# Patient Record
Sex: FEMALE | Race: OTHER | HISPANIC OR LATINO
[De-identification: names, ages, dates, MRNs, and addresses within clinical notes are randomized per-mention and may not be internally consistent; named-entity substitution may affect disease eponyms.]

---

## 2022-01-01 ENCOUNTER — TRANSCRIPTION ENCOUNTER (OUTPATIENT)
Age: 57
End: 2022-01-01

## 2022-01-01 ENCOUNTER — RESULT REVIEW (OUTPATIENT)
Age: 57
End: 2022-01-01

## 2022-01-01 ENCOUNTER — INPATIENT (INPATIENT)
Facility: HOSPITAL | Age: 57
LOS: 53 days | End: 2022-09-25
Attending: INTERNAL MEDICINE | Admitting: INTERNAL MEDICINE

## 2022-01-01 VITALS
SYSTOLIC BLOOD PRESSURE: 135 MMHG | RESPIRATION RATE: 19 BRPM | OXYGEN SATURATION: 99 % | HEART RATE: 68 BPM | DIASTOLIC BLOOD PRESSURE: 80 MMHG

## 2022-01-01 VITALS
SYSTOLIC BLOOD PRESSURE: 296 MMHG | OXYGEN SATURATION: 98 % | DIASTOLIC BLOOD PRESSURE: 174 MMHG | TEMPERATURE: 99 F | HEIGHT: 65 IN | HEART RATE: 127 BPM | RESPIRATION RATE: 18 BRPM | WEIGHT: 181.22 LBS

## 2022-01-01 DIAGNOSIS — R41.82 ALTERED MENTAL STATUS, UNSPECIFIED: ICD-10-CM
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
## 2022-01-01 DIAGNOSIS — I63.9 CEREBRAL INFARCTION, UNSPECIFIED: ICD-10-CM

## 2022-01-01 DIAGNOSIS — R78.81 BACTEREMIA: ICD-10-CM
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
## 2022-01-01 DIAGNOSIS — Z51.5 ENCOUNTER FOR PALLIATIVE CARE: ICD-10-CM

## 2022-01-01 DIAGNOSIS — D64.9 ANEMIA, UNSPECIFIED: ICD-10-CM
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
## 2022-01-01 LAB
% ALBUMIN: 43.1 % — SIGNIFICANT CHANGE UP
% ALPHA 1: 8.1 % — SIGNIFICANT CHANGE UP
% ALPHA 2: 15.8 % — SIGNIFICANT CHANGE UP
% BETA: 16.6 % — SIGNIFICANT CHANGE UP
% GAMMA: 16.4 % — SIGNIFICANT CHANGE UP
-  AMIKACIN: SIGNIFICANT CHANGE UP
-  AMPICILLIN/SULBACTAM: SIGNIFICANT CHANGE UP
-  AMPICILLIN: SIGNIFICANT CHANGE UP
-  AZTREONAM: SIGNIFICANT CHANGE UP
-  CANDIDA ALBICANS: SIGNIFICANT CHANGE UP
-  CARBAPENEM RESISTANCE: SIGNIFICANT CHANGE UP
-  CEFAZOLIN: SIGNIFICANT CHANGE UP
-  CEFEPIME: SIGNIFICANT CHANGE UP
-  CEFIDEROCOL: SIGNIFICANT CHANGE UP
-  CEFOXITIN: SIGNIFICANT CHANGE UP
-  CEFTAZIDIME/AVIBACTAM: SIGNIFICANT CHANGE UP
-  CEFTOLOZANE/TAZOBACTAM: SIGNIFICANT CHANGE UP
-  CEFTRIAXONE: SIGNIFICANT CHANGE UP
-  CIPROFLOXACIN: SIGNIFICANT CHANGE UP
-  ERTAPENEM: SIGNIFICANT CHANGE UP
-  GENTAMICIN: SIGNIFICANT CHANGE UP
-  IMIPENEM: SIGNIFICANT CHANGE UP
-  LEVOFLOXACIN: SIGNIFICANT CHANGE UP
-  MEROPENEM: SIGNIFICANT CHANGE UP
-  NDM RESISTANCE MARKER: SIGNIFICANT CHANGE UP
-  PIPERACILLIN/TAZOBACTAM: SIGNIFICANT CHANGE UP
-  STAPHYLOCOCCUS EPIDERMIDIS, METHICILLIN RESISTANT: SIGNIFICANT CHANGE UP
-  TIGECYCLINE: SIGNIFICANT CHANGE UP
-  TOBRAMYCIN: SIGNIFICANT CHANGE UP
-  TRIMETHOPRIM/SULFAMETHOXAZOLE: SIGNIFICANT CHANGE UP
A1C WITH ESTIMATED AVERAGE GLUCOSE RESULT: 10.4 % — HIGH (ref 4–5.6)
A1C WITH ESTIMATED AVERAGE GLUCOSE RESULT: 10.8 % — HIGH (ref 4–5.6)
ACE SERPL-CCNC: 27 U/L — SIGNIFICANT CHANGE UP (ref 14–82)
ACE SERPL-CCNC: 30 U/L — SIGNIFICANT CHANGE UP (ref 14–82)
ALBUMIN CSF-MCNC: 70.4 MG/DL — HIGH (ref 14–25)
ALBUMIN CSF-MCNC: 70.4 MG/DL — HIGH (ref 14–25)
ALBUMIN SERPL ELPH-MCNC: 2.2 G/DL — LOW (ref 3.5–5.2)
ALBUMIN SERPL ELPH-MCNC: 2.2 G/DL — LOW (ref 3.5–5.2)
ALBUMIN SERPL ELPH-MCNC: 2.4 G/DL — LOW (ref 3.5–5.2)
ALBUMIN SERPL ELPH-MCNC: 2.4 G/DL — LOW (ref 3.5–5.2)
ALBUMIN SERPL ELPH-MCNC: 2.5 G/DL — LOW (ref 3.5–5.2)
ALBUMIN SERPL ELPH-MCNC: 2.6 G/DL — LOW (ref 3.5–5.2)
ALBUMIN SERPL ELPH-MCNC: 2.7 G/DL — LOW (ref 3.5–5.2)
ALBUMIN SERPL ELPH-MCNC: 2.8 G/DL — LOW (ref 3.5–5.2)
ALBUMIN SERPL ELPH-MCNC: 2.9 G/DL — LOW (ref 3.5–5.2)
ALBUMIN SERPL ELPH-MCNC: 2356 MG/DL — LOW (ref 3500–5200)
ALBUMIN SERPL ELPH-MCNC: 2356 MG/DL — LOW (ref 3500–5200)
ALBUMIN SERPL ELPH-MCNC: 3 G/DL — LOW (ref 3.5–5.2)
ALBUMIN SERPL ELPH-MCNC: 3.1 G/DL — LOW (ref 3.5–5.2)
ALBUMIN SERPL ELPH-MCNC: 3.1 G/DL — LOW (ref 3.6–5.5)
ALBUMIN SERPL ELPH-MCNC: 3.3 G/DL — LOW (ref 3.5–5.2)
ALBUMIN SERPL ELPH-MCNC: 3.4 G/DL — LOW (ref 3.5–5.2)
ALBUMIN SERPL ELPH-MCNC: 3.4 G/DL — LOW (ref 3.5–5.2)
ALBUMIN SERPL ELPH-MCNC: 3.5 G/DL — SIGNIFICANT CHANGE UP (ref 3.5–5.2)
ALBUMIN SERPL ELPH-MCNC: 3.7 G/DL — SIGNIFICANT CHANGE UP (ref 3.5–5.2)
ALBUMIN SERPL ELPH-MCNC: 3.9 G/DL — SIGNIFICANT CHANGE UP (ref 3.5–5.2)
ALBUMIN SERPL ELPH-MCNC: 4.1 G/DL — SIGNIFICANT CHANGE UP (ref 3.5–5.2)
ALBUMIN/GLOB SERPL ELPH: 0.7 RATIO — SIGNIFICANT CHANGE UP
ALDOST SERPL-MCNC: 6.2 NG/DL — SIGNIFICANT CHANGE UP
ALP SERPL-CCNC: 100 U/L — SIGNIFICANT CHANGE UP (ref 30–115)
ALP SERPL-CCNC: 106 U/L — SIGNIFICANT CHANGE UP (ref 30–115)
ALP SERPL-CCNC: 109 U/L — SIGNIFICANT CHANGE UP (ref 30–115)
ALP SERPL-CCNC: 112 U/L — SIGNIFICANT CHANGE UP (ref 30–115)
ALP SERPL-CCNC: 112 U/L — SIGNIFICANT CHANGE UP (ref 30–115)
ALP SERPL-CCNC: 113 U/L — SIGNIFICANT CHANGE UP (ref 30–115)
ALP SERPL-CCNC: 115 U/L — SIGNIFICANT CHANGE UP (ref 30–115)
ALP SERPL-CCNC: 116 U/L — HIGH (ref 30–115)
ALP SERPL-CCNC: 119 U/L — HIGH (ref 30–115)
ALP SERPL-CCNC: 121 U/L — HIGH (ref 30–115)
ALP SERPL-CCNC: 122 U/L — HIGH (ref 30–115)
ALP SERPL-CCNC: 126 U/L — HIGH (ref 30–115)
ALP SERPL-CCNC: 129 U/L — HIGH (ref 30–115)
ALP SERPL-CCNC: 129 U/L — HIGH (ref 30–115)
ALP SERPL-CCNC: 132 U/L — HIGH (ref 30–115)
ALP SERPL-CCNC: 134 U/L — HIGH (ref 30–115)
ALP SERPL-CCNC: 136 U/L — HIGH (ref 30–115)
ALP SERPL-CCNC: 136 U/L — HIGH (ref 30–115)
ALP SERPL-CCNC: 137 U/L — HIGH (ref 30–115)
ALP SERPL-CCNC: 139 U/L — HIGH (ref 30–115)
ALP SERPL-CCNC: 140 U/L — HIGH (ref 30–115)
ALP SERPL-CCNC: 141 U/L — HIGH (ref 30–115)
ALP SERPL-CCNC: 143 U/L — HIGH (ref 30–115)
ALP SERPL-CCNC: 144 U/L — HIGH (ref 30–115)
ALP SERPL-CCNC: 145 U/L — HIGH (ref 30–115)
ALP SERPL-CCNC: 146 U/L — HIGH (ref 30–115)
ALP SERPL-CCNC: 153 U/L — HIGH (ref 30–115)
ALP SERPL-CCNC: 153 U/L — HIGH (ref 30–115)
ALP SERPL-CCNC: 156 U/L — HIGH (ref 30–115)
ALP SERPL-CCNC: 163 U/L — HIGH (ref 30–115)
ALP SERPL-CCNC: 168 U/L — HIGH (ref 30–115)
ALP SERPL-CCNC: 173 U/L — HIGH (ref 30–115)
ALP SERPL-CCNC: 181 U/L — HIGH (ref 30–115)
ALP SERPL-CCNC: 184 U/L — HIGH (ref 30–115)
ALP SERPL-CCNC: 186 U/L — HIGH (ref 30–115)
ALP SERPL-CCNC: 200 U/L — HIGH (ref 30–115)
ALP SERPL-CCNC: 201 U/L — HIGH (ref 30–115)
ALP SERPL-CCNC: 207 U/L — HIGH (ref 30–115)
ALP SERPL-CCNC: 218 U/L — HIGH (ref 30–115)
ALP SERPL-CCNC: 221 U/L — HIGH (ref 30–115)
ALP SERPL-CCNC: 222 U/L — HIGH (ref 30–115)
ALP SERPL-CCNC: 227 U/L — HIGH (ref 30–115)
ALP SERPL-CCNC: 227 U/L — HIGH (ref 30–115)
ALP SERPL-CCNC: 231 U/L — HIGH (ref 30–115)
ALP SERPL-CCNC: 232 U/L — HIGH (ref 30–115)
ALP SERPL-CCNC: 232 U/L — HIGH (ref 30–115)
ALP SERPL-CCNC: 245 U/L — HIGH (ref 30–115)
ALP SERPL-CCNC: 246 U/L — HIGH (ref 30–115)
ALP SERPL-CCNC: 248 U/L — HIGH (ref 30–115)
ALP SERPL-CCNC: 277 U/L — HIGH (ref 30–115)
ALP SERPL-CCNC: 280 U/L — HIGH (ref 30–115)
ALP SERPL-CCNC: 295 U/L — HIGH (ref 30–115)
ALP SERPL-CCNC: 311 U/L — HIGH (ref 30–115)
ALP SERPL-CCNC: 336 U/L — HIGH (ref 30–115)
ALP SERPL-CCNC: 338 U/L — HIGH (ref 30–115)
ALP SERPL-CCNC: 376 U/L — HIGH (ref 30–115)
ALP SERPL-CCNC: 404 U/L — HIGH (ref 30–115)
ALPHA1 GLOB SERPL ELPH-MCNC: 0.6 G/DL — HIGH (ref 0.1–0.4)
ALPHA2 GLOB SERPL ELPH-MCNC: 1.2 G/DL — HIGH (ref 0.5–1)
ALT FLD-CCNC: 11 U/L — SIGNIFICANT CHANGE UP (ref 0–41)
ALT FLD-CCNC: 12 U/L — SIGNIFICANT CHANGE UP (ref 0–41)
ALT FLD-CCNC: 13 U/L — SIGNIFICANT CHANGE UP (ref 0–41)
ALT FLD-CCNC: 13 U/L — SIGNIFICANT CHANGE UP (ref 0–41)
ALT FLD-CCNC: 14 U/L — SIGNIFICANT CHANGE UP (ref 0–41)
ALT FLD-CCNC: 15 U/L — SIGNIFICANT CHANGE UP (ref 0–41)
ALT FLD-CCNC: 15 U/L — SIGNIFICANT CHANGE UP (ref 0–41)
ALT FLD-CCNC: 16 U/L — SIGNIFICANT CHANGE UP (ref 0–41)
ALT FLD-CCNC: 17 U/L — SIGNIFICANT CHANGE UP (ref 0–41)
ALT FLD-CCNC: 17 U/L — SIGNIFICANT CHANGE UP (ref 0–41)
ALT FLD-CCNC: 18 U/L — SIGNIFICANT CHANGE UP (ref 0–41)
ALT FLD-CCNC: 19 U/L — SIGNIFICANT CHANGE UP (ref 0–41)
ALT FLD-CCNC: 20 U/L — SIGNIFICANT CHANGE UP (ref 0–41)
ALT FLD-CCNC: 20 U/L — SIGNIFICANT CHANGE UP (ref 0–41)
ALT FLD-CCNC: 21 U/L — SIGNIFICANT CHANGE UP (ref 0–41)
ALT FLD-CCNC: 22 U/L — SIGNIFICANT CHANGE UP (ref 0–41)
ALT FLD-CCNC: 23 U/L — SIGNIFICANT CHANGE UP (ref 0–41)
ALT FLD-CCNC: 23 U/L — SIGNIFICANT CHANGE UP (ref 0–41)
ALT FLD-CCNC: 24 U/L — SIGNIFICANT CHANGE UP (ref 0–41)
ALT FLD-CCNC: 24 U/L — SIGNIFICANT CHANGE UP (ref 0–41)
ALT FLD-CCNC: 26 U/L — SIGNIFICANT CHANGE UP (ref 0–41)
ALT FLD-CCNC: 27 U/L — SIGNIFICANT CHANGE UP (ref 0–41)
ALT FLD-CCNC: 27 U/L — SIGNIFICANT CHANGE UP (ref 0–41)
ALT FLD-CCNC: 29 U/L — SIGNIFICANT CHANGE UP (ref 0–41)
ALT FLD-CCNC: 29 U/L — SIGNIFICANT CHANGE UP (ref 0–41)
ALT FLD-CCNC: 30 U/L — SIGNIFICANT CHANGE UP (ref 0–41)
ALT FLD-CCNC: 32 U/L — SIGNIFICANT CHANGE UP (ref 0–41)
ALT FLD-CCNC: 34 U/L — SIGNIFICANT CHANGE UP (ref 0–41)
ALT FLD-CCNC: 35 U/L — SIGNIFICANT CHANGE UP (ref 0–41)
ALT FLD-CCNC: 36 U/L — SIGNIFICANT CHANGE UP (ref 0–41)
ALT FLD-CCNC: 37 U/L — SIGNIFICANT CHANGE UP (ref 0–41)
ALT FLD-CCNC: 38 U/L — SIGNIFICANT CHANGE UP (ref 0–41)
ALT FLD-CCNC: 40 U/L — SIGNIFICANT CHANGE UP (ref 0–41)
ALT FLD-CCNC: 42 U/L — HIGH (ref 0–41)
ALT FLD-CCNC: 45 U/L — HIGH (ref 0–41)
ALT FLD-CCNC: 51 U/L — HIGH (ref 0–41)
ALT FLD-CCNC: 52 U/L — HIGH (ref 0–41)
ALT FLD-CCNC: 7 U/L — SIGNIFICANT CHANGE UP (ref 0–41)
ALT FLD-CCNC: 8 U/L — SIGNIFICANT CHANGE UP (ref 0–41)
AMMONIA BLD-MCNC: 17 UMOL/L — SIGNIFICANT CHANGE UP (ref 11–55)
ANA PAT FLD IF-IMP: ABNORMAL
ANA TITR SER: ABNORMAL
ANA TITR SER: NEGATIVE — SIGNIFICANT CHANGE UP
ANA TITR SER: NEGATIVE — SIGNIFICANT CHANGE UP
ANION GAP SERPL CALC-SCNC: 10 MMOL/L — SIGNIFICANT CHANGE UP (ref 7–14)
ANION GAP SERPL CALC-SCNC: 10 MMOL/L — SIGNIFICANT CHANGE UP (ref 7–14)
ANION GAP SERPL CALC-SCNC: 11 MMOL/L — SIGNIFICANT CHANGE UP (ref 7–14)
ANION GAP SERPL CALC-SCNC: 12 MMOL/L — SIGNIFICANT CHANGE UP (ref 7–14)
ANION GAP SERPL CALC-SCNC: 13 MMOL/L — SIGNIFICANT CHANGE UP (ref 7–14)
ANION GAP SERPL CALC-SCNC: 14 MMOL/L — SIGNIFICANT CHANGE UP (ref 7–14)
ANION GAP SERPL CALC-SCNC: 15 MMOL/L — HIGH (ref 7–14)
ANION GAP SERPL CALC-SCNC: 16 MMOL/L — HIGH (ref 7–14)
ANION GAP SERPL CALC-SCNC: 17 MMOL/L — HIGH (ref 7–14)
ANION GAP SERPL CALC-SCNC: 18 MMOL/L — HIGH (ref 7–14)
ANION GAP SERPL CALC-SCNC: 19 MMOL/L — HIGH (ref 7–14)
ANION GAP SERPL CALC-SCNC: 19 MMOL/L — HIGH (ref 7–14)
ANION GAP SERPL CALC-SCNC: 20 MMOL/L — HIGH (ref 7–14)
ANION GAP SERPL CALC-SCNC: 20 MMOL/L — HIGH (ref 7–14)
ANION GAP SERPL CALC-SCNC: 21 MMOL/L — HIGH (ref 7–14)
ANION GAP SERPL CALC-SCNC: 22 MMOL/L — HIGH (ref 7–14)
ANION GAP SERPL CALC-SCNC: 22 MMOL/L — HIGH (ref 7–14)
ANION GAP SERPL CALC-SCNC: 23 MMOL/L — HIGH (ref 7–14)
ANION GAP SERPL CALC-SCNC: 24 MMOL/L — HIGH (ref 7–14)
ANION GAP SERPL CALC-SCNC: 27 MMOL/L — HIGH (ref 7–14)
ANISOCYTOSIS BLD QL: SLIGHT — SIGNIFICANT CHANGE UP
ANTI-RIBONUCLEAR PROTEIN: <0.2 AI — SIGNIFICANT CHANGE UP
APCR PPP: 2.88 RATIO — SIGNIFICANT CHANGE UP
APCR PPP: 3.12 RATIO — SIGNIFICANT CHANGE UP
APCR PPP: 3.31 RATIO — SIGNIFICANT CHANGE UP
APPEARANCE CSF: ABNORMAL
APPEARANCE SPUN FLD: COLORLESS — SIGNIFICANT CHANGE UP
APPEARANCE UR: ABNORMAL
APTT BLD: 29.7 SEC — SIGNIFICANT CHANGE UP (ref 27–39.2)
APTT BLD: 30 SEC — SIGNIFICANT CHANGE UP (ref 27–39.2)
APTT BLD: 30.5 SEC — SIGNIFICANT CHANGE UP (ref 27–39.2)
APTT BLD: 31.1 SEC — SIGNIFICANT CHANGE UP (ref 27–39.2)
APTT BLD: 31.2 SEC — SIGNIFICANT CHANGE UP (ref 27–39.2)
APTT BLD: 32.3 SEC — SIGNIFICANT CHANGE UP (ref 27–39.2)
APTT BLD: 32.3 SEC — SIGNIFICANT CHANGE UP (ref 27–39.2)
APTT BLD: 33.2 SEC — SIGNIFICANT CHANGE UP (ref 27–39.2)
APTT BLD: 33.4 SEC — SIGNIFICANT CHANGE UP (ref 27–39.2)
APTT BLD: 34.1 SEC — SIGNIFICANT CHANGE UP (ref 27–39.2)
APTT BLD: 34.4 SEC — SIGNIFICANT CHANGE UP (ref 27–39.2)
APTT BLD: 36.4 SEC — SIGNIFICANT CHANGE UP (ref 27–39.2)
APTT BLD: 36.6 SEC — SIGNIFICANT CHANGE UP (ref 27–39.2)
APTT BLD: 54.2 SEC — HIGH (ref 27–39.2)
APTT BLD: 78 SEC — CRITICAL HIGH (ref 27–39.2)
AST SERPL-CCNC: 107 U/L — HIGH (ref 0–41)
AST SERPL-CCNC: 11 U/L — SIGNIFICANT CHANGE UP (ref 0–41)
AST SERPL-CCNC: 12 U/L — SIGNIFICANT CHANGE UP (ref 0–41)
AST SERPL-CCNC: 12 U/L — SIGNIFICANT CHANGE UP (ref 0–41)
AST SERPL-CCNC: 129 U/L — HIGH (ref 0–41)
AST SERPL-CCNC: 13 U/L — SIGNIFICANT CHANGE UP (ref 0–41)
AST SERPL-CCNC: 14 U/L — SIGNIFICANT CHANGE UP (ref 0–41)
AST SERPL-CCNC: 14 U/L — SIGNIFICANT CHANGE UP (ref 0–41)
AST SERPL-CCNC: 148 U/L — HIGH (ref 0–41)
AST SERPL-CCNC: 15 U/L — SIGNIFICANT CHANGE UP (ref 0–41)
AST SERPL-CCNC: 15 U/L — SIGNIFICANT CHANGE UP (ref 0–41)
AST SERPL-CCNC: 154 U/L — HIGH (ref 0–41)
AST SERPL-CCNC: 16 U/L — SIGNIFICANT CHANGE UP (ref 0–41)
AST SERPL-CCNC: 170 U/L — HIGH (ref 0–41)
AST SERPL-CCNC: 176 U/L — HIGH (ref 0–41)
AST SERPL-CCNC: 18 U/L — SIGNIFICANT CHANGE UP (ref 0–41)
AST SERPL-CCNC: 18 U/L — SIGNIFICANT CHANGE UP (ref 0–41)
AST SERPL-CCNC: 19 U/L — SIGNIFICANT CHANGE UP (ref 0–41)
AST SERPL-CCNC: 191 U/L — HIGH (ref 0–41)
AST SERPL-CCNC: 20 U/L — SIGNIFICANT CHANGE UP (ref 0–41)
AST SERPL-CCNC: 21 U/L — SIGNIFICANT CHANGE UP (ref 0–41)
AST SERPL-CCNC: 21 U/L — SIGNIFICANT CHANGE UP (ref 0–41)
AST SERPL-CCNC: 22 U/L — SIGNIFICANT CHANGE UP (ref 0–41)
AST SERPL-CCNC: 22 U/L — SIGNIFICANT CHANGE UP (ref 0–41)
AST SERPL-CCNC: 23 U/L — SIGNIFICANT CHANGE UP (ref 0–41)
AST SERPL-CCNC: 23 U/L — SIGNIFICANT CHANGE UP (ref 0–41)
AST SERPL-CCNC: 24 U/L — SIGNIFICANT CHANGE UP (ref 0–41)
AST SERPL-CCNC: 242 U/L — HIGH (ref 0–41)
AST SERPL-CCNC: 25 U/L — SIGNIFICANT CHANGE UP (ref 0–41)
AST SERPL-CCNC: 25 U/L — SIGNIFICANT CHANGE UP (ref 0–41)
AST SERPL-CCNC: 26 U/L — SIGNIFICANT CHANGE UP (ref 0–41)
AST SERPL-CCNC: 28 U/L — SIGNIFICANT CHANGE UP (ref 0–41)
AST SERPL-CCNC: 281 U/L — HIGH (ref 0–41)
AST SERPL-CCNC: 285 U/L — HIGH (ref 0–41)
AST SERPL-CCNC: 311 U/L — HIGH (ref 0–41)
AST SERPL-CCNC: 32 U/L — SIGNIFICANT CHANGE UP (ref 0–41)
AST SERPL-CCNC: 33 U/L — SIGNIFICANT CHANGE UP (ref 0–41)
AST SERPL-CCNC: 334 U/L — HIGH (ref 0–41)
AST SERPL-CCNC: 35 U/L — SIGNIFICANT CHANGE UP (ref 0–41)
AST SERPL-CCNC: 37 U/L — SIGNIFICANT CHANGE UP (ref 0–41)
AST SERPL-CCNC: 39 U/L — SIGNIFICANT CHANGE UP (ref 0–41)
AST SERPL-CCNC: 40 U/L — SIGNIFICANT CHANGE UP (ref 0–41)
AST SERPL-CCNC: 41 U/L — SIGNIFICANT CHANGE UP (ref 0–41)
AST SERPL-CCNC: 43 U/L — HIGH (ref 0–41)
AST SERPL-CCNC: 43 U/L — HIGH (ref 0–41)
AST SERPL-CCNC: 46 U/L — HIGH (ref 0–41)
AST SERPL-CCNC: 48 U/L — HIGH (ref 0–41)
AST SERPL-CCNC: 50 U/L — HIGH (ref 0–41)
AST SERPL-CCNC: 50 U/L — HIGH (ref 0–41)
AST SERPL-CCNC: 52 U/L — HIGH (ref 0–41)
AST SERPL-CCNC: 59 U/L — HIGH (ref 0–41)
AST SERPL-CCNC: 60 U/L — HIGH (ref 0–41)
AST SERPL-CCNC: 72 U/L — HIGH (ref 0–41)
AST SERPL-CCNC: 82 U/L — HIGH (ref 0–41)
AST SERPL-CCNC: 9 U/L — SIGNIFICANT CHANGE UP (ref 0–41)
AT III ACT/NOR PPP CHRO: 106 % — SIGNIFICANT CHANGE UP (ref 85–135)
AT III ACT/NOR PPP CHRO: 139 % — HIGH (ref 85–135)
AT III ACT/NOR PPP CHRO: 150 % — HIGH (ref 85–135)
AUTO DIFF PNL BLD: NEGATIVE — SIGNIFICANT CHANGE UP
AUTO DIFF PNL BLD: NEGATIVE — SIGNIFICANT CHANGE UP
B BURGDOR AB CSF-ACNC: SIGNIFICANT CHANGE UP
B BURGDOR DNA SPEC QL NAA+PROBE: NEGATIVE — SIGNIFICANT CHANGE UP
B HENSELAE IGG SER-ACNC: NEGATIVE TITER — SIGNIFICANT CHANGE UP
B HENSELAE IGM SER-ACNC: NEGATIVE TITER — SIGNIFICANT CHANGE UP
B QUINTANA IGG SERPL-ACNC: NEGATIVE TITER — SIGNIFICANT CHANGE UP
B QUINTANA IGM SER-ACNC: NEGATIVE TITER — SIGNIFICANT CHANGE UP
B-GLOBULIN SERPL ELPH-MCNC: 1.2 G/DL — HIGH (ref 0.5–1)
B2 GLYCOPROT1 AB SER QL: NEGATIVE — SIGNIFICANT CHANGE UP
BACTERIA # UR AUTO: ABNORMAL
BACTERIA # UR AUTO: ABNORMAL
BACTERIA # UR AUTO: NEGATIVE — SIGNIFICANT CHANGE UP
BASE EXCESS BLDA CALC-SCNC: -3.4 MMOL/L — LOW (ref -2–3)
BASE EXCESS BLDA CALC-SCNC: -3.8 MMOL/L — LOW (ref -2–3)
BASE EXCESS BLDA CALC-SCNC: -4.4 MMOL/L — LOW (ref -2–3)
BASE EXCESS BLDA CALC-SCNC: -6.6 MMOL/L — LOW (ref -2–3)
BASE EXCESS BLDA CALC-SCNC: -8.2 MMOL/L — LOW (ref -2–3)
BASOPHILS # BLD AUTO: 0 K/UL — SIGNIFICANT CHANGE UP (ref 0–0.2)
BASOPHILS # BLD AUTO: 0.01 K/UL — SIGNIFICANT CHANGE UP (ref 0–0.2)
BASOPHILS # BLD AUTO: 0.02 K/UL — SIGNIFICANT CHANGE UP (ref 0–0.2)
BASOPHILS # BLD AUTO: 0.03 K/UL — SIGNIFICANT CHANGE UP (ref 0–0.2)
BASOPHILS # BLD AUTO: 0.04 K/UL — SIGNIFICANT CHANGE UP (ref 0–0.2)
BASOPHILS # BLD AUTO: 0.05 K/UL — SIGNIFICANT CHANGE UP (ref 0–0.2)
BASOPHILS # BLD AUTO: 0.06 K/UL — SIGNIFICANT CHANGE UP (ref 0–0.2)
BASOPHILS NFR BLD AUTO: 0 % — SIGNIFICANT CHANGE UP (ref 0–1)
BASOPHILS NFR BLD AUTO: 0.1 % — SIGNIFICANT CHANGE UP (ref 0–1)
BASOPHILS NFR BLD AUTO: 0.2 % — SIGNIFICANT CHANGE UP (ref 0–1)
BASOPHILS NFR BLD AUTO: 0.3 % — SIGNIFICANT CHANGE UP (ref 0–1)
BASOPHILS NFR BLD AUTO: 0.4 % — SIGNIFICANT CHANGE UP (ref 0–1)
BASOPHILS NFR BLD AUTO: 0.5 % — SIGNIFICANT CHANGE UP (ref 0–1)
BASOPHILS NFR BLD AUTO: 0.6 % — SIGNIFICANT CHANGE UP (ref 0–1)
BASOPHILS NFR BLD AUTO: 0.7 % — SIGNIFICANT CHANGE UP (ref 0–1)
BILIRUB DIRECT SERPL-MCNC: 0.2 MG/DL — SIGNIFICANT CHANGE UP (ref 0–0.3)
BILIRUB INDIRECT FLD-MCNC: 0.1 MG/DL — LOW (ref 0.2–1.2)
BILIRUB SERPL-MCNC: 0.2 MG/DL — SIGNIFICANT CHANGE UP (ref 0.2–1.2)
BILIRUB SERPL-MCNC: 0.3 MG/DL — SIGNIFICANT CHANGE UP (ref 0.2–1.2)
BILIRUB SERPL-MCNC: 0.4 MG/DL — SIGNIFICANT CHANGE UP (ref 0.2–1.2)
BILIRUB SERPL-MCNC: 0.5 MG/DL — SIGNIFICANT CHANGE UP (ref 0.2–1.2)
BILIRUB SERPL-MCNC: 0.6 MG/DL — SIGNIFICANT CHANGE UP (ref 0.2–1.2)
BILIRUB SERPL-MCNC: 0.8 MG/DL — SIGNIFICANT CHANGE UP (ref 0.2–1.2)
BILIRUB SERPL-MCNC: <0.2 MG/DL — SIGNIFICANT CHANGE UP (ref 0.2–1.2)
BILIRUB UR-MCNC: NEGATIVE — SIGNIFICANT CHANGE UP
BLD GP AB SCN SERPL QL: SIGNIFICANT CHANGE UP
BUN SERPL-MCNC: 10 MG/DL — SIGNIFICANT CHANGE UP (ref 10–20)
BUN SERPL-MCNC: 103 MG/DL — CRITICAL HIGH (ref 10–20)
BUN SERPL-MCNC: 107 MG/DL — CRITICAL HIGH (ref 10–20)
BUN SERPL-MCNC: 11 MG/DL — SIGNIFICANT CHANGE UP (ref 10–20)
BUN SERPL-MCNC: 118 MG/DL — CRITICAL HIGH (ref 10–20)
BUN SERPL-MCNC: 12 MG/DL — SIGNIFICANT CHANGE UP (ref 10–20)
BUN SERPL-MCNC: 12 MG/DL — SIGNIFICANT CHANGE UP (ref 10–20)
BUN SERPL-MCNC: 122 MG/DL — CRITICAL HIGH (ref 10–20)
BUN SERPL-MCNC: 125 MG/DL — CRITICAL HIGH (ref 10–20)
BUN SERPL-MCNC: 13 MG/DL — SIGNIFICANT CHANGE UP (ref 10–20)
BUN SERPL-MCNC: 13 MG/DL — SIGNIFICANT CHANGE UP (ref 10–20)
BUN SERPL-MCNC: 130 MG/DL — CRITICAL HIGH (ref 10–20)
BUN SERPL-MCNC: 14 MG/DL — SIGNIFICANT CHANGE UP (ref 10–20)
BUN SERPL-MCNC: 14 MG/DL — SIGNIFICANT CHANGE UP (ref 10–20)
BUN SERPL-MCNC: 15 MG/DL — SIGNIFICANT CHANGE UP (ref 10–20)
BUN SERPL-MCNC: 16 MG/DL — SIGNIFICANT CHANGE UP (ref 10–20)
BUN SERPL-MCNC: 17 MG/DL — SIGNIFICANT CHANGE UP (ref 10–20)
BUN SERPL-MCNC: 17 MG/DL — SIGNIFICANT CHANGE UP (ref 10–20)
BUN SERPL-MCNC: 18 MG/DL — SIGNIFICANT CHANGE UP (ref 10–20)
BUN SERPL-MCNC: 20 MG/DL — SIGNIFICANT CHANGE UP (ref 10–20)
BUN SERPL-MCNC: 21 MG/DL — HIGH (ref 10–20)
BUN SERPL-MCNC: 26 MG/DL — HIGH (ref 10–20)
BUN SERPL-MCNC: 32 MG/DL — HIGH (ref 10–20)
BUN SERPL-MCNC: 32 MG/DL — HIGH (ref 10–20)
BUN SERPL-MCNC: 33 MG/DL — HIGH (ref 10–20)
BUN SERPL-MCNC: 44 MG/DL — HIGH (ref 10–20)
BUN SERPL-MCNC: 47 MG/DL — HIGH (ref 10–20)
BUN SERPL-MCNC: 48 MG/DL — HIGH (ref 10–20)
BUN SERPL-MCNC: 54 MG/DL — HIGH (ref 10–20)
BUN SERPL-MCNC: 57 MG/DL — HIGH (ref 10–20)
BUN SERPL-MCNC: 57 MG/DL — HIGH (ref 10–20)
BUN SERPL-MCNC: 59 MG/DL — HIGH (ref 10–20)
BUN SERPL-MCNC: 60 MG/DL — HIGH (ref 10–20)
BUN SERPL-MCNC: 64 MG/DL — CRITICAL HIGH (ref 10–20)
BUN SERPL-MCNC: 73 MG/DL — CRITICAL HIGH (ref 10–20)
BUN SERPL-MCNC: 73 MG/DL — CRITICAL HIGH (ref 10–20)
BUN SERPL-MCNC: 75 MG/DL — CRITICAL HIGH (ref 10–20)
BUN SERPL-MCNC: 77 MG/DL — CRITICAL HIGH (ref 10–20)
BUN SERPL-MCNC: 78 MG/DL — CRITICAL HIGH (ref 10–20)
BUN SERPL-MCNC: 79 MG/DL — CRITICAL HIGH (ref 10–20)
BUN SERPL-MCNC: 81 MG/DL — CRITICAL HIGH (ref 10–20)
BUN SERPL-MCNC: 82 MG/DL — CRITICAL HIGH (ref 10–20)
BUN SERPL-MCNC: 83 MG/DL — CRITICAL HIGH (ref 10–20)
BUN SERPL-MCNC: 86 MG/DL — CRITICAL HIGH (ref 10–20)
BUN SERPL-MCNC: 88 MG/DL — CRITICAL HIGH (ref 10–20)
BUN SERPL-MCNC: 88 MG/DL — CRITICAL HIGH (ref 10–20)
BUN SERPL-MCNC: 89 MG/DL — CRITICAL HIGH (ref 10–20)
BUN SERPL-MCNC: 9 MG/DL — LOW (ref 10–20)
BUN SERPL-MCNC: 91 MG/DL — CRITICAL HIGH (ref 10–20)
BUN SERPL-MCNC: 93 MG/DL — CRITICAL HIGH (ref 10–20)
BUN SERPL-MCNC: 93 MG/DL — CRITICAL HIGH (ref 10–20)
BUN SERPL-MCNC: 95 MG/DL — CRITICAL HIGH (ref 10–20)
BUN SERPL-MCNC: 96 MG/DL — CRITICAL HIGH (ref 10–20)
BUN SERPL-MCNC: 97 MG/DL — CRITICAL HIGH (ref 10–20)
BUN SERPL-MCNC: 99 MG/DL — CRITICAL HIGH (ref 10–20)
BURR CELLS BLD QL SMEAR: PRESENT — SIGNIFICANT CHANGE UP
C DIFF BY PCR RESULT: NEGATIVE — SIGNIFICANT CHANGE UP
C DIFF TOX GENS STL QL NAA+PROBE: SIGNIFICANT CHANGE UP
C-ANCA SER-ACNC: NEGATIVE — SIGNIFICANT CHANGE UP
C-ANCA SER-ACNC: NEGATIVE — SIGNIFICANT CHANGE UP
C3 SERPL-MCNC: 134 MG/DL — SIGNIFICANT CHANGE UP (ref 81–157)
C4 SERPL-MCNC: 33 MG/DL — SIGNIFICANT CHANGE UP (ref 13–39)
CALCIUM SERPL-MCNC: 7.2 MG/DL — LOW (ref 8.4–10.5)
CALCIUM SERPL-MCNC: 7.4 MG/DL — LOW (ref 8.4–10.5)
CALCIUM SERPL-MCNC: 7.5 MG/DL — LOW (ref 8.4–10.5)
CALCIUM SERPL-MCNC: 7.6 MG/DL — LOW (ref 8.4–10.5)
CALCIUM SERPL-MCNC: 7.6 MG/DL — LOW (ref 8.4–10.5)
CALCIUM SERPL-MCNC: 7.7 MG/DL — LOW (ref 8.4–10.5)
CALCIUM SERPL-MCNC: 7.9 MG/DL — LOW (ref 8.4–10.5)
CALCIUM SERPL-MCNC: 7.9 MG/DL — LOW (ref 8.4–10.5)
CALCIUM SERPL-MCNC: 8 MG/DL — LOW (ref 8.4–10.5)
CALCIUM SERPL-MCNC: 8 MG/DL — LOW (ref 8.5–10.1)
CALCIUM SERPL-MCNC: 8 MG/DL — LOW (ref 8.5–10.1)
CALCIUM SERPL-MCNC: 8.1 MG/DL — LOW (ref 8.4–10.5)
CALCIUM SERPL-MCNC: 8.2 MG/DL — LOW (ref 8.4–10.4)
CALCIUM SERPL-MCNC: 8.2 MG/DL — LOW (ref 8.4–10.5)
CALCIUM SERPL-MCNC: 8.2 MG/DL — LOW (ref 8.4–10.5)
CALCIUM SERPL-MCNC: 8.2 MG/DL — LOW (ref 8.5–10.1)
CALCIUM SERPL-MCNC: 8.3 MG/DL — LOW (ref 8.4–10.4)
CALCIUM SERPL-MCNC: 8.3 MG/DL — LOW (ref 8.4–10.4)
CALCIUM SERPL-MCNC: 8.3 MG/DL — LOW (ref 8.4–10.5)
CALCIUM SERPL-MCNC: 8.3 MG/DL — LOW (ref 8.5–10.1)
CALCIUM SERPL-MCNC: 8.4 MG/DL — LOW (ref 8.5–10.1)
CALCIUM SERPL-MCNC: 8.4 MG/DL — SIGNIFICANT CHANGE UP (ref 8.4–10.5)
CALCIUM SERPL-MCNC: 8.4 MG/DL — SIGNIFICANT CHANGE UP (ref 8.4–10.5)
CALCIUM SERPL-MCNC: 8.5 MG/DL — SIGNIFICANT CHANGE UP (ref 8.4–10.5)
CALCIUM SERPL-MCNC: 8.5 MG/DL — SIGNIFICANT CHANGE UP (ref 8.5–10.1)
CALCIUM SERPL-MCNC: 8.6 MG/DL — SIGNIFICANT CHANGE UP (ref 8.5–10.1)
CALCIUM SERPL-MCNC: 8.6 MG/DL — SIGNIFICANT CHANGE UP (ref 8.5–10.1)
CALCIUM SERPL-MCNC: 8.7 MG/DL — SIGNIFICANT CHANGE UP (ref 8.5–10.1)
CALCIUM SERPL-MCNC: 8.7 MG/DL — SIGNIFICANT CHANGE UP (ref 8.5–10.1)
CALCIUM SERPL-MCNC: 8.8 MG/DL — SIGNIFICANT CHANGE UP (ref 8.5–10.1)
CALCIUM SERPL-MCNC: 8.9 MG/DL — SIGNIFICANT CHANGE UP (ref 8.5–10.1)
CALCIUM SERPL-MCNC: 9 MG/DL — SIGNIFICANT CHANGE UP (ref 8.5–10.1)
CALCIUM SERPL-MCNC: 9.1 MG/DL — SIGNIFICANT CHANGE UP (ref 8.5–10.1)
CALCIUM SERPL-MCNC: 9.3 MG/DL — SIGNIFICANT CHANGE UP (ref 8.5–10.1)
CALCIUM SERPL-MCNC: 9.3 MG/DL — SIGNIFICANT CHANGE UP (ref 8.5–10.1)
CALCIUM SERPL-MCNC: 9.4 MG/DL — SIGNIFICANT CHANGE UP (ref 8.4–10.5)
CALCIUM SERPL-MCNC: 9.4 MG/DL — SIGNIFICANT CHANGE UP (ref 8.5–10.1)
CALCIUM SERPL-MCNC: 9.4 MG/DL — SIGNIFICANT CHANGE UP (ref 8.5–10.1)
CALCIUM SERPL-MCNC: 9.5 MG/DL — SIGNIFICANT CHANGE UP (ref 8.5–10.1)
CALCIUM SERPL-MCNC: 9.6 MG/DL — SIGNIFICANT CHANGE UP (ref 8.5–10.1)
CALCIUM SERPL-MCNC: 9.7 MG/DL — SIGNIFICANT CHANGE UP (ref 8.5–10.1)
CALCIUM SERPL-MCNC: 9.7 MG/DL — SIGNIFICANT CHANGE UP (ref 8.5–10.1)
CALCIUM SERPL-MCNC: 9.8 MG/DL — SIGNIFICANT CHANGE UP (ref 8.5–10.1)
CARDIOLIPIN AB SER-ACNC: NEGATIVE — SIGNIFICANT CHANGE UP
CHLORIDE SERPL-SCNC: 100 MMOL/L — SIGNIFICANT CHANGE UP (ref 98–110)
CHLORIDE SERPL-SCNC: 101 MMOL/L — SIGNIFICANT CHANGE UP (ref 98–110)
CHLORIDE SERPL-SCNC: 102 MMOL/L — SIGNIFICANT CHANGE UP (ref 98–110)
CHLORIDE SERPL-SCNC: 103 MMOL/L — SIGNIFICANT CHANGE UP (ref 98–110)
CHLORIDE SERPL-SCNC: 104 MMOL/L — SIGNIFICANT CHANGE UP (ref 98–110)
CHLORIDE SERPL-SCNC: 105 MMOL/L — SIGNIFICANT CHANGE UP (ref 98–110)
CHLORIDE SERPL-SCNC: 105 MMOL/L — SIGNIFICANT CHANGE UP (ref 98–110)
CHLORIDE SERPL-SCNC: 106 MMOL/L — SIGNIFICANT CHANGE UP (ref 98–110)
CHLORIDE SERPL-SCNC: 107 MMOL/L — SIGNIFICANT CHANGE UP (ref 98–110)
CHLORIDE SERPL-SCNC: 108 MMOL/L — SIGNIFICANT CHANGE UP (ref 98–110)
CHLORIDE SERPL-SCNC: 109 MMOL/L — SIGNIFICANT CHANGE UP (ref 98–110)
CHLORIDE SERPL-SCNC: 110 MMOL/L — SIGNIFICANT CHANGE UP (ref 98–110)
CHLORIDE SERPL-SCNC: 111 MMOL/L — HIGH (ref 98–110)
CHLORIDE SERPL-SCNC: 112 MMOL/L — HIGH (ref 98–110)
CHLORIDE SERPL-SCNC: 87 MMOL/L — LOW (ref 98–110)
CHLORIDE SERPL-SCNC: 88 MMOL/L — LOW (ref 98–110)
CHLORIDE SERPL-SCNC: 88 MMOL/L — LOW (ref 98–110)
CHLORIDE SERPL-SCNC: 90 MMOL/L — LOW (ref 98–110)
CHLORIDE SERPL-SCNC: 91 MMOL/L — LOW (ref 98–110)
CHLORIDE SERPL-SCNC: 91 MMOL/L — LOW (ref 98–110)
CHLORIDE SERPL-SCNC: 92 MMOL/L — LOW (ref 98–110)
CHLORIDE SERPL-SCNC: 93 MMOL/L — LOW (ref 98–110)
CHLORIDE SERPL-SCNC: 94 MMOL/L — LOW (ref 98–110)
CHLORIDE SERPL-SCNC: 95 MMOL/L — LOW (ref 98–110)
CHLORIDE SERPL-SCNC: 95 MMOL/L — LOW (ref 98–110)
CHLORIDE SERPL-SCNC: 96 MMOL/L — LOW (ref 98–110)
CHLORIDE SERPL-SCNC: 97 MMOL/L — LOW (ref 98–110)
CHLORIDE SERPL-SCNC: 99 MMOL/L — SIGNIFICANT CHANGE UP (ref 98–110)
CHLORIDE SERPL-SCNC: 99 MMOL/L — SIGNIFICANT CHANGE UP (ref 98–110)
CHOLEST SERPL-MCNC: 234 MG/DL — HIGH
CK SERPL-CCNC: 123 U/L — SIGNIFICANT CHANGE UP (ref 0–225)
CO2 SERPL-SCNC: 13 MMOL/L — LOW (ref 17–32)
CO2 SERPL-SCNC: 15 MMOL/L — LOW (ref 17–32)
CO2 SERPL-SCNC: 16 MMOL/L — LOW (ref 17–32)
CO2 SERPL-SCNC: 17 MMOL/L — SIGNIFICANT CHANGE UP (ref 17–32)
CO2 SERPL-SCNC: 18 MMOL/L — SIGNIFICANT CHANGE UP (ref 17–32)
CO2 SERPL-SCNC: 19 MMOL/L — SIGNIFICANT CHANGE UP (ref 17–32)
CO2 SERPL-SCNC: 20 MMOL/L — SIGNIFICANT CHANGE UP (ref 17–32)
CO2 SERPL-SCNC: 21 MMOL/L — SIGNIFICANT CHANGE UP (ref 17–32)
CO2 SERPL-SCNC: 22 MMOL/L — SIGNIFICANT CHANGE UP (ref 17–32)
CO2 SERPL-SCNC: 23 MMOL/L — SIGNIFICANT CHANGE UP (ref 17–32)
CO2 SERPL-SCNC: 24 MMOL/L — SIGNIFICANT CHANGE UP (ref 17–32)
CO2 SERPL-SCNC: 25 MMOL/L — SIGNIFICANT CHANGE UP (ref 17–32)
CO2 SERPL-SCNC: 26 MMOL/L — SIGNIFICANT CHANGE UP (ref 17–32)
CO2 SERPL-SCNC: 28 MMOL/L — SIGNIFICANT CHANGE UP (ref 17–32)
CO2 SERPL-SCNC: 29 MMOL/L — SIGNIFICANT CHANGE UP (ref 17–32)
CO2 SERPL-SCNC: 30 MMOL/L — SIGNIFICANT CHANGE UP (ref 17–32)
CO2 SERPL-SCNC: 31 MMOL/L — SIGNIFICANT CHANGE UP (ref 17–32)
COLOR CSF: ABNORMAL
COLOR SPEC: ABNORMAL
COLOR SPEC: ABNORMAL
COLOR SPEC: YELLOW — SIGNIFICANT CHANGE UP
COMMENT - URINE: SIGNIFICANT CHANGE UP
CREAT ?TM UR-MCNC: 17 MG/DL — SIGNIFICANT CHANGE UP
CREAT ?TM UR-MCNC: 35 MG/DL — SIGNIFICANT CHANGE UP
CREAT SERPL-MCNC: 0.6 MG/DL — LOW (ref 0.7–1.5)
CREAT SERPL-MCNC: 0.6 MG/DL — LOW (ref 0.7–1.5)
CREAT SERPL-MCNC: 0.7 MG/DL — SIGNIFICANT CHANGE UP (ref 0.7–1.5)
CREAT SERPL-MCNC: 0.8 MG/DL — SIGNIFICANT CHANGE UP (ref 0.7–1.5)
CREAT SERPL-MCNC: 0.9 MG/DL — SIGNIFICANT CHANGE UP (ref 0.7–1.5)
CREAT SERPL-MCNC: 1 MG/DL — SIGNIFICANT CHANGE UP (ref 0.7–1.5)
CREAT SERPL-MCNC: 1 MG/DL — SIGNIFICANT CHANGE UP (ref 0.7–1.5)
CREAT SERPL-MCNC: 1.6 MG/DL — HIGH (ref 0.7–1.5)
CREAT SERPL-MCNC: 1.9 MG/DL — HIGH (ref 0.7–1.5)
CREAT SERPL-MCNC: 2.7 MG/DL — HIGH (ref 0.7–1.5)
CREAT SERPL-MCNC: 2.8 MG/DL — HIGH (ref 0.7–1.5)
CREAT SERPL-MCNC: 2.9 MG/DL — HIGH (ref 0.7–1.5)
CREAT SERPL-MCNC: 2.9 MG/DL — HIGH (ref 0.7–1.5)
CREAT SERPL-MCNC: 3 MG/DL — HIGH (ref 0.7–1.5)
CREAT SERPL-MCNC: 3 MG/DL — HIGH (ref 0.7–1.5)
CREAT SERPL-MCNC: 3.2 MG/DL — HIGH (ref 0.7–1.5)
CREAT SERPL-MCNC: 3.3 MG/DL — HIGH (ref 0.7–1.5)
CREAT SERPL-MCNC: 3.4 MG/DL — HIGH (ref 0.7–1.5)
CREAT SERPL-MCNC: 3.4 MG/DL — HIGH (ref 0.7–1.5)
CREAT SERPL-MCNC: 3.5 MG/DL — HIGH (ref 0.7–1.5)
CREAT SERPL-MCNC: 3.5 MG/DL — HIGH (ref 0.7–1.5)
CREAT SERPL-MCNC: 3.6 MG/DL — HIGH (ref 0.7–1.5)
CREAT SERPL-MCNC: 3.7 MG/DL — HIGH (ref 0.7–1.5)
CREAT SERPL-MCNC: 3.8 MG/DL — HIGH (ref 0.7–1.5)
CREAT SERPL-MCNC: 4.2 MG/DL — CRITICAL HIGH (ref 0.7–1.5)
CREAT SERPL-MCNC: 4.3 MG/DL — CRITICAL HIGH (ref 0.7–1.5)
CREAT SERPL-MCNC: 4.6 MG/DL — CRITICAL HIGH (ref 0.7–1.5)
CREAT SERPL-MCNC: 4.7 MG/DL — CRITICAL HIGH (ref 0.7–1.5)
CREAT SERPL-MCNC: 4.9 MG/DL — CRITICAL HIGH (ref 0.7–1.5)
CREAT SERPL-MCNC: 5.2 MG/DL — CRITICAL HIGH (ref 0.7–1.5)
CREAT SERPL-MCNC: 5.3 MG/DL — CRITICAL HIGH (ref 0.7–1.5)
CREAT SERPL-MCNC: 5.3 MG/DL — CRITICAL HIGH (ref 0.7–1.5)
CREAT SERPL-MCNC: 5.4 MG/DL — CRITICAL HIGH (ref 0.7–1.5)
CREAT SERPL-MCNC: 5.4 MG/DL — CRITICAL HIGH (ref 0.7–1.5)
CREAT SERPL-MCNC: 5.6 MG/DL — CRITICAL HIGH (ref 0.7–1.5)
CREAT SERPL-MCNC: 5.8 MG/DL — CRITICAL HIGH (ref 0.7–1.5)
CREAT SERPL-MCNC: 6.1 MG/DL — CRITICAL HIGH (ref 0.7–1.5)
CREAT SERPL-MCNC: 6.4 MG/DL — CRITICAL HIGH (ref 0.7–1.5)
CREAT SERPL-MCNC: 6.8 MG/DL — CRITICAL HIGH (ref 0.7–1.5)
CREAT SERPL-MCNC: 6.8 MG/DL — CRITICAL HIGH (ref 0.7–1.5)
CRP SERPL-MCNC: 289.1 MG/L — HIGH
CRP SERPL-MCNC: 35.6 MG/L — HIGH
CRP SERPL-MCNC: 54.4 MG/L — HIGH
CRYOGLOB SERPL-MCNC: NEGATIVE — SIGNIFICANT CHANGE UP
CRYPTOC AG CSF-ACNC: NEGATIVE — SIGNIFICANT CHANGE UP
CSF PCR RESULT: SIGNIFICANT CHANGE UP
CULTURE RESULTS: NO GROWTH — SIGNIFICANT CHANGE UP
CULTURE RESULTS: NO GROWTH — SIGNIFICANT CHANGE UP
CULTURE RESULTS: SIGNIFICANT CHANGE UP
D DIMER BLD IA.RAPID-MCNC: 1081 NG/ML DDU — HIGH (ref 0–230)
D DIMER BLD IA.RAPID-MCNC: 231 NG/ML DDU — HIGH (ref 0–230)
D DIMER BLD IA.RAPID-MCNC: 386 NG/ML DDU — HIGH (ref 0–230)
D DIMER BLD IA.RAPID-MCNC: 438 NG/ML DDU — HIGH (ref 0–230)
D DIMER BLD IA.RAPID-MCNC: 685 NG/ML DDU — HIGH (ref 0–230)
DIFF PNL FLD: ABNORMAL
DNA PLOIDY SPEC FC-IMP: SIGNIFICANT CHANGE UP
DRVVT SCREEN TO CONFIRM RATIO: SIGNIFICANT CHANGE UP
DRVVT SCREEN TO CONFIRM RATIO: SIGNIFICANT CHANGE UP
DSDNA AB FLD-ACNC: <0.2 AI — SIGNIFICANT CHANGE UP
DSDNA AB FLD-ACNC: <0.2 AI — SIGNIFICANT CHANGE UP
DSDNA AB SER-ACNC: <12 IU/ML — SIGNIFICANT CHANGE UP
E CLOAC COMP DNA BLD POS QL NAA+PROBE: SIGNIFICANT CHANGE UP
E FAECIUM DNA BLD POS QL NAA+NON-PROBE: SIGNIFICANT CHANGE UP
EGFR: 10 ML/MIN/1.73M2 — LOW
EGFR: 10 ML/MIN/1.73M2 — LOW
EGFR: 101 ML/MIN/1.73M2 — SIGNIFICANT CHANGE UP
EGFR: 105 ML/MIN/1.73M2 — SIGNIFICANT CHANGE UP
EGFR: 105 ML/MIN/1.73M2 — SIGNIFICANT CHANGE UP
EGFR: 11 ML/MIN/1.73M2 — LOW
EGFR: 11 ML/MIN/1.73M2 — LOW
EGFR: 12 ML/MIN/1.73M2 — LOW
EGFR: 13 ML/MIN/1.73M2 — LOW
EGFR: 14 ML/MIN/1.73M2 — LOW
EGFR: 14 ML/MIN/1.73M2 — LOW
EGFR: 15 ML/MIN/1.73M2 — LOW
EGFR: 16 ML/MIN/1.73M2 — LOW
EGFR: 16 ML/MIN/1.73M2 — LOW
EGFR: 18 ML/MIN/1.73M2 — LOW
EGFR: 19 ML/MIN/1.73M2 — LOW
EGFR: 20 ML/MIN/1.73M2 — LOW
EGFR: 31 ML/MIN/1.73M2 — LOW
EGFR: 38 ML/MIN/1.73M2 — LOW
EGFR: 66 ML/MIN/1.73M2 — SIGNIFICANT CHANGE UP
EGFR: 66 ML/MIN/1.73M2 — SIGNIFICANT CHANGE UP
EGFR: 7 ML/MIN/1.73M2 — LOW
EGFR: 75 ML/MIN/1.73M2 — SIGNIFICANT CHANGE UP
EGFR: 8 ML/MIN/1.73M2 — LOW
EGFR: 86 ML/MIN/1.73M2 — SIGNIFICANT CHANGE UP
EGFR: 9 ML/MIN/1.73M2 — LOW
ENA SM AB FLD QL: <0.2 AI — SIGNIFICANT CHANGE UP
ENA SS-A AB FLD IA-ACNC: <0.2 AI — SIGNIFICANT CHANGE UP
ENA SS-A AB FLD IA-ACNC: <0.2 AI — SIGNIFICANT CHANGE UP
EOSINOPHIL # BLD AUTO: 0 K/UL — SIGNIFICANT CHANGE UP (ref 0–0.7)
EOSINOPHIL # BLD AUTO: 0.01 K/UL — SIGNIFICANT CHANGE UP (ref 0–0.7)
EOSINOPHIL # BLD AUTO: 0.01 K/UL — SIGNIFICANT CHANGE UP (ref 0–0.7)
EOSINOPHIL # BLD AUTO: 0.02 K/UL — SIGNIFICANT CHANGE UP (ref 0–0.7)
EOSINOPHIL # BLD AUTO: 0.02 K/UL — SIGNIFICANT CHANGE UP (ref 0–0.7)
EOSINOPHIL # BLD AUTO: 0.03 K/UL — SIGNIFICANT CHANGE UP (ref 0–0.7)
EOSINOPHIL # BLD AUTO: 0.05 K/UL — SIGNIFICANT CHANGE UP (ref 0–0.7)
EOSINOPHIL # BLD AUTO: 0.05 K/UL — SIGNIFICANT CHANGE UP (ref 0–0.7)
EOSINOPHIL # BLD AUTO: 0.06 K/UL — SIGNIFICANT CHANGE UP (ref 0–0.7)
EOSINOPHIL # BLD AUTO: 0.06 K/UL — SIGNIFICANT CHANGE UP (ref 0–0.7)
EOSINOPHIL # BLD AUTO: 0.08 K/UL — SIGNIFICANT CHANGE UP (ref 0–0.7)
EOSINOPHIL # BLD AUTO: 0.09 K/UL — SIGNIFICANT CHANGE UP (ref 0–0.7)
EOSINOPHIL # BLD AUTO: 0.09 K/UL — SIGNIFICANT CHANGE UP (ref 0–0.7)
EOSINOPHIL # BLD AUTO: 0.1 K/UL — SIGNIFICANT CHANGE UP (ref 0–0.7)
EOSINOPHIL # BLD AUTO: 0.1 K/UL — SIGNIFICANT CHANGE UP (ref 0–0.7)
EOSINOPHIL # BLD AUTO: 0.11 K/UL — SIGNIFICANT CHANGE UP (ref 0–0.7)
EOSINOPHIL # BLD AUTO: 0.11 K/UL — SIGNIFICANT CHANGE UP (ref 0–0.7)
EOSINOPHIL # BLD AUTO: 0.13 K/UL — SIGNIFICANT CHANGE UP (ref 0–0.7)
EOSINOPHIL # BLD AUTO: 0.15 K/UL — SIGNIFICANT CHANGE UP (ref 0–0.7)
EOSINOPHIL # BLD AUTO: 0.15 K/UL — SIGNIFICANT CHANGE UP (ref 0–0.7)
EOSINOPHIL # BLD AUTO: 0.16 K/UL — SIGNIFICANT CHANGE UP (ref 0–0.7)
EOSINOPHIL # BLD AUTO: 0.16 K/UL — SIGNIFICANT CHANGE UP (ref 0–0.7)
EOSINOPHIL # BLD AUTO: 0.17 K/UL — SIGNIFICANT CHANGE UP (ref 0–0.7)
EOSINOPHIL # BLD AUTO: 0.17 K/UL — SIGNIFICANT CHANGE UP (ref 0–0.7)
EOSINOPHIL # BLD AUTO: 0.19 K/UL — SIGNIFICANT CHANGE UP (ref 0–0.7)
EOSINOPHIL # BLD AUTO: 0.21 K/UL — SIGNIFICANT CHANGE UP (ref 0–0.7)
EOSINOPHIL # BLD AUTO: 0.22 K/UL — SIGNIFICANT CHANGE UP (ref 0–0.7)
EOSINOPHIL # BLD AUTO: 0.23 K/UL — SIGNIFICANT CHANGE UP (ref 0–0.7)
EOSINOPHIL # BLD AUTO: 0.26 K/UL — SIGNIFICANT CHANGE UP (ref 0–0.7)
EOSINOPHIL # BLD AUTO: 0.45 K/UL — SIGNIFICANT CHANGE UP (ref 0–0.7)
EOSINOPHIL # BLD AUTO: 0.51 K/UL — SIGNIFICANT CHANGE UP (ref 0–0.7)
EOSINOPHIL # BLD AUTO: 0.53 K/UL — SIGNIFICANT CHANGE UP (ref 0–0.7)
EOSINOPHIL # BLD AUTO: 0.61 K/UL — SIGNIFICANT CHANGE UP (ref 0–0.7)
EOSINOPHIL # BLD AUTO: 0.68 K/UL — SIGNIFICANT CHANGE UP (ref 0–0.7)
EOSINOPHIL # BLD AUTO: 0.74 K/UL — HIGH (ref 0–0.7)
EOSINOPHIL NFR BLD AUTO: 0 % — SIGNIFICANT CHANGE UP (ref 0–8)
EOSINOPHIL NFR BLD AUTO: 0.1 % — SIGNIFICANT CHANGE UP (ref 0–8)
EOSINOPHIL NFR BLD AUTO: 0.2 % — SIGNIFICANT CHANGE UP (ref 0–8)
EOSINOPHIL NFR BLD AUTO: 0.2 % — SIGNIFICANT CHANGE UP (ref 0–8)
EOSINOPHIL NFR BLD AUTO: 0.3 % — SIGNIFICANT CHANGE UP (ref 0–8)
EOSINOPHIL NFR BLD AUTO: 0.4 % — SIGNIFICANT CHANGE UP (ref 0–8)
EOSINOPHIL NFR BLD AUTO: 0.6 % — SIGNIFICANT CHANGE UP (ref 0–8)
EOSINOPHIL NFR BLD AUTO: 0.7 % — SIGNIFICANT CHANGE UP (ref 0–8)
EOSINOPHIL NFR BLD AUTO: 0.7 % — SIGNIFICANT CHANGE UP (ref 0–8)
EOSINOPHIL NFR BLD AUTO: 0.9 % — SIGNIFICANT CHANGE UP (ref 0–8)
EOSINOPHIL NFR BLD AUTO: 1.1 % — SIGNIFICANT CHANGE UP (ref 0–8)
EOSINOPHIL NFR BLD AUTO: 1.1 % — SIGNIFICANT CHANGE UP (ref 0–8)
EOSINOPHIL NFR BLD AUTO: 1.2 % — SIGNIFICANT CHANGE UP (ref 0–8)
EOSINOPHIL NFR BLD AUTO: 1.3 % — SIGNIFICANT CHANGE UP (ref 0–8)
EOSINOPHIL NFR BLD AUTO: 1.4 % — SIGNIFICANT CHANGE UP (ref 0–8)
EOSINOPHIL NFR BLD AUTO: 1.5 % — SIGNIFICANT CHANGE UP (ref 0–8)
EOSINOPHIL NFR BLD AUTO: 1.8 % — SIGNIFICANT CHANGE UP (ref 0–8)
EOSINOPHIL NFR BLD AUTO: 1.9 % — SIGNIFICANT CHANGE UP (ref 0–8)
EOSINOPHIL NFR BLD AUTO: 2.1 % — SIGNIFICANT CHANGE UP (ref 0–8)
EOSINOPHIL NFR BLD AUTO: 2.2 % — SIGNIFICANT CHANGE UP (ref 0–8)
EOSINOPHIL NFR BLD AUTO: 2.2 % — SIGNIFICANT CHANGE UP (ref 0–8)
EOSINOPHIL NFR BLD AUTO: 2.5 % — SIGNIFICANT CHANGE UP (ref 0–8)
EOSINOPHIL NFR BLD AUTO: 2.6 % — SIGNIFICANT CHANGE UP (ref 0–8)
EOSINOPHIL NFR BLD AUTO: 2.7 % — SIGNIFICANT CHANGE UP (ref 0–8)
EOSINOPHIL NFR BLD AUTO: 2.8 % — SIGNIFICANT CHANGE UP (ref 0–8)
EOSINOPHIL NFR BLD AUTO: 3.1 % — SIGNIFICANT CHANGE UP (ref 0–8)
EOSINOPHIL NFR BLD AUTO: 3.3 % — SIGNIFICANT CHANGE UP (ref 0–8)
EOSINOPHIL NFR BLD AUTO: 3.5 % — SIGNIFICANT CHANGE UP (ref 0–8)
EOSINOPHIL NFR BLD AUTO: 3.6 % — SIGNIFICANT CHANGE UP (ref 0–8)
EOSINOPHIL NFR BLD AUTO: 4.5 % — SIGNIFICANT CHANGE UP (ref 0–8)
EOSINOPHIL NFR BLD AUTO: 4.6 % — SIGNIFICANT CHANGE UP (ref 0–8)
EOSINOPHIL NFR BLD AUTO: 6.2 % — SIGNIFICANT CHANGE UP (ref 0–8)
EOSINOPHIL NFR BLD AUTO: 6.8 % — SIGNIFICANT CHANGE UP (ref 0–8)
EPI CELLS # UR: 2 /HPF — SIGNIFICANT CHANGE UP (ref 0–5)
EPI CELLS # UR: 3 /HPF — SIGNIFICANT CHANGE UP (ref 0–5)
EPI CELLS # UR: 3 /HPF — SIGNIFICANT CHANGE UP (ref 0–5)
EPI CELLS # UR: 6 /HPF — HIGH (ref 0–5)
EPI CELLS # UR: 8 /HPF — HIGH (ref 0–5)
ERYTHROCYTE [SEDIMENTATION RATE] IN BLOOD: 125 MM/HR — HIGH (ref 0–20)
ERYTHROCYTE [SEDIMENTATION RATE] IN BLOOD: 92 MM/HR — HIGH (ref 0–20)
ERYTHROCYTE [SEDIMENTATION RATE] IN BLOOD: >140 MM/HR — HIGH (ref 0–20)
ESTIMATED AVERAGE GLUCOSE: 252 MG/DL — HIGH (ref 68–114)
ESTIMATED AVERAGE GLUCOSE: 263 MG/DL — HIGH (ref 68–114)
FERRITIN SERPL-MCNC: 243 NG/ML — HIGH (ref 15–150)
FIBRINOGEN PPP-MCNC: 520 MG/DL — SIGNIFICANT CHANGE UP (ref 204.4–570.6)
FIBRINOGEN PPP-MCNC: 630 MG/DL — HIGH (ref 204.4–570.6)
FIBRINOGEN PPP-MCNC: >700 MG/DL — HIGH (ref 204.4–570.6)
FOLATE SERPL-MCNC: 11.3 NG/ML — SIGNIFICANT CHANGE UP
GAMMA GLOBULIN: 1.2 G/DL — SIGNIFICANT CHANGE UP (ref 0.6–1.6)
GAMMA INTERFERON BACKGROUND BLD IA-ACNC: 0.03 IU/ML — SIGNIFICANT CHANGE UP
GAS PNL BLDA: SIGNIFICANT CHANGE UP
GIANT PLATELETS BLD QL SMEAR: PRESENT — SIGNIFICANT CHANGE UP
GLUCOSE BLDC GLUCOMTR-MCNC: 100 MG/DL — HIGH (ref 70–99)
GLUCOSE BLDC GLUCOMTR-MCNC: 100 MG/DL — HIGH (ref 70–99)
GLUCOSE BLDC GLUCOMTR-MCNC: 101 MG/DL — HIGH (ref 70–99)
GLUCOSE BLDC GLUCOMTR-MCNC: 102 MG/DL — HIGH (ref 70–99)
GLUCOSE BLDC GLUCOMTR-MCNC: 102 MG/DL — HIGH (ref 70–99)
GLUCOSE BLDC GLUCOMTR-MCNC: 103 MG/DL — HIGH (ref 70–99)
GLUCOSE BLDC GLUCOMTR-MCNC: 104 MG/DL — HIGH (ref 70–99)
GLUCOSE BLDC GLUCOMTR-MCNC: 105 MG/DL — HIGH (ref 70–99)
GLUCOSE BLDC GLUCOMTR-MCNC: 105 MG/DL — HIGH (ref 70–99)
GLUCOSE BLDC GLUCOMTR-MCNC: 106 MG/DL — HIGH (ref 70–99)
GLUCOSE BLDC GLUCOMTR-MCNC: 107 MG/DL — HIGH (ref 70–99)
GLUCOSE BLDC GLUCOMTR-MCNC: 108 MG/DL — HIGH (ref 70–99)
GLUCOSE BLDC GLUCOMTR-MCNC: 108 MG/DL — HIGH (ref 70–99)
GLUCOSE BLDC GLUCOMTR-MCNC: 109 MG/DL — HIGH (ref 70–99)
GLUCOSE BLDC GLUCOMTR-MCNC: 110 MG/DL — HIGH (ref 70–99)
GLUCOSE BLDC GLUCOMTR-MCNC: 111 MG/DL — HIGH (ref 70–99)
GLUCOSE BLDC GLUCOMTR-MCNC: 113 MG/DL — HIGH (ref 70–99)
GLUCOSE BLDC GLUCOMTR-MCNC: 114 MG/DL — HIGH (ref 70–99)
GLUCOSE BLDC GLUCOMTR-MCNC: 114 MG/DL — HIGH (ref 70–99)
GLUCOSE BLDC GLUCOMTR-MCNC: 116 MG/DL — HIGH (ref 70–99)
GLUCOSE BLDC GLUCOMTR-MCNC: 117 MG/DL — HIGH (ref 70–99)
GLUCOSE BLDC GLUCOMTR-MCNC: 118 MG/DL — HIGH (ref 70–99)
GLUCOSE BLDC GLUCOMTR-MCNC: 119 MG/DL — HIGH (ref 70–99)
GLUCOSE BLDC GLUCOMTR-MCNC: 119 MG/DL — HIGH (ref 70–99)
GLUCOSE BLDC GLUCOMTR-MCNC: 120 MG/DL — HIGH (ref 70–99)
GLUCOSE BLDC GLUCOMTR-MCNC: 120 MG/DL — HIGH (ref 70–99)
GLUCOSE BLDC GLUCOMTR-MCNC: 121 MG/DL — HIGH (ref 70–99)
GLUCOSE BLDC GLUCOMTR-MCNC: 121 MG/DL — HIGH (ref 70–99)
GLUCOSE BLDC GLUCOMTR-MCNC: 122 MG/DL — HIGH (ref 70–99)
GLUCOSE BLDC GLUCOMTR-MCNC: 123 MG/DL — HIGH (ref 70–99)
GLUCOSE BLDC GLUCOMTR-MCNC: 124 MG/DL — HIGH (ref 70–99)
GLUCOSE BLDC GLUCOMTR-MCNC: 124 MG/DL — HIGH (ref 70–99)
GLUCOSE BLDC GLUCOMTR-MCNC: 125 MG/DL — HIGH (ref 70–99)
GLUCOSE BLDC GLUCOMTR-MCNC: 125 MG/DL — HIGH (ref 70–99)
GLUCOSE BLDC GLUCOMTR-MCNC: 126 MG/DL — HIGH (ref 70–99)
GLUCOSE BLDC GLUCOMTR-MCNC: 127 MG/DL — HIGH (ref 70–99)
GLUCOSE BLDC GLUCOMTR-MCNC: 128 MG/DL — HIGH (ref 70–99)
GLUCOSE BLDC GLUCOMTR-MCNC: 128 MG/DL — HIGH (ref 70–99)
GLUCOSE BLDC GLUCOMTR-MCNC: 129 MG/DL — HIGH (ref 70–99)
GLUCOSE BLDC GLUCOMTR-MCNC: 130 MG/DL — HIGH (ref 70–99)
GLUCOSE BLDC GLUCOMTR-MCNC: 132 MG/DL — HIGH (ref 70–99)
GLUCOSE BLDC GLUCOMTR-MCNC: 133 MG/DL — HIGH (ref 70–99)
GLUCOSE BLDC GLUCOMTR-MCNC: 134 MG/DL — HIGH (ref 70–99)
GLUCOSE BLDC GLUCOMTR-MCNC: 135 MG/DL — HIGH (ref 70–99)
GLUCOSE BLDC GLUCOMTR-MCNC: 136 MG/DL — HIGH (ref 70–99)
GLUCOSE BLDC GLUCOMTR-MCNC: 137 MG/DL — HIGH (ref 70–99)
GLUCOSE BLDC GLUCOMTR-MCNC: 138 MG/DL — HIGH (ref 70–99)
GLUCOSE BLDC GLUCOMTR-MCNC: 138 MG/DL — HIGH (ref 70–99)
GLUCOSE BLDC GLUCOMTR-MCNC: 139 MG/DL — HIGH (ref 70–99)
GLUCOSE BLDC GLUCOMTR-MCNC: 140 MG/DL — HIGH (ref 70–99)
GLUCOSE BLDC GLUCOMTR-MCNC: 140 MG/DL — HIGH (ref 70–99)
GLUCOSE BLDC GLUCOMTR-MCNC: 141 MG/DL — HIGH (ref 70–99)
GLUCOSE BLDC GLUCOMTR-MCNC: 142 MG/DL — HIGH (ref 70–99)
GLUCOSE BLDC GLUCOMTR-MCNC: 143 MG/DL — HIGH (ref 70–99)
GLUCOSE BLDC GLUCOMTR-MCNC: 145 MG/DL — HIGH (ref 70–99)
GLUCOSE BLDC GLUCOMTR-MCNC: 146 MG/DL — HIGH (ref 70–99)
GLUCOSE BLDC GLUCOMTR-MCNC: 148 MG/DL — HIGH (ref 70–99)
GLUCOSE BLDC GLUCOMTR-MCNC: 148 MG/DL — HIGH (ref 70–99)
GLUCOSE BLDC GLUCOMTR-MCNC: 150 MG/DL — HIGH (ref 70–99)
GLUCOSE BLDC GLUCOMTR-MCNC: 150 MG/DL — HIGH (ref 70–99)
GLUCOSE BLDC GLUCOMTR-MCNC: 152 MG/DL — HIGH (ref 70–99)
GLUCOSE BLDC GLUCOMTR-MCNC: 152 MG/DL — HIGH (ref 70–99)
GLUCOSE BLDC GLUCOMTR-MCNC: 153 MG/DL — HIGH (ref 70–99)
GLUCOSE BLDC GLUCOMTR-MCNC: 154 MG/DL — HIGH (ref 70–99)
GLUCOSE BLDC GLUCOMTR-MCNC: 155 MG/DL — HIGH (ref 70–99)
GLUCOSE BLDC GLUCOMTR-MCNC: 156 MG/DL — HIGH (ref 70–99)
GLUCOSE BLDC GLUCOMTR-MCNC: 156 MG/DL — HIGH (ref 70–99)
GLUCOSE BLDC GLUCOMTR-MCNC: 157 MG/DL — HIGH (ref 70–99)
GLUCOSE BLDC GLUCOMTR-MCNC: 158 MG/DL — HIGH (ref 70–99)
GLUCOSE BLDC GLUCOMTR-MCNC: 158 MG/DL — HIGH (ref 70–99)
GLUCOSE BLDC GLUCOMTR-MCNC: 159 MG/DL — HIGH (ref 70–99)
GLUCOSE BLDC GLUCOMTR-MCNC: 160 MG/DL — HIGH (ref 70–99)
GLUCOSE BLDC GLUCOMTR-MCNC: 161 MG/DL — HIGH (ref 70–99)
GLUCOSE BLDC GLUCOMTR-MCNC: 161 MG/DL — HIGH (ref 70–99)
GLUCOSE BLDC GLUCOMTR-MCNC: 165 MG/DL — HIGH (ref 70–99)
GLUCOSE BLDC GLUCOMTR-MCNC: 165 MG/DL — HIGH (ref 70–99)
GLUCOSE BLDC GLUCOMTR-MCNC: 166 MG/DL — HIGH (ref 70–99)
GLUCOSE BLDC GLUCOMTR-MCNC: 166 MG/DL — HIGH (ref 70–99)
GLUCOSE BLDC GLUCOMTR-MCNC: 167 MG/DL — HIGH (ref 70–99)
GLUCOSE BLDC GLUCOMTR-MCNC: 167 MG/DL — HIGH (ref 70–99)
GLUCOSE BLDC GLUCOMTR-MCNC: 168 MG/DL — HIGH (ref 70–99)
GLUCOSE BLDC GLUCOMTR-MCNC: 169 MG/DL — HIGH (ref 70–99)
GLUCOSE BLDC GLUCOMTR-MCNC: 169 MG/DL — HIGH (ref 70–99)
GLUCOSE BLDC GLUCOMTR-MCNC: 170 MG/DL — HIGH (ref 70–99)
GLUCOSE BLDC GLUCOMTR-MCNC: 171 MG/DL — HIGH (ref 70–99)
GLUCOSE BLDC GLUCOMTR-MCNC: 171 MG/DL — HIGH (ref 70–99)
GLUCOSE BLDC GLUCOMTR-MCNC: 172 MG/DL — HIGH (ref 70–99)
GLUCOSE BLDC GLUCOMTR-MCNC: 173 MG/DL — HIGH (ref 70–99)
GLUCOSE BLDC GLUCOMTR-MCNC: 174 MG/DL — HIGH (ref 70–99)
GLUCOSE BLDC GLUCOMTR-MCNC: 175 MG/DL — HIGH (ref 70–99)
GLUCOSE BLDC GLUCOMTR-MCNC: 175 MG/DL — HIGH (ref 70–99)
GLUCOSE BLDC GLUCOMTR-MCNC: 176 MG/DL — HIGH (ref 70–99)
GLUCOSE BLDC GLUCOMTR-MCNC: 176 MG/DL — HIGH (ref 70–99)
GLUCOSE BLDC GLUCOMTR-MCNC: 177 MG/DL — HIGH (ref 70–99)
GLUCOSE BLDC GLUCOMTR-MCNC: 178 MG/DL — HIGH (ref 70–99)
GLUCOSE BLDC GLUCOMTR-MCNC: 180 MG/DL — HIGH (ref 70–99)
GLUCOSE BLDC GLUCOMTR-MCNC: 181 MG/DL — HIGH (ref 70–99)
GLUCOSE BLDC GLUCOMTR-MCNC: 181 MG/DL — HIGH (ref 70–99)
GLUCOSE BLDC GLUCOMTR-MCNC: 182 MG/DL — HIGH (ref 70–99)
GLUCOSE BLDC GLUCOMTR-MCNC: 183 MG/DL — HIGH (ref 70–99)
GLUCOSE BLDC GLUCOMTR-MCNC: 184 MG/DL — HIGH (ref 70–99)
GLUCOSE BLDC GLUCOMTR-MCNC: 184 MG/DL — HIGH (ref 70–99)
GLUCOSE BLDC GLUCOMTR-MCNC: 186 MG/DL — HIGH (ref 70–99)
GLUCOSE BLDC GLUCOMTR-MCNC: 186 MG/DL — HIGH (ref 70–99)
GLUCOSE BLDC GLUCOMTR-MCNC: 187 MG/DL — HIGH (ref 70–99)
GLUCOSE BLDC GLUCOMTR-MCNC: 187 MG/DL — HIGH (ref 70–99)
GLUCOSE BLDC GLUCOMTR-MCNC: 189 MG/DL — HIGH (ref 70–99)
GLUCOSE BLDC GLUCOMTR-MCNC: 189 MG/DL — HIGH (ref 70–99)
GLUCOSE BLDC GLUCOMTR-MCNC: 190 MG/DL — HIGH (ref 70–99)
GLUCOSE BLDC GLUCOMTR-MCNC: 190 MG/DL — HIGH (ref 70–99)
GLUCOSE BLDC GLUCOMTR-MCNC: 191 MG/DL — HIGH (ref 70–99)
GLUCOSE BLDC GLUCOMTR-MCNC: 193 MG/DL — HIGH (ref 70–99)
GLUCOSE BLDC GLUCOMTR-MCNC: 194 MG/DL — HIGH (ref 70–99)
GLUCOSE BLDC GLUCOMTR-MCNC: 197 MG/DL — HIGH (ref 70–99)
GLUCOSE BLDC GLUCOMTR-MCNC: 198 MG/DL — HIGH (ref 70–99)
GLUCOSE BLDC GLUCOMTR-MCNC: 199 MG/DL — HIGH (ref 70–99)
GLUCOSE BLDC GLUCOMTR-MCNC: 200 MG/DL — HIGH (ref 70–99)
GLUCOSE BLDC GLUCOMTR-MCNC: 200 MG/DL — HIGH (ref 70–99)
GLUCOSE BLDC GLUCOMTR-MCNC: 201 MG/DL — HIGH (ref 70–99)
GLUCOSE BLDC GLUCOMTR-MCNC: 202 MG/DL — HIGH (ref 70–99)
GLUCOSE BLDC GLUCOMTR-MCNC: 203 MG/DL — HIGH (ref 70–99)
GLUCOSE BLDC GLUCOMTR-MCNC: 205 MG/DL — HIGH (ref 70–99)
GLUCOSE BLDC GLUCOMTR-MCNC: 208 MG/DL — HIGH (ref 70–99)
GLUCOSE BLDC GLUCOMTR-MCNC: 211 MG/DL — HIGH (ref 70–99)
GLUCOSE BLDC GLUCOMTR-MCNC: 212 MG/DL — HIGH (ref 70–99)
GLUCOSE BLDC GLUCOMTR-MCNC: 213 MG/DL — HIGH (ref 70–99)
GLUCOSE BLDC GLUCOMTR-MCNC: 214 MG/DL — HIGH (ref 70–99)
GLUCOSE BLDC GLUCOMTR-MCNC: 215 MG/DL — HIGH (ref 70–99)
GLUCOSE BLDC GLUCOMTR-MCNC: 219 MG/DL — HIGH (ref 70–99)
GLUCOSE BLDC GLUCOMTR-MCNC: 219 MG/DL — HIGH (ref 70–99)
GLUCOSE BLDC GLUCOMTR-MCNC: 220 MG/DL — HIGH (ref 70–99)
GLUCOSE BLDC GLUCOMTR-MCNC: 220 MG/DL — HIGH (ref 70–99)
GLUCOSE BLDC GLUCOMTR-MCNC: 224 MG/DL — HIGH (ref 70–99)
GLUCOSE BLDC GLUCOMTR-MCNC: 225 MG/DL — HIGH (ref 70–99)
GLUCOSE BLDC GLUCOMTR-MCNC: 229 MG/DL — HIGH (ref 70–99)
GLUCOSE BLDC GLUCOMTR-MCNC: 229 MG/DL — HIGH (ref 70–99)
GLUCOSE BLDC GLUCOMTR-MCNC: 230 MG/DL — HIGH (ref 70–99)
GLUCOSE BLDC GLUCOMTR-MCNC: 230 MG/DL — HIGH (ref 70–99)
GLUCOSE BLDC GLUCOMTR-MCNC: 233 MG/DL — HIGH (ref 70–99)
GLUCOSE BLDC GLUCOMTR-MCNC: 235 MG/DL — HIGH (ref 70–99)
GLUCOSE BLDC GLUCOMTR-MCNC: 235 MG/DL — HIGH (ref 70–99)
GLUCOSE BLDC GLUCOMTR-MCNC: 238 MG/DL — HIGH (ref 70–99)
GLUCOSE BLDC GLUCOMTR-MCNC: 240 MG/DL — HIGH (ref 70–99)
GLUCOSE BLDC GLUCOMTR-MCNC: 244 MG/DL — HIGH (ref 70–99)
GLUCOSE BLDC GLUCOMTR-MCNC: 248 MG/DL — HIGH (ref 70–99)
GLUCOSE BLDC GLUCOMTR-MCNC: 252 MG/DL — HIGH (ref 70–99)
GLUCOSE BLDC GLUCOMTR-MCNC: 258 MG/DL — HIGH (ref 70–99)
GLUCOSE BLDC GLUCOMTR-MCNC: 260 MG/DL — HIGH (ref 70–99)
GLUCOSE BLDC GLUCOMTR-MCNC: 268 MG/DL — HIGH (ref 70–99)
GLUCOSE BLDC GLUCOMTR-MCNC: 272 MG/DL — HIGH (ref 70–99)
GLUCOSE BLDC GLUCOMTR-MCNC: 272 MG/DL — HIGH (ref 70–99)
GLUCOSE BLDC GLUCOMTR-MCNC: 275 MG/DL — HIGH (ref 70–99)
GLUCOSE BLDC GLUCOMTR-MCNC: 283 MG/DL — HIGH (ref 70–99)
GLUCOSE BLDC GLUCOMTR-MCNC: 286 MG/DL — HIGH (ref 70–99)
GLUCOSE BLDC GLUCOMTR-MCNC: 287 MG/DL — HIGH (ref 70–99)
GLUCOSE BLDC GLUCOMTR-MCNC: 300 MG/DL — HIGH (ref 70–99)
GLUCOSE BLDC GLUCOMTR-MCNC: 300 MG/DL — HIGH (ref 70–99)
GLUCOSE BLDC GLUCOMTR-MCNC: 302 MG/DL — HIGH (ref 70–99)
GLUCOSE BLDC GLUCOMTR-MCNC: 303 MG/DL — HIGH (ref 70–99)
GLUCOSE BLDC GLUCOMTR-MCNC: 303 MG/DL — HIGH (ref 70–99)
GLUCOSE BLDC GLUCOMTR-MCNC: 307 MG/DL — HIGH (ref 70–99)
GLUCOSE BLDC GLUCOMTR-MCNC: 308 MG/DL — HIGH (ref 70–99)
GLUCOSE BLDC GLUCOMTR-MCNC: 312 MG/DL — HIGH (ref 70–99)
GLUCOSE BLDC GLUCOMTR-MCNC: 318 MG/DL — HIGH (ref 70–99)
GLUCOSE BLDC GLUCOMTR-MCNC: 344 MG/DL — HIGH (ref 70–99)
GLUCOSE BLDC GLUCOMTR-MCNC: 360 MG/DL — HIGH (ref 70–99)
GLUCOSE BLDC GLUCOMTR-MCNC: 438 MG/DL — HIGH (ref 70–99)
GLUCOSE BLDC GLUCOMTR-MCNC: 60 MG/DL — LOW (ref 70–99)
GLUCOSE BLDC GLUCOMTR-MCNC: 82 MG/DL — SIGNIFICANT CHANGE UP (ref 70–99)
GLUCOSE BLDC GLUCOMTR-MCNC: 83 MG/DL — SIGNIFICANT CHANGE UP (ref 70–99)
GLUCOSE BLDC GLUCOMTR-MCNC: 83 MG/DL — SIGNIFICANT CHANGE UP (ref 70–99)
GLUCOSE BLDC GLUCOMTR-MCNC: 85 MG/DL — SIGNIFICANT CHANGE UP (ref 70–99)
GLUCOSE BLDC GLUCOMTR-MCNC: 86 MG/DL — SIGNIFICANT CHANGE UP (ref 70–99)
GLUCOSE BLDC GLUCOMTR-MCNC: 89 MG/DL — SIGNIFICANT CHANGE UP (ref 70–99)
GLUCOSE BLDC GLUCOMTR-MCNC: 90 MG/DL — SIGNIFICANT CHANGE UP (ref 70–99)
GLUCOSE BLDC GLUCOMTR-MCNC: 94 MG/DL — SIGNIFICANT CHANGE UP (ref 70–99)
GLUCOSE BLDC GLUCOMTR-MCNC: 94 MG/DL — SIGNIFICANT CHANGE UP (ref 70–99)
GLUCOSE BLDC GLUCOMTR-MCNC: 96 MG/DL — SIGNIFICANT CHANGE UP (ref 70–99)
GLUCOSE BLDC GLUCOMTR-MCNC: 97 MG/DL — SIGNIFICANT CHANGE UP (ref 70–99)
GLUCOSE BLDC GLUCOMTR-MCNC: 98 MG/DL — SIGNIFICANT CHANGE UP (ref 70–99)
GLUCOSE BLDC GLUCOMTR-MCNC: 99 MG/DL — SIGNIFICANT CHANGE UP (ref 70–99)
GLUCOSE BLDC GLUCOMTR-MCNC: 99 MG/DL — SIGNIFICANT CHANGE UP (ref 70–99)
GLUCOSE CSF-MCNC: 67 MG/DL — SIGNIFICANT CHANGE UP (ref 45–75)
GLUCOSE SERPL-MCNC: 100 MG/DL — HIGH (ref 70–99)
GLUCOSE SERPL-MCNC: 102 MG/DL — HIGH (ref 70–99)
GLUCOSE SERPL-MCNC: 102 MG/DL — HIGH (ref 70–99)
GLUCOSE SERPL-MCNC: 103 MG/DL — HIGH (ref 70–99)
GLUCOSE SERPL-MCNC: 103 MG/DL — HIGH (ref 70–99)
GLUCOSE SERPL-MCNC: 104 MG/DL — HIGH (ref 70–99)
GLUCOSE SERPL-MCNC: 106 MG/DL — HIGH (ref 70–99)
GLUCOSE SERPL-MCNC: 108 MG/DL — HIGH (ref 70–99)
GLUCOSE SERPL-MCNC: 112 MG/DL — HIGH (ref 70–99)
GLUCOSE SERPL-MCNC: 112 MG/DL — HIGH (ref 70–99)
GLUCOSE SERPL-MCNC: 115 MG/DL — HIGH (ref 70–99)
GLUCOSE SERPL-MCNC: 116 MG/DL — HIGH (ref 70–99)
GLUCOSE SERPL-MCNC: 117 MG/DL — HIGH (ref 70–99)
GLUCOSE SERPL-MCNC: 118 MG/DL — HIGH (ref 70–99)
GLUCOSE SERPL-MCNC: 119 MG/DL — HIGH (ref 70–99)
GLUCOSE SERPL-MCNC: 119 MG/DL — HIGH (ref 70–99)
GLUCOSE SERPL-MCNC: 120 MG/DL — HIGH (ref 70–99)
GLUCOSE SERPL-MCNC: 120 MG/DL — HIGH (ref 70–99)
GLUCOSE SERPL-MCNC: 121 MG/DL — HIGH (ref 70–99)
GLUCOSE SERPL-MCNC: 122 MG/DL — HIGH (ref 70–99)
GLUCOSE SERPL-MCNC: 123 MG/DL — HIGH (ref 70–99)
GLUCOSE SERPL-MCNC: 124 MG/DL — HIGH (ref 70–99)
GLUCOSE SERPL-MCNC: 124 MG/DL — HIGH (ref 70–99)
GLUCOSE SERPL-MCNC: 125 MG/DL — HIGH (ref 70–99)
GLUCOSE SERPL-MCNC: 126 MG/DL — HIGH (ref 70–99)
GLUCOSE SERPL-MCNC: 129 MG/DL — HIGH (ref 70–99)
GLUCOSE SERPL-MCNC: 130 MG/DL — HIGH (ref 70–99)
GLUCOSE SERPL-MCNC: 132 MG/DL — HIGH (ref 70–99)
GLUCOSE SERPL-MCNC: 134 MG/DL — HIGH (ref 70–99)
GLUCOSE SERPL-MCNC: 135 MG/DL — HIGH (ref 70–99)
GLUCOSE SERPL-MCNC: 137 MG/DL — HIGH (ref 70–99)
GLUCOSE SERPL-MCNC: 138 MG/DL — HIGH (ref 70–99)
GLUCOSE SERPL-MCNC: 139 MG/DL — HIGH (ref 70–99)
GLUCOSE SERPL-MCNC: 139 MG/DL — HIGH (ref 70–99)
GLUCOSE SERPL-MCNC: 148 MG/DL — HIGH (ref 70–99)
GLUCOSE SERPL-MCNC: 150 MG/DL — HIGH (ref 70–99)
GLUCOSE SERPL-MCNC: 153 MG/DL — HIGH (ref 70–99)
GLUCOSE SERPL-MCNC: 153 MG/DL — HIGH (ref 70–99)
GLUCOSE SERPL-MCNC: 156 MG/DL — HIGH (ref 70–99)
GLUCOSE SERPL-MCNC: 156 MG/DL — HIGH (ref 70–99)
GLUCOSE SERPL-MCNC: 160 MG/DL — HIGH (ref 70–99)
GLUCOSE SERPL-MCNC: 169 MG/DL — HIGH (ref 70–99)
GLUCOSE SERPL-MCNC: 173 MG/DL — HIGH (ref 70–99)
GLUCOSE SERPL-MCNC: 181 MG/DL — HIGH (ref 70–99)
GLUCOSE SERPL-MCNC: 183 MG/DL — HIGH (ref 70–99)
GLUCOSE SERPL-MCNC: 185 MG/DL — HIGH (ref 70–99)
GLUCOSE SERPL-MCNC: 194 MG/DL — HIGH (ref 70–99)
GLUCOSE SERPL-MCNC: 196 MG/DL — HIGH (ref 70–99)
GLUCOSE SERPL-MCNC: 201 MG/DL — HIGH (ref 70–99)
GLUCOSE SERPL-MCNC: 202 MG/DL — HIGH (ref 70–99)
GLUCOSE SERPL-MCNC: 205 MG/DL — HIGH (ref 70–99)
GLUCOSE SERPL-MCNC: 214 MG/DL — HIGH (ref 70–99)
GLUCOSE SERPL-MCNC: 216 MG/DL — HIGH (ref 70–99)
GLUCOSE SERPL-MCNC: 218 MG/DL — HIGH (ref 70–99)
GLUCOSE SERPL-MCNC: 224 MG/DL — HIGH (ref 70–99)
GLUCOSE SERPL-MCNC: 225 MG/DL — HIGH (ref 70–99)
GLUCOSE SERPL-MCNC: 270 MG/DL — HIGH (ref 70–99)
GLUCOSE SERPL-MCNC: 298 MG/DL — HIGH (ref 70–99)
GLUCOSE SERPL-MCNC: 302 MG/DL — HIGH (ref 70–99)
GLUCOSE SERPL-MCNC: 306 MG/DL — HIGH (ref 70–99)
GLUCOSE SERPL-MCNC: 307 MG/DL — HIGH (ref 70–99)
GLUCOSE SERPL-MCNC: 309 MG/DL — HIGH (ref 70–99)
GLUCOSE SERPL-MCNC: 83 MG/DL — SIGNIFICANT CHANGE UP (ref 70–99)
GLUCOSE SERPL-MCNC: 87 MG/DL — SIGNIFICANT CHANGE UP (ref 70–99)
GLUCOSE SERPL-MCNC: 89 MG/DL — SIGNIFICANT CHANGE UP (ref 70–99)
GLUCOSE SERPL-MCNC: 89 MG/DL — SIGNIFICANT CHANGE UP (ref 70–99)
GLUCOSE UR QL: NEGATIVE — SIGNIFICANT CHANGE UP
GRAM STN FLD: SIGNIFICANT CHANGE UP
H CAPSUL AG CSF IA-MCNC: SIGNIFICANT CHANGE UP
HAPTOGLOB SERPL-MCNC: 399 MG/DL — HIGH (ref 34–200)
HAPTOGLOB SERPL-MCNC: 478 MG/DL — HIGH (ref 34–200)
HBV CORE AB SER-ACNC: SIGNIFICANT CHANGE UP
HBV CORE IGM SER-ACNC: ABNORMAL
HBV CORE IGM SER-ACNC: SIGNIFICANT CHANGE UP
HBV CORE IGM SER-ACNC: SIGNIFICANT CHANGE UP
HBV SURFACE AB SER-ACNC: <3 MIU/ML — LOW
HBV SURFACE AB SER-ACNC: SIGNIFICANT CHANGE UP
HBV SURFACE AG SER-ACNC: SIGNIFICANT CHANGE UP
HBV SURFACE AG SER-ACNC: SIGNIFICANT CHANGE UP
HCO3 BLDA-SCNC: 16 MMOL/L — LOW (ref 21–28)
HCO3 BLDA-SCNC: 17 MMOL/L — LOW (ref 21–28)
HCO3 BLDA-SCNC: 18 MMOL/L — LOW (ref 21–28)
HCO3 BLDA-SCNC: 20 MMOL/L — LOW (ref 21–28)
HCO3 BLDA-SCNC: 21 MMOL/L — SIGNIFICANT CHANGE UP (ref 21–28)
HCT VFR BLD CALC: 15.8 % — LOW (ref 37–47)
HCT VFR BLD CALC: 17.9 % — LOW (ref 37–47)
HCT VFR BLD CALC: 17.9 % — LOW (ref 37–47)
HCT VFR BLD CALC: 18.9 % — LOW (ref 37–47)
HCT VFR BLD CALC: 19.3 % — LOW (ref 37–47)
HCT VFR BLD CALC: 19.5 % — LOW (ref 37–47)
HCT VFR BLD CALC: 19.7 % — LOW (ref 37–47)
HCT VFR BLD CALC: 20.1 % — LOW (ref 37–47)
HCT VFR BLD CALC: 20.3 % — LOW (ref 37–47)
HCT VFR BLD CALC: 20.7 % — LOW (ref 37–47)
HCT VFR BLD CALC: 20.7 % — LOW (ref 37–47)
HCT VFR BLD CALC: 21 % — LOW (ref 37–47)
HCT VFR BLD CALC: 21.3 % — LOW (ref 37–47)
HCT VFR BLD CALC: 21.5 % — LOW (ref 37–47)
HCT VFR BLD CALC: 21.5 % — LOW (ref 37–47)
HCT VFR BLD CALC: 21.9 % — LOW (ref 37–47)
HCT VFR BLD CALC: 22.1 % — LOW (ref 37–47)
HCT VFR BLD CALC: 22.2 % — LOW (ref 37–47)
HCT VFR BLD CALC: 22.6 % — LOW (ref 37–47)
HCT VFR BLD CALC: 22.6 % — LOW (ref 37–47)
HCT VFR BLD CALC: 22.8 % — LOW (ref 37–47)
HCT VFR BLD CALC: 22.9 % — LOW (ref 37–47)
HCT VFR BLD CALC: 22.9 % — LOW (ref 37–47)
HCT VFR BLD CALC: 23.3 % — LOW (ref 37–47)
HCT VFR BLD CALC: 23.4 % — LOW (ref 37–47)
HCT VFR BLD CALC: 23.8 % — LOW (ref 37–47)
HCT VFR BLD CALC: 23.9 % — LOW (ref 37–47)
HCT VFR BLD CALC: 24.3 % — LOW (ref 37–47)
HCT VFR BLD CALC: 24.5 % — LOW (ref 37–47)
HCT VFR BLD CALC: 24.6 % — LOW (ref 37–47)
HCT VFR BLD CALC: 25 % — LOW (ref 37–47)
HCT VFR BLD CALC: 25.1 % — LOW (ref 37–47)
HCT VFR BLD CALC: 25.3 % — LOW (ref 37–47)
HCT VFR BLD CALC: 25.3 % — LOW (ref 37–47)
HCT VFR BLD CALC: 25.8 % — LOW (ref 37–47)
HCT VFR BLD CALC: 25.9 % — LOW (ref 37–47)
HCT VFR BLD CALC: 26.1 % — LOW (ref 37–47)
HCT VFR BLD CALC: 26.1 % — LOW (ref 37–47)
HCT VFR BLD CALC: 26.4 % — LOW (ref 37–47)
HCT VFR BLD CALC: 26.7 % — LOW (ref 37–47)
HCT VFR BLD CALC: 26.8 % — LOW (ref 37–47)
HCT VFR BLD CALC: 26.9 % — LOW (ref 37–47)
HCT VFR BLD CALC: 27.1 % — LOW (ref 37–47)
HCT VFR BLD CALC: 27.2 % — LOW (ref 37–47)
HCT VFR BLD CALC: 27.7 % — LOW (ref 37–47)
HCT VFR BLD CALC: 28 % — LOW (ref 37–47)
HCT VFR BLD CALC: 28.1 % — LOW (ref 37–47)
HCT VFR BLD CALC: 28.2 % — LOW (ref 37–47)
HCT VFR BLD CALC: 28.4 % — LOW (ref 37–47)
HCT VFR BLD CALC: 28.5 % — LOW (ref 37–47)
HCT VFR BLD CALC: 28.5 % — LOW (ref 37–47)
HCT VFR BLD CALC: 28.7 % — LOW (ref 37–47)
HCT VFR BLD CALC: 28.9 % — LOW (ref 37–47)
HCT VFR BLD CALC: 28.9 % — LOW (ref 37–47)
HCT VFR BLD CALC: 29.1 % — LOW (ref 37–47)
HCT VFR BLD CALC: 29.2 % — LOW (ref 37–47)
HCT VFR BLD CALC: 29.3 % — LOW (ref 37–47)
HCT VFR BLD CALC: 29.4 % — LOW (ref 37–47)
HCT VFR BLD CALC: 29.5 % — LOW (ref 37–47)
HCT VFR BLD CALC: 30.7 % — LOW (ref 37–47)
HCT VFR BLD CALC: 30.8 % — LOW (ref 37–47)
HCT VFR BLD CALC: 30.9 % — LOW (ref 37–47)
HCT VFR BLD CALC: 31.3 % — LOW (ref 37–47)
HCT VFR BLD CALC: 31.4 % — LOW (ref 37–47)
HCT VFR BLD CALC: 32.3 % — LOW (ref 37–47)
HCT VFR BLD CALC: 33.4 % — LOW (ref 37–47)
HCT VFR BLD CALC: 33.8 % — LOW (ref 37–47)
HCT VFR BLD CALC: 34.8 % — LOW (ref 37–47)
HCT VFR BLD CALC: 35.2 % — LOW (ref 37–47)
HCT VFR BLD CALC: 37.4 % — SIGNIFICANT CHANGE UP (ref 37–47)
HCT VFR BLD CALC: 37.4 % — SIGNIFICANT CHANGE UP (ref 37–47)
HCT VFR BLD CALC: 40.5 % — SIGNIFICANT CHANGE UP (ref 37–47)
HCV AB S/CO SERPL IA: 0.03 COI — SIGNIFICANT CHANGE UP
HCV AB S/CO SERPL IA: 0.04 COI — SIGNIFICANT CHANGE UP
HCV AB SERPL-IMP: SIGNIFICANT CHANGE UP
HCV AB SERPL-IMP: SIGNIFICANT CHANGE UP
HCYS SERPL-MCNC: 17 UMOL/L — HIGH
HCYS SERPL-MCNC: 18.9 UMOL/L — HIGH
HCYS SERPL-MCNC: 27.5 UMOL/L — HIGH
HDLC SERPL-MCNC: 42 MG/DL — LOW
HEPARIN-PF4 AB RESULT: <0.6 U/ML — SIGNIFICANT CHANGE UP (ref 0–0.9)
HGB BLD-MCNC: 10 G/DL — LOW (ref 12–16)
HGB BLD-MCNC: 10.1 G/DL — LOW (ref 12–16)
HGB BLD-MCNC: 10.3 G/DL — LOW (ref 12–16)
HGB BLD-MCNC: 10.6 G/DL — LOW (ref 12–16)
HGB BLD-MCNC: 10.8 G/DL — LOW (ref 12–16)
HGB BLD-MCNC: 10.8 G/DL — LOW (ref 12–16)
HGB BLD-MCNC: 11 G/DL — LOW (ref 12–16)
HGB BLD-MCNC: 11.1 G/DL — LOW (ref 12–16)
HGB BLD-MCNC: 11.5 G/DL — LOW (ref 12–16)
HGB BLD-MCNC: 11.6 G/DL — LOW (ref 12–16)
HGB BLD-MCNC: 12 G/DL — SIGNIFICANT CHANGE UP (ref 12–16)
HGB BLD-MCNC: 12.4 G/DL — SIGNIFICANT CHANGE UP (ref 12–16)
HGB BLD-MCNC: 13 G/DL — SIGNIFICANT CHANGE UP (ref 12–16)
HGB BLD-MCNC: 13 G/DL — SIGNIFICANT CHANGE UP (ref 12–16)
HGB BLD-MCNC: 14.4 G/DL — SIGNIFICANT CHANGE UP (ref 12–16)
HGB BLD-MCNC: 5.3 G/DL — CRITICAL LOW (ref 12–16)
HGB BLD-MCNC: 6 G/DL — CRITICAL LOW (ref 12–16)
HGB BLD-MCNC: 6.1 G/DL — CRITICAL LOW (ref 12–16)
HGB BLD-MCNC: 6.5 G/DL — CRITICAL LOW (ref 12–16)
HGB BLD-MCNC: 6.5 G/DL — CRITICAL LOW (ref 12–16)
HGB BLD-MCNC: 6.6 G/DL — CRITICAL LOW (ref 12–16)
HGB BLD-MCNC: 6.8 G/DL — CRITICAL LOW (ref 12–16)
HGB BLD-MCNC: 6.8 G/DL — CRITICAL LOW (ref 12–16)
HGB BLD-MCNC: 6.9 G/DL — CRITICAL LOW (ref 12–16)
HGB BLD-MCNC: 7 G/DL — LOW (ref 12–16)
HGB BLD-MCNC: 7 G/DL — LOW (ref 12–16)
HGB BLD-MCNC: 7.1 G/DL — LOW (ref 12–16)
HGB BLD-MCNC: 7.4 G/DL — LOW (ref 12–16)
HGB BLD-MCNC: 7.4 G/DL — LOW (ref 12–16)
HGB BLD-MCNC: 7.5 G/DL — LOW (ref 12–16)
HGB BLD-MCNC: 7.6 G/DL — LOW (ref 12–16)
HGB BLD-MCNC: 7.7 G/DL — LOW (ref 12–16)
HGB BLD-MCNC: 7.9 G/DL — LOW (ref 12–16)
HGB BLD-MCNC: 8 G/DL — LOW (ref 12–16)
HGB BLD-MCNC: 8 G/DL — LOW (ref 12–16)
HGB BLD-MCNC: 8.1 G/DL — LOW (ref 12–16)
HGB BLD-MCNC: 8.1 G/DL — LOW (ref 12–16)
HGB BLD-MCNC: 8.2 G/DL — LOW (ref 12–16)
HGB BLD-MCNC: 8.2 G/DL — LOW (ref 12–16)
HGB BLD-MCNC: 8.3 G/DL — LOW (ref 12–16)
HGB BLD-MCNC: 8.5 G/DL — LOW (ref 12–16)
HGB BLD-MCNC: 8.7 G/DL — LOW (ref 12–16)
HGB BLD-MCNC: 8.8 G/DL — LOW (ref 12–16)
HGB BLD-MCNC: 9.1 G/DL — LOW (ref 12–16)
HGB BLD-MCNC: 9.2 G/DL — LOW (ref 12–16)
HGB BLD-MCNC: 9.3 G/DL — LOW (ref 12–16)
HGB BLD-MCNC: 9.3 G/DL — LOW (ref 12–16)
HGB BLD-MCNC: 9.4 G/DL — LOW (ref 12–16)
HGB BLD-MCNC: 9.5 G/DL — LOW (ref 12–16)
HGB BLD-MCNC: 9.7 G/DL — LOW (ref 12–16)
HGB BLD-MCNC: 9.8 G/DL — LOW (ref 12–16)
HIV 1+2 AB+HIV1 P24 AG SERPL QL IA: SIGNIFICANT CHANGE UP
HOROWITZ INDEX BLDA+IHG-RTO: 40 — SIGNIFICANT CHANGE UP
HOROWITZ INDEX BLDA+IHG-RTO: 40 — SIGNIFICANT CHANGE UP
HYALINE CASTS # UR AUTO: 1 /LPF — SIGNIFICANT CHANGE UP (ref 0–7)
HYALINE CASTS # UR AUTO: 1 /LPF — SIGNIFICANT CHANGE UP (ref 0–7)
HYALINE CASTS # UR AUTO: 2 /LPF — SIGNIFICANT CHANGE UP (ref 0–7)
HYALINE CASTS # UR AUTO: 6 /LPF — SIGNIFICANT CHANGE UP (ref 0–7)
HYALINE CASTS # UR AUTO: 9 /LPF — HIGH (ref 0–7)
HYPOCHROMIA BLD QL: SIGNIFICANT CHANGE UP
HYPOCHROMIA BLD QL: SLIGHT — SIGNIFICANT CHANGE UP
IGG CSF-MCNC: 17.5 MG/DL — HIGH
IGG CSF-MCNC: 17.5 MG/DL — HIGH
IGG FLD-MCNC: 1072 MG/DL — SIGNIFICANT CHANGE UP (ref 610–1660)
IGG FLD-MCNC: 1072 MG/DL — SIGNIFICANT CHANGE UP (ref 610–1660)
IGG SYNTH RATE SER+CSF CALC-MRATE: 14.1 MG/DAY — HIGH
IGG SYNTH RATE SER+CSF CALC-MRATE: 14.1 MG/DAY — HIGH
IGG/ALB CLEAR SER+CSF-RTO: 0.5 — SIGNIFICANT CHANGE UP
IGG/ALB CLEAR SER+CSF-RTO: 0.5 — SIGNIFICANT CHANGE UP
IGG/ALB CSF: 0.25 RATIO — SIGNIFICANT CHANGE UP
IGG/ALB CSF: 0.25 RATIO — SIGNIFICANT CHANGE UP
IGG/ALB SER: 0.46 RATIO — SIGNIFICANT CHANGE UP
IGG/ALB SER: 0.46 RATIO — SIGNIFICANT CHANGE UP
IMM GRANULOCYTES NFR BLD AUTO: 0.1 % — SIGNIFICANT CHANGE UP (ref 0.1–0.3)
IMM GRANULOCYTES NFR BLD AUTO: 0.2 % — SIGNIFICANT CHANGE UP (ref 0.1–0.3)
IMM GRANULOCYTES NFR BLD AUTO: 0.3 % — SIGNIFICANT CHANGE UP (ref 0.1–0.3)
IMM GRANULOCYTES NFR BLD AUTO: 0.4 % — HIGH (ref 0.1–0.3)
IMM GRANULOCYTES NFR BLD AUTO: 0.5 % — HIGH (ref 0.1–0.3)
IMM GRANULOCYTES NFR BLD AUTO: 0.6 % — HIGH (ref 0.1–0.3)
IMM GRANULOCYTES NFR BLD AUTO: 0.7 % — HIGH (ref 0.1–0.3)
IMM GRANULOCYTES NFR BLD AUTO: 0.9 % — HIGH (ref 0.1–0.3)
IMM GRANULOCYTES NFR BLD AUTO: 1 % — HIGH (ref 0.1–0.3)
IMM GRANULOCYTES NFR BLD AUTO: 1 % — HIGH (ref 0.1–0.3)
IMM GRANULOCYTES NFR BLD AUTO: 1.1 % — HIGH (ref 0.1–0.3)
IMM GRANULOCYTES NFR BLD AUTO: 1.2 % — HIGH (ref 0.1–0.3)
IMM GRANULOCYTES NFR BLD AUTO: 1.4 % — HIGH (ref 0.1–0.3)
INNER EAR 68KD AB FLD QL: 3.5 U/L — HIGH (ref 0–3.1)
INR BLD: 1.03 RATIO — SIGNIFICANT CHANGE UP (ref 0.65–1.3)
INR BLD: 1.05 RATIO — SIGNIFICANT CHANGE UP (ref 0.65–1.3)
INR BLD: 1.14 RATIO — SIGNIFICANT CHANGE UP (ref 0.65–1.3)
INR BLD: 1.21 RATIO — SIGNIFICANT CHANGE UP (ref 0.65–1.3)
INR BLD: 1.24 RATIO — SIGNIFICANT CHANGE UP (ref 0.65–1.3)
INR BLD: 1.24 RATIO — SIGNIFICANT CHANGE UP (ref 0.65–1.3)
INR BLD: 1.25 RATIO — SIGNIFICANT CHANGE UP (ref 0.65–1.3)
INR BLD: 1.27 RATIO — SIGNIFICANT CHANGE UP (ref 0.65–1.3)
INR BLD: 1.32 RATIO — HIGH (ref 0.65–1.3)
INR BLD: 1.32 RATIO — HIGH (ref 0.65–1.3)
INR BLD: 1.36 RATIO — HIGH (ref 0.65–1.3)
INR BLD: 1.44 RATIO — HIGH (ref 0.65–1.3)
INR BLD: 1.52 RATIO — HIGH (ref 0.65–1.3)
INR BLD: 1.64 RATIO — HIGH (ref 0.65–1.3)
INR BLD: 1.78 RATIO — HIGH (ref 0.65–1.3)
INR BLD: 1.85 RATIO — HIGH (ref 0.65–1.3)
JCPYV DNA # CSF NAA+PROBE: SIGNIFICANT CHANGE UP COPIES/ML
KETONES UR-MCNC: ABNORMAL
KETONES UR-MCNC: NEGATIVE — SIGNIFICANT CHANGE UP
LA NT DPL PPP QL: SIGNIFICANT CHANGE UP
LA NT DPL PPP QL: SIGNIFICANT CHANGE UP
LABORATORY COMMENT REPORT: SIGNIFICANT CHANGE UP
LACTATE SERPL-SCNC: 0.6 MMOL/L — LOW (ref 0.7–2)
LACTATE SERPL-SCNC: 0.9 MMOL/L — SIGNIFICANT CHANGE UP (ref 0.7–2)
LACTATE SERPL-SCNC: 1.5 MMOL/L — SIGNIFICANT CHANGE UP (ref 0.7–2)
LACTATE SERPL-SCNC: 1.8 MMOL/L — SIGNIFICANT CHANGE UP (ref 0.7–2)
LACTATE SERPL-SCNC: 1.8 MMOL/L — SIGNIFICANT CHANGE UP (ref 0.7–2)
LACTATE SERPL-SCNC: 2.2 MMOL/L — HIGH (ref 0.7–2)
LDH CSF L TO P-CCNC: 51 U/L — SIGNIFICANT CHANGE UP
LDH FLD-CCNC: 51 U/L — SIGNIFICANT CHANGE UP
LDH SERPL L TO P-CCNC: 145 — SIGNIFICANT CHANGE UP (ref 50–242)
LDH SERPL L TO P-CCNC: 275 — HIGH (ref 50–242)
LEUKOCYTE ESTERASE UR-ACNC: ABNORMAL
LEVETIRACETAM SERPL-MCNC: 26.6 UG/ML — SIGNIFICANT CHANGE UP (ref 10–40)
LIPID PNL WITH DIRECT LDL SERPL: 144 MG/DL — HIGH
LYMPHOCYTES # BLD AUTO: 0.39 K/UL — LOW (ref 1.2–3.4)
LYMPHOCYTES # BLD AUTO: 0.56 K/UL — LOW (ref 1.2–3.4)
LYMPHOCYTES # BLD AUTO: 0.58 K/UL — LOW (ref 1.2–3.4)
LYMPHOCYTES # BLD AUTO: 0.66 K/UL — LOW (ref 1.2–3.4)
LYMPHOCYTES # BLD AUTO: 0.66 K/UL — LOW (ref 1.2–3.4)
LYMPHOCYTES # BLD AUTO: 0.69 K/UL — LOW (ref 1.2–3.4)
LYMPHOCYTES # BLD AUTO: 0.72 K/UL — LOW (ref 1.2–3.4)
LYMPHOCYTES # BLD AUTO: 0.76 K/UL — LOW (ref 1.2–3.4)
LYMPHOCYTES # BLD AUTO: 0.78 K/UL — LOW (ref 1.2–3.4)
LYMPHOCYTES # BLD AUTO: 0.78 K/UL — LOW (ref 1.2–3.4)
LYMPHOCYTES # BLD AUTO: 0.9 K/UL — LOW (ref 1.2–3.4)
LYMPHOCYTES # BLD AUTO: 0.91 K/UL — LOW (ref 1.2–3.4)
LYMPHOCYTES # BLD AUTO: 0.96 K/UL — LOW (ref 1.2–3.4)
LYMPHOCYTES # BLD AUTO: 0.97 K/UL — LOW (ref 1.2–3.4)
LYMPHOCYTES # BLD AUTO: 0.98 K/UL — LOW (ref 1.2–3.4)
LYMPHOCYTES # BLD AUTO: 1.07 K/UL — LOW (ref 1.2–3.4)
LYMPHOCYTES # BLD AUTO: 1.16 K/UL — LOW (ref 1.2–3.4)
LYMPHOCYTES # BLD AUTO: 1.16 K/UL — LOW (ref 1.2–3.4)
LYMPHOCYTES # BLD AUTO: 1.17 K/UL — LOW (ref 1.2–3.4)
LYMPHOCYTES # BLD AUTO: 1.18 K/UL — LOW (ref 1.2–3.4)
LYMPHOCYTES # BLD AUTO: 1.21 K/UL — SIGNIFICANT CHANGE UP (ref 1.2–3.4)
LYMPHOCYTES # BLD AUTO: 1.21 K/UL — SIGNIFICANT CHANGE UP (ref 1.2–3.4)
LYMPHOCYTES # BLD AUTO: 1.25 K/UL — SIGNIFICANT CHANGE UP (ref 1.2–3.4)
LYMPHOCYTES # BLD AUTO: 1.27 K/UL — SIGNIFICANT CHANGE UP (ref 1.2–3.4)
LYMPHOCYTES # BLD AUTO: 1.33 K/UL — SIGNIFICANT CHANGE UP (ref 1.2–3.4)
LYMPHOCYTES # BLD AUTO: 1.35 K/UL — SIGNIFICANT CHANGE UP (ref 1.2–3.4)
LYMPHOCYTES # BLD AUTO: 1.46 K/UL — SIGNIFICANT CHANGE UP (ref 1.2–3.4)
LYMPHOCYTES # BLD AUTO: 1.72 K/UL — SIGNIFICANT CHANGE UP (ref 1.2–3.4)
LYMPHOCYTES # BLD AUTO: 1.85 K/UL — SIGNIFICANT CHANGE UP (ref 1.2–3.4)
LYMPHOCYTES # BLD AUTO: 1.87 K/UL — SIGNIFICANT CHANGE UP (ref 1.2–3.4)
LYMPHOCYTES # BLD AUTO: 1.89 K/UL — SIGNIFICANT CHANGE UP (ref 1.2–3.4)
LYMPHOCYTES # BLD AUTO: 1.93 K/UL — SIGNIFICANT CHANGE UP (ref 1.2–3.4)
LYMPHOCYTES # BLD AUTO: 10.3 % — LOW (ref 20.5–51.1)
LYMPHOCYTES # BLD AUTO: 10.9 % — LOW (ref 20.5–51.1)
LYMPHOCYTES # BLD AUTO: 11.3 % — LOW (ref 20.5–51.1)
LYMPHOCYTES # BLD AUTO: 12.4 % — LOW (ref 20.5–51.1)
LYMPHOCYTES # BLD AUTO: 12.4 % — LOW (ref 20.5–51.1)
LYMPHOCYTES # BLD AUTO: 13.6 % — LOW (ref 20.5–51.1)
LYMPHOCYTES # BLD AUTO: 18 % — LOW (ref 20.5–51.1)
LYMPHOCYTES # BLD AUTO: 2 % — LOW (ref 20.5–51.1)
LYMPHOCYTES # BLD AUTO: 2.07 K/UL — SIGNIFICANT CHANGE UP (ref 1.2–3.4)
LYMPHOCYTES # BLD AUTO: 2.1 K/UL — SIGNIFICANT CHANGE UP (ref 1.2–3.4)
LYMPHOCYTES # BLD AUTO: 2.13 K/UL — SIGNIFICANT CHANGE UP (ref 1.2–3.4)
LYMPHOCYTES # BLD AUTO: 2.14 K/UL — SIGNIFICANT CHANGE UP (ref 1.2–3.4)
LYMPHOCYTES # BLD AUTO: 2.25 K/UL — SIGNIFICANT CHANGE UP (ref 1.2–3.4)
LYMPHOCYTES # BLD AUTO: 2.33 K/UL — SIGNIFICANT CHANGE UP (ref 1.2–3.4)
LYMPHOCYTES # BLD AUTO: 2.35 K/UL — SIGNIFICANT CHANGE UP (ref 1.2–3.4)
LYMPHOCYTES # BLD AUTO: 2.35 K/UL — SIGNIFICANT CHANGE UP (ref 1.2–3.4)
LYMPHOCYTES # BLD AUTO: 2.42 K/UL — SIGNIFICANT CHANGE UP (ref 1.2–3.4)
LYMPHOCYTES # BLD AUTO: 2.48 K/UL — SIGNIFICANT CHANGE UP (ref 1.2–3.4)
LYMPHOCYTES # BLD AUTO: 2.62 K/UL — SIGNIFICANT CHANGE UP (ref 1.2–3.4)
LYMPHOCYTES # BLD AUTO: 2.66 K/UL — SIGNIFICANT CHANGE UP (ref 1.2–3.4)
LYMPHOCYTES # BLD AUTO: 2.7 % — LOW (ref 20.5–51.1)
LYMPHOCYTES # BLD AUTO: 2.93 K/UL — SIGNIFICANT CHANGE UP (ref 1.2–3.4)
LYMPHOCYTES # BLD AUTO: 20.3 % — LOW (ref 20.5–51.1)
LYMPHOCYTES # BLD AUTO: 23.5 % — SIGNIFICANT CHANGE UP (ref 20.5–51.1)
LYMPHOCYTES # BLD AUTO: 24.8 % — SIGNIFICANT CHANGE UP (ref 20.5–51.1)
LYMPHOCYTES # BLD AUTO: 25.4 % — SIGNIFICANT CHANGE UP (ref 20.5–51.1)
LYMPHOCYTES # BLD AUTO: 27 % — SIGNIFICANT CHANGE UP (ref 20.5–51.1)
LYMPHOCYTES # BLD AUTO: 27.1 % — SIGNIFICANT CHANGE UP (ref 20.5–51.1)
LYMPHOCYTES # BLD AUTO: 27.3 % — SIGNIFICANT CHANGE UP (ref 20.5–51.1)
LYMPHOCYTES # BLD AUTO: 28 % — SIGNIFICANT CHANGE UP (ref 20.5–51.1)
LYMPHOCYTES # BLD AUTO: 3.1 % — LOW (ref 20.5–51.1)
LYMPHOCYTES # BLD AUTO: 3.1 % — LOW (ref 20.5–51.1)
LYMPHOCYTES # BLD AUTO: 3.63 K/UL — HIGH (ref 1.2–3.4)
LYMPHOCYTES # BLD AUTO: 3.7 % — LOW (ref 20.5–51.1)
LYMPHOCYTES # BLD AUTO: 3.8 % — LOW (ref 20.5–51.1)
LYMPHOCYTES # BLD AUTO: 3.8 K/UL — HIGH (ref 1.2–3.4)
LYMPHOCYTES # BLD AUTO: 30.5 % — SIGNIFICANT CHANGE UP (ref 20.5–51.1)
LYMPHOCYTES # BLD AUTO: 31.2 % — SIGNIFICANT CHANGE UP (ref 20.5–51.1)
LYMPHOCYTES # BLD AUTO: 32.5 % — SIGNIFICANT CHANGE UP (ref 20.5–51.1)
LYMPHOCYTES # BLD AUTO: 33.7 % — SIGNIFICANT CHANGE UP (ref 20.5–51.1)
LYMPHOCYTES # BLD AUTO: 33.8 % — SIGNIFICANT CHANGE UP (ref 20.5–51.1)
LYMPHOCYTES # BLD AUTO: 35.7 % — SIGNIFICANT CHANGE UP (ref 20.5–51.1)
LYMPHOCYTES # BLD AUTO: 37.1 % — SIGNIFICANT CHANGE UP (ref 20.5–51.1)
LYMPHOCYTES # BLD AUTO: 4.6 % — LOW (ref 20.5–51.1)
LYMPHOCYTES # BLD AUTO: 4.6 % — LOW (ref 20.5–51.1)
LYMPHOCYTES # BLD AUTO: 4.9 % — LOW (ref 20.5–51.1)
LYMPHOCYTES # BLD AUTO: 40.9 % — SIGNIFICANT CHANGE UP (ref 20.5–51.1)
LYMPHOCYTES # BLD AUTO: 44.9 % — SIGNIFICANT CHANGE UP (ref 20.5–51.1)
LYMPHOCYTES # BLD AUTO: 45.6 % — SIGNIFICANT CHANGE UP (ref 20.5–51.1)
LYMPHOCYTES # BLD AUTO: 5 % — LOW (ref 20.5–51.1)
LYMPHOCYTES # BLD AUTO: 5.2 % — LOW (ref 20.5–51.1)
LYMPHOCYTES # BLD AUTO: 5.5 % — LOW (ref 20.5–51.1)
LYMPHOCYTES # BLD AUTO: 5.9 % — LOW (ref 20.5–51.1)
LYMPHOCYTES # BLD AUTO: 6 % — LOW (ref 20.5–51.1)
LYMPHOCYTES # BLD AUTO: 7.2 % — LOW (ref 20.5–51.1)
LYMPHOCYTES # BLD AUTO: 8.3 % — LOW (ref 20.5–51.1)
LYMPHOCYTES # BLD AUTO: 8.6 % — LOW (ref 20.5–51.1)
LYMPHOCYTES # BLD AUTO: 8.7 % — LOW (ref 20.5–51.1)
LYMPHOCYTES # BLD AUTO: 9.4 % — LOW (ref 20.5–51.1)
LYMPHOCYTES # BLD AUTO: 9.4 % — LOW (ref 20.5–51.1)
LYMPHOCYTES # BLD AUTO: 9.5 % — LOW (ref 20.5–51.1)
LYMPHOCYTES # BLD AUTO: 9.6 % — LOW (ref 20.5–51.1)
LYMPHOCYTES # CSF: 61 % — SIGNIFICANT CHANGE UP (ref 40–80)
M TB IFN-G BLD-IMP: NEGATIVE — SIGNIFICANT CHANGE UP
M TB IFN-G CD4+ BCKGRND COR BLD-ACNC: 0.32 IU/ML — SIGNIFICANT CHANGE UP
M TB IFN-G CD4+CD8+ BCKGRND COR BLD-ACNC: 0.15 IU/ML — SIGNIFICANT CHANGE UP
MAGNESIUM SERPL-MCNC: 1.5 MG/DL — LOW (ref 1.8–2.4)
MAGNESIUM SERPL-MCNC: 1.6 MG/DL — LOW (ref 1.8–2.4)
MAGNESIUM SERPL-MCNC: 1.6 MG/DL — LOW (ref 1.8–2.4)
MAGNESIUM SERPL-MCNC: 1.7 MG/DL — LOW (ref 1.8–2.4)
MAGNESIUM SERPL-MCNC: 1.7 MG/DL — LOW (ref 1.8–2.4)
MAGNESIUM SERPL-MCNC: 1.8 MG/DL — SIGNIFICANT CHANGE UP (ref 1.8–2.4)
MAGNESIUM SERPL-MCNC: 1.9 MG/DL — SIGNIFICANT CHANGE UP (ref 1.8–2.4)
MAGNESIUM SERPL-MCNC: 2 MG/DL — SIGNIFICANT CHANGE UP (ref 1.8–2.4)
MAGNESIUM SERPL-MCNC: 2 MG/DL — SIGNIFICANT CHANGE UP (ref 1.8–2.4)
MAGNESIUM SERPL-MCNC: 2.1 MG/DL — SIGNIFICANT CHANGE UP (ref 1.8–2.4)
MAGNESIUM SERPL-MCNC: 2.2 MG/DL — SIGNIFICANT CHANGE UP (ref 1.8–2.4)
MAGNESIUM SERPL-MCNC: 2.3 MG/DL — SIGNIFICANT CHANGE UP (ref 1.8–2.4)
MAGNESIUM SERPL-MCNC: 2.4 MG/DL — SIGNIFICANT CHANGE UP (ref 1.8–2.4)
MAGNESIUM SERPL-MCNC: 2.5 MG/DL — HIGH (ref 1.8–2.4)
MAGNESIUM SERPL-MCNC: 2.6 MG/DL — HIGH (ref 1.8–2.4)
MANUAL SMEAR VERIFICATION: SIGNIFICANT CHANGE UP
MCHC RBC-ENTMCNC: 28.4 PG — SIGNIFICANT CHANGE UP (ref 27–31)
MCHC RBC-ENTMCNC: 28.6 PG — SIGNIFICANT CHANGE UP (ref 27–31)
MCHC RBC-ENTMCNC: 28.8 PG — SIGNIFICANT CHANGE UP (ref 27–31)
MCHC RBC-ENTMCNC: 28.8 PG — SIGNIFICANT CHANGE UP (ref 27–31)
MCHC RBC-ENTMCNC: 28.9 PG — SIGNIFICANT CHANGE UP (ref 27–31)
MCHC RBC-ENTMCNC: 29 PG — SIGNIFICANT CHANGE UP (ref 27–31)
MCHC RBC-ENTMCNC: 29.1 PG — SIGNIFICANT CHANGE UP (ref 27–31)
MCHC RBC-ENTMCNC: 29.2 PG — SIGNIFICANT CHANGE UP (ref 27–31)
MCHC RBC-ENTMCNC: 29.3 PG — SIGNIFICANT CHANGE UP (ref 27–31)
MCHC RBC-ENTMCNC: 29.4 PG — SIGNIFICANT CHANGE UP (ref 27–31)
MCHC RBC-ENTMCNC: 29.5 PG — SIGNIFICANT CHANGE UP (ref 27–31)
MCHC RBC-ENTMCNC: 29.6 PG — SIGNIFICANT CHANGE UP (ref 27–31)
MCHC RBC-ENTMCNC: 29.7 PG — SIGNIFICANT CHANGE UP (ref 27–31)
MCHC RBC-ENTMCNC: 29.8 PG — SIGNIFICANT CHANGE UP (ref 27–31)
MCHC RBC-ENTMCNC: 29.9 PG — SIGNIFICANT CHANGE UP (ref 27–31)
MCHC RBC-ENTMCNC: 30 PG — SIGNIFICANT CHANGE UP (ref 27–31)
MCHC RBC-ENTMCNC: 30.1 PG — SIGNIFICANT CHANGE UP (ref 27–31)
MCHC RBC-ENTMCNC: 30.2 PG — SIGNIFICANT CHANGE UP (ref 27–31)
MCHC RBC-ENTMCNC: 30.2 PG — SIGNIFICANT CHANGE UP (ref 27–31)
MCHC RBC-ENTMCNC: 30.3 PG — SIGNIFICANT CHANGE UP (ref 27–31)
MCHC RBC-ENTMCNC: 30.6 PG — SIGNIFICANT CHANGE UP (ref 27–31)
MCHC RBC-ENTMCNC: 30.8 PG — SIGNIFICANT CHANGE UP (ref 27–31)
MCHC RBC-ENTMCNC: 30.8 PG — SIGNIFICANT CHANGE UP (ref 27–31)
MCHC RBC-ENTMCNC: 31.7 G/DL — LOW (ref 32–37)
MCHC RBC-ENTMCNC: 32.3 G/DL — SIGNIFICANT CHANGE UP (ref 32–37)
MCHC RBC-ENTMCNC: 33.1 G/DL — SIGNIFICANT CHANGE UP (ref 32–37)
MCHC RBC-ENTMCNC: 33.3 G/DL — SIGNIFICANT CHANGE UP (ref 32–37)
MCHC RBC-ENTMCNC: 33.5 G/DL — SIGNIFICANT CHANGE UP (ref 32–37)
MCHC RBC-ENTMCNC: 33.6 G/DL — SIGNIFICANT CHANGE UP (ref 32–37)
MCHC RBC-ENTMCNC: 33.7 G/DL — SIGNIFICANT CHANGE UP (ref 32–37)
MCHC RBC-ENTMCNC: 33.8 G/DL — SIGNIFICANT CHANGE UP (ref 32–37)
MCHC RBC-ENTMCNC: 33.9 G/DL — SIGNIFICANT CHANGE UP (ref 32–37)
MCHC RBC-ENTMCNC: 33.9 G/DL — SIGNIFICANT CHANGE UP (ref 32–37)
MCHC RBC-ENTMCNC: 34 G/DL — SIGNIFICANT CHANGE UP (ref 32–37)
MCHC RBC-ENTMCNC: 34.1 G/DL — SIGNIFICANT CHANGE UP (ref 32–37)
MCHC RBC-ENTMCNC: 34.1 G/DL — SIGNIFICANT CHANGE UP (ref 32–37)
MCHC RBC-ENTMCNC: 34.2 G/DL — SIGNIFICANT CHANGE UP (ref 32–37)
MCHC RBC-ENTMCNC: 34.2 G/DL — SIGNIFICANT CHANGE UP (ref 32–37)
MCHC RBC-ENTMCNC: 34.3 G/DL — SIGNIFICANT CHANGE UP (ref 32–37)
MCHC RBC-ENTMCNC: 34.4 G/DL — SIGNIFICANT CHANGE UP (ref 32–37)
MCHC RBC-ENTMCNC: 34.5 G/DL — SIGNIFICANT CHANGE UP (ref 32–37)
MCHC RBC-ENTMCNC: 34.6 G/DL — SIGNIFICANT CHANGE UP (ref 32–37)
MCHC RBC-ENTMCNC: 34.6 G/DL — SIGNIFICANT CHANGE UP (ref 32–37)
MCHC RBC-ENTMCNC: 34.7 G/DL — SIGNIFICANT CHANGE UP (ref 32–37)
MCHC RBC-ENTMCNC: 34.8 G/DL — SIGNIFICANT CHANGE UP (ref 32–37)
MCHC RBC-ENTMCNC: 34.9 G/DL — SIGNIFICANT CHANGE UP (ref 32–37)
MCHC RBC-ENTMCNC: 35 G/DL — SIGNIFICANT CHANGE UP (ref 32–37)
MCHC RBC-ENTMCNC: 35.1 G/DL — SIGNIFICANT CHANGE UP (ref 32–37)
MCHC RBC-ENTMCNC: 35.2 G/DL — SIGNIFICANT CHANGE UP (ref 32–37)
MCHC RBC-ENTMCNC: 35.3 G/DL — SIGNIFICANT CHANGE UP (ref 32–37)
MCHC RBC-ENTMCNC: 35.4 G/DL — SIGNIFICANT CHANGE UP (ref 32–37)
MCHC RBC-ENTMCNC: 35.5 G/DL — SIGNIFICANT CHANGE UP (ref 32–37)
MCHC RBC-ENTMCNC: 35.6 G/DL — SIGNIFICANT CHANGE UP (ref 32–37)
MCHC RBC-ENTMCNC: 35.8 G/DL — SIGNIFICANT CHANGE UP (ref 32–37)
MCV RBC AUTO: 82.5 FL — SIGNIFICANT CHANGE UP (ref 81–99)
MCV RBC AUTO: 82.8 FL — SIGNIFICANT CHANGE UP (ref 81–99)
MCV RBC AUTO: 83 FL — SIGNIFICANT CHANGE UP (ref 81–99)
MCV RBC AUTO: 83.2 FL — SIGNIFICANT CHANGE UP (ref 81–99)
MCV RBC AUTO: 83.5 FL — SIGNIFICANT CHANGE UP (ref 81–99)
MCV RBC AUTO: 83.5 FL — SIGNIFICANT CHANGE UP (ref 81–99)
MCV RBC AUTO: 83.7 FL — SIGNIFICANT CHANGE UP (ref 81–99)
MCV RBC AUTO: 83.8 FL — SIGNIFICANT CHANGE UP (ref 81–99)
MCV RBC AUTO: 83.9 FL — SIGNIFICANT CHANGE UP (ref 81–99)
MCV RBC AUTO: 83.9 FL — SIGNIFICANT CHANGE UP (ref 81–99)
MCV RBC AUTO: 84 FL — SIGNIFICANT CHANGE UP (ref 81–99)
MCV RBC AUTO: 84.3 FL — SIGNIFICANT CHANGE UP (ref 81–99)
MCV RBC AUTO: 84.6 FL — SIGNIFICANT CHANGE UP (ref 81–99)
MCV RBC AUTO: 84.8 FL — SIGNIFICANT CHANGE UP (ref 81–99)
MCV RBC AUTO: 84.9 FL — SIGNIFICANT CHANGE UP (ref 81–99)
MCV RBC AUTO: 85.1 FL — SIGNIFICANT CHANGE UP (ref 81–99)
MCV RBC AUTO: 85.1 FL — SIGNIFICANT CHANGE UP (ref 81–99)
MCV RBC AUTO: 85.3 FL — SIGNIFICANT CHANGE UP (ref 81–99)
MCV RBC AUTO: 85.4 FL — SIGNIFICANT CHANGE UP (ref 81–99)
MCV RBC AUTO: 85.4 FL — SIGNIFICANT CHANGE UP (ref 81–99)
MCV RBC AUTO: 85.6 FL — SIGNIFICANT CHANGE UP (ref 81–99)
MCV RBC AUTO: 85.7 FL — SIGNIFICANT CHANGE UP (ref 81–99)
MCV RBC AUTO: 85.8 FL — SIGNIFICANT CHANGE UP (ref 81–99)
MCV RBC AUTO: 85.9 FL — SIGNIFICANT CHANGE UP (ref 81–99)
MCV RBC AUTO: 85.9 FL — SIGNIFICANT CHANGE UP (ref 81–99)
MCV RBC AUTO: 86 FL — SIGNIFICANT CHANGE UP (ref 81–99)
MCV RBC AUTO: 86.1 FL — SIGNIFICANT CHANGE UP (ref 81–99)
MCV RBC AUTO: 86.2 FL — SIGNIFICANT CHANGE UP (ref 81–99)
MCV RBC AUTO: 86.3 FL — SIGNIFICANT CHANGE UP (ref 81–99)
MCV RBC AUTO: 86.3 FL — SIGNIFICANT CHANGE UP (ref 81–99)
MCV RBC AUTO: 86.4 FL — SIGNIFICANT CHANGE UP (ref 81–99)
MCV RBC AUTO: 86.5 FL — SIGNIFICANT CHANGE UP (ref 81–99)
MCV RBC AUTO: 86.5 FL — SIGNIFICANT CHANGE UP (ref 81–99)
MCV RBC AUTO: 86.6 FL — SIGNIFICANT CHANGE UP (ref 81–99)
MCV RBC AUTO: 86.7 FL — SIGNIFICANT CHANGE UP (ref 81–99)
MCV RBC AUTO: 86.8 FL — SIGNIFICANT CHANGE UP (ref 81–99)
MCV RBC AUTO: 86.9 FL — SIGNIFICANT CHANGE UP (ref 81–99)
MCV RBC AUTO: 87 FL — SIGNIFICANT CHANGE UP (ref 81–99)
MCV RBC AUTO: 87 FL — SIGNIFICANT CHANGE UP (ref 81–99)
MCV RBC AUTO: 87.1 FL — SIGNIFICANT CHANGE UP (ref 81–99)
MCV RBC AUTO: 87.1 FL — SIGNIFICANT CHANGE UP (ref 81–99)
MCV RBC AUTO: 87.2 FL — SIGNIFICANT CHANGE UP (ref 81–99)
MCV RBC AUTO: 87.3 FL — SIGNIFICANT CHANGE UP (ref 81–99)
MCV RBC AUTO: 87.4 FL — SIGNIFICANT CHANGE UP (ref 81–99)
MCV RBC AUTO: 87.5 FL — SIGNIFICANT CHANGE UP (ref 81–99)
MCV RBC AUTO: 87.7 FL — SIGNIFICANT CHANGE UP (ref 81–99)
MCV RBC AUTO: 87.8 FL — SIGNIFICANT CHANGE UP (ref 81–99)
MCV RBC AUTO: 87.9 FL — SIGNIFICANT CHANGE UP (ref 81–99)
MCV RBC AUTO: 88.2 FL — SIGNIFICANT CHANGE UP (ref 81–99)
MCV RBC AUTO: 88.3 FL — SIGNIFICANT CHANGE UP (ref 81–99)
MCV RBC AUTO: 88.3 FL — SIGNIFICANT CHANGE UP (ref 81–99)
MCV RBC AUTO: 88.8 FL — SIGNIFICANT CHANGE UP (ref 81–99)
MCV RBC AUTO: 91.9 FL — SIGNIFICANT CHANGE UP (ref 81–99)
MCV RBC AUTO: 92.2 FL — SIGNIFICANT CHANGE UP (ref 81–99)
METAMYELOCYTES # FLD: 0.9 % — HIGH (ref 0–0)
METHOD TYPE: SIGNIFICANT CHANGE UP
MONOCYTES # BLD AUTO: 0.09 K/UL — LOW (ref 0.1–0.6)
MONOCYTES # BLD AUTO: 0.15 K/UL — SIGNIFICANT CHANGE UP (ref 0.1–0.6)
MONOCYTES # BLD AUTO: 0.19 K/UL — SIGNIFICANT CHANGE UP (ref 0.1–0.6)
MONOCYTES # BLD AUTO: 0.22 K/UL — SIGNIFICANT CHANGE UP (ref 0.1–0.6)
MONOCYTES # BLD AUTO: 0.29 K/UL — SIGNIFICANT CHANGE UP (ref 0.1–0.6)
MONOCYTES # BLD AUTO: 0.34 K/UL — SIGNIFICANT CHANGE UP (ref 0.1–0.6)
MONOCYTES # BLD AUTO: 0.38 K/UL — SIGNIFICANT CHANGE UP (ref 0.1–0.6)
MONOCYTES # BLD AUTO: 0.41 K/UL — SIGNIFICANT CHANGE UP (ref 0.1–0.6)
MONOCYTES # BLD AUTO: 0.43 K/UL — SIGNIFICANT CHANGE UP (ref 0.1–0.6)
MONOCYTES # BLD AUTO: 0.46 K/UL — SIGNIFICANT CHANGE UP (ref 0.1–0.6)
MONOCYTES # BLD AUTO: 0.48 K/UL — SIGNIFICANT CHANGE UP (ref 0.1–0.6)
MONOCYTES # BLD AUTO: 0.49 K/UL — SIGNIFICANT CHANGE UP (ref 0.1–0.6)
MONOCYTES # BLD AUTO: 0.49 K/UL — SIGNIFICANT CHANGE UP (ref 0.1–0.6)
MONOCYTES # BLD AUTO: 0.5 K/UL — SIGNIFICANT CHANGE UP (ref 0.1–0.6)
MONOCYTES # BLD AUTO: 0.51 K/UL — SIGNIFICANT CHANGE UP (ref 0.1–0.6)
MONOCYTES # BLD AUTO: 0.51 K/UL — SIGNIFICANT CHANGE UP (ref 0.1–0.6)
MONOCYTES # BLD AUTO: 0.53 K/UL — SIGNIFICANT CHANGE UP (ref 0.1–0.6)
MONOCYTES # BLD AUTO: 0.54 K/UL — SIGNIFICANT CHANGE UP (ref 0.1–0.6)
MONOCYTES # BLD AUTO: 0.56 K/UL — SIGNIFICANT CHANGE UP (ref 0.1–0.6)
MONOCYTES # BLD AUTO: 0.57 K/UL — SIGNIFICANT CHANGE UP (ref 0.1–0.6)
MONOCYTES # BLD AUTO: 0.58 K/UL — SIGNIFICANT CHANGE UP (ref 0.1–0.6)
MONOCYTES # BLD AUTO: 0.61 K/UL — HIGH (ref 0.1–0.6)
MONOCYTES # BLD AUTO: 0.62 K/UL — HIGH (ref 0.1–0.6)
MONOCYTES # BLD AUTO: 0.63 K/UL — HIGH (ref 0.1–0.6)
MONOCYTES # BLD AUTO: 0.67 K/UL — HIGH (ref 0.1–0.6)
MONOCYTES # BLD AUTO: 0.67 K/UL — HIGH (ref 0.1–0.6)
MONOCYTES # BLD AUTO: 0.69 K/UL — HIGH (ref 0.1–0.6)
MONOCYTES # BLD AUTO: 0.73 K/UL — HIGH (ref 0.1–0.6)
MONOCYTES # BLD AUTO: 0.8 K/UL — HIGH (ref 0.1–0.6)
MONOCYTES # BLD AUTO: 0.82 K/UL — HIGH (ref 0.1–0.6)
MONOCYTES # BLD AUTO: 0.83 K/UL — HIGH (ref 0.1–0.6)
MONOCYTES # BLD AUTO: 0.84 K/UL — HIGH (ref 0.1–0.6)
MONOCYTES # BLD AUTO: 0.87 K/UL — HIGH (ref 0.1–0.6)
MONOCYTES # BLD AUTO: 0.99 K/UL — HIGH (ref 0.1–0.6)
MONOCYTES # BLD AUTO: 1 K/UL — HIGH (ref 0.1–0.6)
MONOCYTES # BLD AUTO: 1.08 K/UL — HIGH (ref 0.1–0.6)
MONOCYTES # BLD AUTO: 1.2 K/UL — HIGH (ref 0.1–0.6)
MONOCYTES # BLD AUTO: 1.2 K/UL — HIGH (ref 0.1–0.6)
MONOCYTES # BLD AUTO: 1.28 K/UL — HIGH (ref 0.1–0.6)
MONOCYTES # BLD AUTO: 1.45 K/UL — HIGH (ref 0.1–0.6)
MONOCYTES # BLD AUTO: 1.47 K/UL — HIGH (ref 0.1–0.6)
MONOCYTES # BLD AUTO: 1.5 K/UL — HIGH (ref 0.1–0.6)
MONOCYTES # BLD AUTO: 1.64 K/UL — HIGH (ref 0.1–0.6)
MONOCYTES # BLD AUTO: 1.67 K/UL — HIGH (ref 0.1–0.6)
MONOCYTES # BLD AUTO: 1.92 K/UL — HIGH (ref 0.1–0.6)
MONOCYTES # BLD AUTO: 2.22 K/UL — HIGH (ref 0.1–0.6)
MONOCYTES # BLD AUTO: 2.3 K/UL — HIGH (ref 0.1–0.6)
MONOCYTES NFR BLD AUTO: 0.7 % — LOW (ref 1.7–9.3)
MONOCYTES NFR BLD AUTO: 0.9 % — LOW (ref 1.7–9.3)
MONOCYTES NFR BLD AUTO: 1.5 % — LOW (ref 1.7–9.3)
MONOCYTES NFR BLD AUTO: 1.7 % — SIGNIFICANT CHANGE UP (ref 1.7–9.3)
MONOCYTES NFR BLD AUTO: 10.1 % — HIGH (ref 1.7–9.3)
MONOCYTES NFR BLD AUTO: 11 % — HIGH (ref 1.7–9.3)
MONOCYTES NFR BLD AUTO: 11.1 % — HIGH (ref 1.7–9.3)
MONOCYTES NFR BLD AUTO: 2.3 % — SIGNIFICANT CHANGE UP (ref 1.7–9.3)
MONOCYTES NFR BLD AUTO: 3 % — SIGNIFICANT CHANGE UP (ref 1.7–9.3)
MONOCYTES NFR BLD AUTO: 3.1 % — SIGNIFICANT CHANGE UP (ref 1.7–9.3)
MONOCYTES NFR BLD AUTO: 4.1 % — SIGNIFICANT CHANGE UP (ref 1.7–9.3)
MONOCYTES NFR BLD AUTO: 4.9 % — SIGNIFICANT CHANGE UP (ref 1.7–9.3)
MONOCYTES NFR BLD AUTO: 5.3 % — SIGNIFICANT CHANGE UP (ref 1.7–9.3)
MONOCYTES NFR BLD AUTO: 5.4 % — SIGNIFICANT CHANGE UP (ref 1.7–9.3)
MONOCYTES NFR BLD AUTO: 5.7 % — SIGNIFICANT CHANGE UP (ref 1.7–9.3)
MONOCYTES NFR BLD AUTO: 5.8 % — SIGNIFICANT CHANGE UP (ref 1.7–9.3)
MONOCYTES NFR BLD AUTO: 5.9 % — SIGNIFICANT CHANGE UP (ref 1.7–9.3)
MONOCYTES NFR BLD AUTO: 5.9 % — SIGNIFICANT CHANGE UP (ref 1.7–9.3)
MONOCYTES NFR BLD AUTO: 6 % — SIGNIFICANT CHANGE UP (ref 1.7–9.3)
MONOCYTES NFR BLD AUTO: 6.5 % — SIGNIFICANT CHANGE UP (ref 1.7–9.3)
MONOCYTES NFR BLD AUTO: 6.6 % — SIGNIFICANT CHANGE UP (ref 1.7–9.3)
MONOCYTES NFR BLD AUTO: 6.7 % — SIGNIFICANT CHANGE UP (ref 1.7–9.3)
MONOCYTES NFR BLD AUTO: 6.9 % — SIGNIFICANT CHANGE UP (ref 1.7–9.3)
MONOCYTES NFR BLD AUTO: 6.9 % — SIGNIFICANT CHANGE UP (ref 1.7–9.3)
MONOCYTES NFR BLD AUTO: 7 % — SIGNIFICANT CHANGE UP (ref 1.7–9.3)
MONOCYTES NFR BLD AUTO: 7.2 % — SIGNIFICANT CHANGE UP (ref 1.7–9.3)
MONOCYTES NFR BLD AUTO: 7.3 % — SIGNIFICANT CHANGE UP (ref 1.7–9.3)
MONOCYTES NFR BLD AUTO: 7.3 % — SIGNIFICANT CHANGE UP (ref 1.7–9.3)
MONOCYTES NFR BLD AUTO: 7.4 % — SIGNIFICANT CHANGE UP (ref 1.7–9.3)
MONOCYTES NFR BLD AUTO: 7.5 % — SIGNIFICANT CHANGE UP (ref 1.7–9.3)
MONOCYTES NFR BLD AUTO: 7.6 % — SIGNIFICANT CHANGE UP (ref 1.7–9.3)
MONOCYTES NFR BLD AUTO: 7.6 % — SIGNIFICANT CHANGE UP (ref 1.7–9.3)
MONOCYTES NFR BLD AUTO: 8.5 % — SIGNIFICANT CHANGE UP (ref 1.7–9.3)
MONOCYTES NFR BLD AUTO: 8.5 % — SIGNIFICANT CHANGE UP (ref 1.7–9.3)
MONOCYTES NFR BLD AUTO: 8.8 % — SIGNIFICANT CHANGE UP (ref 1.7–9.3)
MONOCYTES NFR BLD AUTO: 8.9 % — SIGNIFICANT CHANGE UP (ref 1.7–9.3)
MONOCYTES NFR BLD AUTO: 9 % — SIGNIFICANT CHANGE UP (ref 1.7–9.3)
MONOCYTES NFR BLD AUTO: 9.1 % — SIGNIFICANT CHANGE UP (ref 1.7–9.3)
MONOCYTES NFR BLD AUTO: 9.2 % — SIGNIFICANT CHANGE UP (ref 1.7–9.3)
MONOCYTES NFR BLD AUTO: 9.2 % — SIGNIFICANT CHANGE UP (ref 1.7–9.3)
MONOCYTES NFR BLD AUTO: 9.5 % — HIGH (ref 1.7–9.3)
MONOS+MACROS NFR CSF: 2 % — LOW (ref 15–45)
MPO AB + PR3 PNL SER: SIGNIFICANT CHANGE UP
MRSA PCR RESULT.: NEGATIVE — SIGNIFICANT CHANGE UP
MRSA PCR RESULT.: POSITIVE
MTHFR GENE MUT ANL BLD/T: SIGNIFICANT CHANGE UP
NEUTROPHILS # BLD AUTO: 11.22 K/UL — HIGH (ref 1.4–6.5)
NEUTROPHILS # BLD AUTO: 11.7 K/UL — HIGH (ref 1.4–6.5)
NEUTROPHILS # BLD AUTO: 12.06 K/UL — HIGH (ref 1.4–6.5)
NEUTROPHILS # BLD AUTO: 12.2 K/UL — HIGH (ref 1.4–6.5)
NEUTROPHILS # BLD AUTO: 13.67 K/UL — HIGH (ref 1.4–6.5)
NEUTROPHILS # BLD AUTO: 16.26 K/UL — HIGH (ref 1.4–6.5)
NEUTROPHILS # BLD AUTO: 17.48 K/UL — HIGH (ref 1.4–6.5)
NEUTROPHILS # BLD AUTO: 18.38 K/UL — HIGH (ref 1.4–6.5)
NEUTROPHILS # BLD AUTO: 18.8 K/UL — HIGH (ref 1.4–6.5)
NEUTROPHILS # BLD AUTO: 18.96 K/UL — HIGH (ref 1.4–6.5)
NEUTROPHILS # BLD AUTO: 19.48 K/UL — HIGH (ref 1.4–6.5)
NEUTROPHILS # BLD AUTO: 2.42 K/UL — SIGNIFICANT CHANGE UP (ref 1.4–6.5)
NEUTROPHILS # BLD AUTO: 2.72 K/UL — SIGNIFICANT CHANGE UP (ref 1.4–6.5)
NEUTROPHILS # BLD AUTO: 21.64 K/UL — HIGH (ref 1.4–6.5)
NEUTROPHILS # BLD AUTO: 21.78 K/UL — HIGH (ref 1.4–6.5)
NEUTROPHILS # BLD AUTO: 21.91 K/UL — HIGH (ref 1.4–6.5)
NEUTROPHILS # BLD AUTO: 22.49 K/UL — HIGH (ref 1.4–6.5)
NEUTROPHILS # BLD AUTO: 22.57 K/UL — HIGH (ref 1.4–6.5)
NEUTROPHILS # BLD AUTO: 26.81 K/UL — HIGH (ref 1.4–6.5)
NEUTROPHILS # BLD AUTO: 3.46 K/UL — SIGNIFICANT CHANGE UP (ref 1.4–6.5)
NEUTROPHILS # BLD AUTO: 3.65 K/UL — SIGNIFICANT CHANGE UP (ref 1.4–6.5)
NEUTROPHILS # BLD AUTO: 3.79 K/UL — SIGNIFICANT CHANGE UP (ref 1.4–6.5)
NEUTROPHILS # BLD AUTO: 3.83 K/UL — SIGNIFICANT CHANGE UP (ref 1.4–6.5)
NEUTROPHILS # BLD AUTO: 3.93 K/UL — SIGNIFICANT CHANGE UP (ref 1.4–6.5)
NEUTROPHILS # BLD AUTO: 4.03 K/UL — SIGNIFICANT CHANGE UP (ref 1.4–6.5)
NEUTROPHILS # BLD AUTO: 4.15 K/UL — SIGNIFICANT CHANGE UP (ref 1.4–6.5)
NEUTROPHILS # BLD AUTO: 4.25 K/UL — SIGNIFICANT CHANGE UP (ref 1.4–6.5)
NEUTROPHILS # BLD AUTO: 4.42 K/UL — SIGNIFICANT CHANGE UP (ref 1.4–6.5)
NEUTROPHILS # BLD AUTO: 4.5 K/UL — SIGNIFICANT CHANGE UP (ref 1.4–6.5)
NEUTROPHILS # BLD AUTO: 4.6 K/UL — SIGNIFICANT CHANGE UP (ref 1.4–6.5)
NEUTROPHILS # BLD AUTO: 4.87 K/UL — SIGNIFICANT CHANGE UP (ref 1.4–6.5)
NEUTROPHILS # BLD AUTO: 5.57 K/UL — SIGNIFICANT CHANGE UP (ref 1.4–6.5)
NEUTROPHILS # BLD AUTO: 5.69 K/UL — SIGNIFICANT CHANGE UP (ref 1.4–6.5)
NEUTROPHILS # BLD AUTO: 5.75 K/UL — SIGNIFICANT CHANGE UP (ref 1.4–6.5)
NEUTROPHILS # BLD AUTO: 6.36 K/UL — SIGNIFICANT CHANGE UP (ref 1.4–6.5)
NEUTROPHILS # BLD AUTO: 6.6 K/UL — HIGH (ref 1.4–6.5)
NEUTROPHILS # BLD AUTO: 6.81 K/UL — HIGH (ref 1.4–6.5)
NEUTROPHILS # BLD AUTO: 6.94 K/UL — HIGH (ref 1.4–6.5)
NEUTROPHILS # BLD AUTO: 7 K/UL — HIGH (ref 1.4–6.5)
NEUTROPHILS # BLD AUTO: 7.64 K/UL — HIGH (ref 1.4–6.5)
NEUTROPHILS # BLD AUTO: 8.23 K/UL — HIGH (ref 1.4–6.5)
NEUTROPHILS # BLD AUTO: 8.72 K/UL — HIGH (ref 1.4–6.5)
NEUTROPHILS # BLD AUTO: 8.77 K/UL — HIGH (ref 1.4–6.5)
NEUTROPHILS # BLD AUTO: 8.82 K/UL — HIGH (ref 1.4–6.5)
NEUTROPHILS # BLD AUTO: 8.95 K/UL — HIGH (ref 1.4–6.5)
NEUTROPHILS # BLD AUTO: 8.96 K/UL — HIGH (ref 1.4–6.5)
NEUTROPHILS # BLD AUTO: 9.79 K/UL — HIGH (ref 1.4–6.5)
NEUTROPHILS # CSF: 37 % — HIGH (ref 0–6)
NEUTROPHILS NFR BLD AUTO: 42.1 % — LOW (ref 42.2–75.2)
NEUTROPHILS NFR BLD AUTO: 45.2 % — SIGNIFICANT CHANGE UP (ref 42.2–75.2)
NEUTROPHILS NFR BLD AUTO: 47.8 % — SIGNIFICANT CHANGE UP (ref 42.2–75.2)
NEUTROPHILS NFR BLD AUTO: 50.9 % — SIGNIFICANT CHANGE UP (ref 42.2–75.2)
NEUTROPHILS NFR BLD AUTO: 52.5 % — SIGNIFICANT CHANGE UP (ref 42.2–75.2)
NEUTROPHILS NFR BLD AUTO: 57.3 % — SIGNIFICANT CHANGE UP (ref 42.2–75.2)
NEUTROPHILS NFR BLD AUTO: 57.9 % — SIGNIFICANT CHANGE UP (ref 42.2–75.2)
NEUTROPHILS NFR BLD AUTO: 58 % — SIGNIFICANT CHANGE UP (ref 42.2–75.2)
NEUTROPHILS NFR BLD AUTO: 58.8 % — SIGNIFICANT CHANGE UP (ref 42.2–75.2)
NEUTROPHILS NFR BLD AUTO: 58.9 % — SIGNIFICANT CHANGE UP (ref 42.2–75.2)
NEUTROPHILS NFR BLD AUTO: 59 % — SIGNIFICANT CHANGE UP (ref 42.2–75.2)
NEUTROPHILS NFR BLD AUTO: 62.1 % — SIGNIFICANT CHANGE UP (ref 42.2–75.2)
NEUTROPHILS NFR BLD AUTO: 62.8 % — SIGNIFICANT CHANGE UP (ref 42.2–75.2)
NEUTROPHILS NFR BLD AUTO: 63.6 % — SIGNIFICANT CHANGE UP (ref 42.2–75.2)
NEUTROPHILS NFR BLD AUTO: 65.9 % — SIGNIFICANT CHANGE UP (ref 42.2–75.2)
NEUTROPHILS NFR BLD AUTO: 67.2 % — SIGNIFICANT CHANGE UP (ref 42.2–75.2)
NEUTROPHILS NFR BLD AUTO: 67.9 % — SIGNIFICANT CHANGE UP (ref 42.2–75.2)
NEUTROPHILS NFR BLD AUTO: 69.3 % — SIGNIFICANT CHANGE UP (ref 42.2–75.2)
NEUTROPHILS NFR BLD AUTO: 70.4 % — SIGNIFICANT CHANGE UP (ref 42.2–75.2)
NEUTROPHILS NFR BLD AUTO: 70.4 % — SIGNIFICANT CHANGE UP (ref 42.2–75.2)
NEUTROPHILS NFR BLD AUTO: 71.7 % — SIGNIFICANT CHANGE UP (ref 42.2–75.2)
NEUTROPHILS NFR BLD AUTO: 75.5 % — HIGH (ref 42.2–75.2)
NEUTROPHILS NFR BLD AUTO: 78.2 % — HIGH (ref 42.2–75.2)
NEUTROPHILS NFR BLD AUTO: 79 % — HIGH (ref 42.2–75.2)
NEUTROPHILS NFR BLD AUTO: 80.7 % — HIGH (ref 42.2–75.2)
NEUTROPHILS NFR BLD AUTO: 82.3 % — HIGH (ref 42.2–75.2)
NEUTROPHILS NFR BLD AUTO: 83.4 % — HIGH (ref 42.2–75.2)
NEUTROPHILS NFR BLD AUTO: 83.7 % — HIGH (ref 42.2–75.2)
NEUTROPHILS NFR BLD AUTO: 83.8 % — HIGH (ref 42.2–75.2)
NEUTROPHILS NFR BLD AUTO: 85.8 % — HIGH (ref 42.2–75.2)
NEUTROPHILS NFR BLD AUTO: 86.5 % — HIGH (ref 42.2–75.2)
NEUTROPHILS NFR BLD AUTO: 86.9 % — HIGH (ref 42.2–75.2)
NEUTROPHILS NFR BLD AUTO: 87 % — HIGH (ref 42.2–75.2)
NEUTROPHILS NFR BLD AUTO: 87.2 % — HIGH (ref 42.2–75.2)
NEUTROPHILS NFR BLD AUTO: 87.4 % — HIGH (ref 42.2–75.2)
NEUTROPHILS NFR BLD AUTO: 87.7 % — HIGH (ref 42.2–75.2)
NEUTROPHILS NFR BLD AUTO: 87.9 % — HIGH (ref 42.2–75.2)
NEUTROPHILS NFR BLD AUTO: 88.7 % — HIGH (ref 42.2–75.2)
NEUTROPHILS NFR BLD AUTO: 89.3 % — HIGH (ref 42.2–75.2)
NEUTROPHILS NFR BLD AUTO: 90.6 % — HIGH (ref 42.2–75.2)
NEUTROPHILS NFR BLD AUTO: 91 % — HIGH (ref 42.2–75.2)
NEUTROPHILS NFR BLD AUTO: 91.8 % — HIGH (ref 42.2–75.2)
NEUTROPHILS NFR BLD AUTO: 92.5 % — HIGH (ref 42.2–75.2)
NEUTROPHILS NFR BLD AUTO: 92.6 % — HIGH (ref 42.2–75.2)
NEUTROPHILS NFR BLD AUTO: 93.1 % — HIGH (ref 42.2–75.2)
NEUTS BAND # BLD: 0.9 % — SIGNIFICANT CHANGE UP (ref 0–6)
NEUTS BAND # BLD: 1 % — SIGNIFICANT CHANGE UP (ref 0–6)
NEUTS HYPERSEG # BLD: PRESENT — SIGNIFICANT CHANGE UP
NIGHT BLUE STAIN TISS: SIGNIFICANT CHANGE UP
NITRITE UR-MCNC: NEGATIVE — SIGNIFICANT CHANGE UP
NON HDL CHOLESTEROL: 192 MG/DL — HIGH
NORMALIZED SCT PPP-RTO: 0.6 RATIO — SIGNIFICANT CHANGE UP (ref 0–1.16)
NORMALIZED SCT PPP-RTO: SIGNIFICANT CHANGE UP
NRBC # BLD: 0 /100 WBCS — SIGNIFICANT CHANGE UP (ref 0–0)
NRBC # BLD: 0 /100 — SIGNIFICANT CHANGE UP (ref 0–0)
NRBC # BLD: 1 /100 WBCS — HIGH (ref 0–0)
NRBC # BLD: 1 /100 — HIGH (ref 0–0)
NRBC # BLD: SIGNIFICANT CHANGE UP /100 WBCS (ref 0–0)
NRBC NFR CSF: 24 /UL — HIGH (ref 0–5)
NT-PROBNP SERPL-SCNC: 714 PG/ML — HIGH (ref 0–300)
OLIGOCLONAL BANDS CSF ELPH-IMP: SIGNIFICANT CHANGE UP
OLIGOCLONAL BANDS CSF ELPH-IMP: SIGNIFICANT CHANGE UP
ORGANISM # SPEC MICROSCOPIC CNT: SIGNIFICANT CHANGE UP
OSMOLALITY UR: 288 MOS/KG — SIGNIFICANT CHANGE UP (ref 50–1200)
OVALOCYTES BLD QL SMEAR: SLIGHT — SIGNIFICANT CHANGE UP
P-ANCA SER-ACNC: NEGATIVE — SIGNIFICANT CHANGE UP
P-ANCA SER-ACNC: NEGATIVE — SIGNIFICANT CHANGE UP
PCO2 BLDA: 27 MMHG — SIGNIFICANT CHANGE UP (ref 25–48)
PCO2 BLDA: 29 MMHG — SIGNIFICANT CHANGE UP (ref 25–48)
PCO2 BLDA: 29 MMHG — SIGNIFICANT CHANGE UP (ref 25–48)
PCO2 BLDA: 31 MMHG — SIGNIFICANT CHANGE UP (ref 25–48)
PCO2 BLDA: 35 MMHG — SIGNIFICANT CHANGE UP (ref 25–48)
PF4 HEPARIN CMPLX AB SER-ACNC: NEGATIVE — SIGNIFICANT CHANGE UP
PH BLDA: 7.35 — SIGNIFICANT CHANGE UP (ref 7.35–7.45)
PH BLDA: 7.38 — SIGNIFICANT CHANGE UP (ref 7.35–7.45)
PH BLDA: 7.38 — SIGNIFICANT CHANGE UP (ref 7.35–7.45)
PH BLDA: 7.42 — SIGNIFICANT CHANGE UP (ref 7.35–7.45)
PH BLDA: 7.44 — SIGNIFICANT CHANGE UP (ref 7.35–7.45)
PH UR: 6 — SIGNIFICANT CHANGE UP (ref 5–8)
PH UR: 6 — SIGNIFICANT CHANGE UP (ref 5–8)
PH UR: 6.5 — SIGNIFICANT CHANGE UP (ref 5–8)
PH UR: 7 — SIGNIFICANT CHANGE UP (ref 5–8)
PH UR: 7.5 — SIGNIFICANT CHANGE UP (ref 5–8)
PHENYTOIN FREE SERPL-MCNC: 0.6 MG/L — LOW (ref 1–2)
PHENYTOIN FREE SERPL-MCNC: 10.5 UG/ML — SIGNIFICANT CHANGE UP (ref 10–20)
PHENYTOIN FREE SERPL-MCNC: 4.2 UG/ML — LOW (ref 10–20)
PHENYTOIN FREE SERPL-MCNC: 5 UG/ML — LOW (ref 10–20)
PHENYTOIN FREE SERPL-MCNC: 8.5 UG/ML — LOW (ref 10–20)
PHENYTOIN FREE SERPL-MCNC: <0.5 MG/L — LOW (ref 1–2)
PHOSPHATE SERPL-MCNC: 10.1 MG/DL — HIGH (ref 2.1–4.9)
PHOSPHATE SERPL-MCNC: 11 MG/DL — HIGH (ref 2.1–4.9)
PHOSPHATE SERPL-MCNC: 11.4 MG/DL — HIGH (ref 2.1–4.9)
PHOSPHATE SERPL-MCNC: 3.8 MG/DL — SIGNIFICANT CHANGE UP (ref 2.1–4.9)
PHOSPHATE SERPL-MCNC: 4.2 MG/DL — SIGNIFICANT CHANGE UP (ref 2.1–4.9)
PHOSPHATE SERPL-MCNC: 4.2 MG/DL — SIGNIFICANT CHANGE UP (ref 2.1–4.9)
PHOSPHATE SERPL-MCNC: 4.5 MG/DL — SIGNIFICANT CHANGE UP (ref 2.1–4.9)
PHOSPHATE SERPL-MCNC: 5.1 MG/DL — HIGH (ref 2.1–4.9)
PHOSPHATE SERPL-MCNC: 5.1 MG/DL — HIGH (ref 2.1–4.9)
PHOSPHATE SERPL-MCNC: 5.5 MG/DL — HIGH (ref 2.1–4.9)
PHOSPHATE SERPL-MCNC: 6 MG/DL — HIGH (ref 2.1–4.9)
PHOSPHATE SERPL-MCNC: 6.1 MG/DL — HIGH (ref 2.1–4.9)
PHOSPHATE SERPL-MCNC: 6.9 MG/DL — HIGH (ref 2.1–4.9)
PHOSPHATE SERPL-MCNC: 8.2 MG/DL — HIGH (ref 2.1–4.9)
PHOSPHATE SERPL-MCNC: 8.9 MG/DL — HIGH (ref 2.1–4.9)
PHOSPHATE SERPL-MCNC: 9.2 MG/DL — HIGH (ref 2.1–4.9)
PHOSPHATE SERPL-MCNC: 9.4 MG/DL — HIGH (ref 2.1–4.9)
PLAT MORPH BLD: NORMAL — SIGNIFICANT CHANGE UP
PLATELET # BLD AUTO: 100 K/UL — LOW (ref 130–400)
PLATELET # BLD AUTO: 100 K/UL — LOW (ref 130–400)
PLATELET # BLD AUTO: 101 K/UL — LOW (ref 130–400)
PLATELET # BLD AUTO: 102 K/UL — LOW (ref 130–400)
PLATELET # BLD AUTO: 104 K/UL — LOW (ref 130–400)
PLATELET # BLD AUTO: 105 K/UL — LOW (ref 130–400)
PLATELET # BLD AUTO: 109 K/UL — LOW (ref 130–400)
PLATELET # BLD AUTO: 116 K/UL — LOW (ref 130–400)
PLATELET # BLD AUTO: 116 K/UL — LOW (ref 130–400)
PLATELET # BLD AUTO: 135 K/UL — SIGNIFICANT CHANGE UP (ref 130–400)
PLATELET # BLD AUTO: 142 K/UL — SIGNIFICANT CHANGE UP (ref 130–400)
PLATELET # BLD AUTO: 142 K/UL — SIGNIFICANT CHANGE UP (ref 130–400)
PLATELET # BLD AUTO: 143 K/UL — SIGNIFICANT CHANGE UP (ref 130–400)
PLATELET # BLD AUTO: 146 K/UL — SIGNIFICANT CHANGE UP (ref 130–400)
PLATELET # BLD AUTO: 150 K/UL — SIGNIFICANT CHANGE UP (ref 130–400)
PLATELET # BLD AUTO: 151 K/UL — SIGNIFICANT CHANGE UP (ref 130–400)
PLATELET # BLD AUTO: 153 K/UL — SIGNIFICANT CHANGE UP (ref 130–400)
PLATELET # BLD AUTO: 159 K/UL — SIGNIFICANT CHANGE UP (ref 130–400)
PLATELET # BLD AUTO: 162 K/UL — SIGNIFICANT CHANGE UP (ref 130–400)
PLATELET # BLD AUTO: 162 K/UL — SIGNIFICANT CHANGE UP (ref 130–400)
PLATELET # BLD AUTO: 164 K/UL — SIGNIFICANT CHANGE UP (ref 130–400)
PLATELET # BLD AUTO: 169 K/UL — SIGNIFICANT CHANGE UP (ref 130–400)
PLATELET # BLD AUTO: 175 K/UL — SIGNIFICANT CHANGE UP (ref 130–400)
PLATELET # BLD AUTO: 177 K/UL — SIGNIFICANT CHANGE UP (ref 130–400)
PLATELET # BLD AUTO: 178 K/UL — SIGNIFICANT CHANGE UP (ref 130–400)
PLATELET # BLD AUTO: 182 K/UL — SIGNIFICANT CHANGE UP (ref 130–400)
PLATELET # BLD AUTO: 191 K/UL — SIGNIFICANT CHANGE UP (ref 130–400)
PLATELET # BLD AUTO: 192 K/UL — SIGNIFICANT CHANGE UP (ref 130–400)
PLATELET # BLD AUTO: 199 K/UL — SIGNIFICANT CHANGE UP (ref 130–400)
PLATELET # BLD AUTO: 200 K/UL — SIGNIFICANT CHANGE UP (ref 130–400)
PLATELET # BLD AUTO: 204 K/UL — SIGNIFICANT CHANGE UP (ref 130–400)
PLATELET # BLD AUTO: 213 K/UL — SIGNIFICANT CHANGE UP (ref 130–400)
PLATELET # BLD AUTO: 215 K/UL — SIGNIFICANT CHANGE UP (ref 130–400)
PLATELET # BLD AUTO: 249 K/UL — SIGNIFICANT CHANGE UP (ref 130–400)
PLATELET # BLD AUTO: 257 K/UL — SIGNIFICANT CHANGE UP (ref 130–400)
PLATELET # BLD AUTO: 263 K/UL — SIGNIFICANT CHANGE UP (ref 130–400)
PLATELET # BLD AUTO: 272 K/UL — SIGNIFICANT CHANGE UP (ref 130–400)
PLATELET # BLD AUTO: 273 K/UL — SIGNIFICANT CHANGE UP (ref 130–400)
PLATELET # BLD AUTO: 276 K/UL — SIGNIFICANT CHANGE UP (ref 130–400)
PLATELET # BLD AUTO: 279 K/UL — SIGNIFICANT CHANGE UP (ref 130–400)
PLATELET # BLD AUTO: 280 K/UL — SIGNIFICANT CHANGE UP (ref 130–400)
PLATELET # BLD AUTO: 282 K/UL — SIGNIFICANT CHANGE UP (ref 130–400)
PLATELET # BLD AUTO: 290 K/UL — SIGNIFICANT CHANGE UP (ref 130–400)
PLATELET # BLD AUTO: 293 K/UL — SIGNIFICANT CHANGE UP (ref 130–400)
PLATELET # BLD AUTO: 296 K/UL — SIGNIFICANT CHANGE UP (ref 130–400)
PLATELET # BLD AUTO: 300 K/UL — SIGNIFICANT CHANGE UP (ref 130–400)
PLATELET # BLD AUTO: 306 K/UL — SIGNIFICANT CHANGE UP (ref 130–400)
PLATELET # BLD AUTO: 309 K/UL — SIGNIFICANT CHANGE UP (ref 130–400)
PLATELET # BLD AUTO: 311 K/UL — SIGNIFICANT CHANGE UP (ref 130–400)
PLATELET # BLD AUTO: 314 K/UL — SIGNIFICANT CHANGE UP (ref 130–400)
PLATELET # BLD AUTO: 322 K/UL — SIGNIFICANT CHANGE UP (ref 130–400)
PLATELET # BLD AUTO: 330 K/UL — SIGNIFICANT CHANGE UP (ref 130–400)
PLATELET # BLD AUTO: 331 K/UL — SIGNIFICANT CHANGE UP (ref 130–400)
PLATELET # BLD AUTO: 335 K/UL — SIGNIFICANT CHANGE UP (ref 130–400)
PLATELET # BLD AUTO: 336 K/UL — SIGNIFICANT CHANGE UP (ref 130–400)
PLATELET # BLD AUTO: 338 K/UL — SIGNIFICANT CHANGE UP (ref 130–400)
PLATELET # BLD AUTO: 340 K/UL — SIGNIFICANT CHANGE UP (ref 130–400)
PLATELET # BLD AUTO: 342 K/UL — SIGNIFICANT CHANGE UP (ref 130–400)
PLATELET # BLD AUTO: 344 K/UL — SIGNIFICANT CHANGE UP (ref 130–400)
PLATELET # BLD AUTO: 345 K/UL — SIGNIFICANT CHANGE UP (ref 130–400)
PLATELET # BLD AUTO: 348 K/UL — SIGNIFICANT CHANGE UP (ref 130–400)
PLATELET # BLD AUTO: 357 K/UL — SIGNIFICANT CHANGE UP (ref 130–400)
PLATELET # BLD AUTO: 358 K/UL — SIGNIFICANT CHANGE UP (ref 130–400)
PLATELET # BLD AUTO: 391 K/UL — SIGNIFICANT CHANGE UP (ref 130–400)
PLATELET # BLD AUTO: 391 K/UL — SIGNIFICANT CHANGE UP (ref 130–400)
PLATELET # BLD AUTO: 398 K/UL — SIGNIFICANT CHANGE UP (ref 130–400)
PLATELET # BLD AUTO: 400 K/UL — SIGNIFICANT CHANGE UP (ref 130–400)
PLATELET # BLD AUTO: 403 K/UL — HIGH (ref 130–400)
PLATELET # BLD AUTO: 410 K/UL — HIGH (ref 130–400)
PLATELET # BLD AUTO: 412 K/UL — HIGH (ref 130–400)
PLATELET # BLD AUTO: 420 K/UL — HIGH (ref 130–400)
PLATELET # BLD AUTO: 420 K/UL — HIGH (ref 130–400)
PLATELET # BLD AUTO: 86 K/UL — LOW (ref 130–400)
PLATELET # BLD AUTO: 91 K/UL — LOW (ref 130–400)
PO2 BLDA: 132 MMHG — HIGH (ref 83–108)
PO2 BLDA: 151 MMHG — HIGH (ref 83–108)
PO2 BLDA: 170 MMHG — HIGH (ref 83–108)
PO2 BLDA: 214 MMHG — HIGH (ref 83–108)
PO2 BLDA: 424 MMHG — HIGH (ref 83–108)
POIKILOCYTOSIS BLD QL AUTO: SIGNIFICANT CHANGE UP
POIKILOCYTOSIS BLD QL AUTO: SLIGHT — SIGNIFICANT CHANGE UP
POLYCHROMASIA BLD QL SMEAR: SLIGHT — SIGNIFICANT CHANGE UP
POLYCHROMASIA BLD QL SMEAR: SLIGHT — SIGNIFICANT CHANGE UP
POTASSIUM SERPL-MCNC: 3 MMOL/L — LOW (ref 3.5–5)
POTASSIUM SERPL-MCNC: 3.3 MMOL/L — LOW (ref 3.5–5)
POTASSIUM SERPL-MCNC: 3.5 MMOL/L — SIGNIFICANT CHANGE UP (ref 3.5–5)
POTASSIUM SERPL-MCNC: 3.6 MMOL/L — SIGNIFICANT CHANGE UP (ref 3.5–5)
POTASSIUM SERPL-MCNC: 3.7 MMOL/L — SIGNIFICANT CHANGE UP (ref 3.5–5)
POTASSIUM SERPL-MCNC: 3.8 MMOL/L — SIGNIFICANT CHANGE UP (ref 3.5–5)
POTASSIUM SERPL-MCNC: 3.9 MMOL/L — SIGNIFICANT CHANGE UP (ref 3.5–5)
POTASSIUM SERPL-MCNC: 4 MMOL/L — SIGNIFICANT CHANGE UP (ref 3.5–5)
POTASSIUM SERPL-MCNC: 4.1 MMOL/L — SIGNIFICANT CHANGE UP (ref 3.5–5)
POTASSIUM SERPL-MCNC: 4.2 MMOL/L — SIGNIFICANT CHANGE UP (ref 3.5–5)
POTASSIUM SERPL-MCNC: 4.3 MMOL/L — SIGNIFICANT CHANGE UP (ref 3.5–5)
POTASSIUM SERPL-MCNC: 4.4 MMOL/L — SIGNIFICANT CHANGE UP (ref 3.5–5)
POTASSIUM SERPL-MCNC: 4.5 MMOL/L — SIGNIFICANT CHANGE UP (ref 3.5–5)
POTASSIUM SERPL-MCNC: 4.6 MMOL/L — SIGNIFICANT CHANGE UP (ref 3.5–5)
POTASSIUM SERPL-MCNC: 4.7 MMOL/L — SIGNIFICANT CHANGE UP (ref 3.5–5)
POTASSIUM SERPL-MCNC: 4.8 MMOL/L — SIGNIFICANT CHANGE UP (ref 3.5–5)
POTASSIUM SERPL-MCNC: 4.8 MMOL/L — SIGNIFICANT CHANGE UP (ref 3.5–5)
POTASSIUM SERPL-MCNC: 4.9 MMOL/L — SIGNIFICANT CHANGE UP (ref 3.5–5)
POTASSIUM SERPL-MCNC: 5.1 MMOL/L — HIGH (ref 3.5–5)
POTASSIUM SERPL-MCNC: 5.2 MMOL/L — HIGH (ref 3.5–5)
POTASSIUM SERPL-MCNC: 5.3 MMOL/L — HIGH (ref 3.5–5)
POTASSIUM SERPL-MCNC: 5.3 MMOL/L — HIGH (ref 3.5–5)
POTASSIUM SERPL-MCNC: 5.6 MMOL/L — HIGH (ref 3.5–5)
POTASSIUM SERPL-SCNC: 3 MMOL/L — LOW (ref 3.5–5)
POTASSIUM SERPL-SCNC: 3.3 MMOL/L — LOW (ref 3.5–5)
POTASSIUM SERPL-SCNC: 3.5 MMOL/L — SIGNIFICANT CHANGE UP (ref 3.5–5)
POTASSIUM SERPL-SCNC: 3.6 MMOL/L — SIGNIFICANT CHANGE UP (ref 3.5–5)
POTASSIUM SERPL-SCNC: 3.7 MMOL/L — SIGNIFICANT CHANGE UP (ref 3.5–5)
POTASSIUM SERPL-SCNC: 3.8 MMOL/L — SIGNIFICANT CHANGE UP (ref 3.5–5)
POTASSIUM SERPL-SCNC: 3.9 MMOL/L — SIGNIFICANT CHANGE UP (ref 3.5–5)
POTASSIUM SERPL-SCNC: 4 MMOL/L — SIGNIFICANT CHANGE UP (ref 3.5–5)
POTASSIUM SERPL-SCNC: 4.1 MMOL/L — SIGNIFICANT CHANGE UP (ref 3.5–5)
POTASSIUM SERPL-SCNC: 4.2 MMOL/L — SIGNIFICANT CHANGE UP (ref 3.5–5)
POTASSIUM SERPL-SCNC: 4.3 MMOL/L — SIGNIFICANT CHANGE UP (ref 3.5–5)
POTASSIUM SERPL-SCNC: 4.4 MMOL/L — SIGNIFICANT CHANGE UP (ref 3.5–5)
POTASSIUM SERPL-SCNC: 4.5 MMOL/L — SIGNIFICANT CHANGE UP (ref 3.5–5)
POTASSIUM SERPL-SCNC: 4.6 MMOL/L — SIGNIFICANT CHANGE UP (ref 3.5–5)
POTASSIUM SERPL-SCNC: 4.7 MMOL/L — SIGNIFICANT CHANGE UP (ref 3.5–5)
POTASSIUM SERPL-SCNC: 4.8 MMOL/L — SIGNIFICANT CHANGE UP (ref 3.5–5)
POTASSIUM SERPL-SCNC: 4.8 MMOL/L — SIGNIFICANT CHANGE UP (ref 3.5–5)
POTASSIUM SERPL-SCNC: 4.9 MMOL/L — SIGNIFICANT CHANGE UP (ref 3.5–5)
POTASSIUM SERPL-SCNC: 5.1 MMOL/L — HIGH (ref 3.5–5)
POTASSIUM SERPL-SCNC: 5.2 MMOL/L — HIGH (ref 3.5–5)
POTASSIUM SERPL-SCNC: 5.3 MMOL/L — HIGH (ref 3.5–5)
POTASSIUM SERPL-SCNC: 5.3 MMOL/L — HIGH (ref 3.5–5)
POTASSIUM SERPL-SCNC: 5.6 MMOL/L — HIGH (ref 3.5–5)
POTASSIUM UR-SCNC: 47 MMOL/L — SIGNIFICANT CHANGE UP
PROCALCITONIN SERPL-MCNC: 0.11 NG/ML — HIGH (ref 0.02–0.1)
PROCALCITONIN SERPL-MCNC: 1.23 NG/ML — HIGH (ref 0.02–0.1)
PROCALCITONIN SERPL-MCNC: 1.31 NG/ML — HIGH (ref 0.02–0.1)
PROCALCITONIN SERPL-MCNC: 2.46 NG/ML — HIGH (ref 0.02–0.1)
PROT ?TM UR-MCNC: 46 MG/DLG/24H — SIGNIFICANT CHANGE UP
PROT ?TM UR-MCNC: 82 MG/DLG/24H — SIGNIFICANT CHANGE UP
PROT C ACT/NOR PPP: 111 % — SIGNIFICANT CHANGE UP (ref 65–129)
PROT C ACT/NOR PPP: 119 % — SIGNIFICANT CHANGE UP (ref 65–129)
PROT CSF-MCNC: 127 MG/DL — HIGH (ref 15–45)
PROT CSF-MCNC: 131 MG/DL — HIGH (ref 15–45)
PROT PATTERN SERPL ELPH-IMP: SIGNIFICANT CHANGE UP
PROT S FREE PPP-ACNC: 74 % — SIGNIFICANT CHANGE UP (ref 63–140)
PROT S FREE PPP-ACNC: 86 % — SIGNIFICANT CHANGE UP (ref 63–140)
PROT S FREE PPP-ACNC: 97 % — SIGNIFICANT CHANGE UP (ref 63–140)
PROT SERPL-MCNC: 4.8 G/DL — LOW (ref 6–8)
PROT SERPL-MCNC: 5 G/DL — LOW (ref 6–8)
PROT SERPL-MCNC: 5.1 G/DL — LOW (ref 6–8)
PROT SERPL-MCNC: 5.2 G/DL — LOW (ref 6–8)
PROT SERPL-MCNC: 5.3 G/DL — LOW (ref 6–8)
PROT SERPL-MCNC: 5.3 G/DL — LOW (ref 6–8)
PROT SERPL-MCNC: 5.4 G/DL — LOW (ref 6–8)
PROT SERPL-MCNC: 5.5 G/DL — LOW (ref 6–8)
PROT SERPL-MCNC: 5.6 G/DL — LOW (ref 6–8)
PROT SERPL-MCNC: 5.7 G/DL — LOW (ref 6–8)
PROT SERPL-MCNC: 5.8 G/DL — LOW (ref 6–8)
PROT SERPL-MCNC: 6 G/DL — SIGNIFICANT CHANGE UP (ref 6–8)
PROT SERPL-MCNC: 6 G/DL — SIGNIFICANT CHANGE UP (ref 6–8)
PROT SERPL-MCNC: 6.1 G/DL — SIGNIFICANT CHANGE UP (ref 6–8)
PROT SERPL-MCNC: 6.2 G/DL — SIGNIFICANT CHANGE UP (ref 6–8)
PROT SERPL-MCNC: 6.3 G/DL — SIGNIFICANT CHANGE UP (ref 6–8)
PROT SERPL-MCNC: 6.4 G/DL — SIGNIFICANT CHANGE UP (ref 6–8)
PROT SERPL-MCNC: 6.5 G/DL — SIGNIFICANT CHANGE UP (ref 6–8)
PROT SERPL-MCNC: 6.6 G/DL — SIGNIFICANT CHANGE UP (ref 6–8)
PROT SERPL-MCNC: 6.6 G/DL — SIGNIFICANT CHANGE UP (ref 6–8)
PROT SERPL-MCNC: 6.7 G/DL — SIGNIFICANT CHANGE UP (ref 6–8)
PROT SERPL-MCNC: 6.8 G/DL — SIGNIFICANT CHANGE UP (ref 6–8)
PROT SERPL-MCNC: 7.1 G/DL — SIGNIFICANT CHANGE UP (ref 6–8)
PROT SERPL-MCNC: 7.3 G/DL — SIGNIFICANT CHANGE UP (ref 6–8.3)
PROT SERPL-MCNC: 7.3 G/DL — SIGNIFICANT CHANGE UP (ref 6–8.3)
PROT SERPL-MCNC: 7.9 G/DL — SIGNIFICANT CHANGE UP (ref 6–8)
PROT UR-MCNC: ABNORMAL
PROT/CREAT UR-RTO: 2.3 RATIO — HIGH (ref 0–0.2)
PROT/CREAT UR-RTO: 2.7 RATIO — HIGH (ref 0–0.2)
PROTHROM AB SERPL-ACNC: 11.8 SEC — SIGNIFICANT CHANGE UP (ref 9.95–12.87)
PROTHROM AB SERPL-ACNC: 12.1 SEC — SIGNIFICANT CHANGE UP (ref 9.95–12.87)
PROTHROM AB SERPL-ACNC: 13.1 SEC — HIGH (ref 9.95–12.87)
PROTHROM AB SERPL-ACNC: 13.9 SEC — HIGH (ref 9.95–12.87)
PROTHROM AB SERPL-ACNC: 14.2 SEC — HIGH (ref 9.95–12.87)
PROTHROM AB SERPL-ACNC: 14.2 SEC — HIGH (ref 9.95–12.87)
PROTHROM AB SERPL-ACNC: 14.3 SEC — HIGH (ref 9.95–12.87)
PROTHROM AB SERPL-ACNC: 14.6 SEC — HIGH (ref 9.95–12.87)
PROTHROM AB SERPL-ACNC: 15.1 SEC — HIGH (ref 9.95–12.87)
PROTHROM AB SERPL-ACNC: 15.1 SEC — HIGH (ref 9.95–12.87)
PROTHROM AB SERPL-ACNC: 15.6 SEC — HIGH (ref 9.95–12.87)
PROTHROM AB SERPL-ACNC: 16.5 SEC — HIGH (ref 9.95–12.87)
PROTHROM AB SERPL-ACNC: 17.4 SEC — HIGH (ref 9.95–12.87)
PROTHROM AB SERPL-ACNC: 18.8 SEC — HIGH (ref 9.95–12.87)
PROTHROM AB SERPL-ACNC: 20.4 SEC — HIGH (ref 9.95–12.87)
PROTHROM AB SERPL-ACNC: 21.1 SEC — HIGH (ref 9.95–12.87)
PTH-INTACT FLD-MCNC: 75 PG/ML — HIGH (ref 15–65)
PTR INTERPRETATION: SIGNIFICANT CHANGE UP
QUANT TB PLUS MITOGEN MINUS NIL: 3.15 IU/ML — SIGNIFICANT CHANGE UP
RBC # BLD: 1.85 M/UL — LOW (ref 4.2–5.4)
RBC # BLD: 2.05 M/UL — LOW (ref 4.2–5.4)
RBC # BLD: 2.08 M/UL — LOW (ref 4.2–5.4)
RBC # BLD: 2.2 M/UL — LOW (ref 4.2–5.4)
RBC # BLD: 2.22 M/UL — LOW (ref 4.2–5.4)
RBC # BLD: 2.25 M/UL — LOW (ref 4.2–5.4)
RBC # BLD: 2.28 M/UL — LOW (ref 4.2–5.4)
RBC # BLD: 2.29 M/UL — LOW (ref 4.2–5.4)
RBC # BLD: 2.33 M/UL — LOW (ref 4.2–5.4)
RBC # BLD: 2.36 M/UL — LOW (ref 4.2–5.4)
RBC # BLD: 2.37 M/UL — LOW (ref 4.2–5.4)
RBC # BLD: 2.43 M/UL — LOW (ref 4.2–5.4)
RBC # BLD: 2.46 M/UL — LOW (ref 4.2–5.4)
RBC # BLD: 2.5 M/UL — LOW (ref 4.2–5.4)
RBC # BLD: 2.55 M/UL — LOW (ref 4.2–5.4)
RBC # BLD: 2.58 M/UL — LOW (ref 4.2–5.4)
RBC # BLD: 2.59 M/UL — LOW (ref 4.2–5.4)
RBC # BLD: 2.63 M/UL — LOW (ref 4.2–5.4)
RBC # BLD: 2.64 M/UL — LOW (ref 4.2–5.4)
RBC # BLD: 2.65 M/UL — LOW (ref 4.2–5.4)
RBC # BLD: 2.65 M/UL — LOW (ref 4.2–5.4)
RBC # BLD: 2.67 M/UL — LOW (ref 4.2–5.4)
RBC # BLD: 2.68 M/UL — LOW (ref 4.2–5.4)
RBC # BLD: 2.69 M/UL — LOW (ref 4.2–5.4)
RBC # BLD: 2.73 M/UL — LOW (ref 4.2–5.4)
RBC # BLD: 2.73 M/UL — LOW (ref 4.2–5.4)
RBC # BLD: 2.75 M/UL — LOW (ref 4.2–5.4)
RBC # BLD: 2.77 M/UL — LOW (ref 4.2–5.4)
RBC # BLD: 2.77 M/UL — LOW (ref 4.2–5.4)
RBC # BLD: 2.81 M/UL — LOW (ref 4.2–5.4)
RBC # BLD: 2.84 M/UL — LOW (ref 4.2–5.4)
RBC # BLD: 2.87 M/UL — LOW (ref 4.2–5.4)
RBC # BLD: 2.89 M/UL — LOW (ref 4.2–5.4)
RBC # BLD: 2.92 M/UL — LOW (ref 4.2–5.4)
RBC # BLD: 2.99 M/UL — LOW (ref 4.2–5.4)
RBC # BLD: 2.99 M/UL — LOW (ref 4.2–5.4)
RBC # BLD: 3.03 M/UL — LOW (ref 4.2–5.4)
RBC # BLD: 3.04 M/UL — LOW (ref 4.2–5.4)
RBC # BLD: 3.05 M/UL — LOW (ref 4.2–5.4)
RBC # BLD: 3.09 M/UL — LOW (ref 4.2–5.4)
RBC # BLD: 3.09 M/UL — LOW (ref 4.2–5.4)
RBC # BLD: 3.11 M/UL — LOW (ref 4.2–5.4)
RBC # BLD: 3.13 M/UL — LOW (ref 4.2–5.4)
RBC # BLD: 3.13 M/UL — LOW (ref 4.2–5.4)
RBC # BLD: 3.17 M/UL — LOW (ref 4.2–5.4)
RBC # BLD: 3.21 M/UL — LOW (ref 4.2–5.4)
RBC # BLD: 3.22 M/UL — LOW (ref 4.2–5.4)
RBC # BLD: 3.24 M/UL — LOW (ref 4.2–5.4)
RBC # BLD: 3.25 M/UL — LOW (ref 4.2–5.4)
RBC # BLD: 3.28 M/UL — LOW (ref 4.2–5.4)
RBC # BLD: 3.28 M/UL — LOW (ref 4.2–5.4)
RBC # BLD: 3.31 M/UL — LOW (ref 4.2–5.4)
RBC # BLD: 3.32 M/UL — LOW (ref 4.2–5.4)
RBC # BLD: 3.33 M/UL — LOW (ref 4.2–5.4)
RBC # BLD: 3.34 M/UL — LOW (ref 4.2–5.4)
RBC # BLD: 3.36 M/UL — LOW (ref 4.2–5.4)
RBC # BLD: 3.4 M/UL — LOW (ref 4.2–5.4)
RBC # BLD: 3.41 M/UL — LOW (ref 4.2–5.4)
RBC # BLD: 3.43 M/UL — LOW (ref 4.2–5.4)
RBC # BLD: 3.5 M/UL — LOW (ref 4.2–5.4)
RBC # BLD: 3.6 M/UL — LOW (ref 4.2–5.4)
RBC # BLD: 3.66 M/UL — LOW (ref 4.2–5.4)
RBC # BLD: 3.7 M/UL — LOW (ref 4.2–5.4)
RBC # BLD: 3.71 M/UL — LOW (ref 4.2–5.4)
RBC # BLD: 3.78 M/UL — LOW (ref 4.2–5.4)
RBC # BLD: 3.87 M/UL — LOW (ref 4.2–5.4)
RBC # BLD: 3.88 M/UL — LOW (ref 4.2–5.4)
RBC # BLD: 4.08 M/UL — LOW (ref 4.2–5.4)
RBC # BLD: 4.09 M/UL — LOW (ref 4.2–5.4)
RBC # BLD: 4.35 M/UL — SIGNIFICANT CHANGE UP (ref 4.2–5.4)
RBC # BLD: 4.41 M/UL — SIGNIFICANT CHANGE UP (ref 4.2–5.4)
RBC # BLD: 4.77 M/UL — SIGNIFICANT CHANGE UP (ref 4.2–5.4)
RBC # CSF: SIGNIFICANT CHANGE UP /UL (ref 0–0)
RBC # FLD: 12.4 % — SIGNIFICANT CHANGE UP (ref 11.5–14.5)
RBC # FLD: 12.6 % — SIGNIFICANT CHANGE UP (ref 11.5–14.5)
RBC # FLD: 13 % — SIGNIFICANT CHANGE UP (ref 11.5–14.5)
RBC # FLD: 13.1 % — SIGNIFICANT CHANGE UP (ref 11.5–14.5)
RBC # FLD: 13.2 % — SIGNIFICANT CHANGE UP (ref 11.5–14.5)
RBC # FLD: 13.3 % — SIGNIFICANT CHANGE UP (ref 11.5–14.5)
RBC # FLD: 13.4 % — SIGNIFICANT CHANGE UP (ref 11.5–14.5)
RBC # FLD: 13.5 % — SIGNIFICANT CHANGE UP (ref 11.5–14.5)
RBC # FLD: 13.7 % — SIGNIFICANT CHANGE UP (ref 11.5–14.5)
RBC # FLD: 13.8 % — SIGNIFICANT CHANGE UP (ref 11.5–14.5)
RBC # FLD: 13.9 % — SIGNIFICANT CHANGE UP (ref 11.5–14.5)
RBC # FLD: 14 % — SIGNIFICANT CHANGE UP (ref 11.5–14.5)
RBC # FLD: 14.7 % — HIGH (ref 11.5–14.5)
RBC # FLD: 15.1 % — HIGH (ref 11.5–14.5)
RBC # FLD: 15.1 % — HIGH (ref 11.5–14.5)
RBC # FLD: 15.2 % — HIGH (ref 11.5–14.5)
RBC # FLD: 15.3 % — HIGH (ref 11.5–14.5)
RBC # FLD: 15.3 % — HIGH (ref 11.5–14.5)
RBC # FLD: 15.4 % — HIGH (ref 11.5–14.5)
RBC # FLD: 15.4 % — HIGH (ref 11.5–14.5)
RBC # FLD: 15.5 % — HIGH (ref 11.5–14.5)
RBC # FLD: 15.6 % — HIGH (ref 11.5–14.5)
RBC # FLD: 15.7 % — HIGH (ref 11.5–14.5)
RBC # FLD: 15.8 % — HIGH (ref 11.5–14.5)
RBC # FLD: 15.9 % — HIGH (ref 11.5–14.5)
RBC # FLD: 16.2 % — HIGH (ref 11.5–14.5)
RBC # FLD: 16.2 % — HIGH (ref 11.5–14.5)
RBC # FLD: 17.1 % — HIGH (ref 11.5–14.5)
RBC # FLD: 17.2 % — HIGH (ref 11.5–14.5)
RBC # FLD: 17.8 % — HIGH (ref 11.5–14.5)
RBC BLD AUTO: ABNORMAL
RBC CASTS # UR COMP ASSIST: 183 /HPF — HIGH (ref 0–4)
RBC CASTS # UR COMP ASSIST: 26 /HPF — HIGH (ref 0–4)
RBC CASTS # UR COMP ASSIST: 30 /HPF — HIGH (ref 0–4)
RBC CASTS # UR COMP ASSIST: 336 /HPF — HIGH (ref 0–4)
RBC CASTS # UR COMP ASSIST: >720 /HPF — HIGH (ref 0–4)
RENIN PLAS-CCNC: 8.81 NG/ML/HR — HIGH (ref 0.17–5.38)
RHEUMATOID FACT SERPL-ACNC: <10 IU/ML — SIGNIFICANT CHANGE UP (ref 0–13)
SAO2 % BLDA: 100 % — HIGH (ref 94–98)
SAO2 % BLDA: 100 % — HIGH (ref 94–98)
SAO2 % BLDA: 99 % — HIGH (ref 94–98)
SAO2 % BLDA: 99.7 % — HIGH (ref 94–98)
SAO2 % BLDA: 99.7 % — HIGH (ref 94–98)
SARS-COV-2 RNA SPEC QL NAA+PROBE: DETECTED
SARS-COV-2 RNA SPEC QL NAA+PROBE: SIGNIFICANT CHANGE UP
SCHISTOCYTES BLD QL AUTO: SLIGHT — SIGNIFICANT CHANGE UP
SMUDGE CELLS # BLD: PRESENT — SIGNIFICANT CHANGE UP
SODIUM SERPL-SCNC: 129 MMOL/L — LOW (ref 135–146)
SODIUM SERPL-SCNC: 130 MMOL/L — LOW (ref 135–146)
SODIUM SERPL-SCNC: 131 MMOL/L — LOW (ref 135–146)
SODIUM SERPL-SCNC: 133 MMOL/L — LOW (ref 135–146)
SODIUM SERPL-SCNC: 133 MMOL/L — LOW (ref 135–146)
SODIUM SERPL-SCNC: 134 MMOL/L — LOW (ref 135–146)
SODIUM SERPL-SCNC: 135 MMOL/L — SIGNIFICANT CHANGE UP (ref 135–146)
SODIUM SERPL-SCNC: 136 MMOL/L — SIGNIFICANT CHANGE UP (ref 135–146)
SODIUM SERPL-SCNC: 137 MMOL/L — SIGNIFICANT CHANGE UP (ref 135–146)
SODIUM SERPL-SCNC: 138 MMOL/L — SIGNIFICANT CHANGE UP (ref 135–146)
SODIUM SERPL-SCNC: 139 MMOL/L — SIGNIFICANT CHANGE UP (ref 135–146)
SODIUM SERPL-SCNC: 140 MMOL/L — SIGNIFICANT CHANGE UP (ref 135–146)
SODIUM SERPL-SCNC: 141 MMOL/L — SIGNIFICANT CHANGE UP (ref 135–146)
SODIUM SERPL-SCNC: 142 MMOL/L — SIGNIFICANT CHANGE UP (ref 135–146)
SODIUM SERPL-SCNC: 143 MMOL/L — SIGNIFICANT CHANGE UP (ref 135–146)
SODIUM SERPL-SCNC: 144 MMOL/L — SIGNIFICANT CHANGE UP (ref 135–146)
SODIUM SERPL-SCNC: 145 MMOL/L — SIGNIFICANT CHANGE UP (ref 135–146)
SODIUM UR-SCNC: 33 MMOL/L — SIGNIFICANT CHANGE UP
SOURCE HSV 1/2: SIGNIFICANT CHANGE UP
SP GR SPEC: 1.01 — LOW (ref 1.01–1.03)
SP GR SPEC: 1.01 — SIGNIFICANT CHANGE UP (ref 1.01–1.03)
SP GR SPEC: 1.02 — SIGNIFICANT CHANGE UP (ref 1.01–1.03)
SPECIMEN SOURCE: SIGNIFICANT CHANGE UP
T PALLIDUM AB TITR SER: NEGATIVE — SIGNIFICANT CHANGE UP
TOXIC GRANULES BLD QL SMEAR: PRESENT — SIGNIFICANT CHANGE UP
TRIGL SERPL-MCNC: 240 MG/DL — HIGH
TROPONIN T SERPL-MCNC: 0.01 NG/ML — SIGNIFICANT CHANGE UP
TROPONIN T SERPL-MCNC: <0.01 NG/ML — SIGNIFICANT CHANGE UP
TROPONIN T SERPL-MCNC: <0.01 NG/ML — SIGNIFICANT CHANGE UP
TSH SERPL-MCNC: 0.86 UIU/ML — SIGNIFICANT CHANGE UP (ref 0.27–4.2)
TSH SERPL-MCNC: 0.86 UIU/ML — SIGNIFICANT CHANGE UP (ref 0.27–4.2)
TSH SERPL-MCNC: 0.96 UIU/ML — SIGNIFICANT CHANGE UP (ref 0.27–4.2)
TUBE TYPE: SIGNIFICANT CHANGE UP
UROBILINOGEN FLD QL: ABNORMAL
UROBILINOGEN FLD QL: SIGNIFICANT CHANGE UP
UUN UR-MCNC: 412 MG/DL — SIGNIFICANT CHANGE UP
VANCOMYCIN FLD-MCNC: 10.6 UG/ML — HIGH (ref 5–10)
VANCOMYCIN FLD-MCNC: 17.1 UG/ML — HIGH (ref 5–10)
VANCOMYCIN TROUGH SERPL-MCNC: 11.3 UG/ML — HIGH (ref 5–10)
VANCOMYCIN TROUGH SERPL-MCNC: 12 UG/ML — HIGH (ref 5–10)
VDRL CSF-TITR: SIGNIFICANT CHANGE UP
VIT B12 SERPL-MCNC: 358 PG/ML — SIGNIFICANT CHANGE UP (ref 232–1245)
WBC # BLD: 10.01 K/UL — SIGNIFICANT CHANGE UP (ref 4.8–10.8)
WBC # BLD: 10.39 K/UL — SIGNIFICANT CHANGE UP (ref 4.8–10.8)
WBC # BLD: 10.81 K/UL — HIGH (ref 4.8–10.8)
WBC # BLD: 10.86 K/UL — HIGH (ref 4.8–10.8)
WBC # BLD: 10.87 K/UL — HIGH (ref 4.8–10.8)
WBC # BLD: 11.23 K/UL — HIGH (ref 4.8–10.8)
WBC # BLD: 11.23 K/UL — HIGH (ref 4.8–10.8)
WBC # BLD: 11.44 K/UL — HIGH (ref 4.8–10.8)
WBC # BLD: 12.14 K/UL — HIGH (ref 4.8–10.8)
WBC # BLD: 12.23 K/UL — HIGH (ref 4.8–10.8)
WBC # BLD: 12.23 K/UL — HIGH (ref 4.8–10.8)
WBC # BLD: 12.53 K/UL — HIGH (ref 4.8–10.8)
WBC # BLD: 12.57 K/UL — HIGH (ref 4.8–10.8)
WBC # BLD: 13.89 K/UL — HIGH (ref 4.8–10.8)
WBC # BLD: 14.36 K/UL — HIGH (ref 4.8–10.8)
WBC # BLD: 14.53 K/UL — HIGH (ref 4.8–10.8)
WBC # BLD: 14.88 K/UL — HIGH (ref 4.8–10.8)
WBC # BLD: 15.1 K/UL — HIGH (ref 4.8–10.8)
WBC # BLD: 15.27 K/UL — HIGH (ref 4.8–10.8)
WBC # BLD: 15.45 K/UL — HIGH (ref 4.8–10.8)
WBC # BLD: 16.46 K/UL — HIGH (ref 4.8–10.8)
WBC # BLD: 17.93 K/UL — HIGH (ref 4.8–10.8)
WBC # BLD: 20.89 K/UL — HIGH (ref 4.8–10.8)
WBC # BLD: 21.25 K/UL — HIGH (ref 4.8–10.8)
WBC # BLD: 21.62 K/UL — HIGH (ref 4.8–10.8)
WBC # BLD: 22.04 K/UL — HIGH (ref 4.8–10.8)
WBC # BLD: 22.11 K/UL — HIGH (ref 4.8–10.8)
WBC # BLD: 22.19 K/UL — HIGH (ref 4.8–10.8)
WBC # BLD: 22.3 K/UL — HIGH (ref 4.8–10.8)
WBC # BLD: 22.35 K/UL — HIGH (ref 4.8–10.8)
WBC # BLD: 22.72 K/UL — HIGH (ref 4.8–10.8)
WBC # BLD: 23.26 K/UL — HIGH (ref 4.8–10.8)
WBC # BLD: 24.09 K/UL — HIGH (ref 4.8–10.8)
WBC # BLD: 24.43 K/UL — HIGH (ref 4.8–10.8)
WBC # BLD: 25.09 K/UL — HIGH (ref 4.8–10.8)
WBC # BLD: 25.17 K/UL — HIGH (ref 4.8–10.8)
WBC # BLD: 25.18 K/UL — HIGH (ref 4.8–10.8)
WBC # BLD: 25.19 K/UL — HIGH (ref 4.8–10.8)
WBC # BLD: 25.21 K/UL — HIGH (ref 4.8–10.8)
WBC # BLD: 25.39 K/UL — HIGH (ref 4.8–10.8)
WBC # BLD: 25.44 K/UL — HIGH (ref 4.8–10.8)
WBC # BLD: 25.76 K/UL — HIGH (ref 4.8–10.8)
WBC # BLD: 26.46 K/UL — HIGH (ref 4.8–10.8)
WBC # BLD: 28.97 K/UL — HIGH (ref 4.8–10.8)
WBC # BLD: 5.07 K/UL — SIGNIFICANT CHANGE UP (ref 4.8–10.8)
WBC # BLD: 5.69 K/UL — SIGNIFICANT CHANGE UP (ref 4.8–10.8)
WBC # BLD: 5.75 K/UL — SIGNIFICANT CHANGE UP (ref 4.8–10.8)
WBC # BLD: 6.05 K/UL — SIGNIFICANT CHANGE UP (ref 4.8–10.8)
WBC # BLD: 6.05 K/UL — SIGNIFICANT CHANGE UP (ref 4.8–10.8)
WBC # BLD: 6.32 K/UL — SIGNIFICANT CHANGE UP (ref 4.8–10.8)
WBC # BLD: 6.68 K/UL — SIGNIFICANT CHANGE UP (ref 4.8–10.8)
WBC # BLD: 6.77 K/UL — SIGNIFICANT CHANGE UP (ref 4.8–10.8)
WBC # BLD: 6.9 K/UL — SIGNIFICANT CHANGE UP (ref 4.8–10.8)
WBC # BLD: 6.97 K/UL — SIGNIFICANT CHANGE UP (ref 4.8–10.8)
WBC # BLD: 6.99 K/UL — SIGNIFICANT CHANGE UP (ref 4.8–10.8)
WBC # BLD: 7.12 K/UL — SIGNIFICANT CHANGE UP (ref 4.8–10.8)
WBC # BLD: 7.42 K/UL — SIGNIFICANT CHANGE UP (ref 4.8–10.8)
WBC # BLD: 7.45 K/UL — SIGNIFICANT CHANGE UP (ref 4.8–10.8)
WBC # BLD: 7.56 K/UL — SIGNIFICANT CHANGE UP (ref 4.8–10.8)
WBC # BLD: 7.63 K/UL — SIGNIFICANT CHANGE UP (ref 4.8–10.8)
WBC # BLD: 7.66 K/UL — SIGNIFICANT CHANGE UP (ref 4.8–10.8)
WBC # BLD: 7.71 K/UL — SIGNIFICANT CHANGE UP (ref 4.8–10.8)
WBC # BLD: 7.9 K/UL — SIGNIFICANT CHANGE UP (ref 4.8–10.8)
WBC # BLD: 7.93 K/UL — SIGNIFICANT CHANGE UP (ref 4.8–10.8)
WBC # BLD: 8.06 K/UL — SIGNIFICANT CHANGE UP (ref 4.8–10.8)
WBC # BLD: 8.09 K/UL — SIGNIFICANT CHANGE UP (ref 4.8–10.8)
WBC # BLD: 8.12 K/UL — SIGNIFICANT CHANGE UP (ref 4.8–10.8)
WBC # BLD: 8.17 K/UL — SIGNIFICANT CHANGE UP (ref 4.8–10.8)
WBC # BLD: 8.28 K/UL — SIGNIFICANT CHANGE UP (ref 4.8–10.8)
WBC # BLD: 8.46 K/UL — SIGNIFICANT CHANGE UP (ref 4.8–10.8)
WBC # BLD: 8.63 K/UL — SIGNIFICANT CHANGE UP (ref 4.8–10.8)
WBC # BLD: 8.77 K/UL — SIGNIFICANT CHANGE UP (ref 4.8–10.8)
WBC # BLD: 9.01 K/UL — SIGNIFICANT CHANGE UP (ref 4.8–10.8)
WBC # BLD: 9.47 K/UL — SIGNIFICANT CHANGE UP (ref 4.8–10.8)
WBC # BLD: 9.77 K/UL — SIGNIFICANT CHANGE UP (ref 4.8–10.8)
WBC # BLD: 9.98 K/UL — SIGNIFICANT CHANGE UP (ref 4.8–10.8)
WBC # FLD AUTO: 10.01 K/UL — SIGNIFICANT CHANGE UP (ref 4.8–10.8)
WBC # FLD AUTO: 10.39 K/UL — SIGNIFICANT CHANGE UP (ref 4.8–10.8)
WBC # FLD AUTO: 10.81 K/UL — HIGH (ref 4.8–10.8)
WBC # FLD AUTO: 10.86 K/UL — HIGH (ref 4.8–10.8)
WBC # FLD AUTO: 10.87 K/UL — HIGH (ref 4.8–10.8)
WBC # FLD AUTO: 11.23 K/UL — HIGH (ref 4.8–10.8)
WBC # FLD AUTO: 11.23 K/UL — HIGH (ref 4.8–10.8)
WBC # FLD AUTO: 11.44 K/UL — HIGH (ref 4.8–10.8)
WBC # FLD AUTO: 12.14 K/UL — HIGH (ref 4.8–10.8)
WBC # FLD AUTO: 12.23 K/UL — HIGH (ref 4.8–10.8)
WBC # FLD AUTO: 12.23 K/UL — HIGH (ref 4.8–10.8)
WBC # FLD AUTO: 12.53 K/UL — HIGH (ref 4.8–10.8)
WBC # FLD AUTO: 12.57 K/UL — HIGH (ref 4.8–10.8)
WBC # FLD AUTO: 13.89 K/UL — HIGH (ref 4.8–10.8)
WBC # FLD AUTO: 14.36 K/UL — HIGH (ref 4.8–10.8)
WBC # FLD AUTO: 14.53 K/UL — HIGH (ref 4.8–10.8)
WBC # FLD AUTO: 14.88 K/UL — HIGH (ref 4.8–10.8)
WBC # FLD AUTO: 15.1 K/UL — HIGH (ref 4.8–10.8)
WBC # FLD AUTO: 15.27 K/UL — HIGH (ref 4.8–10.8)
WBC # FLD AUTO: 15.45 K/UL — HIGH (ref 4.8–10.8)
WBC # FLD AUTO: 16.46 K/UL — HIGH (ref 4.8–10.8)
WBC # FLD AUTO: 17.93 K/UL — HIGH (ref 4.8–10.8)
WBC # FLD AUTO: 20.89 K/UL — HIGH (ref 4.8–10.8)
WBC # FLD AUTO: 21.25 K/UL — HIGH (ref 4.8–10.8)
WBC # FLD AUTO: 21.62 K/UL — HIGH (ref 4.8–10.8)
WBC # FLD AUTO: 22.04 K/UL — HIGH (ref 4.8–10.8)
WBC # FLD AUTO: 22.11 K/UL — HIGH (ref 4.8–10.8)
WBC # FLD AUTO: 22.19 K/UL — HIGH (ref 4.8–10.8)
WBC # FLD AUTO: 22.3 K/UL — HIGH (ref 4.8–10.8)
WBC # FLD AUTO: 22.35 K/UL — HIGH (ref 4.8–10.8)
WBC # FLD AUTO: 22.72 K/UL — HIGH (ref 4.8–10.8)
WBC # FLD AUTO: 23.26 K/UL — HIGH (ref 4.8–10.8)
WBC # FLD AUTO: 24.09 K/UL — HIGH (ref 4.8–10.8)
WBC # FLD AUTO: 24.43 K/UL — HIGH (ref 4.8–10.8)
WBC # FLD AUTO: 25.09 K/UL — HIGH (ref 4.8–10.8)
WBC # FLD AUTO: 25.17 K/UL — HIGH (ref 4.8–10.8)
WBC # FLD AUTO: 25.18 K/UL — HIGH (ref 4.8–10.8)
WBC # FLD AUTO: 25.19 K/UL — HIGH (ref 4.8–10.8)
WBC # FLD AUTO: 25.21 K/UL — HIGH (ref 4.8–10.8)
WBC # FLD AUTO: 25.39 K/UL — HIGH (ref 4.8–10.8)
WBC # FLD AUTO: 25.44 K/UL — HIGH (ref 4.8–10.8)
WBC # FLD AUTO: 25.76 K/UL — HIGH (ref 4.8–10.8)
WBC # FLD AUTO: 26.46 K/UL — HIGH (ref 4.8–10.8)
WBC # FLD AUTO: 28.97 K/UL — HIGH (ref 4.8–10.8)
WBC # FLD AUTO: 5.07 K/UL — SIGNIFICANT CHANGE UP (ref 4.8–10.8)
WBC # FLD AUTO: 5.69 K/UL — SIGNIFICANT CHANGE UP (ref 4.8–10.8)
WBC # FLD AUTO: 5.75 K/UL — SIGNIFICANT CHANGE UP (ref 4.8–10.8)
WBC # FLD AUTO: 6.05 K/UL — SIGNIFICANT CHANGE UP (ref 4.8–10.8)
WBC # FLD AUTO: 6.05 K/UL — SIGNIFICANT CHANGE UP (ref 4.8–10.8)
WBC # FLD AUTO: 6.32 K/UL — SIGNIFICANT CHANGE UP (ref 4.8–10.8)
WBC # FLD AUTO: 6.68 K/UL — SIGNIFICANT CHANGE UP (ref 4.8–10.8)
WBC # FLD AUTO: 6.77 K/UL — SIGNIFICANT CHANGE UP (ref 4.8–10.8)
WBC # FLD AUTO: 6.9 K/UL — SIGNIFICANT CHANGE UP (ref 4.8–10.8)
WBC # FLD AUTO: 6.97 K/UL — SIGNIFICANT CHANGE UP (ref 4.8–10.8)
WBC # FLD AUTO: 6.99 K/UL — SIGNIFICANT CHANGE UP (ref 4.8–10.8)
WBC # FLD AUTO: 7.12 K/UL — SIGNIFICANT CHANGE UP (ref 4.8–10.8)
WBC # FLD AUTO: 7.42 K/UL — SIGNIFICANT CHANGE UP (ref 4.8–10.8)
WBC # FLD AUTO: 7.45 K/UL — SIGNIFICANT CHANGE UP (ref 4.8–10.8)
WBC # FLD AUTO: 7.56 K/UL — SIGNIFICANT CHANGE UP (ref 4.8–10.8)
WBC # FLD AUTO: 7.63 K/UL — SIGNIFICANT CHANGE UP (ref 4.8–10.8)
WBC # FLD AUTO: 7.66 K/UL — SIGNIFICANT CHANGE UP (ref 4.8–10.8)
WBC # FLD AUTO: 7.71 K/UL — SIGNIFICANT CHANGE UP (ref 4.8–10.8)
WBC # FLD AUTO: 7.9 K/UL — SIGNIFICANT CHANGE UP (ref 4.8–10.8)
WBC # FLD AUTO: 7.93 K/UL — SIGNIFICANT CHANGE UP (ref 4.8–10.8)
WBC # FLD AUTO: 8.06 K/UL — SIGNIFICANT CHANGE UP (ref 4.8–10.8)
WBC # FLD AUTO: 8.09 K/UL — SIGNIFICANT CHANGE UP (ref 4.8–10.8)
WBC # FLD AUTO: 8.12 K/UL — SIGNIFICANT CHANGE UP (ref 4.8–10.8)
WBC # FLD AUTO: 8.17 K/UL — SIGNIFICANT CHANGE UP (ref 4.8–10.8)
WBC # FLD AUTO: 8.28 K/UL — SIGNIFICANT CHANGE UP (ref 4.8–10.8)
WBC # FLD AUTO: 8.46 K/UL — SIGNIFICANT CHANGE UP (ref 4.8–10.8)
WBC # FLD AUTO: 8.63 K/UL — SIGNIFICANT CHANGE UP (ref 4.8–10.8)
WBC # FLD AUTO: 8.77 K/UL — SIGNIFICANT CHANGE UP (ref 4.8–10.8)
WBC # FLD AUTO: 9.01 K/UL — SIGNIFICANT CHANGE UP (ref 4.8–10.8)
WBC # FLD AUTO: 9.47 K/UL — SIGNIFICANT CHANGE UP (ref 4.8–10.8)
WBC # FLD AUTO: 9.77 K/UL — SIGNIFICANT CHANGE UP (ref 4.8–10.8)
WBC # FLD AUTO: 9.98 K/UL — SIGNIFICANT CHANGE UP (ref 4.8–10.8)
WBC UR QL: 251 /HPF — HIGH (ref 0–5)
WBC UR QL: 57 /HPF — HIGH (ref 0–5)
WBC UR QL: 84 /HPF — HIGH (ref 0–5)
WBC UR QL: >720 /HPF — HIGH (ref 0–5)
WBC UR QL: >720 /HPF — HIGH (ref 0–5)
WNV IGG CSF IA-ACNC: NEGATIVE — SIGNIFICANT CHANGE UP
WNV IGM CSF IA-ACNC: NEGATIVE — SIGNIFICANT CHANGE UP

## 2022-01-01 PROCEDURE — 99233 SBSQ HOSP IP/OBS HIGH 50: CPT

## 2022-01-01 PROCEDURE — 71045 X-RAY EXAM CHEST 1 VIEW: CPT | Mod: 26,76

## 2022-01-01 PROCEDURE — 71045 X-RAY EXAM CHEST 1 VIEW: CPT | Mod: 26

## 2022-01-01 PROCEDURE — 95720 EEG PHY/QHP EA INCR W/VEEG: CPT

## 2022-01-01 PROCEDURE — 99221 1ST HOSP IP/OBS SF/LOW 40: CPT

## 2022-01-01 PROCEDURE — 93010 ELECTROCARDIOGRAM REPORT: CPT

## 2022-01-01 PROCEDURE — 99223 1ST HOSP IP/OBS HIGH 75: CPT

## 2022-01-01 PROCEDURE — 70551 MRI BRAIN STEM W/O DYE: CPT | Mod: 26

## 2022-01-01 PROCEDURE — 99232 SBSQ HOSP IP/OBS MODERATE 35: CPT

## 2022-01-01 PROCEDURE — 99291 CRITICAL CARE FIRST HOUR: CPT

## 2022-01-01 PROCEDURE — 45378 DIAGNOSTIC COLONOSCOPY: CPT

## 2022-01-01 PROCEDURE — 70553 MRI BRAIN STEM W/O & W/DYE: CPT | Mod: 26

## 2022-01-01 PROCEDURE — 70498 CT ANGIOGRAPHY NECK: CPT | Mod: 26

## 2022-01-01 PROCEDURE — 70450 CT HEAD/BRAIN W/O DYE: CPT | Mod: 26

## 2022-01-01 PROCEDURE — 93291 INTERROG DEV EVAL SCRMS IP: CPT | Mod: 26

## 2022-01-01 PROCEDURE — 99231 SBSQ HOSP IP/OBS SF/LOW 25: CPT

## 2022-01-01 PROCEDURE — ZZZZZ: CPT

## 2022-01-01 PROCEDURE — 71045 X-RAY EXAM CHEST 1 VIEW: CPT | Mod: 26,77

## 2022-01-01 PROCEDURE — 99291 CRITICAL CARE FIRST HOUR: CPT | Mod: 25

## 2022-01-01 PROCEDURE — 11046 DBRDMT MUSC&/FSCA EA ADDL: CPT

## 2022-01-01 PROCEDURE — 99233 SBSQ HOSP IP/OBS HIGH 50: CPT | Mod: 25

## 2022-01-01 PROCEDURE — 99222 1ST HOSP IP/OBS MODERATE 55: CPT

## 2022-01-01 PROCEDURE — 43246 EGD PLACE GASTROSTOMY TUBE: CPT

## 2022-01-01 PROCEDURE — 99223 1ST HOSP IP/OBS HIGH 75: CPT | Mod: 25

## 2022-01-01 PROCEDURE — 93975 VASCULAR STUDY: CPT | Mod: 26

## 2022-01-01 PROCEDURE — 76775 US EXAM ABDO BACK WALL LIM: CPT | Mod: 26

## 2022-01-01 PROCEDURE — 88112 CYTOPATH CELL ENHANCE TECH: CPT | Mod: 26

## 2022-01-01 PROCEDURE — 93970 EXTREMITY STUDY: CPT | Mod: 26

## 2022-01-01 PROCEDURE — 93926 LOWER EXTREMITY STUDY: CPT | Mod: 26,RT

## 2022-01-01 PROCEDURE — 88312 SPECIAL STAINS GROUP 1: CPT | Mod: 26

## 2022-01-01 PROCEDURE — 33285 INSJ SUBQ CAR RHYTHM MNTR: CPT

## 2022-01-01 PROCEDURE — 35800 EXPLORE NECK VESSELS: CPT

## 2022-01-01 PROCEDURE — 93971 EXTREMITY STUDY: CPT | Mod: 26,RT

## 2022-01-01 PROCEDURE — 95718 EEG PHYS/QHP 2-12 HR W/VEEG: CPT

## 2022-01-01 PROCEDURE — 36556 INSERT NON-TUNNEL CV CATH: CPT

## 2022-01-01 PROCEDURE — 74230 X-RAY XM SWLNG FUNCJ C+: CPT | Mod: 26

## 2022-01-01 PROCEDURE — 70496 CT ANGIOGRAPHY HEAD: CPT | Mod: 26

## 2022-01-01 PROCEDURE — 31624 DX BRONCHOSCOPE/LAVAGE: CPT

## 2022-01-01 PROCEDURE — 43239 EGD BIOPSY SINGLE/MULTIPLE: CPT | Mod: XS

## 2022-01-01 PROCEDURE — 93306 TTE W/DOPPLER COMPLETE: CPT | Mod: 26

## 2022-01-01 PROCEDURE — 95816 EEG AWAKE AND DROWSY: CPT | Mod: 26

## 2022-01-01 PROCEDURE — 31600 PLANNED TRACHEOSTOMY: CPT

## 2022-01-01 PROCEDURE — 76705 ECHO EXAM OF ABDOMEN: CPT | Mod: 26

## 2022-01-01 PROCEDURE — 74176 CT ABD & PELVIS W/O CONTRAST: CPT | Mod: 26

## 2022-01-01 PROCEDURE — 99285 EMERGENCY DEPT VISIT HI MDM: CPT

## 2022-01-01 PROCEDURE — 11043 DBRDMT MUSC&/FSCA 1ST 20/<: CPT

## 2022-01-01 PROCEDURE — 99497 ADVNCD CARE PLAN 30 MIN: CPT | Mod: 25

## 2022-01-01 PROCEDURE — 99231 SBSQ HOSP IP/OBS SF/LOW 25: CPT | Mod: 25

## 2022-01-01 PROCEDURE — 71250 CT THORAX DX C-: CPT | Mod: 26

## 2022-01-01 PROCEDURE — 88305 TISSUE EXAM BY PATHOLOGIST: CPT | Mod: 26

## 2022-01-01 PROCEDURE — 73590 X-RAY EXAM OF LOWER LEG: CPT | Mod: 26,RT

## 2022-01-01 RX ORDER — DESMOPRESSIN ACETATE 0.1 MG/1
25 TABLET ORAL
Refills: 0 | Status: DISCONTINUED | OUTPATIENT
Start: 2022-01-01 | End: 2022-01-01

## 2022-01-01 RX ORDER — INSULIN HUMAN 100 [IU]/ML
1 INJECTION, SOLUTION SUBCUTANEOUS
Qty: 100 | Refills: 0 | Status: DISCONTINUED | OUTPATIENT
Start: 2022-01-01 | End: 2022-01-01

## 2022-01-01 RX ORDER — MIDAZOLAM HYDROCHLORIDE 1 MG/ML
2 INJECTION, SOLUTION INTRAMUSCULAR; INTRAVENOUS ONCE
Refills: 0 | Status: DISCONTINUED | OUTPATIENT
Start: 2022-01-01 | End: 2022-01-01

## 2022-01-01 RX ORDER — SEVELAMER CARBONATE 2400 MG/1
800 POWDER, FOR SUSPENSION ORAL
Refills: 0 | Status: DISCONTINUED | OUTPATIENT
Start: 2022-01-01 | End: 2022-01-01

## 2022-01-01 RX ORDER — VANCOMYCIN HCL 1 G
1250 VIAL (EA) INTRAVENOUS EVERY 24 HOURS
Refills: 0 | Status: DISCONTINUED | OUTPATIENT
Start: 2022-01-01 | End: 2022-01-01

## 2022-01-01 RX ORDER — FENTANYL CITRATE 50 UG/ML
0.5 INJECTION INTRAVENOUS
Qty: 2500 | Refills: 0 | Status: DISCONTINUED | OUTPATIENT
Start: 2022-01-01 | End: 2022-01-01

## 2022-01-01 RX ORDER — MAGNESIUM SULFATE 500 MG/ML
2 VIAL (ML) INJECTION ONCE
Refills: 0 | Status: COMPLETED | OUTPATIENT
Start: 2022-01-01 | End: 2022-01-01

## 2022-01-01 RX ORDER — ASPIRIN/CALCIUM CARB/MAGNESIUM 324 MG
81 TABLET ORAL DAILY
Refills: 0 | Status: DISCONTINUED | OUTPATIENT
Start: 2022-01-01 | End: 2022-01-01

## 2022-01-01 RX ORDER — SODIUM ZIRCONIUM CYCLOSILICATE 10 G/10G
10 POWDER, FOR SUSPENSION ORAL EVERY 12 HOURS
Refills: 0 | Status: DISCONTINUED | OUTPATIENT
Start: 2022-01-01 | End: 2022-01-01

## 2022-01-01 RX ORDER — SEVELAMER CARBONATE 2400 MG/1
800 POWDER, FOR SUSPENSION ORAL EVERY 8 HOURS
Refills: 0 | Status: DISCONTINUED | OUTPATIENT
Start: 2022-01-01 | End: 2022-01-01

## 2022-01-01 RX ORDER — LEVETIRACETAM 250 MG/1
1000 TABLET, FILM COATED ORAL ONCE
Refills: 0 | Status: DISCONTINUED | OUTPATIENT
Start: 2022-01-01 | End: 2022-01-01

## 2022-01-01 RX ORDER — NIFEDIPINE 30 MG
30 TABLET, EXTENDED RELEASE 24 HR ORAL ONCE
Refills: 0 | Status: COMPLETED | OUTPATIENT
Start: 2022-01-01 | End: 2022-01-01

## 2022-01-01 RX ORDER — ATORVASTATIN CALCIUM 80 MG/1
80 TABLET, FILM COATED ORAL AT BEDTIME
Refills: 0 | Status: DISCONTINUED | OUTPATIENT
Start: 2022-01-01 | End: 2022-01-01

## 2022-01-01 RX ORDER — INSULIN LISPRO 100/ML
4 VIAL (ML) SUBCUTANEOUS
Refills: 0 | Status: DISCONTINUED | OUTPATIENT
Start: 2022-01-01 | End: 2022-01-01

## 2022-01-01 RX ORDER — ZOLPIDEM TARTRATE 10 MG/1
5 TABLET ORAL AT BEDTIME
Refills: 0 | Status: DISCONTINUED | OUTPATIENT
Start: 2022-01-01 | End: 2022-01-01

## 2022-01-01 RX ORDER — VANCOMYCIN HCL 1 G
1500 VIAL (EA) INTRAVENOUS ONCE
Refills: 0 | Status: COMPLETED | OUTPATIENT
Start: 2022-01-01 | End: 2022-01-01

## 2022-01-01 RX ORDER — HYDRALAZINE HCL 50 MG
5 TABLET ORAL ONCE
Refills: 0 | Status: COMPLETED | OUTPATIENT
Start: 2022-01-01 | End: 2022-01-01

## 2022-01-01 RX ORDER — PROPOFOL 10 MG/ML
100 INJECTION, EMULSION INTRAVENOUS ONCE
Refills: 0 | Status: DISCONTINUED | OUTPATIENT
Start: 2022-01-01 | End: 2022-01-01

## 2022-01-01 RX ORDER — LABETALOL HCL 100 MG
300 TABLET ORAL THREE TIMES A DAY
Refills: 0 | Status: DISCONTINUED | OUTPATIENT
Start: 2022-01-01 | End: 2022-01-01

## 2022-01-01 RX ORDER — LABETALOL HCL 100 MG
10 TABLET ORAL ONCE
Refills: 0 | Status: COMPLETED | OUTPATIENT
Start: 2022-01-01 | End: 2022-01-01

## 2022-01-01 RX ORDER — INSULIN GLARGINE 100 [IU]/ML
30 INJECTION, SOLUTION SUBCUTANEOUS EVERY MORNING
Refills: 0 | Status: DISCONTINUED | OUTPATIENT
Start: 2022-01-01 | End: 2022-01-01

## 2022-01-01 RX ORDER — LEVETIRACETAM 250 MG/1
1000 TABLET, FILM COATED ORAL ONCE
Refills: 0 | Status: COMPLETED | OUTPATIENT
Start: 2022-01-01 | End: 2022-01-01

## 2022-01-01 RX ORDER — APIXABAN 2.5 MG/1
5 TABLET, FILM COATED ORAL EVERY 12 HOURS
Refills: 0 | Status: DISCONTINUED | OUTPATIENT
Start: 2022-01-01 | End: 2022-01-01

## 2022-01-01 RX ORDER — DESMOPRESSIN ACETATE 0.1 MG/1
25 TABLET ORAL ONCE
Refills: 0 | Status: COMPLETED | OUTPATIENT
Start: 2022-01-01 | End: 2022-01-01

## 2022-01-01 RX ORDER — NOREPINEPHRINE BITARTRATE/D5W 8 MG/250ML
0.05 PLASTIC BAG, INJECTION (ML) INTRAVENOUS
Qty: 8 | Refills: 0 | Status: DISCONTINUED | OUTPATIENT
Start: 2022-01-01 | End: 2022-01-01

## 2022-01-01 RX ORDER — HYDRALAZINE HCL 50 MG
10 TABLET ORAL THREE TIMES A DAY
Refills: 0 | Status: DISCONTINUED | OUTPATIENT
Start: 2022-01-01 | End: 2022-01-01

## 2022-01-01 RX ORDER — ENOXAPARIN SODIUM 100 MG/ML
40 INJECTION SUBCUTANEOUS EVERY 24 HOURS
Refills: 0 | Status: DISCONTINUED | OUTPATIENT
Start: 2022-01-01 | End: 2022-01-01

## 2022-01-01 RX ORDER — CALCIUM ACETATE 667 MG
1334 TABLET ORAL
Refills: 0 | Status: DISCONTINUED | OUTPATIENT
Start: 2022-01-01 | End: 2022-01-01

## 2022-01-01 RX ORDER — VANCOMYCIN HCL 1 G
1250 VIAL (EA) INTRAVENOUS EVERY 12 HOURS
Refills: 0 | Status: DISCONTINUED | OUTPATIENT
Start: 2022-01-01 | End: 2022-01-01

## 2022-01-01 RX ORDER — SODIUM CHLORIDE 9 MG/ML
1000 INJECTION INTRAMUSCULAR; INTRAVENOUS; SUBCUTANEOUS
Refills: 0 | Status: DISCONTINUED | OUTPATIENT
Start: 2022-01-01 | End: 2022-01-01

## 2022-01-01 RX ORDER — DEXTROSE 50 % IN WATER 50 %
15 SYRINGE (ML) INTRAVENOUS ONCE
Refills: 0 | Status: DISCONTINUED | OUTPATIENT
Start: 2022-01-01 | End: 2022-01-01

## 2022-01-01 RX ORDER — MIDAZOLAM HYDROCHLORIDE 1 MG/ML
1 INJECTION, SOLUTION INTRAMUSCULAR; INTRAVENOUS ONCE
Refills: 0 | Status: DISCONTINUED | OUTPATIENT
Start: 2022-01-01 | End: 2022-01-01

## 2022-01-01 RX ORDER — DEXMEDETOMIDINE HYDROCHLORIDE IN 0.9% SODIUM CHLORIDE 4 UG/ML
0.2 INJECTION INTRAVENOUS
Qty: 200 | Refills: 0 | Status: DISCONTINUED | OUTPATIENT
Start: 2022-01-01 | End: 2022-01-01

## 2022-01-01 RX ORDER — AMLODIPINE BESYLATE 2.5 MG/1
10 TABLET ORAL DAILY
Refills: 0 | Status: DISCONTINUED | OUTPATIENT
Start: 2022-01-01 | End: 2022-01-01

## 2022-01-01 RX ORDER — DEXMEDETOMIDINE HYDROCHLORIDE IN 0.9% SODIUM CHLORIDE 4 UG/ML
0.2 INJECTION INTRAVENOUS
Qty: 400 | Refills: 0 | Status: DISCONTINUED | OUTPATIENT
Start: 2022-01-01 | End: 2022-01-01

## 2022-01-01 RX ORDER — PROPOFOL 10 MG/ML
10 INJECTION, EMULSION INTRAVENOUS
Qty: 500 | Refills: 0 | Status: DISCONTINUED | OUTPATIENT
Start: 2022-01-01 | End: 2022-01-01

## 2022-01-01 RX ORDER — INSULIN LISPRO 100/ML
2 VIAL (ML) SUBCUTANEOUS ONCE
Refills: 0 | Status: COMPLETED | OUTPATIENT
Start: 2022-01-01 | End: 2022-01-01

## 2022-01-01 RX ORDER — AZTREONAM 2 G
2000 VIAL (EA) INJECTION ONCE
Refills: 0 | Status: COMPLETED | OUTPATIENT
Start: 2022-01-01 | End: 2022-01-01

## 2022-01-01 RX ORDER — ASPIRIN/CALCIUM CARB/MAGNESIUM 324 MG
300 TABLET ORAL ONCE
Refills: 0 | Status: COMPLETED | OUTPATIENT
Start: 2022-01-01 | End: 2022-01-01

## 2022-01-01 RX ORDER — SODIUM CHLORIDE 9 MG/ML
1000 INJECTION, SOLUTION INTRAVENOUS
Refills: 0 | Status: DISCONTINUED | OUTPATIENT
Start: 2022-01-01 | End: 2022-01-01

## 2022-01-01 RX ORDER — NIFEDIPINE 30 MG
60 TABLET, EXTENDED RELEASE 24 HR ORAL AT BEDTIME
Refills: 0 | Status: DISCONTINUED | OUTPATIENT
Start: 2022-01-01 | End: 2022-01-01

## 2022-01-01 RX ORDER — FUROSEMIDE 40 MG
40 TABLET ORAL ONCE
Refills: 0 | Status: COMPLETED | OUTPATIENT
Start: 2022-01-01 | End: 2022-01-01

## 2022-01-01 RX ORDER — HYDRALAZINE HCL 50 MG
100 TABLET ORAL EVERY 8 HOURS
Refills: 0 | Status: DISCONTINUED | OUTPATIENT
Start: 2022-01-01 | End: 2022-01-01

## 2022-01-01 RX ORDER — FENTANYL CITRATE 50 UG/ML
25 INJECTION INTRAVENOUS
Refills: 0 | Status: DISCONTINUED | OUTPATIENT
Start: 2022-01-01 | End: 2022-01-01

## 2022-01-01 RX ORDER — CEFEPIME 1 G/1
1000 INJECTION, POWDER, FOR SOLUTION INTRAMUSCULAR; INTRAVENOUS EVERY 12 HOURS
Refills: 0 | Status: DISCONTINUED | OUTPATIENT
Start: 2022-01-01 | End: 2022-01-01

## 2022-01-01 RX ORDER — LIDOCAINE 4 G/100G
10 CREAM TOPICAL ONCE
Refills: 0 | Status: COMPLETED | OUTPATIENT
Start: 2022-01-01 | End: 2022-01-01

## 2022-01-01 RX ORDER — LABETALOL HCL 100 MG
600 TABLET ORAL THREE TIMES A DAY
Refills: 0 | Status: DISCONTINUED | OUTPATIENT
Start: 2022-01-01 | End: 2022-01-01

## 2022-01-01 RX ORDER — MULTIVIT-MIN/FERROUS GLUCONATE 9 MG/15 ML
15 LIQUID (ML) ORAL DAILY
Refills: 0 | Status: DISCONTINUED | OUTPATIENT
Start: 2022-01-01 | End: 2022-01-01

## 2022-01-01 RX ORDER — NIFEDIPINE 30 MG
60 TABLET, EXTENDED RELEASE 24 HR ORAL DAILY
Refills: 0 | Status: DISCONTINUED | OUTPATIENT
Start: 2022-01-01 | End: 2022-01-01

## 2022-01-01 RX ORDER — AZTREONAM 2 G
VIAL (EA) INJECTION
Refills: 0 | Status: DISCONTINUED | OUTPATIENT
Start: 2022-01-01 | End: 2022-01-01

## 2022-01-01 RX ORDER — AZTREONAM 2 G
2000 VIAL (EA) INJECTION EVERY 12 HOURS
Refills: 0 | Status: DISCONTINUED | OUTPATIENT
Start: 2022-01-01 | End: 2022-01-01

## 2022-01-01 RX ORDER — SODIUM HYPOCHLORITE 0.125 %
1 SOLUTION, NON-ORAL MISCELLANEOUS
Refills: 0 | Status: DISCONTINUED | OUTPATIENT
Start: 2022-01-01 | End: 2022-01-01

## 2022-01-01 RX ORDER — MAGNESIUM SULFATE 500 MG/ML
2 VIAL (ML) INJECTION EVERY 4 HOURS
Refills: 0 | Status: DISCONTINUED | OUTPATIENT
Start: 2022-01-01 | End: 2022-01-01

## 2022-01-01 RX ORDER — CASPOFUNGIN ACETATE 7 MG/ML
INJECTION, POWDER, LYOPHILIZED, FOR SOLUTION INTRAVENOUS
Refills: 0 | Status: DISCONTINUED | OUTPATIENT
Start: 2022-01-01 | End: 2022-01-01

## 2022-01-01 RX ORDER — DEXTROSE 50 % IN WATER 50 %
25 SYRINGE (ML) INTRAVENOUS ONCE
Refills: 0 | Status: COMPLETED | OUTPATIENT
Start: 2022-01-01 | End: 2022-01-01

## 2022-01-01 RX ORDER — GLUCAGON INJECTION, SOLUTION 0.5 MG/.1ML
1 INJECTION, SOLUTION SUBCUTANEOUS ONCE
Refills: 0 | Status: DISCONTINUED | OUTPATIENT
Start: 2022-01-01 | End: 2022-01-01

## 2022-01-01 RX ORDER — LABETALOL HCL 100 MG
100 TABLET ORAL ONCE
Refills: 0 | Status: COMPLETED | OUTPATIENT
Start: 2022-01-01 | End: 2022-01-01

## 2022-01-01 RX ORDER — POTASSIUM CHLORIDE 20 MEQ
40 PACKET (EA) ORAL ONCE
Refills: 0 | Status: COMPLETED | OUTPATIENT
Start: 2022-01-01 | End: 2022-01-01

## 2022-01-01 RX ORDER — LEVETIRACETAM 250 MG/1
1500 TABLET, FILM COATED ORAL EVERY 12 HOURS
Refills: 0 | Status: DISCONTINUED | OUTPATIENT
Start: 2022-01-01 | End: 2022-01-01

## 2022-01-01 RX ORDER — ATORVASTATIN CALCIUM 80 MG/1
10 TABLET, FILM COATED ORAL AT BEDTIME
Refills: 0 | Status: DISCONTINUED | OUTPATIENT
Start: 2022-01-01 | End: 2022-01-01

## 2022-01-01 RX ORDER — POTASSIUM CHLORIDE 20 MEQ
20 PACKET (EA) ORAL ONCE
Refills: 0 | Status: COMPLETED | OUTPATIENT
Start: 2022-01-01 | End: 2022-01-01

## 2022-01-01 RX ORDER — CLOPIDOGREL BISULFATE 75 MG/1
75 TABLET, FILM COATED ORAL DAILY
Refills: 0 | Status: DISCONTINUED | OUTPATIENT
Start: 2022-01-01 | End: 2022-01-01

## 2022-01-01 RX ORDER — INSULIN GLARGINE 100 [IU]/ML
12 INJECTION, SOLUTION SUBCUTANEOUS AT BEDTIME
Refills: 0 | Status: DISCONTINUED | OUTPATIENT
Start: 2022-01-01 | End: 2022-01-01

## 2022-01-01 RX ORDER — HYDROMORPHONE HYDROCHLORIDE 2 MG/ML
0.5 INJECTION INTRAMUSCULAR; INTRAVENOUS; SUBCUTANEOUS ONCE
Refills: 0 | Status: DISCONTINUED | OUTPATIENT
Start: 2022-01-01 | End: 2022-01-01

## 2022-01-01 RX ORDER — DOXAZOSIN MESYLATE 4 MG
2 TABLET ORAL AT BEDTIME
Refills: 0 | Status: DISCONTINUED | OUTPATIENT
Start: 2022-01-01 | End: 2022-01-01

## 2022-01-01 RX ORDER — SODIUM BICARBONATE 1 MEQ/ML
1300 SYRINGE (ML) INTRAVENOUS EVERY 8 HOURS
Refills: 0 | Status: DISCONTINUED | OUTPATIENT
Start: 2022-01-01 | End: 2022-01-01

## 2022-01-01 RX ORDER — MEROPENEM 1 G/30ML
INJECTION INTRAVENOUS
Refills: 0 | Status: DISCONTINUED | OUTPATIENT
Start: 2022-01-01 | End: 2022-01-01

## 2022-01-01 RX ORDER — POTASSIUM CHLORIDE 20 MEQ
20 PACKET (EA) ORAL
Refills: 0 | Status: COMPLETED | OUTPATIENT
Start: 2022-01-01 | End: 2022-01-01

## 2022-01-01 RX ORDER — HYDRALAZINE HCL 50 MG
10 TABLET ORAL
Refills: 0 | Status: DISCONTINUED | OUTPATIENT
Start: 2022-01-01 | End: 2022-01-01

## 2022-01-01 RX ORDER — PROPOFOL 10 MG/ML
2 INJECTION, EMULSION INTRAVENOUS ONCE
Refills: 0 | Status: DISCONTINUED | OUTPATIENT
Start: 2022-01-01 | End: 2022-01-01

## 2022-01-01 RX ORDER — HYDRALAZINE HCL 50 MG
20 TABLET ORAL ONCE
Refills: 0 | Status: COMPLETED | OUTPATIENT
Start: 2022-01-01 | End: 2022-01-01

## 2022-01-01 RX ORDER — LEVETIRACETAM 250 MG/1
2000 TABLET, FILM COATED ORAL ONCE
Refills: 0 | Status: COMPLETED | OUTPATIENT
Start: 2022-01-01 | End: 2022-01-01

## 2022-01-01 RX ORDER — LINEZOLID 600 MG/300ML
600 INJECTION, SOLUTION INTRAVENOUS EVERY 12 HOURS
Refills: 0 | Status: DISCONTINUED | OUTPATIENT
Start: 2022-01-01 | End: 2022-01-01

## 2022-01-01 RX ORDER — DEXTROSE 50 % IN WATER 50 %
25 SYRINGE (ML) INTRAVENOUS ONCE
Refills: 0 | Status: DISCONTINUED | OUTPATIENT
Start: 2022-01-01 | End: 2022-01-01

## 2022-01-01 RX ORDER — HYDRALAZINE HCL 50 MG
50 TABLET ORAL EVERY 8 HOURS
Refills: 0 | Status: DISCONTINUED | OUTPATIENT
Start: 2022-01-01 | End: 2022-01-01

## 2022-01-01 RX ORDER — VANCOMYCIN HCL 1 G
1250 VIAL (EA) INTRAVENOUS ONCE
Refills: 0 | Status: COMPLETED | OUTPATIENT
Start: 2022-01-01 | End: 2022-01-01

## 2022-01-01 RX ORDER — CEFAZOLIN SODIUM 1 G
2000 VIAL (EA) INJECTION ONCE
Refills: 0 | Status: COMPLETED | OUTPATIENT
Start: 2022-01-01 | End: 2022-01-01

## 2022-01-01 RX ORDER — PROPOFOL 10 MG/ML
10 INJECTION, EMULSION INTRAVENOUS
Qty: 1000 | Refills: 0 | Status: DISCONTINUED | OUTPATIENT
Start: 2022-01-01 | End: 2022-01-01

## 2022-01-01 RX ORDER — SODIUM,POTASSIUM PHOSPHATES 278-250MG
1 POWDER IN PACKET (EA) ORAL
Refills: 0 | Status: DISCONTINUED | OUTPATIENT
Start: 2022-01-01 | End: 2022-01-01

## 2022-01-01 RX ORDER — FENTANYL CITRATE 50 UG/ML
50 INJECTION INTRAVENOUS ONCE
Refills: 0 | Status: DISCONTINUED | OUTPATIENT
Start: 2022-01-01 | End: 2022-01-01

## 2022-01-01 RX ORDER — SEVELAMER CARBONATE 2400 MG/1
1600 POWDER, FOR SUSPENSION ORAL EVERY 8 HOURS
Refills: 0 | Status: DISCONTINUED | OUTPATIENT
Start: 2022-01-01 | End: 2022-01-01

## 2022-01-01 RX ORDER — SERTRALINE 25 MG/1
1 TABLET, FILM COATED ORAL
Qty: 0 | Refills: 0 | DISCHARGE

## 2022-01-01 RX ORDER — APIXABAN 2.5 MG/1
5 TABLET, FILM COATED ORAL ONCE
Refills: 0 | Status: COMPLETED | OUTPATIENT
Start: 2022-01-01 | End: 2022-01-01

## 2022-01-01 RX ORDER — INSULIN GLARGINE 100 [IU]/ML
18 INJECTION, SOLUTION SUBCUTANEOUS EVERY MORNING
Refills: 0 | Status: DISCONTINUED | OUTPATIENT
Start: 2022-01-01 | End: 2022-01-01

## 2022-01-01 RX ORDER — LACOSAMIDE 50 MG/1
50 TABLET ORAL
Refills: 0 | Status: DISCONTINUED | OUTPATIENT
Start: 2022-01-01 | End: 2022-01-01

## 2022-01-01 RX ORDER — OMEPRAZOLE 10 MG/1
1 CAPSULE, DELAYED RELEASE ORAL
Qty: 0 | Refills: 0 | DISCHARGE

## 2022-01-01 RX ORDER — VANCOMYCIN HCL 1 G
1000 VIAL (EA) INTRAVENOUS ONCE
Refills: 0 | Status: COMPLETED | OUTPATIENT
Start: 2022-01-01 | End: 2022-01-01

## 2022-01-01 RX ORDER — DESMOPRESSIN ACETATE 0.1 MG/1
30 TABLET ORAL ONCE
Refills: 0 | Status: COMPLETED | OUTPATIENT
Start: 2022-01-01 | End: 2022-01-01

## 2022-01-01 RX ORDER — HYDRALAZINE HCL 50 MG
25 TABLET ORAL EVERY 6 HOURS
Refills: 0 | Status: DISCONTINUED | OUTPATIENT
Start: 2022-01-01 | End: 2022-01-01

## 2022-01-01 RX ORDER — PHYTONADIONE (VIT K1) 5 MG
5 TABLET ORAL ONCE
Refills: 0 | Status: COMPLETED | OUTPATIENT
Start: 2022-01-01 | End: 2022-01-01

## 2022-01-01 RX ORDER — SODIUM BICARBONATE 1 MEQ/ML
50 SYRINGE (ML) INTRAVENOUS ONCE
Refills: 0 | Status: COMPLETED | OUTPATIENT
Start: 2022-01-01 | End: 2022-01-01

## 2022-01-01 RX ORDER — PROPOFOL 10 MG/ML
20 INJECTION, EMULSION INTRAVENOUS ONCE
Refills: 0 | Status: COMPLETED | OUTPATIENT
Start: 2022-01-01 | End: 2022-01-01

## 2022-01-01 RX ORDER — LEVETIRACETAM 250 MG/1
500 TABLET, FILM COATED ORAL
Refills: 0 | Status: DISCONTINUED | OUTPATIENT
Start: 2022-01-01 | End: 2022-01-01

## 2022-01-01 RX ORDER — MIDAZOLAM HYDROCHLORIDE 1 MG/ML
4 INJECTION, SOLUTION INTRAMUSCULAR; INTRAVENOUS ONCE
Refills: 0 | Status: DISCONTINUED | OUTPATIENT
Start: 2022-01-01 | End: 2022-01-01

## 2022-01-01 RX ORDER — LEVETIRACETAM 250 MG/1
1000 TABLET, FILM COATED ORAL EVERY 12 HOURS
Refills: 0 | Status: DISCONTINUED | OUTPATIENT
Start: 2022-01-01 | End: 2022-01-01

## 2022-01-01 RX ORDER — INSULIN GLARGINE 100 [IU]/ML
15 INJECTION, SOLUTION SUBCUTANEOUS EVERY MORNING
Refills: 0 | Status: DISCONTINUED | OUTPATIENT
Start: 2022-01-01 | End: 2022-01-01

## 2022-01-01 RX ORDER — DESMOPRESSIN ACETATE 0.1 MG/1
25 TABLET ORAL
Refills: 0 | Status: ACTIVE | OUTPATIENT
Start: 2022-01-01 | End: 2022-01-01

## 2022-01-01 RX ORDER — SEVELAMER CARBONATE 2400 MG/1
2400 POWDER, FOR SUSPENSION ORAL EVERY 8 HOURS
Refills: 0 | Status: DISCONTINUED | OUTPATIENT
Start: 2022-01-01 | End: 2022-01-01

## 2022-01-01 RX ORDER — LIDOCAINE HYDROCHLORIDE AND EPINEPHRINE 10; 10 MG/ML; UG/ML
20 INJECTION, SOLUTION INFILTRATION; PERINEURAL ONCE
Refills: 0 | Status: DISCONTINUED | OUTPATIENT
Start: 2022-01-01 | End: 2022-01-01

## 2022-01-01 RX ORDER — VANCOMYCIN HCL 1 G
VIAL (EA) INTRAVENOUS
Refills: 0 | Status: DISCONTINUED | OUTPATIENT
Start: 2022-01-01 | End: 2022-01-01

## 2022-01-01 RX ORDER — NIFEDIPINE 30 MG
60 TABLET, EXTENDED RELEASE 24 HR ORAL ONCE
Refills: 0 | Status: DISCONTINUED | OUTPATIENT
Start: 2022-01-01 | End: 2022-01-01

## 2022-01-01 RX ORDER — PANTOPRAZOLE SODIUM 20 MG/1
40 TABLET, DELAYED RELEASE ORAL
Refills: 0 | Status: DISCONTINUED | OUTPATIENT
Start: 2022-01-01 | End: 2022-01-01

## 2022-01-01 RX ORDER — INSULIN LISPRO 100/ML
VIAL (ML) SUBCUTANEOUS EVERY 6 HOURS
Refills: 0 | Status: DISCONTINUED | OUTPATIENT
Start: 2022-01-01 | End: 2022-01-01

## 2022-01-01 RX ORDER — MEROPENEM 1 G/30ML
500 INJECTION INTRAVENOUS ONCE
Refills: 0 | Status: COMPLETED | OUTPATIENT
Start: 2022-01-01 | End: 2022-01-01

## 2022-01-01 RX ORDER — AMPICILLIN SODIUM AND SULBACTAM SODIUM 250; 125 MG/ML; MG/ML
3 INJECTION, POWDER, FOR SUSPENSION INTRAMUSCULAR; INTRAVENOUS EVERY 6 HOURS
Refills: 0 | Status: DISCONTINUED | OUTPATIENT
Start: 2022-01-01 | End: 2022-01-01

## 2022-01-01 RX ORDER — HEPARIN SODIUM 5000 [USP'U]/ML
5000 INJECTION INTRAVENOUS; SUBCUTANEOUS EVERY 8 HOURS
Refills: 0 | Status: DISCONTINUED | OUTPATIENT
Start: 2022-01-01 | End: 2022-01-01

## 2022-01-01 RX ORDER — LANOLIN ALCOHOL/MO/W.PET/CERES
3 CREAM (GRAM) TOPICAL AT BEDTIME
Refills: 0 | Status: DISCONTINUED | OUTPATIENT
Start: 2022-01-01 | End: 2022-01-01

## 2022-01-01 RX ORDER — INSULIN GLARGINE 100 [IU]/ML
22 INJECTION, SOLUTION SUBCUTANEOUS EVERY MORNING
Refills: 0 | Status: DISCONTINUED | OUTPATIENT
Start: 2022-01-01 | End: 2022-01-01

## 2022-01-01 RX ORDER — CEFEPIME 1 G/1
INJECTION, POWDER, FOR SOLUTION INTRAMUSCULAR; INTRAVENOUS
Refills: 0 | Status: DISCONTINUED | OUTPATIENT
Start: 2022-01-01 | End: 2022-01-01

## 2022-01-01 RX ORDER — LABETALOL HCL 100 MG
400 TABLET ORAL THREE TIMES A DAY
Refills: 0 | Status: DISCONTINUED | OUTPATIENT
Start: 2022-01-01 | End: 2022-01-01

## 2022-01-01 RX ORDER — DEXTROSE 50 % IN WATER 50 %
12.5 SYRINGE (ML) INTRAVENOUS ONCE
Refills: 0 | Status: DISCONTINUED | OUTPATIENT
Start: 2022-01-01 | End: 2022-01-01

## 2022-01-01 RX ORDER — FLUTICASONE FUROATE, UMECLIDINIUM BROMIDE AND VILANTEROL TRIFENATATE 200; 62.5; 25 UG/1; UG/1; UG/1
1 POWDER RESPIRATORY (INHALATION)
Qty: 0 | Refills: 0 | DISCHARGE

## 2022-01-01 RX ORDER — METOCLOPRAMIDE HCL 10 MG
10 TABLET ORAL ONCE
Refills: 0 | Status: COMPLETED | OUTPATIENT
Start: 2022-01-01 | End: 2022-01-01

## 2022-01-01 RX ORDER — LIDOCAINE 4 G/100G
15 CREAM TOPICAL ONCE
Refills: 0 | Status: COMPLETED | OUTPATIENT
Start: 2022-01-01 | End: 2022-01-01

## 2022-01-01 RX ORDER — FENTANYL CITRATE 50 UG/ML
50 INJECTION INTRAVENOUS EVERY 6 HOURS
Refills: 0 | Status: DISCONTINUED | OUTPATIENT
Start: 2022-01-01 | End: 2022-01-01

## 2022-01-01 RX ORDER — DESMOPRESSIN ACETATE 0.1 MG/1
25 TABLET ORAL
Refills: 0 | Status: COMPLETED | OUTPATIENT
Start: 2022-01-01 | End: 2022-01-01

## 2022-01-01 RX ORDER — SODIUM BICARBONATE 1 MEQ/ML
650 SYRINGE (ML) INTRAVENOUS EVERY 8 HOURS
Refills: 0 | Status: DISCONTINUED | OUTPATIENT
Start: 2022-01-01 | End: 2022-01-01

## 2022-01-01 RX ORDER — METOPROLOL TARTRATE 50 MG
5 TABLET ORAL EVERY 6 HOURS
Refills: 0 | Status: DISCONTINUED | OUTPATIENT
Start: 2022-01-01 | End: 2022-01-01

## 2022-01-01 RX ORDER — LEVETIRACETAM 250 MG/1
1000 TABLET, FILM COATED ORAL AT BEDTIME
Refills: 0 | Status: DISCONTINUED | OUTPATIENT
Start: 2022-01-01 | End: 2022-01-01

## 2022-01-01 RX ORDER — CHLORHEXIDINE GLUCONATE 213 G/1000ML
15 SOLUTION TOPICAL EVERY 12 HOURS
Refills: 0 | Status: DISCONTINUED | OUTPATIENT
Start: 2022-01-01 | End: 2022-01-01

## 2022-01-01 RX ORDER — CASPOFUNGIN ACETATE 7 MG/ML
50 INJECTION, POWDER, LYOPHILIZED, FOR SOLUTION INTRAVENOUS EVERY 24 HOURS
Refills: 0 | Status: DISCONTINUED | OUTPATIENT
Start: 2022-01-01 | End: 2022-01-01

## 2022-01-01 RX ORDER — FUROSEMIDE 40 MG
80 TABLET ORAL ONCE
Refills: 0 | Status: DISCONTINUED | OUTPATIENT
Start: 2022-01-01 | End: 2022-01-01

## 2022-01-01 RX ORDER — ACETAMINOPHEN 500 MG
650 TABLET ORAL EVERY 6 HOURS
Refills: 0 | Status: DISCONTINUED | OUTPATIENT
Start: 2022-01-01 | End: 2022-01-01

## 2022-01-01 RX ORDER — VANCOMYCIN HCL 1 G
750 VIAL (EA) INTRAVENOUS EVERY 12 HOURS
Refills: 0 | Status: DISCONTINUED | OUTPATIENT
Start: 2022-01-01 | End: 2022-01-01

## 2022-01-01 RX ORDER — SOD SULF/SODIUM/NAHCO3/KCL/PEG
4000 SOLUTION, RECONSTITUTED, ORAL ORAL ONCE
Refills: 0 | Status: COMPLETED | OUTPATIENT
Start: 2022-01-01 | End: 2022-01-01

## 2022-01-01 RX ORDER — ACETAMINOPHEN 500 MG
650 TABLET ORAL ONCE
Refills: 0 | Status: COMPLETED | OUTPATIENT
Start: 2022-01-01 | End: 2022-01-01

## 2022-01-01 RX ORDER — COLLAGENASE CLOSTRIDIUM HIST. 250 UNIT/G
1 OINTMENT (GRAM) TOPICAL
Refills: 0 | Status: DISCONTINUED | OUTPATIENT
Start: 2022-01-01 | End: 2022-01-01

## 2022-01-01 RX ORDER — SODIUM BICARBONATE 1 MEQ/ML
650 SYRINGE (ML) INTRAVENOUS EVERY 6 HOURS
Refills: 0 | Status: DISCONTINUED | OUTPATIENT
Start: 2022-01-01 | End: 2022-01-01

## 2022-01-01 RX ORDER — HYDRALAZINE HCL 50 MG
10 TABLET ORAL ONCE
Refills: 0 | Status: COMPLETED | OUTPATIENT
Start: 2022-01-01 | End: 2022-01-01

## 2022-01-01 RX ORDER — CALCIUM GLUCONATE 100 MG/ML
1 VIAL (ML) INTRAVENOUS ONCE
Refills: 0 | Status: COMPLETED | OUTPATIENT
Start: 2022-01-01 | End: 2022-01-01

## 2022-01-01 RX ORDER — INSULIN LISPRO 100/ML
VIAL (ML) SUBCUTANEOUS
Refills: 0 | Status: DISCONTINUED | OUTPATIENT
Start: 2022-01-01 | End: 2022-01-01

## 2022-01-01 RX ORDER — LOSARTAN POTASSIUM 100 MG/1
50 TABLET, FILM COATED ORAL DAILY
Refills: 0 | Status: DISCONTINUED | OUTPATIENT
Start: 2022-01-01 | End: 2022-01-01

## 2022-01-01 RX ORDER — ZOLPIDEM TARTRATE 10 MG/1
1 TABLET ORAL
Qty: 0 | Refills: 0 | DISCHARGE

## 2022-01-01 RX ORDER — INSULIN LISPRO 100/ML
12 VIAL (ML) SUBCUTANEOUS ONCE
Refills: 0 | Status: COMPLETED | OUTPATIENT
Start: 2022-01-01 | End: 2022-01-01

## 2022-01-01 RX ORDER — CASPOFUNGIN ACETATE 7 MG/ML
70 INJECTION, POWDER, LYOPHILIZED, FOR SOLUTION INTRAVENOUS ONCE
Refills: 0 | Status: COMPLETED | OUTPATIENT
Start: 2022-01-01 | End: 2022-01-01

## 2022-01-01 RX ORDER — INSULIN GLARGINE 100 [IU]/ML
30 INJECTION, SOLUTION SUBCUTANEOUS ONCE
Refills: 0 | Status: COMPLETED | OUTPATIENT
Start: 2022-01-01 | End: 2022-01-01

## 2022-01-01 RX ORDER — LINEZOLID 600 MG/300ML
INJECTION, SOLUTION INTRAVENOUS
Refills: 0 | Status: DISCONTINUED | OUTPATIENT
Start: 2022-01-01 | End: 2022-01-01

## 2022-01-01 RX ORDER — ROCURONIUM BROMIDE 10 MG/ML
10 VIAL (ML) INTRAVENOUS ONCE
Refills: 0 | Status: COMPLETED | OUTPATIENT
Start: 2022-01-01 | End: 2022-01-01

## 2022-01-01 RX ORDER — LABETALOL HCL 100 MG
10 TABLET ORAL EVERY 6 HOURS
Refills: 0 | Status: DISCONTINUED | OUTPATIENT
Start: 2022-01-01 | End: 2022-01-01

## 2022-01-01 RX ORDER — CHLORTHALIDONE 50 MG
25 TABLET ORAL DAILY
Refills: 0 | Status: DISCONTINUED | OUTPATIENT
Start: 2022-01-01 | End: 2022-01-01

## 2022-01-01 RX ORDER — LOSARTAN POTASSIUM 100 MG/1
50 TABLET, FILM COATED ORAL ONCE
Refills: 0 | Status: COMPLETED | OUTPATIENT
Start: 2022-01-01 | End: 2022-01-01

## 2022-01-01 RX ORDER — NIFEDIPINE 30 MG
90 TABLET, EXTENDED RELEASE 24 HR ORAL DAILY
Refills: 0 | Status: DISCONTINUED | OUTPATIENT
Start: 2022-01-01 | End: 2022-01-01

## 2022-01-01 RX ORDER — FUROSEMIDE 40 MG
80 TABLET ORAL DAILY
Refills: 0 | Status: DISCONTINUED | OUTPATIENT
Start: 2022-01-01 | End: 2022-01-01

## 2022-01-01 RX ORDER — INSULIN LISPRO 100/ML
5 VIAL (ML) SUBCUTANEOUS EVERY 6 HOURS
Refills: 0 | Status: DISCONTINUED | OUTPATIENT
Start: 2022-01-01 | End: 2022-01-01

## 2022-01-01 RX ORDER — VANCOMYCIN HCL 1 G
1250 VIAL (EA) INTRAVENOUS EVERY 12 HOURS
Refills: 0 | Status: COMPLETED | OUTPATIENT
Start: 2022-01-01 | End: 2022-01-01

## 2022-01-01 RX ORDER — LOSARTAN POTASSIUM 100 MG/1
1 TABLET, FILM COATED ORAL
Qty: 0 | Refills: 0 | DISCHARGE

## 2022-01-01 RX ORDER — INSULIN LISPRO 100/ML
10 VIAL (ML) SUBCUTANEOUS ONCE
Refills: 0 | Status: COMPLETED | OUTPATIENT
Start: 2022-01-01 | End: 2022-01-01

## 2022-01-01 RX ORDER — METOPROLOL TARTRATE 50 MG
1 TABLET ORAL
Qty: 0 | Refills: 0 | DISCHARGE

## 2022-01-01 RX ORDER — CEFEPIME 1 G/1
2000 INJECTION, POWDER, FOR SOLUTION INTRAMUSCULAR; INTRAVENOUS EVERY 12 HOURS
Refills: 0 | Status: DISCONTINUED | OUTPATIENT
Start: 2022-01-01 | End: 2022-01-01

## 2022-01-01 RX ORDER — ASPIRIN/CALCIUM CARB/MAGNESIUM 324 MG
1 TABLET ORAL
Qty: 0 | Refills: 0 | DISCHARGE

## 2022-01-01 RX ORDER — INSULIN LISPRO 100/ML
VIAL (ML) SUBCUTANEOUS AT BEDTIME
Refills: 0 | Status: DISCONTINUED | OUTPATIENT
Start: 2022-01-01 | End: 2022-01-01

## 2022-01-01 RX ORDER — MAGNESIUM SULFATE 500 MG/ML
1 VIAL (ML) INJECTION ONCE
Refills: 0 | Status: COMPLETED | OUTPATIENT
Start: 2022-01-01 | End: 2022-01-01

## 2022-01-01 RX ORDER — PROPOFOL 10 MG/ML
50 INJECTION, EMULSION INTRAVENOUS ONCE
Refills: 0 | Status: DISCONTINUED | OUTPATIENT
Start: 2022-01-01 | End: 2022-01-01

## 2022-01-01 RX ORDER — SERTRALINE 25 MG/1
50 TABLET, FILM COATED ORAL DAILY
Refills: 0 | Status: DISCONTINUED | OUTPATIENT
Start: 2022-01-01 | End: 2022-01-01

## 2022-01-01 RX ORDER — SCOPALAMINE 1 MG/3D
1 PATCH, EXTENDED RELEASE TRANSDERMAL ONCE
Refills: 0 | Status: COMPLETED | OUTPATIENT
Start: 2022-01-01 | End: 2022-01-01

## 2022-01-01 RX ORDER — LABETALOL HCL 100 MG
100 TABLET ORAL THREE TIMES A DAY
Refills: 0 | Status: DISCONTINUED | OUTPATIENT
Start: 2022-01-01 | End: 2022-01-01

## 2022-01-01 RX ORDER — LABETALOL HCL 100 MG
200 TABLET ORAL EVERY 12 HOURS
Refills: 0 | Status: DISCONTINUED | OUTPATIENT
Start: 2022-01-01 | End: 2022-01-01

## 2022-01-01 RX ORDER — ROFLUMILAST 500 UG/1
1 TABLET ORAL
Qty: 0 | Refills: 0 | DISCHARGE

## 2022-01-01 RX ORDER — FAMOTIDINE 10 MG/ML
20 INJECTION INTRAVENOUS
Refills: 0 | Status: DISCONTINUED | OUTPATIENT
Start: 2022-01-01 | End: 2022-01-01

## 2022-01-01 RX ORDER — MIDAZOLAM HYDROCHLORIDE 1 MG/ML
0.02 INJECTION, SOLUTION INTRAMUSCULAR; INTRAVENOUS
Qty: 100 | Refills: 0 | Status: DISCONTINUED | OUTPATIENT
Start: 2022-01-01 | End: 2022-01-01

## 2022-01-01 RX ORDER — CEFEPIME 1 G/1
2000 INJECTION, POWDER, FOR SOLUTION INTRAMUSCULAR; INTRAVENOUS ONCE
Refills: 0 | Status: COMPLETED | OUTPATIENT
Start: 2022-01-01 | End: 2022-01-01

## 2022-01-01 RX ORDER — ATORVASTATIN CALCIUM 80 MG/1
1 TABLET, FILM COATED ORAL
Qty: 0 | Refills: 0 | DISCHARGE

## 2022-01-01 RX ORDER — AMPICILLIN SODIUM AND SULBACTAM SODIUM 250; 125 MG/ML; MG/ML
INJECTION, POWDER, FOR SUSPENSION INTRAMUSCULAR; INTRAVENOUS
Refills: 0 | Status: DISCONTINUED | OUTPATIENT
Start: 2022-01-01 | End: 2022-01-01

## 2022-01-01 RX ORDER — LACOSAMIDE 50 MG/1
50 TABLET ORAL EVERY 12 HOURS
Refills: 0 | Status: DISCONTINUED | OUTPATIENT
Start: 2022-01-01 | End: 2022-01-01

## 2022-01-01 RX ORDER — DILTIAZEM HCL 120 MG
1 CAPSULE, EXT RELEASE 24 HR ORAL
Qty: 0 | Refills: 0 | DISCHARGE

## 2022-01-01 RX ORDER — INSULIN LISPRO 100/ML
5 VIAL (ML) SUBCUTANEOUS
Refills: 0 | Status: DISCONTINUED | OUTPATIENT
Start: 2022-01-01 | End: 2022-01-01

## 2022-01-01 RX ORDER — SODIUM CHLORIDE 0.65 %
2 AEROSOL, SPRAY (ML) NASAL
Refills: 0 | Status: DISCONTINUED | OUTPATIENT
Start: 2022-01-01 | End: 2022-01-01

## 2022-01-01 RX ORDER — METFORMIN HYDROCHLORIDE 850 MG/1
1 TABLET ORAL
Qty: 0 | Refills: 0 | DISCHARGE

## 2022-01-01 RX ORDER — CHLORHEXIDINE GLUCONATE 213 G/1000ML
1 SOLUTION TOPICAL
Refills: 0 | Status: DISCONTINUED | OUTPATIENT
Start: 2022-01-01 | End: 2022-01-01

## 2022-01-01 RX ORDER — ONDANSETRON 8 MG/1
4 TABLET, FILM COATED ORAL ONCE
Refills: 0 | Status: COMPLETED | OUTPATIENT
Start: 2022-01-01 | End: 2022-01-01

## 2022-01-01 RX ORDER — PANTOPRAZOLE SODIUM 20 MG/1
8 TABLET, DELAYED RELEASE ORAL
Qty: 80 | Refills: 0 | Status: DISCONTINUED | OUTPATIENT
Start: 2022-01-01 | End: 2022-01-01

## 2022-01-01 RX ORDER — PANTOPRAZOLE SODIUM 20 MG/1
80 TABLET, DELAYED RELEASE ORAL ONCE
Refills: 0 | Status: DISCONTINUED | OUTPATIENT
Start: 2022-01-01 | End: 2022-01-01

## 2022-01-01 RX ORDER — MEROPENEM 1 G/30ML
500 INJECTION INTRAVENOUS EVERY 12 HOURS
Refills: 0 | Status: DISCONTINUED | OUTPATIENT
Start: 2022-01-01 | End: 2022-01-01

## 2022-01-01 RX ORDER — AMPICILLIN SODIUM AND SULBACTAM SODIUM 250; 125 MG/ML; MG/ML
3 INJECTION, POWDER, FOR SUSPENSION INTRAMUSCULAR; INTRAVENOUS ONCE
Refills: 0 | Status: COMPLETED | OUTPATIENT
Start: 2022-01-01 | End: 2022-01-01

## 2022-01-01 RX ORDER — DESMOPRESSIN ACETATE 0.1 MG/1
0.3 TABLET ORAL ONCE
Refills: 0 | Status: DISCONTINUED | OUTPATIENT
Start: 2022-01-01 | End: 2022-01-01

## 2022-01-01 RX ORDER — INSULIN HUMAN 100 [IU]/ML
10 INJECTION, SOLUTION SUBCUTANEOUS ONCE
Refills: 0 | Status: COMPLETED | OUTPATIENT
Start: 2022-01-01 | End: 2022-01-01

## 2022-01-01 RX ORDER — MAGNESIUM SULFATE 500 MG/ML
2 VIAL (ML) INJECTION
Refills: 0 | Status: COMPLETED | OUTPATIENT
Start: 2022-01-01 | End: 2022-01-01

## 2022-01-01 RX ORDER — AMLODIPINE BESYLATE 2.5 MG/1
10 TABLET ORAL AT BEDTIME
Refills: 0 | Status: DISCONTINUED | OUTPATIENT
Start: 2022-01-01 | End: 2022-01-01

## 2022-01-01 RX ORDER — LOSARTAN POTASSIUM 100 MG/1
100 TABLET, FILM COATED ORAL DAILY
Refills: 0 | Status: DISCONTINUED | OUTPATIENT
Start: 2022-01-01 | End: 2022-01-01

## 2022-01-01 RX ORDER — INSULIN LISPRO 100/ML
4 VIAL (ML) SUBCUTANEOUS ONCE
Refills: 0 | Status: COMPLETED | OUTPATIENT
Start: 2022-01-01 | End: 2022-01-01

## 2022-01-01 RX ORDER — FUROSEMIDE 40 MG
80 TABLET ORAL EVERY 12 HOURS
Refills: 0 | Status: DISCONTINUED | OUTPATIENT
Start: 2022-01-01 | End: 2022-01-01

## 2022-01-01 RX ORDER — POLYETHYLENE GLYCOL 3350 17 G/17G
17 POWDER, FOR SOLUTION ORAL EVERY 12 HOURS
Refills: 0 | Status: DISCONTINUED | OUTPATIENT
Start: 2022-01-01 | End: 2022-01-01

## 2022-01-01 RX ORDER — LINEZOLID 600 MG/300ML
600 INJECTION, SOLUTION INTRAVENOUS ONCE
Refills: 0 | Status: COMPLETED | OUTPATIENT
Start: 2022-01-01 | End: 2022-01-01

## 2022-01-01 RX ORDER — HYDRALAZINE HCL 50 MG
15 TABLET ORAL THREE TIMES A DAY
Refills: 0 | Status: DISCONTINUED | OUTPATIENT
Start: 2022-01-01 | End: 2022-01-01

## 2022-01-01 RX ORDER — FUROSEMIDE 40 MG
40 TABLET ORAL DAILY
Refills: 0 | Status: DISCONTINUED | OUTPATIENT
Start: 2022-01-01 | End: 2022-01-01

## 2022-01-01 RX ORDER — FAMOTIDINE 10 MG/ML
1 INJECTION INTRAVENOUS
Qty: 0 | Refills: 0 | DISCHARGE

## 2022-01-01 RX ADMIN — Medication 400 MILLIGRAM(S): at 21:31

## 2022-01-01 RX ADMIN — Medication 80 MILLIGRAM(S): at 18:30

## 2022-01-01 RX ADMIN — ATORVASTATIN CALCIUM 80 MILLIGRAM(S): 80 TABLET, FILM COATED ORAL at 21:43

## 2022-01-01 RX ADMIN — Medication 100 MILLIGRAM(S): at 05:13

## 2022-01-01 RX ADMIN — PANTOPRAZOLE SODIUM 40 MILLIGRAM(S): 20 TABLET, DELAYED RELEASE ORAL at 06:13

## 2022-01-01 RX ADMIN — Medication 25 GRAM(S): at 09:49

## 2022-01-01 RX ADMIN — Medication 400 MILLIGRAM(S): at 06:07

## 2022-01-01 RX ADMIN — Medication 166.67 MILLIGRAM(S): at 17:19

## 2022-01-01 RX ADMIN — Medication 2 SPRAY(S): at 17:23

## 2022-01-01 RX ADMIN — Medication 80 MILLIGRAM(S): at 05:14

## 2022-01-01 RX ADMIN — Medication 2 SPRAY(S): at 05:40

## 2022-01-01 RX ADMIN — LACOSAMIDE 50 MILLIGRAM(S): 50 TABLET ORAL at 17:46

## 2022-01-01 RX ADMIN — Medication 1 TABLET(S): at 12:51

## 2022-01-01 RX ADMIN — CLOPIDOGREL BISULFATE 75 MILLIGRAM(S): 75 TABLET, FILM COATED ORAL at 12:10

## 2022-01-01 RX ADMIN — Medication 5 UNIT(S): at 08:06

## 2022-01-01 RX ADMIN — Medication 1 APPLICATION(S): at 17:44

## 2022-01-01 RX ADMIN — AMPICILLIN SODIUM AND SULBACTAM SODIUM 200 GRAM(S): 250; 125 INJECTION, POWDER, FOR SUSPENSION INTRAMUSCULAR; INTRAVENOUS at 12:19

## 2022-01-01 RX ADMIN — ATORVASTATIN CALCIUM 80 MILLIGRAM(S): 80 TABLET, FILM COATED ORAL at 21:58

## 2022-01-01 RX ADMIN — Medication 100 MILLIGRAM(S): at 06:12

## 2022-01-01 RX ADMIN — Medication 650 MILLIGRAM(S): at 22:25

## 2022-01-01 RX ADMIN — Medication 1 APPLICATION(S): at 18:29

## 2022-01-01 RX ADMIN — CHLORHEXIDINE GLUCONATE 1 APPLICATION(S): 213 SOLUTION TOPICAL at 05:19

## 2022-01-01 RX ADMIN — Medication 600 MILLIGRAM(S): at 21:14

## 2022-01-01 RX ADMIN — Medication 60 MILLIGRAM(S): at 05:02

## 2022-01-01 RX ADMIN — Medication 600 MILLIGRAM(S): at 05:39

## 2022-01-01 RX ADMIN — LOSARTAN POTASSIUM 100 MILLIGRAM(S): 100 TABLET, FILM COATED ORAL at 05:02

## 2022-01-01 RX ADMIN — LACOSAMIDE 110 MILLIGRAM(S): 50 TABLET ORAL at 20:24

## 2022-01-01 RX ADMIN — Medication 81 MILLIGRAM(S): at 12:13

## 2022-01-01 RX ADMIN — Medication 40 MILLIGRAM(S): at 02:13

## 2022-01-01 RX ADMIN — SEVELAMER CARBONATE 800 MILLIGRAM(S): 2400 POWDER, FOR SUSPENSION ORAL at 17:32

## 2022-01-01 RX ADMIN — Medication 104 MILLIGRAM(S): at 21:24

## 2022-01-01 RX ADMIN — Medication 50 MILLIGRAM(S): at 15:03

## 2022-01-01 RX ADMIN — Medication 100 MILLIGRAM(S): at 05:05

## 2022-01-01 RX ADMIN — LACOSAMIDE 110 MILLIGRAM(S): 50 TABLET ORAL at 05:50

## 2022-01-01 RX ADMIN — Medication 400 MILLIGRAM(S): at 21:05

## 2022-01-01 RX ADMIN — Medication 600 MILLIGRAM(S): at 21:58

## 2022-01-01 RX ADMIN — Medication 1 APPLICATION(S): at 06:32

## 2022-01-01 RX ADMIN — Medication 2 SPRAY(S): at 17:20

## 2022-01-01 RX ADMIN — Medication 1 TABLET(S): at 12:25

## 2022-01-01 RX ADMIN — ENOXAPARIN SODIUM 40 MILLIGRAM(S): 100 INJECTION SUBCUTANEOUS at 05:24

## 2022-01-01 RX ADMIN — Medication 1 APPLICATION(S): at 05:59

## 2022-01-01 RX ADMIN — Medication 100 MILLIGRAM(S): at 05:02

## 2022-01-01 RX ADMIN — ATORVASTATIN CALCIUM 80 MILLIGRAM(S): 80 TABLET, FILM COATED ORAL at 21:19

## 2022-01-01 RX ADMIN — LEVETIRACETAM 400 MILLIGRAM(S): 250 TABLET, FILM COATED ORAL at 17:47

## 2022-01-01 RX ADMIN — LEVETIRACETAM 500 MILLIGRAM(S): 250 TABLET, FILM COATED ORAL at 18:19

## 2022-01-01 RX ADMIN — SCOPALAMINE 1 PATCH: 1 PATCH, EXTENDED RELEASE TRANSDERMAL at 16:52

## 2022-01-01 RX ADMIN — SEVELAMER CARBONATE 2400 MILLIGRAM(S): 2400 POWDER, FOR SUSPENSION ORAL at 21:40

## 2022-01-01 RX ADMIN — ENOXAPARIN SODIUM 40 MILLIGRAM(S): 100 INJECTION SUBCUTANEOUS at 17:18

## 2022-01-01 RX ADMIN — Medication 650 MILLIGRAM(S): at 23:06

## 2022-01-01 RX ADMIN — LOSARTAN POTASSIUM 50 MILLIGRAM(S): 100 TABLET, FILM COATED ORAL at 18:21

## 2022-01-01 RX ADMIN — INSULIN GLARGINE 22 UNIT(S): 100 INJECTION, SOLUTION SUBCUTANEOUS at 08:14

## 2022-01-01 RX ADMIN — Medication 600 MILLIGRAM(S): at 22:46

## 2022-01-01 RX ADMIN — AMPICILLIN SODIUM AND SULBACTAM SODIUM 200 GRAM(S): 250; 125 INJECTION, POWDER, FOR SUSPENSION INTRAMUSCULAR; INTRAVENOUS at 23:53

## 2022-01-01 RX ADMIN — APIXABAN 5 MILLIGRAM(S): 2.5 TABLET, FILM COATED ORAL at 11:17

## 2022-01-01 RX ADMIN — Medication 2: at 17:34

## 2022-01-01 RX ADMIN — Medication 300 MILLIGRAM(S): at 05:11

## 2022-01-01 RX ADMIN — SEVELAMER CARBONATE 2400 MILLIGRAM(S): 2400 POWDER, FOR SUSPENSION ORAL at 05:55

## 2022-01-01 RX ADMIN — Medication 100 MILLIGRAM(S): at 18:07

## 2022-01-01 RX ADMIN — PANTOPRAZOLE SODIUM 40 MILLIGRAM(S): 20 TABLET, DELAYED RELEASE ORAL at 06:25

## 2022-01-01 RX ADMIN — SEVELAMER CARBONATE 2400 MILLIGRAM(S): 2400 POWDER, FOR SUSPENSION ORAL at 14:18

## 2022-01-01 RX ADMIN — Medication 1 APPLICATION(S): at 05:56

## 2022-01-01 RX ADMIN — Medication 650 MILLIGRAM(S): at 23:24

## 2022-01-01 RX ADMIN — PANTOPRAZOLE SODIUM 40 MILLIGRAM(S): 20 TABLET, DELAYED RELEASE ORAL at 05:01

## 2022-01-01 RX ADMIN — Medication 100 MILLIGRAM(S): at 21:19

## 2022-01-01 RX ADMIN — Medication 1300 MILLIGRAM(S): at 05:39

## 2022-01-01 RX ADMIN — POLYETHYLENE GLYCOL 3350 17 GRAM(S): 17 POWDER, FOR SOLUTION ORAL at 05:04

## 2022-01-01 RX ADMIN — Medication 1 APPLICATION(S): at 18:05

## 2022-01-01 RX ADMIN — Medication 650 MILLIGRAM(S): at 05:01

## 2022-01-01 RX ADMIN — Medication 1 APPLICATION(S): at 06:31

## 2022-01-01 RX ADMIN — Medication 1: at 12:00

## 2022-01-01 RX ADMIN — CHLORHEXIDINE GLUCONATE 15 MILLILITER(S): 213 SOLUTION TOPICAL at 05:24

## 2022-01-01 RX ADMIN — Medication 5 UNIT(S): at 05:54

## 2022-01-01 RX ADMIN — AMLODIPINE BESYLATE 10 MILLIGRAM(S): 2.5 TABLET ORAL at 05:54

## 2022-01-01 RX ADMIN — Medication 4: at 18:43

## 2022-01-01 RX ADMIN — AMPICILLIN SODIUM AND SULBACTAM SODIUM 200 GRAM(S): 250; 125 INJECTION, POWDER, FOR SUSPENSION INTRAMUSCULAR; INTRAVENOUS at 11:14

## 2022-01-01 RX ADMIN — CHLORHEXIDINE GLUCONATE 15 MILLILITER(S): 213 SOLUTION TOPICAL at 18:15

## 2022-01-01 RX ADMIN — LEVETIRACETAM 500 MILLIGRAM(S): 250 TABLET, FILM COATED ORAL at 17:30

## 2022-01-01 RX ADMIN — Medication 600 MILLIGRAM(S): at 05:27

## 2022-01-01 RX ADMIN — CEFEPIME 100 MILLIGRAM(S): 1 INJECTION, POWDER, FOR SOLUTION INTRAMUSCULAR; INTRAVENOUS at 13:30

## 2022-01-01 RX ADMIN — Medication 650 MILLIGRAM(S): at 11:23

## 2022-01-01 RX ADMIN — ATORVASTATIN CALCIUM 80 MILLIGRAM(S): 80 TABLET, FILM COATED ORAL at 21:27

## 2022-01-01 RX ADMIN — HEPARIN SODIUM 5000 UNIT(S): 5000 INJECTION INTRAVENOUS; SUBCUTANEOUS at 14:53

## 2022-01-01 RX ADMIN — PANTOPRAZOLE SODIUM 40 MILLIGRAM(S): 20 TABLET, DELAYED RELEASE ORAL at 06:21

## 2022-01-01 RX ADMIN — AMLODIPINE BESYLATE 10 MILLIGRAM(S): 2.5 TABLET ORAL at 05:03

## 2022-01-01 RX ADMIN — FENTANYL CITRATE 50 MICROGRAM(S): 50 INJECTION INTRAVENOUS at 02:34

## 2022-01-01 RX ADMIN — Medication 600 MILLIGRAM(S): at 13:10

## 2022-01-01 RX ADMIN — AMLODIPINE BESYLATE 10 MILLIGRAM(S): 2.5 TABLET ORAL at 21:04

## 2022-01-01 RX ADMIN — ONDANSETRON 4 MILLIGRAM(S): 8 TABLET, FILM COATED ORAL at 18:30

## 2022-01-01 RX ADMIN — Medication 5 MILLIGRAM(S): at 11:45

## 2022-01-01 RX ADMIN — Medication 2 SPRAY(S): at 18:54

## 2022-01-01 RX ADMIN — Medication 650 MILLIGRAM(S): at 05:29

## 2022-01-01 RX ADMIN — AMLODIPINE BESYLATE 10 MILLIGRAM(S): 2.5 TABLET ORAL at 06:21

## 2022-01-01 RX ADMIN — SEVELAMER CARBONATE 2400 MILLIGRAM(S): 2400 POWDER, FOR SUSPENSION ORAL at 13:10

## 2022-01-01 RX ADMIN — FENTANYL CITRATE 50 MICROGRAM(S): 50 INJECTION INTRAVENOUS at 12:08

## 2022-01-01 RX ADMIN — SEVELAMER CARBONATE 1600 MILLIGRAM(S): 2400 POWDER, FOR SUSPENSION ORAL at 21:15

## 2022-01-01 RX ADMIN — Medication 5 UNIT(S): at 18:34

## 2022-01-01 RX ADMIN — SCOPALAMINE 1 PATCH: 1 PATCH, EXTENDED RELEASE TRANSDERMAL at 19:09

## 2022-01-01 RX ADMIN — Medication 1 APPLICATION(S): at 06:21

## 2022-01-01 RX ADMIN — Medication 1 APPLICATION(S): at 17:20

## 2022-01-01 RX ADMIN — LEVETIRACETAM 400 MILLIGRAM(S): 250 TABLET, FILM COATED ORAL at 06:22

## 2022-01-01 RX ADMIN — SCOPALAMINE 1 PATCH: 1 PATCH, EXTENDED RELEASE TRANSDERMAL at 08:55

## 2022-01-01 RX ADMIN — AMLODIPINE BESYLATE 10 MILLIGRAM(S): 2.5 TABLET ORAL at 05:24

## 2022-01-01 RX ADMIN — Medication 650 MILLIGRAM(S): at 22:52

## 2022-01-01 RX ADMIN — LACOSAMIDE 110 MILLIGRAM(S): 50 TABLET ORAL at 18:28

## 2022-01-01 RX ADMIN — Medication 25 MILLIGRAM(S): at 05:01

## 2022-01-01 RX ADMIN — LOSARTAN POTASSIUM 100 MILLIGRAM(S): 100 TABLET, FILM COATED ORAL at 05:01

## 2022-01-01 RX ADMIN — LEVETIRACETAM 500 MILLIGRAM(S): 250 TABLET, FILM COATED ORAL at 06:37

## 2022-01-01 RX ADMIN — Medication 81 MILLIGRAM(S): at 11:41

## 2022-01-01 RX ADMIN — INSULIN GLARGINE 22 UNIT(S): 100 INJECTION, SOLUTION SUBCUTANEOUS at 11:35

## 2022-01-01 RX ADMIN — CHLORHEXIDINE GLUCONATE 15 MILLILITER(S): 213 SOLUTION TOPICAL at 05:31

## 2022-01-01 RX ADMIN — Medication 1300 MILLIGRAM(S): at 07:05

## 2022-01-01 RX ADMIN — Medication 100 MILLIGRAM(S): at 21:53

## 2022-01-01 RX ADMIN — Medication 2 SPRAY(S): at 06:07

## 2022-01-01 RX ADMIN — Medication 600 MILLIGRAM(S): at 21:45

## 2022-01-01 RX ADMIN — Medication 50 MILLIGRAM(S): at 00:56

## 2022-01-01 RX ADMIN — SEVELAMER CARBONATE 2400 MILLIGRAM(S): 2400 POWDER, FOR SUSPENSION ORAL at 21:02

## 2022-01-01 RX ADMIN — Medication 12 UNIT(S): at 09:20

## 2022-01-01 RX ADMIN — Medication 81 MILLIGRAM(S): at 12:15

## 2022-01-01 RX ADMIN — Medication 650 MILLIGRAM(S): at 12:15

## 2022-01-01 RX ADMIN — Medication 1 TABLET(S): at 11:13

## 2022-01-01 RX ADMIN — Medication 20 MILLIGRAM(S): at 22:20

## 2022-01-01 RX ADMIN — CHLORHEXIDINE GLUCONATE 1 APPLICATION(S): 213 SOLUTION TOPICAL at 05:40

## 2022-01-01 RX ADMIN — LACOSAMIDE 110 MILLIGRAM(S): 50 TABLET ORAL at 18:32

## 2022-01-01 RX ADMIN — LACOSAMIDE 110 MILLIGRAM(S): 50 TABLET ORAL at 17:38

## 2022-01-01 RX ADMIN — FENTANYL CITRATE 50 MICROGRAM(S): 50 INJECTION INTRAVENOUS at 15:44

## 2022-01-01 RX ADMIN — Medication 1 APPLICATION(S): at 05:13

## 2022-01-01 RX ADMIN — ATORVASTATIN CALCIUM 80 MILLIGRAM(S): 80 TABLET, FILM COATED ORAL at 21:25

## 2022-01-01 RX ADMIN — PANTOPRAZOLE SODIUM 40 MILLIGRAM(S): 20 TABLET, DELAYED RELEASE ORAL at 05:35

## 2022-01-01 RX ADMIN — Medication 58 MILLIGRAM(S): at 17:40

## 2022-01-01 RX ADMIN — Medication 2 SPRAY(S): at 05:59

## 2022-01-01 RX ADMIN — Medication 10 MILLIGRAM(S): at 14:01

## 2022-01-01 RX ADMIN — Medication 50 MILLIGRAM(S): at 22:00

## 2022-01-01 RX ADMIN — Medication 10 MILLIGRAM(S): at 13:10

## 2022-01-01 RX ADMIN — Medication 81 MILLIGRAM(S): at 11:19

## 2022-01-01 RX ADMIN — Medication 400 MILLIGRAM(S): at 21:25

## 2022-01-01 RX ADMIN — Medication 4 UNIT(S): at 19:19

## 2022-01-01 RX ADMIN — Medication 650 MILLIGRAM(S): at 18:37

## 2022-01-01 RX ADMIN — Medication 2 SPRAY(S): at 05:30

## 2022-01-01 RX ADMIN — Medication 50 MILLIEQUIVALENT(S): at 14:20

## 2022-01-01 RX ADMIN — PANTOPRAZOLE SODIUM 40 MILLIGRAM(S): 20 TABLET, DELAYED RELEASE ORAL at 06:09

## 2022-01-01 RX ADMIN — Medication 5 UNIT(S): at 17:22

## 2022-01-01 RX ADMIN — Medication 2 SPRAY(S): at 06:42

## 2022-01-01 RX ADMIN — Medication 1 TABLET(S): at 06:20

## 2022-01-01 RX ADMIN — Medication 1: at 18:54

## 2022-01-01 RX ADMIN — Medication 100 MILLIGRAM(S): at 13:10

## 2022-01-01 RX ADMIN — Medication 600 MILLIGRAM(S): at 05:02

## 2022-01-01 RX ADMIN — HEPARIN SODIUM 5000 UNIT(S): 5000 INJECTION INTRAVENOUS; SUBCUTANEOUS at 06:29

## 2022-01-01 RX ADMIN — Medication 300 MILLIGRAM(S): at 15:01

## 2022-01-01 RX ADMIN — PANTOPRAZOLE SODIUM 40 MILLIGRAM(S): 20 TABLET, DELAYED RELEASE ORAL at 17:37

## 2022-01-01 RX ADMIN — Medication 2 MILLIGRAM(S): at 22:45

## 2022-01-01 RX ADMIN — Medication 600 MILLIGRAM(S): at 14:44

## 2022-01-01 RX ADMIN — LOSARTAN POTASSIUM 50 MILLIGRAM(S): 100 TABLET, FILM COATED ORAL at 07:29

## 2022-01-01 RX ADMIN — Medication 600 MILLIGRAM(S): at 21:29

## 2022-01-01 RX ADMIN — Medication 300 MILLIGRAM(S): at 21:26

## 2022-01-01 RX ADMIN — Medication 600 MILLIGRAM(S): at 05:05

## 2022-01-01 RX ADMIN — Medication 15 MILLILITER(S): at 12:12

## 2022-01-01 RX ADMIN — LEVETIRACETAM 400 MILLIGRAM(S): 250 TABLET, FILM COATED ORAL at 17:35

## 2022-01-01 RX ADMIN — Medication 5 UNIT(S): at 17:52

## 2022-01-01 RX ADMIN — AMLODIPINE BESYLATE 10 MILLIGRAM(S): 2.5 TABLET ORAL at 06:13

## 2022-01-01 RX ADMIN — Medication 100 MILLIGRAM(S): at 14:13

## 2022-01-01 RX ADMIN — Medication 100 MILLIGRAM(S): at 21:43

## 2022-01-01 RX ADMIN — Medication 166.67 MILLIGRAM(S): at 07:47

## 2022-01-01 RX ADMIN — Medication 650 MILLIGRAM(S): at 05:14

## 2022-01-01 RX ADMIN — Medication 100 MILLIGRAM(S): at 21:07

## 2022-01-01 RX ADMIN — SODIUM ZIRCONIUM CYCLOSILICATE 10 GRAM(S): 10 POWDER, FOR SUSPENSION ORAL at 11:03

## 2022-01-01 RX ADMIN — SCOPALAMINE 1 PATCH: 1 PATCH, EXTENDED RELEASE TRANSDERMAL at 19:30

## 2022-01-01 RX ADMIN — Medication 5 UNIT(S): at 13:28

## 2022-01-01 RX ADMIN — ENOXAPARIN SODIUM 40 MILLIGRAM(S): 100 INJECTION SUBCUTANEOUS at 19:01

## 2022-01-01 RX ADMIN — MEROPENEM 100 MILLIGRAM(S): 1 INJECTION INTRAVENOUS at 17:51

## 2022-01-01 RX ADMIN — POLYETHYLENE GLYCOL 3350 17 GRAM(S): 17 POWDER, FOR SOLUTION ORAL at 05:17

## 2022-01-01 RX ADMIN — Medication 400 MILLIGRAM(S): at 05:15

## 2022-01-01 RX ADMIN — Medication 650 MILLIGRAM(S): at 11:03

## 2022-01-01 RX ADMIN — Medication 50 MILLIGRAM(S): at 21:31

## 2022-01-01 RX ADMIN — SODIUM ZIRCONIUM CYCLOSILICATE 10 GRAM(S): 10 POWDER, FOR SUSPENSION ORAL at 05:06

## 2022-01-01 RX ADMIN — Medication 100 MILLIGRAM(S): at 13:36

## 2022-01-01 RX ADMIN — Medication 104 MILLIGRAM(S): at 13:03

## 2022-01-01 RX ADMIN — Medication 100 MILLIGRAM(S): at 13:59

## 2022-01-01 RX ADMIN — Medication 3: at 02:10

## 2022-01-01 RX ADMIN — Medication 1 APPLICATION(S): at 18:06

## 2022-01-01 RX ADMIN — Medication 650 MILLIGRAM(S): at 21:26

## 2022-01-01 RX ADMIN — Medication 300 MILLIGRAM(S): at 04:17

## 2022-01-01 RX ADMIN — CHLORHEXIDINE GLUCONATE 15 MILLILITER(S): 213 SOLUTION TOPICAL at 06:06

## 2022-01-01 RX ADMIN — Medication 600 MILLIGRAM(S): at 06:53

## 2022-01-01 RX ADMIN — LACOSAMIDE 110 MILLIGRAM(S): 50 TABLET ORAL at 06:12

## 2022-01-01 RX ADMIN — AMLODIPINE BESYLATE 10 MILLIGRAM(S): 2.5 TABLET ORAL at 05:06

## 2022-01-01 RX ADMIN — Medication 1300 MILLIGRAM(S): at 13:34

## 2022-01-01 RX ADMIN — ENOXAPARIN SODIUM 40 MILLIGRAM(S): 100 INJECTION SUBCUTANEOUS at 17:42

## 2022-01-01 RX ADMIN — Medication 1 TABLET(S): at 18:31

## 2022-01-01 RX ADMIN — Medication 2 SPRAY(S): at 17:44

## 2022-01-01 RX ADMIN — Medication 100 MILLIGRAM(S): at 05:33

## 2022-01-01 RX ADMIN — Medication 1 APPLICATION(S): at 17:54

## 2022-01-01 RX ADMIN — Medication 600 MILLIGRAM(S): at 23:11

## 2022-01-01 RX ADMIN — Medication 15 MILLILITER(S): at 11:57

## 2022-01-01 RX ADMIN — CLOPIDOGREL BISULFATE 75 MILLIGRAM(S): 75 TABLET, FILM COATED ORAL at 11:38

## 2022-01-01 RX ADMIN — Medication 1 APPLICATION(S): at 18:01

## 2022-01-01 RX ADMIN — Medication 650 MILLIGRAM(S): at 00:32

## 2022-01-01 RX ADMIN — Medication 650 MILLIGRAM(S): at 23:03

## 2022-01-01 RX ADMIN — Medication 100 MILLIGRAM(S): at 22:00

## 2022-01-01 RX ADMIN — Medication 2 SPRAY(S): at 06:31

## 2022-01-01 RX ADMIN — INSULIN GLARGINE 22 UNIT(S): 100 INJECTION, SOLUTION SUBCUTANEOUS at 08:01

## 2022-01-01 RX ADMIN — Medication 600 MILLIGRAM(S): at 13:36

## 2022-01-01 RX ADMIN — DEXMEDETOMIDINE HYDROCHLORIDE IN 0.9% SODIUM CHLORIDE 4.07 MICROGRAM(S)/KG/HR: 4 INJECTION INTRAVENOUS at 06:40

## 2022-01-01 RX ADMIN — DEXMEDETOMIDINE HYDROCHLORIDE IN 0.9% SODIUM CHLORIDE 4.07 MICROGRAM(S)/KG/HR: 4 INJECTION INTRAVENOUS at 00:24

## 2022-01-01 RX ADMIN — Medication 166.67 MILLIGRAM(S): at 11:53

## 2022-01-01 RX ADMIN — Medication 100 MILLIGRAM(S): at 21:30

## 2022-01-01 RX ADMIN — Medication 25 GRAM(S): at 14:14

## 2022-01-01 RX ADMIN — LINEZOLID 300 MILLIGRAM(S): 600 INJECTION, SOLUTION INTRAVENOUS at 06:04

## 2022-01-01 RX ADMIN — Medication 104 MILLIGRAM(S): at 06:03

## 2022-01-01 RX ADMIN — Medication 1 PATCH: at 20:20

## 2022-01-01 RX ADMIN — Medication 5 UNIT(S): at 18:15

## 2022-01-01 RX ADMIN — MIDAZOLAM HYDROCHLORIDE 1.95 MG/KG/HR: 1 INJECTION, SOLUTION INTRAMUSCULAR; INTRAVENOUS at 00:13

## 2022-01-01 RX ADMIN — Medication 104 MILLIGRAM(S): at 18:45

## 2022-01-01 RX ADMIN — Medication 650 MILLIGRAM(S): at 06:26

## 2022-01-01 RX ADMIN — Medication 650 MILLIGRAM(S): at 12:23

## 2022-01-01 RX ADMIN — CHLORHEXIDINE GLUCONATE 1 APPLICATION(S): 213 SOLUTION TOPICAL at 05:47

## 2022-01-01 RX ADMIN — SODIUM ZIRCONIUM CYCLOSILICATE 10 GRAM(S): 10 POWDER, FOR SUSPENSION ORAL at 06:35

## 2022-01-01 RX ADMIN — DESMOPRESSIN ACETATE 225 MICROGRAM(S): 0.1 TABLET ORAL at 18:02

## 2022-01-01 RX ADMIN — LEVETIRACETAM 500 MILLIGRAM(S): 250 TABLET, FILM COATED ORAL at 05:55

## 2022-01-01 RX ADMIN — Medication 650 MILLIGRAM(S): at 11:36

## 2022-01-01 RX ADMIN — LEVETIRACETAM 400 MILLIGRAM(S): 250 TABLET, FILM COATED ORAL at 18:31

## 2022-01-01 RX ADMIN — Medication 1 APPLICATION(S): at 17:26

## 2022-01-01 RX ADMIN — Medication 50 MILLIGRAM(S): at 22:29

## 2022-01-01 RX ADMIN — Medication 1 TABLET(S): at 13:05

## 2022-01-01 RX ADMIN — Medication 600 MILLIGRAM(S): at 15:40

## 2022-01-01 RX ADMIN — ATORVASTATIN CALCIUM 80 MILLIGRAM(S): 80 TABLET, FILM COATED ORAL at 21:57

## 2022-01-01 RX ADMIN — Medication 600 MILLIGRAM(S): at 21:40

## 2022-01-01 RX ADMIN — Medication 80 MILLIGRAM(S): at 05:12

## 2022-01-01 RX ADMIN — AMPICILLIN SODIUM AND SULBACTAM SODIUM 200 GRAM(S): 250; 125 INJECTION, POWDER, FOR SUSPENSION INTRAMUSCULAR; INTRAVENOUS at 23:18

## 2022-01-01 RX ADMIN — Medication 80 MILLIGRAM(S): at 21:41

## 2022-01-01 RX ADMIN — Medication 1 PATCH: at 21:50

## 2022-01-01 RX ADMIN — Medication 600 MILLIGRAM(S): at 23:26

## 2022-01-01 RX ADMIN — Medication 80 MILLIGRAM(S): at 05:28

## 2022-01-01 RX ADMIN — Medication 104 MILLIGRAM(S): at 05:12

## 2022-01-01 RX ADMIN — Medication 104 MILLIGRAM(S): at 20:39

## 2022-01-01 RX ADMIN — Medication 1 APPLICATION(S): at 05:40

## 2022-01-01 RX ADMIN — Medication 2 SPRAY(S): at 06:02

## 2022-01-01 RX ADMIN — Medication 300 MILLIGRAM(S): at 12:28

## 2022-01-01 RX ADMIN — Medication 2 SPRAY(S): at 05:05

## 2022-01-01 RX ADMIN — Medication 15 MILLILITER(S): at 14:05

## 2022-01-01 RX ADMIN — LEVETIRACETAM 400 MILLIGRAM(S): 250 TABLET, FILM COATED ORAL at 18:38

## 2022-01-01 RX ADMIN — Medication 600 MILLIGRAM(S): at 14:07

## 2022-01-01 RX ADMIN — Medication 80 MILLIGRAM(S): at 05:41

## 2022-01-01 RX ADMIN — Medication 650 MILLIGRAM(S): at 05:16

## 2022-01-01 RX ADMIN — Medication 80 MILLIGRAM(S): at 06:14

## 2022-01-01 RX ADMIN — Medication 650 MILLIGRAM(S): at 12:56

## 2022-01-01 RX ADMIN — Medication 2 MILLIGRAM(S): at 21:32

## 2022-01-01 RX ADMIN — Medication 15 MILLILITER(S): at 11:32

## 2022-01-01 RX ADMIN — SODIUM ZIRCONIUM CYCLOSILICATE 10 GRAM(S): 10 POWDER, FOR SUSPENSION ORAL at 17:31

## 2022-01-01 RX ADMIN — LEVETIRACETAM 400 MILLIGRAM(S): 250 TABLET, FILM COATED ORAL at 05:14

## 2022-01-01 RX ADMIN — Medication 25 MILLIGRAM(S): at 05:13

## 2022-01-01 RX ADMIN — FENTANYL CITRATE 4.07 MICROGRAM(S)/KG/HR: 50 INJECTION INTRAVENOUS at 10:58

## 2022-01-01 RX ADMIN — Medication 40 MILLIGRAM(S): at 05:30

## 2022-01-01 RX ADMIN — CHLORHEXIDINE GLUCONATE 15 MILLILITER(S): 213 SOLUTION TOPICAL at 05:27

## 2022-01-01 RX ADMIN — Medication 10 MILLIGRAM(S): at 13:27

## 2022-01-01 RX ADMIN — Medication 2 SPRAY(S): at 18:48

## 2022-01-01 RX ADMIN — Medication 104 MILLIGRAM(S): at 21:37

## 2022-01-01 RX ADMIN — Medication 650 MILLIGRAM(S): at 23:17

## 2022-01-01 RX ADMIN — INSULIN GLARGINE 12 UNIT(S): 100 INJECTION, SOLUTION SUBCUTANEOUS at 21:41

## 2022-01-01 RX ADMIN — Medication 1 APPLICATION(S): at 04:48

## 2022-01-01 RX ADMIN — Medication 10 MILLIGRAM(S): at 23:58

## 2022-01-01 RX ADMIN — SODIUM CHLORIDE 60 MILLILITER(S): 9 INJECTION INTRAMUSCULAR; INTRAVENOUS; SUBCUTANEOUS at 03:19

## 2022-01-01 RX ADMIN — Medication 166.67 MILLIGRAM(S): at 08:27

## 2022-01-01 RX ADMIN — LEVETIRACETAM 500 MILLIGRAM(S): 250 TABLET, FILM COATED ORAL at 05:31

## 2022-01-01 RX ADMIN — Medication 1 APPLICATION(S): at 05:03

## 2022-01-01 RX ADMIN — Medication 1: at 06:05

## 2022-01-01 RX ADMIN — PANTOPRAZOLE SODIUM 40 MILLIGRAM(S): 20 TABLET, DELAYED RELEASE ORAL at 06:35

## 2022-01-01 RX ADMIN — Medication 1 APPLICATION(S): at 06:06

## 2022-01-01 RX ADMIN — Medication 1 APPLICATION(S): at 18:48

## 2022-01-01 RX ADMIN — MIDAZOLAM HYDROCHLORIDE 4 MILLIGRAM(S): 1 INJECTION, SOLUTION INTRAMUSCULAR; INTRAVENOUS at 07:54

## 2022-01-01 RX ADMIN — LEVETIRACETAM 400 MILLIGRAM(S): 250 TABLET, FILM COATED ORAL at 05:15

## 2022-01-01 RX ADMIN — Medication 300 MILLIGRAM(S): at 06:09

## 2022-01-01 RX ADMIN — Medication 5 MILLIGRAM(S): at 15:53

## 2022-01-01 RX ADMIN — Medication 600 MILLIGRAM(S): at 05:23

## 2022-01-01 RX ADMIN — ATORVASTATIN CALCIUM 80 MILLIGRAM(S): 80 TABLET, FILM COATED ORAL at 21:50

## 2022-01-01 RX ADMIN — Medication 50 MILLIGRAM(S): at 05:07

## 2022-01-01 RX ADMIN — Medication 1300 MILLIGRAM(S): at 21:29

## 2022-01-01 RX ADMIN — Medication 1 APPLICATION(S): at 17:33

## 2022-01-01 RX ADMIN — Medication 600 MILLIGRAM(S): at 14:13

## 2022-01-01 RX ADMIN — Medication 5 UNIT(S): at 18:06

## 2022-01-01 RX ADMIN — Medication 2 SPRAY(S): at 06:05

## 2022-01-01 RX ADMIN — AMPICILLIN SODIUM AND SULBACTAM SODIUM 200 GRAM(S): 250; 125 INJECTION, POWDER, FOR SUSPENSION INTRAMUSCULAR; INTRAVENOUS at 05:22

## 2022-01-01 RX ADMIN — LACOSAMIDE 110 MILLIGRAM(S): 50 TABLET ORAL at 05:39

## 2022-01-01 RX ADMIN — Medication 100 MILLIGRAM(S): at 13:34

## 2022-01-01 RX ADMIN — SEVELAMER CARBONATE 1600 MILLIGRAM(S): 2400 POWDER, FOR SUSPENSION ORAL at 21:42

## 2022-01-01 RX ADMIN — Medication 1 APPLICATION(S): at 17:41

## 2022-01-01 RX ADMIN — Medication 1 APPLICATION(S): at 06:14

## 2022-01-01 RX ADMIN — PANTOPRAZOLE SODIUM 40 MILLIGRAM(S): 20 TABLET, DELAYED RELEASE ORAL at 06:29

## 2022-01-01 RX ADMIN — Medication 58 MILLIGRAM(S): at 18:13

## 2022-01-01 RX ADMIN — Medication 2 SPRAY(S): at 18:01

## 2022-01-01 RX ADMIN — Medication 15 MILLILITER(S): at 13:06

## 2022-01-01 RX ADMIN — CLOPIDOGREL BISULFATE 75 MILLIGRAM(S): 75 TABLET, FILM COATED ORAL at 13:26

## 2022-01-01 RX ADMIN — Medication 650 MILLIGRAM(S): at 11:32

## 2022-01-01 RX ADMIN — Medication 650 MILLIGRAM(S): at 23:01

## 2022-01-01 RX ADMIN — Medication 5 UNIT(S): at 17:33

## 2022-01-01 RX ADMIN — SEVELAMER CARBONATE 2400 MILLIGRAM(S): 2400 POWDER, FOR SUSPENSION ORAL at 13:03

## 2022-01-01 RX ADMIN — SEVELAMER CARBONATE 2400 MILLIGRAM(S): 2400 POWDER, FOR SUSPENSION ORAL at 14:25

## 2022-01-01 RX ADMIN — HEPARIN SODIUM 5000 UNIT(S): 5000 INJECTION INTRAVENOUS; SUBCUTANEOUS at 14:47

## 2022-01-01 RX ADMIN — SEVELAMER CARBONATE 1600 MILLIGRAM(S): 2400 POWDER, FOR SUSPENSION ORAL at 05:38

## 2022-01-01 RX ADMIN — Medication 104 MILLIGRAM(S): at 06:23

## 2022-01-01 RX ADMIN — POLYETHYLENE GLYCOL 3350 17 GRAM(S): 17 POWDER, FOR SOLUTION ORAL at 17:22

## 2022-01-01 RX ADMIN — Medication 5 UNIT(S): at 12:19

## 2022-01-01 RX ADMIN — LEVETIRACETAM 400 MILLIGRAM(S): 250 TABLET, FILM COATED ORAL at 05:36

## 2022-01-01 RX ADMIN — INSULIN GLARGINE 22 UNIT(S): 100 INJECTION, SOLUTION SUBCUTANEOUS at 08:09

## 2022-01-01 RX ADMIN — Medication 104 MILLIGRAM(S): at 21:46

## 2022-01-01 RX ADMIN — Medication 100 MILLIGRAM(S): at 22:04

## 2022-01-01 RX ADMIN — Medication 40 MILLIGRAM(S): at 20:18

## 2022-01-01 RX ADMIN — Medication 5 UNIT(S): at 11:19

## 2022-01-01 RX ADMIN — Medication 2 SPRAY(S): at 17:55

## 2022-01-01 RX ADMIN — Medication 120 MILLIGRAM(S): at 17:54

## 2022-01-01 RX ADMIN — Medication 1 APPLICATION(S): at 05:48

## 2022-01-01 RX ADMIN — Medication 80 MILLIGRAM(S): at 05:33

## 2022-01-01 RX ADMIN — INSULIN GLARGINE 22 UNIT(S): 100 INJECTION, SOLUTION SUBCUTANEOUS at 08:41

## 2022-01-01 RX ADMIN — Medication 2 SPRAY(S): at 18:10

## 2022-01-01 RX ADMIN — Medication 2 SPRAY(S): at 05:53

## 2022-01-01 RX ADMIN — Medication 81 MILLIGRAM(S): at 13:02

## 2022-01-01 RX ADMIN — Medication 5 UNIT(S): at 11:31

## 2022-01-01 RX ADMIN — Medication 600 MILLIGRAM(S): at 05:30

## 2022-01-01 RX ADMIN — Medication 50 MILLIGRAM(S): at 05:04

## 2022-01-01 RX ADMIN — Medication 2 MILLIGRAM(S): at 00:27

## 2022-01-01 RX ADMIN — Medication 80 MILLIGRAM(S): at 20:58

## 2022-01-01 RX ADMIN — Medication 2 MILLIGRAM(S): at 21:14

## 2022-01-01 RX ADMIN — ENOXAPARIN SODIUM 40 MILLIGRAM(S): 100 INJECTION SUBCUTANEOUS at 18:02

## 2022-01-01 RX ADMIN — SODIUM CHLORIDE 75 MILLILITER(S): 9 INJECTION, SOLUTION INTRAVENOUS at 08:53

## 2022-01-01 RX ADMIN — Medication 100 MILLIGRAM(S): at 21:29

## 2022-01-01 RX ADMIN — Medication 400 MILLIGRAM(S): at 21:04

## 2022-01-01 RX ADMIN — Medication 5 UNIT(S): at 18:03

## 2022-01-01 RX ADMIN — Medication 15 MILLILITER(S): at 11:43

## 2022-01-01 RX ADMIN — LEVETIRACETAM 500 MILLIGRAM(S): 250 TABLET, FILM COATED ORAL at 07:25

## 2022-01-01 RX ADMIN — Medication 100 MILLIGRAM(S): at 13:30

## 2022-01-01 RX ADMIN — Medication 2 SPRAY(S): at 05:47

## 2022-01-01 RX ADMIN — Medication 100 MILLIGRAM(S): at 22:26

## 2022-01-01 RX ADMIN — Medication 58 MILLIGRAM(S): at 18:05

## 2022-01-01 RX ADMIN — Medication 650 MILLIGRAM(S): at 21:49

## 2022-01-01 RX ADMIN — INSULIN GLARGINE 22 UNIT(S): 100 INJECTION, SOLUTION SUBCUTANEOUS at 07:31

## 2022-01-01 RX ADMIN — ATORVASTATIN CALCIUM 80 MILLIGRAM(S): 80 TABLET, FILM COATED ORAL at 22:45

## 2022-01-01 RX ADMIN — Medication 2 MILLIGRAM(S): at 21:27

## 2022-01-01 RX ADMIN — Medication 650 MILLIGRAM(S): at 06:34

## 2022-01-01 RX ADMIN — Medication 1300 MILLIGRAM(S): at 21:40

## 2022-01-01 RX ADMIN — Medication 25 MILLIGRAM(S): at 05:43

## 2022-01-01 RX ADMIN — Medication 1 APPLICATION(S): at 18:21

## 2022-01-01 RX ADMIN — ATORVASTATIN CALCIUM 80 MILLIGRAM(S): 80 TABLET, FILM COATED ORAL at 21:14

## 2022-01-01 RX ADMIN — Medication 104 MILLIGRAM(S): at 11:45

## 2022-01-01 RX ADMIN — Medication 1 APPLICATION(S): at 05:47

## 2022-01-01 RX ADMIN — Medication 1300 MILLIGRAM(S): at 13:03

## 2022-01-01 RX ADMIN — AMLODIPINE BESYLATE 10 MILLIGRAM(S): 2.5 TABLET ORAL at 21:03

## 2022-01-01 RX ADMIN — Medication 100 MILLIGRAM(S): at 16:21

## 2022-01-01 RX ADMIN — Medication 5 UNIT(S): at 12:54

## 2022-01-01 RX ADMIN — Medication 600 MILLIGRAM(S): at 06:32

## 2022-01-01 RX ADMIN — DEXMEDETOMIDINE HYDROCHLORIDE IN 0.9% SODIUM CHLORIDE 4.07 MICROGRAM(S)/KG/HR: 4 INJECTION INTRAVENOUS at 05:43

## 2022-01-01 RX ADMIN — Medication 1 APPLICATION(S): at 05:45

## 2022-01-01 RX ADMIN — Medication 1: at 06:40

## 2022-01-01 RX ADMIN — Medication 25 MILLILITER(S): at 09:40

## 2022-01-01 RX ADMIN — CHLORHEXIDINE GLUCONATE 15 MILLILITER(S): 213 SOLUTION TOPICAL at 18:12

## 2022-01-01 RX ADMIN — PANTOPRAZOLE SODIUM 40 MILLIGRAM(S): 20 TABLET, DELAYED RELEASE ORAL at 17:30

## 2022-01-01 RX ADMIN — Medication 100 MILLIGRAM(S): at 05:27

## 2022-01-01 RX ADMIN — Medication 1 APPLICATION(S): at 06:12

## 2022-01-01 RX ADMIN — Medication 10 MILLIGRAM(S): at 23:12

## 2022-01-01 RX ADMIN — PANTOPRAZOLE SODIUM 40 MILLIGRAM(S): 20 TABLET, DELAYED RELEASE ORAL at 05:46

## 2022-01-01 RX ADMIN — Medication 650 MILLIGRAM(S): at 05:46

## 2022-01-01 RX ADMIN — PANTOPRAZOLE SODIUM 40 MILLIGRAM(S): 20 TABLET, DELAYED RELEASE ORAL at 05:29

## 2022-01-01 RX ADMIN — Medication 104 MILLIGRAM(S): at 16:34

## 2022-01-01 RX ADMIN — Medication 0.2 MILLIGRAM(S): at 21:51

## 2022-01-01 RX ADMIN — INSULIN GLARGINE 30 UNIT(S): 100 INJECTION, SOLUTION SUBCUTANEOUS at 06:17

## 2022-01-01 RX ADMIN — Medication 400 MILLIGRAM(S): at 13:15

## 2022-01-01 RX ADMIN — Medication 1300 MILLIGRAM(S): at 21:06

## 2022-01-01 RX ADMIN — Medication 81 MILLIGRAM(S): at 12:07

## 2022-01-01 RX ADMIN — Medication 1 APPLICATION(S): at 05:41

## 2022-01-01 RX ADMIN — PANTOPRAZOLE SODIUM 40 MILLIGRAM(S): 20 TABLET, DELAYED RELEASE ORAL at 06:00

## 2022-01-01 RX ADMIN — SEVELAMER CARBONATE 2400 MILLIGRAM(S): 2400 POWDER, FOR SUSPENSION ORAL at 05:29

## 2022-01-01 RX ADMIN — Medication 1 APPLICATION(S): at 05:26

## 2022-01-01 RX ADMIN — LEVETIRACETAM 500 MILLIGRAM(S): 250 TABLET, FILM COATED ORAL at 18:01

## 2022-01-01 RX ADMIN — Medication 100 MILLIGRAM(S): at 05:47

## 2022-01-01 RX ADMIN — LACOSAMIDE 110 MILLIGRAM(S): 50 TABLET ORAL at 05:46

## 2022-01-01 RX ADMIN — Medication 1 APPLICATION(S): at 17:42

## 2022-01-01 RX ADMIN — Medication 1 APPLICATION(S): at 17:32

## 2022-01-01 RX ADMIN — FENTANYL CITRATE 50 MICROGRAM(S): 50 INJECTION INTRAVENOUS at 09:55

## 2022-01-01 RX ADMIN — FENTANYL CITRATE 50 MICROGRAM(S): 50 INJECTION INTRAVENOUS at 19:27

## 2022-01-01 RX ADMIN — ATORVASTATIN CALCIUM 80 MILLIGRAM(S): 80 TABLET, FILM COATED ORAL at 22:05

## 2022-01-01 RX ADMIN — ATORVASTATIN CALCIUM 80 MILLIGRAM(S): 80 TABLET, FILM COATED ORAL at 21:06

## 2022-01-01 RX ADMIN — PANTOPRAZOLE SODIUM 40 MILLIGRAM(S): 20 TABLET, DELAYED RELEASE ORAL at 17:24

## 2022-01-01 RX ADMIN — Medication 166.67 MILLIGRAM(S): at 18:03

## 2022-01-01 RX ADMIN — Medication 50 MILLIGRAM(S): at 21:49

## 2022-01-01 RX ADMIN — FENTANYL CITRATE 50 MICROGRAM(S): 50 INJECTION INTRAVENOUS at 14:55

## 2022-01-01 RX ADMIN — Medication 80 MILLIGRAM(S): at 17:23

## 2022-01-01 RX ADMIN — Medication 650 MILLIGRAM(S): at 15:21

## 2022-01-01 RX ADMIN — Medication 650 MILLIGRAM(S): at 01:20

## 2022-01-01 RX ADMIN — Medication 250 MILLIGRAM(S): at 01:55

## 2022-01-01 RX ADMIN — ENOXAPARIN SODIUM 40 MILLIGRAM(S): 100 INJECTION SUBCUTANEOUS at 05:26

## 2022-01-01 RX ADMIN — Medication 600 MILLIGRAM(S): at 21:46

## 2022-01-01 RX ADMIN — Medication 100 MILLIGRAM(S): at 08:42

## 2022-01-01 RX ADMIN — LACOSAMIDE 110 MILLIGRAM(S): 50 TABLET ORAL at 23:51

## 2022-01-01 RX ADMIN — FENTANYL CITRATE 50 MICROGRAM(S): 50 INJECTION INTRAVENOUS at 11:21

## 2022-01-01 RX ADMIN — PANTOPRAZOLE SODIUM 40 MILLIGRAM(S): 20 TABLET, DELAYED RELEASE ORAL at 18:05

## 2022-01-01 RX ADMIN — LOSARTAN POTASSIUM 100 MILLIGRAM(S): 100 TABLET, FILM COATED ORAL at 05:34

## 2022-01-01 RX ADMIN — Medication 650 MILLIGRAM(S): at 06:29

## 2022-01-01 RX ADMIN — HYDROMORPHONE HYDROCHLORIDE 0.5 MILLIGRAM(S): 2 INJECTION INTRAMUSCULAR; INTRAVENOUS; SUBCUTANEOUS at 18:30

## 2022-01-01 RX ADMIN — Medication 650 MILLIGRAM(S): at 11:43

## 2022-01-01 RX ADMIN — CHLORHEXIDINE GLUCONATE 15 MILLILITER(S): 213 SOLUTION TOPICAL at 17:46

## 2022-01-01 RX ADMIN — ENOXAPARIN SODIUM 40 MILLIGRAM(S): 100 INJECTION SUBCUTANEOUS at 17:33

## 2022-01-01 RX ADMIN — INSULIN HUMAN 1 UNIT(S)/HR: 100 INJECTION, SOLUTION SUBCUTANEOUS at 12:21

## 2022-01-01 RX ADMIN — Medication 15 MILLILITER(S): at 12:15

## 2022-01-01 RX ADMIN — ATORVASTATIN CALCIUM 80 MILLIGRAM(S): 80 TABLET, FILM COATED ORAL at 21:31

## 2022-01-01 RX ADMIN — Medication 600 MILLIGRAM(S): at 05:48

## 2022-01-01 RX ADMIN — Medication 650 MILLIGRAM(S): at 22:43

## 2022-01-01 RX ADMIN — INSULIN GLARGINE 15 UNIT(S): 100 INJECTION, SOLUTION SUBCUTANEOUS at 09:00

## 2022-01-01 RX ADMIN — CHLORHEXIDINE GLUCONATE 15 MILLILITER(S): 213 SOLUTION TOPICAL at 05:06

## 2022-01-01 RX ADMIN — LACOSAMIDE 110 MILLIGRAM(S): 50 TABLET ORAL at 05:43

## 2022-01-01 RX ADMIN — Medication 80 MILLIGRAM(S): at 12:59

## 2022-01-01 RX ADMIN — Medication 80 MILLIGRAM(S): at 17:45

## 2022-01-01 RX ADMIN — Medication 100 GRAM(S): at 18:47

## 2022-01-01 RX ADMIN — Medication 600 MILLIGRAM(S): at 06:30

## 2022-01-01 RX ADMIN — Medication 81 MILLIGRAM(S): at 11:34

## 2022-01-01 RX ADMIN — Medication 20 MILLIGRAM(S): at 13:12

## 2022-01-01 RX ADMIN — Medication 100 MILLIGRAM(S): at 05:36

## 2022-01-01 RX ADMIN — Medication 1 PATCH: at 20:05

## 2022-01-01 RX ADMIN — Medication 100 MILLIGRAM(S): at 05:11

## 2022-01-01 RX ADMIN — Medication 80 MILLIGRAM(S): at 05:00

## 2022-01-01 RX ADMIN — Medication 2 SPRAY(S): at 17:54

## 2022-01-01 RX ADMIN — Medication 100 MILLIGRAM(S): at 05:17

## 2022-01-01 RX ADMIN — Medication 650 MILLIGRAM(S): at 08:25

## 2022-01-01 RX ADMIN — Medication 600 MILLIGRAM(S): at 13:03

## 2022-01-01 RX ADMIN — Medication 2 SPRAY(S): at 17:36

## 2022-01-01 RX ADMIN — Medication 240 MILLIGRAM(S): at 20:45

## 2022-01-01 RX ADMIN — Medication 1 TABLET(S): at 18:10

## 2022-01-01 RX ADMIN — LOSARTAN POTASSIUM 100 MILLIGRAM(S): 100 TABLET, FILM COATED ORAL at 05:03

## 2022-01-01 RX ADMIN — Medication 600 MILLIGRAM(S): at 05:44

## 2022-01-01 RX ADMIN — Medication 400 MILLIGRAM(S): at 13:49

## 2022-01-01 RX ADMIN — Medication 100 MILLIGRAM(S): at 16:25

## 2022-01-01 RX ADMIN — INSULIN GLARGINE 22 UNIT(S): 100 INJECTION, SOLUTION SUBCUTANEOUS at 11:05

## 2022-01-01 RX ADMIN — Medication 100 MILLIGRAM(S): at 14:44

## 2022-01-01 RX ADMIN — Medication 650 MILLIGRAM(S): at 06:12

## 2022-01-01 RX ADMIN — ENOXAPARIN SODIUM 40 MILLIGRAM(S): 100 INJECTION SUBCUTANEOUS at 18:40

## 2022-01-01 RX ADMIN — Medication 4 UNIT(S): at 06:40

## 2022-01-01 RX ADMIN — Medication 300 MILLIGRAM(S): at 22:00

## 2022-01-01 RX ADMIN — Medication 100 MILLIGRAM(S): at 05:01

## 2022-01-01 RX ADMIN — Medication 650 MILLIGRAM(S): at 06:31

## 2022-01-01 RX ADMIN — SODIUM ZIRCONIUM CYCLOSILICATE 10 GRAM(S): 10 POWDER, FOR SUSPENSION ORAL at 18:05

## 2022-01-01 RX ADMIN — Medication 650 MILLIGRAM(S): at 12:07

## 2022-01-01 RX ADMIN — Medication 50 MILLIEQUIVALENT(S): at 22:05

## 2022-01-01 RX ADMIN — Medication 81 MILLIGRAM(S): at 12:21

## 2022-01-01 RX ADMIN — Medication 15 MILLILITER(S): at 13:04

## 2022-01-01 RX ADMIN — AMPICILLIN SODIUM AND SULBACTAM SODIUM 200 GRAM(S): 250; 125 INJECTION, POWDER, FOR SUSPENSION INTRAMUSCULAR; INTRAVENOUS at 11:07

## 2022-01-01 RX ADMIN — LACOSAMIDE 110 MILLIGRAM(S): 50 TABLET ORAL at 05:14

## 2022-01-01 RX ADMIN — Medication 1 TABLET(S): at 17:28

## 2022-01-01 RX ADMIN — Medication 80 MILLIGRAM(S): at 22:58

## 2022-01-01 RX ADMIN — LACOSAMIDE 110 MILLIGRAM(S): 50 TABLET ORAL at 20:41

## 2022-01-01 RX ADMIN — Medication 600 MILLIGRAM(S): at 13:34

## 2022-01-01 RX ADMIN — LEVETIRACETAM 1000 MILLIGRAM(S): 250 TABLET, FILM COATED ORAL at 05:26

## 2022-01-01 RX ADMIN — Medication 400 MILLIGRAM(S): at 18:03

## 2022-01-01 RX ADMIN — CLOPIDOGREL BISULFATE 75 MILLIGRAM(S): 75 TABLET, FILM COATED ORAL at 11:32

## 2022-01-01 RX ADMIN — Medication 2 SPRAY(S): at 05:18

## 2022-01-01 RX ADMIN — LEVETIRACETAM 500 MILLIGRAM(S): 250 TABLET, FILM COATED ORAL at 17:23

## 2022-01-01 RX ADMIN — ATORVASTATIN CALCIUM 80 MILLIGRAM(S): 80 TABLET, FILM COATED ORAL at 21:47

## 2022-01-01 RX ADMIN — Medication 100 MILLIGRAM(S): at 13:01

## 2022-01-01 RX ADMIN — ENOXAPARIN SODIUM 40 MILLIGRAM(S): 100 INJECTION SUBCUTANEOUS at 18:28

## 2022-01-01 RX ADMIN — LEVETIRACETAM 400 MILLIGRAM(S): 250 TABLET, FILM COATED ORAL at 18:07

## 2022-01-01 RX ADMIN — Medication 100 MILLIGRAM(S): at 21:34

## 2022-01-01 RX ADMIN — LEVETIRACETAM 400 MILLIGRAM(S): 250 TABLET, FILM COATED ORAL at 06:11

## 2022-01-01 RX ADMIN — INSULIN GLARGINE 22 UNIT(S): 100 INJECTION, SOLUTION SUBCUTANEOUS at 07:38

## 2022-01-01 RX ADMIN — Medication 100 MILLIGRAM(S): at 14:58

## 2022-01-01 RX ADMIN — POLYETHYLENE GLYCOL 3350 17 GRAM(S): 17 POWDER, FOR SOLUTION ORAL at 18:06

## 2022-01-01 RX ADMIN — CHLORHEXIDINE GLUCONATE 15 MILLILITER(S): 213 SOLUTION TOPICAL at 05:01

## 2022-01-01 RX ADMIN — Medication 5 UNIT(S): at 17:35

## 2022-01-01 RX ADMIN — Medication 650 MILLIGRAM(S): at 05:54

## 2022-01-01 RX ADMIN — Medication 10 MILLIGRAM(S): at 06:12

## 2022-01-01 RX ADMIN — AMPICILLIN SODIUM AND SULBACTAM SODIUM 200 GRAM(S): 250; 125 INJECTION, POWDER, FOR SUSPENSION INTRAMUSCULAR; INTRAVENOUS at 11:16

## 2022-01-01 RX ADMIN — PANTOPRAZOLE SODIUM 40 MILLIGRAM(S): 20 TABLET, DELAYED RELEASE ORAL at 06:58

## 2022-01-01 RX ADMIN — POLYETHYLENE GLYCOL 3350 17 GRAM(S): 17 POWDER, FOR SOLUTION ORAL at 17:28

## 2022-01-01 RX ADMIN — ATORVASTATIN CALCIUM 80 MILLIGRAM(S): 80 TABLET, FILM COATED ORAL at 22:49

## 2022-01-01 RX ADMIN — PANTOPRAZOLE SODIUM 40 MILLIGRAM(S): 20 TABLET, DELAYED RELEASE ORAL at 06:14

## 2022-01-01 RX ADMIN — Medication 650 MILLIGRAM(S): at 18:29

## 2022-01-01 RX ADMIN — Medication 600 MILLIGRAM(S): at 05:09

## 2022-01-01 RX ADMIN — Medication 104 MILLIGRAM(S): at 21:06

## 2022-01-01 RX ADMIN — Medication 600 MILLIGRAM(S): at 13:53

## 2022-01-01 RX ADMIN — PANTOPRAZOLE SODIUM 40 MILLIGRAM(S): 20 TABLET, DELAYED RELEASE ORAL at 05:38

## 2022-01-01 RX ADMIN — Medication 1 APPLICATION(S): at 05:27

## 2022-01-01 RX ADMIN — LEVETIRACETAM 400 MILLIGRAM(S): 250 TABLET, FILM COATED ORAL at 05:11

## 2022-01-01 RX ADMIN — CLOPIDOGREL BISULFATE 75 MILLIGRAM(S): 75 TABLET, FILM COATED ORAL at 12:07

## 2022-01-01 RX ADMIN — AMPICILLIN SODIUM AND SULBACTAM SODIUM 200 GRAM(S): 250; 125 INJECTION, POWDER, FOR SUSPENSION INTRAMUSCULAR; INTRAVENOUS at 17:21

## 2022-01-01 RX ADMIN — Medication 600 MILLIGRAM(S): at 14:06

## 2022-01-01 RX ADMIN — Medication 25 MILLIGRAM(S): at 05:14

## 2022-01-01 RX ADMIN — Medication 650 MILLIGRAM(S): at 17:36

## 2022-01-01 RX ADMIN — Medication 5 UNIT(S): at 11:05

## 2022-01-01 RX ADMIN — Medication 1 PATCH: at 21:06

## 2022-01-01 RX ADMIN — Medication 650 MILLIGRAM(S): at 06:08

## 2022-01-01 RX ADMIN — Medication 2 SPRAY(S): at 18:31

## 2022-01-01 RX ADMIN — Medication 600 MILLIGRAM(S): at 05:13

## 2022-01-01 RX ADMIN — Medication 100 MILLIGRAM(S): at 13:52

## 2022-01-01 RX ADMIN — AMLODIPINE BESYLATE 10 MILLIGRAM(S): 2.5 TABLET ORAL at 05:17

## 2022-01-01 RX ADMIN — Medication 600 MILLIGRAM(S): at 21:43

## 2022-01-01 RX ADMIN — Medication 4 UNIT(S): at 13:01

## 2022-01-01 RX ADMIN — ATORVASTATIN CALCIUM 80 MILLIGRAM(S): 80 TABLET, FILM COATED ORAL at 21:02

## 2022-01-01 RX ADMIN — AMLODIPINE BESYLATE 10 MILLIGRAM(S): 2.5 TABLET ORAL at 05:25

## 2022-01-01 RX ADMIN — Medication 600 MILLIGRAM(S): at 22:04

## 2022-01-01 RX ADMIN — PROPOFOL 20 MILLIGRAM(S): 10 INJECTION, EMULSION INTRAVENOUS at 12:17

## 2022-01-01 RX ADMIN — Medication 600 MILLIGRAM(S): at 22:07

## 2022-01-01 RX ADMIN — Medication 104 MILLIGRAM(S): at 15:59

## 2022-01-01 RX ADMIN — CHLORHEXIDINE GLUCONATE 1 APPLICATION(S): 213 SOLUTION TOPICAL at 05:03

## 2022-01-01 RX ADMIN — Medication 1 APPLICATION(S): at 05:51

## 2022-01-01 RX ADMIN — Medication 5 UNIT(S): at 04:09

## 2022-01-01 RX ADMIN — LEVETIRACETAM 400 MILLIGRAM(S): 250 TABLET, FILM COATED ORAL at 17:34

## 2022-01-01 RX ADMIN — Medication 10 MILLIGRAM(S): at 14:00

## 2022-01-01 RX ADMIN — Medication 600 MILLIGRAM(S): at 05:34

## 2022-01-01 RX ADMIN — LOSARTAN POTASSIUM 100 MILLIGRAM(S): 100 TABLET, FILM COATED ORAL at 08:43

## 2022-01-01 RX ADMIN — PANTOPRAZOLE SODIUM 40 MILLIGRAM(S): 20 TABLET, DELAYED RELEASE ORAL at 05:48

## 2022-01-01 RX ADMIN — LACOSAMIDE 110 MILLIGRAM(S): 50 TABLET ORAL at 17:33

## 2022-01-01 RX ADMIN — Medication 600 MILLIGRAM(S): at 14:46

## 2022-01-01 RX ADMIN — CHLORHEXIDINE GLUCONATE 1 APPLICATION(S): 213 SOLUTION TOPICAL at 05:11

## 2022-01-01 RX ADMIN — Medication 650 MILLIGRAM(S): at 17:38

## 2022-01-01 RX ADMIN — Medication 650 MILLIGRAM(S): at 12:14

## 2022-01-01 RX ADMIN — Medication 25 MILLIGRAM(S): at 05:28

## 2022-01-01 RX ADMIN — Medication 1 PATCH: at 21:26

## 2022-01-01 RX ADMIN — CHLORHEXIDINE GLUCONATE 1 APPLICATION(S): 213 SOLUTION TOPICAL at 07:04

## 2022-01-01 RX ADMIN — Medication 2: at 08:50

## 2022-01-01 RX ADMIN — SCOPALAMINE 1 PATCH: 1 PATCH, EXTENDED RELEASE TRANSDERMAL at 19:15

## 2022-01-01 RX ADMIN — Medication 0.2 MILLIGRAM(S): at 13:03

## 2022-01-01 RX ADMIN — ENOXAPARIN SODIUM 40 MILLIGRAM(S): 100 INJECTION SUBCUTANEOUS at 18:04

## 2022-01-01 RX ADMIN — PANTOPRAZOLE SODIUM 40 MILLIGRAM(S): 20 TABLET, DELAYED RELEASE ORAL at 18:28

## 2022-01-01 RX ADMIN — LACOSAMIDE 110 MILLIGRAM(S): 50 TABLET ORAL at 17:16

## 2022-01-01 RX ADMIN — Medication 2 SPRAY(S): at 17:42

## 2022-01-01 RX ADMIN — PANTOPRAZOLE SODIUM 40 MILLIGRAM(S): 20 TABLET, DELAYED RELEASE ORAL at 17:23

## 2022-01-01 RX ADMIN — Medication 600 MILLIGRAM(S): at 19:50

## 2022-01-01 RX ADMIN — SEVELAMER CARBONATE 2400 MILLIGRAM(S): 2400 POWDER, FOR SUSPENSION ORAL at 21:51

## 2022-01-01 RX ADMIN — PANTOPRAZOLE SODIUM 40 MILLIGRAM(S): 20 TABLET, DELAYED RELEASE ORAL at 18:00

## 2022-01-01 RX ADMIN — LEVETIRACETAM 500 MILLIGRAM(S): 250 TABLET, FILM COATED ORAL at 17:34

## 2022-01-01 RX ADMIN — MIDAZOLAM HYDROCHLORIDE 4 MILLIGRAM(S): 1 INJECTION, SOLUTION INTRAMUSCULAR; INTRAVENOUS at 10:20

## 2022-01-01 RX ADMIN — Medication 100 MILLIGRAM(S): at 21:46

## 2022-01-01 RX ADMIN — Medication 100 MILLIGRAM(S): at 13:02

## 2022-01-01 RX ADMIN — Medication 650 MILLIGRAM(S): at 17:19

## 2022-01-01 RX ADMIN — INSULIN GLARGINE 22 UNIT(S): 100 INJECTION, SOLUTION SUBCUTANEOUS at 14:11

## 2022-01-01 RX ADMIN — CHLORHEXIDINE GLUCONATE 1 APPLICATION(S): 213 SOLUTION TOPICAL at 05:15

## 2022-01-01 RX ADMIN — ENOXAPARIN SODIUM 40 MILLIGRAM(S): 100 INJECTION SUBCUTANEOUS at 06:18

## 2022-01-01 RX ADMIN — Medication 400 MILLIGRAM(S): at 21:46

## 2022-01-01 RX ADMIN — LEVETIRACETAM 500 MILLIGRAM(S): 250 TABLET, FILM COATED ORAL at 17:35

## 2022-01-01 RX ADMIN — Medication 1 APPLICATION(S): at 18:08

## 2022-01-01 RX ADMIN — LACOSAMIDE 110 MILLIGRAM(S): 50 TABLET ORAL at 17:51

## 2022-01-01 RX ADMIN — Medication 104 MILLIGRAM(S): at 06:00

## 2022-01-01 RX ADMIN — Medication 0.2 MILLIGRAM(S): at 13:52

## 2022-01-01 RX ADMIN — AMPICILLIN SODIUM AND SULBACTAM SODIUM 200 GRAM(S): 250; 125 INJECTION, POWDER, FOR SUSPENSION INTRAMUSCULAR; INTRAVENOUS at 23:37

## 2022-01-01 RX ADMIN — Medication 40 MILLIGRAM(S): at 05:02

## 2022-01-01 RX ADMIN — Medication 1300 MILLIGRAM(S): at 13:01

## 2022-01-01 RX ADMIN — Medication 104 MILLIGRAM(S): at 22:04

## 2022-01-01 RX ADMIN — SEVELAMER CARBONATE 2400 MILLIGRAM(S): 2400 POWDER, FOR SUSPENSION ORAL at 15:41

## 2022-01-01 RX ADMIN — Medication 600 MILLIGRAM(S): at 21:34

## 2022-01-01 RX ADMIN — Medication 1 APPLICATION(S): at 04:29

## 2022-01-01 RX ADMIN — Medication 250 MILLIGRAM(S): at 18:22

## 2022-01-01 RX ADMIN — DESMOPRESSIN ACETATE 225 MICROGRAM(S): 0.1 TABLET ORAL at 19:24

## 2022-01-01 RX ADMIN — LEVETIRACETAM 400 MILLIGRAM(S): 250 TABLET, FILM COATED ORAL at 06:14

## 2022-01-01 RX ADMIN — Medication 15 MILLILITER(S): at 17:40

## 2022-01-01 RX ADMIN — LOSARTAN POTASSIUM 50 MILLIGRAM(S): 100 TABLET, FILM COATED ORAL at 11:27

## 2022-01-01 RX ADMIN — Medication 100 MILLIGRAM(S): at 17:31

## 2022-01-01 RX ADMIN — LACOSAMIDE 110 MILLIGRAM(S): 50 TABLET ORAL at 05:33

## 2022-01-01 RX ADMIN — LACOSAMIDE 110 MILLIGRAM(S): 50 TABLET ORAL at 17:29

## 2022-01-01 RX ADMIN — Medication 650 MILLIGRAM(S): at 14:47

## 2022-01-01 RX ADMIN — Medication 15 MILLILITER(S): at 18:38

## 2022-01-01 RX ADMIN — Medication 100 MILLIGRAM(S): at 16:30

## 2022-01-01 RX ADMIN — Medication 2 SPRAY(S): at 17:22

## 2022-01-01 RX ADMIN — Medication 400 MILLIGRAM(S): at 05:03

## 2022-01-01 RX ADMIN — Medication 15 MILLILITER(S): at 12:22

## 2022-01-01 RX ADMIN — Medication 1 APPLICATION(S): at 17:36

## 2022-01-01 RX ADMIN — ENOXAPARIN SODIUM 40 MILLIGRAM(S): 100 INJECTION SUBCUTANEOUS at 17:37

## 2022-01-01 RX ADMIN — DEXMEDETOMIDINE HYDROCHLORIDE IN 0.9% SODIUM CHLORIDE 4.07 MICROGRAM(S)/KG/HR: 4 INJECTION INTRAVENOUS at 23:32

## 2022-01-01 RX ADMIN — CHLORHEXIDINE GLUCONATE 1 APPLICATION(S): 213 SOLUTION TOPICAL at 05:09

## 2022-01-01 RX ADMIN — DESMOPRESSIN ACETATE 225 MICROGRAM(S): 0.1 TABLET ORAL at 11:11

## 2022-01-01 RX ADMIN — Medication 2 SPRAY(S): at 17:53

## 2022-01-01 RX ADMIN — LEVETIRACETAM 500 MILLIGRAM(S): 250 TABLET, FILM COATED ORAL at 17:53

## 2022-01-01 RX ADMIN — Medication 50 MILLIEQUIVALENT(S): at 13:40

## 2022-01-01 RX ADMIN — Medication 81 MILLIGRAM(S): at 12:06

## 2022-01-01 RX ADMIN — Medication 650 MILLIGRAM(S): at 01:12

## 2022-01-01 RX ADMIN — Medication 1 TABLET(S): at 17:21

## 2022-01-01 RX ADMIN — Medication 166.67 MILLIGRAM(S): at 05:18

## 2022-01-01 RX ADMIN — DESMOPRESSIN ACETATE 225 MICROGRAM(S): 0.1 TABLET ORAL at 04:00

## 2022-01-01 RX ADMIN — Medication 1 APPLICATION(S): at 17:31

## 2022-01-01 RX ADMIN — Medication 60 MILLIGRAM(S): at 05:54

## 2022-01-01 RX ADMIN — Medication 104 MILLIGRAM(S): at 05:15

## 2022-01-01 RX ADMIN — LEVETIRACETAM 1000 MILLIGRAM(S): 250 TABLET, FILM COATED ORAL at 05:02

## 2022-01-01 RX ADMIN — ATORVASTATIN CALCIUM 80 MILLIGRAM(S): 80 TABLET, FILM COATED ORAL at 21:48

## 2022-01-01 RX ADMIN — Medication 58 MILLIGRAM(S): at 18:27

## 2022-01-01 RX ADMIN — Medication 0.1 MILLIGRAM(S): at 05:01

## 2022-01-01 RX ADMIN — Medication 1: at 11:32

## 2022-01-01 RX ADMIN — Medication 2 SPRAY(S): at 18:05

## 2022-01-01 RX ADMIN — Medication 1 TABLET(S): at 11:03

## 2022-01-01 RX ADMIN — DESMOPRESSIN ACETATE 225 MICROGRAM(S): 0.1 TABLET ORAL at 17:41

## 2022-01-01 RX ADMIN — Medication 1: at 05:07

## 2022-01-01 RX ADMIN — LACOSAMIDE 110 MILLIGRAM(S): 50 TABLET ORAL at 06:15

## 2022-01-01 RX ADMIN — Medication 650 MILLIGRAM(S): at 23:05

## 2022-01-01 RX ADMIN — LOSARTAN POTASSIUM 100 MILLIGRAM(S): 100 TABLET, FILM COATED ORAL at 05:44

## 2022-01-01 RX ADMIN — Medication 100 MILLIGRAM(S): at 21:04

## 2022-01-01 RX ADMIN — LEVETIRACETAM 400 MILLIGRAM(S): 250 TABLET, FILM COATED ORAL at 06:33

## 2022-01-01 RX ADMIN — AMLODIPINE BESYLATE 10 MILLIGRAM(S): 2.5 TABLET ORAL at 05:16

## 2022-01-01 RX ADMIN — Medication 250 MILLIGRAM(S): at 10:12

## 2022-01-01 RX ADMIN — Medication 2 SPRAY(S): at 18:17

## 2022-01-01 RX ADMIN — LACOSAMIDE 110 MILLIGRAM(S): 50 TABLET ORAL at 05:00

## 2022-01-01 RX ADMIN — LACOSAMIDE 110 MILLIGRAM(S): 50 TABLET ORAL at 05:27

## 2022-01-01 RX ADMIN — Medication 2 SPRAY(S): at 05:10

## 2022-01-01 RX ADMIN — Medication 104 MILLIGRAM(S): at 06:12

## 2022-01-01 RX ADMIN — Medication 600 MILLIGRAM(S): at 21:07

## 2022-01-01 RX ADMIN — ENOXAPARIN SODIUM 40 MILLIGRAM(S): 100 INJECTION SUBCUTANEOUS at 17:34

## 2022-01-01 RX ADMIN — AMLODIPINE BESYLATE 10 MILLIGRAM(S): 2.5 TABLET ORAL at 05:02

## 2022-01-01 RX ADMIN — Medication 5 UNIT(S): at 17:55

## 2022-01-01 RX ADMIN — Medication 600 MILLIGRAM(S): at 13:12

## 2022-01-01 RX ADMIN — Medication 2 MILLIGRAM(S): at 21:11

## 2022-01-01 RX ADMIN — Medication 5 UNIT(S): at 08:04

## 2022-01-01 RX ADMIN — AMPICILLIN SODIUM AND SULBACTAM SODIUM 200 GRAM(S): 250; 125 INJECTION, POWDER, FOR SUSPENSION INTRAMUSCULAR; INTRAVENOUS at 23:12

## 2022-01-01 RX ADMIN — INSULIN GLARGINE 22 UNIT(S): 100 INJECTION, SOLUTION SUBCUTANEOUS at 08:46

## 2022-01-01 RX ADMIN — Medication 5 UNIT(S): at 06:34

## 2022-01-01 RX ADMIN — Medication 50 MILLIGRAM(S): at 21:26

## 2022-01-01 RX ADMIN — CHLORHEXIDINE GLUCONATE 1 APPLICATION(S): 213 SOLUTION TOPICAL at 05:52

## 2022-01-01 RX ADMIN — Medication 650 MILLIGRAM(S): at 21:25

## 2022-01-01 RX ADMIN — Medication 5 UNIT(S): at 07:27

## 2022-01-01 RX ADMIN — Medication 2 SPRAY(S): at 05:28

## 2022-01-01 RX ADMIN — Medication 100 MILLIGRAM(S): at 04:37

## 2022-01-01 RX ADMIN — Medication 650 MILLIGRAM(S): at 01:22

## 2022-01-01 RX ADMIN — LOSARTAN POTASSIUM 100 MILLIGRAM(S): 100 TABLET, FILM COATED ORAL at 06:29

## 2022-01-01 RX ADMIN — LEVETIRACETAM 400 MILLIGRAM(S): 250 TABLET, FILM COATED ORAL at 05:22

## 2022-01-01 RX ADMIN — AMLODIPINE BESYLATE 10 MILLIGRAM(S): 2.5 TABLET ORAL at 21:45

## 2022-01-01 RX ADMIN — Medication 81 MILLIGRAM(S): at 11:15

## 2022-01-01 RX ADMIN — Medication 10 MILLIGRAM(S): at 03:57

## 2022-01-01 RX ADMIN — Medication 3: at 16:39

## 2022-01-01 RX ADMIN — Medication 4: at 05:48

## 2022-01-01 RX ADMIN — ATORVASTATIN CALCIUM 80 MILLIGRAM(S): 80 TABLET, FILM COATED ORAL at 22:40

## 2022-01-01 RX ADMIN — Medication 104 MILLIGRAM(S): at 21:53

## 2022-01-01 RX ADMIN — APIXABAN 5 MILLIGRAM(S): 2.5 TABLET, FILM COATED ORAL at 18:15

## 2022-01-01 RX ADMIN — Medication 81 MILLIGRAM(S): at 12:55

## 2022-01-01 RX ADMIN — LEVETIRACETAM 500 MILLIGRAM(S): 250 TABLET, FILM COATED ORAL at 05:10

## 2022-01-01 RX ADMIN — Medication 0.2 MILLIGRAM(S): at 21:20

## 2022-01-01 RX ADMIN — Medication 1 APPLICATION(S): at 06:13

## 2022-01-01 RX ADMIN — HYDROMORPHONE HYDROCHLORIDE 0.5 MILLIGRAM(S): 2 INJECTION INTRAMUSCULAR; INTRAVENOUS; SUBCUTANEOUS at 18:45

## 2022-01-01 RX ADMIN — Medication 300 MILLIGRAM(S): at 05:19

## 2022-01-01 RX ADMIN — Medication 100 MILLIGRAM(S): at 17:45

## 2022-01-01 RX ADMIN — Medication 1 APPLICATION(S): at 18:02

## 2022-01-01 RX ADMIN — Medication 100 MILLIGRAM(S): at 18:31

## 2022-01-01 RX ADMIN — Medication 300 MILLIGRAM(S): at 05:44

## 2022-01-01 RX ADMIN — Medication 100 MILLIGRAM(S): at 14:06

## 2022-01-01 RX ADMIN — Medication 100 MILLIGRAM(S): at 06:36

## 2022-01-01 RX ADMIN — Medication 15 MILLILITER(S): at 14:23

## 2022-01-01 RX ADMIN — Medication 101 MILLIGRAM(S): at 22:40

## 2022-01-01 RX ADMIN — FENTANYL CITRATE 50 MICROGRAM(S): 50 INJECTION INTRAVENOUS at 10:00

## 2022-01-01 RX ADMIN — Medication 15 MILLIGRAM(S): at 14:20

## 2022-01-01 RX ADMIN — Medication 15 MILLILITER(S): at 11:41

## 2022-01-01 RX ADMIN — Medication 100 MILLIGRAM(S): at 05:10

## 2022-01-01 RX ADMIN — Medication 650 MILLIGRAM(S): at 00:00

## 2022-01-01 RX ADMIN — Medication 10 MILLIGRAM(S): at 19:01

## 2022-01-01 RX ADMIN — Medication 650 MILLIGRAM(S): at 00:31

## 2022-01-01 RX ADMIN — Medication 2 SPRAY(S): at 18:22

## 2022-01-01 RX ADMIN — ATORVASTATIN CALCIUM 80 MILLIGRAM(S): 80 TABLET, FILM COATED ORAL at 21:29

## 2022-01-01 RX ADMIN — Medication 40 MILLIGRAM(S): at 06:50

## 2022-01-01 RX ADMIN — ATORVASTATIN CALCIUM 80 MILLIGRAM(S): 80 TABLET, FILM COATED ORAL at 22:00

## 2022-01-01 RX ADMIN — ATORVASTATIN CALCIUM 80 MILLIGRAM(S): 80 TABLET, FILM COATED ORAL at 22:08

## 2022-01-01 RX ADMIN — Medication 4 UNIT(S): at 13:56

## 2022-01-01 RX ADMIN — Medication 5 UNIT(S): at 18:12

## 2022-01-01 RX ADMIN — Medication 2 SPRAY(S): at 05:03

## 2022-01-01 RX ADMIN — Medication 104 MILLIGRAM(S): at 01:02

## 2022-01-01 RX ADMIN — SODIUM CHLORIDE 100 MILLILITER(S): 9 INJECTION INTRAMUSCULAR; INTRAVENOUS; SUBCUTANEOUS at 21:40

## 2022-01-01 RX ADMIN — Medication 5 UNIT(S): at 11:18

## 2022-01-01 RX ADMIN — Medication 2 SPRAY(S): at 18:06

## 2022-01-01 RX ADMIN — Medication 1300 MILLIGRAM(S): at 21:46

## 2022-01-01 RX ADMIN — Medication 650 MILLIGRAM(S): at 14:00

## 2022-01-01 RX ADMIN — LINEZOLID 300 MILLIGRAM(S): 600 INJECTION, SOLUTION INTRAVENOUS at 16:30

## 2022-01-01 RX ADMIN — Medication 650 MILLIGRAM(S): at 05:58

## 2022-01-01 RX ADMIN — Medication 90 MILLIGRAM(S): at 06:09

## 2022-01-01 RX ADMIN — LOSARTAN POTASSIUM 100 MILLIGRAM(S): 100 TABLET, FILM COATED ORAL at 05:28

## 2022-01-01 RX ADMIN — INSULIN GLARGINE 22 UNIT(S): 100 INJECTION, SOLUTION SUBCUTANEOUS at 12:54

## 2022-01-01 RX ADMIN — Medication 1 PACKET(S): at 11:48

## 2022-01-01 RX ADMIN — Medication 1300 MILLIGRAM(S): at 14:22

## 2022-01-01 RX ADMIN — LACOSAMIDE 110 MILLIGRAM(S): 50 TABLET ORAL at 20:50

## 2022-01-01 RX ADMIN — Medication 650 MILLIGRAM(S): at 17:53

## 2022-01-01 RX ADMIN — Medication 5 UNIT(S): at 17:38

## 2022-01-01 RX ADMIN — Medication 81 MILLIGRAM(S): at 11:38

## 2022-01-01 RX ADMIN — DEXMEDETOMIDINE HYDROCHLORIDE IN 0.9% SODIUM CHLORIDE 4.07 MICROGRAM(S)/KG/HR: 4 INJECTION INTRAVENOUS at 18:58

## 2022-01-01 RX ADMIN — Medication 650 MILLIGRAM(S): at 13:24

## 2022-01-01 RX ADMIN — Medication 650 MILLIGRAM(S): at 00:40

## 2022-01-01 RX ADMIN — LACOSAMIDE 110 MILLIGRAM(S): 50 TABLET ORAL at 06:03

## 2022-01-01 RX ADMIN — Medication 100 MILLIGRAM(S): at 05:31

## 2022-01-01 RX ADMIN — Medication 50 MILLIGRAM(S): at 06:20

## 2022-01-01 RX ADMIN — ATORVASTATIN CALCIUM 80 MILLIGRAM(S): 80 TABLET, FILM COATED ORAL at 21:26

## 2022-01-01 RX ADMIN — LEVETIRACETAM 400 MILLIGRAM(S): 250 TABLET, FILM COATED ORAL at 17:49

## 2022-01-01 RX ADMIN — LEVETIRACETAM 400 MILLIGRAM(S): 250 TABLET, FILM COATED ORAL at 17:20

## 2022-01-01 RX ADMIN — Medication 100 MILLIGRAM(S): at 17:34

## 2022-01-01 RX ADMIN — LEVETIRACETAM 500 MILLIGRAM(S): 250 TABLET, FILM COATED ORAL at 05:07

## 2022-01-01 RX ADMIN — Medication 1: at 16:35

## 2022-01-01 RX ADMIN — Medication 104 MILLIGRAM(S): at 14:40

## 2022-01-01 RX ADMIN — Medication 20 MILLIGRAM(S): at 12:06

## 2022-01-01 RX ADMIN — PANTOPRAZOLE SODIUM 40 MILLIGRAM(S): 20 TABLET, DELAYED RELEASE ORAL at 05:15

## 2022-01-01 RX ADMIN — Medication 104 MILLIGRAM(S): at 05:43

## 2022-01-01 RX ADMIN — PANTOPRAZOLE SODIUM 40 MILLIGRAM(S): 20 TABLET, DELAYED RELEASE ORAL at 05:09

## 2022-01-01 RX ADMIN — Medication 4 UNIT(S): at 08:16

## 2022-01-01 RX ADMIN — Medication 2 SPRAY(S): at 17:32

## 2022-01-01 RX ADMIN — Medication 2 SPRAY(S): at 05:04

## 2022-01-01 RX ADMIN — MIDAZOLAM HYDROCHLORIDE 1.95 MG/KG/HR: 1 INJECTION, SOLUTION INTRAMUSCULAR; INTRAVENOUS at 14:11

## 2022-01-01 RX ADMIN — CHLORHEXIDINE GLUCONATE 15 MILLILITER(S): 213 SOLUTION TOPICAL at 18:31

## 2022-01-01 RX ADMIN — Medication 100 MILLIGRAM(S): at 13:53

## 2022-01-01 RX ADMIN — Medication 650 MILLIGRAM(S): at 13:54

## 2022-01-01 RX ADMIN — Medication 600 MILLIGRAM(S): at 14:09

## 2022-01-01 RX ADMIN — LACOSAMIDE 110 MILLIGRAM(S): 50 TABLET ORAL at 18:20

## 2022-01-01 RX ADMIN — ATORVASTATIN CALCIUM 80 MILLIGRAM(S): 80 TABLET, FILM COATED ORAL at 21:15

## 2022-01-01 RX ADMIN — CHLORHEXIDINE GLUCONATE 1 APPLICATION(S): 213 SOLUTION TOPICAL at 06:04

## 2022-01-01 RX ADMIN — Medication 15 MILLILITER(S): at 11:15

## 2022-01-01 RX ADMIN — Medication 1 APPLICATION(S): at 17:19

## 2022-01-01 RX ADMIN — CHLORHEXIDINE GLUCONATE 1 APPLICATION(S): 213 SOLUTION TOPICAL at 06:06

## 2022-01-01 RX ADMIN — LEVETIRACETAM 400 MILLIGRAM(S): 250 TABLET, FILM COATED ORAL at 05:16

## 2022-01-01 RX ADMIN — Medication 2 SPRAY(S): at 05:02

## 2022-01-01 RX ADMIN — Medication 166.67 MILLIGRAM(S): at 05:03

## 2022-01-01 RX ADMIN — SODIUM CHLORIDE 75 MILLILITER(S): 9 INJECTION, SOLUTION INTRAVENOUS at 00:01

## 2022-01-01 RX ADMIN — Medication 1: at 16:00

## 2022-01-01 RX ADMIN — Medication 166.67 MILLIGRAM(S): at 17:29

## 2022-01-01 RX ADMIN — PANTOPRAZOLE SODIUM 40 MILLIGRAM(S): 20 TABLET, DELAYED RELEASE ORAL at 06:46

## 2022-01-01 RX ADMIN — Medication 1300 MILLIGRAM(S): at 05:02

## 2022-01-01 RX ADMIN — Medication 650 MILLIGRAM(S): at 20:20

## 2022-01-01 RX ADMIN — PANTOPRAZOLE SODIUM 40 MILLIGRAM(S): 20 TABLET, DELAYED RELEASE ORAL at 18:29

## 2022-01-01 RX ADMIN — ATORVASTATIN CALCIUM 80 MILLIGRAM(S): 80 TABLET, FILM COATED ORAL at 21:11

## 2022-01-01 RX ADMIN — Medication 400 MILLIGRAM(S): at 16:01

## 2022-01-01 RX ADMIN — Medication 50 MILLIGRAM(S): at 05:59

## 2022-01-01 RX ADMIN — Medication 50 MILLIGRAM(S): at 05:44

## 2022-01-01 RX ADMIN — LEVETIRACETAM 400 MILLIGRAM(S): 250 TABLET, FILM COATED ORAL at 06:12

## 2022-01-01 RX ADMIN — Medication 600 MILLIGRAM(S): at 13:24

## 2022-01-01 RX ADMIN — Medication 100 MILLIGRAM(S): at 05:37

## 2022-01-01 RX ADMIN — SEVELAMER CARBONATE 800 MILLIGRAM(S): 2400 POWDER, FOR SUSPENSION ORAL at 20:05

## 2022-01-01 RX ADMIN — LACOSAMIDE 110 MILLIGRAM(S): 50 TABLET ORAL at 18:13

## 2022-01-01 RX ADMIN — Medication 2 SPRAY(S): at 17:48

## 2022-01-01 RX ADMIN — Medication 2 SPRAY(S): at 18:15

## 2022-01-01 RX ADMIN — Medication 100 MILLIGRAM(S): at 14:22

## 2022-01-01 RX ADMIN — ENOXAPARIN SODIUM 40 MILLIGRAM(S): 100 INJECTION SUBCUTANEOUS at 17:47

## 2022-01-01 RX ADMIN — CHLORHEXIDINE GLUCONATE 15 MILLILITER(S): 213 SOLUTION TOPICAL at 17:31

## 2022-01-01 RX ADMIN — Medication 1: at 12:33

## 2022-01-01 RX ADMIN — AMPICILLIN SODIUM AND SULBACTAM SODIUM 200 GRAM(S): 250; 125 INJECTION, POWDER, FOR SUSPENSION INTRAMUSCULAR; INTRAVENOUS at 06:10

## 2022-01-01 RX ADMIN — LEVETIRACETAM 1000 MILLIGRAM(S): 250 TABLET, FILM COATED ORAL at 21:51

## 2022-01-01 RX ADMIN — AMPICILLIN SODIUM AND SULBACTAM SODIUM 200 GRAM(S): 250; 125 INJECTION, POWDER, FOR SUSPENSION INTRAMUSCULAR; INTRAVENOUS at 03:56

## 2022-01-01 RX ADMIN — Medication 0.2 MILLIGRAM(S): at 05:17

## 2022-01-01 RX ADMIN — Medication 50 MILLIGRAM(S): at 13:13

## 2022-01-01 RX ADMIN — Medication 4 UNIT(S): at 17:28

## 2022-01-01 RX ADMIN — FENTANYL CITRATE 50 MICROGRAM(S): 50 INJECTION INTRAVENOUS at 14:49

## 2022-01-01 RX ADMIN — Medication 10 MILLIGRAM(S): at 21:45

## 2022-01-01 RX ADMIN — Medication 80 MILLIGRAM(S): at 13:35

## 2022-01-01 RX ADMIN — Medication 100 MILLIGRAM(S): at 23:23

## 2022-01-01 RX ADMIN — Medication 2 MILLIGRAM(S): at 16:51

## 2022-01-01 RX ADMIN — LACOSAMIDE 110 MILLIGRAM(S): 50 TABLET ORAL at 05:34

## 2022-01-01 RX ADMIN — Medication 1 APPLICATION(S): at 05:10

## 2022-01-01 RX ADMIN — Medication 5 UNIT(S): at 16:52

## 2022-01-01 RX ADMIN — Medication 100 MILLIGRAM(S): at 14:19

## 2022-01-01 RX ADMIN — Medication 2 SPRAY(S): at 05:55

## 2022-01-01 RX ADMIN — Medication 600 MILLIGRAM(S): at 22:40

## 2022-01-01 RX ADMIN — LEVETIRACETAM 500 MILLIGRAM(S): 250 TABLET, FILM COATED ORAL at 05:47

## 2022-01-01 RX ADMIN — HYDROMORPHONE HYDROCHLORIDE 0.5 MILLIGRAM(S): 2 INJECTION INTRAMUSCULAR; INTRAVENOUS; SUBCUTANEOUS at 18:10

## 2022-01-01 RX ADMIN — Medication 1 APPLICATION(S): at 18:54

## 2022-01-01 RX ADMIN — Medication 1 APPLICATION(S): at 17:53

## 2022-01-01 RX ADMIN — CLOPIDOGREL BISULFATE 75 MILLIGRAM(S): 75 TABLET, FILM COATED ORAL at 12:50

## 2022-01-01 RX ADMIN — Medication 50 MILLIGRAM(S): at 15:01

## 2022-01-01 RX ADMIN — INSULIN HUMAN 1 UNIT(S)/HR: 100 INJECTION, SOLUTION SUBCUTANEOUS at 22:33

## 2022-01-01 RX ADMIN — Medication 300 MILLIGRAM(S): at 23:15

## 2022-01-01 RX ADMIN — LACOSAMIDE 110 MILLIGRAM(S): 50 TABLET ORAL at 06:07

## 2022-01-01 RX ADMIN — AMPICILLIN SODIUM AND SULBACTAM SODIUM 200 GRAM(S): 250; 125 INJECTION, POWDER, FOR SUSPENSION INTRAMUSCULAR; INTRAVENOUS at 05:10

## 2022-01-01 RX ADMIN — AMPICILLIN SODIUM AND SULBACTAM SODIUM 200 GRAM(S): 250; 125 INJECTION, POWDER, FOR SUSPENSION INTRAMUSCULAR; INTRAVENOUS at 12:25

## 2022-01-01 RX ADMIN — SODIUM CHLORIDE 75 MILLILITER(S): 9 INJECTION, SOLUTION INTRAVENOUS at 01:20

## 2022-01-01 RX ADMIN — ENOXAPARIN SODIUM 40 MILLIGRAM(S): 100 INJECTION SUBCUTANEOUS at 17:46

## 2022-01-01 RX ADMIN — Medication 50 MILLIGRAM(S): at 21:04

## 2022-01-01 RX ADMIN — PANTOPRAZOLE SODIUM 40 MILLIGRAM(S): 20 TABLET, DELAYED RELEASE ORAL at 18:09

## 2022-01-01 RX ADMIN — AMPICILLIN SODIUM AND SULBACTAM SODIUM 200 GRAM(S): 250; 125 INJECTION, POWDER, FOR SUSPENSION INTRAMUSCULAR; INTRAVENOUS at 12:03

## 2022-01-01 RX ADMIN — LEVETIRACETAM 400 MILLIGRAM(S): 250 TABLET, FILM COATED ORAL at 05:33

## 2022-01-01 RX ADMIN — Medication 1 PACKET(S): at 08:30

## 2022-01-01 RX ADMIN — Medication 650 MILLIGRAM(S): at 06:42

## 2022-01-01 RX ADMIN — Medication 100 MILLIGRAM(S): at 14:33

## 2022-01-01 RX ADMIN — Medication 5 UNIT(S): at 08:35

## 2022-01-01 RX ADMIN — Medication 600 MILLIGRAM(S): at 13:59

## 2022-01-01 RX ADMIN — Medication 58 MILLIGRAM(S): at 17:31

## 2022-01-01 RX ADMIN — Medication 300 MILLIGRAM(S): at 05:46

## 2022-01-01 RX ADMIN — Medication 300 MILLIGRAM(S): at 14:16

## 2022-01-01 RX ADMIN — LEVETIRACETAM 500 MILLIGRAM(S): 250 TABLET, FILM COATED ORAL at 17:33

## 2022-01-01 RX ADMIN — Medication 5 UNIT(S): at 11:32

## 2022-01-01 RX ADMIN — SEVELAMER CARBONATE 1600 MILLIGRAM(S): 2400 POWDER, FOR SUSPENSION ORAL at 13:03

## 2022-01-01 RX ADMIN — Medication 1: at 13:56

## 2022-01-01 RX ADMIN — Medication 5 UNIT(S): at 08:31

## 2022-01-01 RX ADMIN — Medication 100 MILLIGRAM(S): at 05:46

## 2022-01-01 RX ADMIN — Medication 5 UNIT(S): at 08:12

## 2022-01-01 RX ADMIN — Medication 25 MILLIGRAM(S): at 08:42

## 2022-01-01 RX ADMIN — Medication 300 MILLIGRAM(S): at 05:05

## 2022-01-01 RX ADMIN — Medication 50 MILLIGRAM(S): at 22:48

## 2022-01-01 RX ADMIN — ENOXAPARIN SODIUM 40 MILLIGRAM(S): 100 INJECTION SUBCUTANEOUS at 17:52

## 2022-01-01 RX ADMIN — POLYETHYLENE GLYCOL 3350 17 GRAM(S): 17 POWDER, FOR SOLUTION ORAL at 17:19

## 2022-01-01 RX ADMIN — Medication 100 MILLIGRAM(S): at 18:27

## 2022-01-01 RX ADMIN — Medication 5 UNIT(S): at 12:44

## 2022-01-01 RX ADMIN — Medication 60 MILLIGRAM(S): at 05:11

## 2022-01-01 RX ADMIN — CHLORHEXIDINE GLUCONATE 15 MILLILITER(S): 213 SOLUTION TOPICAL at 05:45

## 2022-01-01 RX ADMIN — Medication 650 MILLIGRAM(S): at 05:28

## 2022-01-01 RX ADMIN — Medication 100 MILLIGRAM(S): at 05:24

## 2022-01-01 RX ADMIN — Medication 650 MILLIGRAM(S): at 17:22

## 2022-01-01 RX ADMIN — LOSARTAN POTASSIUM 100 MILLIGRAM(S): 100 TABLET, FILM COATED ORAL at 05:17

## 2022-01-01 RX ADMIN — ENOXAPARIN SODIUM 40 MILLIGRAM(S): 100 INJECTION SUBCUTANEOUS at 18:36

## 2022-01-01 RX ADMIN — LACOSAMIDE 110 MILLIGRAM(S): 50 TABLET ORAL at 18:58

## 2022-01-01 RX ADMIN — LIDOCAINE 15 MILLILITER(S): 4 CREAM TOPICAL at 11:34

## 2022-01-01 RX ADMIN — Medication 2 SPRAY(S): at 05:46

## 2022-01-01 RX ADMIN — Medication 2 SPRAY(S): at 17:37

## 2022-01-01 RX ADMIN — SEVELAMER CARBONATE 2400 MILLIGRAM(S): 2400 POWDER, FOR SUSPENSION ORAL at 21:49

## 2022-01-01 RX ADMIN — Medication 0.2 MILLIGRAM(S): at 14:06

## 2022-01-01 RX ADMIN — LEVETIRACETAM 500 MILLIGRAM(S): 250 TABLET, FILM COATED ORAL at 05:01

## 2022-01-01 RX ADMIN — Medication 2 SPRAY(S): at 17:30

## 2022-01-01 RX ADMIN — Medication 2 SPRAY(S): at 17:31

## 2022-01-01 RX ADMIN — SEVELAMER CARBONATE 800 MILLIGRAM(S): 2400 POWDER, FOR SUSPENSION ORAL at 13:05

## 2022-01-01 RX ADMIN — Medication 60 MILLIGRAM(S): at 07:29

## 2022-01-01 RX ADMIN — Medication 50 MILLIGRAM(S): at 22:49

## 2022-01-01 RX ADMIN — Medication 80 MILLIGRAM(S): at 17:24

## 2022-01-01 RX ADMIN — SEVELAMER CARBONATE 2400 MILLIGRAM(S): 2400 POWDER, FOR SUSPENSION ORAL at 06:13

## 2022-01-01 RX ADMIN — Medication 1300 MILLIGRAM(S): at 21:04

## 2022-01-01 RX ADMIN — Medication 650 MILLIGRAM(S): at 05:02

## 2022-01-01 RX ADMIN — LEVETIRACETAM 1000 MILLIGRAM(S): 250 TABLET, FILM COATED ORAL at 06:33

## 2022-01-01 RX ADMIN — Medication 50 MILLIGRAM(S): at 17:51

## 2022-01-01 RX ADMIN — Medication 100 MILLIGRAM(S): at 14:09

## 2022-01-01 RX ADMIN — Medication 2 SPRAY(S): at 17:46

## 2022-01-01 RX ADMIN — PANTOPRAZOLE SODIUM 40 MILLIGRAM(S): 20 TABLET, DELAYED RELEASE ORAL at 22:05

## 2022-01-01 RX ADMIN — Medication 2 SPRAY(S): at 17:34

## 2022-01-01 RX ADMIN — Medication 1: at 08:00

## 2022-01-01 RX ADMIN — AMPICILLIN SODIUM AND SULBACTAM SODIUM 200 GRAM(S): 250; 125 INJECTION, POWDER, FOR SUSPENSION INTRAMUSCULAR; INTRAVENOUS at 06:25

## 2022-01-01 RX ADMIN — Medication 80 MILLIGRAM(S): at 18:15

## 2022-01-01 RX ADMIN — ATORVASTATIN CALCIUM 80 MILLIGRAM(S): 80 TABLET, FILM COATED ORAL at 23:15

## 2022-01-01 RX ADMIN — Medication 600 MILLIGRAM(S): at 04:38

## 2022-01-01 RX ADMIN — INSULIN GLARGINE 12 UNIT(S): 100 INJECTION, SOLUTION SUBCUTANEOUS at 22:04

## 2022-01-01 RX ADMIN — Medication 1 APPLICATION(S): at 17:47

## 2022-01-01 RX ADMIN — Medication 50 MILLIEQUIVALENT(S): at 11:39

## 2022-01-01 RX ADMIN — Medication 400 MILLIGRAM(S): at 13:02

## 2022-01-01 RX ADMIN — Medication 100 MILLIGRAM(S): at 11:07

## 2022-01-01 RX ADMIN — Medication 400 MILLIGRAM(S): at 06:21

## 2022-01-01 RX ADMIN — SEVELAMER CARBONATE 2400 MILLIGRAM(S): 2400 POWDER, FOR SUSPENSION ORAL at 21:19

## 2022-01-01 RX ADMIN — Medication 650 MILLIGRAM(S): at 05:34

## 2022-01-01 RX ADMIN — Medication 300 MILLIGRAM(S): at 15:23

## 2022-01-01 RX ADMIN — AMLODIPINE BESYLATE 10 MILLIGRAM(S): 2.5 TABLET ORAL at 11:20

## 2022-01-01 RX ADMIN — FENTANYL CITRATE 4.87 MICROGRAM(S)/KG/HR: 50 INJECTION INTRAVENOUS at 14:11

## 2022-01-01 RX ADMIN — CHLORHEXIDINE GLUCONATE 1 APPLICATION(S): 213 SOLUTION TOPICAL at 05:30

## 2022-01-01 RX ADMIN — Medication 5 UNIT(S): at 05:20

## 2022-01-01 RX ADMIN — Medication 81 MILLIGRAM(S): at 11:44

## 2022-01-01 RX ADMIN — CHLORHEXIDINE GLUCONATE 15 MILLILITER(S): 213 SOLUTION TOPICAL at 05:36

## 2022-01-01 RX ADMIN — LOSARTAN POTASSIUM 100 MILLIGRAM(S): 100 TABLET, FILM COATED ORAL at 06:16

## 2022-01-01 RX ADMIN — Medication 40 MILLIEQUIVALENT(S): at 13:12

## 2022-01-01 RX ADMIN — CHLORHEXIDINE GLUCONATE 1 APPLICATION(S): 213 SOLUTION TOPICAL at 05:39

## 2022-01-01 RX ADMIN — PANTOPRAZOLE SODIUM 40 MILLIGRAM(S): 20 TABLET, DELAYED RELEASE ORAL at 18:17

## 2022-01-01 RX ADMIN — Medication 2 SPRAY(S): at 06:29

## 2022-01-01 RX ADMIN — SODIUM CHLORIDE 75 MILLILITER(S): 9 INJECTION, SOLUTION INTRAVENOUS at 13:40

## 2022-01-01 RX ADMIN — CASPOFUNGIN ACETATE 260 MILLIGRAM(S): 7 INJECTION, POWDER, LYOPHILIZED, FOR SOLUTION INTRAVENOUS at 13:30

## 2022-01-01 RX ADMIN — Medication 100 MILLIGRAM(S): at 09:15

## 2022-01-01 RX ADMIN — Medication 600 MILLIGRAM(S): at 14:31

## 2022-01-01 RX ADMIN — Medication 2 MILLIGRAM(S): at 15:52

## 2022-01-01 RX ADMIN — CLOPIDOGREL BISULFATE 75 MILLIGRAM(S): 75 TABLET, FILM COATED ORAL at 11:36

## 2022-01-01 RX ADMIN — CHLORHEXIDINE GLUCONATE 15 MILLILITER(S): 213 SOLUTION TOPICAL at 05:03

## 2022-01-01 RX ADMIN — INSULIN GLARGINE 12 UNIT(S): 100 INJECTION, SOLUTION SUBCUTANEOUS at 21:53

## 2022-01-01 RX ADMIN — SEVELAMER CARBONATE 800 MILLIGRAM(S): 2400 POWDER, FOR SUSPENSION ORAL at 17:40

## 2022-01-01 RX ADMIN — Medication 15 MILLILITER(S): at 17:30

## 2022-01-01 RX ADMIN — Medication 5 UNIT(S): at 11:54

## 2022-01-01 RX ADMIN — LACOSAMIDE 110 MILLIGRAM(S): 50 TABLET ORAL at 18:31

## 2022-01-01 RX ADMIN — Medication 1 TABLET(S): at 12:11

## 2022-01-01 RX ADMIN — SEVELAMER CARBONATE 2400 MILLIGRAM(S): 2400 POWDER, FOR SUSPENSION ORAL at 05:11

## 2022-01-01 RX ADMIN — DEXMEDETOMIDINE HYDROCHLORIDE IN 0.9% SODIUM CHLORIDE 4.07 MICROGRAM(S)/KG/HR: 4 INJECTION INTRAVENOUS at 00:14

## 2022-01-01 RX ADMIN — Medication 1 PATCH: at 22:47

## 2022-01-01 RX ADMIN — Medication 1 APPLICATION(S): at 17:34

## 2022-01-01 RX ADMIN — Medication 1 APPLICATION(S): at 17:38

## 2022-01-01 RX ADMIN — FENTANYL CITRATE 50 MICROGRAM(S): 50 INJECTION INTRAVENOUS at 02:35

## 2022-01-01 RX ADMIN — CHLORHEXIDINE GLUCONATE 15 MILLILITER(S): 213 SOLUTION TOPICAL at 05:35

## 2022-01-01 RX ADMIN — Medication 15 MILLILITER(S): at 11:24

## 2022-01-01 RX ADMIN — PANTOPRAZOLE SODIUM 40 MILLIGRAM(S): 20 TABLET, DELAYED RELEASE ORAL at 17:42

## 2022-01-01 RX ADMIN — LOSARTAN POTASSIUM 100 MILLIGRAM(S): 100 TABLET, FILM COATED ORAL at 05:59

## 2022-01-01 RX ADMIN — LOSARTAN POTASSIUM 100 MILLIGRAM(S): 100 TABLET, FILM COATED ORAL at 05:25

## 2022-01-01 RX ADMIN — SEVELAMER CARBONATE 2400 MILLIGRAM(S): 2400 POWDER, FOR SUSPENSION ORAL at 05:05

## 2022-01-01 RX ADMIN — Medication 650 MILLIGRAM(S): at 17:44

## 2022-01-01 RX ADMIN — Medication 1 APPLICATION(S): at 05:02

## 2022-01-01 RX ADMIN — Medication 0.2 MILLIGRAM(S): at 21:54

## 2022-01-01 RX ADMIN — LEVETIRACETAM 400 MILLIGRAM(S): 250 TABLET, FILM COATED ORAL at 17:55

## 2022-01-01 RX ADMIN — Medication 2: at 18:00

## 2022-01-01 RX ADMIN — Medication 5 UNIT(S): at 12:06

## 2022-01-01 RX ADMIN — Medication 650 MILLIGRAM(S): at 12:55

## 2022-01-01 RX ADMIN — LACOSAMIDE 110 MILLIGRAM(S): 50 TABLET ORAL at 06:36

## 2022-01-01 RX ADMIN — Medication 1 APPLICATION(S): at 05:46

## 2022-01-01 RX ADMIN — Medication 2: at 11:48

## 2022-01-01 RX ADMIN — Medication 15 MILLILITER(S): at 11:27

## 2022-01-01 RX ADMIN — FENTANYL CITRATE 50 MICROGRAM(S): 50 INJECTION INTRAVENOUS at 07:55

## 2022-01-01 RX ADMIN — LACOSAMIDE 110 MILLIGRAM(S): 50 TABLET ORAL at 10:22

## 2022-01-01 RX ADMIN — AMPICILLIN SODIUM AND SULBACTAM SODIUM 200 GRAM(S): 250; 125 INJECTION, POWDER, FOR SUSPENSION INTRAMUSCULAR; INTRAVENOUS at 13:48

## 2022-01-01 RX ADMIN — Medication 50 MILLIGRAM(S): at 14:20

## 2022-01-01 RX ADMIN — Medication 2: at 08:11

## 2022-01-01 RX ADMIN — Medication 1 APPLICATION(S): at 05:09

## 2022-01-01 RX ADMIN — Medication 1: at 08:39

## 2022-01-01 RX ADMIN — Medication 650 MILLIGRAM(S): at 18:06

## 2022-01-01 RX ADMIN — CLOPIDOGREL BISULFATE 75 MILLIGRAM(S): 75 TABLET, FILM COATED ORAL at 11:40

## 2022-01-01 RX ADMIN — Medication 100 MILLIGRAM(S): at 13:03

## 2022-01-01 RX ADMIN — LACOSAMIDE 110 MILLIGRAM(S): 50 TABLET ORAL at 18:53

## 2022-01-01 RX ADMIN — AMLODIPINE BESYLATE 10 MILLIGRAM(S): 2.5 TABLET ORAL at 09:15

## 2022-01-01 RX ADMIN — LOSARTAN POTASSIUM 100 MILLIGRAM(S): 100 TABLET, FILM COATED ORAL at 06:08

## 2022-01-01 RX ADMIN — LOSARTAN POTASSIUM 100 MILLIGRAM(S): 100 TABLET, FILM COATED ORAL at 09:15

## 2022-01-01 RX ADMIN — Medication 60 MILLIGRAM(S): at 05:04

## 2022-01-01 RX ADMIN — Medication 15 MILLILITER(S): at 11:18

## 2022-01-01 RX ADMIN — SEVELAMER CARBONATE 2400 MILLIGRAM(S): 2400 POWDER, FOR SUSPENSION ORAL at 21:13

## 2022-01-01 RX ADMIN — LEVETIRACETAM 600 MILLIGRAM(S): 250 TABLET, FILM COATED ORAL at 00:55

## 2022-01-01 RX ADMIN — LIDOCAINE 15 MILLILITER(S): 4 CREAM TOPICAL at 09:22

## 2022-01-01 RX ADMIN — Medication 25 MILLIGRAM(S): at 06:12

## 2022-01-01 RX ADMIN — Medication 1 APPLICATION(S): at 17:28

## 2022-01-01 RX ADMIN — Medication 600 MILLIGRAM(S): at 05:32

## 2022-01-01 RX ADMIN — SODIUM CHLORIDE 100 MILLILITER(S): 9 INJECTION INTRAMUSCULAR; INTRAVENOUS; SUBCUTANEOUS at 05:29

## 2022-01-01 RX ADMIN — Medication 1300 MILLIGRAM(S): at 05:05

## 2022-01-01 RX ADMIN — Medication 80 MILLIGRAM(S): at 07:23

## 2022-01-01 RX ADMIN — SEVELAMER CARBONATE 2400 MILLIGRAM(S): 2400 POWDER, FOR SUSPENSION ORAL at 22:38

## 2022-01-01 RX ADMIN — AMLODIPINE BESYLATE 10 MILLIGRAM(S): 2.5 TABLET ORAL at 06:05

## 2022-01-01 RX ADMIN — Medication 100 MILLIGRAM(S): at 16:01

## 2022-01-01 RX ADMIN — Medication 100 MILLIGRAM(S): at 05:25

## 2022-01-01 RX ADMIN — Medication 8: at 12:24

## 2022-01-01 RX ADMIN — Medication 100 MILLIGRAM(S): at 21:49

## 2022-01-01 RX ADMIN — CEFEPIME 100 MILLIGRAM(S): 1 INJECTION, POWDER, FOR SOLUTION INTRAMUSCULAR; INTRAVENOUS at 06:08

## 2022-01-01 RX ADMIN — Medication 2 SPRAY(S): at 18:30

## 2022-01-01 RX ADMIN — SEVELAMER CARBONATE 2400 MILLIGRAM(S): 2400 POWDER, FOR SUSPENSION ORAL at 06:05

## 2022-01-01 RX ADMIN — ENOXAPARIN SODIUM 40 MILLIGRAM(S): 100 INJECTION SUBCUTANEOUS at 18:06

## 2022-01-01 RX ADMIN — Medication 104 MILLIGRAM(S): at 05:16

## 2022-01-01 RX ADMIN — LACOSAMIDE 110 MILLIGRAM(S): 50 TABLET ORAL at 18:50

## 2022-01-01 RX ADMIN — Medication 2 SPRAY(S): at 05:45

## 2022-01-01 RX ADMIN — Medication 650 MILLIGRAM(S): at 17:51

## 2022-01-01 RX ADMIN — Medication 10 MILLIGRAM(S): at 21:37

## 2022-01-01 RX ADMIN — Medication 100 MILLIGRAM(S): at 07:29

## 2022-01-01 RX ADMIN — Medication 1: at 12:38

## 2022-01-01 RX ADMIN — Medication 60 MILLIGRAM(S): at 05:18

## 2022-01-01 RX ADMIN — Medication 100 MILLIGRAM(S): at 19:46

## 2022-01-01 RX ADMIN — Medication 1 APPLICATION(S): at 18:30

## 2022-01-01 RX ADMIN — FENTANYL CITRATE 50 MICROGRAM(S): 50 INJECTION INTRAVENOUS at 09:23

## 2022-01-01 RX ADMIN — Medication 100 MILLIGRAM(S): at 14:10

## 2022-01-01 RX ADMIN — LIDOCAINE 10 MILLILITER(S): 4 CREAM TOPICAL at 07:55

## 2022-01-01 RX ADMIN — SODIUM ZIRCONIUM CYCLOSILICATE 10 GRAM(S): 10 POWDER, FOR SUSPENSION ORAL at 05:42

## 2022-01-01 RX ADMIN — Medication 100 MILLIGRAM(S): at 21:05

## 2022-01-01 RX ADMIN — Medication 50 MILLIEQUIVALENT(S): at 22:51

## 2022-01-01 RX ADMIN — Medication 650 MILLIGRAM(S): at 17:46

## 2022-01-01 RX ADMIN — Medication 300 MILLIGRAM(S): at 15:03

## 2022-01-01 RX ADMIN — MIDAZOLAM HYDROCHLORIDE 1 MILLIGRAM(S): 1 INJECTION, SOLUTION INTRAMUSCULAR; INTRAVENOUS at 18:57

## 2022-01-01 RX ADMIN — Medication 104 MILLIGRAM(S): at 15:03

## 2022-01-01 RX ADMIN — Medication 166.67 MILLIGRAM(S): at 18:41

## 2022-01-01 RX ADMIN — AMLODIPINE BESYLATE 10 MILLIGRAM(S): 2.5 TABLET ORAL at 06:01

## 2022-01-01 RX ADMIN — LEVETIRACETAM 500 MILLIGRAM(S): 250 TABLET, FILM COATED ORAL at 06:04

## 2022-01-01 RX ADMIN — LEVETIRACETAM 500 MILLIGRAM(S): 250 TABLET, FILM COATED ORAL at 05:37

## 2022-01-01 RX ADMIN — Medication 600 MILLIGRAM(S): at 21:57

## 2022-01-01 RX ADMIN — MIDAZOLAM HYDROCHLORIDE 2 MILLIGRAM(S): 1 INJECTION, SOLUTION INTRAMUSCULAR; INTRAVENOUS at 09:22

## 2022-01-01 RX ADMIN — Medication 600 MILLIGRAM(S): at 21:20

## 2022-01-01 RX ADMIN — LEVETIRACETAM 400 MILLIGRAM(S): 250 TABLET, FILM COATED ORAL at 17:27

## 2022-01-01 RX ADMIN — Medication 600 MILLIGRAM(S): at 05:18

## 2022-01-01 RX ADMIN — Medication 1 TABLET(S): at 12:24

## 2022-01-01 RX ADMIN — Medication 1 APPLICATION(S): at 18:47

## 2022-01-01 RX ADMIN — Medication 400 MILLIGRAM(S): at 22:29

## 2022-01-01 RX ADMIN — CHLORHEXIDINE GLUCONATE 15 MILLILITER(S): 213 SOLUTION TOPICAL at 17:25

## 2022-01-01 RX ADMIN — Medication 25 GRAM(S): at 11:39

## 2022-01-01 RX ADMIN — AMLODIPINE BESYLATE 10 MILLIGRAM(S): 2.5 TABLET ORAL at 05:05

## 2022-01-01 RX ADMIN — DEXMEDETOMIDINE HYDROCHLORIDE IN 0.9% SODIUM CHLORIDE 4.07 MICROGRAM(S)/KG/HR: 4 INJECTION INTRAVENOUS at 20:55

## 2022-01-01 RX ADMIN — Medication 104 MILLIGRAM(S): at 05:55

## 2022-01-01 RX ADMIN — Medication 80 MILLIGRAM(S): at 18:31

## 2022-01-01 RX ADMIN — CHLORHEXIDINE GLUCONATE 15 MILLILITER(S): 213 SOLUTION TOPICAL at 18:28

## 2022-01-01 RX ADMIN — LEVETIRACETAM 500 MILLIGRAM(S): 250 TABLET, FILM COATED ORAL at 19:18

## 2022-01-01 RX ADMIN — Medication 100 MILLIGRAM(S): at 21:31

## 2022-01-01 RX ADMIN — Medication 650 MILLIGRAM(S): at 20:25

## 2022-01-01 RX ADMIN — LEVETIRACETAM 1000 MILLIGRAM(S): 250 TABLET, FILM COATED ORAL at 18:28

## 2022-01-01 RX ADMIN — Medication 1 APPLICATION(S): at 18:57

## 2022-01-01 RX ADMIN — Medication 400 MILLIGRAM(S): at 14:10

## 2022-01-01 RX ADMIN — Medication 0: at 07:50

## 2022-01-01 RX ADMIN — Medication 2 SPRAY(S): at 07:26

## 2022-01-01 RX ADMIN — Medication 1 APPLICATION(S): at 05:29

## 2022-01-01 RX ADMIN — AMPICILLIN SODIUM AND SULBACTAM SODIUM 200 GRAM(S): 250; 125 INJECTION, POWDER, FOR SUSPENSION INTRAMUSCULAR; INTRAVENOUS at 17:52

## 2022-01-01 RX ADMIN — Medication 650 MILLIGRAM(S): at 06:21

## 2022-01-01 RX ADMIN — Medication 1 APPLICATION(S): at 04:12

## 2022-01-01 RX ADMIN — PANTOPRAZOLE SODIUM 40 MILLIGRAM(S): 20 TABLET, DELAYED RELEASE ORAL at 17:45

## 2022-01-01 RX ADMIN — PANTOPRAZOLE SODIUM 40 MILLIGRAM(S): 20 TABLET, DELAYED RELEASE ORAL at 18:12

## 2022-01-01 RX ADMIN — Medication 650 MILLIGRAM(S): at 01:30

## 2022-01-01 RX ADMIN — AMPICILLIN SODIUM AND SULBACTAM SODIUM 200 GRAM(S): 250; 125 INJECTION, POWDER, FOR SUSPENSION INTRAMUSCULAR; INTRAVENOUS at 13:14

## 2022-01-01 RX ADMIN — LACOSAMIDE 110 MILLIGRAM(S): 50 TABLET ORAL at 05:05

## 2022-01-01 RX ADMIN — Medication 104 MILLIGRAM(S): at 15:04

## 2022-01-01 RX ADMIN — Medication 100 MILLIGRAM(S): at 21:15

## 2022-01-01 RX ADMIN — Medication 300 MILLIGRAM(S): at 13:45

## 2022-01-01 RX ADMIN — Medication 4 MILLIGRAM(S): at 20:18

## 2022-01-01 RX ADMIN — Medication 600 MILLIGRAM(S): at 21:53

## 2022-01-01 RX ADMIN — Medication 650 MILLIGRAM(S): at 00:59

## 2022-01-01 RX ADMIN — Medication 80 MILLIGRAM(S): at 05:22

## 2022-01-01 RX ADMIN — Medication 104 MILLIGRAM(S): at 13:13

## 2022-01-01 RX ADMIN — Medication 1 APPLICATION(S): at 17:03

## 2022-01-01 RX ADMIN — LEVETIRACETAM 500 MILLIGRAM(S): 250 TABLET, FILM COATED ORAL at 17:38

## 2022-01-01 RX ADMIN — CHLORHEXIDINE GLUCONATE 15 MILLILITER(S): 213 SOLUTION TOPICAL at 05:12

## 2022-01-01 RX ADMIN — Medication 1.25 MILLIGRAM(S): at 03:58

## 2022-01-01 RX ADMIN — Medication 600 MILLIGRAM(S): at 13:02

## 2022-01-01 RX ADMIN — ATORVASTATIN CALCIUM 80 MILLIGRAM(S): 80 TABLET, FILM COATED ORAL at 21:30

## 2022-01-01 RX ADMIN — ATORVASTATIN CALCIUM 80 MILLIGRAM(S): 80 TABLET, FILM COATED ORAL at 22:29

## 2022-01-01 RX ADMIN — Medication 100 MILLIGRAM(S): at 14:46

## 2022-01-01 RX ADMIN — PANTOPRAZOLE SODIUM 40 MILLIGRAM(S): 20 TABLET, DELAYED RELEASE ORAL at 17:34

## 2022-01-01 RX ADMIN — Medication 104 MILLIGRAM(S): at 21:44

## 2022-01-01 RX ADMIN — Medication 81 MILLIGRAM(S): at 11:32

## 2022-01-01 RX ADMIN — LACOSAMIDE 110 MILLIGRAM(S): 50 TABLET ORAL at 06:42

## 2022-01-01 RX ADMIN — ATORVASTATIN CALCIUM 80 MILLIGRAM(S): 80 TABLET, FILM COATED ORAL at 21:04

## 2022-01-01 RX ADMIN — INSULIN GLARGINE 18 UNIT(S): 100 INJECTION, SOLUTION SUBCUTANEOUS at 08:32

## 2022-01-01 RX ADMIN — Medication 4 UNIT(S): at 18:53

## 2022-01-01 RX ADMIN — LEVETIRACETAM 400 MILLIGRAM(S): 250 TABLET, FILM COATED ORAL at 17:39

## 2022-01-01 RX ADMIN — Medication 2 MILLIGRAM(S): at 21:19

## 2022-01-01 RX ADMIN — LEVETIRACETAM 400 MILLIGRAM(S): 250 TABLET, FILM COATED ORAL at 05:02

## 2022-01-01 RX ADMIN — FENTANYL CITRATE 4.87 MICROGRAM(S)/KG/HR: 50 INJECTION INTRAVENOUS at 00:14

## 2022-01-01 RX ADMIN — AMPICILLIN SODIUM AND SULBACTAM SODIUM 200 GRAM(S): 250; 125 INJECTION, POWDER, FOR SUSPENSION INTRAMUSCULAR; INTRAVENOUS at 05:21

## 2022-01-01 RX ADMIN — Medication 10: at 06:30

## 2022-01-01 RX ADMIN — Medication 1: at 11:56

## 2022-01-01 RX ADMIN — Medication 15 MILLILITER(S): at 16:33

## 2022-01-01 RX ADMIN — Medication 1 APPLICATION(S): at 17:29

## 2022-01-01 RX ADMIN — LEVETIRACETAM 500 MILLIGRAM(S): 250 TABLET, FILM COATED ORAL at 05:44

## 2022-01-01 RX ADMIN — SEVELAMER CARBONATE 1600 MILLIGRAM(S): 2400 POWDER, FOR SUSPENSION ORAL at 14:31

## 2022-01-01 RX ADMIN — DEXMEDETOMIDINE HYDROCHLORIDE IN 0.9% SODIUM CHLORIDE 4.07 MICROGRAM(S)/KG/HR: 4 INJECTION INTRAVENOUS at 04:35

## 2022-01-01 RX ADMIN — INSULIN GLARGINE 15 UNIT(S): 100 INJECTION, SOLUTION SUBCUTANEOUS at 08:28

## 2022-01-01 RX ADMIN — Medication 650 MILLIGRAM(S): at 05:30

## 2022-01-01 RX ADMIN — LACOSAMIDE 110 MILLIGRAM(S): 50 TABLET ORAL at 05:53

## 2022-01-01 RX ADMIN — Medication 100 MILLIGRAM(S): at 17:23

## 2022-01-01 RX ADMIN — Medication 90 MILLIGRAM(S): at 05:46

## 2022-01-01 RX ADMIN — AMLODIPINE BESYLATE 10 MILLIGRAM(S): 2.5 TABLET ORAL at 05:35

## 2022-01-01 RX ADMIN — SODIUM ZIRCONIUM CYCLOSILICATE 10 GRAM(S): 10 POWDER, FOR SUSPENSION ORAL at 18:31

## 2022-01-01 RX ADMIN — Medication 100 MILLIGRAM(S): at 13:04

## 2022-01-01 RX ADMIN — Medication 80 MILLIGRAM(S): at 05:27

## 2022-01-01 RX ADMIN — Medication 100 MILLIGRAM(S): at 05:34

## 2022-01-01 RX ADMIN — Medication 1300 MILLIGRAM(S): at 05:48

## 2022-01-01 RX ADMIN — Medication 40 MILLIEQUIVALENT(S): at 14:01

## 2022-01-01 RX ADMIN — Medication 5 UNIT(S): at 17:44

## 2022-01-01 RX ADMIN — LACOSAMIDE 110 MILLIGRAM(S): 50 TABLET ORAL at 06:26

## 2022-01-01 RX ADMIN — Medication 650 MILLIGRAM(S): at 14:08

## 2022-01-01 RX ADMIN — Medication 650 MILLIGRAM(S): at 11:39

## 2022-01-01 RX ADMIN — Medication 1: at 16:51

## 2022-01-01 RX ADMIN — Medication 10 MILLIGRAM(S): at 21:14

## 2022-01-01 RX ADMIN — Medication 650 MILLIGRAM(S): at 18:14

## 2022-01-01 RX ADMIN — Medication 5 UNIT(S): at 11:40

## 2022-01-01 RX ADMIN — Medication 600 MILLIGRAM(S): at 21:16

## 2022-01-01 RX ADMIN — Medication 2 SPRAY(S): at 05:31

## 2022-01-01 RX ADMIN — LEVETIRACETAM 1000 MILLIGRAM(S): 250 TABLET, FILM COATED ORAL at 05:42

## 2022-01-01 RX ADMIN — Medication 4 UNIT(S): at 18:59

## 2022-01-01 RX ADMIN — Medication 600 MILLIGRAM(S): at 21:51

## 2022-01-01 RX ADMIN — PANTOPRAZOLE SODIUM 40 MILLIGRAM(S): 20 TABLET, DELAYED RELEASE ORAL at 05:28

## 2022-01-01 RX ADMIN — Medication 650 MILLIGRAM(S): at 21:45

## 2022-01-01 RX ADMIN — AMLODIPINE BESYLATE 10 MILLIGRAM(S): 2.5 TABLET ORAL at 06:07

## 2022-01-01 RX ADMIN — Medication 10 MILLIGRAM(S): at 06:28

## 2022-01-01 RX ADMIN — CHLORHEXIDINE GLUCONATE 1 APPLICATION(S): 213 SOLUTION TOPICAL at 07:24

## 2022-01-01 RX ADMIN — Medication 1: at 06:44

## 2022-01-01 RX ADMIN — POLYETHYLENE GLYCOL 3350 17 GRAM(S): 17 POWDER, FOR SOLUTION ORAL at 06:20

## 2022-01-01 RX ADMIN — PANTOPRAZOLE SODIUM 40 MILLIGRAM(S): 20 TABLET, DELAYED RELEASE ORAL at 05:53

## 2022-01-01 RX ADMIN — Medication 400 MILLIGRAM(S): at 14:57

## 2022-01-01 RX ADMIN — Medication 5 UNIT(S): at 05:31

## 2022-01-01 RX ADMIN — Medication 2: at 11:40

## 2022-01-01 RX ADMIN — Medication 81 MILLIGRAM(S): at 11:23

## 2022-01-01 RX ADMIN — HYDROMORPHONE HYDROCHLORIDE 0.5 MILLIGRAM(S): 2 INJECTION INTRAMUSCULAR; INTRAVENOUS; SUBCUTANEOUS at 17:46

## 2022-01-01 RX ADMIN — Medication 104 MILLIGRAM(S): at 13:29

## 2022-01-01 RX ADMIN — Medication 2 MILLIGRAM(S): at 21:47

## 2022-01-01 RX ADMIN — Medication 0: at 04:10

## 2022-01-01 RX ADMIN — Medication 100 MILLIGRAM(S): at 17:03

## 2022-01-01 RX ADMIN — APIXABAN 5 MILLIGRAM(S): 2.5 TABLET, FILM COATED ORAL at 18:21

## 2022-01-01 RX ADMIN — LEVETIRACETAM 400 MILLIGRAM(S): 250 TABLET, FILM COATED ORAL at 05:25

## 2022-01-01 RX ADMIN — Medication 600 MILLIGRAM(S): at 08:42

## 2022-01-01 RX ADMIN — Medication 10 MILLIGRAM(S): at 05:11

## 2022-01-01 RX ADMIN — Medication 81 MILLIGRAM(S): at 12:11

## 2022-01-01 RX ADMIN — Medication 166.67 MILLIGRAM(S): at 17:31

## 2022-01-01 RX ADMIN — SEVELAMER CARBONATE 1600 MILLIGRAM(S): 2400 POWDER, FOR SUSPENSION ORAL at 05:29

## 2022-01-01 RX ADMIN — Medication 80 MILLIGRAM(S): at 18:09

## 2022-01-01 RX ADMIN — Medication 100 MILLIGRAM(S): at 21:32

## 2022-01-01 RX ADMIN — Medication 1: at 17:28

## 2022-01-01 RX ADMIN — LACOSAMIDE 110 MILLIGRAM(S): 50 TABLET ORAL at 23:16

## 2022-01-01 RX ADMIN — INSULIN HUMAN 10 UNIT(S): 100 INJECTION, SOLUTION SUBCUTANEOUS at 05:24

## 2022-01-01 RX ADMIN — AMPICILLIN SODIUM AND SULBACTAM SODIUM 200 GRAM(S): 250; 125 INJECTION, POWDER, FOR SUSPENSION INTRAMUSCULAR; INTRAVENOUS at 05:31

## 2022-01-01 RX ADMIN — Medication 2 MILLIGRAM(S): at 21:03

## 2022-01-01 RX ADMIN — LEVETIRACETAM 1000 MILLIGRAM(S): 250 TABLET, FILM COATED ORAL at 18:14

## 2022-01-01 RX ADMIN — Medication 4 UNIT(S): at 11:58

## 2022-01-01 RX ADMIN — ATORVASTATIN CALCIUM 80 MILLIGRAM(S): 80 TABLET, FILM COATED ORAL at 21:46

## 2022-01-01 RX ADMIN — LACOSAMIDE 110 MILLIGRAM(S): 50 TABLET ORAL at 06:00

## 2022-01-01 RX ADMIN — Medication 104 MILLIGRAM(S): at 06:35

## 2022-01-01 RX ADMIN — Medication 100 MILLIGRAM(S): at 05:16

## 2022-01-01 RX ADMIN — Medication 15 MILLILITER(S): at 11:19

## 2022-01-01 RX ADMIN — Medication 1300 MILLIGRAM(S): at 13:10

## 2022-01-01 RX ADMIN — Medication 650 MILLIGRAM(S): at 17:27

## 2022-01-01 RX ADMIN — LEVETIRACETAM 500 MILLIGRAM(S): 250 TABLET, FILM COATED ORAL at 17:26

## 2022-01-01 RX ADMIN — Medication 0.2 MILLIGRAM(S): at 05:03

## 2022-01-01 RX ADMIN — Medication 100 MILLIGRAM(S): at 05:48

## 2022-01-01 RX ADMIN — Medication 81 MILLIGRAM(S): at 11:02

## 2022-01-01 RX ADMIN — LOSARTAN POTASSIUM 50 MILLIGRAM(S): 100 TABLET, FILM COATED ORAL at 05:46

## 2022-01-01 RX ADMIN — Medication 1 TABLET(S): at 06:51

## 2022-01-01 RX ADMIN — Medication 1 MILLIGRAM(S): at 17:53

## 2022-01-01 RX ADMIN — Medication 1: at 17:39

## 2022-01-01 RX ADMIN — Medication 300 MILLIGRAM(S): at 21:49

## 2022-01-01 RX ADMIN — Medication 100 MILLIGRAM(S): at 18:12

## 2022-01-01 RX ADMIN — Medication 650 MILLIGRAM(S): at 12:12

## 2022-01-01 RX ADMIN — Medication 81 MILLIGRAM(S): at 11:46

## 2022-01-01 RX ADMIN — Medication 81 MILLIGRAM(S): at 12:23

## 2022-01-01 RX ADMIN — SODIUM CHLORIDE 60 MILLILITER(S): 9 INJECTION INTRAMUSCULAR; INTRAVENOUS; SUBCUTANEOUS at 17:37

## 2022-01-01 RX ADMIN — INSULIN GLARGINE 22 UNIT(S): 100 INJECTION, SOLUTION SUBCUTANEOUS at 13:28

## 2022-01-01 RX ADMIN — Medication 650 MILLIGRAM(S): at 05:04

## 2022-01-01 RX ADMIN — Medication 1 APPLICATION(S): at 05:18

## 2022-01-01 RX ADMIN — LACOSAMIDE 110 MILLIGRAM(S): 50 TABLET ORAL at 12:04

## 2022-01-01 RX ADMIN — Medication 600 MILLIGRAM(S): at 22:00

## 2022-01-01 RX ADMIN — Medication 5 UNIT(S): at 16:48

## 2022-01-01 RX ADMIN — Medication 81 MILLIGRAM(S): at 13:25

## 2022-01-01 RX ADMIN — Medication 1300 MILLIGRAM(S): at 05:38

## 2022-01-01 RX ADMIN — SCOPALAMINE 1 PATCH: 1 PATCH, EXTENDED RELEASE TRANSDERMAL at 14:02

## 2022-01-01 RX ADMIN — FENTANYL CITRATE 50 MICROGRAM(S): 50 INJECTION INTRAVENOUS at 17:46

## 2022-01-01 RX ADMIN — Medication 5 UNIT(S): at 16:50

## 2022-01-01 RX ADMIN — Medication 100 GRAM(S): at 12:08

## 2022-01-01 RX ADMIN — LEVETIRACETAM 400 MILLIGRAM(S): 250 TABLET, FILM COATED ORAL at 18:49

## 2022-01-01 RX ADMIN — INSULIN GLARGINE 22 UNIT(S): 100 INJECTION, SOLUTION SUBCUTANEOUS at 07:50

## 2022-01-01 RX ADMIN — ATORVASTATIN CALCIUM 80 MILLIGRAM(S): 80 TABLET, FILM COATED ORAL at 21:05

## 2022-01-01 RX ADMIN — AMPICILLIN SODIUM AND SULBACTAM SODIUM 200 GRAM(S): 250; 125 INJECTION, POWDER, FOR SUSPENSION INTRAMUSCULAR; INTRAVENOUS at 00:32

## 2022-01-01 RX ADMIN — LEVETIRACETAM 400 MILLIGRAM(S): 250 TABLET, FILM COATED ORAL at 18:04

## 2022-01-01 RX ADMIN — Medication 100 MILLIGRAM(S): at 13:23

## 2022-01-01 RX ADMIN — AMPICILLIN SODIUM AND SULBACTAM SODIUM 200 GRAM(S): 250; 125 INJECTION, POWDER, FOR SUSPENSION INTRAMUSCULAR; INTRAVENOUS at 05:12

## 2022-01-01 RX ADMIN — PANTOPRAZOLE SODIUM 40 MILLIGRAM(S): 20 TABLET, DELAYED RELEASE ORAL at 05:40

## 2022-01-01 RX ADMIN — LOSARTAN POTASSIUM 100 MILLIGRAM(S): 100 TABLET, FILM COATED ORAL at 05:15

## 2022-01-01 RX ADMIN — DESMOPRESSIN ACETATE 225 MICROGRAM(S): 0.1 TABLET ORAL at 11:01

## 2022-01-01 RX ADMIN — Medication 0.2 MILLIGRAM(S): at 21:14

## 2022-01-01 RX ADMIN — Medication 5 UNIT(S): at 16:45

## 2022-01-01 RX ADMIN — Medication 650 MILLIGRAM(S): at 18:04

## 2022-01-01 RX ADMIN — LEVETIRACETAM 400 MILLIGRAM(S): 250 TABLET, FILM COATED ORAL at 08:15

## 2022-01-01 RX ADMIN — Medication 650 MILLIGRAM(S): at 05:03

## 2022-01-01 RX ADMIN — Medication 0.2 MILLIGRAM(S): at 21:02

## 2022-01-01 RX ADMIN — Medication 104 MILLIGRAM(S): at 21:26

## 2022-01-01 RX ADMIN — Medication 80 MILLIGRAM(S): at 05:06

## 2022-01-01 RX ADMIN — Medication 600 MILLIGRAM(S): at 05:54

## 2022-01-01 RX ADMIN — SODIUM CHLORIDE 60 MILLILITER(S): 9 INJECTION INTRAMUSCULAR; INTRAVENOUS; SUBCUTANEOUS at 17:50

## 2022-01-01 RX ADMIN — Medication 400 MILLIGRAM(S): at 22:48

## 2022-01-01 RX ADMIN — Medication 600 MILLIGRAM(S): at 14:33

## 2022-01-01 RX ADMIN — Medication 650 MILLIGRAM(S): at 18:55

## 2022-01-01 RX ADMIN — Medication 600 MILLIGRAM(S): at 06:13

## 2022-01-01 RX ADMIN — Medication 104 MILLIGRAM(S): at 21:05

## 2022-01-01 RX ADMIN — Medication 10 MILLIGRAM(S): at 12:05

## 2022-01-01 RX ADMIN — Medication 10 MILLIGRAM(S): at 20:17

## 2022-01-01 RX ADMIN — POLYETHYLENE GLYCOL 3350 17 GRAM(S): 17 POWDER, FOR SOLUTION ORAL at 06:03

## 2022-01-01 RX ADMIN — CHLORHEXIDINE GLUCONATE 15 MILLILITER(S): 213 SOLUTION TOPICAL at 17:34

## 2022-01-01 RX ADMIN — Medication 300 MILLIGRAM(S): at 05:06

## 2022-01-01 RX ADMIN — INSULIN GLARGINE 22 UNIT(S): 100 INJECTION, SOLUTION SUBCUTANEOUS at 09:32

## 2022-01-01 RX ADMIN — LEVETIRACETAM 1000 MILLIGRAM(S): 250 TABLET, FILM COATED ORAL at 21:13

## 2022-01-01 RX ADMIN — Medication 1 PATCH: at 07:38

## 2022-01-01 RX ADMIN — Medication 50 MILLIEQUIVALENT(S): at 00:10

## 2022-01-01 RX ADMIN — AMPICILLIN SODIUM AND SULBACTAM SODIUM 200 GRAM(S): 250; 125 INJECTION, POWDER, FOR SUSPENSION INTRAMUSCULAR; INTRAVENOUS at 17:33

## 2022-01-01 RX ADMIN — Medication 600 MILLIGRAM(S): at 19:31

## 2022-01-01 RX ADMIN — LEVETIRACETAM 500 MILLIGRAM(S): 250 TABLET, FILM COATED ORAL at 18:12

## 2022-01-01 RX ADMIN — Medication 60 MILLIGRAM(S): at 07:44

## 2022-01-01 RX ADMIN — Medication 80 MILLIGRAM(S): at 05:11

## 2022-01-01 RX ADMIN — FENTANYL CITRATE 50 MICROGRAM(S): 50 INJECTION INTRAVENOUS at 17:45

## 2022-01-01 RX ADMIN — LEVETIRACETAM 500 MILLIGRAM(S): 250 TABLET, FILM COATED ORAL at 05:16

## 2022-01-01 RX ADMIN — SODIUM CHLORIDE 100 MILLILITER(S): 9 INJECTION INTRAMUSCULAR; INTRAVENOUS; SUBCUTANEOUS at 02:36

## 2022-01-01 RX ADMIN — CHLORHEXIDINE GLUCONATE 15 MILLILITER(S): 213 SOLUTION TOPICAL at 18:05

## 2022-01-01 RX ADMIN — LOSARTAN POTASSIUM 100 MILLIGRAM(S): 100 TABLET, FILM COATED ORAL at 05:06

## 2022-01-01 RX ADMIN — Medication 300 MILLIGRAM(S): at 21:24

## 2022-01-01 RX ADMIN — Medication 100 MILLIGRAM(S): at 21:14

## 2022-01-01 RX ADMIN — Medication 0.1 MILLIGRAM(S): at 21:48

## 2022-01-01 RX ADMIN — Medication 81 MILLIGRAM(S): at 12:25

## 2022-01-01 RX ADMIN — Medication 5 UNIT(S): at 17:17

## 2022-01-01 RX ADMIN — HEPARIN SODIUM 5000 UNIT(S): 5000 INJECTION INTRAVENOUS; SUBCUTANEOUS at 05:30

## 2022-01-01 RX ADMIN — Medication 650 MILLIGRAM(S): at 23:30

## 2022-01-01 RX ADMIN — PANTOPRAZOLE SODIUM 40 MILLIGRAM(S): 20 TABLET, DELAYED RELEASE ORAL at 06:15

## 2022-01-01 RX ADMIN — CHLORHEXIDINE GLUCONATE 1 APPLICATION(S): 213 SOLUTION TOPICAL at 05:29

## 2022-01-01 RX ADMIN — Medication 81 MILLIGRAM(S): at 11:26

## 2022-01-01 RX ADMIN — Medication 10 UNIT(S): at 15:29

## 2022-01-01 RX ADMIN — Medication 5 UNIT(S): at 17:37

## 2022-01-01 RX ADMIN — SEVELAMER CARBONATE 800 MILLIGRAM(S): 2400 POWDER, FOR SUSPENSION ORAL at 12:06

## 2022-01-01 RX ADMIN — LEVETIRACETAM 400 MILLIGRAM(S): 250 TABLET, FILM COATED ORAL at 17:44

## 2022-01-01 RX ADMIN — Medication 2 SPRAY(S): at 05:06

## 2022-01-01 RX ADMIN — POLYETHYLENE GLYCOL 3350 17 GRAM(S): 17 POWDER, FOR SOLUTION ORAL at 05:16

## 2022-01-01 RX ADMIN — LACOSAMIDE 110 MILLIGRAM(S): 50 TABLET ORAL at 05:29

## 2022-01-01 RX ADMIN — Medication 650 MILLIGRAM(S): at 16:29

## 2022-01-01 RX ADMIN — Medication 80 MILLIGRAM(S): at 06:04

## 2022-01-01 RX ADMIN — LEVETIRACETAM 400 MILLIGRAM(S): 250 TABLET, FILM COATED ORAL at 21:30

## 2022-01-01 RX ADMIN — Medication 2 SPRAY(S): at 06:20

## 2022-01-01 RX ADMIN — Medication 80 MILLIGRAM(S): at 18:28

## 2022-01-01 RX ADMIN — Medication 2 SPRAY(S): at 05:36

## 2022-01-01 RX ADMIN — LACOSAMIDE 110 MILLIGRAM(S): 50 TABLET ORAL at 18:00

## 2022-01-01 RX ADMIN — LEVETIRACETAM 400 MILLIGRAM(S): 250 TABLET, FILM COATED ORAL at 17:05

## 2022-01-01 RX ADMIN — Medication 10 MILLIGRAM(S): at 05:16

## 2022-01-01 RX ADMIN — SEVELAMER CARBONATE 2400 MILLIGRAM(S): 2400 POWDER, FOR SUSPENSION ORAL at 14:06

## 2022-01-01 RX ADMIN — Medication 25 GRAM(S): at 14:01

## 2022-01-01 RX ADMIN — Medication 80 MILLIGRAM(S): at 05:53

## 2022-01-01 RX ADMIN — Medication 1: at 17:55

## 2022-01-01 RX ADMIN — Medication 30 MILLIGRAM(S): at 11:07

## 2022-01-01 RX ADMIN — Medication 2: at 18:01

## 2022-01-01 RX ADMIN — LEVETIRACETAM 400 MILLIGRAM(S): 250 TABLET, FILM COATED ORAL at 17:46

## 2022-01-01 RX ADMIN — LOSARTAN POTASSIUM 100 MILLIGRAM(S): 100 TABLET, FILM COATED ORAL at 05:18

## 2022-01-01 RX ADMIN — Medication 1: at 00:17

## 2022-01-01 RX ADMIN — LEVETIRACETAM 400 MILLIGRAM(S): 250 TABLET, FILM COATED ORAL at 05:41

## 2022-01-01 RX ADMIN — Medication 15 MILLILITER(S): at 17:54

## 2022-01-01 RX ADMIN — CHLORHEXIDINE GLUCONATE 15 MILLILITER(S): 213 SOLUTION TOPICAL at 05:11

## 2022-01-01 RX ADMIN — Medication 5 UNIT(S): at 16:35

## 2022-01-01 RX ADMIN — Medication 50 MILLIEQUIVALENT(S): at 12:03

## 2022-01-01 RX ADMIN — FENTANYL CITRATE 50 MICROGRAM(S): 50 INJECTION INTRAVENOUS at 01:27

## 2022-01-01 RX ADMIN — LEVETIRACETAM 500 MILLIGRAM(S): 250 TABLET, FILM COATED ORAL at 05:09

## 2022-01-01 RX ADMIN — Medication 600 MILLIGRAM(S): at 21:25

## 2022-01-01 RX ADMIN — Medication 650 MILLIGRAM(S): at 20:29

## 2022-01-01 RX ADMIN — Medication 600 MILLIGRAM(S): at 05:26

## 2022-01-01 RX ADMIN — Medication 1 TABLET(S): at 05:17

## 2022-01-01 RX ADMIN — Medication 2 SPRAY(S): at 06:00

## 2022-01-01 RX ADMIN — LACOSAMIDE 110 MILLIGRAM(S): 50 TABLET ORAL at 05:44

## 2022-01-01 RX ADMIN — Medication 100 MILLIGRAM(S): at 18:05

## 2022-01-01 RX ADMIN — Medication 400 MILLIGRAM(S): at 14:15

## 2022-01-01 RX ADMIN — Medication 100 MILLIGRAM(S): at 05:06

## 2022-01-01 RX ADMIN — Medication 650 MILLIGRAM(S): at 17:50

## 2022-01-01 RX ADMIN — Medication 1 APPLICATION(S): at 05:05

## 2022-01-01 RX ADMIN — Medication 650 MILLIGRAM(S): at 11:15

## 2022-01-01 RX ADMIN — Medication 10 MILLIGRAM(S): at 21:57

## 2022-01-01 RX ADMIN — Medication 2 SPRAY(S): at 17:50

## 2022-01-01 RX ADMIN — LEVETIRACETAM 500 MILLIGRAM(S): 250 TABLET, FILM COATED ORAL at 18:27

## 2022-01-01 RX ADMIN — LEVETIRACETAM 400 MILLIGRAM(S): 250 TABLET, FILM COATED ORAL at 21:27

## 2022-01-01 RX ADMIN — PANTOPRAZOLE SODIUM 40 MILLIGRAM(S): 20 TABLET, DELAYED RELEASE ORAL at 05:30

## 2022-01-01 RX ADMIN — HYDROMORPHONE HYDROCHLORIDE 0.5 MILLIGRAM(S): 2 INJECTION INTRAMUSCULAR; INTRAVENOUS; SUBCUTANEOUS at 19:20

## 2022-01-01 RX ADMIN — Medication 1: at 16:54

## 2022-01-01 RX ADMIN — ATORVASTATIN CALCIUM 80 MILLIGRAM(S): 80 TABLET, FILM COATED ORAL at 22:42

## 2022-01-01 RX ADMIN — Medication 166.67 MILLIGRAM(S): at 01:21

## 2022-01-01 RX ADMIN — LACOSAMIDE 110 MILLIGRAM(S): 50 TABLET ORAL at 05:06

## 2022-01-01 RX ADMIN — AMLODIPINE BESYLATE 10 MILLIGRAM(S): 2.5 TABLET ORAL at 05:10

## 2022-01-01 RX ADMIN — Medication 10 MILLIGRAM(S): at 22:00

## 2022-01-01 RX ADMIN — SEVELAMER CARBONATE 2400 MILLIGRAM(S): 2400 POWDER, FOR SUSPENSION ORAL at 22:08

## 2022-01-01 RX ADMIN — LEVETIRACETAM 1000 MILLIGRAM(S): 250 TABLET, FILM COATED ORAL at 18:20

## 2022-01-01 RX ADMIN — SODIUM CHLORIDE 50 MILLILITER(S): 9 INJECTION, SOLUTION INTRAVENOUS at 17:22

## 2022-01-01 RX ADMIN — ENOXAPARIN SODIUM 40 MILLIGRAM(S): 100 INJECTION SUBCUTANEOUS at 17:22

## 2022-01-01 RX ADMIN — Medication 1 TABLET(S): at 11:44

## 2022-01-01 RX ADMIN — PANTOPRAZOLE SODIUM 40 MILLIGRAM(S): 20 TABLET, DELAYED RELEASE ORAL at 09:17

## 2022-01-01 RX ADMIN — Medication 100 MILLIGRAM(S): at 15:42

## 2022-01-01 RX ADMIN — CHLORHEXIDINE GLUCONATE 15 MILLILITER(S): 213 SOLUTION TOPICAL at 05:49

## 2022-01-01 RX ADMIN — Medication 80 MILLIGRAM(S): at 05:23

## 2022-01-01 RX ADMIN — Medication 650 MILLIGRAM(S): at 11:17

## 2022-01-01 RX ADMIN — Medication 1: at 11:51

## 2022-01-01 RX ADMIN — Medication 50 MILLIGRAM(S): at 13:14

## 2022-01-01 RX ADMIN — Medication 2 SPRAY(S): at 17:39

## 2022-01-01 RX ADMIN — Medication 650 MILLIGRAM(S): at 21:24

## 2022-01-01 RX ADMIN — Medication 3: at 08:31

## 2022-01-01 RX ADMIN — Medication 2 SPRAY(S): at 06:13

## 2022-01-01 RX ADMIN — Medication 50 MILLIGRAM(S): at 21:46

## 2022-01-01 RX ADMIN — FENTANYL CITRATE 25 MICROGRAM(S): 50 INJECTION INTRAVENOUS at 12:16

## 2022-01-01 RX ADMIN — Medication 600 MILLIGRAM(S): at 13:52

## 2022-01-01 RX ADMIN — FENTANYL CITRATE 50 MICROGRAM(S): 50 INJECTION INTRAVENOUS at 10:36

## 2022-01-01 RX ADMIN — Medication 15 MILLILITER(S): at 12:26

## 2022-01-01 RX ADMIN — Medication 100 MILLIGRAM(S): at 05:44

## 2022-01-01 RX ADMIN — ATORVASTATIN CALCIUM 80 MILLIGRAM(S): 80 TABLET, FILM COATED ORAL at 22:02

## 2022-01-01 RX ADMIN — PANTOPRAZOLE SODIUM 40 MILLIGRAM(S): 20 TABLET, DELAYED RELEASE ORAL at 18:31

## 2022-01-01 RX ADMIN — AMLODIPINE BESYLATE 10 MILLIGRAM(S): 2.5 TABLET ORAL at 06:12

## 2022-01-01 RX ADMIN — Medication 650 MILLIGRAM(S): at 16:32

## 2022-01-01 RX ADMIN — Medication 1 TABLET(S): at 05:59

## 2022-01-01 RX ADMIN — CHLORHEXIDINE GLUCONATE 1 APPLICATION(S): 213 SOLUTION TOPICAL at 05:13

## 2022-01-01 RX ADMIN — CEFEPIME 100 MILLIGRAM(S): 1 INJECTION, POWDER, FOR SOLUTION INTRAMUSCULAR; INTRAVENOUS at 16:05

## 2022-01-01 RX ADMIN — PROPOFOL 5.84 MICROGRAM(S)/KG/MIN: 10 INJECTION, EMULSION INTRAVENOUS at 00:37

## 2022-01-01 RX ADMIN — POLYETHYLENE GLYCOL 3350 17 GRAM(S): 17 POWDER, FOR SOLUTION ORAL at 06:13

## 2022-01-01 RX ADMIN — AMPICILLIN SODIUM AND SULBACTAM SODIUM 200 GRAM(S): 250; 125 INJECTION, POWDER, FOR SUSPENSION INTRAMUSCULAR; INTRAVENOUS at 23:51

## 2022-01-01 RX ADMIN — LACOSAMIDE 110 MILLIGRAM(S): 50 TABLET ORAL at 18:41

## 2022-01-01 RX ADMIN — Medication 5 UNIT(S): at 11:21

## 2022-01-01 RX ADMIN — Medication 15 MILLILITER(S): at 11:34

## 2022-01-01 RX ADMIN — Medication 25 MILLIGRAM(S): at 16:00

## 2022-01-01 RX ADMIN — LEVETIRACETAM 500 MILLIGRAM(S): 250 TABLET, FILM COATED ORAL at 05:17

## 2022-01-01 RX ADMIN — SODIUM CHLORIDE 60 MILLILITER(S): 9 INJECTION INTRAMUSCULAR; INTRAVENOUS; SUBCUTANEOUS at 12:26

## 2022-01-01 RX ADMIN — Medication 1300 MILLIGRAM(S): at 13:36

## 2022-01-01 RX ADMIN — Medication 50 MILLIGRAM(S): at 05:12

## 2022-01-01 RX ADMIN — Medication 600 MILLIGRAM(S): at 06:04

## 2022-01-01 RX ADMIN — SEVELAMER CARBONATE 2400 MILLIGRAM(S): 2400 POWDER, FOR SUSPENSION ORAL at 05:01

## 2022-01-01 RX ADMIN — Medication 50 MILLIGRAM(S): at 21:59

## 2022-01-01 RX ADMIN — DESMOPRESSIN ACETATE 225 MICROGRAM(S): 0.1 TABLET ORAL at 08:47

## 2022-01-01 RX ADMIN — Medication 1: at 18:34

## 2022-01-01 RX ADMIN — LEVETIRACETAM 500 MILLIGRAM(S): 250 TABLET, FILM COATED ORAL at 05:48

## 2022-01-01 RX ADMIN — Medication 40 MILLIGRAM(S): at 05:14

## 2022-01-01 RX ADMIN — Medication 81 MILLIGRAM(S): at 13:35

## 2022-01-01 RX ADMIN — Medication 80 MILLIGRAM(S): at 18:29

## 2022-01-01 RX ADMIN — Medication 3: at 11:12

## 2022-01-01 RX ADMIN — CHLORHEXIDINE GLUCONATE 15 MILLILITER(S): 213 SOLUTION TOPICAL at 17:41

## 2022-01-01 RX ADMIN — CLOPIDOGREL BISULFATE 75 MILLIGRAM(S): 75 TABLET, FILM COATED ORAL at 11:24

## 2022-01-01 RX ADMIN — LEVETIRACETAM 400 MILLIGRAM(S): 250 TABLET, FILM COATED ORAL at 05:01

## 2022-01-01 RX ADMIN — CHLORHEXIDINE GLUCONATE 15 MILLILITER(S): 213 SOLUTION TOPICAL at 17:24

## 2022-01-01 RX ADMIN — Medication 650 MILLIGRAM(S): at 22:00

## 2022-01-01 RX ADMIN — Medication 100 MILLIGRAM(S): at 06:49

## 2022-01-01 RX ADMIN — POLYETHYLENE GLYCOL 3350 17 GRAM(S): 17 POWDER, FOR SOLUTION ORAL at 18:56

## 2022-01-01 RX ADMIN — Medication 60 MILLIGRAM(S): at 09:18

## 2022-01-01 RX ADMIN — ENOXAPARIN SODIUM 40 MILLIGRAM(S): 100 INJECTION SUBCUTANEOUS at 06:22

## 2022-01-01 RX ADMIN — Medication 4 UNIT(S): at 05:31

## 2022-01-01 RX ADMIN — Medication 25 MILLIGRAM(S): at 05:03

## 2022-01-01 RX ADMIN — Medication 100 MILLIGRAM(S): at 21:10

## 2022-01-01 RX ADMIN — Medication 400 MILLIGRAM(S): at 05:16

## 2022-01-01 RX ADMIN — LEVETIRACETAM 500 MILLIGRAM(S): 250 TABLET, FILM COATED ORAL at 18:05

## 2022-01-01 RX ADMIN — Medication 650 MILLIGRAM(S): at 10:19

## 2022-01-01 RX ADMIN — LOSARTAN POTASSIUM 100 MILLIGRAM(S): 100 TABLET, FILM COATED ORAL at 06:13

## 2022-01-01 RX ADMIN — Medication 10 MILLIGRAM(S): at 01:11

## 2022-01-01 RX ADMIN — LEVETIRACETAM 500 MILLIGRAM(S): 250 TABLET, FILM COATED ORAL at 18:09

## 2022-01-01 RX ADMIN — Medication 1 APPLICATION(S): at 05:04

## 2022-01-01 RX ADMIN — LACOSAMIDE 110 MILLIGRAM(S): 50 TABLET ORAL at 18:27

## 2022-01-01 RX ADMIN — Medication 104 MILLIGRAM(S): at 17:16

## 2022-01-01 RX ADMIN — AMPICILLIN SODIUM AND SULBACTAM SODIUM 200 GRAM(S): 250; 125 INJECTION, POWDER, FOR SUSPENSION INTRAMUSCULAR; INTRAVENOUS at 13:04

## 2022-01-01 RX ADMIN — LEVETIRACETAM 1000 MILLIGRAM(S): 250 TABLET, FILM COATED ORAL at 17:18

## 2022-01-01 RX ADMIN — Medication 100 MILLIGRAM(S): at 05:55

## 2022-01-01 RX ADMIN — Medication 104 MILLIGRAM(S): at 21:32

## 2022-01-01 RX ADMIN — Medication 5 UNIT(S): at 18:40

## 2022-01-01 RX ADMIN — Medication 166.67 MILLIGRAM(S): at 05:07

## 2022-01-01 RX ADMIN — Medication 166.67 MILLIGRAM(S): at 05:21

## 2022-01-01 RX ADMIN — Medication 10 MILLIGRAM(S): at 18:38

## 2022-01-01 RX ADMIN — Medication 1 APPLICATION(S): at 19:24

## 2022-01-01 RX ADMIN — LEVETIRACETAM 400 MILLIGRAM(S): 250 TABLET, FILM COATED ORAL at 18:18

## 2022-01-01 RX ADMIN — CHLORHEXIDINE GLUCONATE 15 MILLILITER(S): 213 SOLUTION TOPICAL at 05:52

## 2022-01-01 RX ADMIN — Medication 1300 MILLIGRAM(S): at 21:42

## 2022-01-01 RX ADMIN — Medication 5 UNIT(S): at 07:39

## 2022-01-01 RX ADMIN — Medication 650 MILLIGRAM(S): at 21:58

## 2022-01-01 RX ADMIN — Medication 15 MILLILITER(S): at 12:09

## 2022-01-01 RX ADMIN — Medication 100 MILLIGRAM(S): at 18:28

## 2022-01-01 RX ADMIN — PANTOPRAZOLE SODIUM 40 MILLIGRAM(S): 20 TABLET, DELAYED RELEASE ORAL at 06:08

## 2022-01-01 RX ADMIN — SEVELAMER CARBONATE 2400 MILLIGRAM(S): 2400 POWDER, FOR SUSPENSION ORAL at 13:34

## 2022-01-01 RX ADMIN — AMPICILLIN SODIUM AND SULBACTAM SODIUM 200 GRAM(S): 250; 125 INJECTION, POWDER, FOR SUSPENSION INTRAMUSCULAR; INTRAVENOUS at 17:30

## 2022-01-01 RX ADMIN — Medication 1 APPLICATION(S): at 17:51

## 2022-01-01 RX ADMIN — LEVETIRACETAM 500 MILLIGRAM(S): 250 TABLET, FILM COATED ORAL at 05:45

## 2022-01-01 RX ADMIN — PROPOFOL 5.84 MICROGRAM(S)/KG/MIN: 10 INJECTION, EMULSION INTRAVENOUS at 06:27

## 2022-01-01 RX ADMIN — Medication 650 MILLIGRAM(S): at 17:34

## 2022-01-01 RX ADMIN — Medication 2 MILLIGRAM(S): at 21:25

## 2022-01-01 RX ADMIN — Medication 15 MILLILITER(S): at 13:03

## 2022-01-01 RX ADMIN — DEXMEDETOMIDINE HYDROCHLORIDE IN 0.9% SODIUM CHLORIDE 4.07 MICROGRAM(S)/KG/HR: 4 INJECTION INTRAVENOUS at 22:32

## 2022-01-01 RX ADMIN — Medication 40 MILLIGRAM(S): at 06:37

## 2022-01-01 RX ADMIN — PANTOPRAZOLE SODIUM 40 MILLIGRAM(S): 20 TABLET, DELAYED RELEASE ORAL at 05:11

## 2022-01-01 RX ADMIN — Medication 104 MILLIGRAM(S): at 21:31

## 2022-01-01 RX ADMIN — Medication 5 UNIT(S): at 11:59

## 2022-01-01 RX ADMIN — LOSARTAN POTASSIUM 50 MILLIGRAM(S): 100 TABLET, FILM COATED ORAL at 06:10

## 2022-01-01 RX ADMIN — Medication 80 MILLIGRAM(S): at 17:25

## 2022-01-01 RX ADMIN — Medication 104 MILLIGRAM(S): at 21:57

## 2022-01-01 RX ADMIN — PANTOPRAZOLE SODIUM 40 MILLIGRAM(S): 20 TABLET, DELAYED RELEASE ORAL at 06:16

## 2022-01-01 RX ADMIN — Medication 81 MILLIGRAM(S): at 13:24

## 2022-01-01 RX ADMIN — Medication 1 APPLICATION(S): at 06:15

## 2022-01-01 RX ADMIN — Medication 600 MILLIGRAM(S): at 05:35

## 2022-01-01 RX ADMIN — Medication 1 TABLET(S): at 21:59

## 2022-01-01 RX ADMIN — INSULIN GLARGINE 22 UNIT(S): 100 INJECTION, SOLUTION SUBCUTANEOUS at 10:32

## 2022-01-01 RX ADMIN — Medication 100 MILLIGRAM(S): at 22:45

## 2022-01-01 RX ADMIN — Medication 4000 MILLILITER(S): at 17:28

## 2022-01-01 RX ADMIN — PANTOPRAZOLE SODIUM 40 MILLIGRAM(S): 20 TABLET, DELAYED RELEASE ORAL at 08:31

## 2022-01-01 RX ADMIN — Medication 650 MILLIGRAM(S): at 11:18

## 2022-01-01 RX ADMIN — Medication 1 APPLICATION(S): at 18:04

## 2022-01-01 RX ADMIN — Medication 1 PATCH: at 21:05

## 2022-01-01 RX ADMIN — Medication 81 MILLIGRAM(S): at 12:50

## 2022-01-01 RX ADMIN — Medication 300 MILLIGRAM(S): at 21:29

## 2022-01-01 RX ADMIN — Medication 8: at 16:28

## 2022-01-01 RX ADMIN — AMPICILLIN SODIUM AND SULBACTAM SODIUM 200 GRAM(S): 250; 125 INJECTION, POWDER, FOR SUSPENSION INTRAMUSCULAR; INTRAVENOUS at 23:23

## 2022-01-01 RX ADMIN — LEVETIRACETAM 500 MILLIGRAM(S): 250 TABLET, FILM COATED ORAL at 18:13

## 2022-01-01 RX ADMIN — Medication 100 MILLIGRAM(S): at 21:58

## 2022-01-01 RX ADMIN — Medication 5 UNIT(S): at 11:36

## 2022-01-01 RX ADMIN — Medication 400 MILLIGRAM(S): at 06:12

## 2022-01-01 RX ADMIN — LACOSAMIDE 50 MILLIGRAM(S): 50 TABLET ORAL at 05:01

## 2022-01-01 RX ADMIN — Medication 1300 MILLIGRAM(S): at 05:44

## 2022-01-01 RX ADMIN — SEVELAMER CARBONATE 2400 MILLIGRAM(S): 2400 POWDER, FOR SUSPENSION ORAL at 05:15

## 2022-01-01 RX ADMIN — Medication 81 MILLIGRAM(S): at 12:08

## 2022-01-01 RX ADMIN — Medication 2 SPRAY(S): at 14:08

## 2022-01-01 RX ADMIN — Medication 600 MILLIGRAM(S): at 13:04

## 2022-01-01 RX ADMIN — Medication 50 MILLIGRAM(S): at 21:32

## 2022-01-01 RX ADMIN — Medication 1 TABLET(S): at 18:56

## 2022-01-01 RX ADMIN — SEVELAMER CARBONATE 2400 MILLIGRAM(S): 2400 POWDER, FOR SUSPENSION ORAL at 13:36

## 2022-01-01 RX ADMIN — SEVELAMER CARBONATE 2400 MILLIGRAM(S): 2400 POWDER, FOR SUSPENSION ORAL at 13:52

## 2022-01-01 RX ADMIN — CHLORHEXIDINE GLUCONATE 15 MILLILITER(S): 213 SOLUTION TOPICAL at 08:53

## 2022-01-01 RX ADMIN — Medication 600 MILLIGRAM(S): at 20:53

## 2022-01-01 RX ADMIN — Medication 1: at 17:43

## 2022-01-01 RX ADMIN — LEVETIRACETAM 400 MILLIGRAM(S): 250 TABLET, FILM COATED ORAL at 18:09

## 2022-01-01 RX ADMIN — ENOXAPARIN SODIUM 40 MILLIGRAM(S): 100 INJECTION SUBCUTANEOUS at 17:44

## 2022-01-01 RX ADMIN — Medication 1: at 08:29

## 2022-01-01 RX ADMIN — Medication 650 MILLIGRAM(S): at 23:10

## 2022-01-01 RX ADMIN — AMPICILLIN SODIUM AND SULBACTAM SODIUM 200 GRAM(S): 250; 125 INJECTION, POWDER, FOR SUSPENSION INTRAMUSCULAR; INTRAVENOUS at 07:31

## 2022-01-01 RX ADMIN — AMPICILLIN SODIUM AND SULBACTAM SODIUM 200 GRAM(S): 250; 125 INJECTION, POWDER, FOR SUSPENSION INTRAMUSCULAR; INTRAVENOUS at 17:31

## 2022-01-01 RX ADMIN — Medication 25 GRAM(S): at 23:43

## 2022-01-01 RX ADMIN — ENOXAPARIN SODIUM 40 MILLIGRAM(S): 100 INJECTION SUBCUTANEOUS at 18:05

## 2022-01-01 RX ADMIN — Medication 250 MILLIGRAM(S): at 06:10

## 2022-01-01 RX ADMIN — Medication 50 MILLIEQUIVALENT(S): at 23:43

## 2022-01-01 RX ADMIN — LACOSAMIDE 110 MILLIGRAM(S): 50 TABLET ORAL at 18:22

## 2022-01-01 RX ADMIN — AMPICILLIN SODIUM AND SULBACTAM SODIUM 200 GRAM(S): 250; 125 INJECTION, POWDER, FOR SUSPENSION INTRAMUSCULAR; INTRAVENOUS at 17:35

## 2022-01-01 RX ADMIN — Medication 1 TABLET(S): at 11:27

## 2022-01-01 RX ADMIN — Medication 600 MILLIGRAM(S): at 21:04

## 2022-01-01 RX ADMIN — Medication 1 APPLICATION(S): at 05:12

## 2022-01-01 RX ADMIN — Medication 5 UNIT(S): at 08:28

## 2022-01-01 RX ADMIN — CHLORHEXIDINE GLUCONATE 15 MILLILITER(S): 213 SOLUTION TOPICAL at 06:15

## 2022-01-01 RX ADMIN — Medication 100 MILLIGRAM(S): at 21:25

## 2022-01-01 RX ADMIN — AMPICILLIN SODIUM AND SULBACTAM SODIUM 200 GRAM(S): 250; 125 INJECTION, POWDER, FOR SUSPENSION INTRAMUSCULAR; INTRAVENOUS at 06:43

## 2022-01-01 RX ADMIN — LOSARTAN POTASSIUM 100 MILLIGRAM(S): 100 TABLET, FILM COATED ORAL at 05:14

## 2022-01-01 RX ADMIN — Medication 50 MILLIGRAM(S): at 21:28

## 2022-01-01 RX ADMIN — Medication 1 APPLICATION(S): at 11:17

## 2022-01-01 RX ADMIN — Medication 81 MILLIGRAM(S): at 12:57

## 2022-01-01 RX ADMIN — Medication 100 MILLIGRAM(S): at 06:07

## 2022-01-01 RX ADMIN — Medication 100 MILLIGRAM(S): at 05:30

## 2022-01-01 RX ADMIN — Medication 1300 MILLIGRAM(S): at 14:31

## 2022-01-01 RX ADMIN — SEVELAMER CARBONATE 2400 MILLIGRAM(S): 2400 POWDER, FOR SUSPENSION ORAL at 09:18

## 2022-01-01 RX ADMIN — POLYETHYLENE GLYCOL 3350 17 GRAM(S): 17 POWDER, FOR SOLUTION ORAL at 17:46

## 2022-01-01 RX ADMIN — Medication 5 UNIT(S): at 11:47

## 2022-01-01 RX ADMIN — Medication 101 MILLIGRAM(S): at 17:49

## 2022-01-01 RX ADMIN — Medication 104 MILLIGRAM(S): at 01:12

## 2022-01-01 RX ADMIN — Medication 1: at 05:40

## 2022-01-01 RX ADMIN — ATORVASTATIN CALCIUM 80 MILLIGRAM(S): 80 TABLET, FILM COATED ORAL at 21:40

## 2022-01-01 RX ADMIN — LACOSAMIDE 110 MILLIGRAM(S): 50 TABLET ORAL at 18:57

## 2022-01-01 RX ADMIN — Medication 3: at 11:43

## 2022-01-01 RX ADMIN — Medication 650 MILLIGRAM(S): at 18:21

## 2022-01-01 RX ADMIN — AMPICILLIN SODIUM AND SULBACTAM SODIUM 200 GRAM(S): 250; 125 INJECTION, POWDER, FOR SUSPENSION INTRAMUSCULAR; INTRAVENOUS at 18:05

## 2022-01-01 RX ADMIN — HEPARIN SODIUM 5000 UNIT(S): 5000 INJECTION INTRAVENOUS; SUBCUTANEOUS at 22:01

## 2022-01-01 RX ADMIN — Medication 5 UNIT(S): at 11:38

## 2022-01-01 RX ADMIN — SEVELAMER CARBONATE 2400 MILLIGRAM(S): 2400 POWDER, FOR SUSPENSION ORAL at 05:26

## 2022-01-01 RX ADMIN — Medication 104 MILLIGRAM(S): at 13:35

## 2022-01-01 RX ADMIN — LEVETIRACETAM 400 MILLIGRAM(S): 250 TABLET, FILM COATED ORAL at 17:53

## 2022-01-01 RX ADMIN — HEPARIN SODIUM 5000 UNIT(S): 5000 INJECTION INTRAVENOUS; SUBCUTANEOUS at 21:34

## 2022-01-01 RX ADMIN — CHLORHEXIDINE GLUCONATE 1 APPLICATION(S): 213 SOLUTION TOPICAL at 09:17

## 2022-01-01 RX ADMIN — Medication 15 MILLILITER(S): at 11:33

## 2022-01-01 RX ADMIN — Medication 104 MILLIGRAM(S): at 15:02

## 2022-01-01 RX ADMIN — Medication 2 SPRAY(S): at 18:32

## 2022-01-01 RX ADMIN — Medication 1: at 08:22

## 2022-01-01 RX ADMIN — CHLORHEXIDINE GLUCONATE 15 MILLILITER(S): 213 SOLUTION TOPICAL at 18:08

## 2022-01-01 RX ADMIN — Medication 104 MILLIGRAM(S): at 05:03

## 2022-01-01 RX ADMIN — Medication 1 TABLET(S): at 12:21

## 2022-01-01 RX ADMIN — AMLODIPINE BESYLATE 10 MILLIGRAM(S): 2.5 TABLET ORAL at 05:45

## 2022-01-01 RX ADMIN — Medication 100 MILLIGRAM(S): at 05:14

## 2022-01-01 RX ADMIN — Medication 650 MILLIGRAM(S): at 05:48

## 2022-01-01 RX ADMIN — Medication 600 MILLIGRAM(S): at 13:06

## 2022-01-01 RX ADMIN — Medication 100 MILLIGRAM(S): at 17:38

## 2022-01-01 RX ADMIN — Medication 25 GRAM(S): at 16:51

## 2022-01-01 RX ADMIN — AMPICILLIN SODIUM AND SULBACTAM SODIUM 200 GRAM(S): 250; 125 INJECTION, POWDER, FOR SUSPENSION INTRAMUSCULAR; INTRAVENOUS at 23:41

## 2022-01-01 RX ADMIN — Medication 2 MILLIGRAM(S): at 22:40

## 2022-01-01 RX ADMIN — Medication 100 MILLIGRAM(S): at 21:45

## 2022-01-01 RX ADMIN — PANTOPRAZOLE SODIUM 40 MILLIGRAM(S): 20 TABLET, DELAYED RELEASE ORAL at 05:07

## 2022-01-01 RX ADMIN — Medication 81 MILLIGRAM(S): at 12:12

## 2022-01-01 RX ADMIN — Medication 2 UNIT(S): at 21:04

## 2022-01-01 RX ADMIN — MIDAZOLAM HYDROCHLORIDE 2 MILLIGRAM(S): 1 INJECTION, SOLUTION INTRAMUSCULAR; INTRAVENOUS at 10:42

## 2022-01-01 RX ADMIN — Medication 100 MILLIGRAM(S): at 13:12

## 2022-01-01 RX ADMIN — Medication 100 MILLIGRAM(S): at 14:31

## 2022-01-01 RX ADMIN — Medication 0.1 MILLIGRAM(S): at 13:02

## 2022-01-01 RX ADMIN — Medication 1300 MILLIGRAM(S): at 13:12

## 2022-01-01 RX ADMIN — Medication 300 MILLIGRAM(S): at 13:14

## 2022-01-01 RX ADMIN — Medication 2 SPRAY(S): at 18:28

## 2022-01-01 RX ADMIN — APIXABAN 5 MILLIGRAM(S): 2.5 TABLET, FILM COATED ORAL at 05:43

## 2022-01-01 RX ADMIN — Medication 1300 MILLIGRAM(S): at 13:53

## 2022-01-01 RX ADMIN — Medication 100 MILLIGRAM(S): at 21:50

## 2022-01-01 RX ADMIN — PANTOPRAZOLE SODIUM 40 MILLIGRAM(S): 20 TABLET, DELAYED RELEASE ORAL at 05:44

## 2022-01-01 RX ADMIN — Medication 2 SPRAY(S): at 05:48

## 2022-01-01 RX ADMIN — Medication 81 MILLIGRAM(S): at 11:03

## 2022-01-01 RX ADMIN — Medication 2 SPRAY(S): at 17:33

## 2022-01-01 RX ADMIN — Medication 81 MILLIGRAM(S): at 16:31

## 2022-01-01 RX ADMIN — Medication 1 PACKET(S): at 22:29

## 2022-01-01 RX ADMIN — Medication 100 MILLIGRAM(S): at 05:07

## 2022-01-01 RX ADMIN — SODIUM CHLORIDE 60 MILLILITER(S): 9 INJECTION INTRAMUSCULAR; INTRAVENOUS; SUBCUTANEOUS at 21:29

## 2022-01-01 RX ADMIN — Medication 100 MILLIGRAM(S): at 22:40

## 2022-01-01 RX ADMIN — Medication 2 MILLIGRAM(S): at 21:31

## 2022-01-01 RX ADMIN — Medication 2 SPRAY(S): at 05:12

## 2022-01-01 RX ADMIN — Medication 0.2 MILLIGRAM(S): at 05:55

## 2022-01-01 RX ADMIN — Medication 1 APPLICATION(S): at 17:37

## 2022-01-01 RX ADMIN — Medication 650 MILLIGRAM(S): at 12:45

## 2022-01-01 RX ADMIN — Medication 100 MILLIGRAM(S): at 06:30

## 2022-01-01 RX ADMIN — Medication 650 MILLIGRAM(S): at 05:47

## 2022-01-01 RX ADMIN — INSULIN GLARGINE 12 UNIT(S): 100 INJECTION, SOLUTION SUBCUTANEOUS at 23:45

## 2022-01-01 RX ADMIN — PANTOPRAZOLE SODIUM 40 MILLIGRAM(S): 20 TABLET, DELAYED RELEASE ORAL at 17:26

## 2022-01-01 RX ADMIN — PANTOPRAZOLE SODIUM 40 MILLIGRAM(S): 20 TABLET, DELAYED RELEASE ORAL at 06:05

## 2022-01-01 RX ADMIN — LACOSAMIDE 110 MILLIGRAM(S): 50 TABLET ORAL at 17:35

## 2022-01-01 RX ADMIN — LOSARTAN POTASSIUM 50 MILLIGRAM(S): 100 TABLET, FILM COATED ORAL at 05:11

## 2022-01-01 RX ADMIN — Medication 1: at 17:27

## 2022-01-01 RX ADMIN — LACOSAMIDE 110 MILLIGRAM(S): 50 TABLET ORAL at 06:34

## 2022-01-01 RX ADMIN — SEVELAMER CARBONATE 2400 MILLIGRAM(S): 2400 POWDER, FOR SUSPENSION ORAL at 21:58

## 2022-01-01 RX ADMIN — FENTANYL CITRATE 50 MICROGRAM(S): 50 INJECTION INTRAVENOUS at 16:18

## 2022-01-01 RX ADMIN — Medication 650 MILLIGRAM(S): at 05:15

## 2022-01-01 RX ADMIN — Medication 4 UNIT(S): at 17:25

## 2022-01-01 RX ADMIN — CLOPIDOGREL BISULFATE 75 MILLIGRAM(S): 75 TABLET, FILM COATED ORAL at 17:51

## 2022-01-01 RX ADMIN — Medication 10 MILLIGRAM(S): at 22:18

## 2022-01-01 RX ADMIN — MIDAZOLAM HYDROCHLORIDE 2 MILLIGRAM(S): 1 INJECTION, SOLUTION INTRAMUSCULAR; INTRAVENOUS at 02:56

## 2022-01-01 RX ADMIN — ATORVASTATIN CALCIUM 80 MILLIGRAM(S): 80 TABLET, FILM COATED ORAL at 21:03

## 2022-01-01 RX ADMIN — Medication 300 MILLIGRAM(S): at 16:35

## 2022-01-01 RX ADMIN — ATORVASTATIN CALCIUM 80 MILLIGRAM(S): 80 TABLET, FILM COATED ORAL at 21:54

## 2022-01-01 RX ADMIN — INSULIN GLARGINE 22 UNIT(S): 100 INJECTION, SOLUTION SUBCUTANEOUS at 07:52

## 2022-01-01 RX ADMIN — Medication 300 MILLIGRAM(S): at 22:57

## 2022-01-01 RX ADMIN — Medication 25 GRAM(S): at 14:04

## 2022-01-01 RX ADMIN — FENTANYL CITRATE 50 MICROGRAM(S): 50 INJECTION INTRAVENOUS at 08:02

## 2022-01-01 RX ADMIN — Medication 81 MILLIGRAM(S): at 11:14

## 2022-01-01 RX ADMIN — Medication 600 MILLIGRAM(S): at 05:38

## 2022-01-01 RX ADMIN — Medication 15 MILLILITER(S): at 12:58

## 2022-01-01 RX ADMIN — Medication 5 UNIT(S): at 11:12

## 2022-01-01 RX ADMIN — Medication 1 PACKET(S): at 17:54

## 2022-01-01 RX ADMIN — Medication 400 MILLIGRAM(S): at 16:32

## 2022-01-01 RX ADMIN — LEVETIRACETAM 500 MILLIGRAM(S): 250 TABLET, FILM COATED ORAL at 18:58

## 2022-01-01 RX ADMIN — Medication 2 SPRAY(S): at 06:14

## 2022-01-01 RX ADMIN — SODIUM CHLORIDE 75 MILLILITER(S): 9 INJECTION, SOLUTION INTRAVENOUS at 11:17

## 2022-01-01 RX ADMIN — ENOXAPARIN SODIUM 40 MILLIGRAM(S): 100 INJECTION SUBCUTANEOUS at 17:39

## 2022-01-01 RX ADMIN — Medication 5 UNIT(S): at 07:50

## 2022-01-01 RX ADMIN — Medication 60 MILLIGRAM(S): at 06:05

## 2022-01-01 RX ADMIN — Medication 2 SPRAY(S): at 06:16

## 2022-01-01 RX ADMIN — Medication 100 MILLIGRAM(S): at 21:20

## 2022-01-01 RX ADMIN — Medication 600 MILLIGRAM(S): at 22:03

## 2022-01-01 RX ADMIN — AMPICILLIN SODIUM AND SULBACTAM SODIUM 200 GRAM(S): 250; 125 INJECTION, POWDER, FOR SUSPENSION INTRAMUSCULAR; INTRAVENOUS at 00:01

## 2022-01-01 RX ADMIN — INSULIN GLARGINE 12 UNIT(S): 100 INJECTION, SOLUTION SUBCUTANEOUS at 23:16

## 2022-01-01 RX ADMIN — Medication 1 APPLICATION(S): at 17:52

## 2022-01-01 RX ADMIN — CHLORHEXIDINE GLUCONATE 1 APPLICATION(S): 213 SOLUTION TOPICAL at 05:33

## 2022-01-01 RX ADMIN — Medication 166.67 MILLIGRAM(S): at 05:47

## 2022-01-01 RX ADMIN — LEVETIRACETAM 500 MILLIGRAM(S): 250 TABLET, FILM COATED ORAL at 05:32

## 2022-01-01 RX ADMIN — INSULIN GLARGINE 22 UNIT(S): 100 INJECTION, SOLUTION SUBCUTANEOUS at 08:23

## 2022-01-01 RX ADMIN — Medication 1: at 11:25

## 2022-01-01 RX ADMIN — INSULIN GLARGINE 22 UNIT(S): 100 INJECTION, SOLUTION SUBCUTANEOUS at 08:05

## 2022-01-01 RX ADMIN — CLOPIDOGREL BISULFATE 75 MILLIGRAM(S): 75 TABLET, FILM COATED ORAL at 11:43

## 2022-01-01 RX ADMIN — AMPICILLIN SODIUM AND SULBACTAM SODIUM 200 GRAM(S): 250; 125 INJECTION, POWDER, FOR SUSPENSION INTRAMUSCULAR; INTRAVENOUS at 11:41

## 2022-01-01 RX ADMIN — Medication 100 MILLIGRAM(S): at 06:01

## 2022-01-01 RX ADMIN — LEVETIRACETAM 500 MILLIGRAM(S): 250 TABLET, FILM COATED ORAL at 05:02

## 2022-01-01 RX ADMIN — Medication 1 TABLET(S): at 11:32

## 2022-01-01 RX ADMIN — Medication 650 MILLIGRAM(S): at 05:44

## 2022-01-01 RX ADMIN — HEPARIN SODIUM 5000 UNIT(S): 5000 INJECTION INTRAVENOUS; SUBCUTANEOUS at 13:58

## 2022-01-01 RX ADMIN — Medication 166.67 MILLIGRAM(S): at 18:09

## 2022-01-01 RX ADMIN — Medication 600 MILLIGRAM(S): at 05:04

## 2022-01-01 RX ADMIN — Medication 300 MILLIGRAM(S): at 14:10

## 2022-01-01 RX ADMIN — ENOXAPARIN SODIUM 40 MILLIGRAM(S): 100 INJECTION SUBCUTANEOUS at 16:52

## 2022-01-01 RX ADMIN — PANTOPRAZOLE SODIUM 10 MG/HR: 20 TABLET, DELAYED RELEASE ORAL at 17:29

## 2022-01-01 RX ADMIN — Medication 650 MILLIGRAM(S): at 05:32

## 2022-01-01 RX ADMIN — Medication 104 MILLIGRAM(S): at 06:19

## 2022-01-01 RX ADMIN — SODIUM CHLORIDE 100 MILLILITER(S): 9 INJECTION INTRAMUSCULAR; INTRAVENOUS; SUBCUTANEOUS at 10:55

## 2022-01-01 RX ADMIN — Medication 81 MILLIGRAM(S): at 12:24

## 2022-01-01 RX ADMIN — DESMOPRESSIN ACETATE 230 MICROGRAM(S): 0.1 TABLET ORAL at 20:19

## 2022-01-01 RX ADMIN — Medication 5 UNIT(S): at 11:55

## 2022-01-01 RX ADMIN — Medication 100 MILLIGRAM(S): at 06:00

## 2022-01-01 RX ADMIN — Medication 50 MILLIGRAM(S): at 21:25

## 2022-01-01 RX ADMIN — Medication 0.2 MILLIGRAM(S): at 05:27

## 2022-01-01 RX ADMIN — Medication 166.67 MILLIGRAM(S): at 20:00

## 2022-01-01 RX ADMIN — ENOXAPARIN SODIUM 40 MILLIGRAM(S): 100 INJECTION SUBCUTANEOUS at 21:14

## 2022-01-01 RX ADMIN — Medication 100 MILLIGRAM(S): at 05:39

## 2022-01-01 RX ADMIN — Medication 100 MILLIGRAM(S): at 21:42

## 2022-01-01 RX ADMIN — Medication 0.2 MILLIGRAM(S): at 15:39

## 2022-01-01 RX ADMIN — CHLORHEXIDINE GLUCONATE 15 MILLILITER(S): 213 SOLUTION TOPICAL at 05:17

## 2022-01-01 RX ADMIN — Medication 5 UNIT(S): at 08:23

## 2022-01-01 RX ADMIN — Medication 650 MILLIGRAM(S): at 08:41

## 2022-01-01 RX ADMIN — Medication 1300 MILLIGRAM(S): at 07:25

## 2022-01-01 RX ADMIN — SEVELAMER CARBONATE 2400 MILLIGRAM(S): 2400 POWDER, FOR SUSPENSION ORAL at 21:52

## 2022-01-01 RX ADMIN — Medication 600 MILLIGRAM(S): at 21:49

## 2022-01-01 RX ADMIN — LEVETIRACETAM 1000 MILLIGRAM(S): 250 TABLET, FILM COATED ORAL at 22:42

## 2022-01-01 RX ADMIN — Medication 100 MILLIGRAM(S): at 21:57

## 2022-01-01 RX ADMIN — Medication 0.2 MILLIGRAM(S): at 06:05

## 2022-01-01 RX ADMIN — LEVETIRACETAM 500 MILLIGRAM(S): 250 TABLET, FILM COATED ORAL at 20:42

## 2022-01-01 RX ADMIN — AMLODIPINE BESYLATE 10 MILLIGRAM(S): 2.5 TABLET ORAL at 17:45

## 2022-01-01 RX ADMIN — Medication 400 MILLIGRAM(S): at 05:01

## 2022-01-01 RX ADMIN — Medication 650 MILLIGRAM(S): at 18:10

## 2022-01-01 RX ADMIN — Medication 1 APPLICATION(S): at 17:30

## 2022-01-01 RX ADMIN — Medication 650 MILLIGRAM(S): at 04:59

## 2022-01-01 RX ADMIN — FENTANYL CITRATE 4.07 MICROGRAM(S)/KG/HR: 50 INJECTION INTRAVENOUS at 04:57

## 2022-01-01 RX ADMIN — HEPARIN SODIUM 5000 UNIT(S): 5000 INJECTION INTRAVENOUS; SUBCUTANEOUS at 05:47

## 2022-01-01 RX ADMIN — Medication 2 SPRAY(S): at 05:38

## 2022-01-01 RX ADMIN — Medication 5 UNIT(S): at 17:01

## 2022-01-01 RX ADMIN — Medication 650 MILLIGRAM(S): at 00:03

## 2022-01-01 RX ADMIN — PANTOPRAZOLE SODIUM 40 MILLIGRAM(S): 20 TABLET, DELAYED RELEASE ORAL at 05:12

## 2022-01-01 RX ADMIN — Medication 104 MILLIGRAM(S): at 06:44

## 2022-01-01 RX ADMIN — Medication 1: at 11:33

## 2022-01-01 RX ADMIN — LEVETIRACETAM 400 MILLIGRAM(S): 250 TABLET, FILM COATED ORAL at 17:29

## 2022-01-01 RX ADMIN — SCOPALAMINE 1 PATCH: 1 PATCH, EXTENDED RELEASE TRANSDERMAL at 17:17

## 2022-01-01 RX ADMIN — CHLORHEXIDINE GLUCONATE 15 MILLILITER(S): 213 SOLUTION TOPICAL at 17:37

## 2022-01-01 RX ADMIN — SEVELAMER CARBONATE 800 MILLIGRAM(S): 2400 POWDER, FOR SUSPENSION ORAL at 12:25

## 2022-01-01 RX ADMIN — LACOSAMIDE 110 MILLIGRAM(S): 50 TABLET ORAL at 19:11

## 2022-01-01 RX ADMIN — Medication 100 MILLIGRAM(S): at 17:33

## 2022-01-01 RX ADMIN — SODIUM CHLORIDE 100 MILLILITER(S): 9 INJECTION INTRAMUSCULAR; INTRAVENOUS; SUBCUTANEOUS at 17:39

## 2022-01-01 RX ADMIN — FENTANYL CITRATE 25 MICROGRAM(S): 50 INJECTION INTRAVENOUS at 14:54

## 2022-01-01 RX ADMIN — Medication 100 MILLIGRAM(S): at 05:04

## 2022-01-01 RX ADMIN — LACOSAMIDE 110 MILLIGRAM(S): 50 TABLET ORAL at 05:07

## 2022-01-01 RX ADMIN — Medication 400 MILLIGRAM(S): at 05:58

## 2022-01-01 RX ADMIN — Medication 2 MILLIGRAM(S): at 21:30

## 2022-01-01 RX ADMIN — INSULIN GLARGINE 22 UNIT(S): 100 INJECTION, SOLUTION SUBCUTANEOUS at 08:36

## 2022-01-01 RX ADMIN — Medication 25 GRAM(S): at 19:25

## 2022-01-01 RX ADMIN — Medication 25 MILLIGRAM(S): at 05:26

## 2022-01-01 RX ADMIN — LEVETIRACETAM 400 MILLIGRAM(S): 250 TABLET, FILM COATED ORAL at 17:58

## 2022-01-01 RX ADMIN — Medication 1 TABLET(S): at 05:44

## 2022-01-01 RX ADMIN — LACOSAMIDE 110 MILLIGRAM(S): 50 TABLET ORAL at 18:06

## 2022-01-01 RX ADMIN — INSULIN GLARGINE 22 UNIT(S): 100 INJECTION, SOLUTION SUBCUTANEOUS at 08:15

## 2022-01-01 RX ADMIN — AMLODIPINE BESYLATE 10 MILLIGRAM(S): 2.5 TABLET ORAL at 05:44

## 2022-01-01 RX ADMIN — Medication 5 UNIT(S): at 16:33

## 2022-01-01 RX ADMIN — Medication 1300 MILLIGRAM(S): at 21:15

## 2022-01-01 RX ADMIN — CHLORHEXIDINE GLUCONATE 1 APPLICATION(S): 213 SOLUTION TOPICAL at 06:50

## 2022-01-01 RX ADMIN — LACOSAMIDE 110 MILLIGRAM(S): 50 TABLET ORAL at 06:17

## 2022-01-01 RX ADMIN — Medication 2: at 17:02

## 2022-01-01 RX ADMIN — Medication 2 SPRAY(S): at 05:16

## 2022-01-01 RX ADMIN — Medication 2 SPRAY(S): at 17:38

## 2022-01-01 RX ADMIN — LOSARTAN POTASSIUM 100 MILLIGRAM(S): 100 TABLET, FILM COATED ORAL at 06:30

## 2022-01-01 RX ADMIN — ATORVASTATIN CALCIUM 80 MILLIGRAM(S): 80 TABLET, FILM COATED ORAL at 21:32

## 2022-01-01 RX ADMIN — Medication 100 MILLIGRAM(S): at 06:04

## 2022-01-01 RX ADMIN — Medication 2 SPRAY(S): at 18:57

## 2022-01-01 RX ADMIN — LACOSAMIDE 110 MILLIGRAM(S): 50 TABLET ORAL at 17:41

## 2022-01-01 RX ADMIN — CHLORHEXIDINE GLUCONATE 15 MILLILITER(S): 213 SOLUTION TOPICAL at 18:01

## 2022-01-01 RX ADMIN — Medication 600 MILLIGRAM(S): at 05:46

## 2022-01-01 RX ADMIN — INSULIN GLARGINE 30 UNIT(S): 100 INJECTION, SOLUTION SUBCUTANEOUS at 10:13

## 2022-01-01 RX ADMIN — Medication 100 MILLIGRAM(S): at 16:31

## 2022-01-01 RX ADMIN — Medication 650 MILLIGRAM(S): at 11:34

## 2022-01-01 RX ADMIN — Medication 80 MILLIGRAM(S): at 18:01

## 2022-01-01 RX ADMIN — CLOPIDOGREL BISULFATE 75 MILLIGRAM(S): 75 TABLET, FILM COATED ORAL at 12:15

## 2022-01-01 RX ADMIN — SEVELAMER CARBONATE 2400 MILLIGRAM(S): 2400 POWDER, FOR SUSPENSION ORAL at 22:42

## 2022-01-01 RX ADMIN — Medication 1: at 08:40

## 2022-01-01 RX ADMIN — LACOSAMIDE 110 MILLIGRAM(S): 50 TABLET ORAL at 05:32

## 2022-01-01 RX ADMIN — Medication 250 MILLIGRAM(S): at 18:06

## 2022-01-01 RX ADMIN — POLYETHYLENE GLYCOL 3350 17 GRAM(S): 17 POWDER, FOR SOLUTION ORAL at 13:14

## 2022-01-01 RX ADMIN — Medication 2 SPRAY(S): at 06:32

## 2022-01-01 RX ADMIN — Medication 600 MILLIGRAM(S): at 06:14

## 2022-01-01 RX ADMIN — Medication 1 PACKET(S): at 18:10

## 2022-01-01 RX ADMIN — LOSARTAN POTASSIUM 50 MILLIGRAM(S): 100 TABLET, FILM COATED ORAL at 05:19

## 2022-01-01 RX ADMIN — Medication 2 SPRAY(S): at 17:26

## 2022-01-01 RX ADMIN — Medication 5 UNIT(S): at 17:53

## 2022-01-01 RX ADMIN — Medication 10 MILLIGRAM(S): at 04:39

## 2022-01-01 RX ADMIN — Medication 2 SPRAY(S): at 18:08

## 2022-01-01 RX ADMIN — DESMOPRESSIN ACETATE 225 MICROGRAM(S): 0.1 TABLET ORAL at 18:47

## 2022-01-01 RX ADMIN — Medication 100 MILLIGRAM(S): at 05:09

## 2022-01-01 RX ADMIN — MEROPENEM 100 MILLIGRAM(S): 1 INJECTION INTRAVENOUS at 05:29

## 2022-01-01 RX ADMIN — AMLODIPINE BESYLATE 10 MILLIGRAM(S): 2.5 TABLET ORAL at 05:39

## 2022-01-01 RX ADMIN — Medication 10 MILLIGRAM(S): at 08:30

## 2022-01-01 RX ADMIN — Medication 100 MILLIGRAM(S): at 20:53

## 2022-01-01 RX ADMIN — Medication 650 MILLIGRAM(S): at 14:07

## 2022-01-01 RX ADMIN — Medication 4 UNIT(S): at 11:33

## 2022-01-01 RX ADMIN — Medication 1 TABLET(S): at 11:20

## 2022-01-01 RX ADMIN — AMPICILLIN SODIUM AND SULBACTAM SODIUM 200 GRAM(S): 250; 125 INJECTION, POWDER, FOR SUSPENSION INTRAMUSCULAR; INTRAVENOUS at 18:57

## 2022-01-01 RX ADMIN — Medication 1300 MILLIGRAM(S): at 13:04

## 2022-01-01 RX ADMIN — ATORVASTATIN CALCIUM 80 MILLIGRAM(S): 80 TABLET, FILM COATED ORAL at 22:57

## 2022-01-01 RX ADMIN — Medication 81 MILLIGRAM(S): at 19:00

## 2022-01-01 RX ADMIN — Medication 104 MILLIGRAM(S): at 14:59

## 2022-01-01 RX ADMIN — Medication 5 UNIT(S): at 12:33

## 2022-01-01 RX ADMIN — FENTANYL CITRATE 50 MICROGRAM(S): 50 INJECTION INTRAVENOUS at 03:05

## 2022-01-01 RX ADMIN — Medication 2 MILLIGRAM(S): at 21:05

## 2022-01-01 RX ADMIN — Medication 5 UNIT(S): at 07:21

## 2022-01-01 RX ADMIN — SEVELAMER CARBONATE 2400 MILLIGRAM(S): 2400 POWDER, FOR SUSPENSION ORAL at 05:24

## 2022-01-01 RX ADMIN — Medication 100 MILLIGRAM(S): at 22:02

## 2022-01-01 RX ADMIN — INSULIN GLARGINE 22 UNIT(S): 100 INJECTION, SOLUTION SUBCUTANEOUS at 08:08

## 2022-01-01 RX ADMIN — Medication 1 TABLET(S): at 15:40

## 2022-01-01 RX ADMIN — Medication 650 MILLIGRAM(S): at 16:30

## 2022-01-01 RX ADMIN — Medication 2 SPRAY(S): at 05:32

## 2022-01-01 RX ADMIN — Medication 650 MILLIGRAM(S): at 00:54

## 2022-01-01 RX ADMIN — Medication 100 MILLIGRAM(S): at 13:06

## 2022-01-01 RX ADMIN — Medication 300 MILLIGRAM(S): at 15:17

## 2022-01-01 RX ADMIN — Medication 10 MILLIGRAM(S): at 07:49

## 2022-01-01 RX ADMIN — POLYETHYLENE GLYCOL 3350 17 GRAM(S): 17 POWDER, FOR SOLUTION ORAL at 18:37

## 2022-08-02 NOTE — ED PROVIDER NOTE - OBJECTIVE STATEMENT
57 y/o F with PMH HTN, DM2 noncompliant with medications for past year presenting with right lower extremity wound x1 month. Pt brought in by son who only saw wound tonight. Pt hit leg on corner of table which cut leg; pt has been applying over the counter antibacterial ointments. Wound now draining pus; c/o pain and redness in lower leg. Otherwise no acute complaints. At triage BP elevated to ~300/170. Denies vision change, headache, dizziness, chest pain, dyspnea, back pain. Endorses intermittent abdominal pains for past year since removal of PEG.

## 2022-08-02 NOTE — CONSULT NOTE ADULT - SUBJECTIVE AND OBJECTIVE BOX
Outpt cardiologist:    HPI:  55 yo F with PMH of HTN, DM2, and stroke (per son 2017) who presented for RLE wound. Started 3 months ago when she fell and hit her leg on a wooden cabinet. She put hydrogen peroxide, antibiotic cream, and wrapped the wound but never fully healed. Two weeks ago it started to show yellow pus so her and her family came to the ED for further treatment. Endorsed subjective fevers, chest pain, and vision changes which occurs when walked 2-3 blocks. Denied congestion, sore throat, cough, dyspnea, headache, dysuria, N/V, diarrhea     Per son, they fill her medications at [facility] ([phone]) and this is their current pharmacy. Called them to confirm her medications and they said her last refill of medications was .    In the ED:  Vitals: T: 98.9, BP: 296/ 174, , RR: 18, 98%O2 on RA  Labs: CBC showed WBC 11.23; ESR 92, CRP 35.6  CMP showed glucose 302, alk phos 173; ; VBG pH 7.45  EKG pending  CXR showed borderline cardiomegaly and no airspace opacity.   X-ray of RLE also pending.     Received unasyn and vanco, humalin R, IV vasotec, IV labetalol   (02 Aug 2022 07:47)      ---  cardio fellow additional notes:        PAST MEDICAL & SURGICAL HISTORY  Hypertension    Diabetes mellitus        FAMILY HISTORY:  FAMILY HISTORY:  FH: type 2 diabetes mellitus        SOCIAL HISTORY:  Social History:  Never smoker, does not drink alcohol or drug use (02 Aug 2022 07:47)      ALLERGIES:  No Known Allergies      MEDICATIONS:  dextrose 5%. 1000 milliLiter(s) (100 mL/Hr) IV Continuous <Continuous>  dextrose 5%. 1000 milliLiter(s) (50 mL/Hr) IV Continuous <Continuous>  dextrose 50% Injectable 25 Gram(s) IV Push once  dextrose 50% Injectable 12.5 Gram(s) IV Push once  dextrose 50% Injectable 25 Gram(s) IV Push once  glucagon  Injectable 1 milliGRAM(s) IntraMuscular once  insulin glargine Injectable (LANTUS) 15 Unit(s) SubCutaneous every morning  insulin lispro (ADMELOG) corrective regimen sliding scale   SubCutaneous three times a day before meals  insulin lispro Injectable (ADMELOG) 5 Unit(s) SubCutaneous three times a day before meals  losartan 50 milliGRAM(s) Oral daily  vancomycin  IVPB 1250 milliGRAM(s) IV Intermittent every 12 hours    PRN:  acetaminophen     Tablet .. 650 milliGRAM(s) Oral every 6 hours PRN  dextrose Oral Gel 15 Gram(s) Oral once PRN  melatonin 3 milliGRAM(s) Oral at bedtime PRN      HOME MEDICATIONS:  Home Medications:  losartan 50 mg oral tablet: 1 tab(s) orally once a day (02 Aug 2022 10:38)  metFORMIN 500 mg oral tablet: 1 tab(s) orally 2 times a day (02 Aug 2022 10:38)  metoprolol succinate 50 mg oral tablet, extended release: 1 tab(s) orally once a day (02 Aug 2022 10:38)      VITALS:   T(F): 98.9 ( @ 00:32), Max: 98.9 ( @ 00:32)  HR: 98 ( @ 10:04) (86 - 127)  BP: 178/88 ( @ 10:04) (178/88 - 296/174)  BP(mean): --  RR: 19 ( @ 07:21) (18 - 19)  SpO2: 98% ( @ 07:21) (98% - 98%)    I&O's Summary      REVIEW OF SYSTEMS:  CONSTITUTIONAL: No weakness, fevers or chills  HEENT: No visual changes, neck/ear pain  RESPIRATORY: No cough, sob  CARDIOVASCULAR: See HPI  GASTROINTESTINAL: No abdominal pain. No nausea, vomiting, diarrhea   GENITOURINARY: No dysuria, frequency or hematuria  NEUROLOGICAL: No new focal deficits  SKIN: No new rashes    PHYSICAL EXAM:  General: Not in distress.  Non-toxic appearing.   HEENT: EOMI  Cardio: regular, S1, S2, no murmur  Pulm: B/L BS.  No wheezing / crackles / rales  Abdomen: Soft, non-tender, non-distended. Normoactive bowel sounds  Extremities: + 1 peripheral edema  Neuro: A&O x3. No focal deficits    LABS:                        14.4   11.23 )-----------( 342      ( 02 Aug 2022 01:47 )             40.5     08-    136  |  94<L>  |  14  ----------------------------<  302<H>  3.8   |  28  |  0.8    Ca    9.7      02 Aug 2022 01:47    TPro  7.9  /  Alb  4.1  /  TBili  0.4  /  DBili  x   /  AST  28  /  ALT  12  /  AlkPhos  173<H>        Sedimentation Rate, Erythrocyte: 92 mm/Hr *H* (22 @ 01:50)  Lactate, Blood: 1.8 mmol/L (22 @ 01:50)  Troponin T, Serum: <0.01 ng/mL (22 @ 01:47)    CARDIAC MARKERS ( 02 Aug 2022 01:47 )  x     / <0.01 ng/mL / x     / x     / x            Troponin trend:    Serum Pro-Brain Natriuretic Peptide: 714 pg/mL (22 @ 01:47)      COVID-19 PCR: NotDetec (02 Aug 2022 01:40)      RADIOLOGY:  -CXR: Borderline cardiomegaly. No airspace opacity.  -TTE: none  -CCTA: none  -STRESS TEST: none  -CATHETERIZATION: none   -OTHER:  EC Lead ECG:   Ventricular Rate 97 BPM    Atrial Rate 97 BPM    P-R Interval 166 ms    QRS Duration 88 ms    Q-T Interval 390 ms    QTC Calculation(Bazett) 495 ms    P Axis 61 degrees    R Axis 20 degrees    T Axis 75 degrees    Diagnosis Line Normal sinus rhythm  Biatrial enlargement  Septal infarct , age undetermined  Abnormal ECG    Confirmed by Dhiraj Gonzalez ([id number]) on 2022 10:08:23 AM ( @ 02:18)      TELEMETRY EVENTS:   Outpt cardiologist:    HPI:  55 yo F with PMH of HTN, DM2, and stroke (per son 2017) who presented for RLE wound. Started 3 months ago when she fell and hit her leg on a wooden cabinet. She put hydrogen peroxide, antibiotic cream, and wrapped the wound but never fully healed. Two weeks ago it started to show yellow pus so her and her family came to the ED for further treatment. Endorsed subjective fevers, chest pain, and vision changes which occurs when walked 2-3 blocks. Denied congestion, sore throat, cough, dyspnea, headache, dysuria, N/V, diarrhea     Per son, they fill her medications at [facility] ([phone]) and this is their current pharmacy. Called them to confirm her medications and they said her last refill of medications was .    In the ED:  Vitals: T: 98.9, BP: 296/ 174, , RR: 18, 98%O2 on RA  Labs: CBC showed WBC 11.23; ESR 92, CRP 35.6  CMP showed glucose 302, alk phos 173; ; VBG pH 7.45  EKG pending  CXR showed borderline cardiomegaly and no airspace opacity.   X-ray of RLE also pending.     Received unasyn and vanco, humalin R, IV vasotec, IV labetalol   (02 Aug 2022 07:47)      ---  cardio fellow additional notes:        PAST MEDICAL & SURGICAL HISTORY  Hypertension    Diabetes mellitus        FAMILY HISTORY:  FAMILY HISTORY:  FH: type 2 diabetes mellitus        SOCIAL HISTORY:  Social History:  Never smoker, does not drink alcohol or drug use (02 Aug 2022 07:47)      ALLERGIES:  No Known Allergies      MEDICATIONS:  dextrose 5%. 1000 milliLiter(s) (100 mL/Hr) IV Continuous <Continuous>  dextrose 5%. 1000 milliLiter(s) (50 mL/Hr) IV Continuous <Continuous>  dextrose 50% Injectable 25 Gram(s) IV Push once  dextrose 50% Injectable 12.5 Gram(s) IV Push once  dextrose 50% Injectable 25 Gram(s) IV Push once  glucagon  Injectable 1 milliGRAM(s) IntraMuscular once  insulin glargine Injectable (LANTUS) 15 Unit(s) SubCutaneous every morning  insulin lispro (ADMELOG) corrective regimen sliding scale   SubCutaneous three times a day before meals  insulin lispro Injectable (ADMELOG) 5 Unit(s) SubCutaneous three times a day before meals  losartan 50 milliGRAM(s) Oral daily  vancomycin  IVPB 1250 milliGRAM(s) IV Intermittent every 12 hours    PRN:  acetaminophen     Tablet .. 650 milliGRAM(s) Oral every 6 hours PRN  dextrose Oral Gel 15 Gram(s) Oral once PRN  melatonin 3 milliGRAM(s) Oral at bedtime PRN      HOME MEDICATIONS:  Home Medications:  losartan 50 mg oral tablet: 1 tab(s) orally once a day (02 Aug 2022 10:38)  metFORMIN 500 mg oral tablet: 1 tab(s) orally 2 times a day (02 Aug 2022 10:38)  metoprolol succinate 50 mg oral tablet, extended release: 1 tab(s) orally once a day (02 Aug 2022 10:38)      VITALS:   T(F): 98.9 ( @ 00:32), Max: 98.9 ( @ 00:32)  HR: 98 ( @ 10:04) (86 - 127)  BP: 178/88 ( @ 10:04) (178/88 - 296/174)  BP(mean): --  RR: 19 ( @ 07:21) (18 - 19)  SpO2: 98% ( @ 07:21) (98% - 98%)    I&O's Summary      REVIEW OF SYSTEMS:  CONSTITUTIONAL: No weakness, fevers or chills  HEENT: No visual changes, neck/ear pain  RESPIRATORY: No cough, sob  CARDIOVASCULAR: See HPI  GASTROINTESTINAL: No abdominal pain. No nausea, vomiting, diarrhea   GENITOURINARY: No dysuria, frequency or hematuria  NEUROLOGICAL: Left sided weakness  SKIN: No new rashes    PHYSICAL EXAM:  General: Not in distress.  Non-toxic appearing.   HEENT: EOMI  Cardio: regular, S1, S2, no murmur  Pulm: B/L BS.  No wheezing / crackles / rales  Abdomen: Soft, non-tender, non-distended. Normoactive bowel sounds  Extremities: + 1 peripheral edema  Neuro: A&O x3. No focal deficits    LABS:                        14.4   11.23 )-----------( 342      ( 02 Aug 2022 01:47 )             40.5     08    136  |  94<L>  |  14  ----------------------------<  302<H>  3.8   |  28  |  0.8    Ca    9.7      02 Aug 2022 01:47    TPro  7.9  /  Alb  4.1  /  TBili  0.4  /  DBili  x   /  AST  28  /  ALT  12  /  AlkPhos  173<H>        Sedimentation Rate, Erythrocyte: 92 mm/Hr *H* (22 @ 01:50)  Lactate, Blood: 1.8 mmol/L (22 @ 01:50)  Troponin T, Serum: <0.01 ng/mL (22 @ 01:47)    CARDIAC MARKERS ( 02 Aug 2022 01:47 )  x     / <0.01 ng/mL / x     / x     / x            Troponin trend:    Serum Pro-Brain Natriuretic Peptide: 714 pg/mL (22 @ 01:47)      COVID-19 PCR: NotDetec (02 Aug 2022 01:40)      RADIOLOGY:  -CXR: Borderline cardiomegaly. No airspace opacity.  -TTE: none  -CCTA: none  -STRESS TEST: none  -CATHETERIZATION: none   -OTHER:  EC Lead ECG:   Ventricular Rate 97 BPM    Atrial Rate 97 BPM    P-R Interval 166 ms    QRS Duration 88 ms    Q-T Interval 390 ms    QTC Calculation(Bazett) 495 ms    P Axis 61 degrees    R Axis 20 degrees    T Axis 75 degrees    Diagnosis Line Normal sinus rhythm  Biatrial enlargement  Septal infarct , age undetermined  Abnormal ECG    Confirmed by Dhiraj Gonzalez ([id number]) on 2022 10:08:23 AM ( @ 02:18)      TELEMETRY EVENTS:   HPI:  57 yo F with PMH of HTN, DM2, and stroke (per son 2017) who presented for RLE wound. Started 3 months ago when she fell and hit her leg on a wooden cabinet. She put hydrogen peroxide, antibiotic cream, and wrapped the wound but never fully healed. Two weeks ago it started to show yellow pus so her and her family came to the ED for further treatment. Endorsed subjective fevers, chest pain, and vision changes which occurs when walked 2-3 blocks. Denied congestion, sore throat, cough, dyspnea, headache, dysuria, N/V, diarrhea     Per son, they fill her medications at [facility] ([phone]) and this is their current pharmacy. Called them to confirm her medications and they said her last refill of medications was 2018.    In the ED:  Vitals: T: 98.9, BP: 296/ 174, , RR: 18, 98%O2 on RA  Labs: CBC showed WBC 11.23; ESR 92, CRP 35.6  CMP showed glucose 302, alk phos 173; ; VBG pH 7.45  EKG pending  CXR showed borderline cardiomegaly and no airspace opacity.   X-ray of RLE also pending.     Received unasyn and vanco, humalin R, IV vasotec, IV labetalol          PAST MEDICAL & SURGICAL HISTORY  Hypertension    Diabetes mellitus        FAMILY HISTORY:  FH: type 2 diabetes mellitus      SOCIAL HISTORY:  Never smoker, does not drink alcohol or drug use (02 Aug 2022 07:47)      ALLERGIES:  No Known Allergies      MEDICATIONS:  dextrose 5%. 1000 milliLiter(s) (100 mL/Hr) IV Continuous <Continuous>  dextrose 5%. 1000 milliLiter(s) (50 mL/Hr) IV Continuous <Continuous>  dextrose 50% Injectable 25 Gram(s) IV Push once  dextrose 50% Injectable 12.5 Gram(s) IV Push once  dextrose 50% Injectable 25 Gram(s) IV Push once  glucagon  Injectable 1 milliGRAM(s) IntraMuscular once  insulin glargine Injectable (LANTUS) 15 Unit(s) SubCutaneous every morning  insulin lispro (ADMELOG) corrective regimen sliding scale   SubCutaneous three times a day before meals  insulin lispro Injectable (ADMELOG) 5 Unit(s) SubCutaneous three times a day before meals  losartan 50 milliGRAM(s) Oral daily  vancomycin  IVPB 1250 milliGRAM(s) IV Intermittent every 12 hours    PRN:  acetaminophen     Tablet .. 650 milliGRAM(s) Oral every 6 hours PRN  dextrose Oral Gel 15 Gram(s) Oral once PRN  melatonin 3 milliGRAM(s) Oral at bedtime PRN      HOME MEDICATIONS:  Home Medications:  losartan 50 mg oral tablet: 1 tab(s) orally once a day (02 Aug 2022 10:38)  metFORMIN 500 mg oral tablet: 1 tab(s) orally 2 times a day (02 Aug 2022 10:38)  metoprolol succinate 50 mg oral tablet, extended release: 1 tab(s) orally once a day (02 Aug 2022 10:38)      VITALS:   T(F): 98.9 (08-02 @ 00:32), Max: 98.9 (08-02 @ 00:32)  HR: 98 (08-02 @ 10:04) (86 - 127)  BP: 178/88 (08-02 @ 10:04) (178/88 - 296/174)  BP(mean): --  RR: 19 (08-02 @ 07:21) (18 - 19)  SpO2: 98% (08-02 @ 07:21) (98% - 98%)      REVIEW OF SYSTEMS:  CONSTITUTIONAL: No weakness, fevers or chills  HEENT: No visual changes, neck/ear pain  RESPIRATORY: No cough, sob  CARDIOVASCULAR: See HPI  GASTROINTESTINAL: No abdominal pain. No nausea, vomiting, diarrhea   GENITOURINARY: No dysuria, frequency or hematuria  NEUROLOGICAL: Left sided weakness  SKIN: No new rashes    PHYSICAL EXAM:  General: Not in distress.  Non-toxic appearing.   HEENT: EOMI  Cardio: regular, S1, S2, no murmur  Pulm: B/L BS.  No wheezing / crackles / rales  Abdomen: Soft, non-tender, non-distended  Extremities: + 1 peripheral edema  Neuro: +left-sided weakness      LABS:                        14.4   11.23 )-----------( 342      ( 02 Aug 2022 01:47 )             40.5     08-02    136  |  94<L>  |  14  ----------------------------<  302<H>  3.8   |  28  |  0.8    Ca    9.7      02 Aug 2022 01:47    TPro  7.9  /  Alb  4.1  /  TBili  0.4  /  DBili  x   /  AST  28  /  ALT  12  /  AlkPhos  173<H>  08-02      Sedimentation Rate, Erythrocyte: 92 mm/Hr *H* (08-02-22 @ 01:50)    Lactate, Blood: 1.8 mmol/L (08-02-22 @ 01:50)    Troponin T, Serum: <0.01 ng/mL (08-02-22 @ 01:47)    Serum Pro-Brain Natriuretic Peptide: 714 pg/mL (08-02-22 @ 01:47)      COVID-19 PCR: NotDetec (02 Aug 2022 01:40)      RADIOLOGY:  -CXR: Borderline cardiomegaly. No airspace opacity.  -TTE: none  -CCTA: none  -STRESS TEST: none  -CATHETERIZATION: none         12 Lead ECG:   Ventricular Rate 97 BPM    Atrial Rate 97 BPM    P-R Interval 166 ms    QRS Duration 88 ms    Q-T Interval 390 ms    QTC Calculation(Bazett) 495 ms    P Axis 61 degrees    R Axis 20 degrees    T Axis 75 degrees    Diagnosis Line Normal sinus rhythm  Biatrial enlargement  Septal infarct , age undetermined  Abnormal ECG    Confirmed by Dhiraj Gonzalez ([id number]) on 8/2/2022 10:08:23 AM (08-02 @ 02:18)

## 2022-08-02 NOTE — ED ADULT NURSE NOTE - OBJECTIVE STATEMENT
Pt came to ER c/o high blood pressure 280/170 + infected wound on her right lower leg 2x5 cm. Pt hx DM, HTN, CVA last year. Denies CP/SOB. NSR. Abdomen soft to touch, no pain complain. No legs swelling.

## 2022-08-02 NOTE — CONSULT NOTE ADULT - SUBJECTIVE AND OBJECTIVE BOX
Podiatry Consult Note    Subjective:    JOSE MITCHELL is a 56y year old Female seen at bedside with a chief complaint of  painful thickened, dystrophic, ingrowing and long toenails digits 1-5 bilaterally  and preventative foot examination. Patient is medically managed  by Medicine/Hospitalists.  Patient denies any history of trauma to both feet. Patient has no other pedal complaints.  Patient is experiencing pain while standing, walking and in shoe gear.     PMH: Hypertension    Diabetes mellitus     PAST MEDICAL & SURGICAL HISTORY:  Hypertension      Diabetes mellitus        PSH:  Allergies:No Known Allergies      Labs:                        14.4   11.23 )-----------( 342      ( 02 Aug 2022 01:47 )             40.5       WBC Trend  11.23<H> Date (08-02 @ 01:47)      Chem  08-02    136  |  94<L>  |  14  ----------------------------<  302<H>  3.8   |  28  |  0.8    Ca    9.7      02 Aug 2022 01:47    TPro  7.9  /  Alb  4.1  /  TBili  0.4  /  DBili  x   /  AST  28  /  ALT  12  /  AlkPhos  173<H>  08-02      T(F): 98.9 (08-02-22 @ 00:32), Max: 98.9 (08-02-22 @ 00:32)  HR: 98 (08-02-22 @ 11:33) (86 - 127)  BP: 205/110 (08-02-22 @ 11:33) (176/95 - 296/174)  RR: 19 (08-02-22 @ 07:21) (18 - 19)  SpO2: 98% (08-02-22 @ 07:21) (98% - 98%)  Wt(kg): --    Objective:   DERM:  Skin warm, dry and supple bilateral.  No open lesions or inter-digital macerations noted bilateral.   Toenails 1-5 Right and Left feet thickened, elongated, discolored, and dystrophic with subungual debris. There is pain upon palpation of all fungal and ingrowing nails 1-5 bilaterally.   VASC: Dorsalis Pedis and Posterior Tibial pulses -/4.  Capillary re-fill time less than 3 seconds digits 1-5 bilateral.   NEURO: Protective sensation intact to the level of the digits bilateral.  MSK: Muscle strength 5/5 all major muscle groups bilateral. No structural abnormality, bilaterally    Assessment:   Bilateral dystrophic toenails consistent with  Onychomycosis.   Pain from Elongated nails, ingrowing, incurvated,  and dystrophic nails upon palpation of nails.  Xerosis of bilateral plantar feet.     Plan:   Discussed diagnosis and treatment with patient  Aseptic debridement and curettage of all fungal and ingrowing nails 1-5 bilateral with sterile nail nipper.  Discussed importance of daily foot examinations, drying of feet after bathing and proper shoe gear.  Rec Burn consult for leg ulceration.   Patient Would Benefit From Periodic Debridements; Can Follow Up as Outpatient w/ Dr. Canchola Post Discharge   Discussed Plan w/ Attending;     Spectra;

## 2022-08-02 NOTE — ED PROVIDER NOTE - CARE PLAN
Principal Discharge DX:	Cellulitis  Secondary Diagnosis:	Leg wound, right  Secondary Diagnosis:	Uncontrolled hypertension  Secondary Diagnosis:	Uncontrolled diabetes mellitus with hyperglycemia   1

## 2022-08-02 NOTE — CONSULT NOTE ADULT - ATTENDING COMMENTS
mycotic nails debrided to patients tolerance, at patients request  b/l feet well perfused  RLE wound-->recommend burn consult for evaluation, possible debridement  patient can follow up as outpatient for DM foot care    Aiden Canchola DPM

## 2022-08-02 NOTE — H&P ADULT - NSHPPHYSICALEXAM_GEN_ALL_CORE
T(C): 37.2 (08-02-22 @ 00:32), Max: 37.2 (08-02-22 @ 00:32)  HR: 88 (08-02-22 @ 08:45) (86 - 127)  BP: 199/110 (08-02-22 @ 08:45) (199/110 - 296/174)  RR: 19 (08-02-22 @ 07:21) (18 - 19)  SpO2: 98% (08-02-22 @ 07:21) (98% - 98%)    CONSTITUTIONAL: Well groomed, no apparent distress  RESPIRATORY: No respiratory distress, no use of accessory muscles; CTA b/l, no wheezes, rales or rhonchi  CARDIOVASCULAR: tachycardic, +S1S2, no murmurs, no rubs, no gallops; chest tender to palpation   GASTROINTESTINAL: Soft, non tender, non distended, no rebound, no guarding  NEUROLOGIC: UE weakness L>R, moves all extremities appropriately   EXTREMITIES: 2 inch ulcer on right anterior shin with dry yellow pus and tender to palpation in surrounding area, bilateral onychauxia, no peripheral edema  PSYCHIATRIC: Appropriate insight/judgment; A+O x 3, mood and affect appropriate

## 2022-08-02 NOTE — CONSULT NOTE ADULT - ATTENDING COMMENTS
56 F Thai-speaking with HTN, DM, CVA with left-sided weakness admitted with hypertensive urgency and reproducible chest pain.      Echo  Agree with Nifedipine, Labetalol, Losartan  Consider adding Hydralazine if needed  Pharmacologic NST after adequate BP control 56 F [place]-speaking with HTN, DM, CVA with left-sided weakness admitted with hypertensive urgency, reproducible chest pain and RLE cellulitis.      Echo  Agree with Nifedipine, Labetalol, Losartan  Consider adding Hydralazine if needed  Pharmacologic NST after adequate BP control and cellulitis resolves (may be done inpatient or outpatient)

## 2022-08-02 NOTE — CONSULT NOTE ADULT - SUBJECTIVE AND OBJECTIVE BOX
Patient is a 56y old  Female who presents with a chief complaint of     HPI:  57 yo F with PMH of HTN, DM2 who presented for RLE wound. Started 3 months ago when she fell and hit her leg on a wooden cabinet. She put hydrogen peroxide, antibiotic cream, and wrapped the wound but never fully healed. Two weeks ago it started to show yellow pus so her and her family came to the ED for further treatment. Endorsed fevers, chest pain, and vision changes which occurs when walked 2-3 blocks. Denied congestion, sore throat, cough, dyspnea, headache, dysuria, N/V, diarrhea.    In the ED:  Vitals: T: 98.9, BP: 296/ 174, , RR: 18, 98%O2 on RA  Labs: CBC showed  WBC 11.23, CB showed glucose 302, , VBG pH 7.45  CXR and X-ray of ***    Received unasyn and vanco, humalin R, IV push vasotec, IV labetalol, and   ESR 92, CRP 35.6, (02 Aug 2022 07:47)      PAST MEDICAL & SURGICAL HISTORY:  Hypertension      Diabetes mellitus          Occupational hx:    Social hx:    FAMILY HISTORY:  FH: type 2 diabetes mellitus    :  No known cardiovacular family hisotry     ROS:  See HPI     Allergies    No Known Allergies    Intolerances          PHYSICAL EXAM    ICU Vital Signs Last 24 Hrs  T(C): 37.2 (02 Aug 2022 00:32), Max: 37.2 (02 Aug 2022 00:32)  T(F): 98.9 (02 Aug 2022 00:32), Max: 98.9 (02 Aug 2022 00:32)  HR: 98 (02 Aug 2022 10:04) (86 - 127)  BP: 178/88 (02 Aug 2022 10:04) (178/88 - 296/174)  BP(mean): --  ABP: --  ABP(mean): --  RR: 19 (02 Aug 2022 07:21) (18 - 19)  SpO2: 98% (02 Aug 2022 07:21) (98% - 98%)    O2 Parameters below as of 02 Aug 2022 05:01  Patient On (Oxygen Delivery Method): room air            CONSTITUTIONAL:  Well nourished.  NAD    ENT:   Airway patent,   No thrush    EYES:   Clear bilaterally,   pupils equal,   round and reactive to light.    CARDIAC:   Normal rate,   regular rhythm.    no edema      CAROTID:   normal systolic impulse  no bruits    RESPIRATORY:   No wheezing  Normal chest expansion  Not tachypneic,  No use of accessory muscles    GASTROINTESTINAL:  Abdomen soft,   non-tender,   no guarding,   + BS    MUSCULOSKELETAL:   range of motion is not limited,  no clubbing, cyanosis    NEUROLOGICAL:   Alert and oriented   no motor deficits.    SKIN:   Skin normal color for race,   No evidence of rash.    PSYCHIATRIC:   normal mood and affect.   no apparent risk to self or others.        LABS:                          14.4   11.23 )-----------( 342      ( 02 Aug 2022 01:47 )             40.5                                               08-02    136  |  94<L>  |  14  ----------------------------<  302<H>  3.8   |  28  |  0.8    Ca    9.7      02 Aug 2022 01:47    TPro  7.9  /  Alb  4.1  /  TBili  0.4  /  DBili  x   /  AST  28  /  ALT  12  /  AlkPhos  173<H>  08-02                                                 CARDIAC MARKERS ( 02 Aug 2022 01:47 )  x     / <0.01 ng/mL / x     / x     / x                                                LIVER FUNCTIONS - ( 02 Aug 2022 01:47 )  Alb: 4.1 g/dL / Pro: 7.9 g/dL / ALK PHOS: 173 U/L / ALT: 12 U/L / AST: 28 U/L / GGT: x                                                                                                                                       X-Rays                                                                                     ECHO    MEDICATIONS  (STANDING):  ampicillin/sulbactam  IVPB 3 Gram(s) IV Intermittent every 6 hours  ampicillin/sulbactam  IVPB      atorvastatin 10 milliGRAM(s) Oral at bedtime  dextrose 5%. 1000 milliLiter(s) (100 mL/Hr) IV Continuous <Continuous>  dextrose 5%. 1000 milliLiter(s) (50 mL/Hr) IV Continuous <Continuous>  dextrose 50% Injectable 25 Gram(s) IV Push once  dextrose 50% Injectable 12.5 Gram(s) IV Push once  dextrose 50% Injectable 25 Gram(s) IV Push once  famotidine    Tablet 20 milliGRAM(s) Oral two times a day  glucagon  Injectable 1 milliGRAM(s) IntraMuscular once  insulin glargine Injectable (LANTUS) 15 Unit(s) SubCutaneous every morning  insulin lispro (ADMELOG) corrective regimen sliding scale   SubCutaneous three times a day before meals  insulin lispro Injectable (ADMELOG) 5 Unit(s) SubCutaneous three times a day before meals  sertraline 50 milliGRAM(s) Oral daily    MEDICATIONS  (PRN):  dextrose Oral Gel 15 Gram(s) Oral once PRN Blood Glucose LESS THAN 70 milliGRAM(s)/deciliter  zolpidem 5 milliGRAM(s) Oral at bedtime PRN Insomnia  zolpidem 5 milliGRAM(s) Oral at bedtime PRN Insomnia         Patient is a 56y old  Female who presents with a chief complaint of     HPI:  57 yo F with PMH of HTN, DM2 who presented for RLE wound. Started 3 months ago when she fell and hit her leg on a wooden cabinet. She put hydrogen peroxide, antibiotic cream, and wrapped the wound but never fully healed. Two weeks ago it started to show yellow pus so her and her family came to the ED for further treatment. Endorsed fevers, chest pain, and vision changes which occurs when walked 2-3 blocks. Denied congestion, sore throat, cough, dyspnea, headache, dysuria, N/V, diarrhea.    In the ED:  Vitals: T: 98.9, BP: 296/ 174, , RR: 18, 98%O2 on RA  Labs: CBC showed  WBC 11.23, CB showed glucose 302, , VBG pH 7.45  CXR and X-ray of ***    Received unasyn and vanco, humalin R, IV push vasotec, IV labetalol, and   ESR 92, CRP 35.6, (02 Aug 2022 07:47)      PAST MEDICAL & SURGICAL HISTORY:  Hypertension      Diabetes mellitus    FAMILY HISTORY:  FH: type 2 diabetes mellitus    :  No known cardiovacular family hisotry     ROS:  See HPI     Allergies    No Known Allergies    Intolerances          PHYSICAL EXAM    ICU Vital Signs Last 24 Hrs  T(C): 37.2 (02 Aug 2022 00:32), Max: 37.2 (02 Aug 2022 00:32)  T(F): 98.9 (02 Aug 2022 00:32), Max: 98.9 (02 Aug 2022 00:32)  HR: 98 (02 Aug 2022 10:04) (86 - 127)  BP: 178/88 (02 Aug 2022 10:04) (178/88 - 296/174)  BP(mean): --  ABP: --  ABP(mean): --  RR: 19 (02 Aug 2022 07:21) (18 - 19)  SpO2: 98% (02 Aug 2022 07:21) (98% - 98%)    O2 Parameters below as of 02 Aug 2022 05:01  Patient On (Oxygen Delivery Method): room air            CONSTITUTIONAL:  Well nourished.  NAD    ENT:   Airway patent,   No thrush    EYES:   Clear bilaterally,   pupils equal,   round and reactive to light.    CARDIAC:   Normal rate,   regular rhythm.    no edema      CAROTID:   normal systolic impulse  no bruits    RESPIRATORY:   No wheezing  Normal chest expansion  Not tachypneic,  No use of accessory muscles    GASTROINTESTINAL:  Abdomen soft,   non-tender,   no guarding,   + BS    MUSCULOSKELETAL:   range of motion is not limited,  no clubbing, cyanosis    NEUROLOGICAL:   Alert and oriented   no motor deficits.    SKIN:   right shin [id number] dry base ulcer with surrounding erythema and yellow appearing dried pus  Skin normal color for race,       PSYCHIATRIC:   normal mood and affect.   no apparent risk to self or others.        LABS:                          14.4   11.23 )-----------( 342      ( 02 Aug 2022 01:47 )             40.5                                               08-02    136  |  94<L>  |  14  ----------------------------<  302<H>  3.8   |  28  |  0.8    Ca    9.7      02 Aug 2022 01:47    TPro  7.9  /  Alb  4.1  /  TBili  0.4  /  DBili  x   /  AST  28  /  ALT  12  /  AlkPhos  173<H>  08-02                                                 CARDIAC MARKERS ( 02 Aug 2022 01:47 )  x     / <0.01 ng/mL / x     / x     / x                                                LIVER FUNCTIONS - ( 02 Aug 2022 01:47 )  Alb: 4.1 g/dL / Pro: 7.9 g/dL / ALK PHOS: 173 U/L / ALT: 12 U/L / AST: 28 U/L / GGT: x                                                                                                                                       X-Rays                                                                                     ECHO    MEDICATIONS  (STANDING):  ampicillin/sulbactam  IVPB 3 Gram(s) IV Intermittent every 6 hours  ampicillin/sulbactam  IVPB      atorvastatin 10 milliGRAM(s) Oral at bedtime  dextrose 5%. 1000 milliLiter(s) (100 mL/Hr) IV Continuous <Continuous>  dextrose 5%. 1000 milliLiter(s) (50 mL/Hr) IV Continuous <Continuous>  dextrose 50% Injectable 25 Gram(s) IV Push once  dextrose 50% Injectable 12.5 Gram(s) IV Push once  dextrose 50% Injectable 25 Gram(s) IV Push once  famotidine    Tablet 20 milliGRAM(s) Oral two times a day  glucagon  Injectable 1 milliGRAM(s) IntraMuscular once  insulin glargine Injectable (LANTUS) 15 Unit(s) SubCutaneous every morning  insulin lispro (ADMELOG) corrective regimen sliding scale   SubCutaneous three times a day before meals  insulin lispro Injectable (ADMELOG) 5 Unit(s) SubCutaneous three times a day before meals  sertraline 50 milliGRAM(s) Oral daily    MEDICATIONS  (PRN):  dextrose Oral Gel 15 Gram(s) Oral once PRN Blood Glucose LESS THAN 70 milliGRAM(s)/deciliter  zolpidem 5 milliGRAM(s) Oral at bedtime PRN Insomnia  zolpidem 5 milliGRAM(s) Oral at bedtime PRN Insomnia         Patient is a 56y old  Female who presents with a chief complaint of leg wound/ HTN    HPI:  55 yo F with PMH of HTN, DM2 who presented for RLE wound. Started 3 months ago when she fell and hit her leg on a wooden cabinet. She put hydrogen peroxide, antibiotic cream, and wrapped the wound but never fully healed. Two weeks ago it started to show yellow pus so her and her family came to the ED for further treatment. Endorsed fevers, chest pain, and vision changes which occurs when walked 2-3 blocks. Denied congestion, sore throat, cough, dyspnea, headache, dysuria, N/V, diarrhea.    In the ED:  Vitals: T: 98.9, BP: 296/ 174, , RR: 18, 98%O2 on RA  Labs: CBC showed  WBC 11.23, CB showed glucose 302, , VBG pH 7.45  CXR and X-ray of ***    Received unasyn and vanco, humalin R, IV push vasotec, IV labetalol, and   ESR 92, CRP 35.6, Admitted to SDU called to evaluate      PAST MEDICAL & SURGICAL HISTORY:  Hypertension      Diabetes mellitus    FAMILY HISTORY:  FH: type 2 diabetes mellitus    :  No known cardiovacular family hisotry     ROS:  See HPI     Allergies    No Known Allergies    Intolerances          PHYSICAL EXAM    ICU Vital Signs Last 24 Hrs  T(C): 37.2 (02 Aug 2022 00:32), Max: 37.2 (02 Aug 2022 00:32)  T(F): 98.9 (02 Aug 2022 00:32), Max: 98.9 (02 Aug 2022 00:32)  HR: 98 (02 Aug 2022 10:04) (86 - 127)  BP: 178/88 (02 Aug 2022 10:04) (178/88 - 296/174)  BP(mean): --  RR: 19 (02 Aug 2022 07:21) (18 - 19)  SpO2: 98% (02 Aug 2022 07:21) (98% - 98%)    O2 Parameters below as of 02 Aug 2022 05:01  Patient On (Oxygen Delivery Method): room air            CONSTITUTIONAL:  Well nourished.    ENT:   Airway patent,   No thrush    EYES:   Clear bilaterally,   pupils equal,   round and reactive to light.    CARDIAC:   Normal rate,   regular rhythm.    no edema        RESPIRATORY:   No wheezing  Normal chest expansion  Not tachypneic,  No use of accessory muscles    GASTROINTESTINAL:  Abdomen soft,   non-tender,   no guarding,   + BS    MUSCULOSKELETAL:   range of motion is not limited,  no clubbing, cyanosis    NEUROLOGICAL:   Alert and oriented   no motor deficits.    SKIN:   right shin [id number] dry base ulcer with surrounding erythema and yellow appearing dried pus  Skin normal color for race,       PSYCHIATRIC:   normal mood and affect.   no apparent risk to self or others.        LABS:                          14.4   11.23 )-----------( 342      ( 02 Aug 2022 01:47 )             40.5                                               08-02    136  |  94<L>  |  14  ----------------------------<  302<H>  3.8   |  28  |  0.8    Ca    9.7      02 Aug 2022 01:47    TPro  7.9  /  Alb  4.1  /  TBili  0.4  /  DBili  x   /  AST  28  /  ALT  12  /  AlkPhos  173<H>  08-02                                                 CARDIAC MARKERS ( 02 Aug 2022 01:47 )  x     / <0.01 ng/mL / x     / x     / x                                                LIVER FUNCTIONS - ( 02 Aug 2022 01:47 )  Alb: 4.1 g/dL / Pro: 7.9 g/dL / ALK PHOS: 173 U/L / ALT: 12 U/L / AST: 28 U/L / GGT: x                                                                                                                                       X-Rays                                                                                     ECHO    MEDICATIONS  (STANDING):  ampicillin/sulbactam  IVPB 3 Gram(s) IV Intermittent every 6 hours  ampicillin/sulbactam  IVPB      atorvastatin 10 milliGRAM(s) Oral at bedtime  dextrose 5%. 1000 milliLiter(s) (100 mL/Hr) IV Continuous <Continuous>  dextrose 5%. 1000 milliLiter(s) (50 mL/Hr) IV Continuous <Continuous>  dextrose 50% Injectable 25 Gram(s) IV Push once  dextrose 50% Injectable 12.5 Gram(s) IV Push once  dextrose 50% Injectable 25 Gram(s) IV Push once  famotidine    Tablet 20 milliGRAM(s) Oral two times a day  glucagon  Injectable 1 milliGRAM(s) IntraMuscular once  insulin glargine Injectable (LANTUS) 15 Unit(s) SubCutaneous every morning  insulin lispro (ADMELOG) corrective regimen sliding scale   SubCutaneous three times a day before meals  insulin lispro Injectable (ADMELOG) 5 Unit(s) SubCutaneous three times a day before meals  sertraline 50 milliGRAM(s) Oral daily    MEDICATIONS  (PRN):  dextrose Oral Gel 15 Gram(s) Oral once PRN Blood Glucose LESS THAN 70 milliGRAM(s)/deciliter  zolpidem 5 milliGRAM(s) Oral at bedtime PRN Insomnia  zolpidem 5 milliGRAM(s) Oral at bedtime PRN Insomnia

## 2022-08-02 NOTE — ED PROVIDER NOTE - ATTENDING CONTRIBUTION TO CARE
pt w hx htn, dm (non compliant with meds for ~1 year), c/o wound to RLE as above. no fever. +pain. incidental marked elev BP. denies ha, cp, sob, vis or speech changes, numbness, weakness, ab pain.    pt in nad, comfortable, perrl, eomi, neck sup, ctab, s1s2, ab soft, nt. nfd.  wound to anterior lower right leg, tender. no crep. nml pulses, cap refill. FROM.    will get labs, imaging, supportive care, abx, reassess

## 2022-08-02 NOTE — ED PROVIDER NOTE - NS ED ROS FT
Constitutional: No fever  Eyes:  No visual changes, eye pain, or discharge.  ENMT:  No hearing changes, earpain, sore throat, runny nose, or difficulty swallowing  Cardiac:  No chest pain, SOB or edema. No chest pain with exertion.  Respiratory:  No cough or respiratory distress.   GI:  No nausea, vomiting, diarrhea  :  No dysuria, frequency or burning.  MS:  No myalgia, muscle weakness, joint pain or back pain.  Neuro:  No headache or weakness.  No LOC.  Skin: +wound, skin redness. No skin rash.

## 2022-08-02 NOTE — ED ADULT TRIAGE NOTE - CHIEF COMPLAINT QUOTE
" Cut to right leg with infection . As per son, pt has not been taking medications for a full year and is diabetic and has HTN"  in triage

## 2022-08-02 NOTE — H&P ADULT - ATTENDING COMMENTS
55 yo F with PMH of HTN, DM2, CVA (2017) not on any medications who presented with purulent right lower extremity wound for 3 months consistent with acute RLE cellulitis and severely elevated BP in ED    PHYSICAL EXAM:  General Appearance: NAD, obese, normal for age and gender. 	  Neck: Normal JVP, no bruit.   Eyes: Conjunctiva clear, Extra Ocular muscles intact. No scleral icterus.  ENMT: Moist oral mucosa. No oral lesion.  Cardiovascular: Regular rate and rhythm S1 S2, No JVD, No murmurs.  Respiratory: Lungs clear to auscultation. No wheezes, rales or rhonchi.  Psychiatry: Alert and oriented x 3, Mood & affect appropriate.  Gastrointestinal:  Soft, Non-tender, Non-distended.  Neurologic: Non-focal deficits.  Musculoskeletal/ extremities: Normal range of motion, No clubbing, cyanosis or edema.  Vascular: Peripheral pulses palpable bilaterally.  Skin/Integumen: No rashes, No ecchymoses, No cyanosis. Long toe nails with fungal infection. Right skin thickness ulceration with underlying erythema and yellow cloudy exudate.     # Purulent RLE cellulitis and ulcerations  - c/w IV antibiotics  - follow up cultures  - Burn evaluation for wound debridement    # Hypertensive urgency, presented with   - goal -180 today then further down to 160-140 tomorrow  - will continue nifedipine 60 mg daily, losartan 50 mg daily  - add labetalol 100 mg po TID  - VA duplex of renal arteries, send renal/adrenal studies  - opthalmology evaluation for vision changes    # DM II with hyperglycemia  - start insulin regiment    # Chest pain and FELIZ possibly MSK related but r/o cardiac etiology   - trend cardiac enzymes    # hx of CVA 2017  - check lipid profile, start statin if lipid profile elevated    # DVT prophylaxis  - on lovenox subcut

## 2022-08-02 NOTE — CONSULT NOTE ADULT - ASSESSMENT
IMPRESSION:    Hypertensive urgency - improving  Right shin infection  Dyspnea on exertion and chest pain  HO DM2    PLAN:    CNS: Avoid CNS depressants    HEENT: Oral care    PULMONARY:  HOB @ 45 degrees. Supplemental O2 as needed    CARDIOVASCULAR: Cardiology eval, trend Troponin, echo, repeat EKG. Continue with Nifedipine    GI: GI prophylaxis.  Feeding carb consistent diet.    RENAL:  Follow up lytes.  Correct as needed.    INFECTIOUS DISEASE: Follow up cultures. Vanc and Unasyn for now, podiatry eval for shin wound.    HEMATOLOGICAL:  DVT prophylaxis.    ENDOCRINE:  Follow up FS.  Insulin protocol if needed. Check A1c    MUSCULOSKELETAL: AAT             IMPRESSION:    Hypertensive urgency - improving  Right shin wound infection  Dyspnea on exertion and chest pain  HO DM2    PLAN:    CNS: Avoid CNS depressants    HEENT: Oral care    PULMONARY:  HOB @ 45 degrees. Supplemental O2 as needed    CARDIOVASCULAR: Cardiology eval, trend Troponin, echo, repeat EKG. Continue with Nifedipine    GI: GI prophylaxis.  Feeding carb consistent diet.    RENAL:  Follow up lytes.  Correct as needed.    INFECTIOUS DISEASE: Follow up cultures. Vanc and Unasyn for now, ID eval  HEMATOLOGICAL:  DVT prophylaxis.    ENDOCRINE:  Follow up FS.  Insulin protocol if needed. Check A1c    MUSCULOSKELETAL: AAT, BURN eval    floor

## 2022-08-02 NOTE — H&P ADULT - NSHPLABSRESULTS_GEN_ALL_CORE
CBC Full  -  ( 02 Aug 2022 01:47 )  WBC Count : 11.23 K/uL  RBC Count : 4.77 M/uL  Hemoglobin : 14.4 g/dL  Hematocrit : 40.5 %  Platelet Count - Automated : 342 K/uL  Mean Cell Volume : 84.9 fL  Mean Cell Hemoglobin : 30.2 pg  Mean Cell Hemoglobin Concentration : 35.6 g/dL  Auto Neutrophil # : 6.60 K/uL  Auto Lymphocyte # : 3.80 K/uL  Auto Monocyte # : 0.67 K/uL  Auto Eosinophil # : 0.08 K/uL  Auto Basophil # : 0.05 K/uL  Auto Neutrophil % : 58.8 %  Auto Lymphocyte % : 33.8 %  Auto Monocyte % : 6.0 %  Auto Eosinophil % : 0.7 %  Auto Basophil % : 0.4 %    08-02    136  |  94<L>  |  14  ----------------------------<  302<H>  3.8   |  28  |  0.8    Ca    9.7      02 Aug 2022 01:47    TPro  7.9  /  Alb  4.1  /  TBili  0.4  /  DBili  x   /  AST  28  /  ALT  12  /  AlkPhos  173<H>  08-02    ACC: [id number] EXAM:  XR CHEST PORTABLE URGENT 1V                          PROCEDURE DATE:  08/02/2022          INTERPRETATION:  Clinical History / Reason for exam: Chest pain    Comparison : None.    Technique/Positioning: AP view of the chest.    Findings:    Support devices: None.    Cardiac/mediastinum/hilum: Borderline cardiomegaly    Lung parenchyma/Pleura: No airspace opacity. No pleural effusion or   pneumothorax    Skeleton/soft tissues: Multiple chronic right rib fracture deformities..    Impression:  Borderline cardiomegaly. No airspace opacity.        --- End of Report ---      PRASHANTH MARTE MD; Attending Radiologist  This document has been electronically signed. Aug  2 2022  9:45AM

## 2022-08-02 NOTE — CONSULT NOTE ADULT - ASSESSMENT
56 year old patient, known to have HTN, DM and history of stroke 4 years ago (with no residual focal neurologic deficits), presented to ED for RLE ulcer with pus and subjective low grade fever. On admission, she was found to be in hypertensive emergency /174 and tachycardic . Patient complained of chest pain. Initial cardiac workup was negative.   Patient reports a fall 4 months ago, and since then, she has been having intermittent, reproducible left chest pain. However, she reports SOB upon exertion of 4 years duration. No associated palpitations or CP.     Impression  Hypertensive emergency   Chest pain likely musculoskeletal   Dyspnea on exertion  HTN/HLD/CVA  RLE ulcer with pus    Plan  -  EKG NSR  - Troponin <0.01;    - Chest X-ray: Multiple chronic right rib fracture deformities. Borderline cardiomegaly    - Chest pain likely secondary to fall 4 months ago; reproducible upon palpation   - Needs cardiac workup for dyspnea on exertion since CVA event (4 years ago) in the setting of cardiac risk factors (HTN/DM/CVA)  - Get Echo   - Once HTN optimized, will likely need nuclear stress test  56 year old patient, known to have HTN, DM and history of stroke 4 years ago (with residual left sided weakness), presented to ED for RLE ulcer with pus and subjective low grade fever. On admission, she was found to be in hypertensive urgency /174 and tachycardic . Patient complained of chest pain. Initial cardiac workup was negative.   Patient reports a fall 4 months ago, and since then, she has been having intermittent, reproducible left chest pain. However, she reports SOB upon exertion of 4 years duration. No associated palpitations or CP.     Impression  Hypertensive emergency   Chest pain likely musculoskeletal   Dyspnea on exertion  HTN/HLD/CVA  RLE ulcer with pus    Plan  -  EKG NSR  - Troponin <0.01;    - Chest X-ray: Multiple chronic right rib fracture deformities. Borderline cardiomegaly    - Chest pain likely secondary to fall 4 months ago; reproducible upon palpation   - Needs cardiac workup for dyspnea on exertion since CVA event (4 years ago) in the setting of cardiac risk factors (HTN/DM/CVA)  - Get Echo   - Once HTN optimized, will likely need nuclear stress test  56 F with HTN, DM and history of stroke 4 years ago (with residual left sided weakness), presented to ED for RLE ulcer with pus and subjective low grade fever. On admission, she was found to be in hypertensive urgency /174 and tachycardic . Patient complained of chest pain. Initial cardiac workup was negative.    Patient reports a fall 4 months ago, and since then, she has been having intermittent, reproducible left chest pain. However, she reports SOB upon exertion of 4 years duration. No associated palpitations or CP.     Impression  Hypertensive emergency   Chest pain likely musculoskeletal   Dyspnea on exertion  CVA with left-sided weakness  HTN/HLD  RLE ulcer with pus    Plan  - EKG NSR  - Troponin <0.01;    - Chest X-ray: Multiple chronic right rib fracture deformities. Borderline cardiomegaly  - Chest pain likely secondary to fall 4 months ago; reproducible upon palpation  - Needs cardiac workup for dyspnea on exertion since CVA event (4 years ago) in the setting of cardiac risk factors (HTN/DM/CVA)  - Get Echo   - Once HTN optimized, will likely need nuclear stress test

## 2022-08-02 NOTE — ED PROVIDER NOTE - PHYSICAL EXAMINATION
CONSTITUTIONAL: Well-developed; well-nourished; in no acute distress.   SKIN: Warm, dry. See extremity exam below.  HEAD: Normocephalic; atraumatic  EYES: PERRL, EOMI, normal sclera and conjunctiva   ENT: No nasal discharge; airway clear. MMM  NECK: Supple; non tender.  CARD:  Tachycardic. Regular rhythm. Normal S1, S2. 2+ radial, DP pulses. 1+ b/l LE edema.  RESP: No increased WOB. CTA b/l without wheezes, crackles, rhonchi  ABD: Normoactive BS. Soft, nontender, nondistended.  EXT: Normal ROM. Right anterior lower leg with 3x5 cm open wound, central purulence, surrounding granulation tissue. Diffuse erythema and edema, warmth of right lower leg.   LYMPH: No acute cervical adenopathy.  NEURO: Alert, oriented, grossly unremarkable  PSYCH: Cooperative, appropriate.

## 2022-08-02 NOTE — H&P ADULT - ASSESSMENT
55 yo F with PMH of HTN, DM2 who presented with right lower extremity wound for 1 month.    #Hypertensive urgency  - procardia 60 mg 55 yo F with PMH of HTN, DM2 who presented with right lower extremity wound for 1 month.    #Hypertensive urgency  - procardia 60 mg    DVT ppx:  GI ppx:  Diet:  Activity:  Code:  Dispo: Admit to stepdown  57 yo F with PMH of HTN, DM2 who presented with purulent right lower extremity wound for 3 months consistent with acute RLE cellulitis.     #Purulent RLE cellulitis  -s/p unasyn and and vancomycin in ED  -c/w vancomycin   -collect wound and blood cultures   -trend inflammatory markers   -follow-up on RLE x-ray for osteomyelitis   -ID consult    #Hypertensive urgency  -BP at admission 296/174 without signs of end organ damage  - s/p IV labetalol (x2) and IV vasotec in the ED  -SBP reduce no more than 25% in the first hour. If stable reduce to 160/100 in next 2-6 hours. Then to normal in next 24-48 hours.   - procardia 60 mg  - cardiology consult       #Hyperglycemia  #DMII  -she has never seen podiatry or ophthalmology for treatment  -consult ophthalmology for blurry vision   -consult podiatry for onychauxia,   -SSI  -Q4 FS checks   -f/u A1c    #Chest pain- most likely MSK related but r/o cardiac etiology   -chest tender to palpation  -CXR unremarkable   -f/u troponins, EKG    DVT ppx: lovenox  GI ppx: protonix  Diet: carb consistent  Activity: AAT  Dispo: Admit to stepdown  55 yo F with PMH of HTN, DM2, CVA (2017) who presented with purulent right lower extremity wound for 3 months consistent with acute RLE cellulitis.     #Purulent RLE cellulitis  -s/p unasyn and and vancomycin in ED  -collect wound and blood cultures   -trend inflammatory markers   -follow-up on RLE x-ray for osteomyelitis   -ID consult  - start vanc 1250mg q12hrs    #Hypertensive urgency  -BP at admission 296/174 without signs of end organ damage  - s/p IV labetalol (x2) and IV vasotec in the ED  -SBP reduce no more than 25% in the first hour. If stable reduce to 160/100 in next 2-6 hours. Then to normal in next 24-48 hours.      - SBP improved to 170s   - start procardia 60 mg; c/w losartan 50mg qd    #Hyperglycemia  #DMII  -she has never seen podiatry or ophthalmology for treatment  - podiatry eval for nail debridement and establish care for diabetes   -started insulin regimen   - FS ac/hs  -f/u A1c    #hx of CVA 2017  #left sided weakness  - start asa 81mg qd     #Chest pain and FELIZ- most likely MSK related but r/o cardiac etiology   -chest tender to palpation  -CXR unremarkable   -f/u troponins, EKG  - cardiology consult     DVT ppx: lovenox  GI ppx: protonix  Diet: carb consistent  Activity: AAT  Dispo: Admit to stepdown  57 yo F with PMH of HTN, DM2, CVA (2017) who presented with purulent right lower extremity wound for 3 months consistent with acute RLE cellulitis.     #Purulent RLE cellulitis  -s/p unasyn and and vancomycin in ED  -collect wound and blood cultures   -trend inflammatory markers   -follow-up on RLE x-ray for osteomyelitis   -ID consult  - start vanc 1250mg q12hrs  -possible burn evaluation for debridement     #Hypertensive urgency  -BP at admission 296/174 without signs of end organ damage  - s/p IV labetalol (x2) and IV vasotec in the ED  -SBP reduce no more than 25% in the first hour. If stable reduce to 160/100 in next 2-6 hours. Then to normal in next 24-48 hours.      - SBP improved to 170s   - procardia 60 mg today; pending tomorrow BP may decrease to 30  -hold home losartan 50mg qd; will resume tomorrow      #Hyperglycemia  #DMII  -she has never seen podiatry or ophthalmology for treatment  - podiatry eval for nail debridement and establish care for diabetes   - ophthalmology eval for blurry vision  -started insulin regimen   - FS ac/hs  -f/u A1c    #Chest pain and FELIZ- possibly MSK related but r/o cardiac etiology   -chest wall tender to palpation  -CXR unremarkable   -f/u troponins, EKG  - cardiology consult     #hx of CVA 2017  #left sided weakness  - start asa 81mg qd     DVT ppx: lovenox  GI ppx: protonix  Diet: carb consistent  Activity: AAT  Dispo: Admit to stepdown  55 yo F with PMH of HTN, DM2, CVA (2017) who presented with purulent right lower extremity wound for 3 months consistent with acute RLE cellulitis.     #Purulent RLE cellulitis  -s/p unasyn and and vancomycin in ED  -collect wound and blood cultures   -trend inflammatory markers   -follow-up on RLE x-ray for osteomyelitis   -ID consult  - start vanc 1250mg q12hrs  -possible burn evaluation for debridement     #Hypertensive urgency  -BP at admission 296/174 without signs of end organ damage  - s/p IV labetalol (x2) and IV vasotec in the ED  -SBP reduce no more than 25% in the first hour. If stable reduce to 160/100 in next 2-6 hours. Then to normal in next 24-48 hours.      - SBP improved to 170s   - procardia 60 mg today; pending tomorrow BP may decrease to 30  -hold home losartan 50mg qd; will resume tomorrow      #Hyperglycemia  #DMII  -she has never seen podiatry or ophthalmology for treatment  - podiatry eval for nail debridement and establish care for diabetes   - ophthalmology eval for blurry vision  -started insulin regimen   - FS ac/hs  -f/u A1c    #Chest pain and FELIZ- possibly MSK related but r/o cardiac etiology   -chest wall tender to palpation  -CXR unremarkable   -f/u troponins, EKG  - cardiology consult     #hx of CVA 2017  #left sided weakness  - start asa 81mg qd     DVT ppx: lovenox  GI ppx: protonix  Diet: DASH/carb consistent  Activity: AAT  Dispo: Admit to stepdown  55 yo F with PMH of HTN, DM2, CVA (2017) who presented with purulent right lower extremity wound for 3 months consistent with acute RLE cellulitis.     #Purulent RLE cellulitis  -s/p unasyn and and vancomycin in ED  -collect wound and blood cultures   -trend inflammatory markers   -follow-up on RLE x-ray for osteomyelitis   -ID consult  - start vanc 1250mg q12hrs  -possible burn evaluation for debridement     #Hypertensive urgency  -BP at admission 296/174 without signs of end organ damage  - s/p IV labetalol (x2) and IV vasotec in the ED  -SBP reduce no more than 25% in the first hour. If stable reduce to 160/100 in next 2-6 hours. Then to normal in next 24-48 hours.      - SBP improved to 170s   - procardia 60 mg today; pending tomorrow BP may decrease to 30  -hold home losartan 50mg qd; will resume tomorrow      # Hyperglycemia  # DMII  - she has never seen podiatry or ophthalmology for treatment  - podiatry eval for nail debridement and establish care for diabetes   - ophthalmology eval for blurry vision  - started insulin regimen   - FS ac/hs  - f/u A1c    # Chest pain and FELIZ- possibly MSK related but r/o cardiac etiology   - chest wall tender to palpation  - CXR unremarkable   - f/u troponins, EKG  - cardiology consult     # hx of CVA 2017  # Left sided weakness  - start asa 81mg qd     DVT ppx: lovenox  GI ppx: protonix  Diet: DASH/carb consistent  Activity: AAT  Dispo: Admit to stepdown

## 2022-08-02 NOTE — H&P ADULT - HISTORY OF PRESENT ILLNESS
57 yo F with PMH of HTN, DM2 who presented with right lower extremity wound for 1 month.      In the ED:  Vitals: T: 98.9, BP: 296/ 174, , RR: 18, 98%O2 on RA  Labs: CBC showed  WBC 11.23, CB showed glucose 302, , VBG pH 7.45  EKG ***  CXR and X-ray of ***    Received unasyn and vanco, humalin R, IV push vasotec, IV labetalol, and   ESR 92, CRP 35.6, 55 yo F with PMH of HTN, DM2 who presented with right lower extremity wound for 1 month.      In the ED:  Vitals: T: 98.9, BP: 296/ 174, , RR: 18, 98%O2 on RA  Labs: CBC showed  WBC 11.23, CB showed glucose 302, , VBG pH 7.45  EKG ***  CXR and X-ray of ***    Received unasyn and vanco, humalin R, IV push vasotec, IV labetalol, and   ESR 92, CRP 35.6, 57 yo F with PMH of HTN, DM2 who presented for groin and back pain starting in April. She saw GI for this pain who said she needed surgery for her AAA first which was performed last week.  Since then she has complained of R sided swelling and intermittent loss of feeling in her right leg. Additionally the groin and back pain has not resolved prompting her to come to the ED for further evaluation. Endorses chills, nausea, and NBNB vomiting. Denied chest pain, dyspnea, headache, vision changes, dysuria.     In the ED:  Vitals: T: 98.9, BP: 296/ 174, , RR: 18, 98%O2 on RA  Labs: CBC showed  WBC 11.23, CB showed glucose 302, , VBG pH 7.45  CXR and X-ray of ***    Received unasyn and vanco, humalin R, IV push vasotec, IV labetalol, and   ESR 92, CRP 35.6, 55 yo F with PMH of HTN, DM2 who presented for RLE wound. Started 3 months ago when she fell and hit her leg on a wooden cabinet. She put hydrogen peroxide, antibiotic cream, and wrapped the wound but never fully healed. Two weeks ago it started to show yellow pus so her and her family came to the ED for further treatment. Endorsed fevers, chest pain, and vision changes which occurs when walked 2-3 blocks. Denied congestion, sore throat, cough, dyspnea, headache, dysuria, N/V, diarrhea.    In the ED:  Vitals: T: 98.9, BP: 296/ 174, , RR: 18, 98%O2 on RA  Labs: CBC showed  WBC 11.23, CB showed glucose 302, , VBG pH 7.45  CXR and X-ray of ***    Received unasyn and vanco, humalin R, IV push vasotec, IV labetalol, and   ESR 92, CRP 35.6, 55 yo F with PMH of HTN, DM2, and stroke (per son 2017) who presented for RLE wound. Started 3 months ago when she fell and hit her leg on a wooden cabinet. She put hydrogen peroxide, antibiotic cream, and wrapped the wound but never fully healed. Two weeks ago it started to show yellow pus so her and her family came to the ED for further treatment. Endorsed subjective fevers, chest pain, and vision changes which occurs when walked 2-3 blocks. Denied congestion, sore throat, cough, dyspnea, headache, dysuria, N/V, diarrhea     Per son, they fill her medications at [facility] ([phone]) and this is their current pharmacy. Called them to confirm her medications and they said her last refill of medications was 2018.    In the ED:  Vitals: T: 98.9, BP: 296/ 174, , RR: 18, 98%O2 on RA  Labs: CBC showed WBC 11.23; ESR 92, CRP 35.6  CMP showed glucose 302, alk phos 173; ; VBG pH 7.45  EKG pending  CXR showed borderline cardiomegaly and no airspace opacity.   X-ray of RLE also pending.     Received unasyn and vanco, humalin R, IV vasotec, IV labetalol   55 yo F with PMH of HTN, DM2, and stroke (per son 2017) who presented for RLE wound. Started 3 months ago when she fell and hit her leg on a wooden cabinet. She put hydrogen peroxide, antibiotic cream, and wrapped the wound but never fully healed. Two weeks ago it started to show yellow pus so her and her family came to the ED for further treatment. Endorsed subjective fevers, chest pain, and vision changes which occurs when walked 2-3 blocks. Denied congestion, sore throat, cough, dyspnea, headache, dysuria, N/V, diarrhea     Per son, they fill her medications at [facility] ([phone]) and this is their current pharmacy. Called them to confirm her medications and they said her last refill of medications was 2018. Pt has not seen a doctor due to insurance problem and has not been taking any medications.    In the ED:  Vitals: T: 98.9, BP: 296/ 174, , RR: 18, 98%O2 on RA  Labs: CBC showed WBC 11.23; ESR 92, CRP 35.6  CMP showed glucose 302, alk phos 173; ; VBG pH 7.45  EKG pending  CXR showed borderline cardiomegaly and no airspace opacity.   X-ray of RLE also pending.     Received unasyn and vanco, humalin R, IV vasotec, IV labetalol

## 2022-08-03 NOTE — PROGRESS NOTE ADULT - SUBJECTIVE AND OBJECTIVE BOX
INTERVAL HPI/OVERNIGHT EVENTS:    SUBJECTIVE: Patient seen and examined at bedside.     no cp, sob, abd pain, fever  no cp, palpitations, calhoun, orthopnea    OBJECTIVE:    VITAL SIGNS:  Vital Signs Last 24 Hrs  T(C): 37 (03 Aug 2022 15:59), Max: 38.1 (03 Aug 2022 08:32)  T(F): 98.6 (03 Aug 2022 15:59), Max: 100.5 (03 Aug 2022 08:32)  HR: 90 (03 Aug 2022 15:59) (77 - 99)  BP: 214/109 (03 Aug 2022 15:59) (166/82 - 235/116)  BP(mean): 116 (02 Aug 2022 17:40) (116 - 116)  RR: 18 (03 Aug 2022 15:59) (17 - 18)  SpO2: 97% (03 Aug 2022 15:59) (94% - 99%)    Parameters below as of 03 Aug 2022 15:59  Patient On (Oxygen Delivery Method): room air          PHYSICAL EXAM:    General: NAD  HEENT: NC/AT; PERRL, clear conjunctiva  Neck: supple  Respiratory: CTA b/l  Cardiovascular: +S1/S2; RRR  Abdomen: soft, NT/ND; +BS x4  Extremities: WWP, 2+ peripheral pulses b/l; no LE edema  Skin: normal color and turgor; no rash  Neurological:    MEDICATIONS:  MEDICATIONS  (STANDING):  atorvastatin 80 milliGRAM(s) Oral at bedtime  dextrose 5%. 1000 milliLiter(s) (100 mL/Hr) IV Continuous <Continuous>  dextrose 5%. 1000 milliLiter(s) (50 mL/Hr) IV Continuous <Continuous>  dextrose 50% Injectable 25 Gram(s) IV Push once  dextrose 50% Injectable 12.5 Gram(s) IV Push once  dextrose 50% Injectable 25 Gram(s) IV Push once  enoxaparin Injectable 40 milliGRAM(s) SubCutaneous every 24 hours  glucagon  Injectable 1 milliGRAM(s) IntraMuscular once  insulin glargine Injectable (LANTUS) 18 Unit(s) SubCutaneous every morning  insulin lispro (ADMELOG) corrective regimen sliding scale   SubCutaneous three times a day before meals  insulin lispro Injectable (ADMELOG) 5 Unit(s) SubCutaneous three times a day before meals  labetalol 300 milliGRAM(s) Oral three times a day  losartan 50 milliGRAM(s) Oral daily  pantoprazole    Tablet 40 milliGRAM(s) Oral before breakfast  vancomycin  IVPB 1250 milliGRAM(s) IV Intermittent every 12 hours    MEDICATIONS  (PRN):  acetaminophen     Tablet .. 650 milliGRAM(s) Oral every 6 hours PRN Temp greater or equal to 38C (100.4F), Mild Pain (1 - 3)  dextrose Oral Gel 15 Gram(s) Oral once PRN Blood Glucose LESS THAN 70 milliGRAM(s)/deciliter  melatonin 3 milliGRAM(s) Oral at bedtime PRN Insomnia  silver sulfADIAZINE 1% Cream 1 Application(s) Topical two times a day PRN Wound Care      ALLERGIES:  Allergies    No Known Allergies    Intolerances        LABS:                        13.0   8.09  )-----------( 309      ( 03 Aug 2022 08:56 )             37.4     Hemoglobin: 13.0 g/dL (08-03 @ 08:56)  Hemoglobin: 13.0 g/dL (08-02 @ 11:59)  Hemoglobin: 14.4 g/dL (08-02 @ 01:47)    CBC Full  -  ( 03 Aug 2022 08:56 )  WBC Count : 8.09 K/uL  RBC Count : 4.35 M/uL  Hemoglobin : 13.0 g/dL  Hematocrit : 37.4 %  Platelet Count - Automated : 309 K/uL  Mean Cell Volume : 86.0 fL  Mean Cell Hemoglobin : 29.9 pg  Mean Cell Hemoglobin Concentration : 34.8 g/dL  Auto Neutrophil # : 3.65 K/uL  Auto Lymphocyte # : 3.63 K/uL  Auto Monocyte # : 0.69 K/uL  Auto Eosinophil # : 0.06 K/uL  Auto Basophil # : 0.04 K/uL  Auto Neutrophil % : 45.2 %  Auto Lymphocyte % : 44.9 %  Auto Monocyte % : 8.5 %  Auto Eosinophil % : 0.7 %  Auto Basophil % : 0.5 %    08-03    138  |  99  |  18  ----------------------------<  225<H>  3.3<L>   |  25  |  0.8    Ca    9.0      03 Aug 2022 08:56  Mg     1.9     08-03    TPro  6.6  /  Alb  3.5  /  TBili  0.4  /  DBili  x   /  AST  14  /  ALT  8   /  AlkPhos  126<H>  08-03    Creatinine Trend: 0.8<--, 0.6<--, 0.8<--  LIVER FUNCTIONS - ( 03 Aug 2022 08:56 )  Alb: 3.5 g/dL / Pro: 6.6 g/dL / ALK PHOS: 126 U/L / ALT: 8 U/L / AST: 14 U/L / GGT: x               hs Troponin:              CSF:                      EKG:   MICROBIOLOGY:    Culture - Blood (collected 02 Aug 2022 01:17)  Source: .Blood Blood-Peripheral  Preliminary Report (03 Aug 2022 09:01):    No growth to date.    Culture - Blood (collected 02 Aug 2022 01:17)  Source: .Blood Blood-Peripheral  Preliminary Report (03 Aug 2022 09:01):    No growth to date.      IMAGING:      Labs, imaging, EKG personally reviewed    RADIOLOGY & ADDITIONAL TESTS: Reviewed.

## 2022-08-03 NOTE — PROGRESS NOTE ADULT - TIME BILLING
56F PMHx HTN, DM2, CVA 2017 here with RLE wound. HTN urgency.     #HTN urgency  max sbp 290s  goal 25% reduction around 219  then 160s for tm  labetalol increase to 300 tid  nifedipine 90  losartan 50  renal doppler unrevealing  tte  #RLE cellulitis/ ulcer  +fever  cont vanco  add ceftriaxone, flagyl  bcx ntd  f/u burn for debridement/ wcx  wound care  #DM2  lantus 18  humalog 5 tid  ssi  #CVA  lipitor 80  outpt f/u for asa  #DVT ppx  lovenox    #Progress Note Handoff:  Pending (specify):  Consults_________, Tests________, Test Results_______, Other___debridement______  Family discussion: d/w pt, son at bedside re: treatment plan, primary dx  Disposition: Home___/SNF___/Other________/Unknown at this time____x____

## 2022-08-03 NOTE — CONSULT NOTE ADULT - ASSESSMENT
ASSESSMENT:  Full thickness wound to Rt Hunter s/p trauma  Infected    RECOMMENDATION:  Wound care - Silvadene/Adaptic/Kerlex BID  Possible Surgical debridement   IV abx as indicated/ per ID  Will follow.

## 2022-08-03 NOTE — CONSULT NOTE ADULT - SUBJECTIVE AND OBJECTIVE BOX
56y  Female  HPI:  55 yo F with PMH of HTN, DM2, and stroke (per son 2017) who presented for RLE wound. Started 3 months ago when she fell and hit her leg on a wooden cabinet. She put hydrogen peroxide, antibiotic cream, and wrapped the wound but never fully healed. Two weeks ago it started to show yellow pus so her and her family came to the ED for further treatment. Endorsed subjective fevers, chest pain, and vision changes which occurs when walked 2-3 blocks. Denied congestion, sore throat, cough, dyspnea, headache, dysuria, N/V, diarrhea     Per son, they fill her medications at [facility] ([phone]) and this is their current pharmacy. Called them to confirm her medications and they said her last refill of medications was 2018. Pt has not seen a doctor due to insurance problem and has not been taking any medications.    In the ED:  Vitals: T: 98.9, BP: 296/ 174, , RR: 18, 98%O2 on RA  Labs: CBC showed WBC 11.23; ESR 92, CRP 35.6  CMP showed glucose 302, alk phos 173; ; VBG pH 7.45  EKG pending  CXR showed borderline cardiomegaly and no airspace opacity.   X-ray of RLE also pending.     Received unasyn and vanco, humalin R, IV vasotec, IV labetalol   (02 Aug 2022 07:47)    Hospital course***  Allergies    No Known Allergies    Intolerances      PAST MEDICAL & SURGICAL HISTORY:  Hypertension      Diabetes mellitus      No significant past surgical history          Labs:                        13.0   8.09  )-----------( 309      ( 03 Aug 2022 08:56 )             37.4         PE:  PHYSICAL EXAM: AAo x 3  full thickness wound to Rt Hunter 5x3 cm with yellow adherant eschar. serosang dc  erythema+, tenderness+

## 2022-08-04 NOTE — CONSULT NOTE ADULT - SUBJECTIVE AND OBJECTIVE BOX
JOSE MITCHELL  56y, Female  Allergy: No Known Allergies      All historical available data reviewed.    HPI:  55 yo F with PMH of HTN, DM2, and stroke (per son 2017) who presented for RLE wound. Started 3 months ago when she fell and hit her leg on a wooden cabinet. She put hydrogen peroxide, antibiotic cream, and wrapped the wound but never fully healed. Two weeks ago it started to show yellow pus so her and her family came to the ED for further treatment. Endorsed subjective fevers, chest pain, and vision changes which occurs when walked 2-3 blocks. Denied congestion, sore throat, cough, dyspnea, headache, dysuria, N/V, diarrhea     Per son, they fill her medications at [facility] ([phone]) and this is their current pharmacy. Called them to confirm her medications and they said her last refill of medications was 2018. Pt has not seen a doctor due to insurance problem and has not been taking any medications.    In the ED:  Vitals: T: 98.9, BP: 296/ 174, , RR: 18, 98%O2 on RA  Labs: CBC showed WBC 11.23; ESR 92, CRP 35.6  CMP showed glucose 302, alk phos 173; ; VBG pH 7.45  EKG pending  CXR showed borderline cardiomegaly and no airspace opacity.   X-ray of RLE also pending.     Received unasyn and vanco, humalin R, IV vasotec, IV labetalol   (02 Aug 2022 07:47)    FAMILY HISTORY:  FH: type 2 diabetes mellitus      PAST MEDICAL & SURGICAL HISTORY:  Hypertension      Diabetes mellitus      No significant past surgical history            VITALS:  T(F): 98.2, Max: 99.2 (08-03-22 @ 10:19)  HR: 76  BP: 165/86  RR: 19Vital Signs Last 24 Hrs  T(C): 36.8 (04 Aug 2022 08:08), Max: 37.3 (03 Aug 2022 10:19)  T(F): 98.2 (04 Aug 2022 08:08), Max: 99.2 (03 Aug 2022 10:19)  HR: 76 (04 Aug 2022 08:08) (76 - 100)  BP: 165/86 (04 Aug 2022 08:08) (165/86 - 226/124)  BP(mean): --  RR: 19 (04 Aug 2022 08:08) (18 - 19)  SpO2: 96% (04 Aug 2022 08:08) (96% - 97%)    Parameters below as of 04 Aug 2022 08:08  Patient On (Oxygen Delivery Method): room air        TESTS & MEASUREMENTS:                        11.6   10.01 )-----------( 272      ( 04 Aug 2022 08:26 )             33.4     08-03    138  |  99  |  18  ----------------------------<  225<H>  3.3<L>   |  25  |  0.8    Ca    9.0      03 Aug 2022 08:56  Mg     1.9     08-03    TPro  6.6  /  Alb  3.5  /  TBili  0.4  /  DBili  x   /  AST  14  /  ALT  8   /  AlkPhos  126<H>  08-03    LIVER FUNCTIONS - ( 03 Aug 2022 08:56 )  Alb: 3.5 g/dL / Pro: 6.6 g/dL / ALK PHOS: 126 U/L / ALT: 8 U/L / AST: 14 U/L / GGT: x             Culture - Blood (collected 08-02-22 @ 18:04)  Source: .Blood Blood  Preliminary Report (08-04-22 @ 01:02):    No growth to date.    Culture - Blood (collected 08-02-22 @ 18:04)  Source: .Blood Blood  Preliminary Report (08-04-22 @ 01:02):    No growth to date.    Culture - Blood (collected 08-02-22 @ 01:17)  Source: .Blood Blood-Peripheral  Preliminary Report (08-03-22 @ 09:01):    No growth to date.    Culture - Blood (collected 08-02-22 @ 01:17)  Source: .Blood Blood-Peripheral  Preliminary Report (08-03-22 @ 09:01):    No growth to date.            RADIOLOGY & ADDITIONAL TESTS:  Personal review of radiological diagnostics performed  Echo and EKG results noted when applicable.     MEDICATIONS:  acetaminophen     Tablet .. 650 milliGRAM(s) Oral every 6 hours PRN  atorvastatin 80 milliGRAM(s) Oral at bedtime  dextrose 5%. 1000 milliLiter(s) IV Continuous <Continuous>  dextrose 5%. 1000 milliLiter(s) IV Continuous <Continuous>  dextrose 50% Injectable 25 Gram(s) IV Push once  dextrose 50% Injectable 12.5 Gram(s) IV Push once  dextrose 50% Injectable 25 Gram(s) IV Push once  dextrose Oral Gel 15 Gram(s) Oral once PRN  enoxaparin Injectable 40 milliGRAM(s) SubCutaneous every 24 hours  glucagon  Injectable 1 milliGRAM(s) IntraMuscular once  insulin glargine Injectable (LANTUS) 18 Unit(s) SubCutaneous every morning  insulin lispro (ADMELOG) corrective regimen sliding scale   SubCutaneous three times a day before meals  insulin lispro Injectable (ADMELOG) 5 Unit(s) SubCutaneous three times a day before meals  labetalol 300 milliGRAM(s) Oral three times a day  losartan 50 milliGRAM(s) Oral daily  melatonin 3 milliGRAM(s) Oral at bedtime PRN  NIFEdipine XL 90 milliGRAM(s) Oral daily  pantoprazole    Tablet 40 milliGRAM(s) Oral before breakfast  silver sulfADIAZINE 1% Cream 1 Application(s) Topical two times a day PRN  vancomycin  IVPB 1250 milliGRAM(s) IV Intermittent every 12 hours      ANTIBIOTICS:  vancomycin  IVPB 1250 milliGRAM(s) IV Intermittent every 12 hours

## 2022-08-04 NOTE — CONSULT NOTE ADULT - ASSESSMENT
57 yo F with PMH of HTN, DM2, and stroke (per son 2017) who presented for RLE wound. Started 3 months ago when she fell and hit her leg on a wooden cabinet. She put hydrogen peroxide, antibiotic cream, and wrapped the wound but never fully healed. Two weeks ago it started to show yellow pus so her and her family came to the ED for further treatment. Endorsed subjective fevers, chest pain, and vision changes which occurs when walked 2-3 blocks. Denied congestion, sore throat, cough, dyspnea, headache, dysuria, N/V, diarrhea     IMPRESSION;   RLE with superficial necrotic tissue with possible abscess    RECOMMENDATIONS;  Debridement  Vancomycin 750 mg iv q12h  Unasyn 3 gm iv q6h  Post debridement change to po Augmentin 875 mg q12h for 7 more days  Please do not hesitate to recall ID if any questions arise either through [email or web address] or through [employer]

## 2022-08-04 NOTE — PROGRESS NOTE ADULT - SUBJECTIVE AND OBJECTIVE BOX
INTERVAL HPI/OVERNIGHT EVENTS:    SUBJECTIVE: Patient seen and examined at bedside.     no cp, sob, abd pain, fever  no cp, palpitations, calhoun, orthopnea    OBJECTIVE:    VITAL SIGNS:  Vital Signs Last 24 Hrs  T(C): 36.8 (04 Aug 2022 08:08), Max: 37 (03 Aug 2022 15:59)  T(F): 98.2 (04 Aug 2022 08:08), Max: 98.6 (03 Aug 2022 15:59)  HR: 76 (04 Aug 2022 08:08) (76 - 100)  BP: 165/86 (04 Aug 2022 08:08) (165/86 - 226/124)  BP(mean): --  RR: 19 (04 Aug 2022 08:08) (18 - 19)  SpO2: 96% (04 Aug 2022 08:08) (96% - 97%)    Parameters below as of 04 Aug 2022 08:08  Patient On (Oxygen Delivery Method): room air          PHYSICAL EXAM:    General: NAD  HEENT: NC/AT; PERRL, clear conjunctiva  Neck: supple  Respiratory: CTA b/l  Cardiovascular: +S1/S2; RRR  Abdomen: soft, NT/ND; +BS x4  Extremities: WWP, 2+ peripheral pulses b/l; no LE edema  Skin: normal color and turgor; no rash  Neurological:    MEDICATIONS:  MEDICATIONS  (STANDING):  ampicillin/sulbactam  IVPB 3 Gram(s) IV Intermittent every 6 hours  atorvastatin 80 milliGRAM(s) Oral at bedtime  dextrose 5%. 1000 milliLiter(s) (100 mL/Hr) IV Continuous <Continuous>  dextrose 5%. 1000 milliLiter(s) (50 mL/Hr) IV Continuous <Continuous>  dextrose 50% Injectable 25 Gram(s) IV Push once  dextrose 50% Injectable 12.5 Gram(s) IV Push once  dextrose 50% Injectable 25 Gram(s) IV Push once  enoxaparin Injectable 40 milliGRAM(s) SubCutaneous every 24 hours  glucagon  Injectable 1 milliGRAM(s) IntraMuscular once  insulin lispro (ADMELOG) corrective regimen sliding scale   SubCutaneous three times a day before meals  insulin lispro Injectable (ADMELOG) 5 Unit(s) SubCutaneous three times a day before meals  labetalol 300 milliGRAM(s) Oral three times a day  losartan 50 milliGRAM(s) Oral daily  magnesium sulfate  IVPB 2 Gram(s) IV Intermittent once  NIFEdipine XL 90 milliGRAM(s) Oral daily  pantoprazole    Tablet 40 milliGRAM(s) Oral before breakfast  vancomycin  IVPB 750 milliGRAM(s) IV Intermittent every 12 hours    MEDICATIONS  (PRN):  acetaminophen     Tablet .. 650 milliGRAM(s) Oral every 6 hours PRN Temp greater or equal to 38C (100.4F), Mild Pain (1 - 3)  dextrose Oral Gel 15 Gram(s) Oral once PRN Blood Glucose LESS THAN 70 milliGRAM(s)/deciliter  melatonin 3 milliGRAM(s) Oral at bedtime PRN Insomnia  silver sulfADIAZINE 1% Cream 1 Application(s) Topical two times a day PRN Wound Care      ALLERGIES:  Allergies    No Known Allergies    Intolerances        LABS:                        11.6   10.01 )-----------( 272      ( 04 Aug 2022 08:26 )             33.4     Hemoglobin: 11.6 g/dL (08-04 @ 08:26)  Hemoglobin: 13.0 g/dL (08-03 @ 08:56)  Hemoglobin: 13.0 g/dL (08-02 @ 11:59)  Hemoglobin: 14.4 g/dL (08-02 @ 01:47)    CBC Full  -  ( 04 Aug 2022 08:26 )  WBC Count : 10.01 K/uL  RBC Count : 3.88 M/uL  Hemoglobin : 11.6 g/dL  Hematocrit : 33.4 %  Platelet Count - Automated : 272 K/uL  Mean Cell Volume : 86.1 fL  Mean Cell Hemoglobin : 29.9 pg  Mean Cell Hemoglobin Concentration : 34.7 g/dL  Auto Neutrophil # : 6.94 K/uL  Auto Lymphocyte # : 2.35 K/uL  Auto Monocyte # : 0.57 K/uL  Auto Eosinophil # : 0.11 K/uL  Auto Basophil # : 0.02 K/uL  Auto Neutrophil % : 69.3 %  Auto Lymphocyte % : 23.5 %  Auto Monocyte % : 5.7 %  Auto Eosinophil % : 1.1 %  Auto Basophil % : 0.2 %    08-04    136  |  101  |  16  ----------------------------<  307<H>  3.6   |  23  |  0.9    Ca    8.6      04 Aug 2022 08:26  Mg     1.5     08-04    TPro  6.0  /  Alb  3.1<L>  /  TBili  0.6  /  DBili  x   /  AST  9   /  ALT  8   /  AlkPhos  109  08-04    Creatinine Trend: 0.9<--, 0.8<--, 0.6<--, 0.8<--  LIVER FUNCTIONS - ( 04 Aug 2022 08:26 )  Alb: 3.1 g/dL / Pro: 6.0 g/dL / ALK PHOS: 109 U/L / ALT: 8 U/L / AST: 9 U/L / GGT: x               hs Troponin:              CSF:                      EKG:   MICROBIOLOGY:    Culture - Blood (collected 02 Aug 2022 18:04)  Source: .Blood Blood  Preliminary Report (04 Aug 2022 01:02):    No growth to date.    Culture - Blood (collected 02 Aug 2022 18:04)  Source: .Blood Blood  Preliminary Report (04 Aug 2022 01:02):    No growth to date.    Culture - Blood (collected 02 Aug 2022 01:17)  Source: .Blood Blood-Peripheral  Preliminary Report (03 Aug 2022 09:01):    No growth to date.    Culture - Blood (collected 02 Aug 2022 01:17)  Source: .Blood Blood-Peripheral  Preliminary Report (03 Aug 2022 09:01):    No growth to date.      IMAGING:      Labs, imaging, EKG personally reviewed    RADIOLOGY & ADDITIONAL TESTS: Reviewed.

## 2022-08-04 NOTE — PROGRESS NOTE ADULT - TIME BILLING
56F PMHx HTN, DM2, CVA 2017 here with RLE wound. HTN urgency.     #HTN urgency  goal sbp 160s  labetalol 300 tid  nifedipine 90  losartan 50  renal doppler unrevealing  tte  #RLE cellulitis/ ulcer  +fever  cont vanco, add unasyn  bcx ntd  f/u burn for debridement/ wcx  wound care  appreciate id  #DM2  lantus 22  humalog 5 tid  ssi  #CVA  lipitor 80  outpt f/u for asa  #DVT ppx  lovenox    #Progress Note Handoff:  Pending (specify):  Consults_________, Tests________, Test Results_______, Other___debridement______  Family discussion: d/w pt, son at bedside re: treatment plan, primary dx  Disposition: Home___/SNF___/Other________/Unknown at this time____x____ 56F PMHx HTN, DM2, CVA 2017 here with RLE wound. HTN urgency.     #HTN urgency  goal sbp 160s  labetalol 300 tid  nifedipine 90  losartan 50  renal doppler unrevealing  tte  #RLE cellulitis/ ulcer  +fever, now resolved  cont vanco, add unasyn  bcx ntd  f/u burn for debridement/ wcx  wound care  appreciate id  #DM2  lantus 22  humalog 5 tid  ssi  #CVA  lipitor 80  outpt f/u for asa  #DVT ppx  lovenox    #Progress Note Handoff:  Pending (specify):  Consults_________, Tests________, Test Results_______, Other___debridement______  Family discussion: d/w pt, son at bedside re: treatment plan, primary dx  Disposition: Home___/SNF___/Other________/Unknown at this time____x____ 56F PMHx HTN, DM2, CVA 2017 here with RLE wound. HTN urgency.     #HTN urgency  goal sbp 160s  labetalol 300 tid  nifedipine 90  losartan 50  renal doppler unrevealing  tte  #RLE cellulitis/ ulcer  +fever, now resolved  cont vanco, add unasyn  bcx ntd  f/u burn for debridement/ wcx  wound care  appreciate id  #DM2  lantus 22  humalog 5 tid  ssi  will need insulin upon d/c  #CVA  lipitor 80  outpt f/u for asa  #DVT ppx  lovenox    #Progress Note Handoff:  Pending (specify):  Consults_________, Tests________, Test Results_______, Other___debridement______  Family discussion: d/w pt, son at bedside re: treatment plan, primary dx  Disposition: Home___/SNF___/Other________/Unknown at this time____x____

## 2022-08-05 NOTE — CONSULT NOTE ADULT - ASSESSMENT
57 yo F with PMH of HTN, DM2, and stroke (per son 2017) who presented for RLE wound. Stroke Code was called for episode of AMS, patient not responding to commands which was not her baseline. LKW was around 1pm as per RN and family, NIHSS 23. No tpa given due to patient returning back to baseline, following commands. Neurology on board for further recommendations.    Plan:  - CT Head reviewed, no acute abnormalities  - CTA H/N completed  - Can load with Keppra 1 g  - Recommend VEEG monitoring  - Ativan 2 mg IV for seizures > 2 mins  - Seizure & fall precautions  - Keep Mg > 2, replete PRN  - Neurology will continue to follow 55 yo F with PMH of HTN, DM2, and stroke (per son 2017) who presented for RLE wound. Stroke Code was called for episode of AMS, patient not responding to commands which was not her baseline. LKW was around 1pm as per RN and family, NIHSS 23. No tpa given due to patient returning back to baseline, following commands. Neurology on board for further recommendations.    Plan:  - CT Head reviewed, no acute abnormalities but showed hypodensities in the periventricular white matter, right thalamus, right basal ganglia, right insular cortex representing age indeterminate ischemic changes  - CTA H/N completed  - Can load with Keppra 1 g at this time  - Can give 1 mg IV Ativan  - Recommend STAT EEG, f/u  - Ativan 2 mg IV for seizures > 2 mins  - Seizure & fall precautions  - Keep Mg > 2, replete PRN  - Avoid Cefepime, will lower seizure threshold  - Can use Zosyn if needed as per Medicine  - Neurology will continue to follow  - Continue rest of care as per primary team 57 yo F with PMH of HTN, DM2, and stroke (per son 2017) who presented for RLE wound. Stroke Code was called for episode of AMS, patient not responding to commands which was not her baseline. LKW was around 1pm as per RN and family, NIHSS 23. No tpa given due to patient returning back to baseline, following commands. Neurology on board for further recommendations. Likely related to seizure in setting of infection.    Plan:  - CT Head reviewed, no acute abnormalities but showed hypodensities in the periventricular white matter, right thalamus, right basal ganglia, right insular cortex representing age indeterminate ischemic changes  - CTA H/N completed  - Loaded with 1 g IV Keppra  - Can load with an additional 1 g IV Keppra  - Given 2 mg IV Ativan as well  - Recommend STAT VEEG monitoring  - Ativan 2 mg IV for seizures > 2 mins  - Keep Mg > 2, replete PRN  - Seizure & fall precautions  - Avoid Cefepime, will lower seizure threshold  - Can use Zosyn if needed as per Medicine  - Neurology will continue to follow  - Continue rest of care as per primary team

## 2022-08-05 NOTE — CONSULT NOTE ADULT - SUBJECTIVE AND OBJECTIVE BOX
Stroke Code Consult Note    HPI:  57 yo F with PMH of HTN, DM2, and stroke (per son 2017) who presented for RLE wound. Started 3 months ago when she fell and hit her leg on a wooden cabinet. She put hydrogen peroxide, antibiotic cream, and wrapped the wound but never fully healed. Two weeks ago it started to show yellow pus so her and her family came to the ED for further treatment. Endorsed subjective fevers, chest pain, and vision changes which occurs when walked 2-3 blocks. Denied congestion, sore throat, cough, dyspnea, headache, dysuria, N/V, diarrhea     Per son, they fill her medications at [facility] ([phone]) and this is their current pharmacy. Called them to confirm her medications and they said her last refill of medications was 2018. Pt has not seen a doctor due to insurance problem and has not been taking any medications.    In the ED:  Vitals: T: 98.9, BP: 296/ 174, , RR: 18, 98%O2 on RA  Labs: CBC showed WBC 11.23; ESR 92, CRP 35.6  CMP showed glucose 302, alk phos 173; ; VBG pH 7.45  EKG pending  CXR showed borderline cardiomegaly and no airspace opacity.   X-ray of RLE also pending.     Received unasyn and vanco, humalin R, IV vasotec, IV labetalol   (02 Aug 2022 07:47)      PAST MEDICAL & SURGICAL HISTORY:  Hypertension  Diabetes mellitus  No significant past surgical history      Medications:  acetaminophen     Tablet .. 650 milliGRAM(s) Oral every 6 hours PRN  ampicillin/sulbactam  IVPB 3 Gram(s) IV Intermittent every 6 hours  atorvastatin 80 milliGRAM(s) Oral at bedtime  dextrose 5%. 1000 milliLiter(s) IV Continuous <Continuous>  dextrose 5%. 1000 milliLiter(s) IV Continuous <Continuous>  dextrose 50% Injectable 25 Gram(s) IV Push once  dextrose 50% Injectable 12.5 Gram(s) IV Push once  dextrose 50% Injectable 25 Gram(s) IV Push once  dextrose Oral Gel 15 Gram(s) Oral once PRN  enoxaparin Injectable 40 milliGRAM(s) SubCutaneous every 24 hours  glucagon  Injectable 1 milliGRAM(s) IntraMuscular once  insulin glargine Injectable (LANTUS) 22 Unit(s) SubCutaneous every morning  insulin lispro (ADMELOG) corrective regimen sliding scale   SubCutaneous three times a day before meals  insulin lispro Injectable (ADMELOG) 5 Unit(s) SubCutaneous three times a day before meals  labetalol 300 milliGRAM(s) Oral three times a day  LORazepam   Injectable 2 milliGRAM(s) IV Push once  losartan 50 milliGRAM(s) Oral daily  melatonin 3 milliGRAM(s) Oral at bedtime PRN  NIFEdipine XL 90 milliGRAM(s) Oral daily  pantoprazole    Tablet 40 milliGRAM(s) Oral before breakfast  silver sulfADIAZINE 1% Cream 1 Application(s) Topical two times a day PRN  vancomycin  IVPB 750 milliGRAM(s) IV Intermittent every 12 hours      Vital Signs Last 24 Hrs  T(C): 38.2 (05 Aug 2022 15:18), Max: 38.2 (05 Aug 2022 15:18)  T(F): 100.8 (05 Aug 2022 15:18), Max: 100.8 (05 Aug 2022 15:18)  HR: 92 (05 Aug 2022 15:18) (77 - 92)  BP: 128/78 (05 Aug 2022 15:18) (128/78 - 218/110)  BP(mean): 98 (05 Aug 2022 15:18) (98 - 98)  RR: 18 (05 Aug 2022 15:18) (18 - 18)  SpO2: 95% (05 Aug 2022 15:18) (95% - 99%)    Parameters below as of 05 Aug 2022 15:18  Patient On (Oxygen Delivery Method): room air      Neurological Exam:   Mental status: Awake, alert and oriented x0. Patient not following commands, minimally verbal.  Cranial nerves: Pupils equally round and reactive to light, face symmetric, palate elevation symmetric, tongue was midline.  Motor: Normal tone and bulk. Eye twitching seen, bilateral hand shaking seen.  Sensation: Withdraws to noxious stimuli.  Coordination: Unable to assess.  Reflexes: 2+ in bilateral UE/LE, downgoing toes bilaterally  Gait: Deferred.    NIHSS 23      Labs:  CBC Full  -  ( 04 Aug 2022 08:26 )  WBC Count : 10.01 K/uL  RBC Count : 3.88 M/uL  Hemoglobin : 11.6 g/dL  Hematocrit : 33.4 %  Platelet Count - Automated : 272 K/uL  Mean Cell Volume : 86.1 fL  Mean Cell Hemoglobin : 29.9 pg  Mean Cell Hemoglobin Concentration : 34.7 g/dL  Auto Neutrophil # : 6.94 K/uL  Auto Lymphocyte # : 2.35 K/uL  Auto Monocyte # : 0.57 K/uL  Auto Eosinophil # : 0.11 K/uL  Auto Basophil # : 0.02 K/uL  Auto Neutrophil % : 69.3 %  Auto Lymphocyte % : 23.5 %  Auto Monocyte % : 5.7 %  Auto Eosinophil % : 1.1 %  Auto Basophil % : 0.2 %      08-04    136  |  101  |  16  ----------------------------<  307<H>  3.6   |  23  |  0.9    Ca    8.6      04 Aug 2022 08:26  Mg     1.5     08-04    TPro  6.0  /  Alb  3.1<L>  /  TBili  0.6  /  DBili  x   /  AST  9   /  ALT  8   /  AlkPhos  109  08-04    LIVER FUNCTIONS - ( 04 Aug 2022 08:26 )  Alb: 3.1 g/dL / Pro: 6.0 g/dL / ALK PHOS: 109 U/L / ALT: 8 U/L / AST: 9 U/L / GGT: x              Stroke Code Consult Note    HPI:  57 yo F with PMH of HTN, DM2, and stroke (per son 2017) who presented for RLE wound. Started 3 months ago when she fell and hit her leg on a wooden cabinet. She put hydrogen peroxide, antibiotic cream, and wrapped the wound but never fully healed. Two weeks ago it started to show yellow pus so her and her family came to the ED for further treatment. Endorsed subjective fevers, chest pain, and vision changes which occurs when walked 2-3 blocks. Denied congestion, sore throat, cough, dyspnea, headache, dysuria, N/V, diarrhea     Per son, they fill her medications at [facility] ([phone]) and this is their current pharmacy. Called them to confirm her medications and they said her last refill of medications was 2018. Pt has not seen a doctor due to insurance problem and has not been taking any medications.    In the ED:  Vitals: T: 98.9, BP: 296/ 174, , RR: 18, 98%O2 on RA  Labs: CBC showed WBC 11.23; ESR 92, CRP 35.6  CMP showed glucose 302, alk phos 173; ; VBG pH 7.45  EKG pending  CXR showed borderline cardiomegaly and no airspace opacity.   X-ray of RLE also pending.     Received unasyn and vanco, humalin R, IV vasotec, IV labetalol   (02 Aug 2022 07:47)      PAST MEDICAL & SURGICAL HISTORY:  Hypertension  Diabetes mellitus  No significant past surgical history      Medications:  acetaminophen     Tablet .. 650 milliGRAM(s) Oral every 6 hours PRN  ampicillin/sulbactam  IVPB 3 Gram(s) IV Intermittent every 6 hours  atorvastatin 80 milliGRAM(s) Oral at bedtime  dextrose 5%. 1000 milliLiter(s) IV Continuous <Continuous>  dextrose 5%. 1000 milliLiter(s) IV Continuous <Continuous>  dextrose 50% Injectable 25 Gram(s) IV Push once  dextrose 50% Injectable 12.5 Gram(s) IV Push once  dextrose 50% Injectable 25 Gram(s) IV Push once  dextrose Oral Gel 15 Gram(s) Oral once PRN  enoxaparin Injectable 40 milliGRAM(s) SubCutaneous every 24 hours  glucagon  Injectable 1 milliGRAM(s) IntraMuscular once  insulin glargine Injectable (LANTUS) 22 Unit(s) SubCutaneous every morning  insulin lispro (ADMELOG) corrective regimen sliding scale   SubCutaneous three times a day before meals  insulin lispro Injectable (ADMELOG) 5 Unit(s) SubCutaneous three times a day before meals  labetalol 300 milliGRAM(s) Oral three times a day  LORazepam   Injectable 2 milliGRAM(s) IV Push once  losartan 50 milliGRAM(s) Oral daily  melatonin 3 milliGRAM(s) Oral at bedtime PRN  NIFEdipine XL 90 milliGRAM(s) Oral daily  pantoprazole    Tablet 40 milliGRAM(s) Oral before breakfast  silver sulfADIAZINE 1% Cream 1 Application(s) Topical two times a day PRN  vancomycin  IVPB 750 milliGRAM(s) IV Intermittent every 12 hours      Vital Signs Last 24 Hrs  T(C): 38.2 (05 Aug 2022 15:18), Max: 38.2 (05 Aug 2022 15:18)  T(F): 100.8 (05 Aug 2022 15:18), Max: 100.8 (05 Aug 2022 15:18)  HR: 92 (05 Aug 2022 15:18) (77 - 92)  BP: 128/78 (05 Aug 2022 15:18) (128/78 - 218/110)  BP(mean): 98 (05 Aug 2022 15:18) (98 - 98)  RR: 18 (05 Aug 2022 15:18) (18 - 18)  SpO2: 95% (05 Aug 2022 15:18) (95% - 99%)    Parameters below as of 05 Aug 2022 15:18  Patient On (Oxygen Delivery Method): room air      Neurological Exam:   Mental status: Awake, alert and oriented x0. Patient not following commands, minimally verbal.  Cranial nerves: Pupils equally round and reactive to light, face symmetric, palate elevation symmetric, tongue was midline.  Motor: Normal tone and bulk. Eye twitching seen, bilateral hand shaking seen.  Sensation: Withdraws to noxious stimuli.  Coordination: Unable to assess.  Reflexes: 2+ in bilateral UE/LE, downgoing toes bilaterally  Gait: Deferred.    NIHSS 23      Labs:  CBC Full  -  ( 04 Aug 2022 08:26 )  WBC Count : 10.01 K/uL  RBC Count : 3.88 M/uL  Hemoglobin : 11.6 g/dL  Hematocrit : 33.4 %  Platelet Count - Automated : 272 K/uL  Mean Cell Volume : 86.1 fL  Mean Cell Hemoglobin : 29.9 pg  Mean Cell Hemoglobin Concentration : 34.7 g/dL  Auto Neutrophil # : 6.94 K/uL  Auto Lymphocyte # : 2.35 K/uL  Auto Monocyte # : 0.57 K/uL  Auto Eosinophil # : 0.11 K/uL  Auto Basophil # : 0.02 K/uL  Auto Neutrophil % : 69.3 %  Auto Lymphocyte % : 23.5 %  Auto Monocyte % : 5.7 %  Auto Eosinophil % : 1.1 %  Auto Basophil % : 0.2 %      08-04    136  |  101  |  16  ----------------------------<  307<H>  3.6   |  23  |  0.9    Ca    8.6      04 Aug 2022 08:26  Mg     1.5     08-04    TPro  6.0  /  Alb  3.1<L>  /  TBili  0.6  /  DBili  x   /  AST  9   /  ALT  8   /  AlkPhos  109  08-04    LIVER FUNCTIONS - ( 04 Aug 2022 08:26 )  Alb: 3.1 g/dL / Pro: 6.0 g/dL / ALK PHOS: 109 U/L / ALT: 8 U/L / AST: 9 U/L / GGT: x             < from: CT Brain Stroke Protocol (08.05.22 @ 16:12) >  PROCEDURE DATE:  08/05/2022          INTERPRETATION:  Clinical History / Reason for exam: Change in mental   status.    CT SCAN OF THE BRAIN WITHOUT CONTRAST    TECHNIQUE:    Multiple transaxial noncontrast CT images of the brain were obtained from   base to vertex. Sagittal and coronal reformatted images were obtained.   The examination was performed twice due to motion artifact.    FINDINGS:    The third and lateral ventricles are mildly enlarged as are the cortical   sulci consistent with a mild degree of cortical atrophy.  The fourth   ventricle is normal in size and position.    There is no shift of the midline structures, evidence of acute   intracranial hemorrhage, or depressed skull fracture.    Patchy foci of diminished attenuation in the periventricular white   matter, right thalamus, right basal ganglia, and right insular cortex   likely representing ischemic change, age indeterminate.    IMPRESSION:    1. Mild atrophic changes.    2.  Hypodensities in the periventricular white matter, right thalamus,   right basal ganglia, and right insular cortex likely representing   ischemic change, age indeterminate.    The case was discussed with MARVIN Huerta on August 5, 2022 time   4:17 PM, read back.    --- End of Report ---    < end of copied text >

## 2022-08-05 NOTE — CONSULT NOTE ADULT - ATTENDING COMMENTS
Pt is a 57 yo F who presented with RLE wound. Stroke code called today for unresponsiveness. Concern for seizure with post ictal confusion. CT head without acute process. Given fever and GTC, recommend liberalizing ABx to include meningitis coverage. Would avoid cefepime. Pt given ativan 1mg IV x 1, loaded with LEV 2G IV and start LEV 1g IV BID this evening. STAT vEEG. Continue close monitoring. If no return to baseline, consider upgrading to NCC for status epilepticus management. gen neuro to continue to follow.

## 2022-08-05 NOTE — PROGRESS NOTE ADULT - ASSESSMENT
7 yo F with PMH of HTN, DM2, CVA (2017) who presented with purulent right lower extremity wound for 3 months consistent with acute RLE cellulitis. Code stroke for unresponsiveness at 4pm 8/5    #Episode of unresponsiveness ?Seizure  -CTH negative for acute ICH  -d/w neuro: high likelihood of seizure  -started on IV Keppra  -check labs, lactate  -seizure precautions  -stat EEG  -keep NPO   -IVFs  -close monitoring    #Purulent RLE cellulitis r/o abscess  -s/p unasyn and and vancomycin in ED  -f/u wound and blood cultures   -trend inflammatory markers   -ID consult noted  - vanco, Unasyn  -check Vanco trough before 4th dose (ordered for am)  -burn debridement     #Hypertensive urgency due to noncompliance  -BP at admission 296/174 without signs of end organ damage  -gradual BP lowering    # DMII w/ Hyperglycemia  - started insulin regimen   - FS ac/hs  - f/u A1c    # Atypical Chest pain and FELIZ on admission- possibly MSK related but r/o cardiac etiology   - chest wall tender to palpation on admission  - currently CP free  - CXR unremarkable   - Tn negative  - outpt stress test    # hx of CVA 2017 (Aneurysmal as per family? ?ICH but old ischemic strokes on CTH)  # Left sided weakness  -need to get more Hx  -as per family, aneurysm was never fixed    DVT ppx: lovenox, SCDs  GI ppx: protonix  Diet: DASH/carb consistent  Activity: AAT    #Progress Note Handoff  Pending: Consults____Clinical improvement and stability__x___Tests__EEG, Debridement____PT____x____  Pt/Family discussion: Pt/family informed and agree with the current plan  Disposition: Home______/SNF_______/4A______/To be determined____x____    My note supersedes the residents note should a discrepancy arise.    Chart and notes personally reviewed.  Care Discussed with Consultants/Other Providers/ Housestaff [ x] YES [ ] NO   Radiology, labs, old records personally reviewed.    discussed w/ housestaff, nursing, case management, neuro team, 2 sons, , ?daughter

## 2022-08-05 NOTE — PROGRESS NOTE ADULT - SUBJECTIVE AND OBJECTIVE BOX
Patient is a 56y old  Female who presents with a chief complaint of Hypertensive urgency (02 Aug 2022 11:31)    INTERVAL HPI/OVERNIGHT EVENTS: Patient was examined and seen at bedside. Seen around 12.30pm: pt is resting comfortably in bed and reports no new issues or overnight events. No specific complaints. Family at bedside. At baseline as per family. Around 4pm, stroke code called due to Lt gaze deviation, unresponsiveness. Taken for stat CTH.    ROS: Denied CP, SOB, AP, new weakness in am  All other systems reviewed and are within normal limits.  InitialHPI:  55 yo F with PMH of HTN, DM2, and ?hemorrhagic stroke due to an aneurysm (per son 2017) who presented for RLE wound. Started 3 months ago when she fell and hit her leg on a wooden cabinet. She put hydrogen peroxide, antibiotic cream, and wrapped the wound but never fully healed. Two weeks ago it started to show yellow pus so her and her family came to the ED for further treatment. Endorsed subjective fevers, chest pain, and vision changes which occurs when walked 2-3 blocks. Denied congestion, sore throat, cough, dyspnea, headache, dysuria, N/V, diarrhea     Per son, they fill her medications at [facility] ([phone]) and this is their current pharmacy. Called them to confirm her medications and they said her last refill of medications was 2018. Pt has not seen a doctor due to insurance problem and has not been taking any medications for more than 1yr.    In the ED:  Vitals: T: 98.9, BP: 296/ 174, , RR: 18, 98%O2 on RA  Labs: CBC showed WBC 11.23; ESR 92, CRP 35.6  CMP showed glucose 302, alk phos 173; ; VBG pH 7.45  .   Received unasyn and vanco, humalin R, IV vasotec, IV labetalol   (02 Aug 2022 07:47)    PAST MEDICAL & SURGICAL HISTORY:  Hypertension  ?Hemorrhagic CVA due to aneurysm    Diabetes mellitus      No significant past surgical history    12.30pm exam  General: NAD, awake, alert, chronically ill appearing, +dysarthria  HEENT:  EOMI, no LAD  CV: S1 S2  Resp: decreased breath sounds at bases  GI: NT/ND/S +BS; obese  MS: no clubbing/cyanosis/edema, + pulses b/l  Neuro: Rt 4/5, Lt 3/5,     MEDICATIONS  (STANDING):  ampicillin/sulbactam  IVPB 3 Gram(s) IV Intermittent every 6 hours  atorvastatin 80 milliGRAM(s) Oral at bedtime  dextrose 5%. 1000 milliLiter(s) (100 mL/Hr) IV Continuous <Continuous>  dextrose 5%. 1000 milliLiter(s) (50 mL/Hr) IV Continuous <Continuous>  dextrose 50% Injectable 25 Gram(s) IV Push once  dextrose 50% Injectable 12.5 Gram(s) IV Push once  dextrose 50% Injectable 25 Gram(s) IV Push once  enoxaparin Injectable 40 milliGRAM(s) SubCutaneous every 24 hours  glucagon  Injectable 1 milliGRAM(s) IntraMuscular once  insulin glargine Injectable (LANTUS) 22 Unit(s) SubCutaneous every morning  insulin lispro (ADMELOG) corrective regimen sliding scale   SubCutaneous three times a day before meals  insulin lispro Injectable (ADMELOG) 5 Unit(s) SubCutaneous three times a day before meals  labetalol 300 milliGRAM(s) Oral three times a day  levETIRAcetam  IVPB 1000 milliGRAM(s) IV Intermittent once  levETIRAcetam  IVPB 1000 milliGRAM(s) IV Intermittent once  levETIRAcetam  IVPB 1000 milliGRAM(s) IV Intermittent every 12 hours  LORazepam   Injectable 1 milliGRAM(s) IV Push once  LORazepam   Injectable 1 milliGRAM(s) IV Push once  losartan 50 milliGRAM(s) Oral daily  NIFEdipine XL 90 milliGRAM(s) Oral daily  pantoprazole    Tablet 40 milliGRAM(s) Oral before breakfast  sodium chloride 0.9%. 1000 milliLiter(s) (60 mL/Hr) IV Continuous <Continuous>  vancomycin  IVPB 750 milliGRAM(s) IV Intermittent every 12 hours    MEDICATIONS  (PRN):  acetaminophen     Tablet .. 650 milliGRAM(s) Oral every 6 hours PRN Temp greater or equal to 38C (100.4F), Mild Pain (1 - 3)  dextrose Oral Gel 15 Gram(s) Oral once PRN Blood Glucose LESS THAN 70 milliGRAM(s)/deciliter  melatonin 3 milliGRAM(s) Oral at bedtime PRN Insomnia  silver sulfADIAZINE 1% Cream 1 Application(s) Topical two times a day PRN Wound Care    Vital Signs Last 24 Hrs  T(C): 38.2 (05 Aug 2022 15:18), Max: 38.2 (05 Aug 2022 15:18)  T(F): 100.8 (05 Aug 2022 15:18), Max: 100.8 (05 Aug 2022 15:18)  HR: 92 (05 Aug 2022 15:18) (77 - 92)  BP: 128/78 (05 Aug 2022 15:18) (128/78 - 218/110)  BP(mean): 98 (05 Aug 2022 15:18) (98 - 98)  RR: 18 (05 Aug 2022 15:18) (18 - 18)  SpO2: 95% (05 Aug 2022 15:18) (95% - 99%)    Parameters below as of 05 Aug 2022 15:18  Patient On (Oxygen Delivery Method): room air      CAPILLARY BLOOD GLUCOSE      POCT Blood Glucose.: 180 mg/dL (05 Aug 2022 17:17)  POCT Blood Glucose.: 220 mg/dL (05 Aug 2022 15:17)  POCT Blood Glucose.: 233 mg/dL (05 Aug 2022 11:27)  POCT Blood Glucose.: 199 mg/dL (05 Aug 2022 07:27)  POCT Blood Glucose.: 213 mg/dL (04 Aug 2022 22:56)  POCT Blood Glucose.: 230 mg/dL (04 Aug 2022 21:25)                          11.6   10.01 )-----------( 272      ( 04 Aug 2022 08:26 )             33.4     08-04    136  |  101  |  16  ----------------------------<  307<H>  3.6   |  23  |  0.9    Ca    8.6      04 Aug 2022 08:26  Mg     1.5     08-04    TPro  6.0  /  Alb  3.1<L>  /  TBili  0.6  /  DBili  x   /  AST  9   /  ALT  8   /  AlkPhos  109  08-04    LIVER FUNCTIONS - ( 04 Aug 2022 08:26 )  Alb: 3.1 g/dL / Pro: 6.0 g/dL / ALK PHOS: 109 U/L / ALT: 8 U/L / AST: 9 U/L / GGT: x                           Culture - Blood (collected 02 Aug 2022 18:04)  Source: .Blood Blood  Preliminary Report (04 Aug 2022 01:02):    No growth to date.    Culture - Blood (collected 02 Aug 2022 18:04)  Source: .Blood Blood  Preliminary Report (04 Aug 2022 01:02):    No growth to date.      Chart, Consultant(s) Notes Reviewed:  [x ] YES  [ ] NO  Care Discussed with Consultants/Other Providers/ Housestaff [ x] YES  [ ] NO  Radiology, labs, old available records personally reviewed.

## 2022-08-05 NOTE — PATIENT PROFILE ADULT - STATED REASON FOR ADMISSION
as per son at bedside, " I took my mom to the ED because of the infected wound on her right Lower leg"

## 2022-08-05 NOTE — CHART NOTE - NSCHARTNOTEFT_GEN_A_CORE
Called Neuro today, patient drooling, concerned for active seizures while on VEEG. Called neuro, patient was evaluated at bedside. Per neuro, patient was supposed to get a total of 3g Keppra today. 2g loading, and 1g BID maintenance. Patient only received 1g today. Neuro recommended giving additional 2g and continuing 1g BID. VEEG was evaluated by neuro team. Patient was not activity seizing. No change in neuro exam. If any change in neuro exam, recommending STAT CT head.

## 2022-08-05 NOTE — PATIENT PROFILE ADULT - FALL HARM RISK - HARM RISK INTERVENTIONS
Assistance with ambulation/Assistance OOB with selected safe patient handling equipment/Communicate Risk of Fall with Harm to all staff/Discuss with provider need for PT consult/Monitor gait and stability/Reinforce activity limits and safety measures with patient and family/Tailored Fall Risk Interventions/Visual Cue: Yellow wristband and red socks/Bed in lowest position, wheels locked, appropriate side rails in place/Call bell, personal items and telephone in reach/Instruct patient to call for assistance before getting out of bed or chair/Non-slip footwear when patient is out of bed/Benedicta to call system/Physically safe environment - no spills, clutter or unnecessary equipment/Purposeful Proactive Rounding/Room/bathroom lighting operational, light cord in reach

## 2022-08-06 NOTE — PROGRESS NOTE ADULT - SUBJECTIVE AND OBJECTIVE BOX
SUBJECTIVE:    Patient is a 56y old Female who presents with a chief complaint of Hypertensive urgency (02 Aug 2022 11:31)    Currently admitted to medicine with the primary diagnosis of Cellulitis       Today is hospital day 4d. This morning she is resting comfortably in bed and reports no new issues or overnight events.     PAST MEDICAL & SURGICAL HISTORY  Hypertension    Diabetes mellitus    No significant past surgical history      SOCIAL HISTORY:  Negative for smoking/alcohol/drug use.     ALLERGIES:  No Known Allergies    MEDICATIONS:  STANDING MEDICATIONS  ampicillin/sulbactam  IVPB 3 Gram(s) IV Intermittent every 6 hours  atorvastatin 80 milliGRAM(s) Oral at bedtime  dextrose 5%. 1000 milliLiter(s) IV Continuous <Continuous>  dextrose 5%. 1000 milliLiter(s) IV Continuous <Continuous>  dextrose 50% Injectable 25 Gram(s) IV Push once  dextrose 50% Injectable 12.5 Gram(s) IV Push once  dextrose 50% Injectable 25 Gram(s) IV Push once  enoxaparin Injectable 40 milliGRAM(s) SubCutaneous every 24 hours  glucagon  Injectable 1 milliGRAM(s) IntraMuscular once  insulin glargine Injectable (LANTUS) 22 Unit(s) SubCutaneous every morning  insulin lispro (ADMELOG) corrective regimen sliding scale   SubCutaneous three times a day before meals  insulin lispro Injectable (ADMELOG) 5 Unit(s) SubCutaneous three times a day before meals  labetalol 300 milliGRAM(s) Oral three times a day  levETIRAcetam  IVPB 1000 milliGRAM(s) IV Intermittent every 12 hours  LORazepam   Injectable 1 milliGRAM(s) IV Push once  losartan 50 milliGRAM(s) Oral daily  NIFEdipine XL 90 milliGRAM(s) Oral daily  pantoprazole    Tablet 40 milliGRAM(s) Oral before breakfast  sodium chloride 0.9%. 1000 milliLiter(s) IV Continuous <Continuous>  vancomycin  IVPB 1250 milliGRAM(s) IV Intermittent every 12 hours    PRN MEDICATIONS  acetaminophen     Tablet .. 650 milliGRAM(s) Oral every 6 hours PRN  dextrose Oral Gel 15 Gram(s) Oral once PRN  melatonin 3 milliGRAM(s) Oral at bedtime PRN  silver sulfADIAZINE 1% Cream 1 Application(s) Topical two times a day PRN    VITALS:   T(F): 96.8  HR: 90  BP: 180/98  RR: 18  SpO2: 96%    PHYSICAL EXAM:  GEN: No acute distress  LUNGS: Clear to auscultation bilaterally   HEART: S1/S2 present. RRR.   ABD: Soft, non-tender, non-distended. Bowel sounds present  NEURO: AAOX3      LABS:                        12.0   8.77  )-----------( 300      ( 06 Aug 2022 06:59 )             34.8     08-06    139  |  104  |  14  ----------------------------<  202<H>  3.5   |  21  |  0.8    Ca    8.3<L>      06 Aug 2022 06:59  Mg     2.0     08-06    TPro  6.2  /  Alb  3.3<L>  /  TBili  0.4  /  DBili  x   /  AST  12  /  ALT  8   /  AlkPhos  113  08-06            Lactate, Blood: 1.8 mmol/L (08-05-22 @ 21:59)            RADIOLOGY:

## 2022-08-06 NOTE — CHART NOTE - NSCHARTNOTEFT_GEN_A_CORE
BP of 202/100,  HR 91, patient afebrile. 20 mg hydralazine given and stat ekg ordered. repeat bp 180/98 . BP of 202/100,  HR 91, patient afebrile. 20 mg hydralazine given and stat ekg ordered. repeat bp 180/98 . Ordered blood cx, urine cx, ua, cbc, lactate, cmp for 16:00

## 2022-08-06 NOTE — PROGRESS NOTE ADULT - SUBJECTIVE AND OBJECTIVE BOX
Neurology Follow up note    Name  JOSE MITCHELL    HPI:  55 yo F with PMH of HTN, DM2, and stroke (per son 2017) who presented for RLE wound. Started 3 months ago when she fell and hit her leg on a wooden cabinet. She put hydrogen peroxide, antibiotic cream, and wrapped the wound but never fully healed. Two weeks ago it started to show yellow pus so her and her family came to the ED for further treatment. Endorsed subjective fevers, chest pain, and vision changes which occurs when walked 2-3 blocks. Denied congestion, sore throat, cough, dyspnea, headache, dysuria, N/V, diarrhea     Per son, they fill her medications at [facility] ([phone]) and this is their current pharmacy. Called them to confirm her medications and they said her last refill of medications was 2018. Pt has not seen a doctor due to insurance problem and has not been taking any medications.    In the ED:  Vitals: T: 98.9, BP: 296/ 174, , RR: 18, 98%O2 on RA  Labs: CBC showed WBC 11.23; ESR 92, CRP 35.6  CMP showed glucose 302, alk phos 173; ; VBG pH 7.45  EKG pending  CXR showed borderline cardiomegaly and no airspace opacity.   X-ray of RLE also pending.     Received unasyn and vanco, humalin R, IV vasotec, IV labetalol   (02 Aug 2022 07:47)      Interval History - No new events.  Patients son at bedside          Vital Signs Last 24 Hrs  T(C): 37.5 (07 Aug 2022 04:50), Max: 37.5 (07 Aug 2022 04:50)  T(F): 99.5 (07 Aug 2022 04:50), Max: 99.5 (07 Aug 2022 04:50)  HR: 101 (07 Aug 2022 04:50) (90 - 101)  BP: 168/103 (07 Aug 2022 04:50) (165/83 - 202/100)  BP(mean): --  RR: 18 (07 Aug 2022 04:50) (18 - 18)  SpO2: 97% (07 Aug 2022 04:50) (96% - 97%)    Parameters below as of 07 Aug 2022 04:50  Patient On (Oxygen Delivery Method): room air      ICU Vital Signs Last 24 Hrs  T(C): 37.5 (07 Aug 2022 04:50), Max: 37.5 (07 Aug 2022 04:50)  T(F): 99.5 (07 Aug 2022 04:50), Max: 99.5 (07 Aug 2022 04:50)  HR: 101 (07 Aug 2022 04:50) (90 - 101)  BP: 168/103 (07 Aug 2022 04:50) (165/83 - 202/100)  BP(mean): --  ABP: --  ABP(mean): --  RR: 18 (07 Aug 2022 04:50) (18 - 18)  SpO2: 97% (07 Aug 2022 04:50) (96% - 97%)    O2 Parameters below as of 07 Aug 2022 04:50  Patient On (Oxygen Delivery Method): room air        Neurological Exam:   Mental status: Awake, alert and oriented x1. Patient not following commands, minimally verbal.  Cranial nerves: Pupils equally round and reactive to light, face symmetric, palate elevation symmetric, tongue was midline.  Motor: Normal tone and bulk. left spastic hemiparesis  Sensation: not reliable.  Coordination: Unable to assess.  Reflexes: 2+ in bilateral UE/LE, downgoing toes bilaterally  Gait: Deferred.    Medications  acetaminophen     Tablet .. 650 milliGRAM(s) Oral every 6 hours PRN  ampicillin/sulbactam  IVPB 3 Gram(s) IV Intermittent every 6 hours  atorvastatin 80 milliGRAM(s) Oral at bedtime  dextrose 5%. 1000 milliLiter(s) IV Continuous <Continuous>  dextrose 5%. 1000 milliLiter(s) IV Continuous <Continuous>  dextrose 50% Injectable 25 Gram(s) IV Push once  dextrose 50% Injectable 12.5 Gram(s) IV Push once  dextrose 50% Injectable 25 Gram(s) IV Push once  dextrose Oral Gel 15 Gram(s) Oral once PRN  enoxaparin Injectable 40 milliGRAM(s) SubCutaneous every 24 hours  glucagon  Injectable 1 milliGRAM(s) IntraMuscular once  insulin glargine Injectable (LANTUS) 22 Unit(s) SubCutaneous every morning  insulin lispro (ADMELOG) corrective regimen sliding scale   SubCutaneous three times a day before meals  insulin lispro Injectable (ADMELOG) 5 Unit(s) SubCutaneous three times a day before meals  labetalol 300 milliGRAM(s) Oral three times a day  levETIRAcetam  IVPB 1000 milliGRAM(s) IV Intermittent every 12 hours  LORazepam   Injectable 1 milliGRAM(s) IV Push once  losartan 50 milliGRAM(s) Oral daily  melatonin 3 milliGRAM(s) Oral at bedtime PRN  NIFEdipine XL 90 milliGRAM(s) Oral daily  pantoprazole    Tablet 40 milliGRAM(s) Oral before breakfast  silver sulfADIAZINE 1% Cream 1 Application(s) Topical two times a day PRN  sodium chloride 0.9%. 1000 milliLiter(s) IV Continuous <Continuous>  vancomycin  IVPB 1250 milliGRAM(s) IV Intermittent every 12 hours      Lab                        11.5   7.42  )-----------( 282      ( 07 Aug 2022 07:53 )             33.8     08-07    137  |  103  |  10  ----------------------------<  148<H>  3.6   |  24  |  0.7    Ca    8.5      07 Aug 2022 07:53  Mg     2.0     08-06    TPro  6.5  /  Alb  3.4<L>  /  TBili  0.5  /  DBili  x   /  AST  12  /  ALT  8   /  AlkPhos  116<H>  08-06    CAPILLARY BLOOD GLUCOSE      POCT Blood Glucose.: 154 mg/dL (07 Aug 2022 07:39)  POCT Blood Glucose.: 143 mg/dL (06 Aug 2022 21:28)  POCT Blood Glucose.: 194 mg/dL (06 Aug 2022 16:35)  POCT Blood Glucose.: 177 mg/dL (06 Aug 2022 11:09)    LIVER FUNCTIONS - ( 06 Aug 2022 18:36 )  Alb: 3.4 g/dL / Pro: 6.5 g/dL / ALK PHOS: 116 U/L / ALT: 8 U/L / AST: 12 U/L / GGT: x               Radiology  < from: CT Angio Head w/ IV Cont (08.05.22 @ 16:44) >  Findings:    The visualized aortic arch and great vessel origins are patent.    The common carotid arteries and the carotid bifurcations are patent.    The proximal vertebral arteries are patent. There is a direct origin of   the left vertebral artery from the aortic arch, normal variation.    The distal cervical vasculature as well as the intracranial vasculature   are essentially nondiagnostic in evaluation due to motion artifact.    IMPRESSION:    The visualized aortic arch, common carotid arteries and carotid   bifurcations are patent. The remainder of the exam (distal cervical   vessels and the intracranial circulation) is essentially nondiagnostic   due to motion artifact.    Consider a repeat study with appropriate sedation as clinically warranted.    --- End of Report ---    < end of copied text >    < from: CT Brain Stroke Protocol (08.05.22 @ 16:12) >    1. Mild atrophic changes.    2.  Hypodensities in the periventricular white matter, right thalamus,   right basal ganglia, and right insular cortex likely representing   ischemic change, age indeterminate.    The case was discussed with MARVIN Huerta on August 5, 2022 time   4:17 PM, read back.    --- End of Report ---    < end of copied text >      Other studies:   < from: EEG w/ Video Each 12-26 Hours, Unmonitored (08.06.22 @ 08:00) >    Awake:  The recording during the wakefulness consists of a symmetrical and   well-organized background and posterior dominant rhythm in the range of   8-9 Hz, which is reactive to eye opening and eye closure and change in   the level of alertness.        Asleep:  Different stages of sleep were presented well.  Stage II of non-REM sleep   was characterized by the presence of symmetrical and well-defined sleep   spindles and vertex sharp waves.  The deeper stages of sleep were   identified by the presence of low theta and delta range activity seen   diffusely over both hemispheres and more prominently from the frontal and   central derivations.  All stages captured and symmetric.        Focal Slowing:  There is moderate right temporal slowing      Generalized Slowing:  None    Breach Artifact:  None    Interictal Activity  None    Activation and Provocation Procedures:  None performed      Events:  None      Impression:  Abnormal due to the presence of focal slowing as above.          Clinical Correlation  Findings consistent with right hemispheric electrocerebral dysfunction   secondary to nonspecific etiology    --- End of Report ---      < end of copied text >      Assessment- 55 yo F with PMH of HTN, DM2, and stroke (per son 2017) who presented for RLE wound. Stroke Code was called for episode of AMS, patient not responding to commands which was not her baseline. LKW was around 1pm as per RN and family, NIHSS 23. No tpa given due to patient returning back to baseline, following commands. Neurology on board for further recommendations. Likely related to seizure in setting of infection and prior stroke      Plan  1. Continue keppra 1000mg BID  2. Keep Magnesium >2.0  3. Can repeat CTH  4. PT/OT/Rehab  5. DC planning if no further events

## 2022-08-07 NOTE — PROGRESS NOTE ADULT - SUBJECTIVE AND OBJECTIVE BOX
SUBJECTIVE:    Patient is a 56y old Female who presents with a chief complaint of Hypertensive urgency (02 Aug 2022 11:31)    Currently admitted to medicine with the primary diagnosis of Cellulitis       Today is hospital day 5d. This morning she is resting comfortably in bed and reports no new issues or overnight events.     PAST MEDICAL & SURGICAL HISTORY  Hypertension    Diabetes mellitus    No significant past surgical history      SOCIAL HISTORY:  Negative for smoking/alcohol/drug use.     ALLERGIES:  No Known Allergies    MEDICATIONS:  STANDING MEDICATIONS  ampicillin/sulbactam  IVPB 3 Gram(s) IV Intermittent every 6 hours  atorvastatin 80 milliGRAM(s) Oral at bedtime  dextrose 5%. 1000 milliLiter(s) IV Continuous <Continuous>  dextrose 5%. 1000 milliLiter(s) IV Continuous <Continuous>  dextrose 50% Injectable 25 Gram(s) IV Push once  dextrose 50% Injectable 12.5 Gram(s) IV Push once  dextrose 50% Injectable 25 Gram(s) IV Push once  enoxaparin Injectable 40 milliGRAM(s) SubCutaneous every 24 hours  glucagon  Injectable 1 milliGRAM(s) IntraMuscular once  insulin glargine Injectable (LANTUS) 22 Unit(s) SubCutaneous every morning  insulin lispro (ADMELOG) corrective regimen sliding scale   SubCutaneous three times a day before meals  insulin lispro Injectable (ADMELOG) 5 Unit(s) SubCutaneous three times a day before meals  labetalol 300 milliGRAM(s) Oral three times a day  levETIRAcetam  IVPB 1000 milliGRAM(s) IV Intermittent every 12 hours  LORazepam   Injectable 1 milliGRAM(s) IV Push once  losartan 50 milliGRAM(s) Oral daily  NIFEdipine XL 90 milliGRAM(s) Oral daily  pantoprazole    Tablet 40 milliGRAM(s) Oral before breakfast  sodium chloride 0.9%. 1000 milliLiter(s) IV Continuous <Continuous>  vancomycin  IVPB 1250 milliGRAM(s) IV Intermittent every 12 hours    PRN MEDICATIONS  acetaminophen     Tablet .. 650 milliGRAM(s) Oral every 6 hours PRN  dextrose Oral Gel 15 Gram(s) Oral once PRN  melatonin 3 milliGRAM(s) Oral at bedtime PRN  silver sulfADIAZINE 1% Cream 1 Application(s) Topical two times a day PRN    VITALS:   T(F): 99.5  HR: 101  BP: 168/103  RR: 18  SpO2: 97%    PHYSICAL EXAM:  GEN: No acute distress  LUNGS: Clear to auscultation bilaterally   HEART: S1/S2 present.   ABD: Soft, non-tender, non-distended. Bowel sounds present        LABS:                        11.5   7.42  )-----------( 282      ( 07 Aug 2022 07:53 )             33.8     08-07    137  |  103  |  10  ----------------------------<  148<H>  3.6   |  24  |  0.7    Ca    8.5      07 Aug 2022 07:53  Mg     2.0     08-06    TPro  6.5  /  Alb  3.4<L>  /  TBili  0.5  /  DBili  x   /  AST  12  /  ALT  8   /  AlkPhos  116<H>  08-06            Lactate, Blood: 0.9 mmol/L (08-06-22 @ 18:36)            RADIOLOGY:

## 2022-08-07 NOTE — PROGRESS NOTE ADULT - ASSESSMENT
57 yo F with PMH of HTN, DM2, CVA (2017) who presented with purulent right lower extremity wound for 3 months consistent with acute RLE cellulitis. Code stroke for unresponsiveness at 4pm 8/5    #Episode of unresponsiveness ?Seizure  -CTH negative for acute ICH  -d/w neuro: high likelihood of seizure , EEG running   -started on IV Keppra  -check labs, lactate  -seizure precautions  -keep NPO   -IVFs  -close monitoring    #Purulent RLE cellulitis r/o abscess  -s/p unasyn and and vancomycin in ED  -f/u wound and blood cultures   -trend inflammatory markers   -ID consult noted  - vanco, Unasyn  -check Vanco trough before 4th dose   -burn debridement     #Hypertensive urgency due to noncompliance  -BP at admission 296/174 without signs of end organ damage  -gradual BP lowering  - improved    # DMII w/ Hyperglycemia  - started insulin regimen   - FS ac/hs  - f/u A1c    # Atypical Chest pain and FELIZ on admission- possibly MSK related but r/o cardiac etiology   - chest wall tender to palpation on admission  - currently CP free  - CXR unremarkable   - Tn negative  - outpt stress test    # hx of CVA 2017 (Aneurysmal as per family? ?ICH but old ischemic strokes on CTH)  # Left sided weakness  -need to get more Hx  -as per family, aneurysm was never fixed    DVT ppx: lovenox, SCDs  GI ppx: protonix  Diet: DASH/carb consistent  Activity: AAT

## 2022-08-08 NOTE — PROGRESS NOTE ADULT - ASSESSMENT
55 yo F with PMH of HTN, DM2, and stroke (per son 2017) who presented for RLE wound. Stroke Code was called for episode of AMS, patient not responding to commands which was not her baseline. NIHSS 23. No tpa given due to patient returning back to baseline, following commands. Concern for seizure with post ictal confusio. CTH: Hypodensities in the periventricular white matter, right thalamus, right basal ganglia, and right insular cortex likely representing ischemic change, age indeterminate. EEG: moderate right temporal slowing, currently       Recommendation:   - give lorazepam 2mg IVP x1 and Keppra 1000mg load  - tonight continue with 1000mg Dilantin  - tomorrow onwards Dilantin 100mg q8hrs, f/u dilantin trough   - cw VEEG   - inc Keppra to 1500mg BID  - avoid antibiotics that can lower seizure threshold    - ABC and stabilize vitals per primary team.   - Utox and routine labs including TSH, Mg and Phos, lactate.  - Seizure & Fall precautions  - Ativan 2mg IVP for seizures > 2mins  Management per primary team.     Please call us if any questions or neurological changes.  57 yo F with PMH of HTN, DM2, and stroke (per son 2017) who presented for RLE wound. Stroke Code was called for episode of AMS, patient not responding to commands which was not her baseline. NIHSS 23. No tpa given due to patient returning back to baseline, following commands. Concern for seizure with post ictal confusio. CTH: Hypodensities in the periventricular white matter, right thalamus, right basal ganglia, and right insular cortex likely representing ischemic change, age indeterminate. EEG: moderate right temporal slowing, currently       Recommendation:   - give stat lorazepam 2mg IVP x1 and Keppra 1000mg load  - tonight continue with 1000mg Dilantin  - tomorrow onwards Dilantin 100mg q8hrs, f/u dilantin trough   - cw VEEG   - inc Keppra to 1500mg BID  - avoid antibiotics that can lower seizure threshold    - ABC and stabilize vitals per primary team.   - Utox and routine labs including TSH, Mg and Phos, lactate.  - Seizure & Fall precautions  - Ativan 2mg IVP for seizures > 2mins  Management per primary team.     Please call us if any questions or neurological changes.  55 yo F with PMH of HTN, DM2, and stroke (per son 2017) who presented for RLE wound. Stroke Code was called for episode of AMS, patient not responding to commands which was not her baseline. NIHSS 23. No tpa given due to patient returning back to baseline, following commands. Concern for seizure with post ictal confusio. CTH: Hypodensities in the periventricular white matter, right thalamus, right basal ganglia, and right insular cortex likely representing ischemic change, age indeterminate. EEG: moderate right temporal slowing, currently       Recommendation:   - give stat lorazepam 2mg IVP x1 and Keppra 1000mg load  - tonight continue with Dilantin 1000mg   - tomorrow onwards Dilantin 100mg q8hrs, f/u dilantin trough   - cw VEEG   - inc Keppra to 1500mg BID  - avoid antibiotics that can lower seizure threshold    - ABC and stabilize vitals per primary team.   - Utox and routine labs including TSH, Mg and Phos, lactate.  - Seizure & Fall precautions  - Ativan 2mg IVP for seizures > 2mins  Management per primary team.     Please call us if any questions or neurological changes.  57 yo F with PMH of HTN, DM2, and stroke (per son 2017) who presented for RLE wound. Stroke Code was called for episode of AMS, patient not responding to commands which was not her baseline. NIHSS 23. No tpa given due to patient returning back to baseline, following commands. Concern for seizure with post ictal confusion. CTH: Hypodensities in the periventricular white matter, right thalamus, right basal ganglia, and right insular cortex likely representing ischemic change, age indeterminate. EEG: moderate right temporal slowing, currently       Recommendation:   - give stat lorazepam 2mg IVP x1 and Keppra 1000mg load  - tonight continue with Dilantin 1000mg   - tomorrow onwards Dilantin 100mg q8hrs, f/u dilantin trough   - cw VEEG   - inc Keppra to 1500mg BID  - avoid antibiotics that can lower seizure threshold    - ABC and stabilize vitals per primary team.   - Utox and routine labs including TSH, Mg and Phos, lactate.  - Seizure & Fall precautions  - Ativan 2mg IVP for seizures > 2mins  Management per primary team.     Please call us if any questions or neurological changes.

## 2022-08-08 NOTE — PROGRESS NOTE ADULT - SUBJECTIVE AND OBJECTIVE BOX
Patient is a 56y old  Female who presents with a chief complaint of Hypertensive urgency (02 Aug 2022 11:31)    INTERVAL HPI/OVERNIGHT EVENTS: Patient was examined and seen at bedside. Son at bedside. As per son, has been mostly lethargic since Friday. Son thinks she uttered a few words over the weekend and today. NPO w/ IVFs.    ROS: Denied CP, SOB, AP, new weakness in am  All other systems reviewed and are within normal limits.  InitialHPI:  57 yo F with PMH of HTN, DM2, and ?hemorrhagic stroke due to an aneurysm (per son 2017) who presented for RLE wound. Started 3 months ago when she fell and hit her leg on a wooden cabinet. She put hydrogen peroxide, antibiotic cream, and wrapped the wound but never fully healed. Two weeks ago it started to show yellow pus so her and her family came to the ED for further treatment. Endorsed subjective fevers, chest pain, and vision changes which occurs when walked 2-3 blocks. Denied congestion, sore throat, cough, dyspnea, headache, dysuria, N/V, diarrhea     Per son, they fill her medications at [facility] ([phone]) and this is their current pharmacy. Called them to confirm her medications and they said her last refill of medications was 2018. Pt has not seen a doctor due to insurance problem and has not been taking any medications for more than 1yr.    In the ED:  Vitals: T: 98.9, BP: 296/ 174, , RR: 18, 98%O2 on RA  Labs: CBC showed WBC 11.23; ESR 92, CRP 35.6  CMP showed glucose 302, alk phos 173; ; VBG pH 7.45  .   Received unasyn and vanco, humalin R, IV vasotec, IV labetalol   (02 Aug 2022 07:47)    PAST MEDICAL & SURGICAL HISTORY:  Hypertension  ?Hemorrhagic CVA due to aneurysm    Diabetes mellitus      No significant past surgical history    General: awake, alert but not following commands, Rt facial droop  HEENT: no LAD  CV: S1 S2  Resp: decreased breath sounds at bases  GI: NT/ND/S +BS; obese  MS: no clubbing/cyanosis/edema, + pulses b/l  Neuro: moves both sides spont (R>L)             MEDICATIONS  (STANDING):  ampicillin/sulbactam  IVPB 3 Gram(s) IV Intermittent every 6 hours  atorvastatin 80 milliGRAM(s) Oral at bedtime  dextrose 5%. 1000 milliLiter(s) (100 mL/Hr) IV Continuous <Continuous>  dextrose 5%. 1000 milliLiter(s) (50 mL/Hr) IV Continuous <Continuous>  dextrose 50% Injectable 25 Gram(s) IV Push once  dextrose 50% Injectable 12.5 Gram(s) IV Push once  dextrose 50% Injectable 25 Gram(s) IV Push once  enoxaparin Injectable 40 milliGRAM(s) SubCutaneous every 24 hours  glucagon  Injectable 1 milliGRAM(s) IntraMuscular once  hydrALAZINE 25 milliGRAM(s) Oral every 6 hours  insulin glargine Injectable (LANTUS) 22 Unit(s) SubCutaneous every morning  insulin lispro (ADMELOG) corrective regimen sliding scale   SubCutaneous three times a day before meals  insulin lispro Injectable (ADMELOG) 5 Unit(s) SubCutaneous three times a day before meals  labetalol 300 milliGRAM(s) Oral three times a day  levETIRAcetam  IVPB 1500 milliGRAM(s) IV Intermittent every 12 hours  losartan 50 milliGRAM(s) Oral daily  NIFEdipine XL 90 milliGRAM(s) Oral daily  pantoprazole    Tablet 40 milliGRAM(s) Oral before breakfast  phenytoin  IVPB 1000 milliGRAM(s) IV Intermittent once  sodium chloride 0.45%. 1000 milliLiter(s) (50 mL/Hr) IV Continuous <Continuous>  vancomycin  IVPB 1250 milliGRAM(s) IV Intermittent every 12 hours    MEDICATIONS  (PRN):  acetaminophen     Tablet .. 650 milliGRAM(s) Oral every 6 hours PRN Temp greater or equal to 38C (100.4F), Mild Pain (1 - 3)  dextrose Oral Gel 15 Gram(s) Oral once PRN Blood Glucose LESS THAN 70 milliGRAM(s)/deciliter  melatonin 3 milliGRAM(s) Oral at bedtime PRN Insomnia  midazolam Injectable 4 milliGRAM(s) IV Push once PRN seizure  silver sulfADIAZINE 1% Cream 1 Application(s) Topical two times a day PRN Wound Care    Home Medications:  losartan 50 mg oral tablet: 1 tab(s) orally once a day (02 Aug 2022 10:38)  metFORMIN 500 mg oral tablet: 1 tab(s) orally 2 times a day (02 Aug 2022 10:38)  metoprolol succinate 50 mg oral tablet, extended release: 1 tab(s) orally once a day (02 Aug 2022 10:38)    Vital Signs Last 24 Hrs  T(C): 37.8 (08 Aug 2022 13:17), Max: 37.8 (08 Aug 2022 13:17)  T(F): 100 (08 Aug 2022 13:17), Max: 100 (08 Aug 2022 13:17)  HR: 85 (08 Aug 2022 17:54) (77 - 85)  BP: 177/93 (08 Aug 2022 17:54) (157/78 - 188/96)  BP(mean): 124 (08 Aug 2022 17:54) (123 - 124)  RR: 18 (08 Aug 2022 13:17) (18 - 18)  SpO2: --    Parameters below as of 08 Aug 2022 04:42  Patient On (Oxygen Delivery Method): room air      CAPILLARY BLOOD GLUCOSE      POCT Blood Glucose.: 117 mg/dL (08 Aug 2022 18:17)  POCT Blood Glucose.: 122 mg/dL (08 Aug 2022 11:13)  POCT Blood Glucose.: 141 mg/dL (08 Aug 2022 07:27)  POCT Blood Glucose.: 113 mg/dL (07 Aug 2022 21:11)    LABS:                        11.5   7.42  )-----------( 282      ( 07 Aug 2022 07:53 )             33.8     08-07    137  |  103  |  10  ----------------------------<  148<H>  3.6   |  24  |  0.7    Ca    8.5      07 Aug 2022 07:53                        Culture - Blood (collected 06 Aug 2022 18:36)  Source: .Blood None  Preliminary Report (08 Aug 2022 07:01):    No growth to date.    Culture - Blood (collected 06 Aug 2022 18:36)  Source: .Blood None  Preliminary Report (08 Aug 2022 07:01):    No growth to date.      Consultant Notes Reviewed:  [x ] YES  [ ] NO  Care Discussed with Consultants/Other Providers/ Housestaff [ x] YES  [ ] NO  Radiology, labs, new studies personally reviewed.

## 2022-08-08 NOTE — PROGRESS NOTE ADULT - ASSESSMENT
7 yo F with PMH of HTN, DM2, CVA (2017) who presented with purulent right lower extremity wound for 3 months consistent with acute RLE cellulitis. Code stroke for unresponsiveness at 4pm 8/5    #?Seizures  -CTH negative for acute ICH  -d/w neuro: high likelihood of seizure  -started on IV Keppra  -check labs, lactate  -seizure precautions  -cont VEEG  -keep NPO   -IVFs  -close monitoring  8/9: increased Keppra dose and added Dilantin. Remains on VEEG  Repeat labs    #Purulent RLE cellulitis r/o abscess  -s/p unasyn and and vancomycin in ED  -f/u wound and blood cultures   -trend inflammatory markers   -ID consult noted  - vanco, Unasyn  -burn debridement   -monitor Vanco trough    #Hypertensive urgency due to noncompliance  -BP at admission 296/174 without signs of end organ damage  -gradual BP lowering    # DMII w/ Hyperglycemia  - started insulin regimen   - FS ac/hs  -A1c=10.8    # Atypical Chest pain and FELIZ on admission- possibly MSK related but r/o cardiac etiology   - chest wall tender to palpation on admission  - currently CP free  - CXR unremarkable   - Tn negative  - outpt stress test    # hx of CVA 2017 (Aneurysmal as per family? ?ICH but old ischemic strokes on CTH)  # Left sided weakness  -need to get more Hx  -as per family, aneurysm was never fixed    DVT ppx: lovenox, SCDs  GI ppx: protonix  Diet: DASH/carb consistent  Activity: AAT    High risk pt. Px is guarded.    #Progress Note Handoff  Pending: Consults____Clinical improvement and stability__x___Tests__VEEG, Debridement____PT____x____  Pt/Family discussion: Pt/family informed and agree with the current plan  Disposition: Home______/SNF_______/4A______/To be determined____x____    My note supersedes the residents note should a discrepancy arise.    Chart and notes personally reviewed.  Care Discussed with Consultants/Other Providers/ Housestaff [ x] YES [ ] NO   Radiology, labs, old records personally reviewed.    discussed w/ housestaff, nursing, case management, neuro team, son

## 2022-08-08 NOTE — PROGRESS NOTE ADULT - ATTENDING COMMENTS
56 w/ prior cVA w/ residual LHP currently admitted for RLE wound with possible bacteremia complicated by unresponsiveness found to have epileptiform activity not back to baseline.  Recommendations as above.  Discussed with hospitalist/primary team.  Medications adjustments as above and continue VEEG for now.

## 2022-08-08 NOTE — PROGRESS NOTE ADULT - SUBJECTIVE AND OBJECTIVE BOX
NEUROLOGY CONSULT    HPI:  55 yo F with PMH of HTN, DM2, and stroke (per son 2017) who presented for RLE wound. Started 3 months ago when she fell and hit her leg on a wooden cabinet. She put hydrogen peroxide, antibiotic cream, and wrapped the wound but never fully healed. Two weeks ago it started to show yellow pus so her and her family came to the ED for further treatment. Endorsed subjective fevers, chest pain, and vision changes which occurs when walked 2-3 blocks. Denied congestion, sore throat, cough, dyspnea, headache, dysuria, N/V, diarrhea     Per son, they fill her medications at [facility] ([phone]) and this is their current pharmacy. Called them to confirm her medications and they said her last refill of medications was 2018. Pt has not seen a doctor due to insurance problem and has not been taking any medications.    In the ED:  Vitals: T: 98.9, BP: 296/ 174, , RR: 18, 98%O2 on RA  Labs: CBC showed WBC 11.23; ESR 92, CRP 35.6  CMP showed glucose 302, alk phos 173; ; VBG pH 7.45  EKG pending  CXR showed borderline cardiomegaly and no airspace opacity.   X-ray of RLE also pending.     Received unasyn and vanco, humalin R, IV vasotec, IV labetalol   (02 Aug 2022 07:47)     MEDICATIONS  Home Medications:  losartan 50 mg oral tablet: 1 tab(s) orally once a day (02 Aug 2022 10:38)  metFORMIN 500 mg oral tablet: 1 tab(s) orally 2 times a day (02 Aug 2022 10:38)  metoprolol succinate 50 mg oral tablet, extended release: 1 tab(s) orally once a day (02 Aug 2022 10:38)    MEDICATIONS  (STANDING):  ampicillin/sulbactam  IVPB 3 Gram(s) IV Intermittent every 6 hours  atorvastatin 80 milliGRAM(s) Oral at bedtime  dextrose 5%. 1000 milliLiter(s) (100 mL/Hr) IV Continuous <Continuous>  dextrose 5%. 1000 milliLiter(s) (50 mL/Hr) IV Continuous <Continuous>  dextrose 50% Injectable 25 Gram(s) IV Push once  dextrose 50% Injectable 12.5 Gram(s) IV Push once  dextrose 50% Injectable 25 Gram(s) IV Push once  enoxaparin Injectable 40 milliGRAM(s) SubCutaneous every 24 hours  glucagon  Injectable 1 milliGRAM(s) IntraMuscular once  hydrALAZINE 25 milliGRAM(s) Oral every 6 hours  insulin glargine Injectable (LANTUS) 22 Unit(s) SubCutaneous every morning  insulin lispro (ADMELOG) corrective regimen sliding scale   SubCutaneous three times a day before meals  insulin lispro Injectable (ADMELOG) 5 Unit(s) SubCutaneous three times a day before meals  labetalol 300 milliGRAM(s) Oral three times a day  levETIRAcetam  IVPB 1500 milliGRAM(s) IV Intermittent every 12 hours  levETIRAcetam  IVPB 1000 milliGRAM(s) IV Intermittent once  levETIRAcetam  IVPB 1000 milliGRAM(s) IV Intermittent every 12 hours  losartan 50 milliGRAM(s) Oral daily  NIFEdipine XL 90 milliGRAM(s) Oral daily  pantoprazole    Tablet 40 milliGRAM(s) Oral before breakfast  phenytoin  IVPB 1000 milliGRAM(s) IV Intermittent once  sodium chloride 0.45%. 1000 milliLiter(s) (50 mL/Hr) IV Continuous <Continuous>  vancomycin  IVPB 1250 milliGRAM(s) IV Intermittent every 12 hours    MEDICATIONS  (PRN):  acetaminophen     Tablet .. 650 milliGRAM(s) Oral every 6 hours PRN Temp greater or equal to 38C (100.4F), Mild Pain (1 - 3)  dextrose Oral Gel 15 Gram(s) Oral once PRN Blood Glucose LESS THAN 70 milliGRAM(s)/deciliter  melatonin 3 milliGRAM(s) Oral at bedtime PRN Insomnia  midazolam Injectable 4 milliGRAM(s) IV Push once PRN seizure  silver sulfADIAZINE 1% Cream 1 Application(s) Topical two times a day PRN Wound Care      FAMILY HISTORY:  FH: type 2 diabetes mellitus      SOCIAL HISTORY: negative for tobacco, alcohol, or ilicit drug use.    Allergies    No Known Allergies    Intolerances        GEN: laying in bed in status partialis     NEURO:   MENTAL STATUS: non verbal   LANG/SPEECH: non verbal   CRANIAL NERVES:  II: Pupils equal and reactive, no RAPD, normal visual field and fundus  III, IV, VI: patient not following command, right gaze preference   VII: right side of facial twitching and droop   VIII: normal hearing to speech  MOTOR: not following commands   REFLEXES: 2/4 throughout, bilateral flexor plantars  COORD: no tremor       LABS:                        11.5   7.42  )-----------( 282      ( 07 Aug 2022 07:53 )             33.8     08-07    137  |  103  |  10  ----------------------------<  148<H>  3.6   |  24  |  0.7    Ca    8.5      07 Aug 2022 07:53    TPro  6.5  /  Alb  3.4<L>  /  TBili  0.5  /  DBili  x   /  AST  12  /  ALT  8   /  AlkPhos  116<H>  08-06    Hemoglobin A1C:   Vitamin B12     CAPILLARY BLOOD GLUCOSE      POCT Blood Glucose.: 122 mg/dL (08 Aug 2022 11:13)              Microbiology:    Culture - Blood (collected 06 Aug 2022 18:36)  Source: .Blood None  Preliminary Report (08 Aug 2022 07:01):    No growth to date.    Culture - Blood (collected 06 Aug 2022 18:36)  Source: .Blood None  Preliminary Report (08 Aug 2022 07:01):    No growth to date.        RADIOLOGY, EKG AND ADDITIONAL TESTS: Reviewed.

## 2022-08-09 NOTE — PROGRESS NOTE ADULT - ASSESSMENT
7 yo F with PMH of HTN, DM2, CVA (2017) who presented with purulent right lower extremity wound for 3 months consistent with acute RLE cellulitis. Code stroke for unresponsiveness at 4pm 8/5    #Encephalopathy NOS ?seizures ?PRES  -CTH negative for acute ICH  -d/w neuro: high likelihood of seizure  -started on IV Keppra  -check labs, lactate  -seizure precautions  -cont VEEG  -keep NPO, S&S f/u   -IVFs  -close monitoring  8/8: increased Keppra dose and added Dilantin. Remains on VEEG  8/9 d/w neuro: will get MRI, cont VEEG. LP depending on MRI results      #Purulent RLE cellulitis r/o abscess  -s/p unasyn and and vancomycin in ED  -f/u wound and blood cultures   -trend inflammatory markers   -ID consult noted  - vanco, Unasyn  -burn debridement   -monitor Vanco trough    #Hypertensive urgency due to noncompliance  -BP at admission 296/174 without signs of end organ damage  -gradual BP lowering    # DMII w/ Hyperglycemia  - started insulin regimen   - FS ac/hs  -A1c=10.8    # Atypical Chest pain and FELIZ on admission- possibly MSK related but r/o cardiac etiology   - chest wall tender to palpation on admission  - currently CP free  - CXR unremarkable   - Tn negative  - outpt stress test    # hx of CVA 2017 (Aneurysmal as per family? ?ICH but old ischemic strokes on CTH)  # Left sided weakness  -need to get more Hx  -as per family, aneurysm was never fixed    DVT ppx: lovenox, SCDs  GI ppx: protonix  Diet: DASH/carb consistent  Activity: AAT    High risk pt. Px is guarded.    #Progress Note Handoff  Pending: Consults____Clinical improvement and stability__x___Tests__MRI, VEEG, Debridement____PT____x____  Pt/Family discussion: Pt/family informed and agree with the current plan  Disposition: Home______/SNF_______/4A______/To be determined____x____    My note supersedes the residents note should a discrepancy arise.    Chart and notes personally reviewed.  Care Discussed with Consultants/Other Providers/ Housestaff [ x] YES [ ] NO   Radiology, labs, old records personally reviewed.    discussed w/ housestaff, nursing, case management, neuro team, son

## 2022-08-09 NOTE — PROGRESS NOTE ADULT - ATTENDING COMMENTS
"Called and left a voicemail for Jaclyn. Reminded her that she needs to have her labs done and that her pharmacy has been calling us to notify that she has not picked up her medications. Writer did call and speak with Jaclyn's mother, Choco. Choco reports that Jaclyn is being a \"selective adult.\" Mom reports that she doesn't think Jaclyn takes her medications on time and she is aware that she has not gone to lab recently and has not picked up her medication. Mom reports that Jaclyn does have enough medication at home currently. Writer will discuss these issues with Jaclyn when she calls back.   " 56 w/ prior cVA w/ residual LHP currently admitted for RLE wound with possible bacteremia complicated by unresponsiveness found to have epileptiform activity not back to baseline.  Clinical picture disproportionate to EEG findings.  Recommend MRI to assess for underlying structural etiology.  Recommendations as above.

## 2022-08-09 NOTE — PROGRESS NOTE ADULT - SUBJECTIVE AND OBJECTIVE BOX
Patient is a 56y old  Female who presents with a chief complaint of Hypertensive urgency (02 Aug 2022 11:31)    INTERVAL HPI/OVERNIGHT EVENTS: Patient was examined and seen at bedside. Son at bedside. As per son, more lucid and talking. Answered some questions this am.     ROS: Denied CP, SOB, AP, new weakness in am  All other systems reviewed and are within normal limits.  InitialHPI:  57 yo F with PMH of HTN, DM2, and ?hemorrhagic stroke due to an aneurysm (per son ) who presented for RLE wound. Started 3 months ago when she fell and hit her leg on a wooden cabinet. She put hydrogen peroxide, antibiotic cream, and wrapped the wound but never fully healed. Two weeks ago it started to show yellow pus so her and her family came to the ED for further treatment. Endorsed subjective fevers, chest pain, and vision changes which occurs when walked 2-3 blocks. Denied congestion, sore throat, cough, dyspnea, headache, dysuria, N/V, diarrhea     Per son, they fill her medications at [facility] ([phone]) and this is their current pharmacy. Called them to confirm her medications and they said her last refill of medications was . Pt has not seen a doctor due to insurance problem and has not been taking any medications for more than 1yr.    In the ED:  Vitals: T: 98.9, BP: 296/ 174, , RR: 18, 98%O2 on RA  Labs: CBC showed WBC 11.23; ESR 92, CRP 35.6  CMP showed glucose 302, alk phos 173; ; VBG pH 7.45  .   Received unasyn and vanco, humalin R, IV vasotec, IV labetalol   (02 Aug 2022 07:47)    PAST MEDICAL & SURGICAL HISTORY:  Hypertension  ?Hemorrhagic CVA due to aneurysm    Diabetes mellitus      No significant past surgical history    General: awake, alert, tries to follow commands, answers some simple questions from the son   HEENT: no LAD  CV: S1 S2  Resp: decreased breath sounds at bases  GI: NT/ND/S +BS; obese  MS: no clubbing/cyanosis/edema, + pulses b/l; RLE c/d/i dsg  Neuro: moves both sides spont (R>L)             MEDICATIONS  (STANDING):  ampicillin/sulbactam  IVPB 3 Gram(s) IV Intermittent every 6 hours  atorvastatin 80 milliGRAM(s) Oral at bedtime  dextrose 5%. 1000 milliLiter(s) (100 mL/Hr) IV Continuous <Continuous>  dextrose 5%. 1000 milliLiter(s) (50 mL/Hr) IV Continuous <Continuous>  dextrose 50% Injectable 25 Gram(s) IV Push once  dextrose 50% Injectable 12.5 Gram(s) IV Push once  dextrose 50% Injectable 25 Gram(s) IV Push once  enoxaparin Injectable 40 milliGRAM(s) SubCutaneous every 24 hours  glucagon  Injectable 1 milliGRAM(s) IntraMuscular once  hydrALAZINE 50 milliGRAM(s) Oral every 8 hours  insulin glargine Injectable (LANTUS) 22 Unit(s) SubCutaneous every morning  insulin lispro (ADMELOG) corrective regimen sliding scale   SubCutaneous three times a day before meals  insulin lispro Injectable (ADMELOG) 5 Unit(s) SubCutaneous three times a day before meals  labetalol 300 milliGRAM(s) Oral three times a day  levETIRAcetam  IVPB 1500 milliGRAM(s) IV Intermittent every 12 hours  losartan 50 milliGRAM(s) Oral once  pantoprazole    Tablet 40 milliGRAM(s) Oral before breakfast  phenytoin  IVPB 100 milliGRAM(s) IV Intermittent three times a day  sodium chloride 0.45%. 1000 milliLiter(s) (75 mL/Hr) IV Continuous <Continuous>  vancomycin  IVPB 1250 milliGRAM(s) IV Intermittent every 12 hours    MEDICATIONS  (PRN):  acetaminophen     Tablet .. 650 milliGRAM(s) Oral every 6 hours PRN Temp greater or equal to 38C (100.4F), Mild Pain (1 - 3)  dextrose Oral Gel 15 Gram(s) Oral once PRN Blood Glucose LESS THAN 70 milliGRAM(s)/deciliter  melatonin 3 milliGRAM(s) Oral at bedtime PRN Insomnia  midazolam Injectable 4 milliGRAM(s) IV Push once PRN seizure  silver sulfADIAZINE 1% Cream 1 Application(s) Topical two times a day PRN Wound Care    Home Medications:  losartan 50 mg oral tablet: 1 tab(s) orally once a day (02 Aug 2022 10:38)  metFORMIN 500 mg oral tablet: 1 tab(s) orally 2 times a day (02 Aug 2022 10:38)  metoprolol succinate 50 mg oral tablet, extended release: 1 tab(s) orally once a day (02 Aug 2022 10:38)    Vital Signs Last 24 Hrs  T(C): 37.3 (09 Aug 2022 16:00), Max: 37.3 (09 Aug 2022 16:00)  T(F): 99.1 (09 Aug 2022 16:00), Max: 99.1 (09 Aug 2022 16:00)  HR: 73 (09 Aug 2022 16:00) (73 - 85)  BP: 124/83 (09 Aug 2022 16:00) (124/83 - 197/86)  BP(mean): 136 (09 Aug 2022 14:24) (136 - 136)  RR: 18 (09 Aug 2022 14:24) (17 - 18)  SpO2: 94% (09 Aug 2022 14:24) (94% - 94%)    Parameters below as of 09 Aug 2022 14:24  Patient On (Oxygen Delivery Method): room air      CAPILLARY BLOOD GLUCOSE      POCT Blood Glucose.: 103 mg/dL (09 Aug 2022 16:04)  POCT Blood Glucose.: 132 mg/dL (09 Aug 2022 11:22)  POCT Blood Glucose.: 136 mg/dL (09 Aug 2022 08:22)  POCT Blood Glucose.: 160 mg/dL (08 Aug 2022 21:45)  POCT Blood Glucose.: 117 mg/dL (08 Aug 2022 18:17)    LABS:                        9.8    7.93  )-----------( 263      ( 09 Aug 2022 06:31 )             29.4         145  |  112<H>  |  16  ----------------------------<  118<H>  3.7   |  21  |  0.8    Ca    8.8      09 Aug 2022 06:31  Mg     2.2         TPro  6.1  /  Alb  3.1<L>  /  TBili  0.4  /  DBili  x   /  AST  19  /  ALT  14  /  AlkPhos  100  08-09    LIVER FUNCTIONS - ( 09 Aug 2022 06:31 )  Alb: 3.1 g/dL / Pro: 6.1 g/dL / ALK PHOS: 100 U/L / ALT: 14 U/L / AST: 19 U/L / GGT: x                 Urinalysis Basic - ( 09 Aug 2022 01:00 )    Color: Light Orange / Appearance: Turbid / S.018 / pH: x  Gluc: x / Ketone: Small  / Bili: Negative / Urobili: <2 mg/dL   Blood: x / Protein: 100 mg/dL / Nitrite: Negative   Leuk Esterase: Moderate / RBC: >720 /HPF / WBC 84 /HPF   Sq Epi: x / Non Sq Epi: 6 /HPF / Bacteria: Negative              Culture - Blood (collected 06 Aug 2022 18:36)  Source: .Blood None  Preliminary Report (08 Aug 2022 07:01):    No growth to date.    Culture - Blood (collected 06 Aug 2022 18:36)  Source: .Blood None  Preliminary Report (08 Aug 2022 07:01):    No growth to date.      Consultant Notes Reviewed:  [x ] YES  [ ] NO  Care Discussed with Consultants/Other Providers/ Housestaff [ x] YES  [ ] NO  Radiology, labs, new studies personally reviewed.                                  MEDICATIONS  (STANDING):  ampicillin/sulbactam  IVPB 3 Gram(s) IV Intermittent every 6 hours  atorvastatin 80 milliGRAM(s) Oral at bedtime  dextrose 5%. 1000 milliLiter(s) (100 mL/Hr) IV Continuous <Continuous>  dextrose 5%. 1000 milliLiter(s) (50 mL/Hr) IV Continuous <Continuous>  dextrose 50% Injectable 25 Gram(s) IV Push once  dextrose 50% Injectable 12.5 Gram(s) IV Push once  dextrose 50% Injectable 25 Gram(s) IV Push once  enoxaparin Injectable 40 milliGRAM(s) SubCutaneous every 24 hours  glucagon  Injectable 1 milliGRAM(s) IntraMuscular once  hydrALAZINE 25 milliGRAM(s) Oral every 6 hours  insulin glargine Injectable (LANTUS) 22 Unit(s) SubCutaneous every morning  insulin lispro (ADMELOG) corrective regimen sliding scale   SubCutaneous three times a day before meals  insulin lispro Injectable (ADMELOG) 5 Unit(s) SubCutaneous three times a day before meals  labetalol 300 milliGRAM(s) Oral three times a day  levETIRAcetam  IVPB 1500 milliGRAM(s) IV Intermittent every 12 hours  losartan 50 milliGRAM(s) Oral daily  NIFEdipine XL 90 milliGRAM(s) Oral daily  pantoprazole    Tablet 40 milliGRAM(s) Oral before breakfast  phenytoin  IVPB 1000 milliGRAM(s) IV Intermittent once  sodium chloride 0.45%. 1000 milliLiter(s) (50 mL/Hr) IV Continuous <Continuous>  vancomycin  IVPB 1250 milliGRAM(s) IV Intermittent every 12 hours    MEDICATIONS  (PRN):  acetaminophen     Tablet .. 650 milliGRAM(s) Oral every 6 hours PRN Temp greater or equal to 38C (100.4F), Mild Pain (1 - 3)  dextrose Oral Gel 15 Gram(s) Oral once PRN Blood Glucose LESS THAN 70 milliGRAM(s)/deciliter  melatonin 3 milliGRAM(s) Oral at bedtime PRN Insomnia  midazolam Injectable 4 milliGRAM(s) IV Push once PRN seizure  silver sulfADIAZINE 1% Cream 1 Application(s) Topical two times a day PRN Wound Care    Home Medications:  losartan 50 mg oral tablet: 1 tab(s) orally once a day (02 Aug 2022 10:38)  metFORMIN 500 mg oral tablet: 1 tab(s) orally 2 times a day (02 Aug 2022 10:38)  metoprolol succinate 50 mg oral tablet, extended release: 1 tab(s) orally once a day (02 Aug 2022 10:38)    Vital Signs Last 24 Hrs  T(C): 37.8 (08 Aug 2022 13:17), Max: 37.8 (08 Aug 2022 13:17)  T(F): 100 (08 Aug 2022 13:17), Max: 100 (08 Aug 2022 13:17)  HR: 85 (08 Aug 2022 17:54) (77 - 85)  BP: 177/93 (08 Aug 2022 17:54) (157/78 - 188/96)  BP(mean): 124 (08 Aug 2022 17:54) (123 - 124)  RR: 18 (08 Aug 2022 13:17) (18 - 18)  SpO2: --    Parameters below as of 08 Aug 2022 04:42  Patient On (Oxygen Delivery Method): room air      CAPILLARY BLOOD GLUCOSE      POCT Blood Glucose.: 117 mg/dL (08 Aug 2022 18:17)  POCT Blood Glucose.: 122 mg/dL (08 Aug 2022 11:13)  POCT Blood Glucose.: 141 mg/dL (08 Aug 2022 07:27)  POCT Blood Glucose.: 113 mg/dL (07 Aug 2022 21:11)    LABS:                        11.5   7.42  )-----------( 282      ( 07 Aug 2022 07:53 )             33.8         137  |  103  |  10  ----------------------------<  148<H>  3.6   |  24  |  0.7    Ca    8.5      07 Aug 2022 07:53                        Culture - Blood (collected 06 Aug 2022 18:36)  Source: .Blood None  Preliminary Report (08 Aug 2022 07:01):    No growth to date.    Culture - Blood (collected 06 Aug 2022 18:36)  Source: .Blood None  Preliminary Report (08 Aug 2022 07:01):    No growth to date.      Consultant Notes Reviewed:  [x ] YES  [ ] NO  Care Discussed with Consultants/Other Providers/ Housestaff [ x] YES  [ ] NO  Radiology, labs, new studies personally reviewed.

## 2022-08-09 NOTE — PROGRESS NOTE ADULT - ASSESSMENT
55 yo F with PMH of HTN, DM2, CVA (2017) who presented with purulent right lower extremity wound for 3 months consistent with acute RLE cellulitis. Code stroke for unresponsiveness at 4pm 8/5.    #?Seizures  - CTH negative for acute ICH  - high likelihood of seizure as per neuro  - c/w Keppra  - c/w Dilantin  - c/w VEEG  - keep NPO  - SLP consulted for possible NGT  - f/u neuro for possible LP  - MRI ordered    #Purulent RLE cellulitis r/o abscess  - s/p unasyn and vancomycin in the ED  - cultures NGTD  - burn to debride  - monitor vanco trough 10.6 >11.3    #Hypertensive urgency 2/2 insurance/social problems and inability to get meds  - BP at admission 296/174 > 111 today  - no signs of end organ damage    #DM2  - c/w insulin regiment  - finger sticks  - A1c 10.4        GI PPX: pantoprazole  DVT PPX: Lovenox  DIET: NPO, possible NGT        ___________________________________________________________________  Provider handoff:  [] MRI pending  [] f/u S&S  [] possible LP? f/u neuro  [] burn debridement

## 2022-08-09 NOTE — PROGRESS NOTE ADULT - SUBJECTIVE AND OBJECTIVE BOX
JOSE MITCHELL 56y Female  MRN#: [id number]      Pt is currently admitted with the primary diagnosis of RLE wound complicated by encephalopathy.       Hospital Day: 7d  CC: RLE wound.  HPI: 55 yo F with PMH of HTN, DM2, and stroke (2017 as per son) who presented for RLE wound x 3 months ago when she fell and hit her leg on a wooden cabinet. Hydrogen peroxide, abx cream, and wrap were applied but wound never fully healed. Two weeks ago wound had yellow pus so pt presented to the ED. Pt endorsed subjective fevers, CP, vision changes after walking 2-3 blocks. Denied congestion, sore throat, cough, headache, dysuria, n/v, diarrhea.     Per son, they fill her medications at [facility] ([phone]) and this is their current pharmacy. Called them to confirm her medications and they said her last refill of medications was . Pt has not seen a doctor due to insurance problem and has not been taking any medications.    Hospital Course: Pt was at baseline as per family until  4 PM when code stroke was called due to left gaze deviation and unresponsiveness. Taken for stat CTH.     Overnight events:  None    Subjective complaints:                                            ----------------------------------------------------------    PAST MEDICAL & SURGICAL HISTORY  Hypertension    Diabetes mellitus    No significant past surgical history                                              -----------------------------------------------------------  ALLERGIES:  No Known Allergies                                            ------------------------------------------------------------    HOME MEDICATIONS  Home Medications:  losartan 50 mg oral tablet: 1 tab(s) orally once a day (02 Aug 2022 10:38)  metFORMIN 500 mg oral tablet: 1 tab(s) orally 2 times a day (02 Aug 2022 10:38)  metoprolol succinate 50 mg oral tablet, extended release: 1 tab(s) orally once a day (02 Aug 2022 10:38)                           MEDICATIONS:  STANDING MEDICATIONS  ampicillin/sulbactam  IVPB 3 Gram(s) IV Intermittent every 6 hours  atorvastatin 80 milliGRAM(s) Oral at bedtime  dextrose 5%. 1000 milliLiter(s) IV Continuous <Continuous>  dextrose 5%. 1000 milliLiter(s) IV Continuous <Continuous>  dextrose 50% Injectable 25 Gram(s) IV Push once  dextrose 50% Injectable 12.5 Gram(s) IV Push once  dextrose 50% Injectable 25 Gram(s) IV Push once  enoxaparin Injectable 40 milliGRAM(s) SubCutaneous every 24 hours  glucagon  Injectable 1 milliGRAM(s) IntraMuscular once  hydrALAZINE 50 milliGRAM(s) Oral every 8 hours  insulin glargine Injectable (LANTUS) 22 Unit(s) SubCutaneous every morning  insulin lispro (ADMELOG) corrective regimen sliding scale   SubCutaneous three times a day before meals  insulin lispro Injectable (ADMELOG) 5 Unit(s) SubCutaneous three times a day before meals  labetalol 300 milliGRAM(s) Oral three times a day  levETIRAcetam  IVPB 1500 milliGRAM(s) IV Intermittent every 12 hours  losartan 50 milliGRAM(s) Oral daily  NIFEdipine XL 90 milliGRAM(s) Oral daily  pantoprazole    Tablet 40 milliGRAM(s) Oral before breakfast  phenytoin  IVPB 100 milliGRAM(s) IV Intermittent three times a day  sodium chloride 0.45%. 1000 milliLiter(s) IV Continuous <Continuous>  vancomycin  IVPB 1250 milliGRAM(s) IV Intermittent every 12 hours    PRN MEDICATIONS  acetaminophen     Tablet .. 650 milliGRAM(s) Oral every 6 hours PRN  dextrose Oral Gel 15 Gram(s) Oral once PRN  melatonin 3 milliGRAM(s) Oral at bedtime PRN  midazolam Injectable 4 milliGRAM(s) IV Push once PRN  silver sulfADIAZINE 1% Cream 1 Application(s) Topical two times a day PRN                                            ------------------------------------------------------------  VITAL SIGNS: Last 24 Hours  T(C): 36.9 (09 Aug 2022 14:24), Max: 36.9 (09 Aug 2022 14:24)  T(F): 98.5 (09 Aug 2022 14:24), Max: 98.5 (09 Aug 2022 14:24)  HR: 78 (09 Aug 2022 14:24) (78 - 85)  BP: 178/111 (09 Aug 2022 14:24) (149/82 - 197/86)  BP(mean): 136 (09 Aug 2022 14:) (124 - 136)  RR: 18 (09 Aug 2022 14:) (17 - 18)  SpO2: 94% (09 Aug 2022 14:) (94% - 94%)      22 @ 07:01  -  22 @ 07:00  --------------------------------------------------------  IN: 0 mL / OUT: 225 mL / NET: -225 mL                                             --------------------------------------------------------------  LABS:                        9.8    7.93  )-----------( 263      ( 09 Aug 2022 06:31 )             29.4         145  |  112<H>  |  16  ----------------------------<  118<H>  3.7   |  21  |  0.8    Ca    8.8      09 Aug 2022 06:31  Mg     2.2         TPro  6.1  /  Alb  3.1<L>  /  TBili  0.4  /  DBili  x   /  AST  19  /  ALT  14  /  AlkPhos  100  08-09      Urinalysis Basic - ( 09 Aug 2022 01:00 )    Color: Light Orange / Appearance: Turbid / S.018 / pH: x  Gluc: x / Ketone: Small  / Bili: Negative / Urobili: <2 mg/dL   Blood: x / Protein: 100 mg/dL / Nitrite: Negative   Leuk Esterase: Moderate / RBC: >720 /HPF / WBC 84 /HPF   Sq Epi: x / Non Sq Epi: 6 /HPF / Bacteria: Negative        Lactate, Blood: 0.9 mmol/L (22 @ 23:24)        Culture - Blood (collected 06 Aug 2022 18:36)  Source: .Blood None  Preliminary Report (08 Aug 2022 07:01):    No growth to date.    Culture - Blood (collected 06 Aug 2022 18:36)  Source: .Blood None  Preliminary Report (08 Aug 2022 07:01):    No growth to date.                                                    -------------------------------------------------------------  RADIOLOGY:  < from: CT Head No Cont (22 @ 19:14) >  IMPRESSION:    Chronic ischemic changes unchanged from the scan of 2022.    No evidence of acute intracranial hemorrhage or acute territorial infarct.    No evidence of mass or midline shift.    < end of copied text >                                            --------------------------------------------------------------    PHYSICAL EXAM:  General: female laying in bed with VEEG and son at bedside. Awake and alert but not following commands.   HEENT: Normocephalic, right gaze preference.   LUNGS: Unable to auscultate due to patient laying in bed not following commands.  HEART: RRR. No murmurs, rubs, or gallops.  ABDOMEN: Nontender, nondistended. + bowel sounds.  EXT: Pulses palpable x 4. Nonedematous.  SKIN: Warm, dry.

## 2022-08-09 NOTE — PROGRESS NOTE ADULT - ASSESSMENT
57 yo F with PMH of HTN, DM2, and stroke (per son 2017) with left sided weakness who presented for RLE wound. Stroke Code was called for episode of AMS, patient not responding to commands which was not her baseline. NIHSS 23. No tpa given due to patient returning back to baseline, following commands. Concern for seizure with post ictal confusio. CTH: Hypodensities in the periventricular white matter, right thalamus, right basal ganglia, and right insular cortex likely representing ischemic change, age indeterminate. EEG: moderate right temporal slowing, repeat EEG: Focal and generalized slowing, Dilantin trough: 10.5    Recommendation:  - cw Dilantin 100mg q8hrs (first dose now) with another trough tomorrow  - cw VEEG   - cw Keppra 1500mg BID  - MRI w/ wo contrast    Thank you for the courtesy of this consult, Please contact us if any neurological changes or questions, neurology team will continue to follow.   57 yo F with PMH of HTN, DM2, and stroke (per son 2017) with left sided weakness who presented for RLE wound. Stroke Code was called for episode of AMS, patient not responding to commands which was not her baseline. NIHSS 23. No tpa given due to patient returning back to baseline, following commands. Concern for seizure with post ictal confusio. CTH: Hypodensities in the periventricular white matter, right thalamus, right basal ganglia, and right insular cortex likely representing ischemic change, age indeterminate. EEG: moderate right temporal slowing, repeat EEG: Focal and generalized slowing, Dilantin trough: 10.5    Recommendation:  - cw Dilantin 100mg q8hrs (first dose now) with another trough tomorrow  - cw VEEG   - cw Keppra 1500mg BID  - MRI Brain w/ wo contrast  - will consider LP pending MRI results    Thank you for the courtesy of this consult, Please contact us if any neurological changes or questions, neurology team will continue to follow.

## 2022-08-09 NOTE — PROGRESS NOTE ADULT - SUBJECTIVE AND OBJECTIVE BOX
NEUROLOGY CONSULT    HPI:  55 yo F with PMH of HTN, DM2, and stroke (per son 2017) who presented for RLE wound. Started 3 months ago when she fell and hit her leg on a wooden cabinet. She put hydrogen peroxide, antibiotic cream, and wrapped the wound but never fully healed. Two weeks ago it started to show yellow pus so her and her family came to the ED for further treatment. Endorsed subjective fevers, chest pain, and vision changes which occurs when walked 2-3 blocks. Denied congestion, sore throat, cough, dyspnea, headache, dysuria, N/V, diarrhea     Per son, they fill her medications at [facility] ([phone]) and this is their current pharmacy. Called them to confirm her medications and they said her last refill of medications was . Pt has not seen a doctor due to insurance problem and has not been taking any medications.    In the ED:  Vitals: T: 98.9, BP: 296/ 174, , RR: 18, 98%O2 on RA  Labs: CBC showed WBC 11.23; ESR 92, CRP 35.6  CMP showed glucose 302, alk phos 173; ; VBG pH 7.45  EKG pending  CXR showed borderline cardiomegaly and no airspace opacity.   X-ray of RLE also pending.     Received unasyn and vanco, humalin R, IV vasotec, IV labetalol   (02 Aug 2022 07:47)     MEDICATIONS  Home Medications:  losartan 50 mg oral tablet: 1 tab(s) orally once a day (02 Aug 2022 10:38)  metFORMIN 500 mg oral tablet: 1 tab(s) orally 2 times a day (02 Aug 2022 10:38)  metoprolol succinate 50 mg oral tablet, extended release: 1 tab(s) orally once a day (02 Aug 2022 10:38)    MEDICATIONS  (STANDING):  ampicillin/sulbactam  IVPB 3 Gram(s) IV Intermittent every 6 hours  atorvastatin 80 milliGRAM(s) Oral at bedtime  dextrose 5%. 1000 milliLiter(s) (100 mL/Hr) IV Continuous <Continuous>  dextrose 5%. 1000 milliLiter(s) (50 mL/Hr) IV Continuous <Continuous>  dextrose 50% Injectable 25 Gram(s) IV Push once  dextrose 50% Injectable 12.5 Gram(s) IV Push once  dextrose 50% Injectable 25 Gram(s) IV Push once  enoxaparin Injectable 40 milliGRAM(s) SubCutaneous every 24 hours  glucagon  Injectable 1 milliGRAM(s) IntraMuscular once  hydrALAZINE 50 milliGRAM(s) Oral every 8 hours  insulin glargine Injectable (LANTUS) 22 Unit(s) SubCutaneous every morning  insulin lispro (ADMELOG) corrective regimen sliding scale   SubCutaneous three times a day before meals  insulin lispro Injectable (ADMELOG) 5 Unit(s) SubCutaneous three times a day before meals  labetalol 300 milliGRAM(s) Oral three times a day  levETIRAcetam  IVPB 1500 milliGRAM(s) IV Intermittent every 12 hours  losartan 50 milliGRAM(s) Oral daily  NIFEdipine XL 90 milliGRAM(s) Oral daily  pantoprazole    Tablet 40 milliGRAM(s) Oral before breakfast  phenytoin  IVPB 100 milliGRAM(s) IV Intermittent three times a day  sodium chloride 0.45%. 1000 milliLiter(s) (75 mL/Hr) IV Continuous <Continuous>  vancomycin  IVPB 1250 milliGRAM(s) IV Intermittent every 12 hours    MEDICATIONS  (PRN):  acetaminophen     Tablet .. 650 milliGRAM(s) Oral every 6 hours PRN Temp greater or equal to 38C (100.4F), Mild Pain (1 - 3)  dextrose Oral Gel 15 Gram(s) Oral once PRN Blood Glucose LESS THAN 70 milliGRAM(s)/deciliter  melatonin 3 milliGRAM(s) Oral at bedtime PRN Insomnia  midazolam Injectable 4 milliGRAM(s) IV Push once PRN seizure  silver sulfADIAZINE 1% Cream 1 Application(s) Topical two times a day PRN Wound Care      FAMILY HISTORY:  FH: type 2 diabetes mellitus      SOCIAL HISTORY: negative for tobacco, alcohol, or ilicit drug use.    Allergies    No Known Allergies    Intolerances        GEN: drowsy but awakes to painful stimulus    NEURO:   MENTAL STATUS: not responding to questions  LANG/SPEECH: occasional one word, incomprehensible   CRANIAL NERVES:  II: Pupils equal and reactive  III, IV, VI: patient not following command, no gaze preference   V: normal  VII: no facial asymmetry   VIII: normal hearing to speech  MOTOR: 0/5 in both upper and left lower extremities, spontaneous left leg movement   REFLEXES: 2/4 throughout, bilateral flexor plantars  COORD: not following commands     LABS:                        9.8    7.93  )-----------( 263      ( 09 Aug 2022 06:31 )             29.4         145  |  112<H>  |  16  ----------------------------<  118<H>  3.7   |  21  |  0.8    Ca    8.8      09 Aug 2022 06:31  Mg     2.2         TPro  6.1  /  Alb  3.1<L>  /  TBili  0.4  /  DBili  x   /  AST  19  /  ALT  14  /  AlkPhos  100      Hemoglobin A1C:   Vitamin B12 Vitamin B12, Serum: 358 pg/mL ( @ 17:53)      CAPILLARY BLOOD GLUCOSE      POCT Blood Glucose.: 132 mg/dL (09 Aug 2022 11:22)      Urinalysis Basic - ( 09 Aug 2022 01:00 )    Color: Light Orange / Appearance: Turbid / S.018 / pH: x  Gluc: x / Ketone: Small  / Bili: Negative / Urobili: <2 mg/dL   Blood: x / Protein: 100 mg/dL / Nitrite: Negative   Leuk Esterase: Moderate / RBC: >720 /HPF / WBC 84 /HPF   Sq Epi: x / Non Sq Epi: 6 /HPF / Bacteria: Negative            Microbiology:    Culture - Blood (collected 06 Aug 2022 18:36)  Source: .Blood None  Preliminary Report (08 Aug 2022 07:01):    No growth to date.    Culture - Blood (collected 06 Aug 2022 18:36)  Source: .Blood None  Preliminary Report (08 Aug 2022 07:01):    No growth to date.        RADIOLOGY, EKG AND ADDITIONAL TESTS: Reviewed.

## 2022-08-10 NOTE — PROGRESS NOTE ADULT - SUBJECTIVE AND OBJECTIVE BOX
JOSE MITCHELL 56y Female  MRN#: [id number]      Pt is currently admitted with the primary diagnosis of       Hospital Day: 8d  CC:  HPI: 57 yo F with PMH of HTN, DM2, and stroke (2017 as per son) who presented for RLE wound x 3 months ago when she fell and hit her leg on a wooden cabinet. Hydrogen peroxide, abx cream, and wrap were applied but wound never fully healed. Two weeks ago wound had yellow pus so pt presented to the ED. Pt endorsed subjective fevers, CP, vision changes after walking 2-3 blocks. Denied congestion, sore throat, cough, headache, dysuria, n/v, diarrhea.     Per son, they fill her medications at [facility] ([phone]) and this is their current pharmacy. Called them to confirm her medications and they said her last refill of medications was . Pt has not seen a doctor due to insurance problem and has not been taking any medications.    Hospital Course: Pt was at baseline as per family until  4 PM when code stroke was called due to left gaze deviation and unresponsiveness. Taken for stat CTH.     Started on Keppra and Dilantin. Remains on vEEG.     Overnight events:  None    Subjective complaints:  As per son, patient is talking but still sleepy throughout the day.                                             ----------------------------------------------------------    PAST MEDICAL & SURGICAL HISTORY  Hypertension    Diabetes mellitus    No significant past surgical history                                                -----------------------------------------------------------  ALLERGIES:  No Known Allergies                                            ------------------------------------------------------------    HOME MEDICATIONS  Home Medications:  losartan 50 mg oral tablet: 1 tab(s) orally once a day (02 Aug 2022 10:38)  metFORMIN 500 mg oral tablet: 1 tab(s) orally 2 times a day (02 Aug 2022 10:38)  metoprolol succinate 50 mg oral tablet, extended release: 1 tab(s) orally once a day (02 Aug 2022 10:38)                           MEDICATIONS:  STANDING MEDICATIONS  ampicillin/sulbactam  IVPB 3 Gram(s) IV Intermittent every 6 hours  atorvastatin 80 milliGRAM(s) Oral at bedtime  collagenase Ointment 1 Application(s) Topical two times a day  Dakins Solution - 1/2 Strength 1 Application(s) Topical two times a day  dextrose 5%. 1000 milliLiter(s) IV Continuous <Continuous>  dextrose 5%. 1000 milliLiter(s) IV Continuous <Continuous>  dextrose 50% Injectable 25 Gram(s) IV Push once  dextrose 50% Injectable 12.5 Gram(s) IV Push once  dextrose 50% Injectable 25 Gram(s) IV Push once  enoxaparin Injectable 40 milliGRAM(s) SubCutaneous every 24 hours  glucagon  Injectable 1 milliGRAM(s) IntraMuscular once  hydrALAZINE 50 milliGRAM(s) Oral every 8 hours  insulin glargine Injectable (LANTUS) 22 Unit(s) SubCutaneous every morning  insulin lispro (ADMELOG) corrective regimen sliding scale   SubCutaneous three times a day before meals  insulin lispro Injectable (ADMELOG) 5 Unit(s) SubCutaneous three times a day before meals  labetalol 300 milliGRAM(s) Oral three times a day  levETIRAcetam  IVPB 1500 milliGRAM(s) IV Intermittent every 12 hours  losartan 100 milliGRAM(s) Oral daily  pantoprazole    Tablet 40 milliGRAM(s) Oral before breakfast  phenytoin  IVPB 100 milliGRAM(s) IV Intermittent three times a day  sodium chloride 0.45%. 1000 milliLiter(s) IV Continuous <Continuous>  vancomycin  IVPB 1250 milliGRAM(s) IV Intermittent every 12 hours    PRN MEDICATIONS  acetaminophen     Tablet .. 650 milliGRAM(s) Oral every 6 hours PRN  dextrose Oral Gel 15 Gram(s) Oral once PRN  melatonin 3 milliGRAM(s) Oral at bedtime PRN  midazolam Injectable 4 milliGRAM(s) IV Push once PRN                                            ------------------------------------------------------------  VITAL SIGNS: Last 24 Hours  T(C): 37.3 (10 Aug 2022 13:22), Max: 37.4 (09 Aug 2022 20:02)  T(F): 99.2 (10 Aug 2022 13:22), Max: 99.4 (09 Aug 2022 20:02)  HR: 80 (10 Aug 2022 13:) (70 - 80)  BP: 190/104 (10 Aug 2022 13:) (120/78 - 190/104)  BP(mean): 136 (09 Aug 2022 14:24) (136 - 136)  RR: 20 (10 Aug 2022 13:) (18 - 20)  SpO2: 94% (09 Aug 2022 14:24) (94% - 94%)      22 @ 07:01  -  08-10- @ 07:00  --------------------------------------------------------  IN: 900 mL / OUT: 0 mL / NET: 900 mL                                             --------------------------------------------------------------  LABS:                        9.8    7.93  )-----------( 263      ( 09 Aug 2022 06:31 )             29.4     0810    141  |  109  |  13  ----------------------------<  135<H>  4.1   |  16<L>  |  0.8    Ca    8.5      10 Aug 2022 07:23  Mg     1.9     08-10    TPro  6.2  /  Alb  3.1<L>  /  TBili  0.4  /  DBili  x   /  AST  26  /  ALT  14  /  AlkPhos  121<H>  0810      Urinalysis Basic - ( 09 Aug 2022 01:00 )    Color: Light Orange / Appearance: Turbid / S.018 / pH: x  Gluc: x / Ketone: Small  / Bili: Negative / Urobili: <2 mg/dL   Blood: x / Protein: 100 mg/dL / Nitrite: Negative   Leuk Esterase: Moderate / RBC: >720 /HPF / WBC 84 /HPF   Sq Epi: x / Non Sq Epi: 6 /HPF / Bacteria: Negative              Culture - Blood (collected 09 Aug 2022 02:09)  Source: .Blood None  Preliminary Report (10 Aug 2022 10:01):    No growth to date.    Culture - Urine (collected 09 Aug 2022 01:00)  Source: Catheterized Catheterized  Final Report (10 Aug 2022 10:50):    No growth                                                    -------------------------------------------------------------  RADIOLOGY:                                            --------------------------------------------------------------    PHYSICAL EXAM:  General: female laying in bed with vEEG and son at bedside. Awake and alert but not following commands fully.   HEENT: Normocephalic. Leads in place for vEEG.   LUNGS: Unable to auscultate lungs from posterior due to patient laying in bed not following commands.  HEART: RRR. No murmurs, rubs, or gallops.  ABDOMEN: Nontender, nondistended. + bowel sounds.  EXT: Pulses palpable x 4. Nonedematous. RLE wound wrapped.  SKIN: Warm, dry.

## 2022-08-10 NOTE — PROGRESS NOTE ADULT - ASSESSMENT
55 yo F with PMH of HTN, DM2, CVA (2017) who presented with purulent right lower extremity wound for 3 months consistent with acute RLE cellulitis. Code stroke for unresponsiveness at 4pm 8/5.    #?Seizures  - CTH negative for acute ICH  - high likelihood of seizure as per neuro  - c/w Keppra  - c/w Dilantin  - c/w VEEG  - keep NPO as patient unable to participate in swallow study  - f/u neuro for possible LP  - MRI ordered    #Purulent RLE cellulitis r/o abscess  - s/p unasyn and vancomycin in the ED  - cultures NGTD  - burn on board, wound culture taken   - monitor vanco trough 10.6 >11.3    #Hypertensive urgency 2/2 insurance/social problems and inability to get meds  - BP at admission 296/174 > 111 today  - no signs of end organ damage    #DM2  - c/w insulin regiment  - finger sticks  - A1c 10.4        GI PPX: pantoprazole  DVT PPX: Lovenox  DIET: NPO, possible NGT        ___________________________________________________________________  Provider handoff:  [] MRI pending  [] possible LP? f/u neuro  [] lactic acid f/u  [] repeat BMP  57 yo F with PMH of HTN, DM2, CVA (2017) who presented with purulent right lower extremity wound for 3 months consistent with acute RLE cellulitis. Code stroke for unresponsiveness at 4pm 8/5.    #?Seizures  - CTH negative for acute ICH  - high likelihood of seizure as per neuro  - c/w Keppra  - c/w Dilantin  - c/w VEEG  - keep NPO as patient unable to participate in swallow study  - f/u neuro for possible LP  - consider NG tube  - MRI ordered    #Purulent RLE cellulitis r/o abscess  - s/p unasyn and vancomycin in the ED  - cultures NGTD  - burn on board, wound culture taken   - monitor vanco trough 10.6 >11.3    #Hypertensive urgency 2/2 insurance/social problems and inability to get meds  - BP at admission 296/174 > 111 today  - no signs of end organ damage    #DM2  - c/w insulin regiment  - finger sticks  - A1c 10.4        GI PPX: pantoprazole  DVT PPX: Lovenox  DIET: NPO, possible NGT        ___________________________________________________________________  Provider handoff:  [] MRI pending  [] possible LP? f/u neuro  [] lactic acid f/u  [] repeat CMP  [] consider NG tube

## 2022-08-10 NOTE — PROGRESS NOTE ADULT - ASSESSMENT
56y female admitted for hypertensive urgency and RLE wound. Burn following for RLE wound.       Plan:  Wound is infected and necrotic - would benefit from bedside debridement - son at bedside gave number for brother who consents - unable to reach at this time for consent   Wound care: wash with soap and water, apply santyl, dakins WTD , wrap with kerlix and ACE BID.   Wound culture taken - abx as indicated.

## 2022-08-10 NOTE — PROGRESS NOTE ADULT - SUBJECTIVE AND OBJECTIVE BOX
NEUROLOGY CONSULT    In the ED:  Vitals: T: 98.9, BP: 296/ 174, , RR: 18, 98%O2 on RA  Labs: CBC showed WBC 11.23; ESR 92, CRP 35.6  CMP showed glucose 302, alk phos 173; ; VBG pH 7.45  EKG pending  CXR showed borderline cardiomegaly and no airspace opacity.   X-ray of RLE also pending.     Received unasyn and vanco, humalin R, IV vasotec, IV labetalol   (02 Aug 2022 07:47)     MEDICATIONS  Home Medications:  losartan 50 mg oral tablet: 1 tab(s) orally once a day (02 Aug 2022 10:38)  metFORMIN 500 mg oral tablet: 1 tab(s) orally 2 times a day (02 Aug 2022 10:38)  metoprolol succinate 50 mg oral tablet, extended release: 1 tab(s) orally once a day (02 Aug 2022 10:38)    MEDICATIONS  (STANDING):  ampicillin/sulbactam  IVPB 3 Gram(s) IV Intermittent every 6 hours  atorvastatin 80 milliGRAM(s) Oral at bedtime  collagenase Ointment 1 Application(s) Topical two times a day  Dakins Solution - 1/2 Strength 1 Application(s) Topical two times a day  dextrose 5%. 1000 milliLiter(s) (100 mL/Hr) IV Continuous <Continuous>  dextrose 5%. 1000 milliLiter(s) (50 mL/Hr) IV Continuous <Continuous>  dextrose 50% Injectable 25 Gram(s) IV Push once  dextrose 50% Injectable 12.5 Gram(s) IV Push once  dextrose 50% Injectable 25 Gram(s) IV Push once  enoxaparin Injectable 40 milliGRAM(s) SubCutaneous every 24 hours  glucagon  Injectable 1 milliGRAM(s) IntraMuscular once  hydrALAZINE Injectable 10 milliGRAM(s) IV Push three times a day  insulin glargine Injectable (LANTUS) 22 Unit(s) SubCutaneous every morning  insulin lispro (ADMELOG) corrective regimen sliding scale   SubCutaneous three times a day before meals  insulin lispro Injectable (ADMELOG) 5 Unit(s) SubCutaneous three times a day before meals  levETIRAcetam  IVPB 1500 milliGRAM(s) IV Intermittent every 12 hours  losartan 100 milliGRAM(s) Oral daily  pantoprazole    Tablet 40 milliGRAM(s) Oral before breakfast  phenytoin  IVPB 100 milliGRAM(s) IV Intermittent three times a day  sodium chloride 0.45%. 1000 milliLiter(s) (75 mL/Hr) IV Continuous <Continuous>  vancomycin  IVPB 1250 milliGRAM(s) IV Intermittent every 12 hours    MEDICATIONS  (PRN):  acetaminophen     Tablet .. 650 milliGRAM(s) Oral every 6 hours PRN Temp greater or equal to 38C (100.4F), Mild Pain (1 - 3)  dextrose Oral Gel 15 Gram(s) Oral once PRN Blood Glucose LESS THAN 70 milliGRAM(s)/deciliter  melatonin 3 milliGRAM(s) Oral at bedtime PRN Insomnia  midazolam Injectable 4 milliGRAM(s) IV Push once PRN seizure      FAMILY HISTORY:  FH: type 2 diabetes mellitus      SOCIAL HISTORY: negative for tobacco, alcohol, or ilicit drug use.    Allergies    No Known Allergies    Intolerances        GEN: NAD, awake, alert but not following command and answering in one word only     NEURO:   MENTAL STATUS: AAOx1 (oriented to self)  LANG/SPEECH: minimal response, one word answer   Cranial Nerve  II: Pupils equal and reactive  III, IV, VI: EOM with left neglect   VII: no facial asymmetry  VIII: normal hearing to speech  MOTOR:   EXES: 2/4 throughout, bilateral flexor plantars  SENSORY: Normal to touch, temperature & pin prick in all extremiteis  COORD: Normal finger to nose and heel to shin, no tremor, no dysmetria  Gait:   NIHSS: **** ASPECT Score: ***** ICH Score: ****** (GCS)    LABS:                        9.8    7.93  )-----------( 263      ( 09 Aug 2022 06:31 )             29.4     08-10    141  |  109  |  13  ----------------------------<  135<H>  4.1   |  16<L>  |  0.8    Ca    8.5      10 Aug 2022 07:23  Mg     1.9     08-10    TPro  6.2  /  Alb  3.1<L>  /  TBili  0.4  /  DBili  x   /  AST  26  /  ALT  14  /  AlkPhos  121<H>  08-10    Hemoglobin A1C:   Vitamin B12     CAPILLARY BLOOD GLUCOSE      POCT Blood Glucose.: 97 mg/dL (10 Aug 2022 16:33)      Urinalysis Basic - ( 09 Aug 2022 01:00 )    Color: Light Orange / Appearance: Turbid / S.018 / pH: x  Gluc: x / Ketone: Small  / Bili: Negative / Urobili: <2 mg/dL   Blood: x / Protein: 100 mg/dL / Nitrite: Negative   Leuk Esterase: Moderate / RBC: >720 /HPF / WBC 84 /HPF   Sq Epi: x / Non Sq Epi: 6 /HPF / Bacteria: Negative            Microbiology:    Culture - Blood (collected 09 Aug 2022 02:09)  Source: .Blood None  Preliminary Report (10 Aug 2022 10:01):    No growth to date.    Culture - Urine (collected 09 Aug 2022 01:00)  Source: Catheterized Catheterized  Final Report (10 Aug 2022 10:50):    No growth        RADIOLOGY, EKG AND ADDITIONAL TESTS: Reviewed.             NEUROLOGY CONSULT    HPI:  55 yo F with PMH of HTN, DM2, and stroke (per son 2017) who presented for AMS. She c/o right leg wound which Started 3 months ago when she fell and hit her leg on a wooden cabinet. She put hydrogen peroxide, antibiotic cream, and wrapped the wound but never fully healed. Two weeks ago it started to show yellow pus so her and her family came to the ED for further treatment. Endorsed subjective fevers, chest pain, and vision changes which occurs when walked 2-3 blocks. Denied congestion, sore throat, cough, dyspnea, headache, dysuria, N/V, diarrhea     In the ED:  Vitals: T: 98.9, BP: 296/ 174, , RR: 18, 98%O2 on RA  Labs: CBC showed WBC 11.23; ESR 92, CRP 35.6  CMP showed glucose 302, alk phos 173; ; VBG pH 7.45  EKG pending  CXR showed borderline cardiomegaly and no airspace opacity.   X-ray of RLE also pending.     Received unasyn and vanco, humalin R, IV vasotec, IV labetalol   (02 Aug 2022 07:47)     MEDICATIONS  Home Medications:  losartan 50 mg oral tablet: 1 tab(s) orally once a day (02 Aug 2022 10:38)  metFORMIN 500 mg oral tablet: 1 tab(s) orally 2 times a day (02 Aug 2022 10:38)  metoprolol succinate 50 mg oral tablet, extended release: 1 tab(s) orally once a day (02 Aug 2022 10:38)    MEDICATIONS  (STANDING):  ampicillin/sulbactam  IVPB 3 Gram(s) IV Intermittent every 6 hours  atorvastatin 80 milliGRAM(s) Oral at bedtime  collagenase Ointment 1 Application(s) Topical two times a day  Dakins Solution - 1/2 Strength 1 Application(s) Topical two times a day  dextrose 5%. 1000 milliLiter(s) (100 mL/Hr) IV Continuous <Continuous>  dextrose 5%. 1000 milliLiter(s) (50 mL/Hr) IV Continuous <Continuous>  dextrose 50% Injectable 25 Gram(s) IV Push once  dextrose 50% Injectable 12.5 Gram(s) IV Push once  dextrose 50% Injectable 25 Gram(s) IV Push once  enoxaparin Injectable 40 milliGRAM(s) SubCutaneous every 24 hours  glucagon  Injectable 1 milliGRAM(s) IntraMuscular once  hydrALAZINE Injectable 10 milliGRAM(s) IV Push three times a day  insulin glargine Injectable (LANTUS) 22 Unit(s) SubCutaneous every morning  insulin lispro (ADMELOG) corrective regimen sliding scale   SubCutaneous three times a day before meals  insulin lispro Injectable (ADMELOG) 5 Unit(s) SubCutaneous three times a day before meals  levETIRAcetam  IVPB 1500 milliGRAM(s) IV Intermittent every 12 hours  lidocaine 1%/epinephrine 1:100,000 Inj 20 milliLiter(s) Local Injection once  losartan 100 milliGRAM(s) Oral daily  pantoprazole    Tablet 40 milliGRAM(s) Oral before breakfast  phenytoin  IVPB 100 milliGRAM(s) IV Intermittent three times a day  sodium chloride 0.45%. 1000 milliLiter(s) (75 mL/Hr) IV Continuous <Continuous>  vancomycin  IVPB 1250 milliGRAM(s) IV Intermittent every 12 hours    MEDICATIONS  (PRN):  acetaminophen     Tablet .. 650 milliGRAM(s) Oral every 6 hours PRN Temp greater or equal to 38C (100.4F), Mild Pain (1 - 3)  dextrose Oral Gel 15 Gram(s) Oral once PRN Blood Glucose LESS THAN 70 milliGRAM(s)/deciliter  melatonin 3 milliGRAM(s) Oral at bedtime PRN Insomnia  midazolam Injectable 4 milliGRAM(s) IV Push once PRN seizure      FAMILY HISTORY:  FH: type 2 diabetes mellitus      SOCIAL HISTORY: negative for tobacco, alcohol, or ilicit drug use.    Allergies    No Known Allergies    Intolerances        GEN: NAD, alert and aware to self     NEURO:   MENTAL STATUS: AAOx1 (to self)   LANG/SPEECH: minimal response with one word answers   CRANIAL NERVES:  II: Pupils equal and reactive,   III, IV, VI: EOM with left sided neglect   V: normal  VII: no facial asymmetry  VIII: normal hearing to speech  MOTOR: 0/5 left UE and LE but moves right UE and LE randomly but did not follow commands   REFLEXES: 2/4 throughout, bilateral flexor plantars  SENSORY: not responding to questions   COORD: not follow commands       LABS:                        9.8    7.93  )-----------( 263      ( 09 Aug 2022 06:31 )             29.4     08-10    141  |  109  |  13  ----------------------------<  135<H>  4.1   |  16<L>  |  0.8    Ca    8.5      10 Aug 2022 07:23  Mg     1.9     08-10    TPro  6.2  /  Alb  3.1<L>  /  TBili  0.4  /  DBili  x   /  AST  26  /  ALT  14  /  AlkPhos  121<H>  08-10    Hemoglobin A1C:   Vitamin B12     CAPILLARY BLOOD GLUCOSE      POCT Blood Glucose.: 97 mg/dL (10 Aug 2022 16:33)      Urinalysis Basic - ( 09 Aug 2022 01:00 )    Color: Light Orange / Appearance: Turbid / S.018 / pH: x  Gluc: x / Ketone: Small  / Bili: Negative / Urobili: <2 mg/dL   Blood: x / Protein: 100 mg/dL / Nitrite: Negative   Leuk Esterase: Moderate / RBC: >720 /HPF / WBC 84 /HPF   Sq Epi: x / Non Sq Epi: 6 /HPF / Bacteria: Negative            Microbiology:    Culture - Blood (collected 09 Aug 2022 02:09)  Source: .Blood None  Preliminary Report (10 Aug 2022 10:01):    No growth to date.    Culture - Urine (collected 09 Aug 2022 01:00)  Source: Catheterized Catheterized  Final Report (10 Aug 2022 10:50):    No growth        RADIOLOGY, EKG AND ADDITIONAL TESTS: Reviewed.                       NEUROLOGY CONSULT    Interval:  Pt slightly more awake and alert.  Able to communicate with 1 - 2 word sentences and tracks, follows commands.  Still not baseline per family but much improved from yesterday.  No seizure-like events.  Awaiting MRI.      HPI:  55 yo F with PMH of HTN, DM2, and stroke (per son 2017) who presented for AMS. She c/o right leg wound which Started 3 months ago when she fell and hit her leg on a wooden cabinet. She put hydrogen peroxide, antibiotic cream, and wrapped the wound but never fully healed. Two weeks ago it started to show yellow pus so her and her family came to the ED for further treatment. Endorsed subjective fevers, chest pain, and vision changes which occurs when walked 2-3 blocks. Denied congestion, sore throat, cough, dyspnea, headache, dysuria, N/V, diarrhea     In the ED:  Vitals: T: 98.9, BP: 296/ 174, , RR: 18, 98%O2 on RA  Labs: CBC showed WBC 11.23; ESR 92, CRP 35.6  CMP showed glucose 302, alk phos 173; ; VBG pH 7.45  EKG pending  CXR showed borderline cardiomegaly and no airspace opacity.   X-ray of RLE also pending.     Received unasyn and vanco, humalin R, IV vasotec, IV labetalol   (02 Aug 2022 07:47)     MEDICATIONS  Home Medications:  losartan 50 mg oral tablet: 1 tab(s) orally once a day (02 Aug 2022 10:38)  metFORMIN 500 mg oral tablet: 1 tab(s) orally 2 times a day (02 Aug 2022 10:38)  metoprolol succinate 50 mg oral tablet, extended release: 1 tab(s) orally once a day (02 Aug 2022 10:38)    MEDICATIONS  (STANDING):  ampicillin/sulbactam  IVPB 3 Gram(s) IV Intermittent every 6 hours  atorvastatin 80 milliGRAM(s) Oral at bedtime  collagenase Ointment 1 Application(s) Topical two times a day  Dakins Solution - 1/2 Strength 1 Application(s) Topical two times a day  dextrose 5%. 1000 milliLiter(s) (100 mL/Hr) IV Continuous <Continuous>  dextrose 5%. 1000 milliLiter(s) (50 mL/Hr) IV Continuous <Continuous>  dextrose 50% Injectable 25 Gram(s) IV Push once  dextrose 50% Injectable 12.5 Gram(s) IV Push once  dextrose 50% Injectable 25 Gram(s) IV Push once  enoxaparin Injectable 40 milliGRAM(s) SubCutaneous every 24 hours  glucagon  Injectable 1 milliGRAM(s) IntraMuscular once  hydrALAZINE Injectable 10 milliGRAM(s) IV Push three times a day  insulin glargine Injectable (LANTUS) 22 Unit(s) SubCutaneous every morning  insulin lispro (ADMELOG) corrective regimen sliding scale   SubCutaneous three times a day before meals  insulin lispro Injectable (ADMELOG) 5 Unit(s) SubCutaneous three times a day before meals  levETIRAcetam  IVPB 1500 milliGRAM(s) IV Intermittent every 12 hours  lidocaine 1%/epinephrine 1:100,000 Inj 20 milliLiter(s) Local Injection once  losartan 100 milliGRAM(s) Oral daily  pantoprazole    Tablet 40 milliGRAM(s) Oral before breakfast  phenytoin  IVPB 100 milliGRAM(s) IV Intermittent three times a day  sodium chloride 0.45%. 1000 milliLiter(s) (75 mL/Hr) IV Continuous <Continuous>  vancomycin  IVPB 1250 milliGRAM(s) IV Intermittent every 12 hours    MEDICATIONS  (PRN):  acetaminophen     Tablet .. 650 milliGRAM(s) Oral every 6 hours PRN Temp greater or equal to 38C (100.4F), Mild Pain (1 - 3)  dextrose Oral Gel 15 Gram(s) Oral once PRN Blood Glucose LESS THAN 70 milliGRAM(s)/deciliter  melatonin 3 milliGRAM(s) Oral at bedtime PRN Insomnia  midazolam Injectable 4 milliGRAM(s) IV Push once PRN seizure    FAMILY HISTORY:  FH: type 2 diabetes mellitus    SOCIAL HISTORY: negative for tobacco, alcohol, or ilicit drug use.    Allergies    No Known Allergies    Intolerances    GEN: NAD, alert and aware to self     NEURO:   MENTAL STATUS: AAOx1 (to self)   LANG/SPEECH: minimal response with one word answers   CRANIAL NERVES:  II: Pupils equal and reactive,   III, IV, VI: EOM with left sided neglect   V: normal  VII: no facial asymmetry  VIII: normal hearing to speech  MOTOR: 3/5 left UE and LE but moves right UE and LE randomly but did not follow commands   REFLEXES: 2/4 throughout, bilateral flexor plantars  SENSORY: not responding to questions   COORD: not follow commands       LABS:                        9.8    7.93  )-----------( 263      ( 09 Aug 2022 06:31 )             29.4     08-10    141  |  109  |  13  ----------------------------<  135<H>  4.1   |  16<L>  |  0.8    Ca    8.5      10 Aug 2022 07:23  Mg     1.9     -10    TPro  6.2  /  Alb  3.1<L>  /  TBili  0.4  /  DBili  x   /  AST  26  /  ALT  14  /  AlkPhos  121<H>  08-10    Hemoglobin A1C:   Vitamin B12     CAPILLARY BLOOD GLUCOSE      POCT Blood Glucose.: 97 mg/dL (10 Aug 2022 16:33)      Urinalysis Basic - ( 09 Aug 2022 01:00 )    Color: Light Orange / Appearance: Turbid / S.018 / pH: x  Gluc: x / Ketone: Small  / Bili: Negative / Urobili: <2 mg/dL   Blood: x / Protein: 100 mg/dL / Nitrite: Negative   Leuk Esterase: Moderate / RBC: >720 /HPF / WBC 84 /HPF   Sq Epi: x / Non Sq Epi: 6 /HPF / Bacteria: Negative    Microbiology:    Culture - Blood (collected 09 Aug 2022 02:09)  Source: .Blood None  Preliminary Report (10 Aug 2022 10:01):    No growth to date.    Culture - Urine (collected 09 Aug 2022 01:00)  Source: Catheterized Catheterized  Final Report (10 Aug 2022 10:50):    No growth    RADIOLOGY, EKG AND ADDITIONAL TESTS: Reviewed.

## 2022-08-10 NOTE — PROGRESS NOTE ADULT - ASSESSMENT
55 yo F with PMH of HTN, DM2, and stroke (per son 2017) with left sided weakness who presented for RLE wound. Stroke Code was called for episode of AMS, patient not responding to commands which was not her baseline. NIHSS 23. No tpa given due to patient returning back to baseline, following commands. Concern for seizure with post ictal confusio. CTH: Hypodensities in the periventricular white matter, right thalamus, right basal ganglia, and right insular cortex likely representing ischemic change, age indeterminate. EEG: moderate right temporal slowing, repeat EEG: Focal and generalized slowing, Dilantin trough: 10.5    Recommendation:  - cw Dilantin 100mg q8hrs and follow with another trough   - cw VEEG   - cw Keppra 1500mg BID  - MRI Brain w/ wo contrast  - will consider LP pending MRI results    Thank you for the courtesy of this consult, Please contact us if any neurological changes or questions, neurology team will continue to follow. 57 yo F with PMH of HTN, DM2, and stroke (per son 2017) with left sided weakness who presented for RLE wound. Stroke Code was called for episode of AMS, patient not responding to commands which was not her baseline. NIHSS 23. No tpa given due to patient returning back to baseline, following commands. Concern for seizure with post ictal confusio. CTH: Hypodensities in the periventricular white matter, right thalamus, right basal ganglia, and right insular cortex likely representing ischemic change, age indeterminate. EEG: moderate right temporal slowing, repeat EEG: Focal and generalized slowing, Dilantin trough: 10.5    Recommendation:  - cw Dilantin 100mg q8hrs and follow with another trough   - cw VEEG   - cw Keppra 1500mg BID  - MRI Brain w/ wo contrast    Thank you for the courtesy of this consult, Please contact us if any neurological changes or questions, neurology team will continue to follow.

## 2022-08-10 NOTE — PROGRESS NOTE ADULT - ATTENDING COMMENTS
56 w/ prior cVA w/ residual LHP currently admitted for RLE wound with possible bacteremia complicated by unresponsiveness found to have epileptiform activity currently improving but not back to baseline. Recommend MRI to assess for underlying structural etiology.  Recommendations as above.

## 2022-08-10 NOTE — SWALLOW BEDSIDE ASSESSMENT ADULT - SLP GENERAL OBSERVATIONS
Pt received on room air, sleeping in bed with EEG running. Cleared with nurse and MD to proceed with swallow evaluation. Son present at bedside.  Tuvaluan-English phone  utilized,  ID # [phone]. Pt with waxing/waning mental status, intermittent arousal with maximum verbal/tactile cue, oriented x0. Pt received on room air, sleeping in bed with EEG running. Cleared with nurse and MD to proceed with swallow evaluation. Son present at bedside.  Tajik-English phone  utilized,  ID # [phone]. Pt with waxing/waning mental status, intermittent arousal with maximum verbal/tactile cue, unable to state name, oriented x0.

## 2022-08-10 NOTE — PROGRESS NOTE ADULT - ASSESSMENT
5 yo F with PMH of HTN, DM2, CVA (2017) who presented with purulent right lower extremity wound for 3 months consistent with acute RLE cellulitis. Code stroke for unresponsiveness at 4pm 8/5    #Encephalopathy NOS ?seizures ?PRES  -CTH negative for acute ICH  -d/w neuro: high likelihood of seizure  -started on IV Keppra  -check labs, lactate  -seizure precautions  -cont VEEG  -keep NPO, S&S f/u   -IVFs  -close monitoring  8/8: increased Keppra dose and added Dilantin. Remains on VEEG  8/9-10 d/w neuro: will get MRI, cont VEEG. LP depending on MRI results  8/10: son declined NGT placement and asked to wait till am (aware of risks)      #Purulent RLE cellulitis r/o abscess  -s/p unasyn and and vancomycin in ED  -f/u wound and blood cultures   -trend inflammatory markers   -ID consult noted  - vanco, Unasyn  -burn debridement   -monitor Vanco trough    #Hypertensive urgency due to noncompliance  -BP at admission 296/174 without signs of end organ damage  -gradual BP lowering  -close monitoring    # DMII w/ Hyperglycemia  - started insulin regimen   - FS ac/hs  -A1c=10.8    # Atypical Chest pain and FELIZ on admission- possibly MSK related but r/o cardiac etiology   - chest wall tender to palpation on admission  - currently CP free  - CXR unremarkable   - Tn negative  - outpt stress test    # hx of CVA 2017 (Aneurysmal as per family? ?ICH but old ischemic strokes on CTH)  # Left sided weakness  -need to get more Hx  -as per family, aneurysm was never fixed    DVT ppx: lovenox, SCDs  GI ppx: protonix  Diet: DASH/carb consistent  Activity: AAT    High risk pt. Px is guarded.    #Progress Note Handoff  Pending: Consults____Clinical improvement and stability__x___Tests__MRI, VEEG, Debridement____PT____x____  Pt/Family discussion: Pt/family informed and agree with the current plan  Disposition: Home______/SNF_______/4A______/To be determined____x____    My note supersedes the residents note should a discrepancy arise.    Chart and notes personally reviewed.  Care Discussed with Consultants/Other Providers/ Housestaff [ x] YES [ ] NO   Radiology, labs, old records personally reviewed.    discussed w/ housestaff, nursing, case management, neuro team, son#2

## 2022-08-10 NOTE — PROGRESS NOTE ADULT - SUBJECTIVE AND OBJECTIVE BOX
56y female admitted for hypertensive urgency and RLE wound. Burn following for RLE wound.     AM rounds       Son at bedside, no complaints.       Vital Signs Last 24 Hrs  T(C): 37.4 (09 Aug 2022 20:02), Max: 37.4 (09 Aug 2022 20:02)  T(F): 99.4 (09 Aug 2022 20:02), Max: 99.4 (09 Aug 2022 20:02)  HR: 70 (09 Aug 2022 20:02) (70 - 78)  BP: 152/96 (10 Aug 2022 06:23) (120/78 - 178/111)  BP(mean): 136 (09 Aug 2022 14:24) (136 - 136)  RR: 18 (09 Aug 2022 20:02) (18 - 18)  SpO2: 94% (09 Aug 2022 14:24) (94% - 94%)    Parameters below as of 09 Aug 2022 14:24  Patient On (Oxygen Delivery Method): room air        08-10             9.8    7.93  )-----------( 263      ( 09 Aug 2022 06:31 )             29.4         EXAM:   Gen: overweight female, lying in bed with EKG leads attached to head, NAD  Wound:  Right lower leg, anterior aspect - ~5x3cm full thickness wound mixed with loose and adherent yellow eschar - small area of wound base exposed that is pink, moist appearing. + maloodor, + purulent.        56y female admitted for hypertensive urgency and RLE wound. Burn following for RLE wound.     AM rounds       Son at bedside, no complaints.       Vital Signs Last 24 Hrs  T(C): 37.4 (09 Aug 2022 20:02), Max: 37.4 (09 Aug 2022 20:02)  T(F): 99.4 (09 Aug 2022 20:02), Max: 99.4 (09 Aug 2022 20:02)  HR: 70 (09 Aug 2022 20:02) (70 - 78)  BP: 152/96 (10 Aug 2022 06:23) (120/78 - 178/111)  BP(mean): 136 (09 Aug 2022 14:24) (136 - 136)  RR: 18 (09 Aug 2022 20:02) (18 - 18)  SpO2: 94% (09 Aug 2022 14:24) (94% - 94%)    Parameters below as of 09 Aug 2022 14:24  Patient On (Oxygen Delivery Method): room air        08-10             9.8    7.93  )-----------( 263      ( 09 Aug 2022 06:31 )             29.4         EXAM:   Gen: overweight female, lying in bed with EEG leads attached to head, NAD  Wound:  Right lower leg, anterior aspect - ~5x3cm full thickness wound mixed with loose and adherent yellow eschar - small area of wound base exposed that is pink, moist appearing. + maloodor, + purulent.

## 2022-08-10 NOTE — PROGRESS NOTE ADULT - SUBJECTIVE AND OBJECTIVE BOX
Patient is a 56y old  Female who presents with a chief complaint of Hypertensive urgency (02 Aug 2022 11:31)    INTERVAL HPI/OVERNIGHT EVENTS: Patient was examined and seen at bedside. Son at bedside. Son at bedside. Answered some questions this am. Trying to follow simple commands but overall still encephalopathic.    ROS: unable to obtain due to AMS  InitialHPI:  57 yo F with PMH of HTN, DM2, and ?hemorrhagic stroke due to an aneurysm (per son 2017) who presented for RLE wound. Started 3 months ago when she fell and hit her leg on a wooden cabinet. She put hydrogen peroxide, antibiotic cream, and wrapped the wound but never fully healed. Two weeks ago it started to show yellow pus so her and her family came to the ED for further treatment. Endorsed subjective fevers, chest pain, and vision changes which occurs when walked 2-3 blocks. Denied congestion, sore throat, cough, dyspnea, headache, dysuria, N/V, diarrhea     Per son, they fill her medications at [facility] ([phone]) and this is their current pharmacy. Called them to confirm her medications and they said her last refill of medications was . Pt has not seen a doctor due to insurance problem and has not been taking any medications for more than 1yr.    In the ED:  Vitals: T: 98.9, BP: 296/ 174, , RR: 18, 98%O2 on RA  Labs: CBC showed WBC 11.23; ESR 92, CRP 35.6  CMP showed glucose 302, alk phos 173; ; VBG pH 7.45  .   Received unasyn and vanco, humalin R, IV vasotec, IV labetalol   (02 Aug 2022 07:47)    PAST MEDICAL & SURGICAL HISTORY:  Hypertension  ?Hemorrhagic CVA due to aneurysm    Diabetes mellitus      No significant past surgical history    General: awake, alert, tries to follow commands, answers some simple questions from the son   HEENT: no LAD  CV: S1 S2  Resp: decreased breath sounds at bases  GI: NT/ND/S +BS; obese  MS: no clubbing/cyanosis/edema, + pulses b/l; RLE c/d/i dsg  Neuro: moves mostly Rt side              MEDICATIONS  (STANDING):  ampicillin/sulbactam  IVPB 3 Gram(s) IV Intermittent every 6 hours  atorvastatin 80 milliGRAM(s) Oral at bedtime  collagenase Ointment 1 Application(s) Topical two times a day  Dakins Solution - 1/2 Strength 1 Application(s) Topical two times a day  dextrose 5%. 1000 milliLiter(s) (100 mL/Hr) IV Continuous <Continuous>  dextrose 5%. 1000 milliLiter(s) (50 mL/Hr) IV Continuous <Continuous>  dextrose 50% Injectable 25 Gram(s) IV Push once  dextrose 50% Injectable 12.5 Gram(s) IV Push once  dextrose 50% Injectable 25 Gram(s) IV Push once  enoxaparin Injectable 40 milliGRAM(s) SubCutaneous every 24 hours  glucagon  Injectable 1 milliGRAM(s) IntraMuscular once  hydrALAZINE Injectable 10 milliGRAM(s) IV Push three times a day  insulin glargine Injectable (LANTUS) 22 Unit(s) SubCutaneous every morning  insulin lispro (ADMELOG) corrective regimen sliding scale   SubCutaneous three times a day before meals  insulin lispro Injectable (ADMELOG) 5 Unit(s) SubCutaneous three times a day before meals  levETIRAcetam  IVPB 1500 milliGRAM(s) IV Intermittent every 12 hours  lidocaine 1%/epinephrine 1:100,000 Inj 20 milliLiter(s) Local Injection once  losartan 100 milliGRAM(s) Oral daily  pantoprazole    Tablet 40 milliGRAM(s) Oral before breakfast  phenytoin  IVPB 100 milliGRAM(s) IV Intermittent three times a day  sodium chloride 0.45%. 1000 milliLiter(s) (75 mL/Hr) IV Continuous <Continuous>  vancomycin  IVPB 1250 milliGRAM(s) IV Intermittent every 12 hours    MEDICATIONS  (PRN):  acetaminophen     Tablet .. 650 milliGRAM(s) Oral every 6 hours PRN Temp greater or equal to 38C (100.4F), Mild Pain (1 - 3)  dextrose Oral Gel 15 Gram(s) Oral once PRN Blood Glucose LESS THAN 70 milliGRAM(s)/deciliter  melatonin 3 milliGRAM(s) Oral at bedtime PRN Insomnia  midazolam Injectable 4 milliGRAM(s) IV Push once PRN seizure    Home Medications:  losartan 50 mg oral tablet: 1 tab(s) orally once a day (02 Aug 2022 10:38)  metFORMIN 500 mg oral tablet: 1 tab(s) orally 2 times a day (02 Aug 2022 10:38)  metoprolol succinate 50 mg oral tablet, extended release: 1 tab(s) orally once a day (02 Aug 2022 10:38)    Vital Signs Last 24 Hrs  T(C): 37.3 (10 Aug 2022 13:22), Max: 37.4 (09 Aug 2022 20:02)  T(F): 99.2 (10 Aug 2022 13:22), Max: 99.4 (09 Aug 2022 20:02)  HR: 80 (10 Aug 2022 13:) (70 - 80)  BP: 190/104 (10 Aug 2022 13:22) (120/78 - 190/104)  BP(mean): --  RR: 20 (10 Aug 2022 13:) (18 - 20)  SpO2: --    Parameters below as of 10 Aug 2022 13:  Patient On (Oxygen Delivery Method): room air      CAPILLARY BLOOD GLUCOSE      POCT Blood Glucose.: 97 mg/dL (10 Aug 2022 16:33)  POCT Blood Glucose.: 118 mg/dL (10 Aug 2022 11:25)  POCT Blood Glucose.: 96 mg/dL (10 Aug 2022 06:32)    LABS:                        9.8    7.93  )-----------( 263      ( 09 Aug 2022 06:31 )             29.4     08-10    141  |  109  |  13  ----------------------------<  135<H>  4.1   |  16<L>  |  0.8    Ca    8.5      10 Aug 2022 07:23  Mg     1.9     08-10    TPro  6.2  /  Alb  3.1<L>  /  TBili  0.4  /  DBili  x   /  AST  26  /  ALT  14  /  AlkPhos  121<H>  08-10    LIVER FUNCTIONS - ( 10 Aug 2022 07:23 )  Alb: 3.1 g/dL / Pro: 6.2 g/dL / ALK PHOS: 121 U/L / ALT: 14 U/L / AST: 26 U/L / GGT: x                 Urinalysis Basic - ( 09 Aug 2022 01:00 )    Color: Light Orange / Appearance: Turbid / S.018 / pH: x  Gluc: x / Ketone: Small  / Bili: Negative / Urobili: <2 mg/dL   Blood: x / Protein: 100 mg/dL / Nitrite: Negative   Leuk Esterase: Moderate / RBC: >720 /HPF / WBC 84 /HPF   Sq Epi: x / Non Sq Epi: 6 /HPF / Bacteria: Negative              Culture - Blood (collected 09 Aug 2022 02:09)  Source: .Blood None  Preliminary Report (10 Aug 2022 10:01):    No growth to date.    Culture - Urine (collected 09 Aug 2022 01:00)  Source: Catheterized Catheterized  Final Report (10 Aug 2022 10:50):    No growth      Consultant Notes Reviewed:  [x ] YES  [ ] NO  Care Discussed with Consultants/Other Providers/ Housestaff [ x] YES  [ ] NO  Radiology, labs, new studies personally reviewed.

## 2022-08-11 NOTE — SWALLOW BEDSIDE ASSESSMENT ADULT - COMMENTS
Pt on droplet precaution for COVID. moderate oral dysphagia , Pt with decreased participation and direction following

## 2022-08-11 NOTE — DIETITIAN INITIAL EVALUATION ADULT - LAB (SPECIFY)
patient at high risk for refeeding syndrome, monitor potassium, phosphorus, magnesium and replete PRN

## 2022-08-11 NOTE — DIETITIAN INITIAL EVALUATION ADULT - OTHER CALCULATIONS
IBW 61.8kg, ABW 82.3kg  ENERGY:  PROTEIN:  FLUIDS: IBW 56.82kg, ABW 82.3kg  ENERGY: 1418-1701kcal/day (MSJ x 1.0-1.2 using ABW)  PROTEIN: 85-102g/day (1.5-1.8g/kg of IBW)  FLUIDS: 1mL/kcal

## 2022-08-11 NOTE — DIETITIAN INITIAL EVALUATION ADULT - NSFNSNUTRCHEWSWALLOWFT_GEN_A_CORE
8/10 SLP evaluation recommending continue NPO until mental status improves for participation in swallow assessment  prior to admission was on regular diet with thin liquids

## 2022-08-11 NOTE — DIETITIAN INITIAL EVALUATION ADULT - ORAL INTAKE PTA/DIET HISTORY
met with patients family at bedside, reports good appetite/PO intake with no trouble chewing/swallowing prior to admission. No vitamin/supplement intake. No food preferences. NKFA

## 2022-08-11 NOTE — DIETITIAN INITIAL EVALUATION ADULT - PERTINENT LABORATORY DATA
08-11    142  |  107  |  9<L>  ----------------------------<  103<H>  3.3<L>   |  18  |  0.7    Ca    8.8      11 Aug 2022 07:05  Mg     1.8     08-11    TPro  6.4  /  Alb  3.3<L>  /  TBili  0.4  /  DBili  x   /  AST  16  /  ALT  15  /  AlkPhos  136<H>  08-11  POCT Blood Glucose.: 101 mg/dL (08-11-22 @ 07:41)  A1C with Estimated Average Glucose Result: 10.4 % (08-06-22 @ 06:59)  A1C with Estimated Average Glucose Result: 10.8 % (08-03-22 @ 08:56)

## 2022-08-11 NOTE — PROGRESS NOTE ADULT - SUBJECTIVE AND OBJECTIVE BOX
Patient is a 56y old  Female who presents with a chief complaint of Hypertensive urgency (02 Aug 2022 11:31)    INTERVAL HPI/OVERNIGHT EVENTS: Patient was examined and seen at bedside. Son at bedside. Son at bedside. More awake and trying to talk and follow commands.   ROS: unable to obtain due to AMS  InitialHPI:  57 yo F with PMH of HTN, DM2, and ?hemorrhagic stroke due to an aneurysm (per son 2017) who presented for RLE wound. Started 3 months ago when she fell and hit her leg on a wooden cabinet. She put hydrogen peroxide, antibiotic cream, and wrapped the wound but never fully healed. Two weeks ago it started to show yellow pus so her and her family came to the ED for further treatment. Endorsed subjective fevers, chest pain, and vision changes which occurs when walked 2-3 blocks. Denied congestion, sore throat, cough, dyspnea, headache, dysuria, N/V, diarrhea     Per son, they fill her medications at [facility] ([phone]) and this is their current pharmacy. Called them to confirm her medications and they said her last refill of medications was 2018. Pt has not seen a doctor due to insurance problem and has not been taking any medications for more than 1yr.    In the ED:  Vitals: T: 98.9, BP: 296/ 174, , RR: 18, 98%O2 on RA  Labs: CBC showed WBC 11.23; ESR 92, CRP 35.6  CMP showed glucose 302, alk phos 173; ; VBG pH 7.45  .   Received unasyn and vanco, humalin R, IV vasotec, IV labetalol   (02 Aug 2022 07:47)    PAST MEDICAL & SURGICAL HISTORY:  Hypertension  ?Hemorrhagic CVA due to aneurysm    Diabetes mellitus      No significant past surgical history    General: awake, alert, tries to follow commands, answers some simple questions from the son   HEENT: no LAD  CV: S1 S2  Resp: decreased breath sounds at bases  GI: NT/ND/S +BS; obese  MS: no clubbing/cyanosis/edema, + pulses b/l; RLE c/d/i dsg  Neuro: moves mostly Rt side    tele: SR, nonspecific changes (on my own evaluation of tele monitor)  episode of SVT vs Afib            MEDICATIONS  (STANDING):  ampicillin/sulbactam  IVPB 3 Gram(s) IV Intermittent every 6 hours  atorvastatin 80 milliGRAM(s) Oral at bedtime  clopidogrel Tablet 75 milliGRAM(s) Oral daily  collagenase Ointment 1 Application(s) Topical two times a day  Dakins Solution - 1/2 Strength 1 Application(s) Topical two times a day  dextrose 5%. 1000 milliLiter(s) (100 mL/Hr) IV Continuous <Continuous>  dextrose 5%. 1000 milliLiter(s) (50 mL/Hr) IV Continuous <Continuous>  dextrose 50% Injectable 25 Gram(s) IV Push once  dextrose 50% Injectable 12.5 Gram(s) IV Push once  dextrose 50% Injectable 25 Gram(s) IV Push once  enoxaparin Injectable 40 milliGRAM(s) SubCutaneous every 24 hours  glucagon  Injectable 1 milliGRAM(s) IntraMuscular once  hydrALAZINE 50 milliGRAM(s) Oral every 8 hours  insulin glargine Injectable (LANTUS) 22 Unit(s) SubCutaneous every morning  insulin lispro (ADMELOG) corrective regimen sliding scale   SubCutaneous three times a day before meals  insulin lispro Injectable (ADMELOG) 5 Unit(s) SubCutaneous three times a day before meals  labetalol 300 milliGRAM(s) Oral three times a day  levETIRAcetam  IVPB 1500 milliGRAM(s) IV Intermittent every 12 hours  lidocaine 1%/epinephrine 1:100,000 Inj 20 milliLiter(s) Local Injection once  losartan 100 milliGRAM(s) Oral daily  pantoprazole    Tablet 40 milliGRAM(s) Oral before breakfast  phenytoin  IVPB 100 milliGRAM(s) IV Intermittent three times a day  sodium chloride 0.65% Nasal 2 Spray(s) Both Nostrils two times a day  sodium chloride 0.9%. 1000 milliLiter(s) (60 mL/Hr) IV Continuous <Continuous>  vancomycin  IVPB 1250 milliGRAM(s) IV Intermittent every 12 hours    MEDICATIONS  (PRN):  acetaminophen     Tablet .. 650 milliGRAM(s) Oral every 6 hours PRN Temp greater or equal to 38C (100.4F), Mild Pain (1 - 3)  dextrose Oral Gel 15 Gram(s) Oral once PRN Blood Glucose LESS THAN 70 milliGRAM(s)/deciliter  labetalol Injectable 10 milliGRAM(s) IV Push every 6 hours PRN Systolic blood pressure >  melatonin 3 milliGRAM(s) Oral at bedtime PRN Insomnia  midazolam Injectable 4 milliGRAM(s) IV Push once PRN seizure    Home Medications:  losartan 50 mg oral tablet: 1 tab(s) orally once a day (02 Aug 2022 10:38)  metFORMIN 500 mg oral tablet: 1 tab(s) orally 2 times a day (02 Aug 2022 10:38)  metoprolol succinate 50 mg oral tablet, extended release: 1 tab(s) orally once a day (02 Aug 2022 10:38)    Vital Signs Last 24 Hrs  T(C): 36.7 (11 Aug 2022 16:08), Max: 37.8 (11 Aug 2022 05:00)  T(F): 98 (11 Aug 2022 16:08), Max: 100 (11 Aug 2022 05:00)  HR: 97 (11 Aug 2022 16:08) (73 - 97)  BP: 168/87 (11 Aug 2022 16:08) (168/87 - 227/102)  BP(mean): --  RR: 18 (11 Aug 2022 16:08) (18 - 20)  SpO2: --    Parameters below as of 11 Aug 2022 16:08  Patient On (Oxygen Delivery Method): room air      CAPILLARY BLOOD GLUCOSE      POCT Blood Glucose.: 104 mg/dL (11 Aug 2022 16:02)  POCT Blood Glucose.: 129 mg/dL (11 Aug 2022 11:42)  POCT Blood Glucose.: 101 mg/dL (11 Aug 2022 07:41)  POCT Blood Glucose.: 118 mg/dL (10 Aug 2022 21:29)    LABS:                        10.8   8.46  )-----------( 314      ( 11 Aug 2022 07:05 )             31.3     08-11    142  |  107  |  9<L>  ----------------------------<  103<H>  3.3<L>   |  18  |  0.7    Ca    8.8      11 Aug 2022 07:05  Mg     1.8     08-11    TPro  6.4  /  Alb  3.3<L>  /  TBili  0.4  /  DBili  x   /  AST  16  /  ALT  15  /  AlkPhos  136<H>  08-11    LIVER FUNCTIONS - ( 11 Aug 2022 07:05 )  Alb: 3.3 g/dL / Pro: 6.4 g/dL / ALK PHOS: 136 U/L / ALT: 15 U/L / AST: 16 U/L / GGT: x                           Culture - Blood (collected 09 Aug 2022 02:09)  Source: .Blood None  Preliminary Report (10 Aug 2022 10:01):    No growth to date.    Culture - Urine (collected 09 Aug 2022 01:00)  Source: Catheterized Catheterized  Final Report (10 Aug 2022 10:50):    No growth      Consultant Notes Reviewed:  [x ] YES  [ ] NO  Care Discussed with Consultants/Other Providers/ Housestaff [ x] YES  [ ] NO  Radiology, labs, new studies personally reviewed.

## 2022-08-11 NOTE — PROGRESS NOTE ADULT - ASSESSMENT
57 yo F with PMH of HTN, DM2, CVA (2017) who presented with purulent right lower extremity wound for 3 months consistent with acute RLE cellulitis. Code stroke for unresponsiveness at 4pm 8/5.    #?Seizures  - CTH negative for acute ICH  - high likelihood of seizure as per neuro  - c/w Keppra  - c/w Dilantin  - c/w VEEG  - keep NPO  - MRI showed no evidence of acute hemorrhage, but chronic right thalamic lacunar infarcts, chronic microvascular type changes, chronic hemosiderin deposition within the right basal ganglia consistent with remote hemorrhage.   - to place NG tube    #Chest pain  - EKG    #Purulent RLE cellulitis r/o abscess  - s/p unasyn and vancomycin in the ED  - cultures NGTD  - burn on board, wound culture taken   - vanco trough 10.6 >11.3  - f/u ID for abx recommendations    #Hypertensive urgency 2/2 insurance/social problems and inability to get meds  - BP at admission 296/174 > 227/102 max today  - IV labetalol 10 mg q6 goal -165    #DM2  - c/w insulin regiment  - finger sticks  - A1c 10.4        GI PPX: pantoprazole  DVT PPX: Lovenox  DIET: NPO, possible NGT        ___________________________________________________________________  Provider handoff:  [] EKG  [] NG tube  [] ID f/u  [] monitor BP  57 yo F with PMH of HTN, DM2, CVA (2017) who presented with purulent right lower extremity wound for 3 months consistent with acute RLE cellulitis. Code stroke for unresponsiveness at 4pm 8/5.    #?Seizures  - CTH negative for acute ICH  - high likelihood of seizure as per neuro  - c/w Keppra  - c/w Dilantin  - c/w VEEG  - keep NPO  - MRI showed no evidence of acute hemorrhage, but chronic right thalamic lacunar infarcts, chronic microvascular type changes, chronic hemosiderin deposition within the right basal ganglia consistent with remote hemorrhage.   - NG tube to be placed    #Chest pain  - EKG    #Purulent RLE cellulitis r/o abscess  - s/p unasyn and vancomycin in the ED  - cultures NGTD  - burn on board, wound culture taken   - vanco trough 10.6 >11.3  - f/u ID for abx recommendations    #Hypertensive urgency 2/2 insurance/social problems and inability to get meds  - BP at admission 296/174 > 227/102 max today  - IV labetalol 10 mg q6 goal -165    #DM2  - c/w insulin regiment  - finger sticks  - A1c 10.4        GI PPX: pantoprazole  DVT PPX: Lovenox  DIET: NPO, possible NGT        ___________________________________________________________________  Provider handoff:  [] EKG  [] NG tube  [] ID f/u  [] monitor BP  55 yo F with PMH of HTN, DM2, CVA (2017) who presented with purulent right lower extremity wound for 3 months consistent with acute RLE cellulitis. Code stroke for unresponsiveness at 4pm 8/5.    #?Seizures  - CTH negative for acute ICH  - high likelihood of seizure as per neuro  - c/w Keppra  - c/w Dilantin  - c/w VEEG  - keep NPO  - MRI showed no evidence of acute hemorrhage, but cortical acute infarcts involving multiple vascular territories possibly related to embolic etiology, chronic right thalamic lacunar infarcts, chronic microvascular type changes, chronic hemosiderin deposition within the right basal ganglia consistent with remote hemorrhage.   - NG tube to be placed    #Chest pain  - EKG    #Purulent RLE cellulitis r/o abscess  - s/p unasyn and vancomycin in the ED  - cultures NGTD  - burn on board, wound culture taken   - vanco trough 10.6 >11.3  - f/u ID for abx recommendations    #Hypertensive urgency 2/2 insurance/social problems and inability to get meds  - BP at admission 296/174 > 227/102 max today  - IV labetalol 10 mg q6 goal -165    #DM2  - c/w insulin regiment  - finger sticks  - A1c 10.4        GI PPX: pantoprazole  DVT PPX: Lovenox  DIET: NPO, possible NGT        ___________________________________________________________________  Provider handoff:  [] EKG  [] NG tube  [] ID f/u  [] monitor BP

## 2022-08-11 NOTE — PROGRESS NOTE ADULT - ASSESSMENT
7 yo F with PMH of HTN, DM2, CVA (2017) who presented with purulent right lower extremity wound for 3 months consistent with acute RLE cellulitis. Code stroke for unresponsiveness at 4pm 8/5    #Acute ischemic strokes / ?Seizures  -CTH negative for acute ICH  -d/w neuro: high likelihood of seizure  -started on IV Keppra  -check labs, lactate  -seizure precautions  -cont VEEG  -keep NPO, S&S f/u   -IVFs  -close monitoring  8/8: increased Keppra dose and added Dilantin. Remains on VEEG  8/9-10 d/w neuro: will get MRI, cont VEEG. LP depending on MRI results  8/10: son declined NGT placement and asked to wait till am (aware of risks)  8/11< from: MR Head w/wo IV Cont (08.10.22 @ 21:25) >  Multiple punctate cortical acute infarcts involving multiple vascular territories possibly related to embolic etiology. No evidence of acute hemorrhage. Moderate chronic microvascular type changes as well as chronic hemosiderin deposition within the right basal ganglia consistent with remote hemorrhage.  Chronic right thalamic lacunar infarcts.  < end of copied text >  -d/w neuro: start ASA, Plavix. Cont Lipitor  -repeat CTA head  -NGT placement and start feeds (son agrees today)  -PT/OT/physiatry  -TTE w/ bubble  -will need IVONNE and ILR  -tele w/ SVT w/ Afib: jaleel dempsey  reminded family to bring records from [facility]  -neuro checks    #Purulent RLE cellulitis r/o abscess  -s/p unasyn and and vancomycin in ED  -f/u wound and blood cultures   -trend inflammatory markers   -ID consult noted  - vanco, Unasyn  -s/p burn debridement   -change ABx to augmentin    #Hypertensive urgency due to noncompliance  -BP at admission 296/174 without signs of end organ damage  -gradual BP lowering  -close monitoring    # DMII w/ Hyperglycemia  - started insulin regimen   - FS ac/hs  -A1c=10.8    # Atypical Chest pain and FELIZ on admission- possibly MSK related but r/o cardiac etiology   - chest wall tender to palpation on admission  - currently CP free  - CXR unremarkable   - Tn negative  - outpt stress test    # hx of CVA 2017 (Aneurysmal as per family? ?ICH but old ischemic strokes on CTH)  # Left sided weakness  -need to get more Hx  -as per family, aneurysm was never fixed    DVT ppx: lovenox, SCDs  GI ppx: protonix  Diet: DASH/carb consistent  Activity: AAT    High risk pt. Px is guarded.    #Progress Note Handoff  Pending: Consults____Clinical improvement and stability__x___Tests__TTE>IVONNE, ILR____PT____x____  Pt/Family discussion: Pt/family informed and agree with the current plan  Disposition: Home______/SNF_______/4A______/To be determined____x____    My note supersedes the residents note should a discrepancy arise.    Chart and notes personally reviewed.  Care Discussed with Consultants/Other Providers/ Housestaff [ x] YES [ ] NO   Radiology, labs, old records personally reviewed.    discussed w/ housestaff, nursing, case management, neuro team, son

## 2022-08-11 NOTE — DIETITIAN INITIAL EVALUATION ADULT - ENTERAL
pending placement of NGT and chest x-ray confirmation, recommend Glucerna 1.2 and advance as tolerated as indicated below   day one: @120mL Q6hr    day two: @240mL Q6hr  day three: advance to goal of @325mL Q6hr for total 1300mL, 1560kcal, 78g protein, 1053mL free water  + 50mL FWF pre and post each feed for additional 600mL free water total 1653mL water

## 2022-08-11 NOTE — DIETITIAN INITIAL EVALUATION ADULT - ADD RECOMMEND
pending [facility] conversation ongoing-- would recommend enteral nutrition if within goals of care   **patient at HIGH nutrition risk follow up within 4days** RECOMMEND: thiamine 500mg/daily patient at risk of refeeding syndrome  **patient at HIGH nutrition risk follow up within 4days** RECOMMEND: thiamine 100mg/daily patient at risk of refeeding syndrome  **patient at HIGH nutrition risk follow up within 4days**

## 2022-08-11 NOTE — DIETITIAN INITIAL EVALUATION ADULT - OTHER INFO
57 yo F with PMH of HTN, DM2, CVA (2017) who presented with purulent right lower extremity wound for 3 months consistent with acute RLE cellulitis. Code stroke for unresponsiveness at 4pm 8/5  #Encephalopathy NOS ?seizures ?PRES  8/10: son declined NGT placement and asked to wait till am (aware of risks) 55 yo F with PMH of HTN, DM2, CVA (2017) who presented with purulent right lower extremity wound for 3 months consistent with acute RLE cellulitis. Code stroke for unresponsiveness at 4pm 8/5  #Encephalopathy NOS ?seizures ?PRES

## 2022-08-11 NOTE — SWALLOW BEDSIDE ASSESSMENT ADULT - SWALLOW EVAL: DIAGNOSIS
Pt with decreased direction following/ participation. Suspected pharyngeal dysphagia for puree and thin liquids, Pt observed with coughing post trials.  Pt with very delayed swallow initiation of mildly thick liquids.

## 2022-08-11 NOTE — DIETITIAN INITIAL EVALUATION ADULT - PHYSCIAL ASSESSMENT
cognition: A&Ox0, garbled speech  waxing and waning UBW: unknown per family report though denies any recent weight loss  cognition: A&Ox0, garbled speech--waxing and waning/well nourished

## 2022-08-11 NOTE — DIETITIAN INITIAL EVALUATION ADULT - PERTINENT MEDS FT
MEDICATIONS  (STANDING):  ampicillin/sulbactam  IVPB 3 Gram(s) IV Intermittent every 6 hours  atorvastatin 80 milliGRAM(s) Oral at bedtime  collagenase Ointment 1 Application(s) Topical two times a day  Dakins Solution - 1/2 Strength 1 Application(s) Topical two times a day  dextrose 5%. 1000 milliLiter(s) (100 mL/Hr) IV Continuous <Continuous>  dextrose 5%. 1000 milliLiter(s) (50 mL/Hr) IV Continuous <Continuous>  dextrose 50% Injectable 25 Gram(s) IV Push once  dextrose 50% Injectable 12.5 Gram(s) IV Push once  dextrose 50% Injectable 25 Gram(s) IV Push once  enoxaparin Injectable 40 milliGRAM(s) SubCutaneous every 24 hours  glucagon  Injectable 1 milliGRAM(s) IntraMuscular once  hydrALAZINE Injectable 15 milliGRAM(s) IV Push three times a day  insulin glargine Injectable (LANTUS) 22 Unit(s) SubCutaneous every morning  insulin lispro (ADMELOG) corrective regimen sliding scale   SubCutaneous three times a day before meals  insulin lispro Injectable (ADMELOG) 5 Unit(s) SubCutaneous three times a day before meals  levETIRAcetam  IVPB 1500 milliGRAM(s) IV Intermittent every 12 hours  lidocaine 1%/epinephrine 1:100,000 Inj 20 milliLiter(s) Local Injection once  losartan 100 milliGRAM(s) Oral daily  pantoprazole    Tablet 40 milliGRAM(s) Oral before breakfast  phenytoin  IVPB 100 milliGRAM(s) IV Intermittent three times a day  sodium chloride 0.45%. 1000 milliLiter(s) (75 mL/Hr) IV Continuous <Continuous>  vancomycin  IVPB 1250 milliGRAM(s) IV Intermittent every 12 hours    MEDICATIONS  (PRN):  acetaminophen     Tablet .. 650 milliGRAM(s) Oral every 6 hours PRN Temp greater or equal to 38C (100.4F), Mild Pain (1 - 3)  dextrose Oral Gel 15 Gram(s) Oral once PRN Blood Glucose LESS THAN 70 milliGRAM(s)/deciliter  labetalol Injectable 10 milliGRAM(s) IV Push every 6 hours PRN Systolic blood pressure >  melatonin 3 milliGRAM(s) Oral at bedtime PRN Insomnia  midazolam Injectable 4 milliGRAM(s) IV Push once PRN seizure

## 2022-08-11 NOTE — DIETITIAN INITIAL EVALUATION ADULT - ENERGY INTAKE
waxing and waning mental status affecting PO intake. Per SLP eval pt presented with dry spoon (2x); did not open mouth for first attempt, turned head away to refuse for second attempt. Unable to complete swallowing assessment d/t waxing/waning mental x7days total  (was on PO diet x2days initially)/NPO Upon admission for 2 days patient had good appetite/PO intake, then began to have waxing and waning mental status affecting PO intake. Per SLP eval pt presented with dry spoon (2x); did not open mouth for first attempt, turned head away to refuse for second attempt. Was unable to complete swallowing assessment d/t waxing/waning mental status.

## 2022-08-11 NOTE — PROGRESS NOTE ADULT - ASSESSMENT
56y female admitted for hypertensive urgency and RLE wound. Burn following for RLE wound.     8/10 s/p bedside debridement RLE     Plan:  Wound is improving.   Continue local wound care twice a day: wash with soap and water, apply santyl, cover with Dakins WTD gauze, kerlix and ACE.   F/u wound cultures - abx as indicated   No further burn surgical intervention at this time.   Patient can follow up in burn clinic within 1 week of discharged, call [phone] for appt.  56y female admitted for hypertensive urgency and RLE wound. Burn following for RLE wound.     8/10 s/p bedside debridement RLE     Plan:  Wound is improving.   Continue local wound care twice a day: wash with soap and water, apply santyl, cover with Dakins WTD gauze, kerlix and ACE.   F/u wound cultures - abx as indicated   No further burn surgical intervention at this time. Recall PRN  Patient can follow up in burn clinic within 1 week of discharged, call [phone] for appt.

## 2022-08-11 NOTE — PROGRESS NOTE ADULT - SUBJECTIVE AND OBJECTIVE BOX
NEUROLOGY CONSULT    HPI:  57 yo F with PMH of HTN, DM2, and stroke (per son 2017) who presented for AMS with RLE wound that started 3 months ago when she fell and hit her leg on a wooden cabinet. She put hydrogen peroxide, antibiotic cream, and wrapped the wound but never fully healed. Two weeks ago it started to show yellow pus so her and her family came to the ED for further treatment. Endorsed subjective fevers, chest pain, and vision changes which occurs when walked 2-3 blocks. Denied congestion, sore throat, cough, dyspnea, headache, dysuria, N/V, diarrhea     In the ED:  Vitals: T: 98.9, BP: 296/ 174, , RR: 18, 98%O2 on RA  Labs: CBC showed WBC 11.23; ESR 92, CRP 35.6  CMP showed glucose 302, alk phos 173; ; VBG pH 7.45  EKG pending  CXR showed borderline cardiomegaly and no airspace opacity.   X-ray of RLE also pending.     Received unasyn and vanco, humalin R, IV vasotec, IV labetalol   (02 Aug 2022 07:47)     MEDICATIONS  Home Medications:  losartan 50 mg oral tablet: 1 tab(s) orally once a day (02 Aug 2022 10:38)  metFORMIN 500 mg oral tablet: 1 tab(s) orally 2 times a day (02 Aug 2022 10:38)  metoprolol succinate 50 mg oral tablet, extended release: 1 tab(s) orally once a day (02 Aug 2022 10:38)    MEDICATIONS  (STANDING):  ampicillin/sulbactam  IVPB 3 Gram(s) IV Intermittent every 6 hours  atorvastatin 80 milliGRAM(s) Oral at bedtime  collagenase Ointment 1 Application(s) Topical two times a day  Dakins Solution - 1/2 Strength 1 Application(s) Topical two times a day  dextrose 5%. 1000 milliLiter(s) (100 mL/Hr) IV Continuous <Continuous>  dextrose 5%. 1000 milliLiter(s) (50 mL/Hr) IV Continuous <Continuous>  dextrose 50% Injectable 25 Gram(s) IV Push once  dextrose 50% Injectable 12.5 Gram(s) IV Push once  dextrose 50% Injectable 25 Gram(s) IV Push once  enoxaparin Injectable 40 milliGRAM(s) SubCutaneous every 24 hours  glucagon  Injectable 1 milliGRAM(s) IntraMuscular once  hydrALAZINE Injectable 15 milliGRAM(s) IV Push three times a day  insulin glargine Injectable (LANTUS) 22 Unit(s) SubCutaneous every morning  insulin lispro (ADMELOG) corrective regimen sliding scale   SubCutaneous three times a day before meals  insulin lispro Injectable (ADMELOG) 5 Unit(s) SubCutaneous three times a day before meals  levETIRAcetam  IVPB 1500 milliGRAM(s) IV Intermittent every 12 hours  lidocaine 1%/epinephrine 1:100,000 Inj 20 milliLiter(s) Local Injection once  losartan 100 milliGRAM(s) Oral daily  pantoprazole    Tablet 40 milliGRAM(s) Oral before breakfast  phenytoin  IVPB 100 milliGRAM(s) IV Intermittent three times a day  sodium chloride 0.65% Nasal 2 Spray(s) Both Nostrils two times a day  sodium chloride 0.9%. 1000 milliLiter(s) (60 mL/Hr) IV Continuous <Continuous>  vancomycin  IVPB 1250 milliGRAM(s) IV Intermittent every 12 hours    MEDICATIONS  (PRN):  acetaminophen     Tablet .. 650 milliGRAM(s) Oral every 6 hours PRN Temp greater or equal to 38C (100.4F), Mild Pain (1 - 3)  dextrose Oral Gel 15 Gram(s) Oral once PRN Blood Glucose LESS THAN 70 milliGRAM(s)/deciliter  labetalol Injectable 10 milliGRAM(s) IV Push every 6 hours PRN Systolic blood pressure >  melatonin 3 milliGRAM(s) Oral at bedtime PRN Insomnia  midazolam Injectable 4 milliGRAM(s) IV Push once PRN seizure      FAMILY HISTORY:  FH: type 2 diabetes mellitus      SOCIAL HISTORY: negative for tobacco, alcohol, or ilicit drug use.    Allergies    No Known Allergies    Intolerances    GEN: NAD, more alert and aware to self than yesterday    NEURO:   MENTAL STATUS: AAOx1 (to self)   LANG/SPEECH: minimal response with one word answers to 2-3 worded sentences   CRANIAL NERVES:  II: Pupils equal and reactive,   III, IV, VI: EOM with left sided neglect   V: normal  VII: no facial asymmetry  VIII: normal hearing to speech  MOTOR: 3/5 left UE and LE but moves right UE and LE randomly but did not follow commands   REFLEXES: 2/4 throughout, bilateral flexor plantars  SENSORY: not responding to questions   COORD: not follow commands     LABS:                        10.8   8.46  )-----------( 314      ( 11 Aug 2022 07:05 )             31.3     08-11    142  |  107  |  9<L>  ----------------------------<  103<H>  3.3<L>   |  18  |  0.7    Ca    8.8      11 Aug 2022 07:05  Mg     1.8     08-11    TPro  6.4  /  Alb  3.3<L>  /  TBili  0.4  /  DBili  x   /  AST  16  /  ALT  15  /  AlkPhos  136<H>  08-11    Hemoglobin A1C:   Vitamin B12     CAPILLARY BLOOD GLUCOSE      POCT Blood Glucose.: 129 mg/dL (11 Aug 2022 11:42)              Microbiology:    Culture - Blood (collected 09 Aug 2022 02:09)  Source: .Blood None  Preliminary Report (10 Aug 2022 10:01):    No growth to date.    Culture - Urine (collected 09 Aug 2022 01:00)  Source: Catheterized Catheterized  Final Report (10 Aug 2022 10:50):    No growth        RADIOLOGY, EKG AND ADDITIONAL TESTS: Reviewed.           NEUROLOGY CONSULT    HPI:  55 yo F with PMH of HTN, DM2, and stroke (per son 2017) who presented for AMS with RLE wound that started 3 months ago when she fell and hit her leg on a wooden cabinet. She put hydrogen peroxide, antibiotic cream, and wrapped the wound but never fully healed. Two weeks ago it started to show yellow pus so her and her family came to the ED for further treatment. Endorsed subjective fevers, chest pain, and vision changes which occurs when walked 2-3 blocks. Denied congestion, sore throat, cough, dyspnea, headache, dysuria, N/V, diarrhea     In the ED:  Vitals: T: 98.9, BP: 296/ 174, , RR: 18, 98%O2 on RA  Labs: CBC showed WBC 11.23; ESR 92, CRP 35.6  CMP showed glucose 302, alk phos 173; ; VBG pH 7.45  EKG pending  CXR showed borderline cardiomegaly and no airspace opacity.   X-ray of RLE also pending.     Received unasyn and vanco, humalin R, IV vasotec, IV labetalol   (02 Aug 2022 07:47)     MEDICATIONS  Home Medications:  losartan 50 mg oral tablet: 1 tab(s) orally once a day (02 Aug 2022 10:38)  metFORMIN 500 mg oral tablet: 1 tab(s) orally 2 times a day (02 Aug 2022 10:38)  metoprolol succinate 50 mg oral tablet, extended release: 1 tab(s) orally once a day (02 Aug 2022 10:38)    MEDICATIONS  (STANDING):  ampicillin/sulbactam  IVPB 3 Gram(s) IV Intermittent every 6 hours  atorvastatin 80 milliGRAM(s) Oral at bedtime  collagenase Ointment 1 Application(s) Topical two times a day  Dakins Solution - 1/2 Strength 1 Application(s) Topical two times a day  dextrose 5%. 1000 milliLiter(s) (100 mL/Hr) IV Continuous <Continuous>  dextrose 5%. 1000 milliLiter(s) (50 mL/Hr) IV Continuous <Continuous>  dextrose 50% Injectable 25 Gram(s) IV Push once  dextrose 50% Injectable 12.5 Gram(s) IV Push once  dextrose 50% Injectable 25 Gram(s) IV Push once  enoxaparin Injectable 40 milliGRAM(s) SubCutaneous every 24 hours  glucagon  Injectable 1 milliGRAM(s) IntraMuscular once  hydrALAZINE Injectable 15 milliGRAM(s) IV Push three times a day  insulin glargine Injectable (LANTUS) 22 Unit(s) SubCutaneous every morning  insulin lispro (ADMELOG) corrective regimen sliding scale   SubCutaneous three times a day before meals  insulin lispro Injectable (ADMELOG) 5 Unit(s) SubCutaneous three times a day before meals  levETIRAcetam  IVPB 1500 milliGRAM(s) IV Intermittent every 12 hours  lidocaine 1%/epinephrine 1:100,000 Inj 20 milliLiter(s) Local Injection once  losartan 100 milliGRAM(s) Oral daily  pantoprazole    Tablet 40 milliGRAM(s) Oral before breakfast  phenytoin  IVPB 100 milliGRAM(s) IV Intermittent three times a day  sodium chloride 0.65% Nasal 2 Spray(s) Both Nostrils two times a day  sodium chloride 0.9%. 1000 milliLiter(s) (60 mL/Hr) IV Continuous <Continuous>  vancomycin  IVPB 1250 milliGRAM(s) IV Intermittent every 12 hours    MEDICATIONS  (PRN):  acetaminophen     Tablet .. 650 milliGRAM(s) Oral every 6 hours PRN Temp greater or equal to 38C (100.4F), Mild Pain (1 - 3)  dextrose Oral Gel 15 Gram(s) Oral once PRN Blood Glucose LESS THAN 70 milliGRAM(s)/deciliter  labetalol Injectable 10 milliGRAM(s) IV Push every 6 hours PRN Systolic blood pressure >  melatonin 3 milliGRAM(s) Oral at bedtime PRN Insomnia  midazolam Injectable 4 milliGRAM(s) IV Push once PRN seizure    FAMILY HISTORY:  FH: type 2 diabetes mellitus    SOCIAL HISTORY: negative for tobacco, alcohol, or ilicit drug use.    Allergies    No Known Allergies    Intolerances    GEN: NAD, more alert and aware to self than yesterday    NEURO:   MENTAL STATUS: AAOx1 (to self)   LANG/SPEECH: minimal response with one word answers to 2-3 worded sentences   CRANIAL NERVES:  II: Pupils equal and reactive,   III, IV, VI: EOM with left sided neglect   V: normal  VII: no facial asymmetry  VIII: normal hearing to speech  MOTOR: 3/5 left UE and LE but moves right UE and LE randomly but did not follow commands   REFLEXES: 2/4 throughout, bilateral flexor plantars  SENSORY: not responding to questions   COORD: not follow commands     LABS:                        10.8   8.46  )-----------( 314      ( 11 Aug 2022 07:05 )             31.3     08-11    142  |  107  |  9<L>  ----------------------------<  103<H>  3.3<L>   |  18  |  0.7    Ca    8.8      11 Aug 2022 07:05  Mg     1.8     08-11    TPro  6.4  /  Alb  3.3<L>  /  TBili  0.4  /  DBili  x   /  AST  16  /  ALT  15  /  AlkPhos  136<H>  08-11    Hemoglobin A1C:   Vitamin B12     CAPILLARY BLOOD GLUCOSE      POCT Blood Glucose.: 129 mg/dL (11 Aug 2022 11:42)    Microbiology:    Culture - Blood (collected 09 Aug 2022 02:09)  Source: .Blood None  Preliminary Report (10 Aug 2022 10:01):    No growth to date.    Culture - Urine (collected 09 Aug 2022 01:00)  Source: Catheterized Catheterized  Final Report (10 Aug 2022 10:50):    No growth    RADIOLOGY, EKG AND ADDITIONAL TESTS: Reviewed.

## 2022-08-11 NOTE — PROGRESS NOTE ADULT - SUBJECTIVE AND OBJECTIVE BOX
56y female admitted for hypertensive urgency and RLE wound. Burn following for RLE wound.     POD #1 s/p bedside debridement of RLE wound    AM rounds     Son at bedside, no complaints.       Vital Signs Last 24 Hrs  T(C): 35.9 (11 Aug 2022 13:15), Max: 37.8 (11 Aug 2022 05:00)  T(F): 96.7 (11 Aug 2022 13:15), Max: 100 (11 Aug 2022 05:00)  HR: 87 (11 Aug 2022 14:17) (73 - 94)  BP: 195/93 (11 Aug 2022 14:17) (180/116 - 227/102)  RR: 18 (11 Aug 2022 14:17) (18 - 20)    Parameters below as of 11 Aug 2022 13:15  Patient On (Oxygen Delivery Method): room air        08-11             10.8   8.46  )-----------( 314      ( 11 Aug 2022 07:05 )             31.3         EXAM:   Gen: NAD, awake, answered simple questions  Wound: Right lower leg, anterior aspect ~5x3cm full thickness wound w/ pink, moist tissue mixed with scattered areas of pale tissue. Otherwise no more loose, necrotic tissue or malodor. No purulence.

## 2022-08-11 NOTE — PROGRESS NOTE ADULT - ATTENDING COMMENTS
56 w/ prior cVA w/ residual LHP currently admitted for RLE wound with possible bacteremia complicated by unresponsiveness found to have epileptiform activity currently improving found to have multiple punctate infarcts bilaterally L > R likely etiology of persistent encephalopathy suspicious for cardioembolic cause.  Family unclear if last CVA hemorrhagic or ischemic since pt not on antiplts/anticoags.  Recommendations as above.  F/u with stroke team. Discussed with hospitalist.

## 2022-08-11 NOTE — PROGRESS NOTE ADULT - SUBJECTIVE AND OBJECTIVE BOX
JOSE MITCHELL 56y Female  MRN#: [id number]      Pt is currently admitted with the primary diagnosis of RLE cellulitis, encephalopathy.       Hospital Day: 9d  CC: RLE wound  HPI:  55 yo F with PMH of HTN, DM2, and stroke (2017 as per son) who presented for RLE wound x 3 months ago when she fell and hit her leg on a wooden cabinet. Hydrogen peroxide, abx cream, and wrap were applied but wound never fully healed. Two weeks ago wound had yellow pus so pt presented to the ED. Pt endorsed subjective fevers, CP, vision changes after walking 2-3 blocks. Denied congestion, sore throat, cough, headache, dysuria, n/v, diarrhea.     Per son, they fill her medications at [facility] ([phone]) and this is their current pharmacy. Called them to confirm her medications and they said her last refill of medications was 2018. Pt has not seen a doctor due to insurance problem and has not been taking any medications.    Hospital Course: Pt was at baseline as per family until Friday 8/5 4 PM when code stroke was called due to left gaze deviation and unresponsiveness. Taken for stat CTH.     Started on Keppra and Dilantin. Remains on vEEG.     Overnight events:  Patient hypertensive to 227/102, given hydralazine without improvement. Labetalol push x2.    Subjective complaints:  As per son, patient is talking but still sleepy throughout the day. Complained of chest pain.                                           ----------------------------------------------------------    PAST MEDICAL & SURGICAL HISTORY  Hypertension    Diabetes mellitus    No significant past surgical history                                              -----------------------------------------------------------  ALLERGIES:  No Known Allergies                                            ------------------------------------------------------------    HOME MEDICATIONS  Home Medications:  losartan 50 mg oral tablet: 1 tab(s) orally once a day (02 Aug 2022 10:38)  metFORMIN 500 mg oral tablet: 1 tab(s) orally 2 times a day (02 Aug 2022 10:38)  metoprolol succinate 50 mg oral tablet, extended release: 1 tab(s) orally once a day (02 Aug 2022 10:38)                           MEDICATIONS:  STANDING MEDICATIONS  ampicillin/sulbactam  IVPB 3 Gram(s) IV Intermittent every 6 hours  atorvastatin 80 milliGRAM(s) Oral at bedtime  collagenase Ointment 1 Application(s) Topical two times a day  Dakins Solution - 1/2 Strength 1 Application(s) Topical two times a day  dextrose 5%. 1000 milliLiter(s) IV Continuous <Continuous>  dextrose 5%. 1000 milliLiter(s) IV Continuous <Continuous>  dextrose 50% Injectable 25 Gram(s) IV Push once  dextrose 50% Injectable 12.5 Gram(s) IV Push once  dextrose 50% Injectable 25 Gram(s) IV Push once  enoxaparin Injectable 40 milliGRAM(s) SubCutaneous every 24 hours  glucagon  Injectable 1 milliGRAM(s) IntraMuscular once  hydrALAZINE Injectable 15 milliGRAM(s) IV Push three times a day  insulin glargine Injectable (LANTUS) 22 Unit(s) SubCutaneous every morning  insulin lispro (ADMELOG) corrective regimen sliding scale   SubCutaneous three times a day before meals  insulin lispro Injectable (ADMELOG) 5 Unit(s) SubCutaneous three times a day before meals  levETIRAcetam  IVPB 1500 milliGRAM(s) IV Intermittent every 12 hours  lidocaine 1%/epinephrine 1:100,000 Inj 20 milliLiter(s) Local Injection once  losartan 100 milliGRAM(s) Oral daily  pantoprazole    Tablet 40 milliGRAM(s) Oral before breakfast  phenytoin  IVPB 100 milliGRAM(s) IV Intermittent three times a day  potassium chloride  20 mEq/100 mL IVPB 20 milliEquivalent(s) IV Intermittent every 2 hours  sodium chloride 0.45%. 1000 milliLiter(s) IV Continuous <Continuous>  vancomycin  IVPB 1250 milliGRAM(s) IV Intermittent every 12 hours    PRN MEDICATIONS  acetaminophen     Tablet .. 650 milliGRAM(s) Oral every 6 hours PRN  dextrose Oral Gel 15 Gram(s) Oral once PRN  labetalol Injectable 10 milliGRAM(s) IV Push every 6 hours PRN  melatonin 3 milliGRAM(s) Oral at bedtime PRN  midazolam Injectable 4 milliGRAM(s) IV Push once PRN                                            ------------------------------------------------------------  VITAL SIGNS: Last 24 Hours  T(C): 37.8 (11 Aug 2022 05:00), Max: 37.8 (11 Aug 2022 05:00)  T(F): 100 (11 Aug 2022 05:00), Max: 100 (11 Aug 2022 05:00)  HR: 84 (11 Aug 2022 08:44) (73 - 94)  BP: 187/112 (11 Aug 2022 08:44) (184/82 - 227/102)  BP(mean): --  RR: 18 (11 Aug 2022 08:44) (18 - 20)  SpO2: --                                             --------------------------------------------------------------  LABS:                        10.8   8.46  )-----------( 314      ( 11 Aug 2022 07:05 )             31.3     08-11    142  |  107  |  9<L>  ----------------------------<  103<H>  3.3<L>   |  18  |  0.7    Ca    8.8      11 Aug 2022 07:05  Mg     1.8     08-11    TPro  6.4  /  Alb  3.3<L>  /  TBili  0.4  /  DBili  x   /  AST  16  /  ALT  15  /  AlkPhos  136<H>  08-11          Lactate, Blood: 0.9 mmol/L (08-10-22 @ 18:54)        Culture - Blood (collected 09 Aug 2022 02:09)  Source: .Blood None  Preliminary Report (10 Aug 2022 10:01):    No growth to date.    Culture - Urine (collected 09 Aug 2022 01:00)  Source: Catheterized Catheterized  Final Report (10 Aug 2022 10:50):    No growth                                                    -------------------------------------------------------------  RADIOLOGY:  < from: MR Head w/wo IV Cont (08.10.22 @ 21:25) >    IMPRESSION:  Motion limited examination.    Multiple punctate cortical acute infarcts involving multiple vascular   territories possibly related to embolic etiology. No evidence of acute    hemorrhage.    Moderate chronic microvascular type changes as well as chronic   hemosiderin deposition within the right basal ganglia consistent with   remote hemorrhage.    Chronic right thalamic lacunar infarcts.      < end of copied text >                                            --------------------------------------------------------------    PHYSICAL EXAM:  General: female laying in bed with son Erik at bedside. Awake and alert but not following commands fully.   HEENT: Normocephalic, nontraumatic.   LUNGS: Clear to auscultation b/l. No wheezes, rales, or rhonchi, but auscultated from anterior chest.   HEART: RRR. No murmurs, rubs, or gallops.  ABDOMEN: Nontender, nondistended. + bowel sounds.  EXT: Pulses palpable x 4. Nonedematous. RLE wound wrapped.  SKIN: Warm, dry.         JOSE MITCHELL 56y Female  MRN#: [id number]      Pt is currently admitted with the primary diagnosis of RLE cellulitis, encephalopathy.       Hospital Day: 9d  CC: RLE wound  HPI:  57 yo F with PMH of HTN, DM2, and stroke (2017 as per son) who presented for RLE wound x 3 months ago when she fell and hit her leg on a wooden cabinet. Hydrogen peroxide, abx cream, and wrap were applied but wound never fully healed. Two weeks ago wound had yellow pus so pt presented to the ED. Pt endorsed subjective fevers, CP, vision changes after walking 2-3 blocks. Denied congestion, sore throat, cough, headache, dysuria, n/v, diarrhea.     Per son, they fill her medications at [facility] ([phone]) and this is their current pharmacy. Called them to confirm her medications and they said her last refill of medications was 2018. Pt has not seen a doctor due to insurance problem and has not been taking any medications.    Hospital Course: Pt was at baseline as per family until Friday 8/5 4 PM when code stroke was called due to left gaze deviation and unresponsiveness. Taken for stat CTH.     Started on Keppra and Dilantin. Remains on vEEG.     Overnight events:  Patient hypertensive to 227/102, given hydralazine without improvement. Labetalol push x2 as of 9 AM.     Subjective complaints:  As per son, patient is talking but still sleepy throughout the day. Complained of chest pain.                                           ----------------------------------------------------------    PAST MEDICAL & SURGICAL HISTORY  Hypertension    Diabetes mellitus    No significant past surgical history                                              -----------------------------------------------------------  ALLERGIES:  No Known Allergies                                            ------------------------------------------------------------    HOME MEDICATIONS  Home Medications:  losartan 50 mg oral tablet: 1 tab(s) orally once a day (02 Aug 2022 10:38)  metFORMIN 500 mg oral tablet: 1 tab(s) orally 2 times a day (02 Aug 2022 10:38)  metoprolol succinate 50 mg oral tablet, extended release: 1 tab(s) orally once a day (02 Aug 2022 10:38)                           MEDICATIONS:  STANDING MEDICATIONS  ampicillin/sulbactam  IVPB 3 Gram(s) IV Intermittent every 6 hours  atorvastatin 80 milliGRAM(s) Oral at bedtime  collagenase Ointment 1 Application(s) Topical two times a day  Dakins Solution - 1/2 Strength 1 Application(s) Topical two times a day  dextrose 5%. 1000 milliLiter(s) IV Continuous <Continuous>  dextrose 5%. 1000 milliLiter(s) IV Continuous <Continuous>  dextrose 50% Injectable 25 Gram(s) IV Push once  dextrose 50% Injectable 12.5 Gram(s) IV Push once  dextrose 50% Injectable 25 Gram(s) IV Push once  enoxaparin Injectable 40 milliGRAM(s) SubCutaneous every 24 hours  glucagon  Injectable 1 milliGRAM(s) IntraMuscular once  hydrALAZINE Injectable 15 milliGRAM(s) IV Push three times a day  insulin glargine Injectable (LANTUS) 22 Unit(s) SubCutaneous every morning  insulin lispro (ADMELOG) corrective regimen sliding scale   SubCutaneous three times a day before meals  insulin lispro Injectable (ADMELOG) 5 Unit(s) SubCutaneous three times a day before meals  levETIRAcetam  IVPB 1500 milliGRAM(s) IV Intermittent every 12 hours  lidocaine 1%/epinephrine 1:100,000 Inj 20 milliLiter(s) Local Injection once  losartan 100 milliGRAM(s) Oral daily  pantoprazole    Tablet 40 milliGRAM(s) Oral before breakfast  phenytoin  IVPB 100 milliGRAM(s) IV Intermittent three times a day  potassium chloride  20 mEq/100 mL IVPB 20 milliEquivalent(s) IV Intermittent every 2 hours  sodium chloride 0.45%. 1000 milliLiter(s) IV Continuous <Continuous>  vancomycin  IVPB 1250 milliGRAM(s) IV Intermittent every 12 hours    PRN MEDICATIONS  acetaminophen     Tablet .. 650 milliGRAM(s) Oral every 6 hours PRN  dextrose Oral Gel 15 Gram(s) Oral once PRN  labetalol Injectable 10 milliGRAM(s) IV Push every 6 hours PRN  melatonin 3 milliGRAM(s) Oral at bedtime PRN  midazolam Injectable 4 milliGRAM(s) IV Push once PRN                                            ------------------------------------------------------------  VITAL SIGNS: Last 24 Hours  T(C): 37.8 (11 Aug 2022 05:00), Max: 37.8 (11 Aug 2022 05:00)  T(F): 100 (11 Aug 2022 05:00), Max: 100 (11 Aug 2022 05:00)  HR: 84 (11 Aug 2022 08:44) (73 - 94)  BP: 187/112 (11 Aug 2022 08:44) (184/82 - 227/102)  BP(mean): --  RR: 18 (11 Aug 2022 08:44) (18 - 20)  SpO2: --                                             --------------------------------------------------------------  LABS:                        10.8   8.46  )-----------( 314      ( 11 Aug 2022 07:05 )             31.3     08-11    142  |  107  |  9<L>  ----------------------------<  103<H>  3.3<L>   |  18  |  0.7    Ca    8.8      11 Aug 2022 07:05  Mg     1.8     08-11    TPro  6.4  /  Alb  3.3<L>  /  TBili  0.4  /  DBili  x   /  AST  16  /  ALT  15  /  AlkPhos  136<H>  08-11          Lactate, Blood: 0.9 mmol/L (08-10-22 @ 18:54)        Culture - Blood (collected 09 Aug 2022 02:09)  Source: .Blood None  Preliminary Report (10 Aug 2022 10:01):    No growth to date.    Culture - Urine (collected 09 Aug 2022 01:00)  Source: Catheterized Catheterized  Final Report (10 Aug 2022 10:50):    No growth                                                    -------------------------------------------------------------  RADIOLOGY:  < from: MR Head w/wo IV Cont (08.10.22 @ 21:25) >    IMPRESSION:  Motion limited examination.    Multiple punctate cortical acute infarcts involving multiple vascular   territories possibly related to embolic etiology. No evidence of acute    hemorrhage.    Moderate chronic microvascular type changes as well as chronic   hemosiderin deposition within the right basal ganglia consistent with   remote hemorrhage.    Chronic right thalamic lacunar infarcts.      < end of copied text >                                            --------------------------------------------------------------    PHYSICAL EXAM:  General: female laying in bed with son Erik at bedside. Awake and alert but not following commands fully.   HEENT: Normocephalic, nontraumatic.   LUNGS: Clear to auscultation b/l. No wheezes, rales, or rhonchi, but auscultated from anterior chest.   HEART: RRR. No murmurs, rubs, or gallops.  ABDOMEN: Nontender, nondistended. + bowel sounds.  EXT: Pulses palpable x 4. Nonedematous. RLE wound wrapped.  SKIN: Warm, dry.         JOSE MITCHELL 56y Female  MRN#: [id number]      Pt is currently admitted with the primary diagnosis of RLE cellulitis, encephalopathy.       Hospital Day: 9d  CC: RLE wound  HPI:  57 yo F with PMH of HTN, DM2, and stroke (2017 as per son) who presented for RLE wound x 3 months ago when she fell and hit her leg on a wooden cabinet. Hydrogen peroxide, abx cream, and wrap were applied but wound never fully healed. Two weeks ago wound had yellow pus so pt presented to the ED. Pt endorsed subjective fevers, CP, vision changes after walking 2-3 blocks. Denied congestion, sore throat, cough, headache, dysuria, n/v, diarrhea.     Per son, they fill her medications at [facility] ([phone]) and this is their current pharmacy. Called them to confirm her medications and they said her last refill of medications was 2018. Pt has not seen a doctor due to insurance problem and has not been taking any medications.    Hospital Course: Pt was at baseline as per family until Friday 8/5 4 PM when code stroke was called due to left gaze deviation and unresponsiveness. Taken for stat CTH.     Started on Keppra and Dilantin. Remains on vEEG.     Overnight events:  Patient hypertensive to 227/102, given hydralazine without improvement. Labetalol push x2 as of 9 AM.     Subjective complaints:  As per son, patient is talking but still sleepy throughout the day. Complained of chest pain.                                           ----------------------------------------------------------    PAST MEDICAL & SURGICAL HISTORY  Hypertension    Diabetes mellitus    No significant past surgical history                                              -----------------------------------------------------------  ALLERGIES:  No Known Allergies                                            ------------------------------------------------------------    HOME MEDICATIONS  Home Medications:  losartan 50 mg oral tablet: 1 tab(s) orally once a day (02 Aug 2022 10:38)  metFORMIN 500 mg oral tablet: 1 tab(s) orally 2 times a day (02 Aug 2022 10:38)  metoprolol succinate 50 mg oral tablet, extended release: 1 tab(s) orally once a day (02 Aug 2022 10:38)                           MEDICATIONS:  STANDING MEDICATIONS  ampicillin/sulbactam  IVPB 3 Gram(s) IV Intermittent every 6 hours  atorvastatin 80 milliGRAM(s) Oral at bedtime  collagenase Ointment 1 Application(s) Topical two times a day  Dakins Solution - 1/2 Strength 1 Application(s) Topical two times a day  dextrose 5%. 1000 milliLiter(s) IV Continuous <Continuous>  dextrose 5%. 1000 milliLiter(s) IV Continuous <Continuous>  dextrose 50% Injectable 25 Gram(s) IV Push once  dextrose 50% Injectable 12.5 Gram(s) IV Push once  dextrose 50% Injectable 25 Gram(s) IV Push once  enoxaparin Injectable 40 milliGRAM(s) SubCutaneous every 24 hours  glucagon  Injectable 1 milliGRAM(s) IntraMuscular once  hydrALAZINE Injectable 15 milliGRAM(s) IV Push three times a day  insulin glargine Injectable (LANTUS) 22 Unit(s) SubCutaneous every morning  insulin lispro (ADMELOG) corrective regimen sliding scale   SubCutaneous three times a day before meals  insulin lispro Injectable (ADMELOG) 5 Unit(s) SubCutaneous three times a day before meals  levETIRAcetam  IVPB 1500 milliGRAM(s) IV Intermittent every 12 hours  lidocaine 1%/epinephrine 1:100,000 Inj 20 milliLiter(s) Local Injection once  losartan 100 milliGRAM(s) Oral daily  pantoprazole    Tablet 40 milliGRAM(s) Oral before breakfast  phenytoin  IVPB 100 milliGRAM(s) IV Intermittent three times a day  potassium chloride  20 mEq/100 mL IVPB 20 milliEquivalent(s) IV Intermittent every 2 hours  sodium chloride 0.45%. 1000 milliLiter(s) IV Continuous <Continuous>  vancomycin  IVPB 1250 milliGRAM(s) IV Intermittent every 12 hours    PRN MEDICATIONS  acetaminophen     Tablet .. 650 milliGRAM(s) Oral every 6 hours PRN  dextrose Oral Gel 15 Gram(s) Oral once PRN  labetalol Injectable 10 milliGRAM(s) IV Push every 6 hours PRN  melatonin 3 milliGRAM(s) Oral at bedtime PRN  midazolam Injectable 4 milliGRAM(s) IV Push once PRN                                            ------------------------------------------------------------  VITAL SIGNS: Last 24 Hours  T(C): 37.8 (11 Aug 2022 05:00), Max: 37.8 (11 Aug 2022 05:00)  T(F): 100 (11 Aug 2022 05:00), Max: 100 (11 Aug 2022 05:00)  HR: 84 (11 Aug 2022 08:44) (73 - 94)  BP: 187/112 (11 Aug 2022 08:44) (184/82 - 227/102)  BP(mean): --  RR: 18 (11 Aug 2022 08:44) (18 - 20)  SpO2: --                                             --------------------------------------------------------------  LABS:                        10.8   8.46  )-----------( 314      ( 11 Aug 2022 07:05 )             31.3     08-11    142  |  107  |  9<L>  ----------------------------<  103<H>  3.3<L>   |  18  |  0.7    Ca    8.8      11 Aug 2022 07:05  Mg     1.8     08-11    TPro  6.4  /  Alb  3.3<L>  /  TBili  0.4  /  DBili  x   /  AST  16  /  ALT  15  /  AlkPhos  136<H>  08-11          Lactate, Blood: 0.9 mmol/L (08-10-22 @ 18:54)        Culture - Blood (collected 09 Aug 2022 02:09)  Source: .Blood None  Preliminary Report (10 Aug 2022 10:01):    No growth to date.    Culture - Urine (collected 09 Aug 2022 01:00)  Source: Catheterized Catheterized  Final Report (10 Aug 2022 10:50):    No growth                                                    -------------------------------------------------------------  RADIOLOGY:  < from: MR Head w/wo IV Cont (08.10.22 @ 21:25) >    IMPRESSION:  Motion limited examination.    Multiple punctate cortical acute infarcts involving multiple vascular   territories possibly related to embolic etiology. No evidence of acute    hemorrhage.    Moderate chronic microvascular type changes as well as chronic   hemosiderin deposition within the right basal ganglia consistent with   remote hemorrhage.    Chronic right thalamic lacunar infarcts.      < end of copied text >                                            --------------------------------------------------------------    PHYSICAL EXAM:  General: female laying in bed with son Erik at bedside. Awake and alert but not following commands fully.   HEENT: Normocephalic, nontraumatic.   LUNGS: Clear to auscultation b/l. No wheezes, rales, or rhonchi, but auscultated from anterior chest.   HEART: RRR. No murmurs, rubs, or gallops.  ABDOMEN: Nontender, nondistended. + bowel sounds.  EXT: Pulses palpable x 4. Nonedematous. RLE wound wrapped.  SKIN: Warm, dry.

## 2022-08-12 NOTE — SWALLOW BEDSIDE ASSESSMENT ADULT - COMMENTS
Pt's son was bedside. He stated Pt has been lethargic all day, was given meds earlier in the day after morning agitation.

## 2022-08-12 NOTE — CONSULT NOTE ADULT - ASSESSMENT
IMPRESSION: Rehab of frances CVA / L HP / dysphagia / RLE cellulitis     PRECAUTIONS: [   ] Cardiac  [   ] Respiratory  [   ] Seizures [   ] Contact Isolation  [   ] Droplet Isolation  [   ] Other    Weight Bearing Status:     RECOMMENDATION:    Out of Bed to Chair     DVT/Decubiti Prophylaxis    REHAB PLAN:     [   x ] Bedside P/T 3-5 times a week   [ x   ]   Bedside O/T  2-3 times a week             [  x  ] Speech Therapy               [    ]  No Rehab Therapy Indicated   Conditioning/ROM                                    ADL  Bed Mobility                                               Conditioning/ROM  Transfers                                                     Bed Mobility  Sitting /Standing Balance                         Transfers                                        Gait Training                                               Sitting/Standing Balance  Stair Training [   ]Applicable                    Home equipment Eval                                                                        Splinting  [   ] Only      GOALS:   ADL   [  x  ]   Independent                    Transfers  [  x  ] Independent                          Ambulation  [  x  ] Independent     [  x   ] With device                            [    ]  CG                                                         [    ]  CG                                                                  [    ] CG                            [    ] Min A                                                   [    ] Min A                                                              [    ] Min  A          DISCHARGE PLAN:   [    ]  Good candidate for Intensive Rehabilitation/Hospital based                                             Will tolerate 3hrs Intensive Rehab Daily                                       [     ]  Short Term Rehab in Skilled Nursing Facility                                       [     ]  Home with Outpatient or  services                                         [  x   ]  Possible Candidate for Intensive Hospital based Rehab

## 2022-08-12 NOTE — SWALLOW BEDSIDE ASSESSMENT ADULT - SWALLOW EVAL: DIAGNOSIS
Pt p/ as lethargic and fatigued. Pt required max cues from SLP and family  and physiatrist to maintain arousability. Pt denies hunger and thirst. Pt not appropriate for PO trials at this time.

## 2022-08-12 NOTE — PROGRESS NOTE ADULT - ASSESSMENT
57 yo F PMH HTN, DM2, CVA (2017, awaiting records from [facility]) who presented with purulent RLE wound x 3 months, admitted for RLE cellulitis. Code stroke for unresponsiveness 8/5 4PM. MRI positive for multiple strokes.    #Acute ischemic strokes/?seizures  - CTH negative for acute ICH  - c/w Keppra, phenytoin  - NGT in place, feeds to resume  - MRI showed multiple punctate cortical acute infarcts involving multiple vascular territories possibly related to embolic etiology.   - c/w ASA, Plavix, lipitor  - repeat CTA H pending  - phenytoin level  - receive records from [facility]  - PT/OT/physiatry  - TTE with bubble study  - on tele, with SVT and AF  - cardio, EP consulted  - S&S f/u    Purulent RLE cellulitis r/o abscess  - s/p unasyn and vancomycin in the ED  - cultures NGTD  - burn on board, wound culture taken   - vanco trough 10.6 >11.3  - s/p burn debridement  - c/w Augmentin    #Hypertensive urgency 2/2 insurance/social problems and inability to get meds  - BP at admission 296/174 > 151/76 max today  - IV labetalol 10 mg q6 goal -165    #DM2  - hold dose of Lantus, glucose 103 this AM  - finger sticks  - A1c 10.4        GI PPX: pantoprazole  DVT PPX: Lovenox  DIET: NPO, possible NGT          ___________________________________________________________________  Provider handoff:  [] repeat CTA H  [] consults: PT/OT, cardio, EP, S&S  [] TTE with bubble study  [] phenytoin level tomorrow AM  [] receive records from [facility]   55 yo F PMH HTN, DM2, CVA (2017, awaiting records from [facility]) who presented with purulent RLE wound x 3 months, admitted for RLE cellulitis. Code stroke for unresponsiveness 8/5 4PM. MRI positive for multiple strokes.    #Acute ischemic strokes/?seizures  - CTH negative for acute ICH  - c/w Keppra, phenytoin  - NGT in place, feeds to resume  - MRI showed multiple punctate cortical acute infarcts involving multiple vascular territories possibly related to embolic etiology.   - c/w ASA, Plavix, lipitor  - repeat CTA H pending  - phenytoin level  - receive records from [facility]  - PT/OT/physiatry  - TTE with bubble study  - on tele, with SVT and AF  - cardio, EP consulted  - S&S f/u    Purulent RLE cellulitis r/o abscess  - s/p unasyn and vancomycin in the ED  - cultures NGTD  - burn on board, wound culture taken   - vanco trough 10.6 >11.3  - s/p burn debridement  - c/w Augmentin    #Hypertensive urgency 2/2 insurance/social problems and inability to get meds  - BP at admission 296/174 > 151/76 max today  - IV labetalol 10 mg q6 goal -165    #DM2  - hold dose of Lantus, glucose 103 this AM  - finger sticks  - A1c 10.4        GI PPX: pantoprazole  DVT PPX: Lovenox  DIET: NPO, possible NGT          ___________________________________________________________________  Provider handoff:  [X] repeat CTA H, pending read  [] consults: PT/OT ___, cardio ___, EP ___, S&S ____  [] TTE with bubble study  [] phenytoin level tomorrow AM  [] receive records from [facility]  [] keep patient on isolation precautions as per infectious control  [] COVID test 8/16, if negative can remove isolation 8/19

## 2022-08-12 NOTE — CONSULT NOTE ADULT - ATTENDING COMMENTS
Patient found to have multiple CVA. Also has h/o hemorrhagic CVA.     Rec  - Neuro to document if pt can be on OAC given h/o hemorrhagic CVA  - IVONNE with bubble study  - Monitor on tele while in house  - ID consult to clear pt for loop given recent COVID exposure  - Based on our literature, we recommend an implantable loop recorder as the 30 day event monitor has been shown to not be as effective for the evaluation of occult AFib for cryptogenic strokes    Please let us know once above work up is complete.

## 2022-08-12 NOTE — PROGRESS NOTE ADULT - SUBJECTIVE AND OBJECTIVE BOX
Patient is a 56y old  Female who presents with a chief complaint of Hypertensive urgency (02 Aug 2022 11:31)    INTERVAL HPI/OVERNIGHT EVENTS: Patient was examined and seen at bedside. Son at bedside. Was awake and talking earlier as per staff and son but received pain medication and now somnolent.  ROS: unable to obtain due to AMS  InitialHPI:  57 yo F with PMH of HTN, DM2, and ?hemorrhagic stroke due to an aneurysm (per son 2017) who presented for RLE wound. Started 3 months ago when she fell and hit her leg on a wooden cabinet. She put hydrogen peroxide, antibiotic cream, and wrapped the wound but never fully healed. Two weeks ago it started to show yellow pus so her and her family came to the ED for further treatment. Endorsed subjective fevers, chest pain, and vision changes which occurs when walked 2-3 blocks. Denied congestion, sore throat, cough, dyspnea, headache, dysuria, N/V, diarrhea     Per son, they fill her medications at [facility] ([phone]) and this is their current pharmacy. Called them to confirm her medications and they said her last refill of medications was 2018. Pt has not seen a doctor due to insurance problem and has not been taking any medications for more than 1yr.    In the ED:  Vitals: T: 98.9, BP: 296/ 174, , RR: 18, 98%O2 on RA  Labs: CBC showed WBC 11.23; ESR 92, CRP 35.6  CMP showed glucose 302, alk phos 173; ; VBG pH 7.45  .   Received unasyn and vanco, humalin R, IV vasotec, IV labetalol   (02 Aug 2022 07:47)    PAST MEDICAL & SURGICAL HISTORY:  Hypertension  ?Hemorrhagic CVA due to aneurysm    Diabetes mellitus      No significant past surgical history    General: lethargic, +NGT  HEENT: no LAD  CV: S1 S2  Resp: decreased breath sounds at bases  GI: NT/ND/S +BS; obese  MS: no clubbing/cyanosis/edema, + pulses b/l; RLE c/d/i dsg  Neuro: unable to access    tele: SR, nonspecific changes (on my own evaluation of tele monitor)  episode of SVT vs Afib            MEDICATIONS  (STANDING):  amLODIPine   Tablet 10 milliGRAM(s) Oral daily  amoxicillin  875 milliGRAM(s)/clavulanate 1 Tablet(s) Oral every 12 hours  aspirin  chewable 81 milliGRAM(s) Enteral Tube daily  atorvastatin 80 milliGRAM(s) Oral at bedtime  clopidogrel Tablet 75 milliGRAM(s) Oral daily  collagenase Ointment 1 Application(s) Topical two times a day  Dakins Solution - 1/2 Strength 1 Application(s) Topical two times a day  dextrose 5%. 1000 milliLiter(s) (100 mL/Hr) IV Continuous <Continuous>  dextrose 5%. 1000 milliLiter(s) (50 mL/Hr) IV Continuous <Continuous>  dextrose 50% Injectable 25 Gram(s) IV Push once  dextrose 50% Injectable 12.5 Gram(s) IV Push once  dextrose 50% Injectable 25 Gram(s) IV Push once  enoxaparin Injectable 40 milliGRAM(s) SubCutaneous every 24 hours  glucagon  Injectable 1 milliGRAM(s) IntraMuscular once  hydrALAZINE 50 milliGRAM(s) Oral every 8 hours  HYDROmorphone  Injectable 0.5 milliGRAM(s) IV Push once  insulin glargine Injectable (LANTUS) 22 Unit(s) SubCutaneous every morning  insulin lispro (ADMELOG) corrective regimen sliding scale   SubCutaneous three times a day before meals  insulin lispro Injectable (ADMELOG) 5 Unit(s) SubCutaneous three times a day before meals  labetalol 300 milliGRAM(s) Oral three times a day  levETIRAcetam  IVPB 1500 milliGRAM(s) IV Intermittent every 12 hours  lidocaine 1%/epinephrine 1:100,000 Inj 20 milliLiter(s) Local Injection once  losartan 100 milliGRAM(s) Oral daily  pantoprazole    Tablet 40 milliGRAM(s) Oral before breakfast  phenytoin   Chewable 50 milliGRAM(s) Oral at bedtime  phenytoin  IVPB 100 milliGRAM(s) IV Intermittent three times a day  potassium chloride  20 mEq/100 mL IVPB 20 milliEquivalent(s) IV Intermittent every 2 hours  sodium chloride 0.65% Nasal 2 Spray(s) Both Nostrils two times a day    MEDICATIONS  (PRN):  acetaminophen     Tablet .. 650 milliGRAM(s) Oral every 6 hours PRN Temp greater or equal to 38C (100.4F), Mild Pain (1 - 3)  dextrose Oral Gel 15 Gram(s) Oral once PRN Blood Glucose LESS THAN 70 milliGRAM(s)/deciliter  labetalol Injectable 10 milliGRAM(s) IV Push every 6 hours PRN Systolic blood pressure >  melatonin 3 milliGRAM(s) Oral at bedtime PRN Insomnia  midazolam Injectable 4 milliGRAM(s) IV Push once PRN seizure    Home Medications:  losartan 50 mg oral tablet: 1 tab(s) orally once a day (02 Aug 2022 10:38)  metFORMIN 500 mg oral tablet: 1 tab(s) orally 2 times a day (02 Aug 2022 10:38)  metoprolol succinate 50 mg oral tablet, extended release: 1 tab(s) orally once a day (02 Aug 2022 10:38)    Vital Signs Last 24 Hrs  T(C): 36.8 (12 Aug 2022 16:52), Max: 37.5 (12 Aug 2022 13:21)  T(F): 98.3 (12 Aug 2022 16:52), Max: 99.5 (12 Aug 2022 13:21)  HR: 74 (12 Aug 2022 16:52) (70 - 88)  BP: 165/67 (12 Aug 2022 16:52) (151/76 - 210/105)  BP(mean): --  RR: 18 (12 Aug 2022 16:52) (17 - 18)  SpO2: --      CAPILLARY BLOOD GLUCOSE      POCT Blood Glucose.: 105 mg/dL (12 Aug 2022 12:54)  POCT Blood Glucose.: 119 mg/dL (12 Aug 2022 08:00)    LABS:                        9.3    7.12  )-----------( 290      ( 12 Aug 2022 07:05 )             27.1     08-12    139  |  106  |  10  ----------------------------<  137<H>  3.3<L>   |  17  |  0.7    Ca    8.2<L>      12 Aug 2022 07:05  Mg     1.6     08-12    TPro  5.6<L>  /  Alb  2.7<L>  /  TBili  0.2  /  DBili  x   /  AST  14  /  ALT  11  /  AlkPhos  115  08-12    LIVER FUNCTIONS - ( 12 Aug 2022 07:05 )  Alb: 2.7 g/dL / Pro: 5.6 g/dL / ALK PHOS: 115 U/L / ALT: 11 U/L / AST: 14 U/L / GGT: x                           Culture - Other (collected 10 Aug 2022 12:40)  Source: .Other right leg wound  Preliminary Report (11 Aug 2022 19:46):    No growth      Consultant Notes Reviewed:  [x ] YES  [ ] NO  Care Discussed with Consultants/Other Providers/ Housestaff [ x] YES  [ ] NO  Radiology, labs, new studies personally reviewed.

## 2022-08-12 NOTE — CONSULT NOTE ADULT - ASSESSMENT
Impression   # Multiple areas of acute punctuate strokes likely embolic   # Old right basal ganglia hemorrhagic stroke     Recommendations   - Recommend getting a IVONNE as part of the stroke workup   - She will likely benefit from a loop recorder for long term arrhythmia monitoring     This a preliminary note. Will discuss with attending.   Signature: Brenda UMANZOR, 1st year Cardiology Fellow   Impression   # Multiple areas of acute punctuate strokes likely embolic   # Old right basal ganglia hemorrhagic stroke     Recommendations   - Recommend getting a IVONNE with bubble study as part of the stroke workup   - She will likely benefit from a loop recorder for long term arrhythmia monitoring     This a preliminary note. Will discuss with attending.   Signature: Brenda UMANZOR, 1st year Cardiology Fellow   Impression   # Multiple areas of acute punctuate strokes likely embolic   # Old right basal ganglia hemorrhagic stroke     Recommendations   - Recommend getting a IVONNE with bubble study as part of the stroke workup   - Patient is not being discharged before next week. Can be monitored on tele while in house and if no events can place loop before discharge.     This a preliminary note. Will discuss with attending.   Signature: Brenda UMANZOR, 1st year Cardiology Fellow   Impression   # Multiple areas of acute punctuate strokes likely embolic   # Old right basal ganglia hemorrhagic stroke     Recommendations   - Recommend getting a IVONNE with bubble study as part of the stroke workup   - Patient is not being discharged before next week. Can be monitored on tele while in the hospital and if no events can place loop before discharge.   - Prior to add, would recommend input form neurology if patient is a candidate for short term of lifelong anticoagulation given her history of hemorrhagic stroke    Discussed with attending  Signature: Brenda UMANZOR, 1st year Cardiology Fellow     Impression   # Multiple areas of acute punctuate strokes likely embolic   # Old right basal ganglia hemorrhagic stroke     Recommendations   - Recommend getting a IVONNE with bubble study as part of the stroke workup   - Patient is not being discharged before next week. Can be monitored on tele while in the hospital and if no events can place loop before discharge.   - Prior to that, would recommend input form neurology if patient is a candidate for short term of lifelong anticoagulation given her history of hemorrhagic stroke    Discussed with attending  Signature: Brenda UMANZOR, 1st year Cardiology Fellow

## 2022-08-12 NOTE — OCCUPATIONAL THERAPY INITIAL EVALUATION ADULT - SPECIFY REASON(S)
Unable to complete OT IE at this time. Pt currently off unit. Will follow up once pt is available for assessment

## 2022-08-12 NOTE — PROGRESS NOTE ADULT - SUBJECTIVE AND OBJECTIVE BOX
JOSE MITCHELL 56y Female  MRN#: [id number]      Pt is currently admitted with the primary diagnosis of       Hospital Day: 10d  CC: RLE wound   HPI:  55 yo F with PMH of HTN, DM2, and stroke (2017 as per son) who presented for RLE wound x 3 months ago when she fell and hit her leg on a wooden cabinet. Hydrogen peroxide, abx cream, and wrap were applied but wound never fully healed. Two weeks ago wound had yellow pus so pt presented to the ED. Pt endorsed subjective fevers, CP, vision changes after walking 2-3 blocks. Denied congestion, sore throat, cough, headache, dysuria, n/v, diarrhea.     Per son, they fill her medications at [facility] ([phone]) and this is their current pharmacy. Called them to confirm her medications and they said her last refill of medications was 2018. Pt has not seen a doctor due to insurance problem and has not been taking any medications.    Hospital Course: Pt was at baseline as per family until Friday 8/5 4 PM when code stroke was called due to left gaze deviation and unresponsiveness. Taken for stat CTH.     Started on Keppra and Dilantin.   NGT placed. Pt taken for repeat CTA head.    Overnight events:  Pt unsuccessfully attempted to remove NG tube so was put on right wrist restraints.     Subjective complaints:                          ----------------------------------------------------------    PAST MEDICAL & SURGICAL HISTORY  Hypertension    Diabetes mellitus    No significant past surgical history                                              -----------------------------------------------------------  ALLERGIES:  No Known Allergies                                            ------------------------------------------------------------    HOME MEDICATIONS  Home Medications:  losartan 50 mg oral tablet: 1 tab(s) orally once a day (02 Aug 2022 10:38)  metFORMIN 500 mg oral tablet: 1 tab(s) orally 2 times a day (02 Aug 2022 10:38)  metoprolol succinate 50 mg oral tablet, extended release: 1 tab(s) orally once a day (02 Aug 2022 10:38)                           MEDICATIONS:  STANDING MEDICATIONS  amoxicillin  875 milliGRAM(s)/clavulanate 1 Tablet(s) Oral every 12 hours  aspirin  chewable 81 milliGRAM(s) Enteral Tube daily  atorvastatin 80 milliGRAM(s) Oral at bedtime  clopidogrel Tablet 75 milliGRAM(s) Oral daily  collagenase Ointment 1 Application(s) Topical two times a day  Dakins Solution - 1/2 Strength 1 Application(s) Topical two times a day  dextrose 5%. 1000 milliLiter(s) IV Continuous <Continuous>  dextrose 5%. 1000 milliLiter(s) IV Continuous <Continuous>  dextrose 50% Injectable 25 Gram(s) IV Push once  dextrose 50% Injectable 12.5 Gram(s) IV Push once  dextrose 50% Injectable 25 Gram(s) IV Push once  enoxaparin Injectable 40 milliGRAM(s) SubCutaneous every 24 hours  glucagon  Injectable 1 milliGRAM(s) IntraMuscular once  hydrALAZINE 50 milliGRAM(s) Oral every 8 hours  insulin glargine Injectable (LANTUS) 22 Unit(s) SubCutaneous every morning  insulin lispro (ADMELOG) corrective regimen sliding scale   SubCutaneous three times a day before meals  insulin lispro Injectable (ADMELOG) 5 Unit(s) SubCutaneous three times a day before meals  labetalol 300 milliGRAM(s) Oral three times a day  levETIRAcetam  IVPB 1500 milliGRAM(s) IV Intermittent every 12 hours  lidocaine 1%/epinephrine 1:100,000 Inj 20 milliLiter(s) Local Injection once  losartan 100 milliGRAM(s) Oral daily  magnesium sulfate  IVPB 2 Gram(s) IV Intermittent every 2 hours  pantoprazole    Tablet 40 milliGRAM(s) Oral before breakfast  phenytoin  IVPB 100 milliGRAM(s) IV Intermittent three times a day  potassium chloride  20 mEq/100 mL IVPB 20 milliEquivalent(s) IV Intermittent every 2 hours  sodium chloride 0.65% Nasal 2 Spray(s) Both Nostrils two times a day  sodium chloride 0.9%. 1000 milliLiter(s) IV Continuous <Continuous>    PRN MEDICATIONS  acetaminophen     Tablet .. 650 milliGRAM(s) Oral every 6 hours PRN  dextrose Oral Gel 15 Gram(s) Oral once PRN  labetalol Injectable 10 milliGRAM(s) IV Push every 6 hours PRN  melatonin 3 milliGRAM(s) Oral at bedtime PRN  midazolam Injectable 4 milliGRAM(s) IV Push once PRN                                            ------------------------------------------------------------  VITAL SIGNS: Last 24 Hours  T(C): 36.8 (12 Aug 2022 04:25), Max: 36.8 (12 Aug 2022 04:25)  T(F): 98.3 (12 Aug 2022 04:25), Max: 98.3 (12 Aug 2022 04:25)  HR: 79 (12 Aug 2022 06:28) (78 - 97)  BP: 151/76 (12 Aug 2022 06:28) (151/76 - 202/93)  BP(mean): --  RR: 18 (12 Aug 2022 04:25) (17 - 20)  SpO2: --                                             --------------------------------------------------------------  LABS:                        9.3    7.12  )-----------( 290      ( 12 Aug 2022 07:05 )             27.1     08-12    139  |  106  |  10  ----------------------------<  137<H>  3.3<L>   |  17  |  0.7    Ca    8.2<L>      12 Aug 2022 07:05  Mg     1.6     08-12    TPro  5.6<L>  /  Alb  2.7<L>  /  TBili  0.2  /  DBili  x   /  AST  14  /  ALT  11  /  AlkPhos  115  08-12                Culture - Other (collected 10 Aug 2022 12:40)  Source: .Other right leg wound  Preliminary Report (11 Aug 2022 19:46):    No growth                                                    -------------------------------------------------------------  RADIOLOGY:  < from: Xray Chest 1 View AP/PA (08.11.22 @ 15:34) >      IMPRESSION:    No radiographic evidence of acute cardiopulmonary disease.    Interval placement of an enteric tube terminating in the stomach.    --- End of Report ---      < end of copied text >                                            --------------------------------------------------------------    PHYSICAL EXAM:  General: well-appearing in NAD. AAO x 4.  HEENT: Normocephalic, nontraumatic.   LUNGS: Clear to auscultation b/l. No wheezes, rales, or rhonchi.  HEART: RRR. No murmurs, rubs, or gallops.  ABDOMEN: Nontender, nondistended. + bowel sounds.  EXT: Pulses palpable x 4. Nonedematous.  NEURO: Deferred.  SKIN: Warm, dry.         JOSE MITCHELL 56y Female  MRN#: [id number]      Pt is currently admitted with the primary diagnosis of       Hospital Day: 10d  CC: RLE wound   HPI:  57 yo F with PMH of HTN, DM2, and stroke (2017 as per son) who presented for RLE wound x 3 months ago when she fell and hit her leg on a wooden cabinet. Hydrogen peroxide, abx cream, and wrap were applied but wound never fully healed. Two weeks ago wound had yellow pus so pt presented to the ED. Pt endorsed subjective fevers, CP, vision changes after walking 2-3 blocks. Denied congestion, sore throat, cough, headache, dysuria, n/v, diarrhea.     Per son, they fill her medications at [facility] ([phone]) and this is their current pharmacy. Called them to confirm her medications and they said her last refill of medications was 2018. Pt has not seen a doctor due to insurance problem and has not been taking any medications.    Hospital Course: Pt was at baseline as per family until Friday 8/5 4 PM when code stroke was called due to left gaze deviation and unresponsiveness. Taken for stat CTH.     Started on Keppra and Dilantin.   NGT placed. Pt taken for repeat CTA head.    Overnight events:  Pt unsuccessfully attempted to remove NG tube so was put on right wrist restraints. Repeat CTA completed, pending results.    Subjective complaints:  As per son patient does not complain of any more chest pain.                       ----------------------------------------------------------    PAST MEDICAL & SURGICAL HISTORY  Hypertension    Diabetes mellitus    No significant past surgical history                                              -----------------------------------------------------------  ALLERGIES:  No Known Allergies                                            ------------------------------------------------------------    HOME MEDICATIONS  Home Medications:  losartan 50 mg oral tablet: 1 tab(s) orally once a day (02 Aug 2022 10:38)  metFORMIN 500 mg oral tablet: 1 tab(s) orally 2 times a day (02 Aug 2022 10:38)  metoprolol succinate 50 mg oral tablet, extended release: 1 tab(s) orally once a day (02 Aug 2022 10:38)                           MEDICATIONS:  STANDING MEDICATIONS  amoxicillin  875 milliGRAM(s)/clavulanate 1 Tablet(s) Oral every 12 hours  aspirin  chewable 81 milliGRAM(s) Enteral Tube daily  atorvastatin 80 milliGRAM(s) Oral at bedtime  clopidogrel Tablet 75 milliGRAM(s) Oral daily  collagenase Ointment 1 Application(s) Topical two times a day  Dakins Solution - 1/2 Strength 1 Application(s) Topical two times a day  dextrose 5%. 1000 milliLiter(s) IV Continuous <Continuous>  dextrose 5%. 1000 milliLiter(s) IV Continuous <Continuous>  dextrose 50% Injectable 25 Gram(s) IV Push once  dextrose 50% Injectable 12.5 Gram(s) IV Push once  dextrose 50% Injectable 25 Gram(s) IV Push once  enoxaparin Injectable 40 milliGRAM(s) SubCutaneous every 24 hours  glucagon  Injectable 1 milliGRAM(s) IntraMuscular once  hydrALAZINE 50 milliGRAM(s) Oral every 8 hours  insulin glargine Injectable (LANTUS) 22 Unit(s) SubCutaneous every morning  insulin lispro (ADMELOG) corrective regimen sliding scale   SubCutaneous three times a day before meals  insulin lispro Injectable (ADMELOG) 5 Unit(s) SubCutaneous three times a day before meals  labetalol 300 milliGRAM(s) Oral three times a day  levETIRAcetam  IVPB 1500 milliGRAM(s) IV Intermittent every 12 hours  lidocaine 1%/epinephrine 1:100,000 Inj 20 milliLiter(s) Local Injection once  losartan 100 milliGRAM(s) Oral daily  magnesium sulfate  IVPB 2 Gram(s) IV Intermittent every 2 hours  pantoprazole    Tablet 40 milliGRAM(s) Oral before breakfast  phenytoin  IVPB 100 milliGRAM(s) IV Intermittent three times a day  potassium chloride  20 mEq/100 mL IVPB 20 milliEquivalent(s) IV Intermittent every 2 hours  sodium chloride 0.65% Nasal 2 Spray(s) Both Nostrils two times a day  sodium chloride 0.9%. 1000 milliLiter(s) IV Continuous <Continuous>    PRN MEDICATIONS  acetaminophen     Tablet .. 650 milliGRAM(s) Oral every 6 hours PRN  dextrose Oral Gel 15 Gram(s) Oral once PRN  labetalol Injectable 10 milliGRAM(s) IV Push every 6 hours PRN  melatonin 3 milliGRAM(s) Oral at bedtime PRN  midazolam Injectable 4 milliGRAM(s) IV Push once PRN                                            ------------------------------------------------------------  VITAL SIGNS: Last 24 Hours  T(C): 36.8 (12 Aug 2022 04:25), Max: 36.8 (12 Aug 2022 04:25)  T(F): 98.3 (12 Aug 2022 04:25), Max: 98.3 (12 Aug 2022 04:25)  HR: 79 (12 Aug 2022 06:28) (78 - 97)  BP: 151/76 (12 Aug 2022 06:28) (151/76 - 202/93)  BP(mean): --  RR: 18 (12 Aug 2022 04:25) (17 - 20)  SpO2: --                                             --------------------------------------------------------------  LABS:                        9.3    7.12  )-----------( 290      ( 12 Aug 2022 07:05 )             27.1     08-12    139  |  106  |  10  ----------------------------<  137<H>  3.3<L>   |  17  |  0.7    Ca    8.2<L>      12 Aug 2022 07:05  Mg     1.6     08-12    TPro  5.6<L>  /  Alb  2.7<L>  /  TBili  0.2  /  DBili  x   /  AST  14  /  ALT  11  /  AlkPhos  115  08-12                Culture - Other (collected 10 Aug 2022 12:40)  Source: .Other right leg wound  Preliminary Report (11 Aug 2022 19:46):    No growth                                                    -------------------------------------------------------------  RADIOLOGY:  < from: Xray Chest 1 View AP/PA (08.11.22 @ 15:34) >      IMPRESSION:    No radiographic evidence of acute cardiopulmonary disease.    Interval placement of an enteric tube terminating in the stomach.    --- End of Report ---      < end of copied text >                                            --------------------------------------------------------------    PHYSICAL EXAM:  General: well-appearing in NAD. Able to communicate by nodding head but not verbalizing.   HEENT: Normocephalic, nontraumatic.   LUNGS: Inspiratory and expiratory sounds 2/2 ?NGT? Unable to auscultate clearly.  HEART: Unable to auscultate because of loud respiration sounds.  ABDOMEN: Nontender, nondistended. + bowel sounds.  EXT: Pulses palpable x 4. Nonedematous. RLE wound wrapped in bandages.  SKIN: Warm, dry.         JOSE MITCHELL 56y Female  MRN#: [id number]      Pt is currently admitted with the primary diagnosis of       Hospital Day: 10d  CC: RLE wound   HPI:  57 yo F with PMH of HTN, DM2, and stroke (2017 as per son) who presented for RLE wound x 3 months ago when she fell and hit her leg on a wooden cabinet. Hydrogen peroxide, abx cream, and wrap were applied but wound never fully healed. Two weeks ago wound had yellow pus so pt presented to the ED. Pt endorsed subjective fevers, CP, vision changes after walking 2-3 blocks. Denied congestion, sore throat, cough, headache, dysuria, n/v, diarrhea.     Per son, they fill her medications at [facility] ([phone]) and this is their current pharmacy. Called them to confirm her medications and they said her last refill of medications was 2018. Pt has not seen a doctor due to insurance problem and has not been taking any medications.    Hospital Course: Pt was at baseline as per family until Friday 8/5 4 PM when code stroke was called due to left gaze deviation and unresponsiveness. Taken for stat CTH.     Started on Keppra and Dilantin.   NGT placed. Pt taken for repeat CTA head.    Overnight events:  Pt unsuccessfully attempted to remove NG tube so was put on right wrist restraints. Repeat CTA completed, pending results.    Subjective complaints:  As per son patient does not complain of any more chest pain.                       ----------------------------------------------------------    PAST MEDICAL & SURGICAL HISTORY  Hypertension    Diabetes mellitus    No significant past surgical history                                              -----------------------------------------------------------  ALLERGIES:  No Known Allergies                                            ------------------------------------------------------------    HOME MEDICATIONS  Home Medications:  losartan 50 mg oral tablet: 1 tab(s) orally once a day (02 Aug 2022 10:38)  metFORMIN 500 mg oral tablet: 1 tab(s) orally 2 times a day (02 Aug 2022 10:38)  metoprolol succinate 50 mg oral tablet, extended release: 1 tab(s) orally once a day (02 Aug 2022 10:38)                           MEDICATIONS:  STANDING MEDICATIONS  amoxicillin  875 milliGRAM(s)/clavulanate 1 Tablet(s) Oral every 12 hours  aspirin  chewable 81 milliGRAM(s) Enteral Tube daily  atorvastatin 80 milliGRAM(s) Oral at bedtime  clopidogrel Tablet 75 milliGRAM(s) Oral daily  collagenase Ointment 1 Application(s) Topical two times a day  Dakins Solution - 1/2 Strength 1 Application(s) Topical two times a day  dextrose 5%. 1000 milliLiter(s) IV Continuous <Continuous>  dextrose 5%. 1000 milliLiter(s) IV Continuous <Continuous>  dextrose 50% Injectable 25 Gram(s) IV Push once  dextrose 50% Injectable 12.5 Gram(s) IV Push once  dextrose 50% Injectable 25 Gram(s) IV Push once  enoxaparin Injectable 40 milliGRAM(s) SubCutaneous every 24 hours  glucagon  Injectable 1 milliGRAM(s) IntraMuscular once  hydrALAZINE 50 milliGRAM(s) Oral every 8 hours  insulin glargine Injectable (LANTUS) 22 Unit(s) SubCutaneous every morning  insulin lispro (ADMELOG) corrective regimen sliding scale   SubCutaneous three times a day before meals  insulin lispro Injectable (ADMELOG) 5 Unit(s) SubCutaneous three times a day before meals  labetalol 300 milliGRAM(s) Oral three times a day  levETIRAcetam  IVPB 1500 milliGRAM(s) IV Intermittent every 12 hours  lidocaine 1%/epinephrine 1:100,000 Inj 20 milliLiter(s) Local Injection once  losartan 100 milliGRAM(s) Oral daily  magnesium sulfate  IVPB 2 Gram(s) IV Intermittent every 2 hours  pantoprazole    Tablet 40 milliGRAM(s) Oral before breakfast  phenytoin  IVPB 100 milliGRAM(s) IV Intermittent three times a day  potassium chloride  20 mEq/100 mL IVPB 20 milliEquivalent(s) IV Intermittent every 2 hours  sodium chloride 0.65% Nasal 2 Spray(s) Both Nostrils two times a day  sodium chloride 0.9%. 1000 milliLiter(s) IV Continuous <Continuous>    PRN MEDICATIONS  acetaminophen     Tablet .. 650 milliGRAM(s) Oral every 6 hours PRN  dextrose Oral Gel 15 Gram(s) Oral once PRN  labetalol Injectable 10 milliGRAM(s) IV Push every 6 hours PRN  melatonin 3 milliGRAM(s) Oral at bedtime PRN  midazolam Injectable 4 milliGRAM(s) IV Push once PRN                                            ------------------------------------------------------------  VITAL SIGNS: Last 24 Hours  T(C): 36.8 (12 Aug 2022 04:25), Max: 36.8 (12 Aug 2022 04:25)  T(F): 98.3 (12 Aug 2022 04:25), Max: 98.3 (12 Aug 2022 04:25)  HR: 79 (12 Aug 2022 06:28) (78 - 97)  BP: 151/76 (12 Aug 2022 06:28) (151/76 - 202/93)  BP(mean): --  RR: 18 (12 Aug 2022 04:25) (17 - 20)  SpO2: --                                             --------------------------------------------------------------  LABS:                        9.3    7.12  )-----------( 290      ( 12 Aug 2022 07:05 )             27.1     08-12    139  |  106  |  10  ----------------------------<  137<H>  3.3<L>   |  17  |  0.7    Ca    8.2<L>      12 Aug 2022 07:05  Mg     1.6     08-12    TPro  5.6<L>  /  Alb  2.7<L>  /  TBili  0.2  /  DBili  x   /  AST  14  /  ALT  11  /  AlkPhos  115  08-12                Culture - Other (collected 10 Aug 2022 12:40)  Source: .Other right leg wound  Preliminary Report (11 Aug 2022 19:46):    No growth                                                    -------------------------------------------------------------  RADIOLOGY:  < from: Xray Chest 1 View AP/PA (08.11.22 @ 15:34) >      IMPRESSION:    No radiographic evidence of acute cardiopulmonary disease.    Interval placement of an enteric tube terminating in the stomach.    --- End of Report ---      < end of copied text >                                            --------------------------------------------------------------    PHYSICAL EXAM:  General: well-appearing in NAD. Able to communicate by nodding head but not verbalizing.   HEENT: Normocephalic, nontraumatic.   LUNGS: Inspiratory and expiratory rhonchi.  HEART: Unable to auscultate because of loud respiration sounds.  ABDOMEN: Nontender, nondistended. + bowel sounds.  EXT: Pulses palpable x 4. Nonedematous. RLE wound wrapped in bandages.  SKIN: Warm, dry.

## 2022-08-12 NOTE — CONSULT NOTE ADULT - SUBJECTIVE AND OBJECTIVE BOX
Outpt cardiologist:    Reason for Consult:     HPI:  57 yo F with PMH of HTN, DM2, and stroke (per son 2017) who presented for RLE wound. Started 3 months ago when she fell and hit her leg on a wooden cabinet. She put hydrogen peroxide, antibiotic cream, and wrapped the wound but never fully healed. Two weeks ago it started to show yellow pus so her and her family came to the ED for further treatment. Endorsed subjective fevers, chest pain, and vision changes which occurs when walked 2-3 blocks. Denied congestion, sore throat, cough, dyspnea, headache, dysuria, N/V, diarrhea     Per son, they fill her medications at [facility] ([phone]) and this is their current pharmacy. Called them to confirm her medications and they said her last refill of medications was . Pt has not seen a doctor due to insurance problem and has not been taking any medications.    In the ED:  Vitals: T: 98.9, BP: 296/ 174, , RR: 18, 98%O2 on RA  Labs: CBC showed WBC 11.23; ESR 92, CRP 35.6  CMP showed glucose 302, alk phos 173; ; VBG pH 7.45  EKG pending  CXR showed borderline cardiomegaly and no airspace opacity.   X-ray of RLE also pending.     Received unasyn and vanco, humalin R, IV vasotec, IV labetalol   (02 Aug 2022 07:47)        PREVIOUS CARDIAC WORKUP    Risk Factors:      PAST MEDICAL & SURGICAL HISTORY  Hypertension    Diabetes mellitus    No significant past surgical history        FAMILY HISTORY:  FAMILY HISTORY:  FH: type 2 diabetes mellitus        SOCIAL HISTORY:  Social History:  Never smoker, does not drink alcohol or drug use (02 Aug 2022 07:47)      ALLERGIES:  No Known Allergies      MEDICATIONS:  amoxicillin  875 milliGRAM(s)/clavulanate 1 Tablet(s) Oral every 12 hours  aspirin  chewable 81 milliGRAM(s) Enteral Tube daily  atorvastatin 80 milliGRAM(s) Oral at bedtime  clopidogrel Tablet 75 milliGRAM(s) Oral daily  collagenase Ointment 1 Application(s) Topical two times a day  Dakins Solution - 1/2 Strength 1 Application(s) Topical two times a day  dextrose 5%. 1000 milliLiter(s) (100 mL/Hr) IV Continuous <Continuous>  dextrose 5%. 1000 milliLiter(s) (50 mL/Hr) IV Continuous <Continuous>  dextrose 50% Injectable 25 Gram(s) IV Push once  dextrose 50% Injectable 12.5 Gram(s) IV Push once  dextrose 50% Injectable 25 Gram(s) IV Push once  enoxaparin Injectable 40 milliGRAM(s) SubCutaneous every 24 hours  glucagon  Injectable 1 milliGRAM(s) IntraMuscular once  hydrALAZINE 50 milliGRAM(s) Oral every 8 hours  insulin glargine Injectable (LANTUS) 22 Unit(s) SubCutaneous every morning  insulin lispro (ADMELOG) corrective regimen sliding scale   SubCutaneous three times a day before meals  insulin lispro Injectable (ADMELOG) 5 Unit(s) SubCutaneous three times a day before meals  labetalol 300 milliGRAM(s) Oral three times a day  levETIRAcetam  IVPB 1500 milliGRAM(s) IV Intermittent every 12 hours  lidocaine 1%/epinephrine 1:100,000 Inj 20 milliLiter(s) Local Injection once  losartan 100 milliGRAM(s) Oral daily  magnesium sulfate  IVPB 2 Gram(s) IV Intermittent every 2 hours  pantoprazole    Tablet 40 milliGRAM(s) Oral before breakfast  phenytoin  IVPB 100 milliGRAM(s) IV Intermittent three times a day  potassium chloride  20 mEq/100 mL IVPB 20 milliEquivalent(s) IV Intermittent every 2 hours  sodium chloride 0.65% Nasal 2 Spray(s) Both Nostrils two times a day  sodium chloride 0.9%. 1000 milliLiter(s) (60 mL/Hr) IV Continuous <Continuous>    PRN:  acetaminophen     Tablet .. 650 milliGRAM(s) Oral every 6 hours PRN  dextrose Oral Gel 15 Gram(s) Oral once PRN  labetalol Injectable 10 milliGRAM(s) IV Push every 6 hours PRN  melatonin 3 milliGRAM(s) Oral at bedtime PRN  midazolam Injectable 4 milliGRAM(s) IV Push once PRN      HOME MEDICATIONS:  Home Medications:  losartan 50 mg oral tablet: 1 tab(s) orally once a day (02 Aug 2022 10:38)  metFORMIN 500 mg oral tablet: 1 tab(s) orally 2 times a day (02 Aug 2022 10:38)  metoprolol succinate 50 mg oral tablet, extended release: 1 tab(s) orally once a day (02 Aug 2022 10:38)      VITALS:   T(F): 98.3 ( @ 04:25), Max: 100 ( @ 05:00)  HR: 79 ( @ 06:28) (70 - 97)  BP: 151/76 ( @ 06:28) (120/78 - 227/102)  BP(mean): 136 ( @ 14:24) (136 - 136)  RR: 18 ( @ 04:25) (17 - 20)  SpO2: 94% ( @ 14:24) (94% - 94%)    I&O's Summary      REVIEW OF SYSTEMS:  CONSTITUTIONAL: No weakness, fevers or chills  HEENT: No visual changes, neck/ear pain  RESPIRATORY: No cough, sob  CARDIOVASCULAR: See HPI  GASTROINTESTINAL: No abdominal pain. No nausea, vomiting, diarrhea   GENITOURINARY: No dysuria, frequency or hematuria  NEUROLOGICAL: No new focal deficits  SKIN: No new rashes    PHYSICAL EXAM:  General: Not in distress.  Non-toxic appearing.   HEENT: EOMI  Cardio: regular, S1, S2, no murmur  Pulm: B/L BS.  No wheezing / crackles / rales  Abdomen: Soft, non-tender, non-distended. Normoactive bowel sounds  Extremities: No edema b/l le  Neuro: A&O x3. No focal deficits    LABS:                        9.3    7.12  )-----------( 290      ( 12 Aug 2022 07:05 )             27.1         139  |  106  |  10  ----------------------------<  137<H>  3.3<L>   |  17  |  0.7    Ca    8.2<L>      12 Aug 2022 07:05  Mg     1.6         TPro  5.6<L>  /  Alb  2.7<L>  /  TBili  0.2  /  DBili  x   /  AST  14  /  ALT  11  /  AlkPhos  115      Troponin trend:     Chol 234<H> LDL -- HDL 42<L> Trig 240<H>  COVID-19 PCR: NotDetec (11 Aug 2022 13:22)  COVID-19 PCR: NotDetec (08 Aug 2022 05:14)  COVID-19 PCR: NotDetec (02 Aug 2022 01:40)    EC Lead ECG:   Ventricular Rate 90 BPM    Atrial Rate 90 BPM    P-R Interval 148 ms    QRS Duration 82 ms    Q-T Interval 396 ms    QTC Calculation(Bazett) 484 ms    P Axis 64 degrees    R Axis 44 degrees    T Axis 79 degrees    Diagnosis Line Normal sinus rhythm  Septal infarct , age undetermined  Abnormal ECG    Confirmed by Dhiraj Gonzalez ([id number]) on 2022 1:18:53 PM ( @ 10:37)      TELEMETRY EVENTS:    1 episode of irregular rate consistent with a fib. But only 1 episode of 3 seconds.    Outpt cardiologist:    Reason for Consult:     HPI:  57 yo F with PMH of HTN, DM2, and stroke (per son 2017) who presented for RLE wound. Started 3 months ago when she fell and hit her leg on a wooden cabinet. She put hydrogen peroxide, antibiotic cream, and wrapped the wound but never fully healed. Two weeks ago it started to show yellow pus so her and her family came to the ED for further treatment. Endorsed subjective fevers, chest pain, and vision changes which occurs when walked 2-3 blocks. Denied congestion, sore throat, cough, dyspnea, headache, dysuria, N/V, diarrhea     Per son, they fill her medications at [facility] ([phone]) and this is their current pharmacy. Called them to confirm her medications and they said her last refill of medications was . Pt has not seen a doctor due to insurance problem and has not been taking any medications.    In the ED:  Vitals: T: 98.9, BP: 296/ 174, , RR: 18, 98%O2 on RA  Labs: CBC showed WBC 11.23; ESR 92, CRP 35.6  CMP showed glucose 302, alk phos 173; ; VBG pH 7.45  EKG pending  CXR showed borderline cardiomegaly and no airspace opacity.   X-ray of RLE also pending.     Received unasyn and vanco, humalin R, IV vasotec, IV labetalol   (02 Aug 2022 07:47)        PREVIOUS CARDIAC WORKUP    Risk Factors:      PAST MEDICAL & SURGICAL HISTORY  Hypertension    Diabetes mellitus    No significant past surgical history        FAMILY HISTORY:  FAMILY HISTORY:  FH: type 2 diabetes mellitus        SOCIAL HISTORY:  Social History:  Never smoker, does not drink alcohol or drug use (02 Aug 2022 07:47)      ALLERGIES:  No Known Allergies      MEDICATIONS:  amoxicillin  875 milliGRAM(s)/clavulanate 1 Tablet(s) Oral every 12 hours  aspirin  chewable 81 milliGRAM(s) Enteral Tube daily  atorvastatin 80 milliGRAM(s) Oral at bedtime  clopidogrel Tablet 75 milliGRAM(s) Oral daily  collagenase Ointment 1 Application(s) Topical two times a day  Dakins Solution - 1/2 Strength 1 Application(s) Topical two times a day  dextrose 5%. 1000 milliLiter(s) (100 mL/Hr) IV Continuous <Continuous>  dextrose 5%. 1000 milliLiter(s) (50 mL/Hr) IV Continuous <Continuous>  dextrose 50% Injectable 25 Gram(s) IV Push once  dextrose 50% Injectable 12.5 Gram(s) IV Push once  dextrose 50% Injectable 25 Gram(s) IV Push once  enoxaparin Injectable 40 milliGRAM(s) SubCutaneous every 24 hours  glucagon  Injectable 1 milliGRAM(s) IntraMuscular once  hydrALAZINE 50 milliGRAM(s) Oral every 8 hours  insulin glargine Injectable (LANTUS) 22 Unit(s) SubCutaneous every morning  insulin lispro (ADMELOG) corrective regimen sliding scale   SubCutaneous three times a day before meals  insulin lispro Injectable (ADMELOG) 5 Unit(s) SubCutaneous three times a day before meals  labetalol 300 milliGRAM(s) Oral three times a day  levETIRAcetam  IVPB 1500 milliGRAM(s) IV Intermittent every 12 hours  lidocaine 1%/epinephrine 1:100,000 Inj 20 milliLiter(s) Local Injection once  losartan 100 milliGRAM(s) Oral daily  magnesium sulfate  IVPB 2 Gram(s) IV Intermittent every 2 hours  pantoprazole    Tablet 40 milliGRAM(s) Oral before breakfast  phenytoin  IVPB 100 milliGRAM(s) IV Intermittent three times a day  potassium chloride  20 mEq/100 mL IVPB 20 milliEquivalent(s) IV Intermittent every 2 hours  sodium chloride 0.65% Nasal 2 Spray(s) Both Nostrils two times a day  sodium chloride 0.9%. 1000 milliLiter(s) (60 mL/Hr) IV Continuous <Continuous>    PRN:  acetaminophen     Tablet .. 650 milliGRAM(s) Oral every 6 hours PRN  dextrose Oral Gel 15 Gram(s) Oral once PRN  labetalol Injectable 10 milliGRAM(s) IV Push every 6 hours PRN  melatonin 3 milliGRAM(s) Oral at bedtime PRN  midazolam Injectable 4 milliGRAM(s) IV Push once PRN      HOME MEDICATIONS:  Home Medications:  losartan 50 mg oral tablet: 1 tab(s) orally once a day (02 Aug 2022 10:38)  metFORMIN 500 mg oral tablet: 1 tab(s) orally 2 times a day (02 Aug 2022 10:38)  metoprolol succinate 50 mg oral tablet, extended release: 1 tab(s) orally once a day (02 Aug 2022 10:38)      VITALS:   T(F): 98.3 ( @ 04:25), Max: 100 ( @ 05:00)  HR: 79 ( @ 06:28) (70 - 97)  BP: 151/76 ( @ 06:28) (120/78 - 227/102)  BP(mean): 136 ( @ 14:24) (136 - 136)  RR: 18 ( @ 04:25) (17 - 20)  SpO2: 94% ( @ 14:24) (94% - 94%)    I&O's Summary      REVIEW OF SYSTEMS:  CONSTITUTIONAL: No weakness, fevers or chills  HEENT: No visual changes, neck/ear pain  RESPIRATORY: No cough, sob  CARDIOVASCULAR: See HPI  GASTROINTESTINAL: No abdominal pain. No nausea, vomiting, diarrhea   GENITOURINARY: No dysuria, frequency or hematuria  NEUROLOGICAL: No new focal deficits  SKIN: No new rashes    PHYSICAL EXAM:  General: Not in distress.  Non-toxic appearing.   HEENT: EOMI  Cardio: regular, S1, S2, no murmur  Pulm: B/L BS.  No wheezing / crackles / rales  Abdomen: Soft, non-tender, non-distended. Normoactive bowel sounds  Extremities: No edema b/l le  Neuro: A&O x3. No focal deficits    LABS:                        9.3    7.12  )-----------( 290      ( 12 Aug 2022 07:05 )             27.1         139  |  106  |  10  ----------------------------<  137<H>  3.3<L>   |  17  |  0.7    Ca    8.2<L>      12 Aug 2022 07:05  Mg     1.6         TPro  5.6<L>  /  Alb  2.7<L>  /  TBili  0.2  /  DBili  x   /  AST  14  /  ALT  11  /  AlkPhos  115      Troponin trend:     Chol 234<H> LDL -- HDL 42<L> Trig 240<H>  COVID-19 PCR: NotDetec (11 Aug 2022 13:22)  COVID-19 PCR: NotDetec (08 Aug 2022 05:14)  COVID-19 PCR: NotDetec (02 Aug 2022 01:40)    EC Lead ECG:   Ventricular Rate 90 BPM    Atrial Rate 90 BPM    P-R Interval 148 ms    QRS Duration 82 ms    Q-T Interval 396 ms    QTC Calculation(Bazett) 484 ms    P Axis 64 degrees    R Axis 44 degrees    T Axis 79 degrees    Diagnosis Line Normal sinus rhythm  Septal infarct , age undetermined  Abnormal ECG    Confirmed by Dhiraj Gonzalez ([id number]) on 2022 1:18:53 PM ( @ 10:37)      TELEMETRY EVENTS:  1 episode of irregular rate lasting 4 seconds c/w AT/AF.

## 2022-08-12 NOTE — SWALLOW BEDSIDE ASSESSMENT ADULT - SLP GENERAL OBSERVATIONS
Pt asleep in bed, minimally arousbale to tactile and verbal stim, o2 via RA> family received at bedside.

## 2022-08-12 NOTE — CONSULT NOTE ADULT - SUBJECTIVE AND OBJECTIVE BOX
HPI:  55 yo F with PMH of HTN, DM2, and stroke (per son 2017) who presented for RLE wound. Started 3 months ago when she fell and hit her leg on a wooden cabinet. She put hydrogen peroxide, antibiotic cream, and wrapped the wound but never fully healed. Two weeks ago it started to show yellow pus so her and her family came to the ED for further treatment. Endorsed subjective fevers, chest pain, and vision changes which occurs when walked 2-3 blocks. Denied congestion, sore throat, cough, dyspnea, headache, dysuria, N/V, diarrhea     Per son, they fill her medications at [facility] ([phone]) and this is their current pharmacy. Called them to confirm her medications and they said her last refill of medications was 2018. Pt has not seen a doctor due to insurance problem and has not been taking any medications.    In the ED:  Vitals: T: 98.9, BP: 296/ 174, , RR: 18, 98%O2 on RA  Labs: CBC showed WBC 11.23; ESR 92, CRP 35.6  CMP showed glucose 302, alk phos 173; ; VBG pH 7.45  EKG pending  CXR showed borderline cardiomegaly and no airspace opacity.   X-ray of RLE also pending.     Received unasyn and vanco, humalin R, IV vasotec, IV labetalol    Hospital Course: Pt was at baseline as per family until Friday 8/5 4 PM when code stroke was called due to left gaze deviation and unresponsiveness.     #Acute ischemic strokes/?seizures  - CTH negative for acute ICH  - c/w Keppra, phenytoin  - NGT in place, feeds to resume  - MRI showed multiple punctate cortical acute infarcts involving multiple vascular territories possibly related to embolic etiology.   - repeat CTA H pending  - TTE with bubble study  - on tele, with SVT and AF  - cardio, EP consulted      PAST MEDICAL & SURGICAL HISTORY:  Hypertension      Diabetes mellitus      No significant past surgical history          Hospital Course:    TODAY'S SUBJECTIVE & REVIEW OF SYMPTOMS:     Constitutional WNL   Cardio WNL   Resp WNL   GI WNL  Heme WNL  Endo WNL  Skin RLE wound  MSK WNL  Neuro WNL  Cognitive AMS  Psych WNL      MEDICATIONS  (STANDING):  amoxicillin  875 milliGRAM(s)/clavulanate 1 Tablet(s) Oral every 12 hours  aspirin  chewable 81 milliGRAM(s) Enteral Tube daily  atorvastatin 80 milliGRAM(s) Oral at bedtime  clopidogrel Tablet 75 milliGRAM(s) Oral daily  collagenase Ointment 1 Application(s) Topical two times a day  Dakins Solution - 1/2 Strength 1 Application(s) Topical two times a day  dextrose 5%. 1000 milliLiter(s) (100 mL/Hr) IV Continuous <Continuous>  dextrose 5%. 1000 milliLiter(s) (50 mL/Hr) IV Continuous <Continuous>  dextrose 50% Injectable 25 Gram(s) IV Push once  dextrose 50% Injectable 12.5 Gram(s) IV Push once  dextrose 50% Injectable 25 Gram(s) IV Push once  enoxaparin Injectable 40 milliGRAM(s) SubCutaneous every 24 hours  glucagon  Injectable 1 milliGRAM(s) IntraMuscular once  hydrALAZINE 50 milliGRAM(s) Oral every 8 hours  insulin glargine Injectable (LANTUS) 22 Unit(s) SubCutaneous every morning  insulin lispro (ADMELOG) corrective regimen sliding scale   SubCutaneous three times a day before meals  insulin lispro Injectable (ADMELOG) 5 Unit(s) SubCutaneous three times a day before meals  labetalol 300 milliGRAM(s) Oral three times a day  levETIRAcetam  IVPB 1500 milliGRAM(s) IV Intermittent every 12 hours  lidocaine 1%/epinephrine 1:100,000 Inj 20 milliLiter(s) Local Injection once  losartan 100 milliGRAM(s) Oral daily  pantoprazole    Tablet 40 milliGRAM(s) Oral before breakfast  phenytoin  IVPB 100 milliGRAM(s) IV Intermittent three times a day  sodium chloride 0.65% Nasal 2 Spray(s) Both Nostrils two times a day  sodium chloride 0.9%. 1000 milliLiter(s) (60 mL/Hr) IV Continuous <Continuous>    MEDICATIONS  (PRN):  acetaminophen     Tablet .. 650 milliGRAM(s) Oral every 6 hours PRN Temp greater or equal to 38C (100.4F), Mild Pain (1 - 3)  dextrose Oral Gel 15 Gram(s) Oral once PRN Blood Glucose LESS THAN 70 milliGRAM(s)/deciliter  labetalol Injectable 10 milliGRAM(s) IV Push every 6 hours PRN Systolic blood pressure >  melatonin 3 milliGRAM(s) Oral at bedtime PRN Insomnia  midazolam Injectable 4 milliGRAM(s) IV Push once PRN seizure      FAMILY HISTORY:  FH: type 2 diabetes mellitus        Allergies    No Known Allergies    Intolerances        SOCIAL HISTORY:    [  ] Etoh  [  ] Smoking  [  ] Substance abuse     Home Environment:  [   ] Home Alone  [ x  ] Lives with Family  [   ] Home Health Aid    Dwelling:  [   ] Apartment  [ x  ] Private House  [   ] Adult Home  [   ] Skilled Nursing Facility      [   ] Short Term  [   ] Long Term  [ x  ] Stairs       Elevator [   ]    FUNCTIONAL STATUS PTA: (Check all that apply)  Ambulation: [ x   ]Independent    [   ] Dependent     [   ] Non-Ambulatory  Assistive Device: [  x ] SA Cane  [   ]  Q Cane  [   ] Walker  [   ]  Wheelchair  ADL : [ x  ] Independent  [    ]  Dependent       Vital Signs Last 24 Hrs  T(C): 36.8 (12 Aug 2022 14:17), Max: 37.5 (12 Aug 2022 13:21)  T(F): 98.3 (12 Aug 2022 14:17), Max: 99.5 (12 Aug 2022 13:21)  HR: 70 (12 Aug 2022 14:17) (70 - 88)  BP: 208/97 (12 Aug 2022 14:17) (151/76 - 210/105)  BP(mean): --  RR: 18 (12 Aug 2022 14:17) (17 - 18)  SpO2: --          PHYSICAL EXAM: lethargic   GENERAL: NAD  HEAD:  Normocephalic, + NGT  CHEST/LUNG: Clear   HEART: S1S2+  ABDOMEN: Soft, Nontender  EXTREMITIES:  no calf tenderness, right distal LE in dressing, right hand in restraint     NERVOUS SYSTEM:  Cranial Nerves 2-12 intact [   ] Abnormal  [   ]  ROM: WFL all extremities [   ]  Abnormal [   ]able to move right side > left side - limited participation  Motor Strength: WFL all extremities  [   ]  Abnormal [   ]  Sensation: intact to light touch [   ] Abnormal [   ]    FUNCTIONAL STATUS:  Bed Mobility: Independent [   ]  Supervision [   ]  Needs Assistance [ x  ]  N/A [   ]  Transfers: Independent [   ]  Supervision [   ]  Needs Assistance [   ]  N/A [   ]   Ambulation: Independent [   ]  Supervision [   ]  Needs Assistance [   ]  N/A [   ]  ADL: Independent [   ] Requires Assistance [   ] N/A [   ]      LABS:                        9.3    7.12  )-----------( 290      ( 12 Aug 2022 07:05 )             27.1     08-12    139  |  106  |  10  ----------------------------<  137<H>  3.3<L>   |  17  |  0.7    Ca    8.2<L>      12 Aug 2022 07:05  Mg     1.6     08-12    TPro  5.6<L>  /  Alb  2.7<L>  /  TBili  0.2  /  DBili  x   /  AST  14  /  ALT  11  /  AlkPhos  115  08-12          RADIOLOGY & ADDITIONAL STUDIES:

## 2022-08-12 NOTE — PROGRESS NOTE ADULT - ASSESSMENT
5 yo F with PMH of HTN, DM2, CVA (2017) who presented with purulent right lower extremity wound for 3 months consistent with acute RLE cellulitis. Code stroke for unresponsiveness at 4pm 8/5    #Acute ischemic strokes / ?Seizures  -CTH negative for acute ICH  -d/w neuro: high likelihood of seizure  -started on IV Keppra  -check labs, lactate  -seizure precautions  -cont VEEG  -keep NPO, S&S f/u   -IVFs  -close monitoring  8/8: increased Keppra dose and added Dilantin. Remains on VEEG  8/9-10 d/w neuro: will get MRI, cont VEEG. LP depending on MRI results  8/10: son declined NGT placement and asked to wait till am (aware of risks)  8/11< from: MR Head w/wo IV Cont (08.10.22 @ 21:25) >  Multiple punctate cortical acute infarcts involving multiple vascular territories possibly related to embolic etiology. No evidence of acute hemorrhage. Moderate chronic microvascular type changes as well as chronic hemosiderin deposition within the right basal ganglia consistent with remote hemorrhage.  Chronic right thalamic lacunar infarcts.  < end of copied text >  -d/w neuro: start ASA, Plavix. Cont Lipitor  -repeat CTA head noted  -NGT placement and feeds   -PT/OT/physiatry  -TTE w/ bubble  -will need IVONNE and ILR  -tele w/ SVT w/ Afib: jaleel dempsey  reminded family to bring records from [facility]  -neuro checks    #Purulent RLE cellulitis r/o abscess  -s/p unasyn and and vancomycin in ED  -f/u wound and blood cultures   -trend inflammatory markers   -ID consult noted  - vanco, Unasyn  -s/p burn debridement   -changed ABx to augmentin    #Hypertensive urgency due to noncompliance  -BP at admission 296/174 without signs of end organ damage  -gradual BP lowering  -close monitoring  -8/12: added Norvasc    # DMII w/ Hyperglycemia  - started insulin regimen   - FS ac/hs  -A1c=10.8    # Atypical Chest pain and FELIZ on admission- possibly MSK related but r/o cardiac etiology   - chest wall tender to palpation on admission  - currently CP free  - CXR unremarkable   - Tn negative  - outpt stress test    # hx of CVA 2017 (Aneurysmal as per family? ?ICH but old ischemic strokes on CTH)  # Left sided weakness  -need to get more Hx  -as per family, aneurysm was never fixed    DVT ppx: lovenox, SCDs  GI ppx: protonix  Diet: DASH/carb consistent  Activity: AAT    High risk pt. Px is guarded.    #Progress Note Handoff  Pending: Consults____Clinical improvement and stability__x___Tests__TTE>IVONNE, ILR____PT____x____  Pt/Family discussion: Pt/family informed and agree with the current plan  Disposition: Home______/SNF_______/4A______/To be determined____x____    My note supersedes the residents note should a discrepancy arise.    Chart and notes personally reviewed.  Care Discussed with Consultants/Other Providers/ Housestaff [ x] YES [ ] NO   Radiology, labs, old records personally reviewed.    discussed w/ housestaff, nursing, case management, neuro team, son

## 2022-08-13 NOTE — PROGRESS NOTE ADULT - ASSESSMENT
5 yo F with PMH of HTN, DM2, CVA (2017) who presented with purulent right lower extremity wound for 3 months consistent with acute RLE cellulitis. Code stroke for unresponsiveness at 4pm 8/5    #Acute ischemic strokes / ?Seizures  -CTH negative for acute ICH  -d/w neuro: high likelihood of seizure  -started on IV Keppra  -check labs, lactate  -seizure precautions  -cont VEEG  -keep NPO, S&S f/u   -IVFs  -close monitoring  8/8: increased Keppra dose and added Dilantin. Remains on VEEG  8/9-10 d/w neuro: will get MRI, cont VEEG. LP depending on MRI results  8/10: son declined NGT placement and asked to wait till am (aware of risks)  8/11< from: MR Head w/wo IV Cont (08.10.22 @ 21:25) >  Multiple punctate cortical acute infarcts involving multiple vascular territories possibly related to embolic etiology. No evidence of acute hemorrhage. Moderate chronic microvascular type changes as well as chronic hemosiderin deposition within the right basal ganglia consistent with remote hemorrhage.  Chronic right thalamic lacunar infarcts.  < end of copied text >  -d/w neuro: start ASA, Plavix. Cont Lipitor  -repeat CTA head noted  -NGT placement and feeds   -PT/OT/physiatry  -TTE w/ bubble pending  -will need ?IVONNE and ILR  -tele w/ SVT w/ Afib: ep eval noted  reminded family to bring records from [facility]  -neuro checks  -8/13: spoke to neuro, will give an extras dose of DIlantin 500 x1. Repeat dilantin level in 48hrs. Stat REEG to r/o nonconvulsive status. Stat CTH. Repeat labs at 8p including lactate    #Purulent RLE cellulitis r/o abscess / Fever  -s/p unasyn and and vancomycin in ED  -f/u wound and blood cultures   -trend inflammatory markers   -ID consult noted  - vanco, Unasyn  -s/p burn debridement   -changed ABx to augmentin  -8/13: fever: pancultured    #Hypertensive urgency due to noncompliance  -BP at admission 296/174 without signs of end organ damage  -gradual BP lowering  -close monitoring  -8/12: added Norvasc  -8/13 increased Labetalol dose. Pt also has PRN Labetalol IVP    # DMII w/ Hyperglycemia  - started insulin regimen   - FS ac/hs  -A1c=10.8    # Atypical Chest pain and FELIZ on admission- possibly MSK related but r/o cardiac etiology   - chest wall tender to palpation on admission  - currently CP free  - CXR unremarkable   - Tn negative  - outpt stress test    # hx of CVA 2017 (Aneurysmal as per family? ?ICH but old ischemic strokes on CTH)  # Left sided weakness  -need to get more Hx  -as per family, aneurysm was never fixed    DVT ppx: lovenox, SCDs  GI ppx: protonix  Diet: DASH/carb consistent  Activity: AAT    Very high risk pt. Px is guarded.    #Progress Note Handoff  Pending: Consults____Clinical improvement and stability__x___Tests__TTE>IVONNE, ILR, Labs____PT____x____  Pt/Family discussion: Pt/family informed and agree with the current plan  Disposition: Home______/SNF_______/4A______/To be determined____x____    My note supersedes the residents note should a discrepancy arise.    Chart and notes personally reviewed.  Care Discussed with Consultants/Other Providers/ Housestaff [ x] YES [ ] NO   Radiology, labs, old records personally reviewed.    discussed w/ housestaff, nursing, case management, neuro team     57 yo F with PMH of HTN, DM2, CVA (2017) who presented with purulent right lower extremity wound for 3 months consistent with acute RLE cellulitis. Code stroke for unresponsiveness at 4pm 8/5    #Acute ischemic strokes / ?Seizures  -CTH negative for acute ICH  -d/w neuro: high likelihood of seizure  -started on IV Keppra  -check labs, lactate  -seizure precautions  -cont VEEG  -keep NPO, S&S f/u   -IVFs  -close monitoring  8/8: increased Keppra dose and added Dilantin. Remains on VEEG  8/9-10 d/w neuro: will get MRI, cont VEEG. LP depending on MRI results  8/10: son declined NGT placement and asked to wait till am (aware of risks)  8/11< from: MR Head w/wo IV Cont (08.10.22 @ 21:25) >  Multiple punctate cortical acute infarcts involving multiple vascular territories possibly related to embolic etiology. No evidence of acute hemorrhage. Moderate chronic microvascular type changes as well as chronic hemosiderin deposition within the right basal ganglia consistent with remote hemorrhage.  Chronic right thalamic lacunar infarcts.  < end of copied text >  -d/w neuro: start ASA, Plavix. Cont Lipitor  -repeat CTA head noted  -NGT placement and feeds   -PT/OT/physiatry  -TTE w/ bubble pending  -will need ?IVONNE and ILR  -tele w/ SVT w/ Afib: ep eval noted  reminded family to bring records from [facility]  -neuro checks  -8/13: spoke to neuro, will give an extras dose of DIlantin 500 x1. Repeat dilantin level in 48hrs. Stat REEG to r/o nonconvulsive status. Stat CTH. Repeat labs at 8p including lactate    #Purulent RLE cellulitis r/o abscess / Fever  -s/p unasyn and and vancomycin in ED  -f/u wound and blood cultures   -trend inflammatory markers   -ID consult noted  - vanco, Unasyn  -s/p burn debridement   -changed ABx to augmentin  -8/13: fever: pancultured    #Hypertensive urgency due to noncompliance  -BP at admission 296/174 without signs of end organ damage  -gradual BP lowering  -close monitoring  -8/12: added Norvasc  -8/13 increased Labetalol dose. Pt also has PRN Labetalol IVP    # DMII w/ Hyperglycemia  - started insulin regimen   - FS ac/hs  -A1c=10.8    # Atypical Chest pain and FELIZ on admission- possibly MSK related but r/o cardiac etiology   - chest wall tender to palpation on admission  - currently CP free  - CXR unremarkable   - Tn negative  - outpt stress test    # hx of CVA 2017 (Aneurysmal as per family? ?ICH but old ischemic strokes on CTH)  # Left sided weakness  -need to get more Hx  -as per family, aneurysm was never fixed    DVT ppx: lovenox, SCDs  GI ppx: protonix  Diet: DASH/carb consistent  Activity: AAT    Very high risk pt. Px is guarded.    #Progress Note Handoff  Pending: Consults____Clinical improvement and stability__x___Tests__TTE>IVONNE, ILR, Labs____PT____x____  Pt/Family discussion: Pt/family informed and agree with the current plan  Disposition: Home______/SNF_______/4A______/To be determined____x____    My note supersedes the residents note should a discrepancy arise.    Chart and notes personally reviewed.  Care Discussed with Consultants/Other Providers/ Housestaff [ x] YES [ ] NO   Radiology, labs, old records personally reviewed.    discussed w/ housestaff, nursing, case management, neuro team

## 2022-08-13 NOTE — PROGRESS NOTE ADULT - SUBJECTIVE AND OBJECTIVE BOX
Patient is a 56y old  Female who presents with a chief complaint of Hypertensive urgency (02 Aug 2022 11:31)    INTERVAL HPI/OVERNIGHT EVENTS: Patient was examined and seen at bedside. Events noted.    ROS: unable to obtain due to AMS  InitialHPI:  57 yo F with PMH of HTN, DM2, and ?hemorrhagic stroke due to an aneurysm (per son 2017) who presented for RLE wound. Started 3 months ago when she fell and hit her leg on a wooden cabinet. She put hydrogen peroxide, antibiotic cream, and wrapped the wound but never fully healed. Two weeks ago it started to show yellow pus so her and her family came to the ED for further treatment. Endorsed subjective fevers, chest pain, and vision changes which occurs when walked 2-3 blocks. Denied congestion, sore throat, cough, dyspnea, headache, dysuria, N/V, diarrhea     Per son, they fill her medications at [facility] ([phone]) and this is their current pharmacy. Called them to confirm her medications and they said her last refill of medications was 2018. Pt has not seen a doctor due to insurance problem and has not been taking any medications for more than 1yr.    In the ED:  Vitals: T: 98.9, BP: 296/ 174, , RR: 18, 98%O2 on RA  Labs: CBC showed WBC 11.23; ESR 92, CRP 35.6  CMP showed glucose 302, alk phos 173; ; VBG pH 7.45  .   Received unasyn and vanco, humalin R, IV vasotec, IV labetalol   (02 Aug 2022 07:47)    PAST MEDICAL & SURGICAL HISTORY:  Hypertension  ?Hemorrhagic CVA due to aneurysm    Diabetes mellitus      No significant past surgical history    General: NAD, awake but not following commands well,  +NGT  HEENT: no LAD  CV: S1 S2  Resp: decreased breath sounds at bases  GI: NT/ND/S +BS; obese  MS: no clubbing/cyanosis/edema, + pulses b/l; RLE c/d/i dsg  Neuro: moving only Rt side    tele: SR, nonspecific changes (on my own evaluation of tele monitor)  episode of SVT vs Afib            MEDICATIONS  (STANDING):  amLODIPine   Tablet 10 milliGRAM(s) Oral daily  amoxicillin  875 milliGRAM(s)/clavulanate 1 Tablet(s) Oral every 12 hours  aspirin  chewable 81 milliGRAM(s) Enteral Tube daily  atorvastatin 80 milliGRAM(s) Oral at bedtime  clopidogrel Tablet 75 milliGRAM(s) Oral daily  collagenase Ointment 1 Application(s) Topical two times a day  Dakins Solution - 1/2 Strength 1 Application(s) Topical two times a day  dextrose 5%. 1000 milliLiter(s) (100 mL/Hr) IV Continuous <Continuous>  dextrose 5%. 1000 milliLiter(s) (50 mL/Hr) IV Continuous <Continuous>  dextrose 50% Injectable 25 Gram(s) IV Push once  dextrose 50% Injectable 12.5 Gram(s) IV Push once  dextrose 50% Injectable 25 Gram(s) IV Push once  enoxaparin Injectable 40 milliGRAM(s) SubCutaneous every 24 hours  glucagon  Injectable 1 milliGRAM(s) IntraMuscular once  hydrALAZINE 50 milliGRAM(s) Oral every 8 hours  insulin glargine Injectable (LANTUS) 22 Unit(s) SubCutaneous every morning  insulin lispro (ADMELOG) corrective regimen sliding scale   SubCutaneous three times a day before meals  insulin lispro Injectable (ADMELOG) 5 Unit(s) SubCutaneous three times a day before meals  labetalol 300 milliGRAM(s) Oral three times a day  levETIRAcetam  IVPB 1500 milliGRAM(s) IV Intermittent every 12 hours  lidocaine 1%/epinephrine 1:100,000 Inj 20 milliLiter(s) Local Injection once  losartan 100 milliGRAM(s) Oral daily  pantoprazole    Tablet 40 milliGRAM(s) Oral before breakfast  phenytoin   Chewable 50 milliGRAM(s) Oral at bedtime  phenytoin  IVPB 100 milliGRAM(s) IV Intermittent three times a day  sodium chloride 0.65% Nasal 2 Spray(s) Both Nostrils two times a day    MEDICATIONS  (PRN):  acetaminophen     Tablet .. 650 milliGRAM(s) Oral every 6 hours PRN Temp greater or equal to 38C (100.4F), Mild Pain (1 - 3)  dextrose Oral Gel 15 Gram(s) Oral once PRN Blood Glucose LESS THAN 70 milliGRAM(s)/deciliter  labetalol Injectable 10 milliGRAM(s) IV Push every 6 hours PRN Systolic blood pressure >  melatonin 3 milliGRAM(s) Oral at bedtime PRN Insomnia  midazolam Injectable 4 milliGRAM(s) IV Push once PRN seizure    Home Medications:  losartan 50 mg oral tablet: 1 tab(s) orally once a day (02 Aug 2022 10:38)  metFORMIN 500 mg oral tablet: 1 tab(s) orally 2 times a day (02 Aug 2022 10:38)  metoprolol succinate 50 mg oral tablet, extended release: 1 tab(s) orally once a day (02 Aug 2022 10:38)    Vital Signs Last 24 Hrs  T(C): 38.2 (13 Aug 2022 13:36), Max: 38.2 (13 Aug 2022 05:15)  T(F): 100.8 (13 Aug 2022 13:36), Max: 100.8 (13 Aug 2022 13:36)  HR: 88 (13 Aug 2022 13:36) (74 - 88)  BP: 196/98 (13 Aug 2022 13:36) (165/67 - 196/98)  BP(mean): 135 (13 Aug 2022 05:15) (135 - 135)  RR: 20 (13 Aug 2022 05:15) (18 - 20)  SpO2: --    Parameters below as of 13 Aug 2022 05:15  Patient On (Oxygen Delivery Method): room air      CAPILLARY BLOOD GLUCOSE      POCT Blood Glucose.: 83 mg/dL (13 Aug 2022 11:56)  POCT Blood Glucose.: 85 mg/dL (13 Aug 2022 08:10)  POCT Blood Glucose.: 107 mg/dL (12 Aug 2022 17:53)    LABS:                        9.4    6.05  )-----------( 296      ( 13 Aug 2022 09:06 )             27.2     08-13    140  |  109  |  12  ----------------------------<  83  3.5   |  13<L>  |  0.9    Ca    8.3<L>      13 Aug 2022 09:06  Mg     1.9     08-13    TPro  5.7<L>  /  Alb  2.9<L>  /  TBili  0.2  /  DBili  x   /  AST  15  /  ALT  11  /  AlkPhos  112  08-13    LIVER FUNCTIONS - ( 13 Aug 2022 09:06 )  Alb: 2.9 g/dL / Pro: 5.7 g/dL / ALK PHOS: 112 U/L / ALT: 11 U/L / AST: 15 U/L / GGT: x                           Consultant Notes Reviewed:  [x ] YES  [ ] NO  Care Discussed with Consultants/Other Providers/ Housestaff [ x] YES  [ ] NO  Radiology, labs, new studies personally reviewed.                                                                                   Patient is a 56y old  Female who presents with a chief complaint of Hypertensive urgency (02 Aug 2022 11:31)    INTERVAL HPI/OVERNIGHT EVENTS: Patient was examined and seen at bedside. Events noted.    ROS: unable to obtain due to AMS  InitialHPI:  55 yo F with PMH of HTN, DM2, and ?hemorrhagic stroke due to an aneurysm (per son 2017) who presented for RLE wound. Started 3 months ago when she fell and hit her leg on a wooden cabinet. She put hydrogen peroxide, antibiotic cream, and wrapped the wound but never fully healed. Two weeks ago it started to show yellow pus so her and her family came to the ED for further treatment. Endorsed subjective fevers, chest pain, and vision changes which occurs when walked 2-3 blocks. Denied congestion, sore throat, cough, dyspnea, headache, dysuria, N/V, diarrhea     Per son, they fill her medications at [facility] ([phone]) and this is their current pharmacy. Called them to confirm her medications and they said her last refill of medications was 2018. Pt has not seen a doctor due to insurance problem and has not been taking any medications for more than 1yr.    In the ED:  Vitals: T: 98.9, BP: 296/ 174, , RR: 18, 98%O2 on RA  Labs: CBC showed WBC 11.23; ESR 92, CRP 35.6  CMP showed glucose 302, alk phos 173; ; VBG pH 7.45  .   Received unasyn and vanco, humalin R, IV vasotec, IV labetalol   (02 Aug 2022 07:47)    PAST MEDICAL & SURGICAL HISTORY:  Hypertension  ?Hemorrhagic CVA due to aneurysm    Diabetes mellitus      No significant past surgical history    General: NAD, awake but not following commands well,  +NGT  HEENT: no LAD  CV: S1 S2  Resp: decreased breath sounds at bases  GI: NT/ND/S +BS; obese  MS: no clubbing/cyanosis/edema, + pulses b/l; RLE c/d/i dsg  Neuro: moving only Rt side    tele: SR, nonspecific changes (on my own evaluation of tele monitor)  episode of SVT vs Afib            MEDICATIONS  (STANDING):  amLODIPine   Tablet 10 milliGRAM(s) Oral daily  amoxicillin  875 milliGRAM(s)/clavulanate 1 Tablet(s) Oral every 12 hours  aspirin  chewable 81 milliGRAM(s) Enteral Tube daily  atorvastatin 80 milliGRAM(s) Oral at bedtime  clopidogrel Tablet 75 milliGRAM(s) Oral daily  collagenase Ointment 1 Application(s) Topical two times a day  Dakins Solution - 1/2 Strength 1 Application(s) Topical two times a day  dextrose 5%. 1000 milliLiter(s) (100 mL/Hr) IV Continuous <Continuous>  dextrose 5%. 1000 milliLiter(s) (50 mL/Hr) IV Continuous <Continuous>  dextrose 50% Injectable 25 Gram(s) IV Push once  dextrose 50% Injectable 12.5 Gram(s) IV Push once  dextrose 50% Injectable 25 Gram(s) IV Push once  enoxaparin Injectable 40 milliGRAM(s) SubCutaneous every 24 hours  glucagon  Injectable 1 milliGRAM(s) IntraMuscular once  hydrALAZINE 50 milliGRAM(s) Oral every 8 hours  insulin glargine Injectable (LANTUS) 22 Unit(s) SubCutaneous every morning  insulin lispro (ADMELOG) corrective regimen sliding scale   SubCutaneous three times a day before meals  insulin lispro Injectable (ADMELOG) 5 Unit(s) SubCutaneous three times a day before meals  labetalol 300 milliGRAM(s) Oral three times a day  levETIRAcetam  IVPB 1500 milliGRAM(s) IV Intermittent every 12 hours  lidocaine 1%/epinephrine 1:100,000 Inj 20 milliLiter(s) Local Injection once  losartan 100 milliGRAM(s) Oral daily  pantoprazole    Tablet 40 milliGRAM(s) Oral before breakfast  phenytoin   Chewable 50 milliGRAM(s) Oral at bedtime  phenytoin  IVPB 100 milliGRAM(s) IV Intermittent three times a day  sodium chloride 0.65% Nasal 2 Spray(s) Both Nostrils two times a day    MEDICATIONS  (PRN):  acetaminophen     Tablet .. 650 milliGRAM(s) Oral every 6 hours PRN Temp greater or equal to 38C (100.4F), Mild Pain (1 - 3)  dextrose Oral Gel 15 Gram(s) Oral once PRN Blood Glucose LESS THAN 70 milliGRAM(s)/deciliter  labetalol Injectable 10 milliGRAM(s) IV Push every 6 hours PRN Systolic blood pressure >  melatonin 3 milliGRAM(s) Oral at bedtime PRN Insomnia  midazolam Injectable 4 milliGRAM(s) IV Push once PRN seizure    Home Medications:  losartan 50 mg oral tablet: 1 tab(s) orally once a day (02 Aug 2022 10:38)  metFORMIN 500 mg oral tablet: 1 tab(s) orally 2 times a day (02 Aug 2022 10:38)  metoprolol succinate 50 mg oral tablet, extended release: 1 tab(s) orally once a day (02 Aug 2022 10:38)    Vital Signs Last 24 Hrs  T(C): 38.2 (13 Aug 2022 13:36), Max: 38.2 (13 Aug 2022 05:15)  T(F): 100.8 (13 Aug 2022 13:36), Max: 100.8 (13 Aug 2022 13:36)  HR: 88 (13 Aug 2022 13:36) (74 - 88)  BP: 196/98 (13 Aug 2022 13:36) (165/67 - 196/98)  BP(mean): 135 (13 Aug 2022 05:15) (135 - 135)  RR: 20 (13 Aug 2022 05:15) (18 - 20)  SpO2: --    Parameters below as of 13 Aug 2022 05:15  Patient On (Oxygen Delivery Method): room air      CAPILLARY BLOOD GLUCOSE      POCT Blood Glucose.: 83 mg/dL (13 Aug 2022 11:56)  POCT Blood Glucose.: 85 mg/dL (13 Aug 2022 08:10)  POCT Blood Glucose.: 107 mg/dL (12 Aug 2022 17:53)    LABS:                        9.4    6.05  )-----------( 296      ( 13 Aug 2022 09:06 )             27.2     08-13    140  |  109  |  12  ----------------------------<  83  3.5   |  13<L>  |  0.9    Ca    8.3<L>      13 Aug 2022 09:06  Mg     1.9     08-13    TPro  5.7<L>  /  Alb  2.9<L>  /  TBili  0.2  /  DBili  x   /  AST  15  /  ALT  11  /  AlkPhos  112  08-13    LIVER FUNCTIONS - ( 13 Aug 2022 09:06 )  Alb: 2.9 g/dL / Pro: 5.7 g/dL / ALK PHOS: 112 U/L / ALT: 11 U/L / AST: 15 U/L / GGT: x

## 2022-08-14 NOTE — PROGRESS NOTE ADULT - SUBJECTIVE AND OBJECTIVE BOX
Patient is a 56y old  Female who presents with a chief complaint of Hypertensive urgency (02 Aug 2022 11:31)    INTERVAL HPI/OVERNIGHT EVENTS: Patient was examined and seen at bedside. Events noted. Remains lethargic.    ROS: unable to obtain due to AMS  InitialHPI:  55 yo F with PMH of HTN, DM2, and ?hemorrhagic stroke due to an aneurysm (per son 2017) who presented for RLE wound. Started 3 months ago when she fell and hit her leg on a wooden cabinet. She put hydrogen peroxide, antibiotic cream, and wrapped the wound but never fully healed. Two weeks ago it started to show yellow pus so her and her family came to the ED for further treatment. Endorsed subjective fevers, chest pain, and vision changes which occurs when walked 2-3 blocks. Denied congestion, sore throat, cough, dyspnea, headache, dysuria, N/V, diarrhea     Per son, they fill her medications at [facility] ([phone]) and this is their current pharmacy. Called them to confirm her medications and they said her last refill of medications was 2018. Pt has not seen a doctor due to insurance problem and has not been taking any medications for more than 1yr.    In the ED:  Vitals: T: 98.9, BP: 296/ 174, , RR: 18, 98%O2 on RA  Labs: CBC showed WBC 11.23; ESR 92, CRP 35.6  CMP showed glucose 302, alk phos 173; ; VBG pH 7.45  .   Received unasyn and vanco, humalin R, IV vasotec, IV labetalol   (02 Aug 2022 07:47)    PAST MEDICAL & SURGICAL HISTORY:  Hypertension  ?Hemorrhagic CVA due to aneurysm    Diabetes mellitus      No significant past surgical history    General: NAD, awake but not following commands well,  +NGT  HEENT: no LAD  CV: S1 S2  Resp: decreased breath sounds at bases  GI: NT/ND/S +BS; obese  MS: no clubbing/cyanosis/edema, + pulses b/l; RLE c/d/i dsg  Neuro: moving only Rt side    tele: SR, nonspecific changes (on my own evaluation of tele monitor)            MEDICATIONS  (STANDING):  amLODIPine   Tablet 10 milliGRAM(s) Oral daily  amoxicillin  875 milliGRAM(s)/clavulanate 1 Tablet(s) Oral every 12 hours  aspirin  chewable 81 milliGRAM(s) Enteral Tube daily  atorvastatin 80 milliGRAM(s) Oral at bedtime  clopidogrel Tablet 75 milliGRAM(s) Oral daily  collagenase Ointment 1 Application(s) Topical two times a day  Dakins Solution - 1/2 Strength 1 Application(s) Topical two times a day  dextrose 5%. 1000 milliLiter(s) (100 mL/Hr) IV Continuous <Continuous>  dextrose 5%. 1000 milliLiter(s) (50 mL/Hr) IV Continuous <Continuous>  dextrose 50% Injectable 25 Gram(s) IV Push once  dextrose 50% Injectable 12.5 Gram(s) IV Push once  dextrose 50% Injectable 25 Gram(s) IV Push once  enoxaparin Injectable 40 milliGRAM(s) SubCutaneous every 24 hours  glucagon  Injectable 1 milliGRAM(s) IntraMuscular once  hydrALAZINE 50 milliGRAM(s) Oral every 8 hours  insulin glargine Injectable (LANTUS) 22 Unit(s) SubCutaneous every morning  insulin lispro (ADMELOG) corrective regimen sliding scale   SubCutaneous three times a day before meals  insulin lispro Injectable (ADMELOG) 5 Unit(s) SubCutaneous three times a day before meals  labetalol 400 milliGRAM(s) Oral three times a day  levETIRAcetam  IVPB 1500 milliGRAM(s) IV Intermittent every 12 hours  lidocaine 1%/epinephrine 1:100,000 Inj 20 milliLiter(s) Local Injection once  losartan 100 milliGRAM(s) Oral daily  multivitamin/minerals/iron Oral Solution (CENTRUM) 15 milliLiter(s) Oral daily  pantoprazole    Tablet 40 milliGRAM(s) Oral before breakfast  phenytoin   Chewable 50 milliGRAM(s) Oral at bedtime  phenytoin  IVPB 100 milliGRAM(s) IV Intermittent three times a day  polyethylene glycol 3350 17 Gram(s) Oral every 12 hours  sodium bicarbonate 650 milliGRAM(s) Oral every 6 hours  sodium chloride 0.65% Nasal 2 Spray(s) Both Nostrils two times a day    MEDICATIONS  (PRN):  acetaminophen     Tablet .. 650 milliGRAM(s) Oral every 6 hours PRN Temp greater or equal to 38C (100.4F), Mild Pain (1 - 3)  dextrose Oral Gel 15 Gram(s) Oral once PRN Blood Glucose LESS THAN 70 milliGRAM(s)/deciliter  labetalol Injectable 10 milliGRAM(s) IV Push every 6 hours PRN Systolic blood pressure >  melatonin 3 milliGRAM(s) Oral at bedtime PRN Insomnia  midazolam Injectable 4 milliGRAM(s) IV Push once PRN seizure    Home Medications:  losartan 50 mg oral tablet: 1 tab(s) orally once a day (02 Aug 2022 10:38)  metFORMIN 500 mg oral tablet: 1 tab(s) orally 2 times a day (02 Aug 2022 10:38)  metoprolol succinate 50 mg oral tablet, extended release: 1 tab(s) orally once a day (02 Aug 2022 10:38)    Vital Signs Last 24 Hrs  T(C): 36.6 (14 Aug 2022 04:04), Max: 37.8 (13 Aug 2022 20:38)  T(F): 97.9 (14 Aug 2022 04:04), Max: 100 (13 Aug 2022 20:38)  HR: 69 (14 Aug 2022 04:04) (69 - 94)  BP: 167/82 (14 Aug 2022 04:04) (125/73 - 179/88)  BP(mean): 116 (14 Aug 2022 04:04) (116 - 116)  RR: 20 (14 Aug 2022 04:04) (19 - 20)  SpO2: --    Parameters below as of 14 Aug 2022 04:04  Patient On (Oxygen Delivery Method): room air      CAPILLARY BLOOD GLUCOSE      POCT Blood Glucose.: 83 mg/dL (14 Aug 2022 11:19)  POCT Blood Glucose.: 120 mg/dL (14 Aug 2022 07:42)  POCT Blood Glucose.: 89 mg/dL (13 Aug 2022 22:27)  POCT Blood Glucose.: 107 mg/dL (13 Aug 2022 16:09)    LABS:                        9.7    6.68  )-----------( 340      ( 14 Aug 2022 07:29 )             28.9     08-14    141  |  112<H>  |  15  ----------------------------<  125<H>  3.5   |  17  |  0.8    Ca    8.4<L>      14 Aug 2022 07:29  Phos  4.2     08-13  Mg     1.9     08-14    TPro  6.0  /  Alb  3.0<L>  /  TBili  <0.2  /  DBili  x   /  AST  25  /  ALT  13  /  AlkPhos  119<H>  08-14    LIVER FUNCTIONS - ( 14 Aug 2022 07:29 )  Alb: 3.0 g/dL / Pro: 6.0 g/dL / ALK PHOS: 119 U/L / ALT: 13 U/L / AST: 25 U/L / GGT: x           CARDIAC MARKERS ( 13 Aug 2022 22:44 )  x     / 0.01 ng/mL / x     / x     / x                      Consultant Notes Reviewed:  [x ] YES  [ ] NO  Care Discussed with Consultants/Other Providers/ Housestaff [ x] YES  [ ] NO  Radiology, labs, new studies personally reviewed.

## 2022-08-14 NOTE — PROGRESS NOTE ADULT - SUBJECTIVE AND OBJECTIVE BOX
SUBJECTIVE:    Patient is a 56y old Female who presents with a chief complaint of RLE cellulitis (12 Aug 2022 09:44)    Currently admitted to medicine with the primary diagnosis of Cellulitis       Today is hospital day 12d. This morning she is resting comfortably in bed and reports no new issues or overnight events.     PAST MEDICAL & SURGICAL HISTORY  Hypertension    Diabetes mellitus    No significant past surgical history      SOCIAL HISTORY:  Negative for smoking/alcohol/drug use.     ALLERGIES:  No Known Allergies    MEDICATIONS:  STANDING MEDICATIONS  amLODIPine   Tablet 10 milliGRAM(s) Oral daily  amoxicillin  875 milliGRAM(s)/clavulanate 1 Tablet(s) Oral every 12 hours  aspirin  chewable 81 milliGRAM(s) Enteral Tube daily  atorvastatin 80 milliGRAM(s) Oral at bedtime  clopidogrel Tablet 75 milliGRAM(s) Oral daily  collagenase Ointment 1 Application(s) Topical two times a day  Dakins Solution - 1/2 Strength 1 Application(s) Topical two times a day  dextrose 5%. 1000 milliLiter(s) IV Continuous <Continuous>  dextrose 5%. 1000 milliLiter(s) IV Continuous <Continuous>  dextrose 50% Injectable 25 Gram(s) IV Push once  dextrose 50% Injectable 12.5 Gram(s) IV Push once  dextrose 50% Injectable 25 Gram(s) IV Push once  enoxaparin Injectable 40 milliGRAM(s) SubCutaneous every 24 hours  glucagon  Injectable 1 milliGRAM(s) IntraMuscular once  hydrALAZINE 50 milliGRAM(s) Oral every 8 hours  insulin glargine Injectable (LANTUS) 22 Unit(s) SubCutaneous every morning  insulin lispro (ADMELOG) corrective regimen sliding scale   SubCutaneous three times a day before meals  insulin lispro Injectable (ADMELOG) 5 Unit(s) SubCutaneous three times a day before meals  labetalol 400 milliGRAM(s) Oral three times a day  levETIRAcetam  IVPB 1500 milliGRAM(s) IV Intermittent every 12 hours  lidocaine 1%/epinephrine 1:100,000 Inj 20 milliLiter(s) Local Injection once  losartan 100 milliGRAM(s) Oral daily  multivitamin/minerals/iron Oral Solution (CENTRUM) 15 milliLiter(s) Oral daily  pantoprazole    Tablet 40 milliGRAM(s) Oral before breakfast  phenytoin   Chewable 50 milliGRAM(s) Oral at bedtime  phenytoin  IVPB 100 milliGRAM(s) IV Intermittent three times a day  potassium phosphate / sodium phosphate Powder (PHOS-NaK) 1 Packet(s) Oral four times a day with meals  sodium bicarbonate 650 milliGRAM(s) Oral every 6 hours  sodium chloride 0.65% Nasal 2 Spray(s) Both Nostrils two times a day    PRN MEDICATIONS  acetaminophen     Tablet .. 650 milliGRAM(s) Oral every 6 hours PRN  dextrose Oral Gel 15 Gram(s) Oral once PRN  labetalol Injectable 10 milliGRAM(s) IV Push every 6 hours PRN  melatonin 3 milliGRAM(s) Oral at bedtime PRN  midazolam Injectable 4 milliGRAM(s) IV Push once PRN    VITALS:   T(F): 97.9  HR: 69  BP: 167/82  RR: 20  SpO2: --    LABS:                        9.4    6.05  )-----------( 296      ( 13 Aug 2022 09:06 )             27.2     08-13    140  |  110  |  13  ----------------------------<  100<H>  3.3<L>   |  16<L>  |  0.8    Ca    8.2<L>      13 Aug 2022 22:44  Phos  4.2     08-13  Mg     1.9     08-13    TPro  5.7<L>  /  Alb  2.8<L>  /  TBili  <0.2  /  DBili  x   /  AST  18  /  ALT  11  /  AlkPhos  112  08-13          Lactate, Blood: 0.6 mmol/L *L* (08-13-22 @ 22:44)  Troponin T, Serum: 0.01 ng/mL (08-13-22 @ 22:44)      CARDIAC MARKERS ( 13 Aug 2022 22:44 )  x     / 0.01 ng/mL / x     / x     / x          RADIOLOGY:    PHYSICAL EXAM:  General: NAD, awake but not following commands well,  +NGT  HEENT: no LAD  CV: S1 S2  Resp: decreased breath sounds at bases  GI: NT/ND/S +BS; obese  MS: no clubbing/cyanosis/edema, + pulses b/l; RLE c/d/i dsg  Neuro: moving only Rt side      Intravenous access:   NG tube:   Maloney Catheter:

## 2022-08-14 NOTE — PROGRESS NOTE ADULT - NUTRITIONAL ASSESSMENT
57 yo F with PMH of HTN, DM2, CVA (2017) who presented with purulent right lower extremity wound for 3 months consistent with acute RLE cellulitis. Code stroke for unresponsiveness at 4pm 8/5    #Acute ischemic strokes / ?Seizures  -CTH negative for acute ICH  -d/w neuro: high likelihood of seizure  -started on IV Keppra  -check labs, lactate  -seizure precautions  -cont VEEG  -keep NPO, S&S f/u   -IVFs  -close monitoring  8/8: increased Keppra dose and added Dilantin. Remains on VEEG  8/9-10 d/w neuro: will get MRI, cont VEEG. LP depending on MRI results  8/10: son declined NGT placement and asked to wait till am (aware of risks)  8/11< from: MR Head w/wo IV Cont (08.10.22 @ 21:25) >  Multiple punctate cortical acute infarcts involving multiple vascular territories possibly related to embolic etiology. No evidence of acute hemorrhage. Moderate chronic microvascular type changes as well as chronic hemosiderin deposition within the right basal ganglia consistent with remote hemorrhage.  Chronic right thalamic lacunar infarcts.  < end of copied text >  -d/w neuro: start ASA, Plavix. Cont Lipitor  -repeat CTA head noted  -NGT placement and feeds   -PT/OT/physiatry  -TTE w/ bubble pending  -will need ?IVONNE and ILR  -tele w/ SVT w/ Afib: ep eval noted  reminded family to bring records from [facility]  -neuro checks  -8/13: spoke to neuro, will give an extras dose of DIlantin 500 x1. Repeat dilantin level in 48hrs. Stat REEG to r/o nonconvulsive status. Stat CTH. Repeat labs at 8p including lactate    #Purulent RLE cellulitis r/o abscess / Fever  -s/p unasyn and and vancomycin in ED  -f/u wound and blood cultures   -trend inflammatory markers   -ID consult noted  - vanco, Unasyn  -s/p burn debridement   -changed ABx to augmentin  -8/13: fever: pancultured    #Hypertensive urgency due to noncompliance  -BP at admission 296/174 without signs of end organ damage  -gradual BP lowering  -close monitoring  -8/12: added Norvasc  -8/13 increased Labetalol dose. Pt also has PRN Labetalol IVP    # DMII w/ Hyperglycemia  - started insulin regimen   - FS ac/hs  -A1c=10.8    # Atypical Chest pain and FELIZ on admission- possibly MSK related but r/o cardiac etiology   - chest wall tender to palpation on admission  - currently CP free  - CXR unremarkable   - Tn negative  - outpt stress test    # hx of CVA 2017 (Aneurysmal as per family? ?ICH but old ischemic strokes on CTH)  # Left sided weakness  -need to get more Hx  -as per family, aneurysm was never fixed    DVT ppx: lovenox, SCDs  GI ppx: protonix  Diet: DASH/carb consistent  Activity: AAT

## 2022-08-14 NOTE — PROGRESS NOTE ADULT - ASSESSMENT
57 yo F with PMH of HTN, DM2, CVA (2017) who presented with purulent right lower extremity wound for 3 months consistent with acute RLE cellulitis. Code stroke for unresponsiveness at 4pm 8/5    #Acute ischemic strokes / ?Seizures  -CTH negative for acute ICH  -d/w neuro: high likelihood of seizure  -started on IV Keppra  -check labs, lactate  -seizure precautions  -cont VEEG  -keep NPO, S&S f/u   -IVFs  -close monitoring  8/8: increased Keppra dose and added Dilantin. Remains on VEEG  8/9-10 d/w neuro: will get MRI, cont VEEG. LP depending on MRI results  8/10: son declined NGT placement and asked to wait till am (aware of risks)  8/11< from: MR Head w/wo IV Cont (08.10.22 @ 21:25) >  Multiple punctate cortical acute infarcts involving multiple vascular territories possibly related to embolic etiology. No evidence of acute hemorrhage. Moderate chronic microvascular type changes as well as chronic hemosiderin deposition within the right basal ganglia consistent with remote hemorrhage.  Chronic right thalamic lacunar infarcts.  < end of copied text >  -d/w neuro: start ASA, Plavix. Cont Lipitor  -repeat CTA head noted  -NGT placement and feeds   -PT/OT/physiatry  -TTE w/ bubble pending (notified logistics multiple times)  -will need ?IVONNE and ILR  -tele w/ SVT w/ Afib: jaleel dempsey noted  reminded family to bring records from [facility]  -neuro checks  -8/13: spoke to neuro, will give an extras dose of DIlantin 500 x1. Repeat dilantin level in 48hrs.   -8/14 CTH, EEG unchanged.     #Purulent RLE cellulitis r/o abscess / Fever  -s/p unasyn and and vancomycin in ED  -f/u wound and blood cultures   -trend inflammatory markers   -ID consult noted  - vanco, Unasyn  -s/p burn debridement   -changed ABx to augmentin  -8/13: fever: pancultured  -8/14: Candida in wound. D/w Dr. Chua: contaminant    #Hypertensive urgency due to noncompliance  -BP at admission 296/174 without signs of end organ damage  -gradual BP lowering  -close monitoring  -8/12: added Norvasc  -8/13 increased Labetalol dose. Pt also has PRN Labetalol IVP    # DMII w/ Hyperglycemia  - started insulin regimen   - FS ac/hs  -A1c=10.8    # Atypical Chest pain and FELIZ on admission- possibly MSK related but r/o cardiac etiology   - chest wall tender to palpation on admission  - currently CP free  - CXR unremarkable   - Tn negative  - outpt stress test    # hx of CVA 2017 (Aneurysmal as per family? ?ICH but old ischemic strokes on CTH)  # Left sided weakness  -need to get more Hx  -as per family, aneurysm was never fixed    DVT ppx: lovenox, SCDs  GI ppx: protonix  Diet: DASH/carb consistent  Activity: AAT    Very high risk pt. Px is guarded.    #Progress Note Handoff  Pending: Consults____Clinical improvement and stability__x___Tests__TTE>?IVONNE, ILR, ___PT____x____  Pt/Family discussion: Pt/family informed and agree with the current plan  Disposition: Home______/SNF_______/4A______/To be determined____x____    My note supersedes the residents note should a discrepancy arise.    Chart and notes personally reviewed.  Care Discussed with Consultants/Other Providers/ Housestaff [ x] YES [ ] NO   Radiology, labs, old records personally reviewed.    discussed w/ housestaff, nursing, case management, neuro team

## 2022-08-15 NOTE — PROGRESS NOTE ADULT - ASSESSMENT
57 yo F with PMH of HTN, DM2, CVA (2017) who presented with purulent right lower extremity wound for 3 months consistent with acute RLE cellulitis. Code stroke for unresponsiveness at 4pm 8/5    #Acute ischemic strokes / ?Seizures  -CTH negative for acute ICH  -d/w neuro: high likelihood of seizure  -started on IV Keppra  -seizure precautions  -keep NPO, S&S f/u   -IVFs  -close monitoring  8/8: increased Keppra dose and added Dilantin. Remains on VEEG  8/9-10 d/w neuro: will get MRI, cont VEEG. LP depending on MRI results  8/10: son declined NGT placement and asked to wait till am (aware of risks)  8/11< from: MR Head w/wo IV Cont (08.10.22 @ 21:25) >  Multiple punctate cortical acute infarcts involving multiple vascular territories possibly related to embolic etiology. No evidence of acute hemorrhage. Moderate chronic microvascular type changes as well as chronic hemosiderin deposition within the right basal ganglia consistent with remote hemorrhage.  Chronic right thalamic lacunar infarcts.  < end of copied text >  -d/w neuro: start ASA, Plavix. Cont Lipitor  -repeat CTA head noted  -NGT placement and feeds   -PT/OT/physiatry  -TTE w/ bubble pending  -will need ?IVONNE and ILR; pending covid swab  -tele w/ SVT w/ Afib: ep eval noted  reminded family to bring records from [facility]  -neuro checks  -8/13: spoke to neuro, will give an extras dose of DIlantin 500 x1. Repeat dilantin level in 48hrs. Stat REEG to r/o nonconvulsive status. Stat CTH.   - Patient is receiving NG tube feeds, will need another MRI of the brain to check supplemental sequelae    #Purulent RLE cellulitis r/o abscess / Fever  -s/p unasyn and and vancomycin in ED  -f/u wound and blood cultures   -trend inflammatory markers   -ID consult noted  - vanco, Unasyn  -s/p burn debridement   -changed ABx to augmentin  -8/13: fever: pancultured, no fever for now    #Hypertensive urgency due to noncompliance  -BP at admission 296/174 without signs of end organ damage  -gradual BP lowering  -close monitoring  -8/12: added Norvasc  -8/13 increased Labetalol dose. Pt also has PRN Labetalol IVP  - 8/15: now increased hydralazine to 100 mg q8h    # DMII w/ Hyperglycemia  - started insulin regimen   - FS ac/hs  -A1c=10.8    # Atypical Chest pain and FELIZ on admission- possibly MSK related but r/o cardiac etiology   - chest wall tender to palpation on admission  - currently CP free  - CXR unremarkable   - Tn negative  - outpt stress test    # hx of CVA 2017 (Aneurysmal as per family? ?ICH but old ischemic strokes on CTH)  # Left sided weakness  -need to get more Hx  -as per family, aneurysm was never fixed    DVT ppx: lovenox, SCDs  GI ppx: protonix  Diet: tube feeds  Activity: bed rest

## 2022-08-15 NOTE — PHYSICAL THERAPY INITIAL EVALUATION ADULT - ACTIVE RANGE OF MOTION EXAMINATION, REHAB EVAL
PROM BUE/ BLE grossly WFL , minimal spontaneous movement noted to RUE/ RLE, appears to have increased tone LUE / LLE

## 2022-08-15 NOTE — SWALLOW BEDSIDE ASSESSMENT ADULT - SLP GENERAL OBSERVATIONS
Pt asleep in bed, minimally arousable to tactile and verbal stim, and quickly falls back asleep when she does wake up. Today she stated her first and last name and nodded head no when asked "maksim dolor" (are you in pain). Did not follow commands, did not responds to any other yes/no questions.

## 2022-08-15 NOTE — PROGRESS NOTE ADULT - SUBJECTIVE AND OBJECTIVE BOX
Patient is a 56y old  Female who presents with a chief complaint of Hypertensive urgency (02 Aug 2022 11:31)    INTERVAL HPI/OVERNIGHT EVENTS: Patient was examined and seen at bedside. Events noted. Tries to speak and answer questions.    ROS: unable to obtain due to AMS  InitialHPI:  55 yo F with PMH of HTN, DM2, and ?hemorrhagic stroke due to an aneurysm (per son 2017) who presented for RLE wound. Started 3 months ago when she fell and hit her leg on a wooden cabinet. She put hydrogen peroxide, antibiotic cream, and wrapped the wound but never fully healed. Two weeks ago it started to show yellow pus so her and her family came to the ED for further treatment. Endorsed subjective fevers, chest pain, and vision changes which occurs when walked 2-3 blocks. Denied congestion, sore throat, cough, dyspnea, headache, dysuria, N/V, diarrhea     Per son, they fill her medications at [facility] ([phone]) and this is their current pharmacy. Called them to confirm her medications and they said her last refill of medications was 2018. Pt has not seen a doctor due to insurance problem and has not been taking any medications for more than 1yr.    In the ED:  Vitals: T: 98.9, BP: 296/ 174, , RR: 18, 98%O2 on RA  Labs: CBC showed WBC 11.23; ESR 92, CRP 35.6  CMP showed glucose 302, alk phos 173; ; VBG pH 7.45  .   Received unasyn and vanco, humalin R, IV vasotec, IV labetalol   (02 Aug 2022 07:47)    PAST MEDICAL & SURGICAL HISTORY:  Hypertension  ?Hemorrhagic CVA due to aneurysm    Diabetes mellitus      No significant past surgical history    General: NAD, awake tries to follow commands,  dysarthria+NGT  HEENT: no LAD  CV: S1 S2  Resp: decreased breath sounds at bases  GI: NT/ND/S +BS; obese  MS: no clubbing/cyanosis/edema, + pulses b/l; RLE c/d/i dsg  Neuro: RUE 3/5, rest 0/5    tele: SR, nonspecific changes (on my own evaluation of tele monitor)              MEDICATIONS  (STANDING):  amLODIPine   Tablet 10 milliGRAM(s) Oral daily  amoxicillin  875 milliGRAM(s)/clavulanate 1 Tablet(s) Oral every 12 hours  aspirin  chewable 81 milliGRAM(s) Enteral Tube daily  atorvastatin 80 milliGRAM(s) Oral at bedtime  clopidogrel Tablet 75 milliGRAM(s) Oral daily  collagenase Ointment 1 Application(s) Topical two times a day  Dakins Solution - 1/2 Strength 1 Application(s) Topical two times a day  dextrose 5%. 1000 milliLiter(s) (100 mL/Hr) IV Continuous <Continuous>  dextrose 5%. 1000 milliLiter(s) (50 mL/Hr) IV Continuous <Continuous>  dextrose 50% Injectable 25 Gram(s) IV Push once  dextrose 50% Injectable 12.5 Gram(s) IV Push once  dextrose 50% Injectable 25 Gram(s) IV Push once  doxazosin 2 milliGRAM(s) Oral at bedtime  enoxaparin Injectable 40 milliGRAM(s) SubCutaneous every 24 hours  glucagon  Injectable 1 milliGRAM(s) IntraMuscular once  hydrALAZINE 100 milliGRAM(s) Oral every 8 hours  insulin glargine Injectable (LANTUS) 22 Unit(s) SubCutaneous every morning  insulin lispro (ADMELOG) corrective regimen sliding scale   SubCutaneous three times a day before meals  insulin lispro Injectable (ADMELOG) 5 Unit(s) SubCutaneous three times a day before meals  labetalol 400 milliGRAM(s) Oral three times a day  levETIRAcetam  IVPB 1500 milliGRAM(s) IV Intermittent every 12 hours  lidocaine 1%/epinephrine 1:100,000 Inj 20 milliLiter(s) Local Injection once  losartan 100 milliGRAM(s) Oral daily  multivitamin/minerals/iron Oral Solution (CENTRUM) 15 milliLiter(s) Oral daily  pantoprazole    Tablet 40 milliGRAM(s) Oral before breakfast  phenytoin   Chewable 50 milliGRAM(s) Oral at bedtime  phenytoin  IVPB 100 milliGRAM(s) IV Intermittent three times a day  polyethylene glycol 3350 17 Gram(s) Oral every 12 hours  sodium bicarbonate 650 milliGRAM(s) Oral every 6 hours  sodium chloride 0.65% Nasal 2 Spray(s) Both Nostrils two times a day    MEDICATIONS  (PRN):  acetaminophen     Tablet .. 650 milliGRAM(s) Oral every 6 hours PRN Temp greater or equal to 38C (100.4F), Mild Pain (1 - 3)  dextrose Oral Gel 15 Gram(s) Oral once PRN Blood Glucose LESS THAN 70 milliGRAM(s)/deciliter  labetalol Injectable 10 milliGRAM(s) IV Push every 6 hours PRN Systolic blood pressure >  melatonin 3 milliGRAM(s) Oral at bedtime PRN Insomnia  midazolam Injectable 4 milliGRAM(s) IV Push once PRN seizure    Home Medications:  losartan 50 mg oral tablet: 1 tab(s) orally once a day (02 Aug 2022 10:38)  metFORMIN 500 mg oral tablet: 1 tab(s) orally 2 times a day (02 Aug 2022 10:38)  metoprolol succinate 50 mg oral tablet, extended release: 1 tab(s) orally once a day (02 Aug 2022 10:38)    Vital Signs Last 24 Hrs  T(C): 36.1 (15 Aug 2022 12:30), Max: 37.2 (14 Aug 2022 20:04)  T(F): 96.9 (15 Aug 2022 12:30), Max: 99 (14 Aug 2022 20:04)  HR: 70 (15 Aug 2022 12:30) (62 - 76)  BP: 160/86 (15 Aug 2022 12:30) (131/66 - 190/70)  BP(mean): 125 (15 Aug 2022 05:20) (125 - 125)  RR: 18 (15 Aug 2022 12:30) (18 - 18)  SpO2: 98% (15 Aug 2022 12:30) (94% - 99%)    Parameters below as of 15 Aug 2022 09:45  Patient On (Oxygen Delivery Method): room air      CAPILLARY BLOOD GLUCOSE      POCT Blood Glucose.: 127 mg/dL (15 Aug 2022 17:10)  POCT Blood Glucose.: 117 mg/dL (15 Aug 2022 11:17)  POCT Blood Glucose.: 86 mg/dL (15 Aug 2022 07:24)  POCT Blood Glucose.: 85 mg/dL (14 Aug 2022 22:27)    LABS:                        9.3    5.75  )-----------( 345      ( 15 Aug 2022 07:05 )             27.7     08-15    141  |  112<H>  |  16  ----------------------------<  87  3.8   |  17  |  0.8    Ca    8.5      15 Aug 2022 07:05  Phos  4.2     08-13  Mg     1.9     08-15    TPro  5.7<L>  /  Alb  2.9<L>  /  TBili  <0.2  /  DBili  x   /  AST  21  /  ALT  14  /  AlkPhos  122<H>  08-15    LIVER FUNCTIONS - ( 15 Aug 2022 07:05 )  Alb: 2.9 g/dL / Pro: 5.7 g/dL / ALK PHOS: 122 U/L / ALT: 14 U/L / AST: 21 U/L / GGT: x           CARDIAC MARKERS ( 13 Aug 2022 22:44 )  x     / 0.01 ng/mL / x     / x     / x                      Culture - Blood (collected 13 Aug 2022 22:44)  Source: .Blood Blood-Peripheral  Gram Stain (15 Aug 2022 08:35):    Growth in anaerobic bottle: Gram Positive Cocci in Clusters  Preliminary Report (15 Aug 2022 08:35):    Growth in anaerobic bottle: Gram Positive Cocci in Clusters    ***Blood Panel PCR results on this specimen are available    approximately 3 hours after the Gram stain result.***    Gram stain, PCR, and/or culture results may not always    correspond due todifference in methodologies.    ************************************************************    This PCR assay was performed by multiplex PCR. This    Assay tests for 66 bacterial and resistance gene targets.    Please refer to the [facility] testdirectory    at [email or web address] for details.  Organism: Blood Culture PCR (15 Aug 2022 10:35)  Organism: Blood Culture PCR (15 Aug 2022 10:35)      Consultant Notes Reviewed:  [x ] YES  [ ] NO  Care Discussed with Consultants/Other Providers/ Housestaff [ x] YES  [ ] NO  Radiology, labs, new studies personally reviewed.

## 2022-08-15 NOTE — PROGRESS NOTE ADULT - SUBJECTIVE AND OBJECTIVE BOX
Neurology Progress Note    Interval History:    55 yo F with PMH of HTN, DM2, and stroke (per son 2017) who presented for RLE wound. Started 3 months ago when she fell and hit her leg on a wooden cabinet. She put hydrogen peroxide, antibiotic cream, and wrapped the wound but never fully healed. Two weeks ago it started to show yellow pus so her and her family came to the ED for further treatment. Endorsed subjective fevers, chest pain, and vision changes which occurs when walked 2-3 blocks. Denied congestion, sore throat, cough, dyspnea, headache, dysuria, N/V, diarrhea     Per son, they fill her medications at [facility] ([phone]) and this is their current pharmacy. Called them to confirm her medications and they said her last refill of medications was 2018. Pt has not seen a doctor due to insurance problem and has not been taking any medications.    In the ED:  Vitals: T: 98.9, BP: 296/ 174, , RR: 18, 98%O2 on RA  Labs: CBC showed WBC 11.23; ESR 92, CRP 35.6  CMP showed glucose 302, alk phos 173; ; VBG pH 7.45  EKG pending  CXR showed borderline cardiomegaly and no airspace opacity.   X-ray of RLE also pending.     Received unasyn and vanco, humalin R, IV vasotec, IV labetalol   (02 Aug 2022 07:47)        Vital Signs Last 24 Hrs  T(C): 36.6 (15 Aug 2022 05:20), Max: 37.2 (14 Aug 2022 20:04)  T(F): 97.8 (15 Aug 2022 05:20), Max: 99 (14 Aug 2022 20:04)  HR: 68 (15 Aug 2022 05:20) (62 - 84)  BP: 184/87 (15 Aug 2022 05:20) (131/66 - 190/70)  BP(mean): 125 (15 Aug 2022 05:20) (125 - 125)  RR: 18 (15 Aug 2022 05:20) (18 - 18)  SpO2: 97% (15 Aug 2022 00:00) (96% - 99%)    Parameters below as of 15 Aug 2022 05:20  Patient On (Oxygen Delivery Method): room air        Neurological Exam:   Patient lethargic, able to converse with short sentences.   Significant dysarthria, limited aphasia exam.     RUE 3/5 RLE 2/5 LUE LLE 1/5 chronically.  Sensation: limited due to lethargy/mental status  Coordination: limited by mental status and weakness  Gait: deferred    NIHSS 23      Medications:  MEDICATIONS  (STANDING):  amLODIPine   Tablet 10 milliGRAM(s) Oral daily  amoxicillin  875 milliGRAM(s)/clavulanate 1 Tablet(s) Oral every 12 hours  aspirin  chewable 81 milliGRAM(s) Enteral Tube daily  atorvastatin 80 milliGRAM(s) Oral at bedtime  clopidogrel Tablet 75 milliGRAM(s) Oral daily  collagenase Ointment 1 Application(s) Topical two times a day  Dakins Solution - 1/2 Strength 1 Application(s) Topical two times a day  dextrose 5%. 1000 milliLiter(s) (100 mL/Hr) IV Continuous <Continuous>  dextrose 5%. 1000 milliLiter(s) (50 mL/Hr) IV Continuous <Continuous>  dextrose 50% Injectable 25 Gram(s) IV Push once  dextrose 50% Injectable 12.5 Gram(s) IV Push once  dextrose 50% Injectable 25 Gram(s) IV Push once  doxazosin 2 milliGRAM(s) Oral at bedtime  enoxaparin Injectable 40 milliGRAM(s) SubCutaneous every 24 hours  glucagon  Injectable 1 milliGRAM(s) IntraMuscular once  hydrALAZINE 100 milliGRAM(s) Oral every 8 hours  insulin glargine Injectable (LANTUS) 22 Unit(s) SubCutaneous every morning  insulin lispro (ADMELOG) corrective regimen sliding scale   SubCutaneous three times a day before meals  insulin lispro Injectable (ADMELOG) 5 Unit(s) SubCutaneous three times a day before meals  labetalol 400 milliGRAM(s) Oral three times a day  levETIRAcetam  IVPB 1500 milliGRAM(s) IV Intermittent every 12 hours  lidocaine 1%/epinephrine 1:100,000 Inj 20 milliLiter(s) Local Injection once  losartan 100 milliGRAM(s) Oral daily  multivitamin/minerals/iron Oral Solution (CENTRUM) 15 milliLiter(s) Oral daily  pantoprazole    Tablet 40 milliGRAM(s) Oral before breakfast  phenytoin   Chewable 50 milliGRAM(s) Oral at bedtime  phenytoin  IVPB 100 milliGRAM(s) IV Intermittent three times a day  polyethylene glycol 3350 17 Gram(s) Oral every 12 hours  sodium bicarbonate 650 milliGRAM(s) Oral every 6 hours  sodium chloride 0.65% Nasal 2 Spray(s) Both Nostrils two times a day  vancomycin  IVPB 1250 milliGRAM(s) IV Intermittent every 12 hours      Labs:  CBC Full  -  ( 15 Aug 2022 07:05 )  WBC Count : 5.75 K/uL  RBC Count : 3.17 M/uL  Hemoglobin : 9.3 g/dL  Hematocrit : 27.7 %  Platelet Count - Automated : 345 K/uL  Mean Cell Volume : 87.4 fL  Mean Cell Hemoglobin : 29.3 pg  Mean Cell Hemoglobin Concentration : 33.6 g/dL  Auto Neutrophil # : 2.42 K/uL  Auto Lymphocyte # : 2.62 K/uL  Auto Monocyte # : 0.53 K/uL  Auto Eosinophil # : 0.15 K/uL  Auto Basophil # : 0.02 K/uL  Auto Neutrophil % : 42.1 %  Auto Lymphocyte % : 45.6 %  Auto Monocyte % : 9.2 %  Auto Eosinophil % : 2.6 %  Auto Basophil % : 0.3 %    08-15    141  |  112<H>  |  16  ----------------------------<  87  3.8   |  17  |  0.8    Ca    8.5      15 Aug 2022 07:05  Phos  4.2     08-13  Mg     1.9     08-15    TPro  5.7<L>  /  Alb  2.9<L>  /  TBili  <0.2  /  DBili  x   /  AST  21  /  ALT  14  /  AlkPhos  122<H>  08-15    LIVER FUNCTIONS - ( 15 Aug 2022 07:05 )  Alb: 2.9 g/dL / Pro: 5.7 g/dL / ALK PHOS: 122 U/L / ALT: 14 U/L / AST: 21 U/L / GGT: x               < from: CT Head No Cont (08.13.22 @ 17:11) >    IMPRESSION:  Faintly demonstrated are multiple scattered cortically-based   hypodensities, consistent with known history of acute embolic stroke,   better delineated on recent MRI. No evidence of hemorrhagic   transformation.    --- End of Report ---      < end of copied text >  < from: CT Angio Head w/ IV Cont (08.12.22 @ 10:57) >    IMPRESSION:    No large vessel occlusion, aneurysm, or vascular malformation.    Moderate right/mild left atherosclerotic stenosis in the supraclinoid   ICAs.    Focal mild atherosclerotic stenosis in the V3 segment of the right   vertebral artery.      < end of copied text >

## 2022-08-15 NOTE — PHYSICAL THERAPY INITIAL EVALUATION ADULT - PERTINENT HX OF CURRENT PROBLEM, REHAB EVAL
55 yo F with PMH of HTN, DM2, and stroke (per son 2017) who presented for AMS with RLE wound that started 3 months ago when she fell and hit her leg on a wooden cabinet. She put hydrogen peroxide, antibiotic cream, and wrapped the wound but never fully healed. Two weeks ago it started to show yellow pus so her and her family came to the ED for further treatment. Endorsed subjective fevers, chest pain, and vision changes which occurs when walked 2-3 blocks. Denied congestion, sore throat, cough, dyspnea, headache, dysuria, N/V, diarrhea. Code stroke for unresponsiveness at 4pm 8/5

## 2022-08-15 NOTE — SWALLOW BEDSIDE ASSESSMENT ADULT - SWALLOW EVAL: DIAGNOSIS
Pt p/ as lethargic and fatigued. Pt required max cues from SLP and family  and physiatrist to maintain arousability. Pt not appropriate for PO trials at this time.

## 2022-08-15 NOTE — PROGRESS NOTE ADULT - ASSESSMENT
57 yo F with PMH of HTN, DM2, CVA (2017) who presented with purulent right lower extremity wound for 3 months consistent with acute RLE cellulitis. Code stroke for unresponsiveness at 4pm 8/5    #Acute ischemic strokes w/ multiple chronic microhemorrhages / ?Seizures  -CTH negative for acute ICH  -d/w neuro: high likelihood of seizure  -started on IV Keppra  -check labs, lactate  -seizure precautions  -cont VEEG  -keep NPO, S&S f/u   -IVFs  -close monitoring  8/8: increased Keppra dose and added Dilantin. Remains on VEEG  8/9-10 d/w neuro: will get MRI, cont VEEG. LP depending on MRI results  8/10: son declined NGT placement and asked to wait till am (aware of risks)  8/11< from: MR Head w/wo IV Cont (08.10.22 @ 21:25) >  Multiple punctate cortical acute infarcts involving multiple vascular territories possibly related to embolic etiology. No evidence of acute hemorrhage. Moderate chronic microvascular type changes as well as chronic hemosiderin deposition within the right basal ganglia consistent with remote hemorrhage.  Chronic right thalamic lacunar infarcts.  < end of copied text >  -d/w neuro: start ASA, Plavix. Cont Lipitor  -repeat CTA head noted  -NGT placement and feeds   -PT/OT/physiatry  -TTE w/ bubble pending (notified logistics multiple times)  -will need ?IVONNE vs CTA heart vs TTE w/ contrast    and ILR  reminded family to bring records from [facility]  -neuro checks  -8/13: spoke to neuro, will give an extras dose of DIlantin 500 x1. Repeat dilantin level in 48hrs.   -8/14 CTH, EEG unchanged.   8/15: more alert: d/w neuro, will repeat MRI brain, check vasculitis and hypercoag workup  poor candidate for therapeutic A/c as per neuro    #Purulent RLE cellulitis r/o abscess / Fever  -s/p unasyn and and vancomycin in ED  -f/u wound and blood cultures   -trend inflammatory markers   -ID consult noted  - vanco, Unasyn  -s/p burn debridement   -changed ABx to augmentin  -8/13: fever: pancultured  -8/14: Candida in wound. D/w Dr. Chua: contaminant  8/15 BCx w/ staph: likely contaminant. F/u repeat BCx    #Hypertensive urgency due to noncompliance and Malignant HTN  -BP at admission 296/174 without signs of end organ damage  -gradual BP lowering  -close monitoring  -8/12: added Norvasc  -8/13 increased Labetalol dose. Pt also has PRN Labetalol IVP  8/14 added cardura  8/15 increased Hydralazine  -renal eval  -check for pheo, renin/dawn    # DMII w/ Hyperglycemia  - started insulin regimen   - FS ac/hs  -A1c=10.8    # Atypical Chest pain and FELIZ on admission- possibly MSK related but r/o cardiac etiology   - chest wall tender to palpation on admission  - currently CP free  - CXR unremarkable   - Tn negative  - outpt stress test    # hx of CVA 2017 (Aneurysmal as per family? ?ICH but old ischemic strokes on CTH)  # Left sided weakness  -need to get more Hx  -as per family, aneurysm was never fixed    DVT ppx: lovenox, SCDs  GI ppx: protonix  Diet: DASH/carb consistent  Activity: AAT    Very high risk pt. Px is guarded.    #Progress Note Handoff  Pending: Consults____Clinical improvement and stability__x___MRI, ILR, ?PEG, hypercoag___PT____x____  Pt/Family discussion: Pt/family informed and agree with the current plan  Disposition: Home______/SNF_______/4A______/To be determined____x____    My note supersedes the residents note should a discrepancy arise.    Chart and notes personally reviewed.  Care Discussed with Consultants/Other Providers/ Housestaff [ x] YES [ ] NO   Radiology, labs, old records personally reviewed.    discussed w/ housestaff, nursing, case management, neuro team

## 2022-08-15 NOTE — PROGRESS NOTE ADULT - ASSESSMENT
Impression:  55 yo F with PMH of HTN, DM2, and stroke (per son 2017) with left sided weakness who presented for RLE wound. Stroke Code was called for episode of AMS, patient not responding to commands which was not her baseline. NIHSS 23. No tpa given due to patient returning back to baseline, following commands. Concern for seizure with post ictal confusion. CTH: Hypodensity in the periventricular white matter, right thalamus, right basal ganglia, and right insular cortex likely representing ischemic change, age indeterminate. EEG: moderate right temporal slowing, repeat EEG: Focal and generalized slowing, Dilantin trough 8/9: 10.5, Dilantin 8/11: 8.5. MRI brain:: Multiple punctate cortical acute infarcts involving multiple vascular territories possibly related to embolic etiology. No evidence of acute hemorrhage. Moderate chronic microvascular type changes as well as chronic hemosiderin deposition within the right basal ganglia consistent with remote hemorrhage. CTA head reported  Moderate right/mild left atherosclerotic stenosis in the supraclinoid   ICAs. Focal mild atherosclerotic stenosis in the V3 segment of the right vertebral artery. Chronic right thalamic lacunar infarcts. Remains lethargic on exam, converses minimally.     Recommendation:  - Continue Dilantin to 100mg/ 100mg/ 150mg    - check dilantin trough keep b/w 10 - 20  - Continue Keppra 1500mg q12hrs   - Will decide on TTE vs IVONNE  - Starting aspirin 81mg /Plavix 75  - Continue statins  - Telemetry monitoring   - consider hypercoagulable w/u   - PT/OT/SLP  - Attending to follow     Impression:  55 yo F with PMH of HTN, DM2, and stroke (per son 2017) with left sided weakness who presented for RLE wound. Stroke Code was called for episode of AMS, patient not responding to commands which was not her baseline. NIHSS 23. No tpa given due to patient returning back to baseline, following commands. Concern for seizure with post ictal confusion. CTH: Hypodensity in the periventricular white matter, right thalamus, right basal ganglia, and right insular cortex likely representing ischemic change, age indeterminate. EEG: moderate right temporal slowing, repeat EEG: Focal and generalized slowing, Dilantin trough 8/9: 10.5, Dilantin 8/11: 8.5. MRI brain:: Multiple punctate cortical acute infarcts involving multiple vascular territories possibly related to embolic etiology. No evidence of acute hemorrhage. Moderate chronic microvascular type changes as well as chronic hemosiderin deposition within the right basal ganglia consistent with remote hemorrhage. CTA head reported  Moderate right/mild left atherosclerotic stenosis in the supraclinoid   ICAs. Focal mild atherosclerotic stenosis in the V3 segment of the right vertebral artery. Chronic right thalamic lacunar infarcts. Remains lethargic on exam, converses minimally.     Recommendation:  - Continue Dilantin to 100mg/ 100mg/ 150mg    - check dilantin trough keep b/w 10 - 20  - Continue Keppra 1500mg q12hrs   - Repeat MRI brain w/o ROMY  - Will decide on TTE vs IVONNE  - Starting aspirin 81mg /Plavix 75  - Continue statins  - Telemetry monitoring   - send vasculitis and hypercoagulable labs  - PT/OT/SLP  - Attending to follow

## 2022-08-15 NOTE — PROGRESS NOTE ADULT - SUBJECTIVE AND OBJECTIVE BOX
SUBJECTIVE:    Patient is a 56y old Female who presents with a chief complaint of ams (15 Aug 2022 11:29)    Overnight Events: No overnight events. Patient's BP is still on the high side, increased hydralazine 100 q8h, patient is receiving feeds through the NG tube. Patient is still receiving augmentin for the infected ulcer.  Blood cultures grew Staph epidermidis resistant to methicillin, repeat blood cultures and received only one dose of vancomcyin, will observe     PAST MEDICAL & SURGICAL HISTORY  Hypertension    Diabetes mellitus    No significant past surgical history      SOCIAL HISTORY:  Negative for smoking/alcohol/drug use.     ALLERGIES:  No Known Allergies    MEDICATIONS:  STANDING MEDICATIONS  amLODIPine   Tablet 10 milliGRAM(s) Oral daily  amoxicillin  875 milliGRAM(s)/clavulanate 1 Tablet(s) Oral every 12 hours  aspirin  chewable 81 milliGRAM(s) Enteral Tube daily  atorvastatin 80 milliGRAM(s) Oral at bedtime  clopidogrel Tablet 75 milliGRAM(s) Oral daily  collagenase Ointment 1 Application(s) Topical two times a day  Dakins Solution - 1/2 Strength 1 Application(s) Topical two times a day  dextrose 5%. 1000 milliLiter(s) IV Continuous <Continuous>  dextrose 5%. 1000 milliLiter(s) IV Continuous <Continuous>  dextrose 50% Injectable 25 Gram(s) IV Push once  dextrose 50% Injectable 12.5 Gram(s) IV Push once  dextrose 50% Injectable 25 Gram(s) IV Push once  doxazosin 2 milliGRAM(s) Oral at bedtime  enoxaparin Injectable 40 milliGRAM(s) SubCutaneous every 24 hours  glucagon  Injectable 1 milliGRAM(s) IntraMuscular once  hydrALAZINE 100 milliGRAM(s) Oral every 8 hours  insulin glargine Injectable (LANTUS) 22 Unit(s) SubCutaneous every morning  insulin lispro (ADMELOG) corrective regimen sliding scale   SubCutaneous three times a day before meals  insulin lispro Injectable (ADMELOG) 5 Unit(s) SubCutaneous three times a day before meals  labetalol 400 milliGRAM(s) Oral three times a day  levETIRAcetam  IVPB 1500 milliGRAM(s) IV Intermittent every 12 hours  lidocaine 1%/epinephrine 1:100,000 Inj 20 milliLiter(s) Local Injection once  losartan 100 milliGRAM(s) Oral daily  multivitamin/minerals/iron Oral Solution (CENTRUM) 15 milliLiter(s) Oral daily  pantoprazole    Tablet 40 milliGRAM(s) Oral before breakfast  phenytoin   Chewable 50 milliGRAM(s) Oral at bedtime  phenytoin  IVPB 100 milliGRAM(s) IV Intermittent three times a day  polyethylene glycol 3350 17 Gram(s) Oral every 12 hours  sodium bicarbonate 650 milliGRAM(s) Oral every 6 hours  sodium chloride 0.65% Nasal 2 Spray(s) Both Nostrils two times a day    PRN MEDICATIONS  acetaminophen     Tablet .. 650 milliGRAM(s) Oral every 6 hours PRN  dextrose Oral Gel 15 Gram(s) Oral once PRN  labetalol Injectable 10 milliGRAM(s) IV Push every 6 hours PRN  melatonin 3 milliGRAM(s) Oral at bedtime PRN  midazolam Injectable 4 milliGRAM(s) IV Push once PRN    VITALS:   T(F): 96.9, Max: 99 (08-14-22 @ 20:04)  HR: 70 (62 - 76)  BP: 160/86 (131/66 - 190/70)  RR: 18 (18 - 18)  SpO2: 98% (94% - 99%)    LABS:                        9.3    5.75  )-----------( 345      ( 15 Aug 2022 07:05 )             27.7     08-15    141  |  112<H>  |  16  ----------------------------<  87  3.8   |  17  |  0.8    Ca    8.5      15 Aug 2022 07:05  Phos  4.2     08-13  Mg     1.9     08-15    TPro  5.7<L>  /  Alb  2.9<L>  /  TBili  <0.2  /  DBili  x   /  AST  21  /  ALT  14  /  AlkPhos  122<H>  08-15              Culture - Blood (collected 13 Aug 2022 22:44)  Source: .Blood Blood-Peripheral  Gram Stain (15 Aug 2022 08:35):    Growth in anaerobic bottle: Gram Positive Cocci in Clusters  Preliminary Report (15 Aug 2022 08:35):    Growth in anaerobic bottle: Gram Positive Cocci in Clusters    ***Blood Panel PCR results on this specimen are available    approximately 3 hours after the Gram stain result.***    Gram stain, PCR, and/or culture results may not always    correspond due todifference in methodologies.    ************************************************************    This PCR assay was performed by multiplex PCR. This    Assay tests for 66 bacterial and resistance gene targets.    Please refer to the [facility] Labs testdirectory    at [email or web address] for details.  Organism: Blood Culture PCR (15 Aug 2022 10:35)  Organism: Blood Culture PCR (15 Aug 2022 10:35)      CARDIAC MARKERS ( 13 Aug 2022 22:44 )  x     / 0.01 ng/mL / x     / x     / x              08-14-22 @ 07:01  -  08-15-22 @ 07:00  --------------------------------------------------------  IN: 850 mL / OUT: 0 mL / NET: 850 mL          IMAGING/EKG:    No imaging    PHYSICAL EXAM:  GEN: NAD, comfortable  LUNGS: CTAB, no w/r/r  HEART: RRR, s1 and s2 appreciated, no m/r/g  ABD: soft, NT/ND, +BS  EXT: no edema, PP b/l  NEURO: AAOX3

## 2022-08-16 NOTE — PROGRESS NOTE ADULT - SUBJECTIVE AND OBJECTIVE BOX
SUBJECTIVE:    Patient is a 56y old Female who presents with a chief complaint of Ulcer over the right lower extremity (15 Aug 2022 16:16).    Overnight Events: No overnight events. Patient is HD stable, receiving feeds through the NG tube, PEG tube placement might be scheduled for next week, still on isolation, might get an ILR late this week.    PAST MEDICAL & SURGICAL HISTORY  Hypertension    Diabetes mellitus    No significant past surgical history      SOCIAL HISTORY:  Negative for smoking/alcohol/drug use.     ALLERGIES:  No Known Allergies    MEDICATIONS:  STANDING MEDICATIONS  amLODIPine   Tablet 10 milliGRAM(s) Oral daily  amoxicillin  875 milliGRAM(s)/clavulanate 1 Tablet(s) Oral every 12 hours  aspirin  chewable 81 milliGRAM(s) Enteral Tube daily  atorvastatin 80 milliGRAM(s) Oral at bedtime  collagenase Ointment 1 Application(s) Topical two times a day  Dakins Solution - 1/2 Strength 1 Application(s) Topical two times a day  dextrose 5%. 1000 milliLiter(s) IV Continuous <Continuous>  dextrose 5%. 1000 milliLiter(s) IV Continuous <Continuous>  dextrose 50% Injectable 25 Gram(s) IV Push once  dextrose 50% Injectable 12.5 Gram(s) IV Push once  dextrose 50% Injectable 25 Gram(s) IV Push once  doxazosin 2 milliGRAM(s) Oral at bedtime  enoxaparin Injectable 40 milliGRAM(s) SubCutaneous every 24 hours  glucagon  Injectable 1 milliGRAM(s) IntraMuscular once  hydrALAZINE 100 milliGRAM(s) Oral every 8 hours  insulin glargine Injectable (LANTUS) 22 Unit(s) SubCutaneous every morning  insulin lispro (ADMELOG) corrective regimen sliding scale   SubCutaneous three times a day before meals  insulin lispro Injectable (ADMELOG) 5 Unit(s) SubCutaneous three times a day before meals  labetalol 400 milliGRAM(s) Oral three times a day  levETIRAcetam  IVPB 1500 milliGRAM(s) IV Intermittent every 12 hours  lidocaine 1%/epinephrine 1:100,000 Inj 20 milliLiter(s) Local Injection once  losartan 100 milliGRAM(s) Oral daily  magnesium sulfate  IVPB 2 Gram(s) IV Intermittent once  multivitamin/minerals/iron Oral Solution (CENTRUM) 15 milliLiter(s) Oral daily  pantoprazole    Tablet 40 milliGRAM(s) Oral before breakfast  phenytoin   Chewable 50 milliGRAM(s) Oral at bedtime  phenytoin  IVPB 100 milliGRAM(s) IV Intermittent three times a day  polyethylene glycol 3350 17 Gram(s) Oral every 12 hours  sodium bicarbonate 650 milliGRAM(s) Oral every 6 hours  sodium chloride 0.65% Nasal 2 Spray(s) Both Nostrils two times a day    PRN MEDICATIONS  acetaminophen     Tablet .. 650 milliGRAM(s) Oral every 6 hours PRN  dextrose Oral Gel 15 Gram(s) Oral once PRN  labetalol Injectable 10 milliGRAM(s) IV Push every 6 hours PRN  melatonin 3 milliGRAM(s) Oral at bedtime PRN    VITALS:   T(F): 101.2, Max: 101.2 (08-16-22 @ 05:05)  HR: 89 (79 - 92)  BP: 123/81 (123/81 - 170/89)  RR: 20 (18 - 20)  SpO2: 93% (93% - 93%)    LABS:                        9.2    7.90  )-----------( 357      ( 16 Aug 2022 06:04 )             26.7     08-16    140  |  109  |  17  ----------------------------<  119<H>  4.0   |  16<L>  |  0.8    Ca    8.6      16 Aug 2022 06:04  Mg     1.7     08-16    TPro  5.8<L>  /  Alb  2.9<L>  /  TBili  <0.2  /  DBili  x   /  AST  21  /  ALT  17  /  AlkPhos  145<H>  08-16              Culture - Blood (collected 13 Aug 2022 22:44)  Source: .Blood Blood-Peripheral  Gram Stain (15 Aug 2022 08:35):    Growth in anaerobic bottle: Gram Positive Cocci in Clusters  Preliminary Report (15 Aug 2022 08:35):    Growth in anaerobic bottle: Gram Positive Cocci in Clusters    ***Blood Panel PCR results on this specimen are available    approximately 3 hours after the Gram stain result.***    Gram stain, PCR, and/or culture results may not always    correspond due todifference in methodologies.    ************************************************************    This PCR assay was performed by multiplex PCR. This    Assay tests for 66 bacterial and resistance gene targets.    Please refer to the [facility] Labs testdirectory    at [email or web address] for details.  Organism: Blood Culture PCR (15 Aug 2022 10:35)  Organism: Blood Culture PCR (15 Aug 2022 10:35)                  IMAGING/EKG:    No imaging    PHYSICAL EXAM:  GEN: NAD, comfortable  LUNGS: CTAB  HEART: RRR  ABD: soft, NT/ND, +BS  EXT: no edema, PP b/l  NEURO: Alert, but minimally cooperative

## 2022-08-16 NOTE — PROGRESS NOTE ADULT - ATTENDING COMMENTS
55 yo F with PMH of HTN, DM2, CVA (2017) who presented with purulent right lower extremity wound for 3 months consistent with acute RLE cellulitis. Code stroke for unresponsiveness at 4pm 8/5    # fever, send bc, ID consult ,  Culture - Blood (08.13.22 @ 22:44)    -  Staphylococcus epidermidis, Methicillin resistant: Detec    Gram Stain:   Growth in anaerobic bottle: Gram Positive Cocci in Clusters    Specimen Source: .Blood Blood-Peripheral    Organism: Blood Culture PCR    Culture Results:   Growth in anaerobic bottle: Staphylococcus epidermidis Coag Negative  Staphylococcus  Single set isolate, possible contaminant. Contact  Microbiology if susceptibility testing clinically  indicated.  ***Blood Panel PCR results on this specimen are available  approximately 3 hours after the Gram stain result.***  Gram stain, PCR, and/or culture results may not always  correspond due to difference in methodologies.  ************************************************************  This PCR assay was performed by multiplex PCR. This  Assay tests for 66 bacterial and resistance gene targets.  Please refer to the [facility] test directory  at [email or web address] for details.    Organism Identification: Blood Culture PCR    Method Type: PCR    #Acute ischemic strokes / ?Seizures  -CTH negative for acute ICH  followup neurology   fu echo     # dysphagia, cont ng tube feeding  plan is PEG when medically stable     # right lower ext cellulitis   found to have necrotic tissue  sp debridement by dr castillo,   cont abx    # hypertensive urgency resolved cont meds   BP: 133/82 (08-16-22 @ 14:22) (123/81 - 170/89)    # chest pain, resolved, probably due to hypertensive urgency   cardiac enzyems neg     # DM cont insulin per protocol ,     A1C with Estimated Average Glucose Result: 10.4: (08.06.22 @ 06:59)    POCT Blood Glucose.: 158 mg/dL (08-16-22 @ 16:40)  POCT Blood Glucose.: 138 mg/dL (08-16-22 @ 12:07)  POCT Blood Glucose.: 104 mg/dL (08-15-22 @ 23:53)  POCT Blood Glucose.: 116 mg/dL (08-15-22 @ 21:24)    #Progress Note Handoff  Pending (specify): echo, neuro clearance , will need peg   Disposition: possible acute rehab, 55 yo F with PMH of HTN, DM2, CVA (2017) who presented with purulent right lower extremity wound for 3 months consistent with acute RLE cellulitis. Code stroke for unresponsiveness at 4pm 8/5    # fever, send bc, ID consult , bacteremia, repeat bc   WBC Count: 7.90 K/uL (08.16.22 @ 06:04)    Culture - Blood (08.13.22 @ 22:44)    -  Staphylococcus epidermidis, Methicillin resistant: Detec    Gram Stain:   Growth in anaerobic bottle: Gram Positive Cocci in Clusters    Specimen Source: .Blood Blood-Peripheral    Organism: Blood Culture PCR    Culture Results:   Growth in anaerobic bottle: Staphylococcus epidermidis Coag Negative      #Acute ischemic strokes / ?Seizures  -CTH negative for acute ICH  followup neurology   fu echo     # dysphagia, cont ng tube feeding  plan is PEG when medically stable     # right lower ext cellulitis   found to have necrotic tissue  sp debridement by dr castillo,   cont abx    # hypertensive urgency resolved cont meds   BP: 133/82 (08-16-22 @ 14:22) (123/81 - 170/89)    # chest pain, resolved, probably due to hypertensive urgency   cardiac enzyems neg     # DM cont insulin per protocol ,     A1C with Estimated Average Glucose Result: 10.4: (08.06.22 @ 06:59)    POCT Blood Glucose.: 158 mg/dL (08-16-22 @ 16:40)  POCT Blood Glucose.: 138 mg/dL (08-16-22 @ 12:07)  POCT Blood Glucose.: 104 mg/dL (08-15-22 @ 23:53)  POCT Blood Glucose.: 116 mg/dL (08-15-22 @ 21:24)    #Progress Note Handoff  Pending (specify): echo, neuro clearance , will need peg   Disposition: possible acute rehab,

## 2022-08-16 NOTE — CONSULT NOTE ADULT - SUBJECTIVE AND OBJECTIVE BOX
INTERVENTIONAL RADIOLOGY CONSULT:     Procedure Requested: G tube placement    HPI:  57 yo F with PMH of HTN, DM2, and stroke (per son 2017) who presented for RLE wound. Started 3 months ago when she fell and hit her leg on a wooden cabinet. She put hydrogen peroxide, antibiotic cream, and wrapped the wound but never fully healed. Two weeks ago it started to show yellow pus so her and her family came to the ED for further treatment. Endorsed subjective fevers, chest pain, and vision changes which occurs when walked 2-3 blocks. Denied congestion, sore throat, cough, dyspnea, headache, dysuria, N/V, diarrhea     Per son, they fill her medications at [facility] ([phone]) and this is their current pharmacy. Called them to confirm her medications and they said her last refill of medications was 2018. Pt has not seen a doctor due to insurance problem and has not been taking any medications.    In the ED:  Vitals: T: 98.9, BP: 296/ 174, , RR: 18, 98%O2 on RA  Labs: CBC showed WBC 11.23; ESR 92, CRP 35.6  CMP showed glucose 302, alk phos 173; ; VBG pH 7.45  EKG pending  CXR showed borderline cardiomegaly and no airspace opacity.   X-ray of RLE also pending.     Received unasyn and vanco, humalin R, IV vasotec, IV labetalol   (02 Aug 2022 07:47)      PAST MEDICAL & SURGICAL HISTORY:  Hypertension      Diabetes mellitus      No significant past surgical history          MEDICATIONS  (STANDING):  amLODIPine   Tablet 10 milliGRAM(s) Oral daily  amoxicillin  875 milliGRAM(s)/clavulanate 1 Tablet(s) Oral every 12 hours  aspirin  chewable 81 milliGRAM(s) Enteral Tube daily  atorvastatin 80 milliGRAM(s) Oral at bedtime  clopidogrel Tablet 75 milliGRAM(s) Oral daily  collagenase Ointment 1 Application(s) Topical two times a day  Dakins Solution - 1/2 Strength 1 Application(s) Topical two times a day  dextrose 5%. 1000 milliLiter(s) (50 mL/Hr) IV Continuous <Continuous>  dextrose 5%. 1000 milliLiter(s) (100 mL/Hr) IV Continuous <Continuous>  dextrose 50% Injectable 25 Gram(s) IV Push once  dextrose 50% Injectable 12.5 Gram(s) IV Push once  dextrose 50% Injectable 25 Gram(s) IV Push once  doxazosin 2 milliGRAM(s) Oral at bedtime  enoxaparin Injectable 40 milliGRAM(s) SubCutaneous every 24 hours  glucagon  Injectable 1 milliGRAM(s) IntraMuscular once  hydrALAZINE 100 milliGRAM(s) Oral every 8 hours  insulin glargine Injectable (LANTUS) 22 Unit(s) SubCutaneous every morning  insulin lispro (ADMELOG) corrective regimen sliding scale   SubCutaneous three times a day before meals  insulin lispro Injectable (ADMELOG) 5 Unit(s) SubCutaneous three times a day before meals  labetalol 400 milliGRAM(s) Oral three times a day  levETIRAcetam  IVPB 1500 milliGRAM(s) IV Intermittent every 12 hours  lidocaine 1%/epinephrine 1:100,000 Inj 20 milliLiter(s) Local Injection once  losartan 100 milliGRAM(s) Oral daily  multivitamin/minerals/iron Oral Solution (CENTRUM) 15 milliLiter(s) Oral daily  pantoprazole    Tablet 40 milliGRAM(s) Oral before breakfast  phenytoin   Chewable 50 milliGRAM(s) Oral at bedtime  phenytoin  IVPB 100 milliGRAM(s) IV Intermittent three times a day  polyethylene glycol 3350 17 Gram(s) Oral every 12 hours  sodium bicarbonate 650 milliGRAM(s) Oral every 6 hours  sodium chloride 0.65% Nasal 2 Spray(s) Both Nostrils two times a day    MEDICATIONS  (PRN):  acetaminophen     Tablet .. 650 milliGRAM(s) Oral every 6 hours PRN Temp greater or equal to 38C (100.4F), Mild Pain (1 - 3)  dextrose Oral Gel 15 Gram(s) Oral once PRN Blood Glucose LESS THAN 70 milliGRAM(s)/deciliter  labetalol Injectable 10 milliGRAM(s) IV Push every 6 hours PRN Systolic blood pressure >  melatonin 3 milliGRAM(s) Oral at bedtime PRN Insomnia      Allergies    No Known Allergies    Intolerances    FAMILY HISTORY:  FH: type 2 diabetes mellitus        Physical Exam:   Vital Signs Last 24 Hrs  T(C): 38.4 (16 Aug 2022 05:05), Max: 38.4 (16 Aug 2022 05:05)  T(F): 101.2 (16 Aug 2022 05:05), Max: 101.2 (16 Aug 2022 05:05)  HR: 89 (16 Aug 2022 05:05) (70 - 92)  BP: 123/81 (16 Aug 2022 05:05) (123/81 - 170/89)  BP(mean): --  RR: 20 (16 Aug 2022 05:05) (18 - 20)  SpO2: 93% (15 Aug 2022 23:30) (93% - 98%)    Parameters below as of 15 Aug 2022 23:30  Patient On (Oxygen Delivery Method): room air    Labs:                         9.2    7.90  )-----------( 357      ( 16 Aug 2022 06:04 )             26.7     08-16    140  |  109  |  17  ----------------------------<  119<H>  4.0   |  16<L>  |  0.8    Ca    8.6      16 Aug 2022 06:04  Mg     1.7     08-16    TPro  5.8<L>  /  Alb  2.9<L>  /  TBili  <0.2  /  DBili  x   /  AST  21  /  ALT  17  /  AlkPhos  145<H>  08-16        Pertinent labs:                      9.2    7.90  )-----------( 357      ( 16 Aug 2022 06:04 )             26.7       08-16    140  |  109  |  17  ----------------------------<  119<H>  4.0   |  16<L>  |  0.8    Ca    8.6      16 Aug 2022 06:04  Mg     1.7     08-16    TPro  5.8<L>  /  Alb  2.9<L>  /  TBili  <0.2  /  DBili  x   /  AST  21  /  ALT  17  /  AlkPhos  145<H>  08-16      Radiology & Additional Studies:   Radiology imaging reviewed.       ASSESSMENT/ PLAN:   57 yo F with PMH of HTN, DM2, and stroke (per son 2017) who presented for RLE wound. Started 3 months ago when she fell and hit her leg on a wooden cabinet. She put hydrogen peroxide, antibiotic cream, and wrapped the wound but never fully healed. Two weeks ago it started to show yellow pus so her and her family came to the ED for further treatment. Endorsed subjective fevers, chest pain, and vision changes which occurs when walked 2-3 blocks.  Pt has not seen a doctor due to insurance problem and has not been taking any medications for more than 1yr. Code stroke for unresponsivenexx at 4pm 8/5. Bedside swallow evaluation completed yesterday, patient minimally arousable to tactile and verbal stim, pt not appropriate for PO trials at this time, patient currently NPO with NG tube feeds. IR consulted for G tube placement. Patient currently on ASA 81mg and Plavix 75mg due to recent CVA. For G tube to be placed, both aspirin and plavix must be held for 5 days prior to procedure to prevent bleeding risk. Due to recent history, primary team will not allow both to be held. Recommend surgical G tube placement at this time.  - recommend surgical evaluation for G tube placement   - no IR intervention at this time     Thank you for the courtesy of this consult, please call [phone] with any further questions.    INTERVENTIONAL RADIOLOGY CONSULT:     Procedure Requested: G tube placement    HPI:  55 yo F with PMH of HTN, DM2, and stroke (per son 2017) who presented for RLE wound. Started 3 months ago when she fell and hit her leg on a wooden cabinet. She put hydrogen peroxide, antibiotic cream, and wrapped the wound but never fully healed. Two weeks ago it started to show yellow pus so her and her family came to the ED for further treatment. Endorsed subjective fevers, chest pain, and vision changes which occurs when walked 2-3 blocks. Denied congestion, sore throat, cough, dyspnea, headache, dysuria, N/V, diarrhea     Per son, they fill her medications at [facility] ([phone]) and this is their current pharmacy. Called them to confirm her medications and they said her last refill of medications was 2018. Pt has not seen a doctor due to insurance problem and has not been taking any medications.    In the ED:  Vitals: T: 98.9, BP: 296/ 174, , RR: 18, 98%O2 on RA  Labs: CBC showed WBC 11.23; ESR 92, CRP 35.6  CMP showed glucose 302, alk phos 173; ; VBG pH 7.45  EKG pending  CXR showed borderline cardiomegaly and no airspace opacity.   X-ray of RLE also pending.     Received unasyn and vanco, humalin R, IV vasotec, IV labetalol   (02 Aug 2022 07:47)      PAST MEDICAL & SURGICAL HISTORY:  Hypertension      Diabetes mellitus      No significant past surgical history          MEDICATIONS  (STANDING):  amLODIPine   Tablet 10 milliGRAM(s) Oral daily  amoxicillin  875 milliGRAM(s)/clavulanate 1 Tablet(s) Oral every 12 hours  aspirin  chewable 81 milliGRAM(s) Enteral Tube daily  atorvastatin 80 milliGRAM(s) Oral at bedtime  clopidogrel Tablet 75 milliGRAM(s) Oral daily  collagenase Ointment 1 Application(s) Topical two times a day  Dakins Solution - 1/2 Strength 1 Application(s) Topical two times a day  dextrose 5%. 1000 milliLiter(s) (50 mL/Hr) IV Continuous <Continuous>  dextrose 5%. 1000 milliLiter(s) (100 mL/Hr) IV Continuous <Continuous>  dextrose 50% Injectable 25 Gram(s) IV Push once  dextrose 50% Injectable 12.5 Gram(s) IV Push once  dextrose 50% Injectable 25 Gram(s) IV Push once  doxazosin 2 milliGRAM(s) Oral at bedtime  enoxaparin Injectable 40 milliGRAM(s) SubCutaneous every 24 hours  glucagon  Injectable 1 milliGRAM(s) IntraMuscular once  hydrALAZINE 100 milliGRAM(s) Oral every 8 hours  insulin glargine Injectable (LANTUS) 22 Unit(s) SubCutaneous every morning  insulin lispro (ADMELOG) corrective regimen sliding scale   SubCutaneous three times a day before meals  insulin lispro Injectable (ADMELOG) 5 Unit(s) SubCutaneous three times a day before meals  labetalol 400 milliGRAM(s) Oral three times a day  levETIRAcetam  IVPB 1500 milliGRAM(s) IV Intermittent every 12 hours  lidocaine 1%/epinephrine 1:100,000 Inj 20 milliLiter(s) Local Injection once  losartan 100 milliGRAM(s) Oral daily  multivitamin/minerals/iron Oral Solution (CENTRUM) 15 milliLiter(s) Oral daily  pantoprazole    Tablet 40 milliGRAM(s) Oral before breakfast  phenytoin   Chewable 50 milliGRAM(s) Oral at bedtime  phenytoin  IVPB 100 milliGRAM(s) IV Intermittent three times a day  polyethylene glycol 3350 17 Gram(s) Oral every 12 hours  sodium bicarbonate 650 milliGRAM(s) Oral every 6 hours  sodium chloride 0.65% Nasal 2 Spray(s) Both Nostrils two times a day    MEDICATIONS  (PRN):  acetaminophen     Tablet .. 650 milliGRAM(s) Oral every 6 hours PRN Temp greater or equal to 38C (100.4F), Mild Pain (1 - 3)  dextrose Oral Gel 15 Gram(s) Oral once PRN Blood Glucose LESS THAN 70 milliGRAM(s)/deciliter  labetalol Injectable 10 milliGRAM(s) IV Push every 6 hours PRN Systolic blood pressure >  melatonin 3 milliGRAM(s) Oral at bedtime PRN Insomnia      Allergies    No Known Allergies    Intolerances    FAMILY HISTORY:  FH: type 2 diabetes mellitus        Physical Exam:   Vital Signs Last 24 Hrs  T(C): 38.4 (16 Aug 2022 05:05), Max: 38.4 (16 Aug 2022 05:05)  T(F): 101.2 (16 Aug 2022 05:05), Max: 101.2 (16 Aug 2022 05:05)  HR: 89 (16 Aug 2022 05:05) (70 - 92)  BP: 123/81 (16 Aug 2022 05:05) (123/81 - 170/89)  BP(mean): --  RR: 20 (16 Aug 2022 05:05) (18 - 20)  SpO2: 93% (15 Aug 2022 23:30) (93% - 98%)    Parameters below as of 15 Aug 2022 23:30  Patient On (Oxygen Delivery Method): room air    Labs:                         9.2    7.90  )-----------( 357      ( 16 Aug 2022 06:04 )             26.7     08-16    140  |  109  |  17  ----------------------------<  119<H>  4.0   |  16<L>  |  0.8    Ca    8.6      16 Aug 2022 06:04  Mg     1.7     08-16    TPro  5.8<L>  /  Alb  2.9<L>  /  TBili  <0.2  /  DBili  x   /  AST  21  /  ALT  17  /  AlkPhos  145<H>  08-16        Pertinent labs:                      9.2    7.90  )-----------( 357      ( 16 Aug 2022 06:04 )             26.7       08-16    140  |  109  |  17  ----------------------------<  119<H>  4.0   |  16<L>  |  0.8    Ca    8.6      16 Aug 2022 06:04  Mg     1.7     08-16    TPro  5.8<L>  /  Alb  2.9<L>  /  TBili  <0.2  /  DBili  x   /  AST  21  /  ALT  17  /  AlkPhos  145<H>  08-16      Radiology & Additional Studies:   Radiology imaging reviewed.       ASSESSMENT/ PLAN:   55 yo F with PMH of HTN, DM2, and stroke (per son 2017) who presented for RLE wound. Started 3 months ago when she fell and hit her leg on a wooden cabinet. She put hydrogen peroxide, antibiotic cream, and wrapped the wound but never fully healed. Two weeks ago it started to show yellow pus so her and her family came to the ED for further treatment. Endorsed subjective fevers, chest pain, and vision changes which occurs when walked 2-3 blocks.  Pt has not seen a doctor due to insurance problem and has not been taking any medications for more than 1yr. Code stroke for unresponsivenexx at 4pm 8/5. Bedside swallow evaluation completed yesterday, patient minimally arousable to tactile and verbal stim, pt not appropriate for PO trials at this time, patient currently NPO with NG tube feeds. IR consulted for G tube placement. Patient currently on ASA 81mg and Plavix 75mg due to recent CVA. For G tube to be placed, both aspirin and plavix must be held for 5 days prior to procedure to prevent bleeding risk. Due to recent history, primary team will not allow both to be held. Recommend surgical G tube placement at this time as SIR guidelines recommend withholding aspirin for percutaneous intervention.  - recommend surgical evaluation for G tube placement   - no IR intervention at this time     Thank you for the courtesy of this consult, please call [phone] with any further questions.

## 2022-08-16 NOTE — OCCUPATIONAL THERAPY INITIAL EVALUATION ADULT - NEUROMUSCULAR RE-EDUCATION, PT EVAL
Pt will make eye contact with person on Left side for 30 secs to 1 min by discharge to work towards returning to PLOF

## 2022-08-16 NOTE — OCCUPATIONAL THERAPY INITIAL EVALUATION ADULT - LEVEL OF INDEPENDENCE: SUPINE/SIT, REHAB EVAL
Attempt made, pt requires a two person assist/full body lift at this time/dependent (less than 25% patients effort)/unable to perform

## 2022-08-16 NOTE — OCCUPATIONAL THERAPY INITIAL EVALUATION ADULT - GROSSLY INTACT, SENSORY
unable to formally assess at this time 2/2 pt unable to follow commands or communicate responses verbally and physically at  this time

## 2022-08-16 NOTE — PROGRESS NOTE ADULT - ASSESSMENT
55 yo F with PMH of HTN, DM2, CVA (2017) who presented with purulent right lower extremity wound for 3 months consistent with acute RLE cellulitis. Code stroke for unresponsiveness at 4pm 8/5    #Acute ischemic strokes / ?Seizures  -CTH negative for acute ICH  -d/w neuro: high likelihood of seizure  -started on IV Keppra  -seizure precautions  -keep NPO, S&S f/u   -IVFs  -close monitoring  8/8: increased Keppra dose and added Dilantin. Remains on VEEG  8/9-10 d/w neuro: will get MRI, cont VEEG. LP depending on MRI results  8/10: son declined NGT placement and asked to wait till am (aware of risks)  8/11< from: MR Head w/wo IV Cont (08.10.22 @ 21:25) >  Multiple punctate cortical acute infarcts involving multiple vascular territories possibly related to embolic etiology. No evidence of acute hemorrhage. Moderate chronic microvascular type changes as well as chronic hemosiderin deposition within the right basal ganglia consistent with remote hemorrhage.  Chronic right thalamic lacunar infarcts.  < end of copied text >  -d/w neuro: start ASA, Plavix. Cont Lipitor  -repeat CTA head noted  -NGT placement and feeds   -PT/OT/physiatry  -TTE w/ bubble pending  -will need ?IVONNE and ILR; pending covid swab on the 18th  -tele w/ SVT w/ Afib: ep ke noted  reminded family to bring records from [facility]  -neuro checks  -8/13: spoke to neuro, will give an extras dose of DIlantin 500 x1. Repeat dilantin level in 48hrs. Stat REEG to r/o nonconvulsive status. Stat CTH.   - Patient is receiving NG tube feeds, will need another MRI of the brain to check supplemental sequelae  - Patient will have an ILR placed later this week once pt is off isolation, PEG tube next week    #Purulent RLE cellulitis r/o abscess / Fever  -s/p unasyn and and vancomycin in ED  -f/u wound and blood cultures   -trend inflammatory markers   -ID consult noted  - vanco, Unasyn  -s/p burn debridement   -changed ABx to augmentin  -8/13: fever: pancultured, no fever for now    #Hypertensive urgency due to noncompliance  -BP at admission 296/174 without signs of end organ damage  -gradual BP lowering  -close monitoring  -8/12: added Norvasc  -8/13 increased Labetalol dose. Pt also has PRN Labetalol IVP  - 8/15: now increased hydralazine to 100 mg q8h    # DMII w/ Hyperglycemia  - started insulin regimen   - FS ac/hs  -A1c=10.8    # Atypical Chest pain and FELIZ on admission- possibly MSK related but r/o cardiac etiology   - chest wall tender to palpation on admission  - currently CP free  - CXR unremarkable   - Tn negative  - outpt stress test    # hx of CVA 2017 (Aneurysmal as per family? ?ICH but old ischemic strokes on CTH)  # Left sided weakness  -need to get more Hx  -as per family, aneurysm was never fixed    DVT ppx: lovenox, SCDs  GI ppx: protonix  Diet: tube feeds  Activity: bed rest

## 2022-08-16 NOTE — OCCUPATIONAL THERAPY INITIAL EVALUATION ADULT - ADDITIONAL COMMENTS
Pt unable to verbally or physically respond to verbal and tactile commands during assessment. As per chart review of PT eval, pt resides with spouse; and son in an apt on 3rd floor, + 50 steps

## 2022-08-16 NOTE — CHART NOTE - NSCHARTNOTEFT_GEN_A_CORE
Registered Dietitian Follow-Up     Patient Profile Reviewed                           Yes [x]   No []     Nutrition History Previously Obtained        Yes [x]  No []       Pertinent Subjective Information:   Per RN, pt tolerating TF regimen well; no holds. Phosphorus level is WNL; 8/13 - 4.2 mg/dL.      Pertinent Medical Information:  57 yo F with PMHx of HTN, DM2, CVA (2017) who presented with purulent right lower extremity wound for 3 months consistent with acute RLE cellulitis. Code stroke for unresponsiveness at 4pm 8/5.  # Hypertensive urgency due to noncompliance  # DM II w/ Hyperglycemia    Diet order:  Diet, NPO with Tube Feed:   Tube Feeding Modality: Nasogastric  Glucerna 1.2 Reed  Total Volume for 24 Hours (mL): 1300  Bolus  Total Volume of Bolus (mL):  325  Total # of Feeds: 4  Tube Feed Frequency: Every 6 hours   Tube Feed Start Time: 00:00  Bolus Feed Rate (mL per Hour): 325   Bolus Feed Duration (in Hours): 1  Bolus Feed Instructions:  ***START FEEDS at @120mL Q6hr for 4 feeds  advance to @240mL Q6hr for 4 feeds  advance to goal of @325mL Q6hr***  Bolus  Free Water Flush Instructions:  + 50mL FWF pre and post each feed (08-12-22 @ 11:32) [Active]    Anthropometrics:  - Ht: 165.1 cm  - Wt: 82.3 KG (08/15)  - BMI: 30.2  - IBW: 54.5 KG    Weight hx: pt weight stable at 82.3 KG since admission per EMR     Pertinent Lab Data:  08/16 @ 06:04 - RBC 3.09; H/H 9.2/26.7; ; Alk Phos 145; Mg 1.7    CAPILLARY BLOOD GLUCOSE:  POCT Blood Glucose.: 138 mg/dL (16 Aug 2022 12:07)  POCT Blood Glucose.: 104 mg/dL (15 Aug 2022 23:53)  POCT Blood Glucose.: 116 mg/dL (15 Aug 2022 21:24)  POCT Blood Glucose.: 127 mg/dL (15 Aug 2022 17:10)    Pertinent Meds:  MEDICATIONS  (STANDING):  amLODIPine   Tablet 10 milliGRAM(s) Oral daily  amoxicillin  875 milliGRAM(s)/clavulanate 1 Tablet(s) Oral every 12 hours  aspirin  chewable 81 milliGRAM(s) Enteral Tube daily  atorvastatin 80 milliGRAM(s) Oral at bedtime  collagenase Ointment 1 Application(s) Topical two times a day  Dakins Solution - 1/2 Strength 1 Application(s) Topical two times a day  dextrose 5%. 1000 milliLiter(s) (50 mL/Hr) IV Continuous <Continuous>  dextrose 5%. 1000 milliLiter(s) (100 mL/Hr) IV Continuous <Continuous>  dextrose 50% Injectable 25 Gram(s) IV Push once  dextrose 50% Injectable 12.5 Gram(s) IV Push once  dextrose 50% Injectable 25 Gram(s) IV Push once  doxazosin 2 milliGRAM(s) Oral at bedtime  enoxaparin Injectable 40 milliGRAM(s) SubCutaneous every 24 hours  glucagon  Injectable 1 milliGRAM(s) IntraMuscular once  hydrALAZINE 100 milliGRAM(s) Oral every 8 hours  insulin glargine Injectable (LANTUS) 22 Unit(s) SubCutaneous every morning  insulin lispro (ADMELOG) corrective regimen sliding scale   SubCutaneous three times a day before meals  insulin lispro Injectable (ADMELOG) 5 Unit(s) SubCutaneous three times a day before meals  labetalol 400 milliGRAM(s) Oral three times a day  levETIRAcetam  IVPB 1500 milliGRAM(s) IV Intermittent every 12 hours  lidocaine 1%/epinephrine 1:100,000 Inj 20 milliLiter(s) Local Injection once  losartan 100 milliGRAM(s) Oral daily  magnesium sulfate  IVPB 2 Gram(s) IV Intermittent once  multivitamin/minerals/iron Oral Solution (CENTRUM) 15 milliLiter(s) Oral daily  pantoprazole    Tablet 40 milliGRAM(s) Oral before breakfast  phenytoin   Chewable 50 milliGRAM(s) Oral at bedtime  phenytoin  IVPB 100 milliGRAM(s) IV Intermittent three times a day  polyethylene glycol 3350 17 Gram(s) Oral every 12 hours  sodium bicarbonate 650 milliGRAM(s) Oral every 6 hours  sodium chloride 0.65% Nasal 2 Spray(s) Both Nostrils two times a day    MEDICATIONS  (PRN):  acetaminophen     Tablet .. 650 milliGRAM(s) Oral every 6 hours PRN Temp greater or equal to 38C (100.4F), Mild Pain (1 - 3)  dextrose Oral Gel 15 Gram(s) Oral once PRN Blood Glucose LESS THAN 70 milliGRAM(s)/deciliter  labetalol Injectable 10 milliGRAM(s) IV Push every 6 hours PRN Systolic blood pressure >  melatonin 3 milliGRAM(s) Oral at bedtime PRN Insomnia     Physical Findings:  - Appearance: generalized edema 2+ noted on 8/15 per flowsheet   - GI function: contour symmetrical; rounded; soft/nontender; fecal incontinence   - Tubes: NGT  - Oral/Mouth cavity: NPO with TF  - Skin: cellulitis wound noted with slough, gray area yellow and pink to right leg   - Cognitive: unable to speak per flowsheet     Nutrition Requirements  Weight Used: Using IBW 56.82 KG and ABW 82.3 KG    Estimated Energy Needs    Continue [x]  Adjust []  ENERGY: 3256-1603 kcal/day (MSJ x 1.0-1.2 using ABW)     Estimated Protein Needs    Continue [x]  Adjust []  PROTEIN:  g/day (1.5-1.8g/kg of IBW)     Estimated Fluid Needs        Continue [x]  Adjust []  FLUIDS: 1mL/kcal     Nutrient Intake: Pt receiving >81% of estimated energy/protein needs     [x] Previous Nutrition Diagnosis: Inadequate Oral Intake              [] Ongoing          [x] Resolved - TF regimen meets needs    [] No active nutrition diagnosis identified at this time     Nutrition Intervention: TF regimen     Goal/Expected Outcome: Pt to meet >81% of estimated energy needs via TF regimen.     Indicator/Monitoring:     Recommendations:  1. Continue with TF regimen -- Glucerna 1.2 Reed @325mL Q6hr -- provides 1300mL, 1560 kcal, 78g pro, 1047 mL free water  + 50mL pre and post each feed for additional 400mL free water; total of 1447mL water -- meeting >81% of estimated energy/pro needs  2. Please maintain all aspiration precautions    Pt is at mod nutrition risk; will f/u in 4-6 days.    RD remains available: Magdalena Lai, MALIA [id number]

## 2022-08-16 NOTE — OCCUPATIONAL THERAPY INITIAL EVALUATION ADULT - VISUAL ASSESSMENT: TRACKING
Pt observed with left lower quadrant gaze. Pt unable to track to right side despite max verbal and tactile cues

## 2022-08-16 NOTE — OCCUPATIONAL THERAPY INITIAL EVALUATION ADULT - COGNITIVE, VISUAL PERCEPTUAL, OT EVAL
Pt will follow a 1 step simple command 25% of the time by discharge to work towards returning to PLOF

## 2022-08-16 NOTE — CONSULT NOTE ADULT - SUBJECTIVE AND OBJECTIVE BOX
Gastroenterology Consultation:    Patient is a 56y old  Female who presents with a chief complaint of Ulcer over the right lower extremity (15 Aug 2022 16:16)        Admitted on: 08-02-22      HPI:  57 yo F with PMH of HTN, DM2, and stroke (per son 2017) who presented for RLE wound. Started 3 months ago when she fell and hit her leg on a wooden cabinet. She put hydrogen peroxide, antibiotic cream, and wrapped the wound but never fully healed. Two weeks ago it started to show yellow pus so her and her family came to the ED for further treatment. Endorsed subjective fevers, chest pain, and vision changes which occurs when walked 2-3 blocks. Denied congestion, sore throat, cough, dyspnea, headache, dysuria, N/V, diarrhea     Per son, they fill her medications at [facility] ([phone]) and this is their current pharmacy. Called them to confirm her medications and they said her last refill of medications was 2018. Pt has not seen a doctor due to insurance problem and has not been taking any medications.    In the ED:  Vitals: T: 98.9, BP: 296/ 174, , RR: 18, 98%O2 on RA  Labs: CBC showed WBC 11.23; ESR 92, CRP 35.6  CMP showed glucose 302, alk phos 173; ; VBG pH 7.45  EKG pending  CXR showed borderline cardiomegaly and no airspace opacity.   X-ray of RLE also pending.     Received unasyn and vanco, humalin R, IV vasotec, IV labetalol   (02 Aug 2022 07:47)        Prior EGD: none on chart    Prior Colonoscopy: none on chart      PAST MEDICAL & SURGICAL HISTORY:  Hypertension      Diabetes mellitus      No significant past surgical history            FAMILY HISTORY:  FH: type 2 diabetes mellitus        Social History:  Tobacco: -ve  Alcohol: -ve  Drugs: -ve    Home Medications:  losartan 50 mg oral tablet: 1 tab(s) orally once a day (02 Aug 2022 10:38)  metFORMIN 500 mg oral tablet: 1 tab(s) orally 2 times a day (02 Aug 2022 10:38)  metoprolol succinate 50 mg oral tablet, extended release: 1 tab(s) orally once a day (02 Aug 2022 10:38)        MEDICATIONS  (STANDING):  amLODIPine   Tablet 10 milliGRAM(s) Oral daily  amoxicillin  875 milliGRAM(s)/clavulanate 1 Tablet(s) Oral every 12 hours  aspirin  chewable 81 milliGRAM(s) Enteral Tube daily  atorvastatin 80 milliGRAM(s) Oral at bedtime  clopidogrel Tablet 75 milliGRAM(s) Oral daily  collagenase Ointment 1 Application(s) Topical two times a day  Dakins Solution - 1/2 Strength 1 Application(s) Topical two times a day  dextrose 5%. 1000 milliLiter(s) (50 mL/Hr) IV Continuous <Continuous>  dextrose 5%. 1000 milliLiter(s) (100 mL/Hr) IV Continuous <Continuous>  dextrose 50% Injectable 25 Gram(s) IV Push once  dextrose 50% Injectable 12.5 Gram(s) IV Push once  dextrose 50% Injectable 25 Gram(s) IV Push once  doxazosin 2 milliGRAM(s) Oral at bedtime  enoxaparin Injectable 40 milliGRAM(s) SubCutaneous every 24 hours  glucagon  Injectable 1 milliGRAM(s) IntraMuscular once  hydrALAZINE 100 milliGRAM(s) Oral every 8 hours  insulin glargine Injectable (LANTUS) 22 Unit(s) SubCutaneous every morning  insulin lispro (ADMELOG) corrective regimen sliding scale   SubCutaneous three times a day before meals  insulin lispro Injectable (ADMELOG) 5 Unit(s) SubCutaneous three times a day before meals  labetalol 400 milliGRAM(s) Oral three times a day  levETIRAcetam  IVPB 1500 milliGRAM(s) IV Intermittent every 12 hours  lidocaine 1%/epinephrine 1:100,000 Inj 20 milliLiter(s) Local Injection once  losartan 100 milliGRAM(s) Oral daily  magnesium sulfate  IVPB 2 Gram(s) IV Intermittent once  multivitamin/minerals/iron Oral Solution (CENTRUM) 15 milliLiter(s) Oral daily  pantoprazole    Tablet 40 milliGRAM(s) Oral before breakfast  phenytoin   Chewable 50 milliGRAM(s) Oral at bedtime  phenytoin  IVPB 100 milliGRAM(s) IV Intermittent three times a day  polyethylene glycol 3350 17 Gram(s) Oral every 12 hours  sodium bicarbonate 650 milliGRAM(s) Oral every 6 hours  sodium chloride 0.65% Nasal 2 Spray(s) Both Nostrils two times a day    MEDICATIONS  (PRN):  acetaminophen     Tablet .. 650 milliGRAM(s) Oral every 6 hours PRN Temp greater or equal to 38C (100.4F), Mild Pain (1 - 3)  dextrose Oral Gel 15 Gram(s) Oral once PRN Blood Glucose LESS THAN 70 milliGRAM(s)/deciliter  labetalol Injectable 10 milliGRAM(s) IV Push every 6 hours PRN Systolic blood pressure >  melatonin 3 milliGRAM(s) Oral at bedtime PRN Insomnia      Allergies  No Known Allergies      Review of Systems:   Constitutional:  No Fever, No Chills  ENT/Mouth:  No Hearing Changes,  No Difficulty Swallowing  Eyes:  No Eye Pain, No Vision Changes  Cardiovascular:  No Chest Pain, No Palpitations  Respiratory:  No Cough, No Dyspnea  Gastrointestinal:  As described in HPI  Musculoskeletal:  No Joint Swelling, No Back Pain  Skin:  No Skin Lesions, No Jaundice  Neuro:  No Syncope, No Dizziness  Heme/Lymph:  No Bruising, No Bleeding.          Physical Examination:  T(C): 38.4 (08-16-22 @ 05:05), Max: 38.4 (08-16-22 @ 05:05)  HR: 89 (08-16-22 @ 05:05) (79 - 92)  BP: 123/81 (08-16-22 @ 05:05) (123/81 - 170/89)  RR: 20 (08-16-22 @ 05:05) (18 - 20)  SpO2: 93% (08-15-22 @ 23:30) (93% - 93%)  Height (cm): 165.1 (08-15-22 @ 18:22)  Weight (kg): 82.3 (08-15-22 @ 18:22)    08-14-22 @ 07:01  -  08-15-22 @ 07:00  --------------------------------------------------------  IN: 850 mL / OUT: 0 mL / NET: 850 mL          GENERAL: AAOx3, no acute distress.  HEAD:  Atraumatic, Normocephalic  EYES: conjunctiva and sclera clear  NECK: Supple, no JVD or thyromegaly  CHEST/LUNG: Clear to auscultation bilaterally; No wheeze, rhonchi, or rales  HEART: Regular rate and rhythm; normal S1, S2, No murmurs.  ABDOMEN: Soft, nontender, nondistended; Bowel sounds present  NEUROLOGY: No asterixis or tremor, RUE weakness  SKIN: Intact, no jaundice        Data:                        9.2    7.90  )-----------( 357      ( 16 Aug 2022 06:04 )             26.7     Hgb Trend:  9.2  08-16-22 @ 06:04  9.3  08-15-22 @ 07:05  9.7  08-14-22 @ 07:29        08-16    140  |  109  |  17  ----------------------------<  119<H>  4.0   |  16<L>  |  0.8    Ca    8.6      16 Aug 2022 06:04  Mg     1.7     08-16    TPro  5.8<L>  /  Alb  2.9<L>  /  TBili  <0.2  /  DBili  x   /  AST  21  /  ALT  17  /  AlkPhos  145<H>  08-16    Liver panel trend:  TBili <0.2   /   AST 21   /   ALT 17   /   AlkP 145   /   Tptn 5.8   /   Alb 2.9    /   DBili --      08-16  TBili <0.2   /   AST 21   /   ALT 14   /   AlkP 122   /   Tptn 5.7   /   Alb 2.9    /   DBili --      08-15  TBili <0.2   /   AST 25   /   ALT 13   /   AlkP 119   /   Tptn 6.0   /   Alb 3.0    /   DBili --      08-14  TBili <0.2   /   AST 18   /   ALT 11   /   AlkP 112   /   Tptn 5.7   /   Alb 2.8    /   DBili --      08-13  TBili 0.2   /   AST 15   /   ALT 11   /   AlkP 112   /   Tptn 5.7   /   Alb 2.9    /   DBili --      08-13  TBili 0.2   /   AST 14   /   ALT 11   /   AlkP 115   /   Tptn 5.6   /   Alb 2.7    /   DBili --      08-12  TBili 0.4   /   AST 16   /   ALT 15   /   AlkP 136   /   Tptn 6.4   /   Alb 3.3    /   DBili --      08-11  TBili 0.4   /   AST 26   /   ALT 14   /   AlkP 121   /   Tptn 6.2   /   Alb 3.1    /   DBili --      08-10  TBili 0.4   /   AST 19   /   ALT 14   /   AlkP 100   /   Tptn 6.1   /   Alb 3.1    /   DBili --      08-09  TBili 0.5   /   AST 12   /   ALT 8   /   AlkP 116   /   Tptn 6.5   /   Alb 3.4    /   DBili --      08-06          Culture - Blood (collected 13 Aug 2022 22:44)  Source: .Blood Blood-Peripheral  Gram Stain (15 Aug 2022 08:35):    Growth in anaerobic bottle: Gram Positive Cocci in Clusters  Preliminary Report (15 Aug 2022 08:35):    Growth in anaerobic bottle: Gram Positive Cocci in Clusters    ***Blood Panel PCR results on this specimen are available    approximately 3 hours after the Gram stain result.***    Gram stain, PCR, and/or culture results may not always    correspond due todifference in methodologies.    ************************************************************    This PCR assay was performed by multiplex PCR. This    Assay tests for 66 bacterial and resistance gene targets.    Please refer to the [facility] [place] Labs testdirectory    at [email or web address] for details.  Organism: Blood Culture PCR (15 Aug 2022 10:35)  Organism: Blood Culture PCR (15 Aug 2022 10:35)          Radiology:

## 2022-08-17 PROBLEM — Z00.00 ENCOUNTER FOR PREVENTIVE HEALTH EXAMINATION: Status: ACTIVE | Noted: 2022-01-01

## 2022-08-17 NOTE — CONSULT NOTE ADULT - SUBJECTIVE AND OBJECTIVE BOX
HPI:  55 yo F with PMH of HTN, DM2, and stroke (per son 2017) who presented for RLE wound. Started 3 months ago when she fell and hit her leg on a wooden cabinet. She put hydrogen peroxide, antibiotic cream, and wrapped the wound but never fully healed. Two weeks ago it started to show yellow pus so her and her family came to the ED for further treatment. Endorsed subjective fevers, chest pain, and vision changes which occurs when walked 2-3 blocks. Denied congestion, sore throat, cough, dyspnea, headache, dysuria, N/V, diarrhea        Per son, they fill her medications at [facility] ([phone]) and this is their current pharmacy. Called them to confirm her medications and they said her last refill of medications was 2018. Pt has not seen a doctor due to insurance problem and has not been taking any medications.       In the ED:  Vitals: T: 98.9, BP: 296/ 174, , RR: 18, 98%O2 on RA  Labs: CBC showed WBC 11.23; ESR 92, CRP 35.6  CMP showed glucose 302, alk phos 173; ; VBG pH 7.45  EKG pending  CXR showed borderline cardiomegaly and no airspace opacity.   X-ray of RLE also pending.        Received unasyn and vanco, humalin R, IV vasotec, IV labetalol  (02 Aug 2022 07:47)    Nephrology:  I am 2nd year resident on nephrology rotation. We were consulted for eval/recs on pt's persistent HTN on multiple bp control medications.  Per chart review, pt was admitted for RLE wound and found to have HTNsive urgency. Pt hospital course complicated by new embolic strokes and possible bacterial infections of unknown source, currently on augmentin 875.   Pt is on 5 blood pressure medications. Hydralazine 100mg q8, cardura 2mg qhs, labetalol 400mg q8, norvasc 10mg qD, losartan 100mg qD. Pt is on three different classes, but no diuretic.   Cardio, EP, neurology, ID are all following the patient.   For work up of hypertension, Renal artery duplex was performed bilaterally with no evidence of significant stenosis. Renin level is elevated at 8.8 and aldosterone level is normal at 6.2. TSH wnl .86 Renal function is stable, Cr ~.9 and GFR ranging from   Echocardiogram was performed and pending read.    Pt seen and examined at bedside. Pt is alert, but unresponsive to verbal commands in english or [place]an. Does not follow any instructions. Seen to have oral secretions dripping onto R upper chest. Pt resists eye opening during pupillary exam. Notified nursing staff to help suction oral secretions.  Called the son who provided additional information (Latrell Mack )  States his mother lives with his brother and father, she has a hx of stroke in 2017 requriing her to use a cane to ambulate. He brought his mother in to the hospital for evaluation of a RLE nonhealing wound sustained 2 weeks prior to presentation. States she had no other symptoms, no cp, no sob, no HA.  Endorses she was able to have full conversations, ambulate with cane with no issues prior to hospitalization. Says his mother filled rx at [facility] and [facility]. When told that [facility] has not filled her rx since 2018, states she was getting the medicine from possibly [facility], and he knows for sure that she has not been on medications for diabetes and HTN for AT LEAST one year.           --------------------------------------------------------------------------------------------------------  PMH/PSH:  PAST MEDICAL & SURGICAL HISTORY:  Hypertension    Diabetes mellitus    No significant past surgical history        -------------------------------------------------------------------------------------------------------  PHYSICAL EXAM:  General: drowsy, pooling oral secretions, does not follow commands (including in Bel[place]an)  HEENT: Atraumatic  Respiratory: rhonchi b/l  Cardiac: RRR, normal s1 s2  Abdomen: soft, non-tender, non-distended; tele monitor at L lower abd  Extremities: warm and well-perfused; no LE edema bilateral, R LE with white gauze dressing  Neuro: A+Ox4. No FND    Vital Signs Last 24 Hrs  T(C): 37.8 (17 Aug 2022 04:57), Max: 38.9 (16 Aug 2022 20:00)  T(F): 100.1 (17 Aug 2022 04:57), Max: 102 (16 Aug 2022 20:00)  HR: 87 (17 Aug 2022 04:57) (87 - 96)  BP: 185/84 (17 Aug 2022 04:57) (133/82 - 185/84)  BP(mean): --  RR: 18 (17 Aug 2022 04:57) (18 - 24)  SpO2: --        I&O's Summary    16 Aug 2022 07:01  -  17 Aug 2022 07:00  --------------------------------------------------------  IN: 850 mL / OUT: 0 mL / NET: 850 mL        --------------------------------------------------------------------------------------------------------  LABS:                               9.2    8.28  )-----------( 357      ( 17 Aug 2022 08:31 )             26.1       08-17    141  |  110  |  18  ----------------------------<  173<H>  4.1   |  19  |  0.9    Ca    8.3<L>      17 Aug 2022 08:31  Mg     1.8     08-17    TPro  5.8<L>  /  Alb  2.7<L>  /  TBili  <0.2  /  DBili  x   /  AST  18  /  ALT  16  /  AlkPhos  136<H>  08-17              CULTURE RESULTS:                08-15-22 @ 16:59  Specimen Source: --  Method Type: --  Gram Stain - RRL: --  Gram Stain - Wound: --  Bacteria: --  Culture Results:   No growth to date.      Specimen Source:   Method Type: Method Type: PCR (08-13-22 @ 22:44)    Gram Stain:   Culture Results: Culture Results:   No growth to date. (08-15-22 @ 16:59)  Culture Results:   Growth in anaerobic bottle: Staphylococcus epidermidis Coag Negative  Staphylococcus  Single set isolate, possible contaminant. Contact  Microbiology if susceptibility testing clinically  indicated.  ***Blood Panel PCR results on this specimen are available  approximately 3 hours after the Gram stain result.***  Gram stain, PCR, and/or culture results may not always  correspond due to difference in methodologies.  ************************************************************  This PCR assay was performed by multiplex PCR. This  Assay tests for 66 bacterial and resistance gene targets.  Please refer to the [facility] test directory  at [email or web address] for details. (08-13-22 @ 22:44)  Culture Results:   Numerous Candida parapsilosis "Susceptibilities not performed" (08-10-22 @ 12:40)    Bacteria:       -------------------------------------------------------------------------------------------------  RADIOLOGY:  < from: CT Brain Stroke Protocol (08.05.22 @ 16:12) >  IMPRESSION:    1. Mild atrophic changes.    2.  Hypodensities in the periventricular white matter, right thalamus,   right basal ganglia, and right insular cortex likely representing   ischemic change, age indeterminate.    < end of copied text >  < from: CT Head No Cont (08.08.22 @ 19:14) >  IMPRESSION:    Chronic ischemic changes unchanged from the scan of 8/5/2022.    < end of copied text >  < from: MR Head w/wo IV Cont (08.10.22 @ 21:25) >  IMPRESSION:  Motion limited examination.    Multiple punctate cortical acute infarcts involving multiple vascular   territories possibly related to embolic etiology. No evidence of acute    hemorrhage.    Moderate chronic microvascular type changes as well as chronic   hemosiderin deposition within the right basal ganglia consistent with   remote hemorrhage.    Chronic right thalamic lacunar infarcts.    < end of copied text >  < from: CT Angio Head w/ IV Cont (08.12.22 @ 10:57) >  IMPRESSION:    No large vessel occlusion, aneurysm, or vascular malformation.    Moderate right/mild left atherosclerotic stenosis in the supraclinoid   ICAs.    Focal mild atherosclerotic stenosis in the V3 segment of the right   vertebral artery.    < end of copied text >  < from: CT Head No Cont (08.13.22 @ 17:11) >  IMPRESSION:  Faintly demonstrated are multiple scattered cortically-based   hypodensities, consistent with known history of acute embolic stroke,   better delineated on recent MRI. No evidence of hemorrhagic   transformation.    < end of copied text >      ---------------------------------------------------------------------------------------------------       HPI:  55 yo F with PMH of HTN, DM2, and stroke (per son 2017) who presented for RLE wound. Started 3 months ago when she fell and hit her leg on a wooden cabinet. She put hydrogen peroxide, antibiotic cream, and wrapped the wound but never fully healed. Two weeks ago it started to show yellow pus so her and her family came to the ED for further treatment. Endorsed subjective fevers, chest pain, and vision changes which occurs when walked 2-3 blocks. Denied congestion, sore throat, cough, dyspnea, headache, dysuria, N/V, diarrhea        Per son, they fill her medications at [facility] ([phone]) and this is their current pharmacy. Called them to confirm her medications and they said her last refill of medications was 2018. Pt has not seen a doctor due to insurance problem and has not been taking any medications.       In the ED:  Vitals: T: 98.9, BP: 296/ 174, , RR: 18, 98%O2 on RA  Labs: CBC showed WBC 11.23; ESR 92, CRP 35.6  CMP showed glucose 302, alk phos 173; ; VBG pH 7.45  EKG pending  CXR showed borderline cardiomegaly and no airspace opacity.   X-ray of RLE also pending.        Received unasyn and vanco, humalin R, IV vasotec, IV labetalol  (02 Aug 2022 07:47)    Nephrology:  I am 2nd year resident on nephrology rotation. We were consulted for eval/recs on pt's persistent HTN on multiple bp control medications.  Per chart review, pt was admitted for RLE wound and found to have HTNsive urgency. Pt hospital course complicated by new embolic strokes and possible bacterial infections of unknown source, currently on augmentin 875.   Pt is on 5 blood pressure medications. Hydralazine 100mg q8, cardura 2mg qhs, labetalol 400mg q8, norvasc 10mg qD, losartan 100mg qD. Pt is on three different classes, but no diuretic.   Cardio, EP, neurology, ID are all following the patient.   For work up of hypertension, Renal artery duplex was performed bilaterally with no evidence of significant stenosis. Renin level is elevated at 8.8 and aldosterone level is normal at 6.2. TSH wnl .86 Renal function is stable, Cr ~.9 and GFR ranging from   Echocardiogram was performed and pending read.    Pt seen and examined at bedside. Pt is alert, but unresponsive to verbal commands in english or Polish. Does not follow any instructions. Seen to have oral secretions dripping onto R upper chest. Pt resists eye opening during pupillary exam. Notified nursing staff to help suction oral secretions.  Called the son who provided additional information (Latrell Mack )  States his mother lives with his brother and father, she has a hx of stroke in 2017 requriing her to use a cane to ambulate. He brought his mother in to the hospital for evaluation of a RLE nonhealing wound sustained 2 weeks prior to presentation. States she had no other symptoms, no cp, no sob, no HA.  Endorses she was able to have full conversations, ambulate with cane with no issues prior to hospitalization. Says his mother filled rx at [facility] and [facility]. When told that [facility] has not filled her rx since 2018, states she was getting the medicine from possibly [facility], and he knows for sure that she has not been on medications for diabetes and HTN for AT LEAST one year.           --------------------------------------------------------------------------------------------------------  PMH/PSH:  PAST MEDICAL & SURGICAL HISTORY:  Hypertension    Diabetes mellitus    No significant past surgical history  --------------------------------------------------------------------------------------------------------  Social History (marital status, living situation, occupation, and sexual history): Never smoker, does not drink alcohol or drug use    -------------------------------------------------------------------------------------------------------  PHYSICAL EXAM:  General: drowsy, pooling oral secretions, does not follow commands (including in Polish)  HEENT: Atraumatic  Respiratory: rhonchi b/l  Cardiac: RRR, normal s1 s2  Abdomen: soft, non-tender, non-distended; tele monitor at L lower abd  Extremities: warm and well-perfused; no LE edema bilateral, R LE with white gauze dressing  Neuro: A+Ox0.     Vital Signs Last 24 Hrs  T(C): 37.8 (17 Aug 2022 04:57), Max: 38.9 (16 Aug 2022 20:00)  T(F): 100.1 (17 Aug 2022 04:57), Max: 102 (16 Aug 2022 20:00)  HR: 87 (17 Aug 2022 04:57) (87 - 96)  BP: 185/84 (17 Aug 2022 04:57) (133/82 - 185/84)  BP(mean): --  RR: 18 (17 Aug 2022 04:57) (18 - 24)  SpO2: --        I&O's Summary    16 Aug 2022 07:01  -  17 Aug 2022 07:00  --------------------------------------------------------  IN: 850 mL / OUT: 0 mL / NET: 850 mL        --------------------------------------------------------------------------------------------------------  LABS:                               9.2    8.28  )-----------( 357      ( 17 Aug 2022 08:31 )             26.1       08-17    141  |  110  |  18  ----------------------------<  173<H>  4.1   |  19  |  0.9    Ca    8.3<L>      17 Aug 2022 08:31  Mg     1.8     08-17    TPro  5.8<L>  /  Alb  2.7<L>  /  TBili  <0.2  /  DBili  x   /  AST  18  /  ALT  16  /  AlkPhos  136<H>  08-17              CULTURE RESULTS:                08-15-22 @ 16:59  Specimen Source: --  Method Type: --  Gram Stain - RRL: --  Gram Stain - Wound: --  Bacteria: --  Culture Results:   No growth to date.      Specimen Source:   Method Type: Method Type: PCR (08-13-22 @ 22:44)    Gram Stain:   Culture Results: Culture Results:   No growth to date. (08-15-22 @ 16:59)  Culture Results:   Growth in anaerobic bottle: Staphylococcus epidermidis Coag Negative  Staphylococcus  Single set isolate, possible contaminant. Contact  Microbiology if susceptibility testing clinically  indicated.  ***Blood Panel PCR results on this specimen are available  approximately 3 hours after the Gram stain result.***  Gram stain, PCR, and/or culture results may not always  correspond due to difference in methodologies.  ************************************************************  This PCR assay was performed by multiplex PCR. This  Assay tests for 66 bacterial and resistance gene targets.  Please refer to the [facility] test directory  at [email or web address] for details. (08-13-22 @ 22:44)  Culture Results:   Numerous Candida parapsilosis "Susceptibilities not performed" (08-10-22 @ 12:40)    Bacteria:       -------------------------------------------------------------------------------------------------  RADIOLOGY:  < from: CT Brain Stroke Protocol (08.05.22 @ 16:12) >  IMPRESSION:    1. Mild atrophic changes.    2.  Hypodensities in the periventricular white matter, right thalamus,   right basal ganglia, and right insular cortex likely representing   ischemic change, age indeterminate.    < end of copied text >  < from: CT Head No Cont (08.08.22 @ 19:14) >  IMPRESSION:    Chronic ischemic changes unchanged from the scan of 8/5/2022.    < end of copied text >  < from: MR Head w/wo IV Cont (08.10.22 @ 21:25) >  IMPRESSION:  Motion limited examination.    Multiple punctate cortical acute infarcts involving multiple vascular   territories possibly related to embolic etiology. No evidence of acute    hemorrhage.    Moderate chronic microvascular type changes as well as chronic   hemosiderin deposition within the right basal ganglia consistent with   remote hemorrhage.    Chronic right thalamic lacunar infarcts.    < end of copied text >  < from: CT Angio Head w/ IV Cont (08.12.22 @ 10:57) >  IMPRESSION:    No large vessel occlusion, aneurysm, or vascular malformation.    Moderate right/mild left atherosclerotic stenosis in the supraclinoid   ICAs.    Focal mild atherosclerotic stenosis in the V3 segment of the right   vertebral artery.    < end of copied text >  < from: CT Head No Cont (08.13.22 @ 17:11) >  IMPRESSION:  Faintly demonstrated are multiple scattered cortically-based   hypodensities, consistent with known history of acute embolic stroke,   better delineated on recent MRI. No evidence of hemorrhagic   transformation.    < end of copied text >      ---------------------------------------------------------------------------------------------------       HPI:  55 yo F with PMH of HTN, DM2, and stroke (per son 2017) who presented for RLE wound. Started 3 months ago when she fell and hit her leg on a wooden cabinet. She put hydrogen peroxide, antibiotic cream, and wrapped the wound but never fully healed. Two weeks ago it started to show yellow pus so her and her family came to the ED for further treatment. Endorsed subjective fevers, chest pain, and vision changes which occurs when walked 2-3 blocks. Denied congestion, sore throat, cough, dyspnea, headache, dysuria, N/V, diarrhea        Per son, they fill her medications at [facility] ([phone]) and this is their current pharmacy. Called them to confirm her medications and they said her last refill of medications was 2018. Pt has not seen a doctor due to insurance problem and has not been taking any medications.       In the ED:  Vitals: T: 98.9, BP: 296/ 174, , RR: 18, 98%O2 on RA  Labs: CBC showed WBC 11.23; ESR 92, CRP 35.6  CMP showed glucose 302, alk phos 173; ; VBG pH 7.45  EKG pending  CXR showed borderline cardiomegaly and no airspace opacity.   X-ray of RLE also pending.        Received unasyn and vanco, humalin R, IV vasotec, IV labetalol  (02 Aug 2022 07:47)    Nephrology:  I am 2nd year resident on nephrology rotation. We were consulted for eval/recs on pt's persistent HTN on multiple bp control medications.  Per chart review, pt was admitted for RLE wound and found to have HTNsive urgency. Pt hospital course complicated by new embolic strokes and fevers.  Underwent debridement of RLE by BURN 8/10. On Augmentin 875mg q12. Pt is NPO d/t inability to feed herself/pass SLP eval. Scheduled for peg early next week.   Pt is on 5 blood pressure medications. Hydralazine 100mg q8, cardura 2mg qhs, labetalol 400mg q8, norvasc 10mg qD, losartan 100mg qD. Pt is on three different classes, but no diuretic.   Cardio, EP, neurology, ID, BURN are all following the patient.   For work up of hypertension, Renal artery duplex was performed bilaterally with no evidence of significant stenosis. Renin level is elevated at 8.8 and aldosterone level is normal at 6.2. TSH wnl .86 Renal function is stable, Cr ~.9 and GFR ranging from   Echocardiogram was performed and pending read.    Pt seen and examined at bedside. Pt is alert, but unresponsive to verbal commands in english or Bulgarian. Does not follow any instructions. Seen to have oral secretions dripping onto R upper chest. Pt resists eye opening during pupillary exam. Notified nursing staff to help suction oral secretions.  Called the son who provided additional information (Latrell Mack )  States his mother lives with his brother and father, she has a hx of stroke in 2017 requriing her to use a cane to ambulate. He brought his mother in to the hospital for evaluation of a RLE nonhealing wound sustained 2 weeks prior to presentation. States she had no other symptoms, no cp, no sob, no HA.  Endorses she was able to have full conversations, ambulate with cane with no issues prior to hospitalization. Says his mother filled rx at [facility] and [facility]. When told that [facility] has not filled her rx since 2018, states she was getting the medicine from possibly [facility], and he knows for sure that she has not been on medications for diabetes and HTN for AT LEAST one year.           --------------------------------------------------------------------------------------------------------  PMH/PSH:  PAST MEDICAL & SURGICAL HISTORY:  Hypertension    Diabetes mellitus    No significant past surgical history  --------------------------------------------------------------------------------------------------------  Social History (marital status, living situation, occupation, and sexual history): Never smoker, does not drink alcohol or drug use    -------------------------------------------------------------------------------------------------------  PHYSICAL EXAM:  General: drowsy, pooling oral secretions, does not follow commands (including in Bulgarian)  HEENT: Atraumatic  Respiratory: rhonchi b/l  Cardiac: RRR, normal s1 s2  Abdomen: soft, non-tender, non-distended; tele monitor at L lower abd  Extremities: warm and well-perfused; no LE edema bilateral, R LE with white gauze dressing  Neuro: A+Ox0.     Vital Signs Last 24 Hrs  T(C): 37.8 (17 Aug 2022 04:57), Max: 38.9 (16 Aug 2022 20:00)  T(F): 100.1 (17 Aug 2022 04:57), Max: 102 (16 Aug 2022 20:00)  HR: 87 (17 Aug 2022 04:57) (87 - 96)  BP: 185/84 (17 Aug 2022 04:57) (133/82 - 185/84)  BP(mean): --  RR: 18 (17 Aug 2022 04:57) (18 - 24)  SpO2: --        I&O's Summary    16 Aug 2022 07:01  -  17 Aug 2022 07:00  --------------------------------------------------------  IN: 850 mL / OUT: 0 mL / NET: 850 mL        --------------------------------------------------------------------------------------------------------  LABS:                               9.2    8.28  )-----------( 357      ( 17 Aug 2022 08:31 )             26.1       08-17    141  |  110  |  18  ----------------------------<  173<H>  4.1   |  19  |  0.9    Ca    8.3<L>      17 Aug 2022 08:31  Mg     1.8     08-17    TPro  5.8<L>  /  Alb  2.7<L>  /  TBili  <0.2  /  DBili  x   /  AST  18  /  ALT  16  /  AlkPhos  136<H>  08-17              CULTURE RESULTS:                08-15-22 @ 16:59  Specimen Source: --  Method Type: --  Gram Stain - RRL: --  Gram Stain - Wound: --  Bacteria: --  Culture Results:   No growth to date.      Specimen Source:   Method Type: Method Type: PCR (08-13-22 @ 22:44)    Gram Stain:   Culture Results: Culture Results:   No growth to date. (08-15-22 @ 16:59)  Culture Results:   Growth in anaerobic bottle: Staphylococcus epidermidis Coag Negative  Staphylococcus  Single set isolate, possible contaminant. Contact  Microbiology if susceptibility testing clinically  indicated.  ***Blood Panel PCR results on this specimen are available  approximately 3 hours after the Gram stain result.***  Gram stain, PCR, and/or culture results may not always  correspond due to difference in methodologies.  ************************************************************  This PCR assay was performed by multiplex PCR. This  Assay tests for 66 bacterial and resistance gene targets.  Please refer to the [facility] test directory  at [email or web address] for details. (08-13-22 @ 22:44)  Culture Results:   Numerous Candida parapsilosis "Susceptibilities not performed" (08-10-22 @ 12:40)    Bacteria:       -------------------------------------------------------------------------------------------------  RADIOLOGY:  < from: CT Brain Stroke Protocol (08.05.22 @ 16:12) >  IMPRESSION:    1. Mild atrophic changes.    2.  Hypodensities in the periventricular white matter, right thalamus,   right basal ganglia, and right insular cortex likely representing   ischemic change, age indeterminate.    < end of copied text >  < from: CT Head No Cont (08.08.22 @ 19:14) >  IMPRESSION:    Chronic ischemic changes unchanged from the scan of 8/5/2022.    < end of copied text >  < from: MR Head w/wo IV Cont (08.10.22 @ 21:25) >  IMPRESSION:  Motion limited examination.    Multiple punctate cortical acute infarcts involving multiple vascular   territories possibly related to embolic etiology. No evidence of acute    hemorrhage.    Moderate chronic microvascular type changes as well as chronic   hemosiderin deposition within the right basal ganglia consistent with   remote hemorrhage.    Chronic right thalamic lacunar infarcts.    < end of copied text >  < from: CT Angio Head w/ IV Cont (08.12.22 @ 10:57) >  IMPRESSION:    No large vessel occlusion, aneurysm, or vascular malformation.    Moderate right/mild left atherosclerotic stenosis in the supraclinoid   ICAs.    Focal mild atherosclerotic stenosis in the V3 segment of the right   vertebral artery.    < end of copied text >  < from: CT Head No Cont (08.13.22 @ 17:11) >  IMPRESSION:  Faintly demonstrated are multiple scattered cortically-based   hypodensities, consistent with known history of acute embolic stroke,   better delineated on recent MRI. No evidence of hemorrhagic   transformation.    < end of copied text >      ---------------------------------------------------------------------------------------------------       HPI:  57 yo F with PMH of HTN, DM2, and stroke (per son 2017) who presented for RLE wound. Started 3 months ago when she fell and hit her leg on a wooden cabinet. She put hydrogen peroxide, antibiotic cream, and wrapped the wound but never fully healed. Two weeks ago it started to show yellow pus so her and her family came to the ED for further treatment. Endorsed subjective fevers, chest pain, and vision changes which occurs when walked 2-3 blocks. Denied congestion, sore throat, cough, dyspnea, headache, dysuria, N/V, diarrhea        Per son, they fill her medications at [facility] ([phone]) and this is their current pharmacy. Called them to confirm her medications and they said her last refill of medications was 2018. Pt has not seen a doctor due to insurance problem and has not been taking any medications.       In the ED:  Vitals: T: 98.9, BP: 296/ 174, , RR: 18, 98%O2 on RA  Labs: CBC showed WBC 11.23; ESR 92, CRP 35.6  CMP showed glucose 302, alk phos 173; ; VBG pH 7.45  EKG pending  CXR showed borderline cardiomegaly and no airspace opacity.   X-ray of RLE also pending.        Received unasyn and vanco, humalin R, IV vasotec, IV labetalol  (02 Aug 2022 07:47)    Nephrology:  I am 2nd year resident on nephrology rotation. We were consulted for eval/recs on pt's persistent HTN on multiple bp control medications.  Per chart review, pt was admitted for RLE wound and found to have HTNsive urgency. Pt hospital course complicated by new embolic strokes and fevers.  Underwent debridement of RLE by BURN 8/10. On Augmentin 875mg q12. Pt is NPO d/t inability to feed herself/pass SLP eval. Scheduled for peg early next week.   Pt is on 5 blood pressure medications. Hydralazine 100mg q8, cardura 2mg qhs, labetalol 400mg q8, norvasc 10mg qD, losartan 100mg qD. Pt is on three different classes, but no diuretic.   Cardio, EP, neurology, ID, BURN are all following the patient.   For work up of hypertension, Renal artery duplex was performed bilaterally with no evidence of significant stenosis. Renin level is elevated at 8.8 and aldosterone level is normal at 6.2. TSH wnl .86 Renal function is stable, Cr ~.9 and GFR ranging from   Echocardiogram was performed and pending read.    Pt seen and examined at bedside. Pt is alert, but unresponsive to verbal commands in english or Turkmen. Does not follow any instructions. Seen to have oral secretions dripping onto R upper chest. Pt resists eye opening during pupillary exam. Notified nursing staff to help suction oral secretions.  Called the son who provided additional information (Latrell Mack )  States his mother lives with his brother and father, she has a hx of stroke in 2017 requriing her to use a cane to ambulate. He brought his mother in to the hospital for evaluation of a RLE nonhealing wound sustained 2 weeks prior to presentation. States she had no other symptoms, no cp, no sob, no HA.  Endorses she was able to have full conversations, ambulate with cane with no issues prior to hospitalization. Says his mother filled rx at [facility] and [facility]. When told that [facility] has not filled her rx since 2018, states she was getting the medicine from possibly [facility], and he knows for sure that she has not been on medications for diabetes and HTN for AT LEAST one year.     On reassessment with attending/fellow/medical student on afternoon rounds, pt mental status improved. Able to have brief conversation in Turkmen. Endorses some chest pain. Denies vision disturbance.   Alternating mental status possibly d/t blood pressure fluctuations or medication side effects on AEDs or hospital delirium.        --------------------------------------------------------------------------------------------------------  PMH/PSH:  PAST MEDICAL & SURGICAL HISTORY:  Hypertension    Diabetes mellitus    No significant past surgical history  --------------------------------------------------------------------------------------------------------  Social History (marital status, living situation, occupation, and sexual history): Never smoker, does not drink alcohol or drug use    -------------------------------------------------------------------------------------------------------  PHYSICAL EXAM:  General: drowsy, pooling oral secretions, does not follow commands (including in Turkmen)  HEENT: Atraumatic  Respiratory: rhonchi b/l  Cardiac: RRR, normal s1 s2  Abdomen: soft, non-tender, non-distended; tele monitor at L lower abd  Extremities: warm and well-perfused; no LE edema bilateral, R LE with white gauze dressing  Neuro: A+Ox0.     Vital Signs Last 24 Hrs  T(C): 37.8 (17 Aug 2022 04:57), Max: 38.9 (16 Aug 2022 20:00)  T(F): 100.1 (17 Aug 2022 04:57), Max: 102 (16 Aug 2022 20:00)  HR: 87 (17 Aug 2022 04:57) (87 - 96)  BP: 185/84 (17 Aug 2022 04:57) (133/82 - 185/84)  BP(mean): --  RR: 18 (17 Aug 2022 04:57) (18 - 24)  SpO2: --        I&O's Summary    16 Aug 2022 07:01  -  17 Aug 2022 07:00  --------------------------------------------------------  IN: 850 mL / OUT: 0 mL / NET: 850 mL        --------------------------------------------------------------------------------------------------------  LABS:                               9.2    8.28  )-----------( 357      ( 17 Aug 2022 08:31 )             26.1       08-17    141  |  110  |  18  ----------------------------<  173<H>  4.1   |  19  |  0.9    Ca    8.3<L>      17 Aug 2022 08:31  Mg     1.8     08-17    TPro  5.8<L>  /  Alb  2.7<L>  /  TBili  <0.2  /  DBili  x   /  AST  18  /  ALT  16  /  AlkPhos  136<H>  08-17              CULTURE RESULTS:                08-15-22 @ 16:59  Specimen Source: --  Method Type: --  Gram Stain - RRL: --  Gram Stain - Wound: --  Bacteria: --  Culture Results:   No growth to date.      Specimen Source:   Method Type: Method Type: PCR (08-13-22 @ 22:44)    Gram Stain:   Culture Results: Culture Results:   No growth to date. (08-15-22 @ 16:59)  Culture Results:   Growth in anaerobic bottle: Staphylococcus epidermidis Coag Negative  Staphylococcus  Single set isolate, possible contaminant. Contact  Microbiology if susceptibility testing clinically  indicated.  ***Blood Panel PCR results on this specimen are available  approximately 3 hours after the Gram stain result.***  Gram stain, PCR, and/or culture results may not always  correspond due to difference in methodologies.  ************************************************************  This PCR assay was performed by multiplex PCR. This  Assay tests for 66 bacterial and resistance gene targets.  Please refer to the [facility] test directory  at [email or web address] for details. (08-13-22 @ 22:44)  Culture Results:   Numerous Candida parapsilosis "Susceptibilities not performed" (08-10-22 @ 12:40)    Bacteria:       -------------------------------------------------------------------------------------------------  RADIOLOGY:  < from: CT Brain Stroke Protocol (08.05.22 @ 16:12) >  IMPRESSION:    1. Mild atrophic changes.    2.  Hypodensities in the periventricular white matter, right thalamus,   right basal ganglia, and right insular cortex likely representing   ischemic change, age indeterminate.    < end of copied text >  < from: CT Head No Cont (08.08.22 @ 19:14) >  IMPRESSION:    Chronic ischemic changes unchanged from the scan of 8/5/2022.    < end of copied text >  < from: MR Head w/wo IV Cont (08.10.22 @ 21:25) >  IMPRESSION:  Motion limited examination.    Multiple punctate cortical acute infarcts involving multiple vascular   territories possibly related to embolic etiology. No evidence of acute    hemorrhage.    Moderate chronic microvascular type changes as well as chronic   hemosiderin deposition within the right basal ganglia consistent with   remote hemorrhage.    Chronic right thalamic lacunar infarcts.    < end of copied text >  < from: CT Angio Head w/ IV Cont (08.12.22 @ 10:57) >  IMPRESSION:    No large vessel occlusion, aneurysm, or vascular malformation.    Moderate right/mild left atherosclerotic stenosis in the supraclinoid   ICAs.    Focal mild atherosclerotic stenosis in the V3 segment of the right   vertebral artery.    < end of copied text >  < from: CT Head No Cont (08.13.22 @ 17:11) >  IMPRESSION:  Faintly demonstrated are multiple scattered cortically-based   hypodensities, consistent with known history of acute embolic stroke,   better delineated on recent MRI. No evidence of hemorrhagic   transformation.    < end of copied text >      ---------------------------------------------------------------------------------------------------

## 2022-08-17 NOTE — CONSULT NOTE ADULT - ATTENDING COMMENTS
58 year old female with history of uncontrolled HTN and DM type 2, and ho stroke presents for nonhealing RLE wound and HTNsive urgency to 300 sytolic, developed new stroke (embolic in nature) and possible seizure activity started on AED meds. Nephrology c/s for persistent HTN on multiple (5) bp meds.     # HTN emergency with neurological symptoms on   hydrALAZINE 100 milliGRAM(s) Oral every 8 hours  labetalol 400 milliGRAM(s) Oral three times a day  losartan 100 milliGRAM(s) Oral daily  Vasquez / cardura     Agree with adding chlorthalidone 25 mg q24h / switch amlodipine to nifedipine   keep on hydralazine labetalol and losartan current   DC cardura   follow BP readings for now  renal artery doppler negative for ARIAN   Renin and Thomas noted c/w ACE inh- ARB use   follow BMP in 24-48 h     will follow

## 2022-08-17 NOTE — PROGRESS NOTE ADULT - SUBJECTIVE AND OBJECTIVE BOX
Patient is a 56y old  Female who presents with a chief complaint of Hypertensive urgency (02 Aug 2022 11:31)    INTERVAL HPI/OVERNIGHT EVENTS: Patient was examined and seen at bedside. Events noted. Fevers. Tries to speak and answer questions.    ROS: unable to obtain due to AMS  InitialHPI:  57 yo F with PMH of HTN, DM2, and ?hemorrhagic stroke due to an aneurysm (per son 2017) who presented for RLE wound. Started 3 months ago when she fell and hit her leg on a wooden cabinet. She put hydrogen peroxide, antibiotic cream, and wrapped the wound but never fully healed. Two weeks ago it started to show yellow pus so her and her family came to the ED for further treatment. Endorsed subjective fevers, chest pain, and vision changes which occurs when walked 2-3 blocks. Denied congestion, sore throat, cough, dyspnea, headache, dysuria, N/V, diarrhea     Per son, they fill her medications at [facility] ([phone]) and this is their current pharmacy. Called them to confirm her medications and they said her last refill of medications was 2018. Pt has not seen a doctor due to insurance problem and has not been taking any medications for more than 1yr.    In the ED:  Vitals: T: 98.9, BP: 296/ 174, , RR: 18, 98%O2 on RA  Labs: CBC showed WBC 11.23; ESR 92, CRP 35.6  CMP showed glucose 302, alk phos 173; ; VBG pH 7.45  .   Received unasyn and vanco, humalin R, IV vasotec, IV labetalol   (02 Aug 2022 07:47)    PAST MEDICAL & SURGICAL HISTORY:  Hypertension  ?Hemorrhagic CVA due to aneurysm    Diabetes mellitus      No significant past surgical history    General: NAD, awake tries to follow commands,  dysarthria, +NGT, +oral secretions  HEENT: no LAD  CV: S1 S2  Resp: decreased breath sounds at bases  GI: NT/ND/S +BS; obese  MS: no clubbing/cyanosis/edema, + pulses b/l; RLE c/d/i dsg  Neuro: RUE 3/5, rest 0/5    tele: SR, nonspecific changes (on my own evaluation of tele monitor)            MEDICATIONS  (STANDING):  aspirin  chewable 81 milliGRAM(s) Enteral Tube daily  atorvastatin 80 milliGRAM(s) Oral at bedtime  chlorthalidone 25 milliGRAM(s) Oral daily  collagenase Ointment 1 Application(s) Topical two times a day  Dakins Solution - 1/2 Strength 1 Application(s) Topical two times a day  dextrose 5%. 1000 milliLiter(s) (50 mL/Hr) IV Continuous <Continuous>  dextrose 5%. 1000 milliLiter(s) (100 mL/Hr) IV Continuous <Continuous>  dextrose 50% Injectable 25 Gram(s) IV Push once  dextrose 50% Injectable 12.5 Gram(s) IV Push once  dextrose 50% Injectable 25 Gram(s) IV Push once  doxazosin 2 milliGRAM(s) Oral at bedtime  enoxaparin Injectable 40 milliGRAM(s) SubCutaneous every 24 hours  glucagon  Injectable 1 milliGRAM(s) IntraMuscular once  hydrALAZINE 100 milliGRAM(s) Oral every 8 hours  insulin glargine Injectable (LANTUS) 22 Unit(s) SubCutaneous every morning  insulin lispro (ADMELOG) corrective regimen sliding scale   SubCutaneous three times a day before meals  insulin lispro Injectable (ADMELOG) 5 Unit(s) SubCutaneous three times a day before meals  labetalol 400 milliGRAM(s) Oral three times a day  levETIRAcetam  IVPB 1500 milliGRAM(s) IV Intermittent every 12 hours  lidocaine 1%/epinephrine 1:100,000 Inj 20 milliLiter(s) Local Injection once  losartan 100 milliGRAM(s) Oral daily  multivitamin/minerals/iron Oral Solution (CENTRUM) 15 milliLiter(s) Oral daily  NIFEdipine XL 60 milliGRAM(s) Oral at bedtime  pantoprazole    Tablet 40 milliGRAM(s) Oral before breakfast  phenytoin   Chewable 50 milliGRAM(s) Oral at bedtime  phenytoin  IVPB 100 milliGRAM(s) IV Intermittent three times a day  polyethylene glycol 3350 17 Gram(s) Oral every 12 hours  sodium bicarbonate 650 milliGRAM(s) Oral every 6 hours  sodium chloride 0.65% Nasal 2 Spray(s) Both Nostrils two times a day    MEDICATIONS  (PRN):  acetaminophen     Tablet .. 650 milliGRAM(s) Oral every 6 hours PRN Temp greater or equal to 38C (100.4F), Mild Pain (1 - 3)  dextrose Oral Gel 15 Gram(s) Oral once PRN Blood Glucose LESS THAN 70 milliGRAM(s)/deciliter  labetalol Injectable 10 milliGRAM(s) IV Push every 6 hours PRN Systolic blood pressure >  melatonin 3 milliGRAM(s) Oral at bedtime PRN Insomnia    Home Medications:  losartan 50 mg oral tablet: 1 tab(s) orally once a day (02 Aug 2022 10:38)  metFORMIN 500 mg oral tablet: 1 tab(s) orally 2 times a day (02 Aug 2022 10:38)  metoprolol succinate 50 mg oral tablet, extended release: 1 tab(s) orally once a day (02 Aug 2022 10:38)    Vital Signs Last 24 Hrs  T(C): 37.3 (17 Aug 2022 13:27), Max: 38.9 (16 Aug 2022 20:00)  T(F): 99.2 (17 Aug 2022 13:27), Max: 102 (16 Aug 2022 20:00)  HR: 75 (17 Aug 2022 13:27) (75 - 96)  BP: 157/79 (17 Aug 2022 13:27) (157/79 - 185/84)  BP(mean): --  RR: 20 (17 Aug 2022 13:27) (18 - 20)  SpO2: --    Parameters below as of 17 Aug 2022 13:27  Patient On (Oxygen Delivery Method): room air      CAPILLARY BLOOD GLUCOSE      POCT Blood Glucose.: 127 mg/dL (17 Aug 2022 12:22)  POCT Blood Glucose.: 176 mg/dL (17 Aug 2022 07:55)  POCT Blood Glucose.: 122 mg/dL (17 Aug 2022 00:12)  POCT Blood Glucose.: 125 mg/dL (16 Aug 2022 21:13)  POCT Blood Glucose.: 158 mg/dL (16 Aug 2022 16:40)    LABS:                        9.2    8.28  )-----------( 357      ( 17 Aug 2022 08:31 )             26.1     08-17    141  |  110  |  18  ----------------------------<  173<H>  4.1   |  19  |  0.9    Ca    8.3<L>      17 Aug 2022 08:31  Mg     1.8     08-17    TPro  5.8<L>  /  Alb  2.7<L>  /  TBili  <0.2  /  DBili  x   /  AST  18  /  ALT  16  /  AlkPhos  136<H>  08-17    LIVER FUNCTIONS - ( 17 Aug 2022 08:31 )  Alb: 2.7 g/dL / Pro: 5.8 g/dL / ALK PHOS: 136 U/L / ALT: 16 U/L / AST: 18 U/L / GGT: x                           Culture - Blood (collected 15 Aug 2022 16:59)  Source: .Blood None  Preliminary Report (17 Aug 2022 02:02):    No growth to date.      Consultant Notes Reviewed:  [x ] YES  [ ] NO  Care Discussed with Consultants/Other Providers/ Housestaff [ x] YES  [ ] NO  Radiology, labs, new studies personally reviewed.

## 2022-08-17 NOTE — PROGRESS NOTE ADULT - ASSESSMENT
57 yo F with PMH of HTN, DM2, CVA (2017) who presented with purulent right lower extremity wound for 3 months consistent with acute RLE cellulitis.     As for the cellulitis, patient underwent debridement of the ulcer of the right lower extremity, and was kept on Augmentin until today.    Patient was a code stroke on 8/5, suspected CVA vs epileptic seizures. She was started on Keppra and then continued on Dilantin, since previous VEEG showed continuous epileptiform activities. Patient was started on feeds via NG tube and is scheduled to have a PEG tube on Monday 8/22.    As the patient might have underlying afib resulting in the multiple punctuate strokes, she is scheduled to have an ILR on 8/19. Aitiology is not clear, and vasculitis is not fully r/o, will monitor the patient improvement to decide regarding the LP.     Patient was exposed to a roommate who was covid positive--> on isolation and repeat covid is negative; she is off isolation tomorrow on the 18th.    Patient probably has underlying severe hypertension --> on losartan, amlodipine, cardura and hydralazine and SBP= 180, nephro consulted for antihypertensives management.    Patient has been having fevers, ID is following--> cryptogenic fever, without any source of infection, augmentin course will finish today, as per their recs, do not give antibiotics and trend fevers.    Neuro  #Stroke workup  - continue aspirin 81mg  daily  - Off clopidogrel for PEG tube on Monday  - Continue Atorvastatin 80 mg daily   - q8hr stroke neuro checks and vitals  - TTE pending, possibly will need IVONNE     # High risk of seizures with epileptiform discharges:  Multiple EEG did not capture any seizures  - Continue Keppra 1500 mg bid   - Phenytoin 100 mg tid     Cards  #Hypertensive urgency due to noncompliance and Malignant HTN  -BP at admission 296/174 without signs of end organ damage  -gradual BP lowering  -close monitoring  -8/12: added Norvasc  -8/13 increased Labetalol dose. Pt also has PRN Labetalol IVP  8/14 added cardura  8/15 increased Hydralazine  -renal eval  -check for pheo, renin/dawn    #HLD  - high dose statin as above in CVA      Pulm  - call provider if SPO2 < 94%    GI  #Nutrition/Fluids/Electrolytes   - replete K<4 and Mg <2  - Diet: NGT   - likely needs PEG (had PEG in the past)  - GI on the case      Renal  Nephrology are following, appreciate recs  Daily BMP    Infectious Disease  #Purulent RLE cellulitis r/o abscess / Fever / ?asp-n pneumonitis vs PNA  -s/p unasyn and and vancomycin in ED  -f/u wound and blood cultures   -trend inflammatory markers   -ID consult noted  - vanco, Unasyn  -s/p burn debridement   -changed ABx to augmentin  -8/13: fever: pancultured  -8/14: Candida in wound. D/w Dr. Chua: contaminant  8/15 BCx w/ staph: likely contaminant. F/u repeat BCx  8/17: hold ABx per ID. High suspicion for asp-n pneumonitis vs PNA. Repeat Cx, CXR. If persistent fevers, would change ABx to Zosyn, Vanco. Asp-n precautions. Aggressive suctioning       Endocrine  #DM  - A1C results: 10.4  - ISS  - started insulin regimen   - FS ac/hs      - TSH results: 0.86    DVT ppx: lovenox, SCDs  GI ppx: protonix  Diet: DASH/carb consistent  Activity: AAT          Discussed with Neurology Attending

## 2022-08-17 NOTE — PROGRESS NOTE ADULT - SUBJECTIVE AND OBJECTIVE BOX
Neurology Stroke Progress Note    INTERVAL HPI/OVERNIGHT EVENTS:  Patient seen and examined at the bedside. No issues overnight, no new complains this morning. the patient was more awake and was able to follow some commands.     MEDICATIONS  (STANDING):  aspirin  chewable 81 milliGRAM(s) Enteral Tube daily  atorvastatin 80 milliGRAM(s) Oral at bedtime  chlorthalidone 25 milliGRAM(s) Oral daily  collagenase Ointment 1 Application(s) Topical two times a day  Dakins Solution - 1/2 Strength 1 Application(s) Topical two times a day  dextrose 5%. 1000 milliLiter(s) (50 mL/Hr) IV Continuous <Continuous>  dextrose 5%. 1000 milliLiter(s) (100 mL/Hr) IV Continuous <Continuous>  dextrose 50% Injectable 25 Gram(s) IV Push once  dextrose 50% Injectable 12.5 Gram(s) IV Push once  dextrose 50% Injectable 25 Gram(s) IV Push once  doxazosin 2 milliGRAM(s) Oral at bedtime  enoxaparin Injectable 40 milliGRAM(s) SubCutaneous every 24 hours  glucagon  Injectable 1 milliGRAM(s) IntraMuscular once  hydrALAZINE 100 milliGRAM(s) Oral every 8 hours  insulin glargine Injectable (LANTUS) 22 Unit(s) SubCutaneous every morning  insulin lispro (ADMELOG) corrective regimen sliding scale   SubCutaneous three times a day before meals  insulin lispro Injectable (ADMELOG) 5 Unit(s) SubCutaneous three times a day before meals  labetalol 400 milliGRAM(s) Oral three times a day  levETIRAcetam  IVPB 1500 milliGRAM(s) IV Intermittent every 12 hours  lidocaine 1%/epinephrine 1:100,000 Inj 20 milliLiter(s) Local Injection once  losartan 100 milliGRAM(s) Oral daily  multivitamin/minerals/iron Oral Solution (CENTRUM) 15 milliLiter(s) Oral daily  NIFEdipine XL 60 milliGRAM(s) Oral at bedtime  pantoprazole    Tablet 40 milliGRAM(s) Oral before breakfast  phenytoin   Chewable 50 milliGRAM(s) Oral at bedtime  phenytoin  IVPB 100 milliGRAM(s) IV Intermittent three times a day  polyethylene glycol 3350 17 Gram(s) Oral every 12 hours  sodium bicarbonate 650 milliGRAM(s) Oral every 6 hours  sodium chloride 0.65% Nasal 2 Spray(s) Both Nostrils two times a day    MEDICATIONS  (PRN):  acetaminophen     Tablet .. 650 milliGRAM(s) Oral every 6 hours PRN Temp greater or equal to 38C (100.4F), Mild Pain (1 - 3)  dextrose Oral Gel 15 Gram(s) Oral once PRN Blood Glucose LESS THAN 70 milliGRAM(s)/deciliter  labetalol Injectable 10 milliGRAM(s) IV Push every 6 hours PRN Systolic blood pressure >  melatonin 3 milliGRAM(s) Oral at bedtime PRN Insomnia      Allergies    No Known Allergies    Intolerances        Vital Signs Last 24 Hrs  T(C): 37.3 (17 Aug 2022 13:27), Max: 38.9 (16 Aug 2022 20:00)  T(F): 99.2 (17 Aug 2022 13:27), Max: 102 (16 Aug 2022 20:00)  HR: 75 (17 Aug 2022 13:27) (75 - 96)  BP: 157/79 (17 Aug 2022 13:27) (157/79 - 185/84)  BP(mean): --  RR: 20 (17 Aug 2022 13:27) (18 - 20)  SpO2: --    Parameters below as of 17 Aug 2022 13:27  Patient On (Oxygen Delivery Method): room air        Physical exam:  General: Has NGT, mildly drowsy, able to follow some commands       Neurologic:  -Mental status: Awake,  moderately to severely slurred, able to follow some simple commands.  -Cranial nerves:   II: Rt visual field defect on threaten reflex   III, IV, VI: no nystagmus, no clear limitation of the eye's movements  VII: Rt facial weakness, and drooling from the Rt side   Motor: was able to withdraw all 4 limbs, able to raise her Lt arm, and less on the Rt arm.   Sensation: respond to noxious stimuli in all 4 limbs,          LABS:                        9.2    8.28  )-----------( 357      ( 17 Aug 2022 08:31 )             26.1     08-17    141  |  110  |  18  ----------------------------<  173<H>  4.1   |  19  |  0.9    Ca    8.3<L>      17 Aug 2022 08:31  Mg     1.8     08-17    TPro  5.8<L>  /  Alb  2.7<L>  /  TBili  <0.2  /  DBili  x   /  AST  18  /  ALT  16  /  AlkPhos  136<H>  08-17          RADIOLOGY & ADDITIONAL TESTS:

## 2022-08-17 NOTE — PROGRESS NOTE ADULT - ATTENDING COMMENTS
Patient seen and examined and agree with above except as noted.  Patients history, notes, labs, imaging, vitals and meds reviewed personally.  No new events  Patient more alert today and has L>R weakness  Intermittently following commands  Awaiting PEG next week    Plan as above

## 2022-08-17 NOTE — CONSULT NOTE ADULT - ASSESSMENT
58 year old female with history of uncontrolled HTN and DM type 2, and ho stroke presents for nonhealing RLE wound and HTNsive urgency to 300 sytolic, developed new stroke (embolic in nature) and possible seizure activity started on AED meds. Nephrology c/s for persistent HTN on multiple (5) bp meds.     Impression  persistent HTN iso medication noncompliance and uncontrolled DM  r/o secondary cause of HTN (ARIAN/ hyperaldosteronism/ aortic coarctation/ adrenal gland tumors/ SHRUTI)    ##  -On three different Blood pressure medication classes (losartan, amlodipine, labetalol, cardura, hydralazine), but no diuretic  -pt is not on maximally titrated on current bp medications  -Renal artery duplex wnl>>ARIAN unlikely  -TTE performed; pending read  -aldosterone wnl, renin elevated  -r/o aortic coarctation with TTE    PLAN  -start HCTZ 25mg daily  -c/w losartan 100mg daily  -c/w amlodipine 10mg daily  -adjust labetalol to twice daily dosing; currently at 400mg q8; make 600mg q12  -c/w cardura 2mg nightly (can uptitrate, doubling dose every 1-2 weeks)  -?hydralazine? pending attending recommendation  -obtain abg r/o hypercapnea/ pulm eval for SHRUTI      DO NOT FOLLOW RECS; NOTE IS INCOMPLETE; PENDING DISCUSSION WITH FELLOW/ATTENDING DURING ROUNDS     58 year old female with history of uncontrolled HTN and DM type 2, and ho stroke presents for nonhealing RLE wound and HTNsive urgency to 300 sytolic, developed new stroke (embolic in nature) and possible seizure activity started on AED meds. Nephrology c/s for persistent HTN on multiple (5) bp meds.     Impression  HTN iso medication noncompliance and significant vascular disease (HO chronic/acute stroke/ HO uncontrolled DM)  r/o secondary cause of HTN once pt has been on appropriate medication management    ## ##  -Resistant hypertension is defined as a blood pressure that remains above goal despite concurrent use of three antihypertensive agents of different classes taken at maximally tolerated doses, one of which should be a diuretic.   -Pt is on > three different Blood pressure medications of different classes (losartan, amlodipine, labetalol, cardura, hydralazine), but no diuretic  -pt is not maximally titrated on current bp medications  -Renal artery duplex wnl>>ARIAN unlikely  -aldosterone wnl, renin elevated  -r/o aortic coarctation with TTE (pending results)  ## ##    PLAN  -start chlorthalidone 25mg daily  -start nifedipine 60mg xL qHS  -c/w losartan 100mg daily  -c/w labetalol 400mg q8  -c/w hydralazine 100mg q8  -discontinue amlodipine 10mg daily  -c/w cardura 2mg nightly  -f/u BP readings; adjust dosing and discontinue medications as appropriate if pt becomes hypotensive

## 2022-08-17 NOTE — PROGRESS NOTE ADULT - ASSESSMENT
57 yo F with PMH of HTN, DM2, CVA (2017) who presented with purulent right lower extremity wound for 3 months consistent with acute RLE cellulitis. Code stroke for unresponsiveness at 4pm 8/5    #Acute ischemic strokes w/ multiple chronic microhemorrhages / ?Seizures  8/8: increased Keppra dose and added Dilantin. Remains on VEEG  8/9-10 d/w neuro: will get MRI, cont VEEG. LP depending on MRI results  8/10: son declined NGT placement and asked to wait till am (aware of risks)  8/11< from: MR Head w/wo IV Cont (08.10.22 @ 21:25) >  Multiple punctate cortical acute infarcts involving multiple vascular territories possibly related to embolic etiology. No evidence of acute hemorrhage. Moderate chronic microvascular type changes as well as chronic hemosiderin deposition within the right basal ganglia consistent with remote hemorrhage.  Chronic right thalamic lacunar infarcts.  < end of copied text >  -d/w neuro: start ASA, Plavix. Cont Lipitor  -repeat CTA head noted  -NGT placement and feeds   -PT/OT/physiatry  -TTE w/ bubble pending (notified logistics multiple times)  -will need ?IVONNE vs CTA heart vs TTE w/ contrast   and ILR  reminded family to bring records from [facility]  -neuro checks  -8/13: spoke to neuro, will give an extras dose of DIlantin 500 x1. Repeat dilantin level in 48hrs.   -8/14 CTH, EEG unchanged.   8/15: more alert: d/w neuro, will repeat MRI brain, check vasculitis and hypercoag workup  poor candidate for therapeutic A/c as per neuro  ?LP for vascultis    #Purulent RLE cellulitis r/o abscess / Fever / ?asp-n pneumonitis vs PNA  -s/p unasyn and and vancomycin in ED  -f/u wound and blood cultures   -trend inflammatory markers   -ID consult noted  - vanco Unasyn  -s/p burn debridement   -changed ABx to augmentin  -8/13: fever: pancultured  -8/14: Candida in wound. D/w Dr. Chua: contaminant  8/15 BCx w/ staph: likely contaminant. F/u repeat BCx  8/17: hold ABx per ID. High suspicion for asp-n pneumonitis vs PNA. Repeat Cx, CXR. If persistent fevers, would change ABx to Zosyn, Vanco. Asp-n precautions. Aggressive suctioning    #Dysphagia  -likely needs PEG (had PEG in the past)  -GI on the case  -hold Plavix if ok w/ neuro    #Hypertensive urgency due to noncompliance and Malignant HTN  -BP at admission 296/174 without signs of end organ damage  -gradual BP lowering  -close monitoring  -8/12: added Norvasc  -8/13 increased Labetalol dose. Pt also has PRN Labetalol IVP  8/14 added cardura  8/15 increased Hydralazine  -renal eval  -check for pheo, renin/dawn    # DMII w/ Hyperglycemia  - started insulin regimen   - FS ac/hs  -A1c=10.8    # Atypical Chest pain and FELIZ on admission- possibly MSK related but r/o cardiac etiology   - chest wall tender to palpation on admission  - currently CP free  - CXR unremarkable   - Tn negative  - outpt stress test    # hx of CVA 2017 (Aneurysmal as per family? ?ICH but old ischemic strokes on CTH)  # Left sided weakness  -need to get more Hx  -as per family, aneurysm was never fixed    DVT ppx: lovenox, SCDs  GI ppx: protonix  Diet: DASH/carb consistent  Activity: AAT    Very high risk pt. Px is guarded.  Accepted to neuro service w/ medicine as consultants. Will follow    #Progress Note Handoff  Pending: Consults____Clinical improvement and stability__x___MRI, ILR, ?PEG, hypercoag, Cx___PT____x____  Pt/Family discussion: Pt/family informed and agree with the current plan  Disposition: Home______/SNF_______/4A______/To be determined____x____    My note supersedes the residents note should a discrepancy arise.    Chart and notes personally reviewed.  Care Discussed with Consultants/Other Providers/ Housestaff [ x] YES [ ] NO   Radiology, labs, old records personally reviewed.    discussed w/ housestaff, nursing, case management, neuro team

## 2022-08-17 NOTE — PROGRESS NOTE ADULT - SUBJECTIVE AND OBJECTIVE BOX
Neurology Progress Note    INTERVAL HPI/OVERNIGHT EVENTS:  Patient seen and examined. No acute events overnight. Patient denies any pain this am.     MEDICATIONS  (STANDING):  aspirin  chewable 81 milliGRAM(s) Enteral Tube daily  atorvastatin 80 milliGRAM(s) Oral at bedtime  chlorthalidone 25 milliGRAM(s) Oral daily  collagenase Ointment 1 Application(s) Topical two times a day  Dakins Solution - 1/2 Strength 1 Application(s) Topical two times a day  dextrose 5%. 1000 milliLiter(s) (50 mL/Hr) IV Continuous <Continuous>  dextrose 5%. 1000 milliLiter(s) (100 mL/Hr) IV Continuous <Continuous>  dextrose 50% Injectable 25 Gram(s) IV Push once  dextrose 50% Injectable 12.5 Gram(s) IV Push once  dextrose 50% Injectable 25 Gram(s) IV Push once  doxazosin 2 milliGRAM(s) Oral at bedtime  enoxaparin Injectable 40 milliGRAM(s) SubCutaneous every 24 hours  glucagon  Injectable 1 milliGRAM(s) IntraMuscular once  hydrALAZINE 100 milliGRAM(s) Oral every 8 hours  insulin glargine Injectable (LANTUS) 22 Unit(s) SubCutaneous every morning  insulin lispro (ADMELOG) corrective regimen sliding scale   SubCutaneous three times a day before meals  insulin lispro Injectable (ADMELOG) 5 Unit(s) SubCutaneous three times a day before meals  labetalol 400 milliGRAM(s) Oral three times a day  levETIRAcetam  IVPB 1500 milliGRAM(s) IV Intermittent every 12 hours  lidocaine 1%/epinephrine 1:100,000 Inj 20 milliLiter(s) Local Injection once  losartan 100 milliGRAM(s) Oral daily  multivitamin/minerals/iron Oral Solution (CENTRUM) 15 milliLiter(s) Oral daily  NIFEdipine XL 60 milliGRAM(s) Oral at bedtime  pantoprazole    Tablet 40 milliGRAM(s) Oral before breakfast  phenytoin   Chewable 50 milliGRAM(s) Oral at bedtime  phenytoin  IVPB 100 milliGRAM(s) IV Intermittent three times a day  polyethylene glycol 3350 17 Gram(s) Oral every 12 hours  sodium bicarbonate 650 milliGRAM(s) Oral every 6 hours  sodium chloride 0.65% Nasal 2 Spray(s) Both Nostrils two times a day    MEDICATIONS  (PRN):  acetaminophen     Tablet .. 650 milliGRAM(s) Oral every 6 hours PRN Temp greater or equal to 38C (100.4F), Mild Pain (1 - 3)  dextrose Oral Gel 15 Gram(s) Oral once PRN Blood Glucose LESS THAN 70 milliGRAM(s)/deciliter  labetalol Injectable 10 milliGRAM(s) IV Push every 6 hours PRN Systolic blood pressure >  melatonin 3 milliGRAM(s) Oral at bedtime PRN Insomnia    Allergies    No Known Allergies    Intolerances      Vital Signs Last 24 Hrs  T(C): 37.3 (17 Aug 2022 13:27), Max: 38.9 (16 Aug 2022 20:00)  T(F): 99.2 (17 Aug 2022 13:27), Max: 102 (16 Aug 2022 20:00)  HR: 75 (17 Aug 2022 13:27) (75 - 96)  BP: 157/79 (17 Aug 2022 13:27) (157/79 - 185/84)  BP(mean): --  RR: 20 (17 Aug 2022 13:27) (18 - 20)  SpO2: --    Parameters below as of 17 Aug 2022 13:27  Patient On (Oxygen Delivery Method): room air        Physical exam:  General: No acute distress, awake and alert  Eyes: Anicteric sclerae, moist conjunctivae, see below for CNs  Neck: trachea midline, FROM, supple  Cardiovascular: Regular rate and rhythm  Pulmonary: Anterior breath sounds clear bilaterally, no crackles or wheezing. No use of accessory muscles  GI: Abdomen soft, non-distended, non-tender  Extremities:  +1 edema    Neurologic:  -Mental status: Awake, alert, oriented to person, limited exam given patient is minimally verbally responsive  -Cranial nerves:   II: possible left sided visual defect  III, IV, VI: Extraocular movements are intact without nystagmus. Pupils equally round and reactive to light  V:  Facial sensation V1-V3 equal and intact   VII: Face is symmetric with normal eye closure and smile  VIII: Hearing is bilaterally intact to finger rub  IX, X: Uvula is midline and soft palate rises symmetrically  XI: Head turning and shoulder shrug are intact.  XII: unable to asses   Motor: Normal bulk and tone. No pronator drift. moves all extremities spontaneous. RUE 3/5, RLE 2/5, LUE and LLE 1/5  Sensation: Intact to light touch bilaterally. No neglect or extinction on double simultaneous testing.  Coordination: No dysmetria on finger-to-nose and heel-to-shin bilaterally  Reflexes: Downgoing toes bilaterally, no clonus  Gait: Deferred    LABS:                        9.2    8.28  )-----------( 357      ( 17 Aug 2022 08:31 )             26.1     08-17    141  |  110  |  18  ----------------------------<  173<H>  4.1   |  19  |  0.9    Ca    8.3<L>      17 Aug 2022 08:31  Mg     1.8     08-17    TPro  5.8<L>  /  Alb  2.7<L>  /  TBili  <0.2  /  DBili  x   /  AST  18  /  ALT  16  /  AlkPhos  136<H>  08-17          RADIOLOGY & ADDITIONAL TESTS:

## 2022-08-17 NOTE — PROGRESS NOTE ADULT - ASSESSMENT
57 yo F with PMH of HTN, DM2, CVA (2017) who presented with purulent right lower extremity wound for 3 months consistent with acute RLE cellulitis. Code stroke for unresponsiveness at 4pm 8/5 8/11 MR Head w/wo IV Cont (08.10.22 @ 21:25)   Multiple punctate cortical acute infarcts involving multiple vascular territories possibly related to embolic etiology. No evidence of acute hemorrhage. Moderate chronic microvascular type changes as well as chronic hemosiderin deposition within the right basal ganglia consistent with remote hemorrhage.  Chronic right thalamic lacunar infarcts.    IMPRESSION;   SIRS with no infectious etiology  8/13 BCx CoNS> not a true pathogen  8/15 BCx NG  8/15 COVID-19 NG   WBc 8.2  RLE > ulcer presently not infected. No abscess/cellulitis    RECOMMENDATIONS;  D/c augmentin  No ABx  f/u with neurology  Off loading to prevent pressure sores and preventive measures to avoid aspiration   If any questions , please call [phone] or send a message on [employer] teams  Please update ID in real time with any pertinent new laboratory /microbiological/radiographically findings or a change in the clinical characteristics

## 2022-08-17 NOTE — PROGRESS NOTE ADULT - SUBJECTIVE AND OBJECTIVE BOX
JOSE MITCHELL  56y, Female    All available historical data reviewed    OVERNIGHT EVENTS:  fevers    ROS:  unable to obtain history secondary to patient's mental status     VITALS:  T(F): 100.1, Max: 102 (08-16-22 @ 20:00)  HR: 87  BP: 185/84  RR: 18Vital Signs Last 24 Hrs  T(C): 37.8 (17 Aug 2022 04:57), Max: 38.9 (16 Aug 2022 20:00)  T(F): 100.1 (17 Aug 2022 04:57), Max: 102 (16 Aug 2022 20:00)  HR: 87 (17 Aug 2022 04:57) (87 - 96)  BP: 185/84 (17 Aug 2022 04:57) (133/82 - 185/84)  BP(mean): --  RR: 18 (17 Aug 2022 04:57) (18 - 24)  SpO2: --        TESTS & MEASUREMENTS:                        9.2    8.28  )-----------( 357      ( 17 Aug 2022 08:31 )             26.1     08-17    141  |  110  |  18  ----------------------------<  173<H>  4.1   |  19  |  0.9    Ca    8.3<L>      17 Aug 2022 08:31  Mg     1.8     08-17    TPro  5.8<L>  /  Alb  2.7<L>  /  TBili  <0.2  /  DBili  x   /  AST  18  /  ALT  16  /  AlkPhos  136<H>  08-17    LIVER FUNCTIONS - ( 17 Aug 2022 08:31 )  Alb: 2.7 g/dL / Pro: 5.8 g/dL / ALK PHOS: 136 U/L / ALT: 16 U/L / AST: 18 U/L / GGT: x             Culture - Blood (collected 08-15-22 @ 16:59)  Source: .Blood None  Preliminary Report (08-17-22 @ 02:02):    No growth to date.    Culture - Blood (collected 08-13-22 @ 22:44)  Source: .Blood Blood-Peripheral  Gram Stain (08-15-22 @ 08:35):    Growth in anaerobic bottle: Gram Positive Cocci in Clusters  Final Report (08-16-22 @ 14:28):    Growth in anaerobic bottle: Staphylococcus epidermidis Coag Negative    Staphylococcus    Single set isolate, possible contaminant. Contact    Microbiology if susceptibility testing clinically    indicated.    ***Blood Panel PCR results on this specimen are available    approximately 3 hours after the Gram stain result.***    Gram stain, PCR, and/or culture results may not always    correspond due to difference in methodologies.    ************************************************************    This PCR assay was performed by multiplex PCR. This    Assay tests for 66 bacterial and resistance gene targets.    Please refer to the [facility] test directory    at [email or web address] for details.  Organism: Blood Culture PCR (08-16-22 @ 14:28)  Organism: Blood Culture PCR (08-16-22 @ 14:28)      -  Staphylococcus epidermidis, Methicillin resistant: Detec      Method Type: PCR            RADIOLOGY & ADDITIONAL TESTS:  Personal review of radiological diagnostics performed  Echo and EKG results noted when applicable.     MEDICATIONS:  acetaminophen     Tablet .. 650 milliGRAM(s) Oral every 6 hours PRN  amLODIPine   Tablet 10 milliGRAM(s) Oral daily  amoxicillin  875 milliGRAM(s)/clavulanate 1 Tablet(s) Oral every 12 hours  aspirin  chewable 81 milliGRAM(s) Enteral Tube daily  atorvastatin 80 milliGRAM(s) Oral at bedtime  collagenase Ointment 1 Application(s) Topical two times a day  Dakins Solution - 1/2 Strength 1 Application(s) Topical two times a day  dextrose 5%. 1000 milliLiter(s) IV Continuous <Continuous>  dextrose 5%. 1000 milliLiter(s) IV Continuous <Continuous>  dextrose 50% Injectable 25 Gram(s) IV Push once  dextrose 50% Injectable 12.5 Gram(s) IV Push once  dextrose 50% Injectable 25 Gram(s) IV Push once  dextrose Oral Gel 15 Gram(s) Oral once PRN  doxazosin 2 milliGRAM(s) Oral at bedtime  enoxaparin Injectable 40 milliGRAM(s) SubCutaneous every 24 hours  glucagon  Injectable 1 milliGRAM(s) IntraMuscular once  hydrALAZINE 100 milliGRAM(s) Oral every 8 hours  insulin glargine Injectable (LANTUS) 22 Unit(s) SubCutaneous every morning  insulin lispro (ADMELOG) corrective regimen sliding scale   SubCutaneous three times a day before meals  insulin lispro Injectable (ADMELOG) 5 Unit(s) SubCutaneous three times a day before meals  labetalol 400 milliGRAM(s) Oral three times a day  labetalol Injectable 10 milliGRAM(s) IV Push every 6 hours PRN  levETIRAcetam  IVPB 1500 milliGRAM(s) IV Intermittent every 12 hours  lidocaine 1%/epinephrine 1:100,000 Inj 20 milliLiter(s) Local Injection once  losartan 100 milliGRAM(s) Oral daily  melatonin 3 milliGRAM(s) Oral at bedtime PRN  multivitamin/minerals/iron Oral Solution (CENTRUM) 15 milliLiter(s) Oral daily  pantoprazole    Tablet 40 milliGRAM(s) Oral before breakfast  phenytoin   Chewable 50 milliGRAM(s) Oral at bedtime  phenytoin  IVPB 100 milliGRAM(s) IV Intermittent three times a day  polyethylene glycol 3350 17 Gram(s) Oral every 12 hours  sodium bicarbonate 650 milliGRAM(s) Oral every 6 hours  sodium chloride 0.65% Nasal 2 Spray(s) Both Nostrils two times a day      ANTIBIOTICS:  amoxicillin  875 milliGRAM(s)/clavulanate 1 Tablet(s) Oral every 12 hours

## 2022-08-17 NOTE — PROGRESS NOTE ADULT - ASSESSMENT
55 yo F with PMH of HTN, DM2, CVA (2017) who presented with purulent right lower extremity wound for 3 months consistent with acute RLE cellulitis. Code stroke for unresponsiveness at 4pm 8/5    #Acute ischemic strokes / ?Seizures  -CTH negative for acute ICH  - high likelihood of seizure  -c/w IV Keppra 1500mg BID  -seizure precautions  8/8: increased Keppra dose and added Dilantin. Remains on VEEG  8/9-10 d/w neuro: will get MRI, cont VEEG. LP depending on MRI results  8/10: son declined NGT placement and asked to wait till am (aware of risks)  8/11< from: MR Head w/wo IV Cont (08.10.22 @ 21:25) >  Multiple punctate cortical acute infarcts involving multiple vascular territories possibly related to embolic etiology. No evidence of acute hemorrhage. Moderate chronic microvascular type changes as well as chronic hemosiderin deposition within the right basal ganglia consistent with remote hemorrhage.  Chronic right thalamic lacunar infarcts.  -c/w ASA, Plavix. Cont Lipitor  -repeat CTA head noted  -NGT placement and feeds   -PT/OT/physiatry  -TTE w/ bubble pending  -will talk with cardio regarding IVONNE  -will need ?IVONNE and ILR; pending covid swab on the 18th  -tele w/ SVT w/ Afib: ep eval noted  -neuro checks  - Patient is receiving NG tube feeds, will need another MRI of the brain to check supplemental sequelae  - Patient will have an ILR placed later this week once pt is off isolation, PEG tube next week    #Purulent RLE cellulitis r/o abscess (resolved), now cryptogenic fevers  -s/p unasyn and and vancomycin in ED  -trend inflammatory markers   -ID consult noted  - monitor off abx    #Hypertensive urgency due to noncompliance  -BP at admission 296/174 without signs of end organ damage  -gradual BP lowering  -close monitoring  - c/w chlorthalidone 25mg daily  - c/w hydralalzine 100mg q8h  - c/w losartan 100mg daily  - c/w Nifedipine XL 60mg d  - d/c amlodipine    # DMII w/ Hyperglycemia  - started insulin regimen   - FS ac/hs  -A1c=10.8    # Atypical Chest pain and FELIZ on admission- possibly MSK related but r/o cardiac etiology   - chest wall tender to palpation on admission  - currently CP free  - CXR unremarkable   - Tn negative  - outpt stress test    # hx of CVA 2017 (Aneurysmal as per family? ?ICH but old ischemic strokes on CTH)  # Left sided weakness  -need to get more Hx  -as per family, aneurysm was never fixed    DVT ppx: lovenox, SCDs  GI ppx: protonix  Diet: tube feeds  Activity: bed rest    Pending: IVONNE/TTE, ILR Friday, PEG on monday

## 2022-08-17 NOTE — CHART NOTE - NSCHARTNOTEFT_GEN_A_CORE
55 yo F with PMH of HTN, DM2, CVA (2017) who presented with purulent right lower extremity wound for 3 months consistent with acute RLE cellulitis.     As for the cellulitis, patient underwent debridement of the ulcer of the right lower extremity, and was kept on Augmentin until today.    Patient was a code stroke on 8/5, suspected CVA vs epileptic seizures. She was started on Keppra and then continued on Dilantin, since previous VEEG showed continuous epileptiform activities. Patient was started on feeds via NG tube and is scheduled to have a PEG tube on Monday 8/22.    As the patient might have underlying afib resulting in the multiple punctuate strokes, she is scheduled to have an ILR on 8/19. Please order covid swab on the 18th for ILR clearance.    Patient was exposed to a roommate who was covid positive--> on isolation and repeat covid is negative; she is off isolation tomorrow on the 18th.    Patient probably has underlying severe hypertension --> on losartan, amlodipine, cardura and hydralazine and SBP= 180, nephro consulted for antihypertensives management.    Patient has been having fevers, ID is following--> cryptogenic fever, without any source of infection, augmentin course will finish today, as per their recs, do not give antibiotics and trend fevers.

## 2022-08-18 NOTE — SWALLOW BEDSIDE ASSESSMENT ADULT - SWALLOW EVAL: DIAGNOSIS
Pt. received awake but with no awareness for feeding task, PO trials are not appropriate at this time 2' high aspiration risk.

## 2022-08-18 NOTE — PROGRESS NOTE ADULT - ASSESSMENT
Multiple CNS infarct   pAfib? on tele  HTN    - Unclear etiology for CNF infarct.  - Patient not consentable for IVONNE at this time.  - Please discuss IVONNE plan with family per primary team. Call back cardiology service when family agreeable with IVONNE.  - Check TTE.  - Will need AC given pAfib on tele likely etiology for CVA if no additional contraindication from neuro perspective.  - Currently HD stable and NSR on tele. Will c/w current cardiac regimen.  - Will discuss plan with cardiology attending. Multiple CNS infarct   pAfib? on tele  HTN    - Stroke likely related to underlying pAfib.   - Check TTE.  - Will need AC given pAfib on tele likely etiology for CVA if no additional contraindication from neuro perspective.  - Currently HD stable and NSR on tele. Will c/w current cardiac regimen.  - Will discuss plan with cardiology attending. Multiple CNS infarct   pAfib on tele  HTN    - Stroke likely related to underlying pAfib.   - Check TTE.  - Will need AC given pAfib on tele likely etiology for CVA if no additional contraindication from neuro perspective. Thus low yield from IVONNE, so would hold off for now  - Currently HD stable and NSR on tele. Will c/w current cardiac regimen.    F/u with Dr. Abernathy

## 2022-08-18 NOTE — PROGRESS NOTE ADULT - ASSESSMENT
57 yo F with PMH of HTN, DM2, CVA (2017) who presented with purulent right lower extremity wound for 3 months consistent with acute RLE cellulitis. Code stroke for unresponsiveness at 4pm 8/5    #Acute ischemic strokes w/ multiple chronic microhemorrhages / ?Seizures  8/8: increased Keppra dose and added Dilantin. Remains on VEEG  8/9-10 d/w neuro: will get MRI, cont VEEG. LP depending on MRI results  8/10: son declined NGT placement and asked to wait till am (aware of risks)  8/11< from: MR Head w/wo IV Cont (08.10.22 @ 21:25) >  Multiple punctate cortical acute infarcts involving multiple vascular territories possibly related to embolic etiology. No evidence of acute hemorrhage. Moderate chronic microvascular type changes as well as chronic hemosiderin deposition within the right basal ganglia consistent with remote hemorrhage.  Chronic right thalamic lacunar infarcts.  < end of copied text >  -d/w neuro: start ASA, Plavix. Cont Lipitor  -repeat CTA head noted  -NGT placement and feeds   -PT/OT/physiatry  -TTE w/ bubble pending (notified logistics multiple times)  -will need ?IVONNE vs CTA heart vs TTE w/ contrast   and ILR  reminded family to bring records from [facility]  -neuro checks  -8/13: spoke to neuro, will give an extras dose of DIlantin 500 x1. Repeat dilantin level in 48hrs.   -8/14 CTH, EEG unchanged.   8/15: more alert: d/w neuro, will repeat MRI brain, check vasculitis and hypercoag workup  poor candidate for therapeutic A/c as per neuro  ?LP for vascultis  8/18 ?afib on tele as per cardio. EP to f/u to confirm as I did not see afib. Neuro to adjust AEDs  did not tolerate repeat MRI    #Purulent RLE cellulitis r/o abscess / Fever / ?asp-n pneumonitis vs PNA  -s/p unasyn and and vancomycin in ED  -f/u wound and blood cultures   -trend inflammatory markers   -ID consult noted  - vanco, Unasyn  -s/p burn debridement   -changed ABx to augmentin  -8/13: fever: pancultured  -8/14: Candida in wound. D/w Dr. De Pollard: contaminant  8/15 BCx w/ staph: likely contaminant. F/u repeat BCx  8/17: hold ABx per ID. High suspicion for asp-n pneumonitis vs PNA. Repeat Cx, CXR. If persistent fevers, would change ABx to Zosyn, Vanco. Asp-n precautions. Aggressive suctioning    #Dysphagia  -likely needs PEG (had PEG in the past)  -GI on the case  -hold Plavix if ok w/ neuro    #Hypertensive urgency due to noncompliance and Malignant HTN  -BP at admission 296/174 without signs of end organ damage  -gradual BP lowering  -close monitoring  -8/12: added Norvasc  -8/13 increased Labetalol dose. Pt also has PRN Labetalol IVP  8/14 added cardura  8/15 increased Hydralazine  -renal eval noted  -cannot give Nifedipine ER as pt w/ NGT    # DMII w/ Hyperglycemia  - insulin regimen   - FS ac/hs  -A1c=10.8    # Atypical Chest pain and FELIZ on admission- possibly MSK related but r/o cardiac etiology   - chest wall tender to palpation on admission  - currently CP free  - CXR unremarkable   - Tn negative  - outpt stress test    # hx of CVA 2017 (Aneurysmal as per family? ?ICH but old ischemic strokes on CTH)  # Left sided weakness  -need to get more Hx  -as per family, aneurysm was never fixed    DVT ppx: lovenox, SCDs  GI ppx: protonix  Diet: DASH/carb consistent  Activity: AAT    Very high risk pt. Px is guarded.    #Progress Note Handoff  Pending: Consults____Clinical improvement and stability__x___MRI, ILR, ?PEG, hypercoag, Cx___PT____x____  Pt/Family discussion: Pt/family informed and agree with the current plan  Disposition: Home______/SNF_______/4A______/To be determined____x____    My note supersedes the residents note should a discrepancy arise.    Chart and notes personally reviewed.  Care Discussed with Consultants/Other Providers/ Housestaff [ x] YES [ ] NO   Radiology, labs, old records personally reviewed.    discussed w/ housestaff, nursing, case management, neuro team

## 2022-08-18 NOTE — PROGRESS NOTE ADULT - ASSESSMENT
57 yo F with PMH of HTN, DM2, CVA (2017) who presented with purulent right lower extremity wound for 3 months consistent with acute RLE cellulitis.   As for the cellulitis, patient underwent debridement of the ulcer of the right lower extremity, and was kept on Augmentin until today.  Patient was a code stroke on 8/5, suspected CVA vs epileptic seizures. She was started on Keppra and then continued on Dilantin, since previous VEEG showed continuous epileptiform activities. Patient was started on feeds via NG tube and is scheduled to have a PEG tube on Monday 8/22.  As the patient might have underlying afib resulting in the multiple punctuate strokes, she is scheduled to have an ILR on 8/19. Aitiology is not clear, and vasculitis is not fully r/o, will monitor the patient improvement to decide regarding the LP.   Patient was exposed to a roommate who was covid positive--> on isolation and repeat covid is negative; she is off isolation tomorrow on the 18th.  Patient probably has underlying severe hypertension --> on losartan, amlodipine, cardura and hydralazine and SBP= 180, nephro consulted for antihypertensives management.  Patient has been having fevers, ID is following--> cryptogenic fever, without any source of infection, augmentin course will finish today, as per their recs, do not give antibiotics and trend fevers.    Neuro  #Stroke workup  - continue aspirin 81mg  daily  - Off clopidogrel for PEG tube on Monday  - Continue Atorvastatin 80 mg daily   - q8hr stroke neuro checks and vitals  - TTE done pending report, possibly will need IVONNE   - Possible A. fib pending EP evaluation   - Vasculitic work up ordered     # High risk of seizures with epileptiform discharges:  Multiple EEG did not capture any seizures  - Continue Keppra 1500 mg bid   - Phenytoin 100 mg tid   - Levetiracetam and phenytoin levels pending     Cards  #Hypertensive urgency due to noncompliance and Malignant HTN  BP at admission 296/174 without signs of end organ damage. Renal artery duplex wnl>>ARIAN unlikely  Aldosterone wnl, renin elevated  - r/o aortic coarctation with TTE (pending results)  -close monitoring  Currently on:  -c/w chlorthalidone 25mg daily  -c/w nifedipine 60mg xL qHS  -c/w losartan 100mg daily  -c/w labetalol 400mg q8  -c/w hydralazine 100mg q8  -discontinued amlodipine 10mg daily  -c/w cardura 2mg nightly      #HLD  - high dose statin as above in CVA      Pulm  - call provider if SPO2 < 94%    GI  #Nutrition/Fluids/Electrolytes   - replete K<4 and Mg <2  - Diet: NGT   - likely needs PEG (had PEG in the past)  - GI on the case      Renal  Nephrology are following, appreciate recs  Daily BMP    Infectious Disease  #Purulent RLE cellulitis r/o abscess / Fever / ?asp-n pneumonitis vs PNA  -s/p unasyn and and vancomycin in ED  -f/u wound and blood cultures   -trend inflammatory markers   -ID consult noted  - vanco, Unasyn  -s/p burn debridement   -changed ABx to augmentin  -8/13: fever: pancultured  -8/14: Candida in wound. D/w Dr. Chua: contaminant  8/15 BCx w/ staph: likely contaminant. F/u repeat BCx  8/17: hold ABx per ID. High suspicion for asp-n pneumonitis vs PNA. Repeat Cx, CXR. If persistent fevers, would change ABx to Zosyn, Vanco. Asp-n precautions. Aggressive suctioning       Endocrine  #DM  - A1C results: 10.4  - ISS  - started insulin regimen   - FS ac/hs      - TSH results: 0.86    DVT ppx: lovenox, SCDs  GI ppx: protonix  Diet: DASH/carb consistent  Activity: AAT

## 2022-08-18 NOTE — PROGRESS NOTE ADULT - SUBJECTIVE AND OBJECTIVE BOX
Nephrology progress note    THIS IS AN INCOMPLETE NOTE . FULL NOTE TO FOLLOW SHORTLY    Patient is seen and examined, events over the last 24 h noted .    Allergies:  No Known Allergies    Hospital Medications:   MEDICATIONS  (STANDING):  amLODIPine   Tablet 10 milliGRAM(s) Oral daily  aspirin  chewable 81 milliGRAM(s) Enteral Tube daily  atorvastatin 80 milliGRAM(s) Oral at bedtime  chlorthalidone 25 milliGRAM(s) Oral daily  collagenase Ointment 1 Application(s) Topical two times a day  Dakins Solution - 1/2 Strength 1 Application(s) Topical two times a day  dextrose 5%. 1000 milliLiter(s) (100 mL/Hr) IV Continuous <Continuous>  dextrose 5%. 1000 milliLiter(s) (50 mL/Hr) IV Continuous <Continuous>  dextrose 50% Injectable 25 Gram(s) IV Push once  dextrose 50% Injectable 12.5 Gram(s) IV Push once  dextrose 50% Injectable 25 Gram(s) IV Push once  doxazosin 2 milliGRAM(s) Oral at bedtime  enoxaparin Injectable 40 milliGRAM(s) SubCutaneous every 24 hours  glucagon  Injectable 1 milliGRAM(s) IntraMuscular once  hydrALAZINE 100 milliGRAM(s) Oral every 8 hours  insulin glargine Injectable (LANTUS) 22 Unit(s) SubCutaneous every morning  insulin lispro (ADMELOG) corrective regimen sliding scale   SubCutaneous three times a day before meals  insulin lispro Injectable (ADMELOG) 5 Unit(s) SubCutaneous three times a day before meals  labetalol 400 milliGRAM(s) Oral three times a day  levETIRAcetam  IVPB 1500 milliGRAM(s) IV Intermittent every 12 hours  lidocaine 1%/epinephrine 1:100,000 Inj 20 milliLiter(s) Local Injection once  losartan 100 milliGRAM(s) Oral daily  magnesium sulfate  IVPB 2 Gram(s) IV Intermittent once  multivitamin/minerals/iron Oral Solution (CENTRUM) 15 milliLiter(s) Oral daily  pantoprazole    Tablet 40 milliGRAM(s) Oral before breakfast  phenytoin   Chewable 50 milliGRAM(s) Oral at bedtime  phenytoin  IVPB 100 milliGRAM(s) IV Intermittent three times a day  polyethylene glycol 3350 17 Gram(s) Oral every 12 hours  sodium bicarbonate 650 milliGRAM(s) Oral every 6 hours  sodium chloride 0.65% Nasal 2 Spray(s) Both Nostrils two times a day        VITALS:  T(F): 96.8 (08-18-22 @ 06:55), Max: 99.2 (08-17-22 @ 13:27)  HR: 70 (08-18-22 @ 08:45)  BP: 195/95 (08-18-22 @ 08:45)  RR: 18 (08-18-22 @ 06:55)  SpO2: --  Wt(kg): --    08-16 @ 07:01  -  08-17 @ 07:00  --------------------------------------------------------  IN: 850 mL / OUT: 0 mL / NET: 850 mL    08-17 @ 07:01  -  08-18 @ 07:00  --------------------------------------------------------  IN: 0 mL / OUT: 1 mL / NET: -1 mL      Height (cm): 165.1 (08-17 @ 15:47)  Weight (kg): 82.3 (08-17 @ 15:47)  BMI (kg/m2): 30.2 (08-17 @ 15:47)  BSA (m2): 1.9 (08-17 @ 15:47)    PHYSICAL EXAM:  Constitutional: NAD  HEENT: anicteric sclera, oropharynx clear, MMM  Neck: No JVD  Respiratory: CTAB, no wheezes, rales or rhonchi  Cardiovascular: S1, S2, RRR  Gastrointestinal: BS+, soft, NT/ND  Extremities: No cyanosis or clubbing. No peripheral edema  :  No nolasco.   Skin: No rashes    LABS:  08-18    143  |  110  |  18  ----------------------------<  89  4.4   |  20  |  0.8    Ca    8.7      18 Aug 2022 07:30  Mg     2.1     08-18    TPro  5.8<L>  /  Alb  2.9<L>  /  TBili  0.2  /  DBili      /  AST  23  /  ALT  19  /  AlkPhos  132<H>  08-18                          8.3    7.45  )-----------( 336      ( 18 Aug 2022 07:30 )             24.3       Urine Studies:        TSH 0.86      [08-08-22 @ 17:53]  Lipid: chol 234, , HDL 42, LDL --      [08-03-22 @ 08:56]      Syphilis Screen (Treponema Pallidum Ab) Negative      [08-08-22 @ 17:53]      RADIOLOGY & ADDITIONAL STUDIES:   Nephrology progress note  Patient is seen and examined, events over the last 24 h noted .  Lying in bed no events     Allergies:  No Known Allergies    Hospital Medications:   MEDICATIONS  (STANDING):  amLODIPine   Tablet 10 milliGRAM(s) Oral daily  aspirin  chewable 81 milliGRAM(s) Enteral Tube daily  atorvastatin 80 milliGRAM(s) Oral at bedtime  chlorthalidone 25 milliGRAM(s) Oral daily  collagenase Ointment 1 Application(s) Topical two times a day  Dakins Solution - 1/2 Strength 1 Application(s) Topical two times a day  doxazosin 2 milliGRAM(s) Oral at bedtime  enoxaparin Injectable 40 milliGRAM(s) SubCutaneous every 24 hours  glucagon  Injectable 1 milliGRAM(s) IntraMuscular once  hydrALAZINE 100 milliGRAM(s) Oral every 8 hours  insulin glargine Injectable (LANTUS) 22 Unit(s) SubCutaneous every morning  insulin lispro (ADMELOG) corrective regimen sliding scale   SubCutaneous three times a day before meals  insulin lispro Injectable (ADMELOG) 5 Unit(s) SubCutaneous three times a day before meals  labetalol 400 milliGRAM(s) Oral three times a day  levETIRAcetam  IVPB 1500 milliGRAM(s) IV Intermittent every 12 hours  lidocaine 1%/epinephrine 1:100,000 Inj 20 milliLiter(s) Local Injection once  losartan 100 milliGRAM(s) Oral daily  magnesium sulfate  IVPB 2 Gram(s) IV Intermittent once  multivitamin/minerals/iron Oral Solution (CENTRUM) 15 milliLiter(s) Oral daily  pantoprazole    Tablet 40 milliGRAM(s) Oral before breakfast  phenytoin   Chewable 50 milliGRAM(s) Oral at bedtime  phenytoin  IVPB 100 milliGRAM(s) IV Intermittent three times a day  polyethylene glycol 3350 17 Gram(s) Oral every 12 hours  sodium bicarbonate 650 milliGRAM(s) Oral every 6 hours  sodium chloride 0.65% Nasal 2 Spray(s) Both Nostrils two times a day        VITALS:  T(F): 96.8 (08-18-22 @ 06:55), Max: 99.2 (08-17-22 @ 13:27)  HR: 70 (08-18-22 @ 08:45)  BP: 195/95 (08-18-22 @ 08:45)  RR: 18 (08-18-22 @ 06:55)  SpO2: --  Wt(kg): --    08-16 @ 07:01  -  08-17 @ 07:00  --------------------------------------------------------  IN: 850 mL / OUT: 0 mL / NET: 850 mL    08-17 @ 07:01  -  08-18 @ 07:00  --------------------------------------------------------  IN: 0 mL / OUT: 1 mL / NET: -1 mL      Height (cm): 165.1 (08-17 @ 15:47)  Weight (kg): 82.3 (08-17 @ 15:47)  BMI (kg/m2): 30.2 (08-17 @ 15:47)  BSA (m2): 1.9 (08-17 @ 15:47)    PHYSICAL EXAM:  Constitutional: NAD  HEENT: anicteric sclera, oropharynx clear, MMM  Neck: No JVD  Respiratory: CTAB, no wheezes, rales or rhonchi  Cardiovascular: S1, S2, RRR  Gastrointestinal: BS+, soft, NT/ND  Extremities: No cyanosis or clubbing. No peripheral edema  :  No nolasco.   Skin: No rashes    LABS:  08-18    143  |  110  |  18  ----------------------------<  89  4.4   |  20  |  0.8    Ca    8.7      18 Aug 2022 07:30  Mg     2.1     08-18    TPro  5.8<L>  /  Alb  2.9<L>  /  TBili  0.2  /  DBili      /  AST  23  /  ALT  19  /  AlkPhos  132<H>  08-18                          8.3    7.45  )-----------( 336      ( 18 Aug 2022 07:30 )             24.3       Urine Studies:        TSH 0.86      [08-08-22 @ 17:53]  Lipid: chol 234, , HDL 42, LDL --      [08-03-22 @ 08:56]      Syphilis Screen (Treponema Pallidum Ab) Negative      [08-08-22 @ 17:53]      RADIOLOGY & ADDITIONAL STUDIES:

## 2022-08-18 NOTE — PROGRESS NOTE ADULT - ATTENDING COMMENTS
Patient seen and examined and agree with above except as noted.  Patients history, notes, labs, imaging, vitals and meds reviewed personally.  Patient had episode on exam this morning where she grunted and then her eyes were deviated to the left and she was not responding  She soon began looking around but was still not following commands  Suspect seizure episode    Plan as above  Check AED levels

## 2022-08-18 NOTE — PROGRESS NOTE ADULT - SUBJECTIVE AND OBJECTIVE BOX
Patient is a 56y old  Female who presents with a chief complaint of Hypertensive urgency (02 Aug 2022 11:31)    INTERVAL HPI/OVERNIGHT EVENTS: Patient was examined and seen at bedside. Events noted. This am, pt w/ possible seizure.    ROS: unable to obtain due to AMS  InitialHPI:  55 yo F with PMH of HTN, DM2, and ?hemorrhagic stroke due to an aneurysm (per son 2017) who presented for RLE wound. Started 3 months ago when she fell and hit her leg on a wooden cabinet. She put hydrogen peroxide, antibiotic cream, and wrapped the wound but never fully healed. Two weeks ago it started to show yellow pus so her and her family came to the ED for further treatment. Endorsed subjective fevers, chest pain, and vision changes which occurs when walked 2-3 blocks. Denied congestion, sore throat, cough, dyspnea, headache, dysuria, N/V, diarrhea     Per son, they fill her medications at [facility] ([phone]) and this is their current pharmacy. Called them to confirm her medications and they said her last refill of medications was 2018. Pt has not seen a doctor due to insurance problem and has not been taking any medications for more than 1yr.    In the ED:  Vitals: T: 98.9, BP: 296/ 174, , RR: 18, 98%O2 on RA  Labs: CBC showed WBC 11.23; ESR 92, CRP 35.6  CMP showed glucose 302, alk phos 173; ; VBG pH 7.45  .   Received unasyn and vanco, humalin R, IV vasotec, IV labetalol   (02 Aug 2022 07:47)    PAST MEDICAL & SURGICAL HISTORY:  Hypertension  ?Hemorrhagic CVA due to aneurysm    Diabetes mellitus      No significant past surgical history    General: lethargic,, +NGT  HEENT: no LAD  CV: S1 S2  Resp: decreased breath sounds at bases  GI: NT/ND/S +BS; obese  MS: no clubbing/cyanosis/edema, + pulses b/l; RLE c/d/i dsg  Neuro: unable to access    tele: SR, nonspecific changes (on my own evaluation of tele monitor)            MEDICATIONS  (STANDING):  amLODIPine   Tablet 10 milliGRAM(s) Oral daily  aspirin  chewable 81 milliGRAM(s) Enteral Tube daily  atorvastatin 80 milliGRAM(s) Oral at bedtime  chlorthalidone 25 milliGRAM(s) Oral daily  collagenase Ointment 1 Application(s) Topical two times a day  Dakins Solution - 1/2 Strength 1 Application(s) Topical two times a day  dextrose 5%. 1000 milliLiter(s) (100 mL/Hr) IV Continuous <Continuous>  dextrose 5%. 1000 milliLiter(s) (50 mL/Hr) IV Continuous <Continuous>  dextrose 50% Injectable 25 Gram(s) IV Push once  dextrose 50% Injectable 12.5 Gram(s) IV Push once  dextrose 50% Injectable 25 Gram(s) IV Push once  doxazosin 2 milliGRAM(s) Oral at bedtime  enoxaparin Injectable 40 milliGRAM(s) SubCutaneous every 24 hours  glucagon  Injectable 1 milliGRAM(s) IntraMuscular once  hydrALAZINE 100 milliGRAM(s) Oral every 8 hours  insulin glargine Injectable (LANTUS) 22 Unit(s) SubCutaneous every morning  insulin lispro (ADMELOG) corrective regimen sliding scale   SubCutaneous three times a day before meals  insulin lispro Injectable (ADMELOG) 5 Unit(s) SubCutaneous three times a day before meals  labetalol 400 milliGRAM(s) Oral three times a day  levETIRAcetam  IVPB 1500 milliGRAM(s) IV Intermittent every 12 hours  lidocaine 1%/epinephrine 1:100,000 Inj 20 milliLiter(s) Local Injection once  losartan 100 milliGRAM(s) Oral daily  multivitamin/minerals/iron Oral Solution (CENTRUM) 15 milliLiter(s) Oral daily  pantoprazole    Tablet 40 milliGRAM(s) Oral before breakfast  phenytoin   Chewable 50 milliGRAM(s) Oral at bedtime  phenytoin  IVPB 100 milliGRAM(s) IV Intermittent three times a day  polyethylene glycol 3350 17 Gram(s) Oral every 12 hours  sodium bicarbonate 650 milliGRAM(s) Oral every 6 hours  sodium chloride 0.65% Nasal 2 Spray(s) Both Nostrils two times a day    MEDICATIONS  (PRN):  acetaminophen     Tablet .. 650 milliGRAM(s) Oral every 6 hours PRN Temp greater or equal to 38C (100.4F), Mild Pain (1 - 3)  dextrose Oral Gel 15 Gram(s) Oral once PRN Blood Glucose LESS THAN 70 milliGRAM(s)/deciliter  labetalol Injectable 10 milliGRAM(s) IV Push every 6 hours PRN Systolic blood pressure >  melatonin 3 milliGRAM(s) Oral at bedtime PRN Insomnia    Home Medications:  losartan 50 mg oral tablet: 1 tab(s) orally once a day (02 Aug 2022 10:38)  metFORMIN 500 mg oral tablet: 1 tab(s) orally 2 times a day (02 Aug 2022 10:38)  metoprolol succinate 50 mg oral tablet, extended release: 1 tab(s) orally once a day (02 Aug 2022 10:38)    Vital Signs Last 24 Hrs  T(C): 36.7 (18 Aug 2022 13:03), Max: 37 (17 Aug 2022 20:22)  T(F): 98 (18 Aug 2022 13:03), Max: 98.6 (17 Aug 2022 20:22)  HR: 72 (18 Aug 2022 13:03) (65 - 72)  BP: 161/84 (18 Aug 2022 13:03) (139/66 - 195/95)  BP(mean): 123 (17 Aug 2022 20:22) (123 - 123)  RR: 18 (18 Aug 2022 06:55) (18 - 20)  SpO2: --      CAPILLARY BLOOD GLUCOSE      POCT Blood Glucose.: 99 mg/dL (18 Aug 2022 11:45)  POCT Blood Glucose.: 90 mg/dL (18 Aug 2022 06:32)  POCT Blood Glucose.: 94 mg/dL (18 Aug 2022 00:27)  POCT Blood Glucose.: 106 mg/dL (17 Aug 2022 21:01)    LABS:                        8.3    7.45  )-----------( 336      ( 18 Aug 2022 07:30 )             24.3     08-18    143  |  110  |  18  ----------------------------<  89  4.4   |  20  |  0.8    Ca    8.7      18 Aug 2022 07:30  Mg     2.1     08-18    TPro  5.8<L>  /  Alb  2.9<L>  /  TBili  0.2  /  DBili  x   /  AST  23  /  ALT  19  /  AlkPhos  132<H>  08-18    LIVER FUNCTIONS - ( 18 Aug 2022 07:30 )  Alb: 2.9 g/dL / Pro: 5.8 g/dL / ALK PHOS: 132 U/L / ALT: 19 U/L / AST: 23 U/L / GGT: x                           Culture - Blood (collected 16 Aug 2022 06:04)  Source: .Blood None  Preliminary Report (17 Aug 2022 18:01):    No growth to date.      Consultant Notes Reviewed:  [x ] YES  [ ] NO  Care Discussed with Consultants/Other Providers/ Housestaff [ x] YES  [ ] NO  Radiology, labs, new studies personally reviewed.

## 2022-08-18 NOTE — PROGRESS NOTE ADULT - SUBJECTIVE AND OBJECTIVE BOX
Neurology Stroke Progress Note    INTERVAL HPI/OVERNIGHT EVENTS:  Patient seen and examined at the bedside. Overnight the patient possibly tired to remove her NGT (no documentation for that), CXR was ordered to confirm the position, which showed it is not in the stomach. NGT was repositioned, and CXR confirmed correct position. The patient did not receive her morning oral medications, and her BP was elevated     MEDICATIONS  (STANDING):  amLODIPine   Tablet 10 milliGRAM(s) Oral daily  aspirin  chewable 81 milliGRAM(s) Enteral Tube daily  atorvastatin 80 milliGRAM(s) Oral at bedtime  chlorthalidone 25 milliGRAM(s) Oral daily  collagenase Ointment 1 Application(s) Topical two times a day  Dakins Solution - 1/2 Strength 1 Application(s) Topical two times a day  dextrose 5%. 1000 milliLiter(s) (100 mL/Hr) IV Continuous <Continuous>  dextrose 5%. 1000 milliLiter(s) (50 mL/Hr) IV Continuous <Continuous>  dextrose 50% Injectable 25 Gram(s) IV Push once  dextrose 50% Injectable 12.5 Gram(s) IV Push once  dextrose 50% Injectable 25 Gram(s) IV Push once  doxazosin 2 milliGRAM(s) Oral at bedtime  enoxaparin Injectable 40 milliGRAM(s) SubCutaneous every 24 hours  glucagon  Injectable 1 milliGRAM(s) IntraMuscular once  hydrALAZINE 100 milliGRAM(s) Oral every 8 hours  insulin glargine Injectable (LANTUS) 22 Unit(s) SubCutaneous every morning  insulin lispro (ADMELOG) corrective regimen sliding scale   SubCutaneous three times a day before meals  insulin lispro Injectable (ADMELOG) 5 Unit(s) SubCutaneous three times a day before meals  labetalol 400 milliGRAM(s) Oral three times a day  levETIRAcetam  IVPB 1500 milliGRAM(s) IV Intermittent every 12 hours  lidocaine 1%/epinephrine 1:100,000 Inj 20 milliLiter(s) Local Injection once  losartan 100 milliGRAM(s) Oral daily  magnesium sulfate  IVPB 2 Gram(s) IV Intermittent once  multivitamin/minerals/iron Oral Solution (CENTRUM) 15 milliLiter(s) Oral daily  pantoprazole    Tablet 40 milliGRAM(s) Oral before breakfast  phenytoin   Chewable 50 milliGRAM(s) Oral at bedtime  phenytoin  IVPB 100 milliGRAM(s) IV Intermittent three times a day  polyethylene glycol 3350 17 Gram(s) Oral every 12 hours  sodium bicarbonate 650 milliGRAM(s) Oral every 6 hours  sodium chloride 0.65% Nasal 2 Spray(s) Both Nostrils two times a day    MEDICATIONS  (PRN):  acetaminophen     Tablet .. 650 milliGRAM(s) Oral every 6 hours PRN Temp greater or equal to 38C (100.4F), Mild Pain (1 - 3)  dextrose Oral Gel 15 Gram(s) Oral once PRN Blood Glucose LESS THAN 70 milliGRAM(s)/deciliter  labetalol Injectable 10 milliGRAM(s) IV Push every 6 hours PRN Systolic blood pressure >  melatonin 3 milliGRAM(s) Oral at bedtime PRN Insomnia      Allergies    No Known Allergies    Intolerances        Vital Signs Last 24 Hrs  T(C): 36 (18 Aug 2022 06:55), Max: 37.3 (17 Aug 2022 13:27)  T(F): 96.8 (18 Aug 2022 06:55), Max: 99.2 (17 Aug 2022 13:27)  HR: 70 (18 Aug 2022 08:45) (68 - 75)  BP: 195/95 (18 Aug 2022 08:45) (157/79 - 195/95)  BP(mean): 123 (17 Aug 2022 20:22) (123 - 123)  RR: 18 (18 Aug 2022 06:55) (18 - 20)  SpO2: --    Parameters below as of 17 Aug 2022 13:27  Patient On (Oxygen Delivery Method): room air        Physical exam:  -Mental status: Awake, moderately to severely slurred, able to follow some simple commands.  -Cranial nerves:   II: Rt visual field defect on threaten reflex   III, IV, VI: no nystagmus, no clear limitation of the eye's movements  VII: Rt facial weakness, and drooling from the Rt side   Motor: was able to withdraw all 4 limbs, able to raise her Lt arm, and less on the Rt arm.   Sensation: respond to noxious stimuli in all 4 limbs    NIHSS:       LABS:                        9.2    8.28  )-----------( 357      ( 17 Aug 2022 08:31 )             26.1     08-17    141  |  110  |  18  ----------------------------<  173<H>  4.1   |  19  |  0.9    Ca    8.3<L>      17 Aug 2022 08:31  Mg     1.8     08-17    TPro  5.8<L>  /  Alb  2.7<L>  /  TBili  <0.2  /  DBili  x   /  AST  18  /  ALT  16  /  AlkPhos  136<H>  08-17          RADIOLOGY & ADDITIONAL TESTS:

## 2022-08-18 NOTE — PROGRESS NOTE ADULT - SUBJECTIVE AND OBJECTIVE BOX
JOSE MITCHELL  56y, Female    All available historical data reviewed    OVERNIGHT EVENTS:  no fevers  more alert and does respond albeit very weak  no diarrhea  no cough  On laxatives> diarrhea    ROS:  not reliable    VITALS:  T(F): 96.8, Max: 99.2 (08-17-22 @ 13:27)  HR: 70  BP: 195/95  RR: 18Vital Signs Last 24 Hrs  T(C): 36 (18 Aug 2022 06:55), Max: 37.3 (17 Aug 2022 13:27)  T(F): 96.8 (18 Aug 2022 06:55), Max: 99.2 (17 Aug 2022 13:27)  HR: 70 (18 Aug 2022 08:45) (68 - 75)  BP: 195/95 (18 Aug 2022 08:45) (157/79 - 195/95)  BP(mean): 123 (17 Aug 2022 20:22) (123 - 123)  RR: 18 (18 Aug 2022 06:55) (18 - 20)  SpO2: --    Parameters below as of 17 Aug 2022 13:27  Patient On (Oxygen Delivery Method): room air        TESTS & MEASUREMENTS:                        8.3    7.45  )-----------( 336      ( 18 Aug 2022 07:30 )             24.3     08-18    143  |  110  |  18  ----------------------------<  89  4.4   |  20  |  0.8    Ca    8.7      18 Aug 2022 07:30  Mg     2.1     08-18    TPro  5.8<L>  /  Alb  2.9<L>  /  TBili  0.2  /  DBili  x   /  AST  23  /  ALT  19  /  AlkPhos  132<H>  08-18    LIVER FUNCTIONS - ( 18 Aug 2022 07:30 )  Alb: 2.9 g/dL / Pro: 5.8 g/dL / ALK PHOS: 132 U/L / ALT: 19 U/L / AST: 23 U/L / GGT: x             Culture - Blood (collected 08-16-22 @ 06:04)  Source: .Blood None  Preliminary Report (08-17-22 @ 18:01):    No growth to date.    Culture - Blood (collected 08-15-22 @ 16:59)  Source: .Blood None  Preliminary Report (08-17-22 @ 02:02):    No growth to date.    Culture - Blood (collected 08-13-22 @ 22:44)  Source: .Blood Blood-Peripheral  Gram Stain (08-15-22 @ 08:35):    Growth in anaerobic bottle: Gram Positive Cocci in Clusters  Final Report (08-16-22 @ 14:28):    Growth in anaerobic bottle: Staphylococcus epidermidis Coag Negative    Staphylococcus    Single set isolate, possible contaminant. Contact    Microbiology if susceptibility testing clinically    indicated.    ***Blood Panel PCR results on this specimen are available    approximately 3 hours after the Gram stain result.***    Gram stain, PCR, and/or culture results may not always    correspond due to difference in methodologies.    ************************************************************    This PCR assay was performed by multiplex PCR. This    Assay tests for 66 bacterial and resistance gene targets.    Please refer to the [facility] test directory    at [email or web address] for details.  Organism: Blood Culture PCR (08-16-22 @ 14:28)  Organism: Blood Culture PCR (08-16-22 @ 14:28)      -  Staphylococcus epidermidis, Methicillin resistant: Detec      Method Type: PCR            RADIOLOGY & ADDITIONAL TESTS:  Personal review of radiological diagnostics performed  Echo and EKG results noted when applicable.     MEDICATIONS:  acetaminophen     Tablet .. 650 milliGRAM(s) Oral every 6 hours PRN  amLODIPine   Tablet 10 milliGRAM(s) Oral daily  aspirin  chewable 81 milliGRAM(s) Enteral Tube daily  atorvastatin 80 milliGRAM(s) Oral at bedtime  chlorthalidone 25 milliGRAM(s) Oral daily  collagenase Ointment 1 Application(s) Topical two times a day  Dakins Solution - 1/2 Strength 1 Application(s) Topical two times a day  dextrose 5%. 1000 milliLiter(s) IV Continuous <Continuous>  dextrose 5%. 1000 milliLiter(s) IV Continuous <Continuous>  dextrose 50% Injectable 25 Gram(s) IV Push once  dextrose 50% Injectable 12.5 Gram(s) IV Push once  dextrose 50% Injectable 25 Gram(s) IV Push once  dextrose Oral Gel 15 Gram(s) Oral once PRN  doxazosin 2 milliGRAM(s) Oral at bedtime  enoxaparin Injectable 40 milliGRAM(s) SubCutaneous every 24 hours  glucagon  Injectable 1 milliGRAM(s) IntraMuscular once  hydrALAZINE 100 milliGRAM(s) Oral every 8 hours  insulin glargine Injectable (LANTUS) 22 Unit(s) SubCutaneous every morning  insulin lispro (ADMELOG) corrective regimen sliding scale   SubCutaneous three times a day before meals  insulin lispro Injectable (ADMELOG) 5 Unit(s) SubCutaneous three times a day before meals  labetalol 400 milliGRAM(s) Oral three times a day  labetalol Injectable 10 milliGRAM(s) IV Push every 6 hours PRN  levETIRAcetam  IVPB 1500 milliGRAM(s) IV Intermittent every 12 hours  lidocaine 1%/epinephrine 1:100,000 Inj 20 milliLiter(s) Local Injection once  losartan 100 milliGRAM(s) Oral daily  magnesium sulfate  IVPB 2 Gram(s) IV Intermittent once  melatonin 3 milliGRAM(s) Oral at bedtime PRN  multivitamin/minerals/iron Oral Solution (CENTRUM) 15 milliLiter(s) Oral daily  pantoprazole    Tablet 40 milliGRAM(s) Oral before breakfast  phenytoin   Chewable 50 milliGRAM(s) Oral at bedtime  phenytoin  IVPB 100 milliGRAM(s) IV Intermittent three times a day  polyethylene glycol 3350 17 Gram(s) Oral every 12 hours  sodium bicarbonate 650 milliGRAM(s) Oral every 6 hours  sodium chloride 0.65% Nasal 2 Spray(s) Both Nostrils two times a day      ANTIBIOTICS:

## 2022-08-18 NOTE — PROGRESS NOTE ADULT - ASSESSMENT
58 year old female with history of uncontrolled HTN and DM type 2, and ho stroke presents for nonhealing RLE wound and HTNsive urgency to 300 sytolic, developed new stroke (embolic in nature) and possible seizure activity started on AED meds. Nephrology c/s for persistent HTN on multiple (5) bp meds.     # HTN emergency with neurological symptoms on   continue hydralazine Chlorthalidone Amlodipine ( switch to night dosing) Losartan and labetalol  BP noted high in AM then ok after meds intake   follow BP readings for now  renal artery doppler negative for ARIAN   Renin and Thomas noted c/w ACE inh- ARB use   follow BMP in AM    will follow

## 2022-08-18 NOTE — PROGRESS NOTE ADULT - ASSESSMENT
· Assessment	  57 yo F with PMH of HTN, DM2, CVA (2017) who presented with purulent right lower extremity wound for 3 months consistent with acute RLE cellulitis. Code stroke for unresponsiveness at 4pm 8/5 8/11 MR Head w/wo IV Cont (08.10.22 @ 21:25)   Multiple punctate cortical acute infarcts involving multiple vascular territories possibly related to embolic etiology. No evidence of acute hemorrhage. Moderate chronic microvascular type changes as well as chronic hemosiderin deposition within the right basal ganglia consistent with remote hemorrhage.  Chronic right thalamic lacunar infarcts.    IMPRESSION;   More alert and does try to respond  Resolved SIRS with no infectious etiology  8/13 BCx CoNS> not a true pathogen  8/15 BCx NG  8/15 COVID-19 NG   WBc 8.2  RLE > ulcer presently not infected. No abscess/cellulitis    RECOMMENDATIONS;  No ABx  f/u with neurology  Off loading to prevent pressure sores and preventive measures to avoid aspiration   Please do not hesitate to recall ID if any questions arise either through [employer] or through [employer] teams

## 2022-08-18 NOTE — PROGRESS NOTE ADULT - SUBJECTIVE AND OBJECTIVE BOX
INTERVAL EVENTS:  Patient seen and examined at bedside. No acute distress. Patient remains AOx1 after recent CVA. Unable to reach family over the phone at the time of this evaluation.    PAST MEDICAL & SURGICAL HISTORY:  Hypertension    Diabetes mellitus    No significant past surgical history        MEDICATIONS  (STANDING):  amLODIPine   Tablet 10 milliGRAM(s) Oral daily  aspirin  chewable 81 milliGRAM(s) Enteral Tube daily  atorvastatin 80 milliGRAM(s) Oral at bedtime  chlorthalidone 25 milliGRAM(s) Oral daily  collagenase Ointment 1 Application(s) Topical two times a day  Dakins Solution - 1/2 Strength 1 Application(s) Topical two times a day  dextrose 5%. 1000 milliLiter(s) (50 mL/Hr) IV Continuous <Continuous>  dextrose 5%. 1000 milliLiter(s) (100 mL/Hr) IV Continuous <Continuous>  dextrose 50% Injectable 25 Gram(s) IV Push once  dextrose 50% Injectable 12.5 Gram(s) IV Push once  dextrose 50% Injectable 25 Gram(s) IV Push once  doxazosin 2 milliGRAM(s) Oral at bedtime  enoxaparin Injectable 40 milliGRAM(s) SubCutaneous every 24 hours  glucagon  Injectable 1 milliGRAM(s) IntraMuscular once  hydrALAZINE 100 milliGRAM(s) Oral every 8 hours  insulin glargine Injectable (LANTUS) 22 Unit(s) SubCutaneous every morning  insulin lispro (ADMELOG) corrective regimen sliding scale   SubCutaneous three times a day before meals  insulin lispro Injectable (ADMELOG) 5 Unit(s) SubCutaneous three times a day before meals  labetalol 400 milliGRAM(s) Oral three times a day  levETIRAcetam  IVPB 1500 milliGRAM(s) IV Intermittent every 12 hours  lidocaine 1%/epinephrine 1:100,000 Inj 20 milliLiter(s) Local Injection once  losartan 100 milliGRAM(s) Oral daily  multivitamin/minerals/iron Oral Solution (CENTRUM) 15 milliLiter(s) Oral daily  pantoprazole    Tablet 40 milliGRAM(s) Oral before breakfast  phenytoin   Chewable 50 milliGRAM(s) Oral at bedtime  phenytoin  IVPB 100 milliGRAM(s) IV Intermittent three times a day  polyethylene glycol 3350 17 Gram(s) Oral every 12 hours  sodium bicarbonate 650 milliGRAM(s) Oral every 6 hours  sodium chloride 0.65% Nasal 2 Spray(s) Both Nostrils two times a day    MEDICATIONS  (PRN):  acetaminophen     Tablet .. 650 milliGRAM(s) Oral every 6 hours PRN Temp greater or equal to 38C (100.4F), Mild Pain (1 - 3)  dextrose Oral Gel 15 Gram(s) Oral once PRN Blood Glucose LESS THAN 70 milliGRAM(s)/deciliter  labetalol Injectable 10 milliGRAM(s) IV Push every 6 hours PRN Systolic blood pressure >  melatonin 3 milliGRAM(s) Oral at bedtime PRN Insomnia      PHYSICAL EXAM:  Vital Signs Last 24 Hrs  T(C): 37 (17 Aug 2022 20:22), Max: 37.8 (17 Aug 2022 04:57)  T(F): 98.6 (17 Aug 2022 20:22), Max: 100.1 (17 Aug 2022 04:57)  HR: 69 (17 Aug 2022 20:22) (69 - 87)  BP: 167/93 (17 Aug 2022 20:22) (157/79 - 185/84)  BP(mean): 123 (17 Aug 2022 20:22) (123 - 123)  RR: 20 (17 Aug 2022 20:22) (18 - 20)  SpO2: --    Parameters below as of 17 Aug 2022 13:27  Patient On (Oxygen Delivery Method): room air      GEN: Awake, alert. NAD.   HEENT: NCAT, PERRL, NGT in place  RESP: CTA b/l  CV: RRR. Normal S1/S2. No m/r/g.  ABD: Soft. NT/ND. BS+  EXT: Warm. No edema, clubbing, or cyanosis.   NEURO: AAOx1. No focal deficits.     LABS:                        9.2    8.28  )-----------( 357      ( 17 Aug 2022 08:31 )             26.1     08-17    141  |  110  |  18  ----------------------------<  173<H>  4.1   |  19  |  0.9    Ca    8.3<L>      17 Aug 2022 08:31  Mg     1.8     08-17    TPro  5.8<L>  /  Alb  2.7<L>  /  TBili  <0.2  /  DBili  x   /  AST  18  /  ALT  16  /  AlkPhos  136<H>  08-17            I&O's Summary    16 Aug 2022 07:01  -  17 Aug 2022 07:00  --------------------------------------------------------  IN: 850 mL / OUT: 0 mL / NET: 850 mL    17 Aug 2022 07:01  -  18 Aug 2022 03:13  --------------------------------------------------------  IN: 0 mL / OUT: 1 mL / NET: -1 mL    EKG: NSR no acute ST changes    Tele: afib on 8/11/22 2am INTERVAL EVENTS:  Patient seen and examined at bedside. No acute distress. Patient remains AOx1 after recent CVA. Unable to reach family over the phone at the time of this evaluation.    PAST MEDICAL & SURGICAL HISTORY:    Hypertension    Diabetes mellitus    No significant past surgical history        MEDICATIONS  (STANDING):  amLODIPine   Tablet 10 milliGRAM(s) Oral daily  aspirin  chewable 81 milliGRAM(s) Enteral Tube daily  atorvastatin 80 milliGRAM(s) Oral at bedtime  chlorthalidone 25 milliGRAM(s) Oral daily  collagenase Ointment 1 Application(s) Topical two times a day  Dakins Solution - 1/2 Strength 1 Application(s) Topical two times a day  dextrose 5%. 1000 milliLiter(s) (50 mL/Hr) IV Continuous <Continuous>  dextrose 5%. 1000 milliLiter(s) (100 mL/Hr) IV Continuous <Continuous>  dextrose 50% Injectable 25 Gram(s) IV Push once  dextrose 50% Injectable 12.5 Gram(s) IV Push once  dextrose 50% Injectable 25 Gram(s) IV Push once  doxazosin 2 milliGRAM(s) Oral at bedtime  enoxaparin Injectable 40 milliGRAM(s) SubCutaneous every 24 hours  glucagon  Injectable 1 milliGRAM(s) IntraMuscular once  hydrALAZINE 100 milliGRAM(s) Oral every 8 hours  insulin glargine Injectable (LANTUS) 22 Unit(s) SubCutaneous every morning  insulin lispro (ADMELOG) corrective regimen sliding scale   SubCutaneous three times a day before meals  insulin lispro Injectable (ADMELOG) 5 Unit(s) SubCutaneous three times a day before meals  labetalol 400 milliGRAM(s) Oral three times a day  levETIRAcetam  IVPB 1500 milliGRAM(s) IV Intermittent every 12 hours  lidocaine 1%/epinephrine 1:100,000 Inj 20 milliLiter(s) Local Injection once  losartan 100 milliGRAM(s) Oral daily  multivitamin/minerals/iron Oral Solution (CENTRUM) 15 milliLiter(s) Oral daily  pantoprazole    Tablet 40 milliGRAM(s) Oral before breakfast  phenytoin   Chewable 50 milliGRAM(s) Oral at bedtime  phenytoin  IVPB 100 milliGRAM(s) IV Intermittent three times a day  polyethylene glycol 3350 17 Gram(s) Oral every 12 hours  sodium bicarbonate 650 milliGRAM(s) Oral every 6 hours  sodium chloride 0.65% Nasal 2 Spray(s) Both Nostrils two times a day    MEDICATIONS  (PRN):  acetaminophen     Tablet .. 650 milliGRAM(s) Oral every 6 hours PRN Temp greater or equal to 38C (100.4F), Mild Pain (1 - 3)  dextrose Oral Gel 15 Gram(s) Oral once PRN Blood Glucose LESS THAN 70 milliGRAM(s)/deciliter  labetalol Injectable 10 milliGRAM(s) IV Push every 6 hours PRN Systolic blood pressure >  melatonin 3 milliGRAM(s) Oral at bedtime PRN Insomnia      PHYSICAL EXAM:  Vital Signs Last 24 Hrs  T(C): 37 (17 Aug 2022 20:22), Max: 37.8 (17 Aug 2022 04:57)  T(F): 98.6 (17 Aug 2022 20:22), Max: 100.1 (17 Aug 2022 04:57)  HR: 69 (17 Aug 2022 20:22) (69 - 87)  BP: 167/93 (17 Aug 2022 20:22) (157/79 - 185/84)  BP(mean): 123 (17 Aug 2022 20:22) (123 - 123)  RR: 20 (17 Aug 2022 20:22) (18 - 20)  SpO2: --    Parameters below as of 17 Aug 2022 13:27  Patient On (Oxygen Delivery Method): room air      GEN: Awake, alert. NAD.   HEENT: NCAT, PERRL, NGT in place  RESP: CTA b/l  CV: RRR. Normal S1/S2. No m/r/g.  ABD: Soft. NT/ND. BS+  EXT: Warm. No edema, clubbing, or cyanosis.   NEURO: AAOx1. No focal deficits.     LABS:                        9.2    8.28  )-----------( 357      ( 17 Aug 2022 08:31 )             26.1     08-17    141  |  110  |  18  ----------------------------<  173<H>  4.1   |  19  |  0.9    Ca    8.3<L>      17 Aug 2022 08:31  Mg     1.8     08-17    TPro  5.8<L>  /  Alb  2.7<L>  /  TBili  <0.2  /  DBili  x   /  AST  18  /  ALT  16  /  AlkPhos  136<H>  08-17            I&O's Summary    16 Aug 2022 07:01  -  17 Aug 2022 07:00  --------------------------------------------------------  IN: 850 mL / OUT: 0 mL / NET: 850 mL    17 Aug 2022 07:01  -  18 Aug 2022 03:13  --------------------------------------------------------  IN: 0 mL / OUT: 1 mL / NET: -1 mL    EKG: NSR no acute ST changes    Tele: afib on 8/11/22 2am

## 2022-08-19 NOTE — PROGRESS NOTE ADULT - ASSESSMENT
58 year old female with history of uncontrolled HTN and DM type 2, and ho stroke presents for nonhealing RLE wound and HTNsive urgency to 300 sytolic, developed new stroke (embolic in nature) and possible seizure activity started on AED meds. Nephrology c/s for persistent HTN on multiple (5) bp meds.     # HTN emergency with neurological symptoms   BP overall better controlled on  hydralazine Chlorthalidone Amlodipine Losartan and labetalol  though BP noted still  high in AM then better after meds intake   chane Amlodipine to  PM dosing  follow BP readings for now  renal artery doppler negative for ARIAN   Renin and Thomas noted c/w ACE inh- ARB use

## 2022-08-19 NOTE — CHART NOTE - NSCHARTNOTEFT_GEN_A_CORE
Electrophysiology PA Note     Pt was brought down to EP lab for ILR  Temp oral 99.9 rectal 100.2  Procedure cancelled  Dr Galan at bedside    Primary team notified [phone]

## 2022-08-19 NOTE — PROGRESS NOTE ADULT - SUBJECTIVE AND OBJECTIVE BOX
Neurology Stroke Progress Note    INTERVAL HPI/OVERNIGHT EVENTS:  Patient seen and examined at the bedside. No new issues this morning, no issues overnight. The patient was taken to EP lab for loop recorder insertion, but found to have temp 99.9 oral and 100.3 rectal, and the procedure was canceled. When the patient was back to the floor she was afebrile 97.2 oral, 98.7 rectal       MEDICATIONS  (STANDING):  amLODIPine   Tablet 10 milliGRAM(s) Oral at bedtime  aspirin  chewable 81 milliGRAM(s) Enteral Tube daily  atorvastatin 80 milliGRAM(s) Oral at bedtime  chlorthalidone 25 milliGRAM(s) Oral daily  collagenase Ointment 1 Application(s) Topical two times a day  Dakins Solution - 1/2 Strength 1 Application(s) Topical two times a day  dextrose 5%. 1000 milliLiter(s) (100 mL/Hr) IV Continuous <Continuous>  dextrose 5%. 1000 milliLiter(s) (50 mL/Hr) IV Continuous <Continuous>  dextrose 50% Injectable 25 Gram(s) IV Push once  dextrose 50% Injectable 12.5 Gram(s) IV Push once  dextrose 50% Injectable 25 Gram(s) IV Push once  doxazosin 2 milliGRAM(s) Oral at bedtime  enoxaparin Injectable 40 milliGRAM(s) SubCutaneous every 24 hours  glucagon  Injectable 1 milliGRAM(s) IntraMuscular once  hydrALAZINE 100 milliGRAM(s) Oral every 8 hours  insulin glargine Injectable (LANTUS) 22 Unit(s) SubCutaneous every morning  insulin lispro (ADMELOG) corrective regimen sliding scale   SubCutaneous three times a day before meals  insulin lispro Injectable (ADMELOG) 5 Unit(s) SubCutaneous three times a day before meals  labetalol 400 milliGRAM(s) Oral three times a day  levETIRAcetam  IVPB 1500 milliGRAM(s) IV Intermittent every 12 hours  lidocaine 1%/epinephrine 1:100,000 Inj 20 milliLiter(s) Local Injection once  losartan 100 milliGRAM(s) Oral daily  multivitamin/minerals/iron Oral Solution (CENTRUM) 15 milliLiter(s) Oral daily  pantoprazole    Tablet 40 milliGRAM(s) Oral before breakfast  phenytoin   Chewable 50 milliGRAM(s) Oral at bedtime  phenytoin  IVPB 100 milliGRAM(s) IV Intermittent three times a day  polyethylene glycol 3350 17 Gram(s) Oral every 12 hours  sodium bicarbonate 650 milliGRAM(s) Oral every 6 hours  sodium chloride 0.65% Nasal 2 Spray(s) Both Nostrils two times a day    MEDICATIONS  (PRN):  acetaminophen     Tablet .. 650 milliGRAM(s) Oral every 6 hours PRN Temp greater or equal to 38C (100.4F), Mild Pain (1 - 3)  dextrose Oral Gel 15 Gram(s) Oral once PRN Blood Glucose LESS THAN 70 milliGRAM(s)/deciliter  labetalol Injectable 10 milliGRAM(s) IV Push every 6 hours PRN Systolic blood pressure >  melatonin 3 milliGRAM(s) Oral at bedtime PRN Insomnia      Allergies    No Known Allergies    Intolerances        Vital Signs Last 24 Hrs  T(C): 37.1 (19 Aug 2022 11:48), Max: 37.1 (19 Aug 2022 11:48)  T(F): 98.7 (19 Aug 2022 11:48), Max: 98.7 (19 Aug 2022 11:48)  HR: 68 (19 Aug 2022 11:32) (65 - 80)  BP: 132/84 (19 Aug 2022 11:32) (132/84 - 200/94)  BP(mean): --  RR: 18 (19 Aug 2022 04:23) (18 - 18)  SpO2: 97% (19 Aug 2022 11:32) (97% - 97%)          Physical exam:  -Mental status: Awake, severely slurred, was not following commands, but trying to speak   -Cranial nerves:   II: Rt visual field defect on threaten reflex   III, IV, VI: no nystagmus, no clear limitation of the eye's movements  VII: Rt facial weakness, and drooling from the Rt side   Motor: was able to withdraw all 4 limbs, able to raise her Lt arm, and less on the Rt arm, and leg.    Sensation: respond to noxious stimuli in all 4 limbs            LABS:  cret                        8.3    7.45  )-----------( 336      ( 18 Aug 2022 07:30 )             24.3     08-18    143  |  110  |  18  ----------------------------<  89  4.4   |  20  |  0.8    Ca    8.7      18 Aug 2022 07:30  Mg     2.1     08-18    TPro  5.8<L>  /  Alb  2.9<L>  /  TBili  0.2  /  DBili  x   /  AST  23  /  ALT  19  /  AlkPhos  132<H>  08-18              RADIOLOGY & ADDITIONAL TESTS:

## 2022-08-19 NOTE — PROGRESS NOTE ADULT - ATTENDING COMMENTS
Patient seen and examined and agree with above except as noted.  Patients history, notes, labs, imaging, vitals and meds reviewed personally.  Patient more alert today  Intermittently following simple commands  Right side minimal movement and swelling in the right hand  Left moving better than right    Plan as above

## 2022-08-19 NOTE — PROGRESS NOTE ADULT - ASSESSMENT
57 yo F with PMH of HTN, DM2, CVA (2017) who presented with purulent right lower extremity wound for 3 months consistent with acute RLE cellulitis.   As for the cellulitis, patient underwent debridement of the ulcer of the right lower extremity, and was kept on Augmentin until today.  Patient was a code stroke on 8/5, suspected CVA vs epileptic seizures. She was started on Keppra and then continued on Dilantin, since previous VEEG showed continuous epileptiform activities. Patient was started on feeds via NG tube and is scheduled to have a PEG tube on Monday 8/22.  As the patient might have underlying afib resulting in the multiple punctuate strokes, she is scheduled to have an ILR on 8/19. Aitiology is not clear, and vasculitis is not fully r/o, will monitor the patient improvement to decide regarding the LP.   Patient was exposed to a roommate who was covid positive--> on isolation and repeat covid is negative; she is off isolation tomorrow on the 18th.  Patient probably has underlying severe hypertension --> on losartan, amlodipine, cardura and hydralazine and SBP= 180, nephro consulted for antihypertensives management.  Patient has been having fevers, ID is following--> cryptogenic fever, without any source of infection, augmentin course will finish today, as per their recs, do not give antibiotics and trend fevers.    Neuro  #Stroke workup  - continue aspirin 81mg  daily  - Off clopidogrel for PEG tube on Monday  - Continue Atorvastatin 80 mg daily   - q8hr stroke neuro checks and vitals  - possibly will need IVONNE   - Possible A. fib as per EP evaluation does not fulfill the criteria to diagnosis A. fib and loop recorder is still needed   - Vasculitic work up ordered     # High risk of seizures with epileptiform discharges:  Multiple EEG did not capture any seizures  - Continue Keppra 1500 mg bid   - Phenytoin 100 mg tid   - Levetiracetam and phenytoin levels pending     Cards  #Hypertensive urgency due to noncompliance and Malignant HTN  BP at admission 296/174 without signs of end organ damage. Renal artery duplex wnl>>ARIAN unlikely  Aldosterone wnl, renin elevated  - r/o aortic coarctation with TTE (pending results)  -close monitoring  Currently on:  -c/w chlorthalidone 25mg daily  - Amlodipine 10 mg at bedtime    -c/w losartan 100mg daily  -c/w labetalol 400mg q8  -c/w hydralazine 100mg q8  -c/w doxazocin 2mg nightly      #HLD  - high dose statin as above in CVA      Pulm  - call provider if SPO2 < 94%    GI  #Nutrition/Fluids/Electrolytes   - replete K<4 and Mg <2  - Diet: NGT   - likely needs PEG (had PEG in the past)  - GI on the case      Renal  Nephrology are following, appreciate recs  Daily BMP    Infectious Disease  #Purulent RLE cellulitis r/o abscess / Fever / ?asp-n pneumonitis vs PNA  -s/p unasyn and and vancomycin in ED  -f/u wound and blood cultures   -trend inflammatory markers   -ID consult noted  - vanco, Unasyn  -s/p burn debridement   -changed ABx to augmentin  -8/13: fever: pancultured  -8/14: Candida in wound. D/w Dr. Chua: contaminant  8/15 BCx w/ staph: likely contaminant. F/u repeat BCx  8/17: hold ABx per ID. High suspicion for asp-n pneumonitis vs PNA. Repeat Cx, CXR. If persistent fevers, would change ABx to Zosyn, Vanco. Asp-n precautions. Aggressive suctioning       Endocrine  #DM  - A1C results: 10.4  - ISS  - started insulin regimen   - FS ac/hs      - TSH results: 0.86    DVT ppx: lovenox, SCDs  GI ppx: protonix  Diet: DASH/carb consistent  Activity: AAT

## 2022-08-19 NOTE — PROGRESS NOTE ADULT - SUBJECTIVE AND OBJECTIVE BOX
Nephrology progress note    Patient was seen and examined, events over the last 24 h noted .    Allergies:  No Known Allergies    Hospital Medications:   MEDICATIONS  (STANDING):  amLODIPine   Tablet 10 milliGRAM(s) Oral daily  aspirin  chewable 81 milliGRAM(s) Enteral Tube daily  atorvastatin 80 milliGRAM(s) Oral at bedtime  chlorthalidone 25 milliGRAM(s) Oral daily  collagenase Ointment 1 Application(s) Topical two times a day  Dakins Solution - 1/2 Strength 1 Application(s) Topical two times a day  dextrose 5%. 1000 milliLiter(s) (100 mL/Hr) IV Continuous <Continuous>  dextrose 5%. 1000 milliLiter(s) (50 mL/Hr) IV Continuous <Continuous>  dextrose 50% Injectable 25 Gram(s) IV Push once  dextrose 50% Injectable 12.5 Gram(s) IV Push once  dextrose 50% Injectable 25 Gram(s) IV Push once  doxazosin 2 milliGRAM(s) Oral at bedtime  enoxaparin Injectable 40 milliGRAM(s) SubCutaneous every 24 hours  glucagon  Injectable 1 milliGRAM(s) IntraMuscular once  hydrALAZINE 100 milliGRAM(s) Oral every 8 hours  insulin glargine Injectable (LANTUS) 22 Unit(s) SubCutaneous every morning  insulin lispro (ADMELOG) corrective regimen sliding scale   SubCutaneous three times a day before meals  insulin lispro Injectable (ADMELOG) 5 Unit(s) SubCutaneous three times a day before meals  labetalol 400 milliGRAM(s) Oral three times a day  levETIRAcetam  IVPB 1500 milliGRAM(s) IV Intermittent every 12 hours  lidocaine 1%/epinephrine 1:100,000 Inj 20 milliLiter(s) Local Injection once  losartan 100 milliGRAM(s) Oral daily  multivitamin/minerals/iron Oral Solution (CENTRUM) 15 milliLiter(s) Oral daily  pantoprazole    Tablet 40 milliGRAM(s) Oral before breakfast  phenytoin   Chewable 50 milliGRAM(s) Oral at bedtime  phenytoin  IVPB 100 milliGRAM(s) IV Intermittent three times a day  polyethylene glycol 3350 17 Gram(s) Oral every 12 hours  sodium bicarbonate 650 milliGRAM(s) Oral every 6 hours  sodium chloride 0.65% Nasal 2 Spray(s) Both Nostrils two times a day        VITALS:  T(F): 97.4 (08-19-22 @ 04:23), Max: 98.6 (08-18-22 @ 20:39)  HR: 73 (08-19-22 @ 05:53)  BP: 162/83 (08-19-22 @ 05:53)  RR: 18 (08-19-22 @ 04:23)  SpO2: --  Wt(kg): --    08-17 @ 07:01  -  08-18 @ 07:00  --------------------------------------------------------  IN: 0 mL / OUT: 1 mL / NET: -1 mL    08-18 @ 07:01  -  08-19 @ 07:00  --------------------------------------------------------  IN: 1850 mL / OUT: 0 mL / NET: 1850 mL      Height (cm): 165.1 (08-18 @ 17:20)  Weight (kg): 82.3 (08-18 @ 17:20)  BMI (kg/m2): 30.2 (08-18 @ 17:20)  BSA (m2): 1.9 (08-18 @ 17:20)    PHYSICAL EXAM:  Constitutional: NAD  HEENT: anicteric sclera, oropharynx clear, MMM  Neck: No JVD  Respiratory: CTAB, no wheezes, rales or rhonchi  Cardiovascular: S1, S2, RRR  Gastrointestinal: BS+, soft, NT/ND  Extremities: No cyanosis or clubbing. No peripheral edema  :  No nolasco.   Skin: No rashes    LABS:  08-18    143  |  110  |  18  ----------------------------<  89  4.4   |  20  |  0.8    Ca    8.7      18 Aug 2022 07:30  Mg     2.1     08-18    TPro  5.8<L>  /  Alb  2.9<L>  /  TBili  0.2  /  DBili      /  AST  23  /  ALT  19  /  AlkPhos  132<H>  08-18                          8.3    7.45  )-----------( 336      ( 18 Aug 2022 07:30 )             24.3       Urine Studies:      RADIOLOGY & ADDITIONAL STUDIES:

## 2022-08-19 NOTE — PROGRESS NOTE ADULT - SUBJECTIVE AND OBJECTIVE BOX
Patient is a 56y old  Female who presents with a chief complaint of Hypertensive urgency (02 Aug 2022 11:31)    INTERVAL HPI/OVERNIGHT EVENTS: Patient was examined and seen at bedside. Events noted. Low grade fever this am.    ROS: unable to obtain due to AMS  InitialHPI:  55 yo F with PMH of HTN, DM2, and ?hemorrhagic stroke due to an aneurysm (per son 2017) who presented for RLE wound. Started 3 months ago when she fell and hit her leg on a wooden cabinet. She put hydrogen peroxide, antibiotic cream, and wrapped the wound but never fully healed. Two weeks ago it started to show yellow pus so her and her family came to the ED for further treatment. Endorsed subjective fevers, chest pain, and vision changes which occurs when walked 2-3 blocks. Denied congestion, sore throat, cough, dyspnea, headache, dysuria, N/V, diarrhea     Per son, they fill her medications at [facility] ([phone]) and this is their current pharmacy. Called them to confirm her medications and they said her last refill of medications was 2018. Pt has not seen a doctor due to insurance problem and has not been taking any medications for more than 1yr.    In the ED:  Vitals: T: 98.9, BP: 296/ 174, , RR: 18, 98%O2 on RA  Labs: CBC showed WBC 11.23; ESR 92, CRP 35.6  CMP showed glucose 302, alk phos 173; ; VBG pH 7.45  .   Received unasyn and vanco, humalin R, IV vasotec, IV labetalol   (02 Aug 2022 07:47)    PAST MEDICAL & SURGICAL HISTORY:  Hypertension  ?Hemorrhagic CVA due to aneurysm    Diabetes mellitus      No significant past surgical history    General: lethargic,, +NGT, drooling secretions from the mouth  HEENT: no LAD  CV: S1 S2  Resp: decreased breath sounds at bases  GI: NT/ND/S +BS; obese  MS: no clubbing/cyanosis/edema, + pulses b/l; RLE c/d/i dsg  Neuro: Lt 0/5, Rt 1/5    tele: SR, nonspecific changes (on my own evaluation of tele monitor)              MEDICATIONS  (STANDING):  amLODIPine   Tablet 10 milliGRAM(s) Oral at bedtime  aspirin  chewable 81 milliGRAM(s) Enteral Tube daily  atorvastatin 80 milliGRAM(s) Oral at bedtime  chlorthalidone 25 milliGRAM(s) Oral daily  collagenase Ointment 1 Application(s) Topical two times a day  Dakins Solution - 1/2 Strength 1 Application(s) Topical two times a day  dextrose 5%. 1000 milliLiter(s) (100 mL/Hr) IV Continuous <Continuous>  dextrose 5%. 1000 milliLiter(s) (50 mL/Hr) IV Continuous <Continuous>  dextrose 50% Injectable 25 Gram(s) IV Push once  dextrose 50% Injectable 12.5 Gram(s) IV Push once  dextrose 50% Injectable 25 Gram(s) IV Push once  doxazosin 2 milliGRAM(s) Oral at bedtime  enoxaparin Injectable 40 milliGRAM(s) SubCutaneous every 24 hours  glucagon  Injectable 1 milliGRAM(s) IntraMuscular once  hydrALAZINE 100 milliGRAM(s) Oral every 8 hours  insulin glargine Injectable (LANTUS) 22 Unit(s) SubCutaneous every morning  insulin lispro (ADMELOG) corrective regimen sliding scale   SubCutaneous three times a day before meals  insulin lispro Injectable (ADMELOG) 5 Unit(s) SubCutaneous three times a day before meals  labetalol 400 milliGRAM(s) Oral three times a day  levETIRAcetam  IVPB 1500 milliGRAM(s) IV Intermittent every 12 hours  lidocaine 1%/epinephrine 1:100,000 Inj 20 milliLiter(s) Local Injection once  losartan 100 milliGRAM(s) Oral daily  multivitamin/minerals/iron Oral Solution (CENTRUM) 15 milliLiter(s) Oral daily  pantoprazole    Tablet 40 milliGRAM(s) Oral before breakfast  phenytoin   Chewable 50 milliGRAM(s) Oral at bedtime  phenytoin  IVPB 100 milliGRAM(s) IV Intermittent three times a day  polyethylene glycol 3350 17 Gram(s) Oral every 12 hours  sodium bicarbonate 650 milliGRAM(s) Oral every 6 hours  sodium chloride 0.65% Nasal 2 Spray(s) Both Nostrils two times a day    MEDICATIONS  (PRN):  acetaminophen     Tablet .. 650 milliGRAM(s) Oral every 6 hours PRN Temp greater or equal to 38C (100.4F), Mild Pain (1 - 3)  dextrose Oral Gel 15 Gram(s) Oral once PRN Blood Glucose LESS THAN 70 milliGRAM(s)/deciliter  labetalol Injectable 10 milliGRAM(s) IV Push every 6 hours PRN Systolic blood pressure >  melatonin 3 milliGRAM(s) Oral at bedtime PRN Insomnia    Home Medications:  losartan 50 mg oral tablet: 1 tab(s) orally once a day (02 Aug 2022 10:38)  metFORMIN 500 mg oral tablet: 1 tab(s) orally 2 times a day (02 Aug 2022 10:38)  metoprolol succinate 50 mg oral tablet, extended release: 1 tab(s) orally once a day (02 Aug 2022 10:38)    Vital Signs Last 24 Hrs  T(C): 37.1 (19 Aug 2022 11:48), Max: 37.1 (19 Aug 2022 11:48)  T(F): 98.7 (19 Aug 2022 11:48), Max: 98.7 (19 Aug 2022 11:48)  HR: 68 (19 Aug 2022 11:32) (65 - 80)  BP: 132/84 (19 Aug 2022 11:32) (132/84 - 200/94)  BP(mean): --  RR: 18 (19 Aug 2022 04:23) (18 - 18)  SpO2: 97% (19 Aug 2022 11:32) (97% - 97%)      CAPILLARY BLOOD GLUCOSE      POCT Blood Glucose.: 97 mg/dL (19 Aug 2022 12:16)  POCT Blood Glucose.: 103 mg/dL (19 Aug 2022 10:41)  POCT Blood Glucose.: 137 mg/dL (19 Aug 2022 05:50)  POCT Blood Glucose.: 98 mg/dL (19 Aug 2022 00:04)  POCT Blood Glucose.: 98 mg/dL (18 Aug 2022 21:06)  POCT Blood Glucose.: 94 mg/dL (18 Aug 2022 17:53)    LABS:                        8.3    7.45  )-----------( 336      ( 18 Aug 2022 07:30 )             24.3     08-18    143  |  110  |  18  ----------------------------<  89  4.4   |  20  |  0.8    Ca    8.7      18 Aug 2022 07:30  Mg     2.1     08-18    TPro  5.8<L>  /  Alb  2.9<L>  /  TBili  0.2  /  DBili  x   /  AST  23  /  ALT  19  /  AlkPhos  132<H>  08-18    LIVER FUNCTIONS - ( 18 Aug 2022 07:30 )  Alb: 2.9 g/dL / Pro: 5.8 g/dL / ALK PHOS: 132 U/L / ALT: 19 U/L / AST: 23 U/L / GGT: x                           Culture - Blood (collected 17 Aug 2022 12:25)  Source: .Blood Blood-Peripheral  Preliminary Report (18 Aug 2022 23:01):    No growth to date.      Consultant Notes Reviewed:  [x ] YES  [ ] NO  Care Discussed with Consultants/Other Providers/ Housestaff [ x] YES  [ ] NO  Radiology, labs, new studies personally reviewed.

## 2022-08-19 NOTE — PROGRESS NOTE ADULT - ASSESSMENT
57 yo F with PMH of HTN, DM2, CVA (2017) who presented with purulent right lower extremity wound for 3 months consistent with acute RLE cellulitis. Code stroke for unresponsiveness at 4pm 8/5    #Acute ischemic strokes w/ multiple chronic microhemorrhages / ?Seizures  8/8: increased Keppra dose and added Dilantin. Remains on VEEG  8/9-10 d/w neuro: will get MRI, cont VEEG. LP depending on MRI results  8/10: son declined NGT placement and asked to wait till am (aware of risks)  8/11< from: MR Head w/wo IV Cont (08.10.22 @ 21:25) >  Multiple punctate cortical acute infarcts involving multiple vascular territories possibly related to embolic etiology. No evidence of acute hemorrhage. Moderate chronic microvascular type changes as well as chronic hemosiderin deposition within the right basal ganglia consistent with remote hemorrhage.  Chronic right thalamic lacunar infarcts.  < end of copied text >  -d/w neuro: start ASA, Plavix. Cont Lipitor  -repeat CTA head noted  -NGT placement and feeds   -PT/OT/physiatry  -TTE w/ bubble pending (notified logistics multiple times)  -will need ?IVONNE vs CTA heart vs TTE w/ contrast   and ILR  reminded family to bring records from [facility]  -neuro checks  -8/13: spoke to neuro, will give an extras dose of DIlantin 500 x1. Repeat dilantin level in 48hrs.   -8/14 CTH, EEG unchanged.   8/15: more alert: d/w neuro, will repeat MRI brain, check vasculitis and hypercoag workup  poor candidate for therapeutic A/c as per neuro  ?LP for vascultis  8/18 ?afib on tele as per cardio. EP to f/u to confirm as I did not see afib. Neuro to adjust AEDs  did not tolerate repeat MRI ?reorder    #Purulent RLE cellulitis r/o abscess / Fever / ?asp-n pneumonitis vs PNA  -s/p unasyn and vancomycin in ED  -f/u wound and blood cultures   -trend inflammatory markers   -ID consult noted  - vanco, Unasyn  -s/p burn debridement   -changed ABx to augmentin  -8/13: fever: pancultured  -8/14: Candida in wound. D/w Dr. Chua: contaminant  8/15 BCx w/ staph: likely contaminant. F/u repeat BCx  8/17: hold ABx per ID. High suspicion for asp-n pneumonitis vs PNA. Repeat Cx, CXR. If persistent fevers, would change ABx to Zosyn, Vanco. Asp-n precautions. Aggressive suctioning  -f/u Cx    #Dysphagia  -likely needs PEG (had PEG in the past)  -GI on the case  -hold Plavix if ok w/ neuro    #Hypertensive urgency due to noncompliance and Malignant HTN  -BP at admission 296/174 without signs of end organ damage  -gradual BP lowering  -close monitoring  -8/12: added Norvasc  -8/13 increased Labetalol dose. Pt also has PRN Labetalol IVP  8/14 added cardura  8/15 increased Hydralazine  -renal eval noted  -cannot give Nifedipine ER as pt w/ NGT  -change Norvasc to QHS  -consider increasing Cardura dose    # DMII w/ Hyperglycemia  - insulin regimen   - FS ac/hs  -A1c=10.8    # Atypical Chest pain and FELIZ on admission- possibly MSK related but r/o cardiac etiology   - chest wall tender to palpation on admission  - currently CP free  - CXR unremarkable   - Tn negative  - outpt stress test    # hx of CVA 2017 (Aneurysmal as per family? ?ICH but old ischemic strokes on CTH)  # Left sided weakness  -need to get more Hx  -as per family, aneurysm was never fixed    DVT ppx: lovenox, SCDs  GI ppx: protonix  Diet: DASH/carb consistent  Activity: AAT    Very high risk pt. Px is guarded.    #Progress Note Handoff  Pending: Consults____Clinical improvement and stability__x___MRI, ILR, ?PEG, hypercoag, Cx___PT____x____  Pt/Family discussion: Pt/family informed and agree with the current plan  Disposition: Home______/SNF_______/4A______/To be determined____x____    My note supersedes the residents note should a discrepancy arise.    Chart and notes personally reviewed.  Care Discussed with Consultants/Other Providers/ Housestaff [ x] YES [ ] NO   Radiology, labs, old records personally reviewed.    discussed w/ housestaff, nursing, case management, neuro team

## 2022-08-20 NOTE — PROGRESS NOTE ADULT - ATTENDING COMMENTS
I have personally seen and examined this patient.  I have fully participated in the care of this patient.  I have reviewed all pertinent clinical information, including history, physical exam, plan and note.  Patient continues to have fever with unknown sources. Highly recommend to do IVONNE. Peg tube pending. Continue ASA for now.  I have reviewed all pertinent clinical information and reviewed all relevant imaging and diagnostic studies personally.  Recommendations as above.  Agree with above assessment except as noted.

## 2022-08-20 NOTE — PROGRESS NOTE ADULT - ASSESSMENT
58 year old female with history of uncontrolled HTN and DM type 2, and ho stroke presents for nonhealing RLE wound and HTNsive urgency to 300 sytolic, developed new stroke (embolic in nature) and possible seizure activity started on AED meds. Nephrology c/s for persistent HTN on multiple (5) bp meds.     # HTN emergency with neurological symptoms   BP overall better controlled on  hydralazine Chlorthalidone Amlodipine Losartan and labetalol  though BP noted still  high in AM then better after meds intake   chane Amlodipine to  PM dosing  follow BP readings for now  Can increase labetalol dose to maximum 800 mg q8h if needed   renal artery doppler negative for ARIAN   Renin and Thomas noted c/w ACE inh- ARB use     will sign off recall PRN / call using TEAMS or on [phone]

## 2022-08-20 NOTE — PROGRESS NOTE ADULT - SUBJECTIVE AND OBJECTIVE BOX
Neurology Stroke Progress Note    INTERVAL HPI/OVERNIGHT EVENTS:  Patient seen and examined. No acute events overnight. ROS limited as patient minimally verbal    MEDICATIONS  (STANDING):  amLODIPine   Tablet 10 milliGRAM(s) Oral at bedtime  aspirin  chewable 81 milliGRAM(s) Enteral Tube daily  atorvastatin 80 milliGRAM(s) Oral at bedtime  chlorthalidone 25 milliGRAM(s) Oral daily  collagenase Ointment 1 Application(s) Topical two times a day  Dakins Solution - 1/2 Strength 1 Application(s) Topical two times a day  dextrose 5%. 1000 milliLiter(s) (100 mL/Hr) IV Continuous <Continuous>  dextrose 5%. 1000 milliLiter(s) (50 mL/Hr) IV Continuous <Continuous>  dextrose 50% Injectable 25 Gram(s) IV Push once  dextrose 50% Injectable 12.5 Gram(s) IV Push once  dextrose 50% Injectable 25 Gram(s) IV Push once  doxazosin 2 milliGRAM(s) Oral at bedtime  enoxaparin Injectable 40 milliGRAM(s) SubCutaneous every 24 hours  glucagon  Injectable 1 milliGRAM(s) IntraMuscular once  hydrALAZINE 100 milliGRAM(s) Oral every 8 hours  insulin glargine Injectable (LANTUS) 22 Unit(s) SubCutaneous every morning  insulin lispro (ADMELOG) corrective regimen sliding scale   SubCutaneous three times a day before meals  insulin lispro Injectable (ADMELOG) 5 Unit(s) SubCutaneous three times a day before meals  labetalol 400 milliGRAM(s) Oral three times a day  levETIRAcetam  IVPB 1500 milliGRAM(s) IV Intermittent every 12 hours  lidocaine 1%/epinephrine 1:100,000 Inj 20 milliLiter(s) Local Injection once  losartan 100 milliGRAM(s) Oral daily  multivitamin/minerals/iron Oral Solution (CENTRUM) 15 milliLiter(s) Oral daily  pantoprazole    Tablet 40 milliGRAM(s) Oral before breakfast  phenytoin   Chewable 50 milliGRAM(s) Oral at bedtime  phenytoin  IVPB 100 milliGRAM(s) IV Intermittent three times a day  polyethylene glycol 3350 17 Gram(s) Oral every 12 hours  sodium bicarbonate 650 milliGRAM(s) Oral every 6 hours  sodium chloride 0.65% Nasal 2 Spray(s) Both Nostrils two times a day    MEDICATIONS  (PRN):  acetaminophen     Tablet .. 650 milliGRAM(s) Oral every 6 hours PRN Temp greater or equal to 38C (100.4F), Mild Pain (1 - 3)  dextrose Oral Gel 15 Gram(s) Oral once PRN Blood Glucose LESS THAN 70 milliGRAM(s)/deciliter  labetalol Injectable 10 milliGRAM(s) IV Push every 6 hours PRN Systolic blood pressure >  melatonin 3 milliGRAM(s) Oral at bedtime PRN Insomnia    Allergies    No Known Allergies    Intolerances      Vital Signs Last 24 Hrs  T(C): 36.9 (20 Aug 2022 04:43), Max: 37.1 (19 Aug 2022 11:48)  T(F): 98.4 (20 Aug 2022 04:43), Max: 98.7 (19 Aug 2022 11:48)  HR: 72 (20 Aug 2022 04:43) (63 - 73)  BP: 142/81 (20 Aug 2022 04:43) (122/66 - 183/89)  BP(mean): 123 (19 Aug 2022 13:44) (123 - 123)  RR: 18 (20 Aug 2022 04:43) (18 - 18)  SpO2: 97% (19 Aug 2022 11:32) (97% - 97%)    Parameters below as of 19 Aug 2022 13:44  Patient On (Oxygen Delivery Method): room air        Physical exam:  General: No acute distress  Neck: trachea midline, FROM, supple  Cardiovascular: Regular rate and rhythm, no murmurs  Pulmonary: Anterior breath sounds clear bilaterally, no crackles or wheezing  GI: Abdomen soft, non-distended, non-tender  Extremities: no edema    Neurologic:  -Mental status: appears lethargic, requires stimulation to wake up, unable to follow commands, unable to speak  -Cranial nerves:   III, IV, VI: Extraocular movements are intact without nystagmus. Pupils equally round and reactive to light  VII: R droop with saliva   IX, X: Uvula is midline and soft palate rises symmetrically  Motor:  Normal bulk and tone. No pronator drift. moves all extremities spontaneous. RUE 3/5, RLE 2/5, LUE and LLE 1/5  Sensation: responds to pain stimulation  Coordination: unable to asses  Gait: Deferred    LABS:                        8.8    6.32  )-----------( 391      ( 20 Aug 2022 06:36 )             26.1     08-20    141  |  107  |  20  ----------------------------<  121<H>  4.1   |  23  |  0.8    Ca    8.8      20 Aug 2022 06:36  Mg     2.1     08-20    TPro  5.8<L>  /  Alb  3.0<L>  /  TBili  <0.2  /  DBili  x   /  AST  40  /  ALT  37  /  AlkPhos  141<H>  08-20          RADIOLOGY & ADDITIONAL TESTS:

## 2022-08-20 NOTE — PROGRESS NOTE ADULT - ASSESSMENT
55 yo F with PMH of HTN, DM2, CVA (2017) who presented with purulent right lower extremity wound for 3 months consistent with acute RLE cellulitis.   As for the cellulitis, patient underwent debridement of the ulcer of the right lower extremity, and was kept on Augmentin until today.  Patient was a code stroke on 8/5, suspected CVA vs epileptic seizures. She was started on Keppra and then continued on Dilantin, since previous VEEG showed continuous epileptiform activities. Patient was started on feeds via NG tube and is scheduled to have a PEG tube on Monday 8/22.  As the patient might have underlying afib resulting in the multiple punctuate strokes, she is scheduled to have an ILR on 8/19. Aitiology is not clear, and vasculitis is not fully r/o, will monitor the patient improvement to decide regarding the LP.   Patient was exposed to a roommate who was covid positive--> on isolation and repeat covid is negative; she is off isolation tomorrow on the 18th.  Patient probably has underlying severe hypertension --> on losartan, amlodipine, cardura and hydralazine and SBP= 180, nephro consulted for antihypertensives management.  Patient has been having fevers, ID is following--> cryptogenic fever, without any source of infection, augmentin course will finish today, as per their recs, do not give antibiotics and trend fevers.    Neuro  #Stroke workup  - continue aspirin 81mg  daily  - Off clopidogrel for PEG tube on Monday  - Continue Atorvastatin 80 mg daily   - q8hr stroke neuro checks and vitals  - possibly will need IVONNE   - Possible A. fib as per EP evaluation does not fulfill the criteria to diagnosis A. fib and loop recorder is still needed   - Vasculitic work up ordered     # High risk of seizures with epileptiform discharges:  Multiple EEG did not capture any seizures  - Continue Keppra 1500 mg bid   - Phenytoin 100 mg tid   - Levetiracetam and phenytoin levels pending     Cards  #Hypertensive urgency due to noncompliance and Malignant HTN  BP at admission 296/174 without signs of end organ damage. Renal artery duplex wnl>>ARIAN unlikely  Aldosterone wnl, renin elevated  - r/o aortic coarctation with TTE (pending results)  -close monitoring  Currently on:  -c/w chlorthalidone 25mg daily  - Amlodipine 10 mg at bedtime    -c/w losartan 100mg daily  -c/w labetalol 400mg q8  -c/w hydralazine 100mg q8  -c/w doxazocin 2mg nightly      #HLD  - high dose statin as above in CVA      Pulm  - call provider if SPO2 < 94%    GI  #Nutrition/Fluids/Electrolytes   - replete K<4 and Mg <2  - Diet: NGT   - likely needs PEG (had PEG in the past)  - GI on the case      Renal  Nephrology are following, appreciate recs  Daily BMP    Infectious Disease  #Purulent RLE cellulitis r/o abscess / Fever / ?asp-n pneumonitis vs PNA  -s/p unasyn and and vancomycin in ED  -f/u wound and blood cultures   -trend inflammatory markers   -ID consult noted  - vanco, Unasyn  -s/p burn debridement   -changed ABx to augmentin  -8/13: fever: pancultured  -8/14: Candida in wound. D/w Dr. Chua: contaminant  8/15 BCx w/ staph: likely contaminant. F/u repeat BCx  8/17: hold ABx per ID. High suspicion for asp-n pneumonitis vs PNA. Repeat Cx, CXR. If persistent fevers, would change ABx to Zosyn, Vanco. Asp-n precautions. Aggressive suctioning       Endocrine  #DM  - A1C results: 10.4  - ISS  - started insulin regimen   - FS ac/hs      - TSH results: 0.86    DVT ppx: lovenox, SCDs  GI ppx: protonix  Diet: DASH/carb consistent  Activity: AAT    Pending PEG on Monday 55 yo F with PMH of HTN, DM2, CVA (2017) who presented with purulent right lower extremity wound for 3 months consistent with acute RLE cellulitis.   As for the cellulitis, patient underwent debridement of the ulcer of the right lower extremity, and was kept on Augmentin until today.  Patient was a code stroke on 8/5, suspected CVA vs epileptic seizures. She was started on Keppra and then continued on Dilantin, since previous VEEG showed continuous epileptiform activities. Patient was started on feeds via NG tube and is scheduled to have a PEG tube on Monday 8/22.  As the patient might have underlying afib resulting in the multiple punctuate strokes, she is scheduled to have an ILR on 8/19. Aitiology is not clear, and vasculitis is not fully r/o, will monitor the patient improvement to decide regarding the LP.   Patient was exposed to a roommate who was covid positive--> on isolation and repeat covid is negative; she is off isolation tomorrow on the 18th.  Patient probably has underlying severe hypertension --> on losartan, amlodipine, cardura and hydralazine and SBP= 180, nephro consulted for antihypertensives management.  Patient has been having fevers, ID is following--> cryptogenic fever, without any source of infection, augmentin course will finish today, as per their recs, do not give antibiotics and trend fevers.    Neuro  #Stroke workup  - continue aspirin 81mg  daily  - Off clopidogrel for PEG tube on Monday  - Continue Atorvastatin 80 mg daily   - q8hr stroke neuro checks and vitals  - possibly will need IVONNE   - Possible A. fib as per EP evaluation does not fulfill the criteria to diagnosis A. fib and loop recorder is still needed   - Vasculitic work up ordered     # High risk of seizures with epileptiform discharges:  Multiple EEG did not capture any seizures  - Continue Keppra 1500 mg bid   - Phenytoin 100 mg tid   - Levetiracetam and phenytoin levels pending     Cards  #Hypertensive urgency due to noncompliance and Malignant HTN  BP at admission 296/174 without signs of end organ damage. Renal artery duplex wnl>>ARIAN unlikely  Aldosterone wnl, renin elevated  - r/o aortic coarctation with TTE (pending results)  -close monitoring  Currently on:  -c/w chlorthalidone 25mg daily  - Amlodipine 10 mg at bedtime    -c/w losartan 100mg daily  -c/w labetalol 400mg q8  -c/w hydralazine 100mg q8  -c/w doxazocin 2mg nightly      #HLD  - high dose statin as above in CVA      Pulm  - call provider if SPO2 < 94%    GI  #Nutrition/Fluids/Electrolytes   - replete K<4 and Mg <2  - Diet: NGT   - likely needs PEG (had PEG in the past)  - GI on the case      Renal  Nephrology are following, appreciate recs  Daily BMP    Infectious Disease  #Purulent RLE cellulitis r/o abscess / Fever / ?asp-n pneumonitis vs PNA  -s/p unasyn and and vancomycin in ED  -f/u wound and blood cultures   -trend inflammatory markers   -ID consult noted  - vanco, Unasyn  -s/p burn debridement   -changed ABx to augmentin  -8/13: fever: pancultured  -8/14: Candida in wound. D/w Dr. Chua: contaminant  8/15 BCx w/ staph: likely contaminant. F/u repeat BCx  8/17: hold ABx per ID. High suspicion for asp-n pneumonitis vs PNA. Repeat Cx, CXR. If persistent fevers, would change ABx to Zosyn, Vanco. Asp-n precautions. Aggressive suctioning       Endocrine  #DM  - A1C results: 10.4  - ISS  - started insulin regimen   - FS ac/hs      - TSH results: 0.86    DVT ppx: lovenox, SCDs  GI ppx: protonix  Diet: DASH/carb consistent  Activity: AAT    Pending PEG on Monday, ILR next week, possible IVONNE after convo with cardio

## 2022-08-20 NOTE — PROGRESS NOTE ADULT - SUBJECTIVE AND OBJECTIVE BOX
INTERVAL HPI/OVERNIGHT EVENTS:    SUBJECTIVE: Patient seen and examined at bedside.     cc: ams  unable to obtain ros    OBJECTIVE:    VITAL SIGNS:  Vital Signs Last 24 Hrs  T(C): 37.1 (20 Aug 2022 13:29), Max: 37.1 (20 Aug 2022 13:29)  T(F): 98.8 (20 Aug 2022 13:29), Max: 98.8 (20 Aug 2022 13:29)  HR: 76 (20 Aug 2022 13:29) (63 - 76)  BP: 152/81 (20 Aug 2022 13:29) (122/66 - 183/89)  BP(mean): --  RR: 18 (20 Aug 2022 13:29) (18 - 18)  SpO2: --          PHYSICAL EXAM:    General: NAD  HEENT: NC/AT; PERRL, clear conjunctiva  Neck: supple  Respiratory: CTA b/l  Cardiovascular: +S1/S2; RRR  Abdomen: soft, NT/ND; +BS x4  Extremities: WWP, 2+ peripheral pulses b/l; no LE edema  Skin: normal color and turgor; no rash  Neurological:    MEDICATIONS:  MEDICATIONS  (STANDING):  amLODIPine   Tablet 10 milliGRAM(s) Oral at bedtime  aspirin  chewable 81 milliGRAM(s) Enteral Tube daily  atorvastatin 80 milliGRAM(s) Oral at bedtime  chlorthalidone 25 milliGRAM(s) Oral daily  collagenase Ointment 1 Application(s) Topical two times a day  Dakins Solution - 1/2 Strength 1 Application(s) Topical two times a day  dextrose 5%. 1000 milliLiter(s) (100 mL/Hr) IV Continuous <Continuous>  dextrose 5%. 1000 milliLiter(s) (50 mL/Hr) IV Continuous <Continuous>  dextrose 50% Injectable 25 Gram(s) IV Push once  dextrose 50% Injectable 12.5 Gram(s) IV Push once  dextrose 50% Injectable 25 Gram(s) IV Push once  doxazosin 2 milliGRAM(s) Oral at bedtime  enoxaparin Injectable 40 milliGRAM(s) SubCutaneous every 24 hours  glucagon  Injectable 1 milliGRAM(s) IntraMuscular once  hydrALAZINE 100 milliGRAM(s) Oral every 8 hours  insulin glargine Injectable (LANTUS) 22 Unit(s) SubCutaneous every morning  insulin lispro (ADMELOG) corrective regimen sliding scale   SubCutaneous three times a day before meals  insulin lispro Injectable (ADMELOG) 5 Unit(s) SubCutaneous three times a day before meals  labetalol 400 milliGRAM(s) Oral three times a day  levETIRAcetam  IVPB 1500 milliGRAM(s) IV Intermittent every 12 hours  lidocaine 1%/epinephrine 1:100,000 Inj 20 milliLiter(s) Local Injection once  losartan 100 milliGRAM(s) Oral daily  multivitamin/minerals/iron Oral Solution (CENTRUM) 15 milliLiter(s) Oral daily  pantoprazole    Tablet 40 milliGRAM(s) Oral before breakfast  phenytoin   Chewable 50 milliGRAM(s) Oral at bedtime  phenytoin  IVPB 100 milliGRAM(s) IV Intermittent three times a day  polyethylene glycol 3350 17 Gram(s) Oral every 12 hours  sodium bicarbonate 650 milliGRAM(s) Oral every 6 hours  sodium chloride 0.65% Nasal 2 Spray(s) Both Nostrils two times a day    MEDICATIONS  (PRN):  acetaminophen     Tablet .. 650 milliGRAM(s) Oral every 6 hours PRN Temp greater or equal to 38C (100.4F), Mild Pain (1 - 3)  dextrose Oral Gel 15 Gram(s) Oral once PRN Blood Glucose LESS THAN 70 milliGRAM(s)/deciliter  labetalol Injectable 10 milliGRAM(s) IV Push every 6 hours PRN Systolic blood pressure >  melatonin 3 milliGRAM(s) Oral at bedtime PRN Insomnia      ALLERGIES:  Allergies    No Known Allergies    Intolerances        LABS:                        8.8    6.32  )-----------( 391      ( 20 Aug 2022 06:36 )             26.1     Hemoglobin: 8.8 g/dL (08-20 @ 06:36)  Hemoglobin: 9.4 g/dL (08-19 @ 11:37)  Hemoglobin: 8.3 g/dL (08-18 @ 07:30)  Hemoglobin: 9.2 g/dL (08-17 @ 08:31)  Hemoglobin: 9.2 g/dL (08-16 @ 06:04)    CBC Full  -  ( 20 Aug 2022 06:36 )  WBC Count : 6.32 K/uL  RBC Count : 2.99 M/uL  Hemoglobin : 8.8 g/dL  Hematocrit : 26.1 %  Platelet Count - Automated : 391 K/uL  Mean Cell Volume : 87.3 fL  Mean Cell Hemoglobin : 29.4 pg  Mean Cell Hemoglobin Concentration : 33.7 g/dL  Auto Neutrophil # : x  Auto Lymphocyte # : x  Auto Monocyte # : x  Auto Eosinophil # : x  Auto Basophil # : x  Auto Neutrophil % : x  Auto Lymphocyte % : x  Auto Monocyte % : x  Auto Eosinophil % : x  Auto Basophil % : x    08-20    141  |  107  |  20  ----------------------------<  121<H>  4.1   |  23  |  0.8    Ca    8.8      20 Aug 2022 06:36  Mg     2.1     08-20    TPro  5.8<L>  /  Alb  3.0<L>  /  TBili  <0.2  /  DBili  x   /  AST  40  /  ALT  37  /  AlkPhos  141<H>  08-20    Creatinine Trend: 0.8<--, 0.8<--, 0.8<--, 0.9<--, 0.8<--, 0.8<--  LIVER FUNCTIONS - ( 20 Aug 2022 06:36 )  Alb: 3.0 g/dL / Pro: 5.8 g/dL / ALK PHOS: 141 U/L / ALT: 37 U/L / AST: 40 U/L / GGT: x               hs Troponin:              CSF:                      EKG:   MICROBIOLOGY:    IMAGING:      Labs, imaging, EKG personally reviewed    RADIOLOGY & ADDITIONAL TESTS: Reviewed.

## 2022-08-20 NOTE — PROGRESS NOTE ADULT - TIME BILLING
56F PMHx HTN, DM2, CVA 2017 here with RLE wound. HTN urgency. Acute embolic CVA.     IMP  #Acute CVA    mri with multiple cortical infarcts, suspected embolic; no hemorrhage    c/b dysphagia, on tf via ngt  #HTN urgency  #DM2  #RLE cellulitis    s/p course abx    s/p debridement 8/10  #Interictal acitivy on eeg    PLAN  plan for peg monday  cont asa, plavix on hold  lipitor 80  f/u ep for ilr  possible tamir  cont current htn regimen  keppra 1500 bid  phenytoin 100 tid    Janae  [id number] PMHx HTN, DM2, CVA 2017 here with RLE wound. HTN urgency. Acute embolic CVA.     IMP  #Acute CVA    mri with multiple cortical infarcts, suspected embolic; no hemorrhage    c/b dysphagia, on tf via ngt  #HTN urgency  #DM2  #RLE cellulitis    s/p course abx    s/p debridement 8/10  #Interictal activity on eeg    PLAN  plan for peg monday  cont asa, plavix on hold  lipitor 80  f/u ep for ilr  possible tamir  cont current htn regimen  keppra 1500 bid  phenytoin 100 tid    Janae  [id number]

## 2022-08-20 NOTE — PROGRESS NOTE ADULT - SUBJECTIVE AND OBJECTIVE BOX
Nephrology progress note    THIS IS AN INCOMPLETE NOTE . FULL NOTE TO FOLLOW SHORTLY    Patient is seen and examined, events over the last 24 h noted .    Allergies:  No Known Allergies    Hospital Medications:   MEDICATIONS  (STANDING):  amLODIPine   Tablet 10 milliGRAM(s) Oral at bedtime  aspirin  chewable 81 milliGRAM(s) Enteral Tube daily  atorvastatin 80 milliGRAM(s) Oral at bedtime  chlorthalidone 25 milliGRAM(s) Oral daily  collagenase Ointment 1 Application(s) Topical two times a day  Dakins Solution - 1/2 Strength 1 Application(s) Topical two times a day  dextrose 5%. 1000 milliLiter(s) (100 mL/Hr) IV Continuous <Continuous>  dextrose 5%. 1000 milliLiter(s) (50 mL/Hr) IV Continuous <Continuous>  dextrose 50% Injectable 25 Gram(s) IV Push once  dextrose 50% Injectable 12.5 Gram(s) IV Push once  dextrose 50% Injectable 25 Gram(s) IV Push once  doxazosin 2 milliGRAM(s) Oral at bedtime  enoxaparin Injectable 40 milliGRAM(s) SubCutaneous every 24 hours  glucagon  Injectable 1 milliGRAM(s) IntraMuscular once  hydrALAZINE 100 milliGRAM(s) Oral every 8 hours  insulin glargine Injectable (LANTUS) 22 Unit(s) SubCutaneous every morning  insulin lispro (ADMELOG) corrective regimen sliding scale   SubCutaneous three times a day before meals  insulin lispro Injectable (ADMELOG) 5 Unit(s) SubCutaneous three times a day before meals  labetalol 400 milliGRAM(s) Oral three times a day  levETIRAcetam  IVPB 1500 milliGRAM(s) IV Intermittent every 12 hours  lidocaine 1%/epinephrine 1:100,000 Inj 20 milliLiter(s) Local Injection once  losartan 100 milliGRAM(s) Oral daily  multivitamin/minerals/iron Oral Solution (CENTRUM) 15 milliLiter(s) Oral daily  pantoprazole    Tablet 40 milliGRAM(s) Oral before breakfast  phenytoin   Chewable 50 milliGRAM(s) Oral at bedtime  phenytoin  IVPB 100 milliGRAM(s) IV Intermittent three times a day  polyethylene glycol 3350 17 Gram(s) Oral every 12 hours  sodium bicarbonate 650 milliGRAM(s) Oral every 6 hours  sodium chloride 0.65% Nasal 2 Spray(s) Both Nostrils two times a day        VITALS:  T(F): 98.4 (08-20-22 @ 04:43), Max: 98.7 (08-19-22 @ 11:48)  HR: 72 (08-20-22 @ 04:43)  BP: 142/81 (08-20-22 @ 04:43)  RR: 18 (08-20-22 @ 04:43)  SpO2: 97% (08-19-22 @ 11:32)  Wt(kg): --    08-18 @ 07:01  -  08-19 @ 07:00  --------------------------------------------------------  IN: 1850 mL / OUT: 0 mL / NET: 1850 mL    08-19 @ 07:01  -  08-20 @ 07:00  --------------------------------------------------------  IN: 1000 mL / OUT: 0 mL / NET: 1000 mL      Height (cm): 165.1 (08-19 @ 13:13)  Weight (kg): 82.3 (08-19 @ 13:13)  BMI (kg/m2): 30.2 (08-19 @ 13:13)  BSA (m2): 1.9 (08-19 @ 13:13)    PHYSICAL EXAM:  Constitutional: NAD  HEENT: anicteric sclera, oropharynx clear, MMM  Neck: No JVD  Respiratory: CTAB, no wheezes, rales or rhonchi  Cardiovascular: S1, S2, RRR  Gastrointestinal: BS+, soft, NT/ND  Extremities: No cyanosis or clubbing. No peripheral edema  :  No nolasco.   Skin: No rashes    LABS:  08-20    141  |  107  |  20  ----------------------------<  121<H>  4.1   |  23  |  0.8    Ca    8.8      20 Aug 2022 06:36  Mg     2.1     08-20    TPro  5.8<L>  /  Alb  3.0<L>  /  TBili  <0.2  /  DBili      /  AST  40  /  ALT  37  /  AlkPhos  141<H>  08-20                          8.8    6.32  )-----------( 391      ( 20 Aug 2022 06:36 )             26.1       Urine Studies:        TSH 0.86      [08-08-22 @ 17:53]  Lipid: chol 234, , HDL 42, LDL --      [08-03-22 @ 08:56]      Rheumatoid Factor <10      [08-19-22 @ 11:37]  Syphilis Screen (Treponema Pallidum Ab) Negative      [08-08-22 @ 17:53]      RADIOLOGY & ADDITIONAL STUDIES:   Nephrology progress note    Patient is seen and examined, events over the last 24 h noted .  Lying in bed   Has NGT     Allergies:  No Known Allergies    Hospital Medications:   MEDICATIONS  (STANDING):  amLODIPine   Tablet 10 milliGRAM(s) Oral at bedtime  aspirin  chewable 81 milliGRAM(s) Enteral Tube daily  atorvastatin 80 milliGRAM(s) Oral at bedtime  chlorthalidone 25 milliGRAM(s) Oral daily  collagenase Ointment 1 Application(s) Topical two times a day  Dakins Solution - 1/2 Strength 1 Application(s) Topical two times a day  doxazosin 2 milliGRAM(s) Oral at bedtime  enoxaparin Injectable 40 milliGRAM(s) SubCutaneous every 24 hours  glucagon  Injectable 1 milliGRAM(s) IntraMuscular once  hydrALAZINE 100 milliGRAM(s) Oral every 8 hours  insulin glargine Injectable (LANTUS) 22 Unit(s) SubCutaneous every morning  insulin lispro (ADMELOG) corrective regimen sliding scale   SubCutaneous three times a day before meals  insulin lispro Injectable (ADMELOG) 5 Unit(s) SubCutaneous three times a day before meals  labetalol 400 milliGRAM(s) Oral three times a day  levETIRAcetam  IVPB 1500 milliGRAM(s) IV Intermittent every 12 hours  lidocaine 1%/epinephrine 1:100,000 Inj 20 milliLiter(s) Local Injection once  losartan 100 milliGRAM(s) Oral daily  multivitamin/minerals/iron Oral Solution (CENTRUM) 15 milliLiter(s) Oral daily  pantoprazole    Tablet 40 milliGRAM(s) Oral before breakfast  phenytoin   Chewable 50 milliGRAM(s) Oral at bedtime  phenytoin  IVPB 100 milliGRAM(s) IV Intermittent three times a day  polyethylene glycol 3350 17 Gram(s) Oral every 12 hours  sodium bicarbonate 650 milliGRAM(s) Oral every 6 hours  sodium chloride 0.65% Nasal 2 Spray(s) Both Nostrils two times a day        VITALS:  T(F): 98.4 (08-20-22 @ 04:43), Max: 98.7 (08-19-22 @ 11:48)  HR: 72 (08-20-22 @ 04:43)  BP: 142/81 (08-20-22 @ 04:43)  RR: 18 (08-20-22 @ 04:43)  SpO2: 97% (08-19-22 @ 11:32)      08-18 @ 07:01  -  08-19 @ 07:00  --------------------------------------------------------  IN: 1850 mL / OUT: 0 mL / NET: 1850 mL    08-19 @ 07:01  -  08-20 @ 07:00  --------------------------------------------------------  IN: 1000 mL / OUT: 0 mL / NET: 1000 mL      Height (cm): 165.1 (08-19 @ 13:13)  Weight (kg): 82.3 (08-19 @ 13:13)  BMI (kg/m2): 30.2 (08-19 @ 13:13)  BSA (m2): 1.9 (08-19 @ 13:13)    PHYSICAL EXAM:  Constitutional: ngt   Neck: No JVD  Respiratory: CTA  Cardiovascular: S1, S2, RRR  Gastrointestinal: BS+, soft, NT/ND  Extremities: No cyanosis or clubbing. No peripheral edema  :  No nolasco.   Skin: No rashes    LABS:  08-20    141  |  107  |  20  ----------------------------<  121<H>  4.1   |  23  |  0.8    Ca    8.8      20 Aug 2022 06:36  Mg     2.1     08-20    TPro  5.8<L>  /  Alb  3.0<L>  /  TBili  <0.2  /  DBili      /  AST  40  /  ALT  37  /  AlkPhos  141<H>  08-20                          8.8    6.32  )-----------( 391      ( 20 Aug 2022 06:36 )             26.1       Urine Studies:        TSH 0.86      [08-08-22 @ 17:53]  Lipid: chol 234, , HDL 42, LDL --      [08-03-22 @ 08:56]      Rheumatoid Factor <10      [08-19-22 @ 11:37]  Syphilis Screen (Treponema Pallidum Ab) Negative      [08-08-22 @ 17:53]      RADIOLOGY & ADDITIONAL STUDIES:

## 2022-08-21 NOTE — PROGRESS NOTE ADULT - SUBJECTIVE AND OBJECTIVE BOX
Neurology Stroke Progress Note    INTERVAL HPI/OVERNIGHT EVENTS:  Patient seen and examined. No acute events overnight. ROS limited as patient minimally verbal    MEDICATIONS  (STANDING):  amLODIPine   Tablet 10 milliGRAM(s) Oral at bedtime  aspirin  chewable 81 milliGRAM(s) Enteral Tube daily  atorvastatin 80 milliGRAM(s) Oral at bedtime  chlorthalidone 25 milliGRAM(s) Oral daily  collagenase Ointment 1 Application(s) Topical two times a day  Dakins Solution - 1/2 Strength 1 Application(s) Topical two times a day  dextrose 5%. 1000 milliLiter(s) (100 mL/Hr) IV Continuous <Continuous>  dextrose 5%. 1000 milliLiter(s) (50 mL/Hr) IV Continuous <Continuous>  dextrose 50% Injectable 25 Gram(s) IV Push once  dextrose 50% Injectable 12.5 Gram(s) IV Push once  dextrose 50% Injectable 25 Gram(s) IV Push once  doxazosin 2 milliGRAM(s) Oral at bedtime  enoxaparin Injectable 40 milliGRAM(s) SubCutaneous every 24 hours  glucagon  Injectable 1 milliGRAM(s) IntraMuscular once  hydrALAZINE 100 milliGRAM(s) Oral every 8 hours  insulin glargine Injectable (LANTUS) 22 Unit(s) SubCutaneous every morning  insulin lispro (ADMELOG) corrective regimen sliding scale   SubCutaneous three times a day before meals  insulin lispro Injectable (ADMELOG) 5 Unit(s) SubCutaneous three times a day before meals  labetalol 400 milliGRAM(s) Oral three times a day  levETIRAcetam  IVPB 1500 milliGRAM(s) IV Intermittent every 12 hours  lidocaine 1%/epinephrine 1:100,000 Inj 20 milliLiter(s) Local Injection once  losartan 100 milliGRAM(s) Oral daily  multivitamin/minerals/iron Oral Solution (CENTRUM) 15 milliLiter(s) Oral daily  pantoprazole    Tablet 40 milliGRAM(s) Oral before breakfast  phenytoin   Chewable 50 milliGRAM(s) Oral at bedtime  phenytoin  IVPB 100 milliGRAM(s) IV Intermittent three times a day  polyethylene glycol 3350 17 Gram(s) Oral every 12 hours  sodium bicarbonate 650 milliGRAM(s) Oral every 6 hours  sodium chloride 0.65% Nasal 2 Spray(s) Both Nostrils two times a day    MEDICATIONS  (PRN):  acetaminophen     Tablet .. 650 milliGRAM(s) Oral every 6 hours PRN Temp greater or equal to 38C (100.4F), Mild Pain (1 - 3)  dextrose Oral Gel 15 Gram(s) Oral once PRN Blood Glucose LESS THAN 70 milliGRAM(s)/deciliter  labetalol Injectable 10 milliGRAM(s) IV Push every 6 hours PRN Systolic blood pressure >  melatonin 3 milliGRAM(s) Oral at bedtime PRN Insomnia    Allergies    No Known Allergies    Intolerances      Vital Signs Last 24 Hrs  T(C): 36.8 (20 Aug 2022 20:52), Max: 37.1 (20 Aug 2022 13:29)  T(F): 98.3 (20 Aug 2022 20:52), Max: 98.8 (20 Aug 2022 13:29)  HR: 71 (21 Aug 2022 07:56) (71 - 77)  BP: 141/82 (21 Aug 2022 07:56) (141/82 - 171/88)  BP(mean): --  RR: 17 (21 Aug 2022 06:08) (17 - 18)  SpO2: --    Parameters below as of 21 Aug 2022 06:08  Patient On (Oxygen Delivery Method): room air        Neurologic:  -Mental status: appears lethargic, requires stimulation to wake up, unable to follow commands, unable to speak  -Cranial nerves:   III, IV, VI: Extraocular movements are intact without nystagmus. Pupils equally round and reactive to light  VII: R droop with saliva   IX, X: Uvula is midline and soft palate rises symmetrically  Motor:  Normal bulk and tone. No pronator drift. moves all extremities spontaneous. RUE 3/5, RLE 2/5, LUE and LLE 1/5  Sensation: responds to pain stimulation  Coordination: unable to asses  Gait: Deferred      LABS:  cret                        9.4    6.99  )-----------( 412      ( 21 Aug 2022 07:05 )             28.4     08-21    139  |  105  |  20  ----------------------------<  169<H>  4.4   |  24  |  0.8    Ca    9.1      21 Aug 2022 07:05  Mg     2.1     08-20    TPro  6.1  /  Alb  2.9<L>  /  TBili  <0.2  /  DBili  x   /  AST  24  /  ALT  32  /  AlkPhos  144<H>  08-21

## 2022-08-21 NOTE — PROGRESS NOTE ADULT - TIME BILLING
56F PMHx HTN, DM2, CVA 2017 here with RLE wound. HTN urgency. Acute embolic CVA.     IMP  #Acute CVA    mri with multiple cortical infarcts, suspected embolic; no hemorrhage    c/b dysphagia, on tf via ngt  #HTN urgency  #DM2  #RLE cellulitis    s/p course abx    s/p debridement 8/10  #Interictal activity on eeg    PLAN  plan for peg monday  cont asa, plavix on hold  lipitor 80  f/u ep for ilr  f/u cards for possible tamir  keppra 1500 bid  phenytoin 100 tid    Janae  [id number]

## 2022-08-21 NOTE — CHART NOTE - NSCHARTNOTEFT_GEN_A_CORE
Electrophysiology PA Note    plan for ILR tomorrow (Addon)  NO need for NPO    unable to reach son Latrell  Spoke with son Erik and  Ray, in agreement for the procedure       COVID-19 PCR: Marily (21 Aug 2022 02:34)

## 2022-08-21 NOTE — PROGRESS NOTE ADULT - ASSESSMENT
57 yo F with PMH of HTN, DM2, CVA (2017) who presented with purulent right lower extremity wound for 3 months consistent with acute RLE cellulitis.   As for the cellulitis, patient underwent debridement of the ulcer of the right lower extremity, and was kept on Augmentin until today.  Patient was a code stroke on 8/5, suspected CVA vs epileptic seizures. She was started on Keppra and then continued on Dilantin, since previous VEEG showed continuous epileptiform activities. Patient was started on feeds via NG tube and is scheduled to have a PEG tube on Monday 8/22.  As the patient might have underlying afib resulting in the multiple punctuate strokes, she is scheduled to have an ILR on 8/19. Aitiology is not clear, and vasculitis is not fully r/o, will monitor the patient improvement to decide regarding the LP.       Neuro  #Stroke workup  - continue aspirin 81mg  daily  - Off clopidogrel for PEG tube on Monday  - Continue Atorvastatin 80 mg daily   - q8hr stroke neuro checks and vitals  - IVONNE was denied by cardiology as they found paroxysmal A. fib on tele  - Possible A. fib as per EP evaluation does not fulfill the criteria to diagnosis A. fib and loop recorder is still needed   - Vasculitic work up ordered     # High risk of seizures with epileptiform discharges:  Multiple EEG did not capture any seizures  - Continue Keppra 1500 mg bid   - Phenytoin 100 mg tid   - Levetiracetam and phenytoin levels pending     Cards  #Hypertensive urgency due to noncompliance and Malignant HTN  BP at admission 296/174 without signs of end organ damage. Renal artery duplex wnl>>ARIAN unlikely  Aldosterone wnl, renin elevated  - r/o aortic coarctation with TTE (pending results)  -close monitoring  Currently on:  -c/w chlorthalidone 25mg daily  - Amlodipine 10 mg at bedtime    -c/w losartan 100mg daily  -c/w labetalol 400mg q8  -c/w hydralazine 100mg q8  -c/w doxazocin 2mg nightly      #HLD  - high dose statin as above in CVA      Pulm  - call provider if SPO2 < 94%    GI  #Nutrition/Fluids/Electrolytes   - replete K<4 and Mg <2  - Diet: NGT   - PEG tomorrow (off clopidogrel, and afebrile)  - Will keep NPO after midnight  - GI on the case      Renal  Nephrology are following, appreciate recs  Daily BMP    Infectious Disease  #Purulent RLE cellulitis r/o abscess / Fever / ?asp-n pneumonitis vs PNA  -s/p unasyn and and vancomycin in ED  -f/u wound and blood cultures   -trend inflammatory markers   -ID consult noted  - vanco, Unasyn  -s/p burn debridement   -changed ABx to augmentin  -8/13: fever: pancultured  -8/14: Candida in wound. D/w Dr. Chua: contaminant  8/15 BCx w/ staph: likely contaminant. F/u repeat BCx  8/17: hold ABx per ID. High suspicion for asp-n pneumonitis vs PNA. Repeat Cx, CXR. If persistent fevers, would change ABx to Zosyn, Vanco. Asp-n precautions. Aggressive suctioning    Afebrile for the last few days        Endocrine  #DM  - A1C results: 10.4  - ISS  - started insulin regimen   - FS ac/hs      - TSH results: 0.86    DVT ppx: lovenox, SCDs  GI ppx: protonix  Diet: DASH/carb consistent  Activity: AAT    Pending PEG on Monday, possible IVONNE after convo with cardio

## 2022-08-21 NOTE — PROGRESS NOTE ADULT - SUBJECTIVE AND OBJECTIVE BOX
INTERVAL HPI/OVERNIGHT EVENTS:    SUBJECTIVE: Patient seen and examined at bedside.     cc: ams  unable to obtain ros    OBJECTIVE:    VITAL SIGNS:  Vital Signs Last 24 Hrs  T(C): 36.8 (20 Aug 2022 20:52), Max: 37.1 (20 Aug 2022 13:29)  T(F): 98.3 (20 Aug 2022 20:52), Max: 98.8 (20 Aug 2022 13:29)  HR: 71 (21 Aug 2022 07:56) (71 - 77)  BP: 141/82 (21 Aug 2022 07:56) (141/82 - 171/88)  BP(mean): --  RR: 17 (21 Aug 2022 06:08) (17 - 18)  SpO2: --    Parameters below as of 21 Aug 2022 06:08  Patient On (Oxygen Delivery Method): room air          PHYSICAL EXAM:    General: NAD  HEENT: NC/AT; PERRL, clear conjunctiva  Neck: supple  Respiratory: CTA b/l  Cardiovascular: +S1/S2; RRR  Abdomen: soft, NT/ND; +BS x4  Extremities: WWP, 2+ peripheral pulses b/l; no LE edema  Skin: normal color and turgor; no rash  Neurological:    MEDICATIONS:  MEDICATIONS  (STANDING):  amLODIPine   Tablet 10 milliGRAM(s) Oral at bedtime  aspirin  chewable 81 milliGRAM(s) Enteral Tube daily  atorvastatin 80 milliGRAM(s) Oral at bedtime  chlorthalidone 25 milliGRAM(s) Oral daily  collagenase Ointment 1 Application(s) Topical two times a day  Dakins Solution - 1/2 Strength 1 Application(s) Topical two times a day  dextrose 5%. 1000 milliLiter(s) (100 mL/Hr) IV Continuous <Continuous>  dextrose 5%. 1000 milliLiter(s) (50 mL/Hr) IV Continuous <Continuous>  dextrose 50% Injectable 25 Gram(s) IV Push once  dextrose 50% Injectable 12.5 Gram(s) IV Push once  dextrose 50% Injectable 25 Gram(s) IV Push once  doxazosin 2 milliGRAM(s) Oral at bedtime  enoxaparin Injectable 40 milliGRAM(s) SubCutaneous every 24 hours  glucagon  Injectable 1 milliGRAM(s) IntraMuscular once  hydrALAZINE 100 milliGRAM(s) Oral every 8 hours  insulin glargine Injectable (LANTUS) 22 Unit(s) SubCutaneous every morning  insulin lispro (ADMELOG) corrective regimen sliding scale   SubCutaneous three times a day before meals  insulin lispro Injectable (ADMELOG) 5 Unit(s) SubCutaneous three times a day before meals  labetalol 400 milliGRAM(s) Oral three times a day  levETIRAcetam  IVPB 1500 milliGRAM(s) IV Intermittent every 12 hours  lidocaine 1%/epinephrine 1:100,000 Inj 20 milliLiter(s) Local Injection once  losartan 100 milliGRAM(s) Oral daily  multivitamin/minerals/iron Oral Solution (CENTRUM) 15 milliLiter(s) Oral daily  pantoprazole    Tablet 40 milliGRAM(s) Oral before breakfast  phenytoin   Chewable 50 milliGRAM(s) Oral at bedtime  phenytoin  IVPB 100 milliGRAM(s) IV Intermittent three times a day  polyethylene glycol 3350 17 Gram(s) Oral every 12 hours  sodium bicarbonate 650 milliGRAM(s) Oral every 6 hours  sodium chloride 0.65% Nasal 2 Spray(s) Both Nostrils two times a day    MEDICATIONS  (PRN):  acetaminophen     Tablet .. 650 milliGRAM(s) Oral every 6 hours PRN Temp greater or equal to 38C (100.4F), Mild Pain (1 - 3)  dextrose Oral Gel 15 Gram(s) Oral once PRN Blood Glucose LESS THAN 70 milliGRAM(s)/deciliter  labetalol Injectable 10 milliGRAM(s) IV Push every 6 hours PRN Systolic blood pressure >  melatonin 3 milliGRAM(s) Oral at bedtime PRN Insomnia      ALLERGIES:  Allergies    No Known Allergies    Intolerances        LABS:                        9.4    6.99  )-----------( 412      ( 21 Aug 2022 07:05 )             28.4     Hemoglobin: 9.4 g/dL (08-21 @ 07:05)  Hemoglobin: 8.8 g/dL (08-20 @ 06:36)  Hemoglobin: 9.4 g/dL (08-19 @ 11:37)  Hemoglobin: 8.3 g/dL (08-18 @ 07:30)  Hemoglobin: 9.2 g/dL (08-17 @ 08:31)    CBC Full  -  ( 21 Aug 2022 07:05 )  WBC Count : 6.99 K/uL  RBC Count : 3.24 M/uL  Hemoglobin : 9.4 g/dL  Hematocrit : 28.4 %  Platelet Count - Automated : 412 K/uL  Mean Cell Volume : 87.7 fL  Mean Cell Hemoglobin : 29.0 pg  Mean Cell Hemoglobin Concentration : 33.1 g/dL  Auto Neutrophil # : x  Auto Lymphocyte # : x  Auto Monocyte # : x  Auto Eosinophil # : x  Auto Basophil # : x  Auto Neutrophil % : x  Auto Lymphocyte % : x  Auto Monocyte % : x  Auto Eosinophil % : x  Auto Basophil % : x    08-21    139  |  105  |  20  ----------------------------<  169<H>  4.4   |  24  |  0.8    Ca    9.1      21 Aug 2022 07:05  Mg     2.1     08-20    TPro  6.1  /  Alb  2.9<L>  /  TBili  <0.2  /  DBili  x   /  AST  24  /  ALT  32  /  AlkPhos  144<H>  08-21    Creatinine Trend: 0.8<--, 0.8<--, 0.8<--, 0.8<--, 0.9<--, 0.8<--  LIVER FUNCTIONS - ( 21 Aug 2022 07:05 )  Alb: 2.9 g/dL / Pro: 6.1 g/dL / ALK PHOS: 144 U/L / ALT: 32 U/L / AST: 24 U/L / GGT: x               hs Troponin:              CSF:                      EKG:   MICROBIOLOGY:    IMAGING:      Labs, imaging, EKG personally reviewed    RADIOLOGY & ADDITIONAL TESTS: Reviewed.

## 2022-08-21 NOTE — PROGRESS NOTE ADULT - ATTENDING COMMENTS
I have personally seen and examined this patient.  I have fully participated in the care of this patient.  I have reviewed all pertinent clinical information, including history, physical exam, plan and note. Plan for PEG and ILR tomorrow. Unchanged neuro exam.   I have reviewed all pertinent clinical information and reviewed all relevant imaging and diagnostic studies personally.  Recommendations as above.  Agree with above assessment except as noted.

## 2022-08-22 NOTE — PROGRESS NOTE ADULT - ASSESSMENT
57 yo F with PMH of HTN, DM2, and stroke (per son 2017) who presented for RLE wound. Started 3 months ago when she fell and hit her leg on a wooden cabinet. found to have CVA. GI consulted for peg placement.    # Oropharyngeal dysphagia after CVA - Peg tube placement:  - indication: oropharyngeal dysphagia after CVA  - on ASA and Plavix  - last dose of Plavix 8/16    recommendations:  - BP uncontrolled, recommend optimization before the procedure  - will schedule for peg procedure tentatively on Wednesday pending BP control      55 yo F with PMH of HTN, DM2, and stroke (per son 2017) who presented for RLE wound. Started 3 months ago when she fell and hit her leg on a wooden cabinet. found to have CVA. GI consulted for peg placement.    # Oropharyngeal dysphagia after CVA - Peg tube placement:  - indication: oropharyngeal dysphagia after CVA  - on ASA and Plavix  - last dose of Plavix 8/16    recommendations:  - BP uncontrolled, recommend optimization before the procedure  - will schedule for peg procedure tentatively on Wednesday pending BP control   - get INR and type/screen

## 2022-08-22 NOTE — PROGRESS NOTE ADULT - TIME BILLING
56F PMHx HTN, DM2, CVA 2017 here with RLE wound. HTN urgency. Acute embolic CVA.     IMP  #Acute CVA    mri with multiple cortical infarcts, suspected embolic; no hemorrhage    c/b dysphagia, on tf via ngt  #HTN urgency  #DM2  #RLE cellulitis    s/p course abx    s/p debridement 8/10  #Interictal activity on eeg    PLAN  plan for peg Wednesday  cont asa, plavix on hold  lipitor 80  f/u ep for ilr, likely today   keppra 1500 bid  phenytoin 100 tid  currently on cefazolin IV   change amlodipine to nifedipine. can increase to maximum dose of nifedipine. if still not improived clonidine oral/patch can be added as well. 56F PMHx HTN, DM2, CVA 2017 here with RLE wound. HTN urgency. Acute embolic CVA.     IMP  #Acute CVA    mri with multiple cortical infarcts, suspected embolic; no hemorrhage    c/b dysphagia, on tf via ngt  #HTN urgency  #DM2  #RLE cellulitis    s/p course abx    s/p debridement 8/10  #Interictal activity on eeg    PLAN  plan for peg Wednesday  cont asa, plavix on hold  lipitor 80  f/u ep for ilr, likely today   keppra 1500 bid  phenytoin 100 tid  currently on cefazolin IV one dose as per EP  change amlodipine to nifedipine. can increase to maximum dose of nifedipine. if still not improved clonidine oral/patch can be added as well.

## 2022-08-22 NOTE — PROGRESS NOTE ADULT - ASSESSMENT
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   As for the cellulitis, patient underwent debridement of the ulcer of the right lower extremity,currently off abx.   Patient was a code stroke on 8/5, suspected CVA vs epileptic seizures. She was started on Keppra and then continued on Dilantin, since previous VEEG showed continuous epileptiform activities. Patient was started on feeds via NG tube and is scheduled to have a PEG tube tentatively Wed, awaiting better BP control  As the patient might have underlying afib resulting in the multiple punctuate strokes, she is scheduled to have an ILR on 8/19. Aitiology is not clear, and vasculitis is not fully r/o, will monitor the patient improvement to decide regarding the LP.       Neuro  #Stroke workup  - continue aspirin 81mg  daily  - Off clopidogrel for PEG tube possibly weds  - Continue Atorvastatin 80 mg daily   - q8hr stroke neuro checks and vitals  - IVONNE was denied by cardiology as they found paroxysmal A. fib on tele  - Possible A. fib as per EP evaluation does not fulfill the criteria to diagnosis A. fib and loop recorder is still needed   - Vasculitic work up ordered, pending   - May need LP if work up neg    # High risk of seizures with epileptiform discharges:  Multiple EEG did not capture any seizures  - Continue Keppra 1500 mg bid   - Phenytoin 100 mg tid   - Levetiracetam and phenytoin levels pending     Cards  #Hypertensive urgency due to noncompliance and Malignant HTN - uncontrolled  BP at admission 296/174 without signs of end organ damage. Renal artery duplex wnl>>ARIAN unlikely  Aldosterone wnl, renin elevated  - r/o aortic coarctation with TTE (pending results)  -close monitoring  Currently on:  -c/w chlorthalidone 25mg daily  - Amlodipine 10 mg at bedtime    -c/w losartan 100mg daily  -c/w increase labetalol  to 600mg q8  -c/w hydralazine 100mg q8  -c/w doxazocin 2mg nightly      #HLD  - high dose statin as above in CVA      Pulm  - call provider if SPO2 < 94%    GI  #Nutrition/Fluids/Electrolytes   - replete K<4 and Mg <2  - Diet: NGT   - PEG hopefully weds pending better BP control (off clopidogrel)  - GI on the case      Renal  Nephrology are following, appreciate recs  Daily BMP    Infectious Disease  #Purulent RLE cellulitis r/o abscess / Fever / ?asp-n pneumonitis vs PNA  -s/p unasyn and and vancomycin in ED  - initial neg blood cultures   -s/p burn debridement   - Candida in wound. D/w Dr. Chua: contaminant  -  BCx w/ staph: likely contaminant. repeat BCx -  negative  - hold ABx per ID. High suspicion for asp-n pneumonitis vs PNA.  If persistent fevers, would change ABx to Zosyn, Vanco. Asp-n precautions. Aggressive suctioning  - Currently afebrile for the last few days        Endocrine  #DM  - A1C results: 10.4  - ISS  - started insulin regimen   - c/w lantus 22, lispro 5u      - TSH results: 0.86    DVT ppx: lovenox, SCDs  GI ppx: protonix  Diet: DASH/carb consistent  Activity: AAT    Pending PEG on Weds pending BP control, possible ILR today, BP control 55 yo F with PMH of HTN, DM2, CVA (2017) w/ residual L sided paresis who presented with purulent right lower extremity wound for 3 months consistent with acute RLE cellulitis.   As for the cellulitis, patient underwent debridement of the ulcer of the right lower extremity,currently off abx.   Patient was a code stroke on 8/5, suspected CVA vs epileptic seizures. She was started on Keppra and then continued on Dilantin, since previous VEEG showed continuous epileptiform activities. Patient was started on feeds via NG tube and is scheduled to have a PEG tube tentatively Wed, awaiting better BP control  As the patient might have underlying afib resulting in the multiple punctuate strokes, she is scheduled to have an ILR on 8/19. Aitiology is not clear, and vasculitis is not fully r/o, will monitor the patient improvement to decide regarding the LP.       Neuro  #Stroke workup  - continue aspirin 81mg  daily  - Off clopidogrel for PEG tube possibly weds  - Continue Atorvastatin 80 mg daily   - q8hr stroke neuro checks and vitals  - IVONNE was denied by cardiology as they found paroxysmal A. fib on tele  - Possible A. fib as per EP evaluation does not fulfill the criteria to diagnosis A. fib and loop recorder is still needed   - Vasculitic work up ordered, pending   - May need LP if work up neg    # High risk of seizures with epileptiform discharges:  Multiple EEG did not capture any seizures  - Continue Keppra 1500 mg bid   - Phenytoin 100 mg tid, 50mg at HS  - Levetiracetam and phenytoin levels pending     Cards  #Hypertensive urgency due to noncompliance and Malignant HTN - uncontrolled  BP at admission 296/174 without signs of end organ damage. Renal artery duplex wnl>>ARIAN unlikely  Aldosterone wnl, renin elevated  - r/o aortic coarctation with TTE (pending results)  -close monitoring  Currently on:  -c/w chlorthalidone 25mg daily  - stop Amlodipine 10 mg and start nifedipine 60mg daily  -c/w losartan 100mg daily  -c/w increase labetalol  to 600mg q8  -c/w hydralazine 100mg q8  -c/w doxazocin 2mg nightly      #HLD  - high dose statin as above in CVA      Pulm  - call provider if SPO2 < 94%    GI  #Nutrition/Fluids/Electrolytes   - replete K<4 and Mg <2  - Diet: NGT   - PEG hopefully weds pending better BP control (off clopidogrel)  - GI on the case      Renal  Nephrology are following, appreciate recs  Daily BMP    Infectious Disease  #Purulent RLE cellulitis r/o abscess / Fever / ?asp-n pneumonitis vs PNA  -s/p unasyn and and vancomycin in ED  - initial neg blood cultures   -s/p burn debridement   - Candida in wound. D/w Dr. Chua: contaminant  -  BCx w/ staph: likely contaminant. repeat BCx -  negative  - hold ABx per ID. High suspicion for asp-n pneumonitis vs PNA.  If persistent fevers, would change ABx to Zosyn, Vanco. Asp-n precautions. Aggressive suctioning  - Currently afebrile for the last few days        Endocrine  #DM  - A1C results: 10.4  - ISS  - started insulin regimen   - c/w lantus 22, lispro 5u      - TSH results: 0.86    DVT ppx: lovenox, SCDs  GI ppx: protonix  Diet: DASH/carb consistent  Activity: AAT    Pending PEG on Weds pending BP control, possible ILR today, BP control

## 2022-08-22 NOTE — PROGRESS NOTE ADULT - SUBJECTIVE AND OBJECTIVE BOX
Neurology Stroke Progress Note    INTERVAL HPI/OVERNIGHT EVENTS:  Patient seen and examined. No events overnight. ROS limited as patient unable to speak but denies any pain  via head shaking when asked    MEDICATIONS  (STANDING):  amLODIPine   Tablet 10 milliGRAM(s) Oral at bedtime  aspirin  chewable 81 milliGRAM(s) Enteral Tube daily  atorvastatin 80 milliGRAM(s) Oral at bedtime  chlorthalidone 25 milliGRAM(s) Oral daily  collagenase Ointment 1 Application(s) Topical two times a day  Dakins Solution - 1/2 Strength 1 Application(s) Topical two times a day  dextrose 5%. 1000 milliLiter(s) (100 mL/Hr) IV Continuous <Continuous>  dextrose 5%. 1000 milliLiter(s) (50 mL/Hr) IV Continuous <Continuous>  dextrose 50% Injectable 25 Gram(s) IV Push once  dextrose 50% Injectable 12.5 Gram(s) IV Push once  dextrose 50% Injectable 25 Gram(s) IV Push once  doxazosin 2 milliGRAM(s) Oral at bedtime  enoxaparin Injectable 40 milliGRAM(s) SubCutaneous every 24 hours  glucagon  Injectable 1 milliGRAM(s) IntraMuscular once  hydrALAZINE 100 milliGRAM(s) Oral every 8 hours  insulin glargine Injectable (LANTUS) 22 Unit(s) SubCutaneous every morning  insulin lispro (ADMELOG) corrective regimen sliding scale   SubCutaneous three times a day before meals  insulin lispro Injectable (ADMELOG) 5 Unit(s) SubCutaneous three times a day before meals  labetalol 600 milliGRAM(s) Oral three times a day  levETIRAcetam  IVPB 1500 milliGRAM(s) IV Intermittent every 12 hours  lidocaine 1%/epinephrine 1:100,000 Inj 20 milliLiter(s) Local Injection once  losartan 100 milliGRAM(s) Oral daily  multivitamin/minerals/iron Oral Solution (CENTRUM) 15 milliLiter(s) Oral daily  pantoprazole    Tablet 40 milliGRAM(s) Oral before breakfast  phenytoin   Chewable 50 milliGRAM(s) Oral at bedtime  phenytoin  IVPB 100 milliGRAM(s) IV Intermittent three times a day  polyethylene glycol 3350 17 Gram(s) Oral every 12 hours  sodium bicarbonate 650 milliGRAM(s) Oral every 6 hours  sodium chloride 0.65% Nasal 2 Spray(s) Both Nostrils two times a day    MEDICATIONS  (PRN):  acetaminophen     Tablet .. 650 milliGRAM(s) Oral every 6 hours PRN Temp greater or equal to 38C (100.4F), Mild Pain (1 - 3)  dextrose Oral Gel 15 Gram(s) Oral once PRN Blood Glucose LESS THAN 70 milliGRAM(s)/deciliter  labetalol Injectable 10 milliGRAM(s) IV Push every 6 hours PRN Systolic blood pressure >  melatonin 3 milliGRAM(s) Oral at bedtime PRN Insomnia    Allergies    No Known Allergies    Intolerances      Vital Signs Last 24 Hrs  T(C): 36 (22 Aug 2022 04:37), Max: 37.3 (21 Aug 2022 22:14)  T(F): 96.8 (22 Aug 2022 04:37), Max: 99.1 (21 Aug 2022 22:14)  HR: 72 (22 Aug 2022 04:37) (69 - 72)  BP: 186/89 (22 Aug 2022 04:37) (186/89 - 198/95)  BP(mean): --  RR: 18 (22 Aug 2022 04:37) (18 - 18)  SpO2: --    Parameters below as of 21 Aug 2022 14:06  Patient On (Oxygen Delivery Method): room air        Physical exam:  General: No acute distress, awake  Neck: trachea midline, FROM, supple  Cardiovascular: Regular rate and rhythm, no murmurs  Pulmonary: Anterior breath sounds clear bilaterally, no crackles or wheezing  GI: Abdomen soft, non-distended, non-tender  Extremities: no edema    Neurologic:  -Mental status: awake this am, follow some commands, unable to speak  -Cranial nerves:   III, IV, VI: Extraocular movements are intact without nystagmus. Pupils equally round and reactive to light  VII: R droop with saliva   IX, X: Uvula is midline and soft palate rises symmetrically  Motor:  Normal bulk and tone. No pronator drift. moves all extremities spontaneous. RUE 3/5, RLE 2/5, LUE and LLE 1/5  Sensation: responds to pain stimulation  Coordination: unable to asses  Gait: Deferred    LABS:                        9.7    6.90  )-----------( 403      ( 22 Aug 2022 06:45 )             28.2     08-22    139  |  104  |  20  ----------------------------<  119<H>  4.4   |  23  |  0.8    Ca    9.3      22 Aug 2022 06:45    TPro  6.3  /  Alb  3.1<L>  /  TBili  <0.2  /  DBili  x   /  AST  23  /  ALT  30  /  AlkPhos  143<H>  08-22          RADIOLOGY & ADDITIONAL TESTS:

## 2022-08-22 NOTE — PROGRESS NOTE ADULT - SUBJECTIVE AND OBJECTIVE BOX
Gastroenterology progress note:     Patient is a 56y old  Female who presents with a chief complaint of cellulitis (22 Aug 2022 09:44)       Admitted on: 08-02-22    We are following the patient for: peg tube placement       Interval History:    No acute events overnight.   afebrile, BP uncontrolled       PAST MEDICAL & SURGICAL HISTORY:  Hypertension      Diabetes mellitus      No significant past surgical history          MEDICATIONS  (STANDING):  amLODIPine   Tablet 10 milliGRAM(s) Oral at bedtime  aspirin  chewable 81 milliGRAM(s) Enteral Tube daily  atorvastatin 80 milliGRAM(s) Oral at bedtime  chlorthalidone 25 milliGRAM(s) Oral daily  collagenase Ointment 1 Application(s) Topical two times a day  Dakins Solution - 1/2 Strength 1 Application(s) Topical two times a day  dextrose 5%. 1000 milliLiter(s) (100 mL/Hr) IV Continuous <Continuous>  dextrose 5%. 1000 milliLiter(s) (50 mL/Hr) IV Continuous <Continuous>  dextrose 50% Injectable 25 Gram(s) IV Push once  dextrose 50% Injectable 12.5 Gram(s) IV Push once  dextrose 50% Injectable 25 Gram(s) IV Push once  doxazosin 2 milliGRAM(s) Oral at bedtime  enoxaparin Injectable 40 milliGRAM(s) SubCutaneous every 24 hours  glucagon  Injectable 1 milliGRAM(s) IntraMuscular once  hydrALAZINE 100 milliGRAM(s) Oral every 8 hours  insulin glargine Injectable (LANTUS) 22 Unit(s) SubCutaneous every morning  insulin lispro (ADMELOG) corrective regimen sliding scale   SubCutaneous three times a day before meals  insulin lispro Injectable (ADMELOG) 5 Unit(s) SubCutaneous three times a day before meals  labetalol 600 milliGRAM(s) Oral three times a day  levETIRAcetam  IVPB 1500 milliGRAM(s) IV Intermittent every 12 hours  lidocaine 1%/epinephrine 1:100,000 Inj 20 milliLiter(s) Local Injection once  losartan 100 milliGRAM(s) Oral daily  multivitamin/minerals/iron Oral Solution (CENTRUM) 15 milliLiter(s) Oral daily  pantoprazole    Tablet 40 milliGRAM(s) Oral before breakfast  phenytoin   Chewable 50 milliGRAM(s) Oral at bedtime  phenytoin  IVPB 100 milliGRAM(s) IV Intermittent three times a day  polyethylene glycol 3350 17 Gram(s) Oral every 12 hours  sodium bicarbonate 650 milliGRAM(s) Oral every 6 hours  sodium chloride 0.65% Nasal 2 Spray(s) Both Nostrils two times a day    MEDICATIONS  (PRN):  acetaminophen     Tablet .. 650 milliGRAM(s) Oral every 6 hours PRN Temp greater or equal to 38C (100.4F), Mild Pain (1 - 3)  dextrose Oral Gel 15 Gram(s) Oral once PRN Blood Glucose LESS THAN 70 milliGRAM(s)/deciliter  labetalol Injectable 10 milliGRAM(s) IV Push every 6 hours PRN Systolic blood pressure >  melatonin 3 milliGRAM(s) Oral at bedtime PRN Insomnia      Allergies  No Known Allergies      Review of Systems:   Cardiovascular:  No Chest Pain, No Palpitations  Respiratory:  No Cough, No Dyspnea  Gastrointestinal:  As described in HPI  Skin:  No Skin Lesions, No Jaundice  Neuro:  No Syncope, No Dizziness    Physical Examination:  T(C): 36 (08-22-22 @ 04:37), Max: 37.3 (08-21-22 @ 22:14)  HR: 72 (08-22-22 @ 04:37) (69 - 72)  BP: 186/89 (08-22-22 @ 04:37) (186/89 - 198/95)  RR: 18 (08-22-22 @ 04:37) (18 - 18)  SpO2: --      08-21-22 @ 07:01  -  08-22-22 @ 07:00  --------------------------------------------------------  IN: 850 mL / OUT: 0 mL / NET: 850 mL        GENERAL: AAOx3, no acute distress.  HEAD:  Atraumatic, Normocephalic  EYES: conjunctiva and sclera clear  NECK: Supple, no JVD or thyromegaly  CHEST/LUNG: Clear to auscultation bilaterally; No wheeze, rhonchi, or rales  HEART: Regular rate and rhythm; normal S1, S2, No murmurs.  ABDOMEN: Soft, nontender, nondistended; Bowel sounds present  NEUROLOGY: No asterixis or tremor.   SKIN: Intact, no jaundice     Data:                        9.7    6.90  )-----------( 403      ( 22 Aug 2022 06:45 )             28.2     Hgb trend:  9.7  08-22-22 @ 06:45  9.4  08-21-22 @ 07:05  8.8  08-20-22 @ 06:36  9.4  08-19-22 @ 11:37        08-22    139  |  104  |  20  ----------------------------<  119<H>  4.4   |  23  |  0.8    Ca    9.3      22 Aug 2022 06:45    TPro  6.3  /  Alb  3.1<L>  /  TBili  <0.2  /  DBili  x   /  AST  23  /  ALT  30  /  AlkPhos  143<H>  08-22    Liver panel trend:  TBili <0.2   /   AST 23   /   ALT 30   /   AlkP 143   /   Tptn 6.3   /   Alb 3.1    /   DBili --      08-22  TBili <0.2   /   AST 24   /   ALT 32   /   AlkP 144   /   Tptn 6.1   /   Alb 2.9    /   DBili --      08-21  TBili <0.2   /   AST 40   /   ALT 37   /   AlkP 141   /   Tptn 5.8   /   Alb 3.0    /   DBili --      08-20  TBili <0.2   /   AST 52   /   ALT 36   /   AlkP 139   /   Tptn 6.1   /   Alb 2.8    /   DBili --      08-19  TBili 0.2   /   AST 23   /   ALT 19   /   AlkP 132   /   Tptn 5.8   /   Alb 2.9    /   DBili --      08-18  TBili <0.2   /   AST 18   /   ALT 16   /   AlkP 136   /   Tptn 5.8   /   Alb 2.7    /   DBili --      08-17  TBili <0.2   /   AST 21   /   ALT 17   /   AlkP 145   /   Tptn 5.8   /   Alb 2.9    /   DBili --      08-16  TBili <0.2   /   AST 21   /   ALT 14   /   AlkP 122   /   Tptn 5.7   /   Alb 2.9    /   DBili --      08-15  TBili <0.2   /   AST 25   /   ALT 13   /   AlkP 119   /   Tptn 6.0   /   Alb 3.0    /   DBili --      08-14  TBili <0.2   /   AST 18   /   ALT 11   /   AlkP 112   /   Tptn 5.7   /   Alb 2.8    /   DBili -- 08-13  TBili 0.2   /   AST 15   /   ALT 11   /   AlkP 112   /   Tptn 5.7   /   Alb 2.9    /   DBili -- 08-13

## 2022-08-22 NOTE — CHART NOTE - NSCHARTNOTEFT_GEN_A_CORE
EP PROCEDURE NOTE    Date of Procedure: 08-22-22  EP Attending: RODRIGUEZ Pan MD  Assistant: ARLET WONG  Referring Physician: Tomer JIMENEZ  Procedure: Loop Recorder Implant    Indication: 56y Female with history of ***CVA referred for implantable loop recorder.     Anesthesia: Local    EQUIPMENT IMPLANTED  : [employer]  Model Number: [id number]  Serial Number: [id number]    PROCEDURE DESCRIPTION  The patient was brought to the electrophysiology procedure area in a non-sedated state and received preoperative antibiotics. Informed, written consent was obtained prior to the procedure. The left anterior chest region was prepped and cleaned from the nipple to the upper left clavicular border with serial applications of Chlorhexidine. Patient was then covered with sterile drapes in the usual manner. Blood pressure, oxygenation, and level of comfort were stable throughout.     Following infiltration with local anesthetic, a 1-cm incision was made along the left sternal border at the fourth intercostal space. Using the tunneling device the implantable loop recorder was inserted into the subcutaneous tissue. Direct pressure was applied to the wound to obtain hemostasis. The wound was approximated with 4.0 Vicryl,  Dermabond. Steri-strips and a dry, sterile dressing were placed over the wound. R waves were noted to be **0.41* mV.     The patient tolerated the procedure well.     COMPLICATIONS  No immediate complications    CONCLUSIONS  Successful loop recorder implant    EBL: 2 cc

## 2022-08-22 NOTE — PROGRESS NOTE ADULT - SUBJECTIVE AND OBJECTIVE BOX
INTERVAL HPI/OVERNIGHT EVENTS:    SUBJECTIVE: Patient seen and examined at bedside.   no acute events overnight       OBJECTIVE:    VITAL SIGNS:  Vital Signs Last 24 Hrs  T(C): 36 (22 Aug 2022 13:40), Max: 37.3 (21 Aug 2022 22:14)  T(F): 96.8 (22 Aug 2022 13:40), Max: 99.1 (21 Aug 2022 22:14)  HR: 84 (22 Aug 2022 13:40) (70 - 84)  BP: 191/94 (22 Aug 2022 13:40) (186/89 - 198/95)  BP(mean): --  RR: 18 (22 Aug 2022 13:40) (18 - 18)  SpO2: --      Parameters below as of 21 Aug 2022 06:08  Patient On (Oxygen Delivery Method): room air          PHYSICAL EXAM:    General: NAD  HEENT: NC/AT; PERRL, clear conjunctiva  Neck: supple  Respiratory: CTA b/l  Cardiovascular: +S1/S2; RRR  Abdomen: soft, NT/ND; +BS x4  Extremities: WWP, 2+ peripheral pulses b/l; no LE edema  Skin: normal color and turgor; no rash  Neurological:    MEDICATIONS:  MEDICATIONS  (STANDING):  amLODIPine   Tablet 10 milliGRAM(s) Oral at bedtime  aspirin  chewable 81 milliGRAM(s) Enteral Tube daily  atorvastatin 80 milliGRAM(s) Oral at bedtime  chlorthalidone 25 milliGRAM(s) Oral daily  collagenase Ointment 1 Application(s) Topical two times a day  Dakins Solution - 1/2 Strength 1 Application(s) Topical two times a day  dextrose 5%. 1000 milliLiter(s) (100 mL/Hr) IV Continuous <Continuous>  dextrose 5%. 1000 milliLiter(s) (50 mL/Hr) IV Continuous <Continuous>  dextrose 50% Injectable 25 Gram(s) IV Push once  dextrose 50% Injectable 12.5 Gram(s) IV Push once  dextrose 50% Injectable 25 Gram(s) IV Push once  doxazosin 2 milliGRAM(s) Oral at bedtime  enoxaparin Injectable 40 milliGRAM(s) SubCutaneous every 24 hours  glucagon  Injectable 1 milliGRAM(s) IntraMuscular once  hydrALAZINE 100 milliGRAM(s) Oral every 8 hours  insulin glargine Injectable (LANTUS) 22 Unit(s) SubCutaneous every morning  insulin lispro (ADMELOG) corrective regimen sliding scale   SubCutaneous three times a day before meals  insulin lispro Injectable (ADMELOG) 5 Unit(s) SubCutaneous three times a day before meals  labetalol 400 milliGRAM(s) Oral three times a day  levETIRAcetam  IVPB 1500 milliGRAM(s) IV Intermittent every 12 hours  lidocaine 1%/epinephrine 1:100,000 Inj 20 milliLiter(s) Local Injection once  losartan 100 milliGRAM(s) Oral daily  multivitamin/minerals/iron Oral Solution (CENTRUM) 15 milliLiter(s) Oral daily  pantoprazole    Tablet 40 milliGRAM(s) Oral before breakfast  phenytoin   Chewable 50 milliGRAM(s) Oral at bedtime  phenytoin  IVPB 100 milliGRAM(s) IV Intermittent three times a day  polyethylene glycol 3350 17 Gram(s) Oral every 12 hours  sodium bicarbonate 650 milliGRAM(s) Oral every 6 hours  sodium chloride 0.65% Nasal 2 Spray(s) Both Nostrils two times a day    MEDICATIONS  (PRN):  acetaminophen     Tablet .. 650 milliGRAM(s) Oral every 6 hours PRN Temp greater or equal to 38C (100.4F), Mild Pain (1 - 3)  dextrose Oral Gel 15 Gram(s) Oral once PRN Blood Glucose LESS THAN 70 milliGRAM(s)/deciliter  labetalol Injectable 10 milliGRAM(s) IV Push every 6 hours PRN Systolic blood pressure >  melatonin 3 milliGRAM(s) Oral at bedtime PRN Insomnia      ALLERGIES:  Allergies    No Known Allergies    Intolerances        LABS:               LABS:                        9.7    6.90  )-----------( 403      ( 22 Aug 2022 06:45 )             28.2     08-22    139  |  104  |  20  ----------------------------<  119<H>  4.4   |  23  |  0.8    Ca    9.3      22 Aug 2022 06:45    TPro  6.3  /  Alb  3.1<L>  /  TBili  <0.2  /  DBili  x   /  AST  23  /  ALT  30  /  AlkPhos  143<H>  08-22            CBC Full  -  ( 21 Aug 2022 07:05 )  WBC Count : 6.99 K/uL  RBC Count : 3.24 M/uL  Hemoglobin : 9.4 g/dL  Hematocrit : 28.4 %  Platelet Count - Automated : 412 K/uL  Mean Cell Volume : 87.7 fL  Mean Cell Hemoglobin : 29.0 pg  Mean Cell Hemoglobin Concentration : 33.1 g/dL  Auto Neutrophil # : x  Auto Lymphocyte # : x  Auto Monocyte # : x  Auto Eosinophil # : x  Auto Basophil # : x  Auto Neutrophil % : x  Auto Lymphocyte % : x  Auto Monocyte % : x  Auto Eosinophil % : x  Auto Basophil % : x    08-21    139  |  105  |  20  ----------------------------<  169<H>  4.4   |  24  |  0.8    Ca    9.1      21 Aug 2022 07:05  Mg     2.1     08-20    TPro  6.1  /  Alb  2.9<L>  /  TBili  <0.2  /  DBili  x   /  AST  24  /  ALT  32  /  AlkPhos  144<H>  08-21    Creatinine Trend: 0.8<--, 0.8<--, 0.8<--, 0.8<--, 0.9<--, 0.8<--  LIVER FUNCTIONS - ( 21 Aug 2022 07:05 )  Alb: 2.9 g/dL / Pro: 6.1 g/dL / ALK PHOS: 144 U/L / ALT: 32 U/L / AST: 24 U/L / GGT: x               hs Troponin:              CSF:                      EKG:   MICROBIOLOGY:    IMAGING:      Labs, imaging, EKG personally reviewed    RADIOLOGY & ADDITIONAL TESTS: Reviewed.

## 2022-08-22 NOTE — PROGRESS NOTE ADULT - SUBJECTIVE AND OBJECTIVE BOX
Electrophysiology Brief Post-Op Note    I have personally seen and examined the patient.  I agree with the history and physical which I have reviewed and noted any changes below.  08-22-22 @ 16:32    PRE-OP DIAGNOSIS:  Cryptogenic CVA    POST-OP DIAGNOSIS:  Cryptogenic CVA    PROCEDURE: Loop Implnat    Physician: RODRIGUEZ Pan MD  Assistant: Cinda ISSA    ESTIMATED BLOOD LOSS:  2    mL    ANESTHESIA TYPE:  [  ]General Anesthesia  [  ] Sedation  [X  ] Local/Regional    CONDITION  [  ] Critical  [  ] Serious  [  ]Fair  [ X ]Good    SPECIMENS REMOVED (IF APPLICABLE):  none    IMPLANTS (IF APPLICABLE)  Loop Recorder ([employer])    FINDINGS  PLAN OF CARE  - May remove bandaid in 5 days  - May shower in 5days  Follow up in EP office ___wound check in 1 month _

## 2022-08-23 PROBLEM — I10 ESSENTIAL (PRIMARY) HYPERTENSION: Chronic | Status: ACTIVE | Noted: 2022-01-01

## 2022-08-23 PROBLEM — E11.9 TYPE 2 DIABETES MELLITUS WITHOUT COMPLICATIONS: Chronic | Status: ACTIVE | Noted: 2022-01-01

## 2022-08-23 NOTE — PROGRESS NOTE ADULT - SUBJECTIVE AND OBJECTIVE BOX
Gastroenterology progress note:     Patient is a 56y old  Female who presents with a chief complaint of stroke work up (22 Aug 2022 15:50)       Admitted on: 08-02-22    We are following the patient for: peg tube placement       Interval History:    NG tube out, missed BP medications, uncontrolled HTN      PAST MEDICAL & SURGICAL HISTORY:  Hypertension      Diabetes mellitus      No significant past surgical history          MEDICATIONS  (STANDING):  aspirin  chewable 81 milliGRAM(s) Enteral Tube daily  atorvastatin 80 milliGRAM(s) Oral at bedtime  chlorthalidone 25 milliGRAM(s) Oral daily  collagenase Ointment 1 Application(s) Topical two times a day  Dakins Solution - 1/2 Strength 1 Application(s) Topical two times a day  dextrose 5%. 1000 milliLiter(s) (100 mL/Hr) IV Continuous <Continuous>  dextrose 5%. 1000 milliLiter(s) (50 mL/Hr) IV Continuous <Continuous>  dextrose 50% Injectable 25 Gram(s) IV Push once  dextrose 50% Injectable 12.5 Gram(s) IV Push once  dextrose 50% Injectable 25 Gram(s) IV Push once  doxazosin 2 milliGRAM(s) Oral at bedtime  enoxaparin Injectable 40 milliGRAM(s) SubCutaneous every 24 hours  glucagon  Injectable 1 milliGRAM(s) IntraMuscular once  hydrALAZINE 100 milliGRAM(s) Oral every 8 hours  insulin glargine Injectable (LANTUS) 22 Unit(s) SubCutaneous every morning  insulin lispro (ADMELOG) corrective regimen sliding scale   SubCutaneous three times a day before meals  insulin lispro Injectable (ADMELOG) 5 Unit(s) SubCutaneous three times a day before meals  labetalol 600 milliGRAM(s) Oral three times a day  levETIRAcetam  IVPB 1500 milliGRAM(s) IV Intermittent every 12 hours  lidocaine 1%/epinephrine 1:100,000 Inj 20 milliLiter(s) Local Injection once  losartan 100 milliGRAM(s) Oral daily  multivitamin/minerals/iron Oral Solution (CENTRUM) 15 milliLiter(s) Oral daily  NIFEdipine XL 60 milliGRAM(s) Oral daily  pantoprazole    Tablet 40 milliGRAM(s) Oral before breakfast  phenytoin   Chewable 50 milliGRAM(s) Oral at bedtime  phenytoin  IVPB 100 milliGRAM(s) IV Intermittent three times a day  polyethylene glycol 3350 17 Gram(s) Oral every 12 hours  sodium bicarbonate 650 milliGRAM(s) Oral every 6 hours  sodium chloride 0.65% Nasal 2 Spray(s) Both Nostrils two times a day    MEDICATIONS  (PRN):  acetaminophen     Tablet .. 650 milliGRAM(s) Oral every 6 hours PRN Temp greater or equal to 38C (100.4F), Mild Pain (1 - 3)  dextrose Oral Gel 15 Gram(s) Oral once PRN Blood Glucose LESS THAN 70 milliGRAM(s)/deciliter  labetalol Injectable 10 milliGRAM(s) IV Push every 6 hours PRN Systolic blood pressure >  melatonin 3 milliGRAM(s) Oral at bedtime PRN Insomnia      Allergies  No Known Allergies      Review of Systems:   Cardiovascular:  No Chest Pain, No Palpitations  Respiratory:  No Cough, No Dyspnea  Gastrointestinal:  As described in HPI  Skin:  No Skin Lesions, No Jaundice  Neuro:  No Syncope, No Dizziness    Physical Examination:  T(C): 36.6 (08-23-22 @ 04:31), Max: 36.7 (08-22-22 @ 21:28)  HR: 75 (08-23-22 @ 04:31) (65 - 84)  BP: 195/91 (08-23-22 @ 04:31) (180/80 - 200/98)  RR: 22 (08-23-22 @ 04:31) (18 - 22)  SpO2: 98% (08-22-22 @ 21:28) (98% - 98%)      08-22-22 @ 07:01  -  08-23-22 @ 07:00  --------------------------------------------------------  IN: 450 mL / OUT: 0 mL / NET: 450 mL        GENERAL: AAOx3, no acute distress.  HEAD:  Atraumatic, Normocephalic  EYES: conjunctiva and sclera clear  NECK: Supple, no JVD or thyromegaly  CHEST/LUNG: Clear to auscultation bilaterally; No wheeze, rhonchi, or rales  HEART: Regular rate and rhythm; normal S1, S2, No murmurs.  ABDOMEN: Soft, nontender, nondistended; Bowel sounds present  NEUROLOGY: No asterixis or tremor.   SKIN: Intact, no jaundice     Data:                        9.8    6.05  )-----------( 420      ( 23 Aug 2022 06:06 )             29.1     Hgb trend:  9.8  08-23-22 @ 06:06  9.7  08-22-22 @ 06:45  9.4  08-21-22 @ 07:05        08-23    142  |  107  |  18  ----------------------------<  103<H>  4.2   |  22  |  0.7    Ca    9.5      23 Aug 2022 06:06    TPro  6.4  /  Alb  3.1<L>  /  TBili  <0.2  /  DBili  x   /  AST  22  /  ALT  27  /  AlkPhos  141<H>  08-23    Liver panel trend:  TBili <0.2   /   AST 22   /   ALT 27   /   AlkP 141   /   Tptn 6.4   /   Alb 3.1    /   DBili --      08-23  TBili <0.2   /   AST 23   /   ALT 30   /   AlkP 143   /   Tptn 6.3   /   Alb 3.1    /   DBili --      08-22  TBili <0.2   /   AST 24   /   ALT 32   /   AlkP 144   /   Tptn 6.1   /   Alb 2.9    /   DBili --      08-21  TBili <0.2   /   AST 40   /   ALT 37   /   AlkP 141   /   Tptn 5.8   /   Alb 3.0    /   DBili --      08-20  TBili <0.2   /   AST 52   /   ALT 36   /   AlkP 139   /   Tptn 6.1   /   Alb 2.8    /   DBili --      08-19  TBili 0.2   /   AST 23   /   ALT 19   /   AlkP 132   /   Tptn 5.8   /   Alb 2.9    /   DBili --      08-18  TBili <0.2   /   AST 18   /   ALT 16   /   AlkP 136   /   Tptn 5.8   /   Alb 2.7    /   DBili --      08-17  TBili <0.2   /   AST 21   /   ALT 17   /   AlkP 145   /   Tptn 5.8   /   Alb 2.9    /   DBili --      08-16  TBili <0.2   /   AST 21   /   ALT 14   /   AlkP 122   /   Tptn 5.7   /   Alb 2.9    /   DBili --      08-15  TBili <0.2   /   AST 25   /   ALT 13   /   AlkP 119   /   Tptn 6.0   /   Alb 3.0    /   DBili --      08-14  TBili <0.2   /   AST 18   /   ALT 11   /   AlkP 112   /   Tptn 5.7   /   Alb 2.8    /   DBili --      08-13             Radiology:

## 2022-08-23 NOTE — PROGRESS NOTE ADULT - SUBJECTIVE AND OBJECTIVE BOX
Neurology Stroke Progress Note    INTERVAL HPI/OVERNIGHT EVENTS:  Patient seen and examined. No acute events overnight.     MEDICATIONS  (STANDING):  aspirin  chewable 81 milliGRAM(s) Enteral Tube daily  atorvastatin 80 milliGRAM(s) Oral at bedtime  chlorthalidone 25 milliGRAM(s) Oral daily  collagenase Ointment 1 Application(s) Topical two times a day  Dakins Solution - 1/2 Strength 1 Application(s) Topical two times a day  dextrose 5%. 1000 milliLiter(s) (100 mL/Hr) IV Continuous <Continuous>  dextrose 5%. 1000 milliLiter(s) (50 mL/Hr) IV Continuous <Continuous>  dextrose 50% Injectable 25 Gram(s) IV Push once  dextrose 50% Injectable 12.5 Gram(s) IV Push once  dextrose 50% Injectable 25 Gram(s) IV Push once  doxazosin 2 milliGRAM(s) Oral at bedtime  enoxaparin Injectable 40 milliGRAM(s) SubCutaneous every 24 hours  glucagon  Injectable 1 milliGRAM(s) IntraMuscular once  hydrALAZINE 100 milliGRAM(s) Oral every 8 hours  insulin glargine Injectable (LANTUS) 22 Unit(s) SubCutaneous every morning  insulin lispro (ADMELOG) corrective regimen sliding scale   SubCutaneous three times a day before meals  insulin lispro Injectable (ADMELOG) 5 Unit(s) SubCutaneous three times a day before meals  labetalol 600 milliGRAM(s) Oral three times a day  levETIRAcetam  IVPB 1500 milliGRAM(s) IV Intermittent every 12 hours  lidocaine 1%/epinephrine 1:100,000 Inj 20 milliLiter(s) Local Injection once  losartan 100 milliGRAM(s) Oral daily  multivitamin/minerals/iron Oral Solution (CENTRUM) 15 milliLiter(s) Oral daily  NIFEdipine XL 60 milliGRAM(s) Oral daily  pantoprazole    Tablet 40 milliGRAM(s) Oral before breakfast  phenytoin   Chewable 50 milliGRAM(s) Oral at bedtime  phenytoin  IVPB 100 milliGRAM(s) IV Intermittent three times a day  polyethylene glycol 3350 17 Gram(s) Oral every 12 hours  sodium bicarbonate 650 milliGRAM(s) Oral every 6 hours  sodium chloride 0.65% Nasal 2 Spray(s) Both Nostrils two times a day    MEDICATIONS  (PRN):  acetaminophen     Tablet .. 650 milliGRAM(s) Oral every 6 hours PRN Temp greater or equal to 38C (100.4F), Mild Pain (1 - 3)  dextrose Oral Gel 15 Gram(s) Oral once PRN Blood Glucose LESS THAN 70 milliGRAM(s)/deciliter  labetalol Injectable 10 milliGRAM(s) IV Push every 6 hours PRN Systolic blood pressure >  melatonin 3 milliGRAM(s) Oral at bedtime PRN Insomnia    Allergies    No Known Allergies    Intolerances      Vital Signs Last 24 Hrs  T(C): 36.6 (23 Aug 2022 04:31), Max: 36.7 (22 Aug 2022 21:28)  T(F): 97.9 (23 Aug 2022 04:31), Max: 98 (22 Aug 2022 21:28)  HR: 73 (23 Aug 2022 10:00) (65 - 84)  BP: 126/74 (23 Aug 2022 10:00) (126/74 - 207/95)  BP(mean): 94 (23 Aug 2022 10:00) (94 - 136)  RR: 22 (23 Aug 2022 04:31) (18 - 22)  SpO2: 98% (22 Aug 2022 21:28) (98% - 98%)    Parameters below as of 23 Aug 2022 04:31  Patient On (Oxygen Delivery Method): room air        Physical exam:  General: No acute distress, awake  Neck: trachea midline, FROM, supple  Cardiovascular: Regular rate and rhythm, no murmurs  Pulmonary: Anterior breath sounds clear bilaterally, no crackles or wheezing  GI: Abdomen soft, non-distended, non-tender  Extremities: no edema    Neurologic:  -Mental status: awake this am, follow some commands, unable to speak  -Cranial nerves:   III, IV, VI: Extraocular movements are intact without nystagmus. Pupils equally round and reactive to light  VII: R droop with saliva   IX, X: Uvula is midline and soft palate rises symmetrically  Motor:  Normal bulk and tone. No pronator drift. moves all extremities spontaneous. RUE 3/5, RLE 2/5, LUE and LLE 1/5  Sensation: responds to pain stimulation  Coordination: unable to asses  Gait: Deferred      LABS:                        9.8    6.05  )-----------( 420      ( 23 Aug 2022 06:06 )             29.1     08-23    142  |  107  |  18  ----------------------------<  103<H>  4.2   |  22  |  0.7    Ca    9.5      23 Aug 2022 06:06    TPro  6.4  /  Alb  3.1<L>  /  TBili  <0.2  /  DBili  x   /  AST  22  /  ALT  27  /  AlkPhos  141<H>  08-23          RADIOLOGY & ADDITIONAL TESTS:

## 2022-08-23 NOTE — PROGRESS NOTE ADULT - SUBJECTIVE AND OBJECTIVE BOX
INTERVAL HPI/OVERNIGHT EVENTS:    SUBJECTIVE: Patient seen and examined at bedside.   no acute events overnight . still bp not optimally controlled.       OBJECTIVE:    VITAL SIGNS:  Vital Signs Last 24 Hrs  T(C): 35.7 (23 Aug 2022 13:25), Max: 36.7 (22 Aug 2022 21:28)  T(F): 96.3 (23 Aug 2022 13:25), Max: 98 (22 Aug 2022 21:28)  HR: 75 (23 Aug 2022 13:50) (65 - 75)  BP: 163/84 (23 Aug 2022 13:50) (126/74 - 207/95)  BP(mean): 113 (23 Aug 2022 13:50) (94 - 136)  RR: 22 (23 Aug 2022 04:31) (18 - 22)  SpO2: 94% (23 Aug 2022 13:50) (94% - 98%)    Parameters below as of 23 Aug 2022 13:50  Patient On (Oxygen Delivery Method): room air          Parameters below as of 21 Aug 2022 06:08  Patient On (Oxygen Delivery Method): room air          PHYSICAL EXAM:    General: NAD  HEENT: NC/AT; PERRL, clear conjunctiva  Neck: supple  Respiratory: CTA b/l  Cardiovascular: +S1/S2; RRR  Abdomen: soft, NT/ND; +BS x4  Extremities: WWP, 2+ peripheral pulses b/l; no LE edema  Skin: normal color and turgor; no rash  Neurological:    MEDICATIONS:  MEDICATIONS  (STANDING):  amLODIPine   Tablet 10 milliGRAM(s) Oral at bedtime  aspirin  chewable 81 milliGRAM(s) Enteral Tube daily  atorvastatin 80 milliGRAM(s) Oral at bedtime  chlorthalidone 25 milliGRAM(s) Oral daily  collagenase Ointment 1 Application(s) Topical two times a day  Dakins Solution - 1/2 Strength 1 Application(s) Topical two times a day  dextrose 5%. 1000 milliLiter(s) (100 mL/Hr) IV Continuous <Continuous>  dextrose 5%. 1000 milliLiter(s) (50 mL/Hr) IV Continuous <Continuous>  dextrose 50% Injectable 25 Gram(s) IV Push once  dextrose 50% Injectable 12.5 Gram(s) IV Push once  dextrose 50% Injectable 25 Gram(s) IV Push once  doxazosin 2 milliGRAM(s) Oral at bedtime  enoxaparin Injectable 40 milliGRAM(s) SubCutaneous every 24 hours  glucagon  Injectable 1 milliGRAM(s) IntraMuscular once  hydrALAZINE 100 milliGRAM(s) Oral every 8 hours  insulin glargine Injectable (LANTUS) 22 Unit(s) SubCutaneous every morning  insulin lispro (ADMELOG) corrective regimen sliding scale   SubCutaneous three times a day before meals  insulin lispro Injectable (ADMELOG) 5 Unit(s) SubCutaneous three times a day before meals  labetalol 400 milliGRAM(s) Oral three times a day  levETIRAcetam  IVPB 1500 milliGRAM(s) IV Intermittent every 12 hours  lidocaine 1%/epinephrine 1:100,000 Inj 20 milliLiter(s) Local Injection once  losartan 100 milliGRAM(s) Oral daily  multivitamin/minerals/iron Oral Solution (CENTRUM) 15 milliLiter(s) Oral daily  pantoprazole    Tablet 40 milliGRAM(s) Oral before breakfast  phenytoin   Chewable 50 milliGRAM(s) Oral at bedtime  phenytoin  IVPB 100 milliGRAM(s) IV Intermittent three times a day  polyethylene glycol 3350 17 Gram(s) Oral every 12 hours  sodium bicarbonate 650 milliGRAM(s) Oral every 6 hours  sodium chloride 0.65% Nasal 2 Spray(s) Both Nostrils two times a day    MEDICATIONS  (PRN):  acetaminophen     Tablet .. 650 milliGRAM(s) Oral every 6 hours PRN Temp greater or equal to 38C (100.4F), Mild Pain (1 - 3)  dextrose Oral Gel 15 Gram(s) Oral once PRN Blood Glucose LESS THAN 70 milliGRAM(s)/deciliter  labetalol Injectable 10 milliGRAM(s) IV Push every 6 hours PRN Systolic blood pressure >  melatonin 3 milliGRAM(s) Oral at bedtime PRN Insomnia      ALLERGIES:  Allergies    No Known Allergies    Intolerances        LABS:               LABS:                        9.7    6.90  )-----------( 403      ( 22 Aug 2022 06:45 )             28.2     08-22    139  |  104  |  20  ----------------------------<  119<H>  4.4   |  23  |  0.8    Ca    9.3      22 Aug 2022 06:45    TPro  6.3  /  Alb  3.1<L>  /  TBili  <0.2  /  DBili  x   /  AST  23  /  ALT  30  /  AlkPhos  143<H>  08-22            CBC Full  -  ( 21 Aug 2022 07:05 )  WBC Count : 6.99 K/uL  RBC Count : 3.24 M/uL  Hemoglobin : 9.4 g/dL  Hematocrit : 28.4 %  Platelet Count - Automated : 412 K/uL  Mean Cell Volume : 87.7 fL  Mean Cell Hemoglobin : 29.0 pg  Mean Cell Hemoglobin Concentration : 33.1 g/dL  Auto Neutrophil # : x  Auto Lymphocyte # : x  Auto Monocyte # : x  Auto Eosinophil # : x  Auto Basophil # : x  Auto Neutrophil % : x  Auto Lymphocyte % : x  Auto Monocyte % : x  Auto Eosinophil % : x  Auto Basophil % : x    08-21    139  |  105  |  20  ----------------------------<  169<H>  4.4   |  24  |  0.8    Ca    9.1      21 Aug 2022 07:05  Mg     2.1     08-20    TPro  6.1  /  Alb  2.9<L>  /  TBili  <0.2  /  DBili  x   /  AST  24  /  ALT  32  /  AlkPhos  144<H>  08-21    Creatinine Trend: 0.8<--, 0.8<--, 0.8<--, 0.8<--, 0.9<--, 0.8<--  LIVER FUNCTIONS - ( 21 Aug 2022 07:05 )  Alb: 2.9 g/dL / Pro: 6.1 g/dL / ALK PHOS: 144 U/L / ALT: 32 U/L / AST: 24 U/L / GGT: x               hs Troponin:              CSF:                      EKG:   MICROBIOLOGY:    IMAGING:      Labs, imaging, EKG personally reviewed    RADIOLOGY & ADDITIONAL TESTS: Reviewed.

## 2022-08-23 NOTE — PROGRESS NOTE ADULT - ASSESSMENT
55 yo F with PMH of HTN, DM2, and stroke (per son 2017) who presented for RLE wound. Started 3 months ago when she fell and hit her leg on a wooden cabinet. found to have CVA. GI consulted for peg placement.    # Oropharyngeal dysphagia after CVA - Peg tube placement:  - indication: oropharyngeal dysphagia after CVA  - on ASA and Plavix  - last dose of Plavix 8/16    recommendations:  - BP uncontrolled, recommend optimization before the procedure  - will schedule for peg procedure tomorrow pending BP control   - NPO after mid night  - get INR and type/screen

## 2022-08-23 NOTE — PROGRESS NOTE ADULT - ASSESSMENT
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   As for the cellulitis, patient underwent debridement of the ulcer of the right lower extremity,currently off abx.   Patient was a code stroke on 8/5, suspected CVA vs epileptic seizures. She was started on Keppra and then continued on Dilantin, since previous VEEG showed continuous epileptiform activities. Patient was started on feeds via NG tube and is scheduled to have a PEG tube tentatively Wed, awaiting better BP control  As the patient might have underlying afib resulting in the multiple punctuate strokes, she is scheduled to have an ILR on 8/19. Eitiology is not clear, and vasculitis is not fully r/o, will monitor the patient improvement to decide regarding the LP.       Neuro  #Stroke workup  - continue aspirin 81mg  daily  - Off clopidogrel for PEG tube possibly weds  - Continue Atorvastatin 80 mg daily   - q8hr stroke neuro checks and vitals  - IVONNE was denied by cardiology as they found paroxysmal A. fib on tele  - s/p ILR by EP 8/22  - Vasculitic work up ordered, pending   - May need LP if work up neg    # High risk of seizures with epileptiform discharges:  Multiple EEG did not capture any seizures  - Continue Keppra 1500 mg bid   - Phenytoin 100 mg tid, 50mg at HS  - Levetiracetam 26 .6  - phenytoin 0.5     Cards  #Hypertensive urgency due to noncompliance and Malignant HTN - uncontrolled  BP at admission 296/174 without signs of end organ damage. Renal artery duplex wnl>>ARIAN unlikely  Aldosterone wnl, renin elevated  - r/o aortic coarctation with TTE (pending results)  -close monitoring  Currently on:  -c/w chlorthalidone 25mg daily  - stop Amlodipine 10 mg and start nifedipine 60mg daily  -c/w losartan 100mg daily  -c/w labetalol  to 600mg q8  -c/w hydralazine 100mg q8  -c/w doxazocin 2mg nightly      #HLD  - high dose statin as above in CVA      Pulm  - call provider if SPO2 < 94%    GI  #Nutrition/Fluids/Electrolytes   - replete K<4 and Mg <2  - Diet: NGT   - PEG hopefully weds pending better BP control (off clopidogrel)        Renal  Nephrology are following, appreciate recs  Daily BMP    Infectious Disease  #Purulent RLE cellulitis r/o abscess / Fever / ?asp-n pneumonitis vs PNA  -s/p unasyn and and vancomycin in ED  - initial neg blood cultures   -s/p burn debridement   - Candida in wound. D/w Dr. Chua: contaminant  -  BCx w/ staph: likely contaminant. repeat BCx -  negative  - hold ABx per ID. High suspicion for asp-n pneumonitis vs PNA.  If persistent fevers, would change ABx to Zosyn, Vanco. Asp-n precautions. 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   As for the cellulitis, patient underwent debridement of the ulcer of the right lower extremity,currently off abx.   Patient was a code stroke on 8/5, suspected CVA vs epileptic seizures. She was started on Keppra and then continued on Dilantin, since previous VEEG showed continuous epileptiform activities. Patient was started on feeds via NG tube and is scheduled to have a PEG tube tentatively Wed, awaiting better BP control  As the patient might have underlying afib resulting in the multiple punctuate strokes, she is scheduled to have an ILR on 8/19. Eitiology is not clear, and vasculitis is not fully r/o, will monitor the patient improvement to decide regarding the LP.       Neuro  #Stroke workup  - continue aspirin 81mg  daily  - Off clopidogrel for PEG tube possibly weds  - Continue Atorvastatin 80 mg daily   - q4hr stroke neuro checks and vitals  - IVONNE was denied by cardiology as they found paroxysmal A. fib on tele  - s/p ILR by EP 8/22  - Vasculitic work up ordered, pending   - May need LP if work up neg    # High risk of seizures with epileptiform discharges:  Multiple EEG did not capture any seizures  - Continue Keppra 1500 mg bid   - Phenytoin 100 mg tid, 50mg at HS  - Levetiracetam 26 .6  - phenytoin 0.5     Cards  #Hypertensive urgency due to noncompliance and Malignant HTN - uncontrolled  BP at admission 296/174 without signs of end organ damage. Renal artery duplex wnl>>ARIAN unlikely  Aldosterone wnl, renin elevated  - r/o aortic coarctation with TTE (pending results)  -close monitoring  Currently on:  -c/w chlorthalidone 25mg daily  - previously on Amlodipine 10 mg  - pharmacy can't do Nifedipine IR, and would prefer not to crush Nifedipine XL  -c/w losartan 100mg daily  -c/w labetalol  to 600mg q8  -c/w hydralazine 100mg q8  -c/w doxazocin 2mg nightly  - if SBP not below 160, then start drip till  after PEG tmmr      #HLD  - high dose statin as above in CVA      Pulm  - call provider if SPO2 < 94%    GI  #Nutrition/Fluids/Electrolytes   - replete K<4 and Mg <2  - Diet: NGT   - PEG hopefully weds pending better BP control (off clopidogrel)        Renal  Nephrology are following, appreciate recs  Daily BMP    Infectious Disease  #Purulent RLE cellulitis r/o abscess / Fever / ?asp-n pneumonitis vs PNA  -s/p unasyn and and vancomycin in ED  - initial neg blood cultures   -s/p burn debridement   - Candida in wound. D/w Dr. Chua: contaminant  -  BCx w/ staph: likely contaminant. repeat BCx -  negative  - hold ABx per ID. High suspicion for asp-n pneumonitis vs PNA.  If persistent fevers, would change ABx to Zosyn, Vanco. Asp-n precautions. 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   As for the cellulitis, patient underwent debridement of the ulcer of the right lower extremity,currently off abx.   Patient was a code stroke on 8/5, suspected CVA vs epileptic seizures. She was started on Keppra and then continued on Dilantin, since previous VEEG showed continuous epileptiform activities. Patient was started on feeds via NG tube and is scheduled to have a PEG tube tentatively Wed, awaiting better BP control  As the patient might have underlying afib resulting in the multiple punctuate strokes, she is scheduled to have an ILR on 8/19. Eitiology is not clear, and vasculitis is not fully r/o, will monitor the patient improvement to decide regarding the LP.       Neuro  #Stroke workup  - continue aspirin 81mg  daily  - Off clopidogrel for PEG tube possibly weds  - Continue Atorvastatin 80 mg daily   - q4hr stroke neuro checks and vitals  - IVONNE was denied by cardiology as they found paroxysmal A. fib on tele  - s/p ILR by EP 8/22  - Vasculitic work up ordered, pending   - May need LP if work up neg    # High risk of seizures with epileptiform discharges:  Multiple EEG did not capture any seizures  - Continue Keppra 1500 mg bid   - Stop Phenytoin 100 mg tid, 50mg at HS  - Start vimpat 50mg BID  - Levetiracetam 26 .6  - phenytoin 0.5     Cards  #Hypertensive urgency due to noncompliance and Malignant HTN - uncontrolled  BP at admission 296/174 without signs of end organ damage. Renal artery duplex wnl>>ARIAN unlikely  Aldosterone wnl, renin elevated  - r/o aortic coarctation with TTE (pending results)  -close monitoring  Currently on:  -c/w chlorthalidone 25mg daily  - previously on Amlodipine 10 mg  - pharmacy can't do Nifedipine IR, and would prefer not to crush Nifedipine XL  -c/w losartan 100mg daily  -c/w labetalol  to 600mg q8  -c/w hydralazine 100mg q8  -c/w doxazocin 2mg nightly  - if SBP not below 160, then start drip till after PEG tmmr      #HLD  - high dose statin as above in CVA      Pulm  - call provider if SPO2 < 94%    GI  #Nutrition/Fluids/Electrolytes   - replete K<4 and Mg <2  - Diet: NGT   - PEG hopefully weds pending better BP control (off clopidogrel)        Renal  Nephrology are following, appreciate recs  Daily BMP    Infectious Disease  #Purulent RLE cellulitis r/o abscess / Fever / ?asp-n pneumonitis vs PNA  -s/p unasyn and and vancomycin in ED  - initial neg blood cultures   -s/p burn debridement   - Candida in wound. D/w Dr. Chua: contaminant  -  BCx w/ staph: likely contaminant. repeat BCx -  negative  - hold ABx per ID. High suspicion for asp-n pneumonitis vs PNA.  If persistent fevers, would change ABx to Zosyn, Vanco. Asp-n precautions. Aggressive suctioning  - Currently afebrile for the last few days        Endocrine  #DM  - A1C results: 10.4  - ISS  - started insulin regimen   - c/w lantus 22, lispro 5u      - TSH results: 0.86    DVT ppx: lovenox, SCDs  GI ppx: protonix  Diet: DASH/carb consistent  Activity: AAT    Pending: PEG on tmmr pending BP control, NPO after midnight, Transfer to stroke unit

## 2022-08-23 NOTE — PROGRESS NOTE ADULT - TIME BILLING
56F PMHx HTN, DM2, CVA 2017 here with RLE wound. HTN urgency. Acute embolic CVA.     IMP  #Acute CVA    mri with multiple cortical infarcts, suspected embolic; no hemorrhage    c/b dysphagia, on tf via ngt  #HTN urgency  #DM2  #RLE cellulitis    s/p course abx    s/p debridement 8/10  #Interictal activity on eeg    PLAN  plan for peg Wednesday. NPO midnight   cont asa, plavix on hold  lipitor 80  s/p ILR  keppra 1500 bid  phenytoin 100 tid  change amlodipine to nifedipine. can increase to maximum dose of nifedipine. if still not improved clonidine oral/patch can be added as well.

## 2022-08-24 NOTE — CHART NOTE - NSCHARTNOTEFT_GEN_A_CORE
Registered Dietitian Follow-Up     Patient Profile Reviewed                           Yes [x]   No []     Nutrition History Previously Obtained        Yes [x]  No []       Pertinent Subjective Information:   Per MD note 8/24, PEG placement on 8/24  - RD will provide nutrition recommendations below    Pertinent Medical Information:  55 y/o female w/ PMHx HTN, DM2, CVA 2017 here with RLE wound. Acute embolic CVA.  # Hypertensive urgency due to noncompliance  # DM II w/ Hyperglycemia    Diet order:  Diet, NPO after Midnight:      NPO Start Date: 23-Aug-2022,   NPO Start Time: 23:59  Except Medications (08-23-22 @ 10:08) [Active]  Diet, NPO with Tube Feed:   Tube Feeding Modality: Nasogastric  Glucerna 1.2 Reed  Total Volume for 24 Hours (mL): 1300  Bolus  Total Volume of Bolus (mL):  325  Total # of Feeds: 4  Tube Feed Frequency: Every 6 hours   Tube Feed Start Time: 12:00  Bolus Feed Rate (mL per Hour): 325   Bolus Feed Duration (in Hours): 1  Bolus Feed Instructions:  ***START FEEDS at @120mL Q6hr for 4 feeds  advance to @240mL Q6hr for 4 feeds  advance to goal of @325mL Q6hr***  Bolus  Free Water Flush Instructions:  + 50mL FWF pre and post each feed (08-22-22 @ 12:11) [Active]    Anthropometrics:  - Ht: 165.1 cm  - Wt: 82.3 KG (08/24)  - BMI: 30.2  - IBW: 54.5 KG    Weight hx: pt weight stable at 82.3 KG since admission per EMR     Pertinent Lab Data:  08/23 @ 06:06 - RBC 3.34; H/H 9.8/29.1; ; Alk Phos 141    CAPILLARY BLOOD GLUCOSE:  POCT Blood Glucose.: 102 mg/dL (24 Aug 2022 12:00)  POCT Blood Glucose.: 114 mg/dL (24 Aug 2022 05:39)  POCT Blood Glucose.: 107 mg/dL (23 Aug 2022 23:06)  POCT Blood Glucose.: 127 mg/dL (23 Aug 2022 17:49)    Pertinent Meds:  MEDICATIONS  (STANDING):  aspirin  chewable 81 milliGRAM(s) Enteral Tube daily  atorvastatin 80 milliGRAM(s) Oral at bedtime  chlorthalidone 25 milliGRAM(s) Oral daily  collagenase Ointment 1 Application(s) Topical two times a day  Dakins Solution - 1/2 Strength 1 Application(s) Topical two times a day  dextrose 5%. 1000 milliLiter(s) (100 mL/Hr) IV Continuous <Continuous>  dextrose 5%. 1000 milliLiter(s) (50 mL/Hr) IV Continuous <Continuous>  dextrose 50% Injectable 25 Gram(s) IV Push once  dextrose 50% Injectable 12.5 Gram(s) IV Push once  dextrose 50% Injectable 25 Gram(s) IV Push once  doxazosin 2 milliGRAM(s) Oral at bedtime  enoxaparin Injectable 40 milliGRAM(s) SubCutaneous every 24 hours  glucagon  Injectable 1 milliGRAM(s) IntraMuscular once  hydrALAZINE 100 milliGRAM(s) Oral every 8 hours  insulin glargine Injectable (LANTUS) 22 Unit(s) SubCutaneous every morning  insulin lispro (ADMELOG) corrective regimen sliding scale   SubCutaneous three times a day before meals  insulin lispro Injectable (ADMELOG) 5 Unit(s) SubCutaneous three times a day before meals  labetalol 600 milliGRAM(s) Oral three times a day  lacosamide IVPB 50 milliGRAM(s) IV Intermittent every 12 hours  levETIRAcetam  IVPB 1500 milliGRAM(s) IV Intermittent every 12 hours  lidocaine 1%/epinephrine 1:100,000 Inj 20 milliLiter(s) Local Injection once  losartan 100 milliGRAM(s) Oral daily  multivitamin/minerals/iron Oral Solution (CENTRUM) 15 milliLiter(s) Oral daily  pantoprazole    Tablet 40 milliGRAM(s) Oral before breakfast  sodium bicarbonate 650 milliGRAM(s) Oral every 6 hours  sodium chloride 0.65% Nasal 2 Spray(s) Both Nostrils two times a day    MEDICATIONS  (PRN):  acetaminophen     Tablet .. 650 milliGRAM(s) Oral every 6 hours PRN Temp greater or equal to 38C (100.4F), Mild Pain (1 - 3)  dextrose Oral Gel 15 Gram(s) Oral once PRN Blood Glucose LESS THAN 70 milliGRAM(s)/deciliter  labetalol Injectable 10 milliGRAM(s) IV Push every 6 hours PRN Systolic blood pressure >  melatonin 3 milliGRAM(s) Oral at bedtime PRN Insomnia    Physical Findings:  - Appearance: generalized edema 3+ noted on 8/22 per flowsheet   - GI function: soft/nontender; last BM on 8/24 - 2x  - Tubes: PEG placement 8/24  - Oral/Mouth cavity: NPO with TF  - Skin: intact; no pressure injuries  - Cognitive: unable to speak per flowsheet     Nutrition Requirements  Weight Used: Using IBW 56.82 KG and ABW 82.3 KG    Estimated Energy Needs    Continue [x]  Adjust []  ENERGY: 8288-0932 kcal/day (MSJ x 1.0-1.2 using ABW)     Estimated Protein Needs    Continue [x]  Adjust []  PROTEIN:  g/day (1.5-1.8g/kg of IBW)     Estimated Fluid Needs        Continue [x]  Adjust []  FLUIDS: 1mL/kcal     Nutrient Intake: Pt receiving >81% of estimated energy/protein needs     [x] Previous Nutrition Diagnosis: Inadequate Oral Intake              [] Ongoing          [x] Resolved - TF regimen meets needs    [] No active nutrition diagnosis identified at this time     Nutrition Intervention: TF regimen     Goal/Expected Outcome: Pt to meet >81% of estimated energy needs via TF regimen.     Indicator/Monitoring: diet order, weights, labs, NFPF, body composition, BM and tolerance to TF regimen    Recommendations:  1. Continue with TF regimen -- Glucerna 1.2 Reed @325mL Q6hr -- provides 1300mL, 1560 kcal, 78g pro, 1047 mL free water  + 50mL pre and post each feed for additional 400mL free water; total of 1447mL water -- meeting >81% of estimated energy/pro needs -- ONCE ABLE TO START FEEDING, PLEASE START PEG TF @120mL on DAY 1 and advance as tolerated to @240mL on DAY 2; GOAL RATE @325mL q6hrs.  2. Please maintain all aspiration precautions    Pt is at high nutrition risk; will f/u in 4 days.    RD remains available: Magdalena Lai, MALIA [id number]

## 2022-08-24 NOTE — PROGRESS NOTE ADULT - SUBJECTIVE AND OBJECTIVE BOX
Neurology Stroke Progress Note    INTERVAL HPI/OVERNIGHT EVENTS:  Patient seen and examined. No acute events overnight. ROS limited as patient is aphasic    MEDICATIONS  (STANDING):  aspirin  chewable 81 milliGRAM(s) Enteral Tube daily  atorvastatin 80 milliGRAM(s) Oral at bedtime  chlorthalidone 25 milliGRAM(s) Oral daily  collagenase Ointment 1 Application(s) Topical two times a day  Dakins Solution - 1/2 Strength 1 Application(s) Topical two times a day  dextrose 5%. 1000 milliLiter(s) (100 mL/Hr) IV Continuous <Continuous>  dextrose 5%. 1000 milliLiter(s) (50 mL/Hr) IV Continuous <Continuous>  dextrose 50% Injectable 25 Gram(s) IV Push once  dextrose 50% Injectable 12.5 Gram(s) IV Push once  dextrose 50% Injectable 25 Gram(s) IV Push once  doxazosin 2 milliGRAM(s) Oral at bedtime  enoxaparin Injectable 40 milliGRAM(s) SubCutaneous every 24 hours  glucagon  Injectable 1 milliGRAM(s) IntraMuscular once  hydrALAZINE 100 milliGRAM(s) Oral every 8 hours  insulin glargine Injectable (LANTUS) 22 Unit(s) SubCutaneous every morning  insulin lispro (ADMELOG) corrective regimen sliding scale   SubCutaneous three times a day before meals  insulin lispro Injectable (ADMELOG) 5 Unit(s) SubCutaneous three times a day before meals  labetalol 600 milliGRAM(s) Oral three times a day  lacosamide 50 milliGRAM(s) Oral two times a day  levETIRAcetam  IVPB 1500 milliGRAM(s) IV Intermittent every 12 hours  lidocaine 1%/epinephrine 1:100,000 Inj 20 milliLiter(s) Local Injection once  losartan 100 milliGRAM(s) Oral daily  multivitamin/minerals/iron Oral Solution (CENTRUM) 15 milliLiter(s) Oral daily  pantoprazole    Tablet 40 milliGRAM(s) Oral before breakfast  polyethylene glycol 3350 17 Gram(s) Oral every 12 hours  sodium bicarbonate 650 milliGRAM(s) Oral every 6 hours  sodium chloride 0.65% Nasal 2 Spray(s) Both Nostrils two times a day    MEDICATIONS  (PRN):  acetaminophen     Tablet .. 650 milliGRAM(s) Oral every 6 hours PRN Temp greater or equal to 38C (100.4F), Mild Pain (1 - 3)  dextrose Oral Gel 15 Gram(s) Oral once PRN Blood Glucose LESS THAN 70 milliGRAM(s)/deciliter  labetalol Injectable 10 milliGRAM(s) IV Push every 6 hours PRN Systolic blood pressure >  melatonin 3 milliGRAM(s) Oral at bedtime PRN Insomnia    Allergies    No Known Allergies    Intolerances      Vital Signs Last 24 Hrs  T(C): 36.4 (24 Aug 2022 08:36), Max: 36.4 (24 Aug 2022 08:36)  T(F): 97.5 (24 Aug 2022 08:36), Max: 97.5 (24 Aug 2022 08:36)  HR: 56 (24 Aug 2022 08:36) (52 - 75)  BP: 125/76 (24 Aug 2022 08:36) (119/66 - 165/74)  BP(mean): 92 (24 Aug 2022 08:36) (83 - 136)  RR: 18 (24 Aug 2022 08:36) (17 - 18)  SpO2: 98% (24 Aug 2022 08:36) (94% - 100%)    Parameters below as of 24 Aug 2022 08:36  Patient On (Oxygen Delivery Method): room air        Physical exam:  General: No acute distress, awake  Neck: trachea midline, FROM, supple  Cardiovascular: Regular rate and rhythm, no murmurs  Pulmonary: Anterior breath sounds clear bilaterally, no crackles or wheezing  GI: Abdomen soft, non-distended, non-tender  Extremities: no edema    Neurologic:  -Mental status: sleepy, arouses to verbal stimuli unable to speak  -Cranial nerves:   III, IV, VI: Extraocular movements are intact without nystagmus. Pupils equally round and reactive to light  VII: R droop   IX, X: Uvula is midline and soft palate rises symmetrically  Motor:  Normal bulk and tone. No pronator drift. moves all extremities spontaneous 1/5 throughout  Sensation: responds to pain stimulation  Coordination: unable to asses  NIHSS 23    LABS:                        9.8    6.05  )-----------( 420      ( 23 Aug 2022 06:06 )             29.1     08-23    142  |  107  |  18  ----------------------------<  103<H>  4.2   |  22  |  0.7    Ca    9.5      23 Aug 2022 06:06    TPro  6.4  /  Alb  3.1<L>  /  TBili  <0.2  /  DBili  x   /  AST  22  /  ALT  27  /  AlkPhos  141<H>  08-23    PT/INR - ( 23 Aug 2022 17:24 )   PT: 13.90 sec;   INR: 1.21 ratio               RADIOLOGY & ADDITIONAL TESTS:

## 2022-08-24 NOTE — PRE-OP CHECKLIST - SELECT TESTS ORDERED
CBC/PT/PTT/Hepatic Function/Type and Cross/Type and Screen/EKG/CXR/POCT Blood Glucose/COVID-19 102/CBC/PT/PTT/Hepatic Function/Type and Cross/Type and Screen/EKG/CXR/POCT Blood Glucose/COVID-19

## 2022-08-24 NOTE — PROGRESS NOTE ADULT - ASSESSMENT
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   As for the cellulitis, patient underwent debridement of the ulcer of the right lower extremity,currently off abx.   Patient was a code stroke on 8/5, suspected CVA vs epileptic seizures. She was started on Keppra and then continued on Dilantin, since previous VEEG showed continuous epileptiform activities. Patient was started on feeds via NG tube and is scheduled to have a PEG tube tentatively Wed, awaiting better BP control  As the patient might have underlying afib resulting in the multiple punctuate strokes, she is scheduled to have an ILR on 8/19. Eitiology is not clear, and vasculitis is not fully r/o, will monitor the patient improvement to decide regarding the LP.       Neuro  #Stroke workup  - continue aspirin 81mg  daily  - Off clopidogrel for PEG tube possibly weds  - Continue Atorvastatin 80 mg daily   - q4hr stroke neuro checks and vitals  - IVONNE was denied by cardiology as they found paroxysmal A. fib on tele  - s/p ILR by EP 8/22  - Vasculitic work up neg so far  - May need LP if work up neg    # High risk of seizures with epileptiform discharges:  Multiple EEG did not capture any seizures  - Continue Keppra 1500 mg bid   - Stopped Phenytoin 8/23  - c/w vimpat 50mg BID  - Levetiracetam 26 .6  - phenytoin 0.5     Cards  #Hypertensive urgency due to noncompliance and Malignant HTN - controlled  BP at admission 296/174 without signs of end organ damage. Renal artery duplex wnl>>ARIAN unlikely  Aldosterone wnl, renin elevated  - r/o aortic coarctation with TTE (pending results)  -close monitoring  Currently on:  -c/w chlorthalidone 25mg daily  - previously on Amlodipine 10 mg  - pharmacy can't do Nifedipine IR, and would prefer not to crush Nifedipine XL (off nifedipine)  -c/w losartan 100mg daily  -c/w labetalol  to 600mg q8  -c/w hydralazine 100mg q8  -c/w doxazocin 2mg nightly  - if SBP not below 160, then start drip till after PEG tmmr      #HLD  - high dose statin as above in CVA      Pulm  - call provider if SPO2 < 94%    GI  #Nutrition/Fluids/Electrolytes   - replete K<4 and Mg <2  - Diet: NGT   - PEG hopefully weds pending better BP control (off clopidogrel)        Renal  Nephrology are following, appreciate recs  Daily BMP    Infectious Disease  #Purulent RLE cellulitis r/o abscess / Fever / ?asp-n pneumonitis vs PNA  -s/p unasyn and and vancomycin in ED  - initial neg blood cultures   -s/p burn debridement   - Candida in wound. D/w Dr. Chua: contaminant  -  BCx w/ staph: likely contaminant. repeat BCx -  negative  - hold ABx per ID. High suspicion for asp-n pneumonitis vs PNA.  If persistent fevers, would change ABx to Zosyn, Vanco. Asp-n precautions. Aggressive suctioning  - Currently afebrile for the last few days        Endocrine  #DM  - A1C results: 10.4  - ISS  - started insulin regimen   - c/w lantus 22, lispro 5u      - TSH results: 0.86    DVT ppx: lovenox, SCDs  GI ppx: protonix  Diet: DASH/carb consistent  Activity: AAT    Pending: PEG today, keep NPO

## 2022-08-24 NOTE — PROGRESS NOTE ADULT - SUBJECTIVE AND OBJECTIVE BOX
INTERVAL HPI/OVERNIGHT EVENTS:    SUBJECTIVE: Patient seen and examined at bedside.   no acute events overnight . BP better today.       OBJECTIVE:    VITAL SIGNS:  Vital Signs Last 24 Hrs  T(C): 36.6 (24 Aug 2022 13:10), Max: 36.7 (24 Aug 2022 12:04)  T(F): 97.9 (24 Aug 2022 13:10), Max: 98.1 (24 Aug 2022 12:04)  HR: 74 (24 Aug 2022 13:10) (52 - 75)  BP: 151/74 (24 Aug 2022 13:10) (119/66 - 188/98)  BP(mean): 137 (24 Aug 2022 12:59) (83 - 137)  RR: 18 (24 Aug 2022 13:10) (17 - 18)  SpO2: 100% (24 Aug 2022 12:59) (94% - 100%)    Parameters below as of 24 Aug 2022 12:31  Patient On (Oxygen Delivery Method): room air            Parameters below as of 21 Aug 2022 06:08  Patient On (Oxygen Delivery Method): room air          PHYSICAL EXAM:    General: NAD  HEENT: NC/AT; PERRL, clear conjunctiva  Neck: supple  Respiratory: CTA b/l  Cardiovascular: +S1/S2; RRR  Abdomen: soft, NT/ND; +BS x4  Extremities: WWP, 2+ peripheral pulses b/l; no LE edema  Skin: normal color and turgor; no rash  Neurological:    MEDICATIONS:  MEDICATIONS  (STANDING):  amLODIPine   Tablet 10 milliGRAM(s) Oral at bedtime  aspirin  chewable 81 milliGRAM(s) Enteral Tube daily  atorvastatin 80 milliGRAM(s) Oral at bedtime  chlorthalidone 25 milliGRAM(s) Oral daily  collagenase Ointment 1 Application(s) Topical two times a day  Dakins Solution - 1/2 Strength 1 Application(s) Topical two times a day  dextrose 5%. 1000 milliLiter(s) (100 mL/Hr) IV Continuous <Continuous>  dextrose 5%. 1000 milliLiter(s) (50 mL/Hr) IV Continuous <Continuous>  dextrose 50% Injectable 25 Gram(s) IV Push once  dextrose 50% Injectable 12.5 Gram(s) IV Push once  dextrose 50% Injectable 25 Gram(s) IV Push once  doxazosin 2 milliGRAM(s) Oral at bedtime  enoxaparin Injectable 40 milliGRAM(s) SubCutaneous every 24 hours  glucagon  Injectable 1 milliGRAM(s) IntraMuscular once  hydrALAZINE 100 milliGRAM(s) Oral every 8 hours  insulin glargine Injectable (LANTUS) 22 Unit(s) SubCutaneous every morning  insulin lispro (ADMELOG) corrective regimen sliding scale   SubCutaneous three times a day before meals  insulin lispro Injectable (ADMELOG) 5 Unit(s) SubCutaneous three times a day before meals  labetalol 400 milliGRAM(s) Oral three times a day  levETIRAcetam  IVPB 1500 milliGRAM(s) IV Intermittent every 12 hours  lidocaine 1%/epinephrine 1:100,000 Inj 20 milliLiter(s) Local Injection once  losartan 100 milliGRAM(s) Oral daily  multivitamin/minerals/iron Oral Solution (CENTRUM) 15 milliLiter(s) Oral daily  pantoprazole    Tablet 40 milliGRAM(s) Oral before breakfast  phenytoin   Chewable 50 milliGRAM(s) Oral at bedtime  phenytoin  IVPB 100 milliGRAM(s) IV Intermittent three times a day  polyethylene glycol 3350 17 Gram(s) Oral every 12 hours  sodium bicarbonate 650 milliGRAM(s) Oral every 6 hours  sodium chloride 0.65% Nasal 2 Spray(s) Both Nostrils two times a day    MEDICATIONS  (PRN):  acetaminophen     Tablet .. 650 milliGRAM(s) Oral every 6 hours PRN Temp greater or equal to 38C (100.4F), Mild Pain (1 - 3)  dextrose Oral Gel 15 Gram(s) Oral once PRN Blood Glucose LESS THAN 70 milliGRAM(s)/deciliter  labetalol Injectable 10 milliGRAM(s) IV Push every 6 hours PRN Systolic blood pressure >  melatonin 3 milliGRAM(s) Oral at bedtime PRN Insomnia      ALLERGIES:  Allergies    No Known Allergies    Intolerances        LABS:               LABS:               LABS:                        9.8    6.05  )-----------( 420      ( 23 Aug 2022 06:06 )             29.1     08-23    142  |  107  |  18  ----------------------------<  103<H>  4.2   |  22  |  0.7    Ca    9.5      23 Aug 2022 06:06    TPro  6.4  /  Alb  3.1<L>  /  TBili  <0.2  /  DBili  x   /  AST  22  /  ALT  27  /  AlkPhos  141<H>  08-23    PT/INR - ( 23 Aug 2022 17:24 )   PT: 13.90 sec;   INR: 1.21 ratio                         CBC Full  -  ( 21 Aug 2022 07:05 )  WBC Count : 6.99 K/uL  RBC Count : 3.24 M/uL  Hemoglobin : 9.4 g/dL  Hematocrit : 28.4 %  Platelet Count - Automated : 412 K/uL  Mean Cell Volume : 87.7 fL  Mean Cell Hemoglobin : 29.0 pg  Mean Cell Hemoglobin Concentration : 33.1 g/dL  Auto Neutrophil # : x  Auto Lymphocyte # : x  Auto Monocyte # : x  Auto Eosinophil # : x  Auto Basophil # : x  Auto Neutrophil % : x  Auto Lymphocyte % : x  Auto Monocyte % : x  Auto Eosinophil % : x  Auto Basophil % : x    08-21    139  |  105  |  20  ----------------------------<  169<H>  4.4   |  24  |  0.8    Ca    9.1      21 Aug 2022 07:05  Mg     2.1     08-20    TPro  6.1  /  Alb  2.9<L>  /  TBili  <0.2  /  DBili  x   /  AST  24  /  ALT  32  /  AlkPhos  144<H>  08-21    Creatinine Trend: 0.8<--, 0.8<--, 0.8<--, 0.8<--, 0.9<--, 0.8<--  LIVER FUNCTIONS - ( 21 Aug 2022 07:05 )  Alb: 2.9 g/dL / Pro: 6.1 g/dL / ALK PHOS: 144 U/L / ALT: 32 U/L / AST: 24 U/L / GGT: x               hs Troponin:              CSF:                      EKG:   MICROBIOLOGY:    IMAGING:      Labs, imaging, EKG personally reviewed    RADIOLOGY & ADDITIONAL TESTS: Reviewed.

## 2022-08-24 NOTE — PRE-OP CHECKLIST - ANTIBIOTIC
Called pt to schedule 2 week f/u from 9/4/2020 procedure with Andre Jacques . Na , lvm for pt to rt my call .  
n/a

## 2022-08-25 NOTE — SPEECH LANGUAGE PATHOLOGY EVALUATION - DESCRIBE:
80% accuracy. Pt. benefits from mod verbal cues 2' decreased language processing and decreased unsustained attention.

## 2022-08-25 NOTE — PROGRESS NOTE ADULT - ASSESSMENT
57 yo F with PMH of HTN, DM2, and stroke (per son 2017) who presented for RLE wound. Started 3 months ago when she fell and hit her leg on a wooden cabinet. found to have CVA. GI consulted for peg placement.    # Oropharyngeal dysphagia after CVA s/p Peg tube placement:  - s/p peg placement on 8/24  - Bumper loosened this morning, peg flushed and functioning properly, site looks clean   - can use for medications and feedings     recommendations:  - can restart plavix for CVA  - Care Instructions for patient        - Can use the PEG tube 3 hours after the procedure       - Can shower 24-48 hours after the procedure       - Can bathe 1 week after procedure       - Clean the PEG site with soap and water daily       - Apply clean dressing to the site       - Tape the tube to the skin to avoid tugging       - Flush the tube with 30cc of water before and after feeds and medications    will sign off  recall PRN

## 2022-08-25 NOTE — SWALLOW BEDSIDE ASSESSMENT ADULT - SPECIFY REASON(S)
dysphagia follow up
evaluate oropharyngeal swallow
dysphagia follow up
dysphagia follow up
Dysphagia evaluation
Dysphagia f/u

## 2022-08-25 NOTE — PROGRESS NOTE ADULT - SUBJECTIVE AND OBJECTIVE BOX
Neurology Stroke Progress Note    INTERVAL HPI/OVERNIGHT EVENTS:  Patient seen and examined. No acute events overnight. ROS limited as patient is aphasic.    MEDICATIONS  (STANDING):  aspirin  chewable 81 milliGRAM(s) Enteral Tube daily  atorvastatin 80 milliGRAM(s) Oral at bedtime  chlorthalidone 25 milliGRAM(s) Oral daily  collagenase Ointment 1 Application(s) Topical two times a day  Dakins Solution - 1/2 Strength 1 Application(s) Topical two times a day  dextrose 5%. 1000 milliLiter(s) (100 mL/Hr) IV Continuous <Continuous>  dextrose 5%. 1000 milliLiter(s) (50 mL/Hr) IV Continuous <Continuous>  dextrose 50% Injectable 25 Gram(s) IV Push once  dextrose 50% Injectable 12.5 Gram(s) IV Push once  dextrose 50% Injectable 25 Gram(s) IV Push once  doxazosin 2 milliGRAM(s) Oral at bedtime  enoxaparin Injectable 40 milliGRAM(s) SubCutaneous every 24 hours  glucagon  Injectable 1 milliGRAM(s) IntraMuscular once  hydrALAZINE 100 milliGRAM(s) Oral every 8 hours  insulin glargine Injectable (LANTUS) 22 Unit(s) SubCutaneous every morning  insulin lispro (ADMELOG) corrective regimen sliding scale   SubCutaneous three times a day before meals  insulin lispro Injectable (ADMELOG) 5 Unit(s) SubCutaneous three times a day before meals  labetalol 600 milliGRAM(s) Oral three times a day  lacosamide IVPB 50 milliGRAM(s) IV Intermittent every 12 hours  levETIRAcetam  IVPB 1500 milliGRAM(s) IV Intermittent every 12 hours  lidocaine 1%/epinephrine 1:100,000 Inj 20 milliLiter(s) Local Injection once  losartan 100 milliGRAM(s) Oral daily  multivitamin/minerals/iron Oral Solution (CENTRUM) 15 milliLiter(s) Oral daily  pantoprazole    Tablet 40 milliGRAM(s) Oral before breakfast  sodium bicarbonate 650 milliGRAM(s) Oral every 6 hours  sodium chloride 0.65% Nasal 2 Spray(s) Both Nostrils two times a day    MEDICATIONS  (PRN):  acetaminophen     Tablet .. 650 milliGRAM(s) Oral every 6 hours PRN Temp greater or equal to 38C (100.4F), Mild Pain (1 - 3)  dextrose Oral Gel 15 Gram(s) Oral once PRN Blood Glucose LESS THAN 70 milliGRAM(s)/deciliter  labetalol Injectable 10 milliGRAM(s) IV Push every 6 hours PRN Systolic blood pressure >  melatonin 3 milliGRAM(s) Oral at bedtime PRN Insomnia    Allergies    No Known Allergies    Intolerances      Vital Signs Last 24 Hrs  T(C): 36.7 (25 Aug 2022 04:00), Max: 36.8 (24 Aug 2022 14:14)  T(F): 98.1 (25 Aug 2022 04:00), Max: 98.2 (24 Aug 2022 14:14)  HR: 54 (25 Aug 2022 08:00) (52 - 74)  BP: 145/79 (25 Aug 2022 08:00) (118/69 - 191/88)  BP(mean): 103 (25 Aug 2022 08:00) (90 - 137)  RR: 18 (25 Aug 2022 08:00) (16 - 20)  SpO2: 98% (25 Aug 2022 08:00) (97% - 100%)    Parameters below as of 25 Aug 2022 08:00  Patient On (Oxygen Delivery Method): room air        Physical exam:  General: No acute distress, awake  Neck: trachea midline, FROM, supple  Cardiovascular: Regular rate and rhythm, no murmurs  Pulmonary: Anterior breath sounds clear bilaterally, no crackles or wheezing  GI: Abdomen soft, non-distended, non-tender, PEG tube inplace  Extremities: no edema    Neurologic:  -Mental status: sleepy, arouses to verbal stimuli unable to speak  -Cranial nerves:   III, IV, VI: Extraocular movements are intact without nystagmus. Pupils equally round and reactive to light  VII: R droop   IX, X: Uvula is midline and soft palate rises symmetrically  Motor:  Normal bulk and tone. moves all extremities spontaneous 1/5 on Left, 2/5 on right extremities  Sensation: responds to pain stimulation  Coordination: unable to asses  NIHSS 23    LABS:                        10.0   6.77  )-----------( 420      ( 25 Aug 2022 07:14 )             29.2           PT/INR - ( 23 Aug 2022 17:24 )   PT: 13.90 sec;   INR: 1.21 ratio               RADIOLOGY & ADDITIONAL TESTS:

## 2022-08-25 NOTE — PROGRESS NOTE ADULT - SUBJECTIVE AND OBJECTIVE BOX
INTERVAL HPI/OVERNIGHT EVENTS:    SUBJECTIVE: Patient seen and examined at bedside.   no acute events overnight . BP better today. s/p peg tube  today she was able to speak few words       OBJECTIVE:    VITAL SIGNS:  Vital Signs Last 24 Hrs  T(C): 36.9 (25 Aug 2022 12:00), Max: 36.9 (25 Aug 2022 12:00)  T(F): 98.5 (25 Aug 2022 12:00), Max: 98.5 (25 Aug 2022 12:00)  HR: 60 (25 Aug 2022 12:45) (52 - 74)  BP: 128/77 (25 Aug 2022 12:45) (118/69 - 191/88)  BP(mean): 102 (25 Aug 2022 12:45) (90 - 137)  RR: 17 (25 Aug 2022 12:30) (16 - 20)  SpO2: 98% (25 Aug 2022 12:45) (97% - 100%)    Parameters below as of 25 Aug 2022 12:30  Patient On (Oxygen Delivery Method): room air                Parameters below as of 21 Aug 2022 06:08  Patient On (Oxygen Delivery Method): room air          PHYSICAL EXAM:    General: NAD  HEENT: NC/AT; PERRL, clear conjunctiva  Neck: supple  Respiratory: CTA b/l  Cardiovascular: +S1/S2; RRR  Abdomen: soft, NT/ND; +BS x4  Extremities: WWP, 2+ peripheral pulses b/l; no LE edema  Skin: normal color and turgor; no rash  Neurological:    MEDICATIONS:  MEDICATIONS  (STANDING):  amLODIPine   Tablet 10 milliGRAM(s) Oral at bedtime  aspirin  chewable 81 milliGRAM(s) Enteral Tube daily  atorvastatin 80 milliGRAM(s) Oral at bedtime  chlorthalidone 25 milliGRAM(s) Oral daily  collagenase Ointment 1 Application(s) Topical two times a day  Dakins Solution - 1/2 Strength 1 Application(s) Topical two times a day  dextrose 5%. 1000 milliLiter(s) (100 mL/Hr) IV Continuous <Continuous>  dextrose 5%. 1000 milliLiter(s) (50 mL/Hr) IV Continuous <Continuous>  dextrose 50% Injectable 25 Gram(s) IV Push once  dextrose 50% Injectable 12.5 Gram(s) IV Push once  dextrose 50% Injectable 25 Gram(s) IV Push once  doxazosin 2 milliGRAM(s) Oral at bedtime  enoxaparin Injectable 40 milliGRAM(s) SubCutaneous every 24 hours  glucagon  Injectable 1 milliGRAM(s) IntraMuscular once  hydrALAZINE 100 milliGRAM(s) Oral every 8 hours  insulin glargine Injectable (LANTUS) 22 Unit(s) SubCutaneous every morning  insulin lispro (ADMELOG) corrective regimen sliding scale   SubCutaneous three times a day before meals  insulin lispro Injectable (ADMELOG) 5 Unit(s) SubCutaneous three times a day before meals  labetalol 400 milliGRAM(s) Oral three times a day  levETIRAcetam  IVPB 1500 milliGRAM(s) IV Intermittent every 12 hours  lidocaine 1%/epinephrine 1:100,000 Inj 20 milliLiter(s) Local Injection once  losartan 100 milliGRAM(s) Oral daily  multivitamin/minerals/iron Oral Solution (CENTRUM) 15 milliLiter(s) Oral daily  pantoprazole    Tablet 40 milliGRAM(s) Oral before breakfast  phenytoin   Chewable 50 milliGRAM(s) Oral at bedtime  phenytoin  IVPB 100 milliGRAM(s) IV Intermittent three times a day  polyethylene glycol 3350 17 Gram(s) Oral every 12 hours  sodium bicarbonate 650 milliGRAM(s) Oral every 6 hours  sodium chloride 0.65% Nasal 2 Spray(s) Both Nostrils two times a day    MEDICATIONS  (PRN):  acetaminophen     Tablet .. 650 milliGRAM(s) Oral every 6 hours PRN Temp greater or equal to 38C (100.4F), Mild Pain (1 - 3)  dextrose Oral Gel 15 Gram(s) Oral once PRN Blood Glucose LESS THAN 70 milliGRAM(s)/deciliter  labetalol Injectable 10 milliGRAM(s) IV Push every 6 hours PRN Systolic blood pressure >  melatonin 3 milliGRAM(s) Oral at bedtime PRN Insomnia      ALLERGIES:  Allergies    No Known Allergies    Intolerances        LABS:               LABS:               LABS:               LABS:                        10.0   6.77  )-----------( 420      ( 25 Aug 2022 07:14 )             29.2     08-25    144  |  106  |  21<H>  ----------------------------<  102<H>  3.9   |  22  |  0.8    Ca    9.4      25 Aug 2022 07:14  Mg     1.8     08-25    TPro  6.6  /  Alb  3.5  /  TBili  0.2  /  DBili  x   /  AST  22  /  ALT  23  /  AlkPhos  140<H>  08-25    PT/INR - ( 23 Aug 2022 17:24 )   PT: 13.90 sec;   INR: 1.21 ratio                                     CBC Full  -  ( 21 Aug 2022 07:05 )  WBC Count : 6.99 K/uL  RBC Count : 3.24 M/uL  Hemoglobin : 9.4 g/dL  Hematocrit : 28.4 %  Platelet Count - Automated : 412 K/uL  Mean Cell Volume : 87.7 fL  Mean Cell Hemoglobin : 29.0 pg  Mean Cell Hemoglobin Concentration : 33.1 g/dL  Auto Neutrophil # : x  Auto Lymphocyte # : x  Auto Monocyte # : x  Auto Eosinophil # : x  Auto Basophil # : x  Auto Neutrophil % : x  Auto Lymphocyte % : x  Auto Monocyte % : x  Auto Eosinophil % : x  Auto Basophil % : x    08-21    139  |  105  |  20  ----------------------------<  169<H>  4.4   |  24  |  0.8    Ca    9.1      21 Aug 2022 07:05  Mg     2.1     08-20    TPro  6.1  /  Alb  2.9<L>  /  TBili  <0.2  /  DBili  x   /  AST  24  /  ALT  32  /  AlkPhos  144<H>  08-21    Creatinine Trend: 0.8<--, 0.8<--, 0.8<--, 0.8<--, 0.9<--, 0.8<--  LIVER FUNCTIONS - ( 21 Aug 2022 07:05 )  Alb: 2.9 g/dL / Pro: 6.1 g/dL / ALK PHOS: 144 U/L / ALT: 32 U/L / AST: 24 U/L / GGT: x               hs Troponin:              CSF:                      EKG:   MICROBIOLOGY:    IMAGING:      Labs, imaging, EKG personally reviewed    RADIOLOGY & ADDITIONAL TESTS: Reviewed.

## 2022-08-25 NOTE — SWALLOW BEDSIDE ASSESSMENT ADULT - ORAL PHASE
w/ puree and mildly thick/Decreased anterior-posterior movement of the bolus/Delayed oral transit time

## 2022-08-25 NOTE — SPEECH LANGUAGE PATHOLOGY EVALUATION - SLP PERTINENT HISTORY OF CURRENT PROBLEM
55 yo Female with PMH of HTN, DM2, and ?hemorrhagic stroke due to an aneurysm (per son 2017) who presented for RLE wound on 08/02/22. Code stroke for unresponsiveness at 4pm 8/5; CTH negative for acute ICH,  MRI 8/10 " Multiple punctate cortical acute infarcts involving multiple vascular territories possibly related to embolic etiology. No evidence of acute  hemorrhage. Moderate chronic microvascular type changes as well as chronic hemosiderin deposition within the right basal ganglia consistent with remote hemorrhage. Chronic right thalamic lacunar infarcts.

## 2022-08-25 NOTE — SPEECH LANGUAGE PATHOLOGY EVALUATION - SLP DIAGNOSIS
Pt. presents with severe expressive and receptive aphasia characterized by anomia, neologisms and delayed language processing. In addition to severe dysarthria, and cognitive-linguistic deficits in the areas of orientation and attention.

## 2022-08-25 NOTE — SWALLOW BEDSIDE ASSESSMENT ADULT - SWALLOW EVAL: RECOMMENDED DIET
NPO with alternate means of hydration/ nutrition
NPO w/ NGT for alternate means for nutrition/hydration
NPO with alternate means of hydration/ nutrition
Continue NPO until mental status improves for participation in swallow assessment
NPO with PEG for long term means for nutrition/ hydration
NPO with alternate means of hydration/ nutrition

## 2022-08-25 NOTE — SWALLOW BEDSIDE ASSESSMENT ADULT - SLP GENERAL OBSERVATIONS
Pt. received awake, ox1 (self, ) +gen weakness, +aphasic, +severely dysarthric, room air, follows commands.

## 2022-08-25 NOTE — SWALLOW BEDSIDE ASSESSMENT ADULT - SLP PERTINENT HISTORY OF CURRENT PROBLEM
55 yo F with PMH of HTN, DM2, and ?hemorrhagic stroke due to an aneurysm (per son 2017) who presented for RLE wound on 08/02/22. Code stroke for unresponsiveness at 4pm 8/5; CTH negative for acute ICH,  MRI 8/10 " Multiple punctate cortical acute infarcts involving multiple vascular territories possibly related to embolic etiology. No evidence of acute  hemorrhage. Moderate chronic microvascular type changes as well as chronic hemosiderin deposition within the right basal ganglia consistent with remote hemorrhage. Chronic right thalamic lacunar infarcts.
57 yo F with PMH of HTN, DM2, and ?hemorrhagic stroke due to an aneurysm (per son 2017) who presented for RLE wound on 08/02/22. Code stroke for unresponsiveness at 4pm 8/5; CTH negative for acute ICH, VEEG ongoing and MRI pending.
55 yo F with PMH of HTN, DM2, and ?hemorrhagic stroke due to an aneurysm (per son 2017) who presented for RLE wound on 08/02/22. Code stroke for unresponsiveness at 4pm 8/5; CTH negative for acute ICH,  MRI 8/10 " Multiple punctate cortical acute infarcts involving multiple vascular territories possibly related to embolic etiology. No evidence of acute  hemorrhage. Moderate chronic microvascular type changes as well as chronic hemosiderin deposition within the right basal ganglia consistent with remote hemorrhage. Chronic right thalamic lacunar infarcts.
55 yo Female with PMH of HTN, DM2, and ?hemorrhagic stroke due to an aneurysm (per son 2017) who presented for RLE wound on 08/02/22. Code stroke for unresponsiveness at 4pm 8/5; CTH negative for acute ICH,  MRI 8/10 " Multiple punctate cortical acute infarcts involving multiple vascular territories possibly related to embolic etiology. No evidence of acute  hemorrhage. Moderate chronic microvascular type changes as well as chronic hemosiderin deposition within the right basal ganglia consistent with remote hemorrhage. Chronic right thalamic lacunar infarcts.

## 2022-08-25 NOTE — PROGRESS NOTE ADULT - TIME BILLING
56F PMHx HTN, DM2, CVA 2017 here with RLE wound. HTN urgency. Acute embolic CVA.     IMP  #Acute CVA    mri with multiple cortical infarcts, suspected embolic; no hemorrhage    c/b dysphagia, on tf via ngt  #HTN urgency  #DM2  #RLE cellulitis    s/p course abx    s/p debridement 8/10  #Interictal activity on eeg    PLAN  s/p peg. GI cleared to use peg   cont asa, plavix on hold. May need LP   speech recommending barium swallow   lipitor 80  s/p ILR  keppra 1500 bid  phenytoin 100 tid  BP better controlled

## 2022-08-25 NOTE — SWALLOW BEDSIDE ASSESSMENT ADULT - SWALLOW EVAL: DIAGNOSIS
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 Suspected pharyngeal dysphagia for small amounts of puree w/ mildly thick liquids without overt s/s of penetration/ aspiration. MBSS/VFSS Instrumental swallow evaluation is recommended to further assess swallow physiology prior to PO initiation. 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 Suspected pharyngeal dysphagia for small amounts of puree w/ mildly thick liquids. MBSS/VFSS Instrumental swallow evaluation is recommended to further assess swallow physiology prior to PO initiation.

## 2022-08-25 NOTE — SPEECH LANGUAGE PATHOLOGY EVALUATION - COMMENTS
Addended by: LUIS BROTHERS on: 2/19/2020 02:34 PM     Modules accepted: Orders     Pt. is known to ST service with recs for NPO/ PEG for long term means for nutrition/ hydration.  Bilingual SLP serves as Swedish/ English . Did not test Clear vocal quality. Improved communicative intent when compared from initial onset of CVA. Pt's communicative tasks are negatively impacted by decreased sustained attention and distractibility. Pt. presents with initiation deficits, speech is characterized by 1 word productions with word finding deficits and occasional neologisms. Wants and needs should be anticipated by caregivers and staff. Pt. benefits from sentence completion, phonemic cues and multiple choices. Pt's speech is severely dysarthric and negatively impacted by decreased mouth/jaw opening during verbal productions.

## 2022-08-25 NOTE — PROGRESS NOTE ADULT - ASSESSMENT
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   As for the cellulitis, patient underwent debridement of the ulcer of the right lower extremity,currently off abx.   Patient was a code stroke on 8/5, suspected CVA vs epileptic seizures. She was started on Keppra and then continued on Dilantin, since previous VEEG showed continuous epileptiform activities. Patient was started on feeds via NG tube and is scheduled to have a PEG tube tentatively Wed, awaiting better BP control  As the patient might have underlying afib resulting in the multiple punctuate strokes, she is scheduled to have an ILR on 8/19. Eitiology is not clear, and vasculitis is not fully r/o, will monitor the patient improvement to decide regarding the LP.       Neuro  #Stroke workup  - continue aspirin 81mg  daily  - Off clopidogrel for now (will keep off till LP)  - Continue Atorvastatin 80 mg daily   - q4hr stroke neuro checks and vitals  - IVONNE was denied by cardiology as they found paroxysmal A. fib on tele  - s/p ILR by EP 8/22  - Vasculitic work up neg so far  - May need LP if work up neg with IR, keep off plavix for now    # High risk of seizures with epileptiform discharges  Multiple EEG did not capture any seizures  - Continue Keppra 1500 mg bid   - Stopped Phenytoin 8/23  - c/w vimpat 50mg BID  - Levetiracetam 26 .6  - phenytoin 0.5     Cards  #Hypertensive urgency due to noncompliance and Malignant HTN - controlled  BP at admission 296/174 without signs of end organ damage. Renal artery duplex wnl>>ARIAN unlikely  Aldosterone wnl, renin elevated  - r/o aortic coarctation with TTE (pending results)  -close monitoring  Currently on:  -c/w chlorthalidone 25mg daily  - previously on Amlodipine 10 mg  - pharmacy can't do Nifedipine IR, and would prefer not to crush Nifedipine XL (off nifedipine)  -c/w losartan 100mg daily  -c/w labetalol  to 600mg q8  -c/w hydralazine 100mg q8  -c/w doxazocin 2mg nightly  - s/p PEG tube       #HLD  - high dose statin as above in CVA      Pulm  - call provider if SPO2 < 94%    GI  #Nutrition/Fluids/Electrolytes   - replete K<4 and Mg <2  - Diet via PEG tube      Renal  Nephrology are following, appreciate recs  Daily BMP    Infectious Disease  #Purulent RLE cellulitis r/o abscess / Fever / ?asp-n pneumonitis vs PNA  -s/p unasyn and and vancomycin in ED  - initial neg blood cultures   -s/p burn debridement   - Candida in wound. D/w Dr. Chua: contaminant  -  BCx w/ staph: likely contaminant. repeat BCx -  negative  - hold ABx per ID. High suspicion for asp-n pneumonitis vs PNA.  If persistent fevers, would change ABx to Zosyn, Vanco. Asp-n precautions. 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   As for the cellulitis, patient underwent debridement of the ulcer of the right lower extremity,currently off abx.   Patient was a code stroke on 8/5, suspected CVA vs epileptic seizures. She was started on Keppra and then continued on Dilantin, since previous VEEG showed continuous epileptiform activities. Patient was started on feeds via NG tube and is scheduled to have a PEG tube tentatively Wed, awaiting better BP control  As the patient might have underlying afib resulting in the multiple punctuate strokes, she is scheduled to have an ILR on 8/19. Eitiology is not clear, and vasculitis is not fully r/o, will monitor the patient improvement to decide regarding the LP.       Neuro  #Stroke workup  - continue aspirin 81mg  daily  - Off clopidogrel for now (will keep off till LP)  - Continue Atorvastatin 80 mg daily   - q4hr stroke neuro checks and vitals  - IVONNE was denied by cardiology as they found paroxysmal A. fib on tele  - s/p ILR by EP 8/22  - Vasculitic work up neg so far  - consult IR for LP tap    # High risk of seizures with epileptiform discharges  Multiple EEG did not capture any seizures  - Continue Keppra 1500 mg bid   - Stopped Phenytoin 8/23  - c/w vimpat 50mg BID  - Levetiracetam 26 .6  - phenytoin 0.5     Cards  #Hypertensive urgency due to noncompliance and Malignant HTN - controlled  BP at admission 296/174 without signs of end organ damage. Renal artery duplex wnl>>ARIAN unlikely  Aldosterone wnl, renin elevated  - r/o aortic coarctation with TTE (pending results)  -close monitoring  Currently on:  -c/w chlorthalidone 25mg daily  - previously on Amlodipine 10 mg  - pharmacy can't do Nifedipine IR, and would prefer not to crush Nifedipine XL (off nifedipine)  -c/w losartan 100mg daily  -c/w labetalol  to 600mg q8  -c/w hydralazine 100mg q8  -c/w doxazocin 2mg nightly  - s/p PEG tube       #HLD  - high dose statin as above in CVA      Pulm  - call provider if SPO2 < 94%    GI  #Nutrition/Fluids/Electrolytes   - replete K<4 and Mg <2  - Diet via PEG tube  - obtain MBS test [facility]      Renal  Nephrology are following, appreciate recs  Daily BMP    Infectious Disease  #Purulent RLE cellulitis r/o abscess / Fever / ?asp-n pneumonitis vs PNA  -s/p unasyn and and vancomycin in ED  - initial neg blood cultures   -s/p burn debridement   - Candida in wound. D/w Dr. Chua: contaminant  -  BCx w/ staph: likely contaminant. repeat BCx -  negative  - hold ABx per ID. High suspicion for asp-n pneumonitis vs PNA.  If persistent fevers, would change ABx to Zosyn, Vanco. Asp-n precautions. Aggressive suctioning  - Currently afebrile for the last few days        Endocrine  #DM  - A1C results: 10.4  - ISS  - started insulin regimen   - c/w lantus 22, lispro 5u      - TSH results: 0.86    DVT ppx: lovenox, SCDs  GI ppx: protonix  Diet: DASH/carb consistent  Activity: AAT    Pending: LP with IR, MBS

## 2022-08-25 NOTE — SWALLOW BEDSIDE ASSESSMENT ADULT - NS SPL SWALLOW CLINIC TRIAL FT
Pt presented with dry spoon (2x); did not open mouth for first attempt, turned head away to refuse for second attempt. Unable to complete swallowing assessment d/t waxing/waning mental status/participation. SLP to f/u.  Recommend to continue NPO until mental status improves
did not tolerate , suspected pharyngeal dysphagia
Mod oral dysphagia with puree and mildly thick liquids, suspected oral apraxia. Suspected pharyngeal dysphagia for small amounts of puree w/ mildly thick liquids 2' + throat clear x2.

## 2022-08-25 NOTE — SWALLOW BEDSIDE ASSESSMENT ADULT - COMMENTS
Pt. is known to  service during this admission with recs for NPO 2' poor mental status and high aspiration risk.  Pt. is s/p peg placement on 8/24.

## 2022-08-25 NOTE — PROGRESS NOTE ADULT - SUBJECTIVE AND OBJECTIVE BOX
Gastroenterology progress note:     Patient is a 56y old  Female who presents with a chief complaint of cellulitis (24 Aug 2022 12:59)       Admitted on: 08-02-22    We are following the patient for:       Interval History:    No acute events overnight.   s/p peg placement.       PAST MEDICAL & SURGICAL HISTORY:  Hypertension      Diabetes mellitus      No significant past surgical history          MEDICATIONS  (STANDING):  aspirin  chewable 81 milliGRAM(s) Enteral Tube daily  atorvastatin 80 milliGRAM(s) Oral at bedtime  chlorthalidone 25 milliGRAM(s) Oral daily  collagenase Ointment 1 Application(s) Topical two times a day  Dakins Solution - 1/2 Strength 1 Application(s) Topical two times a day  dextrose 5%. 1000 milliLiter(s) (100 mL/Hr) IV Continuous <Continuous>  dextrose 5%. 1000 milliLiter(s) (50 mL/Hr) IV Continuous <Continuous>  dextrose 50% Injectable 25 Gram(s) IV Push once  dextrose 50% Injectable 12.5 Gram(s) IV Push once  dextrose 50% Injectable 25 Gram(s) IV Push once  doxazosin 2 milliGRAM(s) Oral at bedtime  enoxaparin Injectable 40 milliGRAM(s) SubCutaneous every 24 hours  glucagon  Injectable 1 milliGRAM(s) IntraMuscular once  hydrALAZINE 100 milliGRAM(s) Oral every 8 hours  insulin glargine Injectable (LANTUS) 22 Unit(s) SubCutaneous every morning  insulin lispro (ADMELOG) corrective regimen sliding scale   SubCutaneous three times a day before meals  insulin lispro Injectable (ADMELOG) 5 Unit(s) SubCutaneous three times a day before meals  labetalol 600 milliGRAM(s) Oral three times a day  lacosamide IVPB 50 milliGRAM(s) IV Intermittent every 12 hours  levETIRAcetam  IVPB 1500 milliGRAM(s) IV Intermittent every 12 hours  lidocaine 1%/epinephrine 1:100,000 Inj 20 milliLiter(s) Local Injection once  losartan 100 milliGRAM(s) Oral daily  multivitamin/minerals/iron Oral Solution (CENTRUM) 15 milliLiter(s) Oral daily  pantoprazole    Tablet 40 milliGRAM(s) Oral before breakfast  sodium bicarbonate 650 milliGRAM(s) Oral every 6 hours  sodium chloride 0.65% Nasal 2 Spray(s) Both Nostrils two times a day    MEDICATIONS  (PRN):  acetaminophen     Tablet .. 650 milliGRAM(s) Oral every 6 hours PRN Temp greater or equal to 38C (100.4F), Mild Pain (1 - 3)  dextrose Oral Gel 15 Gram(s) Oral once PRN Blood Glucose LESS THAN 70 milliGRAM(s)/deciliter  labetalol Injectable 10 milliGRAM(s) IV Push every 6 hours PRN Systolic blood pressure >  melatonin 3 milliGRAM(s) Oral at bedtime PRN Insomnia      Allergies  No Known Allergies      Review of Systems:   Cardiovascular:  No Chest Pain, No Palpitations  Respiratory:  No Cough, No Dyspnea  Gastrointestinal:  As described in HPI  Skin:  No Skin Lesions, No Jaundice  Neuro:  No Syncope, No Dizziness    Physical Examination:  T(C): 36.7 (08-25-22 @ 04:00), Max: 36.8 (08-24-22 @ 14:14)  HR: 56 (08-25-22 @ 07:00) (52 - 74)  BP: 118/69 (08-25-22 @ 06:12) (118/69 - 191/88)  RR: 17 (08-25-22 @ 06:12) (16 - 20)  SpO2: 98% (08-25-22 @ 07:00) (97% - 100%)  Weight (kg): 82.3 (08-24-22 @ 12:59)    08-24-22 @ 07:01  -  08-25-22 @ 07:00  --------------------------------------------------------  IN: 100 mL / OUT: 0 mL / NET: 100 mL        GENERAL: AAOx0, no acute distress.  HEAD:  Atraumatic, Normocephalic  EYES: conjunctiva and sclera clear  NECK: Supple, no JVD or thyromegaly  CHEST/LUNG: Clear to auscultation bilaterally; No wheeze, rhonchi, or rales  HEART: Regular rate and rhythm; normal S1, S2, No murmurs.  ABDOMEN: Soft, nontender, nondistended; Bowel sounds present, peg site looks clean and intact  NEUROLOGY: No asterixis or tremor.   SKIN: Intact, no jaundice     Data:    Hgb trend:  9.8  08-23-22 @ 06:06              Liver panel trend:  TBili <0.2   /   AST 22   /   ALT 27   /   AlkP 141   /   Tptn 6.4   /   Alb 3.1    /   DBili --      08-23  TBili <0.2   /   AST 23   /   ALT 30   /   AlkP 143   /   Tptn 6.3   /   Alb 3.1    /   DBili --      08-22  TBili <0.2   /   AST 24   /   ALT 32   /   AlkP 144   /   Tptn 6.1   /   Alb 2.9    /   DBili --      08-21  TBili <0.2   /   AST 40   /   ALT 37   /   AlkP 141   /   Tptn 5.8   /   Alb 3.0    /   DBili --      08-20  TBili <0.2   /   AST 52   /   ALT 36   /   AlkP 139   /   Tptn 6.1   /   Alb 2.8    /   DBili --      08-19  TBili 0.2   /   AST 23   /   ALT 19   /   AlkP 132   /   Tptn 5.8   /   Alb 2.9    /   DBili --      08-18  TBili <0.2   /   AST 18   /   ALT 16   /   AlkP 136   /   Tptn 5.8   /   Alb 2.7    /   DBili --      08-17  TBili <0.2   /   AST 21   /   ALT 17   /   AlkP 145   /   Tptn 5.8   /   Alb 2.9    /   DBili --      08-16      PT/INR - ( 23 Aug 2022 17:24 )   PT: 13.90 sec;   INR: 1.21 ratio

## 2022-08-26 NOTE — SWALLOW VFSS/MBS ASSESSMENT ADULT - DIAGNOSTIC IMPRESSIONS
Distraction and overstimulation noted in radiology environment unable to understand directions to participate in the exam negatively impacting oropharyngeal swallow integrity  Profound oral dysphagia   Moderate pharyngeal risk

## 2022-08-26 NOTE — SWALLOW VFSS/MBS ASSESSMENT ADULT - ORAL PHASE
Delayed oral transit time/Reduced anterior - posterior transport/Residue in oral cavity/Incomplete tongue to palate contact

## 2022-08-26 NOTE — PROGRESS NOTE ADULT - ASSESSMENT
56F PMHx HTN, DM2, CVA 2017 here with RLE wound. HTN urgency. Acute embolic CVA.     IMP  #Acute CVA    mri with multiple cortical infarcts, suspected embolic; no hemorrhage    c/b dysphagia, on tf via ngt  #HTN urgency  #DM2  #RLE cellulitis    s/p course abx    s/p debridement 8/10  #Interictal activity on eeg    PLAN  s/p peg. GI cleared to use peg   cont asa, plavix on hold. LP pending   speech recommending barium swallow , pending   lipitor 80  s/p ILR  keppra 1500 bid  phenytoin 100 tid  BP better controlled    discharge planning as per neuro

## 2022-08-26 NOTE — PROGRESS NOTE ADULT - SUBJECTIVE AND OBJECTIVE BOX
Neurology Stroke Progress Note    INTERVAL HPI/OVERNIGHT EVENTS:  Patient seen and examined. No acute events overnight. ROS limited     MEDICATIONS  (STANDING):  aspirin  chewable 81 milliGRAM(s) Enteral Tube daily  atorvastatin 80 milliGRAM(s) Oral at bedtime  chlorthalidone 25 milliGRAM(s) Oral daily  collagenase Ointment 1 Application(s) Topical two times a day  Dakins Solution - 1/2 Strength 1 Application(s) Topical two times a day  dextrose 5%. 1000 milliLiter(s) (100 mL/Hr) IV Continuous <Continuous>  dextrose 5%. 1000 milliLiter(s) (50 mL/Hr) IV Continuous <Continuous>  dextrose 50% Injectable 25 Gram(s) IV Push once  dextrose 50% Injectable 12.5 Gram(s) IV Push once  dextrose 50% Injectable 25 Gram(s) IV Push once  doxazosin 2 milliGRAM(s) Oral at bedtime  glucagon  Injectable 1 milliGRAM(s) IntraMuscular once  hydrALAZINE 100 milliGRAM(s) Oral every 8 hours  insulin glargine Injectable (LANTUS) 22 Unit(s) SubCutaneous every morning  insulin lispro (ADMELOG) corrective regimen sliding scale   SubCutaneous three times a day before meals  insulin lispro Injectable (ADMELOG) 5 Unit(s) SubCutaneous three times a day before meals  labetalol 600 milliGRAM(s) Oral three times a day  lacosamide IVPB 50 milliGRAM(s) IV Intermittent every 12 hours  levETIRAcetam  IVPB 1500 milliGRAM(s) IV Intermittent every 12 hours  lidocaine 1%/epinephrine 1:100,000 Inj 20 milliLiter(s) Local Injection once  losartan 100 milliGRAM(s) Oral daily  multivitamin/minerals/iron Oral Solution (CENTRUM) 15 milliLiter(s) Oral daily  pantoprazole    Tablet 40 milliGRAM(s) Oral before breakfast  sodium bicarbonate 650 milliGRAM(s) Oral every 6 hours  sodium chloride 0.65% Nasal 2 Spray(s) Both Nostrils two times a day    MEDICATIONS  (PRN):  acetaminophen     Tablet .. 650 milliGRAM(s) Oral every 6 hours PRN Temp greater or equal to 38C (100.4F), Mild Pain (1 - 3)  dextrose Oral Gel 15 Gram(s) Oral once PRN Blood Glucose LESS THAN 70 milliGRAM(s)/deciliter  labetalol Injectable 10 milliGRAM(s) IV Push every 6 hours PRN Systolic blood pressure >  melatonin 3 milliGRAM(s) Oral at bedtime PRN Insomnia    Allergies    No Known Allergies    Intolerances      Vital Signs Last 24 Hrs  T(C): 36.4 (26 Aug 2022 05:09), Max: 36.9 (25 Aug 2022 12:00)  T(F): 97.5 (26 Aug 2022 05:09), Max: 98.5 (25 Aug 2022 12:00)  HR: 65 (26 Aug 2022 05:09) (60 - 72)  BP: 145/68 (26 Aug 2022 05:09) (110/58 - 179/89)  BP(mean): 127 (25 Aug 2022 20:00) (73 - 127)  RR: 18 (26 Aug 2022 05:09) (16 - 19)  SpO2: 98% (25 Aug 2022 20:00) (97% - 99%)    Parameters below as of 25 Aug 2022 20:00  Patient On (Oxygen Delivery Method): room air        Physical exam:  General: No acute distress, awake  Neck: trachea midline, FROM, supple  Cardiovascular: Regular rate and rhythm, no murmurs  Pulmonary: Anterior breath sounds clear bilaterally, no crackles or wheezing  GI: Abdomen soft, non-distended, non-tender, PEG tube in place  Extremities: no edema    Neurologic:  -Mental status: sleepy, few words today, follows simple commands today  -Cranial nerves:   III, IV, VI: Extraocular movements are intact without nystagmus. Pupils equally round and reactive to light  VII: R droop   IX, X: unable to asses  Motor:  Normal bulk and tone. moves all extremities spontaneous 1/5 on Left, 2/5 on right extremities  Sensation: responds to pain stimulation  Coordination: unable to asses  NIHSS 21    LABS:                        9.8    5.69  )-----------( 410      ( 26 Aug 2022 07:35 )             28.5     08-26    138  |  103  |  26<H>  ----------------------------<  132<H>  4.0   |  23  |  0.9    Ca    9.5      26 Aug 2022 07:35  Mg     1.7     08-26    TPro  6.2  /  Alb  3.3<L>  /  TBili  0.2  /  DBili  x   /  AST  20  /  ALT  21  /  AlkPhos  141<H>  08-26          RADIOLOGY & ADDITIONAL TESTS:

## 2022-08-26 NOTE — PROGRESS NOTE ADULT - ASSESSMENT
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   As for the cellulitis, patient underwent debridement of the ulcer of the right lower extremity,currently off abx.   During hospital stay, found to have multiple punctate infarcts suspected to be cardioembolic vs vasculitis. Patient on found to have sharp waves on vEEG currently on AED's.      Neuro  #Stroke workup  - continue aspirin 81mg  daily  - Off clopidogrel for now (will keep off till LP)  - Continue Atorvastatin 80 mg daily   - q4hr stroke neuro checks and vitals  - IVONNE was denied by cardiology as they found paroxysmal A. fib on tele  - s/p ILR by EP 8/22  - Vasculitic work up neg so far  - consulted IR for LP tap - planned for today    # High risk of seizures with epileptiform discharges  Multiple EEG did not capture any seizures  - Continue Keppra 1500 mg bid   - Stopped Phenytoin 8/23  - c/w vimpat 50mg BID  - Levetiracetam 26 .6  - phenytoin 0.5     Cards  #Hypertensive urgency due to noncompliance and Malignant HTN - controlled  BP at admission 296/174 without signs of end organ damage. Renal artery duplex wnl>>ARIAN unlikely  Aldosterone wnl, renin elevated  - r/o aortic coarctation with TTE (pending results)  -close monitoring  Currently on:  -c/w chlorthalidone 25mg daily  - previously on Amlodipine 10 mg  - pharmacy can't do Nifedipine IR, and would prefer not to crush Nifedipine XL (off nifedipine)  -c/w losartan 100mg daily  -c/w labetalol  to 600mg q8  -c/w hydralazine 100mg q8  -c/w doxazocin 2mg nightly  - s/p PEG tube       #HLD  - high dose statin as above in CVA      Pulm  - call provider if SPO2 < 94%    GI  #Nutrition/Fluids/Electrolytes   - replete K<4 and Mg <2  - Diet via PEG tube  - obtain MBS test today      Renal  Nephrology are following, appreciate recs  Daily BMP    Infectious Disease  #Purulent RLE cellulitis r/o abscess / Fever / ?asp-n pneumonitis vs PNA  -s/p unasyn and and vancomycin in ED  - initial neg blood cultures   -s/p burn debridement   - Candida in wound. D/w Dr. Chua: contaminant  -  BCx w/ staph: likely contaminant. repeat BCx -  negative  - hold ABx per ID. High suspicion for asp-n pneumonitis vs PNA.  If persistent fevers, would change ABx to Zosyn, Vanco. Asp-n precautions. Aggressive suctioning  - Currently afebrile        Endocrine  #DM  - A1C results: 10.4  - ISS  - started insulin regimen   - c/w lantus 22, lispro 5u      - TSH results: 0.86    DVT ppx: lovenox, SCDs  GI ppx: protonix  Diet: DASH/carb consistent  Activity: AAT    Pending: LP with IR, MBS today

## 2022-08-26 NOTE — SWALLOW VFSS/MBS ASSESSMENT ADULT - COMMENTS
[employer]  phone attempted however unsuccessful with aphasia therefore live [place] speaking SLP assisted during the exam

## 2022-08-26 NOTE — PROGRESS NOTE ADULT - SUBJECTIVE AND OBJECTIVE BOX
INTERVAL HPI/OVERNIGHT EVENTS:    SUBJECTIVE: Patient seen and examined at bedside.   no acute events overnight . BP better today. s/p peg tube  pending barium swallow and LP       OBJECTIVE:    VITAL SIGNS:  Vital Signs Last 24 Hrs  T(C): 36.4 (26 Aug 2022 05:09), Max: 36.8 (25 Aug 2022 16:00)  T(F): 97.5 (26 Aug 2022 05:09), Max: 98.2 (25 Aug 2022 16:00)  HR: 65 (26 Aug 2022 05:09) (62 - 72)  BP: 145/68 (26 Aug 2022 05:09) (110/58 - 153/81)  BP(mean): 127 (25 Aug 2022 20:00) (73 - 127)  RR: 18 (26 Aug 2022 05:09) (16 - 19)  SpO2: 98% (25 Aug 2022 20:00) (97% - 98%)    Parameters below as of 25 Aug 2022 20:00  Patient On (Oxygen Delivery Method): room air                    Parameters below as of 21 Aug 2022 06:08  Patient On (Oxygen Delivery Method): room air          PHYSICAL EXAM:    General: NAD  HEENT: NC/AT; PERRL, clear conjunctiva  Neck: supple  Respiratory: CTA b/l  Cardiovascular: +S1/S2; RRR  Abdomen: soft, NT/ND; +BS x4  Extremities: WWP, 2+ peripheral pulses b/l; no LE edema  Skin: normal color and turgor; no rash  Neurological:    MEDICATIONS:  MEDICATIONS  (STANDING):  amLODIPine   Tablet 10 milliGRAM(s) Oral at bedtime  aspirin  chewable 81 milliGRAM(s) Enteral Tube daily  atorvastatin 80 milliGRAM(s) Oral at bedtime  chlorthalidone 25 milliGRAM(s) Oral daily  collagenase Ointment 1 Application(s) Topical two times a day  Dakins Solution - 1/2 Strength 1 Application(s) Topical two times a day  dextrose 5%. 1000 milliLiter(s) (100 mL/Hr) IV Continuous <Continuous>  dextrose 5%. 1000 milliLiter(s) (50 mL/Hr) IV Continuous <Continuous>  dextrose 50% Injectable 25 Gram(s) IV Push once  dextrose 50% Injectable 12.5 Gram(s) IV Push once  dextrose 50% Injectable 25 Gram(s) IV Push once  doxazosin 2 milliGRAM(s) Oral at bedtime  enoxaparin Injectable 40 milliGRAM(s) SubCutaneous every 24 hours  glucagon  Injectable 1 milliGRAM(s) IntraMuscular once  hydrALAZINE 100 milliGRAM(s) Oral every 8 hours  insulin glargine Injectable (LANTUS) 22 Unit(s) SubCutaneous every morning  insulin lispro (ADMELOG) corrective regimen sliding scale   SubCutaneous three times a day before meals  insulin lispro Injectable (ADMELOG) 5 Unit(s) SubCutaneous three times a day before meals  labetalol 400 milliGRAM(s) Oral three times a day  levETIRAcetam  IVPB 1500 milliGRAM(s) IV Intermittent every 12 hours  lidocaine 1%/epinephrine 1:100,000 Inj 20 milliLiter(s) Local Injection once  losartan 100 milliGRAM(s) Oral daily  multivitamin/minerals/iron Oral Solution (CENTRUM) 15 milliLiter(s) Oral daily  pantoprazole    Tablet 40 milliGRAM(s) Oral before breakfast  phenytoin   Chewable 50 milliGRAM(s) Oral at bedtime  phenytoin  IVPB 100 milliGRAM(s) IV Intermittent three times a day  polyethylene glycol 3350 17 Gram(s) Oral every 12 hours  sodium bicarbonate 650 milliGRAM(s) Oral every 6 hours  sodium chloride 0.65% Nasal 2 Spray(s) Both Nostrils two times a day    MEDICATIONS  (PRN):  acetaminophen     Tablet .. 650 milliGRAM(s) Oral every 6 hours PRN Temp greater or equal to 38C (100.4F), Mild Pain (1 - 3)  dextrose Oral Gel 15 Gram(s) Oral once PRN Blood Glucose LESS THAN 70 milliGRAM(s)/deciliter  labetalol Injectable 10 milliGRAM(s) IV Push every 6 hours PRN Systolic blood pressure >  melatonin 3 milliGRAM(s) Oral at bedtime PRN Insomnia      ALLERGIES:  Allergies    No Known Allergies    Intolerances               LABS:               LABS:                        9.8    5.69  )-----------( 410      ( 26 Aug 2022 07:35 )             28.5     08-26    138  |  103  |  26<H>  ----------------------------<  132<H>  4.0   |  23  |  0.9    Ca    9.5      26 Aug 2022 07:35  Mg     1.7     08-26    TPro  6.2  /  Alb  3.3<L>  /  TBili  0.2  /  DBili  x   /  AST  20  /  ALT  21  /  AlkPhos  141<H>  08-26                                            CBC Full  -  ( 21 Aug 2022 07:05 )  WBC Count : 6.99 K/uL  RBC Count : 3.24 M/uL  Hemoglobin : 9.4 g/dL  Hematocrit : 28.4 %  Platelet Count - Automated : 412 K/uL  Mean Cell Volume : 87.7 fL  Mean Cell Hemoglobin : 29.0 pg  Mean Cell Hemoglobin Concentration : 33.1 g/dL  Auto Neutrophil # : x  Auto Lymphocyte # : x  Auto Monocyte # : x  Auto Eosinophil # : x  Auto Basophil # : x  Auto Neutrophil % : x  Auto Lymphocyte % : x  Auto Monocyte % : x  Auto Eosinophil % : x  Auto Basophil % : x    08-21    139  |  105  |  20  ----------------------------<  169<H>  4.4   |  24  |  0.8    Ca    9.1      21 Aug 2022 07:05  Mg     2.1     08-20    TPro  6.1  /  Alb  2.9<L>  /  TBili  <0.2  /  DBili  x   /  AST  24  /  ALT  32  /  AlkPhos  144<H>  08-21    Creatinine Trend: 0.8<--, 0.8<--, 0.8<--, 0.8<--, 0.9<--, 0.8<--  LIVER FUNCTIONS - ( 21 Aug 2022 07:05 )  Alb: 2.9 g/dL / Pro: 6.1 g/dL / ALK PHOS: 144 U/L / ALT: 32 U/L / AST: 24 U/L / GGT: x               hs Troponin:              CSF:                      EKG:   MICROBIOLOGY:    IMAGING:      Labs, imaging, EKG personally reviewed    RADIOLOGY & ADDITIONAL TESTS: Reviewed.

## 2022-08-27 NOTE — PROGRESS NOTE ADULT - ASSESSMENT
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   As for the cellulitis, patient underwent debridement of the ulcer of the right lower extremity,currently off abx.   During hospital stay, found to have multiple punctate infarcts suspected to be cardioembolic vs vasculitis. Patient on found to have sharp waves on vEEG currently on AED's.      Neuro  #Stroke workup  - continue aspirin 81mg  daily  - Off clopidogrel for now (will keep off till LP)  - Continue Atorvastatin 80 mg daily   - q4hr stroke neuro checks and vitals  - IVONNE was denied by cardiology as they found paroxysmal A. fib on tele  - s/p ILR by EP 8/22  - Vasculitic work up neg so far  - consulted IR for LP tap - planned for today    # High risk of seizures with epileptiform discharges  Multiple EEG did not capture any seizures  - Continue Keppra 1500 mg bid   - Stopped Phenytoin 8/23  - c/w vimpat 50mg BID  - Levetiracetam 26 .6  - phenytoin 0.5     Cards  #Hypertensive urgency due to noncompliance and Malignant HTN - controlled  BP at admission 296/174 without signs of end organ damage. Renal artery duplex wnl>>ARIAN unlikely  Aldosterone wnl, renin elevated  - r/o aortic coarctation with TTE (pending results)  -close monitoring  Currently on:  -c/w chlorthalidone 25mg daily  - previously on Amlodipine 10 mg  - pharmacy can't do Nifedipine IR, and would prefer not to crush Nifedipine XL (off nifedipine)  -c/w losartan 100mg daily  -c/w labetalol  to 600mg q8  -c/w hydralazine 100mg q8  -c/w doxazocin 2mg nightly  - s/p PEG tube       #HLD  - high dose statin as above in CVA      Pulm  - call provider if SPO2 < 94%    GI  #Nutrition/Fluids/Electrolytes   - replete K<4 and Mg <2  - Diet via PEG tube  - obtain MBS test today      Renal  Nephrology are following, appreciate recs  Daily BMP    Infectious Disease  #Purulent RLE cellulitis r/o abscess / Fever / ?asp-n pneumonitis vs PNA  -s/p unasyn and and vancomycin in ED  - initial neg blood cultures   -s/p burn debridement   - Candida in wound. D/w Dr. Chua: contaminant  -  BCx w/ staph: likely contaminant. repeat BCx -  negative  - hold ABx per ID. High suspicion for asp-n pneumonitis vs PNA.  If persistent fevers, would change ABx to Zosyn, Vanco. Asp-n precautions. 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   As for the cellulitis, patient underwent debridement of the ulcer of the right lower extremity,currently off abx.   During hospital stay, found to have multiple punctate infarcts suspected to be cardioembolic vs vasculitis. Patient on found to have sharp waves on vEEG currently on AED's.      Neuro  #Stroke workup  - continue aspirin 81mg  daily  - Restart plavix as LP performed yesterday 8/26  - Continue Atorvastatin 80 mg daily   - q4hr stroke neuro checks and vitals  - IVONNE was denied by cardiology as they found paroxysmal A. fib on tele  - s/p ILR by EP 8/22  - Vasculitic work up neg so far  - consulted IR for LP tap - was performed yesterday    # High risk of seizures with epileptiform discharges  Multiple EEG did not capture any seizures  - Continue Keppra 1500 mg bid   - Stopped Phenytoin 8/23  - c/w vimpat 50mg BID  - Levetiracetam 26 .6  - phenytoin 0.5     Cards  #Hypertensive urgency due to noncompliance and Malignant HTN - controlled  BP at admission 296/174 without signs of end organ damage. Renal artery duplex wnl>>ARIAN unlikely  Aldosterone wnl, renin elevated  - r/o aortic coarctation with TTE (pending results)  -close monitoring  Currently on:  -c/w chlorthalidone 25mg daily  - previously on Amlodipine 10 mg  - pharmacy can't do Nifedipine IR, and would prefer not to crush Nifedipine XL (off nifedipine)  -c/w losartan 100mg daily  -c/w labetalol  to 600mg q8  -c/w hydralazine 100mg q8  -c/w doxazocin 2mg nightly  - s/p PEG tube       #HLD  - high dose statin as above in CVA      Pulm  - call provider if SPO2 < 94%    GI  #Nutrition/Fluids/Electrolytes   - replete K<4 and Mg <2  - Diet via PEG tube  - obtain MBS test- failed, NPO with PEG alternative means       Renal  Nephrology are following, appreciate recs  Daily BMP    Infectious Disease  #Purulent RLE cellulitis r/o abscess / Fever / ?asp-n pneumonitis vs PNA  -s/p unasyn and and vancomycin in ED  - initial neg blood cultures   -s/p burn debridement   - Candida in wound. D/w Dr. Chua: contaminant  -  BCx w/ staph: likely contaminant. repeat BCx -  negative  - hold ABx per ID. High suspicion for asp-n pneumonitis vs PNA.  If persistent fevers, would change ABx to Zosyn, Vanco. Asp-n precautions. 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   As for the cellulitis, patient underwent debridement of the ulcer of the right lower extremity,currently off abx.   During hospital stay, found to have multiple punctate infarcts suspected to be cardioembolic vs vasculitis. Patient on found to have sharp waves on vEEG currently on AED's.      Neuro  #Stroke workup  - continue aspirin 81mg  daily  - Restart plavix as LP performed yesterday 8/26  - Continue Atorvastatin 80 mg daily   - q4hr stroke neuro checks and vitals  - IVONNE was denied by cardiology as they found paroxysmal A. fib on tele  - s/p ILR by EP 8/22  - Vasculitic work up neg so far  - consulted IR for LP tap - was performed yesterday    # High risk of seizures with epileptiform discharges  Multiple EEG did not capture any seizures  - Continue Keppra 1500 mg bid   - Stopped Phenytoin 8/23  - c/w vimpat 50mg BID  - Levetiracetam 26 .6  - phenytoin 0.5     Cards  #Hypertensive urgency due to noncompliance and Malignant HTN - controlled  BP at admission 296/174 without signs of end organ damage. Renal artery duplex wnl>>ARIAN unlikely  Aldosterone wnl, renin elevated  - r/o aortic coarctation with TTE (pending results)  -close monitoring  Currently on:  -c/w chlorthalidone 25mg daily  - previously on Amlodipine 10 mg  - pharmacy can't do Nifedipine IR, and would prefer not to crush Nifedipine XL (off nifedipine)  -c/w losartan 100mg daily  -c/w labetalol  to 600mg q8  -c/w hydralazine 100mg q8  -c/w doxazocin 2mg nightly  - s/p PEG tube   -SBP goal 120-160      #HLD  - high dose statin as above in CVA      Pulm  - call provider if SPO2 < 94%    GI  #Nutrition/Fluids/Electrolytes   - replete K<4 and Mg <2  - Diet via PEG tube  - obtain MBS test- failed, NPO with PEG alternative means       Renal  Nephrology are following, appreciate recs  Daily BMP    Infectious Disease  #Purulent RLE cellulitis r/o abscess / Fever / ?asp-n pneumonitis vs PNA  -s/p unasyn and and vancomycin in ED  - initial neg blood cultures   -s/p burn debridement   - Candida in wound. D/w Dr. Chua: contaminant  -  BCx w/ staph: likely contaminant. repeat BCx -  negative  - hold ABx per ID. High suspicion for asp-n pneumonitis vs PNA.  If persistent fevers, would change ABx to Zosyn, Vanco. Asp-n precautions. Aggressive suctioning  - Currently afebrile        Endocrine  #DM  - A1C results: 10.4  - ISS  - started insulin regimen   - c/w lantus 22, lispro 5u      - TSH results: 0.86    DVT ppx: lovenox, SCDs  GI ppx: protonix  Diet: DASH/carb consistent  Activity: AAT    Pending: LP performed, dispo planning

## 2022-08-27 NOTE — PROGRESS NOTE ADULT - TIME BILLING
56F PMHx HTN, DM2, CVA 2017 here with RLE wound. HTN urgency. Acute embolic CVA.     IMP  #Acute CVA    mri with multiple cortical infarcts, suspected embolic; no hemorrhage    c/b dysphagia, s/p PEG    s/p ILR  #HTN urgency  #DM2  #RLE cellulitis    s/p course abx    s/p debridement 8/10  #Interictal activity on eeg    PLAN  f/u LP  asa  restart plavix per neuro  lipitor 80  keppra 1500 bid  phenytoin 100 tid    Janae  [id number]

## 2022-08-27 NOTE — PROGRESS NOTE ADULT - SUBJECTIVE AND OBJECTIVE BOX
INTERVAL HPI/OVERNIGHT EVENTS:    SUBJECTIVE: Patient seen and examined at bedside.     cc: leg wound  unable to obtain ros    OBJECTIVE:    VITAL SIGNS:  Vital Signs Last 24 Hrs  T(C): 36.4 (27 Aug 2022 14:34), Max: 36.9 (27 Aug 2022 05:19)  T(F): 97.5 (27 Aug 2022 14:34), Max: 98.5 (27 Aug 2022 05:19)  HR: 72 (27 Aug 2022 14:34) (60 - 72)  BP: 143/74 (27 Aug 2022 14:34) (136/83 - 179/87)  BP(mean): 102 (27 Aug 2022 14:34) (101 - 126)  RR: 18 (27 Aug 2022 14:34) (17 - 18)  SpO2: 97% (26 Aug 2022 19:00) (97% - 97%)    Parameters below as of 26 Aug 2022 19:00  Patient On (Oxygen Delivery Method): room air          PHYSICAL EXAM:    General: NAD  HEENT: NC/AT; PERRL, clear conjunctiva  Neck: supple  Respiratory: CTA b/l  Cardiovascular: +S1/S2; RRR  Abdomen: soft, NT/ND; +BS x4  Extremities: WWP, 2+ peripheral pulses b/l; no LE edema  Skin:  Neurological:    MEDICATIONS:  MEDICATIONS  (STANDING):  aspirin  chewable 81 milliGRAM(s) Enteral Tube daily  atorvastatin 80 milliGRAM(s) Oral at bedtime  chlorthalidone 25 milliGRAM(s) Oral daily  collagenase Ointment 1 Application(s) Topical two times a day  Dakins Solution - 1/2 Strength 1 Application(s) Topical two times a day  dextrose 5%. 1000 milliLiter(s) (100 mL/Hr) IV Continuous <Continuous>  dextrose 5%. 1000 milliLiter(s) (50 mL/Hr) IV Continuous <Continuous>  dextrose 50% Injectable 25 Gram(s) IV Push once  dextrose 50% Injectable 12.5 Gram(s) IV Push once  dextrose 50% Injectable 25 Gram(s) IV Push once  doxazosin 2 milliGRAM(s) Oral at bedtime  glucagon  Injectable 1 milliGRAM(s) IntraMuscular once  hydrALAZINE 100 milliGRAM(s) Oral every 8 hours  insulin glargine Injectable (LANTUS) 22 Unit(s) SubCutaneous every morning  insulin lispro (ADMELOG) corrective regimen sliding scale   SubCutaneous three times a day before meals  insulin lispro Injectable (ADMELOG) 5 Unit(s) SubCutaneous three times a day before meals  labetalol 600 milliGRAM(s) Oral three times a day  lacosamide IVPB 50 milliGRAM(s) IV Intermittent every 12 hours  levETIRAcetam  IVPB 1000 milliGRAM(s) IV Intermittent every 12 hours  lidocaine 1%/epinephrine 1:100,000 Inj 20 milliLiter(s) Local Injection once  losartan 100 milliGRAM(s) Oral daily  multivitamin/minerals/iron Oral Solution (CENTRUM) 15 milliLiter(s) Oral daily  pantoprazole    Tablet 40 milliGRAM(s) Oral before breakfast  sodium bicarbonate 650 milliGRAM(s) Oral every 6 hours  sodium chloride 0.65% Nasal 2 Spray(s) Both Nostrils two times a day    MEDICATIONS  (PRN):  acetaminophen     Tablet .. 650 milliGRAM(s) Oral every 6 hours PRN Temp greater or equal to 38C (100.4F), Mild Pain (1 - 3)  dextrose Oral Gel 15 Gram(s) Oral once PRN Blood Glucose LESS THAN 70 milliGRAM(s)/deciliter  labetalol Injectable 10 milliGRAM(s) IV Push every 6 hours PRN Systolic blood pressure >  melatonin 3 milliGRAM(s) Oral at bedtime PRN Insomnia      ALLERGIES:  Allergies    No Known Allergies    Intolerances        LABS:                        9.7    7.66  )-----------( 398      ( 27 Aug 2022 06:08 )             28.9     Hemoglobin: 9.7 g/dL (08-27 @ 06:08)  Hemoglobin: 9.8 g/dL (08-26 @ 07:35)  Hemoglobin: 10.0 g/dL (08-25 @ 07:14)  Hemoglobin: 9.8 g/dL (08-23 @ 06:06)    CBC Full  -  ( 27 Aug 2022 06:08 )  WBC Count : 7.66 K/uL  RBC Count : 3.31 M/uL  Hemoglobin : 9.7 g/dL  Hematocrit : 28.9 %  Platelet Count - Automated : 398 K/uL  Mean Cell Volume : 87.3 fL  Mean Cell Hemoglobin : 29.3 pg  Mean Cell Hemoglobin Concentration : 33.6 g/dL  Auto Neutrophil # : 4.87 K/uL  Auto Lymphocyte # : 2.07 K/uL  Auto Monocyte # : 0.50 K/uL  Auto Eosinophil # : 0.17 K/uL  Auto Basophil # : 0.03 K/uL  Auto Neutrophil % : 63.6 %  Auto Lymphocyte % : 27.0 %  Auto Monocyte % : 6.5 %  Auto Eosinophil % : 2.2 %  Auto Basophil % : 0.4 %    08-27    140  |  103  |  32<H>  ----------------------------<  106<H>  4.3   |  23  |  0.9    Ca    9.6      27 Aug 2022 06:08  Mg     2.1     08-27    TPro  6.5  /  Alb  3.5  /  TBili  <0.2  /  DBili  x   /  AST  16  /  ALT  19  /  AlkPhos  146<H>  08-27    Creatinine Trend: 0.9<--, 0.9<--, 0.8<--, 0.7<--, 0.8<--, 0.8<--  LIVER FUNCTIONS - ( 27 Aug 2022 06:08 )  Alb: 3.5 g/dL / Pro: 6.5 g/dL / ALK PHOS: 146 U/L / ALT: 19 U/L / AST: 16 U/L / GGT: x           PT/INR - ( 26 Aug 2022 12:32 )   PT: 14.30 sec;   INR: 1.25 ratio         PTT - ( 26 Aug 2022 12:32 )  PTT:33.4 sec    hs Troponin:              CSF:    Total Nucleated Cell Count, CSF: 24 /uL (08-26-22 @ 11:36)  RBC Count - Spinal Fluid: 44121 /uL (08-26-22 @ 11:36)          Glucose, CSF: 67 mg/dL (08-26-22 @ 11:36)  Protein, CSF: 131 mg/dL (08-26-22 @ 11:36)            EKG:   MICROBIOLOGY:    Culture - CSF with Gram Stain (collected 26 Aug 2022 11:36)  Source: .CSF CSF  Gram Stain (26 Aug 2022 22:25):    polymorphonuclear leukocytes seen    No organisms seen    by cytocentrifuge  Preliminary Report (27 Aug 2022 14:49):    No growth    Culture - Acid Fast - CSF (collected 26 Aug 2022 11:36)  Source: .CSF CSF      IMAGING:      Labs, imaging, EKG personally reviewed    RADIOLOGY & ADDITIONAL TESTS: Reviewed.

## 2022-08-27 NOTE — PROGRESS NOTE ADULT - SUBJECTIVE AND OBJECTIVE BOX
Stroke Progress Note:    JOSE MITCHELL    1. Chief Complaint:    HPI:  55 yo F with PMH of HTN, DM2, and stroke (per son 2017) who presented for RLE wound. Started 3 months ago when she fell and hit her leg on a wooden cabinet. She put hydrogen peroxide, antibiotic cream, and wrapped the wound but never fully healed. Two weeks ago it started to show yellow pus so her and her family came to the ED for further treatment. Endorsed subjective fevers, chest pain, and vision changes which occurs when walked 2-3 blocks. Denied congestion, sore throat, cough, dyspnea, headache, dysuria, N/V, diarrhea     Per son, they fill her medications at [facility] ([phone]) and this is their current pharmacy. Called them to confirm her medications and they said her last refill of medications was . Pt has not seen a doctor due to insurance problem and has not been taking any medications.    In the ED:  Vitals: T: 98.9, BP: 296/ 174, , RR: 18, 98%O2 on RA  Labs: CBC showed WBC 11.23; ESR 92, CRP 35.6  CMP showed glucose 302, alk phos 173; ; VBG pH 7.45  EKG pending  CXR showed borderline cardiomegaly and no airspace opacity.   X-ray of RLE also pending.     Received unasyn and vanco, humalin R, IV vasotec, IV labetalol   (02 Aug 2022 07:47)      2. Relevant PMH:   Prior ischemic stroke/TIA[ ], Afib [ ], CAD [ ], HTN [ ], DLD [ ], DM [ ], PVD [ ], Obesity [ ],   Sedentary lifestyle [ ], CHF [ ], SHRUTI [ ], Cancer Hx [ ].    3. Social History: Smoking [ ], Drug Use [ ], Alcohol Use [ ], Other [ ]    4. Possible Location of Stroke:    5. Relevant Brain Tissue Imagin. Relevant Cerebrovascular Imagin. Relevant blood tests:                          9.8    5.69  )-----------( 410      ( 26 Aug 2022 07:35 )             28.5   08-    138  |  103  |  26<H>  ----------------------------<  132<H>  4.0   |  23  |  0.9    Ca    9.5      26 Aug 2022 07:35  Mg     1.7         TPro  6.2  /  Alb  3.3<L>  /  TBili  0.2  /  DBili  x   /  AST  20  /  ALT  21  /  AlkPhos  141<H>    PT/INR - ( 26 Aug 2022 12:32 )   PT: 14.30 sec;   INR: 1.25 ratio         PTT - ( 26 Aug 2022 12:32 )  PTT:33.4 sec  8. Relevant cardiac rhythm monitorin. Relevant Cardiac Structure: (TTE/IVONNE +/-):[ ]No intracardiac thrombus/[ ] no vegetation/[ ]no ak[place]ia/EF:    Home Medications:  losartan 50 mg oral tablet: 1 tab(s) orally once a day (02 Aug 2022 10:38)  metFORMIN 500 mg oral tablet: 1 tab(s) orally 2 times a day (02 Aug 2022 10:38)  metoprolol succinate 50 mg oral tablet, extended release: 1 tab(s) orally once a day (02 Aug 2022 10:38)      MEDICATIONS  (STANDING):  aspirin  chewable 81 milliGRAM(s) Enteral Tube daily  atorvastatin 80 milliGRAM(s) Oral at bedtime  chlorthalidone 25 milliGRAM(s) Oral daily  collagenase Ointment 1 Application(s) Topical two times a day  Dakins Solution - 1/2 Strength 1 Application(s) Topical two times a day  dextrose 5%. 1000 milliLiter(s) (50 mL/Hr) IV Continuous <Continuous>  dextrose 5%. 1000 milliLiter(s) (100 mL/Hr) IV Continuous <Continuous>  dextrose 50% Injectable 25 Gram(s) IV Push once  dextrose 50% Injectable 12.5 Gram(s) IV Push once  dextrose 50% Injectable 25 Gram(s) IV Push once  doxazosin 2 milliGRAM(s) Oral at bedtime  glucagon  Injectable 1 milliGRAM(s) IntraMuscular once  hydrALAZINE 100 milliGRAM(s) Oral every 8 hours  insulin glargine Injectable (LANTUS) 22 Unit(s) SubCutaneous every morning  insulin lispro (ADMELOG) corrective regimen sliding scale   SubCutaneous three times a day before meals  insulin lispro Injectable (ADMELOG) 5 Unit(s) SubCutaneous three times a day before meals  labetalol 600 milliGRAM(s) Oral three times a day  lacosamide IVPB 50 milliGRAM(s) IV Intermittent every 12 hours  levETIRAcetam  IVPB 1000 milliGRAM(s) IV Intermittent every 12 hours  lidocaine 1%/epinephrine 1:100,000 Inj 20 milliLiter(s) Local Injection once  losartan 100 milliGRAM(s) Oral daily  multivitamin/minerals/iron Oral Solution (CENTRUM) 15 milliLiter(s) Oral daily  pantoprazole    Tablet 40 milliGRAM(s) Oral before breakfast  sodium bicarbonate 650 milliGRAM(s) Oral every 6 hours  sodium chloride 0.65% Nasal 2 Spray(s) Both Nostrils two times a day      10. PT/OT/Speech/Rehab/S&Sw/ Cognitive eval results and recommendations:    11. Exam:    Vital Signs Last 24 Hrs  T(C): 36.9 (27 Aug 2022 05:19), Max: 36.9 (27 Aug 2022 05:19)  T(F): 98.5 (27 Aug 2022 05:19), Max: 98.5 (27 Aug 2022 05:19)  HR: 72 (27 Aug 2022 05:19) (60 - 72)  BP: 179/87 (27 Aug 2022 05:19) (136/83 - 179/87)  BP(mean): 126 (26 Aug 2022 22:42) (101 - 126)  RR: 18 (27 Aug 2022 05:19) (16 - 18)  SpO2: 97% (26 Aug 2022 19:00) (97% - 97%)    Parameters below as of 26 Aug 2022 19:00  Patient On (Oxygen Delivery Method): room air         Stroke Progress Note:    JOSE MITCHELL    1. Chief Complaint:    HPI:  55 yo F with PMH of HTN, DM2, and stroke (per son 2017) who presented for RLE wound. Started 3 months ago when she fell and hit her leg on a wooden cabinet. She put hydrogen peroxide, antibiotic cream, and wrapped the wound but never fully healed. Two weeks ago it started to show yellow pus so her and her family came to the ED for further treatment. Endorsed subjective fevers, chest pain, and vision changes which occurs when walked 2-3 blocks. Denied congestion, sore throat, cough, dyspnea, headache, dysuria, N/V, diarrhea     Per son, they fill her medications at [facility] ([phone]) and this is their current pharmacy. Called them to confirm her medications and they said her last refill of medications was . Pt has not seen a doctor due to insurance problem and has not been taking any medications.    In the ED:  Vitals: T: 98.9, BP: 296/ 174, , RR: 18, 98%O2 on RA  Labs: CBC showed WBC 11.23; ESR 92, CRP 35.6  CMP showed glucose 302, alk phos 173; ; VBG pH 7.45  EKG pending  CXR showed borderline cardiomegaly and no airspace opacity.   X-ray of RLE also pending.     Received unasyn and vanco, humalin R, IV vasotec, IV labetalol   (02 Aug 2022 07:47)      2. Relevant PMH:   Prior ischemic stroke/TIA[ ], Afib [ ], CAD [ ], HTN [ ], DLD [ ], DM [ ], PVD [ ], Obesity [ ],   Sedentary lifestyle [ ], CHF [ ], SHRUTI [ ], Cancer Hx [ ].    3. Social History: Smoking [ ], Drug Use [ ], Alcohol Use [ ], Other [ ]    4. Possible Location of Stroke:    5. Relevant Brain Tissue Imagin. Relevant Cerebrovascular Imagin. Relevant blood tests:                          9.8    5.69  )-----------( 410      ( 26 Aug 2022 07:35 )             28.5   08-    138  |  103  |  26<H>  ----------------------------<  132<H>  4.0   |  23  |  0.9    Ca    9.5      26 Aug 2022 07:35  Mg     1.7         TPro  6.2  /  Alb  3.3<L>  /  TBili  0.2  /  DBili  x   /  AST  20  /  ALT  21  /  AlkPhos  141<H>    PT/INR - ( 26 Aug 2022 12:32 )   PT: 14.30 sec;   INR: 1.25 ratio         PTT - ( 26 Aug 2022 12:32 )  PTT:33.4 sec  8. Relevant cardiac rhythm monitorin. Relevant Cardiac Structure: (TTE/IVONNE +/-):[ ]No intracardiac thrombus/[ ] no vegetation/[ ]no [place]ia/EF:    Home Medications:  losartan 50 mg oral tablet: 1 tab(s) orally once a day (02 Aug 2022 10:38)  metFORMIN 500 mg oral tablet: 1 tab(s) orally 2 times a day (02 Aug 2022 10:38)  metoprolol succinate 50 mg oral tablet, extended release: 1 tab(s) orally once a day (02 Aug 2022 10:38)      MEDICATIONS  (STANDING):  aspirin  chewable 81 milliGRAM(s) Enteral Tube daily  atorvastatin 80 milliGRAM(s) Oral at bedtime  chlorthalidone 25 milliGRAM(s) Oral daily  collagenase Ointment 1 Application(s) Topical two times a day  Dakins Solution - 1/2 Strength 1 Application(s) Topical two times a day  dextrose 5%. 1000 milliLiter(s) (50 mL/Hr) IV Continuous <Continuous>  dextrose 5%. 1000 milliLiter(s) (100 mL/Hr) IV Continuous <Continuous>  dextrose 50% Injectable 25 Gram(s) IV Push once  dextrose 50% Injectable 12.5 Gram(s) IV Push once  dextrose 50% Injectable 25 Gram(s) IV Push once  doxazosin 2 milliGRAM(s) Oral at bedtime  glucagon  Injectable 1 milliGRAM(s) IntraMuscular once  hydrALAZINE 100 milliGRAM(s) Oral every 8 hours  insulin glargine Injectable (LANTUS) 22 Unit(s) SubCutaneous every morning  insulin lispro (ADMELOG) corrective regimen sliding scale   SubCutaneous three times a day before meals  insulin lispro Injectable (ADMELOG) 5 Unit(s) SubCutaneous three times a day before meals  labetalol 600 milliGRAM(s) Oral three times a day  lacosamide IVPB 50 milliGRAM(s) IV Intermittent every 12 hours  levETIRAcetam  IVPB 1000 milliGRAM(s) IV Intermittent every 12 hours  lidocaine 1%/epinephrine 1:100,000 Inj 20 milliLiter(s) Local Injection once  losartan 100 milliGRAM(s) Oral daily  multivitamin/minerals/iron Oral Solution (CENTRUM) 15 milliLiter(s) Oral daily  pantoprazole    Tablet 40 milliGRAM(s) Oral before breakfast  sodium bicarbonate 650 milliGRAM(s) Oral every 6 hours  sodium chloride 0.65% Nasal 2 Spray(s) Both Nostrils two times a day      10. PT/OT/Speech/Rehab/S&Sw/ Cognitive eval results and recommendations:    11. Exam:    Vital Signs Last 24 Hrs  T(C): 36.9 (27 Aug 2022 05:19), Max: 36.9 (27 Aug 2022 05:19)  T(F): 98.5 (27 Aug 2022 05:19), Max: 98.5 (27 Aug 2022 05:19)  HR: 72 (27 Aug 2022 05:19) (60 - 72)  BP: 179/87 (27 Aug 2022 05:19) (136/83 - 179/87)  BP(mean): 126 (26 Aug 2022 22:42) (101 - 126)  RR: 18 (27 Aug 2022 05:19) (16 - 18)  SpO2: 97% (26 Aug 2022 19:00) (97% - 97%)    Parameters below as of 26 Aug 2022 19:00  Patient On (Oxygen Delivery Method): room air    Neurologic:  -Mental status: awake and alert, follows simple commands today  -Cranial nerves:   CN: Extraocular movements are intact without nystagmus,R droop   Motor:  Normal bulk and tone. moves all extremities spontaneous 1/5 on Left, 2/5 on right extremities, able to shake hand  Sensation: responds to pain stimulation  Coordination: unable to asses    Culture - CSF with Gram Stain . (22 @ 11:36)   Gram Stain:   polymorphonuclear leukocytes seen   No organisms seen   by cytocentrifuge   Specimen Source: .CSF CSF   Culture Results:   No growth Specimen Source: .CSF CSF (22 @ 11:36)       Historical Values  Specimen Source: .CSF CSF (22 @ 11:36)   Specimen Source: .CSF CSF (22 @ 11:36) Protein, CSF: 131 mg/dL (22 @ 11:36) Lactate Dehydrogenase, CSF: 51: Reference Ranges have NOT been established for CSF LDH.   The  has not determined the efficacy of this test when   performed on CSF specimens. The performance characteristics of this test   were determined by [facility]. U/L (22 @ 11:36) Glucose, CSF: 67 mg/dL (22 @ 11:36) Cerebrospinal Fluid Cell Count-1 (22 @ 11:36)   Tube Type: Tube 2   CSF Color: Pink   RBC Count - Spinal Fluid: 47426 /uL   CSF Appearance: Cloudy   CSF Lymphocytes: 61 %   CSF Monocytes/Macrophages: 2 %   CSF Segmented Neutrophils: 37 %   Appearance Spun: Colorless   Total Nucleated Cell Count, CSF: 24 /uL

## 2022-08-28 NOTE — PROGRESS NOTE ADULT - SUBJECTIVE AND OBJECTIVE BOX
Neurology Progress Note    Interval History:    Patient was seen and examined, no acute event over night.     Medications:  acetaminophen     Tablet .. 650 milliGRAM(s) Oral every 6 hours PRN  aspirin  chewable 81 milliGRAM(s) Enteral Tube daily  atorvastatin 80 milliGRAM(s) Oral at bedtime  chlorthalidone 25 milliGRAM(s) Oral daily  clopidogrel Tablet 75 milliGRAM(s) Oral daily  collagenase Ointment 1 Application(s) Topical two times a day  Dakins Solution - 1/2 Strength 1 Application(s) Topical two times a day  dextrose 5%. 1000 milliLiter(s) IV Continuous <Continuous>  dextrose 5%. 1000 milliLiter(s) IV Continuous <Continuous>  dextrose 50% Injectable 25 Gram(s) IV Push once  dextrose 50% Injectable 12.5 Gram(s) IV Push once  dextrose 50% Injectable 25 Gram(s) IV Push once  dextrose Oral Gel 15 Gram(s) Oral once PRN  doxazosin 2 milliGRAM(s) Oral at bedtime  enoxaparin Injectable 40 milliGRAM(s) SubCutaneous every 24 hours  glucagon  Injectable 1 milliGRAM(s) IntraMuscular once  hydrALAZINE 100 milliGRAM(s) Oral every 8 hours  insulin glargine Injectable (LANTUS) 22 Unit(s) SubCutaneous every morning  insulin lispro (ADMELOG) corrective regimen sliding scale   SubCutaneous three times a day before meals  insulin lispro Injectable (ADMELOG) 5 Unit(s) SubCutaneous three times a day before meals  labetalol 600 milliGRAM(s) Oral three times a day  labetalol Injectable 10 milliGRAM(s) IV Push every 6 hours PRN  lacosamide IVPB 50 milliGRAM(s) IV Intermittent every 12 hours  levETIRAcetam  IVPB 1000 milliGRAM(s) IV Intermittent every 12 hours  lidocaine 1%/epinephrine 1:100,000 Inj 20 milliLiter(s) Local Injection once  losartan 100 milliGRAM(s) Oral daily  melatonin 3 milliGRAM(s) Oral at bedtime PRN  multivitamin/minerals/iron Oral Solution (CENTRUM) 15 milliLiter(s) Oral daily  pantoprazole    Tablet 40 milliGRAM(s) Oral before breakfast  sodium bicarbonate 650 milliGRAM(s) Oral every 6 hours  sodium chloride 0.65% Nasal 2 Spray(s) Both Nostrils two times a day      Vital Signs Last 24 Hrs  T(C): 37.1 (27 Aug 2022 20:32), Max: 37.1 (27 Aug 2022 20:32)  T(F): 98.8 (27 Aug 2022 20:32), Max: 98.8 (27 Aug 2022 20:32)  HR: 72 (27 Aug 2022 20:32) (69 - 72)  BP: 137/71 (27 Aug 2022 20:32) (137/71 - 179/87)  BP(mean): 102 (27 Aug 2022 14:34) (102 - 108)  RR: 18 (27 Aug 2022 20:32) (18 - 18)  SpO2: --        Neurologic:  -Mental status: awake and alert, follows simple commands today  -Cranial nerves:   CN: Extraocular movements are intact without nystagmus, R droop   Motor:  Normal bulk and tone. moves all extremities spontaneous 1/5 on Left, 2/5 on right extremities, able to shake hand  Sensation: responds to pain stimulation  Coordination: unable to asses    Labs:  CBC Full  -  ( 27 Aug 2022 06:08 )  WBC Count : 7.66 K/uL  RBC Count : 3.31 M/uL  Hemoglobin : 9.7 g/dL  Hematocrit : 28.9 %  Platelet Count - Automated : 398 K/uL  Mean Cell Volume : 87.3 fL  Mean Cell Hemoglobin : 29.3 pg  Mean Cell Hemoglobin Concentration : 33.6 g/dL  Auto Neutrophil # : 4.87 K/uL  Auto Lymphocyte # : 2.07 K/uL  Auto Monocyte # : 0.50 K/uL  Auto Eosinophil # : 0.17 K/uL  Auto Basophil # : 0.03 K/uL  Auto Neutrophil % : 63.6 %  Auto Lymphocyte % : 27.0 %  Auto Monocyte % : 6.5 %  Auto Eosinophil % : 2.2 %  Auto Basophil % : 0.4 %    08-27    140  |  103  |  32<H>  ----------------------------<  106<H>  4.3   |  23  |  0.9    Ca    9.6      27 Aug 2022 06:08  Mg     2.1     08-27    TPro  6.5  /  Alb  3.5  /  TBili  <0.2  /  DBili  x   /  AST  16  /  ALT  19  /  AlkPhos  146<H>  08-27    LIVER FUNCTIONS - ( 27 Aug 2022 06:08 )  Alb: 3.5 g/dL / Pro: 6.5 g/dL / ALK PHOS: 146 U/L / ALT: 19 U/L / AST: 16 U/L / GGT: x           PT/INR - ( 26 Aug 2022 12:32 )   PT: 14.30 sec;   INR: 1.25 ratio         PTT - ( 26 Aug 2022 12:32 )  PTT:33.4 sec      RADIOLOGY & ADDITIONAL TESTS:  < from: CT Head No Cont (08.13.22 @ 17:11) >  IMPRESSION:  Faintly demonstrated are multiple scattered cortically-based   hypodensities, consistent with known history of acute embolic stroke,   better delineated on recent MRI. No evidence of hemorrhagic   transformation.    --- End of Report ---    < end of copied text >

## 2022-08-28 NOTE — PROGRESS NOTE ADULT - SUBJECTIVE AND OBJECTIVE BOX
INTERVAL HPI/OVERNIGHT EVENTS:    SUBJECTIVE: Patient seen and examined at bedside.     cc: ams  unable to obtain ros  OBJECTIVE:    VITAL SIGNS:  Vital Signs Last 24 Hrs  T(C): 36.3 (28 Aug 2022 04:23), Max: 37.1 (27 Aug 2022 20:32)  T(F): 97.4 (28 Aug 2022 04:23), Max: 98.8 (27 Aug 2022 20:32)  HR: 64 (28 Aug 2022 06:30) (64 - 72)  BP: 125/68 (28 Aug 2022 06:30) (125/68 - 190/91)  BP(mean): 131 (28 Aug 2022 04:23) (102 - 131)  RR: 18 (28 Aug 2022 06:30) (18 - 20)  SpO2: --    Parameters below as of 28 Aug 2022 04:23  Patient On (Oxygen Delivery Method): room air          PHYSICAL EXAM:    General: NAD  HEENT: NC/AT; PERRL, clear conjunctiva  Neck: supple  Respiratory: CTA b/l  Cardiovascular: +S1/S2; RRR  Abdomen: soft, NT/ND; +BS x4  Extremities: WWP, 2+ peripheral pulses b/l; no LE edema  Skin: normal color and turgor; no rash  Neurological:    MEDICATIONS:  MEDICATIONS  (STANDING):  aspirin  chewable 81 milliGRAM(s) Enteral Tube daily  atorvastatin 80 milliGRAM(s) Oral at bedtime  chlorthalidone 25 milliGRAM(s) Oral daily  clopidogrel Tablet 75 milliGRAM(s) Oral daily  collagenase Ointment 1 Application(s) Topical two times a day  Dakins Solution - 1/2 Strength 1 Application(s) Topical two times a day  dextrose 5%. 1000 milliLiter(s) (50 mL/Hr) IV Continuous <Continuous>  dextrose 5%. 1000 milliLiter(s) (100 mL/Hr) IV Continuous <Continuous>  dextrose 50% Injectable 25 Gram(s) IV Push once  dextrose 50% Injectable 12.5 Gram(s) IV Push once  dextrose 50% Injectable 25 Gram(s) IV Push once  doxazosin 2 milliGRAM(s) Oral at bedtime  enoxaparin Injectable 40 milliGRAM(s) SubCutaneous every 24 hours  glucagon  Injectable 1 milliGRAM(s) IntraMuscular once  hydrALAZINE 100 milliGRAM(s) Oral every 8 hours  insulin glargine Injectable (LANTUS) 22 Unit(s) SubCutaneous every morning  insulin lispro (ADMELOG) corrective regimen sliding scale   SubCutaneous three times a day before meals  insulin lispro Injectable (ADMELOG) 5 Unit(s) SubCutaneous three times a day before meals  labetalol 600 milliGRAM(s) Oral three times a day  lacosamide IVPB 50 milliGRAM(s) IV Intermittent every 12 hours  levETIRAcetam  IVPB 1000 milliGRAM(s) IV Intermittent every 12 hours  lidocaine 1%/epinephrine 1:100,000 Inj 20 milliLiter(s) Local Injection once  losartan 100 milliGRAM(s) Oral daily  multivitamin/minerals/iron Oral Solution (CENTRUM) 15 milliLiter(s) Oral daily  pantoprazole    Tablet 40 milliGRAM(s) Oral before breakfast  sodium bicarbonate 650 milliGRAM(s) Oral every 6 hours  sodium chloride 0.65% Nasal 2 Spray(s) Both Nostrils two times a day    MEDICATIONS  (PRN):  acetaminophen     Tablet .. 650 milliGRAM(s) Oral every 6 hours PRN Temp greater or equal to 38C (100.4F), Mild Pain (1 - 3)  dextrose Oral Gel 15 Gram(s) Oral once PRN Blood Glucose LESS THAN 70 milliGRAM(s)/deciliter  labetalol Injectable 10 milliGRAM(s) IV Push every 6 hours PRN Systolic blood pressure >  melatonin 3 milliGRAM(s) Oral at bedtime PRN Insomnia      ALLERGIES:  Allergies    No Known Allergies    Intolerances        LABS:                        9.4    7.63  )-----------( 358      ( 28 Aug 2022 05:49 )             28.1     Hemoglobin: 9.4 g/dL (08-28 @ 05:49)  Hemoglobin: 9.7 g/dL (08-27 @ 06:08)  Hemoglobin: 9.8 g/dL (08-26 @ 07:35)  Hemoglobin: 10.0 g/dL (08-25 @ 07:14)    CBC Full  -  ( 28 Aug 2022 05:49 )  WBC Count : 7.63 K/uL  RBC Count : 3.21 M/uL  Hemoglobin : 9.4 g/dL  Hematocrit : 28.1 %  Platelet Count - Automated : 358 K/uL  Mean Cell Volume : 87.5 fL  Mean Cell Hemoglobin : 29.3 pg  Mean Cell Hemoglobin Concentration : 33.5 g/dL  Auto Neutrophil # : 4.50 K/uL  Auto Lymphocyte # : 2.48 K/uL  Auto Monocyte # : 0.41 K/uL  Auto Eosinophil # : 0.19 K/uL  Auto Basophil # : 0.03 K/uL  Auto Neutrophil % : 58.9 %  Auto Lymphocyte % : 32.5 %  Auto Monocyte % : 5.4 %  Auto Eosinophil % : 2.5 %  Auto Basophil % : 0.4 %    08-28    141  |  102  |  33<H>  ----------------------------<  130<H>  4.2   |  26  |  1.0    Ca    9.4      28 Aug 2022 05:49  Phos  5.1     08-28  Mg     1.9     08-28    TPro  6.4  /  Alb  3.5  /  TBili  0.2  /  DBili  x   /  AST  13  /  ALT  17  /  AlkPhos  129<H>  08-28    Creatinine Trend: 1.0<--, 0.9<--, 0.9<--, 0.8<--, 0.7<--, 0.8<--  LIVER FUNCTIONS - ( 28 Aug 2022 05:49 )  Alb: 3.5 g/dL / Pro: 6.4 g/dL / ALK PHOS: 129 U/L / ALT: 17 U/L / AST: 13 U/L / GGT: x           PT/INR - ( 26 Aug 2022 12:32 )   PT: 14.30 sec;   INR: 1.25 ratio         PTT - ( 26 Aug 2022 12:32 )  PTT:33.4 sec    hs Troponin:              CSF:    Total Nucleated Cell Count, CSF: 24 /uL (08-26-22 @ 11:36)  RBC Count - Spinal Fluid: 68525 /uL (08-26-22 @ 11:36)          Glucose, CSF: 67 mg/dL (08-26-22 @ 11:36)  Protein, CSF: 131 mg/dL (08-26-22 @ 11:36)            EKG:   MICROBIOLOGY:    Culture - CSF with Gram Stain (collected 26 Aug 2022 11:36)  Source: .CSF CSF  Gram Stain (26 Aug 2022 22:25):    polymorphonuclear leukocytes seen    No organisms seen    by cytocentrifuge  Preliminary Report (27 Aug 2022 14:49):    No growth    Culture - Acid Fast - CSF (collected 26 Aug 2022 11:36)  Source: .CSF CSF      IMAGING:      Labs, imaging, EKG personally reviewed    RADIOLOGY & ADDITIONAL TESTS: Reviewed.

## 2022-08-28 NOTE — PROGRESS NOTE ADULT - ASSESSMENT
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   As for the cellulitis, patient underwent debridement of the ulcer of the right lower extremity,currently off abx.   During hospital stay, found to have multiple punctate infarcts suspected to be cardioembolic vs vasculitis. Patient on found to have sharp waves on vEEG currently on AED's.      Neuro  #Stroke workup  - continue aspirin 81mg  daily  - Restart plavix as LP performed yesterday 8/26  - Continue Atorvastatin 80 mg daily   - q4hr stroke neuro checks and vitals  - IVONNE was denied by cardiology as they found paroxysmal A. fib on tele  - s/p ILR by EP 8/22  - Vasculitic work up neg so far  - consulted IR for LP tap - was performed yesterday    # High risk of seizures with epileptiform discharges  Multiple EEG did not capture any seizures  - Continue Keppra 1500 mg bid   - Stopped Phenytoin 8/23  - c/w vimpat 50mg BID  - Levetiracetam 26 .6  - phenytoin 0.5     Cards  #Hypertensive urgency due to noncompliance and Malignant HTN - controlled  BP at admission 296/174 without signs of end organ damage. Renal artery duplex wnl>>ARIAN unlikely  Aldosterone wnl, renin elevated  - r/o aortic coarctation with TTE (pending results)  -close monitoring  Currently on:  -c/w chlorthalidone 25mg daily  - previously on Amlodipine 10 mg  - pharmacy can't do Nifedipine IR, and would prefer not to crush Nifedipine XL (off nifedipine)  -c/w losartan 100mg daily  -c/w labetalol  to 600mg q8  -c/w hydralazine 100mg q8  -c/w doxazocin 2mg nightly  - s/p PEG tube   -SBP goal 120-160      #HLD  - high dose statin as above in CVA      Pulm  - call provider if SPO2 < 94%    GI  #Nutrition/Fluids/Electrolytes   - replete K<4 and Mg <2  - Diet via PEG tube  - obtain MBS test- failed, NPO with PEG alternative means       Renal  Nephrology are following, appreciate recs  Daily BMP    Infectious Disease  #Purulent RLE cellulitis r/o abscess / Fever / ?asp-n pneumonitis vs PNA  -s/p unasyn and and vancomycin in ED  - initial neg blood cultures   -s/p burn debridement   - Candida in wound. D/w Dr. Chua: contaminant  -  BCx w/ staph: likely contaminant. repeat BCx -  negative  - hold ABx per ID. High suspicion for asp-n pneumonitis vs PNA.  If persistent fevers, would change ABx to Zosyn, Vanco. Asp-n precautions. 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   As for the cellulitis, patient underwent debridement of the ulcer of the right lower extremity,currently off abx.   During hospital stay, found to have multiple punctate infarcts suspected to be cardioembolic vs vasculitis. Patient on found to have sharp waves on vEEG currently on AED's.      Neuro  #Stroke workup  - continue aspirin 81mg  daily  - Restart plavix as LP performed 8/26  - Continue Atorvastatin 80 mg daily   - q4hr stroke neuro checks and vitals  - IVONNE was denied by cardiology as they found paroxysmal A. fib on tele  - s/p ILR by EP 8/22  - Vasculitic work up neg so far  - consulted IR for LP tap - was performed yesterday    # High risk of seizures with epileptiform discharges  Multiple EEG did not capture any seizures  - Continue Keppra 1500 mg bid   - Stopped Phenytoin 8/23  - c/w vimpat 50mg BID  - Levetiracetam 26 .6  - phenytoin 0.5     Cards  #Hypertensive urgency due to noncompliance and Malignant HTN - controlled  BP at admission 296/174 without signs of end organ damage. Renal artery duplex wnl>>ARIAN unlikely  Aldosterone wnl, renin elevated  - r/o aortic coarctation with TTE (pending results)  -close monitoring  Currently on:  -c/w chlorthalidone 25mg daily  - previously on Amlodipine 10 mg  - pharmacy can't do Nifedipine IR, and would prefer not to crush Nifedipine XL (off nifedipine)  -c/w losartan 100mg daily  -c/w labetalol  to 600mg q8  -c/w hydralazine 100mg q8  -c/w doxazocin 2mg nightly  - s/p PEG tube   -SBP goal 120-160      #HLD  - high dose statin as above in CVA      Pulm  - call provider if SPO2 < 94%    GI  #Nutrition/Fluids/Electrolytes   - replete K<4 and Mg <2  - Diet via PEG tube  - obtain MBS test- failed, NPO with PEG alternative means       Renal  Nephrology are following, appreciate recs  Daily BMP    Infectious Disease  #Purulent RLE cellulitis r/o abscess / Fever / ?asp-n pneumonitis vs PNA  -s/p unasyn and and vancomycin in ED  - initial neg blood cultures   -s/p burn debridement   - Candida in wound. D/w Dr. Chua: contaminant  -  BCx w/ staph: likely contaminant. repeat BCx -  negative  - hold ABx per ID. High suspicion for asp-n pneumonitis vs PNA.  If persistent fevers, would change ABx to Zosyn, Vanco. Asp-n precautions. Aggressive suctioning  - Currently afebrile        Endocrine  #DM  - A1C results: 10.4  - ISS  - started insulin regimen   - c/w Lantus 22, lispro 5u      - TSH results: 0.86    DVT ppx: Lovenox SCDs  GI ppx: Protonix  Diet: DASH/carb consistent  Activity: AAT    Pending: LP performed, dispo planning       ***Preliminary Note***

## 2022-08-28 NOTE — PROGRESS NOTE ADULT - ATTENDING COMMENTS
Patient seen and examined and agree with above except as noted.  Patients history, notes, labs, imaging, vitals and meds reviewed personally.  No new complaints  No events    Plan as above

## 2022-08-28 NOTE — PROGRESS NOTE ADULT - TIME BILLING
56F PMHx HTN, DM2, CVA 2017 here with RLE wound. HTN urgency. Acute embolic CVA.     IMP  #Acute CVA    mri with multiple cortical infarcts, suspected embolic; no hemorrhage    c/b dysphagia, s/p PEG    s/p ILR  #HTN urgency  #DM2  #RLE cellulitis    s/p course abx    s/p debridement 8/10  #Interictal activity on eeg    PLAN  f/u LP  asa  plavix resumed  lipitor 80  keppra 1500 bid  phenytoin 100 tid  s/p peg, d/c planning per neuro    Janae  [id number]

## 2022-08-29 NOTE — PROGRESS NOTE ADULT - ASSESSMENT
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   As for the cellulitis, patient underwent debridement of the ulcer of the right lower extremity,currently off abx.   During hospital stay, found to have multiple punctate infarcts suspected to be cardioembolic vs vasculitis. Patient on found to have sharp waves on vEEG currently on AED's.      Neuro  #Stroke workup  - continue aspirin 81mg  daily  - Restart plavix as LP performed 8/26  - Continue Atorvastatin 80 mg daily   - q4hr stroke neuro checks and vitals  - IVONNE was denied by cardiology as they found paroxysmal A. fib on tele  - s/p ILR by EP 8/22  - Vasculitic work up neg so far  - LP completed by Ir  - start Eliquis 5mg !Q12  - vEEg  - repeat MRI    # High risk of seizures with epileptiform discharges  Multiple EEG did not capture any seizures  - Continue Keppra 1500 mg bid   - Stopped Phenytoin 8/23  - c/w vimpat 50mg BID  - Levetiracetam 26 .6  - phenytoin 0.5     Cards  #Hypertensive urgency due to noncompliance and Malignant HTN - controlled  BP at admission 296/174 without signs of end organ damage. Renal artery duplex wnl>>ARIAN unlikely  Aldosterone wnl, renin elevated  - r/o aortic coarctation with TTE (pending results)  -close monitoring  Currently on:  -c/w chlorthalidone 25mg daily  - previously on Amlodipine 10 mg  - pharmacy can't do Nifedipine IR, and would prefer not to crush Nifedipine XL (off nifedipine)  -c/w losartan 100mg daily  -c/w labetalol  to 600mg q8  -c/w hydralazine 100mg q8  -c/w doxazocin 2mg nightly  - s/p PEG tube   -SBP goal 120-160      #HLD  - high dose statin as above in CVA      Pulm  - call provider if SPO2 < 94%    GI  #Nutrition/Fluids/Electrolytes   - replete K<4 and Mg <2  - Diet via PEG tube  - obtain MBS test- failed, NPO with PEG alternative means       Renal  Nephrology are following, appreciate recs  Daily BMP    Infectious Disease  #Purulent RLE cellulitis r/o abscess / Fever / ?asp-n pneumonitis vs PNA  -s/p unasyn and and vancomycin in ED  - initial neg blood cultures   -s/p burn debridement   - Candida in wound. D/w Dr. Chua: contaminant  -  BCx w/ staph: likely contaminant. repeat BCx -  negative  - hold ABx per ID. High suspicion for asp-n pneumonitis vs PNA.  If persistent fevers, would change ABx to Zosyn, Vanco. Asp-n precautions. 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   As for the cellulitis, patient underwent debridement of the ulcer of the right lower extremity,currently off abx.   During hospital stay, found to have multiple punctate infarcts suspected to be cardioembolic vs vasculitis. Patient on found to have sharp waves on vEEG currently on AED's.      Neuro  #Stroke workup  - continue aspirin 81mg  daily  - c/w Plavix   - Continue Atorvastatin 80 mg daily   - q4hr stroke neuro checks and vitals  - IVONNE was denied by cardiology as they found paroxysmal A. fib on tele  - s/p ILR by EP 8/22  - Vasculitic work up neg so far  - LP completed by Ir - see above  - start Eliquis 5mg !Q12  - vEEg  - repeat MRI    # High risk of seizures with epileptiform discharges  Multiple EEG did not capture any seizures  - Continue Keppra 1500 mg bid   - Stopped Phenytoin 8/23  - c/w vimpat 50mg BID  - Levetiracetam 26 .6  - phenytoin 0.5     Cards  #Hypertensive urgency due to noncompliance and Malignant HTN - controlled  BP at admission 296/174 without signs of end organ damage. Renal artery duplex wnl>>ARIAN unlikely  Aldosterone wnl, renin elevated  - r/o aortic coarctation with TTE (pending results)  -close monitoring  Currently on:  -c/w chlorthalidone 25mg daily  - previously on Amlodipine 10 mg  - pharmacy can't do Nifedipine IR, and would prefer not to crush Nifedipine XL (off nifedipine)  -c/w losartan 100mg daily  -c/w labetalol  to 600mg q8  -c/w hydralazine 100mg q8  -c/w doxazocin 2mg nightly  - s/p PEG tube   -SBP goal 120-160      #HLD  - high dose statin as above in CVA      Pulm  - call provider if SPO2 < 94%    GI  #Nutrition/Fluids/Electrolytes   - replete K<4 and Mg <2  - Diet via PEG tube  - obtain MBS test- failed, NPO with PEG alternative means       Renal  Nephrology are following, appreciate recs  Daily BMP    Infectious Disease  #Purulent RLE cellulitis r/o abscess / Fever / ?asp-n pneumonitis vs PNA  -s/p unasyn and and vancomycin in ED  - initial neg blood cultures   -s/p burn debridement   - Candida in wound. D/w Dr. Chua: contaminant  -  BCx w/ staph: likely contaminant. repeat BCx -  negative  - hold ABx per ID. High suspicion for asp-n pneumonitis vs PNA.  If persistent fevers, would change ABx to Zosyn, Vanco. Asp-n precautions. Aggressive suctioning  - Currently afebrile        Endocrine  #DM  - A1C results: 10.4  - ISS  - started insulin regimen   - c/w Lantus 22, lispro 5u      - TSH results: 0.86    DVT ppx: Lovenox SCDs  GI ppx: Protonix  Diet: DASH/carb consistent via PEG  Activity: AAT

## 2022-08-29 NOTE — PROGRESS NOTE ADULT - ASSESSMENT
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 During hospital stay, found to have multiple punctate infarcts suspected to be cardioembolic vs vasculitis. Patient on found to have sharp waves on vEEG currently on AED's.        #Stroke workup  - continue aspirin 81mg  daily  - Restarted plavix as LP performed 8/26  - Continue Atorvastatin 80 mg daily   - q4hr stroke neuro checks and vitals  - IVONNE was denied by cardiology as they found paroxysmal A. fib on tele  - s/p ILR by EP 8/22  - Vasculitic work up neg so far  - consulted IR for LP tap - was performed yesterday  - Defer to neuro for ongoing work up    # High risk of seizures with epileptiform discharges  Multiple EEG did not capture any seizures  - Continue Keppra 1500 mg bid   - Stopped Phenytoin 8/23  - c/w vimpat 50mg BID  - Levetiracetam 26 .6  - phenytoin 0.5   - Defer to neuro for ongoing work up    #Hypertensive urgency due to noncompliance and Malignant HTN   - BP at admission 296/174 without signs of end organ damage. Renal artery duplex wnl>>ARIAN unlikely  - Aldosterone wnl, renin elevated  - r/o aortic coarctation with TTE (pending results)  -close monitoring  -c/w chlorthalidone 25mg daily  - previously on Amlodipine 10 mg  - pharmacy can't do Nifedipine IR, and would prefer not to crush Nifedipine XL (off nifedipine)  -c/w losartan 100mg daily  -c/w labetalol  to 600mg q8  -c/w hydralazine 100mg q8  -c/w doxazocin 2mg nightly  - s/p PEG tube   -SBP goal 120-160  - Cardiology follow up    #HLD  - high dose statin as above in CVA      #Purulent RLE cellulitis r/o abscess / Fever / ?asp-n pneumonitis vs PNA  -s/p unasyn and and vancomycin in ED  - initial neg blood cultures   -s/p burn debridement   - Candida in wound. D/w Dr. Chua: contaminant  -  BCx w/ staph: likely contaminant. repeat BCx -  negative  - hold ABx per ID. High suspicion for asp-n pneumonitis vs PNA.  If persistent fevers, would change ABx to [place]. Asp-n precautions. Aggressive suctioning  - Currently afebrile      #DM  - A1C results: 10.4  - ISS  - started insulin regimen   - c/w Lantus 22, lispro 5u  - TSH results: 0.86      #Nutrition/Fluids/Electrolytes   - replete K<4 and Mg <2  - Diet via PEG tube  - obtain MBS test- failed, NPO with PEG alternative means     DVT ppx: Lovenox SCDs  GI ppx: Protonix  Diet: DASH/carb consistent  Activity: AAT    Dispo planning: per neuro

## 2022-08-29 NOTE — PROGRESS NOTE ADULT - SUBJECTIVE AND OBJECTIVE BOX
Neurology Progress Note    Interval History:    Patient was seen and examined, no acute event over night.     Medications:  acetaminophen     Tablet .. 650 milliGRAM(s) Oral every 6 hours PRN  apixaban 5 milliGRAM(s) Oral every 12 hours  atorvastatin 80 milliGRAM(s) Oral at bedtime  chlorthalidone 25 milliGRAM(s) Oral daily  collagenase Ointment 1 Application(s) Topical two times a day  Dakins Solution - 1/2 Strength 1 Application(s) Topical two times a day  dextrose 5%. 1000 milliLiter(s) IV Continuous <Continuous>  dextrose 5%. 1000 milliLiter(s) IV Continuous <Continuous>  dextrose 50% Injectable 25 Gram(s) IV Push once  dextrose 50% Injectable 12.5 Gram(s) IV Push once  dextrose 50% Injectable 25 Gram(s) IV Push once  dextrose Oral Gel 15 Gram(s) Oral once PRN  doxazosin 2 milliGRAM(s) Oral at bedtime  glucagon  Injectable 1 milliGRAM(s) IntraMuscular once  hydrALAZINE 100 milliGRAM(s) Oral every 8 hours  insulin glargine Injectable (LANTUS) 22 Unit(s) SubCutaneous every morning  insulin lispro (ADMELOG) corrective regimen sliding scale   SubCutaneous three times a day before meals  insulin lispro Injectable (ADMELOG) 5 Unit(s) SubCutaneous three times a day before meals  labetalol 600 milliGRAM(s) Oral three times a day  labetalol Injectable 10 milliGRAM(s) IV Push every 6 hours PRN  lacosamide IVPB 50 milliGRAM(s) IV Intermittent every 12 hours  levETIRAcetam  Solution 1000 milliGRAM(s) Oral every 12 hours  lidocaine 1%/epinephrine 1:100,000 Inj 20 milliLiter(s) Local Injection once  LORazepam     Tablet 1 milliGRAM(s) Oral once PRN  losartan 100 milliGRAM(s) Oral daily  melatonin 3 milliGRAM(s) Oral at bedtime PRN  multivitamin/minerals/iron Oral Solution (CENTRUM) 15 milliLiter(s) Oral daily  pantoprazole    Tablet 40 milliGRAM(s) Oral before breakfast  sodium bicarbonate 650 milliGRAM(s) Oral every 6 hours  sodium chloride 0.65% Nasal 2 Spray(s) Both Nostrils two times a day      Vital Signs Last 24 Hrs  T(C): 36.2 (29 Aug 2022 13:20), Max: 37.1 (29 Aug 2022 05:02)  T(F): 97.2 (29 Aug 2022 13:20), Max: 98.8 (29 Aug 2022 05:02)  HR: 64 (29 Aug 2022 13:20) (64 - 74)  BP: 158/85 (29 Aug 2022 13:20) (158/85 - 204/91)  BP(mean): 130 (28 Aug 2022 21:08) (130 - 131)  RR: 18 (29 Aug 2022 13:20) (18 - 18)  SpO2: --        Neurological Examination:  General:  Appearance is consistent with chronologic age.   Cognitive/Language:  Awake, alert, and oriented to person, place, time and date.  Recent and remote memory intact.  Fund of knowledge is appropriate.  Naming, repetition and comprehension intact. Nondysarthric.    Cranial Nerves  - Eyes: Visual acuity intact, Visual fields full.  EOMI w/o nystagmus, skew or reported double vision.  PERRL.  No ptosis/weakness of eyelid closure.    - Face:  Facial sensation normal V1 - 3, no facial asymmetry.    - Ears/Nose/Throat:  Hearing grossly intact b/l to finger rub.  Palate elevates midline.  Tongue and uvula midline.   Motor examination:  (MRC grade R/L) 5/5 UE; 5/5 LE.  No observable drift. Normal tone and bulk. No tenderness, twitching, tremors or involuntary movements.  Sensory examination:  Intact to light touch and pinprick, pain, temperature and proprioception and vibration in all extremities.  Reflexes:   2+ b/l biceps, triceps, patella and achilles.  Plantar response downgoing b/l.  Jaw jerk, Sanam, clonus absent.  Cerebellum:   FTN/HKS intact.  No dysmetria.    Gait narrow based and normal.    Labs:  CBC Full  -  ( 29 Aug 2022 06:29 )  WBC Count : 8.63 K/uL  RBC Count : 3.41 M/uL  Hemoglobin : 10.0 g/dL  Hematocrit : 29.5 %  Platelet Count - Automated : 344 K/uL  Mean Cell Volume : 86.5 fL  Mean Cell Hemoglobin : 29.3 pg  Mean Cell Hemoglobin Concentration : 33.9 g/dL  Auto Neutrophil # : 5.69 K/uL  Auto Lymphocyte # : 2.14 K/uL  Auto Monocyte # : 0.49 K/uL  Auto Eosinophil # : 0.23 K/uL  Auto Basophil # : 0.06 K/uL  Auto Neutrophil % : 65.9 %  Auto Lymphocyte % : 24.8 %  Auto Monocyte % : 5.7 %  Auto Eosinophil % : 2.7 %  Auto Basophil % : 0.7 %    08-29    140  |  102  |  32<H>  ----------------------------<  112<H>  4.2   |  26  |  1.0    Ca    9.7      29 Aug 2022 06:29  Phos  5.1     08-28  Mg     1.8     08-29    TPro  6.5  /  Alb  3.5  /  TBili  0.3  /  DBili  x   /  AST  11  /  ALT  14  /  AlkPhos  134<H>  08-29    LIVER FUNCTIONS - ( 29 Aug 2022 06:29 )  Alb: 3.5 g/dL / Pro: 6.5 g/dL / ALK PHOS: 134 U/L / ALT: 14 U/L / AST: 11 U/L / GGT: x                 RADIOLOGY & ADDITIONAL TESTS:   Neurology Progress Note    Interval History:    Patient was seen and examined, no acute event over night. Pt appeared to be tracking her surroundings. She currently does not speak or follow command    Medications:  acetaminophen     Tablet .. 650 milliGRAM(s) Oral every 6 hours PRN  apixaban 5 milliGRAM(s) Oral every 12 hours  atorvastatin 80 milliGRAM(s) Oral at bedtime  chlorthalidone 25 milliGRAM(s) Oral daily  collagenase Ointment 1 Application(s) Topical two times a day  Dakins Solution - 1/2 Strength 1 Application(s) Topical two times a day  dextrose 5%. 1000 milliLiter(s) IV Continuous <Continuous>  dextrose 5%. 1000 milliLiter(s) IV Continuous <Continuous>  dextrose 50% Injectable 25 Gram(s) IV Push once  dextrose 50% Injectable 12.5 Gram(s) IV Push once  dextrose 50% Injectable 25 Gram(s) IV Push once  dextrose Oral Gel 15 Gram(s) Oral once PRN  doxazosin 2 milliGRAM(s) Oral at bedtime  glucagon  Injectable 1 milliGRAM(s) IntraMuscular once  hydrALAZINE 100 milliGRAM(s) Oral every 8 hours  insulin glargine Injectable (LANTUS) 22 Unit(s) SubCutaneous every morning  insulin lispro (ADMELOG) corrective regimen sliding scale   SubCutaneous three times a day before meals  insulin lispro Injectable (ADMELOG) 5 Unit(s) SubCutaneous three times a day before meals  labetalol 600 milliGRAM(s) Oral three times a day  labetalol Injectable 10 milliGRAM(s) IV Push every 6 hours PRN  lacosamide IVPB 50 milliGRAM(s) IV Intermittent every 12 hours  levETIRAcetam  Solution 1000 milliGRAM(s) Oral every 12 hours  lidocaine 1%/epinephrine 1:100,000 Inj 20 milliLiter(s) Local Injection once  LORazepam     Tablet 1 milliGRAM(s) Oral once PRN  losartan 100 milliGRAM(s) Oral daily  melatonin 3 milliGRAM(s) Oral at bedtime PRN  multivitamin/minerals/iron Oral Solution (CENTRUM) 15 milliLiter(s) Oral daily  pantoprazole    Tablet 40 milliGRAM(s) Oral before breakfast  sodium bicarbonate 650 milliGRAM(s) Oral every 6 hours  sodium chloride 0.65% Nasal 2 Spray(s) Both Nostrils two times a day      Vital Signs Last 24 Hrs  T(C): 36.2 (29 Aug 2022 13:20), Max: 37.1 (29 Aug 2022 05:02)  T(F): 97.2 (29 Aug 2022 13:20), Max: 98.8 (29 Aug 2022 05:02)  HR: 64 (29 Aug 2022 13:20) (64 - 74)  BP: 158/85 (29 Aug 2022 13:20) (158/85 - 204/91)  BP(mean): 130 (28 Aug 2022 21:08) (130 - 131)  RR: 18 (29 Aug 2022 13:20) (18 - 18)  SpO2: --        Neurological Examination:  General:  Appearance is consistent with chronologic age.   Cognitive/Language:  Awake, not alert, Not oriented to person, place, time and date.  does not speak or follow command. appeared to be tracking. withdraw to painful stimuli.   Cranial Nerves  - Eyes: Visual acuity intact, PERRL.  No ptosis/weakness of eyelid closure.    - Face:  no facial asymmetry.    - Ears/Nose/Throat:     Motor examination:  (MRC grade R/L) 3/5 UE; 3/5 LE. Normal tone and bulk. No tenderness, twitching, tremors or involuntary movements.  Sensory examination:  Reflexes:   2+ b/l biceps, triceps, patella and achilles.  Plantar response upgoing b/l.  clonus absent.  Cerebellum:     Gait     Labs:  CBC Full  -  ( 29 Aug 2022 06:29 )  WBC Count : 8.63 K/uL  RBC Count : 3.41 M/uL  Hemoglobin : 10.0 g/dL  Hematocrit : 29.5 %  Platelet Count - Automated : 344 K/uL  Mean Cell Volume : 86.5 fL  Mean Cell Hemoglobin : 29.3 pg  Mean Cell Hemoglobin Concentration : 33.9 g/dL  Auto Neutrophil # : 5.69 K/uL  Auto Lymphocyte # : 2.14 K/uL  Auto Monocyte # : 0.49 K/uL  Auto Eosinophil # : 0.23 K/uL  Auto Basophil # : 0.06 K/uL  Auto Neutrophil % : 65.9 %  Auto Lymphocyte % : 24.8 %  Auto Monocyte % : 5.7 %  Auto Eosinophil % : 2.7 %  Auto Basophil % : 0.7 %        140  |  102  |  32<H>  ----------------------------<  112<H>  4.2   |  26  |  1.0    Ca    9.7      29 Aug 2022 06:29  Phos  5.1       Mg     1.8         TPro  6.5  /  Alb  3.5  /  TBili  0.3  /  DBili  x   /  AST  11  /  ALT  14  /  AlkPhos  134<H>      LIVER FUNCTIONS - ( 29 Aug 2022 06:29 )  Alb: 3.5 g/dL / Pro: 6.5 g/dL / ALK PHOS: 134 U/L / ALT: 14 U/L / AST: 11 U/L / GGT: x                 RADIOLOGY & ADDITIONAL TESTS:    Protein Elect CSF Serum Part (22 @ 11:36):  IgG Index: 0.5;   Albumin, Serum: 2356 mg/dL;   Quantitative Ig mg/dL;   IgG/Albumin Ratio, Serum: 0.46 Ratio  Protein Electrophoresis, CSF (22 @ 11:36):  Protein, CSF: 127 mg/dL; IgG CSF: 17.5 mg/dL;  CSF ALBU: 70.4 mg/dL; IgG/Albumin Ratio, CSF: 0.25 Ratio; IgG Synthesis: 14.1 mg/day  CSF IgG Index (22 @ 11:36): Quantitative Ig mg/dL;  Albumin, Serum: 2356 mg/dL; IgG CSF: 17.5 mg/dL; CSF ALBU: 70.4 mg/dL; IgG/Albumin Ratio, Serum: 0.46 Ratio; IgG/Albumin Ratio, CSF: 0.25 Ratio; IgG Index: 0.5; IgG Synthesis: 14.1 mg/day  West Nile Virus IgG - CSF: Negative: VDRL Titer, CSF: Nonreact: Cerebrospinal Fluid Cell Count-1 (22 @ 11:36)  Tube Type: Tube 2; CSF Color: Pink; Total Nucleated Cell Count, CSF: 24 /uL; RBC Count - Spinal Fluid: 08460 /uL; CSF Appearance: Cloudy; CSF Lymphocytes: 61 %; CSF Monocytes/Macrophages: 2 %;   CSF Segmented Neutrophils: 37 %;   Appearance Spun: Colorless

## 2022-08-29 NOTE — PROGRESS NOTE ADULT - SUBJECTIVE AND OBJECTIVE BOX
CLAIRE MITCHELLCIA  56y  Female      Patient is a 56y old  Female who presents with a chief complaint of htn (26 Aug 2022 13:04)      INTERVAL HPI/OVERNIGHT EVENTS: no acute events overnight.       REVIEW OF SYSTEMS:  unable to accurately obtain    T(C): 36.2 (08-29-22 @ 13:20), Max: 37.1 (08-29-22 @ 05:02)  HR: 64 (08-29-22 @ 13:20) (64 - 74)  BP: 158/85 (08-29-22 @ 13:20) (158/85 - 204/91)  RR: 18 (08-29-22 @ 13:20) (18 - 18)  SpO2: --  Wt(kg): --Vital Signs Last 24 Hrs  T(C): 36.2 (29 Aug 2022 13:20), Max: 37.1 (29 Aug 2022 05:02)  T(F): 97.2 (29 Aug 2022 13:20), Max: 98.8 (29 Aug 2022 05:02)  HR: 64 (29 Aug 2022 13:20) (64 - 74)  BP: 158/85 (29 Aug 2022 13:20) (158/85 - 204/91)  BP(mean): 130 (28 Aug 2022 21:08) (130 - 131)  RR: 18 (29 Aug 2022 13:20) (18 - 18)  SpO2: --          08-28-22 @ 07:01  -  08-29-22 @ 07:00  --------------------------------------------------------  IN: 1000 mL / OUT: 0 mL / NET: 1000 mL    08-29-22 @ 07:01  -  08-29-22 @ 15:23  --------------------------------------------------------  IN: 450 mL / OUT: 0 mL / NET: 450 mL        PHYSICAL EXAM:  GENERAL: middle-age F, chronically ill appearing, non verbal on exam  PSYCH: no agitation  NERVOUS SYSTEM:  Alert but non verbal; unable to follow command  PULMONARY: symmetrical chest rise  CARDIOVASCULAR: Regular rate and rhythm; No murmurs, rubs, or gallops  GI: non distended  EXTREMITIES:  No clubbing, cyanosis  SKIN: No rashes or lesions    Consultant(s) Notes Reviewed:  [x ] YES  [ ] NO    Discussed with Consultants/Other Providers [ x] YES     LABS                          10.0   8.63  )-----------( 344      ( 29 Aug 2022 06:29 )             29.5     08-29    140  |  102  |  32<H>  ----------------------------<  112<H>  4.2   |  26  |  1.0    Ca    9.7      29 Aug 2022 06:29  Phos  5.1     08-28  Mg     1.8     08-29    TPro  6.5  /  Alb  3.5  /  TBili  0.3  /  DBili  x   /  AST  11  /  ALT  14  /  AlkPhos  134<H>  08-29  POCT Blood Glucose.: 132 mg/dL (29 Aug 2022 11:03)        RADIOLOGY & ADDITIONAL TESTS:  - no images 8/29  Imaging Personally Reviewed:  [ ] YES  [ ] NO    HEALTH ISSUES - PROBLEM Dx:      MEDICATIONS  (STANDING):  apixaban 5 milliGRAM(s) Oral every 12 hours  atorvastatin 80 milliGRAM(s) Oral at bedtime  chlorthalidone 25 milliGRAM(s) Oral daily  collagenase Ointment 1 Application(s) Topical two times a day  Dakins Solution - 1/2 Strength 1 Application(s) Topical two times a day  dextrose 5%. 1000 milliLiter(s) (100 mL/Hr) IV Continuous <Continuous>  dextrose 5%. 1000 milliLiter(s) (50 mL/Hr) IV Continuous <Continuous>  dextrose 50% Injectable 25 Gram(s) IV Push once  dextrose 50% Injectable 12.5 Gram(s) IV Push once  dextrose 50% Injectable 25 Gram(s) IV Push once  doxazosin 2 milliGRAM(s) Oral at bedtime  glucagon  Injectable 1 milliGRAM(s) IntraMuscular once  hydrALAZINE 100 milliGRAM(s) Oral every 8 hours  insulin glargine Injectable (LANTUS) 22 Unit(s) SubCutaneous every morning  insulin lispro (ADMELOG) corrective regimen sliding scale   SubCutaneous three times a day before meals  insulin lispro Injectable (ADMELOG) 5 Unit(s) SubCutaneous three times a day before meals  labetalol 600 milliGRAM(s) Oral three times a day  lacosamide IVPB 50 milliGRAM(s) IV Intermittent every 12 hours  levETIRAcetam  Solution 1000 milliGRAM(s) Oral every 12 hours  lidocaine 1%/epinephrine 1:100,000 Inj 20 milliLiter(s) Local Injection once  losartan 100 milliGRAM(s) Oral daily  multivitamin/minerals/iron Oral Solution (CENTRUM) 15 milliLiter(s) Oral daily  pantoprazole    Tablet 40 milliGRAM(s) Oral before breakfast  sodium bicarbonate 650 milliGRAM(s) Oral every 6 hours  sodium chloride 0.65% Nasal 2 Spray(s) Both Nostrils two times a day    MEDICATIONS  (PRN):  acetaminophen     Tablet .. 650 milliGRAM(s) Oral every 6 hours PRN Temp greater or equal to 38C (100.4F), Mild Pain (1 - 3)  dextrose Oral Gel 15 Gram(s) Oral once PRN Blood Glucose LESS THAN 70 milliGRAM(s)/deciliter  labetalol Injectable 10 milliGRAM(s) IV Push every 6 hours PRN Systolic blood pressure >  LORazepam     Tablet 1 milliGRAM(s) Oral once PRN Agitation  melatonin 3 milliGRAM(s) Oral at bedtime PRN Insomnia

## 2022-08-29 NOTE — PROGRESS NOTE ADULT - ATTENDING COMMENTS
Pt is a 57 yo F with PMHx of HTN, DM, prior stroke in 2017 with residual left sided weakness, who presented with RLE cellulitis. Stroke code called on 8/5 for unresponsiveness. Pt examined today at bedside, left gaze preference (able to cross midline to right), mute, does not speak or follow commands, left facial droop, RUE 3/5, LUE 1/5, BLE moves along the bed.     Impr: scattered punctate acute ischemic strokes in multiple vascular territories, concerning for cardioembolic etiology  Pt's neuro exam is much worse than what would be explained by the strokes. vEEG in past showed sharp waves, she is on LEV?LCS  concern for NCSE, restart vEEG  S/p LP, CSF with mildly elevated cell count and protein, normal glucose  S/p ILR. IVONNE declined by cardiology as tele revealed afib. Touch base with EP regaridng new onset afib.  D/c DAPT, start eliquis 5mg BID  S/p PEG tube placement  PT/OT/ST, tele, -160

## 2022-08-30 NOTE — PROGRESS NOTE ADULT - ATTENDING COMMENTS
Pt is a 55 yo F with PMHx of HTN, DM, prior stroke in 2017 with residual left sided weakness, who presented with RLE cellulitis. Stroke code called on 8/5 for unresponsiveness. Pt examined today at bedside, left gaze preference (able to cross midline to right), mute, does not speak or follow commands, left facial droop, RUE 3/5, LUE 1/5, BLE moves along the bed.     Impr: scattered punctate acute ischemic strokes in multiple vascular territories  Given encephalopathy and small scattered strokes, high suspicion for vasculitis. Especially as, after discussing with prior Neurologist on, afib was not confirmed. vEEG with interictal activity but no seizures.   S/p LP, CSF with mildly elevated cell count and protein, normal glucose. elevated ESR/CRP.    Repeat MRI brain with/without  Vasculitis labs  Start IV methylprednisolone 500mg IV QD (with GI ppx and glucose monitoring). May increase to 1G QD pending workup/response  NOEMI consult for DSA given suspicion for vasculitis  If vasculitis confirmed, will need heme/Onc consult for pulse cytoxan  Low threshold to restart vEEG    S/p ILR. IVONNE declined by cardiology as tele revealed afib. Touch base with EP regarding possible afib. On eliquis for now. If no afib, then will switch back to DAPT.  S/p PEG tube placement  PT/OT/ST, tele, -160.

## 2022-08-30 NOTE — PROGRESS NOTE ADULT - ASSESSMENT
55 yo F with PMH of HTN, DM2, CVA (2017) w/ residual L sided paresis admitted for acute RLE cellulitis.   During hospital stay, found to have multiple punctate infarcts suspected to be cardioembolic vs vasculitis. Patient on found to have sharp waves on vEEG currently on AED's.  Cardiology consulted for Resistant Hypertension.     # Resistant HTN - ddx:   # Paroxysmal A.fib - Rate/Rhythm controlled   # Positive vEEG - On AED  # Suspected Stroke - High risk for IVONNE given paroxysmal A.fib  # Poor PO intake - s/p PEG placement    - Currently on Labetalol 600mg TID, Chlorthalidone 25mg QD, Losartan 100mg QD, Hydralazine 100mg q8 & Doxazosin 2mg QD; BP remains above target goal;   - Aldosterone: 6.2 / Renin 8.8;  - Kidney Duplex negative for renal stenosis;       57 yo F with PMH of HTN, DM2, CVA (2017) w/ residual L sided paresis admitted for acute RLE cellulitis.   During hospital stay, found to have multiple punctate infarcts suspected to be cardioembolic vs vasculitis. Patient on found to have sharp waves on vEEG currently on AED's.  Cardiology consulted for Resistant Hypertension.     # Resistant HTN - Uncontrolled on x5 BP meds;   # Paroxysmal A.fib - Rate/Rhythm controlled   # Positive vEEG - On AED  # Suspected Stroke - High risk for IVONNE given paroxysmal A.fib  # Poor PO intake - s/p PEG placement    - Currently on Labetalol 600mg TID, Chlorthalidone 25mg QD, Losartan 100mg QD, Hydralazine 100mg q8 & Doxazosin 2mg QD; BP remains above target goal;   - Unlikely hyperaldosteronism: Aldosterone: 6.2 / Renin 8.8 / K wnl  - Kidney Duplex negative for renal artery stenosis;    - Possible SHRUTI vs    INCOMPLETE NOTE: PENDING DISCUSSION W/ ATTENDING     57 yo F with PMH of HTN, DM2, CVA (2017) w/ residual L sided paresis admitted for acute RLE cellulitis.   During hospital stay, found to have multiple punctate infarcts suspected to be cardioembolic vs vasculitis. Patient on found to have sharp waves on vEEG currently on AED's.  Cardiology consulted for Resistant Hypertension.     # Resistant HTN - Uncontrolled on x5 BP meds;   # Paroxysmal A.fib - Rate/Rhythm controlled   # Positive vEEG - On AED  # Suspected Stroke - High risk for IVONNE given paroxysmal A.fib  # Poor PO intake - s/p PEG placement    - Currently on Labetalol 600mg TID, Chlorthalidone 25mg QD, Losartan 100mg QD, Hydralazine 100mg q8 & Doxazosin 2mg QD; BP remains above target goal;   - Unlikely hyperaldosteronism: Aldosterone: 6.2 / Renin 8.8 / K wnl  - Kidney Duplex negative for renal artery stenosis;    - Possible SHRUTI vs.    INCOMPLETE NOTE: PENDING DISCUSSION W/ ATTENDING     55 yo F with PMH of HTN, DM2, CVA (2017) w/ residual L sided paresis admitted for acute RLE cellulitis.   During hospital stay, found to have multiple punctate infarcts suspected to be cardioembolic vs vasculitis. Patient on found to have sharp waves on vEEG currently on AED's.  Cardiology consulted for Resistant Hypertension.     # Resistant HTN - Uncontrolled on x5 BP meds;   # Paroxysmal A.fib - Rate/Rhythm controlled   # Positive vEEG - On AED  # Suspected Stroke - High risk for IVONNE given paroxysmal A.fib  # Poor PO intake - s/p PEG placement    - Currently on Labetalol 600mg TID, Chlorthalidone 25mg QD, Losartan 100mg QD, Hydralazine 100mg q8 & Doxazosin 2mg QD; BP remains above target goal;   - Unlikely hyperaldosteronism: Aldosterone: 6.2 / Renin 8.8 / K wnl  - Kidney Duplex negative for renal artery stenosis>> However due to patient's body habitus/obesity, this could be a false negative finding>> Recommend CTA Abdomen to rule out Renal artery stenosis;  - Recommend stopping Chlorthalidone and starting Lasix 40mg PO Daily (more potent diuretic)   - Recommend stopping Doxazosin and starting Clonidine patch 0.1mg (longer acting effect)    Recommendations discussed w/ Dr. Abernathy;       55 yo F with PMH of HTN, DM2, CVA (2017) w/ residual L sided paresis admitted for acute RLE cellulitis.   During hospital stay, found to have multiple punctate infarcts suspected to be cardioembolic vs vasculitis  Patient on found to have sharp waves on vEEG currently on AED's.  Cardiology consulted for Resistant Hypertension.     # Resistant HTN - Uncontrolled on x5 BP meds  # Paroxysmal A.fib - Rate controlled   # Positive vEEG - On AED  # CVA  # Poor PO intake - s/p PEG placement    - Currently on Labetalol 600mg TID, Chlorthalidone 25mg QD, Losartan 100mg QD, Hydralazine 100mg q8 & Doxazosin 2mg QD; BP remains above target goal  - Unlikely hyperaldosteronism: Aldosterone: 6.2 / Renin 8.8 / K wnl  - Kidney Duplex negative for renal artery stenosis>> However due to patient's body habitus/obesity, image quality may be suboptimal >> Recommend CTA Abdomen to rule out renal artery stenosis  - Recommend stopping Chlorthalidone and starting Lasix 40mg PO daily (more potent diuretic)  - Recommend stopping Doxazosin and starting Clonidine patch 0.1mg (longer acting effect)    Recommendations discussed w/ Dr. Abernathy.

## 2022-08-30 NOTE — PROGRESS NOTE ADULT - SUBJECTIVE AND OBJECTIVE BOX
LENGTH OF HOSPITAL STAY: 28d      CHIEF COMPLAINT: Patient is a 56y old  Female who presents with a chief complaint of stroke; cellulitis (29 Aug 2022 16:11)      HISTORY OF PRESENTING ILLNESS:   57 yo F with PMH of HTN, DM2, and stroke (per son 2017) who presented for RLE wound. Started 3 months ago when she fell and hit her leg on a wooden cabinet. She put hydrogen peroxide, antibiotic cream, and wrapped the wound but never fully healed. Two weeks ago it started to show yellow pus so her and her family came to the ED for further treatment. Endorsed subjective fevers, chest pain, and vision changes which occurs when walked 2-3 blocks. Denied congestion, sore throat, cough, dyspnea, headache, dysuria, N/V, diarrhea     Per son, they fill her medications at [facility] ([phone]) and this is their current pharmacy. Called them to confirm her medications and they said her last refill of medications was 2018. Pt has not seen a doctor due to insurance problem and has not been taking any medications.    In the ED:  Vitals: T: 98.9, BP: 296/ 174, , RR: 18, 98%O2 on RA  Labs: CBC showed WBC 11.23; ESR 92, CRP 35.6  CMP showed glucose 302, alk phos 173; ; VBG pH 7.45  EKG pending  CXR showed borderline cardiomegaly and no airspace opacity.   X-ray of RLE also pending.     Received unasyn and vanco, humalin R, IV vasotec, IV labetalol   (02 Aug 2022 07:47)    PAST MEDICAL & SURGICAL HISTORY  PAST MEDICAL & SURGICAL HISTORY:  Hypertension      Diabetes mellitus      No significant past surgical history            REVIEW OF SYSTEMS  Negative, except as in Physical exam    ALLERGIES:  No Known Allergies    MEDICATIONS:  STANDING MEDICATIONS  apixaban 5 milliGRAM(s) Oral every 12 hours  atorvastatin 80 milliGRAM(s) Oral at bedtime  chlorthalidone 25 milliGRAM(s) Oral daily  collagenase Ointment 1 Application(s) Topical two times a day  Dakins Solution - 1/2 Strength 1 Application(s) Topical two times a day  dextrose 5%. 1000 milliLiter(s) IV Continuous <Continuous>  dextrose 5%. 1000 milliLiter(s) IV Continuous <Continuous>  dextrose 50% Injectable 25 Gram(s) IV Push once  dextrose 50% Injectable 12.5 Gram(s) IV Push once  dextrose 50% Injectable 25 Gram(s) IV Push once  doxazosin 2 milliGRAM(s) Oral at bedtime  glucagon  Injectable 1 milliGRAM(s) IntraMuscular once  hydrALAZINE 100 milliGRAM(s) Oral every 8 hours  insulin glargine Injectable (LANTUS) 22 Unit(s) SubCutaneous every morning  insulin lispro (ADMELOG) corrective regimen sliding scale   SubCutaneous three times a day before meals  insulin lispro Injectable (ADMELOG) 5 Unit(s) SubCutaneous three times a day before meals  labetalol 600 milliGRAM(s) Oral three times a day  lacosamide IVPB 50 milliGRAM(s) IV Intermittent every 12 hours  levETIRAcetam  Solution 1000 milliGRAM(s) Oral every 12 hours  lidocaine 1%/epinephrine 1:100,000 Inj 20 milliLiter(s) Local Injection once  losartan 100 milliGRAM(s) Oral daily  multivitamin/minerals/iron Oral Solution (CENTRUM) 15 milliLiter(s) Oral daily  pantoprazole    Tablet 40 milliGRAM(s) Oral before breakfast  sodium bicarbonate 650 milliGRAM(s) Oral every 6 hours  sodium chloride 0.65% Nasal 2 Spray(s) Both Nostrils two times a day      PRN MEDICATIONS  acetaminophen     Tablet .. 650 milliGRAM(s) Oral every 6 hours PRN  dextrose Oral Gel 15 Gram(s) Oral once PRN  labetalol Injectable 10 milliGRAM(s) IV Push every 6 hours PRN  LORazepam     Tablet 1 milliGRAM(s) Oral once PRN  melatonin 3 milliGRAM(s) Oral at bedtime PRN    VITALS:   T(F): 97.3  HR: 65  BP: 188/86  RR: 18  SpO2: --    PHYSICAL EXAM:  General: No acute distress.  AOx  HEENT: Sclear in clear;  PULM: Clear to auscultation bilaterally.  CVS: RRR; No murmurs  Abdomen: Soft, nondistended, nontender.  Pelvis:  Extremities: No edema, nontender; Peripheral pulse  SKIN: Warm and well perfused, no rashes noted.  PSYCH:   NEURO:    LABS:                        10.0   8.63  )-----------( 344      ( 29 Aug 2022 06:29 )             29.5     08-29    140  |  102  |  32<H>  ----------------------------<  112<H>  4.2   |  26  |  1.0    Ca    9.7      29 Aug 2022 06:29  Mg     1.8     08-29    TPro  6.5  /  Alb  3.5  /  TBili  0.3  /  DBili  x   /  AST  11  /  ALT  14  /  AlkPhos  134<H>  08-29         LENGTH OF HOSPITAL STAY: 28d      CHIEF COMPLAINT: Patient is a 56y old  Female who presents with a chief complaint of stroke; cellulitis (29 Aug 2022 16:11)      HISTORY OF PRESENTING ILLNESS:   57 yo F with PMH of HTN, DM2, and stroke (per son 2017) who presented for RLE wound. Started 3 months ago when she fell and hit her leg on a wooden cabinet. She put hydrogen peroxide, antibiotic cream, and wrapped the wound but never fully healed. Two weeks ago it started to show yellow pus so her and her family came to the ED for further treatment. Endorsed subjective fevers, chest pain, and vision changes which occurs when walked 2-3 blocks. Denied congestion, sore throat, cough, dyspnea, headache, dysuria, N/V, diarrhea     Per son, they fill her medications at [facility] ([phone]) and this is their current pharmacy. Called them to confirm her medications and they said her last refill of medications was 2018. Pt has not seen a doctor due to insurance problem and has not been taking any medications.    In the ED:  Vitals: T: 98.9, BP: 296/ 174, , RR: 18, 98%O2 on RA  Labs: CBC showed WBC 11.23; ESR 92, CRP 35.6  CMP showed glucose 302, alk phos 173; ; VBG pH 7.45  EKG pending  CXR showed borderline cardiomegaly and no airspace opacity.   X-ray of RLE also pending.     Received unasyn and vanco, humalin R, IV vasotec, IV labetalol   (02 Aug 2022 07:47)    PAST MEDICAL & SURGICAL HISTORY  PAST MEDICAL & SURGICAL HISTORY:  Hypertension      Diabetes mellitus      No significant past surgical history            REVIEW OF SYSTEMS  Negative, except as in Physical exam    ALLERGIES:  No Known Allergies    MEDICATIONS:  STANDING MEDICATIONS  apixaban 5 milliGRAM(s) Oral every 12 hours  atorvastatin 80 milliGRAM(s) Oral at bedtime  chlorthalidone 25 milliGRAM(s) Oral daily  collagenase Ointment 1 Application(s) Topical two times a day  Dakins Solution - 1/2 Strength 1 Application(s) Topical two times a day  dextrose 5%. 1000 milliLiter(s) IV Continuous <Continuous>  dextrose 5%. 1000 milliLiter(s) IV Continuous <Continuous>  dextrose 50% Injectable 25 Gram(s) IV Push once  dextrose 50% Injectable 12.5 Gram(s) IV Push once  dextrose 50% Injectable 25 Gram(s) IV Push once  doxazosin 2 milliGRAM(s) Oral at bedtime  glucagon  Injectable 1 milliGRAM(s) IntraMuscular once  hydrALAZINE 100 milliGRAM(s) Oral every 8 hours  insulin glargine Injectable (LANTUS) 22 Unit(s) SubCutaneous every morning  insulin lispro (ADMELOG) corrective regimen sliding scale   SubCutaneous three times a day before meals  insulin lispro Injectable (ADMELOG) 5 Unit(s) SubCutaneous three times a day before meals  labetalol 600 milliGRAM(s) Oral three times a day  lacosamide IVPB 50 milliGRAM(s) IV Intermittent every 12 hours  levETIRAcetam  Solution 1000 milliGRAM(s) Oral every 12 hours  lidocaine 1%/epinephrine 1:100,000 Inj 20 milliLiter(s) Local Injection once  losartan 100 milliGRAM(s) Oral daily  multivitamin/minerals/iron Oral Solution (CENTRUM) 15 milliLiter(s) Oral daily  pantoprazole    Tablet 40 milliGRAM(s) Oral before breakfast  sodium bicarbonate 650 milliGRAM(s) Oral every 6 hours  sodium chloride 0.65% Nasal 2 Spray(s) Both Nostrils two times a day      PRN MEDICATIONS  acetaminophen     Tablet .. 650 milliGRAM(s) Oral every 6 hours PRN  dextrose Oral Gel 15 Gram(s) Oral once PRN  labetalol Injectable 10 milliGRAM(s) IV Push every 6 hours PRN  LORazepam     Tablet 1 milliGRAM(s) Oral once PRN  melatonin 3 milliGRAM(s) Oral at bedtime PRN    VITALS:   T(F): 97.3  HR: 65  BP: 188/86  RR: 18  SpO2: --    PHYSICAL EXAM:  General: No acute distress.  AOx1-2  HEENT: Sclear in clear;  PULM: Harsh breath sounds bilaterally;   CVS: Abnormal heart sounds;   Abdomen: Soft,distended  Extremities: No edema; RLE wrapped    LABS:                        10.0   8.63  )-----------( 344      ( 29 Aug 2022 06:29 )             29.5     08-29    140  |  102  |  32<H>  ----------------------------<  112<H>  4.2   |  26  |  1.0    Ca    9.7      29 Aug 2022 06:29  Mg     1.8     08-29    TPro  6.5  /  Alb  3.5  /  TBili  0.3  /  DBili  x   /  AST  11  /  ALT  14  /  AlkPhos  134<H>  08-29         LENGTH OF HOSPITAL STAY: 28d      CHIEF COMPLAINT: Patient is a 56y old  Female who presents with a chief complaint of stroke; cellulitis (29 Aug 2022 16:11)      HISTORY OF PRESENTING ILLNESS:   55 yo F with PMH of HTN, DM2, and stroke (per son 2017) who presented for RLE wound. Started 3 months ago when she fell and hit her leg on a wooden cabinet. She put hydrogen peroxide, antibiotic cream, and wrapped the wound but never fully healed. Two weeks ago it started to show yellow pus so her and her family came to the ED for further treatment. Endorsed subjective fevers, chest pain, and vision changes which occurs when walked 2-3 blocks. Denied congestion, sore throat, cough, dyspnea, headache, dysuria, N/V, diarrhea     Per son, they fill her medications at [facility] ([phone]) and this is their current pharmacy. Called them to confirm her medications and they said her last refill of medications was 2018. Pt has not seen a doctor due to insurance problem and has not been taking any medications.      PAST MEDICAL & SURGICAL HISTORY  PAST MEDICAL & SURGICAL HISTORY:  Hypertension  Diabetes mellitus  No significant past surgical history    MEDICATIONS:  STANDING MEDICATIONS  apixaban 5 milliGRAM(s) Oral every 12 hours  atorvastatin 80 milliGRAM(s) Oral at bedtime  chlorthalidone 25 milliGRAM(s) Oral daily  collagenase Ointment 1 Application(s) Topical two times a day  Dakins Solution - 1/2 Strength 1 Application(s) Topical two times a day  dextrose 5%. 1000 milliLiter(s) IV Continuous <Continuous>  dextrose 5%. 1000 milliLiter(s) IV Continuous <Continuous>  dextrose 50% Injectable 25 Gram(s) IV Push once  dextrose 50% Injectable 12.5 Gram(s) IV Push once  dextrose 50% Injectable 25 Gram(s) IV Push once  doxazosin 2 milliGRAM(s) Oral at bedtime  glucagon  Injectable 1 milliGRAM(s) IntraMuscular once  hydrALAZINE 100 milliGRAM(s) Oral every 8 hours  insulin glargine Injectable (LANTUS) 22 Unit(s) SubCutaneous every morning  insulin lispro (ADMELOG) corrective regimen sliding scale   SubCutaneous three times a day before meals  insulin lispro Injectable (ADMELOG) 5 Unit(s) SubCutaneous three times a day before meals  labetalol 600 milliGRAM(s) Oral three times a day  lacosamide IVPB 50 milliGRAM(s) IV Intermittent every 12 hours  levETIRAcetam  Solution 1000 milliGRAM(s) Oral every 12 hours  lidocaine 1%/epinephrine 1:100,000 Inj 20 milliLiter(s) Local Injection once  losartan 100 milliGRAM(s) Oral daily  multivitamin/minerals/iron Oral Solution (CENTRUM) 15 milliLiter(s) Oral daily  pantoprazole    Tablet 40 milliGRAM(s) Oral before breakfast  sodium bicarbonate 650 milliGRAM(s) Oral every 6 hours  sodium chloride 0.65% Nasal 2 Spray(s) Both Nostrils two times a day      PRN MEDICATIONS  acetaminophen     Tablet .. 650 milliGRAM(s) Oral every 6 hours PRN  dextrose Oral Gel 15 Gram(s) Oral once PRN  labetalol Injectable 10 milliGRAM(s) IV Push every 6 hours PRN  LORazepam     Tablet 1 milliGRAM(s) Oral once PRN  melatonin 3 milliGRAM(s) Oral at bedtime PRN    VITALS:   T(F): 97.3  HR: 65  BP: 188/86  RR: 18  SpO2: --    PHYSICAL EXAM:  General: NAD, does not respond to commands  PULM: Harsh breath sounds bilaterally;  CVS: irregular  Abdomen: Soft, distended  Extremities: No edema; RLE wrapped    LABS:                        10.0   8.63  )-----------( 344      ( 29 Aug 2022 06:29 )             29.5     08-29    140  |  102  |  32<H>  ----------------------------<  112<H>  4.2   |  26  |  1.0    Ca    9.7      29 Aug 2022 06:29  Mg     1.8     08-29    TPro  6.5  /  Alb  3.5  /  TBili  0.3  /  DBili  x   /  AST  11  /  ALT  14  /  AlkPhos  134<H>  08-29

## 2022-08-30 NOTE — CONSULT NOTE ADULT - SUBJECTIVE AND OBJECTIVE BOX
Neuroendovascular Consult:   Consulted for:  Diagnostic DSA to r/o Vasculitis    Interval History:   A neuroendovascular consult was placed to consider a diagnostic cerebral angiogram in order to help rule out vasculitis. On exam the patient was nonverbal, not following commands, EO to voice and tactile stimulation. MRI 8/10 reported scattered  punctate infarcts of the corpus callosum, periventricular white matter, and right frontal white matter and superior temporal gyrus.    Interval HPI:   PAST MEDICAL & SURGICAL HISTORY:  Hypertension  Diabetes mellitus  No significant past surgical history    MEDICATIONS  (STANDING):  atorvastatin 80 milliGRAM(s) Oral at bedtime  chlorthalidone 25 milliGRAM(s) Oral daily  collagenase Ointment 1 Application(s) Topical two times a day  Dakins Solution - 1/2 Strength 1 Application(s) Topical two times a day  dextrose 5%. 1000 milliLiter(s) (100 mL/Hr) IV Continuous <Continuous>  dextrose 5%. 1000 milliLiter(s) (50 mL/Hr) IV Continuous <Continuous>  dextrose 50% Injectable 25 Gram(s) IV Push once  dextrose 50% Injectable 12.5 Gram(s) IV Push once  dextrose 50% Injectable 25 Gram(s) IV Push once  doxazosin 2 milliGRAM(s) Oral at bedtime  glucagon  Injectable 1 milliGRAM(s) IntraMuscular once  hydrALAZINE 100 milliGRAM(s) Oral every 8 hours  insulin glargine Injectable (LANTUS) 22 Unit(s) SubCutaneous every morning  insulin lispro (ADMELOG) corrective regimen sliding scale   SubCutaneous three times a day before meals  insulin lispro Injectable (ADMELOG) 5 Unit(s) SubCutaneous three times a day before meals  labetalol 600 milliGRAM(s) Oral three times a day  lacosamide IVPB 50 milliGRAM(s) IV Intermittent every 12 hours  levETIRAcetam  Solution 1000 milliGRAM(s) Oral every 12 hours  lidocaine 1%/epinephrine 1:100,000 Inj 20 milliLiter(s) Local Injection once  losartan 100 milliGRAM(s) Oral daily  multivitamin/minerals/iron Oral Solution (CENTRUM) 15 milliLiter(s) Oral daily  pantoprazole    Tablet 40 milliGRAM(s) Oral before breakfast  sodium bicarbonate 650 milliGRAM(s) Oral every 6 hours  sodium chloride 0.65% Nasal 2 Spray(s) Both Nostrils two times a day    MEDICATIONS  (PRN):  acetaminophen     Tablet .. 650 milliGRAM(s) Oral every 6 hours PRN Temp greater or equal to 38C (100.4F), Mild Pain (1 - 3)  dextrose Oral Gel 15 Gram(s) Oral once PRN Blood Glucose LESS THAN 70 milliGRAM(s)/deciliter  labetalol Injectable 10 milliGRAM(s) IV Push every 6 hours PRN Systolic blood pressure >  LORazepam     Tablet 1 milliGRAM(s) Oral once PRN Agitation  melatonin 3 milliGRAM(s) Oral at bedtime PRN Insomnia    Allergies  No Known Allergies    FAMILY HISTORY:  FH: type 2 diabetes mellitus    Physical Exam:   Vital Signs Last 24 Hrs  T(C): 38.7 (30 Aug 2022 20:00), Max: 38.7 (30 Aug 2022 20:00)  T(F): 101.6 (30 Aug 2022 20:00), Max: 101.6 (30 Aug 2022 20:00)  HR: 88 (30 Aug 2022 20:00) (60 - 88)  BP: 186/93 (30 Aug 2022 20:00) (132/61 - 217/105)  BP(mean): 130 (30 Aug 2022 05:22) (98 - 130)  RR: 18 (30 Aug 2022 15:30) (18 - 18)  SpO2: 96% (30 Aug 2022 15:30) (96% - 96%)    Parameters below as of 30 Aug 2022 14:00  Patient On (Oxygen Delivery Method): room air    General: Appears lethargic, slumped to side.  Neuro:   NIHSS     Labs:                         10.1   10.87 )-----------( 338      ( 30 Aug 2022 18:03 )             29.3     08-30    135  |  96<L>  |  48<H>  ----------------------------<  185<H>  4.9   |  26  |  1.6<H>    Ca    9.8      30 Aug 2022 18:03  Mg     1.8     08-29    TPro  6.8  /  Alb  3.7  /  TBili  0.2  /  DBili  x   /  AST  11  /  ALT  13  /  AlkPhos  156<H>  08-30        Pertinent labs:                      10.1   10.87 )-----------( 338      ( 30 Aug 2022 18:03 )             29.3       08-30    135  |  96<L>  |  48<H>  ----------------------------<  185<H>  4.9   |  26  |  1.6<H>    Ca    9.8      30 Aug 2022 18:03  Mg     1.8     08-29    TPro  6.8  /  Alb  3.7  /  TBili  0.2  /  DBili  x   /  AST  11  /  ALT  13  /  AlkPhos  156<H>  08-30          Radiology & Additional Studies:   Radiology imaging reviewed.       ASSESSMENT/ PLAN:       Suggestions:   -   -   -     Risks, benefits, and alternatives to treatment discussed. All questions answered with understanding.   Thank you for the courtesy of this consult, please call [phone] [phone] with any further questions.    Neuroendovascular Consult:   Consulted for:  Diagnostic DSA to r/o Vasculitis    Interval History:   A neuroendovascular consult was placed to consider a diagnostic cerebral angiogram in order to help rule out vasculitis. On exam the patient was nonverbal, not following commands, EO to voice and tactile stimulation. MRI 8/10 reported scattered  punctate infarcts of the corpus callosum, periventricular white matter, and right frontal white matter and superior temporal gyrus. Patient today has been febrile and hypertensive with SBP in the 200's.    Interval HPI:   PAST MEDICAL & SURGICAL HISTORY:  Hypertension  Diabetes mellitus  No significant past surgical history    MEDICATIONS  (STANDING):  atorvastatin 80 milliGRAM(s) Oral at bedtime  chlorthalidone 25 milliGRAM(s) Oral daily  collagenase Ointment 1 Application(s) Topical two times a day  Dakins Solution - 1/2 Strength 1 Application(s) Topical two times a day  dextrose 5%. 1000 milliLiter(s) (100 mL/Hr) IV Continuous <Continuous>  dextrose 5%. 1000 milliLiter(s) (50 mL/Hr) IV Continuous <Continuous>  dextrose 50% Injectable 25 Gram(s) IV Push once  dextrose 50% Injectable 12.5 Gram(s) IV Push once  dextrose 50% Injectable 25 Gram(s) IV Push once  doxazosin 2 milliGRAM(s) Oral at bedtime  glucagon  Injectable 1 milliGRAM(s) IntraMuscular once  hydrALAZINE 100 milliGRAM(s) Oral every 8 hours  insulin glargine Injectable (LANTUS) 22 Unit(s) SubCutaneous every morning  insulin lispro (ADMELOG) corrective regimen sliding scale   SubCutaneous three times a day before meals  insulin lispro Injectable (ADMELOG) 5 Unit(s) SubCutaneous three times a day before meals  labetalol 600 milliGRAM(s) Oral three times a day  lacosamide IVPB 50 milliGRAM(s) IV Intermittent every 12 hours  levETIRAcetam  Solution 1000 milliGRAM(s) Oral every 12 hours  lidocaine 1%/epinephrine 1:100,000 Inj 20 milliLiter(s) Local Injection once  losartan 100 milliGRAM(s) Oral daily  multivitamin/minerals/iron Oral Solution (CENTRUM) 15 milliLiter(s) Oral daily  pantoprazole    Tablet 40 milliGRAM(s) Oral before breakfast  sodium bicarbonate 650 milliGRAM(s) Oral every 6 hours  sodium chloride 0.65% Nasal 2 Spray(s) Both Nostrils two times a day    MEDICATIONS  (PRN):  acetaminophen     Tablet .. 650 milliGRAM(s) Oral every 6 hours PRN Temp greater or equal to 38C (100.4F), Mild Pain (1 - 3)  dextrose Oral Gel 15 Gram(s) Oral once PRN Blood Glucose LESS THAN 70 milliGRAM(s)/deciliter  labetalol Injectable 10 milliGRAM(s) IV Push every 6 hours PRN Systolic blood pressure >  LORazepam     Tablet 1 milliGRAM(s) Oral once PRN Agitation  melatonin 3 milliGRAM(s) Oral at bedtime PRN Insomnia    Allergies  No Known Allergies    FAMILY HISTORY:  FH: type 2 diabetes mellitus    Physical Exam:   Vital Signs Last 24 Hrs  T(C): 38.7 (30 Aug 2022 20:00), Max: 38.7 (30 Aug 2022 20:00)  T(F): 101.6 (30 Aug 2022 20:00), Max: 101.6 (30 Aug 2022 20:00)  HR: 88 (30 Aug 2022 20:00) (60 - 88)  BP: 186/93 (30 Aug 2022 20:00) (132/61 - 217/105)  BP(mean): 130 (30 Aug 2022 05:22) (98 - 130)  RR: 18 (30 Aug 2022 15:30) (18 - 18)  SpO2: 96% (30 Aug 2022 15:30) (96% - 96%)    Parameters below as of 30 Aug 2022 14:00  Patient On (Oxygen Delivery Method): room air    General: Appears lethargic, slumped to side.  Neuro: EO to voice, tactile stimuli, not alert, nonverbal, face symmetrical, PERRL, blinks to threat, WD x4 extremities.  Abdomen: Nontender, nondistended.  Extremities: No pedal edema, distal pulses intact.    Labs:                         10.1   10.87 )-----------( 338      ( 30 Aug 2022 18:03 )             29.3     08-30    135  |  96<L>  |  48<H>  ----------------------------<  185<H>  4.9   |  26  |  1.6<H>    Ca    9.8      30 Aug 2022 18:03  Mg     1.8     08-29    TPro  6.8  /  Alb  3.7  /  TBili  0.2  /  DBili  x   /  AST  11  /  ALT  13  /  AlkPhos  156<H>  08-30    Pertinent labs:                      10.1   10.87 )-----------( 338      ( 30 Aug 2022 18:03 )             29.3     08-30    135  |  96<L>  |  48<H>  ----------------------------<  185<H>  4.9   |  26  |  1.6<H>    Ca    9.8      30 Aug 2022 18:03  Mg     1.8     08-29    TPro  6.8  /  Alb  3.7  /  TBili  0.2  /  DBili  x   /  AST  11  /  ALT  13  /  AlkPhos  156<H>  08-30    Radiology & Additional Studies:   Radiology imaging reviewed.     Assessment:  Patient is a 56-year-old female with a PMHx of DM2, HTN, and prior stroke (2017) who initially presented 8/2/22 for a lower extremity wound. The patient's son, Latrell, was called ([phone]) for collateral information. Per son, the patient hit her leg on a cabinet and the wound never healed, prompting her to visit the ED. On arrival, she was noted to also be hypertensive and reported subjective fever. Per son, the patient has residual weakness from her prior stroke. Stroke code was called days later for AMS with an NIHSS 23 (no tpa because patient returned to baseline). MRI 8/10 reported scattered  punctate infarcts of the corpus callosum, periventricular white matter, and right frontal white matter and superior temporal gyrus. The neuroendovascular team was consulted to consider a diagnostic cerebral angiogram so as to rule out vasculitis. On exam the patient was lethargic, not following commands or speaking, but with EO to voice and withdrawal in all extremities. The patient has been hypertensive today with /105,  and febrile at 101.6, with increasing lethargy.     Suggestions:   - The patient is tentatively scheduled for a diagnostic cerebral angiogram tomorrow afternoon with Dr. Gil. Please keep NPO except medication and on IV fluid after midnight tonight in preparation for the procedure. Repeat coag panel recommended. Covid - 8/26.  - Patients' son, Latrell, was informed of plans and will be called for consent tomorrow.  [phone] with questions or concerns.    Risks, benefits, and alternatives to treatment discussed. All questions answered with understanding.   Thank you for the courtesy of this consult.    Neuroendovascular Consult:   Consulted for:  Diagnostic DSA to r/o Vasculitis    Interval History:   A neuroendovascular consult was placed to consider a diagnostic cerebral angiogram in order to help rule out vasculitis. On exam the patient was nonverbal, not following commands, EO to voice and tactile stimulation. MRI 8/10 reported scattered  punctate infarcts of the corpus callosum, periventricular white matter, and right frontal white matter and superior temporal gyrus. Patient today has been febrile and hypertensive with SBP in the 200's.    Interval HPI:   PAST MEDICAL & SURGICAL HISTORY:  Hypertension  Diabetes mellitus  No significant past surgical history    MEDICATIONS  (STANDING):  atorvastatin 80 milliGRAM(s) Oral at bedtime  chlorthalidone 25 milliGRAM(s) Oral daily  collagenase Ointment 1 Application(s) Topical two times a day  Dakins Solution - 1/2 Strength 1 Application(s) Topical two times a day  dextrose 5%. 1000 milliLiter(s) (100 mL/Hr) IV Continuous <Continuous>  dextrose 5%. 1000 milliLiter(s) (50 mL/Hr) IV Continuous <Continuous>  dextrose 50% Injectable 25 Gram(s) IV Push once  dextrose 50% Injectable 12.5 Gram(s) IV Push once  dextrose 50% Injectable 25 Gram(s) IV Push once  doxazosin 2 milliGRAM(s) Oral at bedtime  glucagon  Injectable 1 milliGRAM(s) IntraMuscular once  hydrALAZINE 100 milliGRAM(s) Oral every 8 hours  insulin glargine Injectable (LANTUS) 22 Unit(s) SubCutaneous every morning  insulin lispro (ADMELOG) corrective regimen sliding scale   SubCutaneous three times a day before meals  insulin lispro Injectable (ADMELOG) 5 Unit(s) SubCutaneous three times a day before meals  labetalol 600 milliGRAM(s) Oral three times a day  lacosamide IVPB 50 milliGRAM(s) IV Intermittent every 12 hours  levETIRAcetam  Solution 1000 milliGRAM(s) Oral every 12 hours  lidocaine 1%/epinephrine 1:100,000 Inj 20 milliLiter(s) Local Injection once  losartan 100 milliGRAM(s) Oral daily  multivitamin/minerals/iron Oral Solution (CENTRUM) 15 milliLiter(s) Oral daily  pantoprazole    Tablet 40 milliGRAM(s) Oral before breakfast  sodium bicarbonate 650 milliGRAM(s) Oral every 6 hours  sodium chloride 0.65% Nasal 2 Spray(s) Both Nostrils two times a day    MEDICATIONS  (PRN):  acetaminophen     Tablet .. 650 milliGRAM(s) Oral every 6 hours PRN Temp greater or equal to 38C (100.4F), Mild Pain (1 - 3)  dextrose Oral Gel 15 Gram(s) Oral once PRN Blood Glucose LESS THAN 70 milliGRAM(s)/deciliter  labetalol Injectable 10 milliGRAM(s) IV Push every 6 hours PRN Systolic blood pressure >  LORazepam     Tablet 1 milliGRAM(s) Oral once PRN Agitation  melatonin 3 milliGRAM(s) Oral at bedtime PRN Insomnia    Allergies  No Known Allergies    FAMILY HISTORY:  FH: type 2 diabetes mellitus    Physical Exam:   Vital Signs Last 24 Hrs  T(C): 38.7 (30 Aug 2022 20:00), Max: 38.7 (30 Aug 2022 20:00)  T(F): 101.6 (30 Aug 2022 20:00), Max: 101.6 (30 Aug 2022 20:00)  HR: 88 (30 Aug 2022 20:00) (60 - 88)  BP: 186/93 (30 Aug 2022 20:00) (132/61 - 217/105)  BP(mean): 130 (30 Aug 2022 05:22) (98 - 130)  RR: 18 (30 Aug 2022 15:30) (18 - 18)  SpO2: 96% (30 Aug 2022 15:30) (96% - 96%)    Parameters below as of 30 Aug 2022 14:00  Patient On (Oxygen Delivery Method): room air    General: Appears lethargic, slumped to side.  Neuro: EO to voice, tactile stimuli, not alert, nonverbal, face symmetrical, PERRL, blinks to threat, WD x4 extremities.  Abdomen: Nontender, nondistended.  Extremities: Distal pulses intact.    Labs:                         10.1   10.87 )-----------( 338      ( 30 Aug 2022 18:03 )             29.3     08-30    135  |  96<L>  |  48<H>  ----------------------------<  185<H>  4.9   |  26  |  1.6<H>    Ca    9.8      30 Aug 2022 18:03  Mg     1.8     08-29    TPro  6.8  /  Alb  3.7  /  TBili  0.2  /  DBili  x   /  AST  11  /  ALT  13  /  AlkPhos  156<H>  08-30    Pertinent labs:                      10.1   10.87 )-----------( 338      ( 30 Aug 2022 18:03 )             29.3     08-30    135  |  96<L>  |  48<H>  ----------------------------<  185<H>  4.9   |  26  |  1.6<H>    Ca    9.8      30 Aug 2022 18:03  Mg     1.8     08-29    TPro  6.8  /  Alb  3.7  /  TBili  0.2  /  DBili  x   /  AST  11  /  ALT  13  /  AlkPhos  156<H>  08-30    Radiology & Additional Studies:   Radiology imaging reviewed.     Assessment:  Patient is a 56-year-old female with a PMHx of DM2, HTN, and prior stroke (2017) who initially presented 8/2/22 for a lower extremity wound. The patient's son, Latrell, was called ([phone]) for collateral information. Per son, the patient hit her leg on a cabinet and the wound never healed, prompting her to visit the ED. On arrival, she was noted to also be hypertensive and reported subjective fever. Per son, the patient has residual weakness from her prior stroke. Stroke code was called days later for AMS with an NIHSS 23 (no tpa because patient returned to baseline). MRI 8/10 reported scattered  punctate infarcts of the corpus callosum, periventricular white matter, and right frontal white matter and superior temporal gyrus. The neuroendovascular team was consulted to consider a diagnostic cerebral angiogram so as to rule out vasculitis. On exam the patient was lethargic, not following commands or speaking, but with EO to voice and withdrawal in all extremities. The patient has been hypertensive today with /105,  and febrile at 101.6, with increasing lethargy.     Suggestions:   - The patient is tentatively scheduled for a diagnostic cerebral angiogram tomorrow afternoon with Dr. Gil. Please keep NPO except medication and on IV fluid after midnight tonight in preparation for the procedure. Repeat coag panel recommended. Covid - 8/26.  - Patients' son, Latrell, was informed of plans and will be called for consent tomorrow.  [phone] with questions or concerns.    Risks, benefits, and alternatives to treatment discussed. All questions answered with understanding.   Thank you for the courtesy of this consult.

## 2022-08-30 NOTE — PROGRESS NOTE ADULT - SUBJECTIVE AND OBJECTIVE BOX
Neurology Progress Note    Interval History:    Patient was seen and examined, no acute event over night.     Medications:  acetaminophen     Tablet .. 650 milliGRAM(s) Oral every 6 hours PRN  apixaban 5 milliGRAM(s) Oral every 12 hours  atorvastatin 80 milliGRAM(s) Oral at bedtime  chlorthalidone 25 milliGRAM(s) Oral daily  collagenase Ointment 1 Application(s) Topical two times a day  Dakins Solution - 1/2 Strength 1 Application(s) Topical two times a day  dextrose 5%. 1000 milliLiter(s) IV Continuous <Continuous>  dextrose 5%. 1000 milliLiter(s) IV Continuous <Continuous>  dextrose 50% Injectable 25 Gram(s) IV Push once  dextrose 50% Injectable 12.5 Gram(s) IV Push once  dextrose 50% Injectable 25 Gram(s) IV Push once  dextrose Oral Gel 15 Gram(s) Oral once PRN  doxazosin 2 milliGRAM(s) Oral at bedtime  glucagon  Injectable 1 milliGRAM(s) IntraMuscular once  hydrALAZINE 100 milliGRAM(s) Oral every 8 hours  insulin glargine Injectable (LANTUS) 22 Unit(s) SubCutaneous every morning  insulin lispro (ADMELOG) corrective regimen sliding scale   SubCutaneous three times a day before meals  insulin lispro Injectable (ADMELOG) 5 Unit(s) SubCutaneous three times a day before meals  labetalol 600 milliGRAM(s) Oral three times a day  labetalol Injectable 10 milliGRAM(s) IV Push every 6 hours PRN  lacosamide IVPB 50 milliGRAM(s) IV Intermittent every 12 hours  levETIRAcetam  Solution 1000 milliGRAM(s) Oral every 12 hours  lidocaine 1%/epinephrine 1:100,000 Inj 20 milliLiter(s) Local Injection once  LORazepam     Tablet 1 milliGRAM(s) Oral once PRN  losartan 100 milliGRAM(s) Oral daily  melatonin 3 milliGRAM(s) Oral at bedtime PRN  multivitamin/minerals/iron Oral Solution (CENTRUM) 15 milliLiter(s) Oral daily  pantoprazole    Tablet 40 milliGRAM(s) Oral before breakfast  sodium bicarbonate 650 milliGRAM(s) Oral every 6 hours  sodium chloride 0.65% Nasal 2 Spray(s) Both Nostrils two times a day      Vital Signs Last 24 Hrs  T(C): 38.3 (30 Aug 2022 14:00), Max: 38.3 (30 Aug 2022 14:00)  T(F): 100.9 (30 Aug 2022 14:00), Max: 100.9 (30 Aug 2022 14:00)  HR: 76 (30 Aug 2022 14:00) (60 - 76)  BP: 217/105 (30 Aug 2022 14:00) (136/72 - 217/105)  BP(mean): 130 (30 Aug 2022 05:22) (98 - 130)  RR: 18 (30 Aug 2022 14:00) (18 - 18)  SpO2: 96% (30 Aug 2022 14:00) (96% - 96%)    Parameters below as of 30 Aug 2022 14:00  Patient On (Oxygen Delivery Method): room air        Neurological Examination:  General:  Appearance is consistent with chronologic age.   Cognitive/Language:  Awake, not alert, Not oriented to person, place, time and date.  does not speak or follow command. Appeared to be tracking. Withdraw to painful stimuli.   Cranial Nerves  - Eyes: Visual acuity intact, PERRL.  No ptosis/weakness of eyelid closure.    - Face:  no facial asymmetry.    - Ears/Nose/Throat:     Motor examination:  (MRC grade R/L) 3/5 UE; 3/5 LE. Normal tone and bulk. No tenderness, twitching, tremors or involuntary movements.  Sensory examination:  Reflexes:   2+ b/l biceps, triceps, patella and achilles.  Plantar response upgoing b/l. No clonus.   Cerebellum:     Gait: Unable.       Labs:  CBC Full  -  ( 29 Aug 2022 06:29 )  WBC Count : 8.63 K/uL  RBC Count : 3.41 M/uL  Hemoglobin : 10.0 g/dL  Hematocrit : 29.5 %  Platelet Count - Automated : 344 K/uL  Mean Cell Volume : 86.5 fL  Mean Cell Hemoglobin : 29.3 pg  Mean Cell Hemoglobin Concentration : 33.9 g/dL  Auto Neutrophil # : 5.69 K/uL  Auto Lymphocyte # : 2.14 K/uL  Auto Monocyte # : 0.49 K/uL  Auto Eosinophil # : 0.23 K/uL  Auto Basophil # : 0.06 K/uL  Auto Neutrophil % : 65.9 %  Auto Lymphocyte % : 24.8 %  Auto Monocyte % : 5.7 %  Auto Eosinophil % : 2.7 %  Auto Basophil % : 0.7 %    08-    140  |  102  |  32<H>  ----------------------------<  112<H>  4.2   |  26  |  1.0    Ca    9.7      29 Aug 2022 06:  Mg     1.8         TPro  6.5  /  Alb  3.5  /  TBili  0.3  /  DBili  x   /  AST  11  /  ALT  14  /  AlkPhos  134<H>      LIVER FUNCTIONS - ( 29 Aug 2022 06:29 )  Alb: 3.5 g/dL / Pro: 6.5 g/dL / ALK PHOS: 134 U/L / ALT: 14 U/L / AST: 11 U/L / GGT: x                 RADIOLOGY & ADDITIONAL TESTS:  Protein Elect CSF Serum Part (22 @ 11:36)    IgG Index: 0.5    Albumin, Serum: 2356 mg/dL    Quantitative Ig mg/dL    IgG/Albumin Ratio, Serum: 0.46 Ratio      < from: CT Head No Cont (22 @ 17:43) >  IMPRESSION:    1.  No hemorrhage or new infarct    2.  Chronic changes right subinsular region    3.  Multiple small infarcts (MRI August 10, 2022) not clearly visualized    --- End of Report ---    < end of copied text >  < from: CT Head No Cont (22 @ 17:43) >  IMPRESSION:    1.  No hemorrhage or new infarct    2.  Chronic changes right subinsular region    3.  Multiple small infarcts (MRI August 10, 2022) not clearly visualized    --- End of Report ---    < end of copied text >   Neurology Progress Note    Interval History:    Patient was seen and examined, no acute event over night.     Medications:  acetaminophen     Tablet .. 650 milliGRAM(s) Oral every 6 hours PRN  apixaban 5 milliGRAM(s) Oral every 12 hours  atorvastatin 80 milliGRAM(s) Oral at bedtime  chlorthalidone 25 milliGRAM(s) Oral daily  collagenase Ointment 1 Application(s) Topical two times a day  Dakins Solution - 1/2 Strength 1 Application(s) Topical two times a day  dextrose 5%. 1000 milliLiter(s) IV Continuous <Continuous>  dextrose 5%. 1000 milliLiter(s) IV Continuous <Continuous>  dextrose 50% Injectable 25 Gram(s) IV Push once  dextrose 50% Injectable 12.5 Gram(s) IV Push once  dextrose 50% Injectable 25 Gram(s) IV Push once  dextrose Oral Gel 15 Gram(s) Oral once PRN  doxazosin 2 milliGRAM(s) Oral at bedtime  glucagon  Injectable 1 milliGRAM(s) IntraMuscular once  hydrALAZINE 100 milliGRAM(s) Oral every 8 hours  insulin glargine Injectable (LANTUS) 22 Unit(s) SubCutaneous every morning  insulin lispro (ADMELOG) corrective regimen sliding scale   SubCutaneous three times a day before meals  insulin lispro Injectable (ADMELOG) 5 Unit(s) SubCutaneous three times a day before meals  labetalol 600 milliGRAM(s) Oral three times a day  labetalol Injectable 10 milliGRAM(s) IV Push every 6 hours PRN  lacosamide IVPB 50 milliGRAM(s) IV Intermittent every 12 hours  levETIRAcetam  Solution 1000 milliGRAM(s) Oral every 12 hours  lidocaine 1%/epinephrine 1:100,000 Inj 20 milliLiter(s) Local Injection once  LORazepam     Tablet 1 milliGRAM(s) Oral once PRN  losartan 100 milliGRAM(s) Oral daily  melatonin 3 milliGRAM(s) Oral at bedtime PRN  multivitamin/minerals/iron Oral Solution (CENTRUM) 15 milliLiter(s) Oral daily  pantoprazole    Tablet 40 milliGRAM(s) Oral before breakfast  sodium bicarbonate 650 milliGRAM(s) Oral every 6 hours  sodium chloride 0.65% Nasal 2 Spray(s) Both Nostrils two times a day      Vital Signs Last 24 Hrs  T(C): 38.3 (30 Aug 2022 14:00), Max: 38.3 (30 Aug 2022 14:00)  T(F): 100.9 (30 Aug 2022 14:00), Max: 100.9 (30 Aug 2022 14:00)  HR: 76 (30 Aug 2022 14:00) (60 - 76)  BP: 217/105 (30 Aug 2022 14:00) (136/72 - 217/105)  BP(mean): 130 (30 Aug 2022 05:22) (98 - 130)  RR: 18 (30 Aug 2022 14:00) (18 - 18)  SpO2: 96% (30 Aug 2022 14:00) (96% - 96%)    Parameters below as of 30 Aug 2022 14:00  Patient On (Oxygen Delivery Method): room air        Neurological Examination:  General:  Appearance is consistent with chronologic age.   Cognitive/Language:  Awake, not alert, Not oriented to person, place, time and date.  does not speak or follow command. Appeared to be tracking. Withdraw to painful stimuli.   Cranial Nerves  - Eyes: Visual acuity intact, PERRL.  No ptosis/weakness of eyelid closure.    - Face:  no facial asymmetry.    - Ears/Nose/Throat:     Motor examination:  (MRC grade R/L) 3/5 UE; 3/5 LE. Normal tone and bulk. No tenderness, twitching, tremors or involuntary movements.  Sensory examination:  Reflexes:   2+ b/l biceps, triceps, patella and achilles.  Plantar response upgoing b/l. No clonus.   Cerebellum:     Gait: Unable.       Labs:  CBC Full  -  ( 29 Aug 2022 06:29 )  WBC Count : 8.63 K/uL  RBC Count : 3.41 M/uL  Hemoglobin : 10.0 g/dL  Hematocrit : 29.5 %  Platelet Count - Automated : 344 K/uL  Mean Cell Volume : 86.5 fL  Mean Cell Hemoglobin : 29.3 pg  Mean Cell Hemoglobin Concentration : 33.9 g/dL  Auto Neutrophil # : 5.69 K/uL  Auto Lymphocyte # : 2.14 K/uL  Auto Monocyte # : 0.49 K/uL  Auto Eosinophil # : 0.23 K/uL  Auto Basophil # : 0.06 K/uL  Auto Neutrophil % : 65.9 %  Auto Lymphocyte % : 24.8 %  Auto Monocyte % : 5.7 %  Auto Eosinophil % : 2.7 %  Auto Basophil % : 0.7 %    08-    140  |  102  |  32<H>  ----------------------------<  112<H>  4.2   |  26  |  1.0    Ca    9.7      29 Aug 2022 06:29  Mg     1.8         TPro  6.5  /  Alb  3.5  /  TBili  0.3  /  DBili  x   /  AST  11  /  ALT  14  /  AlkPhos  134<H>      LIVER FUNCTIONS - ( 29 Aug 2022 06:29 )  Alb: 3.5 g/dL / Pro: 6.5 g/dL / ALK PHOS: 134 U/L / ALT: 14 U/L / AST: 11 U/L / GGT: x                 RADIOLOGY & ADDITIONAL TESTS:  Protein Elect CSF Serum Part (22 @ 11:36)    IgG Index: 0.5    Albumin, Serum: 2356 mg/dL    Quantitative Ig mg/dL    IgG/Albumin Ratio, Serum: 0.46 Ratio      < from: CT Head No Cont (22 @ 17:43) >  IMPRESSION:    1.  No hemorrhage or new infarct    2.  Chronic changes right subinsular region    3.  Multiple small infarcts (MRI August 10, 2022) not clearly visualized    --- End of Report ---    < end of copied text >  < from: CT Head No Cont (22 @ 17:43) >  IMPRESSION:    1.  No hemorrhage or new infarct    2.  Chronic changes right subinsular region    3.  Multiple small infarcts (MRI August 10, 2022) not clearly visualized    --- End of Report ---    < end of copied text >    VEEG in the last 24 hours:    Background - continuous, symmetrical, less than optimally organized, reaching frequencies in the range of 4-5 Hz that is reactive    Focal and generalized slowin. moderate generalized slowing  2. mild to moderate independent right FT and left FC/frontal slowing    Interictal activity:  1. moderate number of right hemispheric FTC sharp waves, and low amplitude and after going spike   2. small number of left anterior quadrant sharp transients    Events - none    Seizures - none    Impression: Abnormal VEEG as above    Plan - as per neurovascular team

## 2022-08-30 NOTE — PROGRESS NOTE ADULT - ASSESSMENT
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   As for the cellulitis, patient underwent debridement of the ulcer of the right lower extremity, currently off abx.   During hospital stay, found to have multiple punctate infarcts suspected to be cardioembolic vs vasculitis. Patient on found to have sharp waves on vEEG currently on AED's.      Neuro  #scattered punctate acute ischemic strokes in multiple vascular territories, concerning for cardioembolic etiology  #R/o vasculitis vs autoimmune encephalitis:   - c/w Apixaban 5mg BID.   - Continue Atorvastatin 80 mg daily   - q4hr stroke neuro checks and vitals  - IVONNE was denied by cardiology as they found paroxysmal A. fib on tele  - s/p ILR by EP 8/22  - Vasculitic work up neg so far  - LP completed by Ir - see above  - vEEG reported as above  - MRI brain w/wo requested.   - Possible DSA to assess for vasculitis.   - May start IV Solumedrol and IVIG, pending clinical course.   - Encephalopathy, vasculitis and autoimmune panel are requested.     # High risk of seizures with epileptiform discharges  Multiple EEG did not capture any seizures  - Continue Keppra 1500 mg bid   - Stopped Phenytoin 8/23  - c/w vimpat 50mg BID  - Levetiracetam 26 .6  - phenytoin 0.5     Cards  #Hypertensive urgency due to noncompliance and Malignant HTN  BP at admission 296/174 without signs of end organ damage. Renal artery duplex wnl>>ARIAN unlikely  Aldosterone wnl, renin elevated  - r/o aortic coarctation with TTE (pending results)  -close monitoring  Currently on:  -c/w chlorthalidone 25mg daily  - previously on Amlodipine 10 mg  - pharmacy can't do Nifedipine IR, and would prefer not to crush Nifedipine XL (off nifedipine)  -c/w losartan 100mg daily  -c/w labetalol  to 600mg q8  -c/w hydralazine 100mg q8  -c/w doxazocin 2mg nightly  - s/p PEG tube   -SBP goal 120-160  - Cardiology on board reqs appreciated, elevated BP despite therapy.    - EP contacted to interrogate the loop if no Afib may Dc the Anticoagulant.     #HLD  - high dose statin as above in CVA    Pulm  - call provider if SPO2 < 94%  - Chest Xray repeat wnl.     GI  #Nutrition/Fluids/Electrolytes   - replete K<4 and Mg <2  - Diet via PEG tube  - obtain MBS test- failed, NPO with PEG alternative means       Renal  Nephrology are following, appreciate recs  Daily BMP    Infectious Disease  #Purulent RLE cellulitis r/o abscess / Fever / ?asp-n pneumonitis vs PNA  -s/p unasyn and and vancomycin in ED  - initial neg blood cultures   -s/p burn debridement   - Candida in wound. D/w Dr. Chua: contaminant  -  BCx w/ staph: likely contaminant. repeat BCx -  negative  - hold ABx per ID. High suspicion for asp-n pneumonitis vs PNA.  If persistent fevers, would change ABx to Zosyn, Vanco. Asp-n precautions. Aggressive suctioning  - Currently afebrile        Endocrine  #DM  - A1C results: 10.4  - ISS  - started insulin regimen   - c/w Lantus 22, lispro 5u      - TSH results: 0.86    DVT ppx: Apixaban 5mg BID.   GI ppx: Protonix  Diet: DASH/carb consistent via PEG         55 yo F with PMH of HTN, DM2, CVA (2017) w/ residual L sided paresis who presented with purulent right lower extremity wound for 3 months consistent with acute RLE cellulitis.   As for the cellulitis, patient underwent debridement of the ulcer of the right lower extremity, currently off abx.   During hospital stay, found to have multiple punctate infarcts suspected to be cardioembolic vs vasculitis. Patient on found to have sharp waves on vEEG currently on AED's.      Neuro  #scattered punctate acute ischemic strokes in multiple vascular territories, concerning for cardioembolic etiology  #R/o vasculitis vs autoimmune encephalitis:   - c/w Apixaban 5mg BID.   - Continue Atorvastatin 80 mg daily   - q4hr stroke neuro checks and vitals  - IVONNE was denied by cardiology as they found paroxysmal A. fib on tele  - s/p ILR by EP 8/22  - Vasculitic work up neg so far  - LP completed by Ir - see above  - vEEG reported as above  - MRI brain w/wo requested.   - Possible DSA to assess for vasculitis.   - May start IV Solumedrol and IVIG, pending clinical course.   - Encephalopathy, vasculitis and autoimmune panel are requested.     # High risk of seizures with epileptiform discharges  Multiple EEG did not capture any seizures  - Continue Keppra 1500 mg bid   - Stopped Phenytoin 8/23  - c/w vimpat 50mg BID  - Levetiracetam 26 .6  - phenytoin 0.5   - Fall and seizure precaution    Cards  #Hypertensive urgency due to noncompliance and Malignant HTN  BP at admission 296/174 without signs of end organ damage. Renal artery duplex wnl>>ARIAN unlikely  Aldosterone wnl, renin elevated  - r/o aortic coarctation with TTE (pending results)  -close monitoring  Currently on:  -c/w chlorthalidone 25mg daily  - previously on Amlodipine 10 mg  - pharmacy can't do Nifedipine IR, and would prefer not to crush Nifedipine XL (off nifedipine)  -c/w losartan 100mg daily  -c/w labetalol  to 600mg q8  -c/w hydralazine 100mg q8  -c/w doxazocin 2mg nightly  - s/p PEG tube   -SBP goal 120-160  - Cardiology on board reqs appreciated, elevated BP despite therapy.    - EP contacted to interrogate the loop if no Afib may Dc the Anticoagulant.     #HLD  - high dose statin as above in CVA    Pulm  - call provider if SPO2 < 94%  - Chest Xray repeat wnl.     GI  #Nutrition/Fluids/Electrolytes   - replete K<4 and Mg <2  - Diet via PEG tube  - obtain MBS test- failed, NPO with PEG alternative means       Renal  Nephrology are following, appreciate recs  Daily BMP    Infectious Disease  #Purulent RLE cellulitis r/o abscess / Fever / ?asp-n pneumonitis vs PNA  -s/p unasyn and and vancomycin in ED  - initial neg blood cultures   -s/p burn debridement   - Candida in wound. D/w Dr. Chua: contaminant  -  BCx w/ staph: likely contaminant. repeat BCx -  negative  - hold ABx per ID. High suspicion for asp-n pneumonitis vs PNA.  If persistent fevers, would change ABx to Zosyn, Vanco. Asp-n precautions. Aggressive suctioning  - Currently afebrile        Endocrine  #DM  - A1C results: 10.4  - ISS  - started insulin regimen   - c/w Lantus 22, lispro 5u      - TSH results: 0.86    DVT ppx: Apixaban 5mg BID.   GI ppx: Protonix  Diet: DASH/carb consistent via PEG         55 yo F with PMH of HTN, DM2, CVA (2017) w/ residual L sided paresis who presented with purulent right lower extremity wound for 3 months consistent with acute RLE cellulitis.   As for the cellulitis, patient underwent debridement of the ulcer of the right lower extremity, currently off abx.   During hospital stay, found to have multiple punctate infarcts suspected to be cardioembolic vs vasculitis. Patient on found to have sharp waves on vEEG currently on AED's.      Neuro  #scattered punctate acute ischemic strokes in multiple vascular territories, concerning for cardioembolic etiology  #R/o vasculitis vs autoimmune encephalitis:   - c/w Apixaban 5mg BID.   - Continue Atorvastatin 80 mg daily   - q4hr stroke neuro checks and vitals  - IVONNE was denied by cardiology as they found paroxysmal A. fib on tele  - s/p ILR by EP 8/22  - Vasculitic work up neg so far  - LP completed by Ir - see above  - vEEG reported as above  - MRI brain w/wo requested.   - Possible DSA to assess for vasculitis.   - May start IV Solumedrol and IVIG, pending clinical course.   - Encephalopathy, Hyper coag vasculitis and autoimmune panel are requested.   - Verbal consent from Pt son, Latrell Mack on the chart for hypercoag genetic test.     # High risk of seizures with epileptiform discharges  Multiple EEG did not capture any seizures  - Continue Keppra 1500 mg bid   - Stopped Phenytoin 8/23  - c/w vimpat 50mg BID  - Levetiracetam 26 .6  - phenytoin 0.5   - Fall and seizure precaution    Cards  #Hypertensive urgency due to noncompliance and Malignant HTN  BP at admission 296/174 without signs of end organ damage. Renal artery duplex wnl>>ARIAN unlikely  Aldosterone wnl, renin elevated  - r/o aortic coarctation with TTE (pending results)  -close monitoring  Currently on:  -c/w chlorthalidone 25mg daily  - previously on Amlodipine 10 mg  - pharmacy can't do Nifedipine IR, and would prefer not to crush Nifedipine XL (off nifedipine)  -c/w losartan 100mg daily  -c/w labetalol  to 600mg q8  -c/w hydralazine 100mg q8  -c/w doxazocin 2mg nightly  - s/p PEG tube   -SBP goal 120-160  - Cardiology on board reqs appreciated, elevated BP despite therapy.    - EP contacted to interrogate the loop if no Afib may Dc the Anticoagulant.     #HLD  - high dose statin as above in CVA    Pulm  - call provider if SPO2 < 94%  - Chest Xray repeat wnl.     GI  #Nutrition/Fluids/Electrolytes   - replete K<4 and Mg <2  - Diet via PEG tube  - obtain MBS test- failed, NPO with PEG alternative means       Renal  Nephrology are following, appreciate recs  Daily BMP    Infectious Disease  #Purulent RLE cellulitis r/o abscess / Fever / ?asp-n pneumonitis vs PNA  -s/p unasyn and and vancomycin in ED  - initial neg blood cultures   -s/p burn debridement   - Candida in wound. D/w Dr. Chua: contaminant  -  BCx w/ staph: likely contaminant. repeat BCx -  negative  - hold ABx per ID. High suspicion for asp-n pneumonitis vs PNA.  If persistent fevers, would change ABx to Zosyn, Vanco. Asp-n precautions. Aggressive suctioning  - Currently afebrile        Endocrine  #DM  - A1C results: 10.4  - ISS  - started insulin regimen   - c/w Lantus 22, lispro 5u      - TSH results: 0.86    DVT ppx: Apixaban 5mg BID.   GI ppx: Protonix  Diet: DASH/carb consistent via PEG         57 yo F with PMH of HTN, DM2, CVA (2017) w/ residual L sided paresis who presented with purulent right lower extremity wound for 3 months consistent with acute RLE cellulitis.   As for the cellulitis, patient underwent debridement of the ulcer of the right lower extremity, currently off abx.   During hospital stay, found to have multiple punctate infarcts suspected to be cardioembolic vs vasculitis. Patient on found to have sharp waves on vEEG currently on AED's.      Neuro  #scattered punctate acute ischemic strokes in multiple vascular territories, concerning for cardioembolic etiology  #R/o vasculitis vs autoimmune encephalitis:   - Last dose Apixaban 5mg tonight.   - Start DAPT tomorrow.   - Continue Atorvastatin 80 mg daily   - q4hr stroke neuro checks and vitals  - IVONNE was denied by cardiology as they found paroxysmal A. fib on tele  - s/p ILR by EP 8/22  - Vasculitic work up neg so far  - LP completed by Ir - see above  - vEEG reported as above  - MRI brain w/wo requested.   - Possible DSA to assess for vasculitis.   - May start IV Solumedrol and IVIG, pending clinical course.   - Encephalopathy, Hyper coag vasculitis and autoimmune panel are requested.   - Verbal consent from Pt son, Latrell Mack on the chart for hypercoag genetic test.     # High risk of seizures with epileptiform discharges  Multiple EEG did not capture any seizures  - Continue Keppra 1500 mg bid   - Stopped Phenytoin 8/23  - c/w vimpat 50mg BID  - Levetiracetam 26 .6  - phenytoin 0.5   - Fall and seizure precaution    Cards  #Hypertensive urgency due to noncompliance and Malignant HTN  BP at admission 296/174 without signs of end organ damage. Renal artery duplex wnl>>ARIAN unlikely  Aldosterone wnl, renin elevated  - r/o aortic coarctation with TTE (pending results)  -close monitoring  Currently on:  -c/w chlorthalidone 25mg daily  - previously on Amlodipine 10 mg  - pharmacy can't do Nifedipine IR, and would prefer not to crush Nifedipine XL (off nifedipine)  -c/w losartan 100mg daily  -c/w labetalol  to 600mg q8  -c/w hydralazine 100mg q8  -c/w doxazocin 2mg nightly  - s/p PEG tube   -SBP goal 120-160  - Cardiology on board reqs appreciated, elevated BP despite therapy.    - EP contacted to interrogate the loop, they commented no event noticed. We D/C AC as last dose tonight and will start DAPT tomorrow am.     #HLD  - high dose statin as above in CVA    Pulm  - call provider if SPO2 < 94%  - Chest Xray repeat wnl.     GI  #Nutrition/Fluids/Electrolytes   - replete K<4 and Mg <2  - Diet via PEG tube  - obtain MBS test- failed, NPO with PEG alternative means       Renal  Nephrology are following, appreciate recs  Daily BMP    Infectious Disease  #Purulent RLE cellulitis r/o abscess / Fever / ?asp-n pneumonitis vs PNA  -s/p unasyn and and vancomycin in ED  - initial neg blood cultures   -s/p burn debridement   - Candida in wound. D/w Dr. Chua: contaminant  -  BCx w/ staph: likely contaminant. repeat BCx -  negative  - hold ABx per ID. High suspicion for asp-n pneumonitis vs PNA.  If persistent fevers, would change ABx to Zosyn, Vanco. Asp-n precautions. Aggressive suctioning  - Currently afebrile        Endocrine  #DM  - A1C results: 10.4  - ISS  - started insulin regimen   - c/w Lantus 22, lispro 5u      - TSH results: 0.86    DVT ppx: Apixaban 5mg BID.   GI ppx: Protonix  Diet: DASH/carb consistent via PEG

## 2022-08-30 NOTE — CHART NOTE - NSCHARTNOTEFT_GEN_A_CORE
Neurology team spoke with Pt's son, who is next of kin, Mr. Latrell Mack regarding consent for hypercoag panel and genetic test, family approved to proceed.   Consent in the chart

## 2022-08-30 NOTE — PROGRESS NOTE ADULT - ASSESSMENT
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 During hospital stay, found to have multiple punctate infarcts suspected to be cardioembolic vs vasculitis. Patient on found to have sharp waves on vEEG currently on AED's.        #Stroke workup  - continue aspirin 81mg  daily  - Restarted plavix as LP performed 8/26  - Continue Atorvastatin 80 mg daily   - q4hr stroke neuro checks and vitals  - IVONNE was denied by cardiology as they found paroxysmal A. fib on tele  - s/p ILR by EP 8/22  - Vasculitic work up as per neuro  - consulted IR for LP tap - was performed 8/28  - Defer to neuro for ongoing work up    # High risk of seizures with epileptiform discharges  Multiple EEG did not capture any seizures  - Continue Keppra 1500 mg bid   - Stopped Phenytoin 8/23  - c/w vimpat 50mg BID  - Levetiracetam 26 .6  - phenytoin 0.5   - Defer to neuro for ongoing work up    #Hypertensive urgency due to noncompliance and Malignant HTN   - BP at admission 296/174 without signs of end organ damage. Renal artery duplex wnl>>ARIAN unlikely  - Aldosterone wnl, renin elevated  -close monitoring  -c/w chlorthalidone 25mg daily  - previously on Amlodipine 10 mg  - pharmacy can't do Nifedipine IR, and would prefer not to crush Nifedipine XL (off nifedipine)  -c/w losartan 100mg daily  -c/w labetalol  to 600mg q8  -c/w hydralazine 100mg q8  -c/w doxazocin 2mg nightly  - s/p PEG tube   -SBP goal 120-160  - Cardiology follow up, consult pending     #HLD  - high dose statin as above in CVA      #Purulent RLE cellulitis r/o abscess / Fever / ?asp-n pneumonitis vs PNA  -s/p unasyn and and vancomycin in ED  - initial neg blood cultures   -s/p burn debridement   - Candida in wound. D/w Dr. Chua: contaminant  -  BCx w/ staph: likely contaminant. repeat BCx -  negative  - hold ABx per ID. High suspicion for asp-n pneumonitis vs PNA.  If persistent fevers, would change ABx to Zosyn, Vanco. Asp-n precautions. Aggressive suctioning  - Currently afebrile      #DM  - A1C results: 10.4  - ISS  - started insulin regimen   - c/w Lantus 22, lispro 5u  - TSH results: 0.86      #Nutrition/Fluids/Electrolytes   - replete K<4 and Mg <2  - Diet via PEG tube  - obtain MBS test- failed, NPO with PEG alternative means     DVT ppx: Lovenox SCDs  GI ppx: Protonix  Diet: DASH/carb consistent  Activity: AAT    Dispo planning: per neuro, follow cardiology recommendations. AC follow up with EP 57 yo F with PMH of HTN, DM2, CVA (2017) w/ residual L sided paresis who presented with purulent right lower extremity wound for 3 months consistent with acute RLE cellulitis. During hospital stay, found to have multiple punctate infarcts suspected to be cardioembolic vs vasculitis. Patient on found to have sharp waves on vEEG currently on AED's.        #Stroke workup  - continue aspirin 81mg  daily  - Restarted plavix as LP performed 8/26  - Continue Atorvastatin 80 mg daily   - q4hr stroke neuro checks and vitals  - IVONNE was denied by cardiology as they found paroxysmal A. fib on tele  - s/p ILR by EP 8/22  - Vasculitic work up as per neuro  - consulted IR for LP tap - was performed 8/28  - Defer to neuro for ongoing work up    # High risk of seizures with epileptiform discharges  Multiple EEG did not capture any seizures  - Continue Keppra 1500 mg bid   - Stopped Phenytoin 8/23  - c/w vimpat 50mg BID  - Levetiracetam 26 .6  - phenytoin 0.5   - Defer to neuro for ongoing work up    #Hypertensive urgency due to noncompliance and Malignant HTN   - BP at admission 296/174 without signs of end organ damage. Renal artery duplex wnl>>ARIAN unlikely  - Aldosterone wnl, renin elevated  -close monitoring  -c/w chlorthalidone 25mg daily  - previously on Amlodipine 10 mg  - pharmacy can't do Nifedipine IR, and would prefer not to crush Nifedipine XL (off nifedipine)  -c/w losartan 100mg daily  -c/w labetalol  to 600mg q8  -c/w hydralazine 100mg q8  -c/w doxazocin 2mg nightly  - s/p PEG tube   -SBP goal 120-160  - Cardiology follow up, consult pending     #HLD  - high dose statin as above in CVA      #Purulent RLE cellulitis r/o abscess / Fever / ?asp-n pneumonitis vs PNA  -s/p unasyn and and vancomycin in ED  - initial neg blood cultures   -s/p burn debridement   - Candida in wound. D/w Dr. Chua: contaminant  -  BCx w/ staph: likely contaminant. repeat BCx -  negative  - hold ABx per ID.   now having fever. order blood cultures. ua, cxr, lactic acid. consult ID if persistent fevers. also      #DM  - A1C results: 10.4  - ISS  - started insulin regimen   - c/w Lantus 22, lispro 5u  - TSH results: 0.86      #Nutrition/Fluids/Electrolytes   - replete K<4 and Mg <2  - Diet via PEG tube  - obtain MBS test- failed, NPO with PEG alternative means     DVT ppx: Lovenox SCDs  GI ppx: Protonix  Diet: DASH/carb consistent  Activity: AAT    Dispo planning: per neuro, follow cardiology recommendations. AC follow up with EP

## 2022-08-30 NOTE — CHART NOTE - NSCHARTNOTEFT_GEN_A_CORE
[employer] Loop Recorder interrogated 8/30/22  No events noted, no AF  Device functioning properly    Recall EP as needed [id number]

## 2022-08-31 NOTE — PROGRESS NOTE ADULT - ASSESSMENT
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 During hospital stay, found to have multiple punctate infarcts suspected to be cardioembolic vs vasculitis. Patient on found to have sharp waves on vEEG currently on AED's.        #Stroke workup  - continue aspirin 81mg  daily  - Restarted plavix as LP performed 8/26  - Continue Atorvastatin 80 mg daily   - q4hr stroke neuro checks and vitals  - IVONNE was denied by cardiology as they found paroxysmal A. fib on tele  - s/p ILR by EP 8/22  - Vasculitic work up as per neuro, intervention as per neurovascular   - consulted IR for LP tap - was performed 8/28  - Defer to neuro for ongoing work up    #fever spikes  persistent fever spikes  mild elevation in lactic acid as well  started on gentle hydration  blood cultures, CXR, UA/culture,   currently on cefepime and vancomycin   follow blood cultures and monitor for fever spikes  repeat lactic acid in AM  continue IV fluids     # High risk of seizures with epileptiform discharges  Multiple EEG did not capture any seizures  - Continue Keppra 1500 mg bid   - Stopped Phenytoin 8/23  - c/w vimpat 50mg BID  - Levetiracetam 26 .6  - phenytoin 0.5   - Defer to neuro for ongoing work up    #Hypertensive urgency due to noncompliance and Malignant HTN   - BP at admission 296/174 without signs of end organ damage. Renal artery duplex wnl>>ARIAN unlikely  - Aldosterone wnl, renin elevated  -close monitoring  -c/w chlorthalidone 25mg daily  - previously on Amlodipine 10 mg  - pharmacy can't do Nifedipine IR, and would prefer not to crush Nifedipine XL (off nifedipine)  -c/w losartan 100mg daily  -c/w labetalol  to 600mg q8  -c/w hydralazine 100mg q8  -c/w doxazocin 2mg nightly  - s/p PEG tube   -SBP goal 120-160  - Cardiology follow up, consult pending     #HLD  - high dose statin as above in CVA      #Purulent RLE cellulitis r/o abscess / Fever / ?asp-n pneumonitis vs PNA  -s/p unasyn and and vancomycin in ED  - initial neg blood cultures   -s/p burn debridement   - Candida in wound. D/w Dr. Chua: contaminant  -  BCx w/ staph: likely contaminant. repeat BCx -  negative  - hold ABx per ID.   now having fever. order blood cultures. ua, cxr, lactic acid. consult ID if persistent fevers. also      #DM  - A1C results: 10.4  - ISS  - started insulin regimen   - c/w Lantus 22, lispro 5u  - TSH results: 0.86      #Nutrition/Fluids/Electrolytes   - replete K<4 and Mg <2  - Diet via PEG tube  - obtain MBS test- failed, NPO with PEG alternative means     DVT ppx: Lovenox SCDs  GI ppx: Protonix  Diet: DASH/carb consistent  Activity: AAT    Follow up: follow ID, monitor fever spikes, Patient is high risk of clinical deterioration. follow cultures.

## 2022-08-31 NOTE — CHART NOTE - NSCHARTNOTEFT_GEN_A_CORE
For possible diagnostic cerebral angiogram r/o vasculitis today with Dr. Gil. Please keep NPO without feeds and on fluids for the time being.   Pending discussion with Dr. Gil regarding timing of procedure given infectious workup/ current fevers.   Will update team accordingly.   [phone] For possible diagnostic cerebral angiogram r/o vasculitis today with Dr. Gil. Please keep NPO without feeds and on fluids for the time being.   Pending discussion with Dr. Gil regarding timing of procedure given infectious workup/ current fevers.   Will update team accordingly.   [phone]      UPDATE 15:43   Due to pending infectious disease workup will postpone diagnostic cerebral angiogram until likely next week per Dr. Gil.   Discussed with patient's son over the phone, IR and neuro teams.

## 2022-08-31 NOTE — PROGRESS NOTE ADULT - SUBJECTIVE AND OBJECTIVE BOX
MITCHELLJOSE  56y  Female      Patient is a 56y old  Female who presents with a chief complaint of htn (26 Aug 2022 13:04)      INTERVAL HPI/OVERNIGHT EVENTS: now having fever spikes  ID consulted. looks more lethargic       VITALS:  Vital Signs Last 24 Hrs  T(C): 36.7 (31 Aug 2022 08:15), Max: 38.9 (30 Aug 2022 22:40)  T(F): 98.1 (31 Aug 2022 08:15), Max: 102 (30 Aug 2022 22:40)  HR: 77 (31 Aug 2022 08:15) (75 - 89)  BP: 110/60 (31 Aug 2022 08:15) (110/60 - 217/105)  BP(mean): 80 (31 Aug 2022 08:15) (80 - 80)  RR: 17 (31 Aug 2022 08:15) (17 - 18)  SpO2: 97% (31 Aug 2022 08:15) (96% - 98%)    Parameters below as of 31 Aug 2022 08:15  Patient On (Oxygen Delivery Method): room air        Parameters below as of 30 Aug 2022 14:00  Patient On (Oxygen Delivery Method): room air              08-28-22 @ 07:01  -  08-29-22 @ 07:00  --------------------------------------------------------  IN: 1000 mL / OUT: 0 mL / NET: 1000 mL    08-29-22 @ 07:01 - 08-29-22 @ 15:23  --------------------------------------------------------  IN: 450 mL / OUT: 0 mL / NET: 450 mL        PHYSICAL EXAM:  GENERAL: middle-age F, chronically ill appearing, non verbal on exam  PSYCH: no agitation  NERVOUS SYSTEM:  Alert but non verbal; unable to follow command  PULMONARY: symmetrical chest rise  CARDIOVASCULAR: Regular rate and rhythm; No murmurs, rubs, or gallops  GI: non distended  EXTREMITIES:  No clubbing, cyanosis  SKIN: No rashes or lesions    Consultant(s) Notes Reviewed:  [x ] YES  [ ] NO    Discussed with Consultants/Other Providers [ x] YES     LABS        LABS:                        9.7    14.36 )-----------( 276      ( 31 Aug 2022 07:24 )             28.5     08-31    135  |  96<L>  |  57<H>  ----------------------------<  194<H>  4.7   |  22  |  1.9<H>    Ca    9.5      31 Aug 2022 07:24  Phos  5.1     08-31  Mg     1.8     08-31    TPro  7.1  /  Alb  3.9  /  TBili  0.3  /  DBili  0.2  /  AST  16  /  ALT  15  /  AlkPhos  153<H>  08-30    PT/INR - ( 30 Aug 2022 23:46 )   PT: 18.80 sec;   INR: 1.64 ratio                             RADIOLOGY & ADDITIONAL TESTS:  - no images 8/29  Imaging Personally Reviewed:  [ ] YES  [ ] NO    HEALTH ISSUES - PROBLEM Dx:      MEDICATIONS  (STANDING):  apixaban 5 milliGRAM(s) Oral every 12 hours  atorvastatin 80 milliGRAM(s) Oral at bedtime  chlorthalidone 25 milliGRAM(s) Oral daily  collagenase Ointment 1 Application(s) Topical two times a day  Dakins Solution - 1/2 Strength 1 Application(s) Topical two times a day  dextrose 5%. 1000 milliLiter(s) (100 mL/Hr) IV Continuous <Continuous>  dextrose 5%. 1000 milliLiter(s) (50 mL/Hr) IV Continuous <Continuous>  dextrose 50% Injectable 25 Gram(s) IV Push once  dextrose 50% Injectable 12.5 Gram(s) IV Push once  dextrose 50% Injectable 25 Gram(s) IV Push once  doxazosin 2 milliGRAM(s) Oral at bedtime  glucagon  Injectable 1 milliGRAM(s) IntraMuscular once  hydrALAZINE 100 milliGRAM(s) Oral every 8 hours  insulin glargine Injectable (LANTUS) 22 Unit(s) SubCutaneous every morning  insulin lispro (ADMELOG) corrective regimen sliding scale   SubCutaneous three times a day before meals  insulin lispro Injectable (ADMELOG) 5 Unit(s) SubCutaneous three times a day before meals  labetalol 600 milliGRAM(s) Oral three times a day  lacosamide IVPB 50 milliGRAM(s) IV Intermittent every 12 hours  levETIRAcetam  Solution 1000 milliGRAM(s) Oral every 12 hours  lidocaine 1%/epinephrine 1:100,000 Inj 20 milliLiter(s) Local Injection once  losartan 100 milliGRAM(s) Oral daily  multivitamin/minerals/iron Oral Solution (CENTRUM) 15 milliLiter(s) Oral daily  pantoprazole    Tablet 40 milliGRAM(s) Oral before breakfast  sodium bicarbonate 650 milliGRAM(s) Oral every 6 hours  sodium chloride 0.65% Nasal 2 Spray(s) Both Nostrils two times a day    MEDICATIONS  (PRN):  acetaminophen     Tablet .. 650 milliGRAM(s) Oral every 6 hours PRN Temp greater or equal to 38C (100.4F), Mild Pain (1 - 3)  dextrose Oral Gel 15 Gram(s) Oral once PRN Blood Glucose LESS THAN 70 milliGRAM(s)/deciliter  labetalol Injectable 10 milliGRAM(s) IV Push every 6 hours PRN Systolic blood pressure >  LORazepam     Tablet 1 milliGRAM(s) Oral once PRN Agitation  melatonin 3 milliGRAM(s) Oral at bedtime PRN Insomnia

## 2022-08-31 NOTE — PROGRESS NOTE ADULT - SUBJECTIVE AND OBJECTIVE BOX
MITCHELLJOSE  56y, Female  Allergy: No Known Allergies      LOS  29d    CHIEF COMPLAINT: htn (30 Aug 2022 14:54)      INTERVAL EVENTS/HPI  - No acute events overnight  - T(F): , Max: 102 (22 @ 22:40)  - reconsulted for fevers  - patient is non-verbal   - WBC Count: 14.36 (22 @ 07:24)  WBC Count: 10.87 (22 @ 18:03)     - Creatinine, Serum: 1.9 (22 @ 07:24)  Creatinine, Serum: 1.6 (22 @ 18:03)       ROS  unable to obtain history secondary to patient's mental status and/or sedation    VITALS:  T(F): 98.1, Max: 102 (22 @ 22:40)  HR: 77  BP: 110/60  RR: 17Vital Signs Last 24 Hrs  T(C): 36.7 (31 Aug 2022 08:15), Max: 38.9 (30 Aug 2022 22:40)  T(F): 98.1 (31 Aug 2022 08:15), Max: 102 (30 Aug 2022 22:40)  HR: 77 (31 Aug 2022 08:15) (75 - 89)  BP: 110/60 (31 Aug 2022 08:15) (110/60 - 217/105)  BP(mean): 80 (31 Aug 2022 08:15) (80 - 80)  RR: 17 (31 Aug 2022 08:15) (17 - 18)  SpO2: 97% (31 Aug 2022 08:15) (96% - 98%)    Parameters below as of 31 Aug 2022 08:15  Patient On (Oxygen Delivery Method): room air        PHYSICAL EXAM:  Gen: NAD, resting in bed  HEENT: Normocephalic, atraumatic  Neck: supple, no lymphadenopathy  CV: Regular rate & regular rhythm  Lungs: decreased BS at bases, no fremitus  Abdomen: Soft, BS present; PEG in place  Ext: Warm, well perfused  Neuro: non focal, awake  Skin: no rash, no erythema  Lines: no phlebitis    FH: Non-contributory  Social Hx: Non-contributory    TESTS & MEASUREMENTS:                        9.7    14.36 )-----------( 276      ( 31 Aug 2022 07:24 )             28.5         135  |  96<L>  |  57<H>  ----------------------------<  194<H>  4.7   |  22  |  1.9<H>    Ca    9.5      31 Aug 2022 07:24  Phos  5.1       Mg     1.8         TPro  7.1  /  Alb  3.9  /  TBili  0.3  /  DBili  0.2  /  AST  16  /  ALT  15  /  AlkPhos  153<H>        LIVER FUNCTIONS - ( 30 Aug 2022 23:46 )  Alb: 3.9 g/dL / Pro: 7.1 g/dL / ALK PHOS: 153 U/L / ALT: 15 U/L / AST: 16 U/L / GGT: x           Urinalysis Basic - ( 31 Aug 2022 00:25 )    Color: Yellow / Appearance: Turbid / S.013 / pH: x  Gluc: x / Ketone: Negative  / Bili: Negative / Urobili: <2 mg/dL   Blood: x / Protein: 100 mg/dL / Nitrite: Negative   Leuk Esterase: Large / RBC: 183 /HPF / WBC >720 /HPF   Sq Epi: x / Non Sq Epi: 2 /HPF / Bacteria: Many        Culture - Fungal, CSF (collected 22 @ 11:36)  Source: .CSF CSF  Preliminary Report (22 @ 10:55):    Testing in progress    Culture - CSF with Gram Stain (collected 22 @ 11:36)  Source: .CSF CSF  Gram Stain (22 @ 22:25):    polymorphonuclear leukocytes seen    No organisms seen    by cytocentrifuge  Final Report (22 @ 14:52):    No growth    Culture - Acid Fast - CSF (collected 22 @ 11:36)  Source: .CSF CSF    Culture - Blood (collected 22 @ 12:25)  Source: .Blood Blood-Peripheral  Final Report (22 @ 22:01):    No Growth Final    Culture - Blood (collected 22 @ 06:04)  Source: .Blood None  Final Report (22 @ 18:00):    No Growth Final    Culture - Blood (collected 08-15-22 @ 16:59)  Source: .Blood None  Final Report (22 @ 02:00):    No Growth Final    Culture - Blood (collected 22 @ 22:44)  Source: .Blood Blood-Peripheral  Gram Stain (08-15-22 @ 08:35):    Growth in anaerobic bottle: Gram Positive Cocci in Clusters  Final Report (22 @ 14:28):    Growth in anaerobic bottle: Staphylococcus epidermidis Coag Negative    Staphylococcus    Single set isolate, possible contaminant. Contact    Microbiology if susceptibility testing clinically    indicated.    ***Blood Panel PCR results on this specimen are available    approximately 3 hours after the Gram stain result.***    Gram stain, PCR, and/or culture results may not always    correspond due to difference in methodologies.    ************************************************************    This PCR assay was performed by multiplex PCR. This    Assay tests for 66 bacterial and resistance gene targets.    Please refer to the [facility] test directory    at [email or web address] for details.  Organism: Blood Culture PCR (22 @ 14:28)  Organism: Blood Culture PCR (22 @ 14:28)      -  Staphylococcus epidermidis, Methicillin resistant: Detec      Method Type: PCR    Culture - Other (collected 08-10-22 @ 12:40)  Source: .Other right leg wound  Final Report (22 @ 18:21):    Numerous Candida parapsilosis "Susceptibilities not performed"    Culture - Blood (collected 22 @ 02:09)  Source: .Blood None  Final Report (22 @ 10:01):    No Growth Final    Culture - Urine (collected 22 @ 01:00)  Source: Catheterized Catheterized  Final Report (08-10-22 @ 10:50):    No growth    Culture - Blood (collected 22 @ 18:36)  Source: .Blood None  Final Report (22 @ 07:00):    No Growth Final    Culture - Blood (collected 22 @ 18:36)  Source: .Blood None  Final Report (22 @ 07:00):    No Growth Final    Culture - Blood (collected 22 @ 18:04)  Source: .Blood Blood  Final Report (22 @ 01:00):    No Growth Final    Culture - Blood (collected 22 @ 18:04)  Source: .Blood Blood  Final Report (22 @ 01:00):    No Growth Final    Culture - Blood (collected 22 @ 01:17)  Source: .Blood Blood-Peripheral  Final Report (22 @ 17:01):    No Growth Final    Culture - Blood (collected 22 @ 01:17)  Source: .Blood Blood-Peripheral  Final Report (22 @ 09:01):    No Growth Final        Lactate, Blood: 2.2 mmol/L (22 @ 18:03)      INFECTIOUS DISEASES TESTING  COVID-19 PCR: NotDetec (22 @ 09:40)  COVID-19 PCR: NotDetec (22 @ 02:34)  COVID-19 PCR: NotDetec (22 @ 17:36)  Procalcitonin, Serum: 0.11 (22 @ 06:04)  COVID-19 PCR: NotDetec (08-15-22 @ 15:41)  COVID-19 PCR: NotDetec (22 @ 13:22)  COVID-19 PCR: NotDetec (22 @ 05:14)  MRSA PCR Result.: Negative (22 @ 17:40)  COVID-19 PCR: NotDetec (22 @ 01:40)      INFLAMMATORY MARKERS  Sedimentation Rate, Erythrocyte: 125 mm/Hr (22 @ 19:27)  C-Reactive Protein, Serum: 54.4 mg/L (22 @ 19:27)  Sedimentation Rate, Erythrocyte: 92 mm/Hr (22 @ 01:50)  C-Reactive Protein, Serum: 35.6 mg/L (22 @ 01:50)      RADIOLOGY & ADDITIONAL TESTS:  I have personally reviewed the last available Chest xray  CXR  Xray Chest 1 View- PORTABLE-Urgent:   ACC: [id number] EXAM:  XR CHEST PORTABLE URGENT 1V                          PROCEDURE DATE:  2022          INTERPRETATION:  Clinical History / Reason for exam: Hemoptysis    Comparison : Chest radiograph 2022.    Technique/Positioning: Frontal portable, low lung volumes.    Findings:    Support devices: Loop recorder overlies the thorax    Cardiac/mediastinum/hilum: Unremarkable.    Lung parenchyma/Pleura: Minimal atelectasis    Skeleton/soft tissues: Unremarkable.    Impression:    Minimal atelectasis. Grossly unchanged.        --- End of Report ---            RANCHO CALI MD; Attending Interventional Radiologist  This document has been electronically signed. Aug 31 2022  7:16AM (22 @ 17:23)      CT      CARDIOLOGY TESTING  12 Lead ECG:   Ventricular Rate 63 BPM    Atrial Rate 63 BPM    P-R Interval 162 ms    QRS Duration 76 ms    Q-T Interval 422 ms    QTC Calculation(Bazett) 431 ms    P Axis 51 degrees    R Axis 36 degrees    T Axis 112 degrees    Diagnosis Line Normal sinus rhythm  Anteroseptal infarct , age undetermined  Abnormal ECG    Confirmed by Robert Bauer ([id number]) on 2022 11:37:29 AM (22 @ 20:22)      MEDICATIONS  aspirin  chewable 81 Oral daily  atorvastatin 80 Oral at bedtime  cloNIDine Patch 0.1 mG/24Hr(s) 1 Transdermal <User Schedule>  clopidogrel Tablet 75 Oral daily  collagenase Ointment 1 Topical two times a day  Dakins Solution - 1/2 Strength 1 Topical two times a day  dextrose 5%. 1000 IV Continuous <Continuous>  dextrose 5%. 1000 IV Continuous <Continuous>  dextrose 50% Injectable 25 IV Push once  dextrose 50% Injectable 12.5 IV Push once  dextrose 50% Injectable 25 IV Push once  enoxaparin Injectable 40 SubCutaneous every 24 hours  furosemide    Tablet 40 Oral daily  glucagon  Injectable 1 IntraMuscular once  hydrALAZINE 100 Oral every 8 hours  insulin glargine Injectable (LANTUS) 22 SubCutaneous every morning  insulin lispro (ADMELOG) corrective regimen sliding scale  SubCutaneous three times a day before meals  insulin lispro Injectable (ADMELOG) 5 SubCutaneous three times a day before meals  labetalol 600 Oral three times a day  lacosamide IVPB 50 IV Intermittent every 12 hours  lactated ringers. 1000 IV Continuous <Continuous>  levETIRAcetam  Solution 1000 Oral every 12 hours  lidocaine 1%/epinephrine 1:100,000 Inj 20 Local Injection once  losartan 100 Oral daily  multivitamin/minerals/iron Oral Solution (CENTRUM) 15 Oral daily  pantoprazole    Tablet 40 Oral before breakfast  sodium bicarbonate 650 Oral every 6 hours  sodium chloride 0.65% Nasal 2 Both Nostrils two times a day  vancomycin  IVPB         WEIGHT  Weight (kg): 82.3 (22 @ 12:59)  Creatinine, Serum: 1.9 mg/dL (22 @ 07:24)  Creatinine, Serum: 1.6 mg/dL (22 @ 18:03)      ANTIBIOTICS:  vancomycin  IVPB          All available historical records have been reviewed

## 2022-08-31 NOTE — PROGRESS NOTE ADULT - ASSESSMENT
· Assessment	  55 yo F with PMH of HTN, DM2, CVA (2017) who presented with purulent right lower extremity wound for 3 months consistent with acute RLE cellulitis. Code stroke for unresponsiveness at 4pm 8/5    Impression  #Fevers 8/30 - possible UTI?  - UA with pyruia   - CXR 8/31 unremarkable     #CVA   - 8/11 MR Head w/wo IV Cont (08.10.22 @ 21:25): Multiple punctate cortical acute infarcts involving multiple vascular territories possibly related to embolic etiology. No evidence of acute hemorrhage. Moderate chronic microvascular type changes as well as chronic hemosiderin deposition within the right basal ganglia consistent with remote hemorrhage. Chronic right thalamic lacunar infarcts. More alert and does try to respondResolved SIRS with no infectious etiology  - s/p LP 8/26 with 24 WBC, RBC 87060, 37% PMNs, 61% Lymph -- protein/glucose not obtained    RECOMMENDATIONS;  - check blood cx for completness   - received dose of vancomycin 1250 mg x 1 -- please check level prior to next dose (at 1 AM on 9/1)  -- if > 20 hold   -- if 15-20, continue vancomycin 1250 mg x 1  - continue cefepime 2g q 12 hours   - follow-up blood and urine cx     Please call or message on [employer] Teams if with any questions.  Spectra [phone]

## 2022-08-31 NOTE — CHART NOTE - NSCHARTNOTEFT_GEN_A_CORE
Transfer Level of Care    Transfer from: Neurovascular Service   Transfer to:  Internal Medicine Service      Neurovascular COURSE:      ASSESSMENT & PLAN:             For Follow-Up:  Vanc trough 08/31 7170>>   >>>Hold vancomycin for trough > 20.   >>>Cont current dose for trough 15-20    BCx / UCx  Diagnostic Cerebral Angio by [facility] Transfer Level of Care    Transfer from: Neurovascular Service   Transfer to:  Internal Medicine Service      Neurovascular COURSE:          ASSESSMENT & PLAN:     #scattered punctate acute ischemic strokes in multiple vascular territories, concerning for cardioembolic etiology  #R/o vasculitis vs autoimmune encephalitis:   s/p Code Stroke  - s/p vEEG - no electrographic seizure   - s/p ILR by EP 08/22  - previous vasculitis w/u neg  - cont Atorvastatin 80mg QD  - cont Q-shift stroke neuro & vitals check  >> Diagnostic Cerebral Angio by [facility]  >> Encephalopathy, Hyper coag vasculitis and autoimmune panel pending  >> may start IVIG and Solumedrol after infectious w/u   >> Neurovascular following    #Fever - Tmax 102 08/30  >> r/o Aspiration PNA vs HAP vs Urosepsis  - CXR neg for cardiopulmonary disease  - UA pos  - ID onboard  - cont Vancomycin 1250mg (through 09/01)  - start Cefepime 2g Q12  - Pending UCx/BCx      # High risk of seizures with epileptiform discharges  >>Multiple EEG did not capture any seizures  - s/p Phenytoin (d/c)  - Levetiracetam level 26 .6; phenytoin level 0.5   - Continue Keppra 1500 mg bid   - Cont vimpat 50mg BID  - cont Fall and seizure precaution  - Rescue w/ Ativan 2mg IV Push for observed clinical seizure > 2mins      #Hypertensive urgency due to noncompliance and Malignant HTN  >>BP at admission 296/174 without signs of end organ damage. Renal artery duplex wnl>>ARIAN unlikely  >>Aldosterone wnl, renin elevated  - s/p ILR  - cardio recs appreciated   Currently on:  -Furosemide 40mg QD  -losartan 100mg daily  -labetalol  to 600mg q8  -hydralazine 100mg q8  -Clonidine Patch 0.1mg Q24    #RLE Cellulitis improved   - ID followed   - completed Abx course      #Nutrition  - NPO for PEG dependent  - s/p PEG tube by GI    #DM  - A1C 10.4  - Lantus 22U, Lispro 5U    For Follow-Up:  Vanc trough 08/31 2330>>   >>>Hold vancomycin for trough > 20.   >>>Cont current dose for trough 15-20    BCx / UCx  Diagnostic Cerebral Angio by [facility]  MRI brain w/wo    May start IV Solumedrol and IVIG, pending fever w/u.   Encephalopathy, Hyper coag vasculitis and autoimmune panel pending

## 2022-08-31 NOTE — CHART NOTE - NSCHARTNOTEFT_GEN_A_CORE
Continue to plan for diagnostic cerebral angiogram today with Dr. Gil and the neuroendovascular team.   Discussed plan with patient's son Latrell over the phone who is agreeable and will be available to provide consent at the time of procedure.   Discussed with neurovascular team.   [phone]

## 2022-08-31 NOTE — CHART NOTE - NSCHARTNOTEFT_GEN_A_CORE
Neurovascular addendum:  Patient febrile, discussed with Dr. Aldana.   Discussed with Hospitalist Dr. Holt suggested Vancomycin and Zosyn (blood cultures sent on 8/30)  Vancomycin ordered, discussed with pharmacy, suggests not giving Vanco and zosyn due to creatinine clearance.   UA C+S sent, CXR ordered.   Will discussed antibiotics with neurovascular team in am.

## 2022-08-31 NOTE — PROGRESS NOTE ADULT - ASSESSMENT
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   As for the cellulitis, patient underwent debridement of the ulcer of the right lower extremity, currently off abx.   During hospital stay, found to have multiple punctate infarcts suspected to be cardioembolic vs vasculitis. Patient on found to have sharp waves on vEEG currently on AED's.      Neuro  #scattered punctate acute ischemic strokes in multiple vascular territories, concerning for cardioembolic etiology  #R/o vasculitis vs autoimmune encephalitis:   - Apixaban 5mg d/c 08/30  - cont DAPT   - Continue Atorvastatin 80 mg daily   - q8hr stroke neuro checks and vitals  - IVONNE was denied by cardiology as they found paroxysmal A. fib on tele  - s/p ILR by EP 8/22  - Vasculitic work up neg so far  - LP completed by Ir - see above  - vEEG reported as above  - MRI brain w/wo requested.   - Possible DSA to assess for vasculitis.   - May start IV Solumedrol and IVIG, pending clinical course.   - Encephalopathy, Hyper coag vasculitis and autoimmune panel are requested.   - Verbal consent from Pt son, Latrell Mack on the chart for hypercoag genetic test.     #Day O DCA by [facility]  - NPO overnight  - electrolyte optimized    # High risk of seizures with epileptiform discharges  Multiple EEG did not capture any seizures  - Continue Keppra 1500 mg bid   - Stopped Phenytoin 8/23  - c/w vimpat 50mg BID  - Levetiracetam 26 .6  - phenytoin 0.5   - Fall and seizure precaution    Cards  #Hypertensive urgency due to noncompliance and Malignant HTN  BP at admission 296/174 without signs of end organ damage. Renal artery duplex wnl>>ARIAN unlikely  Aldosterone wnl, renin elevated  - r/o aortic coarctation with TTE (pending results)  -close monitoring  Currently on:  - d/c chlorthalidone 25mg  and start Furosemide 40mg QD  - previously on Amlodipine 10 mg  - pharmacy can't do Nifedipine IR, and would prefer not to crush Nifedipine XL (off nifedipine)  -c/w losartan 100mg daily  -c/w labetalol  to 600mg q8  -c/w hydralazine 100mg q8  -d/c Doxazosin and start Clonidine Patch 0.1mg Q24  - s/p PEG tube   -SBP goal 120-160  - Cardiology on board reqs appreciated, elevated BP despite therapy.    - EP contacted to interrogate the loop, they commented no event noticed.    #HLD  - high dose statin as above in CVA    Pulm  - call provider if SPO2 < 94%  - Chest Xray repeat wnl.     GI  #Nutrition/Fluids/Electrolytes   - replete K<4 and Mg <2  - Diet via PEG tube  - obtain MBS test- failed, NPO with PEG alternative means       Renal  Nephrology are following, appreciate recs  Daily BMP    Infectious Disease  #Fever - Tmax 102 08/30  >> r/o Aspiration PNA vs HAP vs Urosepsis  - CXR neg for cardiopulmonary disease  - UA pos  - ID onboard  - cont Vancomycin 1250mg (through 09/01)  - start Cefepime 2g Q12  - Pending UCx/BCx    #Purulent RLE cellulitis r/o abscess / Fever / ?asp-n pneumonitis vs PNA - resolved   -s/p unasyn and and vancomycin in ED  - initial neg blood cultures   -s/p burn debridement   - Candida in wound. D/w Dr. Chua: contaminant  -  BCx w/ staph: likely contaminant. repeat BCx -  negative  - hold ABx per ID. High suspicion for asp-n pneumonitis vs PNA.  If persistent fevers, would change ABx to Zosyn, Vanco. Asp-n precautions. Aggressive suctioning     Endocrine  #DM  - A1C results: 10.4  - ISS  - started insulin regimen   - c/w Lantus 22, lispro 5u    - TSH results: 0.86    DVT ppx: Lovenox.   GI ppx: Protonix  Diet: DASH/carb consistent via PEG  Dispo: Transfer level of care to Internal medicine team. Neurovascular will follow as consult

## 2022-08-31 NOTE — PROGRESS NOTE ADULT - ATTENDING COMMENTS
Pt is a 55 yo F with PMHx of HTN, DM, prior stroke in 2017 with residual left sided weakness, who presented with RLE cellulitis. Stroke code called on 8/5 for unresponsiveness. Pt examined today at bedside, left gaze preference (able to cross midline to right), mute, does not speak or follow commands, left facial droop, RUE 3/5, LUE 1/5, BLE moves along the bed.     Impr: scattered punctate acute ischemic strokes in multiple vascular territories  Given encephalopathy and small scattered strokes, high suspicion for vasculitis. Especially as, after discussing with prior Neurologist on, afib was not confirmed. vEEG with interictal activity but no seizures.   S/p LP, CSF with mildly elevated cell count and protein, normal glucose. elevated ESR/CRP.    Repeat MRI brain with/without, Vasculitis labs  NOEMI consult for DSA given suspicion for vasculitis- plan to wait till Tuesday given new fever/infection  If vasculitis confirmed, will need heme/Onc consult for pulse cytoxan and high dose IV steroids  Low threshold to restart vEEG  S/p ILR. IVONNE declined by cardiology as tele revealed afib. Touch base with EP regarding possible afib. On eliquis for now. If no afib, then will switch back to DAPT.  S/p PEG tube placement  Apprecaite ID recs  PT/OT/ST, tele, -160.    Transfer to medicine service given complex medical concerns. Stroke neurology to remain as consult fo vasculitis evaluation/management

## 2022-08-31 NOTE — PROGRESS NOTE ADULT - SUBJECTIVE AND OBJECTIVE BOX
Neurology Progress Note    Interval History:    ON febrile w/ Tmax 102. Administered tylenol and started on vancomycin. NPO for Diagnostic Cerebral Angiogram    Patient was seen and examined at bedside. She was looking sick and lethargic. Did not open her eyes this morning with verbal stimuli.    Medications:  acetaminophen     Tablet .. 650 milliGRAM(s) Oral every 6 hours PRN  aspirin  chewable 81 milliGRAM(s) Oral daily  atorvastatin 80 milliGRAM(s) Oral at bedtime  cefepime   IVPB      cefepime   IVPB 2000 milliGRAM(s) IV Intermittent once  cloNIDine Patch 0.1 mG/24Hr(s) 1 patch Transdermal <User Schedule>  clopidogrel Tablet 75 milliGRAM(s) Oral daily  collagenase Ointment 1 Application(s) Topical two times a day  Dakins Solution - 1/2 Strength 1 Application(s) Topical two times a day  dextrose 5%. 1000 milliLiter(s) IV Continuous <Continuous>  dextrose 5%. 1000 milliLiter(s) IV Continuous <Continuous>  dextrose 50% Injectable 25 Gram(s) IV Push once  dextrose 50% Injectable 12.5 Gram(s) IV Push once  dextrose 50% Injectable 25 Gram(s) IV Push once  dextrose Oral Gel 15 Gram(s) Oral once PRN  enoxaparin Injectable 40 milliGRAM(s) SubCutaneous every 24 hours  furosemide    Tablet 40 milliGRAM(s) Oral daily  glucagon  Injectable 1 milliGRAM(s) IntraMuscular once  hydrALAZINE 100 milliGRAM(s) Oral every 8 hours  insulin glargine Injectable (LANTUS) 22 Unit(s) SubCutaneous every morning  insulin lispro (ADMELOG) corrective regimen sliding scale   SubCutaneous three times a day before meals  insulin lispro Injectable (ADMELOG) 5 Unit(s) SubCutaneous three times a day before meals  labetalol 600 milliGRAM(s) Oral three times a day  labetalol Injectable 10 milliGRAM(s) IV Push every 6 hours PRN  lacosamide IVPB 50 milliGRAM(s) IV Intermittent every 12 hours  lactated ringers. 1000 milliLiter(s) IV Continuous <Continuous>  levETIRAcetam  Solution 1000 milliGRAM(s) Oral every 12 hours  lidocaine 1%/epinephrine 1:100,000 Inj 20 milliLiter(s) Local Injection once  LORazepam     Tablet 1 milliGRAM(s) Oral once PRN  losartan 100 milliGRAM(s) Oral daily  melatonin 3 milliGRAM(s) Oral at bedtime PRN  multivitamin/minerals/iron Oral Solution (CENTRUM) 15 milliLiter(s) Oral daily  pantoprazole    Tablet 40 milliGRAM(s) Oral before breakfast  sodium bicarbonate 650 milliGRAM(s) Oral every 6 hours  sodium chloride 0.65% Nasal 2 Spray(s) Both Nostrils two times a day  vancomycin  IVPB          Vital Signs Last 24 Hrs  T(C): 38.6 (31 Aug 2022 13:30), Max: 38.9 (30 Aug 2022 22:40)  T(F): 101.5 (31 Aug 2022 13:30), Max: 102 (30 Aug 2022 22:40)  HR: 90 (31 Aug 2022 13:30) (75 - 90)  BP: 134/76 (31 Aug 2022 13:30) (110/60 - 186/93)  BP(mean): 80 (31 Aug 2022 08:15) (80 - 80)  RR: 16 (31 Aug 2022 13:30) (16 - 18)  SpO2: 95% (31 Aug 2022 13:30) (95% - 98%)    Parameters below as of 31 Aug 2022 08:15  Patient On (Oxygen Delivery Method): room air        Neurological Examination:  General:  Appearance sickly.   Cognitive/Language:  Lethargic. Was not tracking today. Doea not follow command. respond to painful stimuli.   Cranial Nerves  - Eyes: kept eyes closed.    - Face:      - Ears/Nose/Throat:    Motor examination:  independently move her extremities but not on command  Sensory examination:  pain withdrawal in all extremities.  Reflexes:   2+ b/l biceps, triceps. absent right patella and decrease left patellar.  Plantar response downgoing b/l.  clonus absent.  Cerebellum:        Labs:  CBC Full  -  ( 31 Aug 2022 07:24 )  WBC Count : 14.36 K/uL  RBC Count : 3.28 M/uL  Hemoglobin : 9.7 g/dL  Hematocrit : 28.5 %  Platelet Count - Automated : 276 K/uL  Mean Cell Volume : 86.9 fL  Mean Cell Hemoglobin : 29.6 pg  Mean Cell Hemoglobin Concentration : 34.0 g/dL  Auto Neutrophil # : x  Auto Lymphocyte # : x  Auto Monocyte # : x  Auto Eosinophil # : x  Auto Basophil # : x  Auto Neutrophil % : x  Auto Lymphocyte % : x  Auto Monocyte % : x  Auto Eosinophil % : x  Auto Basophil % : x        135  |  96<L>  |  57<H>  ----------------------------<  194<H>  4.7   |  22  |  1.9<H>    Ca    9.5      31 Aug 2022 07:24  Phos  5.1       Mg     1.8         TPro  7.1  /  Alb  3.9  /  TBili  0.3  /  DBili  0.2  /  AST  16  /  ALT  15  /  AlkPhos  153<H>      LIVER FUNCTIONS - ( 30 Aug 2022 23:46 )  Alb: 3.9 g/dL / Pro: 7.1 g/dL / ALK PHOS: 153 U/L / ALT: 15 U/L / AST: 16 U/L / GGT: x           PT/INR - ( 30 Aug 2022 23:46 )   PT: 18.80 sec;   INR: 1.64 ratio           Urinalysis Basic - ( 31 Aug 2022 00:25 )    Color: Yellow / Appearance: Turbid / S.013 / pH: x  Gluc: x / Ketone: Negative  / Bili: Negative / Urobili: <2 mg/dL   Blood: x / Protein: 100 mg/dL / Nitrite: Negative   Leuk Esterase: Large / RBC: 183 /HPF / WBC >720 /HPF   Sq Epi: x / Non Sq Epi: 2 /HPF / Bacteria: Many        RADIOLOGY & ADDITIONAL TESTS:    < from: Xray Chest 1 View- PORTABLE-Routine (Xray Chest 1 View- PORTABLE-Routine in AM.) (22 @ 06:00) >    Comparison : Chest radiograph prior day.    Technique/Positioning: Frontalportable, low lung volumes, rotated.    Findings:    Support devices: Loop recorder overlies the thorax.    Cardiac/mediastinum/hilum: Unremarkable.    Lung parenchyma/Pleura: Within normal limits.    Skeleton/soft tissues: Unremarkable.    Impression:    No radiographic evidence of acute cardiopulmonary disease.    Unchanged.    < end of copied text >

## 2022-09-01 NOTE — PROGRESS NOTE ADULT - ASSESSMENT
Ms. 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 She underwent Abx therapy and debridement. On 08/05 she was observed w/ acute mental status changes. Stroke code was alerted for NIHSS of 23. CTH observed mild atrophic changes as well as hypodensity of the periventricular white matter, right thalamus, basal ganglia and insula cortex.  She was suspected of seizure and started on antiseizure medications. During hospital stay, found to have multiple punctate infarcts suspected to be cardioembolic vs vasculitis. Patient on found to have sharp waves on vEEG currently on AED's.    Pt is currently nonverbal, visual tracking but does not follows command. Sensation is withdrawal to painful stimuli. independent of limb movement however not on command.       Right thalamic lacunar infarct  Multiple punctuate cortical infarcts  Moderate right and mild left atherosclerotic stenosis in supraclinoid ICA  Focal Mild atherosclerotic stenosis in the V3 segment of the right vertebral artery  Vasculitis w/u todate is neg  PEG feed dependent    Recommendation  - Infectious w/u >>>Bcx / UCx --- ID onboard  - continued care as per Medicine team  - Neurovascular will follow as consult   - cont RLE wound care as per BURN team  - MRI brain w/wo requested.   - Possible DSA to assess for vasculitis by [facility] after infectious w/u  - May start IV Solumedrol and IVIG, pending clinical course.   - Encephalopathy, Hyper coag vasculitis and autoimmune panel are requested.

## 2022-09-01 NOTE — CHART NOTE - NSCHARTNOTEFT_GEN_A_CORE
Vancomycin trough level 12 & target vancomycin level is 15-20    Given the fact that the patient is still febrile and BP dropping (concerning in the setting of her Hx severe HTN), a one time vancomycin dose was given (20mg/kg ~1500mg)    In light of her unstable kidney function, patient will need another vancomycin trough in 24 hours to calculate the next vancomycin dose.    Please confirm plan with ID in the AM and order vancomycin trough levels.    BP meds on hold

## 2022-09-01 NOTE — PROGRESS NOTE ADULT - ATTENDING COMMENTS
Pt is a 57 yo F with PMHx of HTN, DM, prior stroke in 2017 with residual left sided weakness, who presented with RLE cellulitis. Stroke code called on 8/5 for unresponsiveness.     Impr: scattered punctate acute ischemic strokes in multiple vascular territories  Given encephalopathy and small scattered strokes, high suspicion for vasculitis.   S/p LP, CSF with mildly elevated cell count and protein, normal glucose. elevated ESR/CRP.  Repeat MRI brain with/without, Vasculitis labs  NOEMI consult for DSA given suspicion for vasculitis- plan to wait till Tuesday given new fever/infection  Low threshold to resume vEEG. Renally dose AED's

## 2022-09-01 NOTE — PROGRESS NOTE ADULT - SUBJECTIVE AND OBJECTIVE BOX
[facility] day: 30    HPI:       Subjective:   Complaints: none. Patient nonverbal   Overnight events: none     Objective:   T(C): 37.9 (08-31-22 @ 22:07), Max: 39.1 (08-31-22 @ 16:11)  HR: 86 (08-31-22 @ 22:07) (77 - 92)  BP: 100/56 (08-31-22 @ 22:07) (100/56 - 140/77)  RR: 18 (08-31-22 @ 22:07) (16 - 18)  SpO2: 98% (08-31-22 @ 22:07) (95% - 98%)    Acc I&O    08-31-22 @ 07:01  -  09-01-22 @ 07:00  --------------------------------------------------------  IN: 0 mL / OUT: 500 mL / NET: -500 mL    PHYSICAL EXAM  GENERAL: Well developed, well nourished   HEENT: Normocephalic, atraumatic  PULMONARY/CARDIOVASCULAR: decreased bibasilar air entry, RRR normal S1S2  GASTROINTESTINAL: Soft, non-tender, non-distended, no guarding. PEG tube in place  RENAL: no cva tenderness  SKIN/EXTREMITIES: No LE edema . Right LE cellulitis with bandages   NEUROLOGIC/MUSCULOSKELETAL: AOx4, grossly moving all extremities, no focal deficits.    MEDICATIONS  acetaminophen     Tablet .. 650 milliGRAM(s) Oral every 6 hours PRN  aspirin  chewable 81 milliGRAM(s) Oral daily  atorvastatin 80 milliGRAM(s) Oral at bedtime  cefepime   IVPB      cefepime   IVPB 2000 milliGRAM(s) IV Intermittent every 12 hours  cloNIDine Patch 0.1 mG/24Hr(s) 1 patch Transdermal <User Schedule>  clopidogrel Tablet 75 milliGRAM(s) Oral daily  collagenase Ointment 1 Application(s) Topical two times a day  Dakins Solution - 1/2 Strength 1 Application(s) Topical two times a day  dextrose 5%. 1000 milliLiter(s) IV Continuous <Continuous>  dextrose 5%. 1000 milliLiter(s) IV Continuous <Continuous>  dextrose 50% Injectable 25 Gram(s) IV Push once  dextrose 50% Injectable 12.5 Gram(s) IV Push once  dextrose 50% Injectable 25 Gram(s) IV Push once  dextrose Oral Gel 15 Gram(s) Oral once PRN  enoxaparin Injectable 40 milliGRAM(s) SubCutaneous every 24 hours  furosemide    Tablet 40 milliGRAM(s) Oral daily  glucagon  Injectable 1 milliGRAM(s) IntraMuscular once  hydrALAZINE 100 milliGRAM(s) Oral every 8 hours  insulin glargine Injectable (LANTUS) 22 Unit(s) SubCutaneous every morning  insulin lispro (ADMELOG) corrective regimen sliding scale   SubCutaneous three times a day before meals  insulin lispro Injectable (ADMELOG) 5 Unit(s) SubCutaneous three times a day before meals  labetalol 600 milliGRAM(s) Oral three times a day  labetalol Injectable 10 milliGRAM(s) IV Push every 6 hours PRN  lacosamide IVPB 50 milliGRAM(s) IV Intermittent every 12 hours  lactated ringers. 1000 milliLiter(s) IV Continuous <Continuous>  levETIRAcetam  Solution 1000 milliGRAM(s) Oral every 12 hours  lidocaine 1%/epinephrine 1:100,000 Inj 20 milliLiter(s) Local Injection once  LORazepam     Tablet 1 milliGRAM(s) Oral once PRN  losartan 100 milliGRAM(s) Oral daily  melatonin 3 milliGRAM(s) Oral at bedtime PRN  multivitamin/minerals/iron Oral Solution (CENTRUM) 15 milliLiter(s) Oral daily  pantoprazole    Tablet 40 milliGRAM(s) Oral before breakfast  sodium bicarbonate 650 milliGRAM(s) Oral every 6 hours  sodium chloride 0.65% Nasal 2 Spray(s) Both Nostrils two times a day      Labs:      Assessment and plan: [facility] day: 30    HPI:   55 yo F with PMH of HTN, DM2, CVA (2017) w/ residual L sided paresis who presented with purulent right lower extremity wound for 3 months consistent with acute RLE cellulitis. During hospital stay, found to have multiple punctate infarcts suspected to be cardioembolic vs vasculitis. Patient on found to have sharp waves on vEEG currently on AED's.    Subjective:   Complaints: none. Patient nonverbal   Overnight events: none     Objective:   T(C): 37.9 (08-31-22 @ 22:07), Max: 39.1 (08-31-22 @ 16:11)  HR: 86 (08-31-22 @ 22:07) (77 - 92)  BP: 100/56 (08-31-22 @ 22:07) (100/56 - 140/77)  RR: 18 (08-31-22 @ 22:07) (16 - 18)  SpO2: 98% (08-31-22 @ 22:07) (95% - 98%)    Acc I&O    08-31-22 @ 07:01  -  09-01-22 @ 07:00  --------------------------------------------------------  IN: 0 mL / OUT: 500 mL / NET: -500 mL    PHYSICAL EXAM  GENERAL: Well developed, well nourished   HEENT: Normocephalic, atraumatic  PULMONARY/CARDIOVASCULAR: decreased bibasilar air entry, RRR normal S1S2  GASTROINTESTINAL: Soft, non-tender, non-distended, no guarding. PEG tube in place  RENAL: no cva tenderness  SKIN/EXTREMITIES: No LE edema . Right LE cellulitis with bandages   NEUROLOGIC/MUSCULOSKELETAL: AOx0.    MEDICATIONS  acetaminophen     Tablet .. 650 milliGRAM(s) Oral every 6 hours PRN  aspirin  chewable 81 milliGRAM(s) Oral daily  atorvastatin 80 milliGRAM(s) Oral at bedtime  cefepime   IVPB      cefepime   IVPB 2000 milliGRAM(s) IV Intermittent every 12 hours  cloNIDine Patch 0.1 mG/24Hr(s) 1 patch Transdermal <User Schedule>  clopidogrel Tablet 75 milliGRAM(s) Oral daily  collagenase Ointment 1 Application(s) Topical two times a day  Dakins Solution - 1/2 Strength 1 Application(s) Topical two times a day  dextrose 5%. 1000 milliLiter(s) IV Continuous <Continuous>  dextrose 5%. 1000 milliLiter(s) IV Continuous <Continuous>  dextrose 50% Injectable 25 Gram(s) IV Push once  dextrose 50% Injectable 12.5 Gram(s) IV Push once  dextrose 50% Injectable 25 Gram(s) IV Push once  dextrose Oral Gel 15 Gram(s) Oral once PRN  enoxaparin Injectable 40 milliGRAM(s) SubCutaneous every 24 hours  furosemide    Tablet 40 milliGRAM(s) Oral daily  glucagon  Injectable 1 milliGRAM(s) IntraMuscular once  hydrALAZINE 100 milliGRAM(s) Oral every 8 hours  insulin glargine Injectable (LANTUS) 22 Unit(s) SubCutaneous every morning  insulin lispro (ADMELOG) corrective regimen sliding scale   SubCutaneous three times a day before meals  insulin lispro Injectable (ADMELOG) 5 Unit(s) SubCutaneous three times a day before meals  labetalol 600 milliGRAM(s) Oral three times a day  labetalol Injectable 10 milliGRAM(s) IV Push every 6 hours PRN  lacosamide IVPB 50 milliGRAM(s) IV Intermittent every 12 hours  lactated ringers. 1000 milliLiter(s) IV Continuous <Continuous>  levETIRAcetam  Solution 1000 milliGRAM(s) Oral every 12 hours  lidocaine 1%/epinephrine 1:100,000 Inj 20 milliLiter(s) Local Injection once  LORazepam     Tablet 1 milliGRAM(s) Oral once PRN  losartan 100 milliGRAM(s) Oral daily  melatonin 3 milliGRAM(s) Oral at bedtime PRN  multivitamin/minerals/iron Oral Solution (CENTRUM) 15 milliLiter(s) Oral daily  pantoprazole    Tablet 40 milliGRAM(s) Oral before breakfast  sodium bicarbonate 650 milliGRAM(s) Oral every 6 hours  sodium chloride 0.65% Nasal 2 Spray(s) Both Nostrils two times a day      Labs:                        8.1    24.43 )-----------( 191      ( 01 Sep 2022 06:38 )             22.6     09-01    140  |  100  |  73<HH>  ----------------------------<  120<H>  4.6   |  24  |  2.8<H>    Ca    8.8      01 Sep 2022 06:38  Phos  5.1     08-31  Mg     1.8     09-01    TPro  5.8<L>  /  Alb  2.9<L>  /  TBili  0.6  /  DBili  x   /  AST  25  /  ALT  14  /  AlkPhos  129<H>  09-01    Assessment and plan:  55 yo F with PMH of HTN, DM2, CVA (2017) w/ residual L sided paresis who presented with purulent right lower extremity wound for 3 months consistent with acute RLE cellulitis. During hospital stay, found to have multiple punctate infarcts suspected to be cardioembolic vs vasculitis. Patient on found to have sharp waves on vEEG currently on AED's.    #Stroke workup  - continue aspirin 81mg  daily  - Restarted plavix as LP performed 8/26  - Continue Atorvastatin 80 mg daily   - q4hr stroke neuro checks and vitals  - IVONNE was denied by cardiology as they found paroxysmal A. fib on tele  - s/p ILR by EP 8/22  - Vasculitic work up as per neuro, intervention as per neurovascular   - consulted IR for LP tap - was performed 8/28  - Defer to neuro for ongoing work up    #Fever   #Possible Pyeloneph  persistent fever spikes  mild elevation in lactic acid as well  started on gentle hydration  blood cultures, CXR, UA/culture,   currently on cefepime and vancomycin   follow blood cultures and monitor for fever spikes  repeat lactic acid in AM  continue IV fluids     # High risk of seizures with epileptiform discharges  Multiple EEG did not capture any seizures  - Continue Keppra 1500 mg bid   - Stopped Phenytoin 8/23  - c/w vimpat 50mg BID  - Levetiracetam 26 .6  - phenytoin 0.5   - Defer to neuro for ongoing work up    #Hypertensive urgency due to noncompliance and Malignant HTN   - BP at admission 296/174 without signs of end organ damage. Renal artery duplex wnl>>ARIAN unlikely  - Aldosterone wnl, renin elevated  -close monitoring  -c/w chlorthalidone 25mg daily  - previously on Amlodipine 10 mg  - pharmacy can't do Nifedipine IR, and would prefer not to crush Nifedipine XL (off nifedipine)  -c/w losartan 100mg daily  -c/w labetalol  to 600mg q8  -c/w hydralazine 100mg q8  -c/w doxazocin 2mg nightly  - s/p PEG tube   -SBP goal 120-160  - Cardiology follow up, consult pending     #HLD  - high dose statin as above in CVA      #Purulent RLE cellulitis r/o abscess / Fever / ?asp-n pneumonitis vs PNA  -s/p unasyn and and vancomycin in ED  - initial neg blood cultures   -s/p burn debridement   - Candida in wound. D/w Dr. Chua: contaminant  -  BCx w/ staph: likely contaminant. repeat BCx -  negative  - hold ABx per ID.   now having fever. order blood cultures. ua, cxr, lactic acid. consult ID if persistent fevers. also      #DM  - A1C results: 10.4  - ISS  - started insulin regimen   - c/w Lantus 22, lispro 5u  - TSH results: 0.86      #Nutrition/Fluids/Electrolytes   - replete K<4 and Mg <2  - Diet via PEG tube  - obtain MBS test- failed, NPO with PEG alternative means     DVT ppx: Lovenox SCDs  GI ppx: Protonix  Diet: DASH/carb consistent  Activity: AAT    Follow up: follow ID, monitor fever spikes, Patient is high risk of clinical deterioration. follow cultures.    [facility] day: 30    HPI:   55 yo F with PMH of HTN, DM2, CVA (2017) w/ residual L sided paresis who presented with purulent right lower extremity wound for 3 months consistent with acute RLE cellulitis. During hospital stay, found to have multiple punctate infarcts suspected to be cardioembolic vs vasculitis. Patient on found to have sharp waves on vEEG currently on AED's.    Subjective:   Complaints: none. Patient nonverbal   Overnight events: none     Objective:   T(C): 37.9 (08-31-22 @ 22:07), Max: 39.1 (08-31-22 @ 16:11)  HR: 86 (08-31-22 @ 22:07) (77 - 92)  BP: 100/56 (08-31-22 @ 22:07) (100/56 - 140/77)  RR: 18 (08-31-22 @ 22:07) (16 - 18)  SpO2: 98% (08-31-22 @ 22:07) (95% - 98%)    Acc I&O    08-31-22 @ 07:01  -  09-01-22 @ 07:00  --------------------------------------------------------  IN: 0 mL / OUT: 500 mL / NET: -500 mL    PHYSICAL EXAM  GENERAL: Well developed, well nourished   HEENT: Normocephalic, atraumatic  PULMONARY/CARDIOVASCULAR: decreased bibasilar air entry, RRR normal S1S2  GASTROINTESTINAL: Soft, non-tender, non-distended, no guarding. PEG tube in place  RENAL: no cva tenderness  SKIN/EXTREMITIES: No LE edema . Right LE cellulitis with bandages   NEUROLOGIC/MUSCULOSKELETAL: AOx0.    MEDICATIONS  acetaminophen     Tablet .. 650 milliGRAM(s) Oral every 6 hours PRN  aspirin  chewable 81 milliGRAM(s) Oral daily  atorvastatin 80 milliGRAM(s) Oral at bedtime  cefepime   IVPB      cefepime   IVPB 2000 milliGRAM(s) IV Intermittent every 12 hours  cloNIDine Patch 0.1 mG/24Hr(s) 1 patch Transdermal <User Schedule>  clopidogrel Tablet 75 milliGRAM(s) Oral daily  collagenase Ointment 1 Application(s) Topical two times a day  Dakins Solution - 1/2 Strength 1 Application(s) Topical two times a day  dextrose 5%. 1000 milliLiter(s) IV Continuous <Continuous>  dextrose 5%. 1000 milliLiter(s) IV Continuous <Continuous>  dextrose 50% Injectable 25 Gram(s) IV Push once  dextrose 50% Injectable 12.5 Gram(s) IV Push once  dextrose 50% Injectable 25 Gram(s) IV Push once  dextrose Oral Gel 15 Gram(s) Oral once PRN  enoxaparin Injectable 40 milliGRAM(s) SubCutaneous every 24 hours  furosemide    Tablet 40 milliGRAM(s) Oral daily  glucagon  Injectable 1 milliGRAM(s) IntraMuscular once  hydrALAZINE 100 milliGRAM(s) Oral every 8 hours  insulin glargine Injectable (LANTUS) 22 Unit(s) SubCutaneous every morning  insulin lispro (ADMELOG) corrective regimen sliding scale   SubCutaneous three times a day before meals  insulin lispro Injectable (ADMELOG) 5 Unit(s) SubCutaneous three times a day before meals  labetalol 600 milliGRAM(s) Oral three times a day  labetalol Injectable 10 milliGRAM(s) IV Push every 6 hours PRN  lacosamide IVPB 50 milliGRAM(s) IV Intermittent every 12 hours  lactated ringers. 1000 milliLiter(s) IV Continuous <Continuous>  levETIRAcetam  Solution 1000 milliGRAM(s) Oral every 12 hours  lidocaine 1%/epinephrine 1:100,000 Inj 20 milliLiter(s) Local Injection once  LORazepam     Tablet 1 milliGRAM(s) Oral once PRN  losartan 100 milliGRAM(s) Oral daily  melatonin 3 milliGRAM(s) Oral at bedtime PRN  multivitamin/minerals/iron Oral Solution (CENTRUM) 15 milliLiter(s) Oral daily  pantoprazole    Tablet 40 milliGRAM(s) Oral before breakfast  sodium bicarbonate 650 milliGRAM(s) Oral every 6 hours  sodium chloride 0.65% Nasal 2 Spray(s) Both Nostrils two times a day      Labs:                        8.1    24.43 )-----------( 191      ( 01 Sep 2022 06:38 )             22.6     09-01    140  |  100  |  73<HH>  ----------------------------<  120<H>  4.6   |  24  |  2.8<H>    Ca    8.8      01 Sep 2022 06:38  Phos  5.1     08-31  Mg     1.8     09-01    TPro  5.8<L>  /  Alb  2.9<L>  /  TBili  0.6  /  DBili  x   /  AST  25  /  ALT  14  /  AlkPhos  129<H>  09-01    Assessment and plan:  55 yo F with PMH of HTN, DM2, CVA (2017) w/ residual L sided paresis who presented with purulent right lower extremity wound for 3 months consistent with acute RLE cellulitis. During hospital stay, found to have multiple punctate infarcts suspected to be cardioembolic vs vasculitis. Patient on found to have sharp waves on vEEG currently on AED's.    #Stroke workup  - continue aspirin 81mg  daily  - Restarted plavix as LP performed 8/26  - Continue Atorvastatin 80 mg daily   - q4hr stroke neuro checks and vitals  - IVONNE was denied by cardiology as they found paroxysmal A. fib on tele  - s/p ILR by EP 8/22  - Vasculitic work up as per neuro>>> Angiogram to be done by Neuroendovascular once infection clear up   - consulted IR for LP tap - was performed 8/28  - Defer to neuro for ongoing work up    #Fever   #Possible Pyelonephritis  #RLE ulcer   - persistently febrile   - Initially treated for RLE cellulitis. Wound looks not infected  - BP on lower side (patient has a history of hypertension  - S/p Cefepime + Vancomycin with persistent fevers  - Uculture 08/31: multiple organisms   - Repeat blood and urine culture  - Id consult: Start meropenem     #Acute urinary retention   - Today retained 1200cc urine. Nolasco inserted   - Dark color and purulent looking >>> possibly from UTI   - Keep nolasco and monitor infection    # High risk of seizures with epileptiform discharges  Multiple EEG did not capture any seizures  - Continue Keppra 1500 mg bid   - Stopped Phenytoin 8/23  - c/w vimpat 50mg BID    #Hypertensive urgency due to noncompliance and Malignant HTN - ON HOLD TX  - BP at admission 296/174 without signs of end organ damage. Renal artery duplex wnl>>ARIAN unlikely  - Aldosterone wnl, renin elevated  -close monitoring  -c/w chlorthalidone 25mg daily  - previously on Amlodipine 10 mg  - pharmacy can't do Nifedipine IR, and would prefer not to crush Nifedipine XL (off nifedipine)  - Was on losartan 100mg daily, labetalol  to 600mg q8, hydralazine 100mg q8 and doxazocin 2mg nightly  - s/p PEG tube   - Hold antihypertensive given SIRSs with low BP     #HLD  - high dose statin as above in CVA     #DM  - A1C results: 10.4  - ISS  - started insulin regimen   - c/w Lantus 22, lispro 5u  - TSH results: 0.86    #Nutrition/Fluids/Electrolytes   - replete K<4 and Mg <2  - Diet via PEG tube  - obtain MBS test- failed, NPO with PEG alternative means     DVT ppx: Lovenox SCDs  GI ppx: Protonix  Diet: DASH/carb consistent  Activity: AAT    Follow up: follow ID, Follow up cultures. Angiogram by neuroendovascular for vasculitis workup

## 2022-09-01 NOTE — PROGRESS NOTE ADULT - ASSESSMENT
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 During hospital stay, found to have multiple punctate infarcts suspected to be cardioembolic vs vasculitis. Patient on found to have sharp waves on vEEG currently on AED's.    #Acute ischemic strokes / High suspicion for CNS vasculitis  - high suspicion for vasculitis as per neuro (high infl markers, CSF protein)  - continue aspirin 81mg  daily  - Restarted plavix as LP performed 8/28 and s/p PEG  - Continue Atorvastatin 80 mg daily   - q4hr stroke neuro checks and vitals  - cardio recommended to hold off IVONNE (consider CTA heart once kidney fxn better)  - s/p ILR by EP 8/22  - Vasculitic work up as per neuro, intervention as per neurovascular (planning DSA)  -repeat MRI brain w/ and w/out contrast once kidney fxn better    #Fevers / Urinary retention  persistent fever spikes  reculture  ABx as per ID    # High risk of seizures with epileptiform discharges  - Continue Keppra 1500 mg bid   - Stopped Phenytoin 8/23 as per neuro  - c/w vimpat 50mg BID    #ALF / Urinary retension  -IVFs  -hold Lasix and Losartan  -check renal U/s  -bladder scans q6-8hrs and CIC      #Hypertensive urgency due to noncompliance and Malignant HTN   - BP at admission 296/174 without signs of end organ damage. Renal artery duplex wnl>>ARIAN unlikely  - Aldosterone wnl, renin elevated  -close monitoring  -c/w chlorthalidone 25mg daily  - previously on Amlodipine 10 mg  - pharmacy can't do Nifedipine IR, and would prefer not to crush Nifedipine XL (off nifedipine)  -c/w losartan 100mg daily  -c/w labetalol  to 600mg q8  -c/w hydralazine 100mg q8  -c/w doxazocin 2mg nightly  - s/p PEG tube   -SBP goal 120-160      #HLD  - high dose statin as above in CVA      #Purulent RLE cellulitis   -s/p unasyn and and vancomycin in ED  - initial neg blood cultures   -s/p burn debridement   - Candida in wound. D/w Dr. Chua: contaminant  -  BCx w/ staph: likely contaminant. repeat BCx -  negative  - finished ABx      #DM w/ Hyperglycemia  - A1C results: 10.4  - ISS  - started insulin regimen   - c/w Lantus 22, lispro 5u  - TSH results: 0.86      DVT ppx: Heparin SQ  GI ppx: Protonix  Diet: DASH/carb consistent  Activity: AAT    Patient is high risk of clinical deterioration. Px is guarded    #Progress Note Handoff  Pending: Consults____Clinical improvement and stability__x___Tests___Cx, MRI, DSA_____PT____x____  Neuro team updated the sons  Disposition: Home______/SNF_______/4A______/To be determined____x____    My note supersedes the residents note should a discrepancy arise.    Chart and notes personally reviewed.  Care Discussed with Consultants/Other Providers/ Housestaff [ x] YES [ ] NO   Radiology, labs, old records personally reviewed.    discussed w/ housestaff, nursing, case management, neuro team

## 2022-09-01 NOTE — PROGRESS NOTE ADULT - SUBJECTIVE AND OBJECTIVE BOX
Neurology Progress Note    Interval History:    Patient was seen and examined, no acute event over night.     Medications:  acetaminophen     Tablet .. 650 milliGRAM(s) Oral every 6 hours PRN  aspirin  chewable 81 milliGRAM(s) Oral daily  atorvastatin 80 milliGRAM(s) Oral at bedtime  cefepime   IVPB      cefepime   IVPB 2000 milliGRAM(s) IV Intermittent every 12 hours  cloNIDine Patch 0.1 mG/24Hr(s) 1 patch Transdermal <User Schedule>  clopidogrel Tablet 75 milliGRAM(s) Oral daily  collagenase Ointment 1 Application(s) Topical two times a day  Dakins Solution - 1/2 Strength 1 Application(s) Topical two times a day  dextrose 5%. 1000 milliLiter(s) IV Continuous <Continuous>  dextrose 5%. 1000 milliLiter(s) IV Continuous <Continuous>  dextrose 50% Injectable 25 Gram(s) IV Push once  dextrose 50% Injectable 12.5 Gram(s) IV Push once  dextrose 50% Injectable 25 Gram(s) IV Push once  dextrose Oral Gel 15 Gram(s) Oral once PRN  enoxaparin Injectable 40 milliGRAM(s) SubCutaneous every 24 hours  furosemide    Tablet 40 milliGRAM(s) Oral daily  glucagon  Injectable 1 milliGRAM(s) IntraMuscular once  hydrALAZINE 100 milliGRAM(s) Oral every 8 hours  insulin glargine Injectable (LANTUS) 22 Unit(s) SubCutaneous every morning  insulin lispro (ADMELOG) corrective regimen sliding scale   SubCutaneous three times a day before meals  insulin lispro Injectable (ADMELOG) 5 Unit(s) SubCutaneous three times a day before meals  labetalol 600 milliGRAM(s) Oral three times a day  labetalol Injectable 10 milliGRAM(s) IV Push every 6 hours PRN  lacosamide IVPB 50 milliGRAM(s) IV Intermittent every 12 hours  lactated ringers. 1000 milliLiter(s) IV Continuous <Continuous>  levETIRAcetam  Solution 1000 milliGRAM(s) Oral every 12 hours  lidocaine 1%/epinephrine 1:100,000 Inj 20 milliLiter(s) Local Injection once  LORazepam     Tablet 1 milliGRAM(s) Oral once PRN  losartan 100 milliGRAM(s) Oral daily  melatonin 3 milliGRAM(s) Oral at bedtime PRN  multivitamin/minerals/iron Oral Solution (CENTRUM) 15 milliLiter(s) Oral daily  pantoprazole    Tablet 40 milliGRAM(s) Oral before breakfast  sodium bicarbonate 650 milliGRAM(s) Oral every 6 hours  sodium chloride 0.65% Nasal 2 Spray(s) Both Nostrils two times a day      Vital Signs Last 24 Hrs  T(C): 37.9 (31 Aug 2022 22:07), Max: 39.1 (31 Aug 2022 16:11)  T(F): 100.3 (31 Aug 2022 22:07), Max: 102.3 (31 Aug 2022 16:11)  HR: 86 (31 Aug 2022 22:07) (77 - 92)  BP: 100/56 (31 Aug 2022 22:07) (100/56 - 140/77)  BP(mean): 96 (31 Aug 2022 20:02) (80 - 96)  RR: 18 (31 Aug 2022 22:07) (16 - 18)  SpO2: 98% (31 Aug 2022 22:07) (95% - 98%)    Parameters below as of 31 Aug 2022 22:07  Patient On (Oxygen Delivery Method): room air        Neurological Examination:  General:  Appearance is consistent with chronologic age.   Cognitive/Language:  Awake, alert, and oriented to person, place, time and date.  Recent and remote memory intact.  Fund of knowledge is appropriate.  Naming, repetition and comprehension intact. Nondysarthric.    Cranial Nerves  - Eyes: Visual acuity intact, Visual fields full.  EOMI w/o nystagmus, skew or reported double vision.  PERRL.  No ptosis/weakness of eyelid closure.    - Face:  Facial sensation normal V1 - 3, no facial asymmetry.    - Ears/Nose/Throat:  Hearing grossly intact b/l to finger rub.  Palate elevates midline.  Tongue and uvula midline.   Motor examination:  (MRC grade R/L) 5/5 UE; 5/5 LE.  No observable drift. Normal tone and bulk. No tenderness, twitching, tremors or involuntary movements.  Sensory examination:  Intact to light touch and pinprick, pain, temperature and proprioception and vibration in all extremities.  Reflexes:   2+ b/l biceps, triceps, patella and achilles.  Plantar response downgoing b/l.  Jaw jerk, Sanam, clonus absent.  Cerebellum:   FTN/HKS intact.  No dysmetria.    Gait narrow based and normal.    Labs:  CBC Full  -  ( 31 Aug 2022 07:24 )  WBC Count : 14.36 K/uL  RBC Count : 3.28 M/uL  Hemoglobin : 9.7 g/dL  Hematocrit : 28.5 %  Platelet Count - Automated : 276 K/uL  Mean Cell Volume : 86.9 fL  Mean Cell Hemoglobin : 29.6 pg  Mean Cell Hemoglobin Concentration : 34.0 g/dL  Auto Neutrophil # : x  Auto Lymphocyte # : x  Auto Monocyte # : x  Auto Eosinophil # : x  Auto Basophil # : x  Auto Neutrophil % : x  Auto Lymphocyte % : x  Auto Monocyte % : x  Auto Eosinophil % : x  Auto Basophil % : x        135  |  96<L>  |  57<H>  ----------------------------<  194<H>  4.7   |  22  |  1.9<H>    Ca    9.5      31 Aug 2022 07:24  Phos  5.1       Mg     1.8         TPro  7.1  /  Alb  3.9  /  TBili  0.3  /  DBili  0.2  /  AST  16  /  ALT  15  /  AlkPhos  153<H>      LIVER FUNCTIONS - ( 30 Aug 2022 23:46 )  Alb: 3.9 g/dL / Pro: 7.1 g/dL / ALK PHOS: 153 U/L / ALT: 15 U/L / AST: 16 U/L / GGT: x           PT/INR - ( 30 Aug 2022 23:46 )   PT: 18.80 sec;   INR: 1.64 ratio           Urinalysis Basic - ( 31 Aug 2022 00:25 )    Color: Yellow / Appearance: Turbid / S.013 / pH: x  Gluc: x / Ketone: Negative  / Bili: Negative / Urobili: <2 mg/dL   Blood: x / Protein: 100 mg/dL / Nitrite: Negative   Leuk Esterase: Large / RBC: 183 /HPF / WBC >720 /HPF   Sq Epi: x / Non Sq Epi: 2 /HPF / Bacteria: Many        RADIOLOGY & ADDITIONAL TESTS:   Neurology Progress Note    Interval History:    ON: febrile Tmax 102.3 - Tylenol    Patient was seen and examined.     Medications:  acetaminophen     Tablet .. 650 milliGRAM(s) Oral every 6 hours PRN  aspirin  chewable 81 milliGRAM(s) Oral daily  atorvastatin 80 milliGRAM(s) Oral at bedtime  cefepime   IVPB      cefepime   IVPB 2000 milliGRAM(s) IV Intermittent every 12 hours  cloNIDine Patch 0.1 mG/24Hr(s) 1 patch Transdermal <User Schedule>  clopidogrel Tablet 75 milliGRAM(s) Oral daily  collagenase Ointment 1 Application(s) Topical two times a day  Dakins Solution - 1/2 Strength 1 Application(s) Topical two times a day  dextrose 5%. 1000 milliLiter(s) IV Continuous <Continuous>  dextrose 5%. 1000 milliLiter(s) IV Continuous <Continuous>  dextrose 50% Injectable 25 Gram(s) IV Push once  dextrose 50% Injectable 12.5 Gram(s) IV Push once  dextrose 50% Injectable 25 Gram(s) IV Push once  dextrose Oral Gel 15 Gram(s) Oral once PRN  enoxaparin Injectable 40 milliGRAM(s) SubCutaneous every 24 hours  furosemide    Tablet 40 milliGRAM(s) Oral daily  glucagon  Injectable 1 milliGRAM(s) IntraMuscular once  hydrALAZINE 100 milliGRAM(s) Oral every 8 hours  insulin glargine Injectable (LANTUS) 22 Unit(s) SubCutaneous every morning  insulin lispro (ADMELOG) corrective regimen sliding scale   SubCutaneous three times a day before meals  insulin lispro Injectable (ADMELOG) 5 Unit(s) SubCutaneous three times a day before meals  labetalol 600 milliGRAM(s) Oral three times a day  labetalol Injectable 10 milliGRAM(s) IV Push every 6 hours PRN  lacosamide IVPB 50 milliGRAM(s) IV Intermittent every 12 hours  lactated ringers. 1000 milliLiter(s) IV Continuous <Continuous>  levETIRAcetam  Solution 1000 milliGRAM(s) Oral every 12 hours  lidocaine 1%/epinephrine 1:100,000 Inj 20 milliLiter(s) Local Injection once  LORazepam     Tablet 1 milliGRAM(s) Oral once PRN  losartan 100 milliGRAM(s) Oral daily  melatonin 3 milliGRAM(s) Oral at bedtime PRN  multivitamin/minerals/iron Oral Solution (CENTRUM) 15 milliLiter(s) Oral daily  pantoprazole    Tablet 40 milliGRAM(s) Oral before breakfast  sodium bicarbonate 650 milliGRAM(s) Oral every 6 hours  sodium chloride 0.65% Nasal 2 Spray(s) Both Nostrils two times a day      Vital Signs Last 24 Hrs  T(C): 37.9 (31 Aug 2022 22:07), Max: 39.1 (31 Aug 2022 16:11)  T(F): 100.3 (31 Aug 2022 22:07), Max: 102.3 (31 Aug 2022 16:11)  HR: 86 (31 Aug 2022 22:07) (77 - 92)  BP: 100/56 (31 Aug 2022 22:07) (100/56 - 140/77)  BP(mean): 96 (31 Aug 2022 20:02) (80 - 96)  RR: 18 (31 Aug 2022 22:07) (16 - 18)  SpO2: 98% (31 Aug 2022 22:07) (95% - 98%)    Parameters below as of 31 Aug 2022 22:07  Patient On (Oxygen Delivery Method): room air        Neurological Examination:  General:  Appearance is consistent with chronologic age.   Cognitive/Language:  Awake, alert, and oriented to person, place, time and date.  Recent and remote memory intact.  Fund of knowledge is appropriate.  Naming, repetition and comprehension intact. Nondysarthric.    Cranial Nerves  - Eyes: Visual acuity intact, Visual fields full.  EOMI w/o nystagmus, skew or reported double vision.  PERRL.  No ptosis/weakness of eyelid closure.    - Face:  Facial sensation normal V1 - 3, no facial asymmetry.    - Ears/Nose/Throat:  Hearing grossly intact b/l to finger rub.  Palate elevates midline.  Tongue and uvula midline.   Motor examination:  (MRC grade R/L) 5/5 UE; 5/5 LE.  No observable drift. Normal tone and bulk. No tenderness, twitching, tremors or involuntary movements.  Sensory examination:  Intact to light touch and pinprick, pain, temperature and proprioception and vibration in all extremities.  Reflexes:   2+ b/l biceps, triceps, patella and achilles.  Plantar response downgoing b/l.  Jaw jerk, Sanam, clonus absent.  Cerebellum:   FTN/HKS intact.  No dysmetria.    Gait narrow based and normal.    Labs:  CBC Full  -  ( 31 Aug 2022 07:24 )  WBC Count : 14.36 K/uL  RBC Count : 3.28 M/uL  Hemoglobin : 9.7 g/dL  Hematocrit : 28.5 %  Platelet Count - Automated : 276 K/uL  Mean Cell Volume : 86.9 fL  Mean Cell Hemoglobin : 29.6 pg  Mean Cell Hemoglobin Concentration : 34.0 g/dL  Auto Neutrophil # : x  Auto Lymphocyte # : x  Auto Monocyte # : x  Auto Eosinophil # : x  Auto Basophil # : x  Auto Neutrophil % : x  Auto Lymphocyte % : x  Auto Monocyte % : x  Auto Eosinophil % : x  Auto Basophil % : x        135  |  96<L>  |  57<H>  ----------------------------<  194<H>  4.7   |  22  |  1.9<H>    Ca    9.5      31 Aug 2022 07:24  Phos  5.1       Mg     1.8         TPro  7.1  /  Alb  3.9  /  TBili  0.3  /  DBili  0.2  /  AST  16  /  ALT  15  /  AlkPhos  153<H>      LIVER FUNCTIONS - ( 30 Aug 2022 23:46 )  Alb: 3.9 g/dL / Pro: 7.1 g/dL / ALK PHOS: 153 U/L / ALT: 15 U/L / AST: 16 U/L / GGT: x           PT/INR - ( 30 Aug 2022 23:46 )   PT: 18.80 sec;   INR: 1.64 ratio           Urinalysis Basic - ( 31 Aug 2022 00:25 )    Color: Yellow / Appearance: Turbid / S.013 / pH: x  Gluc: x / Ketone: Negative  / Bili: Negative / Urobili: <2 mg/dL   Blood: x / Protein: 100 mg/dL / Nitrite: Negative   Leuk Esterase: Large / RBC: 183 /HPF / WBC >720 /HPF   Sq Epi: x / Non Sq Epi: 2 /HPF / Bacteria: Many        RADIOLOGY & ADDITIONAL TESTS:

## 2022-09-01 NOTE — CHART NOTE - NSCHARTNOTEFT_GEN_A_CORE
Discussed case with resident.     Patient with urinary retention today.   Urine Cx with > 3 organisms.   Repeat Urine cx and Blood Cx sent again today.   Given persistent fever, worsening WBC, and creatinine, can switch cefepime to meropenem 500 mg q 12 hours  Check Renal US   Trend Creatinine     Please call or message on [employer] Teams if with any questions.  Spectra [phone]

## 2022-09-01 NOTE — PROGRESS NOTE ADULT - SUBJECTIVE AND OBJECTIVE BOX
Patient is a 56y old  Female who presents with a chief complaint of RLE Cellulitis (01 Sep 2022 07:21)    INTERVAL HPI/OVERNIGHT EVENTS: Patient was examined and seen at bedside. + Urinary retention. +Fevers. Transferred to medicine due to fevers.   ROS: unable to access.  InitialHPI:  57 yo F with PMH of HTN, DM2, and stroke (per son 2017) who presented for RLE wound. Started 3 months ago when she fell and hit her leg on a wooden cabinet. She put hydrogen peroxide, antibiotic cream, and wrapped the wound but never fully healed. Two weeks ago it started to show yellow pus so her and her family came to the ED for further treatment. Endorsed subjective fevers, chest pain, and vision changes which occurs when walked 2-3 blocks. Denied congestion, sore throat, cough, dyspnea, headache, dysuria, N/V, diarrhea. Per son, they fill her medications at [facility] ([phone]) and this is their current pharmacy. Called them to confirm her medications and they said her last refill of medications was . Pt has not seen a doctor due to insurance problem and has not been taking any medications.     (02 Aug 2022 07:47)    PAST MEDICAL & SURGICAL HISTORY:  Hypertension      Diabetes mellitus      No significant past surgical history          General: obtunded, not following commands  HEENT:  no LAD  CV: S1 S2  Resp: decreased breath sounds at bases  GI: NT/ND/S +BS; +PEG  MS: no clubbing/cyanosis/edema, + pulses b/l  Neuro: unable to access due to AMS    MEDICATIONS  (STANDING):  aspirin  chewable 81 milliGRAM(s) Oral daily  atorvastatin 80 milliGRAM(s) Oral at bedtime  cloNIDine Patch 0.1 mG/24Hr(s) 1 patch Transdermal <User Schedule>  clopidogrel Tablet 75 milliGRAM(s) Oral daily  collagenase Ointment 1 Application(s) Topical two times a day  Dakins Solution - 1/2 Strength 1 Application(s) Topical two times a day  dextrose 5%. 1000 milliLiter(s) (100 mL/Hr) IV Continuous <Continuous>  dextrose 5%. 1000 milliLiter(s) (50 mL/Hr) IV Continuous <Continuous>  dextrose 50% Injectable 25 Gram(s) IV Push once  dextrose 50% Injectable 12.5 Gram(s) IV Push once  dextrose 50% Injectable 25 Gram(s) IV Push once  enoxaparin Injectable 40 milliGRAM(s) SubCutaneous every 24 hours  glucagon  Injectable 1 milliGRAM(s) IntraMuscular once  hydrALAZINE 100 milliGRAM(s) Oral every 8 hours  insulin glargine Injectable (LANTUS) 22 Unit(s) SubCutaneous every morning  insulin lispro (ADMELOG) corrective regimen sliding scale   SubCutaneous three times a day before meals  insulin lispro Injectable (ADMELOG) 5 Unit(s) SubCutaneous three times a day before meals  labetalol 600 milliGRAM(s) Oral three times a day  lacosamide IVPB 50 milliGRAM(s) IV Intermittent every 12 hours  levETIRAcetam  Solution 500 milliGRAM(s) Oral two times a day  lidocaine 1%/epinephrine 1:100,000 Inj 20 milliLiter(s) Local Injection once  meropenem  IVPB      meropenem  IVPB 500 milliGRAM(s) IV Intermittent once  multivitamin/minerals/iron Oral Solution (CENTRUM) 15 milliLiter(s) Oral daily  pantoprazole    Tablet 40 milliGRAM(s) Oral before breakfast  sodium bicarbonate 650 milliGRAM(s) Oral every 6 hours  sodium chloride 0.65% Nasal 2 Spray(s) Both Nostrils two times a day  sodium chloride 0.9%. 1000 milliLiter(s) (100 mL/Hr) IV Continuous <Continuous>    MEDICATIONS  (PRN):  acetaminophen     Tablet .. 650 milliGRAM(s) Oral every 6 hours PRN Temp greater or equal to 38C (100.4F), Mild Pain (1 - 3)  dextrose Oral Gel 15 Gram(s) Oral once PRN Blood Glucose LESS THAN 70 milliGRAM(s)/deciliter  labetalol Injectable 10 milliGRAM(s) IV Push every 6 hours PRN Systolic blood pressure >  LORazepam     Tablet 1 milliGRAM(s) Oral once PRN Agitation  melatonin 3 milliGRAM(s) Oral at bedtime PRN Insomnia    Vital Signs Last 24 Hrs  T(C): 37.4 (01 Sep 2022 13:48), Max: 39.1 (31 Aug 2022 16:11)  T(F): 99.4 (01 Sep 2022 13:48), Max: 102.3 (31 Aug 2022 16:11)  HR: 85 (01 Sep 2022 13:48) (79 - 92)  BP: 103/55 (01 Sep 2022 13:48) (100/56 - 140/77)  BP(mean): 96 (31 Aug 2022 20:02) (93 - 96)  RR: 18 (01 Sep 2022 13:48) (16 - 18)  SpO2: 97% (01 Sep 2022 11:30) (95% - 98%)    Parameters below as of 01 Sep 2022 11:30  Patient On (Oxygen Delivery Method): room air      CAPILLARY BLOOD GLUCOSE      POCT Blood Glucose.: 153 mg/dL (01 Sep 2022 11:27)  POCT Blood Glucose.: 148 mg/dL (01 Sep 2022 07:30)  POCT Blood Glucose.: 106 mg/dL (01 Sep 2022 04:36)  POCT Blood Glucose.: 85 mg/dL (01 Sep 2022 00:02)  POCT Blood Glucose.: 113 mg/dL (31 Aug 2022 21:07)  POCT Blood Glucose.: 136 mg/dL (31 Aug 2022 16:36)                          8.1    24.43 )-----------( 191      ( 01 Sep 2022 06:38 )             22.6     09-    140  |  100  |  73<HH>  ----------------------------<  120<H>  4.6   |  24  |  2.8<H>    Ca    8.8      01 Sep 2022 06:38  Phos  5.1     08-31  Mg     1.8     09-    TPro  5.8<L>  /  Alb  2.9<L>  /  TBili  0.6  /  DBili  x   /  AST  25  /  ALT  14  /  AlkPhos  129<H>  09-    LIVER FUNCTIONS - ( 01 Sep 2022 06:38 )  Alb: 2.9 g/dL / Pro: 5.8 g/dL / ALK PHOS: 129 U/L / ALT: 14 U/L / AST: 25 U/L / GGT: x           CARDIAC MARKERS ( 30 Aug 2022 23:46 )  x     / x     / 123 U/L / x     / x          PT/INR - ( 30 Aug 2022 23:46 )   PT: 18.80 sec;   INR: 1.64 ratio           Urinalysis Basic - ( 01 Sep 2022 10:10 )    Color: Orange / Appearance: Turbid / S.014 / pH: x  Gluc: x / Ketone: Negative  / Bili: Negative / Urobili: <2 mg/dL   Blood: x / Protein: 100 mg/dL / Nitrite: Negative   Leuk Esterase: Large / RBC: 336 /HPF / WBC >720 /HPF   Sq Epi: x / Non Sq Epi: 3 /HPF / Bacteria: Few              Culture - Urine (collected 31 Aug 2022 00:25)  Source: Catheterized Catheterized  Final Report (01 Sep 2022 09:12):    >=3 organisms. Probable collection contamination.    Culture - Blood (collected 30 Aug 2022 18:03)  Source: .Blood Blood  Preliminary Report (01 Sep 2022 02:01):    No growth to date.      Chart, Consultant(s) Notes Reviewed:  [x ] YES  [ ] NO  Care Discussed with Consultants/Other Providers/ Housestaff [ x] YES  [ ] NO  Radiology, labs, old available records personally reviewed.

## 2022-09-02 NOTE — CHART NOTE - NSCHARTNOTEFT_GEN_A_CORE
The patient is tentatively scheduled for a diagnostic cerebral angiogram with Dr. Gil this coming Tuesday 9/6 if clinically stable enough to undergo the procedure and pending IR availability.   - Please keep NPO except medication and on IV fluids (recommending normal saline at  mL/hr if primary team is in agreement) after midnight prior to the procedure.   - Patient will need repeat Covid PCR, CBC/CMP/ Coags prior to procedure (Sunday/ Monday).   [phone] with questions/ concerns.

## 2022-09-02 NOTE — PROGRESS NOTE ADULT - ASSESSMENT
Ms. 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 She underwent Abx therapy and debridement. On 08/05 she was observed w/ acute mental status changes. Stroke code was alerted for NIHSS of 23. CTH observed mild atrophic changes as well as hypodensity of the periventricular white matter, right thalamus, basal ganglia and insula cortex.  She was suspected of seizure and started on antiseizure medications. During hospital stay, found to have multiple punctate infarcts suspected to be cardioembolic vs vasculitis. Patient on found to have sharp waves on vEEG currently on AED's.    Pt is currently nonverbal, visual tracking but does not follows command. Sensation is withdrawal to painful stimuli. independent of limb movement however not on command.       Right thalamic lacunar infarct  Multiple punctuate cortical infarcts  Moderate right and mild left atherosclerotic stenosis in supraclinoid ICA  Focal Mild atherosclerotic stenosis in the V3 segment of the right vertebral artery  Vasculitis w/u todate is neg  PEG feed dependent  LP >> CSF with mildly elevated cell count and protein, normal glucose. Elevated ESR/CRP  Cystitis / Urinary Retention  mild transaminitis --- ??DBILI 2/2 Cephalosporin use -- pending RUQ US r/o    Recommendation  - Infectious care >>>pending Bcx / UCx --- ID onboard >>> Cont Meropenem as per ID  - continued care as per Medicine team  - Neurovascular will follow as consult   - cont RLE wound care as per BURN team  - MRI brain w/wo requested - can do w/o contrast for concerns of kidney function  - Possible DCA to assess for vasculitis by NeuroEndovascular after infectious w/u  - May start IV Solumedrol and IVIG, pending clinical course.   - Encephalopathy, Hyper coag vasculitis and autoimmune panel are requested.

## 2022-09-02 NOTE — PROGRESS NOTE ADULT - SUBJECTIVE AND OBJECTIVE BOX
Patient is a 56y old  Female who presents with a chief complaint of RLE Cellulitis (01 Sep 2022 07:21)    INTERVAL HPI/OVERNIGHT EVENTS: Patient was examined and seen at bedside. Still spiking fevers. +BCx w/ MDR Enterobacter.   ROS: unable to access.  InitialHPI:  57 yo F with PMH of HTN, DM2, and stroke (per son 2017) who presented for RLE wound. Started 3 months ago when she fell and hit her leg on a wooden cabinet. She put hydrogen peroxide, antibiotic cream, and wrapped the wound but never fully healed. Two weeks ago it started to show yellow pus so her and her family came to the ED for further treatment. Endorsed subjective fevers, chest pain, and vision changes which occurs when walked 2-3 blocks. Denied congestion, sore throat, cough, dyspnea, headache, dysuria, N/V, diarrhea. Per son, they fill her medications at [facility] ([phone]) and this is their current pharmacy. Called them to confirm her medications and they said her last refill of medications was . Pt has not seen a doctor due to insurance problem and has not been taking any medications.     (02 Aug 2022 07:47)    PAST MEDICAL & SURGICAL HISTORY:  Hypertension      Diabetes mellitus      No significant past surgical history          General: obtunded, not following commands  HEENT:  no LAD  CV: S1 S2  Resp: decreased breath sounds at bases  GI: NT/ND/S +BS; +PEG  MS: no clubbing/cyanosis/edema, + pulses b/l  Neuro: unable to access due to AMS  +Maloney            MEDICATIONS  (STANDING):  aspirin  chewable 81 milliGRAM(s) Oral daily  atorvastatin 80 milliGRAM(s) Oral at bedtime  aztreonam  IVPB      aztreonam  IVPB 2000 milliGRAM(s) IV Intermittent once  ceftazidime/avibactam IVPB 2.5 Gram(s) IV Intermittent once  cloNIDine Patch 0.1 mG/24Hr(s) 1 patch Transdermal <User Schedule>  clopidogrel Tablet 75 milliGRAM(s) Oral daily  collagenase Ointment 1 Application(s) Topical two times a day  Dakins Solution - 1/2 Strength 1 Application(s) Topical two times a day  dextrose 5%. 1000 milliLiter(s) (100 mL/Hr) IV Continuous <Continuous>  dextrose 5%. 1000 milliLiter(s) (50 mL/Hr) IV Continuous <Continuous>  dextrose 50% Injectable 25 Gram(s) IV Push once  dextrose 50% Injectable 12.5 Gram(s) IV Push once  dextrose 50% Injectable 25 Gram(s) IV Push once  glucagon  Injectable 1 milliGRAM(s) IntraMuscular once  heparin   Injectable 5000 Unit(s) SubCutaneous every 8 hours  hydrALAZINE 100 milliGRAM(s) Oral every 8 hours  insulin glargine Injectable (LANTUS) 22 Unit(s) SubCutaneous every morning  insulin lispro (ADMELOG) corrective regimen sliding scale   SubCutaneous three times a day before meals  insulin lispro Injectable (ADMELOG) 5 Unit(s) SubCutaneous three times a day before meals  labetalol 600 milliGRAM(s) Oral three times a day  lacosamide IVPB 50 milliGRAM(s) IV Intermittent every 12 hours  levETIRAcetam  Solution 500 milliGRAM(s) Oral two times a day  lidocaine 1%/epinephrine 1:100,000 Inj 20 milliLiter(s) Local Injection once  multivitamin/minerals/iron Oral Solution (CENTRUM) 15 milliLiter(s) Oral daily  pantoprazole    Tablet 40 milliGRAM(s) Oral before breakfast  sodium bicarbonate 650 milliGRAM(s) Oral every 6 hours  sodium chloride 0.65% Nasal 2 Spray(s) Both Nostrils two times a day  sodium chloride 0.9%. 1000 milliLiter(s) (100 mL/Hr) IV Continuous <Continuous>    MEDICATIONS  (PRN):  acetaminophen     Tablet .. 650 milliGRAM(s) Oral every 6 hours PRN Temp greater or equal to 38C (100.4F), Mild Pain (1 - 3)  dextrose Oral Gel 15 Gram(s) Oral once PRN Blood Glucose LESS THAN 70 milliGRAM(s)/deciliter  labetalol Injectable 10 milliGRAM(s) IV Push every 6 hours PRN Systolic blood pressure >  LORazepam     Tablet 1 milliGRAM(s) Oral once PRN Agitation  melatonin 3 milliGRAM(s) Oral at bedtime PRN Insomnia    Home Medications:  losartan 50 mg oral tablet: 1 tab(s) orally once a day (02 Aug 2022 10:38)  metFORMIN 500 mg oral tablet: 1 tab(s) orally 2 times a day (02 Aug 2022 10:38)  metoprolol succinate 50 mg oral tablet, extended release: 1 tab(s) orally once a day (02 Aug 2022 10:38)    Vital Signs Last 24 Hrs  T(C): 37.5 (02 Sep 2022 14:50), Max: 38.9 (02 Sep 2022 05:25)  T(F): 99.5 (02 Sep 2022 14:50), Max: 102.1 (02 Sep 2022 05:25)  HR: 85 (02 Sep 2022 14:50) (84 - 104)  BP: 129/66 (02 Sep 2022 14:50) (104/57 - 180/88)  BP(mean): --  RR: 17 (02 Sep 2022 13:08) (17 - 20)  SpO2: 100% (02 Sep 2022 08:49) (100% - 100%)    Parameters below as of 02 Sep 2022 08:49  Patient On (Oxygen Delivery Method): room air      CAPILLARY BLOOD GLUCOSE      POCT Blood Glucose.: 171 mg/dL (02 Sep 2022 11:19)  POCT Blood Glucose.: 166 mg/dL (02 Sep 2022 07:23)  POCT Blood Glucose.: 156 mg/dL (01 Sep 2022 21:09)  POCT Blood Glucose.: 150 mg/dL (01 Sep 2022 17:03)    LABS:                        7.6    14.88 )-----------( 175      ( 02 Sep 2022 06:55 )             22.1     09-02    139  |  100  |  81<HH>  ----------------------------<  150<H>  3.8   |  23  |  3.0<H>    Ca    8.2<L>      02 Sep 2022 06:55  Phos  4.2       Mg     2.1         TPro  5.4<L>  /  Alb  2.7<L>  /  TBili  0.8  /  DBili  x   /  AST  43<H>  /  ALT  26  /  AlkPhos  246<H>  0902    LIVER FUNCTIONS - ( 02 Sep 2022 06:55 )  Alb: 2.7 g/dL / Pro: 5.4 g/dL / ALK PHOS: 246 U/L / ALT: 26 U/L / AST: 43 U/L / GGT: x                 Urinalysis Basic - ( 02 Sep 2022 12:57 )    Color: Yellow / Appearance: Slightly Turbid / S.013 / pH: x  Gluc: x / Ketone: Negative  / Bili: Negative / Urobili: 6 mg/dL   Blood: x / Protein: 100 mg/dL / Nitrite: Negative   Leuk Esterase: Large / RBC: 30 /HPF / WBC 57 /HPF   Sq Epi: x / Non Sq Epi: 8 /HPF / Bacteria: Negative              Culture - Blood (collected 01 Sep 2022 12:29)  Source: .Blood None  Gram Stain (02 Sep 2022 10:11):    Growth in aerobic bottle: Gram Negative Rods  Preliminary Report (02 Sep 2022 10:11):    Growth in aerobic bottle: Gram Negative Rods    ***Blood Panel PCR results on this specimen are available    approximately 3 hours after the Gram stain result.***    Gram stain, PCR, and/or culture results may not always    correspond due to difference in methodologies.    ************************************************************    This PCR assay was performed by multiplex PCR. This    Assay tests for 66 bacterial and resistance gene targets.    Please refer to the [facility] test directory    at [email or web address] for details.  Organism: Blood Culture PCR (02 Sep 2022 12:08)  Organism: Blood Culture PCR (02 Sep 2022 12:08)    Culture - Urine (collected 31 Aug 2022 00:25)  Source: Catheterized Catheterized  Final Report (01 Sep 2022 09:12):    >=3 organisms. Probable collection contamination.    Culture - Blood (collected 30 Aug 2022 18:03)  Source: .Blood Blood  Preliminary Report (01 Sep 2022 02:01):    No growth to date.      Consultant Notes Reviewed:  [x ] YES  [ ] NO  Care Discussed with Consultants/Other Providers/ Housestaff [ x] YES  [ ] NO  Radiology, labs, new studies personally reviewed.

## 2022-09-02 NOTE — PROGRESS NOTE ADULT - SUBJECTIVE AND OBJECTIVE BOX
JOSE MITCHELL  56y, Female  Allergy: No Known Allergies      LOS  31d    CHIEF COMPLAINT: RLE Cellulitis (02 Sep 2022 11:48)      INTERVAL EVENTS/HPI  - No acute events overnight  - T(F): , Max: 102.1 (22 @ 05:25)  - remains febrile   - WBC Count: 14.88 (22 @ 06:55)  WBC Count: 24.43 (22 @ 06:38)     - Creatinine, Serum: 3.0 (22 @ 06:55)  Creatinine, Serum: 2.8 (22 @ 06:38)       ROS  unable to obtain history secondary to patient's mental status and/or sedation    VITALS:  T(F): 100.2, Max: 102.1 (22 @ 05:25)  HR: 84  BP: 104/57  RR: 18Vital Signs Last 24 Hrs  T(C): 37.9 (02 Sep 2022 08:49), Max: 38.9 (02 Sep 2022 05:25)  T(F): 100.2 (02 Sep 2022 08:49), Max: 102.1 (02 Sep 2022 05:25)  HR: 84 (02 Sep 2022 08:49) (84 - 104)  BP: 104/57 (02 Sep 2022 08:49) (103/55 - 180/88)  BP(mean): --  RR: 18 (02 Sep 2022 08:49) (18 - 20)  SpO2: 100% (02 Sep 2022 08:49) (100% - 100%)    Parameters below as of 02 Sep 2022 08:49  Patient On (Oxygen Delivery Method): room air        PHYSICAL EXAM:  Gen: NAD, resting in bed  HEENT: Normocephalic, atraumatic  Neck: supple, no lymphadenopathy  CV: Regular rate & regular rhythm  Lungs: decreased BS at bases, no fremitus  Abdomen: Soft, BS present  Ext: Warm, well perfused  Neuro: non focal, awake  Skin: no rash, no erythema  Lines: no phlebitis    FH: Non-contributory  Social Hx: Non-contributory    TESTS & MEASUREMENTS:                        7.6    14.88 )-----------( 175      ( 02 Sep 2022 06:55 )             22.1         139  |  100  |  81<HH>  ----------------------------<  150<H>  3.8   |  23  |  3.0<H>    Ca    8.2<L>      02 Sep 2022 06:55  Phos  4.2       Mg     2.1         TPro  5.4<L>  /  Alb  2.7<L>  /  TBili  0.8  /  DBili  x   /  AST  43<H>  /  ALT  26  /  AlkPhos  246<H>        LIVER FUNCTIONS - ( 02 Sep 2022 06:55 )  Alb: 2.7 g/dL / Pro: 5.4 g/dL / ALK PHOS: 246 U/L / ALT: 26 U/L / AST: 43 U/L / GGT: x           Urinalysis Basic - ( 01 Sep 2022 10:10 )    Color: Orange / Appearance: Turbid / S.014 / pH: x  Gluc: x / Ketone: Negative  / Bili: Negative / Urobili: <2 mg/dL   Blood: x / Protein: 100 mg/dL / Nitrite: Negative   Leuk Esterase: Large / RBC: 336 /HPF / WBC >720 /HPF   Sq Epi: x / Non Sq Epi: 3 /HPF / Bacteria: Few        Culture - Blood (collected 22 @ 12:29)  Source: .Blood None  Gram Stain (22 @ 10:11):    Growth in aerobic bottle: Gram Negative Rods  Preliminary Report (22 @ 10:11):    Growth in aerobic bottle: Gram Negative Rods    ***Blood Panel PCR results on this specimen are available    approximately 3 hours after the Gram stain result.***    Gram stain, PCR, and/or culture results may not always    correspond due to difference in methodologies.    ************************************************************    This PCR assay was performed by multiplex PCR. This    Assay tests for 66 bacterial and resistance gene targets.    Please refer to the [facility] test directory    at [email or web address] for details.  Organism: Blood Culture PCR (22 @ 12:08)  Organism: Blood Culture PCR (22 @ 12:08)      -  Carbapenem Resistance: Detec      -  Enterobacter cloacae complex: Detec      -  NDM Resistance Marker: Detec      Method Type: PCR    Culture - Urine (collected 22 @ 00:25)  Source: Catheterized Catheterized  Final Report (22 @ 09:12):    >=3 organisms. Probable collection contamination.    Culture - Blood (collected 22 @ 18:03)  Source: .Blood Blood  Preliminary Report (22 @ 02:01):    No growth to date.    Culture - Fungal, CSF (collected 22 @ 11:36)  Source: .CSF CSF  Preliminary Report (22 @ 10:55):    Testing in progress    Culture - CSF with Gram Stain (collected 22 @ 11:36)  Source: .CSF CSF  Gram Stain (22 @ 22:25):    polymorphonuclear leukocytes seen    No organisms seen    by cytocentrifuge  Final Report (22 @ 14:52):    No growth    Culture - Acid Fast - CSF (collected 22 @ 11:36)  Source: .CSF CSF  Preliminary Report (22 @ 15:04):    Culture is being performed.    Culture - Blood (collected 22 @ 12:25)  Source: .Blood Blood-Peripheral  Final Report (22 @ 22:01):    No Growth Final    Culture - Blood (collected 22 @ 06:04)  Source: .Blood None  Final Report (22 @ 18:00):    No Growth Final    Culture - Blood (collected 08-15-22 @ 16:59)  Source: .Blood None  Final Report (22 @ 02:00):    No Growth Final    Culture - Blood (collected 22 @ 22:44)  Source: .Blood Blood-Peripheral  Gram Stain (08-15-22 @ 08:35):    Growth in anaerobic bottle: Gram Positive Cocci in Clusters  Final Report (22 @ 14:28):    Growth in anaerobic bottle: Staphylococcus epidermidis Coag Negative    Staphylococcus    Single set isolate, possible contaminant. Contact    Microbiology if susceptibility testing clinically    indicated.    ***Blood Panel PCR results on this specimen are available    approximately 3 hours after the Gram stain result.***    Gram stain, PCR, and/or culture results may not always    correspond due to difference in methodologies.    ************************************************************    This PCR assay was performed by multiplex PCR. This    Assay tests for 66 bacterial and resistance gene targets.    Please refer to the [facility] test directory    at [email or web address] for details.  Organism: Blood Culture PCR (22 @ 14:28)  Organism: Blood Culture PCR (22 @ :28)      -  Staphylococcus epidermidis, Methicillin resistant: Detec      Method Type: PCR    Culture - Other (collected 08-10-22 @ 12:40)  Source: .Other right leg wound  Final Report (22 @ 18:21):    Numerous Candida parapsilosis "Susceptibilities not performed"    Culture - Blood (collected 22 @ 02:09)  Source: .Blood None  Final Report (22 @ 10:01):    No Growth Final    Culture - Urine (collected 22 @ 01:00)  Source: Catheterized Catheterized  Final Report (08-10-22 @ 10:50):    No growth    Culture - Blood (collected 22 @ 18:36)  Source: .Blood None  Final Report (22 @ 07:00):    No Growth Final    Culture - Blood (collected 22 @ 18:36)  Source: .Blood None  Final Report (22 @ 07:00):    No Growth Final        Lactate, Blood: 2.2 mmol/L (22 @ 18:03)      INFECTIOUS DISEASES TESTING  COVID-19 PCR: NotDetec (22 @ 11:58)  Procalcitonin, Serum: 2.46 (22 @ 07:24)  COVID-19 PCR: NotDetec (22 @ 09:40)  COVID-19 PCR: NotDetec (22 @ 02:34)  COVID-19 PCR: NotDetec (22 @ 17:36)  Procalcitonin, Serum: 0.11 (22 @ 06:04)  COVID-19 PCR: NotDetec (08-15-22 @ 15:41)  COVID-19 PCR: NotDetec (22 @ 13:22)  COVID-19 PCR: NotDetec (22 @ 05:14)  MRSA PCR Result.: Negative (22 @ 17:40)  COVID-19 PCR: NotDetec (22 @ 01:40)      INFLAMMATORY MARKERS  Sedimentation Rate, Erythrocyte: 125 mm/Hr (22 @ 19:27)  C-Reactive Protein, Serum: 54.4 mg/L (22 @ 19:27)  Sedimentation Rate, Erythrocyte: 92 mm/Hr (22 @ 01:50)  C-Reactive Protein, Serum: 35.6 mg/L (22 @ 01:50)      RADIOLOGY & ADDITIONAL TESTS:  I have personally reviewed the last available Chest xray  CXR  Xray Chest 1 View- PORTABLE-Urgent:   ACC: [id number] EXAM:  XR CHEST PORTABLE URGENT 1V                          PROCEDURE DATE:  2022          INTERPRETATION:  Clinical History / Reason for exam: Hemoptysis    Comparison : Chest radiograph 2022.    Technique/Positioning: Frontal portable, low lung volumes.    Findings:    Support devices: Loop recorder overlies the thorax    Cardiac/mediastinum/hilum: Unremarkable.    Lung parenchyma/Pleura: Minimal atelectasis    Skeleton/soft tissues: Unremarkable.    Impression:    Minimal atelectasis. Grossly unchanged.        --- End of Report ---            RANCHO CALI MD; Attending Interventional Radiologist  This document has been electronically signed. Aug 31 2022  7:16AM (22 @ 17:23)      CT      CARDIOLOGY TESTING  12 Lead ECG:   Ventricular Rate 63 BPM    Atrial Rate 63 BPM    P-R Interval 162 ms    QRS Duration 76 ms    Q-T Interval 422 ms    QTC Calculation(Bazett) 431 ms    P Axis 51 degrees    R Axis 36 degrees    T Axis 112 degrees    Diagnosis Line Normal sinus rhythm  Anteroseptal infarct , age undetermined  Abnormal ECG    Confirmed by Robert Bauer ([id number]) on 2022 11:37:29 AM (08-26-22 @ 20:22)      MEDICATIONS  aspirin  chewable 81 Oral daily  atorvastatin 80 Oral at bedtime  cloNIDine Patch 0.1 mG/24Hr(s) 1 Transdermal <User Schedule>  clopidogrel Tablet 75 Oral daily  collagenase Ointment 1 Topical two times a day  Dakins Solution - 1/2 Strength 1 Topical two times a day  dextrose 5%. 1000 IV Continuous <Continuous>  dextrose 5%. 1000 IV Continuous <Continuous>  dextrose 50% Injectable 25 IV Push once  dextrose 50% Injectable 12.5 IV Push once  dextrose 50% Injectable 25 IV Push once  glucagon  Injectable 1 IntraMuscular once  heparin   Injectable 5000 SubCutaneous every 8 hours  hydrALAZINE 100 Oral every 8 hours  insulin glargine Injectable (LANTUS) 22 SubCutaneous every morning  insulin lispro (ADMELOG) corrective regimen sliding scale  SubCutaneous three times a day before meals  insulin lispro Injectable (ADMELOG) 5 SubCutaneous three times a day before meals  labetalol 600 Oral three times a day  lacosamide IVPB 50 IV Intermittent every 12 hours  levETIRAcetam  Solution 500 Oral two times a day  lidocaine 1%/epinephrine 1:100,000 Inj 20 Local Injection once  meropenem  IVPB     meropenem  IVPB 500 IV Intermittent every 12 hours  multivitamin/minerals/iron Oral Solution (CENTRUM) 15 Oral daily  pantoprazole    Tablet 40 Oral before breakfast  sodium bicarbonate 650 Oral every 6 hours  sodium chloride 0.65% Nasal 2 Both Nostrils two times a day  sodium chloride 0.9%. 1000 IV Continuous <Continuous>      WEIGHT  Weight (kg): 82.3 (22 @ 12:59)  Creatinine, Serum: 3.0 mg/dL (22 @ 06:55)      ANTIBIOTICS:  meropenem  IVPB      meropenem  IVPB 500 milliGRAM(s) IV Intermittent every 12 hours      All available historical records have been reviewed

## 2022-09-02 NOTE — PHARMACOTHERAPY INTERVENTION NOTE - COMMENTS
Recommended ceftazidime-avibactam 2.5g IV x 1 STAT followed by 0.94g IV q12h starting tomorrow morning and aztreonam 2g IV q12h to start STAT for Enterobacter bacteremia with NDM gene. Recommended administering all doses of aztreonam and ceftazidime-avibactam together.

## 2022-09-02 NOTE — PROGRESS NOTE ADULT - NS ATTEST RISK PROBLEM GEN_ALL_CORE FT
suspected CNS vasculitis, fevers, dysphagia, obtunded
acute CVA, seizure, purulent cellulitis
acute CVA, seizure, purulent cellulitis
?seizure, purulent cellulitis
acute CVA, seizure, purulent cellulitis
acute CVA, seizure, purulent cellulitis
?seizure, purulent cellulitis
acute CVA, seizure, purulent cellulitis
suspected CNS vasculitis, fevers, dysphagia, obtunded
acute CVA, seizure, purulent cellulitis

## 2022-09-02 NOTE — PROGRESS NOTE ADULT - ASSESSMENT
55 yo F with PMH of HTN, DM2, and stroke (per son 2017) who presented for RLE wound s/p debridement by burn (08/10) and antibiotics. stay complicated with code stroke on 08/05 -> CTH observed mild atrophic changes as well as hypodensity of the periventricular white matter, right thalamus, basal ganglia and insula cortex.  She was suspected of seizure and started on antiseizure medications. During hospital stay, found to have multiple punctate infarcts suspected to be cardioembolic vs vasculitis. Patient on found to have sharp waves on vEEG currently on AED's. she was also treated fro hypertensive emergency with multiple antihypertensives. patient developed fevers -> found to have bacteremia with E cloacae CRE 2/2 to possible UTI. finally stay complicated with ALF.    #ALF msot yusraley 2/2 to sepsis and relative hypotension  #E Cloacae bacteremia  - creat 3 (<--, 2.8 <--, 1.9 <--, 1.6 <--, 1.0)  - no recent contrast exposure  - received a dose of vancomycin on 09/01 -> check random vancomycin level  - UA -> LLE, >720 WBCs, 183 RBCs. urine studies pending  - seizures on AED -> check CPK level to r/o rhabdo  - kidney US -> no hydro  - stop clonidine  - c/w hydralazine and labetalol -> taper off if needed in case of hypotension  - keep losartan on hold  - c/w NS  - avoid nephrotoxic drugs  - monitor BMP     57 yo F with PMH of HTN, DM2, and stroke (per son 2017) who presented for RLE wound s/p debridement by burn (08/10) and antibiotics. stay complicated with code stroke on 08/05 -> CTH observed mild atrophic changes as well as hypodensity of the periventricular white matter, right thalamus, basal ganglia and insula cortex.  She was suspected of seizure and started on antiseizure medications. During hospital stay, found to have multiple punctate infarcts suspected to be cardioembolic vs vasculitis. Patient on found to have sharp waves on vEEG currently on AED's. she was also treated fro hypertensive emergency with multiple antihypertensives. patient developed fevers -> found to have bacteremia with E cloacae CRE 2/2 to possible UTI. finally stay complicated with ALF.    #ALF msot yusraley 2/2 to sepsis and relative hypotension  #E Cloacae bacteremia  - creat 3 (<--, 2.8 <--, 1.9 <--, 1.6 <--, 1.0)  - no recent contrast exposure  - received a dose of vancomycin on 09/01 / doubt vanco related toxicity   - UA -> LLE, >720 WBCs, 183 RBCs. urine studies pending  - seizures on AED -> check CPK level to r/o rhabdo  - kidney US -> no hydro  - stop clonidine  - c/w hydralazine and labetalol -> taper off if needed in case of hypotension  - keep losartan on hold  - c/w NS  - avoid nephrotoxic drugs  - monitor BMP

## 2022-09-02 NOTE — PROGRESS NOTE ADULT - SUBJECTIVE AND OBJECTIVE BOX
Neurology Progress Note    Interval History:    ON: Febrile tmax 102.1    Patient was seen and examined at bedside. Eyes open this morning but does not tracked or follow command. Withdraw to painful stimuli.    Medications:  acetaminophen     Tablet .. 650 milliGRAM(s) Oral every 6 hours PRN  aspirin  chewable 81 milliGRAM(s) Oral daily  atorvastatin 80 milliGRAM(s) Oral at bedtime  cloNIDine Patch 0.1 mG/24Hr(s) 1 patch Transdermal <User Schedule>  clopidogrel Tablet 75 milliGRAM(s) Oral daily  collagenase Ointment 1 Application(s) Topical two times a day  Dakins Solution - 1/2 Strength 1 Application(s) Topical two times a day  dextrose 5%. 1000 milliLiter(s) IV Continuous <Continuous>  dextrose 5%. 1000 milliLiter(s) IV Continuous <Continuous>  dextrose 50% Injectable 25 Gram(s) IV Push once  dextrose 50% Injectable 12.5 Gram(s) IV Push once  dextrose 50% Injectable 25 Gram(s) IV Push once  dextrose Oral Gel 15 Gram(s) Oral once PRN  glucagon  Injectable 1 milliGRAM(s) IntraMuscular once  heparin   Injectable 5000 Unit(s) SubCutaneous every 8 hours  hydrALAZINE 100 milliGRAM(s) Oral every 8 hours  insulin glargine Injectable (LANTUS) 22 Unit(s) SubCutaneous every morning  insulin lispro (ADMELOG) corrective regimen sliding scale   SubCutaneous three times a day before meals  insulin lispro Injectable (ADMELOG) 5 Unit(s) SubCutaneous three times a day before meals  labetalol 600 milliGRAM(s) Oral three times a day  labetalol Injectable 10 milliGRAM(s) IV Push every 6 hours PRN  lacosamide IVPB 50 milliGRAM(s) IV Intermittent every 12 hours  levETIRAcetam  Solution 500 milliGRAM(s) Oral two times a day  lidocaine 1%/epinephrine 1:100,000 Inj 20 milliLiter(s) Local Injection once  LORazepam     Tablet 1 milliGRAM(s) Oral once PRN  melatonin 3 milliGRAM(s) Oral at bedtime PRN  meropenem  IVPB      meropenem  IVPB 500 milliGRAM(s) IV Intermittent every 12 hours  multivitamin/minerals/iron Oral Solution (CENTRUM) 15 milliLiter(s) Oral daily  pantoprazole    Tablet 40 milliGRAM(s) Oral before breakfast  sodium bicarbonate 650 milliGRAM(s) Oral every 6 hours  sodium chloride 0.65% Nasal 2 Spray(s) Both Nostrils two times a day  sodium chloride 0.9%. 1000 milliLiter(s) IV Continuous <Continuous>      Vital Signs Last 24 Hrs  T(C): 37.9 (02 Sep 2022 08:49), Max: 38.9 (02 Sep 2022 05:25)  T(F): 100.2 (02 Sep 2022 08:49), Max: 102.1 (02 Sep 2022 05:25)  HR: 84 (02 Sep 2022 08:49) (84 - 104)  BP: 104/57 (02 Sep 2022 08:49) (103/55 - 180/88)  BP(mean): --  RR: 18 (02 Sep 2022 08:49) (18 - 20)  SpO2: 100% (02 Sep 2022 08:49) (100% - 100%)    Parameters below as of 02 Sep 2022 08:49  Patient On (Oxygen Delivery Method): room air        Neurological Examination:  General:  Appearance is consistent with chronologic age.   Cognitive/Language:  Awake, nonverbal, does not track. does not follow commands. withdraw to painful stimuli.  Cranial Nerves  - Eyes: does not track. PERRL.  No ptosis    - Face:  no facial asymmetry.    - Ears/Nose/Throat:    Motor examination:  withdraw w/ painful stimuli and move independently however not on command.    Sensory examination:  withdraw to pain in all extremities.  Reflexes:   2+ b/l biceps, patella.    Cerebellum:      Labs:  CBC Full  -  ( 02 Sep 2022 06:55 )  WBC Count : 14.88 K/uL  RBC Count : 2.63 M/uL  Hemoglobin : 7.6 g/dL  Hematocrit : 22.1 %  Platelet Count - Automated : 175 K/uL  Mean Cell Volume : 84.0 fL  Mean Cell Hemoglobin : 28.9 pg  Mean Cell Hemoglobin Concentration : 34.4 g/dL  Auto Neutrophil # : 13.67 K/uL  Auto Lymphocyte # : 0.56 K/uL  Auto Monocyte # : 0.34 K/uL  Auto Eosinophil # : 0.16 K/uL  Auto Basophil # : 0.02 K/uL  Auto Neutrophil % : 91.8 %  Auto Lymphocyte % : 3.8 %  Auto Monocyte % : 2.3 %  Auto Eosinophil % : 1.1 %  Auto Basophil % : 0.1 %        139  |  100  |  81<HH>  ----------------------------<  150<H>  3.8   |  23  |  3.0<H>    Ca    8.2<L>      02 Sep 2022 06:55  Phos  4.2       Mg     2.1         TPro  5.4<L>  /  Alb  2.7<L>  /  TBili  0.8  /  DBili  x   /  AST  43<H>  /  ALT  26  /  AlkPhos  246<H>      LIVER FUNCTIONS - ( 02 Sep 2022 06:55 )  Alb: 2.7 g/dL / Pro: 5.4 g/dL / ALK PHOS: 246 U/L / ALT: 26 U/L / AST: 43 U/L / GGT: x             Urinalysis Basic - ( 01 Sep 2022 10:10 )    Color: Orange / Appearance: Turbid / S.014 / pH: x  Gluc: x / Ketone: Negative  / Bili: Negative / Urobili: <2 mg/dL   Blood: x / Protein: 100 mg/dL / Nitrite: Negative   Leuk Esterase: Large / RBC: 336 /HPF / WBC >720 /HPF   Sq Epi: x / Non Sq Epi: 3 /HPF / Bacteria: Few        RADIOLOGY & ADDITIONAL TESTS:

## 2022-09-02 NOTE — PROGRESS NOTE ADULT - SUBJECTIVE AND OBJECTIVE BOX
[facility] day: 31    HPI:   55 yo F with PMH of HTN, DM2, CVA (2017) w/ residual L sided paresis who presented with purulent right lower extremity wound for 3 months consistent with acute RLE cellulitis. During hospital stay, found to have multiple punctate infarcts suspected to be cardioembolic vs vasculitis. Patient on found to have sharp waves on vEEG currently on AED's.    Subjective:   Complaints: none. Patient nonverbal   Overnight events: none     Objective:   Vital Signs Last 24 Hrs  T(C): 38.9 (02 Sep 2022 05:25), Max: 38.9 (02 Sep 2022 05:25)  T(F): 102.1 (02 Sep 2022 05:25), Max: 102.1 (02 Sep 2022 05:25)  HR: 104 (02 Sep 2022 05:25) (84 - 104)  BP: 180/88 (02 Sep 2022 05:25) (103/55 - 180/88)  BP(mean): --  RR: 20 (02 Sep 2022 05:25) (18 - 20)  SpO2: 97% (01 Sep 2022 11:30) (97% - 97%)    Parameters below as of 01 Sep 2022 11:30  Patient On (Oxygen Delivery Method): room air    I&O's Summary    01 Sep 2022 07:01  -  02 Sep 2022 07:00  --------------------------------------------------------  IN: 850 mL / OUT: 1350 mL / NET: -500 mL      PHYSICAL EXAM  GENERAL: Well developed, well nourished   HEENT: Normocephalic, atraumatic  PULMONARY/CARDIOVASCULAR: decreased bibasilar air entry, RRR normal S1S2  GASTROINTESTINAL: Soft, non-tender, non-distended, no guarding. PEG tube in place  RENAL: no cva tenderness  SKIN/EXTREMITIES: No LE edema . Right LE cellulitis with bandages   NEUROLOGIC/MUSCULOSKELETAL: AOx0.    MEDICATIONS  acetaminophen     Tablet .. 650 milliGRAM(s) Oral every 6 hours PRN  aspirin  chewable 81 milliGRAM(s) Oral daily  atorvastatin 80 milliGRAM(s) Oral at bedtime  cefepime   IVPB      cefepime   IVPB 2000 milliGRAM(s) IV Intermittent every 12 hours  cloNIDine Patch 0.1 mG/24Hr(s) 1 patch Transdermal <User Schedule>  clopidogrel Tablet 75 milliGRAM(s) Oral daily  collagenase Ointment 1 Application(s) Topical two times a day  Dakins Solution - 1/2 Strength 1 Application(s) Topical two times a day  dextrose 5%. 1000 milliLiter(s) IV Continuous <Continuous>  dextrose 5%. 1000 milliLiter(s) IV Continuous <Continuous>  dextrose 50% Injectable 25 Gram(s) IV Push once  dextrose 50% Injectable 12.5 Gram(s) IV Push once  dextrose 50% Injectable 25 Gram(s) IV Push once  dextrose Oral Gel 15 Gram(s) Oral once PRN  enoxaparin Injectable 40 milliGRAM(s) SubCutaneous every 24 hours  furosemide    Tablet 40 milliGRAM(s) Oral daily  glucagon  Injectable 1 milliGRAM(s) IntraMuscular once  hydrALAZINE 100 milliGRAM(s) Oral every 8 hours  insulin glargine Injectable (LANTUS) 22 Unit(s) SubCutaneous every morning  insulin lispro (ADMELOG) corrective regimen sliding scale   SubCutaneous three times a day before meals  insulin lispro Injectable (ADMELOG) 5 Unit(s) SubCutaneous three times a day before meals  labetalol 600 milliGRAM(s) Oral three times a day  labetalol Injectable 10 milliGRAM(s) IV Push every 6 hours PRN  lacosamide IVPB 50 milliGRAM(s) IV Intermittent every 12 hours  lactated ringers. 1000 milliLiter(s) IV Continuous <Continuous>  levETIRAcetam  Solution 1000 milliGRAM(s) Oral every 12 hours  lidocaine 1%/epinephrine 1:100,000 Inj 20 milliLiter(s) Local Injection once  LORazepam     Tablet 1 milliGRAM(s) Oral once PRN  losartan 100 milliGRAM(s) Oral daily  melatonin 3 milliGRAM(s) Oral at bedtime PRN  multivitamin/minerals/iron Oral Solution (CENTRUM) 15 milliLiter(s) Oral daily  pantoprazole    Tablet 40 milliGRAM(s) Oral before breakfast  sodium bicarbonate 650 milliGRAM(s) Oral every 6 hours  sodium chloride 0.65% Nasal 2 Spray(s) Both Nostrils two times a day      Labs:                        8.1    24.43 )-----------( 191      ( 01 Sep 2022 06:38 )             22.6     09-01    140  |  100  |  73<HH>  ----------------------------<  120<H>  4.6   |  24  |  2.8<H>    Ca    8.8      01 Sep 2022 06:38  Phos  5.1     08-31  Mg     1.8     09-01    TPro  5.8<L>  /  Alb  2.9<L>  /  TBili  0.6  /  DBili  x   /  AST  25  /  ALT  14  /  AlkPhos  129<H>  09-01    Assessment and plan:  55 yo F with PMH of HTN, DM2, CVA (2017) w/ residual L sided paresis who presented with purulent right lower extremity wound for 3 months consistent with acute RLE cellulitis. During hospital stay, found to have multiple punctate infarcts suspected to be cardioembolic vs vasculitis. Patient on found to have sharp waves on vEEG currently on AED's.    #Stroke workup  - continue aspirin 81mg  daily  - Restarted plavix as LP performed 8/26  - Continue Atorvastatin 80 mg daily   - q4hr stroke neuro checks and vitals  - IVONNE was denied by cardiology as they found paroxysmal A. fib on tele  - s/p ILR by EP 8/22  - Vasculitic work up as per neuro>>> Angiogram to be done by Neuroendovascular once infection clear up   - consulted IR for LP tap - was performed 8/28  - Defer to neuro for ongoing work up    #Fever   #Possible Pyelonephritis  #RLE ulcer   - persistently febrile   - Initially treated for RLE cellulitis. Wound looks not infected  - BP on lower side (patient has a history of hypertension  - S/p Cefepime + Vancomycin with persistent fevers  - Uculture 08/31: multiple organisms   - Repeat blood and urine culture  - Id consult: Start meropenem     #Acute urinary retention   - Today retained 1200cc urine. Nolasco inserted   - Dark color and purulent looking >>> possibly from UTI   - Keep nolasco and monitor infection    # High risk of seizures with epileptiform discharges  Multiple EEG did not capture any seizures  - Continue Keppra 1500 mg bid   - Stopped Phenytoin 8/23  - c/w vimpat 50mg BID    #Hypertensive urgency due to noncompliance and Malignant HTN - ON HOLD TX  - BP at admission 296/174 without signs of end organ damage. Renal artery duplex wnl>>ARIAN unlikely  - Aldosterone wnl, renin elevated  -close monitoring  -c/w chlorthalidone 25mg daily  - previously on Amlodipine 10 mg  - pharmacy can't do Nifedipine IR, and would prefer not to crush Nifedipine XL (off nifedipine)  - Was on losartan 100mg daily, labetalol  to 600mg q8, hydralazine 100mg q8 and doxazocin 2mg nightly  - s/p PEG tube   - Hold antihypertensive given SIRSs with low BP     #HLD  - high dose statin as above in CVA     #DM  - A1C results: 10.4  - ISS  - started insulin regimen   - c/w Lantus 22, lispro 5u  - TSH results: 0.86    #Nutrition/Fluids/Electrolytes   - replete K<4 and Mg <2  - Diet via PEG tube  - obtain MBS test- failed, NPO with PEG alternative means     DVT ppx: Lovenox SCDs  GI ppx: Protonix  Diet: DASH/carb consistent  Activity: AAT    Follow up: follow ID, Follow up cultures. Angiogram by neuroendovascular for vasculitis workup   [facility] day: 31    HPI:   55 yo F with PMH of HTN, DM2, CVA (2017) w/ residual L sided paresis who presented with purulent right lower extremity wound for 3 months consistent with acute RLE cellulitis. During hospital stay, found to have multiple punctate infarcts suspected to be cardioembolic vs vasculitis. Patient on found to have sharp waves on vEEG currently on AED's.    Subjective:   Complaints: none. Patient nonverbal   Overnight events: none     Objective:   Vital Signs Last 24 Hrs  T(C): 37.9 (02 Sep 2022 08:49), Max: 38.9 (02 Sep 2022 05:25)  T(F): 100.2 (02 Sep 2022 08:49), Max: 102.1 (02 Sep 2022 05:25)  HR: 84 (02 Sep 2022 08:49) (84 - 104)  BP: 104/57 (02 Sep 2022 08:49) (103/55 - 180/88)  BP(mean): --  RR: 18 (02 Sep 2022 08:49) (18 - 20)  SpO2: 100% (02 Sep 2022 08:49) (97% - 100%)    Parameters below as of 02 Sep 2022 08:49  Patient On (Oxygen Delivery Method): room air      I&O's Summary    01 Sep 2022 07:01  -  02 Sep 2022 07:00  --------------------------------------------------------  IN: 850 mL / OUT: 1350 mL / NET: -500 mL      PHYSICAL EXAM  GENERAL: Well developed, well nourished   HEENT: Normocephalic, atraumatic  PULMONARY/CARDIOVASCULAR: decreased bibasilar air entry, RRR normal S1S2  GASTROINTESTINAL: Soft, non-tender, non-distended, no guarding. PEG tube in place  RENAL: no cva tenderness  SKIN/EXTREMITIES: No LE edema . Right LE cellulitis with bandages   NEUROLOGIC/MUSCULOSKELETAL: AOx0.    MEDICATIONS  (STANDING):  aspirin  chewable 81 milliGRAM(s) Oral daily  atorvastatin 80 milliGRAM(s) Oral at bedtime  cloNIDine Patch 0.1 mG/24Hr(s) 1 patch Transdermal <User Schedule>  clopidogrel Tablet 75 milliGRAM(s) Oral daily  collagenase Ointment 1 Application(s) Topical two times a day  Dakins Solution - 1/2 Strength 1 Application(s) Topical two times a day  dextrose 5%. 1000 milliLiter(s) (100 mL/Hr) IV Continuous <Continuous>  dextrose 5%. 1000 milliLiter(s) (50 mL/Hr) IV Continuous <Continuous>  dextrose 50% Injectable 25 Gram(s) IV Push once  dextrose 50% Injectable 12.5 Gram(s) IV Push once  dextrose 50% Injectable 25 Gram(s) IV Push once  glucagon  Injectable 1 milliGRAM(s) IntraMuscular once  heparin   Injectable 5000 Unit(s) SubCutaneous every 8 hours  hydrALAZINE 100 milliGRAM(s) Oral every 8 hours  insulin glargine Injectable (LANTUS) 22 Unit(s) SubCutaneous every morning  insulin lispro (ADMELOG) corrective regimen sliding scale   SubCutaneous three times a day before meals  insulin lispro Injectable (ADMELOG) 5 Unit(s) SubCutaneous three times a day before meals  labetalol 600 milliGRAM(s) Oral three times a day  lacosamide IVPB 50 milliGRAM(s) IV Intermittent every 12 hours  levETIRAcetam  Solution 500 milliGRAM(s) Oral two times a day  lidocaine 1%/epinephrine 1:100,000 Inj 20 milliLiter(s) Local Injection once  meropenem  IVPB      meropenem  IVPB 500 milliGRAM(s) IV Intermittent every 12 hours  multivitamin/minerals/iron Oral Solution (CENTRUM) 15 milliLiter(s) Oral daily  pantoprazole    Tablet 40 milliGRAM(s) Oral before breakfast  sodium bicarbonate 650 milliGRAM(s) Oral every 6 hours  sodium chloride 0.65% Nasal 2 Spray(s) Both Nostrils two times a day  sodium chloride 0.9%. 1000 milliLiter(s) (100 mL/Hr) IV Continuous <Continuous>    MEDICATIONS  (PRN):  acetaminophen     Tablet .. 650 milliGRAM(s) Oral every 6 hours PRN Temp greater or equal to 38C (100.4F), Mild Pain (1 - 3)  dextrose Oral Gel 15 Gram(s) Oral once PRN Blood Glucose LESS THAN 70 milliGRAM(s)/deciliter  labetalol Injectable 10 milliGRAM(s) IV Push every 6 hours PRN Systolic blood pressure >  LORazepam     Tablet 1 milliGRAM(s) Oral once PRN Agitation  melatonin 3 milliGRAM(s) Oral at bedtime PRN Insomnia      Labs:                                   7.6    14.88 )-----------( 175      ( 02 Sep 2022 06:55 )             22.1     09-02    139  |  100  |  81<HH>  ----------------------------<  150<H>  3.8   |  23  |  3.0<H>    Ca    8.2<L>      02 Sep 2022 06:55  Phos  4.2     09-02  Mg     2.1     09-02    TPro  5.4<L>  /  Alb  2.7<L>  /  TBili  0.8  /  DBili  x   /  AST  43<H>  /  ALT  26  /  AlkPhos  246<H>  09-02    Assessment and plan:  55 yo F with PMH of HTN, DM2, CVA (2017) w/ residual L sided paresis who presented with purulent right lower extremity wound for 3 months consistent with acute RLE cellulitis. During hospital stay, found to have multiple punctate infarcts suspected to be cardioembolic vs vasculitis. Patient on found to have sharp waves on vEEG currently on AED's.    #Stroke workup  - continue aspirin 81mg  daily  - Restarted plavix as LP performed 8/26  - Continue Atorvastatin 80 mg daily   - q4hr stroke neuro checks and vitals  - IVONNE was denied by cardiology as they found paroxysmal A. fib on tele  - s/p ILR by EP 8/22  - Vasculitic work up as per neuro>>> Angiogram to be done by Neuroendovascular once infection clear up   - consulted IR for LP tap - was performed 8/28  - Defer to neuro for ongoing work up    #Fever   #Possible Pyelonephritis  #RLE ulcer   - persistently febrile   - Initially treated for RLE cellulitis. Wound looks not infected  - BP on lower side (patient has a history of hypertension  - S/p Cefepime + Vancomycin with persistent fevers  - Uculture 08/31: multiple organisms   - Repeat blood and urine culture  - Id consult: Start meropenem     #Acute urinary retention   - Today retained 1200cc urine. Nolasco inserted   - Dark color and purulent looking >>> possibly from UTI   - Keep nolasco and monitor infection  - Renal ultrasound pending     #ALF  - Worsening ALF    #Diarrhea  - Multiple episode of diarrhea   - Cdiff neg   - Will monitor     # High risk of seizures with epileptiform discharges  Multiple EEG did not capture any seizures  - Continue Keppra 1500 mg bid   - Stopped Phenytoin 8/23  - c/w vimpat 50mg BID    #Hypertensive urgency due to noncompliance and Malignant HTN - ON HOLD TX  - BP at admission 296/174 without signs of end organ damage. Renal artery duplex wnl>>ARIAN unlikely  - Aldosterone wnl, renin elevated  -close monitoring  -c/w chlorthalidone 25mg daily  - previously on Amlodipine 10 mg  - pharmacy can't do Nifedipine IR, and would prefer not to crush Nifedipine XL (off nifedipine)  - Was on losartan 100mg daily, labetalol  to 600mg q8, hydralazine 100mg q8 and doxazocin 2mg nightly  - s/p PEG tube   - Hold antihypertensive given SIRSs with low BP     #HLD  - high dose statin as above in CVA     #DM  - A1C results: 10.4  - ISS  - started insulin regimen   - c/w Lantus 22, lispro 5u  - TSH results: 0.86    #Nutrition/Fluids/Electrolytes   - replete K<4 and Mg <2  - Diet via PEG tube  - obtain MBS test- failed, NPO with PEG alternative means     DVT ppx: Lovenox SCDs  GI ppx: Protonix  Diet: DASH/carb consistent  Activity: AAT    Follow up: follow ID, Follow up cultures. Angiogram by neuroendovascular for vasculitis workup   [facility] day: 31    HPI:   55 yo F with PMH of HTN, DM2, CVA (2017) w/ residual L sided paresis who presented with purulent right lower extremity wound for 3 months consistent with acute RLE cellulitis. During hospital stay, found to have multiple punctate infarcts suspected to be cardioembolic vs vasculitis. Patient on found to have sharp waves on vEEG currently on AED's.    Subjective:   Complaints: none. Patient nonverbal   Overnight events: none     Objective:   Vital Signs Last 24 Hrs  T(C): 37.9 (02 Sep 2022 08:49), Max: 38.9 (02 Sep 2022 05:25)  T(F): 100.2 (02 Sep 2022 08:49), Max: 102.1 (02 Sep 2022 05:25)  HR: 84 (02 Sep 2022 08:49) (84 - 104)  BP: 104/57 (02 Sep 2022 08:49) (103/55 - 180/88)  BP(mean): --  RR: 18 (02 Sep 2022 08:49) (18 - 20)  SpO2: 100% (02 Sep 2022 08:49) (97% - 100%)    Parameters below as of 02 Sep 2022 08:49  Patient On (Oxygen Delivery Method): room air      I&O's Summary    01 Sep 2022 07:01  -  02 Sep 2022 07:00  --------------------------------------------------------  IN: 850 mL / OUT: 1350 mL / NET: -500 mL      PHYSICAL EXAM  GENERAL: Well developed, well nourished   HEENT: Normocephalic, atraumatic  PULMONARY/CARDIOVASCULAR: decreased bibasilar air entry, RRR normal S1S2  GASTROINTESTINAL: Soft, non-tender, non-distended, no guarding. PEG tube in place  RENAL: no cva tenderness  SKIN/EXTREMITIES: No LE edema . Right LE cellulitis with bandages   NEUROLOGIC/MUSCULOSKELETAL: AOx0.    MEDICATIONS  (STANDING):  aspirin  chewable 81 milliGRAM(s) Oral daily  atorvastatin 80 milliGRAM(s) Oral at bedtime  cloNIDine Patch 0.1 mG/24Hr(s) 1 patch Transdermal <User Schedule>  clopidogrel Tablet 75 milliGRAM(s) Oral daily  collagenase Ointment 1 Application(s) Topical two times a day  Dakins Solution - 1/2 Strength 1 Application(s) Topical two times a day  dextrose 5%. 1000 milliLiter(s) (100 mL/Hr) IV Continuous <Continuous>  dextrose 5%. 1000 milliLiter(s) (50 mL/Hr) IV Continuous <Continuous>  dextrose 50% Injectable 25 Gram(s) IV Push once  dextrose 50% Injectable 12.5 Gram(s) IV Push once  dextrose 50% Injectable 25 Gram(s) IV Push once  glucagon  Injectable 1 milliGRAM(s) IntraMuscular once  heparin   Injectable 5000 Unit(s) SubCutaneous every 8 hours  hydrALAZINE 100 milliGRAM(s) Oral every 8 hours  insulin glargine Injectable (LANTUS) 22 Unit(s) SubCutaneous every morning  insulin lispro (ADMELOG) corrective regimen sliding scale   SubCutaneous three times a day before meals  insulin lispro Injectable (ADMELOG) 5 Unit(s) SubCutaneous three times a day before meals  labetalol 600 milliGRAM(s) Oral three times a day  lacosamide IVPB 50 milliGRAM(s) IV Intermittent every 12 hours  levETIRAcetam  Solution 500 milliGRAM(s) Oral two times a day  lidocaine 1%/epinephrine 1:100,000 Inj 20 milliLiter(s) Local Injection once  meropenem  IVPB      meropenem  IVPB 500 milliGRAM(s) IV Intermittent every 12 hours  multivitamin/minerals/iron Oral Solution (CENTRUM) 15 milliLiter(s) Oral daily  pantoprazole    Tablet 40 milliGRAM(s) Oral before breakfast  sodium bicarbonate 650 milliGRAM(s) Oral every 6 hours  sodium chloride 0.65% Nasal 2 Spray(s) Both Nostrils two times a day  sodium chloride 0.9%. 1000 milliLiter(s) (100 mL/Hr) IV Continuous <Continuous>    MEDICATIONS  (PRN):  acetaminophen     Tablet .. 650 milliGRAM(s) Oral every 6 hours PRN Temp greater or equal to 38C (100.4F), Mild Pain (1 - 3)  dextrose Oral Gel 15 Gram(s) Oral once PRN Blood Glucose LESS THAN 70 milliGRAM(s)/deciliter  labetalol Injectable 10 milliGRAM(s) IV Push every 6 hours PRN Systolic blood pressure >  LORazepam     Tablet 1 milliGRAM(s) Oral once PRN Agitation  melatonin 3 milliGRAM(s) Oral at bedtime PRN Insomnia      Labs:                                   7.6    14.88 )-----------( 175      ( 02 Sep 2022 06:55 )             22.1     09-02    139  |  100  |  81<HH>  ----------------------------<  150<H>  3.8   |  23  |  3.0<H>    Ca    8.2<L>      02 Sep 2022 06:55  Phos  4.2     09-02  Mg     2.1     09-02    TPro  5.4<L>  /  Alb  2.7<L>  /  TBili  0.8  /  DBili  x   /  AST  43<H>  /  ALT  26  /  AlkPhos  246<H>  09-02    Assessment and plan:  55 yo F with PMH of HTN, DM2, CVA (2017) w/ residual L sided paresis who presented with purulent right lower extremity wound for 3 months consistent with acute RLE cellulitis. During hospital stay, found to have multiple punctate infarcts suspected to be cardioembolic vs vasculitis. Patient on found to have sharp waves on vEEG currently on AED's.    #Stroke workup  - continue aspirin 81mg  daily  - Restarted plavix as LP performed 8/26  - Continue Atorvastatin 80 mg daily   - q4hr stroke neuro checks and vitals  - IVONNE was denied by cardiology as they found paroxysmal A. fib on tele  - s/p ILR by EP 8/22  - Vasculitic work up as per neuro>>> Angiogram to be done by Neuroendovascular once infection clear up   - consulted IR for LP tap - was performed 8/28  - Defer to neuro for ongoing work up    #Fever   #Possible Pyelonephritis  #RLE ulcer   - persistently febrile   - Initially treated for RLE cellulitis. Wound looks not infected  - BP on lower side (patient has a history of hypertension  - S/p Cefepime + Vancomycin with persistent fevers  - Uculture 08/31: multiple organisms   - Repeat blood and urine culture  - Id consult: Start meropenem     #Acute urinary retention   - Today retained 1200cc urine. Nolasco inserted   - Dark color and purulent looking >>> possibly from UTI   - Keep nolasco and monitor infection  - Renal ultrasound pending     #ALF  - Worsening kidney function   - Today Cr: 3  - Possible ATN given BP low when at baseline patient has very elevated BP   - s/p IVF   - Renal U/S pending and Urine studies pending     #Diarrhea  - Multiple episode of diarrhea   - Cdiff neg   - Will monitor     # High risk of seizures with epileptiform discharges  Multiple EEG did not capture any seizures  - Continue Keppra 1500 mg bid   - Stopped Phenytoin 8/23  - c/w vimpat 50mg BID    #Hypertensive urgency due to noncompliance and Malignant HTN - ON HOLD TX  - BP at admission 296/174 without signs of end organ damage. Renal artery duplex wnl>>ARIAN unlikely  - Aldosterone wnl, renin elevated  -close monitoring  -c/w chlorthalidone 25mg daily  - previously on Amlodipine 10 mg  - pharmacy can't do Nifedipine IR, and would prefer not to crush Nifedipine XL (off nifedipine)  - Was on losartan 100mg daily, labetalol  to 600mg q8, hydralazine 100mg q8 and doxazocin 2mg nightly  - s/p PEG tube   - Hold antihypertensive given SIRSs with low BP     #HLD  - high dose statin as above in CVA     #DM  - A1C results: 10.4  - ISS  - started insulin regimen   - c/w Lantus 22, lispro 5u  - TSH results: 0.86    #Nutrition/Fluids/Electrolytes   - replete K<4 and Mg <2  - Diet via PEG tube  - obtain MBS test- failed, NPO with PEG alternative means     DVT ppx: Lovenox SCDs  GI ppx: Protonix  Diet: DASH/carb consistent  Activity: AAT    Follow up: follow ID and nephro, Follow up cultures. Angiogram by neuroendovascular for vasculitis workup

## 2022-09-02 NOTE — PROGRESS NOTE ADULT - ASSESSMENT
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 During hospital stay, found to have multiple punctate infarcts suspected to be cardioembolic vs vasculitis. Patient on found to have sharp waves on vEEG currently on AED's.    #Acute ischemic strokes / High suspicion for CNS vasculitis  - high suspicion for vasculitis as per neuro (high infl markers, CSF protein)  - continue aspirin 81mg  daily  - Restarted plavix as LP performed 8/28 and s/p PEG  - Continue Atorvastatin 80 mg daily   - q4hr stroke neuro checks and vitals  - cardio recommended to hold off IVONNE (consider CTA heart once kidney fxn better)  - s/p ILR by EP 8/22  - Vasculitic work up as per neuro, intervention as per neurovascular (planning DSA)  - 9/2: repeat MRI brain w/out contrast    #Fevers / Urinary retention/ MDR Enterobacter in blood  ABx as per ID  daily BCx as per ID    # High risk of seizures with epileptiform discharges  - Continue Keppra 1500 mg bid   - Stopped Phenytoin 8/23 as per neuro  - c/w vimpat 50mg BID    #ALF / Urinary retension / Suspected ATN  -IVFs  -hold Lasix and Losartan  -f/u renal U/s  -bladder scans q6-8hrs and CIC or nolasco if unable to catherize  - renal f/u    #Hypertensive urgency due to noncompliance and Malignant HTN   - BP at admission 296/174 without signs of end organ damage. Renal artery duplex wnl>>ARIAN unlikely  - Aldosterone wnl, renin elevated  -close monitoring  - s/p PEG tube   -SBP goal 120-160    #HLD  - high dose statin as above in CVA    #Purulent RLE cellulitis   -s/p burn debridement   - Candida in wound. D/w Dr. Chua: contaminant  -  BCx w/ staph: likely contaminant. repeat BCx -  negative  - finished ABx      #DM w/ Hyperglycemia  - A1C results: 10.4  - ISS  - started insulin regimen   - c/w Lantus 22, lispro 5u  - TSH results: 0.86      DVT ppx: Heparin SQ  GI ppx: Protonix  Diet: DASH/carb consistent  Activity: AAT    Patient is high risk of clinical deterioration. Px is guarded    #Progress Note Handoff  Pending: Consults____Clinical improvement and stability__x___Tests___Cx, MRI, DSA_____PT____x____  Neuro team updated the sons  Disposition: Home______/SNF_______/4A______/To be determined____x____    My note supersedes the residents note should a discrepancy arise.    Chart and notes personally reviewed.  Care Discussed with Consultants/Other Providers/ Housestaff [ x] YES [ ] NO   Radiology, labs, old records personally reviewed.    discussed w/ housestaff, nursing, case management, neuro team

## 2022-09-02 NOTE — CHART NOTE - NSCHARTNOTEFT_GEN_A_CORE
Registered Dietitian Follow-Up     Patient Profile Reviewed                           Yes [x]   No []     Nutrition History Previously Obtained        Yes [x]  No []       Pertinent Subjective Information:   Per RN, pt tolerates current TF regimen.      Pertinent Medical Interventions: 57y/o female with h/o HTN, DM2, CVA 2017 here with RLE wound. Noted to have acute embolic CVA, hypertensive urgency due to noncompliance and hyperglycemia.     Diet order:   Diet, NPO with Tube Feed:   Tube Feeding Modality: Gastrostomy  Glucerna 1.2 Reed  Total Volume for 24 Hours (mL): 1300  Bolus  Total Volume of Bolus (mL):  325  Tube Feed Frequency: Every 6 hours   Tube Feed Start Time: 08:00  Bolus Feed Rate (mL per Hour): 54   Bolus Feed Duration (in Hours): 24  No Carb Prosource (1pkg = 15gms Protein)     Qty per Day:  2 (09-01-22 @ 07:03) [Active]     Anthropometrics:  - Ht: 165.1 cm  - Wt: 82.3 KG  - BMI: 30.2   - IBW: 57 KG     Pertinent Lab Data:  09/02 @ 06:55 - WBC 14.88; RBC 2.63; H/H 7.6/22.1; BUN 81; Cr 3.0; ; Ca 8.2; Alk Phos 246; AST/SGOT 43; eGFR 18    CAPILLARY BLOOD GLUCOSE:  POCT Blood Glucose.: 171 mg/dL (02 Sep 2022 11:19)  POCT Blood Glucose.: 166 mg/dL (02 Sep 2022 07:23)  POCT Blood Glucose.: 156 mg/dL (01 Sep 2022 21:09)  POCT Blood Glucose.: 150 mg/dL (01 Sep 2022 17:03)     Pertinent Meds:   MEDICATIONS  (STANDING):  aspirin  chewable 81 milliGRAM(s) Oral daily  atorvastatin 80 milliGRAM(s) Oral at bedtime  cloNIDine Patch 0.1 mG/24Hr(s) 1 patch Transdermal <User Schedule>  clopidogrel Tablet 75 milliGRAM(s) Oral daily  collagenase Ointment 1 Application(s) Topical two times a day  Dakins Solution - 1/2 Strength 1 Application(s) Topical two times a day  dextrose 5%. 1000 milliLiter(s) (100 mL/Hr) IV Continuous <Continuous>  dextrose 5%. 1000 milliLiter(s) (50 mL/Hr) IV Continuous <Continuous>  dextrose 50% Injectable 25 Gram(s) IV Push once  dextrose 50% Injectable 12.5 Gram(s) IV Push once  dextrose 50% Injectable 25 Gram(s) IV Push once  glucagon  Injectable 1 milliGRAM(s) IntraMuscular once  heparin   Injectable 5000 Unit(s) SubCutaneous every 8 hours  hydrALAZINE 100 milliGRAM(s) Oral every 8 hours  insulin glargine Injectable (LANTUS) 22 Unit(s) SubCutaneous every morning  insulin lispro (ADMELOG) corrective regimen sliding scale   SubCutaneous three times a day before meals  insulin lispro Injectable (ADMELOG) 5 Unit(s) SubCutaneous three times a day before meals  labetalol 600 milliGRAM(s) Oral three times a day  lacosamide IVPB 50 milliGRAM(s) IV Intermittent every 12 hours  levETIRAcetam  Solution 500 milliGRAM(s) Oral two times a day  lidocaine 1%/epinephrine 1:100,000 Inj 20 milliLiter(s) Local Injection once  meropenem  IVPB      meropenem  IVPB 500 milliGRAM(s) IV Intermittent every 12 hours  multivitamin/minerals/iron Oral Solution (CENTRUM) 15 milliLiter(s) Oral daily  pantoprazole    Tablet 40 milliGRAM(s) Oral before breakfast  sodium bicarbonate 650 milliGRAM(s) Oral every 6 hours  sodium chloride 0.65% Nasal 2 Spray(s) Both Nostrils two times a day  sodium chloride 0.9%. 1000 milliLiter(s) (100 mL/Hr) IV Continuous <Continuous>    MEDICATIONS  (PRN):  acetaminophen     Tablet .. 650 milliGRAM(s) Oral every 6 hours PRN Temp greater or equal to 38C (100.4F), Mild Pain (1 - 3)  dextrose Oral Gel 15 Gram(s) Oral once PRN Blood Glucose LESS THAN 70 milliGRAM(s)/deciliter  labetalol Injectable 10 milliGRAM(s) IV Push every 6 hours PRN Systolic blood pressure >  LORazepam     Tablet 1 milliGRAM(s) Oral once PRN Agitation  melatonin 3 milliGRAM(s) Oral at bedtime PRN Insomnia     Physical Findings:  - Appearance: generalized 3+ edema noted  - GI function: WDL except fecal incontinence; last BM on 9/1 - 4x (last BM on 9/1 was loose)  - Tubes: PEG Tube  - Oral/Mouth cavity: NPO with PEG Tube  - Skin: Intact; no pressure injuries     Nutrition Requirements  Weight Used: Using IBW 56.82 KG and ABW 82.3 KG - Derived from nutrition note (8/24)      Estimated Energy Needs    Continue [x]  Adjust []  Adjusted Energy Recommendations: 1418-1701kcal/day (MSJ x 1-1.2 using ABW)     Estimated Protein Needs    Continue [x]  Adjust []  Adjusted Protein Recommendations: 85-102g/day (1.5-1.8g/kg of IBW)     Estimated Fluid Needs        Continue [x]  Adjust []  Adjusted Fluid Recommendations: 1mL/kcal      Nutrient Intake: Current TF regimen provides 1560kcal, 78gm protein, 1053mL free H2O, meeting 92% estimated calorie and 76% estimated protein needs.       [x] Previous Nutrition Diagnosis: Inadequate Oral Intake            [] Ongoing          [x] Resolved    [] No active nutrition diagnosis identified at this time     Nutrition Intervention: TF regimen     Goal/Expected Outcome: Pt to meet >81% estimated nutritional needs in 7-10 days     Indicator/Monitoring: Diet order, PO intake, weights, labs, NFPF, body composition, BM and tolerance to medical food supplements    Recommendations:  1. Continue with TF regimen ---  Glucerna 1.2 at 325mL Q6hrs via PEG Tube (1560kcal, 78gm protein, 1053mL free H2O)  2. Continue with No Carb Prosource TF 1pkt BID (120kcal, 30gm protein)  3. Maintain all aspiration precautions    Pt downgraded; is at low nutrition risk. Will f/u in 7-10 days or c/s RD prn.    RD remains available: Magdalena Lai [phone]

## 2022-09-02 NOTE — PROGRESS NOTE ADULT - SUBJECTIVE AND OBJECTIVE BOX
NEPHROLOGY CONSULTATION NOTE    55 yo F with PMH of HTN, DM2, and stroke (per son 2017) who presented for RLE wound. Started 3 months ago when she fell and hit her leg on a wooden cabinet. She put hydrogen peroxide, antibiotic cream, and wrapped the wound but never fully healed. Two weeks ago it started to show yellow pus so her and her family came to the ED for further treatment. Endorsed subjective fevers, chest pain, and vision changes which occurs when walked 2-3 blocks. Denied congestion, sore throat, cough, dyspnea, headache, dysuria, N/V, diarrhea     Per son, they fill her medications at [facility] ([phone]) and this is their current pharmacy. Called them to confirm her medications and they said her last refill of medications was . Pt has not seen a doctor due to insurance problem and has not been taking any medications.    In the ED:  Vitals: T: 98.9, BP: 296/ 174, , RR: 18, 98%O2 on RA  Labs: CBC showed WBC 11.23; ESR 92, CRP 35.6  CMP showed glucose 302, alk phos 173; ; VBG pH 7.45  EKG pending  CXR showed borderline cardiomegaly and no airspace opacity.   X-ray of RLE also pending.     Received unasyn and vanco, humalin R, IV vasotec, IV labetalol    PAST MEDICAL & SURGICAL HISTORY:  Hypertension      Diabetes mellitus      No significant past surgical history        Allergies:  No Known Allergies    Home Medications Reviewed  Hospital Medications:   MEDICATIONS  (STANDING):  aspirin  chewable 81 milliGRAM(s) Oral daily  atorvastatin 80 milliGRAM(s) Oral at bedtime  aztreonam  IVPB      aztreonam  IVPB 2000 milliGRAM(s) IV Intermittent once  cloNIDine Patch 0.1 mG/24Hr(s) 1 patch Transdermal <User Schedule>  clopidogrel Tablet 75 milliGRAM(s) Oral daily  collagenase Ointment 1 Application(s) Topical two times a day  Dakins Solution - 1/2 Strength 1 Application(s) Topical two times a day  dextrose 5%. 1000 milliLiter(s) (100 mL/Hr) IV Continuous <Continuous>  dextrose 5%. 1000 milliLiter(s) (50 mL/Hr) IV Continuous <Continuous>  dextrose 50% Injectable 25 Gram(s) IV Push once  dextrose 50% Injectable 12.5 Gram(s) IV Push once  dextrose 50% Injectable 25 Gram(s) IV Push once  glucagon  Injectable 1 milliGRAM(s) IntraMuscular once  heparin   Injectable 5000 Unit(s) SubCutaneous every 8 hours  hydrALAZINE 100 milliGRAM(s) Oral every 8 hours  insulin glargine Injectable (LANTUS) 22 Unit(s) SubCutaneous every morning  insulin lispro (ADMELOG) corrective regimen sliding scale   SubCutaneous three times a day before meals  insulin lispro Injectable (ADMELOG) 5 Unit(s) SubCutaneous three times a day before meals  labetalol 600 milliGRAM(s) Oral three times a day  lacosamide IVPB 50 milliGRAM(s) IV Intermittent every 12 hours  levETIRAcetam  Solution 500 milliGRAM(s) Oral two times a day  lidocaine 1%/epinephrine 1:100,000 Inj 20 milliLiter(s) Local Injection once  meropenem  IVPB      meropenem  IVPB 500 milliGRAM(s) IV Intermittent every 12 hours  multivitamin/minerals/iron Oral Solution (CENTRUM) 15 milliLiter(s) Oral daily  pantoprazole    Tablet 40 milliGRAM(s) Oral before breakfast  sodium bicarbonate 650 milliGRAM(s) Oral every 6 hours  sodium chloride 0.65% Nasal 2 Spray(s) Both Nostrils two times a day  sodium chloride 0.9%. 1000 milliLiter(s) (100 mL/Hr) IV Continuous <Continuous>    SOCIAL HISTORY:  Denies ETOH,Smoking,   FAMILY HISTORY:  FH: type 2 diabetes mellitus        REVIEW OF SYSTEMS: negative except as mentioned in HPI    VITALS:  Vital Signs Last 24 Hrs  T(C): 37.7 (02 Sep 2022 13:08), Max: 38.9 (02 Sep 2022 05:25)  T(F): 99.9 (02 Sep 2022 13:08), Max: 102.1 (02 Sep 2022 05:25)  HR: 90 (02 Sep 2022 13:08) (84 - 104)  BP: 141/77 (02 Sep 2022 13:08) (103/55 - 180/88)  BP(mean): --  RR: 17 (02 Sep 2022 13:08) (17 - 20)  SpO2: 100% (02 Sep 2022 08:49) (100% - 100%)    Parameters below as of 02 Sep 2022 08:49  Patient On (Oxygen Delivery Method): room air         @ 07:01  -   @ 07:00  --------------------------------------------------------  IN: 850 mL / OUT: 1350 mL / NET: -500 mL        PHYSICAL EXAM:  Constitutional: calm, mildly lethargic  Neck: No JVD  Respiratory: CTAB, no wheezes, rales or rhonchi  Cardiovascular: S1, S2, RRR  Gastrointestinal: BS+, soft, NT/ND. PEG in place  Extremities: No peripheral edema, wrapped RLE wound  Neurological: awake, non verbal, does not follow commands  : nolasco with adequate amount of urine in bag      LABS:      139  |  100  |  81<HH>  ----------------------------<  150<H>  3.8   |  23  |  3.0<H>    Ca    8.2<L>      02 Sep 2022 06:55  Phos  4.2       Mg     2.1         TPro  5.4<L>  /  Alb  2.7<L>  /  TBili  0.8  /  DBili      /  AST  43<H>  /  ALT  26  /  AlkPhos  246<H>      Creatinine Trend: 3.0 <--, 2.8 <--, 1.9 <--, 1.6 <--, 1.0 <--, 1.0 <--, 0.9 <--, 0.9 <--, 0.8 <--, 0.7 <--, 0.8 <--, 0.8 <--, 0.8 <--, 0.8 <--, 0.8 <--, 0.9 <--, 0.8 <--, 0.8 <--, 0.8 <--, 0.8 <--, 0.9 <--, 0.7 <--, 0.7 <--, 0.7 <--, 0.8 <--, 0.8 <--, 0.9 <--, 0.7 <--, 0.6 <--, 0.8 <--, 0.9 <--, 0.9 <--                        7.6    14.88 )-----------( 175      ( 02 Sep 2022 06:55 )             22.1     Urine Studies:  Urinalysis Basic - ( 02 Sep 2022 12:57 )    Color: Yellow / Appearance: Slightly Turbid / S.013 / pH:   Gluc:  / Ketone: Negative  / Bili: Negative / Urobili: 6 mg/dL   Blood:  / Protein: 100 mg/dL / Nitrite: Negative   Leuk Esterase: Large / RBC: 30 /HPF / WBC 57 /HPF   Sq Epi:  / Non Sq Epi: 8 /HPF / Bacteria: Negative      Osmolality, Random Urine: 288 mos/kg ( @ 12:57)            RADIOLOGY & ADDITIONAL STUDIES:                 NEPHROLOGY CONSULTATION NOTE    55 yo F with PMH of HTN, DM2, and stroke (per son 2017) who presented for RLE wound. Started 3 months ago when she fell and hit her leg on a wooden cabinet. She put hydrogen peroxide, antibiotic cream, and wrapped the wound but never fully healed. Two weeks ago it started to show yellow pus so her and her family came to the ED for further treatment. Endorsed subjective fevers, chest pain, and vision changes which occurs when walked 2-3 blocks. Denied congestion, sore throat, cough, dyspnea, headache, dysuria, N/V, diarrhea     Per son, they fill her medications at [facility] ([phone]) and this is their current pharmacy. Called them to confirm her medications and they said her last refill of medications was . Pt has not seen a doctor due to insurance problem and has not been taking any medications.    In the ED:  Vitals: T: 98.9, BP: 296/ 174, , RR: 18, 98%O2 on RA  Labs: CBC showed WBC 11.23; ESR 92, CRP 35.6  CMP showed glucose 302, alk phos 173; ; VBG pH 7.45  EKG pending  CXR showed borderline cardiomegaly and no airspace opacity.   X-ray of RLE also pending.     Received unasyn and vanco, humalin R, IV vasotec, IV labetalol    PAST MEDICAL & SURGICAL HISTORY:  Hypertension      Diabetes mellitus      No significant past surgical history        Allergies:  No Known Allergies    Home Medications Reviewed  Hospital Medications:   MEDICATIONS  (STANDING):  aspirin  chewable 81 milliGRAM(s) Oral daily  atorvastatin 80 milliGRAM(s) Oral at bedtime  aztreonam  IVPB 2000 milliGRAM(s) IV Intermittent once  cloNIDine Patch 0.1 mG/24Hr(s) 1 patch Transdermal <User Schedule>  clopidogrel Tablet 75 milliGRAM(s) Oral daily  collagenase Ointment 1 Application(s) Topical two times a day  Dakins Solution - 1/2 Strength 1 Application(s) Topical two times a day  dextrose 5%. 1000 milliLiter(s) (100 mL/Hr) IV Continuous <Continuous>  dextrose 5%. 1000 milliLiter(s) (50 mL/Hr) IV Continuous <Continuous>  dextrose 50% Injectable 25 Gram(s) IV Push once  dextrose 50% Injectable 12.5 Gram(s) IV Push once  dextrose 50% Injectable 25 Gram(s) IV Push once  glucagon  Injectable 1 milliGRAM(s) IntraMuscular once  heparin   Injectable 5000 Unit(s) SubCutaneous every 8 hours  hydrALAZINE 100 milliGRAM(s) Oral every 8 hours  insulin glargine Injectable (LANTUS) 22 Unit(s) SubCutaneous every morning  insulin lispro (ADMELOG) corrective regimen sliding scale   SubCutaneous three times a day before meals  insulin lispro Injectable (ADMELOG) 5 Unit(s) SubCutaneous three times a day before meals  labetalol 600 milliGRAM(s) Oral three times a day  lacosamide IVPB 50 milliGRAM(s) IV Intermittent every 12 hours  levETIRAcetam  Solution 500 milliGRAM(s) Oral two times a day  lidocaine 1%/epinephrine 1:100,000 Inj 20 milliLiter(s) Local Injection once  meropenem  IVPB      meropenem  IVPB 500 milliGRAM(s) IV Intermittent every 12 hours  multivitamin/minerals/iron Oral Solution (CENTRUM) 15 milliLiter(s) Oral daily  pantoprazole    Tablet 40 milliGRAM(s) Oral before breakfast  sodium bicarbonate 650 milliGRAM(s) Oral every 6 hours  sodium chloride 0.65% Nasal 2 Spray(s) Both Nostrils two times a day  sodium chloride 0.9%. 1000 milliLiter(s) (100 mL/Hr) IV Continuous <Continuous>    SOCIAL HISTORY:  Denies ETOH,Smoking,   FAMILY HISTORY:  FH: type 2 diabetes mellitus        REVIEW OF SYSTEMS: negative except as mentioned in HPI    VITALS:  Vital Signs Last 24 Hrs  T(C): 37.7 (02 Sep 2022 13:08), Max: 38.9 (02 Sep 2022 05:25)  T(F): 99.9 (02 Sep 2022 13:08), Max: 102.1 (02 Sep 2022 05:25)  HR: 90 (02 Sep 2022 13:08) (84 - 104)  BP: 141/77 (02 Sep 2022 13:08) (103/55 - 180/88)  BP(mean): --  RR: 17 (02 Sep 2022 13:08) (17 - 20)  SpO2: 100% (02 Sep 2022 08:49) (100% - 100%)    Parameters below as of 02 Sep 2022 08:49  Patient On (Oxygen Delivery Method): room air        09-01 @ 07:01  -   @ 07:00  --------------------------------------------------------  IN: 850 mL / OUT: 1350 mL / NET: -500 mL        PHYSICAL EXAM:  Constitutional: calm, mildly lethargic  Neck: No JVD  Respiratory: CTAB, no wheezes, rales or rhonchi  Cardiovascular: S1, S2, RRR  Gastrointestinal: BS+, soft, NT/ND. PEG in place  Extremities: No peripheral edema, wrapped RLE wound  Neurological: awake, non verbal, does not follow commands  : nolasco with adequate amount of urine in bag      LABS:      139  |  100  |  81<HH>  ----------------------------<  150<H>  3.8   |  23  |  3.0<H>    Ca    8.2<L>      02 Sep 2022 06:55  Phos  4.2       Mg     2.1         TPro  5.4<L>  /  Alb  2.7<L>  /  TBili  0.8  /  DBili      /  AST  43<H>  /  ALT  26  /  AlkPhos  246<H>      Creatinine Trend: 3.0 <--, 2.8 <--, 1.9 <--, 1.6 <--, 1.0 <--, 1.0 <--, 0.9 <--, 0.9 <--, 0.8 <--, 0.7 <--, 0.8 <--, 0.8 <--, 0.8 <--, 0.8 <--, 0.8 <--, 0.9 <--, 0.8 <--, 0.8 <--, 0.8 <--, 0.8 <--, 0.9 <--, 0.7 <--, 0.7 <--, 0.7 <--, 0.8 <--, 0.8 <--, 0.9 <--, 0.7 <--, 0.6 <--, 0.8 <--, 0.9 <--, 0.9 <--                        7.6    14.88 )-----------( 175      ( 02 Sep 2022 06:55 )             22.1     Urine Studies:  Urinalysis Basic - ( 02 Sep 2022 12:57 )    Color: Yellow / Appearance: Slightly Turbid / S.013 / pH:   Gluc:  / Ketone: Negative  / Bili: Negative / Urobili: 6 mg/dL   Blood:  / Protein: 100 mg/dL / Nitrite: Negative   Leuk Esterase: Large / RBC: 30 /HPF / WBC 57 /HPF   Sq Epi:  / Non Sq Epi: 8 /HPF / Bacteria: Negative      Osmolality, Random Urine: 288 mos/kg ( @ 12:57)            RADIOLOGY & ADDITIONAL STUDIES:

## 2022-09-02 NOTE — PROGRESS NOTE ADULT - ASSESSMENT
· Assessment	  57 yo F with PMH of HTN, DM2, CVA (2017) who presented with purulent right lower extremity wound for 3 months consistent with acute RLE cellulitis. Code stroke for unresponsiveness at 4pm 8/5    Impression  #Fevers 8/30 - Enterobacter cloaecae bacteremia  - UA with pyruia   - CXR 8/31 unremarkable   - BLood Cx 9/1 with Enterobacter cloacae with NDM+    #CVA   - 8/11 MR Head w/wo IV Cont (08.10.22 @ 21:25): Multiple punctate cortical acute infarcts involving multiple vascular territories possibly related to embolic etiology. No evidence of acute hemorrhage. Moderate chronic microvascular type changes as well as chronic hemosiderin deposition within the right basal ganglia consistent with remote hemorrhage. Chronic right thalamic lacunar infarcts. More alert and does try to respondResolved SIRS with no infectious etiology  - s/p LP 8/26 with 24 WBC, RBC 15335, 37% PMNs, 61% Lymph -- protein/glucose not obtained    RECOMMENDATIONS;  - stop meropenem   - start avycaz 0.94 1g q 12 hours and aztreonam 2g q 12 hours  - please administer antibiotics together  - repeat blood cx daily until clear   - trend creatinine   - appreciate ID pharmacy recs    Please call or message on Microsoft Teams if with any questions.  Spectra [phone]   No

## 2022-09-02 NOTE — PROGRESS NOTE ADULT - ATTENDING COMMENTS
# AFL rule out ATN / relative hypotension/ sepsis / E cloaca bacteremia / HTN ( was on multiple meds )/ CVA  agree with labetalol hydralazine current   if BP decreases may need to taper down  keep off losartan and diuretics for now  can use LR at 75 cc per hour  keep nolasco strict I and O  on aztreonam and merrem adjust to eGFR < 30   check IP and PTH   avoid nephrotoxins and contrast   no need for RRT    will follow

## 2022-09-02 NOTE — PROGRESS NOTE ADULT - ATTENDING COMMENTS
Pt is a 57 yo F with PMHx of HTN, DM, prior stroke in 2017 with residual left sided weakness, who presented with RLE cellulitis. Stroke code called on 8/5 for unresponsiveness.     Impr: scattered punctate acute ischemic strokes in multiple vascular territories  Given encephalopathy and small scattered strokes, high suspicion for vasculitis.   S/p LP, CSF with mildly elevated cell count and protein, normal glucose. elevated ESR/CRP.  Repeat MRI brain, given worsening renal failure, ok to obtain without contrast. Vasculitis labs  NOEMI consult for DSA given suspicion for vasculitis- plan to wait till Tuesday given new fever/infection  Low threshold to resume vEEG. Renally dose AED's .

## 2022-09-03 NOTE — PROGRESS NOTE ADULT - ASSESSMENT
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 During hospital stay, found to have multiple punctate infarcts suspected to be cardioembolic vs vasculitis. Patient also found to have sharp waves on vEEG and currently on AEDs. Hospital course further complicated by fever and now with MDR Enterobacter bacteremia.    1. Acute ischemic strokes / High suspicion for CNS vasculitis  - high suspicion for vasculitis as per neuro (high inflammatory markers, CSF protein)  - continue aspirin 81mg daily  - Restarted plavix as LP performed 8/28 and s/p PEG  - Continue Atorvastatin 80 mg daily   - q4hr neuro checks and vitals  - cardio recommended to hold off IVONNE (consider CTA heart once kidney fxn better)  - s/p ILR by EP 8/22  - Vasculitic work up as per neuro, intervention as per neurovascular (planning DSA once bacteremia resolved and cleared by ID)  - 9/2: repeat MRI brain w/out contrast ordered    2. Fevers  MDR Enterobacter bacteremia  on Avycaz and aztreonam per ID  daily blood cultures for now  nolasco in place    3. High risk of seizures with epileptiform discharges  - on Keppra 500 mg bid   - Stopped Phenytoin 8/23 as per neuro  - on vimpat 50mg BID    4. nonoliguric ALF / Urinary retention / Suspected ATN  - Cr continues to trend up - now 3.2  - on IVFs  - continue to hold Lasix and Losartan  - avoid hypotension   - renal US 9/2: no hydronephrosis  - keep nolasco for now  - sodium bicarbonate 650mg PO q8hr  - renal following  - daily BMP and I's and O's  - phos level OK    5. Hypertensive urgency due to noncompliance and Malignant HTN   - BP on admission 296/174 without signs of end organ damage. Renal artery duplex wnl>>ARIAN unlikely  - Aldosterone wnl, renin elevated  - BP overall improved  - SBP goal 120-160    6. Hyperlipidemia - high intensity statin    7. Purulent Right LE cellulitis   - s/p debridement by burn on 8/10  - Candida in wound. per Dr. Chua: contaminant  -  BCx w/ staph: likely contaminant. repeat BCx at that time was negative     8. DM w/ Hyperglycemia  - A1C 10.4  - continue insulin and monitor FS    9. Dysphagia   on PEG feeds    10. Anemia - normocytic  could be partly dilutional - now on IVF at 100cc/hr  keep active T&S and monitor CBC and for bleeding    11. DVT ppx: Heparin SQ  GI ppx: Protonix    12. Ulcerated lesion of upper lip  unclear etiology at this time - does not seem pressure related - ? due to trauma  monitor closely - if worsens, will need debridement    overall very guarded prognosis and at high risk of clinical deterioration.  full code status      PROGRESS NOTE HANDOFF    Pending: MRI of brain, daily blood cultures until negative, resolution of fever, daily labs including CBC and BMP, Phos  monitor upper lip lesion    Disposition: to be determined, likely SNF

## 2022-09-03 NOTE — PROGRESS NOTE ADULT - SUBJECTIVE AND OBJECTIVE BOX
PAULACLAIREJOSE  56y Female    INTERVAL HPI/OVERNIGHT EVENTS:    still with fevers up to 102.5  now with upper lip ulcer  unable to obtain ROS due to altered mental status    T(F): 100 (09-03-22 @ 12:00), Max: 102.5 (09-02-22 @ 22:01)  HR: 80 (09-03-22 @ 12:00) (80 - 92)  BP: 133/70 (09-03-22 @ 12:00) (127/64 - 177/79)  RR: 20 (09-03-22 @ 12:00) (18 - 24)  SpO2: 98% (09-03-22 @ 12:00) (98% - 99%) on RA  I&O's Summary    02 Sep 2022 07:01  -  03 Sep 2022 07:00  --------------------------------------------------------  IN: 0 mL / OUT: 1770 mL / NET: -1770 mL      CAPILLARY BLOOD GLUCOSE      POCT Blood Glucose.: 187 mg/dL (03 Sep 2022 11:32)  POCT Blood Glucose.: 184 mg/dL (03 Sep 2022 07:48)  POCT Blood Glucose.: 158 mg/dL (02 Sep 2022 21:50)  POCT Blood Glucose.: 165 mg/dL (02 Sep 2022 16:50)        PHYSICAL EXAM:  GENERAL: NAD  HEAD:  Normocephalic  EYES:  conjunctiva and sclera clear  ENMT: left upper lip with ulcerated lesion, no bleeding   NERVOUS SYSTEM:  lethargic, eyes closed  CHEST/LUNG: CTA b/l  HEART: Regular rate and rhythm  ABDOMEN: Soft, Nontender, Nondistended; Bowel sounds present, PEG site: clean  EXTREMITIES:   No edema  SKIN: warm, dry  right LE with dressing  + nolasco    Consultant(s) Notes Reviewed:  [x ] YES  [ ] NO  Care Discussed with Consultants/Other Providers [ x] YES  [ ] NO    MEDICATIONS  (STANDING):  aspirin  chewable 81 milliGRAM(s) Oral daily  atorvastatin 80 milliGRAM(s) Oral at bedtime  aztreonam  IVPB      aztreonam  IVPB 2000 milliGRAM(s) IV Intermittent every 12 hours  ceftazidime/avibactam IVPB 0.94 Gram(s) IV Intermittent every 12 hours  cloNIDine Patch 0.1 mG/24Hr(s) 1 patch Transdermal every 7 days  clopidogrel Tablet 75 milliGRAM(s) Oral daily  collagenase Ointment 1 Application(s) Topical two times a day  Dakins Solution - 1/2 Strength 1 Application(s) Topical two times a day  dextrose 5%. 1000 milliLiter(s) (100 mL/Hr) IV Continuous <Continuous>  dextrose 5%. 1000 milliLiter(s) (50 mL/Hr) IV Continuous <Continuous>  dextrose 50% Injectable 25 Gram(s) IV Push once  dextrose 50% Injectable 12.5 Gram(s) IV Push once  dextrose 50% Injectable 25 Gram(s) IV Push once  glucagon  Injectable 1 milliGRAM(s) IntraMuscular once  heparin   Injectable 5000 Unit(s) SubCutaneous every 8 hours  hydrALAZINE 100 milliGRAM(s) Oral every 8 hours  insulin glargine Injectable (LANTUS) 22 Unit(s) SubCutaneous every morning  insulin lispro (ADMELOG) corrective regimen sliding scale   SubCutaneous three times a day before meals  insulin lispro Injectable (ADMELOG) 5 Unit(s) SubCutaneous three times a day before meals  labetalol 600 milliGRAM(s) Oral three times a day  lacosamide IVPB 50 milliGRAM(s) IV Intermittent every 12 hours  levETIRAcetam  Solution 500 milliGRAM(s) Oral two times a day  lidocaine 1%/epinephrine 1:100,000 Inj 20 milliLiter(s) Local Injection once  multivitamin/minerals/iron Oral Solution (CENTRUM) 15 milliLiter(s) Oral daily  pantoprazole    Tablet 40 milliGRAM(s) Oral before breakfast  sodium bicarbonate 650 milliGRAM(s) Oral every 8 hours  sodium chloride 0.65% Nasal 2 Spray(s) Both Nostrils two times a day  sodium chloride 0.9%. 1000 milliLiter(s) (100 mL/Hr) IV Continuous <Continuous>    MEDICATIONS  (PRN):  acetaminophen     Tablet .. 650 milliGRAM(s) Oral every 6 hours PRN Temp greater or equal to 38C (100.4F), Mild Pain (1 - 3)  dextrose Oral Gel 15 Gram(s) Oral once PRN Blood Glucose LESS THAN 70 milliGRAM(s)/deciliter  labetalol Injectable 10 milliGRAM(s) IV Push every 6 hours PRN Systolic blood pressure >  LORazepam     Tablet 1 milliGRAM(s) Oral once PRN Agitation  melatonin 3 milliGRAM(s) Oral at bedtime PRN Insomnia      LABS:                        7.4    8.06  )-----------( 169      ( 03 Sep 2022 07:54 )             21.3     09-03    139  |  102  |  82<HH>  ----------------------------<  181<H>  4.0   |  22  |  3.2<H>    Ca    8.3<L>      03 Sep 2022 07:54  Phos  3.8     09-03  Mg     2.1     09-03    TPro  5.4<L>  /  Alb  2.7<L>  /  TBili  0.6  /  DBili  x   /  AST  50<H>  /  ALT  34  /  AlkPhos  336<H>  09-03        Culture - Blood (collected 01 Sep 2022 12:29)  Source: .Blood None  Gram Stain (02 Sep 2022 10:11):    Growth in aerobic bottle: Gram Negative Rods  Preliminary Report (02 Sep 2022 10:11):    Growth in aerobic bottle: Gram Negative Rods    ***Blood Panel PCR results on this specimen are available    approximately 3 hours after the Gram stain result.***    Gram stain, PCR, and/or culture results may not always    correspond due to difference in methodologies.    ************************************************************    This PCR assay was performed by multiplex PCR. This    Assay tests for 66 bacterial and resistance gene targets.    Please refer to the [facility] test directory    at [email or web address] for details.  Organism: Blood Culture PCR (02 Sep 2022 12:08)  Organism: Blood Culture PCR (02 Sep 2022 12:08)    Culture - Urine (collected 01 Sep 2022 10:10)  Source: Catheterized Catheterized  Final Report (03 Sep 2022 08:06):    >=3 organisms. Probable collection contamination.        RADIOLOGY & ADDITIONAL TESTS:    Imaging or report Personally Reviewed:  [x ] YES  [ ] NO    < from: US Abdomen Upper Quadrant Right (09.02.22 @ 11:24) >    IMPRESSION:    Cholelithiasis and sludge without sonographic evidence of acute   cholecystitis.    CBD mildly dilated measuring 7 to 8 mm in diameter. Correlate with liver   function tests.    Right-sided pleural effusion.    < end of copied text >      < from: US Renal (09.02.22 @ 11:22) >  IMPRESSION:    No hydronephrosis    < end of copied text >      < from: Xray Chest 1 View- PORTABLE-Routine (Xray Chest 1 View- PORTABLE-Routine in AM.) (08.31.22 @ 06:00) >  Impression:    No radiographic evidence of acute cardiopulmonary disease.    < end of copied text >      < from: CT Head No Cont (08.29.22 @ 17:43) >  IMPRESSION:    1.  No hemorrhage or new infarct    2.  Chronic changes right subinsular region    3.  Multiple small infarcts (MRI August 10, 2022) not clearly visualized    < end of copied text >      < from: MR Head w/wo IV Cont (08.10.22 @ 21:25) >  IMPRESSION:  Motion limited examination.    Multiple punctate cortical acute infarcts involving multiple vascular   territories possibly related to embolic etiology. No evidence of acute    hemorrhage.    Moderate chronic microvascular type changes as well as chronic   hemosiderin deposition within the right basal ganglia consistent with   remote hemorrhage.    Chronic right thalamic lacunar infarcts.    < end of copied text >      < from: TTE Echo Complete w/o Contrast w/ Doppler (08.14.22 @ 16:47) >  Summary:   1. LV Ejection Fraction by Reich's Method with a biplane EF of 61 %.   2. Moderate concentric left ventricular hypertrophy.   3. Spectral Doppler shows impaired relaxation pattern of left   ventricular myocardial filling (Grade I diastolic dysfunction).   4. Mildly enlarged left atrium.   5. Normal right atrial size.   6. Mild mitral valve regurgitation.   7. Mild tricuspid regurgitation.   8. Mild aortic regurgitation.   9. Estimated pulmonary artery systolic pressure is 43.8 mmHg assuming a   right atrial pressure of 3 mmHg, which is consistent with mild pulmonary   hypertension.  10. Color flow doppler and intravenous injection of agitated saline   demonstrates the presence of an intact intra atrial septum.    < end of copied text >      Case discussed with residents and RN on rounds today

## 2022-09-03 NOTE — PROGRESS NOTE ADULT - ATTENDING COMMENTS
Pt examined 9/3/22 and is poorly responsive.  She looks to left and moves RUE and RLE > LLE to pain.  She is nonverbal.  Concern for vasculitis.  Recommend steroids if permissible from id standpoint.

## 2022-09-03 NOTE — PROGRESS NOTE ADULT - ASSESSMENT
· Assessment	  57 yo F with PMH of HTN, DM2, CVA (2017) who presented with purulent right lower extremity wound for 3 months consistent with acute RLE cellulitis. Code stroke for unresponsiveness at 4pm 8/5    Impression  #Fevers 8/30 - Enterobacter cloaecae bacteremia  - UA with pyruia   - CXR 8/31 unremarkable   - BLood Cx 9/1 with Enterobacter cloacae with NDM+    #CVA   - 8/11 MR Head w/wo IV Cont (08.10.22 @ 21:25): Multiple punctate cortical acute infarcts involving multiple vascular territories possibly related to embolic etiology. No evidence of acute hemorrhage. Moderate chronic microvascular type changes as well as chronic hemosiderin deposition within the right basal ganglia consistent with remote hemorrhage. Chronic right thalamic lacunar infarcts. More alert and does try to respondResolved SIRS with no infectious etiology  - s/p LP 8/26 with 24 WBC, RBC 10669, 37% PMNs, 61% Lymph -- protein/glucose not obtained    RECOMMENDATIONS;  - continue  avycaz 0.94 1g q 12 hours and aztreonam 2g q 12 hours  - please administer antibiotics together  - repeat blood cx for surveillance  - trend creatinine   - trend WBC and fever curve    Please call or message on Microsoft Teams if with any questions.  Spectra [phone]

## 2022-09-03 NOTE — PROGRESS NOTE ADULT - SUBJECTIVE AND OBJECTIVE BOX
Neurology Progress Note    Interval History:    ON: Febrile   Patient was seen and examined at bedside. She remains very lethargic, non-verbal nonresponsive, not following command    Medications:  acetaminophen     Tablet .. 650 milliGRAM(s) Oral every 6 hours PRN  aspirin  chewable 81 milliGRAM(s) Oral daily  atorvastatin 80 milliGRAM(s) Oral at bedtime  aztreonam  IVPB      aztreonam  IVPB 2000 milliGRAM(s) IV Intermittent every 12 hours  ceftazidime/avibactam IVPB 0.94 Gram(s) IV Intermittent every 12 hours  cloNIDine Patch 0.1 mG/24Hr(s) 1 patch Transdermal every 7 days  clopidogrel Tablet 75 milliGRAM(s) Oral daily  collagenase Ointment 1 Application(s) Topical two times a day  Dakins Solution - 1/2 Strength 1 Application(s) Topical two times a day  dextrose 5%. 1000 milliLiter(s) IV Continuous <Continuous>  dextrose 5%. 1000 milliLiter(s) IV Continuous <Continuous>  dextrose 50% Injectable 25 Gram(s) IV Push once  dextrose 50% Injectable 12.5 Gram(s) IV Push once  dextrose 50% Injectable 25 Gram(s) IV Push once  dextrose Oral Gel 15 Gram(s) Oral once PRN  glucagon  Injectable 1 milliGRAM(s) IntraMuscular once  heparin   Injectable 5000 Unit(s) SubCutaneous every 8 hours  hydrALAZINE 100 milliGRAM(s) Oral every 8 hours  insulin glargine Injectable (LANTUS) 22 Unit(s) SubCutaneous every morning  insulin lispro (ADMELOG) corrective regimen sliding scale   SubCutaneous three times a day before meals  insulin lispro Injectable (ADMELOG) 5 Unit(s) SubCutaneous three times a day before meals  labetalol 600 milliGRAM(s) Oral three times a day  labetalol Injectable 10 milliGRAM(s) IV Push every 6 hours PRN  lacosamide IVPB 50 milliGRAM(s) IV Intermittent every 12 hours  levETIRAcetam  Solution 500 milliGRAM(s) Oral two times a day  lidocaine 1%/epinephrine 1:100,000 Inj 20 milliLiter(s) Local Injection once  LORazepam     Tablet 1 milliGRAM(s) Oral once PRN  melatonin 3 milliGRAM(s) Oral at bedtime PRN  multivitamin/minerals/iron Oral Solution (CENTRUM) 15 milliLiter(s) Oral daily  pantoprazole    Tablet 40 milliGRAM(s) Oral before breakfast  sodium bicarbonate 650 milliGRAM(s) Oral every 8 hours  sodium chloride 0.65% Nasal 2 Spray(s) Both Nostrils two times a day  sodium chloride 0.9%. 1000 milliLiter(s) IV Continuous <Continuous>      Vital Signs Last 24 Hrs  T(C): 37.9 (03 Sep 2022 14:35), Max: 39.2 (02 Sep 2022 22:01)  T(F): 100.2 (03 Sep 2022 14:35), Max: 102.5 (02 Sep 2022 22:01)  HR: 80 (03 Sep 2022 14:35) (80 - 92)  BP: 145/72 (03 Sep 2022 14:35) (127/64 - 177/79)  BP(mean): 102 (03 Sep 2022 14:35) (102 - 109)  RR: 20 (03 Sep 2022 14:35) (18 - 24)  SpO2: 99% (03 Sep 2022 14:35) (97% - 99%)    Parameters below as of 03 Sep 2022 14:35  Patient On (Oxygen Delivery Method): room air        Neurological Examination:  General: very sick  Cognitive/Language:  Stuporous   Cranial Nerves  - Eyes: PERRL.    - Face:    - Ears/Nose/Throat:    Motor examination:  able to move extremities but not on command. Normal tone and bulk. No tenderness, twitching, tremors or involuntary movements.  Sensory examination:  withdraw to painful stimuli  Reflexes:   2+ b/l biceps, triceps, patella and achilles.        Labs:  CBC Full  -  ( 03 Sep 2022 07:54 )  WBC Count : 8.06 K/uL  RBC Count : 2.46 M/uL  Hemoglobin : 7.4 g/dL  Hematocrit : 21.3 %  Platelet Count - Automated : 169 K/uL  Mean Cell Volume : 86.6 fL  Mean Cell Hemoglobin : 30.1 pg  Mean Cell Hemoglobin Concentration : 34.7 g/dL  Auto Neutrophil # : 6.36 K/uL  Auto Lymphocyte # : 0.91 K/uL  Auto Monocyte # : 0.56 K/uL  Auto Eosinophil # : 0.15 K/uL  Auto Basophil # : 0.02 K/uL  Auto Neutrophil % : 79.0 %  Auto Lymphocyte % : 11.3 %  Auto Monocyte % : 6.9 %  Auto Eosinophil % : 1.9 %  Auto Basophil % : 0.2 %    09-03    139  |  102  |  82<HH>  ----------------------------<  181<H>  4.0   |  22  |  3.2<H>    Ca    8.3<L>      03 Sep 2022 07:54  Phos  3.8       Mg     2.1         TPro  5.4<L>  /  Alb  2.7<L>  /  TBili  0.6  /  DBili  x   /  AST  50<H>  /  ALT  34  /  AlkPhos  336<H>      LIVER FUNCTIONS - ( 03 Sep 2022 07:54 )  Alb: 2.7 g/dL / Pro: 5.4 g/dL / ALK PHOS: 336 U/L / ALT: 34 U/L / AST: 50 U/L / GGT: x             Urinalysis Basic - ( 02 Sep 2022 12:57 )    Color: Yellow / Appearance: Slightly Turbid / S.013 / pH: x  Gluc: x / Ketone: Negative  / Bili: Negative / Urobili: 6 mg/dL   Blood: x / Protein: 100 mg/dL / Nitrite: Negative   Leuk Esterase: Large / RBC: 30 /HPF / WBC 57 /HPF   Sq Epi: x / Non Sq Epi: 8 /HPF / Bacteria: Negative        RADIOLOGY & ADDITIONAL TESTS:

## 2022-09-03 NOTE — PROGRESS NOTE ADULT - SUBJECTIVE AND OBJECTIVE BOX
JOSE MITCHELL 56y Female  MRN#: [id number]   CODE STATUS:    Hospital Day: 32d    SUBJECTIVE  No acute events overnight. Patient continues to be febrile overnight even with cooling blanket. Unarousable to sound.                                                 ----------------------------------------------------------  OBJECTIVE  PAST MEDICAL & SURGICAL HISTORY  Hypertension    Diabetes mellitus    No significant past surgical history                                              -----------------------------------------------------------  ALLERGIES:  No Known Allergies                                            ------------------------------------------------------------    HOME MEDICATIONS  Home Medications:  losartan 50 mg oral tablet: 1 tab(s) orally once a day (02 Aug 2022 10:38)  metFORMIN 500 mg oral tablet: 1 tab(s) orally 2 times a day (02 Aug 2022 10:38)  metoprolol succinate 50 mg oral tablet, extended release: 1 tab(s) orally once a day (02 Aug 2022 10:38)                           MEDICATIONS:  STANDING MEDICATIONS  aspirin  chewable 81 milliGRAM(s) Oral daily  atorvastatin 80 milliGRAM(s) Oral at bedtime  aztreonam  IVPB      aztreonam  IVPB 2000 milliGRAM(s) IV Intermittent every 12 hours  ceftazidime/avibactam IVPB 0.94 Gram(s) IV Intermittent every 12 hours  cloNIDine Patch 0.1 mG/24Hr(s) 1 patch Transdermal every 7 days  clopidogrel Tablet 75 milliGRAM(s) Oral daily  collagenase Ointment 1 Application(s) Topical two times a day  Dakins Solution - 1/2 Strength 1 Application(s) Topical two times a day  dextrose 5%. 1000 milliLiter(s) IV Continuous <Continuous>  dextrose 5%. 1000 milliLiter(s) IV Continuous <Continuous>  dextrose 50% Injectable 25 Gram(s) IV Push once  dextrose 50% Injectable 12.5 Gram(s) IV Push once  dextrose 50% Injectable 25 Gram(s) IV Push once  glucagon  Injectable 1 milliGRAM(s) IntraMuscular once  heparin   Injectable 5000 Unit(s) SubCutaneous every 8 hours  hydrALAZINE 100 milliGRAM(s) Oral every 8 hours  insulin glargine Injectable (LANTUS) 22 Unit(s) SubCutaneous every morning  insulin lispro (ADMELOG) corrective regimen sliding scale   SubCutaneous three times a day before meals  insulin lispro Injectable (ADMELOG) 5 Unit(s) SubCutaneous three times a day before meals  labetalol 600 milliGRAM(s) Oral three times a day  lacosamide IVPB 50 milliGRAM(s) IV Intermittent every 12 hours  levETIRAcetam  Solution 500 milliGRAM(s) Oral two times a day  lidocaine 1%/epinephrine 1:100,000 Inj 20 milliLiter(s) Local Injection once  multivitamin/minerals/iron Oral Solution (CENTRUM) 15 milliLiter(s) Oral daily  pantoprazole    Tablet 40 milliGRAM(s) Oral before breakfast  sodium bicarbonate 650 milliGRAM(s) Oral every 6 hours  sodium chloride 0.65% Nasal 2 Spray(s) Both Nostrils two times a day  sodium chloride 0.9%. 1000 milliLiter(s) IV Continuous <Continuous>    PRN MEDICATIONS  acetaminophen     Tablet .. 650 milliGRAM(s) Oral every 6 hours PRN  dextrose Oral Gel 15 Gram(s) Oral once PRN  labetalol Injectable 10 milliGRAM(s) IV Push every 6 hours PRN  LORazepam     Tablet 1 milliGRAM(s) Oral once PRN  melatonin 3 milliGRAM(s) Oral at bedtime PRN                                            ------------------------------------------------------------  VITAL SIGNS: Last 24 Hours  T(C): 38.4 (03 Sep 2022 07:45), Max: 39.2 (02 Sep 2022 22:01)  T(F): 101.2 (03 Sep 2022 07:45), Max: 102.5 (02 Sep 2022 22:01)  HR: 80 (03 Sep 2022 07:45) (80 - 92)  BP: 127/64 (03 Sep 2022 07:45) (127/64 - 177/79)  BP(mean): 109 (03 Sep 2022 04:41) (109 - 109)  RR: 20 (03 Sep 2022 07:45) (17 - 24)  SpO2: 99% (03 Sep 2022 07:45) (99% - 99%)      22 @ 07:01  -  - @ 07:00  --------------------------------------------------------  IN: 0 mL / OUT: 1770 mL / NET: -1770 mL                                             --------------------------------------------------------------  LABS:                        7.4    8.06  )-----------( 169      ( 03 Sep 2022 07:54 )             21.3     09    139  |  100  |  81<HH>  ----------------------------<  150<H>  3.8   |  23  |  3.0<H>    Ca    8.2<L>      02 Sep 2022 06:55  Phos  4.2       Mg     2.1         TPro  5.4<L>  /  Alb  2.7<L>  /  TBili  0.8  /  DBili  x   /  AST  43<H>  /  ALT  26  /  AlkPhos  246<H>        Urinalysis Basic - ( 02 Sep 2022 12:57 )    Color: Yellow / Appearance: Slightly Turbid / S.013 / pH: x  Gluc: x / Ketone: Negative  / Bili: Negative / Urobili: 6 mg/dL   Blood: x / Protein: 100 mg/dL / Nitrite: Negative   Leuk Esterase: Large / RBC: 30 /HPF / WBC 57 /HPF   Sq Epi: x / Non Sq Epi: 8 /HPF / Bacteria: Negative              Culture - Blood (collected 01 Sep 2022 12:29)  Source: .Blood None  Gram Stain (02 Sep 2022 10:11):    Growth in aerobic bottle: Gram Negative Rods  Preliminary Report (02 Sep 2022 10:11):    Growth in aerobic bottle: Gram Negative Rods    ***Blood Panel PCR results on this specimen are available    approximately 3 hours after the Gram stain result.***    Gram stain, PCR, and/or culture results may not always    correspond due to difference in methodologies.    ************************************************************    This PCR assay was performed by multiplex PCR. This    Assay tests for 66 bacterial and resistance gene targets.    Please refer to the [facility] test directory    at [email or web address] for details.  Organism: Blood Culture PCR (02 Sep 2022 12:08)  Organism: Blood Culture PCR (02 Sep 2022 12:08)    Culture - Urine (collected 01 Sep 2022 10:10)  Source: Catheterized Catheterized  Final Report (03 Sep 2022 08:06):    >=3 organisms. Probable collection contamination.              PHYSICAL EXAM:  General: obtunded. unarousable to sound and can not follow commands  HEENT: Normocephalic, nontraumatic.   LUNGS: Clear to auscultation b/l. No wheezes, rales, or rhonchi.  HEART: RRR. No murmurs, rubs, or gallops.  ABDOMEN: Nontender, nondistended. + bowel sounds. PEG tube  EXT: Pulses palpable. Nonedematous. Maloney  SKIN: Warm, dry.                                                                                      JOSE MITCHELL 56y Female  MRN#: [id number]   CODE STATUS:    Hospital Day: 32d    SUBJECTIVE  No acute events overnight. Patient continues to be febrile overnight even with cooling blanket.                                               ----------------------------------------------------------  OBJECTIVE  PAST MEDICAL & SURGICAL HISTORY  Hypertension    Diabetes mellitus    No significant past surgical history                                              -----------------------------------------------------------  ALLERGIES:  No Known Allergies                                            ------------------------------------------------------------    HOME MEDICATIONS  Home Medications:  losartan 50 mg oral tablet: 1 tab(s) orally once a day (02 Aug 2022 10:38)  metFORMIN 500 mg oral tablet: 1 tab(s) orally 2 times a day (02 Aug 2022 10:38)  metoprolol succinate 50 mg oral tablet, extended release: 1 tab(s) orally once a day (02 Aug 2022 10:38)                           MEDICATIONS:  STANDING MEDICATIONS  aspirin  chewable 81 milliGRAM(s) Oral daily  atorvastatin 80 milliGRAM(s) Oral at bedtime  aztreonam  IVPB      aztreonam  IVPB 2000 milliGRAM(s) IV Intermittent every 12 hours  ceftazidime/avibactam IVPB 0.94 Gram(s) IV Intermittent every 12 hours  cloNIDine Patch 0.1 mG/24Hr(s) 1 patch Transdermal every 7 days  clopidogrel Tablet 75 milliGRAM(s) Oral daily  collagenase Ointment 1 Application(s) Topical two times a day  Dakins Solution - 1/2 Strength 1 Application(s) Topical two times a day  dextrose 5%. 1000 milliLiter(s) IV Continuous <Continuous>  dextrose 5%. 1000 milliLiter(s) IV Continuous <Continuous>  dextrose 50% Injectable 25 Gram(s) IV Push once  dextrose 50% Injectable 12.5 Gram(s) IV Push once  dextrose 50% Injectable 25 Gram(s) IV Push once  glucagon  Injectable 1 milliGRAM(s) IntraMuscular once  heparin   Injectable 5000 Unit(s) SubCutaneous every 8 hours  hydrALAZINE 100 milliGRAM(s) Oral every 8 hours  insulin glargine Injectable (LANTUS) 22 Unit(s) SubCutaneous every morning  insulin lispro (ADMELOG) corrective regimen sliding scale   SubCutaneous three times a day before meals  insulin lispro Injectable (ADMELOG) 5 Unit(s) SubCutaneous three times a day before meals  labetalol 600 milliGRAM(s) Oral three times a day  lacosamide IVPB 50 milliGRAM(s) IV Intermittent every 12 hours  levETIRAcetam  Solution 500 milliGRAM(s) Oral two times a day  lidocaine 1%/epinephrine 1:100,000 Inj 20 milliLiter(s) Local Injection once  multivitamin/minerals/iron Oral Solution (CENTRUM) 15 milliLiter(s) Oral daily  pantoprazole    Tablet 40 milliGRAM(s) Oral before breakfast  sodium bicarbonate 650 milliGRAM(s) Oral every 6 hours  sodium chloride 0.65% Nasal 2 Spray(s) Both Nostrils two times a day  sodium chloride 0.9%. 1000 milliLiter(s) IV Continuous <Continuous>    PRN MEDICATIONS  acetaminophen     Tablet .. 650 milliGRAM(s) Oral every 6 hours PRN  dextrose Oral Gel 15 Gram(s) Oral once PRN  labetalol Injectable 10 milliGRAM(s) IV Push every 6 hours PRN  LORazepam     Tablet 1 milliGRAM(s) Oral once PRN  melatonin 3 milliGRAM(s) Oral at bedtime PRN                                            ------------------------------------------------------------  VITAL SIGNS: Last 24 Hours  T(C): 38.4 (03 Sep 2022 07:45), Max: 39.2 (02 Sep 2022 22:01)  T(F): 101.2 (03 Sep 2022 07:45), Max: 102.5 (02 Sep 2022 22:01)  HR: 80 (03 Sep 2022 07:45) (80 - 92)  BP: 127/64 (03 Sep 2022 07:45) (127/64 - 177/79)  BP(mean): 109 (03 Sep 2022 04:41) (109 - 109)  RR: 20 (03 Sep 2022 07:45) (17 - 24)  SpO2: 99% (03 Sep 2022 07:45) (99% - 99%)      22 @ 07:01  -  09- @ 07:00  --------------------------------------------------------  IN: 0 mL / OUT: 1770 mL / NET: -1770 mL                                             --------------------------------------------------------------  LABS:                        7.4    8.06  )-----------( 169      ( 03 Sep 2022 07:54 )             21.3         139  |  100  |  81<HH>  ----------------------------<  150<H>  3.8   |  23  |  3.0<H>    Ca    8.2<L>      02 Sep 2022 06:55  Phos  4.2       Mg     2.1         TPro  5.4<L>  /  Alb  2.7<L>  /  TBili  0.8  /  DBili  x   /  AST  43<H>  /  ALT  26  /  AlkPhos  246<H>        Urinalysis Basic - ( 02 Sep 2022 12:57 )    Color: Yellow / Appearance: Slightly Turbid / S.013 / pH: x  Gluc: x / Ketone: Negative  / Bili: Negative / Urobili: 6 mg/dL   Blood: x / Protein: 100 mg/dL / Nitrite: Negative   Leuk Esterase: Large / RBC: 30 /HPF / WBC 57 /HPF   Sq Epi: x / Non Sq Epi: 8 /HPF / Bacteria: Negative              Culture - Blood (collected 01 Sep 2022 12:29)  Source: .Blood None  Gram Stain (02 Sep 2022 10:11):    Growth in aerobic bottle: Gram Negative Rods  Preliminary Report (02 Sep 2022 10:11):    Growth in aerobic bottle: Gram Negative Rods    ***Blood Panel PCR results on this specimen are available    approximately 3 hours after the Gram stain result.***    Gram stain, PCR, and/or culture results may not always    correspond due to difference in methodologies.    ************************************************************    This PCR assay was performed by multiplex PCR. This    Assay tests for 66 bacterial and resistance gene targets.    Please refer to the [facility] Labs test directory    at [email or web address] for details.  Organism: Blood Culture PCR (02 Sep 2022 12:08)  Organism: Blood Culture PCR (02 Sep 2022 12:08)    Culture - Urine (collected 01 Sep 2022 10:10)  Source: Catheterized Catheterized  Final Report (03 Sep 2022 08:06):    >=3 organisms. Probable collection contamination.              PHYSICAL EXAM:  General: obtunded. unarousable to sound and can not follow commands  HEENT: Normocephalic, nontraumatic.   LUNGS: Clear to auscultation b/l. No wheezes, rales, or rhonchi.  HEART: RRR. No murmurs, rubs, or gallops.  ABDOMEN: Nontender, nondistended. + bowel sounds. PEG tube  EXT: Pulses palpable. Nonedematous. Maloney  SKIN: Warm, dry.                                                                                      JOSE MITCHELL 56y Female  MRN#: [id number]   CODE STATUS:    Hospital Day: 32d    SUBJECTIVE  No acute events overnight. Patient continues to be febrile overnight even with cooling blanket.                                               ----------------------------------------------------------  OBJECTIVE  PAST MEDICAL & SURGICAL HISTORY  Hypertension    Diabetes mellitus    No significant past surgical history                                              -----------------------------------------------------------  ALLERGIES:  No Known Allergies                                            ------------------------------------------------------------    HOME MEDICATIONS  Home Medications:  losartan 50 mg oral tablet: 1 tab(s) orally once a day (02 Aug 2022 10:38)  metFORMIN 500 mg oral tablet: 1 tab(s) orally 2 times a day (02 Aug 2022 10:38)  metoprolol succinate 50 mg oral tablet, extended release: 1 tab(s) orally once a day (02 Aug 2022 10:38)                           MEDICATIONS:  STANDING MEDICATIONS  aspirin  chewable 81 milliGRAM(s) Oral daily  atorvastatin 80 milliGRAM(s) Oral at bedtime  aztreonam  IVPB      aztreonam  IVPB 2000 milliGRAM(s) IV Intermittent every 12 hours  ceftazidime/avibactam IVPB 0.94 Gram(s) IV Intermittent every 12 hours  cloNIDine Patch 0.1 mG/24Hr(s) 1 patch Transdermal every 7 days  clopidogrel Tablet 75 milliGRAM(s) Oral daily  collagenase Ointment 1 Application(s) Topical two times a day  Dakins Solution - 1/2 Strength 1 Application(s) Topical two times a day  dextrose 5%. 1000 milliLiter(s) IV Continuous <Continuous>  dextrose 5%. 1000 milliLiter(s) IV Continuous <Continuous>  dextrose 50% Injectable 25 Gram(s) IV Push once  dextrose 50% Injectable 12.5 Gram(s) IV Push once  dextrose 50% Injectable 25 Gram(s) IV Push once  glucagon  Injectable 1 milliGRAM(s) IntraMuscular once  heparin   Injectable 5000 Unit(s) SubCutaneous every 8 hours  hydrALAZINE 100 milliGRAM(s) Oral every 8 hours  insulin glargine Injectable (LANTUS) 22 Unit(s) SubCutaneous every morning  insulin lispro (ADMELOG) corrective regimen sliding scale   SubCutaneous three times a day before meals  insulin lispro Injectable (ADMELOG) 5 Unit(s) SubCutaneous three times a day before meals  labetalol 600 milliGRAM(s) Oral three times a day  lacosamide IVPB 50 milliGRAM(s) IV Intermittent every 12 hours  levETIRAcetam  Solution 500 milliGRAM(s) Oral two times a day  lidocaine 1%/epinephrine 1:100,000 Inj 20 milliLiter(s) Local Injection once  multivitamin/minerals/iron Oral Solution (CENTRUM) 15 milliLiter(s) Oral daily  pantoprazole    Tablet 40 milliGRAM(s) Oral before breakfast  sodium bicarbonate 650 milliGRAM(s) Oral every 6 hours  sodium chloride 0.65% Nasal 2 Spray(s) Both Nostrils two times a day  sodium chloride 0.9%. 1000 milliLiter(s) IV Continuous <Continuous>    PRN MEDICATIONS  acetaminophen     Tablet .. 650 milliGRAM(s) Oral every 6 hours PRN  dextrose Oral Gel 15 Gram(s) Oral once PRN  labetalol Injectable 10 milliGRAM(s) IV Push every 6 hours PRN  LORazepam     Tablet 1 milliGRAM(s) Oral once PRN  melatonin 3 milliGRAM(s) Oral at bedtime PRN                                            ------------------------------------------------------------  VITAL SIGNS: Last 24 Hours  T(C): 38.4 (03 Sep 2022 07:45), Max: 39.2 (02 Sep 2022 22:01)  T(F): 101.2 (03 Sep 2022 07:45), Max: 102.5 (02 Sep 2022 22:01)  HR: 80 (03 Sep 2022 07:45) (80 - 92)  BP: 127/64 (03 Sep 2022 07:45) (127/64 - 177/79)  BP(mean): 109 (03 Sep 2022 04:41) (109 - 109)  RR: 20 (03 Sep 2022 07:45) (17 - 24)  SpO2: 99% (03 Sep 2022 07:45) (99% - 99%)      22 @ 07:01  -  09- @ 07:00  --------------------------------------------------------  IN: 0 mL / OUT: 1770 mL / NET: -1770 mL                                             --------------------------------------------------------------  LABS:                        7.4    8.06  )-----------( 169      ( 03 Sep 2022 07:54 )             21.3         139  |  100  |  81<HH>  ----------------------------<  150<H>  3.8   |  23  |  3.0<H>    Ca    8.2<L>      02 Sep 2022 06:55  Phos  4.2       Mg     2.1         TPro  5.4<L>  /  Alb  2.7<L>  /  TBili  0.8  /  DBili  x   /  AST  43<H>  /  ALT  26  /  AlkPhos  246<H>        Urinalysis Basic - ( 02 Sep 2022 12:57 )    Color: Yellow / Appearance: Slightly Turbid / S.013 / pH: x  Gluc: x / Ketone: Negative  / Bili: Negative / Urobili: 6 mg/dL   Blood: x / Protein: 100 mg/dL / Nitrite: Negative   Leuk Esterase: Large / RBC: 30 /HPF / WBC 57 /HPF   Sq Epi: x / Non Sq Epi: 8 /HPF / Bacteria: Negative              Culture - Blood (collected 01 Sep 2022 12:29)  Source: .Blood None  Gram Stain (02 Sep 2022 10:11):    Growth in aerobic bottle: Gram Negative Rods  Preliminary Report (02 Sep 2022 10:11):    Growth in aerobic bottle: Gram Negative Rods    ***Blood Panel PCR results on this specimen are available    approximately 3 hours after the Gram stain result.***    Gram stain, PCR, and/or culture results may not always    correspond due to difference in methodologies.    ************************************************************    This PCR assay was performed by multiplex PCR. This    Assay tests for 66 bacterial and resistance gene targets.    Please refer to the [facility] Labs test directory    at [email or web address] for details.  Organism: Blood Culture PCR (02 Sep 2022 12:08)  Organism: Blood Culture PCR (02 Sep 2022 12:08)    Culture - Urine (collected 01 Sep 2022 10:10)  Source: Catheterized Catheterized  Final Report (03 Sep 2022 08:06):    >=3 organisms. Probable collection contamination.              PHYSICAL EXAM:  General: obtunded. unarousable to sound and can not follow commands  HEENT: Normocephalic, nontraumatic. roughly [id number] ulcer on inside of upper lip  LUNGS: Clear to auscultation b/l. No wheezes, rales, or rhonchi.  HEART: RRR. No murmurs, rubs, or gallops.  ABDOMEN: Nontender, nondistended. + bowel sounds. PEG tube  EXT: Pulses palpable. Nonedematous. Maloney  SKIN: Warm, dry.

## 2022-09-03 NOTE — PROGRESS NOTE ADULT - ASSESSMENT
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 During hospital stay, found to have multiple punctate infarcts suspected to be cardioembolic vs vasculitis. Patient on found to have sharp waves on vEEG currently on AED's.    #Stroke workup  - continue aspirin 81mg  daily  - Restarted plavix as LP performed 8/26  - Continue Atorvastatin 80 mg daily   - q4hr stroke neuro checks and vitals  - IVONNE was denied by cardiology as they found paroxysmal A. fib on tele  - s/p ILR by EP 8/22  - consulted IR for LP tap - was performed 8/28  - Vasculitic work up as per neuro: Diag. cerebral Angiogram to be done by [facility] once infection clear up; scheduled tentatively 9/6  - Defer to neuro for ongoing work up    #Fever   #Possible Pyelonephritis  #RLE ulcer   - persistently febrile   - Initially treated for RLE cellulitis. Wound looks not infected  - BP on lower side (patient has a history of hypertension  - S/p Cefepime + Vancomycin with persistent fevers  - Uculture 08/31: multiple organisms   - ID consult: d/c meropenem, start avycaz 0.94 1g q12 + aztreonam 2g q12, administered at same time  - f/u blood cx daily until clear    #Acute urinary retention   - Today retained 1200cc urine. Nolasco inserted   - Dark color and purulent looking; possibly from UTI   - Keep nolasco and monitor infection  - Renal ultrasound 9/2- negative for hydronephrosis    #ALF  - Worsening kidney function   - Today Cr: 3  - Possible ATN given BP low when at baseline patient has very elevated BP   - s/p IVF   - Urine studies pending     #Diarrhea  - Multiple episode of diarrhea   - Cdiff neg   - Will monitor     # High risk of seizures with epileptiform discharges  Multiple EEG did not capture any seizures  - Continue Keppra 1500 mg bid   - Stopped Phenytoin 8/23  - c/w vimpat 50mg BID    #Hypertensive urgency due to noncompliance and Malignant HTN - ON HOLD TX  - BP at admission 296/174 without signs of end organ damage. Renal artery duplex wnl>>ARIAN unlikely  - Aldosterone wnl, renin elevated  -close monitoring  -c/w chlorthalidone 25mg daily  - previously on Amlodipine 10 mg  - pharmacy can't do Nifedipine IR, and would prefer not to crush Nifedipine XL (off nifedipine)  - Was on losartan 100mg daily, labetalol  to 600mg q8, hydralazine 100mg q8 and doxazocin 2mg nightly  - s/p PEG tube   - Hold antihypertensive given SIRSs with low BP     #HLD  - high dose statin as above in CVA     #DM  - A1C results: 10.4  - ISS  - started insulin regimen   - c/w Lantus 22, lispro 5u  - TSH results: 0.86    #Nutrition/Fluids/Electrolytes   - replete K<4 and Mg <2  - Diet via PEG tube  - obtain MBS test- failed, NPO with PEG alternative means     DVT ppx: Lovenox SCDs  GI ppx: Protonix  Diet: DASH/carb consistent  Activity: AAT    Follow up: follow ID and nephro, Follow up blood cultures daily. Angiogram by [facility] for vasculitis workup scheduled 9/6. 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 During hospital stay, found to have multiple punctate infarcts suspected to be cardioembolic vs vasculitis. Patient on found to have sharp waves on vEEG currently on AED's.    #Stroke workup  - continue aspirin 81mg  daily  - Restarted plavix as LP performed 8/26  - Continue Atorvastatin 80 mg daily   - q4hr stroke neuro checks and vitals  - IVONNE was denied by cardiology as they found paroxysmal A. fib on tele  - s/p ILR by EP 8/22  - consulted IR for LP tap - was performed 8/28  - Vasculitic work up as per neuro: Diag. cerebral Angiogram to be done by [facility] once infection clear up; scheduled tentatively 9/6  - Defer to neuro for ongoing work up    #Fever   #Possible Pyelonephritis  #RLE ulcer   - persistently febrile   - Initially treated for RLE cellulitis. Wound looks not infected  - BP on lower side (patient has a history of hypertension  - S/p Cefepime + Vancomycin with persistent fevers  - Uculture 08/31: multiple organisms   - ID consult: d/c meropenem, start avycaz 0.94 1g q12 + aztreonam 2g q12, administered at same time  - WBC trending down, 8.08 9/3, 15 9/2, 24 9/1  - f/u blood cx daily until clear    #Acute urinary retention   - Today retained 1200cc urine. Nolasco inserted   - Dark color and purulent looking; possibly from UTI   - Keep nolasco and monitor infection  - Renal ultrasound 9/2- negative for hydronephrosis    #ALF  - Worsening kidney function   - creatinine 3.2 9/3 (3 on 9/2)  - Possible ATN given BP low when at baseline patient has very elevated BP   - c/w IVF  - Urine studies pending   - f/u creatinine daily    #Diarrhea  - Multiple episode of diarrhea   - Cdiff neg   - Will monitor     # High risk of seizures with epileptiform discharges  Multiple EEG did not capture any seizures  - Continue Keppra 1500 mg bid   - Stopped Phenytoin 8/23  - c/w vimpat 50mg BID    #Hypertensive urgency due to noncompliance and Malignant HTN - ON HOLD TX  - BP at admission 296/174 without signs of end organ damage. Renal artery duplex wnl>>ARIAN unlikely  - Aldosterone wnl, renin elevated  -close monitoring  -c/w chlorthalidone 25mg daily  - previously on Amlodipine 10 mg  - pharmacy can't do Nifedipine IR, and would prefer not to crush Nifedipine XL (off nifedipine)  - Was on losartan 100mg daily, labetalol  to 600mg q8, hydralazine 100mg q8 and doxazocin 2mg nightly  - s/p PEG tube   - Hold antihypertensive given SIRSs with low BP     #HLD  - high dose statin as above in CVA     #DM  - A1C results: 10.4  - ISS  - started insulin regimen   - c/w Lantus 22, lispro 5u  - TSH results: 0.86    #Nutrition/Fluids/Electrolytes   - replete K<4 and Mg <2  - Diet via PEG tube  - obtain MBS test- failed, NPO with PEG alternative means     DVT ppx: Lovenox SCDs  GI ppx: Protonix  Diet: DASH/carb consistent  Activity: AAT    Follow up: follow ID and nephro, Follow up blood cultures daily. Angiogram by [facility] for vasculitis workup scheduled 9/6. 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 During hospital stay, found to have multiple punctate infarcts suspected to be cardioembolic vs vasculitis. Patient on found to have sharp waves on vEEG currently on AED's.    #Stroke workup  - continue aspirin 81mg  daily  - Restarted plavix as LP performed 8/26  - Continue Atorvastatin 80 mg daily   - q4hr stroke neuro checks and vitals  - IVONNE was denied by cardiology as they found paroxysmal A. fib on tele  - s/p ILR by EP 8/22  - consulted IR for LP tap - was performed 8/28  - Vasculitic work up as per neuro: Diag. cerebral Angiogram to be done by [facility] once infection clear up; scheduled tentatively 9/6  - f/u encephalopathy panel, hyper coag panels, ESR, CRP, LUIS FELIPE  - Defer to neuro for ongoing work up    #Fever   #Possible Pyelonephritis  #RLE ulcer   - persistently febrile   - Initially treated for RLE cellulitis. Wound looks not infected  - BP on lower side (patient has a history of hypertension  - S/p Cefepime + Vancomycin with persistent fevers  - Uculture 08/31: multiple organisms   - ID consult: d/c meropenem, start avycaz 0.94 1g q12 + aztreonam 2g q12, administered at same time  - WBC trending down, 8.08 9/3, 15 9/2, 24 9/1  - f/u blood cx daily until clear    #Acute urinary retention   - Today retained 1200cc urine. Nolasco inserted   - Dark color and purulent looking; possibly from UTI   - Keep nolasco and monitor infection  - Renal ultrasound 9/2- negative for hydronephrosis    #ALF  - Worsening kidney function   - creatinine 3.2 9/3 (3 on 9/2)  - Possible ATN given BP low when at baseline patient has very elevated BP   - c/w IVF  - Urine studies pending   - f/u creatinine daily    #Diarrhea  - Multiple episode of diarrhea   - Cdiff neg   - Will monitor     # High risk of seizures with epileptiform discharges  Multiple EEG did not capture any seizures  - Continue Keppra 1500 mg bid   - Stopped Phenytoin 8/23  - c/w vimpat 50mg BID    #Hypertensive urgency due to noncompliance and Malignant HTN - ON HOLD TX  - BP at admission 296/174 without signs of end organ damage. Renal artery duplex wnl>>ARIAN unlikely  - Aldosterone wnl, renin elevated  -close monitoring  -c/w chlorthalidone 25mg daily  - previously on Amlodipine 10 mg  - pharmacy can't do Nifedipine IR, and would prefer not to crush Nifedipine XL (off nifedipine)  - Was on losartan 100mg daily, labetalol  to 600mg q8, hydralazine 100mg q8 and doxazocin 2mg nightly  - s/p PEG tube   - Hold antihypertensive given SIRSs with low BP     #HLD  - high dose statin as above in CVA     #DM  - A1C results: 10.4  - ISS  - started insulin regimen   - c/w Lantus 22, lispro 5u  - TSH results: 0.86    #Nutrition/Fluids/Electrolytes   - replete K<4 and Mg <2  - Diet via PEG tube  - obtain MBS test- failed, NPO with PEG alternative means     DVT ppx: Lovenox SCDs  GI ppx: Protonix  Diet: DASH/carb consistent  Activity: AAT    Follow up: follow ID and nephro, Follow up blood cultures daily. Angiogram by [facility] for vasculitis workup scheduled 9/6. 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 During hospital stay, found to have multiple punctate infarcts suspected to be cardioembolic vs vasculitis. Patient on found to have sharp waves on vEEG currently on AED's.    #Stroke workup  - continue aspirin 81mg  daily  - Restarted plavix as LP performed 8/26  - Continue Atorvastatin 80 mg daily   - q4hr stroke neuro checks and vitals  - IVONNE was denied by cardiology as they found paroxysmal A. fib on tele  - s/p ILR by EP 8/22  - consulted IR for LP tap - was performed 8/28  - Vasculitic work up as per neuro: Diag. cerebral Angiogram to be done by [facility] once infection clear up; scheduled tentatively 9/6  - f/u encephalopathy panel, hyper coag panels, ESR, CRP, LUIS FELIPE  - Defer to neuro for ongoing work up    #Fever   #Possible Pyelonephritis  #RLE ulcer   - persistently febrile   - Initially treated for RLE cellulitis. Wound looks not infected  - BP on lower side (patient has a history of hypertension  - S/p Cefepime + Vancomycin with persistent fevers  - Uculture 08/31: multiple organisms   - ID consult: d/c meropenem, start avycaz 0.94 1g q12 + aztreonam 2g q12, administered at same time  - WBC trending down, 8.08 9/3, 15 9/2, 24 9/1  - roughly [id number] ulcer, dark/necrotic appearance on inside of upper lip  - f/u blood cx daily until clear    #Acute urinary retention   - Today retained 1200cc urine. Nolasco inserted   - Dark color and purulent looking; possibly from UTI   - Keep nolasco and monitor infection  - Renal ultrasound 9/2- negative for hydronephrosis    #ALF  - Worsening kidney function   - creatinine 3.2 9/3 (3 on 9/2)  - Possible ATN given BP low when at baseline patient has very elevated BP   - c/w IVF  - Urine studies pending   - f/u creatinine daily    #Diarrhea  - Multiple episode of diarrhea   - Cdiff neg   - Will monitor     # High risk of seizures with epileptiform discharges  Multiple EEG did not capture any seizures  - Continue Keppra 1500 mg bid   - Stopped Phenytoin 8/23  - c/w vimpat 50mg BID    #Hypertensive urgency due to noncompliance and Malignant HTN - ON HOLD TX  - BP at admission 296/174 without signs of end organ damage. Renal artery duplex wnl>>ARIAN unlikely  - Aldosterone wnl, renin elevated  -close monitoring  -c/w chlorthalidone 25mg daily  - previously on Amlodipine 10 mg  - pharmacy can't do Nifedipine IR, and would prefer not to crush Nifedipine XL (off nifedipine)  - Was on losartan 100mg daily, labetalol  to 600mg q8, hydralazine 100mg q8 and doxazocin 2mg nightly  - s/p PEG tube   - Hold antihypertensive given SIRSs with low BP     #HLD  - high dose statin as above in CVA     #DM  - A1C results: 10.4  - ISS  - started insulin regimen   - c/w Lantus 22, lispro 5u  - TSH results: 0.86    #Nutrition/Fluids/Electrolytes   - replete K<4 and Mg <2  - Diet via PEG tube  - obtain MBS test- failed, NPO with PEG alternative means     DVT ppx: Lovenox SCDs  GI ppx: Protonix  Diet: DASH/carb consistent  Activity: AAT    Follow up: follow ID and nephro, Follow up blood cultures daily. Angiogram by [facility] for vasculitis workup scheduled 9/6.

## 2022-09-03 NOTE — PROGRESS NOTE ADULT - SUBJECTIVE AND OBJECTIVE BOX
JOSE MITCHELL  56y, Female  Allergy: No Known Allergies      LOS  32d    CHIEF COMPLAINT: cellulitis, htn (02 Sep 2022 15:58)      INTERVAL EVENTS/HPI  - No acute events overnight  - T(F): , Max: 102.5 (22 @ 22:01)  - remains febrile, WBC improving   - WBC Count: 8.06 (22 @ 07:54)  WBC Count: 14.88 (22 @ 06:55)     - Creatinine, Serum: 3.2 (22 @ 07:54)  Creatinine, Serum: 3.0 (22 @ 06:55)       ROS  unable to obtain history secondary to patient's mental status and/or sedation    VITALS:  T(F): 100.2, Max: 102.5 (22 @ 22:01)  HR: 80  BP: 145/72  RR: 20Vital Signs Last 24 Hrs  T(C): 37.9 (03 Sep 2022 14:35), Max: 39.2 (02 Sep 2022 22:01)  T(F): 100.2 (03 Sep 2022 14:35), Max: 102.5 (02 Sep 2022 22:01)  HR: 80 (03 Sep 2022 14:35) (80 - 92)  BP: 145/72 (03 Sep 2022 14:35) (127/64 - 177/79)  BP(mean): 102 (03 Sep 2022 14:35) (102 - 109)  RR: 20 (03 Sep 2022 14:35) (18 - 24)  SpO2: 99% (03 Sep 2022 14:35) (97% - 99%)    Parameters below as of 03 Sep 2022 14:35  Patient On (Oxygen Delivery Method): room air        PHYSICAL EXAM:  Gen: NAD, resting in bed  HEENT: Normocephalic, atraumatic  Neck: supple, no lymphadenopathy  CV: Regular rate & regular rhythm  Lungs: decreased BS at bases, no fremitus  Abdomen: Soft, BS present  Ext: Warm, well perfused  Neuro: non focal, awake  Skin: no rash, no erythema  Lines: no phlebitis    FH: Non-contributory  Social Hx: Non-contributory    TESTS & MEASUREMENTS:                        7.4    8.06  )-----------( 169      ( 03 Sep 2022 07:54 )             21.3         139  |  102  |  82<HH>  ----------------------------<  181<H>  4.0   |  22  |  3.2<H>    Ca    8.3<L>      03 Sep 2022 07:54  Phos  3.8       Mg     2.1         TPro  5.4<L>  /  Alb  2.7<L>  /  TBili  0.6  /  DBili  x   /  AST  50<H>  /  ALT  34  /  AlkPhos  336<H>        LIVER FUNCTIONS - ( 03 Sep 2022 07:54 )  Alb: 2.7 g/dL / Pro: 5.4 g/dL / ALK PHOS: 336 U/L / ALT: 34 U/L / AST: 50 U/L / GGT: x           Urinalysis Basic - ( 02 Sep 2022 12:57 )    Color: Yellow / Appearance: Slightly Turbid / S.013 / pH: x  Gluc: x / Ketone: Negative  / Bili: Negative / Urobili: 6 mg/dL   Blood: x / Protein: 100 mg/dL / Nitrite: Negative   Leuk Esterase: Large / RBC: 30 /HPF / WBC 57 /HPF   Sq Epi: x / Non Sq Epi: 8 /HPF / Bacteria: Negative        Culture - Blood (collected 22 @ 12:29)  Source: .Blood None  Gram Stain (22 @ 10:11):    Growth in aerobic bottle: Gram Negative Rods  Preliminary Report (22 @ 14:11):    Growth in aerobic bottle: Enterobacter cloacae complex    ***Blood Panel PCR results on this specimen are available    approximately 3 hours after the Gram stain result.***    Gram stain, PCR, and/or culture results may not always    correspond due to difference in methodologies.    ************************************************************    This PCR assay was performed by multiplex PCR. This    Assay tests for 66 bacterial and resistance gene targets.    Please refer to the [facility] test directory    at [email or web address] for details.  Organism: Blood Culture PCR (22 @ 12:08)  Organism: Blood Culture PCR (22 @ 12:08)      -  Carbapenem Resistance: Detec      -  Enterobacter cloacae complex: Detec      -  NDM Resistance Marker: Detec      Method Type: PCR    Culture - Urine (collected 22 @ 10:10)  Source: Catheterized Catheterized  Final Report (22 @ 08:06):    >=3 organisms. Probable collection contamination.    Culture - Urine (collected 22 @ 00:25)  Source: Catheterized Catheterized  Final Report (22 @ 09:12):    >=3 organisms. Probable collection contamination.    Culture - Blood (collected 22 @ 18:03)  Source: .Blood Blood  Preliminary Report (22 @ 02:01):    No growth to date.    Culture - Fungal, CSF (collected 22 @ 11:36)  Source: .CSF CSF  Preliminary Report (22 @ 15:02):    No growth    Culture - CSF with Gram Stain (collected 22 @ 11:36)  Source: .CSF CSF  Gram Stain (22 @ 22:25):    polymorphonuclear leukocytes seen    No organisms seen    by cytocentrifuge  Final Report (22 @ 14:52):    No growth    Culture - Acid Fast - CSF (collected 22 @ 11:36)  Source: .CSF CSF  Preliminary Report (22 @ 15:04):    No growth at 1 week.    Culture - Blood (collected 22 @ 12:25)  Source: .Blood Blood-Peripheral  Final Report (22 @ 22:01):    No Growth Final    Culture - Blood (collected 22 @ 06:04)  Source: .Blood None  Final Report (22 @ 18:00):    No Growth Final    Culture - Blood (collected 08-15-22 @ 16:59)  Source: .Blood None  Final Report (22 @ 02:00):    No Growth Final    Culture - Blood (collected 22 @ 22:44)  Source: .Blood Blood-Peripheral  Gram Stain (08-15-22 @ 08:35):    Growth in anaerobic bottle: Gram Positive Cocci in Clusters  Final Report (22 @ 14:28):    Growth in anaerobic bottle: Staphylococcus epidermidis Coag Negative    Staphylococcus    Single set isolate, possible contaminant. Contact    Microbiology if susceptibility testing clinically    indicated.    ***Blood Panel PCR results on this specimen are available    approximately 3 hours after the Gram stain result.***    Gram stain, PCR, and/or culture results may not always    correspond due to difference in methodologies.    ************************************************************    This PCR assay was performed by multiplex PCR. This    Assay tests for 66 bacterial and resistance gene targets.    Please refer to the [facility] test directory    at [email or web address] for details.  Organism: Blood Culture PCR (22 @ 14:28)  Organism: Blood Culture PCR (22 @ 14:28)      -  Staphylococcus epidermidis, Methicillin resistant: Detec      Method Type: PCR    Culture - Other (collected 08-10-22 @ 12:40)  Source: .Other right leg wound  Final Report (22 @ 18:21):    Numerous Candida parapsilosis "Susceptibilities not performed"    Culture - Blood (collected 22 @ 02:09)  Source: .Blood None  Final Report (22 @ 10:01):    No Growth Final    Culture - Urine (collected 22 @ 01:00)  Source: Catheterized Catheterized  Final Report (08-10-22 @ 10:50):    No growth    Culture - Blood (collected 22 @ 18:36)  Source: .Blood None  Final Report (22 @ 07:00):    No Growth Final    Culture - Blood (collected 22 @ 18:36)  Source: .Blood None  Final Report (22 @ 07:00):    No Growth Final        Lactate, Blood: 2.2 mmol/L (22 @ 18:03)      INFECTIOUS DISEASES TESTING  COVID-19 PCR: NotDetec (22 @ 11:58)  Procalcitonin, Serum: 2.46 (22 @ 07:24)  COVID-19 PCR: NotDetec (22 @ 09:40)  COVID-19 PCR: NotDetec (22 @ 02:34)  COVID-19 PCR: NotDetec (22 @ 17:36)  Procalcitonin, Serum: 0.11 (22 @ 06:04)  COVID-19 PCR: NotDetec (08-15-22 @ 15:41)  COVID-19 PCR: NotDetec (22 @ 13:22)  COVID-19 PCR: NotDetec (22 @ 05:14)  MRSA PCR Result.: Negative (22 @ 17:40)  COVID-19 PCR: NotDetec (22 @ 01:40)      INFLAMMATORY MARKERS  C-Reactive Protein, Serum: 289.1 mg/L (22 @ 12:04)  Sedimentation Rate, Erythrocyte: >140 mm/Hr (22 @ 12:04)  Sedimentation Rate, Erythrocyte: 125 mm/Hr (22 @ 19:27)  C-Reactive Protein, Serum: 54.4 mg/L (22 @ 19:27)      RADIOLOGY & ADDITIONAL TESTS:  I have personally reviewed the last available Chest xray  CXR      CT      CARDIOLOGY TESTING  12 Lead ECG:   Ventricular Rate 63 BPM    Atrial Rate 63 BPM    P-R Interval 162 ms    QRS Duration 76 ms    Q-T Interval 422 ms    QTC Calculation(Bazett) 431 ms    P Axis 51 degrees    R Axis 36 degrees    T Axis 112 degrees    Diagnosis Line Normal sinus rhythm  Anteroseptal infarct , age undetermined  Abnormal ECG    Confirmed by Robert Bauer ([id number]) on 2022 11:37:29 AM (22 @ 20:22)      MEDICATIONS  aspirin  chewable 81 Oral daily  atorvastatin 80 Oral at bedtime  aztreonam  IVPB     aztreonam  IVPB 2000 IV Intermittent every 12 hours  ceftazidime/avibactam IVPB 0.94 IV Intermittent every 12 hours  cloNIDine Patch 0.1 mG/24Hr(s) 1 Transdermal every 7 days  clopidogrel Tablet 75 Oral daily  collagenase Ointment 1 Topical two times a day  Dakins Solution - 1/2 Strength 1 Topical two times a day  dextrose 5%. 1000 IV Continuous <Continuous>  dextrose 5%. 1000 IV Continuous <Continuous>  dextrose 50% Injectable 25 IV Push once  dextrose 50% Injectable 12.5 IV Push once  dextrose 50% Injectable 25 IV Push once  glucagon  Injectable 1 IntraMuscular once  heparin   Injectable 5000 SubCutaneous every 8 hours  hydrALAZINE 100 Oral every 8 hours  insulin glargine Injectable (LANTUS) 22 SubCutaneous every morning  insulin lispro (ADMELOG) corrective regimen sliding scale  SubCutaneous three times a day before meals  insulin lispro Injectable (ADMELOG) 5 SubCutaneous three times a day before meals  labetalol 600 Oral three times a day  lacosamide IVPB 50 IV Intermittent every 12 hours  levETIRAcetam  Solution 500 Oral two times a day  lidocaine 1%/epinephrine 1:100,000 Inj 20 Local Injection once  multivitamin/minerals/iron Oral Solution (CENTRUM) 15 Oral daily  pantoprazole    Tablet 40 Oral before breakfast  sodium bicarbonate 650 Oral every 8 hours  sodium chloride 0.65% Nasal 2 Both Nostrils two times a day  sodium chloride 0.9%. 1000 IV Continuous <Continuous>      WEIGHT  Weight (kg): 82.3 (22 @ 12:59)  Creatinine, Serum: 3.2 mg/dL (22 @ 07:54)      ANTIBIOTICS:  aztreonam  IVPB      aztreonam  IVPB 2000 milliGRAM(s) IV Intermittent every 12 hours  ceftazidime/avibactam IVPB 0.94 Gram(s) IV Intermittent every 12 hours      All available historical records have been reviewed

## 2022-09-03 NOTE — PROGRESS NOTE ADULT - ASSESSMENT
Ms. 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 She underwent Abx therapy and debridement. On 08/05 she was observed w/ acute mental status changes. Stroke code was alerted for NIHSS of 23. CTH observed mild atrophic changes as well as hypodensity of the periventricular white matter, right thalamus, basal ganglia and insula cortex.  She was suspected of seizure and started on antiseizure medications. During hospital stay, found to have multiple punctate infarcts suspected to be cardioembolic vs vasculitis. Patient on found to have sharp waves on vEEG currently on AED's.    Pt is currently nonverbal, visual tracking but does not follows command. Sensation is withdrawal to painful stimuli. independent of limb movement however not on command.       Right thalamic lacunar infarct  Multiple punctuate cortical infarcts  Moderate right and mild left atherosclerotic stenosis in supraclinoid ICA  Focal Mild atherosclerotic stenosis in the V3 segment of the right vertebral artery  Vasculitis w/u todate is neg  PEG feed dependent  LP >> CSF with mildly elevated cell count and protein, normal glucose. Elevated ESR/CRP  Cystitis / Urinary Retention  mild transaminitis --- ??DBILI 2/2 Cephalosporin use -- pending RUQ US r/o  Bacteremia >> pos BCx - Enterobacter cloaecae bacteremia  Inner upper lip left sided ulcer    Protein Electrophoresis, Serum (08.30.22 @ 23:46)    Protein Total, Serum: 7.3 g/dL; Total Protein, Serum: 7.3 g/dL; Albumin, Serum: 3.1 g/dL;   Alpha 1: 0.6 g/dL; Alpha 2: 1.2 g/dL; Beta Globulin: 1.2 g/dL; Gamma Globulin: 1.2 g/dL; % Albumin: 43.1 %; % Alpha 1: 8.1 %; % Alpha 2: 15.8 %;  % Beta: 16.6 %; % Gamma: 16.4 %; Albumin/Globulin Ratio: 0.7 Ratio; Serum Protein Electrophoresis Interp: Pattern Consistent With Acute Inflammation Or Stress    Recommendation  - Infectious care >>>pending Bcx / UCx --- ID onboard >>> Cont Meropenem as per ID  - continued care as per Medicine team  - Neurovascular will follow as consult   - cont RLE wound care as per BURN team  - MRI brain w/wo requested - can do w/o contrast for concerns of kidney function  - Possible DCA to assess for vasculitis by NeuroEndovascular after infectious w/u  - May start IV Solumedrol and IVIG, pending clinical course.   - Encephalopathy, Hyper coag vasculitis and autoimmune panel are requested.

## 2022-09-03 NOTE — PROGRESS NOTE ADULT - SUBJECTIVE AND OBJECTIVE BOX
seen and examined  no distress   24 h events noted         PAST HISTORY  --------------------------------------------------------------------------------  No significant changes to PMH, PSH, FHx, SHx, unless otherwise noted    ALLERGIES & MEDICATIONS  --------------------------------------------------------------------------------  Allergies    No Known Allergies    Intolerances      Standing Inpatient Medications  aspirin  chewable 81 milliGRAM(s) Oral daily  atorvastatin 80 milliGRAM(s) Oral at bedtime  aztreonam  IVPB      aztreonam  IVPB 2000 milliGRAM(s) IV Intermittent every 12 hours  ceftazidime/avibactam IVPB 0.94 Gram(s) IV Intermittent every 12 hours  cloNIDine Patch 0.1 mG/24Hr(s) 1 patch Transdermal every 7 days  clopidogrel Tablet 75 milliGRAM(s) Oral daily  collagenase Ointment 1 Application(s) Topical two times a day  Dakins Solution - 1/2 Strength 1 Application(s) Topical two times a day  dextrose 5%. 1000 milliLiter(s) IV Continuous <Continuous>  dextrose 5%. 1000 milliLiter(s) IV Continuous <Continuous>  dextrose 50% Injectable 25 Gram(s) IV Push once  dextrose 50% Injectable 12.5 Gram(s) IV Push once  dextrose 50% Injectable 25 Gram(s) IV Push once  glucagon  Injectable 1 milliGRAM(s) IntraMuscular once  heparin   Injectable 5000 Unit(s) SubCutaneous every 8 hours  hydrALAZINE 100 milliGRAM(s) Oral every 8 hours  insulin glargine Injectable (LANTUS) 22 Unit(s) SubCutaneous every morning  insulin lispro (ADMELOG) corrective regimen sliding scale   SubCutaneous three times a day before meals  insulin lispro Injectable (ADMELOG) 5 Unit(s) SubCutaneous three times a day before meals  labetalol 600 milliGRAM(s) Oral three times a day  lacosamide IVPB 50 milliGRAM(s) IV Intermittent every 12 hours  levETIRAcetam  Solution 500 milliGRAM(s) Oral two times a day  lidocaine 1%/epinephrine 1:100,000 Inj 20 milliLiter(s) Local Injection once  multivitamin/minerals/iron Oral Solution (CENTRUM) 15 milliLiter(s) Oral daily  pantoprazole    Tablet 40 milliGRAM(s) Oral before breakfast  sodium bicarbonate 650 milliGRAM(s) Oral every 6 hours  sodium chloride 0.65% Nasal 2 Spray(s) Both Nostrils two times a day  sodium chloride 0.9%. 1000 milliLiter(s) IV Continuous <Continuous>    PRN Inpatient Medications  acetaminophen     Tablet .. 650 milliGRAM(s) Oral every 6 hours PRN  dextrose Oral Gel 15 Gram(s) Oral once PRN  labetalol Injectable 10 milliGRAM(s) IV Push every 6 hours PRN  LORazepam     Tablet 1 milliGRAM(s) Oral once PRN  melatonin 3 milliGRAM(s) Oral at bedtime PRN          VITALS/PHYSICAL EXAM  --------------------------------------------------------------------------------  T(C): 38.4 (09-03-22 @ 07:45), Max: 39.2 (09-02-22 @ 22:01)  HR: 80 (09-03-22 @ 07:45) (80 - 92)  BP: 127/64 (09-03-22 @ 07:45) (104/57 - 177/79)  RR: 20 (09-03-22 @ 07:45) (17 - 24)  SpO2: 99% (09-03-22 @ 07:45) (99% - 100%)  Wt(kg): --        09-02-22 @ 07:01  -  09-03-22 @ 07:00  --------------------------------------------------------  IN: 0 mL / OUT: 1770 mL / NET: -1770 mL      Physical Exam:  	Gen: NAD  	Pulm: decrease BS  B/L  	CV: S1S2; no rub  	Abd: +PEG   	    LABS/STUDIES  --------------------------------------------------------------------------------              7.6    14.88 >-----------<  175      [09-02-22 @ 06:55]              22.1     139  |  100  |  81  ----------------------------<  150      [09-02-22 @ 06:55]  3.8   |  23  |  3.0        Ca     8.2     [09-02-22 @ 06:55]      Mg     2.1     [09-02-22 @ 06:55]      Phos  4.2     [09-02-22 @ 06:55]    TPro  5.4  /  Alb  2.7  /  TBili  0.8  /  DBili  x   /  AST  43  /  ALT  26  /  AlkPhos  246  [09-02-22 @ 06:55]    Creatinine Trend:  SCr 3.0 [09-02 @ 06:55]  SCr 2.8 [09-01 @ 06:38]  SCr 1.9 [08-31 @ 07:24]  SCr 1.6 [08-30 @ 18:03]  SCr 1.0 [08-29 @ 06:29]    Urinalysis - [09-02-22 @ 12:57]      Color Yellow / Appearance Slightly Turbid / SG 1.013 / pH 7.5      Gluc Negative / Ketone Negative  / Bili Negative / Urobili 6 mg/dL       Blood Moderate / Protein 100 mg/dL / Leuk Est Large / Nitrite Negative      RBC 30 / WBC 57 / Hyaline 2 / Gran  / Sq Epi  / Non Sq Epi 8 / Bacteria Negative    Urine Creatinine 35      [09-02-22 @ 13:30]  Urine Protein 82      [09-02-22 @ 13:30]  Urine Sodium 33.0      [09-02-22 @ 13:30]  Urine Urea Nitrogen 412      [09-02-22 @ 13:30]  Urine Potassium 47      [09-02-22 @ 13:30]  Urine Osmolality 288      [09-02-22 @ 12:57]    Ferritin 243      [08-28-22 @ 05:49]  TSH 0.86      [08-08-22 @ 17:53]  Lipid: chol 234, , HDL 42, LDL --      [08-03-22 @ 08:56]      LUIS FELIPE: titer Negative, pattern --      [08-19-22 @ 11:37]  dsDNA <12      [08-30-22 @ 19:27]  Rheumatoid Factor <10      [08-19-22 @ 11:37]  ANCA: cANCA Negative, pANCA Negative, atypical ANCA Negative      [08-30-22 @ 19:27]  Syphilis Screen (Treponema Pallidum Ab) Negative      [08-08-22 @ 17:53]

## 2022-09-03 NOTE — PROGRESS NOTE ADULT - ASSESSMENT
ALF rule out ATN / relative hypotension/ sepsis / E cloaca bacteremia / HTN ( was on multiple meds )/ CVA  cr trending up / check repeat today   high risk for fredy in angio is needed in view of ALF   sono no hydro  non oliguric   continue iv fluids   change sodium bicarbonate to q8  decrease labetatol if BP remains on the low side   check IP and PTH   no need for RRT  will follow

## 2022-09-04 NOTE — PROGRESS NOTE ADULT - SUBJECTIVE AND OBJECTIVE BOX
MITCHELLCLAIREJOSE  56y Female    INTERVAL HPI/OVERNIGHT EVENTS:    fever curve improving  no overnight events  unable to obtain ROS due to mental status    T(F): 100.3 (09-04-22 @ 05:08), Max: 100.9 (09-03-22 @ 13:58)  HR: 79 (09-04-22 @ 05:08) (73 - 83)  BP: 161/84 (09-04-22 @ 05:08) (145/72 - 173/87)  RR: 18 (09-04-22 @ 05:08) (18 - 20)  SpO2: 96% (09-03-22 @ 20:51) (96% - 99%) on RA      I&O's Summary    03 Sep 2022 07:01  -  04 Sep 2022 07:00  --------------------------------------------------------  IN: 2005 mL / OUT: 1775 mL / NET: 230 mL      CAPILLARY BLOOD GLUCOSE      POCT Blood Glucose.: 143 mg/dL (04 Sep 2022 11:31)  POCT Blood Glucose.: 134 mg/dL (04 Sep 2022 07:20)  POCT Blood Glucose.: 116 mg/dL (04 Sep 2022 06:30)  POCT Blood Glucose.: 193 mg/dL (03 Sep 2022 21:01)  POCT Blood Glucose.: 174 mg/dL (03 Sep 2022 16:33)        PHYSICAL EXAM:  GENERAL: NAD  HEAD:  Normocephalic  EYES:  conjunctiva and sclera clear  ENMT: Moist mucous membranes, stable left upper lip lesion, no necrosis noted  NERVOUS SYSTEM:  lethargic, opens eyes at times  CHEST/LUNG: decreased BS b/l  HEART: Regular rate and rhythm  ABDOMEN: Soft, Nontender, Nondistended; Bowel sounds present, PEG  EXTREMITIES:   No edema  SKIN: warm, dry  right LE with ulcer with mild bleeding (covered with dressing)  : nolasco    Consultant(s) Notes Reviewed:  [x ] YES  [ ] NO  Care Discussed with Consultants/Other Providers [ x] YES  [ ] NO    MEDICATIONS  (STANDING):  aspirin  chewable 81 milliGRAM(s) Oral daily  atorvastatin 80 milliGRAM(s) Oral at bedtime  aztreonam  IVPB 2000 milliGRAM(s) IV Intermittent every 12 hours  aztreonam  IVPB      ceftazidime/avibactam IVPB 0.94 Gram(s) IV Intermittent every 12 hours  cloNIDine Patch 0.1 mG/24Hr(s) 1 patch Transdermal every 7 days  clopidogrel Tablet 75 milliGRAM(s) Oral daily  collagenase Ointment 1 Application(s) Topical two times a day  Dakins Solution - 1/2 Strength 1 Application(s) Topical two times a day  dextrose 5%. 1000 milliLiter(s) (50 mL/Hr) IV Continuous <Continuous>  dextrose 5%. 1000 milliLiter(s) (100 mL/Hr) IV Continuous <Continuous>  dextrose 50% Injectable 25 Gram(s) IV Push once  dextrose 50% Injectable 12.5 Gram(s) IV Push once  dextrose 50% Injectable 25 Gram(s) IV Push once  glucagon  Injectable 1 milliGRAM(s) IntraMuscular once  heparin   Injectable 5000 Unit(s) SubCutaneous every 8 hours  hydrALAZINE 100 milliGRAM(s) Oral every 8 hours  insulin glargine Injectable (LANTUS) 22 Unit(s) SubCutaneous every morning  insulin lispro (ADMELOG) corrective regimen sliding scale   SubCutaneous three times a day before meals  insulin lispro Injectable (ADMELOG) 5 Unit(s) SubCutaneous three times a day before meals  labetalol 600 milliGRAM(s) Oral three times a day  lacosamide IVPB 50 milliGRAM(s) IV Intermittent every 12 hours  levETIRAcetam  Solution 500 milliGRAM(s) Oral two times a day  lidocaine 1%/epinephrine 1:100,000 Inj 20 milliLiter(s) Local Injection once  multivitamin/minerals/iron Oral Solution (CENTRUM) 15 milliLiter(s) Oral daily  pantoprazole    Tablet 40 milliGRAM(s) Oral before breakfast  sodium bicarbonate 650 milliGRAM(s) Oral every 8 hours  sodium chloride 0.65% Nasal 2 Spray(s) Both Nostrils two times a day  sodium chloride 0.9%. 1000 milliLiter(s) (100 mL/Hr) IV Continuous <Continuous>    MEDICATIONS  (PRN):  acetaminophen     Tablet .. 650 milliGRAM(s) Oral every 6 hours PRN Temp greater or equal to 38C (100.4F), Mild Pain (1 - 3)  dextrose Oral Gel 15 Gram(s) Oral once PRN Blood Glucose LESS THAN 70 milliGRAM(s)/deciliter  labetalol Injectable 10 milliGRAM(s) IV Push every 6 hours PRN Systolic blood pressure >  LORazepam     Tablet 1 milliGRAM(s) Oral once PRN Agitation  melatonin 3 milliGRAM(s) Oral at bedtime PRN Insomnia      LABS:                        7.4    8.06  )-----------( 169      ( 03 Sep 2022 07:54 )             21.3     09-03    139  |  102  |  82<HH>  ----------------------------<  181<H>  4.0   |  22  |  3.2<H>    Ca    8.3<L>      03 Sep 2022 07:54  Phos  3.8     09-03  Mg     2.1     09-03    TPro  5.4<L>  /  Alb  2.7<L>  /  TBili  0.6  /  DBili  x   /  AST  50<H>  /  ALT  34  /  AlkPhos  336<H>  09-03        Culture - Blood (collected 01 Sep 2022 12:29)  Source: .Blood None  Gram Stain (02 Sep 2022 10:11):    Growth in aerobic bottle: Gram Negative Rods  Final Report (04 Sep 2022 08:50):    Growth in aerobic bottle: Enterobacter cloacae (Carbapenem Resistant)    ***Blood Panel PCR results on this specimen are available    approximately 3 hours after the Gram stain result.***    Gram stain, PCR, and/or culture results may not always    correspond due to difference in methodologies.    ************************************************************    This PCR assay was performed by multiplex PCR. This    Assay tests for 66 bacterial and resistance gene targets.    Please refer to the [facility] test directory    at [email or web address] for details.  Organism: Blood Culture PCR  Enterobacter cloacae (Carbapenem Resistant) (04 Sep 2022 08:50)  Organism: Enterobacter cloacae (Carbapenem Resistant) (04 Sep 2022 08:50)  Organism: Blood Culture PCR (04 Sep 2022 08:50)            Case discussed with RN today    Care discussed with pt's family

## 2022-09-04 NOTE — PROGRESS NOTE ADULT - SUBJECTIVE AND OBJECTIVE BOX
JOSE MITCHELL  56y, Female  Allergy: No Known Allergies      LOS  33d    CHIEF COMPLAINT: RLE Cellulitis (03 Sep 2022 17:12)      INTERVAL EVENTS/HPI  - No acute events overnight  - T(F): , Max: 100.9 (22 @ 13:58)  - fever curve potentially improving   - WBC Count: 8.06 (22 @ 07:54)  WBC Count: 14.88 (22 @ 06:55)     - Creatinine, Serum: 3.2 (22 @ 07:54)       ROS  unable to obtain history secondary to patient's mental status and/or sedation    VITALS:  T(F): 99.3, Max: 100.9 (22 @ 13:58)  HR: 82  BP: 160/78  RR: 20Vital Signs Last 24 Hrs  T(C): 37.4 (04 Sep 2022 12:19), Max: 38.3 (03 Sep 2022 13:58)  T(F): 99.3 (04 Sep 2022 12:19), Max: 100.9 (03 Sep 2022 13:58)  HR: 82 (04 Sep 2022 12:19) (73 - 83)  BP: 160/78 (04 Sep 2022 12:19) (145/72 - 173/87)  BP(mean): 102 (03 Sep 2022 14:35) (102 - 103)  RR: 20 (04 Sep 2022 12:19) (18 - 20)  SpO2: 96% (03 Sep 2022 20:51) (96% - 99%)    Parameters below as of 03 Sep 2022 17:30  Patient On (Oxygen Delivery Method): room air        PHYSICAL EXAM:  Gen: NAD  HEENT: Normocephalic, atraumatic  Neck: supple, no lymphadenopathy  CV: Regular rate & regular rhythm  Lungs: decreased BS at bases, no fremitus  Abdomen: Soft, BS present  Ext: Warm, well perfused  Neuro: non focal, awake  Skin: no rash, no erythema  Lines: no phlebitis    FH: Non-contributory  Social Hx: Non-contributory    TESTS & MEASUREMENTS:                        7.4    8.06  )-----------( 169      ( 03 Sep 2022 07:54 )             21.3         139  |  102  |  82<HH>  ----------------------------<  181<H>  4.0   |  22  |  3.2<H>    Ca    8.3<L>      03 Sep 2022 07:54  Phos  3.8       Mg     2.1         TPro  5.4<L>  /  Alb  2.7<L>  /  TBili  0.6  /  DBili  x   /  AST  50<H>  /  ALT  34  /  AlkPhos  336<H>        LIVER FUNCTIONS - ( 03 Sep 2022 07:54 )  Alb: 2.7 g/dL / Pro: 5.4 g/dL / ALK PHOS: 336 U/L / ALT: 34 U/L / AST: 50 U/L / GGT: x           Urinalysis Basic - ( 02 Sep 2022 12:57 )    Color: Yellow / Appearance: Slightly Turbid / S.013 / pH: x  Gluc: x / Ketone: Negative  / Bili: Negative / Urobili: 6 mg/dL   Blood: x / Protein: 100 mg/dL / Nitrite: Negative   Leuk Esterase: Large / RBC: 30 /HPF / WBC 57 /HPF   Sq Epi: x / Non Sq Epi: 8 /HPF / Bacteria: Negative        Culture - Blood (collected 22 @ 12:29)  Source: .Blood None  Gram Stain (22 @ 10:11):    Growth in aerobic bottle: Gram Negative Rods  Final Report (22 @ 08:50):    Growth in aerobic bottle: Enterobacter cloacae (Carbapenem Resistant)    ***Blood Panel PCR results on this specimen are available    approximately 3 hours after the Gram stain result.***    Gram stain, PCR, and/or culture results may not always    correspond due to difference in methodologies.    ************************************************************    This PCR assay was performed by multiplex PCR. This    Assay tests for 66 bacterial and resistance gene targets.    Please refer to the [facility] test directory    at [email or web address] for details.  Organism: Blood Culture PCR  Enterobacter cloacae (Carbapenem Resistant) (22 @ 08:50)  Organism: Enterobacter cloacae (Carbapenem Resistant) (22 @ 08:50)      -  Amikacin: S <=16      -  Ampicillin: R >16 These ampicillin results predict results for amoxicillin      -  Ampicillin/Sulbactam: R >16/8 Enterobacter, Klebsiella aerogenes, Citrobacter, and Serratia may develop resistance during prolonged therapy (3-4 days)      -  Aztreonam: S <=4      -  Cefazolin: R >16 Enterobacter, Klebsiella aerogenes, Citrobacter, and Serratia may develop resistance during prolonged therapy (3-4 days)      -  Cefepime: R >16      -  Cefoxitin: R >16      -  Ceftazidime/Avibactam: R >16      -  Ceftolozane/tazobactam: R >8      -  Ceftriaxone: R >32 Enterobacter, Klebsiella aerogenes, Citrobacter, and Serratia may develop resistance during prolonged therapy      -  Ciprofloxacin: S <=0.25      -  Ertapenem: R >1      -  Gentamicin: S <=2      -  Imipenem: R >8      -  Levofloxacin: S <=0.5      -  Meropenem: R >8      -  Piperacillin/Tazobactam: R >64      -  Tigecycline: S <=2      -  Tobramycin: S <=2      -  Trimethoprim/Sulfamethoxazole: S <=0.5/9.5      Method Type: AALIYAH  Organism: Blood Culture PCR (22 @ 08:50)      -  Carbapenem Resistance: Detec      -  Enterobacter cloacae complex: Detec      -  NDM Resistance Marker: Detec      Method Type: PCR    Culture - Urine (collected 22 @ 10:10)  Source: Catheterized Catheterized  Final Report (22 @ 08:06):    >=3 organisms. Probable collection contamination.    Culture - Urine (collected 22 @ 00:25)  Source: Catheterized Catheterized  Final Report (22 @ 09:12):    >=3 organisms. Probable collection contamination.    Culture - Blood (collected 22 @ 18:03)  Source: .Blood Blood  Preliminary Report (22 @ 02:01):    No growth to date.    Culture - Fungal, CSF (collected 22 @ 11:36)  Source: .CSF CSF  Preliminary Report (22 @ 15:02):    No growth    Culture - CSF with Gram Stain (collected 22 @ 11:36)  Source: .CSF CSF  Gram Stain (22 @ 22:25):    polymorphonuclear leukocytes seen    No organisms seen    by cytocentrifuge  Final Report (22 @ 14:52):    No growth    Culture - Acid Fast - CSF (collected 22 @ 11:36)  Source: .CSF CSF  Preliminary Report (22 @ 15:04):    No growth at 1 week.    Culture - Blood (collected 22 @ 12:25)  Source: .Blood Blood-Peripheral  Final Report (22 @ 22:01):    No Growth Final    Culture - Blood (collected 22 @ 06:04)  Source: .Blood None  Final Report (22 @ 18:00):    No Growth Final    Culture - Blood (collected 08-15-22 @ 16:59)  Source: .Blood None  Final Report (22 @ 02:00):    No Growth Final    Culture - Blood (collected 22 @ 22:44)  Source: .Blood Blood-Peripheral  Gram Stain (08-15-22 @ 08:35):    Growth in anaerobic bottle: Gram Positive Cocci in Clusters  Final Report (22 @ 14:28):    Growth in anaerobic bottle: Staphylococcus epidermidis Coag Negative    Staphylococcus    Single set isolate, possible contaminant. Contact    Microbiology if susceptibility testing clinically    indicated.    ***Blood Panel PCR results on this specimen are available    approximately 3 hours after the Gram stain result.***    Gram stain, PCR, and/or culture results may not always    correspond due to difference in methodologies.    ************************************************************    This PCR assay was performed by multiplex PCR. This    Assay tests for 66 bacterial and resistance gene targets.    Please refer to the [facility] test directory    at [email or web address] for details.  Organism: Blood Culture PCR (22 @ 14:28)  Organism: Blood Culture PCR (22 @ 14:28)      -  Staphylococcus epidermidis, Methicillin resistant: Detec      Method Type: PCR    Culture - Other (collected 08-10-22 @ 12:40)  Source: .Other right leg wound  Final Report (22 @ 18:21):    Numerous Candida parapsilosis "Susceptibilities not performed"    Culture - Blood (collected 22 @ 02:09)  Source: .Blood None  Final Report (22 @ 10:01):    No Growth Final    Culture - Urine (collected 22 @ 01:00)  Source: Catheterized Catheterized  Final Report (08-10-22 @ 10:50):    No growth    Culture - Blood (collected 22 @ 18:36)  Source: .Blood None  Final Report (22 @ 07:00):    No Growth Final    Culture - Blood (collected 22 @ 18:36)  Source: .Blood None  Final Report (22 @ 07:00):    No Growth Final        Lactate, Blood: 2.2 mmol/L (22 @ 18:03)      INFECTIOUS DISEASES TESTING  COVID-19 PCR: NotDetec (22 @ 11:58)  Procalcitonin, Serum: 2.46 (22 @ 07:24)  COVID-19 PCR: NotDetec (22 @ 09:40)  COVID-19 PCR: NotDetec (22 @ 02:34)  COVID-19 PCR: NotDetec (22 @ 17:36)  Procalcitonin, Serum: 0.11 (22 @ 06:04)  COVID-19 PCR: NotDetec (08-15-22 @ 15:41)  COVID-19 PCR: NotDetec (22 @ 13:22)  COVID-19 PCR: NotDetec (22 @ 05:14)  MRSA PCR Result.: Negative (22 @ 17:40)  COVID-19 PCR: NotDetec (22 @ 01:40)      INFLAMMATORY MARKERS  C-Reactive Protein, Serum: 289.1 mg/L (22 @ 12:04)  Sedimentation Rate, Erythrocyte: >140 mm/Hr (22 @ 12:04)  Sedimentation Rate, Erythrocyte: 125 mm/Hr (22 @ 19:27)  C-Reactive Protein, Serum: 54.4 mg/L (22 @ 19:27)      RADIOLOGY & ADDITIONAL TESTS:  I have personally reviewed the last available Chest xray  CXR      CT      CARDIOLOGY TESTING  12 Lead ECG:   Ventricular Rate 63 BPM    Atrial Rate 63 BPM    P-R Interval 162 ms    QRS Duration 76 ms    Q-T Interval 422 ms    QTC Calculation(Bazett) 431 ms    P Axis 51 degrees    R Axis 36 degrees    T Axis 112 degrees    Diagnosis Line Normal sinus rhythm  Anteroseptal infarct , age undetermined  Abnormal ECG    Confirmed by Robert Bauer ([id number]) on 2022 11:37:29 AM (22 @ 20:22)      MEDICATIONS  aspirin  chewable 81 Oral daily  atorvastatin 80 Oral at bedtime  aztreonam  IVPB     aztreonam  IVPB 2000 IV Intermittent every 12 hours  ceftazidime/avibactam IVPB 0.94 IV Intermittent every 12 hours  cloNIDine Patch 0.1 mG/24Hr(s) 1 Transdermal every 7 days  clopidogrel Tablet 75 Oral daily  collagenase Ointment 1 Topical two times a day  Dakins Solution - 1/2 Strength 1 Topical two times a day  dextrose 5%. 1000 IV Continuous <Continuous>  dextrose 5%. 1000 IV Continuous <Continuous>  dextrose 50% Injectable 25 IV Push once  dextrose 50% Injectable 12.5 IV Push once  dextrose 50% Injectable 25 IV Push once  glucagon  Injectable 1 IntraMuscular once  heparin   Injectable 5000 SubCutaneous every 8 hours  hydrALAZINE 100 Oral every 8 hours  insulin glargine Injectable (LANTUS) 22 SubCutaneous every morning  insulin lispro (ADMELOG) corrective regimen sliding scale  SubCutaneous three times a day before meals  insulin lispro Injectable (ADMELOG) 5 SubCutaneous three times a day before meals  labetalol 600 Oral three times a day  lacosamide IVPB 50 IV Intermittent every 12 hours  levETIRAcetam  Solution 500 Oral two times a day  lidocaine 1%/epinephrine 1:100,000 Inj 20 Local Injection once  multivitamin/minerals/iron Oral Solution (CENTRUM) 15 Oral daily  pantoprazole    Tablet 40 Oral before breakfast  sodium bicarbonate 650 Oral every 8 hours  sodium chloride 0.65% Nasal 2 Both Nostrils two times a day  sodium chloride 0.9%. 1000 IV Continuous <Continuous>      WEIGHT  Weight (kg): 82.3 (22 @ 12:59)      ANTIBIOTICS:  aztreonam  IVPB      aztreonam  IVPB 2000 milliGRAM(s) IV Intermittent every 12 hours  ceftazidime/avibactam IVPB 0.94 Gram(s) IV Intermittent every 12 hours      All available historical records have been reviewed

## 2022-09-04 NOTE — PROGRESS NOTE ADULT - ASSESSMENT
· Assessment	  55 yo F with PMH of HTN, DM2, CVA (2017) who presented with purulent right lower extremity wound for 3 months consistent with acute RLE cellulitis. Code stroke for unresponsiveness at 4pm 8/5    Impression  #Fevers 8/30 - Enterobacter cloaecae bacteremia  - UA with pyruia   - CXR 8/31 unremarkable   - BLood Cx 9/1 with Enterobacter cloacae with NDM+    #CVA   - 8/11 MR Head w/wo IV Cont (08.10.22 @ 21:25): Multiple punctate cortical acute infarcts involving multiple vascular territories possibly related to embolic etiology. No evidence of acute hemorrhage. Moderate chronic microvascular type changes as well as chronic hemosiderin deposition within the right basal ganglia consistent with remote hemorrhage. Chronic right thalamic lacunar infarcts. More alert and does try to respondResolved SIRS with no infectious etiology  - s/p LP 8/26 with 24 WBC, RBC 16045, 37% PMNs, 61% Lymph -- protein/glucose not obtained    RECOMMENDATIONS;  - continue  avycaz 0.94 1g q 12 hours and aztreonam 2g q 12 hours  - please administer antibiotics together  - follow-up surveillance culture  - will monitor Left Upper lip swelling for now   - trend creatinine   - trend WBC and fever curve  - isolation precaution for MDR     Please call or message on Microsoft Teams if with any questions.  Spectra [phone]

## 2022-09-04 NOTE — PROGRESS NOTE ADULT - ASSESSMENT
Pt with Lilly secondary to infection and hypovolemia , creatinine 3.2 , need to monitor closely , hemoglobin 7.4 , needs iron studies as part of evaluation of anemia , /84 improved , Bp meds noted

## 2022-09-04 NOTE — PROGRESS NOTE ADULT - SUBJECTIVE AND OBJECTIVE BOX
[facility] FOLLOW UP NOTE  --------------------------------------------------------------------------------  Chief Complaint:    24 hour events/subjective:        PAST HISTORY  --------------------------------------------------------------------------------  No significant changes to PMH, PSH, FHx, SHx, unless otherwise noted    ALLERGIES & MEDICATIONS  --------------------------------------------------------------------------------  Allergies    No Known Allergies    Intolerances      Standing Inpatient Medications  aspirin  chewable 81 milliGRAM(s) Oral daily  atorvastatin 80 milliGRAM(s) Oral at bedtime  aztreonam  IVPB 2000 milliGRAM(s) IV Intermittent every 12 hours  aztreonam  IVPB      ceftazidime/avibactam IVPB 0.94 Gram(s) IV Intermittent every 12 hours  cloNIDine Patch 0.1 mG/24Hr(s) 1 patch Transdermal every 7 days  clopidogrel Tablet 75 milliGRAM(s) Oral daily  collagenase Ointment 1 Application(s) Topical two times a day  Dakins Solution - 1/2 Strength 1 Application(s) Topical two times a day  dextrose 5%. 1000 milliLiter(s) IV Continuous <Continuous>  dextrose 5%. 1000 milliLiter(s) IV Continuous <Continuous>  dextrose 50% Injectable 25 Gram(s) IV Push once  dextrose 50% Injectable 12.5 Gram(s) IV Push once  dextrose 50% Injectable 25 Gram(s) IV Push once  glucagon  Injectable 1 milliGRAM(s) IntraMuscular once  heparin   Injectable 5000 Unit(s) SubCutaneous every 8 hours  hydrALAZINE 100 milliGRAM(s) Oral every 8 hours  insulin glargine Injectable (LANTUS) 22 Unit(s) SubCutaneous every morning  insulin lispro (ADMELOG) corrective regimen sliding scale   SubCutaneous three times a day before meals  insulin lispro Injectable (ADMELOG) 5 Unit(s) SubCutaneous three times a day before meals  labetalol 600 milliGRAM(s) Oral three times a day  lacosamide IVPB 50 milliGRAM(s) IV Intermittent every 12 hours  levETIRAcetam  Solution 500 milliGRAM(s) Oral two times a day  lidocaine 1%/epinephrine 1:100,000 Inj 20 milliLiter(s) Local Injection once  multivitamin/minerals/iron Oral Solution (CENTRUM) 15 milliLiter(s) Oral daily  pantoprazole    Tablet 40 milliGRAM(s) Oral before breakfast  sodium bicarbonate 650 milliGRAM(s) Oral every 8 hours  sodium chloride 0.65% Nasal 2 Spray(s) Both Nostrils two times a day  sodium chloride 0.9%. 1000 milliLiter(s) IV Continuous <Continuous>    PRN Inpatient Medications  acetaminophen     Tablet .. 650 milliGRAM(s) Oral every 6 hours PRN  dextrose Oral Gel 15 Gram(s) Oral once PRN  labetalol Injectable 10 milliGRAM(s) IV Push every 6 hours PRN  LORazepam     Tablet 1 milliGRAM(s) Oral once PRN  melatonin 3 milliGRAM(s) Oral at bedtime PRN      REVIEW OF SYSTEMS  --------------------------------------------------------------------------------  Gen: No weight changes, fatigue, fevers/chills, weakness  Skin: No rashes  Head/Eyes/Ears/Mouth: No headache; Normal hearing; Normal vision w/o blurriness; No sinus pain/discomfort, sore throat  Respiratory: No dyspnea, cough, wheezing, hemoptysis  CV: No chest pain, PND, orthopnea  GI: No abdominal pain, diarrhea, constipation, nausea, vomiting, melena, hematochezia  : No increased frequency, dysuria, hematuria, nocturia  MSK: No joint pain/swelling; no back pain; no edema  Neuro: No dizziness/lightheadedness, weakness, seizures, numbness, tingling  Heme: No easy bruising or bleeding  Endo: No heat/cold intolerance  Psych: No significant nervousness, anxiety, stress, depression    All other systems were reviewed and are negative, except as noted.    VITALS/PHYSICAL EXAM  --------------------------------------------------------------------------------  T(C): 37.9 (09-04-22 @ 05:08), Max: 38.3 (09-03-22 @ 13:58)  HR: 79 (09-04-22 @ 05:08) (73 - 83)  BP: 161/84 (09-04-22 @ 05:08) (133/70 - 173/87)  RR: 18 (09-04-22 @ 05:08) (18 - 20)  SpO2: 96% (09-03-22 @ 20:51) (96% - 99%)  Wt(kg): --        09-03-22 @ 07:01  -  09-04-22 @ 07:00  --------------------------------------------------------  IN: 2005 mL / OUT: 1775 mL / NET: 230 mL      Physical Exam:  	Gen: NAD, well-appearing  	HEENT: PERRL, supple neck, clear oropharynx  	Pulm: CTA B/L  	CV: RRR, S1S2; no rub  	Back: No spinal or CVA tenderness; no sacral edema  	Abd: +BS, soft, nontender/nondistended  	: No suprapubic tenderness  	UE: Warm, FROM, no clubbing, intact strength; no edema; no asterixis  	LE: Warm, FROM, no clubbing, intact strength; no edema  	Neuro: No focal deficits, intact gait  	Psych: Normal affect and mood  	Skin: Warm, without rashes  	Vascular access:    LABS/STUDIES  --------------------------------------------------------------------------------              7.4    8.06  >-----------<  169      [09-03-22 @ 07:54]              21.3     139  |  102  |  82  ----------------------------<  181      [09-03-22 @ 07:54]  4.0   |  22  |  3.2        Ca     8.3     [09-03-22 @ 07:54]      Mg     2.1     [09-03-22 @ 07:54]      Phos  3.8     [09-03-22 @ 07:54]    TPro  5.4  /  Alb  2.7  /  TBili  0.6  /  DBili  x   /  AST  50  /  ALT  34  /  AlkPhos  336  [09-03-22 @ 07:54]          Creatinine Trend:  SCr 3.2 [09-03 @ 07:54]  SCr 3.0 [09-02 @ 06:55]  SCr 2.8 [09-01 @ 06:38]  SCr 1.9 [08-31 @ 07:24]  SCr 1.6 [08-30 @ 18:03]    Urinalysis - [09-02-22 @ 12:57]      Color Yellow / Appearance Slightly Turbid / SG 1.013 / pH 7.5      Gluc Negative / Ketone Negative  / Bili Negative / Urobili 6 mg/dL       Blood Moderate / Protein 100 mg/dL / Leuk Est Large / Nitrite Negative      RBC 30 / WBC 57 / Hyaline 2 / Gran  / Sq Epi  / Non Sq Epi 8 / Bacteria Negative    Urine Creatinine 35      [09-02-22 @ 13:30]  Urine Protein 82      [09-02-22 @ 13:30]  Urine Sodium 33.0      [09-02-22 @ 13:30]  Urine Urea Nitrogen 412      [09-02-22 @ 13:30]  Urine Potassium 47      [09-02-22 @ 13:30]  Urine Osmolality 288      [09-02-22 @ 12:57]    Ferritin 243      [08-28-22 @ 05:49]  TSH 0.86      [08-08-22 @ 17:53]  Lipid: chol 234, , HDL 42, LDL --      [08-03-22 @ 08:56]      LUIS FELIPE: titer Negative, pattern --      [08-19-22 @ 11:37]  dsDNA <12      [08-30-22 @ 19:27]  Rheumatoid Factor <10      [08-19-22 @ 11:37]  ANCA: cANCA Negative, pANCA Negative, atypical ANCA Negative      [08-30-22 @ 19:27]  Syphilis Screen (Treponema Pallidum Ab) Negative      [08-08-22 @ 17:53]  Immunofixation Serum:   No Monoclonal Band Identified    Reference Range: None Detected      [08-30-22 @ 23:46]  SPEP Interpretation: Pattern Consistent With Acute Inflammation Or Stress      [08-30-22 @ 23:46]

## 2022-09-04 NOTE — PROGRESS NOTE ADULT - ASSESSMENT
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 During hospital stay, found to have multiple punctate infarcts suspected to be cardioembolic vs vasculitis. Patient also found to have sharp waves on vEEG and currently on AEDs. Hospital course further complicated by fever and now with MDR Enterobacter bacteremia.    1. Acute ischemic strokes / High suspicion for CNS vasculitis  - high suspicion for vasculitis as per neuro (high inflammatory markers, CSF protein)  - continue aspirin 81mg daily  - Restarted plavix as LP performed 8/28 and s/p PEG  - Continue Atorvastatin 80 mg daily   - q4hr neuro checks and vitals  - cardio recommended to hold off IVONNE (consider CTA heart once kidney fxn better)  - s/p ILR by EP 8/22  - Vasculitic work up as per neuro, intervention as per neurovascular (planning DSA once bacteremia resolved and cleared by ID)  - 9/3: f/u repeat MRI brain report    2. Fevers  MDR Enterobacter bacteremia  on Avycaz and aztreonam per ID  daily blood cultures for now  nolasco in place    3. High risk of seizures with epileptiform discharges  - on Keppra 500 mg bid   - Stopped Phenytoin 8/23 as per neuro  - on vimpat 50mg BID    4. nonoliguric ALF / Urinary retention / Suspected ATN  - Cr continues to trend up - 3.2 on 9/3 (today's labs pending)  - on IVFs  - continue to hold Lasix and Losartan  - avoid hypotension   - renal US 9/2: no hydronephrosis  - keep nolasco for now  - sodium bicarbonate 650mg PO q8hr  - renal following  - daily BMP and I's and O's  - phos level OK    5. Hypertensive urgency due to noncompliance and Malignant HTN   - BP on admission 296/174 without signs of end organ damage. Renal artery duplex wnl>>ARIAN unlikely  - Aldosterone wnl, renin elevated  - BP overall improved  - SBP goal 120-160    6. Hyperlipidemia - high intensity statin    7. Purulent Right LE cellulitis   - s/p debridement by burn on 8/10  - Candida in wound. per Dr. Chua: contaminant  -  BCx w/ staph: likely contaminant. repeat BCx at that time was negative     8. DM w/ Hyperglycemia  - A1C 10.4  - continue insulin and monitor FS - acceptable today    9. Dysphagia   on PEG feeds    10. Anemia - normocytic  could be partly dilutional - now on IVF at 100cc/hr  keep active T&S and monitor CBC and for bleeding    11. DVT ppx: Heparin SQ  GI ppx: Protonix    12. Ulcerated lesion of upper lip - stable from 9/3  unclear etiology at this time - does not seem pressure related - ? due to trauma  continue to monitor     overall very guarded prognosis and at high risk of clinical deterioration.  full code status      PROGRESS NOTE HANDOFF    Pending: MRI of brain, daily blood cultures until negative, resolution of fever, daily labs including CBC and BMP, Phos  monitor upper lip lesion    Disposition: to be determined, likely SNF    Family discussion: left message for son Latrell Mack today

## 2022-09-05 NOTE — PROGRESS NOTE ADULT - ASSESSMENT
57 yo F with PMH of HTN, DM2, and stroke (2017) who presented for RLE abscess. Started 3 months ago when she fell and hit her leg on a wooden cabinet s/p debridement by burn on 8/10. found to have CVA on admission suspected to be d/t vasculitis and started on aspirin and plavix. GI consulted for peg placement and was placed on 8/24. Hospital course further complicated by HTN urgency, seizures and was started on AED and was diagnosed with MDR enterobacter bacteremia and ATN. GI was recalled as overnight PT was noted to have bleeding per mouth w/ oozing red blood from oral cavity likely from biting her tongue vs oral ulcer. PT also noted to have red blood per rectum with clots although remained hemodynamically stable. Pt  was upgraded to ICU for closer monitoring.     #Hematochezia likely lower GI bleed - hemorrhoids vs diverticulosis vs ischemia  #Bleeding from oral cavity possible d/t oral ulcer vs tongue laceration  - hemodynamically stable and not on pressors  - PEG irrigation: coffee grounds. no fresh blood. small old clots. MIREYA: marroon blood mixed with stool and clots  - EGD for PEG on 8/24: normal esophagus, stomach and duodenum  - H&H baseline 9-10 - slowly downtrended to 7 and 6.5 -> 2units -> 8.4  - ASA and Plavix LD on 9/4  - no prior cfs in chart    Plan  - NPO  - ENT eval  - recommend intubation if continues to bleed from oral cavity as high risk of aspiration  - protonix 40 bid  - will tentatively plan for EGD and colonoscopy tomorrow 9/6 - pending neurology clearence for procedure and also clearence to hold plavix for procedure.  - please prep patient with 4L golytely via PEG and 20mg of Dulcoloax PO via PEG  - NPO after midnight  - repeat CBC, BMP and INR today and target Hb >8. and INR <1.5  - monitor cbc bid         57 yo F with PMH of HTN, DM2, and stroke (2017) who presented for RLE abscess. Started 3 months ago when she fell and hit her leg on a wooden cabinet s/p debridement by burn on 8/10. found to have CVA on admission suspected to be d/t vasculitis and started on aspirin and plavix. GI consulted for peg placement and was placed on 8/24. Hospital course further complicated by HTN urgency, seizures and was started on AED and was diagnosed with MDR enterobacter bacteremia and ATN. GI was recalled as overnight PT was noted to have bleeding per mouth w/ oozing red blood from oral cavity likely from biting her tongue vs oral ulcer. PT also noted to have red blood per rectum with clots although remained hemodynamically stable. Pt  was upgraded to ICU for closer monitoring.     #Hematochezia likely lower GI bleed - hemorrhoids vs diverticulosis vs ischemic colitis   #Bleeding from oral cavity possible d/t oral ulcer vs tongue laceration  - hemodynamically stable and not on pressors  - PEG irrigation: coffee grounds. no fresh blood. small old clots. MIREYA: marroon blood mixed with stool and clots  - EGD for PEG on 8/24: normal esophagus, stomach and duodenum  - H&H baseline 9-10 - slowly downtrended to 7 and 6.5 -> 2units -> 8.4  - ASA and Plavix LD on 9/4  - no prior cfs in chart    Plan  - Hold feedings  - ENT eval  - recommend to consider intubation if continues to bleed from oral cavity as high risk of aspiration  - Protonix 40 IV  bid  - will tentatively plan for EGD and colonoscopy tomorrow 9/6 - pending neurology clearance and risk stratification for procedure   - please prep patient with 4L Golytely via PEG and 20mg of Dulcolax  via PEG  - NPO after midnight  - repeat CBC, BMP and INR today and target Hb >8  and INR <1.5  - monitor cbc bid  -2 large gauge IV   -will consider endoscopy on emergency bases if patient developed GI bleed with signs of hemodynamic instability

## 2022-09-05 NOTE — PROGRESS NOTE ADULT - ASSESSMENT
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 During hospital stay, found to have multiple punctate infarcts suspected to be cardioembolic vs vasculitis. Patient also found to have sharp waves on vEEG and currently on AEDs. Hospital course further complicated by fever and now with MDR Enterobacter bacteremia.    # GI bleed   - S/p 2 units of PRBCs  - GI eval, possible EGD and colonoscopy tmrw LGIB   - Keep pt NPO  - Protonix drip  - ENT eval appreciated for oral bleed. No actively oozing wounds, teeth guard put in. possible intubation if patient is bleeding from mouth and cant protect airways.   - FU CBC, coags       # Acute ischemic strokes mutlifocal  - Spoke with neurology, suspiscious for vasculitis, but not confirmed, CSF studies does not support the diagnosis. No IVIG or solumedrol to be used.,  - Plavix and aspirin held for LGIB   - FU EEG, cw keppra   - Pt barely responsive, only blinks to command.   - Neuro check q1h, low threshold for intubation       # Bacteremia   - bcx positive for MDR enterobacter Cloacae   - Cw w antibiotics per ID   - bcx daily     # nonoliguric ALF / Urinary retention / Suspected ATN  - Cr continues to trend up   - on IVFs  - continue to hold Lasix and Losartan  - avoid hypotension   - renal US 9/2: no hydronephrosis  - keep nolasco for now  - sodium bicarbonate 650mg PO q8hr  - renal following  - daily BMP and I's and O's  - phos level OK    # Hypertensive urgency due to noncompliance and Malignant HTN   - BP on admission 296/174 without signs of end organ damage. Renal artery duplex wnl>>ARIAN unlikely  - Aldosterone wnl, renin elevated  - BP overall improved  - SBP goal 120-160      # Purulent Right LE cellulitis   - s/p debridement by burn on 8/10  - Candida in wound. per Dr. Chua: contaminant  -  BCx w/ staph: likely contaminant. repeat BCx at that time was negative     # Diabetes mellitus   - HbA1C 10.4  - Insulin held for now as pt is NPO     #HLD   - cw statins       #MISC:  - GI prophylaxis: protonix   - Dispo: MICU   - Acitivity: bedrest

## 2022-09-05 NOTE — CHART NOTE - NSCHARTNOTEFT_GEN_A_CORE
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 During hospital stay, she was found to have multiple punctate infarcts suspected to be cardioembolic vs vasculitis. Patient also found to have sharp waves on vEEG and currently on AEDs. Hospital course further complicated by fever and now with MDR Enterobacter bacteremia.    On 9/5 pt had a bloody bowel movement and 30 mins later she was found to be bleeding from mouth. Stat labs for CBC, CMP, PT/INR, PTT, Lactate, LDH and HIT panel were ordered.  Pt has been hemodynamically stable during this episode.  Pt has an active type and screen from 9/4 and may require a blood transfusion.    Pt's BP on admission was  296/174 without signs of end organ damage and Renal artery duplex showed no abnormality, ARIAN unlikely. But Aldosterone wnl,  and reninm is elevated.  the elevated BP is believed to be due to noncompliance and Malignant HTN     Neuro suspects the Acute ischemic strokes could be secondary to CNS vasculitis (high inflammatory markers, CSF protein) and patient is currently on aspirin 81mg daily, plavix.  neurovascular intervention (DSA) could be planned once bacteremia is resolved and ID clears her for the procedure.  multiple scattered lacunar infarcts, now subacute. No   MRI brain report from 9/3 : NO compelling evidence of an acute infarct or acute intracranial hemorrhage  Pt's BCx grew MDR Enterobacter and is currently on Avycaz and aztreonam per ID, following daily blood cultures for now.    Pt is currently at High risk of seizures with epileptiform discharges and is currently on Keppra 500 mg bid & vimpat 50mg BID. Phenytoin was discontinued 8/23 as per neuro    Pt's Cr continues to trend up - 3.2 on 9/3 whioch could be seconmdary to nonoliguric ALF / Urinary retention / Suspected ATN  Lasix and Losartan were held, and renal US from 9/2 showed no hydronephrosis.  Pt is currently on sodium bicarbonate 650mg PO q8hr    Pt was admitted for Purulent Right LE cellulitis,s/p debridement by burn on 8/10. The Candida in wound was suspected to be a contaminant and BCx grew staph, which also likely a contaminant  Repeat BCx at that time was negative     Pt is receiving insulin for DM w/ Hyperglycemia  Pt is on PEG feeds for dysphagia    Pt also has an Ulcerated lesion of upper lip that has been stable from 9/3, with the cause unknown. 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 During hospital stay, she was found to have multiple punctate infarcts suspected to be cardioembolic vs vasculitis. Patient also found to have sharp waves on vEEG and currently on AEDs. Hospital course further complicated by fever and now with MDR Enterobacter bacteremia.    On 9/5 pt had a bloody bowel movement and 30 mins later she was found to be bleeding from mouth. Stat labs for CBC, CMP, PT/INR, PTT, Lactate, LDH and HIT panel were ordered.  Pt has been hemodynamically stable during this episode although her BP was soft lowest 112 SBP on admission she was 296 SBP    Repeat Hgb 6.5 will give 2 units with lasix 40 IV in between the units, F/U DIC pannel, maintain Active T and S, GI eval. keep NPO start protonix ggt    Pt's BP on admission was  296/174 without signs of end organ damage and Renal artery duplex showed no abnormality, ARIAN unlikely. But Aldosterone wnl,  and reninm is elevated.  the elevated BP is believed to be due to noncompliance and Malignant HTN     Neuro suspects the Acute ischemic strokes could be secondary to CNS vasculitis (high inflammatory markers, CSF protein) and patient is currently on aspirin 81mg daily, plavix.  neurovascular intervention (DSA) could be planned once bacteremia is resolved and ID clears her for the procedure.  multiple scattered lacunar infarcts, now subacute. No   MRI brain report from 9/3 : NO compelling evidence of an acute infarct or acute intracranial hemorrhage  Pt's BCx grew MDR Enterobacter and is currently on Avycaz and aztreonam per ID, following daily blood cultures for now.    Pt is currently at High risk of seizures with epileptiform discharges and is currently on Keppra 500 mg bid & vimpat 50mg BID. Phenytoin was discontinued 8/23 as per neuro    Pt's Cr continues to trend up - 3.2 on 9/3 whioch could be seconmdary to nonoliguric ALF / Urinary retention / Suspected ATN  Lasix and Losartan were held, and renal US from 9/2 showed no hydronephrosis.  Pt is currently on sodium bicarbonate 650mg PO q8hr    Pt was admitted for Purulent Right LE cellulitis,s/p debridement by burn on 8/10. The Candida in wound was suspected to be a contaminant and BCx grew staph, which also likely a contaminant  Repeat BCx at that time was negative     Pt is receiving insulin for DM w/ Hyperglycemia  Pt is on PEG feeds for dysphagia    Pt also has an Ulcerated lesion of upper lip that has been stable from 9/3, with the cause unknown.

## 2022-09-05 NOTE — CONSULT NOTE ADULT - SUBJECTIVE AND OBJECTIVE BOX
Neurocritical Care Team Initial Consult Note    Location: [facility]  A ([facility] ICU)  Patient Name: JOSE MITCHELL  Age: 56y  Gender: Female      Ms. Mitchell is a 56 year old female patient known to have:  - HTN  - DM  - CVA in 2017  - HFpEF. TTE 08/14 EF 61%, DDI      She was brought to the ED on 08/02 for evaluation of right LE redness and purulent drainage.  She was admitted for cellulitis management.    - Her stay was complicated by a Code Stroke on 08/05  --> CT Brain Stroke Protocol (08.05.22 @ 16:12) Mild atrophic changes.Hypodensities in the periventricular white matter, right thalamus, right basal ganglia, and right insular cortex likely representing ischemic change, age indeterminate.  --> CT Angio Neck w/ IV Cont (08.05.22 @ 16:42) The visualized aortic arch, common carotid arteries and carotid bifurcations are patent. The remainder of the exam (distal cervical vessels and the intracranial circulation) is essentially nondiagnostic due to motion artifact. Consider a repeat study with appropriate sedation as clinically warranted.  --> Most recent repeat CT Head No Cont (08.29.22 @ 17:43) No hemorrhage or new infarct Chronic changes right subinsular region. Multiple small infarcts (MRI August 10, 2022) not clearly visualized  --> Most recent MR Head No Cont (09.03.22 @ 20:08) Redemonstration of multiple scattered lacunar infarcts, now subacute. No compelling evidence of an acute infarct or acute intracranial hemorrhage.  --> She was started on DAPs -> continued until 09/05 when patient had GI bleed and acute drop in Hb requiting a transfusion    - During her stay, she was worked up for AMS and metabolic encephalopathy  - Since there was concern for seizures, EEG was performed  --> EEG findings Focal and generalized slowing: moderate generalized slowing; mild to moderate independent right FT and left FC/frontal slowing. Interictal activity: moderate number of right hemispheric FTC sharp waves, and low amplitude and after going spike; small number of left anterior quadrant sharp transients  --> She was started on PO Keppra and IV VImpat  --> She is s/p EGD and PEG tube placement on 08/24  --> A rheumatological workup was sent and was non revealing    - It was also complicated by fever and bacteremia (MDR Enterobacter) on 09/01.  -  She was started on IV Ceftzidime/Avibactam and Aztreonam for bacteremia.    - It was also complicated by Lower GI bleed and some UGIB due to tongue biting on 09/05  - She had an acute drop in Hb to 6.5 on 09/05 s/p 1 unit pRBC (2nd unit is ordered)    - We are consulted for AMS and concern for vasculitis      Vital Signs in the last 24 hours   Vitals Summary T(C): 37.2 (09-05-22 @ 12:00), Max: 37.2 (09-05-22 @ 08:00)  HR: 80 (09-05-22 @ 12:00) (72 - 80)  BP: 174/103 (09-05-22 @ 12:00) (134/77 - 208/107)  RR: 18 (09-05-22 @ 12:00) (16 - 21)  SpO2: 97% (09-05-22 @ 12:00) (97% - 99%)  Vent Data   Intake/ Output   09-04-22 @ 07:01  -  09-05-22 @ 07:00  --------------------------------------------------------  IN: 2568 mL / OUT: 1050 mL / NET: 1518 mL    09-05-22 @ 07:01  -  09-05-22 @ 15:00  --------------------------------------------------------  IN: 825 mL / OUT: 1200 mL / NET: -375 mL        Physical Exam  * General Appearance: Alert, not cooperative, responds to painful but not verbal stimuli, unable to assess if oriented to time, place, or person  * Head: Normocephalic, without obvious abnormality, atraumatic  * Eyes: unable to assess for nystagmus as patient not following commands, pupils sluggishly reactive to light bilaterally  * Neck: Supple, symmetrical, trachea midline, no adenopathy;   * Thyroid:  No enlargement/tenderness/nodules; no carotid bruit or JVD  * Lungs: Respirations unlabored, Good bilateral air entry, normal breath sounds (Clear to auscultation bilaterally, no audible wheezes, crackles, or rhonchi)  * Heart: Regular Rate and Rhythm, normal S1 and S2, no audible murmur, rub, or gallop  * Abdomen: Symmetric, minimally distended, soft, unable to assess for tenderness, bowel sounds active all four quadrants, no masses, no organomegaly (no hepatosplenomegaly)  * Extremities: no lower extremity pitting edema bilaterally, adequate dorsalis pedis pulses  * Pulses: 2+ and symmetric all extremities  * Skin: Skin color, texture, turgor normal, no rashes or lesions  * Lymph nodes: Cervical, supraclavicular, and axillary nodes normal  * Neurologic: responds to painful but not verbal stimuli, unable to assess CNII-XII due patient being noncooperative, unable to assess sensation, motor power, or vibratory sensation; babinski positive bilaterally      Investigations   Laboratory Workup  - CBC:                        8.7    16.46 )-----------( 146      ( 05 Sep 2022 12:19 )             25.3     - Chemistry:  09-05    138  |  104  |  88<HH>  ----------------------------<  117<H>  4.0   |  20  |  3.4<H>    Ca    8.3<L>      05 Sep 2022 12:19    TPro  5.6<L>  /  Alb  2.7<L>  /  TBili  0.5  /  DBili  x   /  AST  59<H>  /  ALT  45<H>  /  AlkPhos  376<H>  09-05    - Coagulation Studies:  PT/INR - ( 05 Sep 2022 01:59 )   PT: 14.60 sec;   INR: 1.27 ratio    PTT - ( 05 Sep 2022 01:59 )  PTT:32.3 sec      Microbiological Workup  Culture - Blood (collected 04 Sep 2022 05:43)  Source: .Blood None  Preliminary Report (05 Sep 2022 12:01):    No growth to date.    Culture - Blood (collected 03 Sep 2022 18:18)  Source: .Blood None  Preliminary Report (05 Sep 2022 04:01):    No growth to date.      Current Medications  Standing Medications  atorvastatin 80 milliGRAM(s) Oral at bedtime  aztreonam  IVPB      aztreonam  IVPB 2000 milliGRAM(s) IV Intermittent every 12 hours  bisacodyl 20 milliGRAM(s) Oral at bedtime  ceftazidime/avibactam IVPB 0.94 Gram(s) IV Intermittent every 12 hours  chlorhexidine 2% Cloths 1 Application(s) Topical <User Schedule>  collagenase Ointment 1 Application(s) Topical two times a day  Dakins Solution - 1/2 Strength 1 Application(s) Topical two times a day  dextrose 5%. 1000 milliLiter(s) (100 mL/Hr) IV Continuous <Continuous>  dextrose 5%. 1000 milliLiter(s) (50 mL/Hr) IV Continuous <Continuous>  dextrose 50% Injectable 25 Gram(s) IV Push once  dextrose 50% Injectable 12.5 Gram(s) IV Push once  dextrose 50% Injectable 25 Gram(s) IV Push once  glucagon  Injectable 1 milliGRAM(s) IntraMuscular once  hydrALAZINE 100 milliGRAM(s) Oral every 8 hours  labetalol 600 milliGRAM(s) Oral three times a day  lacosamide IVPB 50 milliGRAM(s) IV Intermittent every 12 hours  levETIRAcetam  Solution 500 milliGRAM(s) Oral two times a day  lidocaine 1%/epinephrine 1:100,000 Inj 20 milliLiter(s) Local Injection once  multivitamin/minerals/iron Oral Solution (CENTRUM) 15 milliLiter(s) Oral daily  pantoprazole  Injectable 80 milliGRAM(s) IV Push once  pantoprazole Infusion 8 mG/Hr (10 mL/Hr) IV Continuous <Continuous>  polyethylene glycol/electrolyte Solution. 4000 milliLiter(s) Oral once  sodium bicarbonate 650 milliGRAM(s) Oral every 8 hours  sodium chloride 0.65% Nasal 2 Spray(s) Both Nostrils two times a day  sodium chloride 0.9%. 1000 milliLiter(s) (100 mL/Hr) IV Continuous <Continuous>    PRN Medications  acetaminophen     Tablet .. 650 milliGRAM(s) Oral every 6 hours PRN Temp greater or equal to 38C (100.4F), Mild Pain (1 - 3)  dextrose Oral Gel 15 Gram(s) Oral once PRN Blood Glucose LESS THAN 70 milliGRAM(s)/deciliter  furosemide   Injectable 40 milliGRAM(s) IV Push once PRN post 1 PRBC  labetalol Injectable 10 milliGRAM(s) IV Push every 6 hours PRN Systolic blood pressure >  LORazepam     Tablet 1 milliGRAM(s) Oral once PRN Agitation  melatonin 3 milliGRAM(s) Oral at bedtime PRN Insomnia    Singles Doses Administered  (ADM OVERRIDE) 1 each &lt;see task&gt; GiveOnce  (ADM OVERRIDE) 1 each &lt;see task&gt; GiveOnce  (ADM OVERRIDE) 1 each &lt;see task&gt; GiveOnce  (ADM OVERRIDE) 1 each &lt;see task&gt; GiveOnce  (ADM OVERRIDE) 1 each &lt;see task&gt; GiveOnce  (ADM OVERRIDE) 1 each &lt;see task&gt; GiveOnce  (ADM OVERRIDE) 1 each &lt;see task&gt; GiveOnce  (Floorstock) 1 each &lt;see task&gt; GiveOnce  (Floorstock) 2 each &lt;see task&gt; GiveOnce  (Floorstock) 1 each &lt;see task&gt; GiveOnce  (Floorstock) 2 each &lt;see task&gt; GiveOnce  (Floorstock) 1 each &lt;see task&gt; GiveOnce  (Floorstock) 1 each &lt;see task&gt; GiveOnce  (Floorstock) 1 each &lt;see task&gt; GiveOnce  (Floorstock) 1 each &lt;see task&gt; GiveOnce  (Floorstock) 2 each &lt;see task&gt; GiveOnce  (Floorstock) 1 each &lt;see task&gt; GiveOnce  (Floorstock) 1 each &lt;see task&gt; GiveOnce  acetaminophen     Tablet .. 650 milliGRAM(s) Oral once  acetaminophen  Suppository .. 650 milliGRAM(s) Rectal once PRN  ampicillin/sulbactam  IVPB 3 Gram(s) IV Intermittent once  apixaban 5 milliGRAM(s) Oral once  aspirin Suppository 300 milliGRAM(s) Rectal once  aztreonam  IVPB 2000 milliGRAM(s) IV Intermittent once  ceFAZolin   IVPB 2000 milliGRAM(s) IV Intermittent once  ceFAZolin   IVPB 2000 milliGRAM(s) IV Intermittent once  cefepime   IVPB 2000 milliGRAM(s) IV Intermittent once  ceftazidime/avibactam IVPB 2.5 Gram(s) IV Intermittent once  enalaprilat Injectable 1.25 milliGRAM(s) IV Push Once  etomidate 2 mG/mL Solution 10 mL (Rx Quick Charge) 4 milliGRAM(s) IV Push   furosemide   Injectable 40 milliGRAM(s) IV Push once  hydrALAZINE Injectable 20 milliGRAM(s) IV Push once  hydrALAZINE Injectable 20 milliGRAM(s) IV Push once  hydrALAZINE Injectable 20 milliGRAM(s) IV Push once  hydrALAZINE Injectable 5 milliGRAM(s) IV Push once  hydrALAZINE Injectable 5 milliGRAM(s) IV Push once  hydrALAZINE Injectable 5 milliGRAM(s) IV Push once  hydrALAZINE Injectable 10 milliGRAM(s) IV Push once  HYDROmorphone  Injectable 0.5 milliGRAM(s) IV Push once  HYDROmorphone  Injectable 0.5 milliGRAM(s) IV Push once  HYDROmorphone  Injectable 0.5 milliGRAM(s) IV Push once  insulin regular  human recombinant. 10 Unit(s) SubCutaneous once  labetalol 100 milliGRAM(s) Oral once  labetalol Injectable 10 milliGRAM(s) IV Push once  labetalol Injectable 10 milliGRAM(s) IV Push once  labetalol Injectable 10 milliGRAM(s) IV Push once  labetalol Injectable 10 milliGRAM(s) IV Push once  labetalol Injectable 10 milliGRAM(s) IV Push once  labetalol Injectable 10 milliGRAM(s) IV Push once  levETIRAcetam  IVPB 1000 milliGRAM(s) IV Intermittent once  levETIRAcetam  IVPB 2000 milliGRAM(s) IV Intermittent once  levETIRAcetam  IVPB 1000 milliGRAM(s) IV Intermittent once  levETIRAcetam  IVPB 1000 milliGRAM(s) IV Intermittent once  LORazepam   Injectable 2 milliGRAM(s) IV Push once  LORazepam   Injectable 1 milliGRAM(s) IV Push once  LORazepam   Injectable 2 milliGRAM(s) IV Push once  losartan 50 milliGRAM(s) Oral once  magnesium sulfate  IVPB 2 Gram(s) IV Intermittent once  magnesium sulfate  IVPB 2 Gram(s) IV Intermittent every 2 hours  magnesium sulfate  IVPB 2 Gram(s) IV Intermittent once  magnesium sulfate  IVPB 2 Gram(s) IV Intermittent once  magnesium sulfate  IVPB 2 Gram(s) IV Intermittent once  magnesium sulfate  IVPB 1 Gram(s) IV Intermittent once  magnesium sulfate  IVPB 2 Gram(s) IV Intermittent once  magnesium sulfate  IVPB 2 Gram(s) IV Intermittent once  magnesium sulfate  IVPB 2 Gram(s) IV Intermittent once  meropenem  IVPB 500 milliGRAM(s) IV Intermittent once  midazolam Injectable 4 milliGRAM(s) IV Push once PRN  NIFEdipine XL 30 milliGRAM(s) Oral once  NIFEdipine XL 30 milliGRAM(s) Oral once  ondansetron Injectable 4 milliGRAM(s) IV Push once PRN  phenytoin  IVPB 500 milliGRAM(s) IV Intermittent once  phenytoin  IVPB 1000 milliGRAM(s) IV Intermittent once  potassium chloride    Tablet ER 40 milliEquivalent(s) Oral once  potassium chloride   Powder 40 milliEquivalent(s) Oral once  potassium chloride   Powder 40 milliEquivalent(s) Oral once  potassium chloride  20 mEq/100 mL IVPB 20 milliEquivalent(s) IV Intermittent every 2 hours  potassium chloride  20 mEq/100 mL IVPB 20 milliEquivalent(s) IV Intermittent every 2 hours  potassium chloride  20 mEq/100 mL IVPB 20 milliEquivalent(s) IV Intermittent every 2 hours  potassium chloride  20 mEq/100 mL IVPB 20 milliEquivalent(s) IV Intermittent every 2 hours  potassium chloride  20 mEq/100 mL IVPB 20 milliEquivalent(s) IV Intermittent once  propofol 10 mG/mL Injectable (Rx Quick Charge) 160 milliGRAM(s) IV Push   scopolamine 1 mG/72 Hr(s) Patch 1 Patch Transdermal once  vancomycin  IVPB 1500 milliGRAM(s) IV Intermittent once  vancomycin  IVPB 1250 milliGRAM(s) IV Intermittent once  vancomycin  IVPB 1250 milliGRAM(s) IV Intermittent every 12 hours  vancomycin  IVPB. 1000 milliGRAM(s) IV Intermittent once

## 2022-09-05 NOTE — CONSULT NOTE ADULT - SUBJECTIVE AND OBJECTIVE BOX
Patient is a 56y old  Female who presents with a chief complaint of RLE Cellulitis (03 Sep 2022 17:12)      Over Night Events:  Patient seen and examined.   overnight drop in Hb  baseline AAO*0 mental status        ROS:  See HPI      PHYSICAL EXAM    ICU Vital Signs Last 24 Hrs  T(C): 37.2 (05 Sep 2022 08:00), Max: 37.4 (04 Sep 2022 12:19)  T(F): 98.9 (05 Sep 2022 08:00), Max: 99.3 (04 Sep 2022 12:19)  HR: 72 (05 Sep 2022 08:00) (72 - 82)  BP: 154/78 (05 Sep 2022 08:00) (134/77 - 168/86)  BP(mean): 103 (05 Sep 2022 08:00) (103 - 126)  ABP: --  ABP(mean): --  RR: 18 (05 Sep 2022 08:00) (16 - 20)  SpO2: 98% (05 Sep 2022 08:59) (98% - 99%)    O2 Parameters below as of 05 Sep 2022 08:59  Patient On (Oxygen Delivery Method): room air            General: awake, does not follow commands  HEENT:neck supple, unable to have look clearly oral cavity      Lymph Nodes: NO cervical LN   Lungs: Bilateral BS  Cardiovascular: Regular   Abdomen: Soft, Positive BS  Extremities: No clubbing   Skin: warm   Neurological: does not follow command  Musculoskeletal: move all ext     I&O's Detail    04 Sep 2022 07:01  -  05 Sep 2022 07:00  --------------------------------------------------------  IN:    Enteral Tube Flush: 50 mL    Glucerna: 975 mL    Pantoprazole: 50 mL    PRBCs (Packed Red Blood Cells): 293 mL    sodium chloride 0.9%: 1200 mL  Total IN: 2568 mL    OUT:    Ureteral Catheter (mL): 500 mL    Voided (mL): 550 mL  Total OUT: 1050 mL    Total NET: 1518 mL      05 Sep 2022 07:01  -  05 Sep 2022 09:42  --------------------------------------------------------  IN:    Pantoprazole: 20 mL    PRBCs (Packed Red Blood Cells): 275 mL    sodium chloride 0.9%: 200 mL  Total IN: 495 mL    OUT:    Voided (mL): 300 mL  Total OUT: 300 mL    Total NET: 195 mL          LABS:                          6.5    14.53 )-----------( 153      ( 05 Sep 2022 01:45 )             19.3         05 Sep 2022 01:41    144    |  108    |  88     ----------------------------<  108    4.4     |  22     |  3.5      Ca    8.2        05 Sep 2022 01:41    TPro  5.5    /  Alb  2.6    /  TBili  0.5    /  DBili  x      /  AST  72     /  ALT  51     /  AlkPhos  404    05 Sep 2022 01:41  Amylase x     lipase x                                                 PT/INR - ( 05 Sep 2022 01:59 )   PT: 14.60 sec;   INR: 1.27 ratio         PTT - ( 05 Sep 2022 01:59 )  PTT:32.3 sec                                             Culture - Blood (collected 03 Sep 2022 18:18)  Source: .Blood None  Preliminary Report (05 Sep 2022 04:01):    No growth to date.                                   MEDICATIONS  (STANDING):  aspirin  chewable 81 milliGRAM(s) Oral daily  atorvastatin 80 milliGRAM(s) Oral at bedtime  aztreonam  IVPB      aztreonam  IVPB 2000 milliGRAM(s) IV Intermittent every 12 hours  ceftazidime/avibactam IVPB 0.94 Gram(s) IV Intermittent every 12 hours  chlorhexidine 2% Cloths 1 Application(s) Topical <User Schedule>  collagenase Ointment 1 Application(s) Topical two times a day  Dakins Solution - 1/2 Strength 1 Application(s) Topical two times a day  dextrose 5%. 1000 milliLiter(s) (100 mL/Hr) IV Continuous <Continuous>  dextrose 5%. 1000 milliLiter(s) (50 mL/Hr) IV Continuous <Continuous>  dextrose 50% Injectable 25 Gram(s) IV Push once  dextrose 50% Injectable 12.5 Gram(s) IV Push once  dextrose 50% Injectable 25 Gram(s) IV Push once  glucagon  Injectable 1 milliGRAM(s) IntraMuscular once  hydrALAZINE 100 milliGRAM(s) Oral every 8 hours  insulin glargine Injectable (LANTUS) 22 Unit(s) SubCutaneous every morning  insulin lispro (ADMELOG) corrective regimen sliding scale   SubCutaneous three times a day before meals  insulin lispro Injectable (ADMELOG) 5 Unit(s) SubCutaneous every 6 hours  labetalol 600 milliGRAM(s) Oral three times a day  lacosamide IVPB 50 milliGRAM(s) IV Intermittent every 12 hours  levETIRAcetam  Solution 500 milliGRAM(s) Oral two times a day  lidocaine 1%/epinephrine 1:100,000 Inj 20 milliLiter(s) Local Injection once  multivitamin/minerals/iron Oral Solution (CENTRUM) 15 milliLiter(s) Oral daily  pantoprazole  Injectable 80 milliGRAM(s) IV Push once  pantoprazole Infusion 8 mG/Hr (10 mL/Hr) IV Continuous <Continuous>  sodium bicarbonate 650 milliGRAM(s) Oral every 8 hours  sodium chloride 0.65% Nasal 2 Spray(s) Both Nostrils two times a day  sodium chloride 0.9%. 1000 milliLiter(s) (100 mL/Hr) IV Continuous <Continuous>    MEDICATIONS  (PRN):  acetaminophen     Tablet .. 650 milliGRAM(s) Oral every 6 hours PRN Temp greater or equal to 38C (100.4F), Mild Pain (1 - 3)  dextrose Oral Gel 15 Gram(s) Oral once PRN Blood Glucose LESS THAN 70 milliGRAM(s)/deciliter  furosemide   Injectable 40 milliGRAM(s) IV Push once PRN post 1 PRBC  labetalol Injectable 10 milliGRAM(s) IV Push every 6 hours PRN Systolic blood pressure >  LORazepam     Tablet 1 milliGRAM(s) Oral once PRN Agitation  melatonin 3 milliGRAM(s) Oral at bedtime PRN Insomnia

## 2022-09-05 NOTE — CONSULT NOTE ADULT - ATTENDING COMMENTS
Attending Statement: I have personally performed a face to face diagnostic evaluation on this patient. The patient is suffering from:  Acute blood loss anemia  LGI bleed  oral cavity bleed likely from tongue biting  ischemic stroke multifocal  LLE cellulitis  ALF  bacteremia  AMS unable to r/o atonic seizure  I have made amendments to the documentation where necessary. I have personally seen and examined this patient.  I have fully participated in the care of this patient.  I have reviewed all pertinent clinical information, including history, physical exam, plan and note.

## 2022-09-05 NOTE — PROGRESS NOTE ADULT - SUBJECTIVE AND OBJECTIVE BOX
Patient is a 56y old  Female who presents with a chief complaint of RLE Cellulitis (03 Sep 2022 17:12)      INTERVAL HPI/OVERNIGHT EVENTS:     Pt was upgraded for GI bleed. In the ICU pt is evaluated S/p 2 PRBCs. She is not responsive, withdraws to pain, and blinks to command. She has blood from her mouth, ENT eval she has a bite wound in the tongue but not actively oozing, so was put on teeth guard.      ICU Vital Signs Last 24 Hrs  T(C): 37.2 (05 Sep 2022 12:00), Max: 37.2 (05 Sep 2022 08:00)  T(F): 99 (05 Sep 2022 12:00), Max: 99 (05 Sep 2022 12:00)  HR: 80 (05 Sep 2022 12:00) (72 - 80)  BP: 174/103 (05 Sep 2022 12:00) (134/77 - 208/107)  BP(mean): 151 (05 Sep 2022 12:00) (103 - 151)  ABP: --  ABP(mean): --  RR: 18 (05 Sep 2022 12:00) (16 - 21)  SpO2: 97% (05 Sep 2022 12:00) (97% - 99%)    O2 Parameters below as of 05 Sep 2022 08:59  Patient On (Oxygen Delivery Method): room air          I&O's Summary    04 Sep 2022 07:01  -  05 Sep 2022 07:00  --------------------------------------------------------  IN: 2568 mL / OUT: 1050 mL / NET: 1518 mL    05 Sep 2022 07:01  -  05 Sep 2022 12:58  --------------------------------------------------------  IN: 825 mL / OUT: 1200 mL / NET: -375 mL          LABS:                        8.7    16.46 )-----------( 146      ( 05 Sep 2022 12:19 )             25.3     09-05    144  |  108  |  88<HH>  ----------------------------<  108<H>  4.4   |  22  |  3.5<H>    Ca    8.2<L>      05 Sep 2022 01:41    TPro  5.5<L>  /  Alb  2.6<L>  /  TBili  0.5  /  DBili  x   /  AST  72<H>  /  ALT  51<H>  /  AlkPhos  404<H>  09-05    PT/INR - ( 05 Sep 2022 01:59 )   PT: 14.60 sec;   INR: 1.27 ratio         PTT - ( 05 Sep 2022 01:59 )  PTT:32.3 sec    CAPILLARY BLOOD GLUCOSE      POCT Blood Glucose.: 126 mg/dL (05 Sep 2022 11:14)  POCT Blood Glucose.: 107 mg/dL (04 Sep 2022 21:30)  POCT Blood Glucose.: 110 mg/dL (04 Sep 2022 16:43)        RADIOLOGY & ADDITIONAL TESTS:    Consultant(s) Notes Reviewed:  [x ] YES  [ ] NO    MEDICATIONS  (STANDING):  atorvastatin 80 milliGRAM(s) Oral at bedtime  aztreonam  IVPB      aztreonam  IVPB 2000 milliGRAM(s) IV Intermittent every 12 hours  bisacodyl 20 milliGRAM(s) Oral at bedtime  ceftazidime/avibactam IVPB 0.94 Gram(s) IV Intermittent every 12 hours  chlorhexidine 2% Cloths 1 Application(s) Topical <User Schedule>  collagenase Ointment 1 Application(s) Topical two times a day  Dakins Solution - 1/2 Strength 1 Application(s) Topical two times a day  dextrose 5%. 1000 milliLiter(s) (100 mL/Hr) IV Continuous <Continuous>  dextrose 5%. 1000 milliLiter(s) (50 mL/Hr) IV Continuous <Continuous>  dextrose 50% Injectable 25 Gram(s) IV Push once  dextrose 50% Injectable 12.5 Gram(s) IV Push once  dextrose 50% Injectable 25 Gram(s) IV Push once  glucagon  Injectable 1 milliGRAM(s) IntraMuscular once  hydrALAZINE 100 milliGRAM(s) Oral every 8 hours  labetalol 600 milliGRAM(s) Oral three times a day  lacosamide IVPB 50 milliGRAM(s) IV Intermittent every 12 hours  levETIRAcetam  Solution 500 milliGRAM(s) Oral two times a day  lidocaine 1%/epinephrine 1:100,000 Inj 20 milliLiter(s) Local Injection once  multivitamin/minerals/iron Oral Solution (CENTRUM) 15 milliLiter(s) Oral daily  pantoprazole  Injectable 80 milliGRAM(s) IV Push once  pantoprazole Infusion 8 mG/Hr (10 mL/Hr) IV Continuous <Continuous>  polyethylene glycol/electrolyte Solution. 4000 milliLiter(s) Oral once  sodium bicarbonate 650 milliGRAM(s) Oral every 8 hours  sodium chloride 0.65% Nasal 2 Spray(s) Both Nostrils two times a day  sodium chloride 0.9%. 1000 milliLiter(s) (100 mL/Hr) IV Continuous <Continuous>    MEDICATIONS  (PRN):  acetaminophen     Tablet .. 650 milliGRAM(s) Oral every 6 hours PRN Temp greater or equal to 38C (100.4F), Mild Pain (1 - 3)  dextrose Oral Gel 15 Gram(s) Oral once PRN Blood Glucose LESS THAN 70 milliGRAM(s)/deciliter  furosemide   Injectable 40 milliGRAM(s) IV Push once PRN post 1 PRBC  labetalol Injectable 10 milliGRAM(s) IV Push every 6 hours PRN Systolic blood pressure >  LORazepam     Tablet 1 milliGRAM(s) Oral once PRN Agitation  melatonin 3 milliGRAM(s) Oral at bedtime PRN Insomnia      PHYSICAL EXAM:  GENERAL: Pt is severely ill   HEAD:  Atraumatic, Normocephalic  EYES: EOMI, PERRLA, conjunctiva and sclera clear  NECK: Supple, No JVD, Normal thyroid, no enlarged nodes  NERVOUS SYSTEM:  Awake  CHEST/LUNG: B/L good air entry; No rales, rhonchi, or wheezing  HEART: S1S2 normal, no S3, Regular rate and rhythm; No murmurs  ABDOMEN: Soft, Nontender, Nondistended; Bowel sounds present  EXTREMITIES:  2+ Peripheral Pulses, No clubbing, cyanosis, or edema  LYMPH: No lymphadenopathy noted  SKIN: No rashes or lesions    Care Discussed with Consultants/Other Providers [ x] YES  [ ] NO

## 2022-09-05 NOTE — CHART NOTE - NSCHARTNOTEFT_GEN_A_CORE
Neurology was called to clear the patient for EGD for her GI bleed, and to question regarding her aspirin and plavix, in addition, for recommendations regarding steroids treatment     Recommendations:   - No contraindications from neurology point of view for EGD  - No clear indication for now regarding starting steroids or IVIG from neurology point of view   - Resume anti platelets as soon as cleared by GI team       Case discussed with Dr. Wood

## 2022-09-05 NOTE — CONSULT NOTE ADULT - ASSESSMENT
Impression:  Acute blood loss anemia  LGI bleed  oral cavity bleed likely from tongue biting  ischemic stroke multifocal  LLE cellulitis  ALF  bacteremia  AMS unable to r/o atonic seizure      PLAN:    CNS: neurocheck q1 hr, low threshold intubation, neuro consult, , continue keppra    HEENT: Oral care    PULMONARY:  HOB @ 45 degrees.  Aspiration precautions place capnogram    CARDIOVASCULAR: adequate MAP    GI: GI prophylaxis. NPO, ppi drip, peg lavage, GI consult for endoscopy    RENAL:  follow up [facility]    INFECTIOUS DISEASE: bcx 9/1 enterbacter CRE, on meropenem    HEMATOLOGICAL:  scd, duplex    ENDOCRINE:  Follow up FS.  Insulin protocol if needed.    MUSCULOSKELETAL: bedrest    MICU    poor prognosis             Impression:  Acute blood loss anemia  LGI bleed  oral cavity bleed likely from tongue biting  ischemic stroke multifocal  LLE cellulitis  ALF  bacteremia  AMS unable to r/o atonic seizure      PLAN:    CNS: neurocheck q1 hr, low threshold intubation,   neuro consult,   EEG  continue keppra    HEENT: Oral care    PULMONARY:  HOB @ 45 degrees.  Aspiration precautions place capnogram    CARDIOVASCULAR: adequate MAP    GI: GI prophylaxis. NPO, ppi drip, peg lavage, GI consult for endoscopy    RENAL:  follow up [facility]    INFECTIOUS DISEASE: bcx 9/1 enterobacter CRE, on meropenem    HEMATOLOGICAL:  scd, duplex    ENDOCRINE:  Follow up FS.  Insulin protocol if needed.    MUSCULOSKELETAL: bedrest    MICU    poor prognosis

## 2022-09-05 NOTE — CHART NOTE - NSCHARTNOTEFT_GEN_A_CORE
Pt had an episode pof bloody bowel movement at 1:10 am and started bleeding from the mouth at 1:35 am  Pt is hemodynamically stable with BP in 140-150/70-80 and HR in 70s  Pt is non-verbal at baseline and is AA*00    Stat CBC, CMP, PT/INR, PTT, haptoglobin, lactate and lactate dehydrogenase were drwan and sent for.    Called Pt's son Latrell Mack, informed him about the pt's condition and he consented for blood transfusion and confirmed pt is FULL CODE.

## 2022-09-05 NOTE — PHARMACOTHERAPY INTERVENTION NOTE - COMMENTS
Contacted micro lab to obtain cefiderocol susceptibilities for NDM-producing Enterobacter in the blood.

## 2022-09-05 NOTE — CONSULT NOTE ADULT - ASSESSMENT
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 ESR might be elevated in setting of infection/bacteremia  - Follow up EEG  - Continue IV Vimpat 50mg BID. Consider checking a level  - Continue PO Keppra 500mg BID. Level from 08/19 noted (26.6)  - Continue Atorvastatin 80mg QD. LDL 08/03; Hba1c 08/06 10.4; TSH 08/08 0.86  - Consider resuming Aspirin 81mg and Plavix 75mg QD after weighing benefits (stroke) and risks (of recurrent GI bleed)  - Avoid sedation      HEENT:   - Oral Care      PULMONARY:   - HOB 45  - Monitor SAO2 while on NC 2LPM  - Daily chest X ray      CARDIOVASCULAR:   - Keep Normotensive  - Continue Hydralazine 100mg PO Q8h and labetalol 600mg PO TID  - Continue IV Labetalol 10mg Q6h PRN for SBP >180mmHg  - TTE 08/14 noted EF 61%, DDI, mild MR, mild AR  - Continue IVF with LR at 100mL/hour  - Strict I/Os  - Keep euvolemic      GI:  - GI team input appreciated. Might consider repeat EGD with colonoscopy on 09/06. EGD findings from 08/24 noted   - Monitor for recurrence of GI bleeding and monitor vitals closely for signs of bleed  - Trend CBC closely and transfuse as needed (received 1 unit pRBC and ordered for a 2nd unit 09/05). Keep Active Type and Screen  - Continue protonix drip  - Discuss resumption of DAPs   - S/P PEG tube placement 08/24 but will keep NPO for now on LR at 100mL/hour      RENAL:   - Nephrology input appreciated  - Trend BUN, Cr and lytes, replete as necessary  - Continue IVF LR at 100mL/hour  - Monitor I/O  - Keep nolasco catheter      INFECTIOUS DISEASE:   - Infectious Disease team on board  - Blood culture from 09/01 growing MDR Enterobacter noted. Most recent blood culture 09/04 NGTD  - Continue IV Ceftazidime/Avibactam 2g BID (started on 09/02)   - Continue IV Aztreonam 2g BID (started 09/03)      HEMATOLOGICAL:   - Holding DVT prophylaxis for GI bleed  - Trend CBC closely and transfuse as needed (received 1 unit pRBC and ordered for a 2nd unit 09/05)  - Keep Active Type and Screen      ENDOCRINE:   - Hba1c 08/06 10.4; TSH 08/08 0.86 noted  - Follow up FS  - Start Sliding scale if needed      MUSCULOSKELETAL:   - Bedrest for now      To Be Discussed with Neurocritical Care Team

## 2022-09-05 NOTE — PROGRESS NOTE ADULT - SUBJECTIVE AND OBJECTIVE BOX
Gastroenterology progress note:     Patient is a 56y old  Female who presents with a chief complaint of Right LE Cellulitis (05 Sep 2022 14:22)       Admitted on: 08-02-22    We are following the patient for: PEG and now GI recalled for hematochezia       Interval History:    PT was noted to have bleeding per mouth w/ oozing red blood from oral cavity likely from biting her tongue vs oral ulcer. PT also noted to have red blood per rectum with clots although remained hemodynamically stable. Pt  was upgraded to ICU for closer monitoring  - Diet - tolerated tube feeds  - last BM - this am. moderate volume hematochezia with clots  - Abdominal pain - none appreciated      PAST MEDICAL & SURGICAL HISTORY:  Hypertension      Diabetes mellitus      No significant past surgical history          MEDICATIONS  (STANDING):  atorvastatin 80 milliGRAM(s) Oral at bedtime  aztreonam  IVPB      aztreonam  IVPB 2000 milliGRAM(s) IV Intermittent every 12 hours  bisacodyl 20 milliGRAM(s) Oral at bedtime  ceftazidime/avibactam IVPB 0.94 Gram(s) IV Intermittent every 12 hours  chlorhexidine 2% Cloths 1 Application(s) Topical <User Schedule>  collagenase Ointment 1 Application(s) Topical two times a day  Dakins Solution - 1/2 Strength 1 Application(s) Topical two times a day  dextrose 5%. 1000 milliLiter(s) (100 mL/Hr) IV Continuous <Continuous>  dextrose 5%. 1000 milliLiter(s) (50 mL/Hr) IV Continuous <Continuous>  dextrose 50% Injectable 25 Gram(s) IV Push once  dextrose 50% Injectable 12.5 Gram(s) IV Push once  dextrose 50% Injectable 25 Gram(s) IV Push once  glucagon  Injectable 1 milliGRAM(s) IntraMuscular once  hydrALAZINE 100 milliGRAM(s) Oral every 8 hours  labetalol 600 milliGRAM(s) Oral three times a day  lacosamide IVPB 50 milliGRAM(s) IV Intermittent every 12 hours  levETIRAcetam  Solution 500 milliGRAM(s) Oral two times a day  lidocaine 1%/epinephrine 1:100,000 Inj 20 milliLiter(s) Local Injection once  multivitamin/minerals/iron Oral Solution (CENTRUM) 15 milliLiter(s) Oral daily  pantoprazole  Injectable 80 milliGRAM(s) IV Push once  pantoprazole Infusion 8 mG/Hr (10 mL/Hr) IV Continuous <Continuous>  polyethylene glycol/electrolyte Solution. 4000 milliLiter(s) Oral once  sodium bicarbonate 650 milliGRAM(s) Oral every 8 hours  sodium chloride 0.65% Nasal 2 Spray(s) Both Nostrils two times a day  sodium chloride 0.9%. 1000 milliLiter(s) (100 mL/Hr) IV Continuous <Continuous>    MEDICATIONS  (PRN):  acetaminophen     Tablet .. 650 milliGRAM(s) Oral every 6 hours PRN Temp greater or equal to 38C (100.4F), Mild Pain (1 - 3)  dextrose Oral Gel 15 Gram(s) Oral once PRN Blood Glucose LESS THAN 70 milliGRAM(s)/deciliter  furosemide   Injectable 40 milliGRAM(s) IV Push once PRN post 1 PRBC  labetalol Injectable 10 milliGRAM(s) IV Push every 6 hours PRN Systolic blood pressure >  LORazepam     Tablet 1 milliGRAM(s) Oral once PRN Agitation  melatonin 3 milliGRAM(s) Oral at bedtime PRN Insomnia      Allergies  No Known Allergies      Review of Systems:   unable to obtain    Physical Examination:  T(C): 37.2 (09-05-22 @ 12:00), Max: 37.2 (09-05-22 @ 08:00)  HR: 80 (09-05-22 @ 12:00) (72 - 80)  BP: 174/103 (09-05-22 @ 12:00) (134/77 - 208/107)  RR: 18 (09-05-22 @ 12:00) (16 - 21)  SpO2: 97% (09-05-22 @ 12:00) (97% - 99%)  Weight (kg): 81.4 (09-05-22 @ 03:14)    09-04-22 @ 07:01  -  09-05-22 @ 07:00  --------------------------------------------------------  IN: 2568 mL / OUT: 1050 mL / NET: 1518 mL    09-05-22 @ 07:01  -  09-05-22 @ 15:10  --------------------------------------------------------  IN: 825 mL / OUT: 1200 mL / NET: -375 mL        GENERAL: NAD  HEAD:  Atraumatic, Normocephalic. oral cavity oozing red blood  EYES: conjunctiva and sclera clear  NECK: Supple, no JVD or thyromegaly  CHEST/LUNG: Clear to auscultation bilaterally; No wheeze, rhonchi, or rales  HEART: Regular rate and rhythm; normal S1, S2, No murmurs.  ABDOMEN: Soft, nontender, nondistended; Bowel sounds present. PEG irrigation - coffee grounds mostly and no fresh blood. small clots evident. MIREYA: maroon stool w/ clots  NEUROLOGY: No asterixis or tremor.   SKIN: Intact, no jaundice     Data:                        8.7    16.46 )-----------( 146      ( 05 Sep 2022 12:19 )             25.3     Hgb trend:  8.7  09-05-22 @ 12:19  6.5  09-05-22 @ 01:45  6.5  09-05-22 @ 00:58  7.4  09-03-22 @ 07:54      09-04-22 @ 07:01  -  09-05-22 @ 07:00  --------------------------------------------------------  IN: 293 mL    09-05-22 @ 07:01  -  09-05-22 @ 15:10  --------------------------------------------------------  IN: 275 mL      09-05    138  |  104  |  88<HH>  ----------------------------<  117<H>  4.0   |  20  |  3.4<H>    Ca    8.3<L>      05 Sep 2022 12:19    TPro  5.6<L>  /  Alb  2.7<L>  /  TBili  0.5  /  DBili  x   /  AST  59<H>  /  ALT  45<H>  /  AlkPhos  376<H>  09-05    Liver panel trend:  TBili 0.5   /   AST 59   /   ALT 45   /   AlkP 376   /   Tptn 5.6   /   Alb 2.7    /   DBili --      09-05  TBili 0.5   /   AST 72   /   ALT 51   /   AlkP 404   /   Tptn 5.5   /   Alb 2.6    /   DBili --      09-05  TBili 0.6   /   AST 50   /   ALT 34   /   AlkP 336   /   Tptn 5.4   /   Alb 2.7    /   DBili --      09-03  TBili 0.8   /   AST 43   /   ALT 26   /   AlkP 246   /   Tptn 5.4   /   Alb 2.7    /   DBili --      09-02  TBili 0.6   /   AST 25   /   ALT 14   /   AlkP 129   /   Tptn 5.8   /   Alb 2.9    /   DBili --      09-01  TBili 0.3   /   AST 16   /   ALT 15   /   AlkP 153   /   Tptn 7.1   /   Alb 3.9    /   DBili 0.2      08-30  TBili 0.2   /   AST 11   /   ALT 13   /   AlkP 156   /   Tptn 6.8   /   Alb 3.7    /   DBili --      08-30  TBili 0.3   /   AST 11   /   ALT 14   /   AlkP 134   /   Tptn 6.5   /   Alb 3.5    /   DBili --      08-29  TBili 0.2   /   AST 13   /   ALT 17   /   AlkP 129   /   Tptn 6.4   /   Alb 3.5    /   DBili --      08-28  TBili <0.2   /   AST 16   /   ALT 19   /   AlkP 146   /   Tptn 6.5   /   Alb 3.5    /   DBili --      08-27      PT/INR - ( 05 Sep 2022 01:59 )   PT: 14.60 sec;   INR: 1.27 ratio         PTT - ( 05 Sep 2022 01:59 )  PTT:32.3 sec    Culture - Blood (collected 04 Sep 2022 05:43)  Source: .Blood None  Preliminary Report (05 Sep 2022 12:01):    No growth to date.    Culture - Blood (collected 03 Sep 2022 18:18)  Source: .Blood None  Preliminary Report (05 Sep 2022 04:01):    No growth to date.         Radiology:

## 2022-09-05 NOTE — PROGRESS NOTE ADULT - ASSESSMENT
ALF rule out ATN / relative hypotension/ sepsis / E cloaca bacteremia / HTN ( was on multiple meds )/ CVA/ GIB  cr trending up / non oliguric   follow trend Hb   sono no hydro  non oliguric   continue iv fluids / PRBC   cont sodium bicarbonate to q8  check IP and PTH   no need for RRT  will follow

## 2022-09-05 NOTE — CONSULT NOTE ADULT - ASSESSMENT
Pt is a 57yo Female who presents with RLE cellulitis, CVA, now LGIB - oral cavity bleeding likely 2* tongue laceration from biting. Placed bite block.    ·	cont bite block as much as possible to avoid further tongue trauma  ·	oral care when possible  ·	no acute ENT intervention  ·	w/d with attng

## 2022-09-05 NOTE — CONSULT NOTE ADULT - SUBJECTIVE AND OBJECTIVE BOX
Pt is a 57yo Female who presents with RLE cellulitis, CVA, now LGIB - called by medical resident as patient has oozing of blood from oral cavity. Pt is baseline non verbal, as per RN - pt has been biting her tongue, not compliant with oral exam or oral care due to baseline neurologic status.     PAST MEDICAL & SURGICAL HISTORY:  Hypertension  Diabetes mellitus      No significant past surgical history      MEDICATIONS  (STANDING):  atorvastatin 80 milliGRAM(s) Oral at bedtime  aztreonam  IVPB      aztreonam  IVPB 2000 milliGRAM(s) IV Intermittent every 12 hours  bisacodyl 20 milliGRAM(s) Oral at bedtime  ceftazidime/avibactam IVPB 0.94 Gram(s) IV Intermittent every 12 hours  chlorhexidine 2% Cloths 1 Application(s) Topical <User Schedule>  collagenase Ointment 1 Application(s) Topical two times a day  Dakins Solution - 1/2 Strength 1 Application(s) Topical two times a day  dextrose 5%. 1000 milliLiter(s) (100 mL/Hr) IV Continuous <Continuous>  dextrose 5%. 1000 milliLiter(s) (50 mL/Hr) IV Continuous <Continuous>  dextrose 50% Injectable 25 Gram(s) IV Push once  dextrose 50% Injectable 12.5 Gram(s) IV Push once  dextrose 50% Injectable 25 Gram(s) IV Push once  glucagon  Injectable 1 milliGRAM(s) IntraMuscular once  hydrALAZINE 100 milliGRAM(s) Oral every 8 hours  labetalol 600 milliGRAM(s) Oral three times a day  lacosamide IVPB 50 milliGRAM(s) IV Intermittent every 12 hours  levETIRAcetam  Solution 500 milliGRAM(s) Oral two times a day  lidocaine 1%/epinephrine 1:100,000 Inj 20 milliLiter(s) Local Injection once  multivitamin/minerals/iron Oral Solution (CENTRUM) 15 milliLiter(s) Oral daily  pantoprazole  Injectable 80 milliGRAM(s) IV Push once  pantoprazole Infusion 8 mG/Hr (10 mL/Hr) IV Continuous <Continuous>  polyethylene glycol/electrolyte Solution. 4000 milliLiter(s) Oral once  sodium bicarbonate 650 milliGRAM(s) Oral every 8 hours  sodium chloride 0.65% Nasal 2 Spray(s) Both Nostrils two times a day  sodium chloride 0.9%. 1000 milliLiter(s) (100 mL/Hr) IV Continuous <Continuous>    MEDICATIONS  (PRN):  acetaminophen     Tablet .. 650 milliGRAM(s) Oral every 6 hours PRN Temp greater or equal to 38C (100.4F), Mild Pain (1 - 3)  dextrose Oral Gel 15 Gram(s) Oral once PRN Blood Glucose LESS THAN 70 milliGRAM(s)/deciliter  furosemide   Injectable 40 milliGRAM(s) IV Push once PRN post 1 PRBC  labetalol Injectable 10 milliGRAM(s) IV Push every 6 hours PRN Systolic blood pressure >  LORazepam     Tablet 1 milliGRAM(s) Oral once PRN Agitation  melatonin 3 milliGRAM(s) Oral at bedtime PRN Insomnia      Allergies    No Known Allergies    Intolerances    FAMILY HISTORY:  FH: type 2 diabetes mellitus      REVIEW OF SYSTEMS   [x] Due to altered mental status/intubation, subjective information were not able to be obtained from patient. History was obtained, to the extent possible, from review of the chart and collateral sources of information.    Vital Signs Last 24 Hrs  T(C): 37.2 (05 Sep 2022 08:00), Max: 37.4 (04 Sep 2022 12:19)  T(F): 98.9 (05 Sep 2022 08:00), Max: 99.3 (04 Sep 2022 12:19)  HR: 78 (05 Sep 2022 11:52) (72 - 82)  BP: 201/102 (05 Sep 2022 11:52) (134/77 - 208/107)  BP(mean): 143 (05 Sep 2022 11:35) (103 - 151)  RR: 17 (05 Sep 2022 11:52) (16 - 21)  SpO2: 97% (05 Sep 2022 11:52) (97% - 99%)    Parameters below as of 05 Sep 2022 08:59  Patient On (Oxygen Delivery Method): room air      GEN: NAD, awake. + drooling, no pooling of secretions. No stridor or stertor. non verbal baseline.    SKIN: Good color, non diaphoretic.  HEENT: NC/AT; + blood tinged oral secretions noted, dark blood. + biting right tongue, laceration noted. unable to further assess oral cavity as patient is non compliant with exam and does not follow commands.   NECK: Trachea midline, Neck supple, no TTP to B/L lateral neck, no cervical LAD.  RESP: No dyspnea, non-labored breathing. No use of accessory muscles.   CARDIO: +S1/S2  ABDO: Soft, NT.  EXT: no LE edema      LABS:                        6.5    14.53 )-----------( 153      ( 05 Sep 2022 01:45 )             19.3     09-05    144  |  108  |  88<HH>  ----------------------------<  108<H>  4.4   |  22  |  3.5<H>    Ca    8.2<L>      05 Sep 2022 01:41    TPro  5.5<L>  /  Alb  2.6<L>  /  TBili  0.5  /  DBili  x   /  AST  72<H>  /  ALT  51<H>  /  AlkPhos  404<H>  09-05    PT/INR - ( 05 Sep 2022 01:59 )   PT: 14.60 sec;   INR: 1.27 ratio         PTT - ( 05 Sep 2022 01:59 )  PTT:32.3 sec

## 2022-09-05 NOTE — PROGRESS NOTE ADULT - SUBJECTIVE AND OBJECTIVE BOX
Nephrology progress note    THIS IS AN INCOMPLETE NOTE . FULL NOTE TO FOLLOW SHORTLY    Patient is seen and examined, events over the last 24 h noted .    Allergies:  No Known Allergies    Hospital Medications:   MEDICATIONS  (STANDING):  aspirin  chewable 81 milliGRAM(s) Oral daily  atorvastatin 80 milliGRAM(s) Oral at bedtime  aztreonam  IVPB      aztreonam  IVPB 2000 milliGRAM(s) IV Intermittent every 12 hours  ceftazidime/avibactam IVPB 0.94 Gram(s) IV Intermittent every 12 hours  chlorhexidine 2% Cloths 1 Application(s) Topical <User Schedule>  clopidogrel Tablet 75 milliGRAM(s) Oral daily  collagenase Ointment 1 Application(s) Topical two times a day  Dakins Solution - 1/2 Strength 1 Application(s) Topical two times a day  dextrose 5%. 1000 milliLiter(s) (50 mL/Hr) IV Continuous <Continuous>  dextrose 5%. 1000 milliLiter(s) (100 mL/Hr) IV Continuous <Continuous>  dextrose 50% Injectable 25 Gram(s) IV Push once  dextrose 50% Injectable 12.5 Gram(s) IV Push once  dextrose 50% Injectable 25 Gram(s) IV Push once  glucagon  Injectable 1 milliGRAM(s) IntraMuscular once  hydrALAZINE 100 milliGRAM(s) Oral every 8 hours  insulin glargine Injectable (LANTUS) 22 Unit(s) SubCutaneous every morning  insulin lispro (ADMELOG) corrective regimen sliding scale   SubCutaneous three times a day before meals  insulin lispro Injectable (ADMELOG) 5 Unit(s) SubCutaneous three times a day before meals  labetalol 600 milliGRAM(s) Oral three times a day  lacosamide IVPB 50 milliGRAM(s) IV Intermittent every 12 hours  levETIRAcetam  Solution 500 milliGRAM(s) Oral two times a day  lidocaine 1%/epinephrine 1:100,000 Inj 20 milliLiter(s) Local Injection once  multivitamin/minerals/iron Oral Solution (CENTRUM) 15 milliLiter(s) Oral daily  pantoprazole  Injectable 80 milliGRAM(s) IV Push once  pantoprazole Infusion 8 mG/Hr (10 mL/Hr) IV Continuous <Continuous>  sodium bicarbonate 650 milliGRAM(s) Oral every 8 hours  sodium chloride 0.65% Nasal 2 Spray(s) Both Nostrils two times a day  sodium chloride 0.9%. 1000 milliLiter(s) (100 mL/Hr) IV Continuous <Continuous>        VITALS:  T(F): 98.2 (22 @ 03:14), Max: 99.3 (22 @ 12:19)  HR: 74 (22 @ 07:00)  BP: 168/86 (22 @ 07:00)  RR: 18 (22 @ 07:00)  SpO2: 98% (22 @ 07:00)  Wt(kg): --     07:  -   07:00  --------------------------------------------------------  IN: 2005 mL / OUT: 1775 mL / NET: 230 mL    :01  -   07:00  --------------------------------------------------------  IN: 2568 mL / OUT: 1050 mL / NET: 1518 mL      Height (cm): 165.1 ( 03:14)  Weight (kg): 81.4 ( 03:14)  BMI (kg/m2): 29.9 ( 03:14)  BSA (m2): 1.89 ( 03:14)    PHYSICAL EXAM:  Constitutional: NAD  HEENT: anicteric sclera, oropharynx clear, MMM  Neck: No JVD  Respiratory: CTAB, no wheezes, rales or rhonchi  Cardiovascular: S1, S2, RRR  Gastrointestinal: BS+, soft, NT/ND  Extremities: No cyanosis or clubbing. No peripheral edema  :  No nolasco.   Skin: No rashes    LABS:      144  |  108  |  88<HH>  ----------------------------<  108<H>  4.4   |  22  |  3.5<H>    Ca    8.2<L>      05 Sep 2022 01:41    TPro  5.5<L>  /  Alb  2.6<L>  /  TBili  0.5  /  DBili      /  AST  72<H>  /  ALT  51<H>  /  AlkPhos  404<H>                            6.5    14.53 )-----------( 153      ( 05 Sep 2022 01:45 )             19.3       Urine Studies:  Urinalysis Basic - ( 02 Sep 2022 12:57 )    Color: Yellow / Appearance: Slightly Turbid / S.013 / pH:   Gluc:  / Ketone: Negative  / Bili: Negative / Urobili: 6 mg/dL   Blood:  / Protein: 100 mg/dL / Nitrite: Negative   Leuk Esterase: Large / RBC: 30 /HPF / WBC 57 /HPF   Sq Epi:  / Non Sq Epi: 8 /HPF / Bacteria: Negative      Sodium, Random Urine: 33.0 mmoL/L ( @ 13:30)  Creatinine, Random Urine: 35 mg/dL ( @ 13:30)  Protein/Creatinine Ratio Calculation: 2.3 Ratio ( @ 13:30)  Potassium, Random Urine: 47 mmol/L ( @ 13:30)  Osmolality, Random Urine: 288 mos/kg ( @ 12:57)      Ferritin 243      [22 @ 05:49]  TSH 0.86      [22 @ 17:53]  Lipid: chol 234, , HDL 42, LDL --      [22 @ 08:56]      LUIS FELIPE: titer Negative, pattern --      [22 @ 11:37]  dsDNA <12      [22 @ 19:27]  Rheumatoid Factor <10      [22 @ 11:37]  ANCA: cANCA Negative, pANCA Negative, atypical ANCA Negative      [22 @ 19:27]  Syphilis Screen (Treponema Pallidum Ab) Negative      [22 @ 17:53]  Immunofixation Serum:   No Monoclonal Band Identified    Reference Range: None Detected      [22 @ 23:46]  SPEP Interpretation: Pattern Consistent With Acute Inflammation Or Stress      [22 @ 23:46]      RADIOLOGY & ADDITIONAL STUDIES:   Nephrology progress note    Patient is seen and examined, events over the last 24 h noted .  Lying in bed   upgraded to MICU for GIB     Allergies:  No Known Allergies    Hospital Medications:   MEDICATIONS  (STANDING):  aspirin  chewable 81 milliGRAM(s) Oral daily  atorvastatin 80 milliGRAM(s) Oral at bedtime  aztreonam  IVPB 2000 milliGRAM(s) IV Intermittent every 12 hours  ceftazidime/avibactam IVPB 0.94 Gram(s) IV Intermittent every 12 hours  clopidogrel Tablet 75 milliGRAM(s) Oral daily  collagenase Ointment 1 Application(s) Topical two times a day  Dakins Solution - 1/2 Strength 1 Application(s) Topical two times a day  glucagon  Injectable 1 milliGRAM(s) IntraMuscular once  hydrALAZINE 100 milliGRAM(s) Oral every 8 hours  insulin glargine Injectable (LANTUS) 22 Unit(s) SubCutaneous every morning  insulin lispro (ADMELOG) corrective regimen sliding scale   SubCutaneous three times a day before meals  insulin lispro Injectable (ADMELOG) 5 Unit(s) SubCutaneous three times a day before meals  labetalol 600 milliGRAM(s) Oral three times a day  lacosamide IVPB 50 milliGRAM(s) IV Intermittent every 12 hours  levETIRAcetam  Solution 500 milliGRAM(s) Oral two times a day  lidocaine 1%/epinephrine 1:100,000 Inj 20 milliLiter(s) Local Injection once  multivitamin/minerals/iron Oral Solution (CENTRUM) 15 milliLiter(s) Oral daily  pantoprazole  Injectable 80 milliGRAM(s) IV Push once  pantoprazole Infusion 8 mG/Hr (10 mL/Hr) IV Continuous <Continuous>  sodium bicarbonate 650 milliGRAM(s) Oral every 8 hours  sodium chloride 0.65% Nasal 2 Spray(s) Both Nostrils two times a day  sodium chloride 0.9%. 1000 milliLiter(s) (100 mL/Hr) IV Continuous <Continuous>        VITALS:  T(F): 98.2 (22 @ 03:14), Max: 99.3 (22 @ 12:19)  HR: 74 (22 @ 07:00)  BP: 168/86 (22 @ 07:00)  RR: 18 (22 @ 07:00)  SpO2: 98% (22 @ 07:00)  Wt(kg): --     @ 07:01  -   @ 07:00  --------------------------------------------------------  IN: 2005 mL / OUT: 1775 mL / NET: 230 mL     @ 07:01  -   @ 07:00  --------------------------------------------------------  IN: 2568 mL / OUT: 1050 mL / NET: 1518 mL      Height (cm): 165.1 ( 03:14)  Weight (kg): 81.4 (:14)  BMI (kg/m2): 29.9 (:14)  BSA (m2): 1.89 (:14)    PHYSICAL EXAM:  Constitutional: NAD  Respiratory: CTAB,  Cardiovascular: S1, S2, RRR  Gastrointestinal: BS+, soft, NT/ND  Extremities: No cyanosis or clubbing. No peripheral edema  :  nolasco.   Skin: No rashes    LABS:      144  |  108  |  88<HH>  ----------------------------<  108<H>  4.4   |  22  |  3.5<H>    Creatinine Trend: 3.5<--, 3.2<--, 3.0<--, 2.8<--, 1.9<--, 1.6<--    Ca    8.2<L>      05 Sep 2022 01:41    TPro  5.5<L>  /  Alb  2.6<L>  /  TBili  0.5  /  DBili      /  AST  72<H>  /  ALT  51<H>  /  AlkPhos  404<H>                            6.5    14.53 )-----------( 153      ( 05 Sep 2022 01:45 )             19.3       Urine Studies:  Urinalysis Basic - ( 02 Sep 2022 12:57 )    Color: Yellow / Appearance: Slightly Turbid / S.013 / pH:   Gluc:  / Ketone: Negative  / Bili: Negative / Urobili: 6 mg/dL   Blood:  / Protein: 100 mg/dL / Nitrite: Negative   Leuk Esterase: Large / RBC: 30 /HPF / WBC 57 /HPF   Sq Epi:  / Non Sq Epi: 8 /HPF / Bacteria: Negative      Sodium, Random Urine: 33.0 mmoL/L ( @ 13:30)  Creatinine, Random Urine: 35 mg/dL ( @ 13:30)  Protein/Creatinine Ratio Calculation: 2.3 Ratio (:)  Potassium, Random Urine: 47 mmol/L ( @ :30)  Osmolality, Random Urine: 288 mos/kg ( @ 12:57)      Ferritin 243      [22 @ 05:49]  TSH 0.86      [22 @ 17:53]  Lipid: chol 234, , HDL 42, LDL --      [22 @ 08:56]      LUIS FELIPE: titer Negative, pattern --      [22 @ 11:37]  dsDNA <12      [22 @ 19:27]  Rheumatoid Factor <10      [22 @ 11:37]  ANCA: cANCA Negative, pANCA Negative, atypical ANCA Negative      [22 @ 19:27]  Syphilis Screen (Treponema Pallidum Ab) Negative      [22 @ 17:53]  Immunofixation Serum:   No Monoclonal Band Identified    Reference Range: None Detected      [22 @ 23:46]  SPEP Interpretation: Pattern Consistent With Acute Inflammation Or Stress      [22 @ 23:46]      RADIOLOGY & ADDITIONAL STUDIES:

## 2022-09-06 NOTE — CONSULT NOTE ADULT - ASSESSMENT
ASSESSMENT:  56 year old female PMHx of HTN, DM2, stroke (2017) admitted for RLE wound. S/P debridement on 8/10/22 with burn team. Hospital course complicated my episode of AMS and found to have multiple punctate infarcts. On 9/5, patient had an episode of bloody movement and 30 minutes later had an episode of bleeding from the oral cavity (tongue laceration). Hgb was 6.5 and was given 2 units with Lasix. Post transfusion Hgb was 8.7. Patient upgraded to ICU for further management. Patient intubated on AM of 9/6 for bedside EGD and colonoscopy. EGD - nonerosive gastritis; colonoscopy - large bleeding external hemorrhoids, no evidence of active bleeding. Surgery consulted for BRBPR. Patient with above PMHx admitted for RLE wound s/p debridement on 8/10 with burn team. Physical exam findings, imaging, and labs as documented above.     PLAN:  -External hemorrhoids unlikely the cause of acute hgb drop  -No acute surgical intervention   -Continue trending hgb, transfuse PRN   -GI following  -PPI BID  -Surgery to follow     Above plan discussed with Attending Surgeon Dr. Frye, patient, patient family, and Primary team  09-06-22 @ 21:37    Blue Team Spectra: [phone] ASSESSMENT:  56 year old female PMHx of HTN, DM2, stroke (2017) admitted for RLE wound. S/P debridement on 8/10/22 with burn team. Hospital course complicated my episode of AMS and found to have multiple punctate infarcts. On 9/5, patient had an episode of bloody movement and 30 minutes later had an episode of bleeding from the oral cavity (tongue laceration). Hgb was 6.5 and was given 2 units with Lasix. Post transfusion Hgb was 8.7. Patient upgraded to ICU for further management. Patient intubated on AM of 9/6 for bedside EGD and colonoscopy. EGD - nonerosive gastritis; colonoscopy - large bleeding external hemorrhoids, no evidence of active bleeding. Surgery consulted for BRBPR. Patient with above PMHx admitted for RLE wound s/p debridement on 8/10 with burn team. Physical exam findings, imaging, and labs as documented above.     PLAN:  -Bleeding from External hemorrhoids observed on colonoscopy  -No acute surgical intervention   -Continue trending hgb, transfuse PRN   -GI following  -PPI BID  -Surgery to follow     Above plan discussed with Attending Surgeon Dr. Frye, patient, patient family, and Primary team  09-06-22 @ 21:37    Blue Team Spectra: [phone]

## 2022-09-06 NOTE — AIRWAY PLACEMENT NOTE ADULT - AIRWAY COMMENTS:
Visualization attempted by Dr. Aguilar, On visualization attempt #2, airway placed by anesthesia, atraumatic insertion.

## 2022-09-06 NOTE — PROGRESS NOTE ADULT - SUBJECTIVE AND OBJECTIVE BOX
Over Night Events: events noted, unresponsive,  CC/H, bloody BM, SP 3 units prbc, sp EEG    PHYSICAL EXAM    ICU Vital Signs Last 24 Hrs  T(C): 36.9 (06 Sep 2022 08:00), Max: 37.5 (05 Sep 2022 16:00)  T(F): 98.5 (06 Sep 2022 08:00), Max: 99.5 (05 Sep 2022 16:00)  HR: 80 (06 Sep 2022 08:00) (68 - 84)  BP: 156/88 (06 Sep 2022 08:00) (125/73 - 208/107)  BP(mean): 116 (06 Sep 2022 08:00) (92 - 151)  RR: 33 (06 Sep 2022 08:00) (14 - 33)  SpO2: 100% (06 Sep 2022 08:00) (95% - 100%)    O2 Parameters below as of 06 Sep 2022 07:00  Patient On (Oxygen Delivery Method): nasal cannula  O2 Flow (L/min): 2          General: ILL looking, unresponsive  Lungs: dec bs both bases  Cardiovascular: PARAG 2.6  Abdomen: Soft, Positive BS  Extremities: No clubbing   Unresponsive    09-05-22 @ 07:01  -  09-06-22 @ 07:00  --------------------------------------------------------  IN:    Enteral Tube Flush: 4000 mL    Pantoprazole: 60 mL    PRBCs (Packed Red Blood Cells): 275 mL    sodium chloride 0.9%: 1800 mL  Total IN: 6135 mL    OUT:    Indwelling Catheter - Urethral (mL): 2555 mL    Voided (mL): 300 mL  Total OUT: 2855 mL    Total NET: 3280 mL          LABS:                          7.6    15.27 )-----------( 162      ( 06 Sep 2022 05:30 )             22.1                                               09-06    142  |  107  |  78<HH>  ----------------------------<  112<H>  4.2   |  20  |  3.3<H>    Ca    8.3<L>      06 Sep 2022 05:30  Phos  4.5     09-05  Mg     2.2     09-06    TPro  5.2<L>  /  Alb  2.5<L>  /  TBili  0.4  /  DBili  x   /  AST  46<H>  /  ALT  38  /  AlkPhos  311<H>  09-06      PT/INR - ( 05 Sep 2022 16:33 )   PT: 14.20 sec;   INR: 1.24 ratio         PTT - ( 05 Sep 2022 16:33 )  PTT:32.3 sec                                                                                     LIVER FUNCTIONS - ( 06 Sep 2022 05:30 )  Alb: 2.5 g/dL / Pro: 5.2 g/dL / ALK PHOS: 311 U/L / ALT: 38 U/L / AST: 46 U/L / GGT: x                                                  Culture - Blood (collected 04 Sep 2022 05:43)  Source: .Blood None  Preliminary Report (05 Sep 2022 12:01):    No growth to date.    Culture - Blood (collected 03 Sep 2022 18:18)  Source: .Blood None  Preliminary Report (05 Sep 2022 04:01):    No growth to date.                                                                                           MEDICATIONS  (STANDING):  aspirin  chewable 81 milliGRAM(s) Oral daily  atorvastatin 80 milliGRAM(s) Oral at bedtime  aztreonam  IVPB      aztreonam  IVPB 2000 milliGRAM(s) IV Intermittent every 12 hours  ceftazidime/avibactam IVPB 0.94 Gram(s) IV Intermittent every 12 hours  chlorhexidine 2% Cloths 1 Application(s) Topical <User Schedule>  collagenase Ointment 1 Application(s) Topical two times a day  Dakins Solution - 1/2 Strength 1 Application(s) Topical two times a day  dextrose 5%. 1000 milliLiter(s) (100 mL/Hr) IV Continuous <Continuous>  dextrose 5%. 1000 milliLiter(s) (50 mL/Hr) IV Continuous <Continuous>  dextrose 50% Injectable 25 Gram(s) IV Push once  dextrose 50% Injectable 12.5 Gram(s) IV Push once  dextrose 50% Injectable 25 Gram(s) IV Push once  glucagon  Injectable 1 milliGRAM(s) IntraMuscular once  hydrALAZINE 100 milliGRAM(s) Oral every 8 hours  labetalol 600 milliGRAM(s) Oral three times a day  lacosamide IVPB 50 milliGRAM(s) IV Intermittent every 12 hours  levETIRAcetam  Solution 500 milliGRAM(s) Oral two times a day  lidocaine 1%/epinephrine 1:100,000 Inj 20 milliLiter(s) Local Injection once  multivitamin/minerals/iron Oral Solution (CENTRUM) 15 milliLiter(s) Oral daily  pantoprazole  Injectable 40 milliGRAM(s) IV Push two times a day  sodium bicarbonate 650 milliGRAM(s) Oral every 8 hours  sodium chloride 0.65% Nasal 2 Spray(s) Both Nostrils two times a day  sodium chloride 0.9%. 1000 milliLiter(s) (100 mL/Hr) IV Continuous <Continuous>    MEDICATIONS  (PRN):  acetaminophen     Tablet .. 650 milliGRAM(s) Oral every 6 hours PRN Temp greater or equal to 38C (100.4F), Mild Pain (1 - 3)  dextrose Oral Gel 15 Gram(s) Oral once PRN Blood Glucose LESS THAN 70 milliGRAM(s)/deciliter  furosemide   Injectable 40 milliGRAM(s) IV Push once PRN post 1 PRBC  labetalol Injectable 10 milliGRAM(s) IV Push every 6 hours PRN Systolic blood pressure >  melatonin 3 milliGRAM(s) Oral at bedtime PRN Insomnia      Xrays:                                                                                     ECHO

## 2022-09-06 NOTE — CONSULT NOTE ADULT - CONVERSATION DETAILS
Spoke with son on phone. introduced palliative care. he asserts that  he is the decision maker but includes the patients spouse in updates. he has been given  medical updates. he is still hopeful his mother will recover and improve from here. discussed code status and that given how ill she is, CPR may be more burdensome than beneficial. he feels that for now, the patient would want to be a full code at this time. he is open to speaking will palliative regarding GOC once more tests have been done and a more clear prognosis is established.

## 2022-09-06 NOTE — CONSULT NOTE ADULT - PROBLEM SELECTOR RECOMMENDATION 2
poor mental status, little improvement since admission  s/p PEG due to failed S & S evals  neurology for workup for vasculitis - low suspicion   seizure recommendations per neuro poor mental status, little improvement since admission  s/p PEG due to failed S & S evals  neurology for workup for vasculitis   seizure recommendations per neuro

## 2022-09-06 NOTE — PROGRESS NOTE ADULT - SUBJECTIVE AND OBJECTIVE BOX
Patient is a 56y old  Female who presents with a chief complaint of RLE wound (06 Sep 2022 14:00)      INTERVAL HPI/OVERNIGHT EVENTS:     Pt had 3 units of PRBcs in total over the past 2 days.     ICU Vital Signs Last 24 Hrs  T(C): 36.9 (06 Sep 2022 08:00), Max: 37.5 (05 Sep 2022 16:00)  T(F): 98.5 (06 Sep 2022 08:00), Max: 99.5 (05 Sep 2022 16:00)  HR: 63 (06 Sep 2022 14:03) (61 - 84)  BP: 165/85 (06 Sep 2022 13:03) (132/74 - 165/85)  BP(mean): 118 (06 Sep 2022 13:03) (93 - 130)  ABP: --  ABP(mean): --  RR: 21 (06 Sep 2022 13:03) (14 - 33)  SpO2: 100% (06 Sep 2022 14:03) (96% - 100%)    O2 Parameters below as of 06 Sep 2022 12:53  Patient On (Oxygen Delivery Method): ventilator          I&O's Summary    05 Sep 2022 07:01  -  06 Sep 2022 07:00  --------------------------------------------------------  IN: 6135 mL / OUT: 2855 mL / NET: 3280 mL    06 Sep 2022 07:01  -  06 Sep 2022 15:35  --------------------------------------------------------  IN: 500 mL / OUT: 300 mL / NET: 200 mL      Mode: AC/ CMV (Assist Control/ Continuous Mandatory Ventilation)  RR (machine): 16  TV (machine): 400  FiO2: 50  PEEP: 5  ITime: 1  MAP: 10  PIP: 27      LABS:                        7.5    11.44 )-----------( 142      ( 06 Sep 2022 12:39 )             21.5     09-06    142  |  107  |  78<HH>  ----------------------------<  112<H>  4.2   |  20  |  3.3<H>    Ca    8.3<L>      06 Sep 2022 05:30  Phos  4.5     09-05  Mg     2.2     09-06    TPro  5.2<L>  /  Alb  2.5<L>  /  TBili  0.4  /  DBili  x   /  AST  46<H>  /  ALT  38  /  AlkPhos  311<H>  09-06    PT/INR - ( 05 Sep 2022 16:33 )   PT: 14.20 sec;   INR: 1.24 ratio         PTT - ( 05 Sep 2022 16:33 )  PTT:32.3 sec    CAPILLARY BLOOD GLUCOSE      POCT Blood Glucose.: 126 mg/dL (06 Sep 2022 01:20)    ABG - ( 06 Sep 2022 12:57 )  pH, Arterial: 7.41  pH, Blood: x     /  pCO2: 25    /  pO2: 385   / HCO3: 16    / Base Excess: -7.9  /  SaO2: 99.6                RADIOLOGY & ADDITIONAL TESTS:    Consultant(s) Notes Reviewed:  [x ] YES  [ ] NO    MEDICATIONS  (STANDING):  aspirin  chewable 81 milliGRAM(s) Oral daily  atorvastatin 80 milliGRAM(s) Oral at bedtime  aztreonam  IVPB      aztreonam  IVPB 2000 milliGRAM(s) IV Intermittent every 12 hours  ceftazidime/avibactam IVPB 0.94 Gram(s) IV Intermittent every 12 hours  chlorhexidine 0.12% Liquid 15 milliLiter(s) Oral Mucosa every 12 hours  chlorhexidine 2% Cloths 1 Application(s) Topical <User Schedule>  collagenase Ointment 1 Application(s) Topical two times a day  Dakins Solution - 1/2 Strength 1 Application(s) Topical two times a day  dexMEDEtomidine Infusion 0.2 MICROgram(s)/kG/Hr (4.07 mL/Hr) IV Continuous <Continuous>  dextrose 5%. 1000 milliLiter(s) (50 mL/Hr) IV Continuous <Continuous>  dextrose 5%. 1000 milliLiter(s) (100 mL/Hr) IV Continuous <Continuous>  dextrose 50% Injectable 25 Gram(s) IV Push once  dextrose 50% Injectable 12.5 Gram(s) IV Push once  dextrose 50% Injectable 25 Gram(s) IV Push once  fentaNYL   Infusion. 0.5 MICROgram(s)/kG/Hr (4.07 mL/Hr) IV Continuous <Continuous>  glucagon  Injectable 1 milliGRAM(s) IntraMuscular once  hydrALAZINE 100 milliGRAM(s) Oral every 8 hours  labetalol 600 milliGRAM(s) Oral three times a day  lacosamide IVPB 50 milliGRAM(s) IV Intermittent every 12 hours  levETIRAcetam  Solution 500 milliGRAM(s) Oral two times a day  lidocaine 1%/epinephrine 1:100,000 Inj 20 milliLiter(s) Local Injection once  multivitamin/minerals/iron Oral Solution (CENTRUM) 15 milliLiter(s) Oral daily  pantoprazole  Injectable 40 milliGRAM(s) IV Push two times a day  propofol Infusion 10 MICROgram(s)/kG/Min (4.88 mL/Hr) IV Continuous <Continuous>  sodium bicarbonate 650 milliGRAM(s) Oral every 8 hours  sodium chloride 0.65% Nasal 2 Spray(s) Both Nostrils two times a day  sodium chloride 0.9%. 1000 milliLiter(s) (100 mL/Hr) IV Continuous <Continuous>    MEDICATIONS  (PRN):  acetaminophen     Tablet .. 650 milliGRAM(s) Oral every 6 hours PRN Temp greater or equal to 38C (100.4F), Mild Pain (1 - 3)  dextrose Oral Gel 15 Gram(s) Oral once PRN Blood Glucose LESS THAN 70 milliGRAM(s)/deciliter  furosemide   Injectable 40 milliGRAM(s) IV Push once PRN post 1 PRBC  labetalol Injectable 10 milliGRAM(s) IV Push every 6 hours PRN Systolic blood pressure >  melatonin 3 milliGRAM(s) Oral at bedtime PRN Insomnia      PHYSICAL EXAM:  GENERAL:   HEAD:  Atraumatic, Normocephalic  EYES: EOMI, PERRLA, conjunctiva and sclera clear  NECK: Supple, No JVD, Normal thyroid, no enlarged nodes  NERVOUS SYSTEM:  Alert & Awake.   CHEST/LUNG: B/L good air entry; No rales, rhonchi, or wheezing  HEART: S1S2 normal, no S3, Regular rate and rhythm; No murmurs  ABDOMEN: Soft, Nontender, Nondistended; Bowel sounds present  EXTREMITIES:  2+ Peripheral Pulses, No clubbing, cyanosis, or edema  LYMPH: No lymphadenopathy noted  SKIN: No rashes or lesions    Care Discussed with Consultants/Other Providers [ x] YES  [ ] NO

## 2022-09-06 NOTE — CONSULT NOTE ADULT - ASSESSMENT
56yFemale with PMH including HTN, DM2, and stroke (per son 2017), being evaluated for GOC. Patient was initially admitted on 8/2 for RLE wound, hypertensive urgency. Pt was stroke code on 8/5, seizure activity on EEG. MRI showed multiple lacunar acute infarcts on 8/10. Patients mental status continued to be poor, failed S& S evals- s/p PEG tube 8/24. Patient was having LGIB with drop in HGB, now s/p intubation and scope with GI- found hemorrhoids.           MEDD (morphine equivalent daily dose):      See Recs below.    Please call [phone] with questions or concerns 24/7.   We will continue to follow.     Discussed with primary MD.   56yFemale with PMH including HTN, DM2, and stroke (per son 2017), being evaluated for GOC. Patient was initially admitted on 8/2 for RLE wound, hypertensive urgency. Pt was stroke code on 8/5, seizure activity on EEG. MRI showed multiple lacunar acute infarcts on 8/10. Patients mental status continued to be poor, failed S& S evals- s/p PEG tube 8/24. Patient was having LGIB with drop in HGB, now s/p intubation and scope with GI- found hemorrhoids.       Spoke with patients sonLatrell on phone. For now, they would like aggressive medical treatment and workup.   Will re-address GOC as appropriate.     MEDD (morphine equivalent daily dose): 0      See Recs below.    Please call [phone] with questions or concerns 24/7.   We will continue to follow.     Discussed with primary MD.

## 2022-09-06 NOTE — CHART NOTE - NSCHARTNOTEFT_GEN_A_CORE
EGD findings:   Normal mucosa in the whole esophagus.  	Erythema in the stomach compatible with non-erosive gastritis.  	Normal mucosa in the whole examined duodenum    Colonoscopy:   Large bleeding external hemorrhoids were noted.	  Additional findings	-Reached up to distal ascending colon. No evidence of active bleeding noted. Normal colonic mucosa in the examined colon. No evidence of diverticulosis was noted.    Recs:   Surgery evaluation for bleeding external hemorrhoids     Trend CBC, Keep HB>7     Need colonoscopy as an OP

## 2022-09-06 NOTE — CONSULT NOTE ADULT - PROBLEM SELECTOR RECOMMENDATION 4
Full Code  Will check if  defers to son for decision making  Continue aggressive medical management  Will re-address GOC as appropriate   Will follow Enterobacter bacteremia.  -continue IV antibiotics  -f/u ID

## 2022-09-06 NOTE — PROGRESS NOTE ADULT - ASSESSMENT
· Assessment	  ALF rule out ATN / relative hypotension/ sepsis / E cloaca bacteremia / HTN ( was on multiple meds )/ CVA/ GIB  cr stable  non oliguric   follow Hb   GI notes appreciated / EGD / colonoscopy today   sono no hydro  non oliguric   continue iv fluids / PRBC   cont sodium bicarbonate to q8  PH at goal   no need for RRT  will follow

## 2022-09-06 NOTE — CONSULT NOTE ADULT - SUBJECTIVE AND OBJECTIVE BOX
GENERAL SURGERY CONSULT NOTE    Patient: JOSE MITCHELL , 56y ([birth year])Female   MRN: [id number]  Location: [facility]  A  Visit: 08-02-22 Inpatient  Date: 09-06-22 @ 21:37    HPI:  57 yo F with PMH of HTN, DM2, and stroke (per son 2017) who presented for RLE wound. Started 3 months ago when she fell and hit her leg on a wooden cabinet. She put hydrogen peroxide, antibiotic cream, and wrapped the wound but never fully healed. Two weeks ago it started to show yellow pus so her and her family came to the ED for further treatment. Endorsed subjective fevers, chest pain, and vision changes which occurs when walked 2-3 blocks. Denied congestion, sore throat, cough, dyspnea, headache, dysuria, N/V, diarrhea     Per son, they fill her medications at [facility] ([phone]) and this is their current pharmacy. Called them to confirm her medications and they said her last refill of medications was 2018. Pt has not seen a doctor due to insurance problem and has not been taking any medications.    In the ED:  Vitals: T: 98.9, BP: 296/ 174, , RR: 18, 98%O2 on RA  Labs: CBC showed WBC 11.23; ESR 92, CRP 35.6  CMP showed glucose 302, alk phos 173; ; VBG pH 7.45  EKG pending  CXR showed borderline cardiomegaly and no airspace opacity.   X-ray of RLE also pending.     Received unasyn and vanco, humalin R, IV vasotec, IV labetalol   (02 Aug 2022 07:47)      Surgery consulted for BRBPR. Patient with above PMHx admitted for RLE wound s/p debridement on 8/10 with burn team. Hospital course complicated my episode of AMS and found to have multiple punctate infarcts. On 9/5, patient had an episode of bloody movement and 30 minutes later had an episode of bleeding from the oral cavity (tongue laceration). Hgb was 6.5 and was given 2 units with Lasix. Post transfusion Hgb was 8.7. Patient upgraded to ICU for further management. Patient intubated on AM of 9/6 for bedside EGD and colonoscopy. EGD - nonerosive gastritis; colonoscopy - large bleeding external hemorrhoids, no evidence of active bleeding.     PAST MEDICAL & SURGICAL HISTORY:  Hypertension  Diabetes mellitus  No significant past surgical history    Home Medications:  losartan 50 mg oral tablet: 1 tab(s) orally once a day (02 Aug 2022 10:38)  metFORMIN 500 mg oral tablet: 1 tab(s) orally 2 times a day (02 Aug 2022 10:38)  metoprolol succinate 50 mg oral tablet, extended release: 1 tab(s) orally once a day (02 Aug 2022 10:38)    VITALS:  T(F): 98.9 (09-06-22 @ 16:00), Max: 98.9 (09-06-22 @ 16:00)  HR: 62 (09-06-22 @ 20:00) (58 - 84)  BP: 149/80 (09-06-22 @ 20:00) (82/57 - 195/97)  RR: 16 (09-06-22 @ 20:00) (10 - 33)  SpO2: 100% (09-06-22 @ 20:00) (99% - 100%)    PHYSICAL EXAM:  General: NAD, sedated, intubated   HEENT: NCAT, EOMI  Cardiac: S1, S2  Respiratory: bilateral breath sounds, vented   Abdomen: Soft, non-distended  Rectal: good tone, external hemorrhoids, minimal blood on MIREYA   Skin: no jaundice    MEDICATIONS  (STANDING):  aspirin  chewable 81 milliGRAM(s) Oral daily  atorvastatin 80 milliGRAM(s) Oral at bedtime  aztreonam  IVPB      aztreonam  IVPB 2000 milliGRAM(s) IV Intermittent every 12 hours  ceftazidime/avibactam IVPB 0.94 Gram(s) IV Intermittent every 12 hours  chlorhexidine 0.12% Liquid 15 milliLiter(s) Oral Mucosa every 12 hours  chlorhexidine 2% Cloths 1 Application(s) Topical <User Schedule>  collagenase Ointment 1 Application(s) Topical two times a day  Dakins Solution - 1/2 Strength 1 Application(s) Topical two times a day  dexMEDEtomidine Infusion 0.2 MICROgram(s)/kG/Hr (4.07 mL/Hr) IV Continuous <Continuous>  dextrose 5%. 1000 milliLiter(s) (50 mL/Hr) IV Continuous <Continuous>  dextrose 5%. 1000 milliLiter(s) (100 mL/Hr) IV Continuous <Continuous>  dextrose 50% Injectable 25 Gram(s) IV Push once  dextrose 50% Injectable 12.5 Gram(s) IV Push once  dextrose 50% Injectable 25 Gram(s) IV Push once  fentaNYL   Infusion. 0.5 MICROgram(s)/kG/Hr (4.07 mL/Hr) IV Continuous <Continuous>  glucagon  Injectable 1 milliGRAM(s) IntraMuscular once  hydrALAZINE 100 milliGRAM(s) Oral every 8 hours  labetalol 600 milliGRAM(s) Oral three times a day  lacosamide IVPB 50 milliGRAM(s) IV Intermittent every 12 hours  levETIRAcetam  Solution 500 milliGRAM(s) Oral two times a day  lidocaine 1%/epinephrine 1:100,000 Inj 20 milliLiter(s) Local Injection once  multivitamin/minerals/iron Oral Solution (CENTRUM) 15 milliLiter(s) Oral daily  pantoprazole  Injectable 40 milliGRAM(s) IV Push two times a day  propofol Infusion 10 MICROgram(s)/kG/Min (4.88 mL/Hr) IV Continuous <Continuous>  sodium bicarbonate 650 milliGRAM(s) Oral every 8 hours  sodium chloride 0.65% Nasal 2 Spray(s) Both Nostrils two times a day  sodium chloride 0.9%. 1000 milliLiter(s) (100 mL/Hr) IV Continuous <Continuous>    MEDICATIONS  (PRN):  acetaminophen     Tablet .. 650 milliGRAM(s) Oral every 6 hours PRN Temp greater or equal to 38C (100.4F), Mild Pain (1 - 3)  dextrose Oral Gel 15 Gram(s) Oral once PRN Blood Glucose LESS THAN 70 milliGRAM(s)/deciliter  furosemide   Injectable 40 milliGRAM(s) IV Push once PRN post 1 PRBC  labetalol Injectable 10 milliGRAM(s) IV Push every 6 hours PRN Systolic blood pressure >  melatonin 3 milliGRAM(s) Oral at bedtime PRN Insomnia    LAB/STUDIES:                        7.5    11.44 )-----------( 142      ( 06 Sep 2022 12:39 )             21.5     09-06    142  |  107  |  78<HH>  ----------------------------<  112<H>  4.2   |  20  |  3.3<H>    Ca    8.3<L>      06 Sep 2022 05:30  Phos  4.5     09-05  Mg     2.2     09-06    TPro  5.2<L>  /  Alb  2.5<L>  /  TBili  0.4  /  DBili  x   /  AST  46<H>  /  ALT  38  /  AlkPhos  311<H>  09-06    PT/INR - ( 05 Sep 2022 16:33 )   PT: 14.20 sec;   INR: 1.24 ratio      PTT - ( 05 Sep 2022 16:33 )  PTT:32.3 sec  LIVER FUNCTIONS - ( 06 Sep 2022 05:30 )  Alb: 2.5 g/dL / Pro: 5.2 g/dL / ALK PHOS: 311 U/L / ALT: 38 U/L / AST: 46 U/L / GGT: x           ABG - ( 06 Sep 2022 12:57 )  pH, Arterial: 7.41  pH, Blood: x     /  pCO2: 25    /  pO2: 385   / HCO3: 16    / Base Excess: -7.9  /  SaO2: 99.6      Culture - Blood (collected 05 Sep 2022 12:19)  Source: .Blood Blood  Preliminary Report (06 Sep 2022 18:02):    No growth to date.    Culture - Blood (collected 04 Sep 2022 05:43)  Source: .Blood None  Preliminary Report (05 Sep 2022 12:01):    No growth to date.    IMAGING:

## 2022-09-06 NOTE — PROGRESS NOTE ADULT - SUBJECTIVE AND OBJECTIVE BOX
seen and examined  24 h events noted         PAST HISTORY  --------------------------------------------------------------------------------  No significant changes to PMH, PSH, FHx, SHx, unless otherwise noted    ALLERGIES & MEDICATIONS  --------------------------------------------------------------------------------  Allergies    No Known Allergies    Intolerances      Standing Inpatient Medications  aspirin  chewable 81 milliGRAM(s) Oral daily  atorvastatin 80 milliGRAM(s) Oral at bedtime  aztreonam  IVPB      aztreonam  IVPB 2000 milliGRAM(s) IV Intermittent every 12 hours  ceftazidime/avibactam IVPB 0.94 Gram(s) IV Intermittent every 12 hours  chlorhexidine 2% Cloths 1 Application(s) Topical <User Schedule>  collagenase Ointment 1 Application(s) Topical two times a day  Dakins Solution - 1/2 Strength 1 Application(s) Topical two times a day  dextrose 5%. 1000 milliLiter(s) IV Continuous <Continuous>  dextrose 5%. 1000 milliLiter(s) IV Continuous <Continuous>  dextrose 50% Injectable 25 Gram(s) IV Push once  dextrose 50% Injectable 12.5 Gram(s) IV Push once  dextrose 50% Injectable 25 Gram(s) IV Push once  glucagon  Injectable 1 milliGRAM(s) IntraMuscular once  hydrALAZINE 100 milliGRAM(s) Oral every 8 hours  labetalol 600 milliGRAM(s) Oral three times a day  lacosamide IVPB 50 milliGRAM(s) IV Intermittent every 12 hours  levETIRAcetam  Solution 500 milliGRAM(s) Oral two times a day  lidocaine 1%/epinephrine 1:100,000 Inj 20 milliLiter(s) Local Injection once  multivitamin/minerals/iron Oral Solution (CENTRUM) 15 milliLiter(s) Oral daily  pantoprazole  Injectable 40 milliGRAM(s) IV Push two times a day  sodium bicarbonate 650 milliGRAM(s) Oral every 8 hours  sodium chloride 0.65% Nasal 2 Spray(s) Both Nostrils two times a day  sodium chloride 0.9%. 1000 milliLiter(s) IV Continuous <Continuous>    PRN Inpatient Medications  acetaminophen     Tablet .. 650 milliGRAM(s) Oral every 6 hours PRN  dextrose Oral Gel 15 Gram(s) Oral once PRN  furosemide   Injectable 40 milliGRAM(s) IV Push once PRN  labetalol Injectable 10 milliGRAM(s) IV Push every 6 hours PRN  melatonin 3 milliGRAM(s) Oral at bedtime PRN          VITALS/PHYSICAL EXAM  --------------------------------------------------------------------------------  T(C): 36.9 (09-06-22 @ 04:00), Max: 37.5 (09-05-22 @ 16:00)  HR: 76 (09-06-22 @ 07:00) (68 - 84)  BP: 135/73 (09-06-22 @ 07:00) (125/73 - 208/107)  RR: 18 (09-06-22 @ 07:00) (14 - 21)  SpO2: 100% (09-06-22 @ 07:00) (95% - 100%)  Wt(kg): --  Height (cm): 165.1 (09-05-22 @ 03:14)  Weight (kg): 81.4 (09-05-22 @ 03:14)  BMI (kg/m2): 29.9 (09-05-22 @ 03:14)  BSA (m2): 1.89 (09-05-22 @ 03:14)      09-05-22 @ 07:01  -  09-06-22 @ 07:00  --------------------------------------------------------  IN: 6135 mL / OUT: 2855 mL / NET: 3280 mL      Physical Exam:  	Gen: NAD,   	Pulm: decrease BS B/L  	CV:  S1S2; no rub  	Abd: +distended  	    LABS/STUDIES  --------------------------------------------------------------------------------              7.6    15.27 >-----------<  162      [09-06-22 @ 05:30]              22.1     142  |  107  |  78  ----------------------------<  112      [09-06-22 @ 05:30]  4.2   |  20  |  3.3        Ca     8.3     [09-06-22 @ 05:30]      Mg     2.2     [09-06-22 @ 05:30]      Phos  4.5     [09-05-22 @ 20:00]    TPro  5.2  /  Alb  2.5  /  TBili  0.4  /  DBili  x   /  AST  46  /  ALT  38  /  AlkPhos  311  [09-06-22 @ 05:30]    PT/INR: PT 14.20, INR 1.24       [09-05-22 @ 16:33]  PTT: 32.3       [09-05-22 @ 16:33]          [09-05-22 @ 01:45]    Creatinine Trend:  SCr 3.3 [09-06 @ 05:30]  SCr 3.4 [09-05 @ 16:33]  SCr 3.4 [09-05 @ 12:19]  SCr 3.5 [09-05 @ 01:41]  SCr 3.2 [09-03 @ 07:54]    Urinalysis - [09-02-22 @ 12:57]      Color Yellow / Appearance Slightly Turbid / SG 1.013 / pH 7.5      Gluc Negative / Ketone Negative  / Bili Negative / Urobili 6 mg/dL       Blood Moderate / Protein 100 mg/dL / Leuk Est Large / Nitrite Negative      RBC 30 / WBC 57 / Hyaline 2 / Gran  / Sq Epi  / Non Sq Epi 8 / Bacteria Negative    Urine Creatinine 35      [09-02-22 @ 13:30]  Urine Protein 82      [09-02-22 @ 13:30]  Urine Sodium 33.0      [09-02-22 @ 13:30]  Urine Urea Nitrogen 412      [09-02-22 @ 13:30]  Urine Potassium 47      [09-02-22 @ 13:30]  Urine Osmolality 288      [09-02-22 @ 12:57]    Ferritin 243      [08-28-22 @ 05:49]  TSH 0.86      [08-08-22 @ 17:53]  Lipid: chol 234, , HDL 42, LDL --      [08-03-22 @ 08:56]      LUIS FELIPE: titer 1:80, pattern Speckled      [08-30-22 @ 19:27]  dsDNA <12      [08-30-22 @ 19:27]  Rheumatoid Factor <10      [08-19-22 @ 11:37]  ANCA: cANCA Negative, pANCA Negative, atypical ANCA Negative      [08-30-22 @ 19:27]  Syphilis Screen (Treponema Pallidum Ab) Negative      [08-08-22 @ 17:53]  Immunofixation Serum:   No Monoclonal Band Identified    Reference Range: None Detected      [08-30-22 @ 23:46]  SPEP Interpretation: Pattern Consistent With Acute Inflammation Or Stress      [08-30-22 @ 23:46]

## 2022-09-06 NOTE — CHART NOTE - NSCHARTNOTEFT_GEN_A_CORE
PALLIATIVE MEDICINE INTERDISCIPLINARY TEAM NOTE    Provider:  [x   ]Social Work   [   ]          [ x  ] Initial visit [   ] Follow up    Family or contact name / phone #   Met with: [ x  ] Patient:  patient was observed to be resting comfortably  [   ] Family  [   ] Other:    Primary Language: [   ] English [   ] Other*:                      *Interpretation provided by:    SUPPORT DIAGNOSES            (Check all that apply)  [   ] Psychosocial spiritual assessment (PSSA)  [   ] EOL issues  [   ] Cultural / spiritual concerns  [ x  ] Pain / suffering  [   ] Dementia / AMS  [   ] Other:  [ x  ] AD issues  [   ] Grief / loss / sadness  [   ] Discharge issues  [   x] Distress / coping    PSYCHOSOCIAL ASSESSMENT OF PATIENT         (Check all that apply)  [ x  ] Initial Assessment            [   ] Reassessment          [   ] Not Applicable this visit    Pain/suffering acuity:  patient appeared to be comfortable  [   ] None to mild (0-3)           [   ] Moderate (4-6)        [   ] High (7-10)    Mental Status:  [   ] Alert/oriented (x3)          [   ] Confused/Altered(x2/x1)         [x   ] Non-resp    Functional status:  [   ] Independent w ADLs      [   ] Needs Assistance             [ x  ] Bedbound/Full Care    Coping:  unable to assess  [   ] Coping well                     [   ] Coping w/difficulty            [   ] Poor coping    Support system:  unable to assess  [   ] Strong                              [   ] Adequate                        [   ] Inadequate      Past history and medications for:     [ ] Anxiety       [ ] Depression    [ ] Sleep disorders     SPIRITUAL ASSESSMENT  Restorationist/Spiritual practice: ___________________________    Role of organized Jew:  [   ] Important                     [   ] Some (fam tradition, cultural)               [   ] None    Effects on medical care:  [   ] Yes, _____________________________________                         [   ] None    Cultural/Anabaptism need:  [   ] Yes, _____________________________________                         [   ] None    Refer to Pastoral Care:  [   ] Yes           [   ] No, not at this time    SERVICE PROVIDED  [   ]PSSA                                                                             [   ]Discharge support / facilitation  [   ]AD / goals of care counseling                                  [   ]EOL / death / bereavement counseling  [   ]Counseling / support                                                [   ] Family meeting  [   ]Prayer / sacrament / ritual                                      [   ] Referral   [   ]Other                                                                       NOTE and Plan of Care (PoC):    Patient is a 56 year old female.  Patient was admitted on 8/2/22, dx:  cellulitis, right leg wound, uncontrolled hypertension, uncontrolled diabetes.  Patient has PMH of HTN, DM2, CVA in 2017.  Patient presented to ED with purulent right lower extremity wound for 3 months consistent with acute RLE cellulitis.  Patient was code stroke for unresponsiveness on 8/5/22 at 4:00 pm.    Patient was observed to be resting comfortably.  T/C to sonLatrell:  left message.

## 2022-09-06 NOTE — CONSULT NOTE ADULT - SUBJECTIVE AND OBJECTIVE BOX
HPI:    57 yo F with PMH of HTN, DM2, and stroke (per son 2017) who presented for RLE wound. Started 3 months ago when she fell and hit her leg on a wooden cabinet. She put hydrogen peroxide, antibiotic cream, and wrapped the wound but never fully healed. Two weeks ago it started to show yellow pus so her and her family came to the ED for further treatment. Endorsed subjective fevers, chest pain, and vision changes which occurs when walked 2-3 blocks. Denied congestion, sore throat, cough, dyspnea, headache, dysuria, N/V, diarrhea     Per son, they fill her medications at [facility] ([phone]) and this is their current pharmacy. Called them to confirm her medications and they said her last refill of medications was 2018. Pt has not seen a doctor due to insurance problem and has not been taking any medications.    In the ED:  Vitals: T: 98.9, BP: 296/ 174, , RR: 18, 98%O2 on RA  Labs: CBC showed WBC 11.23; ESR 92, CRP 35.6  CMP showed glucose 302, alk phos 173; ; VBG pH 7.45  EKG pending  CXR showed borderline cardiomegaly and no airspace opacity.   X-ray of RLE also pending.     Received unasyn and vanco, humalin R, IV vasotec, IV labetalol   (02 Aug 2022 07:47)    PERTINENT PM/SXH:   Hypertension    Diabetes mellitus      No significant past surgical history      FAMILY HISTORY:  FH: type 2 diabetes mellitus      ITEMS NOT CHECKED ARE NOT PRESENT    SOCIAL HISTORY:   Significant other/partner[X- Db  ]  Children[X- Latrell ]  Sabianism/Spirituality:  Substance hx:  [ ]   Tobacco hx:  [ ]   Alcohol hx: [ ]   Living Situation: [X]Home  [ ]Long term care  [ ]Rehab [ ]Other  Home Services: [ ] HHA [ ] Epi RN [ ] Hospice  Occupation:  Home Opioid hx:  [ ] Y [ ] N [X ] I-Stop Reference No: This report was requested by: Kimber Darby | Reference #: [id number]    ADVANCE DIRECTIVES:     MOLST  [ ]  Living Will  [ ]   DECISION MAKER(s):  [ ] Health Care Proxy(s)  [ X] Surrogate(s)  [ ] Guardian           Name(s): Phone Number(s):  Db     BASELINE (I)ADL(s) (prior to admission):  [place]: [X ]Total  [  ] Moderate [ ]Dependent  Palliative Performance Status Version 2:      80   %    [email or web address]    Allergies    No Known Allergies    Intolerances    MEDICATIONS  (STANDING):  aspirin  chewable 81 milliGRAM(s) Oral daily  atorvastatin 80 milliGRAM(s) Oral at bedtime  aztreonam  IVPB      aztreonam  IVPB 2000 milliGRAM(s) IV Intermittent every 12 hours  ceftazidime/avibactam IVPB 0.94 Gram(s) IV Intermittent every 12 hours  chlorhexidine 0.12% Liquid 15 milliLiter(s) Oral Mucosa every 12 hours  chlorhexidine 2% Cloths 1 Application(s) Topical <User Schedule>  collagenase Ointment 1 Application(s) Topical two times a day  Dakins Solution - 1/2 Strength 1 Application(s) Topical two times a day  dexMEDEtomidine Infusion 0.2 MICROgram(s)/kG/Hr (4.07 mL/Hr) IV Continuous <Continuous>  dextrose 5%. 1000 milliLiter(s) (100 mL/Hr) IV Continuous <Continuous>  dextrose 5%. 1000 milliLiter(s) (50 mL/Hr) IV Continuous <Continuous>  dextrose 50% Injectable 25 Gram(s) IV Push once  dextrose 50% Injectable 12.5 Gram(s) IV Push once  dextrose 50% Injectable 25 Gram(s) IV Push once  fentaNYL   Infusion. 0.5 MICROgram(s)/kG/Hr (4.07 mL/Hr) IV Continuous <Continuous>  glucagon  Injectable 1 milliGRAM(s) IntraMuscular once  hydrALAZINE 100 milliGRAM(s) Oral every 8 hours  labetalol 600 milliGRAM(s) Oral three times a day  lacosamide IVPB 50 milliGRAM(s) IV Intermittent every 12 hours  levETIRAcetam  Solution 500 milliGRAM(s) Oral two times a day  lidocaine 1%/epinephrine 1:100,000 Inj 20 milliLiter(s) Local Injection once  multivitamin/minerals/iron Oral Solution (CENTRUM) 15 milliLiter(s) Oral daily  pantoprazole  Injectable 40 milliGRAM(s) IV Push two times a day  propofol Infusion 10 MICROgram(s)/kG/Min (4.88 mL/Hr) IV Continuous <Continuous>  sodium bicarbonate 650 milliGRAM(s) Oral every 8 hours  sodium chloride 0.65% Nasal 2 Spray(s) Both Nostrils two times a day  sodium chloride 0.9%. 1000 milliLiter(s) (100 mL/Hr) IV Continuous <Continuous>    MEDICATIONS  (PRN):  acetaminophen     Tablet .. 650 milliGRAM(s) Oral every 6 hours PRN Temp greater or equal to 38C (100.4F), Mild Pain (1 - 3)  dextrose Oral Gel 15 Gram(s) Oral once PRN Blood Glucose LESS THAN 70 milliGRAM(s)/deciliter  furosemide   Injectable 40 milliGRAM(s) IV Push once PRN post 1 PRBC  labetalol Injectable 10 milliGRAM(s) IV Push every 6 hours PRN Systolic blood pressure >  melatonin 3 milliGRAM(s) Oral at bedtime PRN Insomnia    PRESENT SYMPTOMS: [X ]Unable to obtain due to poor mentation   Source if other than patient:  [ ]Family   [ ]Team     CPOT:  0  https://www.[email or web address]/getattachment/[email or web address]/Critical-Care-Pain-Observation-Tool-(CPOT)        Dyspnea:                           [ ]Mild [ ]Moderate [ ]Severe  Anxiety:                             [ ]Mild [ ]Moderate [ ]Severe  Fatigue:                             [ ]Mild [ ]Moderate [ ]Severe  Nausea:                             [ ]Mild [ ]Moderate [ ]Severe  Loss of appetite:              [ ]Mild [ ]Moderate [ ]Severe  Constipation:                    [ ]Mild [ ]Moderate [ ]Severe    Other Symptoms:  [ X]All other review of systems negative     Palliative Performance Status Version 2:     10    %    http://npcrc.org/files/news/palliative_performance_scale_ppsv2.pdf    PHYSICAL EXAM:  Vital Signs Last 24 Hrs  T(C): 36.9 (06 Sep 2022 08:00), Max: 37.5 (05 Sep 2022 16:00)  T(F): 98.5 (06 Sep 2022 08:00), Max: 99.5 (05 Sep 2022 16:00)  HR: 66 (06 Sep 2022 13:03) (61 - 84)  BP: 165/85 (06 Sep 2022 13:03) (125/73 - 165/85)  BP(mean): 118 (06 Sep 2022 13:03) (92 - 130)  RR: 21 (06 Sep 2022 13:03) (14 - 33)  SpO2: 100% (06 Sep 2022 13:03) (95% - 100%)    GENERAL:  [ ]Alert  [ ]Oriented x   [ ]Lethargic  [ ]Cachexia  [X ]Unarousable  [ ]Verbal  [ X]Non-Verbal  Behavioral:   [ ] Anxiety  [ ] Delirium [ ] Agitation [ X] Calm   HEENT:  [ ]Normal   [ ]Dry mouth   [ X]ET Tube/Trach  [ ]Oral lesions  PULMONARY:   [X ]Clear [ ]Tachypnea  [ ]Audible excessive secretions   orally intubated   CARDIOVASCULAR:    [ X]Regular [ ]Irregular [ ]Tachy  [ ]Krishna [ ]Murmur [ ]Other  GASTROINTESTINAL:  [ X]Soft  [ ]Distended   [ ]+BS  [ ]Non tender [ ]Tender  [ ]PEG [ ]OGT/ NGT  Last BM:   GENITOURINARY:  [ X]Normal [ ] Incontinent   [ ]Oliguria/Anuria   [ ]Maloney  MUSCULOSKELETAL:   [ ]Normal   [ ]Weakness  [X ]Bed/Wheelchair bound [ ]Edema  NEUROLOGIC:   [ ]No focal deficits  [ X]Cognitive impairment  [ ]Dysphagia [ ]Dysarthria [ ]Paresis [ ]Other   SKIN:   [ X]Normal    [ ]Rash  [ ]Pressure ulcer(s)       Present on admission [ ]y [ ]n    CRITICAL CARE:  [ ] Shock Present  [ ]Septic [ ]Cardiogenic [ ]Neurologic [ ]Hypovolemic  [ ]  Vasopressors [ ]  Inotropes   [ ]Respiratory failure present [X ]Mechanical ventilation [ ]Non-invasive ventilatory support [ ]High flow  [ ]Acute  [ ]Chronic [ ]Hypoxic  [ ]Hypercarbic [ ]Other  [ ]Other organ failure     LABS:                        7.5    11.44 )-----------( 142      ( 06 Sep 2022 12:39 )             21.5   09-06    142  |  107  |  78<HH>  ----------------------------<  112<H>  4.2   |  20  |  3.3<H>    Ca    8.3<L>      06 Sep 2022 05:30  Phos  4.5     09-05  Mg     2.2     09-06    TPro  5.2<L>  /  Alb  2.5<L>  /  TBili  0.4  /  DBili  x   /  AST  46<H>  /  ALT  38  /  AlkPhos  311<H>  09-06  PT/INR - ( 05 Sep 2022 16:33 )   PT: 14.20 sec;   INR: 1.24 ratio         PTT - ( 05 Sep 2022 16:33 )  PTT:32.3 sec      RADIOLOGY & ADDITIONAL STUDIES:    < from: MR Head No Cont (09.03.22 @ 20:08) >  IMPRESSION:  Redemonstration of multiple scattered lacunar infarcts, now subacute. No   compelling evidence of an acute infarct or acute intracranial hemorrhage.    --- End of Report ---    < end of copied text >    < from: CT Head No Cont (08.29.22 @ 17:43) >    IMPRESSION:    1.  No hemorrhage or new infarct    2.  Chronic changes right subinsular region    3.  Multiple small infarcts (MRI August 10, 2022) not clearly visualized    --- End of Report ---      < end of copied text >      REFERRALS:   [ ]Chaplaincy  [ ]Hospice  [ ]Child Life  [ ]Social Work  [ ]Case management [ ]Holistic Therapy      HPI:    57 yo F with PMH of HTN, DM2, and stroke (per son 2017) who presented for RLE wound. Started 3 months ago when she fell and hit her leg on a wooden cabinet. She put hydrogen peroxide, antibiotic cream, and wrapped the wound but never fully healed. Two weeks ago it started to show yellow pus so her and her family came to the ED for further treatment. Endorsed subjective fevers, chest pain, and vision changes which occurs when walked 2-3 blocks. Denied congestion, sore throat, cough, dyspnea, headache, dysuria, N/V, diarrhea     Per son, they fill her medications at [facility] ([phone]) and this is their current pharmacy. Called them to confirm her medications and they said her last refill of medications was 2018. Pt has not seen a doctor due to insurance problem and has not been taking any medications.    In the ED:  Vitals: T: 98.9, BP: 296/ 174, , RR: 18, 98%O2 on RA  Labs: CBC showed WBC 11.23; ESR 92, CRP 35.6  CMP showed glucose 302, alk phos 173; ; VBG pH 7.45  EKG pending  CXR showed borderline cardiomegaly and no airspace opacity.   X-ray of RLE also pending.     Received unasyn and vanco, humalin R, IV vasotec, IV labetalol   (02 Aug 2022 07:47)      PERTINENT PM/SXH:   Hypertension    Diabetes mellitus      No significant past surgical history      FAMILY HISTORY:  FH: type 2 diabetes mellitus      ITEMS NOT CHECKED ARE NOT PRESENT    SOCIAL HISTORY:   Significant other/partner[X- Db  ]  Children[X- Latrell ]  Sabianism/Spirituality:  Substance hx:  [ ]   Tobacco hx:  [ ]   Alcohol hx: [ ]   Living Situation: [X]Home  [ ]Long term care  [ ]Rehab [ ]Other  Home Services: [ ] HHA [ ] Epi RN [ ] Hospice  Occupation:  Home Opioid hx:  [ ] Y [ ] N [X ] I-Stop Reference No: This report was requested by: Kimber Darby | Reference #: [id number]    ADVANCE DIRECTIVES:     MOLST  [ ]  Living Will  [ ]   DECISION MAKER(s):  [ ] Health Care Proxy(s)  [ X] Surrogate(s)  [ ] Guardian           Name(s): Phone Number(s):  Db     BASELINE (I)ADL(s) (prior to admission):  [place]: [X ]Total  [  ] Moderate [ ]Dependent  Palliative Performance Status Version 2:      80   %    [email or web address]    Allergies    No Known Allergies    Intolerances    MEDICATIONS  (STANDING):  aspirin  chewable 81 milliGRAM(s) Oral daily  atorvastatin 80 milliGRAM(s) Oral at bedtime  aztreonam  IVPB      aztreonam  IVPB 2000 milliGRAM(s) IV Intermittent every 12 hours  ceftazidime/avibactam IVPB 0.94 Gram(s) IV Intermittent every 12 hours  chlorhexidine 0.12% Liquid 15 milliLiter(s) Oral Mucosa every 12 hours  chlorhexidine 2% Cloths 1 Application(s) Topical <User Schedule>  collagenase Ointment 1 Application(s) Topical two times a day  Dakins Solution - 1/2 Strength 1 Application(s) Topical two times a day  dexMEDEtomidine Infusion 0.2 MICROgram(s)/kG/Hr (4.07 mL/Hr) IV Continuous <Continuous>  dextrose 5%. 1000 milliLiter(s) (100 mL/Hr) IV Continuous <Continuous>  dextrose 5%. 1000 milliLiter(s) (50 mL/Hr) IV Continuous <Continuous>  dextrose 50% Injectable 25 Gram(s) IV Push once  dextrose 50% Injectable 12.5 Gram(s) IV Push once  dextrose 50% Injectable 25 Gram(s) IV Push once  fentaNYL   Infusion. 0.5 MICROgram(s)/kG/Hr (4.07 mL/Hr) IV Continuous <Continuous>  glucagon  Injectable 1 milliGRAM(s) IntraMuscular once  hydrALAZINE 100 milliGRAM(s) Oral every 8 hours  labetalol 600 milliGRAM(s) Oral three times a day  lacosamide IVPB 50 milliGRAM(s) IV Intermittent every 12 hours  levETIRAcetam  Solution 500 milliGRAM(s) Oral two times a day  lidocaine 1%/epinephrine 1:100,000 Inj 20 milliLiter(s) Local Injection once  multivitamin/minerals/iron Oral Solution (CENTRUM) 15 milliLiter(s) Oral daily  pantoprazole  Injectable 40 milliGRAM(s) IV Push two times a day  propofol Infusion 10 MICROgram(s)/kG/Min (4.88 mL/Hr) IV Continuous <Continuous>  sodium bicarbonate 650 milliGRAM(s) Oral every 8 hours  sodium chloride 0.65% Nasal 2 Spray(s) Both Nostrils two times a day  sodium chloride 0.9%. 1000 milliLiter(s) (100 mL/Hr) IV Continuous <Continuous>    MEDICATIONS  (PRN):  acetaminophen     Tablet .. 650 milliGRAM(s) Oral every 6 hours PRN Temp greater or equal to 38C (100.4F), Mild Pain (1 - 3)  dextrose Oral Gel 15 Gram(s) Oral once PRN Blood Glucose LESS THAN 70 milliGRAM(s)/deciliter  furosemide   Injectable 40 milliGRAM(s) IV Push once PRN post 1 PRBC  labetalol Injectable 10 milliGRAM(s) IV Push every 6 hours PRN Systolic blood pressure >  melatonin 3 milliGRAM(s) Oral at bedtime PRN Insomnia    PRESENT SYMPTOMS: [X ]Unable to obtain due to poor mentation   Source if other than patient:  [ ]Family   [ ]Team     CPOT:  0  https://www.[facility].org/getattachment/[email or web address]/Critical-Care-Pain-Observation-Tool-(CPOT)        Dyspnea:                           [ ]Mild [ ]Moderate [ ]Severe  Anxiety:                             [ ]Mild [ ]Moderate [ ]Severe  Fatigue:                             [ ]Mild [ ]Moderate [ ]Severe  Nausea:                             [ ]Mild [ ]Moderate [ ]Severe  Loss of appetite:              [ ]Mild [ ]Moderate [ ]Severe  Constipation:                    [ ]Mild [ ]Moderate [ ]Severe    Other Symptoms:  [ X]All other review of systems negative     Palliative Performance Status Version 2:     10    %    http://npcrc.org/files/news/palliative_performance_scale_ppsv2.pdf    PHYSICAL EXAM:  Vital Signs Last 24 Hrs  T(C): 36.9 (06 Sep 2022 08:00), Max: 37.5 (05 Sep 2022 16:00)  T(F): 98.5 (06 Sep 2022 08:00), Max: 99.5 (05 Sep 2022 16:00)  HR: 66 (06 Sep 2022 13:03) (61 - 84)  BP: 165/85 (06 Sep 2022 13:03) (125/73 - 165/85)  BP(mean): 118 (06 Sep 2022 13:03) (92 - 130)  RR: 21 (06 Sep 2022 13:03) (14 - 33)  SpO2: 100% (06 Sep 2022 13:03) (95% - 100%)    GENERAL:  [ ]Alert  [ ]Oriented x   [ ]Lethargic  [ ]Cachexia  [X ]Unarousable  [ ]Verbal  [ X]Non-Verbal  Behavioral:   [ ] Anxiety  [ ] Delirium [ ] Agitation [ X] Calm   HEENT:  [ ]Normal   [ ]Dry mouth   [ X]ET Tube/Trach  [ ]Oral lesions  PULMONARY:   [X ]Clear [ ]Tachypnea  [ ]Audible excessive secretions   orally intubated   CARDIOVASCULAR:    [ X]Regular [ ]Irregular [ ]Tachy  [ ]Krishna [ ]Murmur [ ]Other  GASTROINTESTINAL:  [ X]Soft  [ ]Distended   [ ]+BS  [ ]Non tender [ ]Tender  [ ]PEG [ ]OGT/ NGT  Last BM:   GENITOURINARY:  [ X]Normal [ ] Incontinent   [ ]Oliguria/Anuria   [ ]Maloney  MUSCULOSKELETAL:   [ ]Normal   [ ]Weakness  [X ]Bed/Wheelchair bound [ ]Edema  NEUROLOGIC:   [ ]No focal deficits  [ X]Cognitive impairment  [ ]Dysphagia [ ]Dysarthria [ ]Paresis [ ]Other   SKIN:   [ X]Normal    [ ]Rash  [ ]Pressure ulcer(s)       Present on admission [ ]y [ ]n    CRITICAL CARE:  [ ] Shock Present  [ ]Septic [ ]Cardiogenic [ ]Neurologic [ ]Hypovolemic  [ ]  Vasopressors [ ]  Inotropes   [ ]Respiratory failure present [X ]Mechanical ventilation [ ]Non-invasive ventilatory support [ ]High flow  [ ]Acute  [ ]Chronic [ ]Hypoxic  [ ]Hypercarbic [ ]Other  [ ]Other organ failure     LABS:                        7.5    11.44 )-----------( 142      ( 06 Sep 2022 12:39 )             21.5   09-06    142  |  107  |  78<HH>  ----------------------------<  112<H>  4.2   |  20  |  3.3<H>    Ca    8.3<L>      06 Sep 2022 05:30  Phos  4.5     09-05  Mg     2.2     09-06    TPro  5.2<L>  /  Alb  2.5<L>  /  TBili  0.4  /  DBili  x   /  AST  46<H>  /  ALT  38  /  AlkPhos  311<H>  09-06  PT/INR - ( 05 Sep 2022 16:33 )   PT: 14.20 sec;   INR: 1.24 ratio         PTT - ( 05 Sep 2022 16:33 )  PTT:32.3 sec      RADIOLOGY & ADDITIONAL STUDIES:    < from: MR Head No Cont (09.03.22 @ 20:08) >  IMPRESSION:  Redemonstration of multiple scattered lacunar infarcts, now subacute. No   compelling evidence of an acute infarct or acute intracranial hemorrhage.    --- End of Report ---    < end of copied text >    < from: CT Head No Cont (08.29.22 @ 17:43) >    IMPRESSION:    1.  No hemorrhage or new infarct    2.  Chronic changes right subinsular region    3.  Multiple small infarcts (MRI August 10, 2022) not clearly visualized    --- End of Report ---      < end of copied text >      REFERRALS:   [ ]Chaplaincy  [ ]Hospice  [ ]Child Life  [ ]Social Work  [ ]Case management [ ]Holistic Therapy      CC: RLE wound    HPI:    55 yo F with PMH of HTN, DM2, and stroke (per son 2017) who presented for RLE wound. Started 3 months ago when she fell and hit her leg on a wooden cabinet. She put hydrogen peroxide, antibiotic cream, and wrapped the wound but never fully healed. Two weeks ago it started to show yellow pus so her and her family came to the ED for further treatment. Endorsed subjective fevers, chest pain, and vision changes which occurs when walked 2-3 blocks. Denied congestion, sore throat, cough, dyspnea, headache, dysuria, N/V, diarrhea     Per son, they fill her medications at [facility] ([phone]) and this is their current pharmacy. Called them to confirm her medications and they said her last refill of medications was 2018. Pt has not seen a doctor due to insurance problem and has not been taking any medications.    In the ED:  Vitals: T: 98.9, BP: 296/ 174, , RR: 18, 98%O2 on RA  Labs: CBC showed WBC 11.23; ESR 92, CRP 35.6  CMP showed glucose 302, alk phos 173; ; VBG pH 7.45  EKG pending  CXR showed borderline cardiomegaly and no airspace opacity.   X-ray of RLE also pending.     Received unasyn and vanco, humalin R, IV vasotec, IV labetalol   (02 Aug 2022 07:47)      PERTINENT PM/SXH:   Hypertension    Diabetes mellitus      No significant past surgical history      FAMILY HISTORY:  FH: type 2 diabetes mellitus    ITEMS NOT CHECKED ARE NOT PRESENT    SOCIAL HISTORY:   Significant other/partner[X- Db  ]  Children[X- Latrell ]  Zoroastrian/Spirituality:  Substance hx:  [ ]   Tobacco hx:  [ ]   Alcohol hx: [ ]   Living Situation: [X]Home  [ ]Long term care  [ ]Rehab [ ]Other  Home Services: [ ] HHA [ ] Epi RN [ ] Hospice  Occupation:  Home Opioid hx:  [ ] Y [ ] N [X ] I-Stop Reference No: This report was requested by: Kimber Darby | Reference #: [id number]    ADVANCE DIRECTIVES:     MOLST  [ ]  Living Will  [ ]   DECISION MAKER(s):  [ ] Health Care Proxy(s)  [ X] Surrogate(s)  [ ] Guardian           Name(s): Phone Number(s):  Db     BASELINE (I)ADL(s) (prior to admission):  [place]: [X ]Total  [  ] Moderate [ ]Dependent  Palliative Performance Status Version 2:      80   %    [email or web address]/files/[email or web address]_performance_[email or web address]_[email or web address]    Allergies  No Known Allergies    MEDICATIONS  (STANDING):  aspirin  chewable 81 milliGRAM(s) Oral daily  atorvastatin 80 milliGRAM(s) Oral at bedtime  aztreonam  IVPB      aztreonam  IVPB 2000 milliGRAM(s) IV Intermittent every 12 hours  ceftazidime/avibactam IVPB 0.94 Gram(s) IV Intermittent every 12 hours  chlorhexidine 0.12% Liquid 15 milliLiter(s) Oral Mucosa every 12 hours  chlorhexidine 2% Cloths 1 Application(s) Topical <User Schedule>  collagenase Ointment 1 Application(s) Topical two times a day  Dakins Solution - 1/2 Strength 1 Application(s) Topical two times a day  dexMEDEtomidine Infusion 0.2 MICROgram(s)/kG/Hr (4.07 mL/Hr) IV Continuous <Continuous>  dextrose 5%. 1000 milliLiter(s) (100 mL/Hr) IV Continuous <Continuous>  dextrose 5%. 1000 milliLiter(s) (50 mL/Hr) IV Continuous <Continuous>  dextrose 50% Injectable 25 Gram(s) IV Push once  dextrose 50% Injectable 12.5 Gram(s) IV Push once  dextrose 50% Injectable 25 Gram(s) IV Push once  fentaNYL   Infusion. 0.5 MICROgram(s)/kG/Hr (4.07 mL/Hr) IV Continuous <Continuous>  glucagon  Injectable 1 milliGRAM(s) IntraMuscular once  hydrALAZINE 100 milliGRAM(s) Oral every 8 hours  labetalol 600 milliGRAM(s) Oral three times a day  lacosamide IVPB 50 milliGRAM(s) IV Intermittent every 12 hours  levETIRAcetam  Solution 500 milliGRAM(s) Oral two times a day  lidocaine 1%/epinephrine 1:100,000 Inj 20 milliLiter(s) Local Injection once  multivitamin/minerals/iron Oral Solution (CENTRUM) 15 milliLiter(s) Oral daily  pantoprazole  Injectable 40 milliGRAM(s) IV Push two times a day  propofol Infusion 10 MICROgram(s)/kG/Min (4.88 mL/Hr) IV Continuous <Continuous>  sodium bicarbonate 650 milliGRAM(s) Oral every 8 hours  sodium chloride 0.65% Nasal 2 Spray(s) Both Nostrils two times a day  sodium chloride 0.9%. 1000 milliLiter(s) (100 mL/Hr) IV Continuous <Continuous>    MEDICATIONS  (PRN):  acetaminophen     Tablet .. 650 milliGRAM(s) Oral every 6 hours PRN Temp greater or equal to 38C (100.4F), Mild Pain (1 - 3)  dextrose Oral Gel 15 Gram(s) Oral once PRN Blood Glucose LESS THAN 70 milliGRAM(s)/deciliter  furosemide   Injectable 40 milliGRAM(s) IV Push once PRN post 1 PRBC  labetalol Injectable 10 milliGRAM(s) IV Push every 6 hours PRN Systolic blood pressure >  melatonin 3 milliGRAM(s) Oral at bedtime PRN Insomnia    PRESENT SYMPTOMS: [X ]Unable to obtain due to poor mentation   Source if other than patient:  [ ]Family   [ ]Team     CPOT:  0  https://www.[email or web address]/getattachment/[email or web address]/Critical-Care-Pain-Observation-Tool-(CPOT)        Dyspnea:                           [ ]Mild [ ]Moderate [ ]Severe  Anxiety:                             [ ]Mild [ ]Moderate [ ]Severe  Fatigue:                             [ ]Mild [ ]Moderate [ ]Severe  Nausea:                             [ ]Mild [ ]Moderate [ ]Severe  Loss of appetite:              [ ]Mild [ ]Moderate [ ]Severe  Constipation:                    [ ]Mild [ ]Moderate [ ]Severe    Other Symptoms:  [ X]All other review of systems negative     Palliative Performance Status Version 2:     10    %    http://npcrc.org/files/news/palliative_performance_scale_[email or web address].pdf    PHYSICAL EXAM:  Vital Signs Last 24 Hrs  T(C): 36.9 (06 Sep 2022 08:00), Max: 37.5 (05 Sep 2022 16:00)  T(F): 98.5 (06 Sep 2022 08:00), Max: 99.5 (05 Sep 2022 16:00)  HR: 66 (06 Sep 2022 13:03) (61 - 84)  BP: 165/85 (06 Sep 2022 13:03) (125/73 - 165/85)  BP(mean): 118 (06 Sep 2022 13:03) (92 - 130)  RR: 21 (06 Sep 2022 13:03) (14 - 33)  SpO2: 100% (06 Sep 2022 13:03) (95% - 100%)    GENERAL:  [ ]Alert  [ ]Oriented x   [ ]Lethargic  [ ]Cachexia  [X ]Unarousable  [ ]Verbal  [ X]Non-Verbal  Behavioral:   [ ] Anxiety  [ ] Delirium [ ] Agitation [ X] Calm   Eyes: Closed  ENMT:  [ ]Normal   [ ]Dry mouth   [ X]ET Tube/Trach  [ x]no Oral lesions  PULMONARY:   [X ]Clear [ ]Tachypnea  [ ]Audible excessive secretions   [x]orally intubated   CARDIOVASCULAR:    [ X]Regular [ ]Irregular [ ]Tachy  [ ]Krishna [ ]Murmur [ ]Other  GASTROINTESTINAL:  [ X]Soft  [ ]Distended   [ ]+BS  [ ]Non tender [ ]Tender  [ ]PEG [ ]OGT/ NGT  Last BM:   GENITOURINARY:  [ X]Normal [ ] Incontinent   [ ]Oliguria/Anuria   [ ]Maloney  MUSCULOSKELETAL:   [ ]Normal   [ ]Weakness  [X ]Bed/Wheelchair bound [ ]Edema  NEUROLOGIC:   [ ]No focal deficits  [ X]Cognitive impairment  [ ]Dysphagia [ ]Dysarthria [ ]Paresis [ ]Other   SKIN:   [ X]Normal    [x ]No Rash  [ ]Pressure ulcer(s)       Present on admission [ ]y [ ]n    CRITICAL CARE:  [ ] Shock Present  [ ]Septic [ ]Cardiogenic [ ]Neurologic [ ]Hypovolemic  [ ]  Vasopressors [ ]  Inotropes   [ ]Respiratory failure present [X ]Mechanical ventilation [ ]Non-invasive ventilatory support [ ]High flow  [ ]Acute  [ ]Chronic [ ]Hypoxic  [ ]Hypercarbic [ ]Other  [ ]Other organ failure     LABS:  reviewed                         7.5    11.44 )-----------( 142      ( 06 Sep 2022 12:39 )             21.5   09-06    142  |  107  |  78<HH>  ----------------------------<  112<H>  4.2   |  20  |  3.3<H>    Ca    8.3<L>      06 Sep 2022 05:30  Phos  4.5     09-05  Mg     2.2     09-06    TPro  5.2<L>  /  Alb  2.5<L>  /  TBili  0.4  /  DBili  x   /  AST  46<H>  /  ALT  38  /  AlkPhos  311<H>  09-06  PT/INR - ( 05 Sep 2022 16:33 )   PT: 14.20 sec;   INR: 1.24 ratio         PTT - ( 05 Sep 2022 16:33 )  PTT:32.3 sec      RADIOLOGY & ADDITIONAL STUDIES:  reviewed    < from: MR Head No Cont (09.03.22 @ 20:08) >  IMPRESSION:  Redemonstration of multiple scattered lacunar infarcts, now subacute. No   compelling evidence of an acute infarct or acute intracranial hemorrhage.    --- End of Report ---    < end of copied text >    < from: CT Head No Cont (08.29.22 @ 17:43) >    IMPRESSION:    1.  No hemorrhage or new infarct    2.  Chronic changes right subinsular region    3.  Multiple small infarcts (MRI August 10, 2022) not clearly visualized    --- End of Report ---    EKG reviewed by me  < end of copied text >    < from: 12 Lead ECG (08.26.22 @ 20:22) >    Ventricular Rate 63 BPM    Atrial Rate 63 BPM    P-R Interval 162 ms    QRS Duration 76 ms    Q-T Interval 422 ms    QTC Calculation(Bazett) 431 ms    P Axis 51 degrees    R Axis 36 degrees    T Axis 112 degrees    Diagnosis Line Normal sinus rhythm  Anteroseptal infarct , age undetermined  Abnormal ECG    < end of copied text >        REFERRALS:   [ ]Chaplaincy  [ ]Hospice  [ ]Child Life  [ ]Social Work  [ ]Case management [ ]Holistic Therapy

## 2022-09-06 NOTE — AIRWAY PLACEMENT NOTE ADULT - INDICATIONS:
Route for mechanical ventilation Rituxan Pregnancy And Lactation Text: This medication is Pregnancy Category C and it isn't know if it is safe during pregnancy. It is unknown if this medication is excreted in breast milk but similar antibodies are known to be excreted.

## 2022-09-06 NOTE — PROGRESS NOTE ADULT - SUBJECTIVE AND OBJECTIVE BOX
CHIEF COMPLAINT:  right leg wound    INTERVAL HISTORY:  Pt transferred to ICU over the weekend. Hospital course complicated by anemia, GIB, ALF, enterobacter cloacae. Pt is intubated and sedated. Discussed assessment/plan with pt's family (son and ) at bedside.     REVIEW OF SYSTEMS:  unable to obtain 2/2 mentation    MEDICATIONS:  acetaminophen     Tablet .. 650 milliGRAM(s) Oral every 6 hours PRN  aspirin  chewable 81 milliGRAM(s) Oral daily  atorvastatin 80 milliGRAM(s) Oral at bedtime  aztreonam  IVPB      aztreonam  IVPB 2000 milliGRAM(s) IV Intermittent every 12 hours  ceftazidime/avibactam IVPB 0.94 Gram(s) IV Intermittent every 12 hours  chlorhexidine 0.12% Liquid 15 milliLiter(s) Oral Mucosa every 12 hours  chlorhexidine 2% Cloths 1 Application(s) Topical <User Schedule>  collagenase Ointment 1 Application(s) Topical two times a day  Dakins Solution - 1/2 Strength 1 Application(s) Topical two times a day  dexMEDEtomidine Infusion 0.2 MICROgram(s)/kG/Hr IV Continuous <Continuous>  dextrose 5%. 1000 milliLiter(s) IV Continuous <Continuous>  dextrose 5%. 1000 milliLiter(s) IV Continuous <Continuous>  dextrose 50% Injectable 25 Gram(s) IV Push once  dextrose 50% Injectable 12.5 Gram(s) IV Push once  dextrose 50% Injectable 25 Gram(s) IV Push once  dextrose Oral Gel 15 Gram(s) Oral once PRN  fentaNYL   Infusion. 0.5 MICROgram(s)/kG/Hr IV Continuous <Continuous>  furosemide   Injectable 40 milliGRAM(s) IV Push once PRN  glucagon  Injectable 1 milliGRAM(s) IntraMuscular once  hydrALAZINE 100 milliGRAM(s) Oral every 8 hours  labetalol 600 milliGRAM(s) Oral three times a day  labetalol Injectable 10 milliGRAM(s) IV Push every 6 hours PRN  lacosamide IVPB 50 milliGRAM(s) IV Intermittent every 12 hours  levETIRAcetam  Solution 500 milliGRAM(s) Oral two times a day  lidocaine 1%/epinephrine 1:100,000 Inj 20 milliLiter(s) Local Injection once  melatonin 3 milliGRAM(s) Oral at bedtime PRN  multivitamin/minerals/iron Oral Solution (CENTRUM) 15 milliLiter(s) Oral daily  pantoprazole  Injectable 40 milliGRAM(s) IV Push two times a day  propofol Infusion 10 MICROgram(s)/kG/Min IV Continuous <Continuous>  sodium bicarbonate 650 milliGRAM(s) Oral every 8 hours  sodium chloride 0.65% Nasal 2 Spray(s) Both Nostrils two times a day  sodium chloride 0.9%. 1000 milliLiter(s) IV Continuous <Continuous>    VITAL SIGNS:  Vital Signs Last 24 Hrs  T(C): 36.9 (06 Sep 2022 08:00), Max: 37.3 (05 Sep 2022 20:00)  T(F): 98.5 (06 Sep 2022 08:00), Max: 99.2 (05 Sep 2022 20:00)  HR: 72 (06 Sep 2022 15:30) (58 - 84)  BP: 159/83 (06 Sep 2022 15:30) (82/57 - 195/97)  BP(mean): 116 (06 Sep 2022 15:30) (63 - 151)  RR: 16 (06 Sep 2022 15:30) (10 - 33)  SpO2: 100% (06 Sep 2022 15:30) (96% - 100%)    Parameters below as of 06 Sep 2022 12:53  Patient On (Oxygen Delivery Method): ventilator        PHYSICAL EXAMINATION:  General: Well-developed, well nourished, in no acute distress.  Eyes: Conjunctiva and sclera clear.  Cardiovascular: Regular rate and rhythm; S1 and S2 Normal;  Neurologic:  - Mental Status:  sedated, no response to pain  - Cranial Nerves II-XII:    II:  no blink to visual threat b/l, pupils 3mm b/l  III, IV, VI, VIII:  Extraocular movements are intact without nystagmus.  V and VII: corneal reflex intact b/l  IX, X:  gag response not tested  XI:  unable to assess 2/2 mentation  XII:   unable to assess 2/2 mentation  - Motor:  normal tone in BUE and BLE, no response to noxious stimuli in BUE or BLE  - Reflexes:   Plantar responses flexor.  - Sensory:  no response to noxious stimuli in BUE or BLE  - Coordination:  unable to assess 2/2 mentation  - Gait:  unable to assess 2/2 mentation and intubation    LABS:                          7.5    11.44 )-----------( 142      ( 06 Sep 2022 12:39 )             21.5     09-06    142  |  107  |  78<HH>  ----------------------------<  112<H>  4.2   |  20  |  3.3<H>    Ca    8.3<L>      06 Sep 2022 05:30  Phos  4.5     09-05  Mg     2.2     09-06    TPro  5.2<L>  /  Alb  2.5<L>  /  TBili  0.4  /  DBili  x   /  AST  46<H>  /  ALT  38  /  AlkPhos  311<H>  09-06    PT/INR - ( 05 Sep 2022 16:33 )   PT: 14.20 sec;   INR: 1.24 ratio         PTT - ( 05 Sep 2022 16:33 )  PTT:32.3 sec      RADIOLOGY & ADDITIONAL STUDIES:      MRI brain 9/3/22: IMPRESSION:  Redemonstration of multiple scattered lacunar infarcts, now subacute. No   compelling evidence of an acute infarct or acute intracranial hemorrhage.    CTA head: 8/12/22: IMPRESSION:    No large vessel occlusion, aneurysm, or vascular malformation.    Moderate right/mild left atherosclerotic stenosis in the supraclinoid   ICAs.    Focal mild atherosclerotic stenosis in the V3 segment of the right   vertebral artery.    MRI brain with/without 8/10/22: IMPRESSION:  Motion limited examination.    Multiple punctate cortical acute infarcts involving multiple vascular   territories possibly related to embolic etiology. No evidence of acute    hemorrhage.    Moderate chronic microvascular type changes as well as chronic   hemosiderin deposition within the right basal ganglia consistent with   remote hemorrhage.    Chronic right thalamic lacunar infarcts.    TTE 8/14/22: Summary:   1. LV Ejection Fraction by Reich's Method with a biplane EF of 61 %.   2. Moderate concentric left ventricular hypertrophy.   3. Spectral Doppler shows impaired relaxation pattern of left   ventricular myocardial filling (Grade I diastolic dysfunction).   4. Mildly enlarged left atrium.   5. Normal right atrial size.   6. Mild mitral valve regurgitation.   7. Mild tricuspid regurgitation.   8. Mild aortic regurgitation.   9. Estimated pulmonary artery systolic pressure is 43.8 mmHg assuming a   right atrial pressure of 3 mmHg, which is consistent with mild pulmonary   hypertension.  10. Color flow doppler and intravenous injection of agitated saline   demonstrates the presence of an intact intra atrial septum.

## 2022-09-06 NOTE — PROGRESS NOTE ADULT - ASSESSMENT
IMPRESSION & PLAN:    Pt is a 57 yo F with PMHx of HTN, DM, prior stroke in 2017 with residual left sided weakness, who presented with RLE cellulitis. Stroke code called on 8/5 for unresponsiveness.     Impr: scattered punctate acute ischemic strokes in multiple vascular territories  Given encephalopathy and small scattered strokes, high suspicion for vasculitis.   S/p LP, CSF with mildly elevated cell count and protein, normal glucose. elevated ESR/CRP.  Renally dose AED's .    DSA on hold given anemia and renal failure  Pt is moderate to high risk for colonoscopy from stroke perspective.  Once infection is improving, consider IR consult for renal biopsy (looking for vasculitis).   Consider obtaining peripheral smear

## 2022-09-06 NOTE — CONSULT NOTE ADULT - ATTENDING COMMENTS
Patient seen with surgery resident in ICU and discussed management plans. Patient presently intubated and sedated. Last 24 hours of course reviewed. Drop in HGb . As per GI Dr. Cardenas active bleeding was noted from hemorrhoids with no colonic or UGI bleeding. No active bleeding was noted as per resident exam early this am . Patient transfused now . will watch for any further bleeding before any intervention at this time.

## 2022-09-06 NOTE — PROGRESS NOTE ADULT - ASSESSMENT
· Assessment	  57 yo F with PMH of HTN, DM2, CVA (2017) who presented with purulent right lower extremity wound for 3 months consistent with acute RLE cellulitis. Code stroke for unresponsiveness at 4pm 8/5    Impression  #CVA  - 8/11 MR Head w/wo IV Cont (08.10.22 @ 21:25): Multiple punctate cortical acute infarcts involving multiple vascular territories possibly related to embolic etiology. No evidence of acute hemorrhage. Moderate chronic microvascular type changes as well as chronic hemosiderin deposition within the right basal ganglia consistent with remote hemorrhage. Chronic right thalamic lacunar infarcts. More alert and does try to respondResolved SIRS with no infectious etiology  - s/p LP 8/26 with 24 WBC, RBC 42026, 37% PMNs, 61% Lymph -- protein/glucose not obtained    #Fevers 8/30 - Enterobacter cloaecae bacteremia  - UA with pyruia   - CXR 8/31 unremarkable   - BLood Cx 9/1 with Enterobacter cloacae with NDM+    #GI BLeed  - s/p EGD 9/6     RECOMMENDATIONS;  - continue  avycaz 0.94 1g q 12 hours and aztreonam 2g q 12 hours  - please administer antibiotics together  - trend creatinine   - trend WBC and fever curve  - will plan at least 2 weeks     I will be away tomorrow and will be available on Thursday.   I will be unavailable by phone. Please message on Teams if with questions.  Spectra [phone]

## 2022-09-06 NOTE — PROGRESS NOTE ADULT - ASSESSMENT
Impression:    Acute blood loss anemia. GI bleed sp 3 units PRBC  oral cavity bleed likely from tongue biting  Altered MS/ ischemic stroke no evidence of vasculitis per neur crit  LLE cellulitis  ALF non oliguric  bacteremia      PLAN:    CNS: neurocheck q1 hr, low threshold intubation,   neuro FUP  EEG  AED per neuro    HEENT: Oral care    PULMONARY:  HOB @ 45 degrees.  Aspiration precautions, ABG, need intubation for procedure    CARDIOVASCULAR: IVF, echo reviewed    GI: GI prophylaxis. NPO, ppi q 12    RENAL:  follow up [facility]    INFECTIOUS DISEASE: bcx 9/1 enterobacter CRE, on meropenem,     HEMATOLOGICAL:  SCD    ENDOCRINE:  Follow up FS.  Insulin protocol if needed.    MUSCULOSKELETAL: bedrest    MICU    VERY poor prognosis  Sacrum stage 2  LE wound  no TLC  nolasco 9/5

## 2022-09-06 NOTE — CHART NOTE - NSCHARTNOTEFT_GEN_A_CORE
Registered Dietitian Follow-Up     Patient Profile Reviewed                           Yes [x]   No []     Nutrition History Previously Obtained        Yes [x]  No []      Pertinent Medical Interventions: Pt upgraded to MICU for GI bleed. Pt was seen by GI, bleeding hemorrhoids noted, external. Surgery contacted for rx. Pt intubated this am for bedside EGD and colonoscopy. EGD - nonerosive gastritis; colonoscopy - large bleeding external hemorrhoids, no evidence of active bleeding. Per sx progress notes, external hemorrhoids unlikely the cause of acute hgb drop. Will continue to monitor plan of care. Nonoliguric ALF noted. No need for RRT per Nephrology. Purulent Right LE cellulitis s/p debridement 8/10. Tmax 24 hours 37.2 C. Ve 8.2.     Diet order: NPO as of  following acute drop in Hgb. Prior to this, pt was receiving Glucerna 1.2 at 325 mL q6hrs with 2 packets No Carb Prosource daily.     Anthropometrics:  Height (cm): 165.1 (22 @ 03:14)  Weight (kg): 81.4 (22 @ 03:14)  BMI (kg/m2): 29.9 (22 @ 03:14)  IBW: 56.8 KG    Daily Weight in k.4 (), Weight in k.4 ()  Admit wt 82.3 kg ().  No significant wt loss observed at this time; will continue to monitor.    Pertinent Lab Data: : Na-142 (WDL), K-4.2 (WDL), BUN-78, creat-3.3, gluc-112, Mg-2.2 (WDL)     MEDICATIONS  (STANDING):  aspirin  chewable 81 milliGRAM(s) Oral daily  atorvastatin 80 milliGRAM(s) Oral at bedtime  aztreonam  IVPB      aztreonam  IVPB 2000 milliGRAM(s) IV Intermittent every 12 hours  ceftazidime/avibactam IVPB 0.94 Gram(s) IV Intermittent every 12 hours  chlorhexidine 0.12% Liquid 15 milliLiter(s) Oral Mucosa every 12 hours  chlorhexidine 2% Cloths 1 Application(s) Topical <User Schedule>  collagenase Ointment 1 Application(s) Topical two times a day  Dakins Solution - 1/2 Strength 1 Application(s) Topical two times a day  dexMEDEtomidine Infusion 0.2 MICROgram(s)/kG/Hr (4.07 mL/Hr) IV Continuous <Continuous>  dextrose 5%. 1000 milliLiter(s) (50 mL/Hr) IV Continuous <Continuous>  dextrose 5%. 1000 milliLiter(s) (100 mL/Hr) IV Continuous <Continuous>  dextrose 50% Injectable 25 Gram(s) IV Push once  dextrose 50% Injectable 12.5 Gram(s) IV Push once  dextrose 50% Injectable 25 Gram(s) IV Push once  fentaNYL   Infusion. 0.5 MICROgram(s)/kG/Hr (4.07 mL/Hr) IV Continuous <Continuous>  glucagon  Injectable 1 milliGRAM(s) IntraMuscular once  hydrALAZINE 100 milliGRAM(s) Oral every 8 hours  insulin lispro (ADMELOG) corrective regimen sliding scale   SubCutaneous three times a day before meals  labetalol 600 milliGRAM(s) Oral three times a day  lacosamide IVPB 50 milliGRAM(s) IV Intermittent every 12 hours  levETIRAcetam  Solution 500 milliGRAM(s) Oral two times a day  lidocaine 1%/epinephrine 1:100,000 Inj 20 milliLiter(s) Local Injection once  multivitamin/minerals/iron Oral Solution (CENTRUM) 15 milliLiter(s) Oral daily  pantoprazole  Injectable 40 milliGRAM(s) IV Push two times a day  propofol Infusion 10 MICROgram(s)/kG/Min (4.88 mL/Hr) IV Continuous <Continuous>  sodium bicarbonate 650 milliGRAM(s) Oral every 8 hours  sodium chloride 0.65% Nasal 2 Spray(s) Both Nostrils two times a day  sodium chloride 0.9%. 1000 milliLiter(s) (100 mL/Hr) IV Continuous <Continuous>    MEDICATIONS  (PRN):  acetaminophen     Tablet .. 650 milliGRAM(s) Oral every 6 hours PRN Temp greater or equal to 38C (100.4F), Mild Pain (1 - 3)  dextrose Oral Gel 15 Gram(s) Oral once PRN Blood Glucose LESS THAN 70 milliGRAM(s)/deciliter  labetalol Injectable 10 milliGRAM(s) IV Push every 6 hours PRN Systolic blood pressure >  melatonin 3 milliGRAM(s) Oral at bedtime PRN Insomnia    Propofol rate 4.88 mL/hr to provide 129 kcal/day.     Physical Findings:  - Appearance: generalized 2+ edema noted; intubated at this time  - GI function: last BM ; multiple loose BM persists; will avoid adding Banatrol at this time given concern of GI bleed; will monitor GI function. No nausea/vomiting reported at this time. No abd distention noted at this time.  - Tubes: PEG Tube  - Oral/Mouth cavity: NPO with PEG Tube  - Skin: R LE cellulitis, lip wound, newly documented stage II pressure ulcer to sacrum     Nutrition Requirements  Weight Used: 81.4 kg dosing wt     Estimated Energy Needs    Continue []  Adjust [x]  Adjusted Energy Recommendations: 1598 kcal/day ([facility] 2003b equation) vs      Estimated Protein Needs    Continue []  Adjust [x]  Adjusted Protein Recommendations:  g/day (1.2-1.4 g/kg ABW)    Estimated Fluid Needs        Continue []  Adjust [x]  Adjusted Fluid Recommendations: 0848-8753 mL/day (20-25 mL/kg ABW); edema noted, will continue to monitor     Nutrient Intake: Currently NPO, not meeting estimated nutrient needs at this time     [x] Previous Nutrition Diagnosis: Inadequate Oral Intake (resumed)    [] No active nutrition diagnosis identified at this time     Nutrition Intervention: TF regimen     Goal/Expected Outcome: Pt to meet >85% & <105% of estimated nutritional needs in 4 days. Pt at high nutrition risk.     Indicator/Monitoring: Diet order, PO intake, weights, labs, NFPF, body composition, BM and tolerance to medical food supplements    Recommendations:  While pt remains intubated; if able to resume nutrition support, provide Peptamen AF at 20 mL/hr. If tolerated at this rate, increase by 10 mL as tolerated to goal rate 50 mL/hr + provide Prosource TF once daily. Provide 150 mL flushes q6hrs. Regimen at goal to provide 1480 kcal, 101 g protein, 1572 mL free H2O. Current propofol rate to add 129 kcal/day. If unable to resume enteral nutrition, consult nutrition support team to evaluate.

## 2022-09-06 NOTE — PROGRESS NOTE ADULT - SUBJECTIVE AND OBJECTIVE BOX
PAULACLAIREJOSE  56y, Female  Allergy: No Known Allergies      LOS  35d    CHIEF COMPLAINT: RLE cellulitis (06 Sep 2022 16:12)      INTERVAL EVENTS/HPI  - T(F): , Max: 99.2 (09-05-22 @ 20:00)  - WBC Count: 11.44 (09-06-22 @ 12:39)  WBC Count: 15.27 (09-06-22 @ 05:30)   - noted events, EGD completed  - Creatinine, Serum: 3.3 (09-06-22 @ 05:30)  Creatinine, Serum: 3.4 (09-05-22 @ 16:33)       ROS  unable to obtain history secondary to patient's mental status and/or sedation    VITALS:  T(F): 98.5, Max: 99.2 (09-05-22 @ 20:00)  HR: 72  BP: 159/83  RR: 16Vital Signs Last 24 Hrs  T(C): 36.9 (06 Sep 2022 08:00), Max: 37.3 (05 Sep 2022 20:00)  T(F): 98.5 (06 Sep 2022 08:00), Max: 99.2 (05 Sep 2022 20:00)  HR: 72 (06 Sep 2022 15:30) (58 - 84)  BP: 159/83 (06 Sep 2022 15:30) (82/57 - 195/97)  BP(mean): 116 (06 Sep 2022 15:30) (63 - 151)  RR: 16 (06 Sep 2022 15:30) (10 - 33)  SpO2: 100% (06 Sep 2022 15:30) (97% - 100%)    Parameters below as of 06 Sep 2022 12:53  Patient On (Oxygen Delivery Method): ventilator        PHYSICAL EXAM:  Gen: vent/trach    HEENT: Normocephalic, atraumatic  Neck: supple, no lymphadenopathy  CV: Regular rate & regular rhythm  Lungs: decreased BS at bases, no fremitus  Abdomen: Soft, BS present  Ext: Warm, well perfused  Neuro: non focal, awake  Skin: no rash, no erythema  Lines: no phlebitis    FH: Non-contributory  Social Hx: Non-contributory    TESTS & MEASUREMENTS:                        7.5    11.44 )-----------( 142      ( 06 Sep 2022 12:39 )             21.5     09-06    142  |  107  |  78<HH>  ----------------------------<  112<H>  4.2   |  20  |  3.3<H>    Ca    8.3<L>      06 Sep 2022 05:30  Phos  4.5     09-05  Mg     2.2     09-06    TPro  5.2<L>  /  Alb  2.5<L>  /  TBili  0.4  /  DBili  x   /  AST  46<H>  /  ALT  38  /  AlkPhos  311<H>  09-06      LIVER FUNCTIONS - ( 06 Sep 2022 05:30 )  Alb: 2.5 g/dL / Pro: 5.2 g/dL / ALK PHOS: 311 U/L / ALT: 38 U/L / AST: 46 U/L / GGT: x               Culture - Blood (collected 09-04-22 @ 05:43)  Source: .Blood None  Preliminary Report (09-05-22 @ 12:01):    No growth to date.    Culture - Blood (collected 09-03-22 @ 18:18)  Source: .Blood None  Preliminary Report (09-05-22 @ 04:01):    No growth to date.    Culture - Blood (collected 09-01-22 @ 12:29)  Source: .Blood None  Gram Stain (09-06-22 @ 17:03):    Growth in aerobic bottle: Gram Negative Rods    Growth in anaerobic bottle: Gram Negative Rods  Final Report (09-06-22 @ 17:03):    Growth in aerobic bottle: Enterobacter cloacae (Carbapenem Resistant)    Growth in anaerobic bottle: Gram Negative Rods    ***Blood Panel PCR results on this specimen are available    approximately 3 hours after the Gram stain result.***    Gram stain, PCR, and/or culture results may not always    correspond due to difference in methodologies.    ************************************************************    This PCR assay was performed by multiplex PCR. This    Assay tests for 66 bacterial and resistance gene targets.    Please refer to the [facility] [employer] test directory    at [email or web address] for details.  Organism: Blood Culture PCR  Enterobacter cloacae (Carbapenem Resistant)  Enterobacter cloacae (Carbapenem Resistant) (09-06-22 @ 17:03)  Organism: Enterobacter cloacae (Carbapenem Resistant) (09-06-22 @ 17:03)      -  Cefiderocol: S      Method Type: KB  Organism: Enterobacter cloacae (Carbapenem Resistant) (09-06-22 @ 17:03)      -  Amikacin: S <=16      -  Ampicillin: R >16 These ampicillin results predict results for amoxicillin      -  Ampicillin/Sulbactam: R >16/8 Enterobacter, Klebsiella aerogenes, Citrobacter, and Serratia may develop resistance during prolonged therapy (3-4 days)      -  Aztreonam: S <=4      -  Cefazolin: R >16 Enterobacter, Klebsiella aerogenes, Citrobacter, and Serratia may develop resistance during prolonged therapy (3-4 days)      -  Cefepime: R >16      -  Cefoxitin: R >16      -  Ceftazidime/Avibactam: R >16      -  Ceftolozane/tazobactam: R >8      -  Ceftriaxone: R >32 Enterobacter, Klebsiella aerogenes, Citrobacter, and Serratia may develop resistance during prolonged therapy      -  Ciprofloxacin: S <=0.25      -  Ertapenem: R >1      -  Gentamicin: S <=2      -  Imipenem: R >8      -  Levofloxacin: S <=0.5      -  Meropenem: R >8      -  Piperacillin/Tazobactam: R >64      -  Tigecycline: S <=2      -  Tobramycin: S <=2      -  Trimethoprim/Sulfamethoxazole: S <=0.5/9.5      Method Type: AALIYAH  Organism: Blood Culture PCR (09-06-22 @ 17:03)      -  Carbapenem Resistance: Detec      -  Enterobacter cloacae complex: Detec      -  NDM Resistance Marker: Detec      Method Type: PCR    Culture - Urine (collected 09-01-22 @ 10:10)  Source: Catheterized Catheterized  Final Report (09-03-22 @ 08:06):    >=3 organisms. Probable collection contamination.    Culture - Urine (collected 08-31-22 @ 00:25)  Source: Catheterized Catheterized  Final Report (09-01-22 @ 09:12):    >=3 organisms. Probable collection contamination.    Culture - Blood (collected 08-30-22 @ 18:03)  Source: .Blood Blood  Final Report (09-05-22 @ 02:00):    No Growth Final    Culture - Fungal, CSF (collected 08-26-22 @ 11:36)  Source: .CSF CSF  Preliminary Report (09-03-22 @ 15:02):    No growth    Culture - CSF with Gram Stain (collected 08-26-22 @ 11:36)  Source: .CSF CSF  Gram Stain (08-26-22 @ 22:25):    polymorphonuclear leukocytes seen    No organisms seen    by cytocentrifuge  Final Report (08-29-22 @ 14:52):    No growth    Culture - Acid Fast - CSF (collected 08-26-22 @ 11:36)  Source: .CSF CSF  Preliminary Report (09-03-22 @ 15:04):    No growth at 1 week.    Culture - Blood (collected 08-17-22 @ 12:25)  Source: .Blood Blood-Peripheral  Final Report (08-22-22 @ 22:01):    No Growth Final    Culture - Blood (collected 08-16-22 @ 06:04)  Source: .Blood None  Final Report (08-21-22 @ 18:00):    No Growth Final    Culture - Blood (collected 08-15-22 @ 16:59)  Source: .Blood None  Final Report (08-21-22 @ 02:00):    No Growth Final    Culture - Blood (collected 08-13-22 @ 22:44)  Source: .Blood Blood-Peripheral  Gram Stain (08-15-22 @ 08:35):    Growth in anaerobic bottle: Gram Positive Cocci in Clusters  Final Report (08-16-22 @ 14:28):    Growth in anaerobic bottle: Staphylococcus epidermidis Coag Negative    Staphylococcus    Single set isolate, possible contaminant. Contact    Microbiology if susceptibility testing clinically    indicated.    ***Blood Panel PCR results on this specimen are available    approximately 3 hours after the Gram stain result.***    Gram stain, PCR, and/or culture results may not always    correspond due to difference in methodologies.    ************************************************************    This PCR assay was performed by multiplex PCR. This    Assay tests for 66 bacterial and resistance gene targets.    Please refer to the [facility] test directory    at [email or web address] for details.  Organism: Blood Culture PCR (08-16-22 @ 14:28)  Organism: Blood Culture PCR (08-16-22 @ 14:28)      -  Staphylococcus epidermidis, Methicillin resistant: Detec      Method Type: PCR    Culture - Other (collected 08-10-22 @ 12:40)  Source: .Other right leg wound  Final Report (08-13-22 @ 18:21):    Numerous Candida parapsilosis "Susceptibilities not performed"    Culture - Blood (collected 08-09-22 @ 02:09)  Source: .Blood None  Final Report (08-14-22 @ 10:01):    No Growth Final    Culture - Urine (collected 08-09-22 @ 01:00)  Source: Catheterized Catheterized  Final Report (08-10-22 @ 10:50):    No growth            INFECTIOUS DISEASES TESTING  COVID-19 PCR: NotDetec (08-31-22 @ 11:58)  Procalcitonin, Serum: 2.46 (08-31-22 @ 07:24)  COVID-19 PCR: NotDetec (08-26-22 @ 09:40)  COVID-19 PCR: NotDetec (08-21-22 @ 02:34)  COVID-19 PCR: NotDetec (08-18-22 @ 17:36)  Procalcitonin, Serum: 0.11 (08-16-22 @ 06:04)  COVID-19 PCR: NotDetec (08-15-22 @ 15:41)  COVID-19 PCR: NotDetec (08-11-22 @ 13:22)  COVID-19 PCR: NotDetec (08-08-22 @ 05:14)  MRSA PCR Result.: Negative (08-02-22 @ 17:40)  COVID-19 PCR: NotDetec (08-02-22 @ 01:40)      INFLAMMATORY MARKERS  C-Reactive Protein, Serum: 289.1 mg/L (09-03-22 @ 12:04)  Sedimentation Rate, Erythrocyte: >140 mm/Hr (09-03-22 @ 12:04)  Sedimentation Rate, Erythrocyte: 125 mm/Hr (08-30-22 @ 19:27)  C-Reactive Protein, Serum: 54.4 mg/L (08-30-22 @ 19:27)      RADIOLOGY & ADDITIONAL TESTS:  I have personally reviewed the last available Chest xray  CXR      CT      CARDIOLOGY TESTING  12 Lead ECG:   Ventricular Rate 63 BPM    Atrial Rate 63 BPM    P-R Interval 162 ms    QRS Duration 76 ms    Q-T Interval 422 ms    QTC Calculation(Bazett) 431 ms    P Axis 51 degrees    R Axis 36 degrees    T Axis 112 degrees    Diagnosis Line Normal sinus rhythm  Anteroseptal infarct , age undetermined  Abnormal ECG    Confirmed by Robert Bauer ([id number]) on 8/27/2022 11:37:29 AM (08-26-22 @ 20:22)      MEDICATIONS  aspirin  chewable 81 Oral daily  atorvastatin 80 Oral at bedtime  aztreonam  IVPB     aztreonam  IVPB 2000 IV Intermittent every 12 hours  ceftazidime/avibactam IVPB 0.94 IV Intermittent every 12 hours  chlorhexidine 0.12% Liquid 15 Oral Mucosa every 12 hours  chlorhexidine 2% Cloths 1 Topical <User Schedule>  collagenase Ointment 1 Topical two times a day  Dakins Solution - 1/2 Strength 1 Topical two times a day  dexMEDEtomidine Infusion 0.2 IV Continuous <Continuous>  dextrose 5%. 1000 IV Continuous <Continuous>  dextrose 5%. 1000 IV Continuous <Continuous>  dextrose 50% Injectable 25 IV Push once  dextrose 50% Injectable 12.5 IV Push once  dextrose 50% Injectable 25 IV Push once  fentaNYL   Infusion. 0.5 IV Continuous <Continuous>  glucagon  Injectable 1 IntraMuscular once  hydrALAZINE 100 Oral every 8 hours  labetalol 600 Oral three times a day  lacosamide IVPB 50 IV Intermittent every 12 hours  levETIRAcetam  Solution 500 Oral two times a day  lidocaine 1%/epinephrine 1:100,000 Inj 20 Local Injection once  multivitamin/minerals/iron Oral Solution (CENTRUM) 15 Oral daily  pantoprazole  Injectable 40 IV Push two times a day  propofol Infusion 10 IV Continuous <Continuous>  sodium bicarbonate 650 Oral every 8 hours  sodium chloride 0.65% Nasal 2 Both Nostrils two times a day  sodium chloride 0.9%. 1000 IV Continuous <Continuous>      WEIGHT  Weight (kg): 81.4 (09-05-22 @ 03:14)  Creatinine, Serum: 3.3 mg/dL (09-06-22 @ 05:30)      ANTIBIOTICS:  aztreonam  IVPB      aztreonam  IVPB 2000 milliGRAM(s) IV Intermittent every 12 hours  ceftazidime/avibactam IVPB 0.94 Gram(s) IV Intermittent every 12 hours      All available historical records have been reviewed

## 2022-09-06 NOTE — PROGRESS NOTE ADULT - ASSESSMENT
Pt is a 56y Female admitted with RLE cellulitis, LGIB -- ENT following from oral bleeding.    PLAN:  -No acute ENT intervention at this time  -No evidence of bleeding on exam, though limited because of ETT  -Trend hemoglobin, transfuse prn  -Remainder of care as per primary team  -Please recall ENT with any questions, concerns, or evidence of rebleeding  -Discussed with attending

## 2022-09-06 NOTE — CONSULT NOTE ADULT - PROBLEM SELECTOR RECOMMENDATION 5
Full Code  Will check if  defers to son for decision making  Continue aggressive medical management  Will re-address GOC as appropriate   Will follow

## 2022-09-06 NOTE — PROGRESS NOTE ADULT - SUBJECTIVE AND OBJECTIVE BOX
ENT DAILY PROGRESS NOTE    Pt is a 56y Female admitted with RLE cellulitis, LGIB -- ENT following from oral bleeding. Patient seen and examined at bedside earlier this afternoon with Dr. Marcos, patient now intubated for airway protection.    REVIEW OF SYSTEMS   [ X ] Due to altered mental status/intubation, subjective information were not able to be obtained from patient. History was obtained, to the extent possible, from review of the chart and collateral sources of information.    Allergies  No Known Allergies    MEDICATIONS:  acetaminophen     Tablet .. 650 milliGRAM(s) Oral every 6 hours PRN  aspirin  chewable 81 milliGRAM(s) Oral daily  atorvastatin 80 milliGRAM(s) Oral at bedtime  aztreonam  IVPB      aztreonam  IVPB 2000 milliGRAM(s) IV Intermittent every 12 hours  ceftazidime/avibactam IVPB 0.94 Gram(s) IV Intermittent every 12 hours  chlorhexidine 0.12% Liquid 15 milliLiter(s) Oral Mucosa every 12 hours  chlorhexidine 2% Cloths 1 Application(s) Topical <User Schedule>  collagenase Ointment 1 Application(s) Topical two times a day  Dakins Solution - 1/2 Strength 1 Application(s) Topical two times a day  dexMEDEtomidine Infusion 0.2 MICROgram(s)/kG/Hr IV Continuous <Continuous>  dextrose 5%. 1000 milliLiter(s) IV Continuous <Continuous>  dextrose 5%. 1000 milliLiter(s) IV Continuous <Continuous>  dextrose 50% Injectable 25 Gram(s) IV Push once  dextrose 50% Injectable 12.5 Gram(s) IV Push once  dextrose 50% Injectable 25 Gram(s) IV Push once  dextrose Oral Gel 15 Gram(s) Oral once PRN  fentaNYL   Infusion. 0.5 MICROgram(s)/kG/Hr IV Continuous <Continuous>  furosemide   Injectable 40 milliGRAM(s) IV Push once PRN  glucagon  Injectable 1 milliGRAM(s) IntraMuscular once  hydrALAZINE 100 milliGRAM(s) Oral every 8 hours  labetalol 600 milliGRAM(s) Oral three times a day  labetalol Injectable 10 milliGRAM(s) IV Push every 6 hours PRN  lacosamide IVPB 50 milliGRAM(s) IV Intermittent every 12 hours  levETIRAcetam  Solution 500 milliGRAM(s) Oral two times a day  lidocaine 1%/epinephrine 1:100,000 Inj 20 milliLiter(s) Local Injection once  melatonin 3 milliGRAM(s) Oral at bedtime PRN  multivitamin/minerals/iron Oral Solution (CENTRUM) 15 milliLiter(s) Oral daily  pantoprazole  Injectable 40 milliGRAM(s) IV Push two times a day  propofol Infusion 10 MICROgram(s)/kG/Min IV Continuous <Continuous>  sodium bicarbonate 650 milliGRAM(s) Oral every 8 hours  sodium chloride 0.65% Nasal 2 Spray(s) Both Nostrils two times a day  sodium chloride 0.9%. 1000 milliLiter(s) IV Continuous <Continuous>    Vital Signs Last 24 Hrs  T(C): 36.9 (06 Sep 2022 08:00), Max: 37.3 (05 Sep 2022 20:00)  T(F): 98.5 (06 Sep 2022 08:00), Max: 99.2 (05 Sep 2022 20:00)  HR: 72 (06 Sep 2022 15:30) (58 - 84)  BP: 159/83 (06 Sep 2022 15:30) (82/57 - 195/97)  BP(mean): 116 (06 Sep 2022 15:30) (63 - 151)  RR: 16 (06 Sep 2022 15:30) (10 - 33)  SpO2: 100% (06 Sep 2022 15:30) (97% - 100%)    Parameters below as of 06 Sep 2022 12:53  Patient On (Oxygen Delivery Method): ventilator    09-05 @ 07:01  -  09-06 @ 07:00  --------------------------------------------------------  IN:    Enteral Tube Flush: 4000 mL    Pantoprazole: 60 mL    PRBCs (Packed Red Blood Cells): 275 mL    sodium chloride 0.9%: 1800 mL  Total IN: 6135 mL    OUT:    Indwelling Catheter - Urethral (mL): 2555 mL    Voided (mL): 300 mL  Total OUT: 2855 mL    Total NET: 3280 mL    09-06 @ 07:01  -  09-06 @ 17:44  --------------------------------------------------------  IN:    Dexmedetomidine: 29.4 mL    FentaNYL: 245.4 mL    PRBCs (Packed Red Blood Cells): 300 mL    Propofol: 18 mL    sodium chloride 0.9%: 900 mL  Total IN: 1492.8 mL    OUT:    Indwelling Catheter - Urethral (mL): 820 mL  Total OUT: 820 mL    Total NET: 672.8 mL    PHYSICAL EXAM:  GEN: Intubated.  HEENT: Oral exam limited secondary to ETT, however no obvious oral bleeding noted.  NECK: Trachea midline, Neck supple, no tenderness to palpation to bilateral lateral neck, no cervical LAD.   RESP: No dyspnea, non-labored breathing. No use of accessory muscles.   CARDIO: +S1/S2  ABDO: Soft, NT.    LABS:  CBC-             7.5    11.44 )-----------( 142      ( 06 Sep 2022 12:39 )             21.5     BMP/CMP-  06 Sep 2022 05:30  142    |  107    |  78     ----------------------------<  112    4.2     |  20     |  3.3      Ca    8.3        06 Sep 2022 05:30  Phos  4.5       05 Sep 2022 20:00  Mg     2.2       06 Sep 2022 05:30    TPro  5.2    /  Alb  2.5    /  TBili  0.4    /  DBili  x      /  AST  46     /  ALT  38     /  AlkPhos  311    06 Sep 2022 05:30    Coagulation Studies-  PT/INR - ( 05 Sep 2022 16:33 )   PT: 14.20 sec;   INR: 1.24 ratio    PTT - ( 05 Sep 2022 16:33 )  PTT:32.3 sec    RADIOLOGY & ADDITIONAL STUDIES:  None relevant to review

## 2022-09-06 NOTE — CHART NOTE - NSCHARTNOTEFT_GEN_A_CORE
The patient was initially scheduled for a diagnostic cerebral angiogram today in neuro IR, but, after consulting the primary team, the procedure was postponed by Dr. Gil due to concerns about patient clinical stability.    [phone]

## 2022-09-06 NOTE — PROGRESS NOTE ADULT - ASSESSMENT
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 During hospital stay, found to have multiple punctate infarcts suspected to be cardioembolic vs vasculitis. Patient also found to have sharp waves on vEEG and currently on AEDs. Hospital course further complicated by fever and now with MDR Enterobacter bacteremia.    # GI bleed   - S/p 3 units of PRBCs  - Pt was seen by GI, bleeding hemorrhoids noted, external. Surgery contacted for rx.   - Protonix q12h  - ENT eval appreciated for oral bleed. No actively oozing wounds, teeth guard put in.    - FU CBC, coags   - pt intubated today for the procedure     # Acute ischemic strokes mutlifocal  - Spoke with neurology, suspiscious for vasculitis, but not confirmed, CSF studies does not support the diagnosis. No IVIG or solumedrol to be used.,  - Plavix and aspirin held for LGIB   - FU EEG, cw keppra   - Neuro check q1h,  - Pt is intubated.     # Bacteremia   - bcx positive for MDR enterobacter Cloacae   - Cw w antibiotics per ID   - bcx daily     # nonoliguric ALF / Urinary retention / Suspected ATN  - Cr continues to trend up   - on IVFs  - continue to hold Lasix and Losartan  - avoid hypotension   - renal US 9/2: no hydronephrosis  - keep nolasco for now  - sodium bicarbonate 650mg PO q8hr  - renal following  - daily BMP and I's and O's  - phos level OK    # Hypertensive urgency due to noncompliance and Malignant HTN   - BP on admission 296/174 without signs of end organ damage. Renal artery duplex wnl>>ARIAN unlikely  - Aldosterone wnl, renin elevated  - BP overall improved  - SBP goal 120-160      # Purulent Right LE cellulitis   - s/p debridement by burn on 8/10  - Candida in wound. per Dr. Chua: contaminant  -  BCx w/ staph: likely contaminant. repeat BCx at that time was negative     # Diabetes mellitus   - HbA1C 10.4  - Insulin held for now as pt is NPO     #HLD   - cw statins       #MISC:  - GI prophylaxis: protonix   - Dispo: MICU   - Acitivity: bedrest

## 2022-09-07 NOTE — PROGRESS NOTE ADULT - SUBJECTIVE AND OBJECTIVE BOX
Over Night Events: events noted, remain critically done, still intubated, ventilated, on fentanyl, precedex, off propofol, Neuro, ID, GI sp EGD, colonoscopy no bloody BM    PHYSICAL EXAM    ICU Vital Signs Last 24 Hrs  T(C): 36.6 (07 Sep 2022 04:00), Max: 37.2 (06 Sep 2022 16:00)  T(F): 97.8 (07 Sep 2022 04:00), Max: 98.9 (06 Sep 2022 16:00)  HR: 58 (07 Sep 2022 07:00) (54 - 84)  BP: 112/65 (07 Sep 2022 07:00) (82/57 - 195/97)  BP(mean): 88 (07 Sep 2022 07:00) (63 - 151)  RR: 16 (07 Sep 2022 07:00) (10 - 24)  SpO2: 100% (07 Sep 2022 07:00) (100% - 100%)    O2 Parameters below as of 07 Sep 2022 04:00  Patient On (Oxygen Delivery Method): ventilator            General: ILL looking  HEENT: ETT  Lungs: dec bs both bases  Cardiovascular: daphne 2/6  ABDOMEn: Soft, Positive BS  Extremities: No clubbing   not following commands    09-06-22 @ 07:01  -  09-07-22 @ 07:00  --------------------------------------------------------  IN:    Dexmedetomidine: 141.9 mL    Dexmedetomidine: 1.4 mL    FentaNYL: 497.1 mL    PRBCs (Packed Red Blood Cells): 300 mL    Propofol: 81 mL    sodium chloride 0.9%: 2200 mL  Total IN: 3221.4 mL    OUT:    Indwelling Catheter - Urethral (mL): 940 mL  Total OUT: 940 mL    Total NET: 2281.4 mL          LABS:                          6.0    9.77  )-----------( 104      ( 07 Sep 2022 05:08 )             17.9                                               09-07    141  |  109  |  77<HH>  ----------------------------<  126<H>  4.1   |  15<L>  |  3.6<H>    Ca    8.2<L>      07 Sep 2022 05:08  Phos  4.5     09-05  Mg     2.1     09-07    TPro  4.8<L>  /  Alb  2.2<L>  /  TBili  0.3  /  DBili  x   /  AST  32  /  ALT  27  /  AlkPhos  221<H>  09-07      PT/INR - ( 05 Sep 2022 16:33 )   PT: 14.20 sec;   INR: 1.24 ratio         PTT - ( 05 Sep 2022 16:33 )  PTT:32.3 sec                                                                                     LIVER FUNCTIONS - ( 07 Sep 2022 05:08 )  Alb: 2.2 g/dL / Pro: 4.8 g/dL / ALK PHOS: 221 U/L / ALT: 27 U/L / AST: 32 U/L / GGT: x                                                  Culture - Blood (collected 05 Sep 2022 12:19)  Source: .Blood Blood  Preliminary Report (06 Sep 2022 18:02):    No growth to date.                                                   Mode: AC/ CMV (Assist Control/ Continuous Mandatory Ventilation)  RR (machine): 16  TV (machine): 400  FiO2: 50  PEEP: 5  ITime: 1  MAP: 10  PIP: 28                                      ABG - ( 07 Sep 2022 03:32 )  pH, Arterial: 7.35  pH, Blood: x     /  pCO2: 29    /  pO2: 214   / HCO3: 16    / Base Excess: -8.2  /  SaO2: 100.0               MEDICATIONS  (STANDING):  aspirin  chewable 81 milliGRAM(s) Oral daily  atorvastatin 80 milliGRAM(s) Oral at bedtime  aztreonam  IVPB      aztreonam  IVPB 2000 milliGRAM(s) IV Intermittent every 12 hours  ceftazidime/avibactam IVPB 0.94 Gram(s) IV Intermittent every 12 hours  chlorhexidine 0.12% Liquid 15 milliLiter(s) Oral Mucosa every 12 hours  chlorhexidine 2% Cloths 1 Application(s) Topical <User Schedule>  collagenase Ointment 1 Application(s) Topical two times a day  Dakins Solution - 1/2 Strength 1 Application(s) Topical two times a day  dexMEDEtomidine Infusion 0.2 MICROgram(s)/kG/Hr (4.07 mL/Hr) IV Continuous <Continuous>  dextrose 5%. 1000 milliLiter(s) (100 mL/Hr) IV Continuous <Continuous>  dextrose 5%. 1000 milliLiter(s) (50 mL/Hr) IV Continuous <Continuous>  dextrose 50% Injectable 25 Gram(s) IV Push once  dextrose 50% Injectable 12.5 Gram(s) IV Push once  dextrose 50% Injectable 25 Gram(s) IV Push once  fentaNYL   Infusion. 0.5 MICROgram(s)/kG/Hr (4.07 mL/Hr) IV Continuous <Continuous>  glucagon  Injectable 1 milliGRAM(s) IntraMuscular once  hydrALAZINE 100 milliGRAM(s) Oral every 8 hours  insulin lispro (ADMELOG) corrective regimen sliding scale   SubCutaneous three times a day before meals  labetalol 600 milliGRAM(s) Oral three times a day  lacosamide IVPB 50 milliGRAM(s) IV Intermittent every 12 hours  levETIRAcetam  Solution 500 milliGRAM(s) Oral two times a day  lidocaine 1%/epinephrine 1:100,000 Inj 20 milliLiter(s) Local Injection once  multivitamin/minerals/iron Oral Solution (CENTRUM) 15 milliLiter(s) Oral daily  pantoprazole  Injectable 40 milliGRAM(s) IV Push two times a day  propofol Infusion 10 MICROgram(s)/kG/Min (4.88 mL/Hr) IV Continuous <Continuous>  sodium bicarbonate 650 milliGRAM(s) Oral every 8 hours  sodium chloride 0.65% Nasal 2 Spray(s) Both Nostrils two times a day  sodium chloride 0.9%. 1000 milliLiter(s) (100 mL/Hr) IV Continuous <Continuous>    MEDICATIONS  (PRN):  acetaminophen     Tablet .. 650 milliGRAM(s) Oral every 6 hours PRN Temp greater or equal to 38C (100.4F), Mild Pain (1 - 3)  dextrose Oral Gel 15 Gram(s) Oral once PRN Blood Glucose LESS THAN 70 milliGRAM(s)/deciliter  labetalol Injectable 10 milliGRAM(s) IV Push every 6 hours PRN Systolic blood pressure >  melatonin 3 milliGRAM(s) Oral at bedtime PRN Insomnia      CXR reviewed

## 2022-09-07 NOTE — PROGRESS NOTE ADULT - SUBJECTIVE AND OBJECTIVE BOX
HPI:  55 yo F with PMH of HTN, DM2, and stroke (per son 2017) who presented for RLE wound. Started 3 months ago when she fell and hit her leg on a wooden cabinet. She put hydrogen peroxide, antibiotic cream, and wrapped the wound but never fully healed. Two weeks ago it started to show yellow pus so her and her family came to the ED for further treatment. Endorsed subjective fevers, chest pain, and vision changes which occurs when walked 2-3 blocks. Denied congestion, sore throat, cough, dyspnea, headache, dysuria, N/V, diarrhea     Per son, they fill her medications at [facility] ([phone]) and this is their current pharmacy. Called them to confirm her medications and they said her last refill of medications was 2018. Pt has not seen a doctor due to insurance problem and has not been taking any medications.    In the ED:  Vitals: T: 98.9, BP: 296/ 174, , RR: 18, 98%O2 on RA  Labs: CBC showed WBC 11.23; ESR 92, CRP 35.6  CMP showed glucose 302, alk phos 173; ; VBG pH 7.45  EKG pending  CXR showed borderline cardiomegaly and no airspace opacity.   X-ray of RLE also pending.     Received unasyn and vanco, humalin R, IV vasotec, IV labetalol   (02 Aug 2022 07:47)     INTERVAL EVENTS:  9/6: pt intubated for endoscopy. spoke with son, Latrell, who wanted to keep full code and continue aggressive medical management. he remains hopeful she will improve. they want to pursue continued workup with rheum/neuro, etc.   9/7: pt remains intubated, bradycardic.     ADVANCE DIRECTIVES:    MOLST  [ ]  Living Will  [ ]   DECISION MAKER(s):  [ ] Health Care Proxy(s)  [ X] Surrogate(s)  [ ] Guardian           Name(s): Phone Number(s): Barney Sams     BASELINE (I)ADL(s) (prior to admission):  [place]: [X ]Total  [ ] Moderate [ ]Dependent  Palliative Performance Status Version 2:      80   %    http://npcrc.org/files/news/palliative_performance_scale_ppsv2.pdf    Allergies    No Known Allergies    Intolerances    MEDICATIONS  (STANDING):  aspirin  chewable 81 milliGRAM(s) Oral daily  atorvastatin 80 milliGRAM(s) Oral at bedtime  aztreonam  IVPB 2000 milliGRAM(s) IV Intermittent every 12 hours  aztreonam  IVPB      ceftazidime/avibactam IVPB 0.94 Gram(s) IV Intermittent every 12 hours  chlorhexidine 0.12% Liquid 15 milliLiter(s) Oral Mucosa every 12 hours  chlorhexidine 2% Cloths 1 Application(s) Topical <User Schedule>  collagenase Ointment 1 Application(s) Topical two times a day  Dakins Solution - 1/2 Strength 1 Application(s) Topical two times a day  dexMEDEtomidine Infusion 0.2 MICROgram(s)/kG/Hr (4.07 mL/Hr) IV Continuous <Continuous>  dextrose 5% 1000 milliLiter(s) (75 mL/Hr) IV Continuous <Continuous>  dextrose 5%. 1000 milliLiter(s) (100 mL/Hr) IV Continuous <Continuous>  dextrose 5%. 1000 milliLiter(s) (50 mL/Hr) IV Continuous <Continuous>  dextrose 50% Injectable 25 Gram(s) IV Push once  dextrose 50% Injectable 12.5 Gram(s) IV Push once  dextrose 50% Injectable 25 Gram(s) IV Push once  fentaNYL   Infusion. 0.5 MICROgram(s)/kG/Hr (4.07 mL/Hr) IV Continuous <Continuous>  glucagon  Injectable 1 milliGRAM(s) IntraMuscular once  hydrALAZINE 100 milliGRAM(s) Oral every 8 hours  insulin lispro (ADMELOG) corrective regimen sliding scale   SubCutaneous three times a day before meals  labetalol 600 milliGRAM(s) Oral three times a day  lacosamide IVPB 50 milliGRAM(s) IV Intermittent every 12 hours  levETIRAcetam  Solution 500 milliGRAM(s) Oral two times a day  lidocaine 1%/epinephrine 1:100,000 Inj 20 milliLiter(s) Local Injection once  multivitamin/minerals/iron Oral Solution (CENTRUM) 15 milliLiter(s) Oral daily  pantoprazole  Injectable 40 milliGRAM(s) IV Push two times a day  propofol Infusion 10 MICROgram(s)/kG/Min (4.88 mL/Hr) IV Continuous <Continuous>  sodium bicarbonate 1300 milliGRAM(s) Oral every 8 hours  sodium chloride 0.65% Nasal 2 Spray(s) Both Nostrils two times a day    MEDICATIONS  (PRN):  acetaminophen     Tablet .. 650 milliGRAM(s) Oral every 6 hours PRN Temp greater or equal to 38C (100.4F), Mild Pain (1 - 3)  dextrose Oral Gel 15 Gram(s) Oral once PRN Blood Glucose LESS THAN 70 milliGRAM(s)/deciliter  labetalol Injectable 10 milliGRAM(s) IV Push every 6 hours PRN Systolic blood pressure >  melatonin 3 milliGRAM(s) Oral at bedtime PRN Insomnia    PRESENT SYMPTOMS: [X ]Unable to obtain due to poor mentation   Source if other than patient:  [ ]Family   [ ]Team     Pain: [ ]yes [ ]no  QOL impact -   Location -                    Aggravating factors -  Quality -  Radiation -  Timing-  Severity (0-10 scale):  Minimal acceptable level (0-10 scale):     CPOT:  0  https://www.[facility].org/getattachment/[email or web address]/Critical-Care-Pain-Observation-Tool-(CPOT)    Dyspnea:                           [ ]Mild [ ]Moderate [ ]Severe  Anxiety:                             [ ]Mild [ ]Moderate [ ]Severe  Fatigue:                             [ ]Mild [ ]Moderate [ ]Severe  Nausea:                             [ ]Mild [ ]Moderate [ ]Severe  Loss of appetite:              [ ]Mild [ ]Moderate [ ]Severe  Constipation:                    [ ]Mild [ ]Moderate [ ]Severe    Other Symptoms:  [X ]All other review of systems negative     Palliative Performance Status Version 2:   10      %    http://[facility].org/files/news/palliative_performance_scale_[email or web address].pdf    PHYSICAL EXAM:  Vital Signs Last 24 Hrs  T(C): 36.6 (07 Sep 2022 04:00), Max: 37.2 (06 Sep 2022 16:00)  T(F): 97.8 (07 Sep 2022 04:00), Max: 98.9 (06 Sep 2022 16:00)  HR: 50 (07 Sep 2022 11:00) (50 - 76)  BP: 107/66 (07 Sep 2022 11:00) (97/60 - 161/83)  BP(mean): 86 (07 Sep 2022 11:00) (75 - 118)  RR: 16 (07 Sep 2022 11:00) (16 - 18)  SpO2: 100% (07 Sep 2022 11:00) (100% - 100%)    GENERAL:  [ ]Alert  [ ]Oriented x   [ ]Lethargic  [ ]Cachexia  [X ]Unarousable  [ ]Verbal  [X ]Non-Verbal  Behavioral:   [ ] Anxiety  [ ] Delirium [ ] Agitation [ X] Calm   HEENT:  [ ]Normal   [ ]Dry mouth   [ X]ET Tube/Trach  [ ]Oral lesions  PULMONARY:   [X ]Clear [ ]Tachypnea  [ ]Audible excessive secretions   [ ]Rhonchi        [ ]Right [ ]Left [ ]Bilateral  [ ]Crackles        [ ]Right [ ]Left [ ]Bilateral  [ ]Wheezing     [ ]Right [ ]Left [ ]Bilateral  [ ]Diminished breath sounds [ ]right [ ]left [ ]bilateral  CARDIOVASCULAR:    [ ]Regular [ ]Irregular [ ]Tachy  [X ]Krishna [ ]Murmur [ ]Other  GASTROINTESTINAL:  [X ]Soft  [ ]Distended   [ ]+BS  [ ]Non tender [ ]Tender  [ ]PEG [ ]OGT/ NGT  Last BM:   GENITOURINARY:  [ ]Normal [ ] Incontinent   [ ]Oliguria/Anuria   [X ]Maloney  MUSCULOSKELETAL:   [ ]Normal   [ ]Weakness  [ X]Bed/Wheelchair bound [ ]Edema  NEUROLOGIC:   [ ]No focal deficits  [ X]Cognitive impairment  [ ]Dysphagia [ ]Dysarthria [ ]Paresis [ ]Other   SKIN:   [X ]Normal    [ ]Rash  [ ]Pressure ulcer(s)       Present on admission [ ]y [ ]n    CRITICAL CARE:  [ ] Shock Present  [ ]Septic [ ]Cardiogenic [ ]Neurologic [ ]Hypovolemic  [ ]  Vasopressors [ ]  Inotropes   [ ]Respiratory failure present [ X]Mechanical ventilation [ ]Non-invasive ventilatory support [ ]High flow  [ ]Acute  [ ]Chronic [ ]Hypoxic  [ ]Hypercarbic [ ]Other  [ ]Other organ failure     LABS:                        6.0    9.77  )-----------( 104      ( 07 Sep 2022 05:08 )             17.9   09-07    141  |  109  |  77<HH>  ----------------------------<  126<H>  4.1   |  15<L>  |  3.6<H>    Ca    8.2<L>      07 Sep 2022 05:08  Phos  4.5     09-05  Mg     2.1     09-07    TPro  4.8<L>  /  Alb  2.2<L>  /  TBili  0.3  /  DBili  x   /  AST  32  /  ALT  27  /  AlkPhos  221<H>  09-07  PT/INR - ( 05 Sep 2022 16:33 )   PT: 14.20 sec;   INR: 1.24 ratio         PTT - ( 05 Sep 2022 16:33 )  PTT:32.3 sec      RADIOLOGY & ADDITIONAL STUDIES:    < from: MR Head No Cont (09.03.22 @ 20:08) >  IMPRESSION:  Redemonstration of multiple scattered lacunar infarcts, now subacute. No   compelling evidence of an acute infarct or acute intracranial hemorrhage.    --- End of Report ---    < end of copied text >      Goals of Care Document:   - spoke with son, Latrell, yesterday.  defers to the son as the decision maker  - son wants full code, continued medical management and to continue workup  - open to re-addressing GOC in the future as needed   - will follow up once further evaluation per neuro/rheum        HPI:  57 yo F with PMH of HTN, DM2, and stroke (per son 2017) who presented for RLE wound. Started 3 months ago when she fell and hit her leg on a wooden cabinet. She put hydrogen peroxide, antibiotic cream, and wrapped the wound but never fully healed. Two weeks ago it started to show yellow pus so her and her family came to the ED for further treatment. Endorsed subjective fevers, chest pain, and vision changes which occurs when walked 2-3 blocks. Denied congestion, sore throat, cough, dyspnea, headache, dysuria, N/V, diarrhea    (02 Aug 2022 07:47)     INTERVAL EVENTS:  9/6: pt intubated for endoscopy. spoke with son, Latrell, who wanted to keep full code and continue aggressive medical management. he remains hopeful she will improve. they want to pursue continued workup with rheum/neuro, etc.   9/7: pt remains intubated, bradycardic.     ADVANCE DIRECTIVES:    MOLST  [ ]  Living Will  [ ]   DECISION MAKER(s):  [ ] Health Care Proxy(s)  [ X] Surrogate(s)  [ ] Guardian           Name(s): Phone Number(s): Barney Sams     BASELINE (I)ADL(s) (prior to admission):  [place]: [X ]Total  [ ] Moderate [ ]Dependent  Palliative Performance Status Version 2:      80   %    http://npcrc.org/files/news/palliative_performance_scale_ppsv2.pdf    Allergies    No Known Allergies    Intolerances    MEDICATIONS  (STANDING):  aspirin  chewable 81 milliGRAM(s) Oral daily  atorvastatin 80 milliGRAM(s) Oral at bedtime  aztreonam  IVPB 2000 milliGRAM(s) IV Intermittent every 12 hours  aztreonam  IVPB      ceftazidime/avibactam IVPB 0.94 Gram(s) IV Intermittent every 12 hours  chlorhexidine 0.12% Liquid 15 milliLiter(s) Oral Mucosa every 12 hours  chlorhexidine 2% Cloths 1 Application(s) Topical <User Schedule>  collagenase Ointment 1 Application(s) Topical two times a day  Dakins Solution - 1/2 Strength 1 Application(s) Topical two times a day  dexMEDEtomidine Infusion 0.2 MICROgram(s)/kG/Hr (4.07 mL/Hr) IV Continuous <Continuous>  dextrose 5% 1000 milliLiter(s) (75 mL/Hr) IV Continuous <Continuous>  dextrose 5%. 1000 milliLiter(s) (100 mL/Hr) IV Continuous <Continuous>  dextrose 5%. 1000 milliLiter(s) (50 mL/Hr) IV Continuous <Continuous>  dextrose 50% Injectable 25 Gram(s) IV Push once  dextrose 50% Injectable 12.5 Gram(s) IV Push once  dextrose 50% Injectable 25 Gram(s) IV Push once  fentaNYL   Infusion. 0.5 MICROgram(s)/kG/Hr (4.07 mL/Hr) IV Continuous <Continuous>  glucagon  Injectable 1 milliGRAM(s) IntraMuscular once  hydrALAZINE 100 milliGRAM(s) Oral every 8 hours  insulin lispro (ADMELOG) corrective regimen sliding scale   SubCutaneous three times a day before meals  labetalol 600 milliGRAM(s) Oral three times a day  lacosamide IVPB 50 milliGRAM(s) IV Intermittent every 12 hours  levETIRAcetam  Solution 500 milliGRAM(s) Oral two times a day  lidocaine 1%/epinephrine 1:100,000 Inj 20 milliLiter(s) Local Injection once  multivitamin/minerals/iron Oral Solution (CENTRUM) 15 milliLiter(s) Oral daily  pantoprazole  Injectable 40 milliGRAM(s) IV Push two times a day  propofol Infusion 10 MICROgram(s)/kG/Min (4.88 mL/Hr) IV Continuous <Continuous>  sodium bicarbonate 1300 milliGRAM(s) Oral every 8 hours  sodium chloride 0.65% Nasal 2 Spray(s) Both Nostrils two times a day    MEDICATIONS  (PRN):  acetaminophen     Tablet .. 650 milliGRAM(s) Oral every 6 hours PRN Temp greater or equal to 38C (100.4F), Mild Pain (1 - 3)  dextrose Oral Gel 15 Gram(s) Oral once PRN Blood Glucose LESS THAN 70 milliGRAM(s)/deciliter  labetalol Injectable 10 milliGRAM(s) IV Push every 6 hours PRN Systolic blood pressure >  melatonin 3 milliGRAM(s) Oral at bedtime PRN Insomnia    PRESENT SYMPTOMS: [X ]Unable to obtain due to poor mentation   Source if other than patient:  [ ]Family   [ ]Team     Pain: [ ]yes [ ]no  QOL impact -   Location -                    Aggravating factors -  Quality -  Radiation -  Timing-  Severity (0-10 scale):  Minimal acceptable level (0-10 scale):     CPOT:  0  https://www.[place].org/getattachment/[email or web address]/Critical-Care-Pain-Observation-Tool-(CPOT)    Dyspnea:                           [ ]Mild [ ]Moderate [ ]Severe  Anxiety:                             [ ]Mild [ ]Moderate [ ]Severe  Fatigue:                             [ ]Mild [ ]Moderate [ ]Severe  Nausea:                             [ ]Mild [ ]Moderate [ ]Severe  Loss of appetite:              [ ]Mild [ ]Moderate [ ]Severe  Constipation:                    [ ]Mild [ ]Moderate [ ]Severe    Other Symptoms:  [X ]All other review of systems negative     Palliative Performance Status Version 2:   10      %    http://[email or web address].org/files/news/palliative_performance_scale_ppsv2.pdf    PHYSICAL EXAM:  Vital Signs Last 24 Hrs  T(C): 36.6 (07 Sep 2022 04:00), Max: 37.2 (06 Sep 2022 16:00)  T(F): 97.8 (07 Sep 2022 04:00), Max: 98.9 (06 Sep 2022 16:00)  HR: 50 (07 Sep 2022 11:00) (50 - 76)  BP: 107/66 (07 Sep 2022 11:00) (97/60 - 161/83)  BP(mean): 86 (07 Sep 2022 11:00) (75 - 118)  RR: 16 (07 Sep 2022 11:00) (16 - 18)  SpO2: 100% (07 Sep 2022 11:00) (100% - 100%)    GENERAL:  [ ]Alert  [ ]Oriented x   [ ]Lethargic  [ ]Cachexia  [X ]Unarousable  [ ]Verbal  [X ]Non-Verbal  Behavioral:   [ ] Anxiety  [ ] Delirium [ ] Agitation [ X] Calm   HEENT:  [ ]Normal   [ ]Dry mouth   [ X]ET Tube/Trach  [ ]Oral lesions  PULMONARY:   [X ]Clear [ ]Tachypnea  [ ]Audible excessive secretions   [ ]Rhonchi        [ ]Right [ ]Left [ ]Bilateral  [ ]Crackles        [ ]Right [ ]Left [ ]Bilateral  [ ]Wheezing     [ ]Right [ ]Left [ ]Bilateral  [ ]Diminished breath sounds [ ]right [ ]left [ ]bilateral  CARDIOVASCULAR:    [ ]Regular [ ]Irregular [ ]Tachy  [X ]Krishna [ ]Murmur [ ]Other  GASTROINTESTINAL:  [X ]Soft  [ ]Distended   [ ]+BS  [ ]Non tender [ ]Tender  [ ]PEG [ ]OGT/ NGT  Last BM:   GENITOURINARY:  [ ]Normal [ ] Incontinent   [ ]Oliguria/Anuria   [X ]Maloney  MUSCULOSKELETAL:   [ ]Normal   [ ]Weakness  [ X]Bed/Wheelchair bound [ ]Edema  NEUROLOGIC:   [ ]No focal deficits  [ X]Cognitive impairment  [ ]Dysphagia [ ]Dysarthria [ ]Paresis [ ]Other   SKIN:   [X ]Normal    [ ]Rash  [ ]Pressure ulcer(s)       Present on admission [ ]y [ ]n    CRITICAL CARE:  [ ] Shock Present  [ ]Septic [ ]Cardiogenic [ ]Neurologic [ ]Hypovolemic  [ ]  Vasopressors [ ]  Inotropes   [ ]Respiratory failure present [ X]Mechanical ventilation [ ]Non-invasive ventilatory support [ ]High flow  [ ]Acute  [ ]Chronic [ ]Hypoxic  [ ]Hypercarbic [ ]Other  [ ]Other organ failure     LABS:  reviewed                        6.0    9.77  )-----------( 104      ( 07 Sep 2022 05:08 )             17.9   09-07    141  |  109  |  77<HH>  ----------------------------<  126<H>  4.1   |  15<L>  |  3.6<H>    Ca    8.2<L>      07 Sep 2022 05:08  Phos  4.5     09-05  Mg     2.1     09-07    TPro  4.8<L>  /  Alb  2.2<L>  /  TBili  0.3  /  DBili  x   /  AST  32  /  ALT  27  /  AlkPhos  221<H>  09-07  PT/INR - ( 05 Sep 2022 16:33 )   PT: 14.20 sec;   INR: 1.24 ratio         PTT - ( 05 Sep 2022 16:33 )  PTT:32.3 sec      RADIOLOGY & ADDITIONAL STUDIES:  reviewed    < from: MR Head No Cont (09.03.22 @ 20:08) >  IMPRESSION:  Redemonstration of multiple scattered lacunar infarcts, now subacute. No   compelling evidence of an acute infarct or acute intracranial hemorrhage.    --- End of Report ---    < end of copied text >      < from: 12 Lead ECG (08.26.22 @ 20:22) >  Ventricular Rate 63 BPM    Atrial Rate 63 BPM    P-R Interval 162 ms    QRS Duration 76 ms    Q-T Interval 422 ms    QTC Calculation(Bazett) 431 ms    P Axis 51 degrees    R Axis 36 degrees    T Axis 112 degrees    Diagnosis Line Normal sinus rhythm  Anteroseptal infarct , age undetermined  Abnormal ECG    < end of copied text >      Goals of Care Document:   - spoke with son, Latrell, yesterday.  defers to the son as the decision maker  - son wants full code, continued medical management and to continue workup  - open to re-addressing GOC in the future as needed   - will follow up once further evaluation per neuro/rheum        [Follow - Up] : a follow-up visit [DM Type 2] : DM Type 2

## 2022-09-07 NOTE — PROGRESS NOTE ADULT - ASSESSMENT
ALF rule out ATN / relative hypotension/ sepsis / E cloaca bacteremia / HTN ( was on multiple meds )/ CVA/ GIB  cr trending up  non oliguric   follow Hb / will need blood tx / s/p EGD/ colonoscopy : large hemorrhoids / non erosive gastritis / followed by GI and surgery   GI notes appreciated / EGD / colonoscopy today   sono no hydro  decrease labetalol if BP remains on the low side   IV  fluids d5 with 3 amp of bicarbonate at 75 cc/h , increase sodium bicarbonate 1300 q 8   ph  at goal   no need for RRT  overall prognosis poor   will follow

## 2022-09-07 NOTE — PROGRESS NOTE ADULT - SUBJECTIVE AND OBJECTIVE BOX
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2 Reed  Total Volume for 24 Hours (mL): 1300  Bolus  Total Volume of Bolus (mL):  325  Tube Feed Frequency: Every 6 hours   Tube Feed Start Time: 08:00  Bolus Feed Rate (mL per Hour): 54   Bolus Feed Duration (in Hours): 24  No Carb Prosource (1pkg = 15gms Protein)     Qty per Day:  2  Diet, NPO after Midnight:      NPO Start Date: 05-Sep-2022,   NPO Start Time: 23:59  Except Medications      Fluids:     I & O's:    09-06-22 @ 07:01  -  09-07-22 @ 07:00  --------------------------------------------------------  IN:    Dexmedetomidine: 141.9 mL    Dexmedetomidine: 1.4 mL    FentaNYL: 497.1 mL    PRBCs (Packed Red Blood Cells): 300 mL    Propofol: 81 mL    sodium chloride 0.9%: 2200 mL  Total IN: 3221.4 mL    OUT:    Indwelling Catheter - Urethral (mL): 940 mL    Intermittent Catheterization - Urethral (mL): 60 mL  Total OUT: 1000 mL    Total NET: 2221.4 mL      MEDICATIONS  (STANDING):  aspirin  chewable 81 milliGRAM(s) Oral daily  atorvastatin 80 milliGRAM(s) Oral at bedtime  aztreonam  IVPB      aztreonam  IVPB 2000 milliGRAM(s) IV Intermittent every 12 hours  ceftazidime/avibactam IVPB 0.94 Gram(s) IV Intermittent every 12 hours  chlorhexidine 0.12% Liquid 15 milliLiter(s) Oral Mucosa every 12 hours  chlorhexidine 2% Cloths 1 Application(s) Topical <User Schedule>  collagenase Ointment 1 Application(s) Topical two times a day  Dakins Solution - 1/2 Strength 1 Application(s) Topical two times a day  dexMEDEtomidine Infusion 0.2 MICROgram(s)/kG/Hr (4.07 mL/Hr) IV Continuous <Continuous>  dextrose 5% 1000 milliLiter(s) (75 mL/Hr) IV Continuous <Continuous>  dextrose 5%. 1000 milliLiter(s) (100 mL/Hr) IV Continuous <Continuous>  dextrose 5%. 1000 milliLiter(s) (50 mL/Hr) IV Continuous <Continuous>  dextrose 50% Injectable 25 Gram(s) IV Push once  dextrose 50% Injectable 12.5 Gram(s) IV Push once  dextrose 50% Injectable 25 Gram(s) IV Push once  fentaNYL   Infusion. 0.5 MICROgram(s)/kG/Hr (4.07 mL/Hr) IV Continuous <Continuous>  glucagon  Injectable 1 milliGRAM(s) IntraMuscular once  hydrALAZINE 100 milliGRAM(s) Oral every 8 hours  insulin lispro (ADMELOG) corrective regimen sliding scale   SubCutaneous three times a day before meals  labetalol 600 milliGRAM(s) Oral three times a day  lacosamide IVPB 50 milliGRAM(s) IV Intermittent every 12 hours  levETIRAcetam  Solution 500 milliGRAM(s) Oral two times a day  lidocaine 1%/epinephrine 1:100,000 Inj 20 milliLiter(s) Local Injection once  multivitamin/minerals/iron Oral Solution (CENTRUM) 15 milliLiter(s) Oral daily  pantoprazole  Injectable 40 milliGRAM(s) IV Push two times a day  propofol Infusion 10 MICROgram(s)/kG/Min (4.88 mL/Hr) IV Continuous <Continuous>  sodium bicarbonate 1300 milliGRAM(s) Oral every 8 hours  sodium chloride 0.65% Nasal 2 Spray(s) Both Nostrils two times a day    MEDICATIONS  (PRN):  acetaminophen     Tablet .. 650 milliGRAM(s) Oral every 6 hours PRN Temp greater or equal to 38C (100.4F), Mild Pain (1 - 3)  dextrose Oral Gel 15 Gram(s) Oral once PRN Blood Glucose LESS THAN 70 milliGRAM(s)/deciliter  labetalol Injectable 10 milliGRAM(s) IV Push every 6 hours PRN Systolic blood pressure >  melatonin 3 milliGRAM(s) Oral at bedtime PRN Insomnia      DVT PROPHYLAXIS:   GI PROPHYLAXIS: pantoprazole  Injectable 40 milliGRAM(s) IV Push two times a day    ANTICOAGULATION:   ANTIBIOTICS:  aztreonam  IVPB    aztreonam  IVPB 2000 milliGRAM(s)  ceftazidime/avibactam IVPB 0.94 Gram(s)        Isolation Precautions:     Isolation Type: CONTACT;  Indication for Isolation: CRE Enterobacter (09-06-22 @ 16:17)      LAB/STUDIES:  Labs:  CAPILLARY BLOOD GLUCOSE      POCT Blood Glucose.: 148 mg/dL (07 Sep 2022 05:47)  POCT Blood Glucose.: 155 mg/dL (07 Sep 2022 01:03)  POCT Blood Glucose.: 191 mg/dL (06 Sep 2022 20:26)                          6.0    9.77  )-----------( 104      ( 07 Sep 2022 05:08 )             17.9       Auto Neutrophil %: 71.7 % (09-07-22 @ 05:08)  Auto Immature Granulocyte %: 1.0 % (09-07-22 @ 05:08)  Auto Neutrophil %: 78.2 % (09-06-22 @ 12:39)  Auto Immature Granulocyte %: 1.2 % (09-06-22 @ 12:39)    09-07    141  |  109  |  77<HH>  ----------------------------<  126<H>  4.1   |  15<L>  |  3.6<H>    Calcium, Total Serum: 8.2 mg/dL (09-07-22 @ 05:08)      LFTs:             4.8  | 0.3  | 32       ------------------[221     ( 07 Sep 2022 05:08 )  2.2  | x    | 27          Lipase:x      Amylase:x         Blood Gas Arterial, Lactate: 0.50 mmol/L (09-07-22 @ 03:32)  Blood Gas Arterial, Lactate: 0.50 mmol/L (09-06-22 @ 12:57)  Blood Gas Arterial, Lactate: 0.70 mmol/L (09-06-22 @ 08:23)    ABG - ( 07 Sep 2022 03:32 )  pH: 7.35  /  pCO2: 29    /  pO2: 214   / HCO3: 16    / Base Excess: -8.2  /  SaO2: 100.0       ABG - ( 06 Sep 2022 12:57 )  pH: 7.41  /  pCO2: 25    /  pO2: 385   / HCO3: 16    / Base Excess: -7.9  /  SaO2: 99.6        ABG - ( 06 Sep 2022 08:23 )  pH: 7.43  /  pCO2: 27    /  pO2: 85    / HCO3: 18    / Base Excess: -5.6  /  SaO2: 98.7          Coags:     14.20  ----< 1.24    ( 05 Sep 2022 16:33 )     32.3            Culture - Blood (collected 05 Sep 2022 12:19)  Source: .Blood Blood  Preliminary Report (06 Sep 2022 18:02):    No growth to date.

## 2022-09-07 NOTE — PROGRESS NOTE ADULT - SUBJECTIVE AND OBJECTIVE BOX
seen and examined  no distress   lying comfortable         PAST HISTORY  --------------------------------------------------------------------------------  No significant changes to PMH, PSH, FHx, SHx, unless otherwise noted    ALLERGIES & MEDICATIONS  --------------------------------------------------------------------------------  Allergies    No Known Allergies    Intolerances      Standing Inpatient Medications  aspirin  chewable 81 milliGRAM(s) Oral daily  atorvastatin 80 milliGRAM(s) Oral at bedtime  aztreonam  IVPB      aztreonam  IVPB 2000 milliGRAM(s) IV Intermittent every 12 hours  ceftazidime/avibactam IVPB 0.94 Gram(s) IV Intermittent every 12 hours  chlorhexidine 0.12% Liquid 15 milliLiter(s) Oral Mucosa every 12 hours  chlorhexidine 2% Cloths 1 Application(s) Topical <User Schedule>  collagenase Ointment 1 Application(s) Topical two times a day  Dakins Solution - 1/2 Strength 1 Application(s) Topical two times a day  dexMEDEtomidine Infusion 0.2 MICROgram(s)/kG/Hr IV Continuous <Continuous>  dextrose 5%. 1000 milliLiter(s) IV Continuous <Continuous>  dextrose 5%. 1000 milliLiter(s) IV Continuous <Continuous>  dextrose 50% Injectable 25 Gram(s) IV Push once  dextrose 50% Injectable 12.5 Gram(s) IV Push once  dextrose 50% Injectable 25 Gram(s) IV Push once  fentaNYL   Infusion. 0.5 MICROgram(s)/kG/Hr IV Continuous <Continuous>  glucagon  Injectable 1 milliGRAM(s) IntraMuscular once  hydrALAZINE 100 milliGRAM(s) Oral every 8 hours  insulin lispro (ADMELOG) corrective regimen sliding scale   SubCutaneous three times a day before meals  labetalol 600 milliGRAM(s) Oral three times a day  lacosamide IVPB 50 milliGRAM(s) IV Intermittent every 12 hours  levETIRAcetam  Solution 500 milliGRAM(s) Oral two times a day  lidocaine 1%/epinephrine 1:100,000 Inj 20 milliLiter(s) Local Injection once  multivitamin/minerals/iron Oral Solution (CENTRUM) 15 milliLiter(s) Oral daily  pantoprazole  Injectable 40 milliGRAM(s) IV Push two times a day  propofol Infusion 10 MICROgram(s)/kG/Min IV Continuous <Continuous>  sodium bicarbonate 650 milliGRAM(s) Oral every 8 hours  sodium chloride 0.65% Nasal 2 Spray(s) Both Nostrils two times a day  sodium chloride 0.9%. 1000 milliLiter(s) IV Continuous <Continuous>    PRN Inpatient Medications  acetaminophen     Tablet .. 650 milliGRAM(s) Oral every 6 hours PRN  dextrose Oral Gel 15 Gram(s) Oral once PRN  labetalol Injectable 10 milliGRAM(s) IV Push every 6 hours PRN  melatonin 3 milliGRAM(s) Oral at bedtime PRN        VITALS/PHYSICAL EXAM  --------------------------------------------------------------------------------  T(C): 36.6 (09-07-22 @ 04:00), Max: 37.2 (09-06-22 @ 16:00)  HR: 54 (09-07-22 @ 04:00) (54 - 84)  BP: 121/74 (09-07-22 @ 04:00) (82/57 - 195/97)  RR: 16 (09-07-22 @ 04:00) (10 - 33)  SpO2: 100% (09-07-22 @ 04:00) (100% - 100%)  Wt(kg): --        09-06-22 @ 07:01  -  09-07-22 @ 07:00  --------------------------------------------------------  IN: 3221.4 mL / OUT: 940 mL / NET: 2281.4 mL      Physical Exam:  	Gen: intubated/ventilated   	Pulm: B/L linda   	CV:  S1S2; no rub  	Abd: distended      LABS/STUDIES  --------------------------------------------------------------------------------              6.0    9.77  >-----------<  104      [09-07-22 @ 05:08]              17.9     141  |  109  |  77  ----------------------------<  126      [09-07-22 @ 05:08]  4.1   |  15  |  3.6        Ca     8.2     [09-07-22 @ 05:08]      Mg     2.1     [09-07-22 @ 05:08]      Phos  4.5     [09-05-22 @ 20:00]    TPro  4.8  /  Alb  2.2  /  TBili  0.3  /  DBili  x   /  AST  32  /  ALT  27  /  AlkPhos  221  [09-07-22 @ 05:08]    PT/INR: PT 14.20, INR 1.24       [09-05-22 @ 16:33]  PTT: 32.3       [09-05-22 @ 16:33]    Creatinine Trend:  SCr 3.6 [09-07 @ 05:08]  SCr 3.3 [09-06 @ 05:30]  SCr 3.4 [09-05 @ 16:33]  SCr 3.4 [09-05 @ 12:19]  SCr 3.5 [09-05 @ 01:41]    Urinalysis - [09-02-22 @ 12:57]      Color Yellow / Appearance Slightly Turbid / SG 1.013 / pH 7.5      Gluc Negative / Ketone Negative  / Bili Negative / Urobili 6 mg/dL       Blood Moderate / Protein 100 mg/dL / Leuk Est Large / Nitrite Negative      RBC 30 / WBC 57 / Hyaline 2 / Gran  / Sq Epi  / Non Sq Epi 8 / Bacteria Negative    Urine Creatinine 35      [09-02-22 @ 13:30]  Urine Protein 82      [09-02-22 @ 13:30]  Urine Sodium 33.0      [09-02-22 @ 13:30]  Urine Urea Nitrogen 412      [09-02-22 @ 13:30]  Urine Potassium 47      [09-02-22 @ 13:30]  Urine Osmolality 288      [09-02-22 @ 12:57]    Ferritin 243      [08-28-22 @ 05:49]  PTH -- (Ca 8.5)      [09-05-22 @ 20:00]   75  TSH 0.86      [08-08-22 @ 17:53]  Lipid: chol 234, , HDL 42, LDL --      [08-03-22 @ 08:56]      LUIS FELIPE: titer 1:80, pattern Speckled      [08-30-22 @ 19:27]  dsDNA <12      [08-30-22 @ 19:27]  Rheumatoid Factor <10      [08-19-22 @ 11:37]  ANCA: cANCA Negative, pANCA Negative, atypical ANCA Negative      [08-30-22 @ 19:27]  Syphilis Screen (Treponema Pallidum Ab) Negative      [08-08-22 @ 17:53]  Immunofixation Serum:   No Monoclonal Band Identified    Reference Range: None Detected      [08-30-22 @ 23:46]  SPEP Interpretation: Pattern Consistent With Acute Inflammation Or Stress      [08-30-22 @ 23:46]

## 2022-09-07 NOTE — PROGRESS NOTE ADULT - NS ATTEST RISK GEN_ALL_CORE
Risk Statement (NON-critical care)
Carac Counseling:  I discussed with the patient the risks of Carac including but not limited to erythema, scaling, itching, weeping, crusting, and pain.

## 2022-09-07 NOTE — PROGRESS NOTE ADULT - ASSESSMENT
Impression:    Acute blood loss anemia. GI bleed sp 3 units PRBC sp EGD/ colon  oral cavity bleed likely from tongue biting  Altered MS/ ischemic stroke? vasculitis no improvement  LLE cellulitis  ALF non oliguric  bacteremia      PLAN:    CNS: neurocheck q1 hr,   neuro FUP  EEG  AED per neuro  Neuro inte fup    HEENT: Oral care    PULMONARY:  HOB @ 45 degrees.  Aspiration precautions, dec FIO2 30%. NO SBT    CARDIOVASCULAR: bicarb drip    GI: GI prophylaxis. peg feeding    RENAL:  follow up lytes, nephrology fup    INFECTIOUS DISEASE: abx per ID    HEMATOLOGICAL:  SCD, LE doppler    ENDOCRINE:  Follow up FS.  Insulin protocol if needed.    MUSCULOSKELETAL: rheumato eval    MICU    VERY poor prognosis  Sacrum stage 2  RLE wound  nolasco 9/5

## 2022-09-07 NOTE — PROGRESS NOTE ADULT - PROBLEM SELECTOR PLAN 3
s/p EGD today with GI  - external bleeding hemorroids  s/p 3 Units PRBCs   continue  medical management  surgery consulted for hemorrhoids

## 2022-09-07 NOTE — PROGRESS NOTE ADULT - ASSESSMENT
56yFemale with PMH including HTN, DM2, and stroke (per son 2017), being evaluated for GOC. Patient was initially admitted on 8/2 for RLE wound, hypertensive urgency. Pt was stroke code on 8/5, seizure activity on EEG. MRI showed multiple lacunar acute infarcts on 8/10. Patients mental status continued to be poor, failed S& S evals- s/p PEG tube 8/24. Patient was having LGIB with drop in HGB, now s/p intubation and scope with GI- found hemorrhoids.       Spoke with patients sonLatrell on phone yesterday. For now, they would like aggressive medical treatment and workup. Will re-address GOC as needed (i.e. trach/peg, code status, etc).     MEDD (morphine equivalent daily dose): fentanyl gtt      See Recs below.    Please call [phone] with questions or concerns 24/7.   We will continue to follow.     Discussed with primary MD.

## 2022-09-07 NOTE — CONSULT NOTE ADULT - ASSESSMENT
Assessment:  Patient  Miquel score vent dependent , current  pressore usage  ,  on sedation obese /frial/ current diet,                         Mobility /incontince to  feacal / urine containment device                        High risk for pressur injury developemnt or progression   Skin assessed-  Wound #1  type and location :   Size:   Tissue Description :   [ ] Necrotic   [ ] Slough   [ ] Infected   [ ] Granulation (firm, beefy red tissue)   [ ] Hypergranulation (soft, gelatinous)  [ ] Poor-Quality Granulation (pale, grey/brown/red granulation tissue)   [ ] Epithelium (pink/mauve at wound edges)  [ ] Macerated  [ ] Other: _______  Wound Exudate :   Wound Edge:   [ ] Epithelisation [ ] Maceration [ ] Dehydration [ ] Undermining (use clock position) [ ] Rolled Edges [ ] Other: _____  Periwound Condition (area that extends 4cm from the edge of the wound):   [ ] Maceration [ ] Excoriation [ ] Dry skin [ ] Hyperkeratosis [ ] Callus [ ] Eczema [ ] Other: _______      Other Etiology:  [ ] Aterial  [ ] Venous   [ ] Surgical Incision  [ ] Other: ________     Assessment:  Patient  received in bed , vented , sedated.  Miquel score 9, currently Npo on enteral feedings                        Mostly bedbound incontinence to stool and urine                        High risk for pressure injury development or progression     Skin assessed- B/L buttock and sacrum moisture associated dermatitis with erythema and denuded skin                          B/L heel dry and intact, no pressure injury noted at time of assessment                             Wound #1  Type and location :   Size:   Tissue Description :   [ ] Necrotic   [ ] Slough   [ ] Infected   [ ] Granulation (firm, beefy red tissue)   [ ] Hypergranulation (soft, gelatinous)  [ ] Poor-Quality Granulation (pale, grey/brown/red granulation tissue)   [ ] Epithelium (pink/mauve at wound edges)  [ ] Macerated  [ ] Other: _______  Wound Exudate :   Wound Edge:   [ ] Epithelisation [ ] Maceration [ ] Dehydration [ ] Undermining (use clock position) [ ] Rolled Edges [ ] Other: _____  Periwound Condition (area that extends 4cm from the edge of the wound):   [ ] Maceration [ ] Excoriation [ ] Dry skin [ ] Hyperkeratosis [ ] Callus [ ] Eczema [ ] Other: _______      Other Etiology:  [ ] Aterial  [ ] Venous   [ ] Surgical Incision  [ ] Other: ________    Plan:  Wound and skin care recs,   Discontinue pressure injury perimeter .  continue triad hydrophilic dressing   Pressure  injury  preventive  measures  skin  and incontinence care   Assess skin  and inform primary provider of any changes   Case discussed with primary Rn  Wound/ ostomy specialist  to f/u as needed     Offloading: [ x] Frequent position changes [x ] Devices/Equipment  Cleansing: [ ] Saline [x Soap/Water [ ] Other: ______  Topicals: [x ] Barrier Cream [ ] Antimicrobial [ ] Enzymatic Wound Debridement  Dressings: [ ] Dry, sterile [ ] Allevyn  Foam [ ] Absorbant Pads [ ] Collagenase    Other Recs.        Total time for bedside assessment , review of medical records  and  discussion of plan of care with primary team greater than 35 min

## 2022-09-07 NOTE — PROGRESS NOTE ADULT - SUBJECTIVE AND OBJECTIVE BOX
Patient is a 56y old  Female who presents with a chief complaint of C/S for external hemorrhoids (07 Sep 2022 09:55)      INTERVAL HPI/OVERNIGHT EVENTS:     Pt had colonoscopy and EGD by GI team, external hemorrhoids were found. Pt Hb dropped to 6, one unit of PRBCs given     ICU Vital Signs Last 24 Hrs  T(C): 36.6 (07 Sep 2022 04:00), Max: 37.2 (06 Sep 2022 16:00)  T(F): 97.8 (07 Sep 2022 04:00), Max: 98.9 (06 Sep 2022 16:00)  HR: 50 (07 Sep 2022 11:00) (50 - 76)  BP: 107/66 (07 Sep 2022 11:00) (97/60 - 195/97)  BP(mean): 86 (07 Sep 2022 11:00) (75 - 144)  ABP: --  ABP(mean): --  RR: 16 (07 Sep 2022 11:00) (16 - 24)  SpO2: 100% (07 Sep 2022 11:00) (100% - 100%)    O2 Parameters below as of 07 Sep 2022 11:00  Patient On (Oxygen Delivery Method): ventilator    O2 Concentration (%): 40      I&O's Summary    06 Sep 2022 07:01  -  07 Sep 2022 07:00  --------------------------------------------------------  IN: 3221.4 mL / OUT: 1000 mL / NET: 2221.4 mL    07 Sep 2022 07:01  -  07 Sep 2022 12:58  --------------------------------------------------------  IN: 1431 mL / OUT: 140 mL / NET: 1291 mL      Mode: AC/ CMV (Assist Control/ Continuous Mandatory Ventilation)  RR (machine): 16  TV (machine): 400  FiO2: 40  PEEP: 5  ITime: 1  MAP: 9  PIP: 24      LABS:                        6.0    9.77  )-----------( 104      ( 07 Sep 2022 05:08 )             17.9     09-07    141  |  109  |  77<HH>  ----------------------------<  126<H>  4.1   |  15<L>  |  3.6<H>    Ca    8.2<L>      07 Sep 2022 05:08  Phos  4.5     09-05  Mg     2.1     09-07    TPro  4.8<L>  /  Alb  2.2<L>  /  TBili  0.3  /  DBili  x   /  AST  32  /  ALT  27  /  AlkPhos  221<H>  09-07    PT/INR - ( 05 Sep 2022 16:33 )   PT: 14.20 sec;   INR: 1.24 ratio         PTT - ( 05 Sep 2022 16:33 )  PTT:32.3 sec    CAPILLARY BLOOD GLUCOSE      POCT Blood Glucose.: 194 mg/dL (07 Sep 2022 11:15)  POCT Blood Glucose.: 148 mg/dL (07 Sep 2022 05:47)  POCT Blood Glucose.: 155 mg/dL (07 Sep 2022 01:03)  POCT Blood Glucose.: 191 mg/dL (06 Sep 2022 20:26)    ABG - ( 07 Sep 2022 03:32 )  pH, Arterial: 7.35  pH, Blood: x     /  pCO2: 29    /  pO2: 214   / HCO3: 16    / Base Excess: -8.2  /  SaO2: 100.0               RADIOLOGY & ADDITIONAL TESTS:    Consultant(s) Notes Reviewed:  [x ] YES  [ ] NO    MEDICATIONS  (STANDING):  aspirin  chewable 81 milliGRAM(s) Oral daily  atorvastatin 80 milliGRAM(s) Oral at bedtime  aztreonam  IVPB 2000 milliGRAM(s) IV Intermittent every 12 hours  aztreonam  IVPB      ceftazidime/avibactam IVPB 0.94 Gram(s) IV Intermittent every 12 hours  chlorhexidine 0.12% Liquid 15 milliLiter(s) Oral Mucosa every 12 hours  chlorhexidine 2% Cloths 1 Application(s) Topical <User Schedule>  collagenase Ointment 1 Application(s) Topical two times a day  Dakins Solution - 1/2 Strength 1 Application(s) Topical two times a day  dexMEDEtomidine Infusion 0.2 MICROgram(s)/kG/Hr (4.07 mL/Hr) IV Continuous <Continuous>  dextrose 5% 1000 milliLiter(s) (75 mL/Hr) IV Continuous <Continuous>  dextrose 5%. 1000 milliLiter(s) (100 mL/Hr) IV Continuous <Continuous>  dextrose 5%. 1000 milliLiter(s) (50 mL/Hr) IV Continuous <Continuous>  dextrose 50% Injectable 25 Gram(s) IV Push once  dextrose 50% Injectable 12.5 Gram(s) IV Push once  dextrose 50% Injectable 25 Gram(s) IV Push once  fentaNYL   Infusion. 0.5 MICROgram(s)/kG/Hr (4.07 mL/Hr) IV Continuous <Continuous>  glucagon  Injectable 1 milliGRAM(s) IntraMuscular once  hydrALAZINE 100 milliGRAM(s) Oral every 8 hours  insulin lispro (ADMELOG) corrective regimen sliding scale   SubCutaneous three times a day before meals  labetalol 600 milliGRAM(s) Oral three times a day  lacosamide IVPB 50 milliGRAM(s) IV Intermittent every 12 hours  levETIRAcetam  Solution 500 milliGRAM(s) Oral two times a day  lidocaine 1%/epinephrine 1:100,000 Inj 20 milliLiter(s) Local Injection once  multivitamin/minerals/iron Oral Solution (CENTRUM) 15 milliLiter(s) Oral daily  pantoprazole  Injectable 40 milliGRAM(s) IV Push two times a day  propofol Infusion 10 MICROgram(s)/kG/Min (4.88 mL/Hr) IV Continuous <Continuous>  sodium bicarbonate 1300 milliGRAM(s) Oral every 8 hours  sodium chloride 0.65% Nasal 2 Spray(s) Both Nostrils two times a day    MEDICATIONS  (PRN):  acetaminophen     Tablet .. 650 milliGRAM(s) Oral every 6 hours PRN Temp greater or equal to 38C (100.4F), Mild Pain (1 - 3)  dextrose Oral Gel 15 Gram(s) Oral once PRN Blood Glucose LESS THAN 70 milliGRAM(s)/deciliter  labetalol Injectable 10 milliGRAM(s) IV Push every 6 hours PRN Systolic blood pressure >  melatonin 3 milliGRAM(s) Oral at bedtime PRN Insomnia      PHYSICAL EXAM:  GENERAL: Intubated and sedated pt   HEAD:  Atraumatic, Normocephalic  EYES: EOMI, PERRLA, conjunctiva and sclera clear  NECK: Supple, No JVD, Normal thyroid, no enlarged nodes  NERVOUS SYSTEM:  Alert & Awake.   CHEST/LUNG: pt intubated, breathing sounds BL decreased.   HEART: S1S2 normal, no S3, Regular rate and rhythm; No murmurs  ABDOMEN: Soft, Nontender, Nondistended; Bowel sounds present  EXTREMITIES:  2+ Peripheral Pulses, No clubbing, cyanosis, or edema  LYMPH: No lymphadenopathy noted  SKIN: No rashes or lesions    Care Discussed with Consultants/Other Providers [ x] YES  [ ] NO

## 2022-09-07 NOTE — PROGRESS NOTE ADULT - ATTENDING COMMENTS
Patient seen in MICU team on rounds and discussed management plans. Patient remains intubated with no active bleeding reported overnight. No surgery intervention at this time.

## 2022-09-07 NOTE — PROGRESS NOTE ADULT - ASSESSMENT
55 yo F with PMH of HTN, DM2, and stroke (2017) who presented for RLE abscess. Started 3 months ago when she fell and hit her leg on a wooden cabinet s/p debridement by burn on 8/10. found to have CVA on admission suspected to be d/t vasculitis and started on aspirin and plavix. GI consulted for peg placement and was placed on 8/24. Hospital course further complicated by HTN urgency, seizures and was started on AED and was diagnosed with MDR enterobacter bacteremia and ATN. GI was recalled as overnight PT was noted to have bleeding per mouth w/ oozing red blood from oral cavity likely from biting her tongue vs oral ulcer. PT also noted to have red blood per rectum with clots although remained hemodynamically stable. Pt  was upgraded to ICU for closer monitoring.    #)Hematochezia likely due to large bleeding external hemorrhoids   #)Bleeding from oral cavity possible d/t oral ulcer vs tongue laceration  - hemodynamically stable and not on pressors  - s/p EGD and colonoscopy 9/6 no evidence of active bleeding   - Drop In Hb from 7.1 to 6.5   - PEG tube lavage: Bilious secretions  - One BM last night   - Still intubated     Recs:   -Follow up with surgery for bleeding external hemorrhoids   -Trend CBC, Keep Hb>8   -PPI once a day for prophylaxis   -Active type and screen, 2 18 gauge   -No evidence of overt GI bleed  -Recall GI as needed 57 yo F with PMH of HTN, DM2, and stroke (2017) who presented for RLE abscess. Started 3 months ago when she fell and hit her leg on a wooden cabinet s/p debridement by burn on 8/10. found to have CVA on admission suspected to be d/t vasculitis and started on aspirin and plavix. GI consulted for peg placement and was placed on 8/24. Hospital course further complicated by HTN urgency, seizures and was started on AED and was diagnosed with MDR enterobacter bacteremia and ATN. GI was recalled as overnight PT was noted to have bleeding per mouth w/ oozing red blood from oral cavity likely from biting her tongue vs oral ulcer. PT also noted to have red blood per rectum with clots although remained hemodynamically stable. Pt  was upgraded to ICU for closer monitoring.    #)Hematochezia likely due to large bleeding external hemorrhoids   #)Bleeding from oral cavity possible d/t oral ulcer vs tongue laceration  - hemodynamically stable and not on pressors  - s/p EGD and colonoscopy 9/6 no evidence of active bleeding   - Drop In Hb from 7.1 to 6.5   - PEG tube lavage: Bilious secretions  - One BM last night   - Still intubated     Recs:   -Follow up with surgery for bleeding external hemorrhoids, case discussed with Dr. Frye   -Trend CBC, Keep Hb>8   -PPI once a day for prophylaxis   -Active type and screen, 2 18 gauge   -No evidence of overt GI bleed  -Recall GI as needed

## 2022-09-07 NOTE — PROGRESS NOTE ADULT - ASSESSMENT
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 During hospital stay, found to have multiple punctate infarcts suspected to be cardioembolic vs vasculitis. Patient also found to have sharp waves on vEEG and currently on AEDs. Hospital course further complicated by fever and now with MDR Enterobacter bacteremia.    # GI bleed   - S/p 4 units of PRBCs  - Pt was seen by GI, bleeding hemorrhoids noted, external. Surgery contacted for rx.   - Protonix q12h  - ENT eval appreciated for oral bleed. No actively oozing wounds, teeth guard put in.    - FU CBC, coags   - pt intubated for the procedure     # Acute ischemic strokes mutlifocal  - Spoke with neurology, suspiscious for vasculitis, but not confirmed, CSF studies does not support the diagnosis. No IVIG or solumedrol to be used.,  - Plavix held for LGIB. Aspirin resumed    - FU EEG, cw keppra   - Neuro check q1h,  - Pt is intubated.   - NPO after midnight for cerebral angiogram.     # Bacteremia   - bcx positive for MDR enterobacter Cloacae   - Cw w antibiotics per ID   - bcx daily     # nonoliguric ALF / Urinary retention / Suspected ATN  - Cr continues to trend up   - on IVFs  - continue to hold Lasix and Losartan  - avoid hypotension   - renal US 9/2: no hydronephrosis  - keep nolasco for now  - sodium bicarbonate increased to 1300 q8h. Sodium bicarb drip started,   - nephro following  - daily BMP and I's and O's  - phos level OK    # Hypertensive urgency due to noncompliance and Malignant HTN   - BP on admission 296/174 without signs of end organ damage. Renal artery duplex wnl>>ARIAN unlikely  - Aldosterone wnl, renin elevated  - BP overall improved  - SBP goal 120-160      # Purulent Right LE cellulitis   - s/p debridement by burn on 8/10  - Candida in wound. per Dr. Chua: contaminant  -  BCx w/ staph: likely contaminant. repeat BCx at that time was negative     # Diabetes mellitus   - HbA1C 10.4  - Insulin held for now as pt is NPO     #HLD   - cw statins       #MISC:  - GI prophylaxis: protonix   - Dispo: MICU   - Acitivity: bedrest

## 2022-09-07 NOTE — PROGRESS NOTE ADULT - SUBJECTIVE AND OBJECTIVE BOX
Gastroenterology progress note:     Patient is a 56y old  Female who presents with a chief complaint of C/S for external hemorrhoids (07 Sep 2022 09:55)       Admitted on: 08-02-22    We are following the patient for rectal bleed     Interval History:  s/p EGD and colonoscopy 9/6  Drop in Hb from 7.5 to 6   Last night had one BM with rectal bleed  off pressors   still intubated     PAST MEDICAL & SURGICAL HISTORY:  Hypertension      Diabetes mellitus      No significant past surgical history          MEDICATIONS  (STANDING):  aspirin  chewable 81 milliGRAM(s) Oral daily  atorvastatin 80 milliGRAM(s) Oral at bedtime  aztreonam  IVPB 2000 milliGRAM(s) IV Intermittent every 12 hours  aztreonam  IVPB      ceftazidime/avibactam IVPB 0.94 Gram(s) IV Intermittent every 12 hours  chlorhexidine 0.12% Liquid 15 milliLiter(s) Oral Mucosa every 12 hours  chlorhexidine 2% Cloths 1 Application(s) Topical <User Schedule>  collagenase Ointment 1 Application(s) Topical two times a day  Dakins Solution - 1/2 Strength 1 Application(s) Topical two times a day  dexMEDEtomidine Infusion 0.2 MICROgram(s)/kG/Hr (4.07 mL/Hr) IV Continuous <Continuous>  dextrose 5% 1000 milliLiter(s) (75 mL/Hr) IV Continuous <Continuous>  dextrose 5%. 1000 milliLiter(s) (100 mL/Hr) IV Continuous <Continuous>  dextrose 5%. 1000 milliLiter(s) (50 mL/Hr) IV Continuous <Continuous>  dextrose 50% Injectable 25 Gram(s) IV Push once  dextrose 50% Injectable 12.5 Gram(s) IV Push once  dextrose 50% Injectable 25 Gram(s) IV Push once  fentaNYL   Infusion. 0.5 MICROgram(s)/kG/Hr (4.07 mL/Hr) IV Continuous <Continuous>  glucagon  Injectable 1 milliGRAM(s) IntraMuscular once  hydrALAZINE 100 milliGRAM(s) Oral every 8 hours  insulin lispro (ADMELOG) corrective regimen sliding scale   SubCutaneous three times a day before meals  labetalol 600 milliGRAM(s) Oral three times a day  lacosamide IVPB 50 milliGRAM(s) IV Intermittent every 12 hours  levETIRAcetam  Solution 500 milliGRAM(s) Oral two times a day  lidocaine 1%/epinephrine 1:100,000 Inj 20 milliLiter(s) Local Injection once  multivitamin/minerals/iron Oral Solution (CENTRUM) 15 milliLiter(s) Oral daily  pantoprazole  Injectable 40 milliGRAM(s) IV Push two times a day  propofol Infusion 10 MICROgram(s)/kG/Min (4.88 mL/Hr) IV Continuous <Continuous>  sodium bicarbonate 1300 milliGRAM(s) Oral every 8 hours  sodium chloride 0.65% Nasal 2 Spray(s) Both Nostrils two times a day    MEDICATIONS  (PRN):  acetaminophen     Tablet .. 650 milliGRAM(s) Oral every 6 hours PRN Temp greater or equal to 38C (100.4F), Mild Pain (1 - 3)  dextrose Oral Gel 15 Gram(s) Oral once PRN Blood Glucose LESS THAN 70 milliGRAM(s)/deciliter  labetalol Injectable 10 milliGRAM(s) IV Push every 6 hours PRN Systolic blood pressure >  melatonin 3 milliGRAM(s) Oral at bedtime PRN Insomnia      Allergies  No Known Allergies      Review of Systems:   couldn't obtain     Physical Examination:  T(C): 36.6 (09-07-22 @ 04:00), Max: 37.2 (09-06-22 @ 16:00)  HR: 50 (09-07-22 @ 11:00) (50 - 76)  BP: 107/66 (09-07-22 @ 11:00) (97/60 - 195/97)  RR: 16 (09-07-22 @ 11:00) (16 - 18)  SpO2: 100% (09-07-22 @ 11:00) (100% - 100%)      09-06-22 @ 07:01  -  09-07-22 @ 07:00  --------------------------------------------------------  IN: 3221.4 mL / OUT: 1000 mL / NET: 2221.4 mL    09-07-22 @ 07:01  -  09-07-22 @ 13:57  --------------------------------------------------------  IN: 1431 mL / OUT: 140 mL / NET: 1291 mL      Constitutional: Intubated   Respiratory:  No signs of respiratory distress. Lung sounds are clear bilaterally.  Cardiovascular:  S1 S2, Regular rate and rhythm.  Abdominal: Abdomen is soft, symmetric, and non-tender without distention.   Skin: No rashes, No Jaundice.        Data:                        6.0    9.77  )-----------( 104      ( 07 Sep 2022 05:08 )             17.9     Hgb trend:  6.0  09-07-22 @ 05:08  7.5  09-06-22 @ 12:39  7.6  09-06-22 @ 05:30  7.7  09-06-22 @ 02:05  8.0  09-05-22 @ 16:33  8.7  09-05-22 @ 12:19  6.5  09-05-22 @ 01:45  6.5  09-05-22 @ 00:58      09-04-22 @ 07:01  -  09-05-22 @ 07:00  --------------------------------------------------------  IN: 293 mL    09-05-22 @ 07:01  -  09-06-22 @ 07:00  --------------------------------------------------------  IN: 275 mL    09-06-22 @ 07:01  -  09-07-22 @ 07:00  --------------------------------------------------------  IN: 300 mL    09-07-22 @ 07:01  -  09-07-22 @ 13:57  --------------------------------------------------------  IN: 291 mL      09-07    141  |  109  |  77<HH>  ----------------------------<  126<H>  4.1   |  15<L>  |  3.6<H>    Ca    8.2<L>      07 Sep 2022 05:08  Phos  4.5     09-05  Mg     2.1     09-07    TPro  4.8<L>  /  Alb  2.2<L>  /  TBili  0.3  /  DBili  x   /  AST  32  /  ALT  27  /  AlkPhos  221<H>  09-07    Liver panel trend:  TBili 0.3   /   AST 32   /   ALT 27   /   AlkP 221   /   Tptn 4.8   /   Alb 2.2    /   DBili --      09-07  TBili 0.4   /   AST 46   /   ALT 38   /   AlkP 311   /   Tptn 5.2   /   Alb 2.5    /   DBili --      09-06  TBili 0.4   /   AST 50   /   ALT 40   /   AlkP 338   /   Tptn 5.2   /   Alb 2.5    /   DBili --      09-05  TBili 0.5   /   AST 59   /   ALT 45   /   AlkP 376   /   Tptn 5.6   /   Alb 2.7    /   DBili --      09-05  TBili 0.5   /   AST 72   /   ALT 51   /   AlkP 404   /   Tptn 5.5   /   Alb 2.6    /   DBili --      09-05  TBili 0.6   /   AST 50   /   ALT 34   /   AlkP 336   /   Tptn 5.4   /   Alb 2.7    /   DBili --      09-03  TBili 0.8   /   AST 43   /   ALT 26   /   AlkP 246   /   Tptn 5.4   /   Alb 2.7    /   DBili --      09-02  TBili 0.6   /   AST 25   /   ALT 14   /   AlkP 129   /   Tptn 5.8   /   Alb 2.9    /   DBili --      09-01  TBili 0.3   /   AST 16   /   ALT 15   /   AlkP 153   /   Tptn 7.1   /   Alb 3.9    /   DBili 0.2      08-30  TBili 0.2   /   AST 11   /   ALT 13   /   AlkP 156   /   Tptn 6.8   /   Alb 3.7    /   DBili --      08-30  TBili 0.3   /   AST 11   /   ALT 14   /   AlkP 134   /   Tptn 6.5   /   Alb 3.5    /   DBili --      08-29      PT/INR - ( 05 Sep 2022 16:33 )   PT: 14.20 sec;   INR: 1.24 ratio         PTT - ( 05 Sep 2022 16:33 )  PTT:32.3 sec    Culture - Blood (collected 05 Sep 2022 12:19)  Source: .Blood Blood  Preliminary Report (06 Sep 2022 18:02):    No growth to date.    Radiology:

## 2022-09-07 NOTE — CONSULT NOTE ADULT - ASSESSMENT
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 During hospital stay, found to have multiple punctate infarcts suspected to be cardioembolic vs vasculitis. Patient also found to have sharp waves on vEEG and currently on AEDs. Hospital course further complicated by MDR Enterobacter bacteremia, as well as GI bleed.    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 During hospital stay, found to have multiple punctate infarcts suspected to be cardioembolic vs vasculitis. Patient also found to have sharp waves on vEEG and currently on AEDs. Hospital course further complicated by MDR Enterobacter bacteremia, as well as GI bleed. Rheumatology was consulted for suspected vasculitis.    # High suspicion for CNS vasculitis  # Acute ischemic strokes multifocal  -  high suspicion for vasculitis as per neuro (high inflammatory markers, CSF protein)    - Anticardiolipin Antibody Level, Total: Negative: (09.03.22 @ 12:04)  - Beta 2 Glycoprotein 1 Antibody Screen: Negative (09.03.22 @ 12:04)   - Anticardiolipin Antibody Level, Total: Negative: Method: EIA (08.30.22 @ 23:46)  - Anti SS-A Antibody/Anti SS-B Antibody: neg  - Double Stranded DNA Antibody: neg  - Anti Nuclear Factor Titer: 1:80: Antinuclear AB (LUIS FELIPE), IFA Method (08.30.22 @ 19:27)   - LUIS FELIPE Pattern: Speckled (08.30.22 @ 19:27)   -Cytoplasmic (c-ANCA) Antibody: Negative (08.30.22 @ 19:27)   -Perinuclear (p-ANCA) Antibody: Negative (08.30.22 @ 19:27)   -Protein Electrophoresis, CSF (08.26.22 @ 11:36): Protein: 127 mg/dL; IgG CSF: 17.5 mg/dL; CSF ALBU: 70.4 mg/dL; IgG/Albumin Ratio, CSF: 0.25 Ratio; IgG Synthesis: 14.1 mg/day   - planned for cerebral angiogram tomorrow       55 y/o woman with PMH of HTN, DM2, 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 During hospital stay, found to have multiple punctate infarcts suspected to be cardioembolic vs vasculitis. Patient also found to have sharp waves on vEEG and currently on AEDs. Hospital course further complicated by MDR Enterobacter bacteremia, as well as GI bleed. Rheumatology was consulted for suspected vasculitis.    - Please see attestation for further recommendations    - Anticardiolipin Antibody Level, Total: Negative: (09.03.22 @ 12:04)  - Beta 2 Glycoprotein 1 Antibody Screen: Negative (09.03.22 @ 12:04)   - Anticardiolipin Antibody Level, Total: Negative: Method: EIA (08.30.22 @ 23:46)  - Anti SS-A Antibody/Anti SS-B Antibody: neg  - Double Stranded DNA Antibody: neg  - Anti Nuclear Factor Titer: 1:80: Antinuclear AB (LUIS FELIPE), IFA Method (08.30.22 @ 19:27)   - LUIS FELIPE Pattern: Speckled (08.30.22 @ 19:27)   -Cytoplasmic (c-ANCA) Antibody: Negative (08.30.22 @ 19:27)   -Perinuclear (p-ANCA) Antibody: Negative (08.30.22 @ 19:27)   -Protein Electrophoresis, CSF (08.26.22 @ 11:36): Protein: 127 mg/dL; IgG CSF: 17.5 mg/dL; CSF ALBU: 70.4 mg/dL; IgG/Albumin Ratio, CSF: 0.25 Ratio; IgG Synthesis: 14.1 mg/day   - planned for cerebral angiogram tomorrow

## 2022-09-07 NOTE — CONSULT NOTE ADULT - SUBJECTIVE AND OBJECTIVE BOX
Patient is a 56y old  Female who presents with a chief complaint of C/S for external hemorrhoids (07 Sep 2022 09:55)    HPI:  55 yo F with PMH of HTN, DM2, and stroke (per son 2017) who presented for RLE wound. Started 3 months ago when she fell and hit her leg on a wooden cabinet. She put hydrogen peroxide, antibiotic cream, and wrapped the wound but never fully healed. Two weeks ago it started to show yellow pus so her and her family came to the ED for further treatment. Endorsed subjective fevers, chest pain, and vision changes which occurs when walked 2-3 blocks. Denied congestion, sore throat, cough, dyspnea, headache, dysuria, N/V, diarrhea     Per son, they fill her medications at [facility] ([phone]) and this is their current pharmacy. Called them to confirm her medications and they said her last refill of medications was . Pt has not seen a doctor due to insurance problem and has not been taking any medications.    In the ED:  Vitals: T: 98.9, BP: 296/ 174, , RR: 18, 98%O2 on RA  Labs: CBC showed WBC 11.23; ESR 92, CRP 35.6  CMP showed glucose 302, alk phos 173; ; VBG pH 7.45  EKG pending  CXR showed borderline cardiomegaly and no airspace opacity.   X-ray of RLE also pending.     Received unasyn and vanco, humalin R, IV vasotec, IV labetalol   (02 Aug 2022 07:47)    Additional Information:    REVIEW OF SYSTEMS:  All Review of systems negative, except as noted per HPI    MEDICATIONS  (STANDING):  aspirin  chewable 81 milliGRAM(s) Oral daily  atorvastatin 80 milliGRAM(s) Oral at bedtime  aztreonam  IVPB      aztreonam  IVPB 2000 milliGRAM(s) IV Intermittent every 12 hours  ceftazidime/avibactam IVPB 0.94 Gram(s) IV Intermittent every 12 hours  chlorhexidine 0.12% Liquid 15 milliLiter(s) Oral Mucosa every 12 hours  chlorhexidine 2% Cloths 1 Application(s) Topical <User Schedule>  collagenase Ointment 1 Application(s) Topical two times a day  Dakins Solution - 1/2 Strength 1 Application(s) Topical two times a day  dexMEDEtomidine Infusion 0.2 MICROgram(s)/kG/Hr (4.07 mL/Hr) IV Continuous <Continuous>  dextrose 5% 1000 milliLiter(s) (75 mL/Hr) IV Continuous <Continuous>  dextrose 5%. 1000 milliLiter(s) (100 mL/Hr) IV Continuous <Continuous>  dextrose 5%. 1000 milliLiter(s) (50 mL/Hr) IV Continuous <Continuous>  dextrose 50% Injectable 25 Gram(s) IV Push once  dextrose 50% Injectable 12.5 Gram(s) IV Push once  dextrose 50% Injectable 25 Gram(s) IV Push once  fentaNYL   Infusion. 0.5 MICROgram(s)/kG/Hr (4.07 mL/Hr) IV Continuous <Continuous>  glucagon  Injectable 1 milliGRAM(s) IntraMuscular once  hydrALAZINE 100 milliGRAM(s) Oral every 8 hours  insulin lispro (ADMELOG) corrective regimen sliding scale   SubCutaneous three times a day before meals  labetalol 600 milliGRAM(s) Oral three times a day  lacosamide IVPB 50 milliGRAM(s) IV Intermittent every 12 hours  levETIRAcetam  Solution 500 milliGRAM(s) Oral two times a day  lidocaine 1%/epinephrine 1:100,000 Inj 20 milliLiter(s) Local Injection once  multivitamin/minerals/iron Oral Solution (CENTRUM) 15 milliLiter(s) Oral daily  pantoprazole  Injectable 40 milliGRAM(s) IV Push two times a day  propofol Infusion 10 MICROgram(s)/kG/Min (4.88 mL/Hr) IV Continuous <Continuous>  sodium bicarbonate 1300 milliGRAM(s) Oral every 8 hours  sodium chloride 0.65% Nasal 2 Spray(s) Both Nostrils two times a day    MEDICATIONS  (PRN):  acetaminophen     Tablet .. 650 milliGRAM(s) Oral every 6 hours PRN Temp greater or equal to 38C (100.4F), Mild Pain (1 - 3)  dextrose Oral Gel 15 Gram(s) Oral once PRN Blood Glucose LESS THAN 70 milliGRAM(s)/deciliter  labetalol Injectable 10 milliGRAM(s) IV Push every 6 hours PRN Systolic blood pressure >  melatonin 3 milliGRAM(s) Oral at bedtime PRN Insomnia    Allergies    No Known Allergies      PAST MEDICAL & SURGICAL HISTORY:  Hypertension      Diabetes mellitus      No significant past surgical history        Growth & Development:    FAMILY HISTORY:  [] Arthritis:  [] Lupus/Collagen Vascular:  [] Psoriasis:  [] Uveitis:  [] Thyroid Disease:  [] Ankylosing Spondylitis:  [] Lyme  [] IBD  [] Acute Rheumatic Fever  [] Diabetes    SOCIAL HISTORY:  School Performance/Attendance:  [] Animal/Insect Exposure:    Vital Signs Last 24 Hrs  T(C): 36.6 (07 Sep 2022 04:00), Max: 37.2 (06 Sep 2022 16:00)  T(F): 97.8 (07 Sep 2022 04:00), Max: 98.9 (06 Sep 2022 16:00)  HR: 56 (07 Sep 2022 10:00) (54 - 76)  BP: 97/60 (07 Sep 2022 10:00) (97/60 - 195/97)  BP(mean): 75 (07 Sep 2022 10:00) (75 - 144)  RR: 16 (07 Sep 2022 10:00) (16 - 24)  SpO2: 100% (07 Sep 2022 10:00) (100% - 100%)    Parameters below as of 07 Sep 2022 11:00  Patient On (Oxygen Delivery Method): ventilator    O2 Concentration (%): 40  Daily     Daily Weight in k.7 (07 Sep 2022 04:00)    PHYSICAL EXAM:  All physical exam findings normal, except for those marked:  General Appearance:  Skin 		WNL: no rash, lesion, ulcers, indurations, nodules or tightening, normal nail bed   .		capillaries  .		[] Abnormal:  Eyes		WNL: normal conjunctiva and lids, normal pupils and iris  .		[] Abnormal:  ENT		WNL: normal appearance of ears, nose lips, teeth, gums, oropharynx, oral   .		mucosal and palate  .		[] Abnormal:  Neck: 		WNL: no masses, normal thyroid  .		[] Abnormal:  Cardiovascular: WNL: normal auscultation, normal peripheral pulses, no peripheral edema  .		[] Abnormal:  Respiratory: 	WNL: normal respiratory effort  .		[] Abnormal:  GI:		WNL: no masses or tenderness, normal liver and spleen  .		[] Abnormal:  Lymphatic: 	WNL: normal cervical, axillary and inguinal nodes  .		[] Abnormal:  Neurologic: 	WNL: normal DTR’s, normal sensation  .		[] Abnormal:  Psychiatric: 	WNL: normal judgment and insight, normal memory, normal mood and affect  .		[] Abnormal:  Genitalia: 	WNL: normal breasts, genitals and pubic hair  .		[] Abnormal:  Musculoskeletal:	WNL: normal digits, normal muscle strength, full ROM, normal gait  .			[] Abnormal/see Joint exam below  .			[] Leg Lengths:  .			[] Muscle Atrophy:  .			[] Global Assessment of Disease Activity (1-10):    Joint:  [] Warmth	[] Pain/Motion	[] Less ROM	[] Effusion	[] Tender	[] Swelling  Joint :  [] Warmth	[] Pain/Motion	[] Less ROM	[] Effusion	[] Tender	[] Swelling  Joint :  [] Warmth	[] Pain/Motion	[] Less ROM	[] Effusion	[] Tender	[] Swelling  Joint :  [] Warmth	[] Pain/Motion	[] Less ROM	[] Effusion	[] Tender	[] Swelling    Lab Results:                        6.0    9.77  )-----------( 104      ( 07 Sep 2022 05:08 )             17.9     09-07    141  |  109  |  77<HH>  ----------------------------<  126<H>  4.1   |  15<L>  |  3.6<H>    Ca    8.2<L>      07 Sep 2022 05:08  Phos  4.5       Mg     2.1         TPro  4.8<L>  /  Alb  2.2<L>  /  TBili  0.3  /  DBili  x   /  AST  32  /  ALT  27  /  AlkPhos  221<H>  09-    PT/INR - ( 05 Sep 2022 16:33 )   PT: 14.20 sec;   INR: 1.24 ratio         PTT - ( 05 Sep 2022 16:33 )  PTT:32.3 sec   Patient is a 56y old  Female who presents with a chief complaint of C/S for external hemorrhoids (07 Sep 2022 09:55)    HPI:  57 yo F with PMH of HTN, DM2, and stroke (per son 2017) who presented for RLE wound. Started 3 months ago when she fell and hit her leg on a wooden cabinet. She put hydrogen peroxide, antibiotic cream, and wrapped the wound but never fully healed. Two weeks ago it started to show yellow pus so her and her family came to the ED for further treatment. Endorsed subjective fevers, chest pain, and vision changes which occurs when walked 2-3 blocks. Denied congestion, sore throat, cough, dyspnea, headache, dysuria, N/V, diarrhea     Per son, they fill her medications at [facility] ([phone]) and this is their current pharmacy. Called them to confirm her medications and they said her last refill of medications was . Pt has not seen a doctor due to insurance problem and has not been taking any medications.    In the ED:  Vitals: T: 98.9, BP: 296/ 174, , RR: 18, 98%O2 on RA  Labs: CBC showed WBC 11.23; ESR 92, CRP 35.6  CMP showed glucose 302, alk phos 173; ; VBG pH 7.45  EKG pending  CXR showed borderline cardiomegaly and no airspace opacity.   X-ray of RLE also pending.     Received unasyn and vanco, humalin R, IV vasotec, IV labetalol   (02 Aug 2022 07:47)    Additional Information:    REVIEW OF SYSTEMS:  All Review of systems negative, except as noted per HPI    MEDICATIONS  (STANDING):  aspirin  chewable 81 milliGRAM(s) Oral daily  atorvastatin 80 milliGRAM(s) Oral at bedtime  aztreonam  IVPB      aztreonam  IVPB 2000 milliGRAM(s) IV Intermittent every 12 hours  ceftazidime/avibactam IVPB 0.94 Gram(s) IV Intermittent every 12 hours  chlorhexidine 0.12% Liquid 15 milliLiter(s) Oral Mucosa every 12 hours  chlorhexidine 2% Cloths 1 Application(s) Topical <User Schedule>  collagenase Ointment 1 Application(s) Topical two times a day  Dakins Solution - 1/2 Strength 1 Application(s) Topical two times a day  dexMEDEtomidine Infusion 0.2 MICROgram(s)/kG/Hr (4.07 mL/Hr) IV Continuous <Continuous>  dextrose 5% 1000 milliLiter(s) (75 mL/Hr) IV Continuous <Continuous>  dextrose 5%. 1000 milliLiter(s) (100 mL/Hr) IV Continuous <Continuous>  dextrose 5%. 1000 milliLiter(s) (50 mL/Hr) IV Continuous <Continuous>  dextrose 50% Injectable 25 Gram(s) IV Push once  dextrose 50% Injectable 12.5 Gram(s) IV Push once  dextrose 50% Injectable 25 Gram(s) IV Push once  fentaNYL   Infusion. 0.5 MICROgram(s)/kG/Hr (4.07 mL/Hr) IV Continuous <Continuous>  glucagon  Injectable 1 milliGRAM(s) IntraMuscular once  hydrALAZINE 100 milliGRAM(s) Oral every 8 hours  insulin lispro (ADMELOG) corrective regimen sliding scale   SubCutaneous three times a day before meals  labetalol 600 milliGRAM(s) Oral three times a day  lacosamide IVPB 50 milliGRAM(s) IV Intermittent every 12 hours  levETIRAcetam  Solution 500 milliGRAM(s) Oral two times a day  lidocaine 1%/epinephrine 1:100,000 Inj 20 milliLiter(s) Local Injection once  multivitamin/minerals/iron Oral Solution (CENTRUM) 15 milliLiter(s) Oral daily  pantoprazole  Injectable 40 milliGRAM(s) IV Push two times a day  propofol Infusion 10 MICROgram(s)/kG/Min (4.88 mL/Hr) IV Continuous <Continuous>  sodium bicarbonate 1300 milliGRAM(s) Oral every 8 hours  sodium chloride 0.65% Nasal 2 Spray(s) Both Nostrils two times a day    MEDICATIONS  (PRN):  acetaminophen     Tablet .. 650 milliGRAM(s) Oral every 6 hours PRN Temp greater or equal to 38C (100.4F), Mild Pain (1 - 3)  dextrose Oral Gel 15 Gram(s) Oral once PRN Blood Glucose LESS THAN 70 milliGRAM(s)/deciliter  labetalol Injectable 10 milliGRAM(s) IV Push every 6 hours PRN Systolic blood pressure >  melatonin 3 milliGRAM(s) Oral at bedtime PRN Insomnia    Allergies    No Known Allergies      PAST MEDICAL & SURGICAL HISTORY:  Hypertension      Diabetes mellitus      No significant past surgical history        Growth & Development:    FAMILY HISTORY:  [] Arthritis:  [] Lupus/Collagen Vascular:  [] Psoriasis:  [] Uveitis:  [] Thyroid Disease:  [] Ankylosing Spondylitis:  [] Lyme  [] IBD  [] Acute Rheumatic Fever  [] Diabetes    SOCIAL HISTORY:  School Performance/Attendance:  [] Animal/Insect Exposure:    Vital Signs Last 24 Hrs  T(C): 36.6 (07 Sep 2022 04:00), Max: 37.2 (06 Sep 2022 16:00)  T(F): 97.8 (07 Sep 2022 04:00), Max: 98.9 (06 Sep 2022 16:00)  HR: 56 (07 Sep 2022 10:00) (54 - 76)  BP: 97/60 (07 Sep 2022 10:00) (97/60 - 195/97)  BP(mean): 75 (07 Sep 2022 10:00) (75 - 144)  RR: 16 (07 Sep 2022 10:00) (16 - 24)  SpO2: 100% (07 Sep 2022 10:00) (100% - 100%)    Parameters below as of 07 Sep 2022 11:00  Patient On (Oxygen Delivery Method): ventilator    O2 Concentration (%): 40  Daily     Daily Weight in k.7 (07 Sep 2022 04:00)    PHYSICAL EXAM:  All physical exam findings normal, except for those marked:  General Appearance:  Skin 		WNL: no rash, lesion, ulcers, indurations, nodules or tightening, normal nail bed   .		capillaries  .		[] Abnormal:  Eyes		WNL: normal conjunctiva and lids, normal pupils and iris  .		[] Abnormal:  ENT		WNL: normal appearance of ears, nose lips, teeth, gums, oropharynx, oral   .		mucosal and palate  .		[] Abnormal:  Neck: 		WNL: no masses, normal thyroid  .		[] Abnormal:  Cardiovascular: WNL: normal auscultation, normal peripheral pulses, no peripheral edema  .		[] Abnormal:  Respiratory: 	WNL: normal respiratory effort  .		[] Abnormal:  GI:		WNL: no masses or tenderness, normal liver and spleen  .		[] Abnormal:  Lymphatic: 	WNL: normal cervical, axillary and inguinal nodes  .		[] Abnormal:  Neurologic: 	WNL: normal DTR’s, normal sensation  .		[] Abnormal:  Psychiatric: 	WNL: normal judgment and insight, normal memory, normal mood and affect  .		[] Abnormal:  Genitalia: 	WNL: normal breasts, genitals and pubic hair  .		[] Abnormal:  Musculoskeletal:	WNL: normal digits, normal muscle strength, full ROM, normal gait  .			[] Abnormal/see Joint exam below  .			[] Leg Lengths:  .			[] Muscle Atrophy:  .			[] Global Assessment of Disease Activity (1-10):    Joint:  [] Warmth	[] Pain/Motion	[] Less ROM	[] Effusion	[] Tender	[] Swelling  Joint :  [] Warmth	[] Pain/Motion	[] Less ROM	[] Effusion	[] Tender	[] Swelling  Joint :  [] Warmth	[] Pain/Motion	[] Less ROM	[] Effusion	[] Tender	[] Swelling  Joint :  [] Warmth	[] Pain/Motion	[] Less ROM	[] Effusion	[] Tender	[] Swelling    Lab Results:                        6.0    9.77  )-----------( 104      ( 07 Sep 2022 05:08 )             17.9     -    141  |  109  |  77<HH>  ----------------------------<  126<H>  4.1   |  15<L>  |  3.6<H>    Ca    8.2<L>      07 Sep 2022 05:08  Phos  4.5       Mg     2.1         TPro  4.8<L>  /  Alb  2.2<L>  /  TBili  0.3  /  DBili  x   /  AST  32  /  ALT  27  /  AlkPhos  221<H>      PT/INR - ( 05 Sep 2022 16:33 )   PT: 14.20 sec;   INR: 1.24 ratio         PTT - ( 05 Sep 2022 16:33 )  PTT:32.3 sec    Anticardiolipin Antibody Level, Total: Negative: Method: EIA (22 @ 12:04) Beta 2 Glycoprotein 1 Antibody Screen: Negative (22 @ 12:04) Anticardiolipin Antibody Level, Total: Negative: Method: EIA (22 @ 23:46) Beta 2 Glycoprotein 1 Antibody Screen: Negative (22 @ 23:46) Anti SS-A Antibody: <0.2 AI (22 @ 19:27) Anti SS-B Antibody: <0.2: Fluorescent Bead Immunoassay Double Stranded DNA Antibody: <12: Method: EIA Anti Nuclear Factor Titer: 1:80: Antinuclear AB (LUIS FELIPE), IFA Method (22 @ 19:27) LUIS FELIPE Pattern: Speckled (22 @ 19:27) Cytoplasmic (c-ANCA) Antibody: Negative (22 @ 19:27) Perinuclear (p-ANCA) Antibody: Negative (22 @ 19:27)     < from: MR Head No Cont (22 @ 20:08) >  IMPRESSION:  Redemonstration of multiple scattered lacunar infarcts, now subacute. No   compelling evidence of an acute infarct or acute intracranial hemorrhage.    < end of copied text >

## 2022-09-07 NOTE — PROGRESS NOTE ADULT - ASSESSMENT
56 year old female PMHx of HTN, DM2, stroke (2017) admitted for RLE wound. S/P debridement on 8/10/22 with burn team. Hospital course complicated my episode of AMS and found to have multiple punctate infarcts. On 9/5, patient had an episode of bloody movement and 30 minutes later had an episode of bleeding from the oral cavity (tongue laceration). Hgb was 6.5 and was given 2 units with Lasix. Post transfusion Hgb was 8.7. Patient upgraded to ICU for further management. Patient intubated on AM of 9/6 for bedside EGD and colonoscopy. EGD - nonerosive gastritis; colonoscopy - large bleeding external hemorrhoids, no evidence of active bleeding. Surgery consulted for BRBPR. Patient with above PMHx admitted for RLE wound s/p debridement on 8/10 with burn team. Physical exam findings, imaging, and labs as documented above.     PLAN:  -Bleeding from External hemorrhoids observed on colonoscopy  -No acute surgical intervention   -Continue trending hgb, transfuse PRN   -GI following  -PPI BID  -recall surgery as needed    Above plan discussed with Attending Surgeon Dr. Frye, patient, patient family, and Primary team  09-06-22 @ 21:37

## 2022-09-07 NOTE — CONSULT NOTE ADULT - ATTENDING COMMENTS
57 y/o woman admitted with right LE cellulitis, hospital stay complicated by altered mental status with stroke code called 8/5 for unresponsiveness, Enterobacter bacteremia, and GI bleed, rheumatology consulted for further evaluation for possible CNS vasculitis. Of note, history is limited due to pt's intubated status. Per pt's son, she does not have any h/o joint problems, and prior to her current hospitalization she did not have any known complaints aside from the cellulitis in her RLE. From a rheum standpoint, CNS vasculitis can be either primary CNS vasculitis, which is exceedingly rare and has a large range of mimic conditions, and CNS vasculitis in the setting of a systemic condition such as SLE or Sjogren's Syndrome. Pt does not have any known symptoms suggesting a systemic connective tissue disease prior to her current hospitalization, does not have any findings to suggest a systemic connective tissue disease on her exam currently, and had labs so far which have been unremarkable for rheumatologic conditions aside from a borderline LUIS FELIPE of 1:80. In terms of primary CNS vasculitis, the gold standard to diagnose this condition is a brain biopsy, which is generally not pursued unless other etiologies, e.g. infectious and thromboembolic disease, have been excluded.   - Please send complements C3 and C4, cryoglobulin, RUSTY antibodies, quantiferon, Bartonella antibodies, hepatitis B core antibody and surface antigen, and HIV. However, as mentioned above the gold standard test for CNS vasculitis is a brain biopsy or a biopsy of a peripheral organ if there is concern for systemic vasculitis (e.g. renal biopsy), and I would not recommend empirically treating pt for vasculitis as the treatment would involve high-dose steroids and cyclophosphamide, which carry a higher risk than benefit in the setting of her actively treated infection

## 2022-09-07 NOTE — CONSULT NOTE ADULT - SUBJECTIVE AND OBJECTIVE BOX
HPI:   Patient received  din bed 55 yo F with PMH of HTN, DM2,  and stroke (per son 2017) who presented for RLE wound. Started 3 months ago when she fell and hit her leg on a wooden cabinet. She put hydrogen peroxide, antibiotic cream, and wrapped the wound but never fully healed. Two weeks ago it started to show yellow pus so her and her family came to the ED for further treatment. Endorsed subjective fevers, chest pain, and vision changes which occurs when walked 2-3 blocks. Denied congestion, sore throat, cough, dyspnea, headache, dysuria, N/V, diarrhea   Per son, they fill her medications at [facility] ([phone]) and this is their current pharmacy. Called them to confirm her medications and they said her last refill of medications was 2018. Pt has not seen a doctor due to insurance problem and has not been taking any medications.    In the ED:  Vitals: T: 98.9, BP: 296/ 174, , RR: 18, 98%O2 on RA  Labs: CBC showed WBC 11.23; ESR 92, CRP 35.6  CMP showed glucose 302, alk phos 173; ; VBG pH 7.45  EKG pending  CXR showed borderline cardiomegaly and no airspace opacity.   X-ray of RLE also pending.     Received unasyn and vanco, humalin R, IV vasotec, IV labetalol             Current Diet: Diet, NPO after Midnight:      NPO Start Date: 07-Sep-2022,   NPO Start Time: 23:59 (09-07-22 @ 10:00)  Diet, NPO with Tube Feed:   Tube Feeding Modality: Gastrostomy  Glucerna 1.2 Reed  Total Volume for 24 Hours (mL): 1300  Bolus  Total Volume of Bolus (mL):  325  Tube Feed Frequency: Every 6 hours   Tube Feed Start Time: 08:00  Bolus Feed Rate (mL per Hour): 54   Bolus Feed Duration (in Hours): 24  No Carb Prosource (1pkg = 15gms Protein)     Qty per Day:  2 (09-07-22 @ 00:17)  Diet, NPO after Midnight:      NPO Start Date: 05-Sep-2022,   NPO Start Time: 23:59  Except Medications (09-05-22 @ 19:33)      PAST MEDICAL & SURGICAL HISTORY:  Hypertension      Diabetes mellitus      No significant past surgical history          REVIEW OF SYSTEMS: Pt unable to offer  General/ Breast/ Skin/ Neuro/ MSK: see HPI  All other systems negative    MEDICATIONS  (STANDING):  aspirin  chewable 81 milliGRAM(s) Oral daily  atorvastatin 80 milliGRAM(s) Oral at bedtime  aztreonam  IVPB 2000 milliGRAM(s) IV Intermittent every 12 hours  aztreonam  IVPB      ceftazidime/avibactam IVPB 0.94 Gram(s) IV Intermittent every 12 hours  chlorhexidine 0.12% Liquid 15 milliLiter(s) Oral Mucosa every 12 hours  chlorhexidine 2% Cloths 1 Application(s) Topical <User Schedule>  collagenase Ointment 1 Application(s) Topical two times a day  Dakins Solution - 1/2 Strength 1 Application(s) Topical two times a day  dexMEDEtomidine Infusion 0.2 MICROgram(s)/kG/Hr (4.07 mL/Hr) IV Continuous <Continuous>  dextrose 5% 1000 milliLiter(s) (75 mL/Hr) IV Continuous <Continuous>  dextrose 5%. 1000 milliLiter(s) (100 mL/Hr) IV Continuous <Continuous>  dextrose 5%. 1000 milliLiter(s) (50 mL/Hr) IV Continuous <Continuous>  dextrose 50% Injectable 25 Gram(s) IV Push once  dextrose 50% Injectable 12.5 Gram(s) IV Push once  dextrose 50% Injectable 25 Gram(s) IV Push once  fentaNYL   Infusion. 0.5 MICROgram(s)/kG/Hr (4.07 mL/Hr) IV Continuous <Continuous>  glucagon  Injectable 1 milliGRAM(s) IntraMuscular once  hydrALAZINE 100 milliGRAM(s) Oral every 8 hours  insulin lispro (ADMELOG) corrective regimen sliding scale   SubCutaneous three times a day before meals  labetalol 600 milliGRAM(s) Oral three times a day  lacosamide IVPB 50 milliGRAM(s) IV Intermittent every 12 hours  levETIRAcetam  Solution 500 milliGRAM(s) Oral two times a day  lidocaine 1%/epinephrine 1:100,000 Inj 20 milliLiter(s) Local Injection once  multivitamin/minerals/iron Oral Solution (CENTRUM) 15 milliLiter(s) Oral daily  pantoprazole  Injectable 40 milliGRAM(s) IV Push two times a day  propofol Infusion 10 MICROgram(s)/kG/Min (4.88 mL/Hr) IV Continuous <Continuous>  sodium bicarbonate 1300 milliGRAM(s) Oral every 8 hours  sodium chloride 0.65% Nasal 2 Spray(s) Both Nostrils two times a day    MEDICATIONS  (PRN):  acetaminophen     Tablet .. 650 milliGRAM(s) Oral every 6 hours PRN Temp greater or equal to 38C (100.4F), Mild Pain (1 - 3)  dextrose Oral Gel 15 Gram(s) Oral once PRN Blood Glucose LESS THAN 70 milliGRAM(s)/deciliter  labetalol Injectable 10 milliGRAM(s) IV Push every 6 hours PRN Systolic blood pressure >  melatonin 3 milliGRAM(s) Oral at bedtime PRN Insomnia      Allergies    No Known Allergies    Intolerances        SOCIAL HISTORY:  / /single/ ; (+)HHA/ lives in SNF; Former smoker, No/ Denies smoking, ETOH, drugs    FAMILY HISTORY:  FH: type 2 diabetes mellitus     no h/o PVD or wound healing or skin problems  >>> <<<    PHYSICAL EXAM:  Vital Signs Last 24 Hrs  T(C): 36.6 (07 Sep 2022 04:00), Max: 37.2 (06 Sep 2022 16:00)  T(F): 97.8 (07 Sep 2022 04:00), Max: 98.9 (06 Sep 2022 16:00)  HR: 50 (07 Sep 2022 11:00) (50 - 74)  BP: 107/66 (07 Sep 2022 11:00) (97/60 - 159/83)  BP(mean): 86 (07 Sep 2022 11:00) (75 - 117)  RR: 16 (07 Sep 2022 11:00) (16 - 18)  SpO2: 100% (07 Sep 2022 11:00) (100% - 100%)    Parameters below as of 07 Sep 2022 11:00  Patient On (Oxygen Delivery Method): ventilator    O2 Concentration (%): 40    NAD / Guarded but stable,  A&Ox3/ Alert/ Confused  cachectic/ thin/  MO/ Obese/ frail  WD/ WN/ WG/ Disheveled  Total Care Sport/ Versa Care P500 / Envella Progressa bed     HEENT:  NC/AT, PERRL, EOMI, sclera clear, mucosa moist, throat clear, trachea midline, neck supple  Cardiovascular: RRR (+)m  Respiratory: CTA/ equal chest rise  Gastrointestinal soft NT/ND (+)BS  (+)PEG (+)ostomy (+)NGT  Neurology:  weakened strength & sensation grossly intact/ paraesthesia  nonverbal, no follow commands/ paraplegic  Psych: calm/ appropriate/ flat affect/ easily aggitated/ restless  Musculoskeletal:  limited stiff / FROM, no deformities/ contractures  Vascular: BLE equally warm/ cool,  no cyanosis, clubbing, edema           >LE //BLE edema equal           BLE DP/PT pulses palpable           no acute ischemia noted          BLE hemosiderin staining  Skin:  moist w/ good turgor  pale, frail,  ecchymosis w/o hematoma  serosanguinous drainage  No odor, erythema, increased warmth, tenderness, induration, fluctuance, nor crepitus    LABS/ CULTURES/ RADIOLOGY:                        6.0    9.77  )-----------( 104      ( 07 Sep 2022 05:08 )             17.9       141  |  109  |  77  ----------------------------<  126      [09-07-22 @ 05:08]  4.1   |  15  |  3.6        Ca     8.2     [09-07-22 @ 05:08]      Mg     2.1     [09-07-22 @ 05:08]      Phos  4.5     [09-05-22 @ 20:00]    TPro  4.8  /  Alb  2.2  /  TBili  0.3  /  DBili  x   /  AST  32  /  ALT  27  /  AlkPhos  221  [09-07-22 @ 05:08]    PT/INR: PT 14.20, INR 1.24       [09-05-22 @ 16:33]  PTT: 32.3       [09-05-22 @ 16:33]          Culture - Blood (collected 09-05-22 @ 12:19)  Source: .Blood Blood  Preliminary Report (09-06-22 @ 18:02):    No growth to date.    Culture - Blood (collected 09-04-22 @ 05:43)  Source: .Blood None  Preliminary Report (09-05-22 @ 12:01):    No growth to date.    Culture - Blood (collected 09-03-22 @ 18:18)  Source: .Blood None  Preliminary Report (09-05-22 @ 04:01):    No growth to date.    Culture - Blood (collected 09-01-22 @ 12:29)  Source: .Blood None  Gram Stain (09-06-22 @ 17:03):    Growth in aerobic bottle: Gram Negative Rods    Growth in anaerobic bottle: Gram Negative Rods  Final Report (09-06-22 @ 17:03):    Growth in aerobic bottle: Enterobacter cloacae (Carbapenem Resistant)    Growth in anaerobic bottle: Gram Negative Rods    ***Blood Panel PCR results on this specimen are available    approximately 3 hours after the Gram stain result.***    Gram stain, PCR, and/or culture results may not always    correspond due to difference in methodologies.    ************************************************************    This PCR assay was performed by multiplex PCR. This    Assay tests for 66 bacterial and resistance gene targets.    Please refer to the [facility] test directory    at [email or web address] for details.  Organism: Blood Culture PCR  Enterobacter cloacae (Carbapenem Resistant)  Enterobacter cloacae (Carbapenem Resistant) (09-06-22 @ 17:03)  Organism: Enterobacter cloacae (Carbapenem Resistant) (09-06-22 @ 17:03)      -  Cefiderocol: S      Method Type: KB  Organism: Enterobacter cloacae (Carbapenem Resistant) (09-06-22 @ 17:03)      -  Amikacin: S <=16      -  Ampicillin: R >16 These ampicillin results predict results for amoxicillin      -  Ampicillin/Sulbactam: R >16/8 Enterobacter, Klebsiella aerogenes, Citrobacter, and Serratia may develop resistance during prolonged therapy (3-4 days)      -  Aztreonam: S <=4      -  Cefazolin: R >16 Enterobacter, Klebsiella aerogenes, Citrobacter, and Serratia may develop resistance during prolonged therapy (3-4 days)      -  Cefepime: R >16      -  Cefoxitin: R >16      -  Ceftazidime/Avibactam: R >16      -  Ceftolozane/tazobactam: R >8      -  Ceftriaxone: R >32 Enterobacter, Klebsiella aerogenes, Citrobacter, and Serratia may develop resistance during prolonged therapy      -  Ciprofloxacin: S <=0.25      -  Ertapenem: R >1      -  Gentamicin: S <=2      -  Imipenem: R >8      -  Levofloxacin: S <=0.5      -  Meropenem: R >8      -  Piperacillin/Tazobactam: R >64      -  Tigecycline: S <=2      -  Tobramycin: S <=2      -  Trimethoprim/Sulfamethoxazole: S <=0.5/9.5      Method Type: AALIYAH  Organism: Blood Culture PCR (09-06-22 @ 17:03)      -  Carbapenem Resistance: Detec      -  Enterobacter cloacae complex: Detec      -  NDM Resistance Marker: Detec      Method Type: PCR                       HPI:   Patient  is a 55 yo F with PMH of HTN, DM2,  and stroke (per son 2017) who presented for RLE wound. Started 3 months ago when she fell and hit her leg on a wooden cabinet. She put hydrogen peroxide, antibiotic cream, and wrapped the wound but never fully healed. Two weeks ago it started to show yellow pus so her and her family came to the ED for further treatment. Endorsed subjective fevers, chest pain, and vision changes which occurs when walked 2-3 blocks. Denied congestion, sore throat, cough, dyspnea, headache, dysuria, N/V, diarrhea   Per son, they fill her medications at [facility] ([phone]) and this is their current pharmacy. Called them to confirm her medications and they said her last refill of medications was 2018. Pt has not seen a doctor due to insurance problem and has not been taking any medications.    In the ED:  Vitals: T: 98.9, BP: 296/ 174, , RR: 18, 98%O2 on RA  Labs: CBC showed WBC 11.23; ESR 92, CRP 35.6  CMP showed glucose 302, alk phos 173; ; VBG pH 7.45  EKG pending  CXR showed borderline cardiomegaly and no airspace opacity.   X-ray of RLE also pending.     Received unasyn and vanco, humalin R, IV vasotec, IV labetalol             Current Diet: Diet, NPO after Midnight:      NPO Start Date: 07-Sep-2022,   NPO Start Time: 23:59 (09-07-22 @ 10:00)  Diet, NPO with Tube Feed:   Tube Feeding Modality: Gastrostomy  Glucerna 1.2 Reed  Total Volume for 24 Hours (mL): 1300  Bolus  Total Volume of Bolus (mL):  325  Tube Feed Frequency: Every 6 hours   Tube Feed Start Time: 08:00  Bolus Feed Rate (mL per Hour): 54   Bolus Feed Duration (in Hours): 24  No Carb Prosource (1pkg = 15gms Protein)     Qty per Day:  2 (09-07-22 @ 00:17)  Diet, NPO after Midnight:      NPO Start Date: 05-Sep-2022,   NPO Start Time: 23:59  Except Medications (09-05-22 @ 19:33)      PAST MEDICAL & SURGICAL HISTORY:  Hypertension  Diabetes mellitus  No significant past surgical history    REVIEW OF SYSTEMS: Pt unable to offer due to current state       MEDICATIONS  (STANDING):  aspirin  chewable 81 milliGRAM(s) Oral daily  atorvastatin 80 milliGRAM(s) Oral at bedtime  aztreonam  IVPB 2000 milliGRAM(s) IV Intermittent every 12 hours  aztreonam  IVPB      ceftazidime/avibactam IVPB 0.94 Gram(s) IV Intermittent every 12 hours  chlorhexidine 0.12% Liquid 15 milliLiter(s) Oral Mucosa every 12 hours  chlorhexidine 2% Cloths 1 Application(s) Topical <User Schedule>  collagenase Ointment 1 Application(s) Topical two times a day  Dakins Solution - 1/2 Strength 1 Application(s) Topical two times a day  dexMEDEtomidine Infusion 0.2 MICROgram(s)/kG/Hr (4.07 mL/Hr) IV Continuous <Continuous>  dextrose 5% 1000 milliLiter(s) (75 mL/Hr) IV Continuous <Continuous>  dextrose 5%. 1000 milliLiter(s) (100 mL/Hr) IV Continuous <Continuous>  dextrose 5%. 1000 milliLiter(s) (50 mL/Hr) IV Continuous <Continuous>  dextrose 50% Injectable 25 Gram(s) IV Push once  dextrose 50% Injectable 12.5 Gram(s) IV Push once  dextrose 50% Injectable 25 Gram(s) IV Push once  fentaNYL   Infusion. 0.5 MICROgram(s)/kG/Hr (4.07 mL/Hr) IV Continuous <Continuous>  glucagon  Injectable 1 milliGRAM(s) IntraMuscular once  hydrALAZINE 100 milliGRAM(s) Oral every 8 hours  insulin lispro (ADMELOG) corrective regimen sliding scale   SubCutaneous three times a day before meals  labetalol 600 milliGRAM(s) Oral three times a day  lacosamide IVPB 50 milliGRAM(s) IV Intermittent every 12 hours  levETIRAcetam  Solution 500 milliGRAM(s) Oral two times a day  lidocaine 1%/epinephrine 1:100,000 Inj 20 milliLiter(s) Local Injection once  multivitamin/minerals/iron Oral Solution (CENTRUM) 15 milliLiter(s) Oral daily  pantoprazole  Injectable 40 milliGRAM(s) IV Push two times a day  propofol Infusion 10 MICROgram(s)/kG/Min (4.88 mL/Hr) IV Continuous <Continuous>  sodium bicarbonate 1300 milliGRAM(s) Oral every 8 hours  sodium chloride 0.65% Nasal 2 Spray(s) Both Nostrils two times a day    MEDICATIONS  (PRN):  acetaminophen     Tablet .. 650 milliGRAM(s) Oral every 6 hours PRN Temp greater or equal to 38C (100.4F), Mild Pain (1 - 3)  dextrose Oral Gel 15 Gram(s) Oral once PRN Blood Glucose LESS THAN 70 milliGRAM(s)/deciliter  labetalol Injectable 10 milliGRAM(s) IV Push every 6 hours PRN Systolic blood pressure >  melatonin 3 milliGRAM(s) Oral at bedtime PRN Insomnia      Allergies    No Known Allergies    Intolerances        SOCIAL HISTORY: Lives at home     FAMILY HISTORY:  FH: type 2 diabetes mellitus       PHYSICAL EXAM:  Vital Signs Last 24 Hrs  T(C): 36.6 (07 Sep 2022 04:00), Max: 37.2 (06 Sep 2022 16:00)  T(F): 97.8 (07 Sep 2022 04:00), Max: 98.9 (06 Sep 2022 16:00)  HR: 50 (07 Sep 2022 11:00) (50 - 74)  BP: 107/66 (07 Sep 2022 11:00) (97/60 - 159/83)  BP(mean): 86 (07 Sep 2022 11:00) (75 - 117)  RR: 16 (07 Sep 2022 11:00) (16 - 18)  SpO2: 100% (07 Sep 2022 11:00) (100% - 100%)    Parameters below as of 07 Sep 2022 11:00  Patient On (Oxygen Delivery Method): ventilator    O2 Concentration (%): 40     General _ NAD  Et tube to vent    HEENT:  NC/AT, PERRL, EOMI, sclera clear, mucosa moist, throat clear, trachea midline, neck supple  Cardiovascular: RRR   Respiratory: mechanical ventilator   Gastrointestinal soft NT/ND (+)BS    Neurology:  Weakened strength & sensation   Psych: calm, sedated   Musculoskeletal:  limited   Skin:  Maceration     LABS/ CULTURES/ RADIOLOGY:                        6.0    9.77  )-----------( 104      ( 07 Sep 2022 05:08 )             17.9       141  |  109  |  77  ----------------------------<  126      [09-07-22 @ 05:08]  4.1   |  15  |  3.6        Ca     8.2     [09-07-22 @ 05:08]      Mg     2.1     [09-07-22 @ 05:08]      Phos  4.5     [09-05-22 @ 20:00]    TPro  4.8  /  Alb  2.2  /  TBili  0.3  /  DBili  x   /  AST  32  /  ALT  27  /  AlkPhos  221  [09-07-22 @ 05:08]    PT/INR: PT 14.20, INR 1.24       [09-05-22 @ 16:33]  PTT: 32.3       [09-05-22 @ 16:33]          Culture - Blood (collected 09-05-22 @ 12:19)  Source: .Blood Blood  Preliminary Report (09-06-22 @ 18:02):    No growth to date.    Culture - Blood (collected 09-04-22 @ 05:43)  Source: .Blood None  Preliminary Report (09-05-22 @ 12:01):    No growth to date.    Culture - Blood (collected 09-03-22 @ 18:18)  Source: .Blood None  Preliminary Report (09-05-22 @ 04:01):    No growth to date.    Culture - Blood (collected 09-01-22 @ 12:29)  Source: .Blood None  Gram Stain (09-06-22 @ 17:03):    Growth in aerobic bottle: Gram Negative Rods    Growth in anaerobic bottle: Gram Negative Rods  Final Report (09-06-22 @ 17:03):    Growth in aerobic bottle: Enterobacter cloacae (Carbapenem Resistant)    Growth in anaerobic bottle: Gram Negative Rods    ***Blood Panel PCR results on this specimen are available    approximately 3 hours after the Gram stain result.***    Gram stain, PCR, and/or culture results may not always    correspond due to difference in methodologies.    ************************************************************    This PCR assay was performed by multiplex PCR. This    Assay tests for 66 bacterial and resistance gene targets.    Please refer to the [facility] test directory    at [email or web address] for details.  Organism: Blood Culture PCR  Enterobacter cloacae (Carbapenem Resistant)  Enterobacter cloacae (Carbapenem Resistant) (09-06-22 @ 17:03)  Organism: Enterobacter cloacae (Carbapenem Resistant) (09-06-22 @ 17:03)      -  Cefiderocol: S      Method Type: KB  Organism: Enterobacter cloacae (Carbapenem Resistant) (09-06-22 @ 17:03)      -  Amikacin: S <=16      -  Ampicillin: R >16 These ampicillin results predict results for amoxicillin      -  Ampicillin/Sulbactam: R >16/8 Enterobacter, Klebsiella aerogenes, Citrobacter, and Serratia may develop resistance during prolonged therapy (3-4 days)      -  Aztreonam: S <=4      -  Cefazolin: R >16 Enterobacter, Klebsiella aerogenes, Citrobacter, and Serratia may develop resistance during prolonged therapy (3-4 days)      -  Cefepime: R >16      -  Cefoxitin: R >16      -  Ceftazidime/Avibactam: R >16      -  Ceftolozane/tazobactam: R >8      -  Ceftriaxone: R >32 Enterobacter, Klebsiella aerogenes, Citrobacter, and Serratia may develop resistance during prolonged therapy      -  Ciprofloxacin: S <=0.25      -  Ertapenem: R >1      -  Gentamicin: S <=2      -  Imipenem: R >8      -  Levofloxacin: S <=0.5      -  Meropenem: R >8      -  Piperacillin/Tazobactam: R >64      -  Tigecycline: S <=2      -  Tobramycin: S <=2      -  Trimethoprim/Sulfamethoxazole: S <=0.5/9.5      Method Type: AALIYAH  Organism: Blood Culture PCR (09-06-22 @ 17:03)      -  Carbapenem Resistance: Detec      -  Enterobacter cloacae complex: Detec      -  NDM Resistance Marker: Detec      Method Type: PCR

## 2022-09-08 NOTE — PROGRESS NOTE ADULT - SUBJECTIVE AND OBJECTIVE BOX
Patient is a 56y old  Female who presents with a chief complaint of LE cellulitis (08 Sep 2022 08:00)      INTERVAL HPI/OVERNIGHT EVENTS:     Pt Hb dropped, she was given 2 units of PRBCs. No signs of bleeding from GI. CTAP, CT chest, and CT head ordered.      ICU Vital Signs Last 24 Hrs  T(C): 37.1 (08 Sep 2022 12:00), Max: 37.6 (08 Sep 2022 04:00)  T(F): 98.8 (08 Sep 2022 12:00), Max: 99.7 (08 Sep 2022 04:00)  HR: 62 (08 Sep 2022 15:00) (60 - 78)  BP: 148/80 (08 Sep 2022 15:00) (118/68 - 185/90)  BP(mean): 100 (08 Sep 2022 15:00) (93 - 148)  ABP: --  ABP(mean): --  RR: 14 (08 Sep 2022 15:00) (11 - 18)  SpO2: 100% (08 Sep 2022 15:00) (99% - 100%)    O2 Parameters below as of 08 Sep 2022 12:00  Patient On (Oxygen Delivery Method): ventilator    O2 Concentration (%): 40      I&O's Summary    07 Sep 2022 07:01  -  08 Sep 2022 07:00  --------------------------------------------------------  IN: 3886.8 mL / OUT: 370 mL / NET: 3516.8 mL    08 Sep 2022 07:01  -  08 Sep 2022 15:11  --------------------------------------------------------  IN: 665.6 mL / OUT: 90 mL / NET: 575.6 mL      Mode: AC/ CMV (Assist Control/ Continuous Mandatory Ventilation)  RR (machine): 16  TV (machine): 400  FiO2: 40  PEEP: 5  ITime: 1  MAP: 9  PIP: 23      LABS:                        5.3    5.07  )-----------( 391      ( 08 Sep 2022 05:10 )             15.8     09-08    140  |  105  |  75<HH>  ----------------------------<  196<H>  4.4   |  19  |  4.3<HH>    Ca    8.0<L>      08 Sep 2022 05:10  Phos  6.1     09-08  Mg     2.0     09-08    TPro  5.0<L>  /  Alb  2.2<L>  /  TBili  <0.2  /  DBili  x   /  AST  39  /  ALT  29  /  AlkPhos  280<H>  09-08        CAPILLARY BLOOD GLUCOSE      POCT Blood Glucose.: 159 mg/dL (08 Sep 2022 12:01)  POCT Blood Glucose.: 212 mg/dL (08 Sep 2022 08:45)  POCT Blood Glucose.: 211 mg/dL (08 Sep 2022 07:39)  POCT Blood Glucose.: 169 mg/dL (07 Sep 2022 16:09)    ABG - ( 08 Sep 2022 03:57 )  pH, Arterial: 7.42  pH, Blood: x     /  pCO2: 31    /  pO2: 151   / HCO3: 20    / Base Excess: -3.4  /  SaO2: 99.0                RADIOLOGY & ADDITIONAL TESTS:    Consultant(s) Notes Reviewed:  [x ] YES  [ ] NO    MEDICATIONS  (STANDING):  amLODIPine   Tablet 10 milliGRAM(s) Oral daily  aspirin  chewable 81 milliGRAM(s) Oral daily  atorvastatin 80 milliGRAM(s) Oral at bedtime  aztreonam  IVPB      aztreonam  IVPB 2000 milliGRAM(s) IV Intermittent every 12 hours  ceftazidime/avibactam IVPB 0.94 Gram(s) IV Intermittent every 12 hours  chlorhexidine 0.12% Liquid 15 milliLiter(s) Oral Mucosa every 12 hours  chlorhexidine 2% Cloths 1 Application(s) Topical <User Schedule>  collagenase Ointment 1 Application(s) Topical two times a day  Dakins Solution - 1/2 Strength 1 Application(s) Topical two times a day  dexMEDEtomidine Infusion 0.2 MICROgram(s)/kG/Hr (4.07 mL/Hr) IV Continuous <Continuous>  dextrose 5%. 1000 milliLiter(s) (100 mL/Hr) IV Continuous <Continuous>  dextrose 5%. 1000 milliLiter(s) (50 mL/Hr) IV Continuous <Continuous>  dextrose 50% Injectable 25 Gram(s) IV Push once  dextrose 50% Injectable 12.5 Gram(s) IV Push once  dextrose 50% Injectable 25 Gram(s) IV Push once  fentaNYL   Infusion. 0.5 MICROgram(s)/kG/Hr (4.07 mL/Hr) IV Continuous <Continuous>  glucagon  Injectable 1 milliGRAM(s) IntraMuscular once  hydrALAZINE 100 milliGRAM(s) Oral every 8 hours  insulin lispro (ADMELOG) corrective regimen sliding scale   SubCutaneous three times a day before meals  labetalol 600 milliGRAM(s) Oral three times a day  lacosamide IVPB 50 milliGRAM(s) IV Intermittent every 12 hours  levETIRAcetam  Solution 500 milliGRAM(s) Oral two times a day  lidocaine 1%/epinephrine 1:100,000 Inj 20 milliLiter(s) Local Injection once  multivitamin/minerals/iron Oral Solution (CENTRUM) 15 milliLiter(s) Oral daily  pantoprazole  Injectable 40 milliGRAM(s) IV Push two times a day  propofol Infusion 10 MICROgram(s)/kG/Min (4.88 mL/Hr) IV Continuous <Continuous>  sodium bicarbonate 1300 milliGRAM(s) Oral every 8 hours  sodium chloride 0.65% Nasal 2 Spray(s) Both Nostrils two times a day    MEDICATIONS  (PRN):  acetaminophen     Tablet .. 650 milliGRAM(s) Oral every 6 hours PRN Temp greater or equal to 38C (100.4F), Mild Pain (1 - 3)  dextrose Oral Gel 15 Gram(s) Oral once PRN Blood Glucose LESS THAN 70 milliGRAM(s)/deciliter  labetalol Injectable 10 milliGRAM(s) IV Push every 6 hours PRN Systolic blood pressure >  melatonin 3 milliGRAM(s) Oral at bedtime PRN Insomnia      PHYSICAL EXAM:  GENERAL: Pt is intubated and sedated.   HEAD:  Atraumatic, Normocephalic  EYES: EOMI, PERRLA, conjunctiva and sclera clear  NECK: Supple, No JVD, Normal thyroid, no enlarged nodes  NERVOUS SYSTEM:  Intubated and sedated.   CHEST/LUNG: Pt is intubated. Decreased BL breathing sounds   HEART: S1S2 normal, no S3, Regular rate and rhythm; No murmurs  ABDOMEN: Soft, Nontender, Nondistended; Bowel sounds present  EXTREMITIES:  LLE cellulitis   LYMPH: No lymphadenopathy noted  SKIN: No rashes or lesions    Care Discussed with Consultants/Other Providers [ x] YES  [ ] NO

## 2022-09-08 NOTE — CHART NOTE - NSCHARTNOTEFT_GEN_A_CORE
The patient had been tentatively scheduled for a diagnostic cerebral angiogram 9/6, however, given acute blood loss anemia and H/H today and yesterday, the procedure will be postponed until the patient is more clinically stable. Please contact [phone] with questions or concerns.

## 2022-09-08 NOTE — PROGRESS NOTE ADULT - ASSESSMENT
assessment:   57 yo F with PMHx of HTN, DM, prior stroke in 2017 with residual left sided weakness, who presented with RLE cellulitis. Stroke code called on 8/5 for unresponsiveness.  MR brain showed scattered punctate acute ischemic strokes. Pt is minimally responsive today.      suggestion:  methylprednisolone 1g qd x 5days if okay from infectious stand point   renal biopsy when stable  ( for vasculitis )   Hold off DSA for now   medical management per primary team   attending note to follow

## 2022-09-08 NOTE — PROGRESS NOTE ADULT - SUBJECTIVE AND OBJECTIVE BOX
CHIEF COMPLAINT:  right leg wound    INTERVAL HISTORY: Pt remains intubated and sedated.      MEDICATIONS  (STANDING):  amLODIPine   Tablet 10 milliGRAM(s) Oral daily  aspirin  chewable 81 milliGRAM(s) Oral daily  atorvastatin 80 milliGRAM(s) Oral at bedtime  aztreonam  IVPB      aztreonam  IVPB 2000 milliGRAM(s) IV Intermittent every 12 hours  ceftazidime/avibactam IVPB 0.94 Gram(s) IV Intermittent every 12 hours  chlorhexidine 0.12% Liquid 15 milliLiter(s) Oral Mucosa every 12 hours  chlorhexidine 2% Cloths 1 Application(s) Topical <User Schedule>  collagenase Ointment 1 Application(s) Topical two times a day  Dakins Solution - 1/2 Strength 1 Application(s) Topical two times a day  dexMEDEtomidine Infusion 0.2 MICROgram(s)/kG/Hr (4.07 mL/Hr) IV Continuous <Continuous>  dextrose 5%. 1000 milliLiter(s) (100 mL/Hr) IV Continuous <Continuous>  dextrose 5%. 1000 milliLiter(s) (50 mL/Hr) IV Continuous <Continuous>  dextrose 50% Injectable 25 Gram(s) IV Push once  dextrose 50% Injectable 12.5 Gram(s) IV Push once  dextrose 50% Injectable 25 Gram(s) IV Push once  fentaNYL   Infusion. 0.5 MICROgram(s)/kG/Hr (4.07 mL/Hr) IV Continuous <Continuous>  glucagon  Injectable 1 milliGRAM(s) IntraMuscular once  hydrALAZINE 100 milliGRAM(s) Oral every 8 hours  insulin lispro (ADMELOG) corrective regimen sliding scale   SubCutaneous three times a day before meals  labetalol 600 milliGRAM(s) Oral three times a day  lacosamide IVPB 50 milliGRAM(s) IV Intermittent every 12 hours  levETIRAcetam  Solution 500 milliGRAM(s) Oral two times a day  lidocaine 1%/epinephrine 1:100,000 Inj 20 milliLiter(s) Local Injection once  multivitamin/minerals/iron Oral Solution (CENTRUM) 15 milliLiter(s) Oral daily  pantoprazole  Injectable 40 milliGRAM(s) IV Push two times a day  propofol Infusion 10 MICROgram(s)/kG/Min (4.88 mL/Hr) IV Continuous <Continuous>  sodium bicarbonate 1300 milliGRAM(s) Oral every 8 hours  sodium chloride 0.65% Nasal 2 Spray(s) Both Nostrils two times a day    MEDICATIONS  (PRN):  acetaminophen     Tablet .. 650 milliGRAM(s) Oral every 6 hours PRN Temp greater or equal to 38C (100.4F), Mild Pain (1 - 3)  dextrose Oral Gel 15 Gram(s) Oral once PRN Blood Glucose LESS THAN 70 milliGRAM(s)/deciliter  labetalol Injectable 10 milliGRAM(s) IV Push every 6 hours PRN Systolic blood pressure >  melatonin 3 milliGRAM(s) Oral at bedtime PRN Insomnia       Vital Signs Last 24 Hrs  T(C): 37.1 (08 Sep 2022 12:00), Max: 37.6 (08 Sep 2022 04:00)  T(F): 98.8 (08 Sep 2022 12:00), Max: 99.7 (08 Sep 2022 04:00)  HR: 62 (08 Sep 2022 15:00) (60 - 78)  BP: 148/80 (08 Sep 2022 15:00) (118/68 - 185/90)  BP(mean): 100 (08 Sep 2022 15:00) (93 - 148)  RR: 14 (08 Sep 2022 15:00) (11 - 18)  SpO2: 100% (08 Sep 2022 15:00) (99% - 100%)    O2 Parameters below as of 08 Sep 2022 12:00  Patient On (Oxygen Delivery Method): ventilator    O2 Concentration (%): 40        PHYSICAL EXAMINATION:  General: well nourished, intubated and sedated on ventilator, minimally responsive    not following commands, not reactive to peripheral or noxious stimuli ( per nurse responds to very painful stimuli)  opens eyes randomly, left gaze, - blink to threat in right eye, +corneal reflex  +gag/ cough reflex  no spontaneous movement of BUE and BLE         LABS:                         5.3    5.07  )-----------( 391      ( 08 Sep 2022 05:10 )             15.8     09-08    140  |  105  |  75<HH>  ----------------------------<  196<H>  4.4   |  19  |  4.3<HH>    Ca    8.0<L>      08 Sep 2022 05:10  Phos  6.1     09-08  Mg     2.0     09-08    TPro  5.0<L>  /  Alb  2.2<L>  /  TBili  <0.2  /  DBili  x   /  AST  39  /  ALT  29  /  AlkPhos  280<H>  09-08        ABG - ( 08 Sep 2022 03:57 )  pH, Arterial: 7.42  pH, Blood: x     /  pCO2: 31    /  pO2: 151   / HCO3: 20    / Base Excess: -3.4  /  SaO2: 99.0        Culture - Blood (collected 06 Sep 2022 05:30)  Source: .Blood None  Preliminary Report (07 Sep 2022 20:00):    No growth to date.              RADIOLOGY & ADDITIONAL STUDIES:      MRI brain 9/3/22: IMPRESSION:  Redemonstration of multiple scattered lacunar infarcts, now subacute. No   compelling evidence of an acute infarct or acute intracranial hemorrhage.    CTA head: 8/12/22: IMPRESSION:    No large vessel occlusion, aneurysm, or vascular malformation.    Moderate right/mild left atherosclerotic stenosis in the supraclinoid   ICAs.    Focal mild atherosclerotic stenosis in the V3 segment of the right   vertebral artery.    MRI brain with/without 8/10/22: IMPRESSION:  Motion limited examination.    Multiple punctate cortical acute infarcts involving multiple vascular   territories possibly related to embolic etiology. No evidence of acute    hemorrhage.    Moderate chronic microvascular type changes as well as chronic   hemosiderin deposition within the right basal ganglia consistent with   remote hemorrhage.    Chronic right thalamic lacunar infarcts.    TTE 8/14/22: Summary:   1. LV Ejection Fraction by Reich's Method with a biplane EF of 61 %.   2. Moderate concentric left ventricular hypertrophy.   3. Spectral Doppler shows impaired relaxation pattern of left   ventricular myocardial filling (Grade I diastolic dysfunction).   4. Mildly enlarged left atrium.   5. Normal right atrial size.   6. Mild mitral valve regurgitation.   7. Mild tricuspid regurgitation.   8. Mild aortic regurgitation.   9. Estimated pulmonary artery systolic pressure is 43.8 mmHg assuming a   right atrial pressure of 3 mmHg, which is consistent with mild pulmonary   hypertension.  10. Color flow doppler and intravenous injection of agitated saline   demonstrates the presence of an intact intra atrial septum.

## 2022-09-08 NOTE — PROGRESS NOTE ADULT - ASSESSMENT
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 During hospital stay, found to have multiple punctate infarcts suspected to be cardioembolic vs vasculitis. Patient also found to have sharp waves on vEEG and currently on AEDs. Hospital course further complicated by fever and now with MDR Enterobacter bacteremia.    # GI bleed   - S/p 4 units of PRBCs. Was given another 2 units today (6 total).   - Pt was seen by GI, bleeding hemorrhoids noted, external. Surgery contacted for rx.   - Protonix q12h  - ENT eval appreciated for oral bleed. No actively oozing wounds, teeth guard put in.    - FU CBC, coags   - pt intubated for the procedure     # Acute ischemic strokes mutlifocal  - Spoke with neurology, suspiscious for vasculitis, but not confirmed, CSF studies does not support the diagnosis. No IVIG or solumedrol to be used.,  - Plavix held for LGIB. Aspirin resumed    - FU EEG, cw keppra   - Neuro check q1h,  - Pt is intubated.   - Per stroke team, no need for angio, requested a renal bx as expected widespread vasculitis. And once cleared for infection, can be started on steroids (can wait to results of bx as rheum advised against rx empirically).     # Bacteremia   - bcx positive for MDR enterobacter Cloacae   - Cw w antibiotics per ID   - bcx daily     # nonoliguric ALF / Urinary retention / Suspected ATN  - Cr continues to trend up   - on IVFs  - continue to hold Lasix and Losartan  - avoid hypotension   - renal US 9/2: no hydronephrosis  - keep nolasco for now  - sodium bicarbonate increased to 1300 q8h. Sodium bicarb drip started,   - nephro following  - daily BMP and I's and O's  - phos level OK    # Hypertensive urgency due to noncompliance and Malignant HTN   - BP on admission 296/174 without signs of end organ damage. Renal artery duplex wnl>>ARIAN unlikely  - Aldosterone wnl, renin elevated  - BP overall improved  - SBP goal 120-160      # Purulent Right LE cellulitis   - s/p debridement by burn on 8/10  - Candida in wound. per Dr. Chua: contaminant  -  BCx w/ staph: likely contaminant. repeat BCx at that time was negative     # Diabetes mellitus   - HbA1C 10.4  - Insulin held for now as pt is NPO     #HLD   - cw statins       #MISC:  - GI prophylaxis: protonix   - Dispo: MICU   - Acitivity: bedrest    complains of pain/discomfort

## 2022-09-08 NOTE — PROGRESS NOTE ADULT - SUBJECTIVE AND OBJECTIVE BOX
Nephrology progress note    THIS IS AN INCOMPLETE NOTE . FULL NOTE TO FOLLOW SHORTLY    Patient is seen and examined, events over the last 24 h noted .    Allergies:  No Known Allergies    Hospital Medications:   MEDICATIONS  (STANDING):  aspirin  chewable 81 milliGRAM(s) Oral daily  atorvastatin 80 milliGRAM(s) Oral at bedtime  aztreonam  IVPB      aztreonam  IVPB 2000 milliGRAM(s) IV Intermittent every 12 hours  ceftazidime/avibactam IVPB 0.94 Gram(s) IV Intermittent every 12 hours  chlorhexidine 0.12% Liquid 15 milliLiter(s) Oral Mucosa every 12 hours  chlorhexidine 2% Cloths 1 Application(s) Topical <User Schedule>  collagenase Ointment 1 Application(s) Topical two times a day  Dakins Solution - 1/2 Strength 1 Application(s) Topical two times a day  dexMEDEtomidine Infusion 0.2 MICROgram(s)/kG/Hr (4.07 mL/Hr) IV Continuous <Continuous>  dextrose 5%. 1000 milliLiter(s) (100 mL/Hr) IV Continuous <Continuous>  dextrose 5%. 1000 milliLiter(s) (50 mL/Hr) IV Continuous <Continuous>  dextrose 50% Injectable 25 Gram(s) IV Push once  dextrose 50% Injectable 12.5 Gram(s) IV Push once  dextrose 50% Injectable 25 Gram(s) IV Push once  fentaNYL   Infusion. 0.5 MICROgram(s)/kG/Hr (4.07 mL/Hr) IV Continuous <Continuous>  glucagon  Injectable 1 milliGRAM(s) IntraMuscular once  hydrALAZINE 100 milliGRAM(s) Oral every 8 hours  insulin lispro (ADMELOG) corrective regimen sliding scale   SubCutaneous three times a day before meals  labetalol 600 milliGRAM(s) Oral three times a day  lacosamide IVPB 50 milliGRAM(s) IV Intermittent every 12 hours  levETIRAcetam  Solution 500 milliGRAM(s) Oral two times a day  lidocaine 1%/epinephrine 1:100,000 Inj 20 milliLiter(s) Local Injection once  multivitamin/minerals/iron Oral Solution (CENTRUM) 15 milliLiter(s) Oral daily  pantoprazole  Injectable 40 milliGRAM(s) IV Push two times a day  propofol Infusion 10 MICROgram(s)/kG/Min (4.88 mL/Hr) IV Continuous <Continuous>  sodium bicarbonate 1300 milliGRAM(s) Oral every 8 hours  sodium chloride 0.65% Nasal 2 Spray(s) Both Nostrils two times a day        VITALS:  T(F): 98.2 (22 @ 09:00), Max: 99.7 (22 @ 04:00)  HR: 64 (22 @ 09:00)  BP: 179/89 (22 @ 09:00)  RR: 14 (22 @ 09:00)  SpO2: 100% (22 @ 09:00)  Wt(kg): --     @ 07:01  -   @ 07:00  --------------------------------------------------------  IN: 3221.4 mL / OUT: 1000 mL / NET: 2221.4 mL     @ :  -   @ 07:00  --------------------------------------------------------  IN: 3886.8 mL / OUT: 370 mL / NET: 3516.8 mL     @ 07:01  -   @ 09:35  --------------------------------------------------------  IN: 16.4 mL / OUT: 30 mL / NET: -13.6 mL          PHYSICAL EXAM:  Constitutional: NAD  HEENT: anicteric sclera, oropharynx clear, MMM  Neck: No JVD  Respiratory: CTAB, no wheezes, rales or rhonchi  Cardiovascular: S1, S2, RRR  Gastrointestinal: BS+, soft, NT/ND  Extremities: No cyanosis or clubbing. No peripheral edema  :  No nolasco.   Skin: No rashes    LABS:      140  |  105  |  75<HH>  ----------------------------<  196<H>  4.4   |  19  |  4.3<HH>    Creatinine Trend: 4.3<--, 3.6<--, 3.3<--, 3.4<--, 3.4<--, 3.5<--  Ca    8.0<L>      08 Sep 2022 05:10  Phos  6.1       Mg     2.0         TPro  5.0<L>  /  Alb  2.2<L>  /  TBili  <0.2  /  DBili      /  AST  39  /  ALT  29  /  AlkPhos  280<H>                            5.3    5.07  )-----------( 391      ( 08 Sep 2022 05:10 )             15.8       Urine Studies:  Urinalysis Basic - ( 02 Sep 2022 12:57 )    Color: Yellow / Appearance: Slightly Turbid / S.013 / pH:   Gluc:  / Ketone: Negative  / Bili: Negative / Urobili: 6 mg/dL   Blood:  / Protein: 100 mg/dL / Nitrite: Negative   Leuk Esterase: Large / RBC: 30 /HPF / WBC 57 /HPF   Sq Epi:  / Non Sq Epi: 8 /HPF / Bacteria: Negative      Sodium, Random Urine: 33.0 mmoL/L ( @ 13:30)  Creatinine, Random Urine: 35 mg/dL ( @ 13:30)  Protein/Creatinine Ratio Calculation: 2.3 Ratio ( @ 13:30)  Potassium, Random Urine: 47 mmol/L ( @ 13:30)  Osmolality, Random Urine: 288 mos/kg ( @ 12:57)      Ferritin 243      [22 @ 05:49]  PTH -- (Ca 8.5)      [22 @ 20:00]   75  TSH 0.86      [22 @ 17:53]  Lipid: chol 234, , HDL 42, LDL --      [22 @ 08:56]      LUIS FELIPE: titer Negative, pattern --      [22 @ 12:04]  dsDNA <12      [22 @ 19:27]  Rheumatoid Factor <10      [22 @ 11:37]  ANCA: cANCA Negative, pANCA Negative, atypical ANCA Negative      [22 @ 19:27]  Syphilis Screen (Treponema Pallidum Ab) Negative      [22 @ 17:53]  Immunofixation Serum:   No Monoclonal Band Identified    Reference Range: None Detected      [22 @ 23:46]  SPEP Interpretation: Pattern Consistent With Acute Inflammation Or Stress      [22 @ 23:46]      RADIOLOGY & ADDITIONAL STUDIES:   Nephrology progress note  Patient is seen and examined, events over the last 24 h noted .  intubated on MV   has anemia     Allergies:  No Known Allergies    Hospital Medications:   MEDICATIONS  (STANDING):  aspirin  chewable 81 milliGRAM(s) Oral daily  atorvastatin 80 milliGRAM(s) Oral at bedtime    aztreonam  IVPB 2000 milliGRAM(s) IV Intermittent every 12 hours  ceftazidime/avibactam IVPB 0.94 Gram(s) IV Intermittent every 12 hours  collagenase Ointment 1 Application(s) Topical two times a day  Dakins Solution - 1/2 Strength 1 Application(s) Topical two times a day  dexMEDEtomidine Infusion 0.2 MICROgram(s)/kG/Hr (4.07 mL/Hr) IV Continuous <Continuous>  fentaNYL   Infusion. 0.5 MICROgram(s)/kG/Hr (4.07 mL/Hr) IV Continuous <Continuous>  glucagon  Injectable 1 milliGRAM(s) IntraMuscular once  hydrALAZINE 100 milliGRAM(s) Oral every 8 hours  insulin lispro (ADMELOG) corrective regimen sliding scale   SubCutaneous three times a day before meals  labetalol 600 milliGRAM(s) Oral three times a day  lacosamide IVPB 50 milliGRAM(s) IV Intermittent every 12 hours  levETIRAcetam  Solution 500 milliGRAM(s) Oral two times a day  lidocaine 1%/epinephrine 1:100,000 Inj 20 milliLiter(s) Local Injection once  multivitamin/minerals/iron Oral Solution (CENTRUM) 15 milliLiter(s) Oral daily  pantoprazole  Injectable 40 milliGRAM(s) IV Push two times a day  propofol Infusion 10 MICROgram(s)/kG/Min (4.88 mL/Hr) IV Continuous <Continuous>  sodium bicarbonate 1300 milliGRAM(s) Oral every 8 hours  sodium chloride 0.65% Nasal 2 Spray(s) Both Nostrils two times a day        VITALS:  T(F): 98.2 (22 @ 09:00), Max: 99.7 (22 @ 04:00)  HR: 64 (22 @ 09:00)  BP: 179/89 (22 @ 09:00)  RR: 14 (22 @ 09:00)  SpO2: 100% (22 @ 09:00)      09-06 @ 07:01  -   @ 07:00  --------------------------------------------------------  IN: 3221.4 mL / OUT: 1000 mL / NET: 2221.4 mL     @ 07: @ 07:00  --------------------------------------------------------  IN: 3886.8 mL / OUT: 370 mL / NET: 3516.8 mL     @ 07: @ 09:35  --------------------------------------------------------  IN: 16.4 mL / OUT: 30 mL / NET: -13.6 mL          PHYSICAL EXAM:  Constitutional: intubated on MV   Neck: No JVD  Cardiovascular: S1, S2, RRR  Gastrointestinal: BS+, soft, NT/ND  Extremities: No cyanosis or clubbing. diffuse edema   :  nolasco.   Skin: No rashes    LABS:      140  |  105  |  75<HH>  ----------------------------<  196<H>  4.4   |  19  |  4.3<HH>    Creatinine Trend: 4.3<--, 3.6<--, 3.3<--, 3.4<--, 3.4<--, 3.5<--  Ca    8.0<L>      08 Sep 2022 05:10  Phos  6.1       Mg     2.0         TPro  5.0<L>  /  Alb  2.2<L>  /  TBili  <0.2  /  DBili      /  AST  39  /  ALT  29  /  AlkPhos  280<H>                            5.3    5.07  )-----------( 391      ( 08 Sep 2022 05:10 )             15.8       Urine Studies:  Urinalysis Basic - ( 02 Sep 2022 12:57 )    Color: Yellow / Appearance: Slightly Turbid / S.013 / pH:   Gluc:  / Ketone: Negative  / Bili: Negative / Urobili: 6 mg/dL   Blood:  / Protein: 100 mg/dL / Nitrite: Negative   Leuk Esterase: Large / RBC: 30 /HPF / WBC 57 /HPF   Sq Epi:  / Non Sq Epi: 8 /HPF / Bacteria: Negative      Sodium, Random Urine: 33.0 mmoL/L ( @ 13:30)  Creatinine, Random Urine: 35 mg/dL ( @ 13:30)  Protein/Creatinine Ratio Calculation: 2.3 Ratio (:30)  Potassium, Random Urine: 47 mmol/L ( @ 13:30)  Osmolality, Random Urine: 288 mos/kg ( @ 12:57)      Ferritin 243      [22 @ 05:49]  PTH -- (Ca 8.5)      [22 @ 20:00]   75  TSH 0.86      [22 @ 17:53]  Lipid: chol 234, , HDL 42, LDL --      [22 @ 08:56]      LUIS FELIPE: titer Negative, pattern --      [22 @ 12:04]  dsDNA <12      [22 @ 19:27]  Rheumatoid Factor <10      [22 @ 11:37]  ANCA: cANCA Negative, pANCA Negative, atypical ANCA Negative      [22 @ 19:27]  Syphilis Screen (Treponema Pallidum Ab) Negative      [22 @ 17:53]  Immunofixation Serum:   No Monoclonal Band Identified    Reference Range: None Detected      [22 @ 23:46]  SPEP Interpretation: Pattern Consistent With Acute Inflammation Or Stress      [22 @ 23:46]      RADIOLOGY & ADDITIONAL STUDIES:

## 2022-09-08 NOTE — PROGRESS NOTE ADULT - SUBJECTIVE AND OBJECTIVE BOX
Over Night Events: events noted, remain critically ill, still intubated, ventilated, bicar drip, fentanyl, sp rheumato, palliative reviewed, no bloody BM, however decrease hb, sp blood tx    PHYSICAL EXAM    ICU Vital Signs Last 24 Hrs  T(C): 37.6 (08 Sep 2022 04:00), Max: 37.6 (08 Sep 2022 04:00)  T(F): 99.7 (08 Sep 2022 04:00), Max: 99.7 (08 Sep 2022 04:00)  HR: 66 (08 Sep 2022 07:00) (50 - 78)  BP: 153/78 (08 Sep 2022 07:00) (97/60 - 185/90)  BP(mean): 116 (08 Sep 2022 07:00) (75 - 146)  RR: 14 (08 Sep 2022 07:00) (11 - 16)  SpO2: 100% (08 Sep 2022 07:00) (99% - 100%)    O2 Parameters below as of 07 Sep 2022 19:00  Patient On (Oxygen Delivery Method): ventilator    O2 Concentration (%): 40        General: ill looking  HEENT: ETT  Lungs: dec bs both base  Cardiovascular: PARAG 2.6  Abdomen: Soft, Positive BS  Extremities: No clubbing   not following commands      09-07-22 @ 07:01  -  09-08-22 @ 07:00  --------------------------------------------------------  IN:    Dexmedetomidine: 32.8 mL    dextrose 5% w/ Additives: 1725 mL    FentaNYL: 73.6 mL    FentaNYL: 139.4 mL    Glucerna: 975 mL    IV PiggyBack: 550 mL    PRBCs (Packed Red Blood Cells): 291 mL    sodium chloride 0.9%: 100 mL  Total IN: 3886.8 mL    OUT:    Indwelling Catheter - Urethral (mL): 370 mL    Propofol: 0 mL  Total OUT: 370 mL    Total NET: 3516.8 mL          LABS:                          5.3    5.07  )-----------( 391      ( 08 Sep 2022 05:10 )             15.8                                               09-08    140  |  105  |  75<HH>  ----------------------------<  196<H>  4.4   |  19  |  4.3<HH>    Ca    8.0<L>      08 Sep 2022 05:10  Phos  6.1     09-08  Mg     2.0     09-08    TPro  5.0<L>  /  Alb  2.2<L>  /  TBili  <0.2  /  DBili  x   /  AST  39  /  ALT  29  /  AlkPhos  280<H>  09-08                                                                                           LIVER FUNCTIONS - ( 08 Sep 2022 05:10 )  Alb: 2.2 g/dL / Pro: 5.0 g/dL / ALK PHOS: 280 U/L / ALT: 29 U/L / AST: 39 U/L / GGT: x                                                  Culture - Blood (collected 06 Sep 2022 05:30)  Source: .Blood None  Preliminary Report (07 Sep 2022 20:00):    No growth to date.    Culture - Blood (collected 05 Sep 2022 12:19)  Source: .Blood Blood  Preliminary Report (06 Sep 2022 18:02):    No growth to date.                                                   Mode: AC/ CMV (Assist Control/ Continuous Mandatory Ventilation)  RR (machine): 16  TV (machine): 400  FiO2: 40  PEEP: 5  ITime: 1  MAP: 12  PIP: 27                                      ABG - ( 08 Sep 2022 03:57 )  pH, Arterial: 7.42  pH, Blood: x     /  pCO2: 31    /  pO2: 151   / HCO3: 20    / Base Excess: -3.4  /  SaO2: 99.0        LA 0.9        MEDICATIONS  (STANDING):  aspirin  chewable 81 milliGRAM(s) Oral daily  atorvastatin 80 milliGRAM(s) Oral at bedtime  aztreonam  IVPB      aztreonam  IVPB 2000 milliGRAM(s) IV Intermittent every 12 hours  ceftazidime/avibactam IVPB 0.94 Gram(s) IV Intermittent every 12 hours  chlorhexidine 0.12% Liquid 15 milliLiter(s) Oral Mucosa every 12 hours  chlorhexidine 2% Cloths 1 Application(s) Topical <User Schedule>  collagenase Ointment 1 Application(s) Topical two times a day  Dakins Solution - 1/2 Strength 1 Application(s) Topical two times a day  dexMEDEtomidine Infusion 0.2 MICROgram(s)/kG/Hr (4.07 mL/Hr) IV Continuous <Continuous>  dextrose 5% 1000 milliLiter(s) (75 mL/Hr) IV Continuous <Continuous>  dextrose 5%. 1000 milliLiter(s) (50 mL/Hr) IV Continuous <Continuous>  dextrose 5%. 1000 milliLiter(s) (100 mL/Hr) IV Continuous <Continuous>  dextrose 50% Injectable 25 Gram(s) IV Push once  dextrose 50% Injectable 12.5 Gram(s) IV Push once  dextrose 50% Injectable 25 Gram(s) IV Push once  fentaNYL   Infusion. 0.5 MICROgram(s)/kG/Hr (4.07 mL/Hr) IV Continuous <Continuous>  glucagon  Injectable 1 milliGRAM(s) IntraMuscular once  hydrALAZINE 100 milliGRAM(s) Oral every 8 hours  insulin lispro (ADMELOG) corrective regimen sliding scale   SubCutaneous three times a day before meals  labetalol 600 milliGRAM(s) Oral three times a day  lacosamide IVPB 50 milliGRAM(s) IV Intermittent every 12 hours  levETIRAcetam  Solution 500 milliGRAM(s) Oral two times a day  lidocaine 1%/epinephrine 1:100,000 Inj 20 milliLiter(s) Local Injection once  multivitamin/minerals/iron Oral Solution (CENTRUM) 15 milliLiter(s) Oral daily  pantoprazole  Injectable 40 milliGRAM(s) IV Push two times a day  propofol Infusion 10 MICROgram(s)/kG/Min (4.88 mL/Hr) IV Continuous <Continuous>  sodium bicarbonate 1300 milliGRAM(s) Oral every 8 hours  sodium chloride 0.65% Nasal 2 Spray(s) Both Nostrils two times a day    MEDICATIONS  (PRN):  acetaminophen     Tablet .. 650 milliGRAM(s) Oral every 6 hours PRN Temp greater or equal to 38C (100.4F), Mild Pain (1 - 3)  dextrose Oral Gel 15 Gram(s) Oral once PRN Blood Glucose LESS THAN 70 milliGRAM(s)/deciliter  labetalol Injectable 10 milliGRAM(s) IV Push every 6 hours PRN Systolic blood pressure >  melatonin 3 milliGRAM(s) Oral at bedtime PRN Insomnia      CXR reivewed

## 2022-09-08 NOTE — PROGRESS NOTE ADULT - ASSESSMENT
ALF rule out ATN / relative hypotension/ sepsis / E cloaca bacteremia / HTN ( was on multiple meds )/ CVA/ GIB  cr trending up today   oliguric now   follow Hb / will need repeat blood tx / s/p EGD/ colonoscopy : large hemorrhoids / non erosive gastritis / followed by GI and surgery   GI notes appreciated   sono no hydro  cont IVF   fluids d5 with 3 amp of bicarbonate at 75 cc/h ,  sodium bicarbonate 1300 q 8   ph  at goal   no need for RRTyet   overall prognosis poor   will follow

## 2022-09-08 NOTE — PROGRESS NOTE ADULT - ASSESSMENT
Impression:    Acute blood loss anemia. GI bleed  sp EGD/ colon still dec in hb  oral cavity bleed likely from tongue biting  Altered MS/ ischemic stroke? vasculitis no improvement  LLE cellulitis  ALF non oliguric  bacteremia      PLAN:    CNS: neurocheck q1 hr,   neuro FUP  EEG  AED per neuro  Neuro inte fup  head CT    HEENT: Oral care    PULMONARY:  HOB @ 45 degrees.  Aspiration precautions, dec FIO2 30%.     CARDIOVASCULAR: bicarb drip DC, echo    GI: GI prophylaxis. peg feeding    RENAL:  follow up lytes, nephrology fup, DC Bicarb drip,     INFECTIOUS DISEASE: abx per ID, CT C/A/P    HEMATOLOGICAL:  SCD, LE doppler    ENDOCRINE:  Follow up FS.  Insulin protocol if needed.    MUSCULOSKELETAL: rheumato fup    MICU    VERY poor prognosis  Sacrum stage 2  RLE wound  nolasco 9/5

## 2022-09-09 NOTE — PROGRESS NOTE ADULT - SUBJECTIVE AND OBJECTIVE BOX
Over Night Events: events noted, remain critically ill, still intubated, ventilated, on fentanyl, neuro reviewed, sp chest ct/ ct ap, sp 2 units no bloody BM    ICU Vital Signs Last 24 Hrs  T(C): 36.1 (09 Sep 2022 04:00), Max: 37.1 (08 Sep 2022 12:00)  T(F): 97 (09 Sep 2022 04:00), Max: 98.8 (08 Sep 2022 12:00)  HR: 58 (09 Sep 2022 07:00) (56 - 70)  BP: 131/73 (09 Sep 2022 07:00) (127/73 - 179/89)  BP(mean): 97 (09 Sep 2022 07:00) (95 - 148)  RR: 16 (09 Sep 2022 07:00) (14 - 18)  SpO2: 99% (09 Sep 2022 07:00) (98% - 100%)    O2 Parameters below as of 09 Sep 2022 04:00  Patient On (Oxygen Delivery Method): ventilator    O2 Concentration (%): 40        General: ill looking  HEENT: ETT            Lungs: Bilateral BS  Cardiovascular: daphne 2.6   Abdomen: Soft, Positive BS  Extremities: No clubbing   R SIDE GAZE      09-08-22 @ 07:01  -  09-09-22 @ 07:00  --------------------------------------------------------  IN:    Enteral Tube Flush: 180 mL    FentaNYL: 196.8 mL    IV PiggyBack: 600 mL    PRBCs (Packed Red Blood Cells): 500 mL  Total IN: 1476.8 mL    OUT:    Indwelling Catheter - Urethral (mL): 335 mL  Total OUT: 335 mL    Total NET: 1141.8 mL          LABS:                          9.1    12.53 )-----------( 213      ( 09 Sep 2022 06:11 )             26.4                                               09-09    139  |  102  |  79<HH>  ----------------------------<  102<H>  4.4   |  21  |  4.7<HH>    Ca    8.2<L>      09 Sep 2022 06:11  Phos  6.1     09-08  Mg     2.1     09-09    TPro  5.2<L>  /  Alb  2.4<L>  /  TBili  0.3  /  DBili  x   /  AST  48<H>  /  ALT  32  /  AlkPhos  277<H>  09-09                                                                                           LIVER FUNCTIONS - ( 09 Sep 2022 06:11 )  Alb: 2.4 g/dL / Pro: 5.2 g/dL / ALK PHOS: 277 U/L / ALT: 32 U/L / AST: 48 U/L / GGT: x                                                  Culture - Blood (collected 07 Sep 2022 05:08)  Source: .Blood None  Preliminary Report (08 Sep 2022 19:02):    No growth to date.                                                   Mode: AC/ CMV (Assist Control/ Continuous Mandatory Ventilation)  RR (machine): 16  TV (machine): 400  FiO2: 40  PEEP: 5  ITime: 1  MAP: 9  PIP: 26                                      ABG - ( 09 Sep 2022 03:00 )  pH, Arterial: 7.38  pH, Blood: x     /  pCO2: 35    /  pO2: 170   / HCO3: 21    / Base Excess: -3.8  /  SaO2: 100.0               MEDICATIONS  (STANDING):  amLODIPine   Tablet 10 milliGRAM(s) Oral daily  aspirin  chewable 81 milliGRAM(s) Oral daily  atorvastatin 80 milliGRAM(s) Oral at bedtime  aztreonam  IVPB      aztreonam  IVPB 2000 milliGRAM(s) IV Intermittent every 12 hours  ceftazidime/avibactam IVPB 0.94 Gram(s) IV Intermittent every 12 hours  chlorhexidine 0.12% Liquid 15 milliLiter(s) Oral Mucosa every 12 hours  chlorhexidine 2% Cloths 1 Application(s) Topical <User Schedule>  collagenase Ointment 1 Application(s) Topical two times a day  Dakins Solution - 1/2 Strength 1 Application(s) Topical two times a day  dexMEDEtomidine Infusion 0.2 MICROgram(s)/kG/Hr (4.07 mL/Hr) IV Continuous <Continuous>  dextrose 5%. 1000 milliLiter(s) (50 mL/Hr) IV Continuous <Continuous>  dextrose 5%. 1000 milliLiter(s) (100 mL/Hr) IV Continuous <Continuous>  dextrose 50% Injectable 25 Gram(s) IV Push once  dextrose 50% Injectable 12.5 Gram(s) IV Push once  dextrose 50% Injectable 25 Gram(s) IV Push once  fentaNYL   Infusion. 0.5 MICROgram(s)/kG/Hr (4.07 mL/Hr) IV Continuous <Continuous>  glucagon  Injectable 1 milliGRAM(s) IntraMuscular once  hydrALAZINE 100 milliGRAM(s) Oral every 8 hours  insulin lispro (ADMELOG) corrective regimen sliding scale   SubCutaneous three times a day before meals  labetalol 600 milliGRAM(s) Oral three times a day  lacosamide IVPB 50 milliGRAM(s) IV Intermittent every 12 hours  levETIRAcetam  Solution 500 milliGRAM(s) Oral two times a day  lidocaine 1%/epinephrine 1:100,000 Inj 20 milliLiter(s) Local Injection once  multivitamin/minerals/iron Oral Solution (CENTRUM) 15 milliLiter(s) Oral daily  pantoprazole  Injectable 40 milliGRAM(s) IV Push two times a day  propofol Infusion 10 MICROgram(s)/kG/Min (4.88 mL/Hr) IV Continuous <Continuous>  sodium bicarbonate 1300 milliGRAM(s) Oral every 8 hours  sodium chloride 0.65% Nasal 2 Spray(s) Both Nostrils two times a day    MEDICATIONS  (PRN):  acetaminophen     Tablet .. 650 milliGRAM(s) Oral every 6 hours PRN Temp greater or equal to 38C (100.4F), Mild Pain (1 - 3)  dextrose Oral Gel 15 Gram(s) Oral once PRN Blood Glucose LESS THAN 70 milliGRAM(s)/deciliter  labetalol Injectable 10 milliGRAM(s) IV Push every 6 hours PRN Systolic blood pressure >  melatonin 3 milliGRAM(s) Oral at bedtime PRN Insomnia      cxr reviewed

## 2022-09-09 NOTE — PROGRESS NOTE ADULT - SUBJECTIVE AND OBJECTIVE BOX
Nephrology progress note    THIS IS AN INCOMPLETE NOTE . FULL NOTE TO FOLLOW SHORTLY    Patient is seen and examined, events over the last 24 h noted .    Allergies:  No Known Allergies    Hospital Medications:   MEDICATIONS  (STANDING):  amLODIPine   Tablet 10 milliGRAM(s) Oral daily  aspirin  chewable 81 milliGRAM(s) Oral daily  atorvastatin 80 milliGRAM(s) Oral at bedtime  aztreonam  IVPB      aztreonam  IVPB 2000 milliGRAM(s) IV Intermittent every 12 hours  ceftazidime/avibactam IVPB 0.94 Gram(s) IV Intermittent every 12 hours  chlorhexidine 0.12% Liquid 15 milliLiter(s) Oral Mucosa every 12 hours  chlorhexidine 2% Cloths 1 Application(s) Topical <User Schedule>  collagenase Ointment 1 Application(s) Topical two times a day  Dakins Solution - 1/2 Strength 1 Application(s) Topical two times a day  dexMEDEtomidine Infusion 0.2 MICROgram(s)/kG/Hr (4.07 mL/Hr) IV Continuous <Continuous>  dextrose 5%. 1000 milliLiter(s) (100 mL/Hr) IV Continuous <Continuous>  dextrose 5%. 1000 milliLiter(s) (50 mL/Hr) IV Continuous <Continuous>  dextrose 50% Injectable 25 Gram(s) IV Push once  dextrose 50% Injectable 12.5 Gram(s) IV Push once  dextrose 50% Injectable 25 Gram(s) IV Push once  fentaNYL   Infusion. 0.5 MICROgram(s)/kG/Hr (4.07 mL/Hr) IV Continuous <Continuous>  furosemide   Injectable 80 milliGRAM(s) IntraMuscular once  glucagon  Injectable 1 milliGRAM(s) IntraMuscular once  hydrALAZINE 100 milliGRAM(s) Oral every 8 hours  insulin lispro (ADMELOG) corrective regimen sliding scale   SubCutaneous three times a day before meals  labetalol 600 milliGRAM(s) Oral three times a day  lacosamide IVPB 50 milliGRAM(s) IV Intermittent every 12 hours  levETIRAcetam  Solution 500 milliGRAM(s) Oral two times a day  lidocaine 1%/epinephrine 1:100,000 Inj 20 milliLiter(s) Local Injection once  multivitamin/minerals/iron Oral Solution (CENTRUM) 15 milliLiter(s) Oral daily  pantoprazole  Injectable 40 milliGRAM(s) IV Push two times a day  propofol Infusion 10 MICROgram(s)/kG/Min (4.88 mL/Hr) IV Continuous <Continuous>  sodium bicarbonate 1300 milliGRAM(s) Oral every 8 hours  sodium chloride 0.65% Nasal 2 Spray(s) Both Nostrils two times a day        VITALS:  T(F): 97.2 (22 @ 08:00), Max: 98.8 (22 @ 12:00)  HR: 58 (22 @ 08:08)  BP: 117/69 (22 @ 08:00)  RR: 16 (22 @ 08:00)  SpO2: 98% (22 @ 08:08)  Wt(kg): --     @ 07:01  -   @ 07:00  --------------------------------------------------------  IN: 3886.8 mL / OUT: 370 mL / NET: 3516.8 mL     @ 07:  -   @ 07:00  --------------------------------------------------------  IN: 1476.8 mL / OUT: 335 mL / NET: 1141.8 mL     @ 07:01  -   @ 09:15  --------------------------------------------------------  IN: 8.2 mL / OUT: 35 mL / NET: -26.8 mL          PHYSICAL EXAM:  Constitutional: NAD  HEENT: anicteric sclera, oropharynx clear, MMM  Neck: No JVD  Respiratory: CTAB, no wheezes, rales or rhonchi  Cardiovascular: S1, S2, RRR  Gastrointestinal: BS+, soft, NT/ND  Extremities: No cyanosis or clubbing. No peripheral edema  :  No nolasco.   Skin: No rashes    LABS:      139  |  102  |  79<HH>  ----------------------------<  102<H>  4.4   |  21  |  4.7<HH>    Ca    8.2<L>      09 Sep 2022 06:11  Phos  6.1       Mg     2.1         TPro  5.2<L>  /  Alb  2.4<L>  /  TBili  0.3  /  DBili      /  AST  48<H>  /  ALT  32  /  AlkPhos  277<H>                            9.1    12.53 )-----------( 213      ( 09 Sep 2022 06:11 )             26.4       Urine Studies:  Urinalysis Basic - ( 02 Sep 2022 12:57 )    Color: Yellow / Appearance: Slightly Turbid / S.013 / pH:   Gluc:  / Ketone: Negative  / Bili: Negative / Urobili: 6 mg/dL   Blood:  / Protein: 100 mg/dL / Nitrite: Negative   Leuk Esterase: Large / RBC: 30 /HPF / WBC 57 /HPF   Sq Epi:  / Non Sq Epi: 8 /HPF / Bacteria: Negative      Sodium, Random Urine: 33.0 mmoL/L ( @ 13:30)  Creatinine, Random Urine: 35 mg/dL ( @ 13:30)  Protein/Creatinine Ratio Calculation: 2.3 Ratio ( @ 13:30)  Potassium, Random Urine: 47 mmol/L ( @ 13:30)  Osmolality, Random Urine: 288 mos/kg ( @ 12:57)      Ferritin 243      [22 @ 05:49]  PTH -- (Ca 8.5)      [22 @ 20:00]   75  TSH 0.86      [22 @ 17:53]  Lipid: chol 234, , HDL 42, LDL --      [22 @ 08:56]      LUIS FELIPE: titer Negative, pattern --      [22 @ 12:04]  dsDNA <12      [22 @ 19:27]  Rheumatoid Factor <10      [22 @ 11:37]  ANCA: cANCA Negative, pANCA Negative, atypical ANCA Negative      [22 @ 19:27]  Syphilis Screen (Treponema Pallidum Ab) Negative      [22 @ 17:53]  Immunofixation Serum:   No Monoclonal Band Identified    Reference Range: None Detected      [22 @ 23:46]  SPEP Interpretation: Pattern Consistent With Acute Inflammation Or Stress      [22 @ 23:46]      RADIOLOGY & ADDITIONAL STUDIES:   Nephrology progress note  Patient is seen and examined, events over the last 24 h noted .  Lying in bed intubated on MV     Allergies:  No Known Allergies    Hospital Medications:   MEDICATIONS  (STANDING):    amLODIPine   Tablet 10 milliGRAM(s) Oral daily  aspirin  chewable 81 milliGRAM(s) Oral daily  atorvastatin 80 milliGRAM(s) Oral at bedtime  aztreonam  IVPB 2000 milliGRAM(s) IV Intermittent every 12 hours  ceftazidime/avibactam IVPB 0.94 Gram(s) IV Intermittent every 12 hours  collagenase Ointment 1 Application(s) Topical two times a day  Dakins Solution - 1/2 Strength 1 Application(s) Topical two times a day  dexMEDEtomidine Infusion 0.2 MICROgram(s)/kG/Hr (4.07 mL/Hr) IV Continuous <Continuous>  fentaNYL   Infusion. 0.5 MICROgram(s)/kG/Hr (4.07 mL/Hr) IV Continuous <Continuous>  furosemide   Injectable 80 milliGRAM(s) IntraMuscular once  glucagon  Injectable 1 milliGRAM(s) IntraMuscular once  hydrALAZINE 100 milliGRAM(s) Oral every 8 hours  insulin lispro (ADMELOG) corrective regimen sliding scale   SubCutaneous three times a day before meals  labetalol 600 milliGRAM(s) Oral three times a day  lacosamide IVPB 50 milliGRAM(s) IV Intermittent every 12 hours  levETIRAcetam  Solution 500 milliGRAM(s) Oral two times a day  lidocaine 1%/epinephrine 1:100,000 Inj 20 milliLiter(s) Local Injection once  multivitamin/minerals/iron Oral Solution (CENTRUM) 15 milliLiter(s) Oral daily  pantoprazole  Injectable 40 milliGRAM(s) IV Push two times a day  propofol Infusion 10 MICROgram(s)/kG/Min (4.88 mL/Hr) IV Continuous <Continuous>  sodium bicarbonate 1300 milliGRAM(s) Oral every 8 hours  sodium chloride 0.65% Nasal 2 Spray(s) Both Nostrils two times a day        VITALS:  T(F): 97.2 (22 @ 08:00), Max: 98.8 (22 @ 12:00)  HR: 58 (22 @ 08:08)  BP: 117/69 (22 @ 08:00)  RR: 16 (22 @ 08:00)  SpO2: 98% (22 @ 08:08)       @ :01  -   @ 07:00  --------------------------------------------------------  IN: 3886.8 mL / OUT: 370 mL / NET: 3516.8 mL    :  -   @ 07:00  --------------------------------------------------------  IN: 1476.8 mL / OUT: 335 mL / NET: 1141.8 mL     @ 07:  -   @ 09:15  --------------------------------------------------------  IN: 8.2 mL / OUT: 35 mL / NET: -26.8 mL          PHYSICAL EXAM:  Constitutional: NAD  Neck: No JVD  Respiratory: Crackles at base   Cardiovascular: S1, S2, RRR  Gastrointestinal: BS+, soft, NT/ND  Extremities: No cyanosis or clubbing. plus one edema   :  No nolasco.   Skin: No rashes    LABS:      139  |  102  |  79<HH>  ----------------------------<  102<H>  4.4   |  21  |  4.7<HH>    Creatinine Trend: 4.7<--, 4.3<--, 3.6<--, 3.3<--, 3.4<--, 3.4<--    Ca    8.2<L>      09 Sep 2022 06:11  Phos  6.1       Mg     2.1         TPro  5.2<L>  /  Alb  2.4<L>  /  TBili  0.3  /  DBili      /  AST  48<H>  /  ALT  32  /  AlkPhos  277<H>                            9.1    12.53 )-----------( 213      ( 09 Sep 2022 06:11 )             26.4       Urine Studies:  Urinalysis Basic - ( 02 Sep 2022 12:57 )    Color: Yellow / Appearance: Slightly Turbid / S.013 / pH:   Gluc:  / Ketone: Negative  / Bili: Negative / Urobili: 6 mg/dL   Blood:  / Protein: 100 mg/dL / Nitrite: Negative   Leuk Esterase: Large / RBC: 30 /HPF / WBC 57 /HPF   Sq Epi:  / Non Sq Epi: 8 /HPF / Bacteria: Negative      Sodium, Random Urine: 33.0 mmoL/L ( @ 13:30)  Creatinine, Random Urine: 35 mg/dL ( @ 13:30)  Protein/Creatinine Ratio Calculation: 2.3 Ratio ( 13:30)  Potassium, Random Urine: 47 mmol/L ( @ 13:30)  Osmolality, Random Urine: 288 mos/kg ( @ 12:57)      Ferritin 243      [22 @ 05:49]  PTH -- (Ca 8.5)      [22 @ 20:00]   75  TSH 0.86      [22 @ 17:53]  Lipid: chol 234, , HDL 42, LDL --      [22 @ 08:56]      LUIS FELIPE: titer Negative, pattern --      [22 @ 12:04]  dsDNA <12      [22 @ 19:27]  Rheumatoid Factor <10      [22 @ 11:37]  ANCA: cANCA Negative, pANCA Negative, atypical ANCA Negative      [22 @ 19:27]  Syphilis Screen (Treponema Pallidum Ab) Negative      [22 @ 17:53]  Immunofixation Serum:   No Monoclonal Band Identified    Reference Range: None Detected      [22 @ 23:46]  SPEP Interpretation: Pattern Consistent With Acute Inflammation Or Stress      [22 @ 23:46]      RADIOLOGY & ADDITIONAL STUDIES:

## 2022-09-09 NOTE — CHART NOTE - NSCHARTNOTEFT_GEN_A_CORE
Neuroendovascular team was consulted for r/o vasculitis 8/30/2022. Patient has since remained unstable and has been intuated, upgraded to ICU, with GI bleed.     Per stroke team, wish to delay diagnostic cerebral angiogram given medical condition and current vasculitis workup. Will continue to communicate regarding plans for angiogram, if indicated, possibly next week pending further discussion with stroke team and patient's clinical condition.   [phone]

## 2022-09-09 NOTE — PROGRESS NOTE ADULT - ASSESSMENT
Impression:    Acute blood loss anemia. GI bleed  sp EGD/ colon still dec in hb sp CT AP  oral cavity bleed likely from tongue biting  Altered MS/ ischemic stroke? vasculitis no improvement  LLE cellulitis  ALF oliguric  bacteremia      PLAN:    CNS: neurocheck q1 hr,   neuro FUP  EEG  AED per neuro  Neuro inte fup  head CT REVIEWED  Start high dose solumedrol per neuro    HEENT: Oral care    PULMONARY:  HOB @ 45 degrees.  Aspiration precautions, dec FIO2 30%.  not wenable due to MS    CARDIOVASCULAR: LASIX 80 X1    GI: GI prophylaxis. peg feeding    RENAL:  follow up lytes, nephrology fup, po bcarb spoke with renal to comment regarding kidney bx    INFECTIOUS DISEASE: abx per ID    HEMATOLOGICAL:  SCD, LE doppler    ENDOCRINE:  Follow up FS.  Insulin protocol if needed.    MUSCULOSKELETAL: rheumato fup, C 3 C4    MICU    VERY poor prognosis  Sacrum stage 2  RLE wound  nolasco 9/5

## 2022-09-09 NOTE — PROGRESS NOTE ADULT - ASSESSMENT
· Assessment	  57 yo F with PMH of HTN, DM2, CVA (2017) who presented with purulent right lower extremity wound for 3 months consistent with acute RLE cellulitis. Code stroke for unresponsiveness at 4pm 8/5    Impression  #CVA  - 8/11 MR Head w/wo IV Cont (08.10.22 @ 21:25): Multiple punctate cortical acute infarcts involving multiple vascular territories possibly related to embolic etiology. No evidence of acute hemorrhage. Moderate chronic microvascular type changes as well as chronic hemosiderin deposition within the right basal ganglia consistent with remote hemorrhage. Chronic right thalamic lacunar infarcts. More alert and does try to respondResolved SIRS with no infectious etiology  - s/p LP 8/26 with 24 WBC, RBC 00949, 37% PMNs, 61% Lymph -- protein/glucose not obtained    #Fevers 8/30 - Enterobacter cloaecae bacteremia  - UA with pyruia   - CXR 8/31 unremarkable   - BLood Cx 9/1 with Enterobacter cloacae with NDM+  - BLood Cx 9/3 NG     #GI BLeed  - s/p EGD 9/6     RECOMMENDATIONS;  - CT imaging reviewed  - continue  avycaz 0.94 1g q 12 hours and aztreonam 2g q 12 hours  - please administer antibiotics together  - steroids per primary/neuro for vasculitis   - trend creatinine   - trend WBC and fever curve  - will plan at least 2 weeks (9/3-9/16)    Please call or message on [employer] Teams if with any questions.  Spectra [phone]

## 2022-09-09 NOTE — PROGRESS NOTE ADULT - ASSESSMENT
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 During hospital stay, found to have multiple punctate infarcts suspected to be cardioembolic vs vasculitis. Patient also found to have sharp waves on vEEG and currently on AEDs. Hospital course further complicated by fever and now with MDR Enterobacter bacteremia.    # GI bleed   - S/p 6 units of PRBCs. Hb is table 9.1  - Pt was seen by GI, bleeding hemorrhoids noted, external. Surgery contacted for rx.   - Protonix q12h  - ENT eval appreciated for oral bleed. No actively oozing wounds, teeth guard put in.    - FU CBC, coags   - pt intubated   - oral cavity bleed from tongue biting     # Acute ischemic strokes mutlifocal  - Spoke with neurology, suspiscious for vasculitis, but not confirmed, CSF studies does not support the diagnosis. - Plavix held for LGIB. Aspirin resumed    - neurology recommended 1g IV solumedrol for 5 days, today is day 1. (cleared from ID)  - FU EEG, cw keppra   - Neuro check q1h,  - Pt is intubated.   - Per stroke team, no need for angio, requested a renal bx as expected widespread vasculitis. And once cleared for infection, can be started on steroids (can wait to results of bx as rheum advised against rx empirically).     # Bacteremia   - bcx positive for MDR enterobacter Cloacae   - Cw w antibiotics per ID   - bcx daily     # nonoliguric ALF / Urinary retention / Suspected ATN  - Cr continues to trend up 4.7  - on IVFs  - 1 dose of lasix 80 mg IV once was given   - avoid hypotension   - renal US 9/2: no hydronephrosis  - keep nolasco for now  - sodium bicarbonate increased to 1300 q8h. Sodium bicarb drip started,   - nephro following  - daily BMP and I's and O's  - phos level 6.1, started sevelamer 800 mg TID in PEG tube     # Hypertensive urgency due to noncompliance and Malignant HTN   - BP on admission 296/174 without signs of end organ damage. Renal artery duplex wnl>>ARIAN unlikely  - Aldosterone wnl, renin elevated  - BP overall improved  - SBP goal 120-160      # Purulent Right LE cellulitis   - s/p debridement by burn on 8/10  - Candida in wound. per Dr. Chua: contaminant  -  BCx w/ staph: likely contaminant. repeat BCx at that time was negative     # Diabetes mellitus   - HbA1C 10.4  - Insulin held for now as pt is NPO     #HLD   - cw statins       #MISC:  - GI prophylaxis: protonix   - Dispo: MICU   - Acitivity: bedrest    57 y/o woman with PMH of HTN, DM2, 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 During hospital stay, found to have multiple punctate infarcts suspected to be cardioembolic vs vasculitis. Patient also found to have sharp waves on vEEG and currently on AEDs. Hospital course further complicated by fever and now with MDR Enterobacter bacteremia.    # GI bleed   - S/p 6 units of PRBCs. Hb is stable 9.1  - Pt was seen by GI, bleeding hemorrhoids noted, external. Surgery contacted for rx.   - Protonix q12h  - ENT eval appreciated for oral bleed. No actively oozing wounds, teeth guard put in.    - FU CBC, coags   - pt intubated   - oral cavity bleed from tongue biting     # Acute ischemic strokes mutlifocal  - Spoke with neurology, suspiscious for vasculitis, but not confirmed, CSF studies does not support the diagnosis. - Plavix held for LGIB. Aspirin resumed    - neurology recommended 1g IV solumedrol for 5 days, today is day 1. (cleared from ID)  - FU EEG, cw keppra   - Neuro check q1h,  - Pt is intubated.   - Per stroke team, no need for angio, requested a renal bx as expected widespread vasculitis. And once cleared for infection, can be started on steroids (can wait to results of bx as rheum advised against rx empirically).     # Bacteremia   - bcx positive for MDR enterobacter Cloacae   - Cw w antibiotics per ID   - bcx daily     # nonoliguric ALF / Urinary retention / Suspected ATN  - Cr continues to trend up 4.7  - on IVFs  - 1 dose of lasix 80 mg IV once was given   - avoid hypotension   - renal US 9/2: no hydronephrosis  - keep nolasco for now  - sodium bicarbonate increased to 1300 q8h. Sodium bicarb drip started,   - nephro following  - daily BMP and I's and O's  - phos level 6.1, started sevelamer 800 mg TID in PEG tube     # Hypertensive urgency due to noncompliance and Malignant HTN   - BP on admission 296/174 without signs of end organ damage. Renal artery duplex wnl>>ARIAN unlikely  - Aldosterone wnl, renin elevated  - BP overall improved  - SBP goal 120-160      # Purulent Right LE cellulitis   - s/p debridement by burn on 8/10  - Candida in wound. per Dr. Chua: contaminant  -  BCx w/ staph: likely contaminant. repeat BCx at that time was negative     # Diabetes mellitus   - HbA1C 10.4  - Insulin held for now as pt is NPO     #HLD   - cw statins       #MISC:  - GI prophylaxis: protonix   - Dispo: MICU   - Acitivity: bedrest

## 2022-09-09 NOTE — PROGRESS NOTE ADULT - SUBJECTIVE AND OBJECTIVE BOX
Patient is a 56y old  Female who presents with a chief complaint of LE cellulitis (09 Sep 2022 08:07)      INTERVAL HPI/OVERNIGHT EVENTS:   No overnight events   Afebrile, hemodynamically stable, Hb is 9.1    ICU Vital Signs Last 24 Hrs  T(C): 36.2 (09 Sep 2022 08:00), Max: 37.1 (08 Sep 2022 12:00)  T(F): 97.2 (09 Sep 2022 08:00), Max: 98.8 (08 Sep 2022 12:00)  HR: 58 (09 Sep 2022 08:08) (56 - 70)  BP: 117/69 (09 Sep 2022 08:00) (117/69 - 178/93)  BP(mean): 88 (09 Sep 2022 08:00) (88 - 148)  ABP: --  ABP(mean): --  RR: 16 (09 Sep 2022 08:00) (14 - 18)  SpO2: 98% (09 Sep 2022 08:08) (98% - 100%)    O2 Parameters below as of 09 Sep 2022 08:00  Patient On (Oxygen Delivery Method): ventilator    O2 Concentration (%): 40      I&O's Summary    08 Sep 2022 07:01  -  09 Sep 2022 07:00  --------------------------------------------------------  IN: 1476.8 mL / OUT: 335 mL / NET: 1141.8 mL    09 Sep 2022 07:01  -  09 Sep 2022 09:54  --------------------------------------------------------  IN: 8.2 mL / OUT: 35 mL / NET: -26.8 mL      Mode: AC/ CMV (Assist Control/ Continuous Mandatory Ventilation)  RR (machine): 16  TV (machine): 400  FiO2: 40  PEEP: 5  ITime: 1  MAP: 9  PIP: 25      LABS:                        9.1    12.53 )-----------( 213      ( 09 Sep 2022 06:11 )             26.4     09-09    139  |  102  |  79<HH>  ----------------------------<  102<H>  4.4   |  21  |  4.7<HH>    Ca    8.2<L>      09 Sep 2022 06:11  Phos  6.1     09-08  Mg     2.1     09-09    TPro  5.2<L>  /  Alb  2.4<L>  /  TBili  0.3  /  DBili  x   /  AST  48<H>  /  ALT  32  /  AlkPhos  277<H>  09-09        CAPILLARY BLOOD GLUCOSE      POCT Blood Glucose.: 106 mg/dL (09 Sep 2022 06:12)  POCT Blood Glucose.: 109 mg/dL (09 Sep 2022 00:33)  POCT Blood Glucose.: 116 mg/dL (08 Sep 2022 17:35)  POCT Blood Glucose.: 159 mg/dL (08 Sep 2022 12:01)    ABG - ( 09 Sep 2022 03:00 )  pH, Arterial: 7.38  pH, Blood: x     /  pCO2: 35    /  pO2: 170   / HCO3: 21    / Base Excess: -3.8  /  SaO2: 100.0               RADIOLOGY & ADDITIONAL TESTS:    Consultant(s) Notes Reviewed:  [x ] YES  [ ] NO    MEDICATIONS  (STANDING):  amLODIPine   Tablet 10 milliGRAM(s) Oral daily  aspirin  chewable 81 milliGRAM(s) Oral daily  atorvastatin 80 milliGRAM(s) Oral at bedtime  aztreonam  IVPB      aztreonam  IVPB 2000 milliGRAM(s) IV Intermittent every 12 hours  ceftazidime/avibactam IVPB 0.94 Gram(s) IV Intermittent every 12 hours  chlorhexidine 0.12% Liquid 15 milliLiter(s) Oral Mucosa every 12 hours  chlorhexidine 2% Cloths 1 Application(s) Topical <User Schedule>  collagenase Ointment 1 Application(s) Topical two times a day  Dakins Solution - 1/2 Strength 1 Application(s) Topical two times a day  dexMEDEtomidine Infusion 0.2 MICROgram(s)/kG/Hr (4.07 mL/Hr) IV Continuous <Continuous>  dextrose 5%. 1000 milliLiter(s) (100 mL/Hr) IV Continuous <Continuous>  dextrose 5%. 1000 milliLiter(s) (50 mL/Hr) IV Continuous <Continuous>  dextrose 50% Injectable 25 Gram(s) IV Push once  dextrose 50% Injectable 12.5 Gram(s) IV Push once  dextrose 50% Injectable 25 Gram(s) IV Push once  fentaNYL   Infusion. 0.5 MICROgram(s)/kG/Hr (4.07 mL/Hr) IV Continuous <Continuous>  furosemide   Injectable 80 milliGRAM(s) IV Push daily  glucagon  Injectable 1 milliGRAM(s) IntraMuscular once  hydrALAZINE 100 milliGRAM(s) Oral every 8 hours  insulin lispro (ADMELOG) corrective regimen sliding scale   SubCutaneous three times a day before meals  labetalol 600 milliGRAM(s) Oral three times a day  lacosamide IVPB 50 milliGRAM(s) IV Intermittent every 12 hours  levETIRAcetam  Solution 500 milliGRAM(s) Oral two times a day  lidocaine 1%/epinephrine 1:100,000 Inj 20 milliLiter(s) Local Injection once  methylPREDNISolone sodium succinate IVPB 1000 milliGRAM(s) IV Intermittent daily  multivitamin/minerals/iron Oral Solution (CENTRUM) 15 milliLiter(s) Oral daily  pantoprazole  Injectable 40 milliGRAM(s) IV Push two times a day  propofol Infusion 10 MICROgram(s)/kG/Min (4.88 mL/Hr) IV Continuous <Continuous>  sodium bicarbonate 1300 milliGRAM(s) Oral every 8 hours  sodium chloride 0.65% Nasal 2 Spray(s) Both Nostrils two times a day    MEDICATIONS  (PRN):  acetaminophen     Tablet .. 650 milliGRAM(s) Oral every 6 hours PRN Temp greater or equal to 38C (100.4F), Mild Pain (1 - 3)  dextrose Oral Gel 15 Gram(s) Oral once PRN Blood Glucose LESS THAN 70 milliGRAM(s)/deciliter  labetalol Injectable 10 milliGRAM(s) IV Push every 6 hours PRN Systolic blood pressure >  melatonin 3 milliGRAM(s) Oral at bedtime PRN Insomnia      PHYSICAL EXAM:  GENERAL: Pt is intubated and sedated.   HEAD:  Atraumatic, Normocephalic  EYES: EOMI, PERRLA, conjunctiva and sclera clear  NECK: Supple, No JVD, Normal thyroid, no enlarged nodes  NERVOUS SYSTEM:  Intubated and sedated.   CHEST/LUNG: Pt is intubated. Decreased BL breathing sounds   HEART: S1S2 normal, no S3, Regular rate and rhythm; No murmurs  ABDOMEN: Soft, Nontender, Nondistended; Bowel sounds present  EXTREMITIES:  LLE cellulitis   LYMPH: No lymphadenopathy noted  SKIN: No rashes or lesions        Care Discussed with Consultants/Other Providers [ x] YES  [ ] NO

## 2022-09-09 NOTE — CONSULT NOTE ADULT - SUBJECTIVE AND OBJECTIVE BOX
INTERVENTIONAL RADIOLOGY CONSULT:     Procedure Requested:     HPI:  57 yo F with PMH of HTN, DM2, and stroke (per son 2017) who presented for RLE wound. Started 3 months ago when she fell and hit her leg on a wooden cabinet. She put hydrogen peroxide, antibiotic cream, and wrapped the wound but never fully healed. Two weeks ago it started to show yellow pus so her and her family came to the ED for further treatment. Endorsed subjective fevers, chest pain, and vision changes which occurs when walked 2-3 blocks. Denied congestion, sore throat, cough, dyspnea, headache, dysuria, N/V, diarrhea     Per son, they fill her medications at [facility] ([phone]) and this is their current pharmacy. Called them to confirm her medications and they said her last refill of medications was 2018. Pt has not seen a doctor due to insurance problem and has not been taking any medications.    In the ED:  Vitals: T: 98.9, BP: 296/ 174, , RR: 18, 98%O2 on RA  Labs: CBC showed WBC 11.23; ESR 92, CRP 35.6  CMP showed glucose 302, alk phos 173; ; VBG pH 7.45  EKG pending  CXR showed borderline cardiomegaly and no airspace opacity.   X-ray of RLE also pending.     Received unasyn and vanco, humalin R, IV vasotec, IV labetalol   (02 Aug 2022 07:47)      PAST MEDICAL & SURGICAL HISTORY:  Hypertension      Diabetes mellitus      No significant past surgical history          MEDICATIONS  (STANDING):  amLODIPine   Tablet 10 milliGRAM(s) Oral daily  aspirin  chewable 81 milliGRAM(s) Oral daily  atorvastatin 80 milliGRAM(s) Oral at bedtime  aztreonam  IVPB      aztreonam  IVPB 2000 milliGRAM(s) IV Intermittent every 12 hours  ceftazidime/avibactam IVPB 0.94 Gram(s) IV Intermittent every 12 hours  chlorhexidine 0.12% Liquid 15 milliLiter(s) Oral Mucosa every 12 hours  chlorhexidine 2% Cloths 1 Application(s) Topical <User Schedule>  collagenase Ointment 1 Application(s) Topical two times a day  Dakins Solution - 1/2 Strength 1 Application(s) Topical two times a day  dexMEDEtomidine Infusion 0.2 MICROgram(s)/kG/Hr (4.07 mL/Hr) IV Continuous <Continuous>  dextrose 5%. 1000 milliLiter(s) (100 mL/Hr) IV Continuous <Continuous>  dextrose 5%. 1000 milliLiter(s) (50 mL/Hr) IV Continuous <Continuous>  dextrose 50% Injectable 25 Gram(s) IV Push once  dextrose 50% Injectable 12.5 Gram(s) IV Push once  dextrose 50% Injectable 25 Gram(s) IV Push once  fentaNYL   Infusion. 0.5 MICROgram(s)/kG/Hr (4.07 mL/Hr) IV Continuous <Continuous>  furosemide   Injectable 80 milliGRAM(s) IV Push daily  glucagon  Injectable 1 milliGRAM(s) IntraMuscular once  hydrALAZINE 100 milliGRAM(s) Oral every 8 hours  insulin lispro (ADMELOG) corrective regimen sliding scale   SubCutaneous three times a day before meals  labetalol 600 milliGRAM(s) Oral three times a day  lacosamide IVPB 50 milliGRAM(s) IV Intermittent every 12 hours  levETIRAcetam  Solution 500 milliGRAM(s) Oral two times a day  lidocaine 1%/epinephrine 1:100,000 Inj 20 milliLiter(s) Local Injection once  methylPREDNISolone sodium succinate IVPB 1000 milliGRAM(s) IV Intermittent daily  multivitamin/minerals/iron Oral Solution (CENTRUM) 15 milliLiter(s) Oral daily  pantoprazole  Injectable 40 milliGRAM(s) IV Push two times a day  propofol Infusion 10 MICROgram(s)/kG/Min (4.88 mL/Hr) IV Continuous <Continuous>  sevelamer carbonate Powder 800 milliGRAM(s) Oral three times a day with meals  sodium bicarbonate 1300 milliGRAM(s) Oral every 8 hours  sodium chloride 0.65% Nasal 2 Spray(s) Both Nostrils two times a day    MEDICATIONS  (PRN):  acetaminophen     Tablet .. 650 milliGRAM(s) Oral every 6 hours PRN Temp greater or equal to 38C (100.4F), Mild Pain (1 - 3)  dextrose Oral Gel 15 Gram(s) Oral once PRN Blood Glucose LESS THAN 70 milliGRAM(s)/deciliter  labetalol Injectable 10 milliGRAM(s) IV Push every 6 hours PRN Systolic blood pressure >  melatonin 3 milliGRAM(s) Oral at bedtime PRN Insomnia      Allergies    No Known Allergies    Intolerances          FAMILY HISTORY:  FH: type 2 diabetes mellitus        Physical Exam:   Vital Signs Last 24 Hrs  T(C): 36.2 (09 Sep 2022 08:00), Max: 37 (08 Sep 2022 16:00)  T(F): 97.2 (09 Sep 2022 08:00), Max: 98.6 (08 Sep 2022 16:00)  HR: 58 (09 Sep 2022 08:08) (56 - 68)  BP: 117/69 (09 Sep 2022 08:00) (117/69 - 160/89)  BP(mean): 88 (09 Sep 2022 08:00) (88 - 136)  RR: 16 (09 Sep 2022 08:00) (14 - 17)  SpO2: 98% (09 Sep 2022 08:08) (98% - 100%)    Parameters below as of 09 Sep 2022 08:00  Patient On (Oxygen Delivery Method): ventilator    O2 Concentration (%): 40      Labs:                         9.1    12.53 )-----------( 213      ( 09 Sep 2022 06:11 )             26.4     09-09    139  |  102  |  79<HH>  ----------------------------<  102<H>  4.4   |  21  |  4.7<HH>    Ca    8.2<L>      09 Sep 2022 06:11  Phos  6.1     09-08  Mg     2.1     09-09    TPro  5.2<L>  /  Alb  2.4<L>  /  TBili  0.3  /  DBili  x   /  AST  48<H>  /  ALT  32  /  AlkPhos  277<H>  09-09        Pertinent labs:                      9.1    12.53 )-----------( 213      ( 09 Sep 2022 06:11 )             26.4       09-09    139  |  102  |  79<HH>  ----------------------------<  102<H>  4.4   |  21  |  4.7<HH>    Ca    8.2<L>      09 Sep 2022 06:11  Phos  6.1     09-08  Mg     2.1     09-09    TPro  5.2<L>  /  Alb  2.4<L>  /  TBili  0.3  /  DBili  x   /  AST  48<H>  /  ALT  32  /  AlkPhos  277<H>  09-09          Radiology & Additional Studies:     Radiology imaging reviewed.       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 During hospital stay, found to have multiple punctate infarcts suspected to be cardioembolic vs vasculitis. Hospital course further complicated by fever and now with MDR Enterobacter bacteremia. IR consulted to evaluate for renal biopsy to rule out vasculitis.    Plan:  -No indication for renal biopsy at this time given acuity of patient condition  -Re-consult when stable      Thank you for the courtesy of this consult, please call [phone] with any further questions.

## 2022-09-09 NOTE — PROGRESS NOTE ADULT - ASSESSMENT
ALF rule out ATN / relative hypotension/ sepsis / E cloaca bacteremia / HTN ( was on multiple meds )/ CVA/ GIB  cr trending up today   oliguric now   follow Hb / will need repeat blood tx / s/p EGD/ colonoscopy : large hemorrhoids / non erosive gastritis / followed by GI and surgery   GI notes appreciated   sono no hydro  work up to date is neg for GN ( LUIS FELIPE DsDNA  neg / RF noted/ SPEP with inflammatory pattern ) need C3C4 / cryo to be sent out/ no thrombocytopenia   No indication yet for a kidney biopsy from renal stand point  cont   sodium bicarbonate 1300 q 8   ph  at goal   no need for RRT yet / if no response to diuretics and volume overloaded / BMP change will consider it   overall prognosis poor   will follow

## 2022-09-10 NOTE — PROGRESS NOTE ADULT - SUBJECTIVE AND OBJECTIVE BOX
seen and examined  24 h events noted   intubated/ventilated         PAST HISTORY  --------------------------------------------------------------------------------  No significant changes to PMH, PSH, FHx, SHx, unless otherwise noted    ALLERGIES & MEDICATIONS  --------------------------------------------------------------------------------  Allergies    No Known Allergies    Intolerances      Standing Inpatient Medications  amLODIPine   Tablet 10 milliGRAM(s) Oral daily  aspirin  chewable 81 milliGRAM(s) Oral daily  atorvastatin 80 milliGRAM(s) Oral at bedtime  aztreonam  IVPB      aztreonam  IVPB 2000 milliGRAM(s) IV Intermittent every 12 hours  ceftazidime/avibactam IVPB 0.94 Gram(s) IV Intermittent every 12 hours  chlorhexidine 0.12% Liquid 15 milliLiter(s) Oral Mucosa every 12 hours  chlorhexidine 2% Cloths 1 Application(s) Topical <User Schedule>  collagenase Ointment 1 Application(s) Topical two times a day  Dakins Solution - 1/2 Strength 1 Application(s) Topical two times a day  dexMEDEtomidine Infusion 0.2 MICROgram(s)/kG/Hr IV Continuous <Continuous>  dextrose 5%. 1000 milliLiter(s) IV Continuous <Continuous>  dextrose 5%. 1000 milliLiter(s) IV Continuous <Continuous>  dextrose 50% Injectable 25 Gram(s) IV Push once  dextrose 50% Injectable 12.5 Gram(s) IV Push once  dextrose 50% Injectable 25 Gram(s) IV Push once  fentaNYL   Infusion. 0.5 MICROgram(s)/kG/Hr IV Continuous <Continuous>  furosemide   Injectable 80 milliGRAM(s) IV Push daily  glucagon  Injectable 1 milliGRAM(s) IntraMuscular once  hydrALAZINE 100 milliGRAM(s) Oral every 8 hours  insulin lispro (ADMELOG) corrective regimen sliding scale   SubCutaneous three times a day before meals  labetalol 600 milliGRAM(s) Oral three times a day  lacosamide IVPB 50 milliGRAM(s) IV Intermittent every 12 hours  levETIRAcetam  Solution 500 milliGRAM(s) Oral two times a day  lidocaine 1%/epinephrine 1:100,000 Inj 20 milliLiter(s) Local Injection once  methylPREDNISolone sodium succinate IVPB 1000 milliGRAM(s) IV Intermittent daily  multivitamin/minerals/iron Oral Solution (CENTRUM) 15 milliLiter(s) Oral daily  pantoprazole  Injectable 40 milliGRAM(s) IV Push two times a day  propofol Infusion 10 MICROgram(s)/kG/Min IV Continuous <Continuous>  sevelamer carbonate Powder 800 milliGRAM(s) Oral three times a day with meals  sodium bicarbonate 1300 milliGRAM(s) Oral every 8 hours  sodium chloride 0.65% Nasal 2 Spray(s) Both Nostrils two times a day    PRN Inpatient Medications  acetaminophen     Tablet .. 650 milliGRAM(s) Oral every 6 hours PRN  dextrose Oral Gel 15 Gram(s) Oral once PRN  labetalol Injectable 10 milliGRAM(s) IV Push every 6 hours PRN  melatonin 3 milliGRAM(s) Oral at bedtime PRN            VITALS/PHYSICAL EXAM  --------------------------------------------------------------------------------  T(C): 37.3 (09-10-22 @ 04:00), Max: 37.3 (09-10-22 @ 04:00)  HR: 64 (09-10-22 @ 07:00) (52 - 68)  BP: 149/77 (09-10-22 @ 07:00) (121/71 - 159/85)  RR: 18 (09-10-22 @ 07:00) (14 - 18)  SpO2: 99% (09-10-22 @ 07:00) (98% - 99%)  Wt(kg): --        09-09-22 @ 07:01  -  09-10-22 @ 07:00  --------------------------------------------------------  IN: 1173.6 mL / OUT: 915 mL / NET: 258.6 mL      Physical Exam:  	Gen:intubated/ventilated   	Pulm:  B/L linda   	CV:  S1S2; no rub  	Abd: +distended  	    LABS/STUDIES  --------------------------------------------------------------------------------              11.0   12.14 >-----------<  279      [09-10-22 @ 05:12]              31.4     135  |  99  |  89  ----------------------------<  216      [09-10-22 @ 05:12]  5.6   |  18  |  5.2        Ca     8.3     [09-10-22 @ 05:12]      Mg     2.1     [09-10-22 @ 05:12]      Phos  9.2     [09-10-22 @ 05:12]    TPro  5.1  /  Alb  2.4  /  TBili  0.2  /  DBili  x   /  AST  41  /  ALT  29  /  AlkPhos  295  [09-10-22 @ 05:12]      Creatinine Trend:  SCr 5.2 [09-10 @ 05:12]  SCr 4.7 [09-09 @ 06:11]  SCr 4.3 [09-08 @ 05:10]  SCr 3.6 [09-07 @ 05:08]  SCr 3.3 [09-06 @ 05:30]    Urinalysis - [09-09-22 @ 19:05]      Color Yellow / Appearance Turbid / SG 1.007 / pH 6.0      Gluc Negative / Ketone Negative  / Bili Negative / Urobili <2 mg/dL       Blood Large / Protein 30 mg/dL / Leuk Est Large / Nitrite Negative      RBC 26 /  / Hyaline 6 / Gran  / Sq Epi  / Non Sq Epi 3 / Bacteria Negative      Ferritin 243      [08-28-22 @ 05:49]  PTH -- (Ca 8.5)      [09-05-22 @ 20:00]   75  TSH 0.86      [08-08-22 @ 17:53]  Lipid: chol 234, , HDL 42, LDL --      [08-03-22 @ 08:56]    HBcAb Nonreact      [09-09-22 @ 06:11]  HIV Nonreact      [09-09-22 @ 06:11]    LUIS FELIPE: titer Negative, pattern --      [09-03-22 @ 12:04]  dsDNA <12      [08-30-22 @ 19:27]  C3 Complement 134      [09-09-22 @ 06:11]  C4 Complement 33      [09-09-22 @ 06:11]  Rheumatoid Factor <10      [08-19-22 @ 11:37]  ANCA: cANCA Negative, pANCA Negative, atypical ANCA Negative      [08-30-22 @ 19:27]  Immunofixation Serum:   No Monoclonal Band Identified    Reference Range: None Detected      [08-30-22 @ 23:46]  SPEP Interpretation: Pattern Consistent With Acute Inflammation Or Stress      [08-30-22 @ 23:46]

## 2022-09-10 NOTE — PROGRESS NOTE ADULT - ASSESSMENT
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 During hospital stay, found to have multiple punctate infarcts suspected to be cardioembolic vs vasculitis. Patient also found to have sharp waves on vEEG and currently on AEDs. Hospital course further complicated by fever and now with MDR Enterobacter bacteremia.    # GI bleed   - S/p 6 units of PRBCs. Hb is stable 9.1  - Pt was seen by GI, bleeding hemorrhoids noted, external. Surgery contacted for rx.   - Protonix q12h  - ENT eval appreciated for oral bleed. No actively oozing wounds, teeth guard put in.    - FU CBC, coags   - pt intubated   - oral cavity bleed from tongue biting     # Acute ischemic strokes mutlifocal  - Spoke with neurology, suspiscious for vasculitis, but not confirmed, CSF studies does not support the diagnosis. - Plavix held for LGIB. Aspirin resumed    - neurology recommended 1g IV solumedrol for 5 days, today is day 1. (cleared from ID)  - FU EEG, cw keppra   - Neuro check q1h,  - Pt is intubated.   - Per stroke team, no need for angio, requested a renal bx as expected widespread vasculitis. And once cleared for infection, can be started on steroids (can wait to results of bx as rheum advised against rx empirically).     # Bacteremia   - bcx positive for MDR enterobacter Cloacae   - Cw w antibiotics per ID   - bcx daily     # nonoliguric ALF / Urinary retention / Suspected ATN  - Cr continues to trend up 4.7  - on IVFs  - 1 dose of lasix 80 mg IV once was given   - avoid hypotension   - renal US 9/2: no hydronephrosis  - keep nolasco for now  - sodium bicarbonate increased to 1300 q8h. Sodium bicarb drip started,   - nephro following  - daily BMP and I's and O's  - phos level 6.1, started sevelamer 800 mg TID in PEG tube     # Hypertensive urgency due to noncompliance and Malignant HTN   - BP on admission 296/174 without signs of end organ damage. Renal artery duplex wnl>>ARIAN unlikely  - Aldosterone wnl, renin elevated  - BP overall improved  - SBP goal 120-160      # Purulent Right LE cellulitis   - s/p debridement by burn on 8/10  - Candida in wound. per Dr. Chua: contaminant  -  BCx w/ staph: likely contaminant. repeat BCx at that time was negative     # Diabetes mellitus   - HbA1C 10.4  - Insulin held for now as pt is NPO     #HLD   - cw statins       #MISC:  - GI prophylaxis: protonix   - Dispo: MICU   - Acitivity: bedrest

## 2022-09-10 NOTE — PROGRESS NOTE ADULT - ASSESSMENT
ALF rule out ATN / relative hypotension/ sepsis / E cloaca bacteremia / HTN ( was on multiple meds )/ CVA/ GIB  cr trending up today   UO improved   GI notes appreciated   sono no hydro  continue lasix   work up to date is neg for GN ( LUIS FELIPE DsDNA  neg / RF noted/ SPEP with inflammatory pattern ) cryo to be sent out/ no thrombocytopenia   No indication yet for a kidney biopsy from renal stand point  cont   sodium bicarbonate 1300 q 8   ph noted / increase renagel 2/2/2  no need for RRT yet   start lokelma 10 q 12/ feeed nepro   overall prognosis poor   will follow

## 2022-09-10 NOTE — PROGRESS NOTE ADULT - SUBJECTIVE AND OBJECTIVE BOX
Patient is a 56y old  Female who presents with a chief complaint of sepsis (09 Sep 2022 08:07)      INTERVAL HPI/OVERNIGHT EVENTS:   No overnight events   Afebrile, hemodynamically stable     ICU Vital Signs Last 24 Hrs  T(C): 36.2 (10 Sep 2022 00:00), Max: 36.6 (09 Sep 2022 20:00)  T(F): 97.2 (10 Sep 2022 00:00), Max: 97.9 (09 Sep 2022 20:00)  HR: 60 (10 Sep 2022 03:00) (52 - 68)  BP: 133/71 (10 Sep 2022 03:00) (117/69 - 159/85)  BP(mean): 88 (10 Sep 2022 03:00) (88 - 116)  ABP: --  ABP(mean): --  RR: 16 (10 Sep 2022 03:00) (14 - 16)  SpO2: 99% (10 Sep 2022 03:00) (98% - 99%)    O2 Parameters below as of 10 Sep 2022 00:00  Patient On (Oxygen Delivery Method): ventilator    O2 Concentration (%): 40      I&O's Summary    08 Sep 2022 07:01  -  09 Sep 2022 07:00  --------------------------------------------------------  IN: 1476.8 mL / OUT: 335 mL / NET: 1141.8 mL    09 Sep 2022 07:01  -  10 Sep 2022 06:16  --------------------------------------------------------  IN: 853.2 mL / OUT: 620 mL / NET: 233.2 mL      Mode: AC/ CMV (Assist Control/ Continuous Mandatory Ventilation)  RR (machine): 16  TV (machine): 400  FiO2: 40  PEEP: 5  ITime: 1  MAP: 10  PIP: 22      LABS:                        9.1    12.53 )-----------( 213      ( 09 Sep 2022 06:11 )             26.4     09-09    139  |  102  |  79<HH>  ----------------------------<  102<H>  4.4   |  21  |  4.7<HH>    Ca    8.2<L>      09 Sep 2022 06:11  Mg     2.1         TPro  5.2<L>  /  Alb  2.4<L>  /  TBili  0.3  /  DBili  x   /  AST  48<H>  /  ALT  32  /  AlkPhos  277<H>        Urinalysis Basic - ( 09 Sep 2022 19:05 )    Color: Yellow / Appearance: Turbid / S.007 / pH: x  Gluc: x / Ketone: Negative  / Bili: Negative / Urobili: <2 mg/dL   Blood: x / Protein: 30 mg/dL / Nitrite: Negative   Leuk Esterase: Large / RBC: 26 /HPF /  /HPF   Sq Epi: x / Non Sq Epi: 3 /HPF / Bacteria: Negative      CAPILLARY BLOOD GLUCOSE      POCT Blood Glucose.: 200 mg/dL (10 Sep 2022 04:55)  POCT Blood Glucose.: 146 mg/dL (10 Sep 2022 00:04)  POCT Blood Glucose.: 107 mg/dL (09 Sep 2022 17:31)  POCT Blood Glucose.: 113 mg/dL (09 Sep 2022 12:57)    ABG - ( 10 Sep 2022 03:24 )  pH, Arterial: 7.38  pH, Blood: x     /  pCO2: 29    /  pO2: 132   / HCO3: 17    / Base Excess: -6.6  /  SaO2: 99.7                RADIOLOGY & ADDITIONAL TESTS:    Consultant(s) Notes Reviewed:  [x ] YES  [ ] NO    MEDICATIONS  (STANDING):  amLODIPine   Tablet 10 milliGRAM(s) Oral daily  aspirin  chewable 81 milliGRAM(s) Oral daily  atorvastatin 80 milliGRAM(s) Oral at bedtime  aztreonam  IVPB      aztreonam  IVPB 2000 milliGRAM(s) IV Intermittent every 12 hours  ceftazidime/avibactam IVPB 0.94 Gram(s) IV Intermittent every 12 hours  chlorhexidine 0.12% Liquid 15 milliLiter(s) Oral Mucosa every 12 hours  chlorhexidine 2% Cloths 1 Application(s) Topical <User Schedule>  collagenase Ointment 1 Application(s) Topical two times a day  Dakins Solution - 1/2 Strength 1 Application(s) Topical two times a day  dexMEDEtomidine Infusion 0.2 MICROgram(s)/kG/Hr (4.07 mL/Hr) IV Continuous <Continuous>  dextrose 5%. 1000 milliLiter(s) (100 mL/Hr) IV Continuous <Continuous>  dextrose 5%. 1000 milliLiter(s) (50 mL/Hr) IV Continuous <Continuous>  dextrose 50% Injectable 25 Gram(s) IV Push once  dextrose 50% Injectable 12.5 Gram(s) IV Push once  dextrose 50% Injectable 25 Gram(s) IV Push once  fentaNYL   Infusion. 0.5 MICROgram(s)/kG/Hr (4.07 mL/Hr) IV Continuous <Continuous>  furosemide   Injectable 80 milliGRAM(s) IV Push daily  glucagon  Injectable 1 milliGRAM(s) IntraMuscular once  hydrALAZINE 100 milliGRAM(s) Oral every 8 hours  insulin lispro (ADMELOG) corrective regimen sliding scale   SubCutaneous three times a day before meals  labetalol 600 milliGRAM(s) Oral three times a day  lacosamide IVPB 50 milliGRAM(s) IV Intermittent every 12 hours  levETIRAcetam  Solution 500 milliGRAM(s) Oral two times a day  lidocaine 1%/epinephrine 1:100,000 Inj 20 milliLiter(s) Local Injection once  methylPREDNISolone sodium succinate IVPB 1000 milliGRAM(s) IV Intermittent daily  multivitamin/minerals/iron Oral Solution (CENTRUM) 15 milliLiter(s) Oral daily  pantoprazole  Injectable 40 milliGRAM(s) IV Push two times a day  propofol Infusion 10 MICROgram(s)/kG/Min (4.88 mL/Hr) IV Continuous <Continuous>  sevelamer carbonate Powder 800 milliGRAM(s) Oral three times a day with meals  sodium bicarbonate 1300 milliGRAM(s) Oral every 8 hours  sodium chloride 0.65% Nasal 2 Spray(s) Both Nostrils two times a day    MEDICATIONS  (PRN):  acetaminophen     Tablet .. 650 milliGRAM(s) Oral every 6 hours PRN Temp greater or equal to 38C (100.4F), Mild Pain (1 - 3)  dextrose Oral Gel 15 Gram(s) Oral once PRN Blood Glucose LESS THAN 70 milliGRAM(s)/deciliter  labetalol Injectable 10 milliGRAM(s) IV Push every 6 hours PRN Systolic blood pressure >  melatonin 3 milliGRAM(s) Oral at bedtime PRN Insomnia      PHYSICAL EXAM:  GENERAL: intubated, minimal response even off sedation.gets agitated   HEAD:  Atraumatic, Normocephalic  EYES: EOMI, PERRLA, conjunctiva and sclera clear  NECK: Supple, No JVD, Normal thyroid, no enlarged nodes  NERVOUS SYSTEM:  agitation off sedation   CHEST/LUNG: Intubated ; No rales, rhonchi, or wheezing  HEART: S1S2 normal, no S3, Regular rate and rhythm; No murmurs  ABDOMEN: Soft, Nontender, Nondistended; Bowel sounds present  EXTREMITIES:  2+ Peripheral Pulses, No clubbing, cyanosis, or edema  LYMPH: No lymphadenopathy noted  SKIN: No rashes or lesions    Care Discussed with Consultants/Other Providers [ x] YES  [ ] NO

## 2022-09-10 NOTE — PROGRESS NOTE ADULT - SUBJECTIVE AND OBJECTIVE BOX
ICU Attending Progress Daily Note     10 Sep 2022 12:12  remains intubated  He has history of Hypertension    Diabetes mellitus      Interval event for past 24 hr:  JOSE MITCHELL  56y had no event.   Current Complains:  JOSE MITCHELL has no new complains  HPI:  55 yo F with PMH of HTN, DM2, and stroke (per son 2017) who presented for RLE wound. Started 3 months ago when she fell and hit her leg on a wooden cabinet. She put hydrogen peroxide, antibiotic cream, and wrapped the wound but never fully healed. Two weeks ago it started to show yellow pus so her and her family came to the ED for further treatment. Endorsed subjective fevers, chest pain, and vision changes which occurs when walked 2-3 blocks. Denied congestion, sore throat, cough, dyspnea, headache, dysuria, N/V, diarrhea     Per son, they fill her medications at [facility] ([phone]) and this is their current pharmacy. Called them to confirm her medications and they said her last refill of medications was . Pt has not seen a doctor due to insurance problem and has not been taking any medications.    In the ED:  Vitals: T: 98.9, BP: 296/ 174, , RR: 18, 98%O2 on RA  Labs: CBC showed WBC 11.23; ESR 92, CRP 35.6  CMP showed glucose 302, alk phos 173; ; VBG pH 7.45  EKG pending  CXR showed borderline cardiomegaly and no airspace opacity.   X-ray of RLE also pending.     Received unasyn and vanco, humalin R, IV vasotec, IV labetalol   (02 Aug 2022 07:47)    OBJECTIVE:  ICU Vital Signs Last 24 Hrs  T(C): 35.6 (10 Sep 2022 08:00), Max: 37.3 (10 Sep 2022 04:00)  T(F): 96 (10 Sep 2022 08:00), Max: 99.1 (10 Sep 2022 04:00)  HR: 66 (10 Sep 2022 11:45) (52 - 68)  BP: 171/87 (10 Sep 2022 11:45) (133/71 - 171/87)  BP(mean): 128 (10 Sep 2022 11:45) (88 - 128)  ABP: --  ABP(mean): --  RR: 16 (10 Sep 2022 11:45) (14 - 19)  SpO2: 98% (10 Sep 2022 11:45) (98% - 99%)    O2 Parameters below as of 10 Sep 2022 11:45  Patient On (Oxygen Delivery Method): ventilator    O2 Concentration (%): 40      I&O's Summary    09 Sep 2022 07:  -  10 Sep 2022 07:00  --------------------------------------------------------  IN: 1173.6 mL / OUT: 915 mL / NET: 258.6 mL    10 Sep 2022 07:  -  10 Sep 2022 12:12  --------------------------------------------------------  IN: 137.6 mL / OUT: 152 mL / NET: -14.4 mL      I&O's Detail    09 Sep 2022 07:  -  10 Sep 2022 07:00  --------------------------------------------------------  IN:    Dexmedetomidine: 20.4 mL    Enteral Tube Flush: 120 mL    FentaNYL: 8.2 mL    Glucerna: 325 mL    IV PiggyBack: 600 mL    Oral Fluid: 100 mL  Total IN: 1173.6 mL    OUT:    Indwelling Catheter - Urethral (mL): 915 mL  Total OUT: 915 mL    Total NET: 258.6 mL      10 Sep 2022 07:  -  10 Sep 2022 12:12  --------------------------------------------------------  IN:    Dexmedetomidine: 97.6 mL    Glucerna: 40 mL  Total IN: 137.6 mL    OUT:    Indwelling Catheter - Urethral (mL): 152 mL  Total OUT: 152 mL    Total NET: -14.4 mL        Adult Advanced Hemodynamics Last 24 Hrs  CVP(mm Hg): --  CVP(cm H2O): --  CO: --  CI: --  PA: --  PA(mean): --  PCWP: --  SVR: --  SVRI: --  PVR: --  PVRI: --  Mode: AC/ CMV (Assist Control/ Continuous Mandatory Ventilation)  RR (machine): 16  TV (machine): 400  FiO2: 40  PEEP: 5  ITime: 1  MAP: 9  PIP: 28    CAPILLARY BLOOD GLUCOSE      POCT Blood Glucose.: 258 mg/dL (10 Sep 2022 11:06)  POCT Blood Glucose.: 200 mg/dL (10 Sep 2022 04:55)  POCT Blood Glucose.: 146 mg/dL (10 Sep 2022 00:04)  POCT Blood Glucose.: 107 mg/dL (09 Sep 2022 17:31)  POCT Blood Glucose.: 113 mg/dL (09 Sep 2022 12:57)    LABS:  ABG - ( 10 Sep 2022 03:24 )  pH, Arterial: 7.38  pH, Blood: x     /  pCO2: 29    /  pO2: 132   / HCO3: 17    / Base Excess: -6.6  /  SaO2: 99.7                                    11.0   12.14 )-----------( 279      ( 10 Sep 2022 05:12 )             31.4     09-10    135  |  99  |  89<HH>  ----------------------------<  216<H>  5.6<H>   |  18  |  5.2<HH>    Ca    8.3<L>      10 Sep 2022 05:12  Phos  9.2     09-10  Mg     2.1     09-10    TPro  5.1<L>  /  Alb  2.4<L>  /  TBili  0.2  /  DBili  x   /  AST  41  /  ALT  29  /  AlkPhos  295<H>  09-10      Urinalysis Basic - ( 09 Sep 2022 19:05 )    Color: Yellow / Appearance: Turbid / S.007 / pH: x  Gluc: x / Ketone: Negative  / Bili: Negative / Urobili: <2 mg/dL   Blood: x / Protein: 30 mg/dL / Nitrite: Negative   Leuk Esterase: Large / RBC: 26 /HPF /  /HPF   Sq Epi: x / Non Sq Epi: 3 /HPF / Bacteria: Negative        Home Medications:  losartan 50 mg oral tablet: 1 tab(s) orally once a day (02 Aug 2022 10:38)  metFORMIN 500 mg oral tablet: 1 tab(s) orally 2 times a day (02 Aug 2022 10:38)  metoprolol succinate 50 mg oral tablet, extended release: 1 tab(s) orally once a day (02 Aug 2022 10:38)    HOSPITAL MEDICATIONS:  MEDICATIONS  (STANDING):  amLODIPine   Tablet 10 milliGRAM(s) Oral daily  aspirin  chewable 81 milliGRAM(s) Oral daily  atorvastatin 80 milliGRAM(s) Oral at bedtime  aztreonam  IVPB      aztreonam  IVPB 2000 milliGRAM(s) IV Intermittent every 12 hours  ceftazidime/avibactam IVPB 0.94 Gram(s) IV Intermittent every 12 hours  chlorhexidine 0.12% Liquid 15 milliLiter(s) Oral Mucosa every 12 hours  chlorhexidine 2% Cloths 1 Application(s) Topical <User Schedule>  collagenase Ointment 1 Application(s) Topical two times a day  Dakins Solution - 1/2 Strength 1 Application(s) Topical two times a day  dexMEDEtomidine Infusion 0.2 MICROgram(s)/kG/Hr (4.07 mL/Hr) IV Continuous <Continuous>  dextrose 5%. 1000 milliLiter(s) (100 mL/Hr) IV Continuous <Continuous>  dextrose 5%. 1000 milliLiter(s) (50 mL/Hr) IV Continuous <Continuous>  dextrose 50% Injectable 25 Gram(s) IV Push once  dextrose 50% Injectable 12.5 Gram(s) IV Push once  dextrose 50% Injectable 25 Gram(s) IV Push once  fentaNYL   Infusion. 0.5 MICROgram(s)/kG/Hr (4.07 mL/Hr) IV Continuous <Continuous>  glucagon  Injectable 1 milliGRAM(s) IntraMuscular once  hydrALAZINE 100 milliGRAM(s) Oral every 8 hours  insulin lispro (ADMELOG) corrective regimen sliding scale   SubCutaneous three times a day before meals  labetalol 600 milliGRAM(s) Oral three times a day  lacosamide IVPB 50 milliGRAM(s) IV Intermittent every 12 hours  levETIRAcetam  Solution 500 milliGRAM(s) Oral two times a day  lidocaine 1%/epinephrine 1:100,000 Inj 20 milliLiter(s) Local Injection once  methylPREDNISolone sodium succinate IVPB 1000 milliGRAM(s) IV Intermittent daily  multivitamin/minerals/iron Oral Solution (CENTRUM) 15 milliLiter(s) Oral daily  pantoprazole  Injectable 40 milliGRAM(s) IV Push two times a day  propofol Infusion 10 MICROgram(s)/kG/Min (4.88 mL/Hr) IV Continuous <Continuous>  sevelamer carbonate Powder 800 milliGRAM(s) Oral three times a day with meals  sodium bicarbonate 1300 milliGRAM(s) Oral every 8 hours  sodium chloride 0.65% Nasal 2 Spray(s) Both Nostrils two times a day  sodium zirconium cyclosilicate 10 Gram(s) Oral every 12 hours    MEDICATIONS  (PRN):  acetaminophen     Tablet .. 650 milliGRAM(s) Oral every 6 hours PRN Temp greater or equal to 38C (100.4F), Mild Pain (1 - 3)  dextrose Oral Gel 15 Gram(s) Oral once PRN Blood Glucose LESS THAN 70 milliGRAM(s)/deciliter  labetalol Injectable 10 milliGRAM(s) IV Push every 6 hours PRN Systolic blood pressure >  melatonin 3 milliGRAM(s) Oral at bedtime PRN Insomnia      REVIEW OF SYSTEMS:  CONSTITUTIONAL: [X] all negative; [ ] weakness, [ ] fevers, [ ] chills  EYES/ENT: [X] all negative; [ ] visual changes, [ ] vertigo, [ ] throat pain   NECK: [X] all negative; [ ] pain, [ ] stiffness  RESPIRATORY: [] all negative, [ ] cough, [ ] wheezing, [ ] hemoptysis, [ ] shortness of breath  CARDIOVASCULAR: [] all negative; [ ] chest pain, [ ] palpitations, [ ] orthopnea  GASTROINTESTINAL: [X] all negative; [ ]abdominal pain, [ ] nausea, [ ] vomiting, [ ] hematemesis, [ ] diarrhea, [ ] constipation, [ ] melena, [ ] hematochezia.  GENITOURINARY: [X] all negative; [ ] dysuria, [ ] frequency, [ ] hematuria  NEUROLOGICAL: [X] all negative; [ ] numbness, [ ] weakness  SKIN: [X] all negative; [ ] itching, [ ] burning, [ ] rashes, [ ] lesions   All other review of systems is negative unless indicated above.    [  ] Unable to assess ROS because     PHYSICAL EXAM:          CONSTITUTIONAL: Well-developed; well-nourished; in no acute distress.   	SKIN: warm, dry  	HEAD: Normocephalic; atraumatic.  	EYES: PERRL, EOM, no conj injection, sclera clear  	ENT: No nasal discharge; airway clear.  	NECK: Supple; non tender.  No midline ttp ctls  	CARD: S1, S2 normal; no murmurs, gallops, or rubs. Regular rate and rhythm. 2+ RPs and DPs bilat, no carotid bruits, no pedal   edema, no calf pain b/l  	RESP: CTA  bilat good air movement No wheezes, rales or rhonchi.  	ABD: Soft, not tender, not distended, no CVA ttp no rebound or guarding, bowel sounds present  	EXT: Normal ROM.  No clubbing, cyanosis or edema.   	  	NEURO: Alert, awake, motor 5/5 R, 5/5 L        RADIOLOGY:  xray  < from: Xray Chest 1 View- PORTABLE-Routine (22 @ 11:48) >  impression:    Support devices: Endotracheal tube in satisfactory position. Loop   recorder device.    Cardiac/mediastinum/hilum: Unchanged    Lung parenchyma/Pleura: Interstitial edema and left basilar   opacity/effusionwithout significant change. No pneumothorax.    Skeleton/soft tissues: Unchanged        < end of copied text >    I spent 45 minutes of critical care time ; more than 50% of visit was spent counseling and/or examining patient, reviewing vitals, labs, medications, imaging and discussing with the team goals of care to prevent life-threatening in this patient who is at high risk for deterioration or death due to:

## 2022-09-10 NOTE — CHART NOTE - NSCHARTNOTEFT_GEN_A_CORE
Registered Dietitian Follow-Up     Patient Profile Reviewed                           Yes [x]   No []     Nutrition History Previously Obtained        Yes [x]  No []      Pertinent Medical Interventions: Pt upgraded to MICU for GI bleed. Pt was seen by GI, bleeding hemorrhoids noted, external. Surgery contacted for rx. Pt intubated this am for bedside EGD and colonoscopy. EGD - nonerosive gastritis; colonoscopy - large bleeding external hemorrhoids, no evidence of active bleeding. Per sx progress notes, external hemorrhoids unlikely the cause of acute hgb drop. Will continue to monitor plan of care. Nonoliguric ALF noted. No need for RRT per Nephrology. Purulent Right LE cellulitis s/p debridement 8/10. Tmax 24 hours 37.2 C. Ve 8.2.     Diet order: NPO as of  following acute drop in Hgb. Prior to this, pt was receiving Glucerna 1.2 at 325 mL q6hrs with 2 packets No Carb Prosource daily.     Anthropometrics:  Height (cm): 165.1 (22 @ 03:14)  Weight (kg): 81.4 (22 @ 03:14)  BMI (kg/m2): 29.9 (22 @ 03:14)  IBW: 56.8 KG    Daily Weight in k.4 (), Weight in k.4 ()  Admit wt 82.3 kg ().  No significant wt loss observed at this time; will continue to monitor.    Pertinent Lab Data: : Na-142 (WDL), K-4.2 (WDL), BUN-78, creat-3.3, gluc-112, Mg-2.2 (WDL)     MEDICATIONS  (STANDING):  aspirin  chewable 81 milliGRAM(s) Oral daily  atorvastatin 80 milliGRAM(s) Oral at bedtime  aztreonam  IVPB      aztreonam  IVPB 2000 milliGRAM(s) IV Intermittent every 12 hours  ceftazidime/avibactam IVPB 0.94 Gram(s) IV Intermittent every 12 hours  chlorhexidine 0.12% Liquid 15 milliLiter(s) Oral Mucosa every 12 hours  chlorhexidine 2% Cloths 1 Application(s) Topical <User Schedule>  collagenase Ointment 1 Application(s) Topical two times a day  Dakins Solution - 1/2 Strength 1 Application(s) Topical two times a day  dexMEDEtomidine Infusion 0.2 MICROgram(s)/kG/Hr (4.07 mL/Hr) IV Continuous <Continuous>  dextrose 5%. 1000 milliLiter(s) (50 mL/Hr) IV Continuous <Continuous>  dextrose 5%. 1000 milliLiter(s) (100 mL/Hr) IV Continuous <Continuous>  dextrose 50% Injectable 25 Gram(s) IV Push once  dextrose 50% Injectable 12.5 Gram(s) IV Push once  dextrose 50% Injectable 25 Gram(s) IV Push once  fentaNYL   Infusion. 0.5 MICROgram(s)/kG/Hr (4.07 mL/Hr) IV Continuous <Continuous>  glucagon  Injectable 1 milliGRAM(s) IntraMuscular once  hydrALAZINE 100 milliGRAM(s) Oral every 8 hours  insulin lispro (ADMELOG) corrective regimen sliding scale   SubCutaneous three times a day before meals  labetalol 600 milliGRAM(s) Oral three times a day  lacosamide IVPB 50 milliGRAM(s) IV Intermittent every 12 hours  levETIRAcetam  Solution 500 milliGRAM(s) Oral two times a day  lidocaine 1%/epinephrine 1:100,000 Inj 20 milliLiter(s) Local Injection once  multivitamin/minerals/iron Oral Solution (CENTRUM) 15 milliLiter(s) Oral daily  pantoprazole  Injectable 40 milliGRAM(s) IV Push two times a day  propofol Infusion 10 MICROgram(s)/kG/Min (4.88 mL/Hr) IV Continuous <Continuous>  sodium bicarbonate 650 milliGRAM(s) Oral every 8 hours  sodium chloride 0.65% Nasal 2 Spray(s) Both Nostrils two times a day  sodium chloride 0.9%. 1000 milliLiter(s) (100 mL/Hr) IV Continuous <Continuous>    MEDICATIONS  (PRN):  acetaminophen     Tablet .. 650 milliGRAM(s) Oral every 6 hours PRN Temp greater or equal to 38C (100.4F), Mild Pain (1 - 3)  dextrose Oral Gel 15 Gram(s) Oral once PRN Blood Glucose LESS THAN 70 milliGRAM(s)/deciliter  labetalol Injectable 10 milliGRAM(s) IV Push every 6 hours PRN Systolic blood pressure >  melatonin 3 milliGRAM(s) Oral at bedtime PRN Insomnia    Propofol rate 4.88 mL/hr to provide 129 kcal/day.     Physical Findings:  - Appearance: generalized 2+ edema noted; intubated at this time  - GI function: last BM ; multiple loose BM persists; will avoid adding Banatrol at this time given concern of GI bleed; will monitor GI function. No nausea/vomiting reported at this time. No abd distention noted at this time.  - Tubes: PEG Tube  - Oral/Mouth cavity: NPO with PEG Tube  - Skin: R LE cellulitis, lip wound, newly documented stage II pressure ulcer to sacrum     Nutrition Requirements  Weight Used: 81.4 kg dosing wt     Estimated Energy Needs    Continue []  Adjust [x]  Adjusted Energy Recommendations: 1598 kcal/day ([facility] 2003b equation) vs      Estimated Protein Needs    Continue []  Adjust [x]  Adjusted Protein Recommendations:  g/day (1.2-1.4 g/kg ABW)    Estimated Fluid Needs        Continue []  Adjust [x]  Adjusted Fluid Recommendations: 7355-3460 mL/day (20-25 mL/kg ABW); edema noted, will continue to monitor     Nutrient Intake: Currently NPO, not meeting estimated nutrient needs at this time     [x] Previous Nutrition Diagnosis: Inadequate Oral Intake (resumed)    [] No active nutrition diagnosis identified at this time     Nutrition Intervention: TF regimen     Goal/Expected Outcome: Pt to meet >85% & <105% of estimated nutritional needs in 4 days. Pt at high nutrition risk.     Indicator/Monitoring: Diet order, PO intake, weights, labs, NFPF, body composition, BM and tolerance to medical food supplements    Recommendations:  While pt remains intubated; if able to resume nutrition support, provide Peptamen AF at 20 mL/hr. If tolerated at this rate, increase by 10 mL as tolerated to goal rate 50 mL/hr + provide Prosource TF once daily. Provide 150 mL flushes q6hrs. Regimen at goal to provide 1480 kcal, 101 g protein, 1572 mL free H2O. Current propofol rate to add 129 kcal/day. If unable to resume enteral nutrition, consult nutrition support team to evaluate.    Registered Dietitian Follow-Up     Patient Profile Reviewed                           Yes []   No []     Nutrition History Previously Obtained        Yes []  No []       Pertinent Subjective Information:     Pertinent Medical Interventions:     Diet order:     Anthropometrics:  Height (cm): 165.1 (22 @ 03:14)  Weight (kg): 81.4 (22 @ 03:14)  BMI (kg/m2): 29.9 (22 @ 03:14)  IBW:     Daily Weight in k.7 (09-10), Weight in k.7 (), Weight in k.3 (), Weight in k.7 (), Weight in k.4 (), Weight in k.4 ()  % Weight Change    MEDICATIONS  (STANDING):  amLODIPine   Tablet 10 milliGRAM(s) Oral daily  aspirin  chewable 81 milliGRAM(s) Oral daily  atorvastatin 80 milliGRAM(s) Oral at bedtime  aztreonam  IVPB      aztreonam  IVPB 2000 milliGRAM(s) IV Intermittent every 12 hours  ceftazidime/avibactam IVPB 0.94 Gram(s) IV Intermittent every 12 hours  chlorhexidine 0.12% Liquid 15 milliLiter(s) Oral Mucosa every 12 hours  chlorhexidine 2% Cloths 1 Application(s) Topical <User Schedule>  collagenase Ointment 1 Application(s) Topical two times a day  Dakins Solution - 1/2 Strength 1 Application(s) Topical two times a day  dexMEDEtomidine Infusion 0.2 MICROgram(s)/kG/Hr (4.07 mL/Hr) IV Continuous <Continuous>  dextrose 5%. 1000 milliLiter(s) (100 mL/Hr) IV Continuous <Continuous>  dextrose 5%. 1000 milliLiter(s) (50 mL/Hr) IV Continuous <Continuous>  dextrose 50% Injectable 25 Gram(s) IV Push once  dextrose 50% Injectable 12.5 Gram(s) IV Push once  dextrose 50% Injectable 25 Gram(s) IV Push once  fentaNYL   Infusion. 0.5 MICROgram(s)/kG/Hr (4.07 mL/Hr) IV Continuous <Continuous>  glucagon  Injectable 1 milliGRAM(s) IntraMuscular once  hydrALAZINE 100 milliGRAM(s) Oral every 8 hours  insulin lispro (ADMELOG) corrective regimen sliding scale   SubCutaneous three times a day before meals  labetalol 600 milliGRAM(s) Oral three times a day  lacosamide IVPB 50 milliGRAM(s) IV Intermittent every 12 hours  levETIRAcetam  Solution 500 milliGRAM(s) Oral two times a day  lidocaine 1%/epinephrine 1:100,000 Inj 20 milliLiter(s) Local Injection once  methylPREDNISolone sodium succinate IVPB 1000 milliGRAM(s) IV Intermittent daily  multivitamin/minerals/iron Oral Solution (CENTRUM) 15 milliLiter(s) Oral daily  pantoprazole  Injectable 40 milliGRAM(s) IV Push two times a day  propofol Infusion 10 MICROgram(s)/kG/Min (4.88 mL/Hr) IV Continuous <Continuous>  sevelamer carbonate Powder 800 milliGRAM(s) Oral three times a day with meals  sodium bicarbonate 1300 milliGRAM(s) Oral every 8 hours  sodium chloride 0.65% Nasal 2 Spray(s) Both Nostrils two times a day  sodium zirconium cyclosilicate 10 Gram(s) Oral every 12 hours    MEDICATIONS  (PRN):  acetaminophen     Tablet .. 650 milliGRAM(s) Oral every 6 hours PRN Temp greater or equal to 38C (100.4F), Mild Pain (1 - 3)  dextrose Oral Gel 15 Gram(s) Oral once PRN Blood Glucose LESS THAN 70 milliGRAM(s)/deciliter  labetalol Injectable 10 milliGRAM(s) IV Push every 6 hours PRN Systolic blood pressure >  melatonin 3 milliGRAM(s) Oral at bedtime PRN Insomnia    Pertinent Labs: 09-10 @ 05:12: Na 135, BUN 89<HH>, Cr 5.2<HH>, <H>, K+ 5.6<H>, Phos 9.2<H>, Mg 2.1, Alk Phos 295<H>, ALT/SGPT 29, AST/SGOT 41, HbA1c --    Finger Sticks:  POCT Blood Glucose.: 283 mg/dL (09-10 @ 16:24)  POCT Blood Glucose.: 258 mg/dL (09-10 @ 11:06)  POCT Blood Glucose.: 200 mg/dL (09-10 @ 04:55)  POCT Blood Glucose.: 146 mg/dL (09-10 @ 00:04)    Physical Findings:  - Appearance:  - GI function:  - Tubes:  - Oral/Mouth cavity:  - Skin:     Nutrition Requirements:  Weight Used:     Estimated Energy Needs    Continue []  Adjust []  Adjusted Energy Recommendations:   kcal/day        Estimated Protein Needs    Continue []  Adjust []  Adjusted Protein Recommendations:   gm/day        Estimated Fluid Needs        Continue []  Adjust []  Adjusted Fluid Recommendations:   mL/day     Nutrient Intake:     [] Previous Nutrition Diagnosis:            [] Ongoing          [] Resolved    [] No active nutrition diagnosis identified at this time     Nutrition Intervention      Goal/Expected Outcome:      Indicator/Monitoring:      Recommendation: Registered Dietitian Follow-Up     Patient Profile Reviewed                           Yes [x]   No []     Nutrition History Previously Obtained        Yes [x]  No []      Pertinent Medical Interventions: Pt upgraded to MICU for GI bleed. Pt was seen by GI, bleeding hemorrhoids noted, external. Surgery contacted for rx. Pt intubated this am for bedside EGD and colonoscopy. EGD - nonerosive gastritis; colonoscopy - large bleeding external hemorrhoids, no evidence of active bleeding. Oral cavity bleed likely from tongue biting. Acute ischemic strokes mutlifocal. Nonoliguric ALF noted. LLE cellulitis. Tmax 24 hours 37.3 C. Ve 8.1.     Diet order: Pt was receiving Glucerna 1.2 at 325 mL q6hrs with No Carb Prosource 2 times daily from  - 9/10. Today, diet order was changed to continuous infusions of Glucerna 1.2 at 20 mL/hr (provides 576 kcal, 29 g protein, 389 mL free H2O.     Anthropometrics:  Height (cm): 165.1 (22 @ 03:14)  Weight (kg): 81.4 (22 @ 03:14)  BMI (kg/m2): 29.9 (22 @ 03:14)  IBW: 56.8 KG    Daily Weight in k.7 (09-10), Weight in k.7 (), Weight in k.3 (), Weight in k.7 (), Weight in k.4 (), Weight in k.4 ()  Admit wt 82.3 kg ().  Trend of wt gain observed; suspected to be r/t fluid shifts; will continue to monitor.     MEDICATIONS  (STANDING):  amLODIPine   Tablet 10 milliGRAM(s) Oral daily  aspirin  chewable 81 milliGRAM(s) Oral daily  atorvastatin 80 milliGRAM(s) Oral at bedtime  aztreonam  IVPB      aztreonam  IVPB 2000 milliGRAM(s) IV Intermittent every 12 hours  ceftazidime/avibactam IVPB 0.94 Gram(s) IV Intermittent every 12 hours  chlorhexidine 0.12% Liquid 15 milliLiter(s) Oral Mucosa every 12 hours  chlorhexidine 2% Cloths 1 Application(s) Topical <User Schedule>  collagenase Ointment 1 Application(s) Topical two times a day  Dakins Solution - 1/2 Strength 1 Application(s) Topical two times a day  dexMEDEtomidine Infusion 0.2 MICROgram(s)/kG/Hr (4.07 mL/Hr) IV Continuous <Continuous>  dextrose 5%. 1000 milliLiter(s) (100 mL/Hr) IV Continuous <Continuous>  dextrose 5%. 1000 milliLiter(s) (50 mL/Hr) IV Continuous <Continuous>  dextrose 50% Injectable 25 Gram(s) IV Push once  dextrose 50% Injectable 12.5 Gram(s) IV Push once  dextrose 50% Injectable 25 Gram(s) IV Push once  fentaNYL   Infusion. 0.5 MICROgram(s)/kG/Hr (4.07 mL/Hr) IV Continuous <Continuous>  glucagon  Injectable 1 milliGRAM(s) IntraMuscular once  hydrALAZINE 100 milliGRAM(s) Oral every 8 hours  insulin lispro (ADMELOG) corrective regimen sliding scale   SubCutaneous three times a day before meals  labetalol 600 milliGRAM(s) Oral three times a day  lacosamide IVPB 50 milliGRAM(s) IV Intermittent every 12 hours  levETIRAcetam  Solution 500 milliGRAM(s) Oral two times a day  lidocaine 1%/epinephrine 1:100,000 Inj 20 milliLiter(s) Local Injection once  methylPREDNISolone sodium succinate IVPB 1000 milliGRAM(s) IV Intermittent daily  multivitamin/minerals/iron Oral Solution (CENTRUM) 15 milliLiter(s) Oral daily  pantoprazole  Injectable 40 milliGRAM(s) IV Push two times a day  propofol Infusion 10 MICROgram(s)/kG/Min (4.88 mL/Hr) IV Continuous <Continuous>  sevelamer carbonate Powder 800 milliGRAM(s) Oral three times a day with meals  sodium bicarbonate 1300 milliGRAM(s) Oral every 8 hours  sodium chloride 0.65% Nasal 2 Spray(s) Both Nostrils two times a day  sodium zirconium cyclosilicate 10 Gram(s) Oral every 12 hours    MEDICATIONS  (PRN):  acetaminophen     Tablet .. 650 milliGRAM(s) Oral every 6 hours PRN Temp greater or equal to 38C (100.4F), Mild Pain (1 - 3)  dextrose Oral Gel 15 Gram(s) Oral once PRN Blood Glucose LESS THAN 70 milliGRAM(s)/deciliter  labetalol Injectable 10 milliGRAM(s) IV Push every 6 hours PRN Systolic blood pressure >  melatonin 3 milliGRAM(s) Oral at bedtime PRN Insomnia    Propofol rate 4.88 mL/hr to provide 129 kcal/day when infused over 24 hour duration.    Pertinent Labs: 09-10 @ 05:12: Na 135, BUN 89<HH>, Cr 5.2<HH>, <H>, K+ 5.6<H>, Phos 9.2<H>, Mg 2.1, Alk Phos 295<H>, ALT/SGPT 29, AST/SGOT 41    Finger Sticks:  POCT Blood Glucose.: 283 mg/dL (09-10 @ 16:24)  POCT Blood Glucose.: 258 mg/dL (09-10 @ 11:06)  POCT Blood Glucose.: 200 mg/dL (09-10 @ 04:55)  POCT Blood Glucose.: 146 mg/dL (09-10 @ 00:04)     Physical Findings:  - Appearance: generalized 2+ edema noted; intubated at this time  - GI function: last BM 9/10; multiple loose BM persists; will avoid adding Banatrol at this time given concern of GI bleed; will monitor GI function. No nausea/vomiting reported at this time. No abd distention noted at this time.  - Tubes: PEG Tube  - Oral/Mouth cavity: NPO with PEG Tube  - Skin: R LE cellulitis, lip wound; no longer documented to have stage II pressure ulcer to sacrum     Nutrition Requirements  Weight Used: 81.4 kg dosing wt     Estimated Energy Needs    Continue [x]  Adjust []  ~8167-2799 kcal/day; obtained by comparing 1598 kcal/day ([facility] 2003b equation) vs 977-2035 kcal/day (12-25 kcal/kg ABW)     Estimated Protein Needs    Continue [x]  Adjust []   g/day (1.2-1.4 g/kg ABW)    Estimated Fluid Needs        Continue [x]  Adjust [x  3848-2727 mL/day (20-25 mL/kg ABW); edema noted, will continue to monitor     Nutrient Intake: Per RN, pt is reportedly tolerating current EN regimen. Current EN regimen at goal to provide 41% kcal & 30% protein needs at goal.     [x] Previous Nutrition Diagnosis: Inadequate Oral Intake (ongoing)    Nutrition Intervention: TEnteral Nutrition     Goal/Expected Outcome: Pt to meet >85% & <105% of estimated nutritional needs in 4 days. Pt at high nutrition risk.     Indicator/Monitoring: Diet order, PO intake, weights, labs, NFPF, body composition, BM and tolerance to medical food supplements    Recommendations:  Change tube feed formula to Peptamen AF at 20 mL/hr. If tolerated at this rate, increase by 10 mL as tolerated to goal rate 50 mL/hr + provide Prosource TF once daily. Provide 150 mL flushes q6hrs. Regimen at goal to provide 1480 kcal, 101 g protein, 1572 mL free H2O. Current propofol rate to add 129 kcal/day.

## 2022-09-11 NOTE — PROGRESS NOTE ADULT - SUBJECTIVE AND OBJECTIVE BOX
JOSE MITCHELL 56y Female  MRN#: [id number]   Hospital Day: 40d    HPI:  55 yo F with PMH of HTN, DM2, and stroke (per son 2017) who presented for RLE wound. Started 3 months ago when she fell and hit her leg on a wooden cabinet. She put hydrogen peroxide, antibiotic cream, and wrapped the wound but never fully healed. Two weeks ago it started to show yellow pus so her and her family came to the ED for further treatment. Endorsed subjective fevers, chest pain, and vision changes which occurs when walked 2-3 blocks. Denied congestion, sore throat, cough, dyspnea, headache, dysuria, N/V, diarrhea     Per son, they fill her medications at [facility] ([phone]) and this is their current pharmacy. Called them to confirm her medications and they said her last refill of medications was . Pt has not seen a doctor due to insurance problem and has not been taking any medications.    In the ED:  Vitals: T: 98.9, BP: 296/ 174, , RR: 18, 98%O2 on RA  Labs: CBC showed WBC 11.23; ESR 92, CRP 35.6  CMP showed glucose 302, alk phos 173; ; VBG pH 7.45  EKG pending  CXR showed borderline cardiomegaly and no airspace opacity.   X-ray of RLE also pending.     Received unasyn and vanco, humalin R, IV vasotec, IV labetalol   (02 Aug 2022 07:47)      SUBJECTIVE  Patient is a 56y old Female who presents with a chief complaint of sepsis (09 Sep 2022 08:07)  Currently admitted to medicine with the primary diagnosis of Cellulitis      INTERVAL HPI AND OVERNIGHT EVENTS:  Patient was examined and seen at bedside. This morning she does not follow commands but withdraws to painful stimuli.    REVIEW OF SYMPTOMS:  NA    OBJECTIVE  PAST MEDICAL & SURGICAL HISTORY  Hypertension    Diabetes mellitus    No significant past surgical history      ALLERGIES:  No Known Allergies    MEDICATIONS:  STANDING MEDICATIONS  amLODIPine   Tablet 10 milliGRAM(s) Oral daily  aspirin  chewable 81 milliGRAM(s) Oral daily  atorvastatin 80 milliGRAM(s) Oral at bedtime  aztreonam  IVPB      aztreonam  IVPB 2000 milliGRAM(s) IV Intermittent every 12 hours  ceftazidime/avibactam IVPB 0.94 Gram(s) IV Intermittent every 12 hours  chlorhexidine 0.12% Liquid 15 milliLiter(s) Oral Mucosa every 12 hours  chlorhexidine 2% Cloths 1 Application(s) Topical <User Schedule>  collagenase Ointment 1 Application(s) Topical two times a day  Dakins Solution - 1/2 Strength 1 Application(s) Topical two times a day  dexMEDEtomidine Infusion 0.2 MICROgram(s)/kG/Hr IV Continuous <Continuous>  dextrose 5%. 1000 milliLiter(s) IV Continuous <Continuous>  dextrose 5%. 1000 milliLiter(s) IV Continuous <Continuous>  dextrose 50% Injectable 25 Gram(s) IV Push once  dextrose 50% Injectable 12.5 Gram(s) IV Push once  dextrose 50% Injectable 25 Gram(s) IV Push once  fentaNYL   Infusion. 0.5 MICROgram(s)/kG/Hr IV Continuous <Continuous>  glucagon  Injectable 1 milliGRAM(s) IntraMuscular once  hydrALAZINE 100 milliGRAM(s) Oral every 8 hours  insulin glargine Injectable (LANTUS) 30 Unit(s) SubCutaneous every morning  insulin glargine Injectable (LANTUS) 30 Unit(s) SubCutaneous once  insulin lispro (ADMELOG) corrective regimen sliding scale   SubCutaneous three times a day before meals  insulin lispro Injectable (ADMELOG). 12 Unit(s) SubCutaneous once  labetalol 600 milliGRAM(s) Oral three times a day  lacosamide IVPB 50 milliGRAM(s) IV Intermittent every 12 hours  levETIRAcetam  Solution 500 milliGRAM(s) Oral two times a day  lidocaine 1%/epinephrine 1:100,000 Inj 20 milliLiter(s) Local Injection once  methylPREDNISolone sodium succinate IVPB 1000 milliGRAM(s) IV Intermittent daily  multivitamin 1 Tablet(s) Oral daily  pantoprazole  Injectable 40 milliGRAM(s) IV Push two times a day  propofol Infusion 10 MICROgram(s)/kG/Min IV Continuous <Continuous>  sevelamer carbonate Powder 800 milliGRAM(s) Oral three times a day with meals  sodium bicarbonate 1300 milliGRAM(s) Oral every 8 hours  sodium chloride 0.65% Nasal 2 Spray(s) Both Nostrils two times a day  sodium zirconium cyclosilicate 10 Gram(s) Oral every 12 hours    PRN MEDICATIONS  acetaminophen     Tablet .. 650 milliGRAM(s) Oral every 6 hours PRN  dextrose Oral Gel 15 Gram(s) Oral once PRN  labetalol Injectable 10 milliGRAM(s) IV Push every 6 hours PRN  melatonin 3 milliGRAM(s) Oral at bedtime PRN      VITAL SIGNS: Last 24 Hours  T(C): 36 (11 Sep 2022 08:00), Max: 36.7 (11 Sep 2022 00:00)  T(F): 96.8 (11 Sep 2022 08:00), Max: 98 (11 Sep 2022 00:00)  HR: 67 (11 Sep 2022 09:16) (60 - 82)  BP: 154/83 (11 Sep 2022 09:00) (141/75 - 175/90)  BP(mean): 122 (11 Sep 2022 09:00) (103 - 133)  RR: 19 (11 Sep 2022 09:00) (14 - 24)  SpO2: 99% (11 Sep 2022 09:16) (98% - 99%)    LABS:                        10.6   11.23 )-----------( 357      ( 11 Sep 2022 04:58 )             30.8         134<L>  |  96<L>  |  99<HH>  ----------------------------<  298<H>  5.2<H>   |  17  |  5.4<HH>    Ca    8.4<L>      11 Sep 2022 04:58  Phos  9.4       Mg     2.3         TPro  5.5<L>  /  Alb  2.6<L>  /  TBili  0.2  /  DBili  x   /  AST  35  /  ALT  24  /  AlkPhos  248<H>        Urinalysis Basic - ( 09 Sep 2022 19:05 )    Color: Yellow / Appearance: Turbid / S.007 / pH: x  Gluc: x / Ketone: Negative  / Bili: Negative / Urobili: <2 mg/dL   Blood: x / Protein: 30 mg/dL / Nitrite: Negative   Leuk Esterase: Large / RBC: 26 /HPF /  /HPF   Sq Epi: x / Non Sq Epi: 3 /HPF / Bacteria: Negative      ABG - ( 11 Sep 2022 04:04 )  pH, Arterial: 7.43  pH, Blood: x     /  pCO2: 27    /  pO2: 158   / HCO3: 18    / Base Excess: -5.3  /  SaO2: 99                        RADIOLOGY:      PHYSICAL EXAM:  CONSTITUTIONAL: No acute distress, sedated  HEAD: Atraumatic, normocephalic  EYES: EOM intact, PERRLA, conjunctiva and sclera clear  ENT: Supple, no masses, no thyromegaly, no bruits, no JVD; moist mucous membranes  PULMONARY: Clear to auscultation bilaterally; no wheezes, rales, or rhonchi  CARDIOVASCULAR: Regular rate and rhythm; no murmurs, rubs, or gallops  GASTROINTESTINAL: Soft, non-tender, non-distended; bowel sounds present  MUSCULOSKELETAL: 2+ peripheral pulses; no clubbing, no cyanosis, no edema  NEUROLOGY: non-focal  SKIN: No rashes or lesions; warm and dry    ASSESSMENT & PLAN  57 y/o woman with PMH of HTN, DM2, 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 During hospital stay, found to have multiple punctate infarcts suspected to be cardioembolic vs vasculitis. Patient also found to have sharp waves on vEEG and currently on AEDs. Hospital course further complicated by fever and now with MDR Enterobacter bacteremia.    # GI bleed   - S/p 6 units of PRBCs. Hb is stable.   - Pt was seen by GI, bleeding hemorrhoids noted, external. Surgery contacted for rx.   - Protonix q12h  - ENT eval appreciated for oral bleed. No actively oozing wounds, teeth guard put in.     - pt intubated   - oral cavity bleed from tongue biting     # Acute ischemic strokes mutlifocal  - Spoke with neurology, suspiscious for vasculitis, but not confirmed, CSF studies does not support the diagnosis. - Plavix held for LGIB. Aspirin resumed    - neurology recommended 1g IV solumedrol for 5 days, today is day 2. (cleared from ID)  - FU EEG, cw keppra   - Neuro check q1h  - Pt is intubated.   - Per stroke team, no need for angio, requested a renal bx as expected widespread vasculitis. And once cleared for infection, can be started on steroids (can wait to results of bx as rheum advised against rx empirically).     # Bacteremia   - bcx positive for MDR enterobacter Cloacae   - Cw w antibiotics per ID   - bcx daily     # nonoliguric ALF / Urinary retention / Suspected ATN  - Cr continues to trend up 4.7  - on IVFs  - 1 dose of lasix 80 mg IV once was given   - avoid hypotension   - renal US : no hydronephrosis  - keep nolasco for now  - sodium bicarbonate increased to 1300 q8h. Sodium bicarb drip started,   - nephro following  - daily BMP and I's and O's  - phos level 6.1, started sevelamer 800 mg TID in PEG tube     # Hypertensive urgency due to noncompliance and Malignant HTN   - BP on admission 296/174 without signs of end organ damage. Renal artery duplex wnl>>ARIAN unlikely  - Aldosterone wnl, renin elevated  - BP overall improved  - SBP goal 120-160      # Purulent Right LE cellulitis   - s/p debridement by burn on 8/10  - Candida in wound. per Dr. Chua: contaminant  -  BCx w/ staph: likely contaminant. repeat BCx at that time was negative     # Diabetes mellitus   - HbA1C 10.4  - Lantus and lispro sliding scale    #HLD   - cw statins       #MISC:  - GI prophylaxis: protonix   - Dispo: MICU   - Acitivity: bedrest

## 2022-09-11 NOTE — PROGRESS NOTE ADULT - ASSESSMENT
Impression:  acute hypoxemic resp failure SBT today hold precedex   Hyperglycemia add lantus and novlkog sliding scale  probbaly from steroids   Acute blood loss anemia. GI bleed  sp EGD/ colon still dec in hb sp CT AP  oral cavity bleed likely from tongue biting  Altered MS/ ischemic stroke? vasculitis no improvement      LLE cellulitis  ALF oliguric  bacteremia      PLAN:    CNS: neurocheck q1 hr,   neuro FUP  EEG  AED per neuro  Neuro inte fup  head CT REVIEWED  Start high dose solumedrol per neuro    HEENT: Oral care    PULMONARY:  HOB @ 45 degrees.  Aspiration precautions, dec FIO2 30%.  not wenable due to MS    CARDIOVASCULAR: LASIX 80 X1    GI: GI prophylaxis. peg feeding    RENAL:  follow up lytes, nephrology fup, po bcarb spoke with renal to comment regarding kidney bx    INFECTIOUS DISEASE: abx per ID    HEMATOLOGICAL:  SCD, LE doppler    ENDOCRINE:  Follow up FS.  Insulin protocol if needed.    MUSCULOSKELETAL: rheumato fup, C 3 C4    MICU    VERY poor prognosis  Sacrum stage 2  RLE wound  nolasco 9/5

## 2022-09-11 NOTE — PROGRESS NOTE ADULT - SUBJECTIVE AND OBJECTIVE BOX
Nephrology Progress Note    JOSE MITCHELL  MRN-[id number]  56y  Female    S:  Patient is seen and examined, events over the last 24h noted.    O:  Allergies:  No Known Allergies    Hospital Medications:   MEDICATIONS  (STANDING):  amLODIPine   Tablet 10 milliGRAM(s) Oral daily  aspirin  chewable 81 milliGRAM(s) Oral daily  atorvastatin 80 milliGRAM(s) Oral at bedtime  aztreonam  IVPB 2000 milliGRAM(s) IV Intermittent every 12 hours  ceftazidime/avibactam IVPB 0.94 Gram(s) IV Intermittent every 12 hours  chlorhexidine 0.12% Liquid 15 milliLiter(s) Oral Mucosa every 12 hours  chlorhexidine 2% Cloths 1 Application(s) Topical <User Schedule>  collagenase Ointment 1 Application(s) Topical two times a day  Dakins Solution - 1/2 Strength 1 Application(s) Topical two times a day  dexMEDEtomidine Infusion 0.2 MICROgram(s)/kG/Hr (4.07 mL/Hr) IV Continuous <Continuous>  hydrALAZINE 100 milliGRAM(s) Oral every 8 hours  insulin glargine Injectable (LANTUS) 30 Unit(s) SubCutaneous every morning  insulin lispro (ADMELOG) corrective regimen sliding scale   SubCutaneous three times a day before meals  labetalol 600 milliGRAM(s) Oral three times a day  lacosamide IVPB 50 milliGRAM(s) IV Intermittent every 12 hours  levETIRAcetam  Solution 500 milliGRAM(s) Oral two times a day  lidocaine 1%/epinephrine 1:100,000 Inj 20 milliLiter(s) Local Injection once  methylPREDNISolone sodium succinate IVPB 1000 milliGRAM(s) IV Intermittent daily  multivitamin 1 Tablet(s) Oral daily  pantoprazole  Injectable 40 milliGRAM(s) IV Push two times a day  sevelamer carbonate Powder 800 milliGRAM(s) Oral three times a day with meals  sodium bicarbonate 1300 milliGRAM(s) Oral every 8 hours  sodium chloride 0.65% Nasal 2 Spray(s) Both Nostrils two times a day  sodium zirconium cyclosilicate 10 Gram(s) Oral every 12 hours    MEDICATIONS  (PRN):  acetaminophen     Tablet .. 650 milliGRAM(s) Oral every 6 hours PRN Temp greater or equal to 38C (100.4F), Mild Pain (1 - 3)  dextrose Oral Gel 15 Gram(s) Oral once PRN Blood Glucose LESS THAN 70 milliGRAM(s)/deciliter  labetalol Injectable 10 milliGRAM(s) IV Push every 6 hours PRN Systolic blood pressure >  melatonin 3 milliGRAM(s) Oral at bedtime PRN Insomnia    Home Medications:  losartan 50 mg oral tablet: 1 tab(s) orally once a day (02 Aug 2022 10:38)  metFORMIN 500 mg oral tablet: 1 tab(s) orally 2 times a day (02 Aug 2022 10:38)  metoprolol succinate 50 mg oral tablet, extended release: 1 tab(s) orally once a day (02 Aug 2022 10:38)      VITALS:  Daily     Daily Weight in k.7 (11 Sep 2022 02:00)  T(F): 96.9 (22 @ 12:00), Max: 98 (22 @ 00:00)  HR: 58 (22 @ 13:00)  BP: 139/80 (22 @ 13:00)  RR: 18 (22 @ 13:00)  SpO2: 99% (22 @ 13:00)  Wt(kg): --  I&O's Detail    10 Sep 2022 07:  -  11 Sep 2022 07:00  --------------------------------------------------------  IN:    Dexmedetomidine: 666.1 mL    Glucerna: 320 mL    IV PiggyBack: 700 mL    Oral Fluid: 50 mL  Total IN: 1736.1 mL    OUT:    Indwelling Catheter - Urethral (mL): 463 mL    Propofol: 0 mL  Total OUT: 463 mL    Total NET: 1273.1 mL      11 Sep 2022 07:01  -  11 Sep 2022 14:12  --------------------------------------------------------  IN:    Dexmedetomidine: 157.5 mL    Glucerna: 140 mL  Total IN: 297.5 mL    OUT:    Indwelling Catheter - Urethral (mL): 117 mL  Total OUT: 117 mL    Total NET: 180.5 mL        I&O's Summary    10 Sep 2022 07:  -  11 Sep 2022 07:00  --------------------------------------------------------  IN: 1736.1 mL / OUT: 463 mL / NET: 1273.1 mL    11 Sep 2022 07:01  -  11 Sep 2022 14:12  --------------------------------------------------------  IN: 297.5 mL / OUT: 117 mL / NET: 180.5 mL          PHYSICAL EXAM:  Gen: intubated/ventilated      LABS:  ABG - ( 11 Sep 2022 04:04 )  pH, Arterial: 7.43  pH, Blood: x     /  pCO2: 27    /  pO2: 158   / HCO3: 18    / Base Excess: -5.3  /  SaO2: 99              134<L>  |  96<L>  |  99<HH>  ----------------------------<  298<H>  5.2<H>   |  17  |  5.4<HH>    Ca    8.4<L>      11 Sep 2022 04:58  Phos  9.4       Mg     2.3         TPro  5.5<L>  /  Alb  2.6<L>  /  TBili  0.2  /  DBili      /  AST  35  /  ALT  24  /  AlkPhos  248<H>      Phosphorus Level, Serum: 9.4 mg/dL (22 @ 04:58)  Phosphorus Level, Serum: 9.2 mg/dL (09-10-22 @ 05:12)    Intact PTH: 75 pg/mL (22 @ 20:00)                          10.6   11.23 )-----------( 357      ( 11 Sep 2022 04:58 )             30.8     Mean Cell Volume: 83.0 fL (22 @ 04:58)    Ferritin, Serum: 243 ng/mL (22 @ 05:49)      Urine Studies:  Urinalysis Basic - ( 09 Sep 2022 19:05 )    Color: Yellow / Appearance: Turbid / S.007 / pH:   Gluc:  / Ketone: Negative  / Bili: Negative / Urobili: <2 mg/dL   Blood:  / Protein: 30 mg/dL / Nitrite: Negative   Leuk Esterase: Large / RBC: 26 /HPF /  /HPF   Sq Epi:  / Non Sq Epi: 3 /HPF / Bacteria: Negative        Urea Nitrogen,  Random Urine: 412 mg/dL (22 @ 13:30)    Creatinine, Random Urine: 17 mg/dL ( @ 19:05)  Protein/Creatinine Ratio Calculation: 2.7 Ratio ( @ 19:05)    Creatinine trend:  Creatinine, Serum: 5.4 mg/dL (22 @ 04:58)  Creatinine, Serum: 5.4 mg/dL (09-10-22 @ 16:31)  Creatinine, Serum: 5.2 mg/dL (09-10-22 @ 05:12)  Creatinine, Serum: 4.7 mg/dL (22 @ 06:11)  Creatinine, Serum: 4.3 mg/dL (22 @ 05:10)  Creatinine, Serum: 3.6 mg/dL (22 @ 05:08)  Creatinine, Serum: 3.3 mg/dL (22 @ 05:30)  Creatinine, Serum: 3.4 mg/dL (22 @ 16:33)    Hepatitis B Core Antibody, Total: Nonreact (22 @ 06:11)  Hepatitis B Core IgM Antibody: Indeterminate (22 @ 06:11)  Serum Protein Electrophoresis Interp: Pattern Consistent With Acute Inflammation Or Stress (22 @ 23:46)    US Renal:   ACC: [id number] EXAM:  US KIDNEY(S)                          PROCEDURE DATE:  2022          INTERPRETATION:  CLINICAL INFORMATION: Acute kidney injury    COMPARISON: Sonogram dated 8/3/2022    TECHNIQUE: Sonography of the kidneys and bladder.    FINDINGS:    Right kidney: 13.8. No hydronephrosis.    Left kidney:  13.4. No hydronephrosis.    Urinary bladder: Contracted around Maloney catheter precluding assessment        IMPRESSION:    No hydronephrosis    --- End of Report ---            QING VERNON MD; Attending Radiologist  This document has been electronically signed. Sep  2 2022 11:51AM (22 @ 11:22)

## 2022-09-11 NOTE — PROGRESS NOTE ADULT - ASSESSMENT
ALF rule out ATN / relative hypotension/ sepsis / E cloaca bacteremia / HTN ( was on multiple meds )/ CVA/ GIB  cr trending up today   UO decreasing / improved with lasix 80mg iv yesterday  GI notes appreciated   sono no hydro  resume lasix 80mg iv q12h  work up to date is neg for GN ( LUIS FELIPE DsDNA  neg / RF noted/ SPEP with inflammatory pattern ) cryo to be sent out/ no thrombocytopenia   No indication yet for a kidney biopsy from renal stand point  cont  sodium bicarbonate 1300 q 8   ph noted / increase sevelamer to 2/2/2  no need for RRT yet   cont lokelma 10 q 12/ feed nepro   overall prognosis poor   will follow

## 2022-09-11 NOTE — PROGRESS NOTE ADULT - SUBJECTIVE AND OBJECTIVE BOX
ICU  Attending Progress Daily Note     11 Sep 2022 11:22    He has history of Hypertension    Diabetes mellitus      Interval event for past 24 hr:  JOSE MITCHELL  56y had no event.   Current Complains:  JOSE MITCHELL has no new complains  HPI:  55 yo F with PMH of HTN, DM2, and stroke (per son 2017) who presented for RLE wound. Started 3 months ago when she fell and hit her leg on a wooden cabinet. She put hydrogen peroxide, antibiotic cream, and wrapped the wound but never fully healed. Two weeks ago it started to show yellow pus so her and her family came to the ED for further treatment. Endorsed subjective fevers, chest pain, and vision changes which occurs when walked 2-3 blocks. Denied congestion, sore throat, cough, dyspnea, headache, dysuria, N/V, diarrhea     Per son, they fill her medications at [facility] ([phone]) and this is their current pharmacy. Called them to confirm her medications and they said her last refill of medications was . Pt has not seen a doctor due to insurance problem and has not been taking any medications.    In the ED:  Vitals: T: 98.9, BP: 296/ 174, , RR: 18, 98%O2 on RA  Labs: CBC showed WBC 11.23; ESR 92, CRP 35.6  CMP showed glucose 302, alk phos 173; ; VBG pH 7.45  EKG pending  CXR showed borderline cardiomegaly and no airspace opacity.   X-ray of RLE also pending.     Received unasyn and vanco, humalin R, IV vasotec, IV labetalol   (02 Aug 2022 07:47)    OBJECTIVE:  ICU Vital Signs Last 24 Hrs  T(C): 36 (11 Sep 2022 08:00), Max: 36.7 (11 Sep 2022 00:00)  T(F): 96.8 (11 Sep 2022 08:00), Max: 98 (11 Sep 2022 00:00)  HR: 70 (11 Sep 2022 10:00) (60 - 82)  BP: 146/81 (11 Sep 2022 10:00) (146/81 - 175/90)  BP(mean): 102 (11 Sep 2022 10:00) (102 - 133)  ABP: --  ABP(mean): --  RR: 12 (11 Sep 2022 10:00) (12 - 24)  SpO2: 99% (11 Sep 2022 10:00) (98% - 99%)    O2 Parameters below as of 11 Sep 2022 08:00  Patient On (Oxygen Delivery Method): ventilator    O2 Concentration (%): 40      I&O's Summary    10 Sep 2022 07:  -  11 Sep 2022 07:00  --------------------------------------------------------  IN: 1736.1 mL / OUT: 463 mL / NET: 1273.1 mL    11 Sep 2022 07:  -  11 Sep 2022 11:22  --------------------------------------------------------  IN: 123 mL / OUT: 72 mL / NET: 51 mL      I&O's Detail    10 Sep 2022 07:01  -  11 Sep 2022 07:00  --------------------------------------------------------  IN:    Dexmedetomidine: 666.1 mL    Glucerna: 320 mL    IV PiggyBack: 700 mL    Oral Fluid: 50 mL  Total IN: 1736.1 mL    OUT:    Indwelling Catheter - Urethral (mL): 463 mL    Propofol: 0 mL  Total OUT: 463 mL    Total NET: 1273.1 mL      11 Sep 2022 07:01  -  11 Sep 2022 11:22  --------------------------------------------------------  IN:    Dexmedetomidine: 63 mL    Glucerna: 60 mL  Total IN: 123 mL    OUT:    Indwelling Catheter - Urethral (mL): 72 mL  Total OUT: 72 mL    Total NET: 51 mL        Adult Advanced Hemodynamics Last 24 Hrs  CVP(mm Hg): --  CVP(cm H2O): --  CO: --  CI: --  PA: --  PA(mean): --  PCWP: --  SVR: --  SVRI: --  PVR: --  PVRI: --  Mode: CPAP with PS  PEEP: 5  PS: 7    CAPILLARY BLOOD GLUCOSE      POCT Blood Glucose.: 308 mg/dL (11 Sep 2022 09:22)  POCT Blood Glucose.: 344 mg/dL (11 Sep 2022 05:34)  POCT Blood Glucose.: 300 mg/dL (11 Sep 2022 01:31)  POCT Blood Glucose.: 283 mg/dL (10 Sep 2022 16:24)    LABS:  ABG - ( 11 Sep 2022 04:04 )  pH, Arterial: 7.43  pH, Blood: x     /  pCO2: 27    /  pO2: 158   / HCO3: 18    / Base Excess: -5.3  /  SaO2: 99                                      10.6   11.23 )-----------( 357      ( 11 Sep 2022 04:58 )             30.8         134<L>  |  96<L>  |  99<HH>  ----------------------------<  298<H>  5.2<H>   |  17  |  5.4<HH>    Ca    8.4<L>      11 Sep 2022 04:58  Phos  9.4       Mg     2.3         TPro  5.5<L>  /  Alb  2.6<L>  /  TBili  0.2  /  DBili  x   /  AST  35  /  ALT  24  /  AlkPhos  248<H>        Urinalysis Basic - ( 09 Sep 2022 19:05 )    Color: Yellow / Appearance: Turbid / S.007 / pH: x  Gluc: x / Ketone: Negative  / Bili: Negative / Urobili: <2 mg/dL   Blood: x / Protein: 30 mg/dL / Nitrite: Negative   Leuk Esterase: Large / RBC: 26 /HPF /  /HPF   Sq Epi: x / Non Sq Epi: 3 /HPF / Bacteria: Negative        Home Medications:  losartan 50 mg oral tablet: 1 tab(s) orally once a day (02 Aug 2022 10:38)  metFORMIN 500 mg oral tablet: 1 tab(s) orally 2 times a day (02 Aug 2022 10:38)  metoprolol succinate 50 mg oral tablet, extended release: 1 tab(s) orally once a day (02 Aug 2022 10:38)    HOSPITAL MEDICATIONS:  MEDICATIONS  (STANDING):  amLODIPine   Tablet 10 milliGRAM(s) Oral daily  aspirin  chewable 81 milliGRAM(s) Oral daily  atorvastatin 80 milliGRAM(s) Oral at bedtime  aztreonam  IVPB 2000 milliGRAM(s) IV Intermittent every 12 hours  aztreonam  IVPB      ceftazidime/avibactam IVPB 0.94 Gram(s) IV Intermittent every 12 hours  chlorhexidine 0.12% Liquid 15 milliLiter(s) Oral Mucosa every 12 hours  chlorhexidine 2% Cloths 1 Application(s) Topical <User Schedule>  collagenase Ointment 1 Application(s) Topical two times a day  Dakins Solution - 1/2 Strength 1 Application(s) Topical two times a day  dexMEDEtomidine Infusion 0.2 MICROgram(s)/kG/Hr (4.07 mL/Hr) IV Continuous <Continuous>  dextrose 5%. 1000 milliLiter(s) (100 mL/Hr) IV Continuous <Continuous>  dextrose 5%. 1000 milliLiter(s) (50 mL/Hr) IV Continuous <Continuous>  dextrose 50% Injectable 25 Gram(s) IV Push once  dextrose 50% Injectable 12.5 Gram(s) IV Push once  dextrose 50% Injectable 25 Gram(s) IV Push once  fentaNYL   Infusion. 0.5 MICROgram(s)/kG/Hr (4.07 mL/Hr) IV Continuous <Continuous>  glucagon  Injectable 1 milliGRAM(s) IntraMuscular once  hydrALAZINE 100 milliGRAM(s) Oral every 8 hours  insulin glargine Injectable (LANTUS) 30 Unit(s) SubCutaneous every morning  insulin lispro (ADMELOG) corrective regimen sliding scale   SubCutaneous three times a day before meals  labetalol 600 milliGRAM(s) Oral three times a day  lacosamide IVPB 50 milliGRAM(s) IV Intermittent every 12 hours  levETIRAcetam  Solution 500 milliGRAM(s) Oral two times a day  lidocaine 1%/epinephrine 1:100,000 Inj 20 milliLiter(s) Local Injection once  methylPREDNISolone sodium succinate IVPB 1000 milliGRAM(s) IV Intermittent daily  multivitamin 1 Tablet(s) Oral daily  pantoprazole  Injectable 40 milliGRAM(s) IV Push two times a day  propofol Infusion 10 MICROgram(s)/kG/Min (4.88 mL/Hr) IV Continuous <Continuous>  sevelamer carbonate Powder 800 milliGRAM(s) Oral three times a day with meals  sodium bicarbonate 1300 milliGRAM(s) Oral every 8 hours  sodium chloride 0.65% Nasal 2 Spray(s) Both Nostrils two times a day  sodium zirconium cyclosilicate 10 Gram(s) Oral every 12 hours    MEDICATIONS  (PRN):  acetaminophen     Tablet .. 650 milliGRAM(s) Oral every 6 hours PRN Temp greater or equal to 38C (100.4F), Mild Pain (1 - 3)  dextrose Oral Gel 15 Gram(s) Oral once PRN Blood Glucose LESS THAN 70 milliGRAM(s)/deciliter  labetalol Injectable 10 milliGRAM(s) IV Push every 6 hours PRN Systolic blood pressure >  melatonin 3 milliGRAM(s) Oral at bedtime PRN Insomnia      REVIEW OF SYSTEMS:  CONSTITUTIONAL: [X] all negative; [ ] weakness, [ ] fevers, [ ] chills  EYES/ENT: [X] all negative; [ ] visual changes, [ ] vertigo, [ ] throat pain   NECK: [X] all negative; [ ] pain, [ ] stiffness  RESPIRATORY: [] all negative, [ ] cough, [ ] wheezing, [ ] hemoptysis, [ ] shortness of breath  CARDIOVASCULAR: [] all negative; [ ] chest pain, [ ] palpitations, [ ] orthopnea  GASTROINTESTINAL: [X] all negative; [ ]abdominal pain, [ ] nausea, [ ] vomiting, [ ] hematemesis, [ ] diarrhea, [ ] constipation, [ ] melena, [ ] hematochezia.  GENITOURINARY: [X] all negative; [ ] dysuria, [ ] frequency, [ ] hematuria  NEUROLOGICAL: [X] all negative; [ ] numbness, [ ] weakness  SKIN: [X] all negative; [ ] itching, [ ] burning, [ ] rashes, [ ] lesions   All other review of systems is negative unless indicated above.    [  ] Unable to assess ROS because     PHYSICAL EXAM:          CONSTITUTIONAL: Well-developed; well-nourished; in no acute distress.   	SKIN: warm, dry  	HEAD: Normocephalic; atraumatic.  	EYES: PERRL, EOM, no conj injection, sclera clear  	ENT: No nasal discharge; airway clear.  	NECK: Supple; non tender.  No midline ttp ctls  	CARD: S1, S2 normal; no murmurs, gallops, or rubs. Regular rate and rhythm. 2+ RPs and DPs bilat, no carotid bruits, no pedal   edema, no calf pain b/l  	RESP: CTA  bilat good air movement No wheezes, rales or rhonchi.  	ABD: Soft, not tender, not distended, no CVA ttp no rebound or guarding, bowel sounds present  	EXT: Normal ROM.  No clubbing, cyanosis or edema.   	  	NEURO: Alert, awake, motor 5/5 R, 5/5 L        RADIOLOGY:  xray  < from: Xray Chest 1 View- PORTABLE-Routine (Xray Chest 1 View- PORTABLE-Routine in AM.) (22 @ 05:45) >    Impression:    Increased bilateral basilar opacities and left pleural effusion.    --- End of Report ---    < end of copied text >    I spent 45 minutes of critical care time ; more than 50% of visit was spent counseling and/or examining patient, reviewing vitals, labs, medications, imaging and discussing with the team goals of care to prevent life-threatening in this patient who is at high risk for deterioration or death due to:

## 2022-09-12 NOTE — PROGRESS NOTE ADULT - PROBLEM SELECTOR PLAN 2
poor mental status, little improvement since admission  s/p PEG due to failed S & S evals  neurology/rheumatology for workup for CNS vasculitis   seizure recommendations per neuro  continue aggressive medical management
poor mental status, little improvement since admission  s/p PEG due to failed S & S evals earlier this admission   neurology/rheumatology recs for CNS vasculitis   seizure recommendations per neuro  continue aggressive medical management

## 2022-09-12 NOTE — PROGRESS NOTE ADULT - PROBLEM SELECTOR PLAN 3
s/p EGD today with GI  - external bleeding hemorroids  s/p 3 Units PRBCs   continue  medical management  continues to bleed from mouth s/p EGD today with GI  - external bleeding hemorroids  s/p 3 Units PRBCs   continue  medical management  continues to bleed from mouth  Remains intubated

## 2022-09-12 NOTE — PROGRESS NOTE ADULT - PROBLEM SELECTOR PLAN 1
on MRI multiple lacunar infarcts  hx of stroke (2017)   neurology following for recommendations-  no clear prognosis yet  suspicion for CNS vasculitis - rheum c/s--> on steroids
on MRI multiple lacunar infarcts  hx of stroke (2017)   neurology following for recommendations-  no clear prognosis yet  undergoing workup for CNS vasculitis - rheum c/s

## 2022-09-12 NOTE — CONSULT NOTE ADULT - SUBJECTIVE AND OBJECTIVE BOX
GENERAL SURGERY CONSULT NOTE    Patient: JOSE MITCHELL , 56y ([birth year])Female   MRN: [id number]  Location: [facility]  A  Visit: 08-02-22 Inpatient  Date: 09-12-22 @ 17:25    HPI:  57 yo F with PMH of HTN, DM2, and stroke (per son 2017) who presented for RLE wound. Started 3 months ago when she fell and hit her leg on a wooden cabinet. She put hydrogen peroxide, antibiotic cream, and wrapped the wound but never fully healed. Two weeks ago it started to show yellow pus so her and her family came to the ED for further treatment. Endorsed subjective fevers, chest pain, and vision changes which occurs when walked 2-3 blocks. Denied congestion, sore throat, cough, dyspnea, headache, dysuria, N/V, diarrhea     Per son, they fill her medications at [facility] ([phone]) and this is their current pharmacy. Called them to confirm her medications and they said her last refill of medications was 2018. Pt has not seen a doctor due to insurance problem and has not been taking any medications.    In the ED:  Vitals: T: 98.9, BP: 296/ 174, , RR: 18, 98%O2 on RA  Labs: CBC showed WBC 11.23; ESR 92, CRP 35.6  CMP showed glucose 302, alk phos 173; ; VBG pH 7.45  EKG pending  CXR showed borderline cardiomegaly and no airspace opacity.   X-ray of RLE also pending.     Received unasyn and vanco, humalin R, IV vasotec, IV labetalol   (02 Aug 2022 07:47)    Thoracic surgery consulted for trach & PEG. Patient intubated on 9/6/22 for endoscopy/colonoscopy and remains intubated.     PAST MEDICAL & SURGICAL HISTORY:  Hypertension  Diabetes mellitus    No significant past surgical history    Home Medications:  losartan 50 mg oral tablet: 1 tab(s) orally once a day (02 Aug 2022 10:38)  metFORMIN 500 mg oral tablet: 1 tab(s) orally 2 times a day (02 Aug 2022 10:38)  metoprolol succinate 50 mg oral tablet, extended release: 1 tab(s) orally once a day (02 Aug 2022 10:38)    VITALS:  T(F): 96.9 (09-12-22 @ 16:00), Max: 97.1 (09-12-22 @ 08:00)  HR: 58 (09-12-22 @ 17:00) (52 - 78)  BP: 150/81 (09-12-22 @ 17:00) (134/78 - 167/89)  RR: 11 (09-12-22 @ 17:00) (11 - 23)  SpO2: 99% (09-12-22 @ 17:00) (99% - 100%)    PHYSICAL EXAM:  General: NAD, sedated and intubated   HEENT: NCAT  Cardiac: S1, S2  Respiratory: bilateral breath sounds, vented   Abdomen: Soft, non-distended  Skin: no jaundice    MEDICATIONS  (STANDING):  amLODIPine   Tablet 10 milliGRAM(s) Oral daily  aspirin  chewable 81 milliGRAM(s) Oral daily  atorvastatin 80 milliGRAM(s) Oral at bedtime  aztreonam  IVPB      aztreonam  IVPB 2000 milliGRAM(s) IV Intermittent every 12 hours  chlorhexidine 0.12% Liquid 15 milliLiter(s) Oral Mucosa every 12 hours  chlorhexidine 2% Cloths 1 Application(s) Topical <User Schedule>  collagenase Ointment 1 Application(s) Topical two times a day  Dakins Solution - 1/2 Strength 1 Application(s) Topical two times a day  dexMEDEtomidine Infusion 0.2 MICROgram(s)/kG/Hr (4.07 mL/Hr) IV Continuous <Continuous>  dextrose 5%. 1000 milliLiter(s) (100 mL/Hr) IV Continuous <Continuous>  dextrose 5%. 1000 milliLiter(s) (50 mL/Hr) IV Continuous <Continuous>  dextrose 50% Injectable 25 Gram(s) IV Push once  dextrose 50% Injectable 12.5 Gram(s) IV Push once  dextrose 50% Injectable 25 Gram(s) IV Push once  furosemide   Injectable 80 milliGRAM(s) IV Push every 12 hours  glucagon  Injectable 1 milliGRAM(s) IntraMuscular once  hydrALAZINE 100 milliGRAM(s) Oral every 8 hours  insulin regular Infusion 1 Unit(s)/Hr (1 mL/Hr) IV Continuous <Continuous>  labetalol 600 milliGRAM(s) Oral three times a day  lacosamide IVPB 50 milliGRAM(s) IV Intermittent every 12 hours  levETIRAcetam  Solution 500 milliGRAM(s) Oral two times a day  lidocaine 1%/epinephrine 1:100,000 Inj 20 milliLiter(s) Local Injection once  methylPREDNISolone sodium succinate IVPB 1000 milliGRAM(s) IV Intermittent daily  multivitamin 1 Tablet(s) Oral daily  pantoprazole  Injectable 40 milliGRAM(s) IV Push two times a day  sevelamer carbonate Powder 1600 milliGRAM(s) Oral every 8 hours  sodium bicarbonate 1300 milliGRAM(s) Oral every 8 hours  sodium chloride 0.65% Nasal 2 Spray(s) Both Nostrils two times a day  sodium zirconium cyclosilicate 10 Gram(s) Oral every 12 hours    MEDICATIONS  (PRN):  acetaminophen     Tablet .. 650 milliGRAM(s) Oral every 6 hours PRN Temp greater or equal to 38C (100.4F), Mild Pain (1 - 3)  dextrose Oral Gel 15 Gram(s) Oral once PRN Blood Glucose LESS THAN 70 milliGRAM(s)/deciliter  labetalol Injectable 10 milliGRAM(s) IV Push every 6 hours PRN Systolic blood pressure >  melatonin 3 milliGRAM(s) Oral at bedtime PRN Insomnia    LAB/STUDIES:                        10.0   9.47  )-----------( 348      ( 12 Sep 2022 05:17 )             28.7     09-12    134<L>  |  94<L>  |  107<HH>  ----------------------------<  306<H>  4.5   |  19  |  5.8<HH>    Ca    8.4<L>      12 Sep 2022 05:17  Phos  10.1     09-12  Mg     2.4     09-12    TPro  5.5<L>  /  Alb  2.7<L>  /  TBili  0.2  /  DBili  x   /  AST  33  /  ALT  23  /  AlkPhos  222<H>  09-12    PT/INR - ( 12 Sep 2022 05:17 )   PT: 16.50 sec;   INR: 1.44 ratio    PTT - ( 12 Sep 2022 05:17 )  PTT:30.5 sec  LIVER FUNCTIONS - ( 12 Sep 2022 05:17 )  Alb: 2.7 g/dL / Pro: 5.5 g/dL / ALK PHOS: 222 U/L / ALT: 23 U/L / AST: 33 U/L / GGT: x           ABG - ( 12 Sep 2022 03:49 )  pH, Arterial: 7.44  pH, Blood: x     /  pCO2: 27    /  pO2: 424   / HCO3: 18    / Base Excess: -4.4  /  SaO2: 99.7      IMAGING:  < from: Xray Chest 1 View- PORTABLE-Routine (09.12.22 @ 06:09) >  Impression:  Stable bilateral lung opacities  --- End of Report ---

## 2022-09-12 NOTE — PROGRESS NOTE ADULT - ASSESSMENT
56yFemale with PMH including HTN, DM2, and stroke (per son 2017), being evaluated for GOC. Patient was initially admitted on 8/2 for RLE wound, hypertensive urgency. Pt was stroke code on 8/5, seizure activity on EEG. MRI showed multiple lacunar acute infarcts on 8/10. Patients mental status continued to be poor, failed S& S evals- s/p PEG tube 8/24. Patient was having LGIB with drop in HGB, now s/p intubation and scope with GI- found hemorrhoids.       Patient continues to have very poor prognosis.   May be beneficial to have family meeting prior to offering trach/PEG to make sure family understands patients expected prognosis and condition.     MEDD (morphine equivalent daily dose): 0    See Recs below.    Please call [phone] with questions or concerns 24/7.   We will continue to follow.     Discussed with primary MD.

## 2022-09-12 NOTE — PROGRESS NOTE ADULT - SUBJECTIVE AND OBJECTIVE BOX
Patient is a 56y old  Female who presents with a chief complaint of sepsis (09 Sep 2022 08:07)      INTERVAL HPI/OVERNIGHT EVENTS:   Overnight low urine output  Afebrile, hemodynamically stable         ICU Vital Signs Last 24 Hrs  T(C): 35.9 (12 Sep 2022 12:00), Max: 36.2 (12 Sep 2022 08:00)  T(F): 96.6 (12 Sep 2022 12:00), Max: 97.1 (12 Sep 2022 08:00)  HR: 60 (12 Sep 2022 12:00) (52 - 78)  BP: 160/88 (12 Sep 2022 12:00) (134/78 - 167/89)  BP(mean): 115 (12 Sep 2022 12:00) (96 - 131)  ABP: --  ABP(mean): --  RR: 18 (12 Sep 2022 12:00) (12 - 23)  SpO2: 99% (12 Sep 2022 12:00) (99% - 100%)    O2 Parameters below as of 12 Sep 2022 12:00  Patient On (Oxygen Delivery Method): ventilator    O2 Concentration (%): 40      I&O's Summary    11 Sep 2022 07:01  -  12 Sep 2022 07:00  --------------------------------------------------------  IN: 1279 mL / OUT: 305 mL / NET: 974 mL    12 Sep 2022 07:01  -  12 Sep 2022 14:08  --------------------------------------------------------  IN: 266.4 mL / OUT: 40 mL / NET: 226.4 mL      Mode: AC/ CMV (Assist Control/ Continuous Mandatory Ventilation)  RR (machine): 16  TV (machine): 400  FiO2: 40  PEEP: 5  ITime: 1  MAP: 14  PIP: 23      LABS:                        10.0   9.47  )-----------( 348      ( 12 Sep 2022 05:17 )             28.7     09-12    134<L>  |  94<L>  |  107<HH>  ----------------------------<  306<H>  4.5   |  19  |  5.8<HH>    Ca    8.4<L>      12 Sep 2022 05:17  Phos  10.1     09-12  Mg     2.4     09-12    TPro  5.5<L>  /  Alb  2.7<L>  /  TBili  0.2  /  DBili  x   /  AST  33  /  ALT  23  /  AlkPhos  222<H>  09-12    PT/INR - ( 12 Sep 2022 05:17 )   PT: 16.50 sec;   INR: 1.44 ratio         PTT - ( 12 Sep 2022 05:17 )  PTT:30.5 sec    CAPILLARY BLOOD GLUCOSE      POCT Blood Glucose.: 302 mg/dL (12 Sep 2022 13:01)  POCT Blood Glucose.: 307 mg/dL (12 Sep 2022 11:46)  POCT Blood Glucose.: 360 mg/dL (12 Sep 2022 06:28)  POCT Blood Glucose.: 307 mg/dL (12 Sep 2022 05:18)  POCT Blood Glucose.: 275 mg/dL (12 Sep 2022 00:17)  POCT Blood Glucose.: 272 mg/dL (11 Sep 2022 20:43)  POCT Blood Glucose.: 303 mg/dL (11 Sep 2022 16:24)  POCT Blood Glucose.: 307 mg/dL (11 Sep 2022 14:37)    ABG - ( 12 Sep 2022 03:49 )  pH, Arterial: 7.44  pH, Blood: x     /  pCO2: 27    /  pO2: 424   / HCO3: 18    / Base Excess: -4.4  /  SaO2: 99.7                RADIOLOGY & ADDITIONAL TESTS:    Consultant(s) Notes Reviewed:  [x ] YES  [ ] NO    MEDICATIONS  (STANDING):  amLODIPine   Tablet 10 milliGRAM(s) Oral daily  aspirin  chewable 81 milliGRAM(s) Oral daily  atorvastatin 80 milliGRAM(s) Oral at bedtime  aztreonam  IVPB      aztreonam  IVPB 2000 milliGRAM(s) IV Intermittent every 12 hours  chlorhexidine 0.12% Liquid 15 milliLiter(s) Oral Mucosa every 12 hours  chlorhexidine 2% Cloths 1 Application(s) Topical <User Schedule>  collagenase Ointment 1 Application(s) Topical two times a day  Dakins Solution - 1/2 Strength 1 Application(s) Topical two times a day  dexMEDEtomidine Infusion 0.2 MICROgram(s)/kG/Hr (4.07 mL/Hr) IV Continuous <Continuous>  dextrose 5%. 1000 milliLiter(s) (100 mL/Hr) IV Continuous <Continuous>  dextrose 5%. 1000 milliLiter(s) (50 mL/Hr) IV Continuous <Continuous>  dextrose 50% Injectable 25 Gram(s) IV Push once  dextrose 50% Injectable 12.5 Gram(s) IV Push once  dextrose 50% Injectable 25 Gram(s) IV Push once  furosemide   Injectable 80 milliGRAM(s) IV Push every 12 hours  glucagon  Injectable 1 milliGRAM(s) IntraMuscular once  hydrALAZINE 100 milliGRAM(s) Oral every 8 hours  insulin regular Infusion 1 Unit(s)/Hr (1 mL/Hr) IV Continuous <Continuous>  labetalol 600 milliGRAM(s) Oral three times a day  lacosamide IVPB 50 milliGRAM(s) IV Intermittent every 12 hours  levETIRAcetam  Solution 500 milliGRAM(s) Oral two times a day  lidocaine 1%/epinephrine 1:100,000 Inj 20 milliLiter(s) Local Injection once  methylPREDNISolone sodium succinate IVPB 1000 milliGRAM(s) IV Intermittent daily  multivitamin 1 Tablet(s) Oral daily  pantoprazole  Injectable 40 milliGRAM(s) IV Push two times a day  sevelamer carbonate Powder 1600 milliGRAM(s) Oral every 8 hours  sodium bicarbonate 1300 milliGRAM(s) Oral every 8 hours  sodium chloride 0.65% Nasal 2 Spray(s) Both Nostrils two times a day  sodium zirconium cyclosilicate 10 Gram(s) Oral every 12 hours    MEDICATIONS  (PRN):  acetaminophen     Tablet .. 650 milliGRAM(s) Oral every 6 hours PRN Temp greater or equal to 38C (100.4F), Mild Pain (1 - 3)  dextrose Oral Gel 15 Gram(s) Oral once PRN Blood Glucose LESS THAN 70 milliGRAM(s)/deciliter  labetalol Injectable 10 milliGRAM(s) IV Push every 6 hours PRN Systolic blood pressure >  melatonin 3 milliGRAM(s) Oral at bedtime PRN Insomnia      PHYSICAL EXAM:  GENERAL: NAD  HEAD:  Atraumatic, Normocephalic  EYES: EOMI right pupil greater than left,   NERVOUS SYSTEM: nonresponsive to commands, eyes are open with non-purposeful movement  CHEST/LUNG: intubated, B/L coarse breath sounds  HEART: S1S2 normal, no S3, Regular rate and rhythm; No murmurs    Assessment/Plan   57 y/o woman with PMH of HTN, DM2, 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 During hospital stay, found to have multiple punctate infarcts suspected to be cardioembolic vs vasculitis. Patient also found to have sharp waves on vEEG and currently on AEDs. Hospital course further complicated by fever and now with MDR Enterobacter bacteremia.    # nonoliguric ALF / Urinary retention / Suspected ATN  - Cr continues to trend up 4.7  - on IVFs  - 1 dose of lasix 80 mg IV once was given   - avoid hypotension   - renal US 9/2: no hydronephrosis  - keep nolasco for now  - sodium bicarbonate increased to 1300 q8h. Sodium bicarb drip started,   - nephro following  - daily BMP and I's and O's  - phos level 6.1, started sevelamer 800 mg TID in PEG tube       # Acute ischemic strokes mutlifocal  - Spoke with neurology, suspiscious for vasculitis, but not confirmed, CSF studies does not support the diagnosis. - Plavix held for LGIB. Aspirin resumed    - neurology recommended 1g IV solumedrol for 5 days, today is day 2. (cleared from ID)  - FU EEG, cw keppra   - Neuro check q1h  - Pt is intubated.   - Per stroke team, no need for angio, requested a renal bx as expected widespread vasculitis. And once cleared for infection, can be started on steroids (can wait to results of bx as rheum advised against rx empirically).   - 9/12 on day 3/5 of steroids, f/u neuro recs after course complete for steroid dose  - CT Surgery consulted for trach and PEG tube placement    # Bacteremia, resolved   - bcx positive for MDR enterobacter Cloacae   - Cw w antibiotics per ID   - bcx daily   - 9/12 ceftaz changed to q 24, aztreoname still q12    # Purulent Right LE cellulitis   - s/p debridement by burn on 8/10  - Candida in wound. per Dr. Chua: contaminant  -  BCx w/ staph: likely contaminant. repeat BCx at that time was negative      # GI bleed   - S/p 6 units of PRBCs. Hb is stable.   - Pt was seen by GI, bleeding hemorrhoids noted, external. Surgery contacted for rx.   - Protonix q12h  - ENT eval appreciated for oral bleed. No actively oozing wounds, teeth guard put in.     - pt intubated   - oral cavity bleed from tongue biting     # Hypertensive urgency due to noncompliance and Malignant HTN   - BP on admission 296/174 without signs of end organ damage. Renal artery duplex wnl>>ARIAN unlikely  - Aldosterone wnl, renin elevated  - BP overall improved  - SBP goal 120-160    # Diabetes mellitus   - HbA1C 10.4  - Lantus and lispro sliding scale    #HLD   - cw statins       #MISC:  - GI prophylaxis: protonix   - Dispo: MICU   - Acitivity: bedrest     #Handoff  - trach and peg placement, completion of steroid course to assess if mental status improves, completion of antibiotics

## 2022-09-12 NOTE — PROGRESS NOTE ADULT - SUBJECTIVE AND OBJECTIVE BOX
Patient is a 56y old  Female who presents with a chief complaint of sepsis (09 Sep 2022 08:07)        Over Night Events:  Remains critically ill on MV. Sedated.  Off pressors.           ROS:     All ROS are negative except HPI         PHYSICAL EXAM    ICU Vital Signs Last 24 Hrs  T(C): 35.9 (12 Sep 2022 04:00), Max: 36.1 (11 Sep 2022 12:00)  T(F): 96.6 (12 Sep 2022 04:00), Max: 96.9 (11 Sep 2022 12:00)  HR: 61 (12 Sep 2022 08:58) (52 - 76)  BP: 167/89 (12 Sep 2022 07:00) (134/78 - 167/89)  BP(mean): 120 (12 Sep 2022 07:00) (96 - 151)  ABP: --  ABP(mean): --  RR: 21 (12 Sep 2022 07:00) (11 - 23)  SpO2: 99% (12 Sep 2022 08:58) (99% - 100%)    O2 Parameters below as of 12 Sep 2022 07:00  Patient On (Oxygen Delivery Method): ventilator    O2 Concentration (%): 40        CONSTITUTIONAL:  In  NAD    ENT:   Airway patent,   Mouth with normal mucosa.   + ET     EYES:   Pupils equal,   Round and reactive to light.    CARDIAC:   Normal rate,   Regular rhythm.        RESPIRATORY:   No wheezing  Bilateral BS  Normal chest expansion  Not tachypneic,  No use of accessory muscles    GASTROINTESTINAL:  Abdomen soft,   Non-tender,   No guarding,   + BS    MUSCULOSKELETAL:   Range of motion is not limited,    NEUROLOGICAL:   Sedated     SKIN:   Skin normal color for race,   No evidence of rash.    PSYCHIATRIC:   No apparent risk to self or others.          09-11-22 @ 07:01  -  09-12-22 @ 07:00  --------------------------------------------------------  IN:    Dexmedetomidine: 549 mL    Glucerna: 480 mL    IV PiggyBack: 250 mL  Total IN: 1279 mL    OUT:    Indwelling Catheter - Urethral (mL): 305 mL  Total OUT: 305 mL    Total NET: 974 mL          LABS:                            10.0   9.47  )-----------( 348      ( 12 Sep 2022 05:17 )             28.7                                               09-12    134<L>  |  94<L>  |  107<HH>  ----------------------------<  306<H>  4.5   |  19  |  5.8<HH>    Creatinine Trend  , Cr 5.8, (09-12-22 @ 05:17)  Creatinine Trend  BUN 99, Cr 5.4, (09-11-22 @ 04:58)  Creatinine Trend  BUN 96, Cr 5.4, (09-10-22 @ 16:31)  Creatinine Trend  BUN 89, Cr 5.2, (09-10-22 @ 05:12)  Creatinine Trend  BUN 79, Cr 4.7, (09-09-22 @ 06:11)  Creatinine Trend  BUN 75, Cr 4.3, (09-08-22 @ 05:10)      Ca    8.4<L>      12 Sep 2022 05:17  Phos  10.1     09-12  Mg     2.4     09-12    TPro  5.5<L>  /  Alb  2.7<L>  /  TBili  0.2  /  DBili  x   /  AST  33  /  ALT  23  /  AlkPhos  222<H>  09-12      PT/INR - ( 12 Sep 2022 05:17 )   PT: 16.50 sec;   INR: 1.44 ratio         PTT - ( 12 Sep 2022 05:17 )  PTT:30.5 sec                                                                                     LIVER FUNCTIONS - ( 12 Sep 2022 05:17 )  Alb: 2.7 g/dL / Pro: 5.5 g/dL / ALK PHOS: 222 U/L / ALT: 23 U/L / AST: 33 U/L / GGT: x                                                                                               Mode: AC/ CMV (Assist Control/ Continuous Mandatory Ventilation)  RR (machine): 16  TV (machine): 400  FiO2: 100  PEEP: 5  ITime: 1  MAP: 14  PIP: 23                                      ABG - ( 12 Sep 2022 03:49 )  pH, Arterial: 7.44  pH, Blood: x     /  pCO2: 27    /  pO2: 424   / HCO3: 18    / Base Excess: -4.4  /  SaO2: 99.7                MEDICATIONS  (STANDING):  amLODIPine   Tablet 10 milliGRAM(s) Oral daily  aspirin  chewable 81 milliGRAM(s) Oral daily  atorvastatin 80 milliGRAM(s) Oral at bedtime  aztreonam  IVPB      aztreonam  IVPB 2000 milliGRAM(s) IV Intermittent every 12 hours  ceftazidime/avibactam IVPB 0.94 Gram(s) IV Intermittent every 12 hours  chlorhexidine 0.12% Liquid 15 milliLiter(s) Oral Mucosa every 12 hours  chlorhexidine 2% Cloths 1 Application(s) Topical <User Schedule>  collagenase Ointment 1 Application(s) Topical two times a day  Dakins Solution - 1/2 Strength 1 Application(s) Topical two times a day  dexMEDEtomidine Infusion 0.2 MICROgram(s)/kG/Hr (4.07 mL/Hr) IV Continuous <Continuous>  dextrose 5%. 1000 milliLiter(s) (50 mL/Hr) IV Continuous <Continuous>  dextrose 5%. 1000 milliLiter(s) (100 mL/Hr) IV Continuous <Continuous>  dextrose 50% Injectable 25 Gram(s) IV Push once  dextrose 50% Injectable 12.5 Gram(s) IV Push once  dextrose 50% Injectable 25 Gram(s) IV Push once  furosemide   Injectable 80 milliGRAM(s) IV Push every 12 hours  glucagon  Injectable 1 milliGRAM(s) IntraMuscular once  hydrALAZINE 100 milliGRAM(s) Oral every 8 hours  insulin glargine Injectable (LANTUS) 30 Unit(s) SubCutaneous every morning  insulin lispro (ADMELOG) corrective regimen sliding scale   SubCutaneous three times a day before meals  labetalol 600 milliGRAM(s) Oral three times a day  lacosamide IVPB 50 milliGRAM(s) IV Intermittent every 12 hours  levETIRAcetam  Solution 500 milliGRAM(s) Oral two times a day  lidocaine 1%/epinephrine 1:100,000 Inj 20 milliLiter(s) Local Injection once  methylPREDNISolone sodium succinate IVPB 1000 milliGRAM(s) IV Intermittent daily  multivitamin 1 Tablet(s) Oral daily  pantoprazole  Injectable 40 milliGRAM(s) IV Push two times a day  sevelamer carbonate Powder 800 milliGRAM(s) Oral three times a day with meals  sodium bicarbonate 1300 milliGRAM(s) Oral every 8 hours  sodium chloride 0.65% Nasal 2 Spray(s) Both Nostrils two times a day  sodium zirconium cyclosilicate 10 Gram(s) Oral every 12 hours    MEDICATIONS  (PRN):  acetaminophen     Tablet .. 650 milliGRAM(s) Oral every 6 hours PRN Temp greater or equal to 38C (100.4F), Mild Pain (1 - 3)  dextrose Oral Gel 15 Gram(s) Oral once PRN Blood Glucose LESS THAN 70 milliGRAM(s)/deciliter  labetalol Injectable 10 milliGRAM(s) IV Push every 6 hours PRN Systolic blood pressure >  melatonin 3 milliGRAM(s) Oral at bedtime PRN Insomnia      New X-rays reviewed:                                                                                  ECHO

## 2022-09-12 NOTE — PROGRESS NOTE ADULT - SUBJECTIVE AND OBJECTIVE BOX
HPI:  55 yo F with PMH of HTN, DM2, and stroke (per son 2017) who presented for RLE wound. Started 3 months ago when she fell and hit her leg on a wooden cabinet. She put hydrogen peroxide, antibiotic cream, and wrapped the wound but never fully healed. Two weeks ago it started to show yellow pus so her and her family came to the ED for further treatment. Endorsed subjective fevers, chest pain, and vision changes which occurs when walked 2-3 blocks. Denied congestion, sore throat, cough, dyspnea, headache, dysuria, N/V, diarrhea    (02 Aug 2022 07:47)     INTERVAL EVENTS:  9/6: pt intubated for endoscopy. spoke with son, Latrell, who wanted to keep full code and continue aggressive medical management. he remains hopeful she will improve. they want to pursue continued workup with rheum/neuro, etc.   9/7: pt remains intubated, bradycardic.   9/12: pt remains intubated, bleeding from mouth, on steroids for suspected vasculitis, poor mental status, ALF    ADVANCE DIRECTIVES:    MOLST  [ ]  Living Will  [ ]   DECISION MAKER(s):  [ ] Health Care Proxy(s)  [ X] Surrogate(s)  [ ] Guardian           Name(s): Phone Number(s): Barney Sams     BASELINE (I)ADL(s) (prior to admission):  [place]: [X ]Total  [ ] Moderate [ ]Dependent  Palliative Performance Status Version 2:      80   %    [email or web address]    Allergies    No Known Allergies    Intolerances    MEDICATIONS  (STANDING):  aspirin  chewable 81 milliGRAM(s) Oral daily  atorvastatin 80 milliGRAM(s) Oral at bedtime  aztreonam  IVPB 2000 milliGRAM(s) IV Intermittent every 12 hours  aztreonam  IVPB      ceftazidime/avibactam IVPB 0.94 Gram(s) IV Intermittent every 12 hours  chlorhexidine 0.12% Liquid 15 milliLiter(s) Oral Mucosa every 12 hours  chlorhexidine 2% Cloths 1 Application(s) Topical <User Schedule>  collagenase Ointment 1 Application(s) Topical two times a day  Dakins Solution - 1/2 Strength 1 Application(s) Topical two times a day  dexMEDEtomidine Infusion 0.2 MICROgram(s)/kG/Hr (4.07 mL/Hr) IV Continuous <Continuous>  dextrose 5% 1000 milliLiter(s) (75 mL/Hr) IV Continuous <Continuous>  dextrose 5%. 1000 milliLiter(s) (100 mL/Hr) IV Continuous <Continuous>  dextrose 5%. 1000 milliLiter(s) (50 mL/Hr) IV Continuous <Continuous>  dextrose 50% Injectable 25 Gram(s) IV Push once  dextrose 50% Injectable 12.5 Gram(s) IV Push once  dextrose 50% Injectable 25 Gram(s) IV Push once  fentaNYL   Infusion. 0.5 MICROgram(s)/kG/Hr (4.07 mL/Hr) IV Continuous <Continuous>  glucagon  Injectable 1 milliGRAM(s) IntraMuscular once  hydrALAZINE 100 milliGRAM(s) Oral every 8 hours  insulin lispro (ADMELOG) corrective regimen sliding scale   SubCutaneous three times a day before meals  labetalol 600 milliGRAM(s) Oral three times a day  lacosamide IVPB 50 milliGRAM(s) IV Intermittent every 12 hours  levETIRAcetam  Solution 500 milliGRAM(s) Oral two times a day  lidocaine 1%/epinephrine 1:100,000 Inj 20 milliLiter(s) Local Injection once  multivitamin/minerals/iron Oral Solution (CENTRUM) 15 milliLiter(s) Oral daily  pantoprazole  Injectable 40 milliGRAM(s) IV Push two times a day  propofol Infusion 10 MICROgram(s)/kG/Min (4.88 mL/Hr) IV Continuous <Continuous>  sodium bicarbonate 1300 milliGRAM(s) Oral every 8 hours  sodium chloride 0.65% Nasal 2 Spray(s) Both Nostrils two times a day    MEDICATIONS  (PRN):  acetaminophen     Tablet .. 650 milliGRAM(s) Oral every 6 hours PRN Temp greater or equal to 38C (100.4F), Mild Pain (1 - 3)  dextrose Oral Gel 15 Gram(s) Oral once PRN Blood Glucose LESS THAN 70 milliGRAM(s)/deciliter  labetalol Injectable 10 milliGRAM(s) IV Push every 6 hours PRN Systolic blood pressure >  melatonin 3 milliGRAM(s) Oral at bedtime PRN Insomnia    PRESENT SYMPTOMS: [X ]Unable to obtain due to poor mentation   Source if other than patient:  [ ]Family   [ ]Team     Pain: [ ]yes [ ]no  QOL impact -   Location -                    Aggravating factors -  Quality -  Radiation -  Timing-  Severity (0-10 scale):  Minimal acceptable level (0-10 scale):     CPOT:  0  https://[email or web address]/getattachment/[email or web address]/Critical-Care-Pain-Observation-Tool-(CPOT)    Dyspnea:                           [ ]Mild [ ]Moderate [ ]Severe  Anxiety:                             [ ]Mild [ ]Moderate [ ]Severe  Fatigue:                             [ ]Mild [ ]Moderate [ ]Severe  Nausea:                             [ ]Mild [ ]Moderate [ ]Severe  Loss of appetite:              [ ]Mild [ ]Moderate [ ]Severe  Constipation:                    [ ]Mild [ ]Moderate [ ]Severe    Other Symptoms:  [X ]All other review of systems negative     Palliative Performance Status Version 2:   10      %    [email or web address]_[email or web address]_[email or web address]    PHYSICAL EXAM:  ICU Vital Signs Last 24 Hrs  T(C): 35.9 (12 Sep 2022 12:00), Max: 36.2 (12 Sep 2022 08:00)  T(F): 96.6 (12 Sep 2022 12:00), Max: 97.1 (12 Sep 2022 08:00)  HR: 62 (12 Sep 2022 15:05) (52 - 78)  BP: 146/84 (12 Sep 2022 15:00) (134/78 - 167/89)  BP(mean): 103 (12 Sep 2022 15:00) (96 - 131)  RR: 12 (12 Sep 2022 15:00) (12 - 23)  SpO2: 99% (12 Sep 2022 15:05) (99% - 100%)    GENERAL:  [ ]Alert  [ ]Oriented x   [ ]Lethargic  [ ]Cachexia  [X ]Unarousable  [ ]Verbal  [X ]Non-Verbal  Behavioral:   [ ] Anxiety  [ ] Delirium [ ] Agitation [ X] Calm   HEENT:  [ ]Normal   [ ]Dry mouth   [ X]ET Tube/Trach  [ ]Oral lesions  PULMONARY:   [X ]Clear [ ]Tachypnea  [ ]Audible excessive secretions   [ ]Rhonchi        [ ]Right [ ]Left [ ]Bilateral  [ ]Crackles        [ ]Right [ ]Left [ ]Bilateral  [ ]Wheezing     [ ]Right [ ]Left [ ]Bilateral  [ ]Diminished breath sounds [ ]right [ ]left [ ]bilateral  CARDIOVASCULAR:    [ ]Regular [ ]Irregular [ ]Tachy  [X ]Krishna [ ]Murmur [ ]Other  GASTROINTESTINAL:  [X ]Soft  [ ]Distended   [ ]+BS  [ ]Non tender [ ]Tender  [ ]PEG [ ]OGT/ NGT  Last BM:   GENITOURINARY:  [ ]Normal [ ] Incontinent   [ ]Oliguria/Anuria   [X ]Maloney  MUSCULOSKELETAL:   [ ]Normal   [ ]Weakness  [ X]Bed/Wheelchair bound [ ]Edema  NEUROLOGIC:   [ ]No focal deficits  [ X]Cognitive impairment  [ ]Dysphagia [ ]Dysarthria [ ]Paresis [ ]Other   SKIN:   [X ]Normal    [ ]Rash  [ ]Pressure ulcer(s)       Present on admission [ ]y [ ]n    CRITICAL CARE:  [ ] Shock Present  [ ]Septic [ ]Cardiogenic [ ]Neurologic [ ]Hypovolemic  [ ]  Vasopressors [ ]  Inotropes   [ ]Respiratory failure present [ X]Mechanical ventilation [ ]Non-invasive ventilatory support [ ]High flow  [ ]Acute  [ ]Chronic [ ]Hypoxic  [ ]Hypercarbic [ ]Other  [ ]Other organ failure     LABS:  reviewed    09-12    134<L>  |  94<L>  |  107<HH>  ----------------------------<  306<H>  4.5   |  19  |  5.8<HH>    Ca    8.4<L>      12 Sep 2022 05:17  Phos  10.1     09-12  Mg     2.4     09-12    TPro  5.5<L>  /  Alb  2.7<L>  /  TBili  0.2  /  DBili  x   /  AST  33  /  ALT  23  /  AlkPhos  222<H>  09-12                            10.0   9.47  )-----------( 348      ( 12 Sep 2022 05:17 )             28.7           RADIOLOGY & ADDITIONAL STUDIES:  reviewed    < from: MR Head No Cont (09.03.22 @ 20:08) >  IMPRESSION:  Redemonstration of multiple scattered lacunar infarcts, now subacute. No   compelling evidence of an acute infarct or acute intracranial hemorrhage.    --- End of Report ---    < end of copied text >      < from: 12 Lead ECG (08.26.22 @ 20:22) >  Ventricular Rate 63 BPM    Atrial Rate 63 BPM    P-R Interval 162 ms    QRS Duration 76 ms    Q-T Interval 422 ms    QTC Calculation(Bazett) 431 ms    P Axis 51 degrees    R Axis 36 degrees    T Axis 112 degrees    Diagnosis Line Normal sinus rhythm  Anteroseptal infarct , age undetermined  Abnormal ECG    < end of copied text >      Goals of Care Document:   - spoke with son, Latrell, yesterday.  defers to the son as the decision maker  - son wants full code, continued medical management and to continue workup  - if trach/peg is being offered, may be beneficial to have family meeting with palliative prior to that decision.         HPI:  57 yo F with PMH of HTN, DM2, and stroke (per son 2017) who presented for RLE wound. Started 3 months ago when she fell and hit her leg on a wooden cabinet. She put hydrogen peroxide, antibiotic cream, and wrapped the wound but never fully healed. Two weeks ago it started to show yellow pus so her and her family came to the ED for further treatment. Endorsed subjective fevers, chest pain, and vision changes which occurs when walked 2-3 blocks. Denied congestion, sore throat, cough, dyspnea, headache, dysuria, N/V, diarrhea    (02 Aug 2022 07:47)     INTERVAL EVENTS:  9/6: pt intubated for endoscopy. spoke with son, Latrell, who wanted to keep full code and continue aggressive medical management. he remains hopeful she will improve. they want to pursue continued workup with rheum/neuro, etc.   9/7: pt remains intubated, bradycardic.   9/12: pt remains intubated, bleeding from mouth, on steroids for suspected vasculitis, poor mental status, ALF    ADVANCE DIRECTIVES:    MOLST  [ ]  Living Will  [ ]   DECISION MAKER(s):  [ ] Health Care Proxy(s)  [ X] Surrogate(s)  [ ] Guardian           Name(s): Phone Number(s): Barney Sams     BASELINE (I)ADL(s) (prior to admission):  [place]: [X ]Total  [ ] Moderate [ ]Dependent  Palliative Performance Status Version 2:      80   %    [email or web address]_[email or web address]_[email or web address]    Allergies    No Known Allergies    Intolerances    MEDICATIONS  (STANDING):  aspirin  chewable 81 milliGRAM(s) Oral daily  atorvastatin 80 milliGRAM(s) Oral at bedtime  aztreonam  IVPB 2000 milliGRAM(s) IV Intermittent every 12 hours  aztreonam  IVPB      ceftazidime/avibactam IVPB 0.94 Gram(s) IV Intermittent every 12 hours  chlorhexidine 0.12% Liquid 15 milliLiter(s) Oral Mucosa every 12 hours  chlorhexidine 2% Cloths 1 Application(s) Topical <User Schedule>  collagenase Ointment 1 Application(s) Topical two times a day  Dakins Solution - 1/2 Strength 1 Application(s) Topical two times a day  dexMEDEtomidine Infusion 0.2 MICROgram(s)/kG/Hr (4.07 mL/Hr) IV Continuous <Continuous>  dextrose 5% 1000 milliLiter(s) (75 mL/Hr) IV Continuous <Continuous>  dextrose 5%. 1000 milliLiter(s) (100 mL/Hr) IV Continuous <Continuous>  dextrose 5%. 1000 milliLiter(s) (50 mL/Hr) IV Continuous <Continuous>  dextrose 50% Injectable 25 Gram(s) IV Push once  dextrose 50% Injectable 12.5 Gram(s) IV Push once  dextrose 50% Injectable 25 Gram(s) IV Push once  fentaNYL   Infusion. 0.5 MICROgram(s)/kG/Hr (4.07 mL/Hr) IV Continuous <Continuous>  glucagon  Injectable 1 milliGRAM(s) IntraMuscular once  hydrALAZINE 100 milliGRAM(s) Oral every 8 hours  insulin lispro (ADMELOG) corrective regimen sliding scale   SubCutaneous three times a day before meals  labetalol 600 milliGRAM(s) Oral three times a day  lacosamide IVPB 50 milliGRAM(s) IV Intermittent every 12 hours  levETIRAcetam  Solution 500 milliGRAM(s) Oral two times a day  lidocaine 1%/epinephrine 1:100,000 Inj 20 milliLiter(s) Local Injection once  multivitamin/minerals/iron Oral Solution (CENTRUM) 15 milliLiter(s) Oral daily  pantoprazole  Injectable 40 milliGRAM(s) IV Push two times a day  propofol Infusion 10 MICROgram(s)/kG/Min (4.88 mL/Hr) IV Continuous <Continuous>  sodium bicarbonate 1300 milliGRAM(s) Oral every 8 hours  sodium chloride 0.65% Nasal 2 Spray(s) Both Nostrils two times a day    MEDICATIONS  (PRN):  acetaminophen     Tablet .. 650 milliGRAM(s) Oral every 6 hours PRN Temp greater or equal to 38C (100.4F), Mild Pain (1 - 3)  dextrose Oral Gel 15 Gram(s) Oral once PRN Blood Glucose LESS THAN 70 milliGRAM(s)/deciliter  labetalol Injectable 10 milliGRAM(s) IV Push every 6 hours PRN Systolic blood pressure >  melatonin 3 milliGRAM(s) Oral at bedtime PRN Insomnia    PRESENT SYMPTOMS: [X ]Unable to obtain due to poor mentation   Source if other than patient:  [ ]Family   [ ]Team     Pain: [ ]yes [ ]no  QOL impact -   Location -                    Aggravating factors -  Quality -  Radiation -  Timing-  Severity (0-10 scale):  Minimal acceptable level (0-10 scale):     CPOT:  0  https://www.[email or web address]/getattachment/[email or web address]/Critical-Care-Pain-Observation-Tool-(CPOT)    Dyspnea:                           [ ]Mild [ ]Moderate [ ]Severe  Anxiety:                             [ ]Mild [ ]Moderate [ ]Severe  Fatigue:                             [ ]Mild [ ]Moderate [ ]Severe  Nausea:                             [ ]Mild [ ]Moderate [ ]Severe  Loss of appetite:              [ ]Mild [ ]Moderate [ ]Severe  Constipation:                    [ ]Mild [ ]Moderate [ ]Severe    Other Symptoms:  [X ]All other review of systems negative     Palliative Performance Status Version 2:   10      %    [email or web address]    PHYSICAL EXAM:  ICU Vital Signs Last 24 Hrs  T(C): 35.9 (12 Sep 2022 12:00), Max: 36.2 (12 Sep 2022 08:00)  T(F): 96.6 (12 Sep 2022 12:00), Max: 97.1 (12 Sep 2022 08:00)  HR: 62 (12 Sep 2022 15:05) (52 - 78)  BP: 146/84 (12 Sep 2022 15:00) (134/78 - 167/89)  BP(mean): 103 (12 Sep 2022 15:00) (96 - 131)  RR: 12 (12 Sep 2022 15:00) (12 - 23)  SpO2: 99% (12 Sep 2022 15:05) (99% - 100%)    GENERAL:  [ ]Alert  [ ]Oriented x   [ ]Lethargic  [ ]Cachexia  [X ]Unarousable  [ ]Verbal  [X ]Non-Verbal  Behavioral:   [ ] Anxiety  [ ] Delirium [ ] Agitation [ X] Calm   HEENT:  [ ]Normal   [ ]Dry mouth   [ X]ET Tube/Trach  [ ]Oral lesions  PULMONARY:   [X ]Clear [ ]Tachypnea  [ ]Audible excessive secretions   [ ]Rhonchi        [ ]Right [ ]Left [ ]Bilateral  [ ]Crackles        [ ]Right [ ]Left [ ]Bilateral  [ ]Wheezing     [ ]Right [ ]Left [ ]Bilateral  [ ]Diminished breath sounds [ ]right [ ]left [ ]bilateral  CARDIOVASCULAR:    [ ]Regular [ ]Irregular [ ]Tachy  [X ]Krishna [ ]Murmur [ ]Other  GASTROINTESTINAL:  [X ]Soft  [ ]Distended   [ ]+BS  [ ]Non tender [ ]Tender  [ ]PEG [ ]OGT/ NGT  Last BM:   GENITOURINARY:  [ ]Normal [ ] Incontinent   [ ]Oliguria/Anuria   [X ]Maloney  MUSCULOSKELETAL:   [ ]Normal   [ ]Weakness  [ X]Bed/Wheelchair bound [ ]Edema  NEUROLOGIC:   [ ]No focal deficits  [ X]Cognitive impairment  [ ]Dysphagia [ ]Dysarthria [ ]Paresis [ ]Other   SKIN:   [X ]Normal    [ ]Rash  [ ]Pressure ulcer(s)       Present on admission [ ]y [ ]n    CRITICAL CARE:  [ ] Shock Present  [ ]Septic [ ]Cardiogenic [ ]Neurologic [ ]Hypovolemic  [ ]  Vasopressors [ ]  Inotropes   [ ]Respiratory failure present [ X]Mechanical ventilation [ ]Non-invasive ventilatory support [ ]High flow  [ ]Acute  [ ]Chronic [ ]Hypoxic  [ ]Hypercarbic [ ]Other  [ ]Other organ failure     LABS:  reviewed    09-12    134<L>  |  94<L>  |  107<HH>  ----------------------------<  306<H>  4.5   |  19  |  5.8<HH>    Ca    8.4<L>      12 Sep 2022 05:17  Phos  10.1     09-12  Mg     2.4     09-12    TPro  5.5<L>  /  Alb  2.7<L>  /  TBili  0.2  /  DBili  x   /  AST  33  /  ALT  23  /  AlkPhos  222<H>  09-12                            10.0   9.47  )-----------( 348      ( 12 Sep 2022 05:17 )             28.7           RADIOLOGY & ADDITIONAL STUDIES:  reviewed    < from: [facility] Duplex Lower Ext Vein Scan, Bilat (09.12.22 @ 12:55) >  IMPRESSION:  No evidence of deep venous thrombosis in either lower extremity.    Calf veins not visualized right side      EKG  < from: 12 Lead ECG (08.26.22 @ 20:22) >  Ventricular Rate 63 BPM    Atrial Rate 63 BPM    P-R Interval 162 ms    QRS Duration 76 ms    Q-T Interval 422 ms    QTC Calculation(Bazett) 431 ms    P Axis 51 degrees    R Axis 36 degrees    T Axis 112 degrees    Diagnosis Line Normal sinus rhythm  Anteroseptal infarct , age undetermined  Abnormal ECG    < end of copied text >      Goals of Care Document:   - spoke with son, Latrell, yesterday.  defers to the son as the decision maker  - son wants full code, continued medical management and to continue workup  - if trach/peg is being offered, may be beneficial to have family meeting with palliative prior to that decision.

## 2022-09-12 NOTE — PROGRESS NOTE ADULT - SUBJECTIVE AND OBJECTIVE BOX
JOSE MITCHELL  56y, Female  Allergy: No Known Allergies      LOS  41d    CHIEF COMPLAINT: sepsis (09 Sep 2022 08:07)      INTERVAL EVENTS/HPI  - No acute events overnight  - T(F): , Max: 97.1 (09-12-22 @ 08:00)  - WBC Count: 9.47 (09-12-22 @ 05:17)  WBC Count: 11.23 (09-11-22 @ 04:58)     - Creatinine, Serum: 5.8 (09-12-22 @ 05:17)  Creatinine, Serum: 5.4 (09-11-22 @ 04:58)       ROS  unable to obtain history secondary to patient's mental status and/or sedation    VITALS:  T(F): 97.1, Max: 97.1 (09-12-22 @ 08:00)  HR: 62  BP: 150/83  RR: 20Vital Signs Last 24 Hrs  T(C): 36.2 (12 Sep 2022 08:00), Max: 36.2 (12 Sep 2022 08:00)  T(F): 97.1 (12 Sep 2022 08:00), Max: 97.1 (12 Sep 2022 08:00)  HR: 62 (12 Sep 2022 09:00) (52 - 78)  BP: 150/83 (12 Sep 2022 09:00) (134/78 - 167/89)  BP(mean): 109 (12 Sep 2022 09:00) (96 - 151)  RR: 20 (12 Sep 2022 09:00) (11 - 23)  SpO2: 99% (12 Sep 2022 09:00) (99% - 100%)    Parameters below as of 12 Sep 2022 08:00  Patient On (Oxygen Delivery Method): ventilator    O2 Concentration (%): 40    PHYSICAL EXAM:  Gen: intubated  HEENT: Normocephalic, atraumatic  Neck: supple, no lymphadenopathy  CV: Regular rate & regular rhythm  Lungs: decreased BS at bases, no fremitus  Abdomen: Soft, BS present  Ext: Warm, well perfused  Neuro: non focal, awake  Skin: no rash, no erythema  Lines: no phlebitis    FH: Non-contributory  Social Hx: Non-contributory    TESTS & MEASUREMENTS:                        10.0   9.47  )-----------( 348      ( 12 Sep 2022 05:17 )             28.7     09-12    134<L>  |  94<L>  |  107<HH>  ----------------------------<  306<H>  4.5   |  19  |  5.8<HH>    Ca    8.4<L>      12 Sep 2022 05:17  Phos  10.1     09-12  Mg     2.4     09-12    TPro  5.5<L>  /  Alb  2.7<L>  /  TBili  0.2  /  DBili  x   /  AST  33  /  ALT  23  /  AlkPhos  222<H>  09-12      LIVER FUNCTIONS - ( 12 Sep 2022 05:17 )  Alb: 2.7 g/dL / Pro: 5.5 g/dL / ALK PHOS: 222 U/L / ALT: 23 U/L / AST: 33 U/L / GGT: x               Culture - Blood (collected 09-07-22 @ 05:08)  Source: .Blood None  Preliminary Report (09-08-22 @ 19:02):    No growth to date.    Culture - Blood (collected 09-06-22 @ 05:30)  Source: .Blood None  Final Report (09-11-22 @ 20:00):    No Growth Final    Culture - Blood (collected 09-05-22 @ 12:19)  Source: .Blood Blood  Final Report (09-10-22 @ 18:00):    No Growth Final    Culture - Blood (collected 09-04-22 @ 05:43)  Source: .Blood None  Final Report (09-09-22 @ 12:00):    No Growth Final    Culture - Blood (collected 09-03-22 @ 18:18)  Source: .Blood None  Final Report (09-09-22 @ 04:00):    No Growth Final    Culture - Blood (collected 09-01-22 @ 12:29)  Source: .Blood None  Gram Stain (09-06-22 @ 17:03):    Growth in aerobic bottle: Gram Negative Rods    Growth in anaerobic bottle: Gram Negative Rods  Final Report (09-07-22 @ 17:45):    Growth in aerobic and anaerobic bottles: Enterobacter cloacae (Carbapenem    Resistant)    ***Blood Panel PCR results on this specimen are available    approximately 3 hours after the Gram stain result.***    Gram stain, PCR, and/or culture results may notalways    correspond due to difference in methodologies.    ************************************************************    This PCR assay was performed by multiplex PCR. This    Assay tests for 66 bacterial and resistance gene targets.    Please refer to the [facility] test directory    at [email or web address] for details.  Organism: Blood Culture PCR  Enterobacter cloacae (Carbapenem Resistant)  Enterobacter cloacae (Carbapenem Resistant) (09-06-22 @ 17:03)  Organism: Enterobacter cloacae (Carbapenem Resistant) (09-06-22 @ 17:03)      -  Cefiderocol: S      Method Type: KB  Organism: Enterobacter cloacae (Carbapenem Resistant) (09-06-22 @ 17:03)      -  Amikacin: S <=16      -  Ampicillin: R >16 These ampicillin results predict results for amoxicillin      -  Ampicillin/Sulbactam: R >16/8 Enterobacter, Klebsiella aerogenes, Citrobacter, and Serratia may develop resistance during prolonged therapy (3-4 days)      -  Aztreonam: S <=4      -  Cefazolin: R >16 Enterobacter, Klebsiella aerogenes, Citrobacter, and Serratia may develop resistance during prolonged therapy (3-4 days)      -  Cefepime: R >16      -  Cefoxitin: R >16      -  Ceftazidime/Avibactam: R >16      -  Ceftolozane/tazobactam: R >8      -  Ceftriaxone: R >32 Enterobacter, Klebsiella aerogenes, Citrobacter, and Serratia may develop resistance during prolonged therapy      -  Ciprofloxacin: S <=0.25      -  Ertapenem: R >1      -  Gentamicin: S <=2      -  Imipenem: R >8      -  Levofloxacin: S <=0.5      -  Meropenem: R >8      -  Piperacillin/Tazobactam: R >64      -  Tigecycline: S <=2      -  Tobramycin: S <=2      -  Trimethoprim/Sulfamethoxazole: S <=0.5/9.5      Method Type: AALIYAH  Organism: Blood Culture PCR (09-06-22 @ 17:03)      -  Carbapenem Resistance: Detec      -  Enterobacter cloacae complex: Detec      -  NDM Resistance Marker: Detec      Method Type: PCR    Culture - Urine (collected 09-01-22 @ 10:10)  Source: Catheterized Catheterized  Final Report (09-03-22 @ 08:06):    >=3 organisms. Probable collection contamination.    Culture - Urine (collected 08-31-22 @ 00:25)  Source: Catheterized Catheterized  Final Report (09-01-22 @ 09:12):    >=3 organisms. Probable collection contamination.    Culture - Blood (collected 08-30-22 @ 18:03)  Source: .Blood Blood  Final Report (09-05-22 @ 02:00):    No Growth Final    Culture - Fungal, CSF (collected 08-26-22 @ 11:36)  Source: .CSF CSF  Preliminary Report (09-03-22 @ 15:02):    No growth    Culture - CSF with Gram Stain (collected 08-26-22 @ 11:36)  Source: .CSF CSF  Gram Stain (08-26-22 @ 22:25):    polymorphonuclear leukocytes seen    No organisms seen    by cytocentrifuge  Final Report (08-29-22 @ 14:52):    No growth    Culture - Acid Fast - CSF (collected 08-26-22 @ 11:36)  Source: .CSF CSF  Preliminary Report (09-03-22 @ 15:04):    No growth at 1 week.    Culture - Blood (collected 08-17-22 @ 12:25)  Source: .Blood Blood-Peripheral  Final Report (08-22-22 @ 22:01):    No Growth Final    Culture - Blood (collected 08-16-22 @ 06:04)  Source: .Blood None  Final Report (08-21-22 @ 18:00):    No Growth Final    Culture - Blood (collected 08-15-22 @ 16:59)  Source: .Blood None  Final Report (08-21-22 @ 02:00):    No Growth Final    Culture - Blood (collected 08-13-22 @ 22:44)  Source: .Blood Blood-Peripheral  Gram Stain (08-15-22 @ 08:35):    Growth in anaerobic bottle: Gram Positive Cocci in Clusters  Final Report (08-16-22 @ 14:28):    Growth in anaerobic bottle: Staphylococcus epidermidis Coag Negative    Staphylococcus    Single set isolate, possible contaminant. Contact    Microbiology if susceptibility testing clinically    indicated.    ***Blood Panel PCR results on this specimen are available    approximately 3 hours after the Gram stain result.***    Gram stain, PCR, and/or culture results may not always    correspond due to difference in methodologies.    ************************************************************    This PCR assay was performed by multiplex PCR. This    Assay tests for 66 bacterial and resistance gene targets.    Please refer to the [facility] test directory    at [email or web address] for details.  Organism: Blood Culture PCR (08-16-22 @ 14:28)  Organism: Blood Culture PCR (08-16-22 @ 14:28)      -  Staphylococcus epidermidis, Methicillin resistant: Detec      Method Type: PCR            INFECTIOUS DISEASES TESTING  HIV-1/2 Combo Result: Nonreact (09-09-22 @ 06:11)  COVID-19 PCR: NotDetec (08-31-22 @ 11:58)  Procalcitonin, Serum: 2.46 (08-31-22 @ 07:24)  COVID-19 PCR: NotDetec (08-26-22 @ 09:40)  COVID-19 PCR: NotDetec (08-21-22 @ 02:34)  COVID-19 PCR: NotDetec (08-18-22 @ 17:36)  Procalcitonin, Serum: 0.11 (08-16-22 @ 06:04)  COVID-19 PCR: NotDetec (08-15-22 @ 15:41)  COVID-19 PCR: NotDetec (08-11-22 @ 13:22)  COVID-19 PCR: NotDetec (08-08-22 @ 05:14)  MRSA PCR Result.: Negative (08-02-22 @ 17:40)  COVID-19 PCR: NotDetec (08-02-22 @ 01:40)      INFLAMMATORY MARKERS  C-Reactive Protein, Serum: 289.1 mg/L (09-03-22 @ 12:04)  Sedimentation Rate, Erythrocyte: >140 mm/Hr (09-03-22 @ 12:04)  Sedimentation Rate, Erythrocyte: 125 mm/Hr (08-30-22 @ 19:27)  C-Reactive Protein, Serum: 54.4 mg/L (08-30-22 @ 19:27)      RADIOLOGY & ADDITIONAL TESTS:  I have personally reviewed the last available Chest xray  CXR      CT      CARDIOLOGY TESTING      MEDICATIONS  amLODIPine   Tablet 10 Oral daily  aspirin  chewable 81 Oral daily  atorvastatin 80 Oral at bedtime  aztreonam  IVPB     aztreonam  IVPB 2000 IV Intermittent every 12 hours  ceftazidime/avibactam IVPB 0.94 IV Intermittent every 12 hours  chlorhexidine 0.12% Liquid 15 Oral Mucosa every 12 hours  chlorhexidine 2% Cloths 1 Topical <User Schedule>  collagenase Ointment 1 Topical two times a day  Dakins Solution - 1/2 Strength 1 Topical two times a day  dexMEDEtomidine Infusion 0.2 IV Continuous <Continuous>  dextrose 5%. 1000 IV Continuous <Continuous>  dextrose 5%. 1000 IV Continuous <Continuous>  dextrose 50% Injectable 25 IV Push once  dextrose 50% Injectable 12.5 IV Push once  dextrose 50% Injectable 25 IV Push once  furosemide   Injectable 80 IV Push every 12 hours  glucagon  Injectable 1 IntraMuscular once  hydrALAZINE 100 Oral every 8 hours  insulin regular Infusion 1 IV Continuous <Continuous>  labetalol 600 Oral three times a day  lacosamide IVPB 50 IV Intermittent every 12 hours  levETIRAcetam  Solution 500 Oral two times a day  lidocaine 1%/epinephrine 1:100,000 Inj 20 Local Injection once  methylPREDNISolone sodium succinate IVPB 1000 IV Intermittent daily  multivitamin 1 Oral daily  pantoprazole  Injectable 40 IV Push two times a day  sevelamer carbonate Powder 1600 Oral every 8 hours  sodium bicarbonate 1300 Oral every 8 hours  sodium chloride 0.65% Nasal 2 Both Nostrils two times a day      WEIGHT  Weight (kg): 81.4 (09-05-22 @ 03:14)  Creatinine, Serum: 5.8 mg/dL (09-12-22 @ 05:17)      ANTIBIOTICS:  aztreonam  IVPB      aztreonam  IVPB 2000 milliGRAM(s) IV Intermittent every 12 hours  ceftazidime/avibactam IVPB 0.94 Gram(s) IV Intermittent every 12 hours      All available historical records have been reviewed

## 2022-09-12 NOTE — PROGRESS NOTE ADULT - SUBJECTIVE AND OBJECTIVE BOX
Nephrology progress note    THIS IS AN INCOMPLETE NOTE . FULL NOTE TO FOLLOW SHORTLY    Patient is seen and examined, events over the last 24 h noted .    Allergies:  No Known Allergies    Hospital Medications:   MEDICATIONS  (STANDING):  amLODIPine   Tablet 10 milliGRAM(s) Oral daily  aspirin  chewable 81 milliGRAM(s) Oral daily  atorvastatin 80 milliGRAM(s) Oral at bedtime  aztreonam  IVPB      aztreonam  IVPB 2000 milliGRAM(s) IV Intermittent every 12 hours  ceftazidime/avibactam IVPB 0.94 Gram(s) IV Intermittent every 12 hours  chlorhexidine 0.12% Liquid 15 milliLiter(s) Oral Mucosa every 12 hours  chlorhexidine 2% Cloths 1 Application(s) Topical <User Schedule>  collagenase Ointment 1 Application(s) Topical two times a day  Dakins Solution - 1/2 Strength 1 Application(s) Topical two times a day  dexMEDEtomidine Infusion 0.2 MICROgram(s)/kG/Hr (4.07 mL/Hr) IV Continuous <Continuous>  dextrose 5%. 1000 milliLiter(s) (100 mL/Hr) IV Continuous <Continuous>  dextrose 5%. 1000 milliLiter(s) (50 mL/Hr) IV Continuous <Continuous>  dextrose 50% Injectable 25 Gram(s) IV Push once  dextrose 50% Injectable 12.5 Gram(s) IV Push once  dextrose 50% Injectable 25 Gram(s) IV Push once  furosemide   Injectable 80 milliGRAM(s) IV Push every 12 hours  glucagon  Injectable 1 milliGRAM(s) IntraMuscular once  hydrALAZINE 100 milliGRAM(s) Oral every 8 hours  insulin glargine Injectable (LANTUS) 30 Unit(s) SubCutaneous every morning  insulin lispro (ADMELOG) corrective regimen sliding scale   SubCutaneous three times a day before meals  labetalol 600 milliGRAM(s) Oral three times a day  lacosamide IVPB 50 milliGRAM(s) IV Intermittent every 12 hours  levETIRAcetam  Solution 500 milliGRAM(s) Oral two times a day  lidocaine 1%/epinephrine 1:100,000 Inj 20 milliLiter(s) Local Injection once  methylPREDNISolone sodium succinate IVPB 1000 milliGRAM(s) IV Intermittent daily  multivitamin 1 Tablet(s) Oral daily  pantoprazole  Injectable 40 milliGRAM(s) IV Push two times a day  sevelamer carbonate Powder 800 milliGRAM(s) Oral three times a day with meals  sodium bicarbonate 1300 milliGRAM(s) Oral every 8 hours  sodium chloride 0.65% Nasal 2 Spray(s) Both Nostrils two times a day  sodium zirconium cyclosilicate 10 Gram(s) Oral every 12 hours        VITALS:  T(F): 96.6 (22 @ 04:00), Max: 96.9 (22 @ 12:00)  HR: 62 (22 @ 07:00)  BP: 167/89 (22 @ 07:00)  RR: 21 (22 @ 07:00)  SpO2: 99% (22 @ 07:00)  Wt(kg): --    09-10 @ :  -   @ 07:00  --------------------------------------------------------  IN: 1736.1 mL / OUT: 463 mL / NET: 1273.1 mL     @ :  -   @ 07:00  --------------------------------------------------------  IN: 1279 mL / OUT: 305 mL / NET: 974 mL          PHYSICAL EXAM:  Constitutional: NAD  HEENT: anicteric sclera, oropharynx clear, MMM  Neck: No JVD  Respiratory: CTAB, no wheezes, rales or rhonchi  Cardiovascular: S1, S2, RRR  Gastrointestinal: BS+, soft, NT/ND  Extremities: No cyanosis or clubbing. No peripheral edema  :  No nolasco.   Skin: No rashes    LABS:      134<L>  |  94<L>  |  107<HH>  ----------------------------<  306<H>  4.5   |  19  |  5.8<HH>  Creatinine Trend: 5.8<--, 5.4<--, 5.4<--, 5.2<--, 4.7<--, 4.3<--  Ca    8.4<L>      12 Sep 2022 05:17  Phos  10.1       Mg     2.4         TPro  5.5<L>  /  Alb  2.7<L>  /  TBili  0.2  /  DBili      /  AST  33  /  ALT  23  /  AlkPhos  222<H>                            10.0   9.47  )-----------( 348      ( 12 Sep 2022 05:17 )             28.7       Urine Studies:  Urinalysis Basic - ( 09 Sep 2022 19:05 )    Color: Yellow / Appearance: Turbid / S.007 / pH:   Gluc:  / Ketone: Negative  / Bili: Negative / Urobili: <2 mg/dL   Blood:  / Protein: 30 mg/dL / Nitrite: Negative   Leuk Esterase: Large / RBC: 26 /HPF /  /HPF   Sq Epi:  / Non Sq Epi: 3 /HPF / Bacteria: Negative      Creatinine, Random Urine: 17 mg/dL ( @ 19:05)  Protein/Creatinine Ratio Calculation: 2.7 Ratio ( @ 19:05)      Ferritin 243      [22 @ 05:49]  PTH -- (Ca 8.5)      [22 @ 20:00]   75  TSH 0.86      [22 @ 17:53]  Lipid: chol 234, , HDL 42, LDL --      [22 @ 08:56]    HBcAb Nonreact      [22 @ 06:11]  HIV Nonreact      [22 @ 06:11]    LUIS FELIPE: titer Negative, pattern --      [22 @ 12:04]  dsDNA <12      [22 @ 19:27]  C3 Complement 134      [22 @ 06:11]  C4 Complement 33      [22 @ 06:11]  Rheumatoid Factor <10      [22 @ 11:37]  ANCA: cANCA Negative, pANCA Negative, atypical ANCA Negative      [22 @ 19:27]  Syphilis Screen (Treponema Pallidum Ab) Negative      [22 @ 17:53]  Immunofixation Serum:   No Monoclonal Band Identified    Reference Range: None Detected      [22 @ 23:46]  SPEP Interpretation: Pattern Consistent With Acute Inflammation Or Stress      [22 @ 23:46]  Cryoglobulin: Negative      [22 @ 06:11]      RADIOLOGY & ADDITIONAL STUDIES:   Nephrology progress note  Patient is seen and examined, events over the last 24 h noted .  Lying in bed   intubated on MV     Allergies:  No Known Allergies    Hospital Medications:   MEDICATIONS  (STANDING):  amLODIPine   Tablet 10 milliGRAM(s) Oral daily  aspirin  chewable 81 milliGRAM(s) Oral daily  atorvastatin 80 milliGRAM(s) Oral at bedtime  aztreonam  IVPB 2000 milliGRAM(s) IV Intermittent every 12 hours  ceftazidime/avibactam IVPB 0.94 Gram(s) IV Intermittent every 12 hours  collagenase Ointment 1 Application(s) Topical two times a day  Dakins Solution - 1/2 Strength 1 Application(s) Topical two times a day  dexMEDEtomidine Infusion 0.2 MICROgram(s)/kG/Hr (4.07 mL/Hr) IV Continuous <Continuous>  furosemide   Injectable 80 milliGRAM(s) IV Push every 12 hours  glucagon  Injectable 1 milliGRAM(s) IntraMuscular once  hydrALAZINE 100 milliGRAM(s) Oral every 8 hours  insulin glargine Injectable (LANTUS) 30 Unit(s) SubCutaneous every morning  insulin lispro (ADMELOG) corrective regimen sliding scale   SubCutaneous three times a day before meals  labetalol 600 milliGRAM(s) Oral three times a day  lacosamide IVPB 50 milliGRAM(s) IV Intermittent every 12 hours  levETIRAcetam  Solution 500 milliGRAM(s) Oral two times a day  lidocaine 1%/epinephrine 1:100,000 Inj 20 milliLiter(s) Local Injection once  methylPREDNISolone sodium succinate IVPB 1000 milliGRAM(s) IV Intermittent daily  multivitamin 1 Tablet(s) Oral daily  pantoprazole  Injectable 40 milliGRAM(s) IV Push two times a day  sevelamer carbonate Powder 800 milliGRAM(s) Oral three times a day with meals  sodium bicarbonate 1300 milliGRAM(s) Oral every 8 hours  sodium chloride 0.65% Nasal 2 Spray(s) Both Nostrils two times a day  sodium zirconium cyclosilicate 10 Gram(s) Oral every 12 hours        VITALS:  T(F): 96.6 (22 @ 04:00), Max: 96.9 (22 @ 12:00)  HR: 62 (22 @ 07:00)  BP: 167/89 (22 @ 07:00)  RR: 21 (22 @ 07:00)  SpO2: 99% (22 @ 07:00)      09-10 @ 07:  -   @ 07:00  --------------------------------------------------------  IN: 1736.1 mL / OUT: 463 mL / NET: 1273.1 mL     @ :  -   @ 07:00  --------------------------------------------------------  IN: 1279 mL / OUT: 305 mL / NET: 974 mL          PHYSICAL EXAM:  Constitutional: intubated on MV   Respiratory: CTAB,  Cardiovascular: S1, S2, RRR  Gastrointestinal: BS+, soft, NT/ND  Extremities: . No peripheral edema  :  No nolasco.   Skin: No rashes    LABS:      134<L>  |  94<L>  |  107<HH>  ----------------------------<  306<H>  4.5   |  19  |  5.8<HH>    Creatinine Trend: 5.8<--, 5.4<--, 5.4<--, 5.2<--, 4.7<--, 4.3<--    Ca    8.4<L>      12 Sep 2022 05:17  Phos  10.1       Mg     2.4         TPro  5.5<L>  /  Alb  2.7<L>  /  TBili  0.2  /  DBili      /  AST  33  /  ALT  23  /  AlkPhos  222<H>                            10.0   9.47  )-----------( 348      ( 12 Sep 2022 05:17 )             28.7       Urine Studies:  Urinalysis Basic - ( 09 Sep 2022 19:05 )    Color: Yellow / Appearance: Turbid / S.007 / pH:   Gluc:  / Ketone: Negative  / Bili: Negative / Urobili: <2 mg/dL   Blood:  / Protein: 30 mg/dL / Nitrite: Negative   Leuk Esterase: Large / RBC: 26 /HPF /  /HPF   Sq Epi:  / Non Sq Epi: 3 /HPF / Bacteria: Negative      Creatinine, Random Urine: 17 mg/dL ( @ 19:05)  Protein/Creatinine Ratio Calculation: 2.7 Ratio ( 19:05)      Ferritin 243      [22 @ 05:49]  PTH -- (Ca 8.5)      [22 @ 20:00]   75  TSH 0.86      [22 @ 17:53]  Lipid: chol 234, , HDL 42, LDL --      [22 @ 08:56]    HBcAb Nonreact      [22 @ 06:11]  HIV Nonreact      [22 @ 06:11]    LUIS FELIPE: titer Negative, pattern --      [22 @ 12:04]  dsDNA <12      [22 @ 19:27]  C3 Complement 134      [22 @ 06:11]  C4 Complement 33      [22 @ 06:11]  Rheumatoid Factor <10      [22 @ 11:37]  ANCA: cANCA Negative, pANCA Negative, atypical ANCA Negative      [22 @ 19:27]  Syphilis Screen (Treponema Pallidum Ab) Negative      [22 @ 17:53]  Immunofixation Serum:   No Monoclonal Band Identified    Reference Range: None Detected      [22 @ 23:46]  SPEP Interpretation: Pattern Consistent With Acute Inflammation Or Stress      [22 @ 23:46]  Cryoglobulin: Negative      [22 @ 06:11]      RADIOLOGY & ADDITIONAL STUDIES:

## 2022-09-12 NOTE — PROGRESS NOTE ADULT - REASON FOR ADMISSION
Cellulitis
Cellulitis
Cellulitis/Sepsis
RLE Cellulitis
RLE cellulitis
htn
AMS
AMS: encephalopathy
Encephalopathy
LE cellulitis
RLE cellulitis
RLE cellulitis
RLE infection, sepsis
RLE wound
ams
cellulitis
cellulitis, htn
encephalopathy
rectal bleed
sepsis
stroke work up
AMS
C/S for external hemorrhoids
Cellulitis
LE cellulitis
RLE Cellulitis
RLE cellulitis
cellulitis, htn
htn
Cellulitis
LE cellulitis
RLE Cellulitis
RLE Cellulitis
Ulcer over the right lower extremity
cellulitis
stroke work up
cellulitis
cellulitis, htn
stroke; cellulitis
cellulitis
cellulitis
RLE cellulitis
RLE wound

## 2022-09-12 NOTE — CONSULT NOTE ADULT - ASSESSMENT
ASSESSMENT:  56yF w/ PMHx HTN, DM2, and stroke (per son 2017) admitted for RLE wound. S/P debridement on 8/10/22 with burn team. Hospital course complicated my episode of AMS and found to have multiple punctate infarcts. Intubated on 9/6/22 for bedside endoscopy/colonoscopy and remains intubated. Thoracic surgery consulted for trach & PEG. Physical exam findings, imaging, and labs as documented above.     PLAN:  -Tentative plan for OR on 9/14/22 for bronchoscopy, tracheostomy, and PEG tube placement   -Preop patient tomorrow 9/13 (preop labs, active T&S, active COVID)   -Monitor vent settings     Above plan discussed with Attending Surgeon Dr. Gideon Aceves, patient, patient family, and Primary team  09-12-22 @ 17:25    [place] Team Spectra: [phone]

## 2022-09-12 NOTE — PROGRESS NOTE ADULT - PROBLEM SELECTOR PLAN 4
Full Code   defers to son (Latrell)  for decision making  Continue aggressive medical management  Will re-address GOC as appropriate - may need to have meeting prior to trach/peg   Will follow
Full Code   defers to son (Latrell)  for decision making  Continue aggressive medical management  Will re-address GOC as appropriate   Will follow

## 2022-09-12 NOTE — PROGRESS NOTE ADULT - ASSESSMENT
Impression:    Acute blood loss anemia. GI bleed  sp EGD and colonoscopy   Oral cavity bleed likely from tongue biting  Altered MS suspected vasculitis on pulse steroid.    LLE cellulitis  ALF oliguric  E Cloacae Bacteremia resolved       PLAN:    CNS;  Pulse steroids per neuro and rheum.  FU MS.      HEENT: Oral care.  Will need trach     PULMONARY:  HOB @ 45 degrees.  Aspiration precautions.  NO vent changes.  Trach     CARDIOVASCULAR: Avoid overload.  BP control     GI: GI prophylaxis.  Peg feeding    RENAL:  follow up lytes.  Correct as needed.  Renal following.      INFECTIOUS DISEASE:  ABX per ID    HEMATOLOGICAL:  SCD, LE doppler negative.  FU CBC and Coags     ENDOCRINE:  Follow up FS.  Insulin protocol if needed.    MUSCULOSKELETAL:  Bed chair postion     MICU    VERY poor prognosis    Sacrum stage 2    RLE wound

## 2022-09-12 NOTE — PROGRESS NOTE ADULT - ASSESSMENT
ALF rule out ATN / relative hypotension/ sepsis / E cloaca bacteremia / HTN ( was on multiple meds )/ CVA/ GIB  cr still up   UO decreasing / improved with lasix 80mg iv yesterday  GI notes appreciated   sono no hydro  resume lasix 80mg iv q12h/ if oligoanuric will require RRT in 24-48h   work up to date is neg for GN ( LUIS FELIPE DsDNA  neg / RF noted/ SPEP with inflammatory pattern ) cryo to be sent out/ no thrombocytopenia   No indication yet for a kidney biopsy from renal stand point  cont  sodium bicarbonate 1300 q 8   ph noted / increase sevelamer to 2 tabs po q8h  no need for RRT yet   cont lokelma 10 q 12/ feed nepro   overall prognosis poor   will follow

## 2022-09-12 NOTE — PROGRESS NOTE ADULT - ASSESSMENT
· Assessment	  57 yo F with PMH of HTN, DM2, CVA (2017) who presented with purulent right lower extremity wound for 3 months consistent with acute RLE cellulitis. Code stroke for unresponsiveness at 4pm 8/5    Impression  #CVA  - 8/11 MR Head w/wo IV Cont (08.10.22 @ 21:25): Multiple punctate cortical acute infarcts involving multiple vascular territories possibly related to embolic etiology. No evidence of acute hemorrhage. Moderate chronic microvascular type changes as well as chronic hemosiderin deposition within the right basal ganglia consistent with remote hemorrhage. Chronic right thalamic lacunar infarcts. More alert and does try to respondResolved SIRS with no infectious etiology  - s/p LP 8/26 with 24 WBC, RBC 72798, 37% PMNs, 61% Lymph -- protein/glucose not obtained    #Fevers 8/30 - Enterobacter cloaecae bacteremia  - UA with pyruia   - CXR 8/31 unremarkable   - BLood Cx 9/1 with Enterobacter cloacae with NDM+  - BLood Cx 9/3 NG     #GI BLeed  - s/p EGD 9/6     RECOMMENDATIONS;  - continue  avycaz  -- decrease to 0.94 1g q 24 hours for CrCl   - continue aztreonam 2g q 12 hours  - steroids per primary/neuro for vasculitis   - trend creatinine   - trend WBC and fever curve  - plan 2 weeks (9/3-9/16)    Please call or message on [employer] Teams if with any questions.  Spectra [phone]

## 2022-09-13 NOTE — PROGRESS NOTE ADULT - ASSESSMENT
ALF rule out ATN / relative hypotension/ sepsis / E cloaca bacteremia / HTN ( was on multiple meds )/ CVA/ GIB  cr trending up   oligoanuric  on lasix 80mg iv q12h  work up to date is neg for GN ( LUIS FELIPE DsDNA  neg / RF noted/ SPEP with inflammatory pattern ) cryo to be sent out/ no thrombocytopenia   No indication yet for a kidney biopsy from renal stand point  cont  sodium bicarbonate 1300 q 8   ph noted / increase sevelamer to 3 tabs po q8h  no need for RRT yet   k noted / d/c lokelma   will need initiation of RRT if family agreeable/ will discuss with ICU team   overall prognosis poor  palliative care notes appreciated    will follow

## 2022-09-13 NOTE — PROGRESS NOTE ADULT - ASSESSMENT
Impression:  Acute respiratory failure   Acute blood loss anemia. GI bleed  sp EGD and colonoscopy   Oral cavity bleed  Altered MS suspected vasculitis on pulse steroid.    LLE cellulitis  ALF oliguric  E Cloacae Bacteremia resolved       PLAN:    CNS;  Pulse steroids per neuro and rheum.  FU MS.  SAT     HEENT: Oral care.  Might need trach     PULMONARY:  HOB @ 45 degrees.  Aspiration precautions.  .  SBT.      CARDIOVASCULAR: Avoid overload.  BP control     GI: GI prophylaxis.  Peg feeding    RENAL:  follow up lytes.  Correct as needed.  DW renal.  Agree with HD     INFECTIOUS DISEASE:  ABX per ID.  End date 9-16     HEMATOLOGICAL:  SCD, LE doppler negative.  FU CBC and Coags     ENDOCRINE:  Follow up FS.  Insulin protocol if needed.    MUSCULOSKELETAL:  Bed chair position     MICU    VERY poor prognosis    Wound care per burn

## 2022-09-13 NOTE — CHART NOTE - NSCHARTNOTEFT_GEN_A_CORE
Palliative following for GOC.   Goals clear as patient is already scheduled for trach/peg on Wednesday tentatively.   Family wants full aggressive medical intervention.   Will sign off.   Recall PRN X [phone].

## 2022-09-13 NOTE — PROGRESS NOTE ADULT - SUBJECTIVE AND OBJECTIVE BOX
GENERAL SURGERY PROGRESS NOTE    Patient: JOSE MITCHELL , 56y ([birth year])Female   MRN: [id number]  Location: [facility] (formerly [facility]  A  Visit: 08-02-22 Inpatient  Date: 09-13-22 @ 11:59      Procedure/Dx/Injuries: cx for trach and peg    Events of past 24 hours:  surgery consulted    PAST MEDICAL & SURGICAL HISTORY:  Hypertension      Diabetes mellitus      No significant past surgical history          Vitals:   T(F): 96.7 (09-13-22 @ 08:00), Max: 97 (09-13-22 @ 00:00)  HR: 70 (09-13-22 @ 11:00)  BP: 167/79 (09-13-22 @ 11:00)  RR: 15 (09-13-22 @ 11:00)  SpO2: 99% (09-13-22 @ 11:00)  Mode: AC/ CMV (Assist Control/ Continuous Mandatory Ventilation), RR (machine): 16, TV (machine): 360, FiO2: 40, PEEP: 5    Diet, NPO with Tube Feed:   Tube Feeding Modality: Gastrostomy  Glucerna 1.2 Reed  Total Volume for 24 Hours (mL): 480  Continuous  Starting Tube Feed Rate mL per Hour: 20  Until Goal Tube Feed Rate (mL per Hour): 20  Tube Feed Duration (in Hours): 24  Tube Feed Start Time: 10:00      Fluids:     I & O's:    09-12-22 @ 07:01  -  09-13-22 @ 07:00  --------------------------------------------------------  IN:    Dexmedetomidine: 585.6 mL    Glucerna: 480 mL    Insulin: 149 mL    IV PiggyBack: 200 mL  Total IN: 1414.6 mL    OUT:    Indwelling Catheter - Urethral (mL): 145 mL  Total OUT: 145 mL    Total NET: 1269.6 mL          PHYSICAL EXAM:  General Appearance: NAD, intubated and sedated  Heart: RRR  Lungs: CTAx2  Abdomen:  soft, non distended  MSK/Extremities:  no cyanosis    MEDICATIONS  (STANDING):  amLODIPine   Tablet 10 milliGRAM(s) Oral daily  aspirin  chewable 81 milliGRAM(s) Oral daily  atorvastatin 80 milliGRAM(s) Oral at bedtime  aztreonam  IVPB      aztreonam  IVPB 2000 milliGRAM(s) IV Intermittent every 12 hours  ceftazidime/avibactam IVPB 0.94 Gram(s) IV Intermittent every 24 hours  chlorhexidine 0.12% Liquid 15 milliLiter(s) Oral Mucosa every 12 hours  chlorhexidine 2% Cloths 1 Application(s) Topical <User Schedule>  collagenase Ointment 1 Application(s) Topical two times a day  Dakins Solution - 1/2 Strength 1 Application(s) Topical two times a day  dexMEDEtomidine Infusion 0.2 MICROgram(s)/kG/Hr (4.07 mL/Hr) IV Continuous <Continuous>  dextrose 5%. 1000 milliLiter(s) (100 mL/Hr) IV Continuous <Continuous>  dextrose 5%. 1000 milliLiter(s) (50 mL/Hr) IV Continuous <Continuous>  dextrose 50% Injectable 25 Gram(s) IV Push once  dextrose 50% Injectable 12.5 Gram(s) IV Push once  dextrose 50% Injectable 25 Gram(s) IV Push once  furosemide   Injectable 80 milliGRAM(s) IV Push every 12 hours  glucagon  Injectable 1 milliGRAM(s) IntraMuscular once  hydrALAZINE 100 milliGRAM(s) Oral every 8 hours  insulin regular Infusion 1 Unit(s)/Hr (1 mL/Hr) IV Continuous <Continuous>  labetalol 600 milliGRAM(s) Oral three times a day  lacosamide IVPB 50 milliGRAM(s) IV Intermittent every 12 hours  levETIRAcetam  Solution 500 milliGRAM(s) Oral two times a day  lidocaine 1%/epinephrine 1:100,000 Inj 20 milliLiter(s) Local Injection once  methylPREDNISolone sodium succinate IVPB 1000 milliGRAM(s) IV Intermittent daily  multivitamin 1 Tablet(s) Oral daily  pantoprazole  Injectable 40 milliGRAM(s) IV Push two times a day  sevelamer carbonate Powder 1600 milliGRAM(s) Oral every 8 hours  sodium bicarbonate 1300 milliGRAM(s) Oral every 8 hours  sodium chloride 0.65% Nasal 2 Spray(s) Both Nostrils two times a day    MEDICATIONS  (PRN):  acetaminophen     Tablet .. 650 milliGRAM(s) Oral every 6 hours PRN Temp greater or equal to 38C (100.4F), Mild Pain (1 - 3)  dextrose Oral Gel 15 Gram(s) Oral once PRN Blood Glucose LESS THAN 70 milliGRAM(s)/deciliter  labetalol Injectable 10 milliGRAM(s) IV Push every 6 hours PRN Systolic blood pressure >  melatonin 3 milliGRAM(s) Oral at bedtime PRN Insomnia      DVT PROPHYLAXIS:   GI PROPHYLAXIS: pantoprazole  Injectable 40 milliGRAM(s) IV Push two times a day    ANTICOAGULATION:   ANTIBIOTICS:  aztreonam  IVPB    aztreonam  IVPB 2000 milliGRAM(s)  ceftazidime/avibactam IVPB 0.94 Gram(s)            LAB/STUDIES:  Labs:  CAPILLARY BLOOD GLUCOSE      POCT Blood Glucose.: 132 mg/dL (13 Sep 2022 06:22)  POCT Blood Glucose.: 121 mg/dL (13 Sep 2022 05:32)  POCT Blood Glucose.: 124 mg/dL (13 Sep 2022 04:20)  POCT Blood Glucose.: 127 mg/dL (13 Sep 2022 03:27)  POCT Blood Glucose.: 128 mg/dL (13 Sep 2022 02:20)  POCT Blood Glucose.: 143 mg/dL (13 Sep 2022 01:19)  POCT Blood Glucose.: 155 mg/dL (13 Sep 2022 00:16)  POCT Blood Glucose.: 175 mg/dL (12 Sep 2022 23:28)  POCT Blood Glucose.: 181 mg/dL (12 Sep 2022 22:26)  POCT Blood Glucose.: 208 mg/dL (12 Sep 2022 21:06)  POCT Blood Glucose.: 438 mg/dL (12 Sep 2022 19:40)  POCT Blood Glucose.: 252 mg/dL (12 Sep 2022 19:04)  POCT Blood Glucose.: 244 mg/dL (12 Sep 2022 18:06)  POCT Blood Glucose.: 272 mg/dL (12 Sep 2022 16:24)  POCT Blood Glucose.: 318 mg/dL (12 Sep 2022 15:10)  POCT Blood Glucose.: 300 mg/dL (12 Sep 2022 14:13)  POCT Blood Glucose.: 302 mg/dL (12 Sep 2022 13:01)                          9.4    8.12  )-----------( 331      ( 13 Sep 2022 05:00 )             26.9       Auto Neutrophil %: 83.8 % (09-13-22 @ 05:00)  Auto Immature Granulocyte %: 0.9 % (09-13-22 @ 05:00)    09-13    137  |  97<L>  |  118<HH>  ----------------------------<  115<H>  3.9   |  18  |  6.1<HH>      Calcium, Total Serum: 8.2 mg/dL (09-13-22 @ 05:00)      LFTs:             5.6  | <0.2 | 37       ------------------[201     ( 13 Sep 2022 05:00 )  2.8  | x    | 22          Lipase:x      Amylase:x         Blood Gas Arterial, Lactate: 0.70 mmol/L (09-13-22 @ 10:06)  Blood Gas Arterial, Lactate: 0.80 mmol/L (09-13-22 @ 03:25)  Blood Gas Arterial, Lactate: 0.60 mmol/L (09-12-22 @ 03:49)  Blood Gas Arterial, Lactate: 0.50 mmol/L (09-11-22 @ 04:04)    ABG - ( 13 Sep 2022 10:06 )  pH: 7.43  /  pCO2: 28    /  pO2: 157   / HCO3: 19    / Base Excess: -4.8  /  SaO2: 100.0           ABG - ( 13 Sep 2022 03:25 )  pH: 7.44  /  pCO2: 28    /  pO2: 146   / HCO3: 19    / Base Excess: -4.0  /  SaO2: 99.5            ABG - ( 12 Sep 2022 03:49 )  pH: 7.44  /  pCO2: 27    /  pO2: 424   / HCO3: 18    / Base Excess: -4.4  /  SaO2: 99.7              Coags:     16.50  ----< 1.44    ( 12 Sep 2022 05:17 )     30.5                            IMAGING:      ACCESS/ DEVICES:  [x ] Peripheral IV  [ ] Central Venous Line	[ ] R	[ ] L	[ ] IJ	[ ] Fem	[ ] SC	Placed:   [ ] Arterial Line		[ ] R	[ ] L	[ ] Fem	[ ] Rad	[ ] Ax	Placed:   [ ] PICC:					[ ] Mediport  [ ] Urinary Catheter,  Date Placed:   [ ] Chest tube: [ ] Right, [ ] Left  [ ] FAIZAN/Ryne Drains

## 2022-09-13 NOTE — PROGRESS NOTE ADULT - SUBJECTIVE AND OBJECTIVE BOX
seen and examined  intubated/ventilated         PAST HISTORY  --------------------------------------------------------------------------------  No significant changes to PMH, PSH, FHx, SHx, unless otherwise noted    ALLERGIES & MEDICATIONS  --------------------------------------------------------------------------------  Allergies    No Known Allergies    Intolerances      Standing Inpatient Medications  amLODIPine   Tablet 10 milliGRAM(s) Oral daily  aspirin  chewable 81 milliGRAM(s) Oral daily  atorvastatin 80 milliGRAM(s) Oral at bedtime  aztreonam  IVPB      aztreonam  IVPB 2000 milliGRAM(s) IV Intermittent every 12 hours  ceftazidime/avibactam IVPB 0.94 Gram(s) IV Intermittent every 24 hours  chlorhexidine 0.12% Liquid 15 milliLiter(s) Oral Mucosa every 12 hours  chlorhexidine 2% Cloths 1 Application(s) Topical <User Schedule>  collagenase Ointment 1 Application(s) Topical two times a day  Dakins Solution - 1/2 Strength 1 Application(s) Topical two times a day  dexMEDEtomidine Infusion 0.2 MICROgram(s)/kG/Hr IV Continuous <Continuous>  dextrose 5%. 1000 milliLiter(s) IV Continuous <Continuous>  dextrose 5%. 1000 milliLiter(s) IV Continuous <Continuous>  dextrose 50% Injectable 25 Gram(s) IV Push once  dextrose 50% Injectable 12.5 Gram(s) IV Push once  dextrose 50% Injectable 25 Gram(s) IV Push once  furosemide   Injectable 80 milliGRAM(s) IV Push every 12 hours  glucagon  Injectable 1 milliGRAM(s) IntraMuscular once  hydrALAZINE 100 milliGRAM(s) Oral every 8 hours  insulin regular Infusion 1 Unit(s)/Hr IV Continuous <Continuous>  labetalol 600 milliGRAM(s) Oral three times a day  lacosamide IVPB 50 milliGRAM(s) IV Intermittent every 12 hours  levETIRAcetam  Solution 500 milliGRAM(s) Oral two times a day  lidocaine 1%/epinephrine 1:100,000 Inj 20 milliLiter(s) Local Injection once  methylPREDNISolone sodium succinate IVPB 1000 milliGRAM(s) IV Intermittent daily  multivitamin 1 Tablet(s) Oral daily  pantoprazole  Injectable 40 milliGRAM(s) IV Push two times a day  sevelamer carbonate Powder 1600 milliGRAM(s) Oral every 8 hours  sodium bicarbonate 1300 milliGRAM(s) Oral every 8 hours  sodium chloride 0.65% Nasal 2 Spray(s) Both Nostrils two times a day  sodium zirconium cyclosilicate 10 Gram(s) Oral every 12 hours    PRN Inpatient Medications  acetaminophen     Tablet .. 650 milliGRAM(s) Oral every 6 hours PRN  dextrose Oral Gel 15 Gram(s) Oral once PRN  labetalol Injectable 10 milliGRAM(s) IV Push every 6 hours PRN  melatonin 3 milliGRAM(s) Oral at bedtime PRN          VITALS/PHYSICAL EXAM  --------------------------------------------------------------------------------  T(C): 36.1 (09-13-22 @ 00:00), Max: 36.2 (09-12-22 @ 08:00)  HR: 54 (09-13-22 @ 07:00) (52 - 78)  BP: 148/75 (09-13-22 @ 07:00) (128/73 - 160/89)  RR: 17 (09-13-22 @ 07:00) (11 - 20)  SpO2: 99% (09-13-22 @ 07:00) (98% - 99%)  Wt(kg): --        09-12-22 @ 07:01  -  09-13-22 @ 07:00  --------------------------------------------------------  IN: 1414.6 mL / OUT: 145 mL / NET: 1269.6 mL      Physical Exam:  	Gen: intubated/ventilated   	Pulm: B/L linda   	CV:  S1S2; no rub  	Abd: +distended  	    LABS/STUDIES  --------------------------------------------------------------------------------              9.4    8.12  >-----------<  331      [09-13-22 @ 05:00]              26.9     137  |  97  |  118  ----------------------------<  115      [09-13-22 @ 05:00]  3.9   |  18  |  6.1        Ca     8.2     [09-13-22 @ 05:00]      Mg     2.4     [09-13-22 @ 05:00]      Phos  10.1     [09-12-22 @ 05:17]    TPro  5.6  /  Alb  2.8  /  TBili  <0.2  /  DBili  x   /  AST  37  /  ALT  22  /  AlkPhos  201  [09-13-22 @ 05:00]    PT/INR: PT 16.50, INR 1.44       [09-12-22 @ 05:17]  PTT: 30.5       [09-12-22 @ 05:17]    Creatinine Trend:  SCr 6.1 [09-13 @ 05:00]  SCr 5.8 [09-12 @ 05:17]  SCr 5.4 [09-11 @ 04:58]  SCr 5.4 [09-10 @ 16:31]  SCr 5.2 [09-10 @ 05:12]    Urinalysis - [09-09-22 @ 19:05]      Color Yellow / Appearance Turbid / SG 1.007 / pH 6.0      Gluc Negative / Ketone Negative  / Bili Negative / Urobili <2 mg/dL       Blood Large / Protein 30 mg/dL / Leuk Est Large / Nitrite Negative      RBC 26 /  / Hyaline 6 / Gran  / Sq Epi  / Non Sq Epi 3 / Bacteria Negative    Urine Creatinine 17      [09-09-22 @ 19:05]  Urine Protein 46      [09-09-22 @ 19:05]    Ferritin 243      [08-28-22 @ 05:49]  PTH -- (Ca 8.5)      [09-05-22 @ 20:00]   75  TSH 0.86      [08-08-22 @ 17:53]  Lipid: chol 234, , HDL 42, LDL --      [08-03-22 @ 08:56]    HBcAb Nonreact      [09-09-22 @ 06:11]  HIV Nonreact      [09-09-22 @ 06:11]    LUIS FELIPE: titer Negative, pattern --      [09-03-22 @ 12:04]  dsDNA <12      [08-30-22 @ 19:27]  C3 Complement 134      [09-09-22 @ 06:11]  C4 Complement 33      [09-09-22 @ 06:11]  Rheumatoid Factor <10      [08-19-22 @ 11:37]  ANCA: cANCA Negative, pANCA Negative, atypical ANCA Negative      [08-30-22 @ 19:27]  Immunofixation Serum:   No Monoclonal Band Identified    Reference Range: None Detected      [08-30-22 @ 23:46]  SPEP Interpretation: Pattern Consistent With Acute Inflammation Or Stress      [08-30-22 @ 23:46]  Cryoglobulin: Negative      [09-09-22 @ 06:11]

## 2022-09-13 NOTE — PROGRESS NOTE ADULT - SUBJECTIVE AND OBJECTIVE BOX
Patient is a 56y old  Female who presents with a chief complaint of RLE wound (12 Sep 2022 15:26)        Over Night Events:  Remains critically ill on MV.  Off sedation.  Off pressors.  UO remains low.          ROS:     All ROS are negative except HPI         PHYSICAL EXAM    ICU Vital Signs Last 24 Hrs  T(C): 36.1 (13 Sep 2022 00:00), Max: 36.1 (12 Sep 2022 16:00)  T(F): 97 (13 Sep 2022 00:00), Max: 97 (13 Sep 2022 00:00)  HR: 54 (13 Sep 2022 07:00) (52 - 66)  BP: 148/75 (13 Sep 2022 07:00) (128/73 - 160/88)  BP(mean): 104 (13 Sep 2022 07:00) (91 - 115)  ABP: --  ABP(mean): --  RR: 17 (13 Sep 2022 07:00) (11 - 20)  SpO2: 99% (13 Sep 2022 07:00) (98% - 99%)    O2 Parameters below as of 13 Sep 2022 07:00  Patient On (Oxygen Delivery Method): ventilator    O2 Concentration (%): 40        CONSTITUTIONAL:  IN  NAD    ENT:   Airway patent,   Mouth with normal mucosa.     EYES:   Pupils equal,   Round and reactive to light.    CARDIAC:   Normal rate,   Regular rhythm.     RESPIRATORY:   No wheezing  Bilateral BS  Normal chest expansion  Not tachypneic,  No use of accessory muscles    GASTROINTESTINAL:  Abdomen soft,   Non-tender,   No guarding,   + BS    MUSCULOSKELETAL:   Range of motion is not limited,  No clubbing, cyanosis    NEUROLOGICAL:   Opens eyes   Does not follow commands     SKIN:   Skin normal color for race,   No evidence of rash.    PSYCHIATRIC:   No apparent risk to self or others.        09-12-22 @ 07:01  -  09-13-22 @ 07:00  --------------------------------------------------------  IN:    Dexmedetomidine: 585.6 mL    Glucerna: 480 mL    Insulin: 149 mL    IV PiggyBack: 200 mL  Total IN: 1414.6 mL    OUT:    Indwelling Catheter - Urethral (mL): 145 mL  Total OUT: 145 mL    Total NET: 1269.6 mL          LABS:                            9.4    8.12  )-----------( 331      ( 13 Sep 2022 05:00 )             26.9                                               09-13    137  |  97<L>  |  118<HH>  ----------------------------<  115<H>  3.9   |  18  |  6.1<HH>    Creatinine Trend  , Cr 6.1, (09-13-22 @ 05:00)  Creatinine Trend  , Cr 5.8, (09-12-22 @ 05:17)  Creatinine Trend  BUN 99, Cr 5.4, (09-11-22 @ 04:58)  Creatinine Trend  BUN 96, Cr 5.4, (09-10-22 @ 16:31)  Creatinine Trend  BUN 89, Cr 5.2, (09-10-22 @ 05:12)  Creatinine Trend  BUN 79, Cr 4.7, (09-09-22 @ 06:11)      Ca    8.2<L>      13 Sep 2022 05:00  Phos  10.1     09-12  Mg     2.4     09-13    TPro  5.6<L>  /  Alb  2.8<L>  /  TBili  <0.2  /  DBili  x   /  AST  37  /  ALT  22  /  AlkPhos  201<H>  09-13      PT/INR - ( 12 Sep 2022 05:17 )   PT: 16.50 sec;   INR: 1.44 ratio         PTT - ( 12 Sep 2022 05:17 )  PTT:30.5 sec                                                                                     LIVER FUNCTIONS - ( 13 Sep 2022 05:00 )  Alb: 2.8 g/dL / Pro: 5.6 g/dL / ALK PHOS: 201 U/L / ALT: 22 U/L / AST: 37 U/L / GGT: x                                                                                               Mode: AC/ CMV (Assist Control/ Continuous Mandatory Ventilation)  RR (machine): 16  TV (machine): 400  FiO2: 40  PEEP: 5  ITime: 1  MAP: 9  PIP: 22                                      ABG - ( 13 Sep 2022 03:25 )  pH, Arterial: 7.44  pH, Blood: x     /  pCO2: 28    /  pO2: 146   / HCO3: 19    / Base Excess: -4.0  /  SaO2: 99.5                MEDICATIONS  (STANDING):  amLODIPine   Tablet 10 milliGRAM(s) Oral daily  aspirin  chewable 81 milliGRAM(s) Oral daily  atorvastatin 80 milliGRAM(s) Oral at bedtime  aztreonam  IVPB      aztreonam  IVPB 2000 milliGRAM(s) IV Intermittent every 12 hours  ceftazidime/avibactam IVPB 0.94 Gram(s) IV Intermittent every 24 hours  chlorhexidine 0.12% Liquid 15 milliLiter(s) Oral Mucosa every 12 hours  chlorhexidine 2% Cloths 1 Application(s) Topical <User Schedule>  collagenase Ointment 1 Application(s) Topical two times a day  Dakins Solution - 1/2 Strength 1 Application(s) Topical two times a day  dexMEDEtomidine Infusion 0.2 MICROgram(s)/kG/Hr (4.07 mL/Hr) IV Continuous <Continuous>  dextrose 5%. 1000 milliLiter(s) (100 mL/Hr) IV Continuous <Continuous>  dextrose 5%. 1000 milliLiter(s) (50 mL/Hr) IV Continuous <Continuous>  dextrose 50% Injectable 25 Gram(s) IV Push once  dextrose 50% Injectable 12.5 Gram(s) IV Push once  dextrose 50% Injectable 25 Gram(s) IV Push once  furosemide   Injectable 80 milliGRAM(s) IV Push every 12 hours  glucagon  Injectable 1 milliGRAM(s) IntraMuscular once  hydrALAZINE 100 milliGRAM(s) Oral every 8 hours  insulin regular Infusion 1 Unit(s)/Hr (1 mL/Hr) IV Continuous <Continuous>  labetalol 600 milliGRAM(s) Oral three times a day  lacosamide IVPB 50 milliGRAM(s) IV Intermittent every 12 hours  levETIRAcetam  Solution 500 milliGRAM(s) Oral two times a day  lidocaine 1%/epinephrine 1:100,000 Inj 20 milliLiter(s) Local Injection once  methylPREDNISolone sodium succinate IVPB 1000 milliGRAM(s) IV Intermittent daily  multivitamin 1 Tablet(s) Oral daily  pantoprazole  Injectable 40 milliGRAM(s) IV Push two times a day  sevelamer carbonate Powder 1600 milliGRAM(s) Oral every 8 hours  sodium bicarbonate 1300 milliGRAM(s) Oral every 8 hours  sodium chloride 0.65% Nasal 2 Spray(s) Both Nostrils two times a day  sodium zirconium cyclosilicate 10 Gram(s) Oral every 12 hours    MEDICATIONS  (PRN):  acetaminophen     Tablet .. 650 milliGRAM(s) Oral every 6 hours PRN Temp greater or equal to 38C (100.4F), Mild Pain (1 - 3)  dextrose Oral Gel 15 Gram(s) Oral once PRN Blood Glucose LESS THAN 70 milliGRAM(s)/deciliter  labetalol Injectable 10 milliGRAM(s) IV Push every 6 hours PRN Systolic blood pressure >  melatonin 3 milliGRAM(s) Oral at bedtime PRN Insomnia      New X-rays reviewed:                                                                                  ECHO

## 2022-09-13 NOTE — PROGRESS NOTE ADULT - ASSESSMENT
56yF w/ PMHx HTN, DM2, and stroke (per son 2017) admitted for RLE wound. S/P debridement on 8/10/22 with burn team. Hospital course complicated my episode of AMS and found to have multiple punctate infarcts. Intubated on 9/6/22 for bedside endoscopy/colonoscopy and remains intubated. Thoracic surgery consulted for trach & PEG. Physical exam findings, imaging, and labs as documented above.     PLAN:  -Care by primary team  -OR on 9/14/22 for bronchoscopy, tracheostomy, and PEG tube placement   -Preop patient today 9/13 (cbc, bmp, mag, phos, coags, active T&S, active COVID)   -Will obtain consent  -Keep NPO after midnight, IVFs when NPO    spectra [phone]

## 2022-09-13 NOTE — PROGRESS NOTE ADULT - SUBJECTIVE AND OBJECTIVE BOX
Patient is a 56y old  Female who presents with a chief complaint of RLE wound (12 Sep 2022 15:26)      INTERVAL HPI/OVERNIGHT EVENTS:   No overnight events   Afebrile, hemodynamically stable. Stopped insulin drip in AM  throughout the day, Patient had an episode of coughing with blood going through ET tube.  CXR, CBC, PT/INR ordered.  Family will decide if they want tracheostomy tube and dialysis for patient by 8pm tonight.  If they agree patient will be put NPO with IV fluids for procedure tomorrow.      ICU Vital Signs Last 24 Hrs  T(C): 36.1 (13 Sep 2022 12:00), Max: 36.1 (12 Sep 2022 16:00)  T(F): 97 (13 Sep 2022 12:00), Max: 97 (13 Sep 2022 00:00)  HR: 80 (13 Sep 2022 14:18) (52 - 80)  BP: 185/85 (13 Sep 2022 13:00) (128/73 - 185/85)  BP(mean): 122 (13 Sep 2022 13:00) (91 - 126)  ABP: --  ABP(mean): --  RR: 15 (13 Sep 2022 13:00) (10 - 24)  SpO2: 99% (13 Sep 2022 14:18) (98% - 99%)    O2 Parameters below as of 13 Sep 2022 12:00  Patient On (Oxygen Delivery Method): ventilator    O2 Concentration (%): 40      I&O's Summary    12 Sep 2022 07:01  -  13 Sep 2022 07:00  --------------------------------------------------------  IN: 1414.6 mL / OUT: 145 mL / NET: 1269.6 mL    13 Sep 2022 07:01  -  13 Sep 2022 15:26  --------------------------------------------------------  IN: 88.8 mL / OUT: 50 mL / NET: 38.8 mL      Mode: AC/ CMV (Assist Control/ Continuous Mandatory Ventilation)  RR (machine): 16  TV (machine): 360  FiO2: 40  PEEP: 5  ITime: 1  MAP: 10  PIP: 24      LABS:                        9.5    10.39 )-----------( 322      ( 13 Sep 2022 13:00 )             26.8     09-13    137  |  97<L>  |  118<HH>  ----------------------------<  115<H>  3.9   |  18  |  6.1<HH>    Ca    8.2<L>      13 Sep 2022 05:00  Phos  10.1     09-12  Mg     2.4     09-13    TPro  5.6<L>  /  Alb  2.8<L>  /  TBili  <0.2  /  DBili  x   /  AST  37  /  ALT  22  /  AlkPhos  201<H>  09-13    PT/INR - ( 13 Sep 2022 13:00 )   PT: 14.30 sec;   INR: 1.25 ratio         PTT - ( 13 Sep 2022 13:00 )  PTT:29.7 sec    CAPILLARY BLOOD GLUCOSE      POCT Blood Glucose.: 169 mg/dL (13 Sep 2022 12:09)  POCT Blood Glucose.: 132 mg/dL (13 Sep 2022 06:22)  POCT Blood Glucose.: 121 mg/dL (13 Sep 2022 05:32)  POCT Blood Glucose.: 124 mg/dL (13 Sep 2022 04:20)  POCT Blood Glucose.: 127 mg/dL (13 Sep 2022 03:27)  POCT Blood Glucose.: 128 mg/dL (13 Sep 2022 02:20)  POCT Blood Glucose.: 143 mg/dL (13 Sep 2022 01:19)  POCT Blood Glucose.: 155 mg/dL (13 Sep 2022 00:16)  POCT Blood Glucose.: 175 mg/dL (12 Sep 2022 23:28)  POCT Blood Glucose.: 181 mg/dL (12 Sep 2022 22:26)  POCT Blood Glucose.: 208 mg/dL (12 Sep 2022 21:06)  POCT Blood Glucose.: 438 mg/dL (12 Sep 2022 19:40)  POCT Blood Glucose.: 252 mg/dL (12 Sep 2022 19:04)  POCT Blood Glucose.: 244 mg/dL (12 Sep 2022 18:06)  POCT Blood Glucose.: 272 mg/dL (12 Sep 2022 16:24)    ABG - ( 13 Sep 2022 10:06 )  pH, Arterial: 7.43  pH, Blood: x     /  pCO2: 28    /  pO2: 157   / HCO3: 19    / Base Excess: -4.8  /  SaO2: 100.0               RADIOLOGY & ADDITIONAL TESTS:    Consultant(s) Notes Reviewed:  [x ] YES  [ ] NO    MEDICATIONS  (STANDING):  amLODIPine   Tablet 10 milliGRAM(s) Oral daily  aspirin  chewable 81 milliGRAM(s) Oral daily  atorvastatin 80 milliGRAM(s) Oral at bedtime  aztreonam  IVPB      aztreonam  IVPB 2000 milliGRAM(s) IV Intermittent every 12 hours  ceftazidime/avibactam IVPB 0.94 Gram(s) IV Intermittent every 24 hours  chlorhexidine 0.12% Liquid 15 milliLiter(s) Oral Mucosa every 12 hours  chlorhexidine 2% Cloths 1 Application(s) Topical <User Schedule>  collagenase Ointment 1 Application(s) Topical two times a day  Dakins Solution - 1/2 Strength 1 Application(s) Topical two times a day  dexMEDEtomidine Infusion 0.2 MICROgram(s)/kG/Hr (4.07 mL/Hr) IV Continuous <Continuous>  dextrose 5%. 1000 milliLiter(s) (100 mL/Hr) IV Continuous <Continuous>  dextrose 5%. 1000 milliLiter(s) (50 mL/Hr) IV Continuous <Continuous>  dextrose 50% Injectable 25 Gram(s) IV Push once  dextrose 50% Injectable 12.5 Gram(s) IV Push once  dextrose 50% Injectable 25 Gram(s) IV Push once  furosemide   Injectable 80 milliGRAM(s) IV Push every 12 hours  glucagon  Injectable 1 milliGRAM(s) IntraMuscular once  hydrALAZINE 100 milliGRAM(s) Oral every 8 hours  insulin regular Infusion 1 Unit(s)/Hr (1 mL/Hr) IV Continuous <Continuous>  labetalol 600 milliGRAM(s) Oral three times a day  lacosamide IVPB 50 milliGRAM(s) IV Intermittent every 12 hours  levETIRAcetam  Solution 500 milliGRAM(s) Oral two times a day  lidocaine 1%/epinephrine 1:100,000 Inj 20 milliLiter(s) Local Injection once  methylPREDNISolone sodium succinate IVPB 1000 milliGRAM(s) IV Intermittent daily  multivitamin 1 Tablet(s) Oral daily  pantoprazole  Injectable 40 milliGRAM(s) IV Push two times a day  sevelamer carbonate Powder 1600 milliGRAM(s) Oral every 8 hours  sodium bicarbonate 1300 milliGRAM(s) Oral every 8 hours  sodium chloride 0.65% Nasal 2 Spray(s) Both Nostrils two times a day    MEDICATIONS  (PRN):  acetaminophen     Tablet .. 650 milliGRAM(s) Oral every 6 hours PRN Temp greater or equal to 38C (100.4F), Mild Pain (1 - 3)  dextrose Oral Gel 15 Gram(s) Oral once PRN Blood Glucose LESS THAN 70 milliGRAM(s)/deciliter  labetalol Injectable 10 milliGRAM(s) IV Push every 6 hours PRN Systolic blood pressure >  melatonin 3 milliGRAM(s) Oral at bedtime PRN Insomnia      PHYSICAL EXAM:  GENERAL: NAD  HEAD:  Atraumatic, Normocephalic  EYES: EOMI right pupil greater than left,   NERVOUS SYSTEM: nonresponsive to commands, eyes are open with non-purposeful movement  CHEST/LUNG: intubated, B/L coarse breath sounds  HEART: S1S2 normal, no S3, Regular rate and rhythm; No murmurs    Assessment/Plan   55 y/o woman with PMH of HTN, DM2, 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 During hospital stay, found to have multiple punctate infarcts suspected to be cardioembolic vs vasculitis. Patient also found to have sharp waves on vEEG and currently on AEDs. Hospital course further complicated by fever and now with MDR Enterobacter bacteremia.    # nonoliguric ALF / Urinary retention / Suspected ATN  - Cr continues to trend up 4.7  - on IVFs  - 1 dose of lasix 80 mg IV once was given   - avoid hypotension   - renal US 9/2: no hydronephrosis  - keep nolasco for now  - sodium bicarbonate increased to 1300 q8h. Sodium bicarb drip started,   - nephro following  - daily BMP and I's and O's  - phos level 6.1, started sevelamer 800 mg TID in PEG tube   - 9/13 Family will decide at 8pm if they want Renal Replacement Therapy       # Acute ischemic strokes mutlifocal  - Spoke with neurology, suspiscious for vasculitis, but not confirmed, CSF studies does not support the diagnosis. - Plavix held for LGIB. Aspirin resumed    - neurology recommended 1g IV solumedrol for 5 days, today is day 2. (cleared from ID)  - FU EEG, cw keppra   - Neuro check q1h  - Pt is intubated.   - Per stroke team, no need for angio, requested a renal bx as expected widespread vasculitis. And once cleared for infection, can be started on steroids (can wait to results of bx as rheum advised against rx empirically).   - 9/12 on day 4/5 of steroids, f/u neuro recs after course complete for steroid dose  - 9/13 Family will decide at 8pm if they want tracheostomy tube, make NPO and put on IV fluids, F/U 8pm labs  - call family after 8pm if no call received    # Bacteremia, resolved   - bcx positive for MDR enterobacter Cloacae   - Cw w antibiotics per ID   - bcx daily   - 9/12 ceftaz changed to q 24, aztreonam still q12    # Purulent Right LE cellulitis   - s/p debridement by burn on 8/10  - Candida in wound. per Dr. Chua: contaminant  -  BCx w/ staph: likely contaminant. repeat BCx at that time was negative  - 9/13 BURN Consulted for new recs for wound care, f/u recs      # GI bleed   - S/p 6 units of PRBCs. Hb is stable.   - Pt was seen by GI, bleeding hemorrhoids noted, external. Surgery contacted for rx.   - Protonix q12h  - ENT eval appreciated for oral bleed. No actively oozing wounds, teeth guard put in.     - pt intubated   - oral cavity bleed from tongue biting, improving     # Hypertensive urgency due to noncompliance and Malignant HTN   - BP on admission 296/174 without signs of end organ damage. Renal artery duplex wnl>>ARIAN unlikely  - Aldosterone wnl, renin elevated  - BP overall improved  - SBP goal 120-160    # Diabetes mellitus   - HbA1C 10.4  - Lantus and lispro sliding scale    #HLD   - cw statins       #MISC:  - GI prophylaxis: protonix   - Dispo: MICU   - Acitivity: bedrest     #Handoff  - possible trach placement tomorrow (Make NPO if family agreeable), completion of steroid course to assess if mental status improves, completion of antibiotics

## 2022-09-14 NOTE — CONSULT NOTE ADULT - SUBJECTIVE AND OBJECTIVE BOX
NUTRITION SUPPORT TEAM  -  CONSULT NOTE     ADMISSION HPI:  57 yo F with PMH of HTN, DM2, and stroke (per son 2017) who presented for RLE wound. Started 3 months ago when she fell and hit her leg on a wooden cabinet. She put hydrogen peroxide, antibiotic cream, and wrapped the wound but never fully healed. Two weeks ago it started to show yellow pus so her and her family came to the ED for further treatment. Endorsed subjective fevers, chest pain, and vision changes which occurs when walked 2-3 blocks. Denied congestion, sore throat, cough, dyspnea, headache, dysuria, N/V, diarrhea   (02 Aug 2022 07:47)    NUTRITION SUPPORT NOTE:    Patient is intubated and sedated. S/p PEG placement 8/24. Plan for tracheostomy today and initiation of dialysis after OR.     REVIEW OF SYSTEMS:  Negative except as noted above.     PAST MEDICAL/SURGICAL HISTORY:   Hypertension  Diabetes mellitus  No significant past surgical history    ALLERGIES:  No Known Allergies    VITALS:  T(F): 97 (09-14 @ 11:21), Max: 97.2 (09-14 @ 00:00)  HR: 58 (09-14 @ 11:00) (58 - 70)  BP: 138/78 (09-14 @ 11:21) (131/76 - 169/92)  RR: 18 (09-14 @ 11:21) (10 - 21)  SpO2: 98% (09-14 @ 11:21) (97% - 100%)    HEIGHT/WEIGHT/BMI:   Height (cm): 165.1 (09-14)  Weight (kg): 81.4 (09-14)  BMI (kg/m2): 29.9 (09-14)    PHYSICAL EXAM:   GENERAL: NAD, opens eyes, does not follow commands, obese  HEENT: Moist mucous membranes, Good dentition, No lesions  ABDOMEN: Soft, Nontender, Nondistended, PEG tube in place without surrounding drainage/erythema  EXTREMITIES:  No clubbing, cyanosis, pitting edema present in BUE/BLE  SKIN: warm and well perfused; No obvious rashes or lesions    I/Os:   09-13-22 @ 07:01  -  09-14-22 @ 07:00  --------------------------------------------------------  IN:    Dexmedetomidine: 486.3 mL    Glucerna: 180 mL    IV PiggyBack: 200 mL  Total IN: 866.3 mL    OUT:    Indwelling Catheter - Urethral (mL): 220 mL    Insulin: 0 mL  Total OUT: 220 mL    Total NET: 646.3 mL      STANDING MEDICATIONS:   amLODIPine   Tablet 10 milliGRAM(s) Oral daily  aspirin  chewable 81 milliGRAM(s) Oral daily  atorvastatin 80 milliGRAM(s) Oral at bedtime  aztreonam  IVPB      aztreonam  IVPB 2000 milliGRAM(s) IV Intermittent every 12 hours  ceftazidime/avibactam IVPB 0.94 Gram(s) IV Intermittent every 24 hours  chlorhexidine 0.12% Liquid 15 milliLiter(s) Oral Mucosa every 12 hours  chlorhexidine 2% Cloths 1 Application(s) Topical <User Schedule>  collagenase Ointment 1 Application(s) Topical two times a day  Dakins Solution - 1/2 Strength 1 Application(s) Topical two times a day  dexMEDEtomidine Infusion 0.2 MICROgram(s)/kG/Hr IV Continuous <Continuous>  dextrose 5%. 1000 milliLiter(s) IV Continuous <Continuous>  dextrose 5%. 1000 milliLiter(s) IV Continuous <Continuous>  dextrose 50% Injectable 25 Gram(s) IV Push once  dextrose 50% Injectable 12.5 Gram(s) IV Push once  dextrose 50% Injectable 25 Gram(s) IV Push once  furosemide   Injectable 80 milliGRAM(s) IV Push every 12 hours  glucagon  Injectable 1 milliGRAM(s) IntraMuscular once  hydrALAZINE 100 milliGRAM(s) Oral every 8 hours  labetalol 600 milliGRAM(s) Oral three times a day  lacosamide IVPB 50 milliGRAM(s) IV Intermittent every 12 hours  levETIRAcetam  Solution 500 milliGRAM(s) Oral two times a day  lidocaine 1%/epinephrine 1:100,000 Inj 20 milliLiter(s) Local Injection once  multivitamin 1 Tablet(s) Oral daily  pantoprazole  Injectable 40 milliGRAM(s) IV Push two times a day  sevelamer carbonate Powder 2400 milliGRAM(s) Enteral Tube every 8 hours  sodium bicarbonate 1300 milliGRAM(s) Oral every 8 hours  sodium chloride 0.65% Nasal 2 Spray(s) Both Nostrils two times a day      LABS:              9.1    10.81 )-----------( 293      ( 14 Sep 2022 05:32 )             25.9     136  |  94<L>  |  125<HH>  ----------------------------<  224<H>          (09-14-22 @ 05:32)  3.7   |  19  |  6.8<HH>    Ca    8.1<L>          (09-14-22 @ 05:32)  Phos  11.4         (09-14-22 @ 05:32)  Mg     2.5         (09-14-22 @ 05:32)    TPro  5.3<L>  /  Alb  2.8<L>  /  TBili  0.2  /  DBili  x   /  AST  43<H>  /  ALT  21  /  AlkPhos  181<H>       09-14-22 @ 05:32    Folate: 11.3 ng/mL (08-08 @ 17:53)  Vitamin B12, Serum: 358 pg/mL (08-08 @ 17:53)  A1c: 10.4 % (08-06-22 @ 06:59)    Blood Glucose (Past 24 hours):  224 mg/dL (09-14 @ 05:17)  202 mg/dL (09-13 @ 20:37)  169 mg/dL (09-13 @ 12:09)    DIET:   Diet, NPO with Tube Feed:   Tube Feeding Modality: Gastrostomy  Nepro with Carb Steady  Total Volume for 24 Hours (mL): 480  Continuous  Starting Tube Feed Rate mL per Hour: 20  Until Goal Tube Feed Rate (mL per Hour): 20  Tube Feed Duration (in Hours): 24  Tube Feed Start Time: 10:00 (09-14-22 @ 10:26) [Active]  Diet, NPO after Midnight:      NPO Start Date: 13-Sep-2022,   NPO Start Time: 23:59 (09-13-22 @ 19:31) [Active] NUTRITION SUPPORT TEAM  -  CONSULT NOTE     ADMISSION HPI:  55 yo F with PMH of HTN, DM2, and stroke (per son 2017) who presented for RLE wound. Started 3 months ago when she fell and hit her leg on a wooden cabinet. She put hydrogen peroxide, antibiotic cream, and wrapped the wound but never fully healed. Two weeks ago it started to show yellow pus so her and her family came to the ED for further treatment. Endorsed subjective fevers, chest pain, and vision changes which occurs when walked 2-3 blocks. Denied congestion, sore throat, cough, dyspnea, headache, dysuria, N/V, diarrhea   (02 Aug 2022 07:47)    NUTRITION SUPPORT NOTE:    Hospital course included embolic strokes, uncontrolled DM prior to admission, and MDR enterobacter bacteremia.   Patient is intubated and sedated. S/p PEG placement 8/24. Plan for tracheostomy today and initiation of dialysis after OR.   pt fed Glucerna 1.2 for much of the tie intubated, NPO often, and most recently limited to 20 ml/h of feeding.  Renal function began to deteriorate ~ 8/30.  GIB noted, s/p endoscopy 9/10 with finding of mild gastritis and bleeding hemorrhoids.    REVIEW OF SYSTEMS:  Negative except as noted above.     PAST MEDICAL/SURGICAL HISTORY:   Hypertension  Diabetes mellitus  No significant past surgical history    ALLERGIES:  No Known Allergies    VITALS:  T(F): 97 (09-14 @ 11:21), Max: 97.2 (09-14 @ 00:00)  HR: 58 (09-14 @ 11:00) (58 - 70)  BP: 138/78 (09-14 @ 11:21) (131/76 - 169/92)  RR: 18 (09-14 @ 11:21) (10 - 21)  SpO2: 98% (09-14 @ 11:21) (97% - 100%)    HEIGHT/WEIGHT/BMI:   Height (cm): 165.1 (09-14)  Weight (kg): 81.4 (09-14)  BMI (kg/m2): 29.9 (09-14)    PHYSICAL EXAM:   GENERAL: NAD, opens eyes, does not follow commands, obese  HEENT: Moist mucous membranes, Good dentition, No lesions  ABDOMEN: Soft, Nontender, Nondistended, PEG tube in place without surrounding drainage/erythema  EXTREMITIES:  No clubbing, cyanosis, pitting edema present in BUE/BLE  SKIN: warm and well perfused; No obvious rashes, RLE dressing (f/u with Burn team planned)    I/Os:   09-13-22 @ 07:01  -  09-14-22 @ 07:00  --------------------------------------------------------  IN:    Dexmedetomidine: 486.3 mL    Glucerna: 180 mL    IV PiggyBack: 200 mL  Total IN: 866.3 mL    OUT:    Indwelling Catheter - Urethral (mL): 220 mL    Insulin: 0 mL  Total OUT: 220 mL    Total NET: 646.3 mL      STANDING MEDICATIONS:   amLODIPine   Tablet 10 milliGRAM(s) Oral daily  aspirin  chewable 81 milliGRAM(s) Oral daily  atorvastatin 80 milliGRAM(s) Oral at bedtime  aztreonam  IVPB 2000 milliGRAM(s) IV Intermittent every 12 hours  ceftazidime/avibactam IVPB 0.94 Gram(s) IV Intermittent every 24 hours  chlorhexidine 0.12% Liquid 15 milliLiter(s) Oral Mucosa every 12 hours  chlorhexidine 2% Cloths 1 Application(s) Topical <User Schedule>  collagenase Ointment 1 Application(s) Topical two times a day  Dakins Solution - 1/2 Strength 1 Application(s) Topical two times a day  dexMEDEtomidine Infusion 0.2 MICROgram(s)/kG/Hr IV Continuous <Continuous>  furosemide   Injectable 80 milliGRAM(s) IV Push every 12 hours  hydrALAZINE 100 milliGRAM(s) Oral every 8 hours  labetalol 600 milliGRAM(s) Oral three times a day  lacosamide IVPB 50 milliGRAM(s) IV Intermittent every 12 hours  levETIRAcetam  Solution 500 milliGRAM(s) Oral two times a day  lidocaine 1%/epinephrine 1:100,000 Inj 20 milliLiter(s) Local Injection once  multivitamin 1 Tablet(s) Oral daily  pantoprazole  Injectable 40 milliGRAM(s) IV Push two times a day  sevelamer carbonate Powder 2400 milliGRAM(s) Enteral Tube every 8 hours  sodium bicarbonate 1300 milliGRAM(s) Oral every 8 hours  sodium chloride 0.65% Nasal 2 Spray(s) Both Nostrils two times a day      LABS:              9.1    10.81 )-----------( 293      ( 14 Sep 2022 05:32 )             25.9     136  |  94<L>  |  125<HH>  ----------------------------<  224<H>          (09-14-22 @ 05:32)  3.7   |  19  |  6.8<HH>    Ca    8.1<L>          (09-14-22 @ 05:32)  Phos  11.4         (09-14-22 @ 05:32)  Mg     2.5         (09-14-22 @ 05:32)    TPro  5.3<L>  /  Alb  2.8<L>  /  TBili  0.2  /  DBili  x   /  AST  43<H>  /  ALT  21  /  AlkPhos  181<H>       09-14-22 @ 05:32    Folate: 11.3 ng/mL (08-08 @ 17:53)  Vitamin B12, Serum: 358 pg/mL (08-08 @ 17:53)  A1c: 10.4 % (08-06-22 @ 06:59)    Blood Glucose (Past 24 hours):  224 mg/dL (09-14 @ 05:17)  202 mg/dL (09-13 @ 20:37)  169 mg/dL (09-13 @ 12:09)    DIET:   Diet, NPO with Tube Feed:   Tube Feeding Modality: Gastrostomy  Nepro with Carb Steady  Total Volume for 24 Hours (mL): 480  Continuous  Starting Tube Feed Rate mL per Hour: 20  Until Goal Tube Feed Rate (mL per Hour): 20  Tube Feed Duration (in Hours): 24  Tube Feed Start Time: 10:00 (09-14-22 @ 10:26) [Active]  Diet, NPO after Midnight:      NPO Start Date: 13-Sep-2022,   NPO Start Time: 23:59 (09-13-22 @ 19:31) [Active]

## 2022-09-14 NOTE — PROGRESS NOTE ADULT - SUBJECTIVE AND OBJECTIVE BOX
Patient is a 56y old  Female who presents with a chief complaint of RLE wound (12 Sep 2022 15:26)        Over Night Events:  Remains critically ill on MV>  Sedated.  Off pressors.          ROS:     All ROS are negative except HPI         PHYSICAL EXAM    ICU Vital Signs Last 24 Hrs  T(C): 36 (14 Sep 2022 04:00), Max: 36.3 (13 Sep 2022 16:00)  T(F): 96.8 (14 Sep 2022 04:00), Max: 97.3 (13 Sep 2022 16:00)  HR: 60 (14 Sep 2022 07:00) (60 - 80)  BP: 151/82 (14 Sep 2022 07:00) (147/72 - 182/80)  BP(mean): 113 (14 Sep 2022 07:00) (104 - 132)  ABP: --  ABP(mean): --  RR: 19 (14 Sep 2022 07:00) (11 - 22)  SpO2: 98% (14 Sep 2022 07:00) (97% - 100%)    O2 Parameters below as of 14 Sep 2022 07:00  Patient On (Oxygen Delivery Method): ventilator    O2 Concentration (%): 40        CONSTITUTIONAL:  In  NAD    ENT:   Airway patent,   Mouth with normal mucosa.     EYES:   Pupils equal,   Round and reactive to light.    CARDIAC:   Normal rate,   Regular rhythm.        RESPIRATORY:   No wheezing  Bilateral BS  Normal chest expansion  Not tachypneic,  No use of accessory muscles    GASTROINTESTINAL:  Abdomen soft,   Non-tender,   No guarding,   + BS    MUSCULOSKELETAL:   Range of motion is not limited,  No clubbing, cyanosis    NEUROLOGICAL:   Opens eyes  Does not follow commands       SKIN:   Skin normal color for race,   No evidence of rash.    PSYCHIATRIC:   No apparent risk to self or others.        09-13-22 @ 07:01  -  09-14-22 @ 07:00  --------------------------------------------------------  IN:    Dexmedetomidine: 486.3 mL    Glucerna: 180 mL    IV PiggyBack: 200 mL  Total IN: 866.3 mL    OUT:    Indwelling Catheter - Urethral (mL): 220 mL    Insulin: 0 mL  Total OUT: 220 mL    Total NET: 646.3 mL          LABS:                            9.1    10.81 )-----------( 293      ( 14 Sep 2022 05:32 )             25.9                                               09-14    136  |  94<L>  |  125<HH>  ----------------------------<  224<H>  3.7   |  19  |  6.8<HH>    Ca    8.1<L>      14 Sep 2022 05:32  Phos  11.4     09-14  Mg     2.5     09-14    TPro  5.3<L>  /  Alb  2.8<L>  /  TBili  0.2  /  DBili  x   /  AST  43<H>  /  ALT  21  /  AlkPhos  181<H>  09-14      PT/INR - ( 13 Sep 2022 13:00 )   PT: 14.30 sec;   INR: 1.25 ratio         PTT - ( 13 Sep 2022 13:00 )  PTT:29.7 sec                                                                                     LIVER FUNCTIONS - ( 14 Sep 2022 05:32 )  Alb: 2.8 g/dL / Pro: 5.3 g/dL / ALK PHOS: 181 U/L / ALT: 21 U/L / AST: 43 U/L / GGT: x                                                                                               Mode: AC/ CMV (Assist Control/ Continuous Mandatory Ventilation)  RR (machine): 16  TV (machine): 360  FiO2: 40  PEEP: 5  ITime: 1  MAP: 10  PIP: 19                                      ABG - ( 14 Sep 2022 03:04 )  pH, Arterial: 7.44  pH, Blood: x     /  pCO2: 28    /  pO2: 122   / HCO3: 19    / Base Excess: -4.4  /  SaO2: 99.6                MEDICATIONS  (STANDING):  amLODIPine   Tablet 10 milliGRAM(s) Oral daily  aspirin  chewable 81 milliGRAM(s) Oral daily  atorvastatin 80 milliGRAM(s) Oral at bedtime  aztreonam  IVPB      aztreonam  IVPB 2000 milliGRAM(s) IV Intermittent every 12 hours  ceftazidime/avibactam IVPB 0.94 Gram(s) IV Intermittent every 24 hours  chlorhexidine 0.12% Liquid 15 milliLiter(s) Oral Mucosa every 12 hours  chlorhexidine 2% Cloths 1 Application(s) Topical <User Schedule>  collagenase Ointment 1 Application(s) Topical two times a day  Dakins Solution - 1/2 Strength 1 Application(s) Topical two times a day  dexMEDEtomidine Infusion 0.2 MICROgram(s)/kG/Hr (4.07 mL/Hr) IV Continuous <Continuous>  dextrose 5%. 1000 milliLiter(s) (100 mL/Hr) IV Continuous <Continuous>  dextrose 5%. 1000 milliLiter(s) (50 mL/Hr) IV Continuous <Continuous>  dextrose 50% Injectable 25 Gram(s) IV Push once  dextrose 50% Injectable 12.5 Gram(s) IV Push once  dextrose 50% Injectable 25 Gram(s) IV Push once  furosemide   Injectable 80 milliGRAM(s) IV Push every 12 hours  glucagon  Injectable 1 milliGRAM(s) IntraMuscular once  hydrALAZINE 100 milliGRAM(s) Oral every 8 hours  labetalol 600 milliGRAM(s) Oral three times a day  lacosamide IVPB 50 milliGRAM(s) IV Intermittent every 12 hours  levETIRAcetam  Solution 500 milliGRAM(s) Oral two times a day  lidocaine 1%/epinephrine 1:100,000 Inj 20 milliLiter(s) Local Injection once  multivitamin 1 Tablet(s) Oral daily  pantoprazole  Injectable 40 milliGRAM(s) IV Push two times a day  sevelamer carbonate Powder 1600 milliGRAM(s) Oral every 8 hours  sodium bicarbonate 1300 milliGRAM(s) Oral every 8 hours  sodium chloride 0.65% Nasal 2 Spray(s) Both Nostrils two times a day    MEDICATIONS  (PRN):  acetaminophen     Tablet .. 650 milliGRAM(s) Oral every 6 hours PRN Temp greater or equal to 38C (100.4F), Mild Pain (1 - 3)  dextrose Oral Gel 15 Gram(s) Oral once PRN Blood Glucose LESS THAN 70 milliGRAM(s)/deciliter  labetalol Injectable 10 milliGRAM(s) IV Push every 6 hours PRN Systolic blood pressure >  melatonin 3 milliGRAM(s) Oral at bedtime PRN Insomnia      New X-rays reviewed:                                                                                  ECHO

## 2022-09-14 NOTE — PRE-OP CHECKLIST - 2.
Patient Contact isolation(airborne/droplet ) Precaution for positive COVID-19 (result received today this AM)
seizure precautions

## 2022-09-14 NOTE — PROGRESS NOTE ADULT - SUBJECTIVE AND OBJECTIVE BOX
seen and examined  24 h events noted   intubated/ventilated         PAST HISTORY  --------------------------------------------------------------------------------  No significant changes to PMH, PSH, FHx, SHx, unless otherwise noted    ALLERGIES & MEDICATIONS  --------------------------------------------------------------------------------  Allergies    No Known Allergies    Intolerances      Standing Inpatient Medications  amLODIPine   Tablet 10 milliGRAM(s) Oral daily  aspirin  chewable 81 milliGRAM(s) Oral daily  atorvastatin 80 milliGRAM(s) Oral at bedtime  aztreonam  IVPB      aztreonam  IVPB 2000 milliGRAM(s) IV Intermittent every 12 hours  ceftazidime/avibactam IVPB 0.94 Gram(s) IV Intermittent every 24 hours  chlorhexidine 0.12% Liquid 15 milliLiter(s) Oral Mucosa every 12 hours  chlorhexidine 2% Cloths 1 Application(s) Topical <User Schedule>  collagenase Ointment 1 Application(s) Topical two times a day  Dakins Solution - 1/2 Strength 1 Application(s) Topical two times a day  dexMEDEtomidine Infusion 0.2 MICROgram(s)/kG/Hr IV Continuous <Continuous>  dextrose 5%. 1000 milliLiter(s) IV Continuous <Continuous>  dextrose 5%. 1000 milliLiter(s) IV Continuous <Continuous>  dextrose 50% Injectable 25 Gram(s) IV Push once  dextrose 50% Injectable 12.5 Gram(s) IV Push once  dextrose 50% Injectable 25 Gram(s) IV Push once  furosemide   Injectable 80 milliGRAM(s) IV Push every 12 hours  glucagon  Injectable 1 milliGRAM(s) IntraMuscular once  hydrALAZINE 100 milliGRAM(s) Oral every 8 hours  labetalol 600 milliGRAM(s) Oral three times a day  lacosamide IVPB 50 milliGRAM(s) IV Intermittent every 12 hours  levETIRAcetam  Solution 500 milliGRAM(s) Oral two times a day  lidocaine 1%/epinephrine 1:100,000 Inj 20 milliLiter(s) Local Injection once  multivitamin 1 Tablet(s) Oral daily  pantoprazole  Injectable 40 milliGRAM(s) IV Push two times a day  sevelamer carbonate Powder 1600 milliGRAM(s) Oral every 8 hours  sodium bicarbonate 1300 milliGRAM(s) Oral every 8 hours  sodium chloride 0.65% Nasal 2 Spray(s) Both Nostrils two times a day    PRN Inpatient Medications  acetaminophen     Tablet .. 650 milliGRAM(s) Oral every 6 hours PRN  dextrose Oral Gel 15 Gram(s) Oral once PRN  labetalol Injectable 10 milliGRAM(s) IV Push every 6 hours PRN  melatonin 3 milliGRAM(s) Oral at bedtime PRN          VITALS/PHYSICAL EXAM  --------------------------------------------------------------------------------  T(C): 36.2 (09-14-22 @ 00:00), Max: 36.3 (09-13-22 @ 16:00)  HR: 62 (09-14-22 @ 04:00) (52 - 80)  BP: 158/86 (09-14-22 @ 04:00) (147/72 - 182/80)  RR: 20 (09-14-22 @ 04:00) (10 - 24)  SpO2: 99% (09-14-22 @ 04:00) (97% - 100%)  Wt(kg): --  Height (cm): 165.1 (09-13-22 @ 23:04)  Weight (kg): 81.4 (09-13-22 @ 23:04)  BMI (kg/m2): 29.9 (09-13-22 @ 23:04)  BSA (m2): 1.89 (09-13-22 @ 23:04)      09-12-22 @ 07:01  -  09-13-22 @ 07:00  --------------------------------------------------------  IN: 1414.6 mL / OUT: 145 mL / NET: 1269.6 mL    09-13-22 @ 07:01  -  09-14-22 @ 05:07  --------------------------------------------------------  IN: 805.3 mL / OUT: 160 mL / NET: 645.3 mL      Physical Exam:  	Gen: intubated/venntilated   	Pulm: decrease BS B/L  	CV: S1S2; no rub  	Abd: +distended  	LE:  edema  	    LABS/STUDIES  --------------------------------------------------------------------------------              9.2    12.23 >-----------<  306      [09-13-22 @ 21:35]              25.9     137  |  94  |  122  ----------------------------<  201      [09-13-22 @ 21:35]  3.9   |  19  |  6.4        Ca     8.4     [09-13-22 @ 21:35]      Mg     2.5     [09-13-22 @ 21:35]      Phos  11.0     [09-13-22 @ 21:35]    TPro  5.6  /  Alb  2.8  /  TBili  <0.2  /  DBili  x   /  AST  37  /  ALT  22  /  AlkPhos  201  [09-13-22 @ 05:00]    PT/INR: PT 14.30, INR 1.25       [09-13-22 @ 13:00]  PTT: 29.7       [09-13-22 @ 13:00]    Creatinine Trend:  SCr 6.4 [09-13 @ 21:35]  SCr 6.1 [09-13 @ 05:00]  SCr 5.8 [09-12 @ 05:17]  SCr 5.4 [09-11 @ 04:58]  SCr 5.4 [09-10 @ 16:31]    Urinalysis - [09-09-22 @ 19:05]      Color Yellow / Appearance Turbid / SG 1.007 / pH 6.0      Gluc Negative / Ketone Negative  / Bili Negative / Urobili <2 mg/dL       Blood Large / Protein 30 mg/dL / Leuk Est Large / Nitrite Negative      RBC 26 /  / Hyaline 6 / Gran  / Sq Epi  / Non Sq Epi 3 / Bacteria Negative    Urine Creatinine 17      [09-09-22 @ 19:05]  Urine Protein 46      [09-09-22 @ 19:05]    Ferritin 243      [08-28-22 @ 05:49]  PTH -- (Ca 8.5)      [09-05-22 @ 20:00]   75  TSH 0.86      [08-08-22 @ 17:53]  Lipid: chol 234, , HDL 42, LDL --      [08-03-22 @ 08:56]    HBcAb Nonreact      [09-09-22 @ 06:11]  HIV Nonreact      [09-09-22 @ 06:11]    LUIS FELIPE: titer Negative, pattern --      [09-03-22 @ 12:04]  dsDNA <12      [08-30-22 @ 19:27]  C3 Complement 134      [09-09-22 @ 06:11]  C4 Complement 33      [09-09-22 @ 06:11]  Rheumatoid Factor <10      [08-19-22 @ 11:37]  ANCA: cANCA Negative, pANCA Negative, atypical ANCA Negative      [08-30-22 @ 19:27]  Immunofixation Serum:   No Monoclonal Band Identified    Reference Range: None Detected      [08-30-22 @ 23:46]  SPEP Interpretation: Pattern Consistent With Acute Inflammation Or Stress      [08-30-22 @ 23:46]  Cryoglobulin: Negative      [09-09-22 @ 06:11]

## 2022-09-14 NOTE — PROGRESS NOTE ADULT - ASSESSMENT
56yF w/ PMHx HTN, DM2, and stroke (per son 2017) admitted for RLE wound. S/P debridement on 8/10/22 with burn team. Hospital course complicated my episode of AMS and found to have multiple punctate infarcts. Intubated on 9/6/22 for bedside endoscopy/colonoscopy and remains intubated. Thoracic surgery consulted for trach & PEG. Physical exam findings, imaging, and labs as documented above.     PLAN:  -Care by primary team  -OR on 9/14/22 for bronchoscopy, tracheostomy  -Preop done   -Consent in chart  -Keep NPO, IVFs    spectra [phone]

## 2022-09-14 NOTE — CHART NOTE - NSCHARTNOTEFT_GEN_A_CORE
PACU ANESTHESIA ADMISSION NOTE      Procedure: Selective debridement  right lower leg    Open tracheostomy      Post op diagnosis:  Wound    Acute respiratory failure with hypoxia       trached  TV:__500____       Rate: ____12__      FiO2: ____80__    _x___  Patent Airway    ____  Full return of protective reflexes    ____  Full recovery from anesthesia / back to baseline status    Vitals  SPO2:-98%  HR:-55  RR:-12  B.P:-125/76      Mental Status:  __ Awake   __Alert   ___x__ Drowsy   _____ Sedated    Nausea/Vomiting:  _x___  NO       ______Yes,   See Post - Op Orders         Pain Scale (0-10):  __0___    Treatment: _x___ None    __x__ See Post - Op/PCA Orders    Post - Operative Fluids:   ___ Oral   ____x See Post - Op Orders    Plan: Discharge:   ____Home       ____Floor     _x____Critical Care    _____  Other:_________________    Comments:  Report endorsed to RN in ICU  Vitals stable. no intraoperative events   No anesthesia issues or complications noted.

## 2022-09-14 NOTE — PRE-OP CHECKLIST - 5.
UO=10-20ml/hr, Med Team aware, possible U-dall placement for possible HMD today after trache.(Discussed during rounds with Dr. Adames)

## 2022-09-14 NOTE — PROGRESS NOTE ADULT - SUBJECTIVE AND OBJECTIVE BOX
JOSE MITCHELL  56y, Female  Allergy: No Known Allergies      LOS  43d    CHIEF COMPLAINT: RLE wound (12 Sep 2022 15:26)      INTERVAL EVENTS/HPI  - No acute events overnight  - T(F): , Max: 97.3 (09-13-22 @ 16:00)  - WBC Count: 10.81 (09-14-22 @ 05:32)  WBC Count: 12.23 (09-13-22 @ 21:35)     - Creatinine, Serum: 6.8 (09-14-22 @ 05:32)  Creatinine, Serum: 6.4 (09-13-22 @ 21:35)       ROS  unable to obtain history secondary to patient's mental status and/or sedation      VITALS:  T(F): 96.8, Max: 97.3 (09-13-22 @ 16:00)  HR: 60  BP: 151/82  RR: 19Vital Signs Last 24 Hrs  T(C): 36 (14 Sep 2022 04:00), Max: 36.3 (13 Sep 2022 16:00)  T(F): 96.8 (14 Sep 2022 04:00), Max: 97.3 (13 Sep 2022 16:00)  HR: 60 (14 Sep 2022 07:00) (54 - 80)  BP: 151/82 (14 Sep 2022 07:00) (147/72 - 182/80)  BP(mean): 113 (14 Sep 2022 07:00) (103 - 132)  RR: 19 (14 Sep 2022 07:00) (10 - 22)  SpO2: 98% (14 Sep 2022 07:00) (97% - 100%)    Parameters below as of 14 Sep 2022 07:00  Patient On (Oxygen Delivery Method): ventilator    O2 Concentration (%): 40    PHYSICAL EXAM:  Gen: intubated  HEENT: Normocephalic, atraumatic  Neck: supple, no lymphadenopathy  CV: Regular rate & regular rhythm  Lungs: decreased BS at bases, no fremitus  Abdomen: Soft, BS present  Ext: Warm, well perfused  Neuro: non focal, sedated  Skin: no rash, no erythema  Lines: no phlebitis    FH: Non-contributory  Social Hx: Non-contributory    TESTS & MEASUREMENTS:                        9.1    10.81 )-----------( 293      ( 14 Sep 2022 05:32 )             25.9     09-14    136  |  94<L>  |  125<HH>  ----------------------------<  224<H>  3.7   |  19  |  6.8<HH>    Ca    8.1<L>      14 Sep 2022 05:32  Phos  11.4     09-14  Mg     2.5     09-14    TPro  5.3<L>  /  Alb  2.8<L>  /  TBili  0.2  /  DBili  x   /  AST  43<H>  /  ALT  21  /  AlkPhos  181<H>  09-14      LIVER FUNCTIONS - ( 14 Sep 2022 05:32 )  Alb: 2.8 g/dL / Pro: 5.3 g/dL / ALK PHOS: 181 U/L / ALT: 21 U/L / AST: 43 U/L / GGT: x               Culture - Blood (collected 09-07-22 @ 05:08)  Source: .Blood None  Final Report (09-12-22 @ 19:00):    No Growth Final    Culture - Blood (collected 09-06-22 @ 05:30)  Source: .Blood None  Final Report (09-11-22 @ 20:00):    No Growth Final    Culture - Blood (collected 09-05-22 @ 12:19)  Source: .Blood Blood  Final Report (09-10-22 @ 18:00):    No Growth Final    Culture - Blood (collected 09-04-22 @ 05:43)  Source: .Blood None  Final Report (09-09-22 @ 12:00):    No Growth Final    Culture - Blood (collected 09-03-22 @ 18:18)  Source: .Blood None  Final Report (09-09-22 @ 04:00):    No Growth Final    Culture - Blood (collected 09-01-22 @ 12:29)  Source: .Blood None  Gram Stain (09-06-22 @ 17:03):    Growth in aerobic bottle: Gram Negative Rods    Growth in anaerobic bottle: Gram Negative Rods  Final Report (09-07-22 @ 17:45):    Growth in aerobic and anaerobic bottles: Enterobacter cloacae (Carbapenem    Resistant)    ***Blood Panel PCR results on this specimen are available    approximately 3 hours after the Gram stain result.***    Gram stain, PCR, and/or culture results may notalways    correspond due to difference in methodologies.    ************************************************************    This PCR assay was performed by multiplex PCR. This    Assay tests for 66 bacterial and resistance gene targets.    Please refer to the [facility] test directory    at [email or web address] for details.  Organism: Blood Culture PCR  Enterobacter cloacae (Carbapenem Resistant)  Enterobacter cloacae (Carbapenem Resistant) (09-06-22 @ 17:03)  Organism: Enterobacter cloacae (Carbapenem Resistant) (09-06-22 @ 17:03)      -  Cefiderocol: S      Method Type: KB  Organism: Enterobacter cloacae (Carbapenem Resistant) (09-06-22 @ 17:03)      -  Amikacin: S <=16      -  Ampicillin: R >16 These ampicillin results predict results for amoxicillin      -  Ampicillin/Sulbactam: R >16/8 Enterobacter, Klebsiella aerogenes, Citrobacter, and Serratia may develop resistance during prolonged therapy (3-4 days)      -  Aztreonam: S <=4      -  Cefazolin: R >16 Enterobacter, Klebsiella aerogenes, Citrobacter, and Serratia may develop resistance during prolonged therapy (3-4 days)      -  Cefepime: R >16      -  Cefoxitin: R >16      -  Ceftazidime/Avibactam: R >16      -  Ceftolozane/tazobactam: R >8      -  Ceftriaxone: R >32 Enterobacter, Klebsiella aerogenes, Citrobacter, and Serratia may develop resistance during prolonged therapy      -  Ciprofloxacin: S <=0.25      -  Ertapenem: R >1      -  Gentamicin: S <=2      -  Imipenem: R >8      -  Levofloxacin: S <=0.5      -  Meropenem: R >8      -  Piperacillin/Tazobactam: R >64      -  Tigecycline: S <=2      -  Tobramycin: S <=2      -  Trimethoprim/Sulfamethoxazole: S <=0.5/9.5      Method Type: AALIYAH  Organism: Blood Culture PCR (09-06-22 @ 17:03)      -  Carbapenem Resistance: Detec      -  Enterobacter cloacae complex: Detec      -  NDM Resistance Marker: Detec      Method Type: PCR    Culture - Urine (collected 09-01-22 @ 10:10)  Source: Catheterized Catheterized  Final Report (09-03-22 @ 08:06):    >=3 organisms. Probable collection contamination.    Culture - Urine (collected 08-31-22 @ 00:25)  Source: Catheterized Catheterized  Final Report (09-01-22 @ 09:12):    >=3 organisms. Probable collection contamination.    Culture - Blood (collected 08-30-22 @ 18:03)  Source: .Blood Blood  Final Report (09-05-22 @ 02:00):    No Growth Final    Culture - Fungal, CSF (collected 08-26-22 @ 11:36)  Source: .CSF CSF  Preliminary Report (09-03-22 @ 15:02):    No growth    Culture - CSF with Gram Stain (collected 08-26-22 @ 11:36)  Source: .CSF CSF  Gram Stain (08-26-22 @ 22:25):    polymorphonuclear leukocytes seen    No organisms seen    by cytocentrifuge  Final Report (08-29-22 @ 14:52):    No growth    Culture - Acid Fast - CSF (collected 08-26-22 @ 11:36)  Source: .CSF CSF  Preliminary Report (09-03-22 @ 15:04):    No growth at 1 week.    Culture - Blood (collected 08-17-22 @ 12:25)  Source: .Blood Blood-Peripheral  Final Report (08-22-22 @ 22:01):    No Growth Final    Culture - Blood (collected 08-16-22 @ 06:04)  Source: .Blood None  Final Report (08-21-22 @ 18:00):    No Growth Final    Culture - Blood (collected 08-15-22 @ 16:59)  Source: .Blood None  Final Report (08-21-22 @ 02:00):    No Growth Final            INFECTIOUS DISEASES TESTING  COVID-19 PCR: Detected (09-13-22 @ 18:48)  HIV-1/2 Combo Result: Nonreact (09-09-22 @ 06:11)  COVID-19 PCR: NotDetec (08-31-22 @ 11:58)  Procalcitonin, Serum: 2.46 (08-31-22 @ 07:24)  COVID-19 PCR: NotDetec (08-26-22 @ 09:40)  COVID-19 PCR: NotDetec (08-21-22 @ 02:34)  COVID-19 PCR: NotDetec (08-18-22 @ 17:36)  Procalcitonin, Serum: 0.11 (08-16-22 @ 06:04)  COVID-19 PCR: NotDetec (08-15-22 @ 15:41)  COVID-19 PCR: NotDetec (08-11-22 @ 13:22)  COVID-19 PCR: NotDetec (08-08-22 @ 05:14)  MRSA PCR Result.: Negative (08-02-22 @ 17:40)  COVID-19 PCR: NotDetec (08-02-22 @ 01:40)      INFLAMMATORY MARKERS  C-Reactive Protein, Serum: 289.1 mg/L (09-03-22 @ 12:04)  Sedimentation Rate, Erythrocyte: >140 mm/Hr (09-03-22 @ 12:04)  Sedimentation Rate, Erythrocyte: 125 mm/Hr (08-30-22 @ 19:27)  C-Reactive Protein, Serum: 54.4 mg/L (08-30-22 @ 19:27)      RADIOLOGY & ADDITIONAL TESTS:  I have personally reviewed the last available Chest xray  CXR      CT      CARDIOLOGY TESTING      MEDICATIONS  amLODIPine   Tablet 10 Oral daily  aspirin  chewable 81 Oral daily  atorvastatin 80 Oral at bedtime  aztreonam  IVPB     aztreonam  IVPB 2000 IV Intermittent every 12 hours  ceftazidime/avibactam IVPB 0.94 IV Intermittent every 24 hours  chlorhexidine 0.12% Liquid 15 Oral Mucosa every 12 hours  chlorhexidine 2% Cloths 1 Topical <User Schedule>  collagenase Ointment 1 Topical two times a day  Dakins Solution - 1/2 Strength 1 Topical two times a day  dexMEDEtomidine Infusion 0.2 IV Continuous <Continuous>  dextrose 5%. 1000 IV Continuous <Continuous>  dextrose 5%. 1000 IV Continuous <Continuous>  dextrose 50% Injectable 25 IV Push once  dextrose 50% Injectable 12.5 IV Push once  dextrose 50% Injectable 25 IV Push once  furosemide   Injectable 80 IV Push every 12 hours  glucagon  Injectable 1 IntraMuscular once  hydrALAZINE 100 Oral every 8 hours  labetalol 600 Oral three times a day  lacosamide IVPB 50 IV Intermittent every 12 hours  levETIRAcetam  Solution 500 Oral two times a day  lidocaine 1%/epinephrine 1:100,000 Inj 20 Local Injection once  multivitamin 1 Oral daily  pantoprazole  Injectable 40 IV Push two times a day  sevelamer carbonate Powder 1600 Oral every 8 hours  sodium bicarbonate 1300 Oral every 8 hours  sodium chloride 0.65% Nasal 2 Both Nostrils two times a day      WEIGHT  Weight (kg): 81.4 (09-13-22 @ 23:04)  Creatinine, Serum: 6.8 mg/dL (09-14-22 @ 05:32)  Creatinine, Serum: 6.4 mg/dL (09-13-22 @ 21:35)      ANTIBIOTICS:  aztreonam  IVPB      aztreonam  IVPB 2000 milliGRAM(s) IV Intermittent every 12 hours  ceftazidime/avibactam IVPB 0.94 Gram(s) IV Intermittent every 24 hours      All available historical records have been reviewed

## 2022-09-14 NOTE — CONSULT NOTE ADULT - ASSESSMENT
Patient is 57 yo F with PMH of HTN, DM2, and stroke (per son 2017) who presented for RLE wound. Started 3 months ago when she fell and hit her leg on a wooden cabinet. She put hydrogen peroxide, antibiotic cream, and wrapped the wound but never fully healed. Two weeks ago it started to show yellow pus so her and her family came to the ED for further treatment. Endorsed subjective fevers, chest pain, and vision changes which occurs when walked 2-3 blocks. Denied congestion, sore throat, cough, dyspnea, headache, dysuria, N/V, diarrhea     RLE wound  - No surgical intervention required. Wound is healing.   - Local wound care: please wash wound with soap and water. Apply xeroform, Kerlix and ACE wrap   - Remainder of care per primary team  - Recall PRN

## 2022-09-14 NOTE — PROGRESS NOTE ADULT - SUBJECTIVE AND OBJECTIVE BOX
GENERAL SURGERY PROGRESS NOTE    Patient: JOSE MITCHELL , 56y ([birth year])Female   MRN: [id number]  Location: [facility]  A  Visit: 08-02-22 Inpatient  Date: 09-14-22 @ 10:55      Procedure/Dx/Injuries: cx x trach    Events of past 24 hours: no acute events, pre-op and consented, no covid positive    PAST MEDICAL & SURGICAL HISTORY:  Hypertension      Diabetes mellitus      No significant past surgical history          Vitals:   T(F): 97 (09-14-22 @ 08:00), Max: 97.3 (09-13-22 @ 16:00)  HR: 58 (09-14-22 @ 10:00)  BP: 137/79 (09-14-22 @ 10:00)  RR: 10 (09-14-22 @ 10:00)  SpO2: 98% (09-14-22 @ 10:00)  Mode: AC/ CMV (Assist Control/ Continuous Mandatory Ventilation), RR (machine): 16, TV (machine): 360, FiO2: 40, PEEP: 5, ITime: 1, MAP: 10, PIP: 22    Diet, NPO with Tube Feed:   Tube Feeding Modality: Gastrostomy  Nepro with Carb Steady  Total Volume for 24 Hours (mL): 480  Continuous  Starting Tube Feed Rate mL per Hour: 20  Until Goal Tube Feed Rate (mL per Hour): 20  Tube Feed Duration (in Hours): 24  Tube Feed Start Time: 10:00  Diet, NPO after Midnight:      NPO Start Date: 13-Sep-2022,   NPO Start Time: 23:59      Fluids:     I & O's:    09-13-22 @ 07:01  -  09-14-22 @ 07:00  --------------------------------------------------------  IN:    Dexmedetomidine: 486.3 mL    Glucerna: 180 mL    IV PiggyBack: 200 mL  Total IN: 866.3 mL    OUT:    Indwelling Catheter - Urethral (mL): 220 mL    Insulin: 0 mL  Total OUT: 220 mL    Total NET: 646.3 mL          PHYSICAL EXAM:  General Appearance: NAD  Heart: RRR  Lungs: CTAx2, intubated  Abdomen:  soft, with peg tube in place  MSK/Extremities:  no cyanosis      MEDICATIONS  (STANDING):  amLODIPine   Tablet 10 milliGRAM(s) Oral daily  aspirin  chewable 81 milliGRAM(s) Oral daily  atorvastatin 80 milliGRAM(s) Oral at bedtime  aztreonam  IVPB      aztreonam  IVPB 2000 milliGRAM(s) IV Intermittent every 12 hours  ceftazidime/avibactam IVPB 0.94 Gram(s) IV Intermittent every 24 hours  chlorhexidine 0.12% Liquid 15 milliLiter(s) Oral Mucosa every 12 hours  chlorhexidine 2% Cloths 1 Application(s) Topical <User Schedule>  collagenase Ointment 1 Application(s) Topical two times a day  Dakins Solution - 1/2 Strength 1 Application(s) Topical two times a day  dexMEDEtomidine Infusion 0.2 MICROgram(s)/kG/Hr (4.07 mL/Hr) IV Continuous <Continuous>  dextrose 5%. 1000 milliLiter(s) (100 mL/Hr) IV Continuous <Continuous>  dextrose 5%. 1000 milliLiter(s) (50 mL/Hr) IV Continuous <Continuous>  dextrose 50% Injectable 25 Gram(s) IV Push once  dextrose 50% Injectable 12.5 Gram(s) IV Push once  dextrose 50% Injectable 25 Gram(s) IV Push once  furosemide   Injectable 80 milliGRAM(s) IV Push every 12 hours  glucagon  Injectable 1 milliGRAM(s) IntraMuscular once  hydrALAZINE 100 milliGRAM(s) Oral every 8 hours  labetalol 600 milliGRAM(s) Oral three times a day  lacosamide IVPB 50 milliGRAM(s) IV Intermittent every 12 hours  levETIRAcetam  Solution 500 milliGRAM(s) Oral two times a day  lidocaine 1%/epinephrine 1:100,000 Inj 20 milliLiter(s) Local Injection once  multivitamin 1 Tablet(s) Oral daily  pantoprazole  Injectable 40 milliGRAM(s) IV Push two times a day  sevelamer carbonate Powder 2400 milliGRAM(s) Enteral Tube every 8 hours  sodium bicarbonate 1300 milliGRAM(s) Oral every 8 hours  sodium chloride 0.65% Nasal 2 Spray(s) Both Nostrils two times a day    MEDICATIONS  (PRN):  acetaminophen     Tablet .. 650 milliGRAM(s) Oral every 6 hours PRN Temp greater or equal to 38C (100.4F), Mild Pain (1 - 3)  dextrose Oral Gel 15 Gram(s) Oral once PRN Blood Glucose LESS THAN 70 milliGRAM(s)/deciliter  labetalol Injectable 10 milliGRAM(s) IV Push every 6 hours PRN Systolic blood pressure >  melatonin 3 milliGRAM(s) Oral at bedtime PRN Insomnia      DVT PROPHYLAXIS:   GI PROPHYLAXIS: pantoprazole  Injectable 40 milliGRAM(s) IV Push two times a day    ANTICOAGULATION:   ANTIBIOTICS:  aztreonam  IVPB    aztreonam  IVPB 2000 milliGRAM(s)  ceftazidime/avibactam IVPB 0.94 Gram(s)            LAB/STUDIES:  Labs:  CAPILLARY BLOOD GLUCOSE      POCT Blood Glucose.: 224 mg/dL (14 Sep 2022 05:17)  POCT Blood Glucose.: 202 mg/dL (13 Sep 2022 20:37)  POCT Blood Glucose.: 169 mg/dL (13 Sep 2022 12:09)                          9.1    10.81 )-----------( 293      ( 14 Sep 2022 05:32 )             25.9       Auto Neutrophil %: 80.7 % (09-14-22 @ 05:32)  Auto Immature Granulocyte %: 0.9 % (09-14-22 @ 05:32)    09-14    136  |  94<L>  |  125<HH>  ----------------------------<  224<H>  3.7   |  19  |  6.8<HH>      Calcium, Total Serum: 8.1 mg/dL (09-14-22 @ 05:32)      LFTs:             5.3  | 0.2  | 43       ------------------[181     ( 14 Sep 2022 05:32 )  2.8  | x    | 21          Lipase:x      Amylase:x         Blood Gas Arterial, Lactate: 0.60 mmol/L (09-14-22 @ 03:04)  Blood Gas Arterial, Lactate: 0.70 mmol/L (09-13-22 @ 10:06)  Blood Gas Arterial, Lactate: 0.80 mmol/L (09-13-22 @ 03:25)  Blood Gas Arterial, Lactate: 0.60 mmol/L (09-12-22 @ 03:49)    ABG - ( 14 Sep 2022 03:04 )  pH: 7.44  /  pCO2: 28    /  pO2: 122   / HCO3: 19    / Base Excess: -4.4  /  SaO2: 99.6            ABG - ( 13 Sep 2022 10:06 )  pH: 7.43  /  pCO2: 28    /  pO2: 157   / HCO3: 19    / Base Excess: -4.8  /  SaO2: 100.0           ABG - ( 13 Sep 2022 03:25 )  pH: 7.44  /  pCO2: 28    /  pO2: 146   / HCO3: 19    / Base Excess: -4.0  /  SaO2: 99.5              Coags:     14.30  ----< 1.25    ( 13 Sep 2022 13:00 )     29.7                            IMAGING:      ACCESS/ DEVICES:  [x ] Peripheral IV  [ ] Central Venous Line	[ ] R	[ ] L	[ ] IJ	[ ] Fem	[ ] SC	Placed:   [ ] Arterial Line		[ ] R	[ ] L	[ ] Fem	[ ] Rad	[ ] Ax	Placed:   [ ] PICC:					[ ] Mediport  [ ] Urinary Catheter,  Date Placed:   [ ] Chest tube: [ ] Right, [ ] Left  [ ] FAIZAN/Ryne Drains

## 2022-09-14 NOTE — CONSULT NOTE ADULT - SUBJECTIVE AND OBJECTIVE BOX
Patient is 57 yo F with PMH of HTN, DM2, and stroke (per son 2017) who presented for RLE wound. Started 3 months ago when she fell and hit her leg on a wooden cabinet. She put hydrogen peroxide, antibiotic cream, and wrapped the wound but never fully healed. Two weeks ago it started to show yellow pus so her and her family came to the ED for further treatment. Endorsed subjective fevers, chest pain, and vision changes which occurs when walked 2-3 blocks. Denied congestion, sore throat, cough, dyspnea, headache, dysuria, N/V, diarrhea     In the ED:  Vitals: T: 98.9, BP: 296/ 174, , RR: 18, 98%O2 on RA  Labs: CBC showed WBC 11.23; ESR 92, CRP 35.6  CMP showed glucose 302, alk phos 173; ; VBG pH 7.45  EKG pending  CXR showed borderline cardiomegaly and no airspace opacity.   X-ray of RLE also pending.     Received unasyn and vanco, humalin R, IV vasotec, IV labetalol   (02 Aug 2022 07:47)    PAST MEDICAL & SURGICAL HISTORY:  - Hypertension  - Diabetes mellitus    FAMILY HISTORY:  - FH: type 2 diabetes mellitus    Vital Signs Last 24 Hrs  T(C): 36.4 (14 Sep 2022 12:00), Max: 36.4 (14 Sep 2022 12:00)  T(F): 97.5 (14 Sep 2022 12:00), Max: 97.5 (14 Sep 2022 12:00)  HR: 58 (14 Sep 2022 12:00) (58 - 74)  BP: 142/79 (14 Sep 2022 12:00) (131/76 - 169/92)  BP(mean): 106 (14 Sep 2022 12:00) (96 - 132)  RR: 10 (14 Sep 2022 12:00) (10 - 22)  SpO2: 98% (14 Sep 2022 12:00) (97% - 100%)    O2 Parameters below as of 14 Sep 2022 12:00  Patient On (Oxygen Delivery Method): ventilator  O2 Concentration (%): 40    PHYSICAL EXAM:  GENERAL: Patient lying in NAD  CHEST/LUNG: Breathing comfortably   HEART: In no cardiopulmonary distress  SKIN:  RLE: ~3cm x 5cm wound to the medial aspect of distal LE. Dermis is pink and moist. No active bleeding, malodor, drainage, purulence, erythema or swelling appreciated.  Patient is 55 yo F with PMH of HTN, DM2, and stroke (per son 2017) who presented for RLE wound. Started 3 months ago when she fell and hit her leg on a wooden cabinet. She put hydrogen peroxide, antibiotic cream, and wrapped the wound but never fully healed. Two weeks ago it started to show yellow pus so her and her family came to the ED for further treatment. Endorsed subjective fevers, chest pain, and vision changes which occurs when walked 2-3 blocks. Denied congestion, sore throat, cough, dyspnea, headache, dysuria, N/V, diarrhea     In the ED:  Vitals: T: 98.9, BP: 296/ 174, , RR: 18, 98%O2 on RA  Labs: CBC showed WBC 11.23; ESR 92, CRP 35.6  CMP showed glucose 302, alk phos 173; ; VBG pH 7.45  EKG pending  CXR showed borderline cardiomegaly and no airspace opacity.   X-ray of RLE also pending.     Received unasyn and vanco, humalin R, IV vasotec, IV labetalol   (02 Aug 2022 07:47)    PAST MEDICAL & SURGICAL HISTORY:  - Hypertension  - Diabetes mellitus    FAMILY HISTORY:  - FH: type 2 diabetes mellitus    Vital Signs Last 24 Hrs  T(C): 36.4 (14 Sep 2022 12:00), Max: 36.4 (14 Sep 2022 12:00)  T(F): 97.5 (14 Sep 2022 12:00), Max: 97.5 (14 Sep 2022 12:00)  HR: 58 (14 Sep 2022 12:00) (58 - 74)  BP: 142/79 (14 Sep 2022 12:00) (131/76 - 169/92)  BP(mean): 106 (14 Sep 2022 12:00) (96 - 132)  RR: 10 (14 Sep 2022 12:00) (10 - 22)  SpO2: 98% (14 Sep 2022 12:00) (97% - 100%)    O2 Parameters below as of 14 Sep 2022 12:00  Patient On (Oxygen Delivery Method): ventilator  O2 Concentration (%): 40    PHYSICAL EXAM:  GENERAL: Patient lying in NAD  CHEST/LUNG: Breathing comfortably   HEART: In no cardiopulmonary distress  SKIN:  full thickness wound to right shin with granulation tissue, small serosang dc, no erythema no edema

## 2022-09-14 NOTE — PROGRESS NOTE ADULT - SUBJECTIVE AND OBJECTIVE BOX
Patient is a 56y old  Female who presents with a chief complaint of RLE wound (12 Sep 2022 15:26)      INTERVAL HPI/OVERNIGHT EVENTS:   No overnight events   Afebrile, hemodynamically stable. Pt's family consented to tracheostomy tube and hemodialysis.  FREDDIEALL placed today. Spoke with neurology for further recommendations regarding steroid dosing. Scheduled for HD tomorrow in AM    GCS: 3  Sedatives: Precedex  Pressors: None    ICU Vital Signs Last 24 Hrs  T(C): 36.4 (14 Sep 2022 14:41), Max: 36.4 (14 Sep 2022 12:00)  T(F): 97.5 (14 Sep 2022 12:00), Max: 97.5 (14 Sep 2022 12:00)  HR: 60 (14 Sep 2022 14:41) (58 - 74)  BP: 143/79 (14 Sep 2022 14:41) (131/76 - 169/92)  BP(mean): 105 (14 Sep 2022 14:41) (96 - 132)  ABP: --  ABP(mean): --  RR: 12 (14 Sep 2022 14:41) (10 - 22)  SpO2: 98% (14 Sep 2022 14:41) (97% - 100%)    O2 Parameters below as of 14 Sep 2022 14:41      O2 Concentration (%): 40      I&O's Summary    13 Sep 2022 07:01  -  14 Sep 2022 07:00  --------------------------------------------------------  IN: 866.3 mL / OUT: 220 mL / NET: 646.3 mL    14 Sep 2022 07:01  -  14 Sep 2022 15:39  --------------------------------------------------------  IN: 167.3 mL / OUT: 160 mL / NET: 7.3 mL      Mode: pt went to OR      LABS:                        9.1    10.81 )-----------( 293      ( 14 Sep 2022 05:32 )             25.9     09-14    136  |  94<L>  |  125<HH>  ----------------------------<  224<H>  3.7   |  19  |  6.8<HH>    Ca    8.1<L>      14 Sep 2022 05:32  Phos  11.4     09-14  Mg     2.5     09-14    TPro  5.3<L>  /  Alb  2.8<L>  /  TBili  0.2  /  DBili  x   /  AST  43<H>  /  ALT  21  /  AlkPhos  181<H>  09-14    PT/INR - ( 13 Sep 2022 13:00 )   PT: 14.30 sec;   INR: 1.25 ratio         PTT - ( 13 Sep 2022 13:00 )  PTT:29.7 sec    CAPILLARY BLOOD GLUCOSE      POCT Blood Glucose.: 240 mg/dL (14 Sep 2022 12:32)  POCT Blood Glucose.: 224 mg/dL (14 Sep 2022 05:17)  POCT Blood Glucose.: 202 mg/dL (13 Sep 2022 20:37)    ABG - ( 14 Sep 2022 03:04 )  pH, Arterial: 7.44  pH, Blood: x     /  pCO2: 28    /  pO2: 122   / HCO3: 19    / Base Excess: -4.4  /  SaO2: 99.6                RADIOLOGY & ADDITIONAL TESTS:    Consultant(s) Notes Reviewed:  [x ] YES  [ ] NO    MEDICATIONS  (STANDING):  amLODIPine   Tablet 10 milliGRAM(s) Oral daily  aspirin  chewable 81 milliGRAM(s) Oral daily  atorvastatin 80 milliGRAM(s) Oral at bedtime  aztreonam  IVPB      aztreonam  IVPB 2000 milliGRAM(s) IV Intermittent every 12 hours  ceftazidime/avibactam IVPB 0.94 Gram(s) IV Intermittent every 24 hours  chlorhexidine 0.12% Liquid 15 milliLiter(s) Oral Mucosa every 12 hours  chlorhexidine 2% Cloths 1 Application(s) Topical <User Schedule>  collagenase Ointment 1 Application(s) Topical two times a day  dexMEDEtomidine Infusion 0.2 MICROgram(s)/kG/Hr (4.07 mL/Hr) IV Continuous <Continuous>  dextrose 5%. 1000 milliLiter(s) (50 mL/Hr) IV Continuous <Continuous>  dextrose 5%. 1000 milliLiter(s) (100 mL/Hr) IV Continuous <Continuous>  dextrose 50% Injectable 25 Gram(s) IV Push once  dextrose 50% Injectable 12.5 Gram(s) IV Push once  dextrose 50% Injectable 25 Gram(s) IV Push once  furosemide   Injectable 80 milliGRAM(s) IV Push every 12 hours  glucagon  Injectable 1 milliGRAM(s) IntraMuscular once  hydrALAZINE 100 milliGRAM(s) Oral every 8 hours  labetalol 600 milliGRAM(s) Oral three times a day  lacosamide IVPB 50 milliGRAM(s) IV Intermittent every 12 hours  levETIRAcetam  Solution 500 milliGRAM(s) Oral two times a day  lidocaine 1%/epinephrine 1:100,000 Inj 20 milliLiter(s) Local Injection once  multivitamin 1 Tablet(s) Oral daily  pantoprazole  Injectable 40 milliGRAM(s) IV Push two times a day  sevelamer carbonate Powder 2400 milliGRAM(s) Enteral Tube every 8 hours  sodium bicarbonate 1300 milliGRAM(s) Oral every 8 hours  sodium chloride 0.65% Nasal 2 Spray(s) Both Nostrils two times a day    MEDICATIONS  (PRN):  acetaminophen     Tablet .. 650 milliGRAM(s) Oral every 6 hours PRN Temp greater or equal to 38C (100.4F), Mild Pain (1 - 3)  dextrose Oral Gel 15 Gram(s) Oral once PRN Blood Glucose LESS THAN 70 milliGRAM(s)/deciliter  labetalol Injectable 10 milliGRAM(s) IV Push every 6 hours PRN Systolic blood pressure >  melatonin 3 milliGRAM(s) Oral at bedtime PRN Insomnia      PHYSICAL EXAM:  GENERAL: NAD  HEAD:  Atraumatic, Normocephalic  EYES: EOMI right pupil greater than left,   NERVOUS SYSTEM: nonresponsive to commands, eyes are open with non-purposeful movement  CHEST/LUNG: intubated, B/L coarse breath sounds  HEART: S1S2 normal, no S3, Regular rate and rhythm; No murmurs      Care Discussed with Consultants/Other Providers [ x] YES  [ ] NO    57 y/o woman with PMH of HTN, DM2, 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 During hospital stay, found to have multiple punctate infarcts suspected to be cardioembolic vs vasculitis. Patient also found to have sharp waves on vEEG and currently on AEDs. Hospital course further complicated by fever and now with MDR Enterobacter bacteremia.    # nonoliguric ALF / Urinary retention / Suspected ATN  - Cr continues to trend up 4.7  - on IVFs  - 1 dose of lasix 80 mg IV once was given   - avoid hypotension   - renal US 9/2: no hydronephrosis  - keep nolasco for now  - sodium bicarbonate increased to 1300 q8h. Sodium bicarb drip started,   - nephro following  - daily BMP and I's and O's  - started sevelamer 2400 mg TID in PEG tube   - 9/14 Family agreed to hemodialysis, scheduled for tomorrow in AM  -  UDALL Placed in Right Fem.       # Acute ischemic strokes multifocal  - Spoke with neurology, suspicious for vasculitis, but not confirmed, CSF studies does not support the diagnosis. - Plavix held for LGIB. Aspirin resumed    - neurology recommended 1g IV solumedrol for 5 days, today is day 2. (cleared from ID)  - FU EEG, cw keppra   - Neuro check q1h  - Pt is intubated.   - Per stroke team, no need for angio, requested a renal bx as expected widespread vasculitis. And once cleared for infection, can be started on steroids (can wait to results of bx as rheum advised against rx empirically).   -Completed 5 day steroid course, f/u neuro recs after course complete for steroid dose      # Bacteremia, resolved   - bcx positive for MDR enterobacter Cloacae   - Cw w antibiotics per ID   - bcx daily   - 9/12 ceftaz changed to q 24, aztreonam still q12    # Purulent Right LE cellulitis   - s/p debridement by burn on 8/10  - Candida in wound. per Dr. Chua: contaminant  -  BCx w/ staph: likely contaminant. repeat BCx at that time was negative  - 9/13 BURN Consulted for new recs for wound care, follow recs      # GI bleed   - S/p 6 units of PRBCs. Hb is stable.   - Pt was seen by GI, bleeding hemorrhoids noted, external. Surgery contacted for rx.   - Protonix q12h  - ENT eval appreciated for oral bleed. No actively oozing wounds, teeth guard put in.     - pt intubated   - oral cavity bleed from tongue biting, improving     # Hypertensive urgency due to noncompliance and Malignant HTN   - BP on admission 296/174 without signs of end organ damage. Renal artery duplex wnl>>ARIAN unlikely  - Aldosterone wnl, renin elevated  - BP overall improved  - SBP goal 120-160  - c/w amlodipine 10, furosemide 80 q12, hydralazine 100mg q8, labetalol 600mg TID,     # Diabetes mellitus   - HbA1C 10.4  - Lantus and lispro sliding scale    #HLD   - cw statins         #MISC:  - Diet: f/u nutrition recs  - GI prophylaxis: protonix   - Dispo: MICU   - Acitivity: bedrest     #Handoff  - tracheostomy today, neuro recs for steroids, completion of antibiotics, nutrition recommendations for diet

## 2022-09-14 NOTE — PROGRESS NOTE ADULT - ASSESSMENT
ALF rule out ATN / relative hypotension/ sepsis / E cloaca bacteremia / HTN ( was on multiple meds )/ CVA/ GIB  cr trending up   oligoanuric  on lasix 80mg iv q12h  work up to date is neg for GN ( LUIS FELIPE DsDNA  neg / RF noted/ SPEP with inflammatory pattern ) cryo to be sent out/ no thrombocytopenia   No indication yet for a kidney biopsy from renal stand point  cont  sodium bicarbonate 1300 q 8   ph noted / increase sevelamer to 3 tabs po q8h/ feed nepro  no need for RRT yet   k noted / off  lokelma   will need initiation of RRT if family agreeable  overall prognosis poor  will follow

## 2022-09-14 NOTE — CONSULT NOTE ADULT - NS ATTEND AMEND GEN_ALL_CORE FT
wound eval and treat
High Risk patient requiring IV antibiotics and intensive ICU monitoring  Will follow and be available for GOC discussions as appropriate

## 2022-09-14 NOTE — PROGRESS NOTE ADULT - ASSESSMENT
Impression:    Acute respiratory failure   Acute blood loss anemia. GI bleed  sp EGD and colonoscopy   Oral cavity bleed  Altered MS suspected vasculitis on pulse steroid.    LLE cellulitis  ALF oliguric  COVID 19 infection   E Cloacae Bacteremia resolved       PLAN:    CNS;  Pulse steroids per neuro and rheum.  FU MS.  SAT post trach.      HEENT: Oral care.  Might need trach     PULMONARY:  HOB @ 45 degrees.  Aspiration precautions. No vent changes.  trach today.  DW CTS     CARDIOVASCULAR: Avoid overload.  BP control     GI: GI prophylaxis.  Peg feeding post trach     RENAL:  follow up lytes.  Correct as needed.  DW renal.  Family Agreed with HD     INFECTIOUS DISEASE:  ABX per ID.  End date 9-16     HEMATOLOGICAL:  SCD, LE doppler negative.  FU CBC and Coags     ENDOCRINE:  Follow up FS.  Insulin protocol if needed.    MUSCULOSKELETAL:  Bed chair position     MICU    VERY poor prognosis    Wound care per burn

## 2022-09-14 NOTE — PROGRESS NOTE ADULT - ASSESSMENT
· Assessment	  57 yo F with PMH of HTN, DM2, CVA (2017) who presented with purulent right lower extremity wound for 3 months consistent with acute RLE cellulitis. Code stroke for unresponsiveness at 4pm 8/5    Impression  #CVA  - 8/11 MR Head w/wo IV Cont (08.10.22 @ 21:25): Multiple punctate cortical acute infarcts involving multiple vascular territories possibly related to embolic etiology. No evidence of acute hemorrhage. Moderate chronic microvascular type changes as well as chronic hemosiderin deposition within the right basal ganglia consistent with remote hemorrhage. Chronic right thalamic lacunar infarcts. More alert and does try to respondResolved SIRS with no infectious etiology  - s/p LP 8/26 with 24 WBC, RBC 55810, 37% PMNs, 61% Lymph -- protein/glucose not obtained    #Fevers 8/30 - Enterobacter cloaecae bacteremia  - UA with pyruia   - CXR 8/31 unremarkable   - BLood Cx 9/1 with Enterobacter cloacae with NDM+  - BLood Cx 9/3 NG     #GI BLeed  - s/p EGD 9/6     #Possible Vasculitis   - s/p empiric steroid course     #COVID-PCR positive 9/13   - no previous positive     RECOMMENDATIONS  - continue  avycaz  0.94g q 24 hours for CrCl   - continue aztreonam 2g q 12 hours  - plan 2 weeks (9/3-9/16)  - trend WBC and fever curve  - supportive care for COVID   - continue contact precautions  - continue airborn precautions     Please call or message on [facility] Teams if with any questions.  Spectra [phone]

## 2022-09-14 NOTE — CONSULT NOTE ADULT - TIME BILLING
wound eval and treat
Counseled patient's son about diagnostic testing and treatment plan. All questions answered.
CVA
Review of imaging and chart; obtaining history; examination of pt; discussion and coordination of care.

## 2022-09-14 NOTE — CONSULT NOTE ADULT - ASSESSMENT
57 yo F with PMH of HTN, DM2, and CVA in 2017, who presented for RLE cellulitis now s/p debridement, s/p PEG placement 8/24, pending tracheostomy placement then plans for dialysis. 57 yo F with PMH of HTN, DM2, and CVA in 2017, who presented for RLE cellulitis now s/p debridement, s/p PEG placement 8/24, pending tracheostomy placement then plans for dialysis.  - acute/ chronic resp failure  - RLE cellulitis/ DFU  - uncontrolled DM  - embolic strokes  - ALF - to start HD today    estimated REE by PSE 1378 k/d, est protein ~ 85+ gm/d  Suggest:  - Nepro at 30 ml/h + 5 packets Beneprotein/d --> 87 gm protein, 1400 kcal, 510 total mg phos, 23 mEq K/d  - will adjust feeds depending on tolerance and continuity of HD  - f/u and document BMs

## 2022-09-14 NOTE — CHART NOTE - NSCHARTNOTEFT_GEN_A_CORE
Post Operative Note  Patient: JOSE MITCHELL 56y (1965) Female   MRN: [id number]  Location: [facility] [address]  Visit: 08-02-22 Inpatient  Date: 09-14-22 @ 23:39    Procedure: Wound    Acute respiratory failure with hypoxia     S/P Selective debridement    Open tracheostomy    Subjective:   Pain Assessment: Patient is sedated, unable to asses for pain    Objective:  Vitals: T(F): 96.8 (09-14-22 @ 20:09), Max: 97.5 (09-14-22 @ 12:00)  HR: 66 (09-14-22 @ 23:00)  BP: 140/79 (09-14-22 @ 23:00) (131/76 - 169/92)  RR: 15 (09-14-22 @ 23:00)  SpO2: 98% (09-14-22 @ 23:00)  Vent Settings: Mode: AC/ CMV (Assist Control/ Continuous Mandatory Ventilation), RR (machine): 16, TV (machine): 360, FiO2: 40, PEEP: 5, MAP: 11, PIP: 16    In:   09-13-22 @ 07:01  -  09-14-22 @ 07:00  --------------------------------------------------------  IN: 866.3 mL    09-14-22 @ 07:01  -  09-14-22 @ 23:39  --------------------------------------------------------  IN: 407.3 mL      IV Fluids: dextrose 5%. 1000 milliLiter(s) (100 mL/Hr) IV Continuous <Continuous>  dextrose 5%. 1000 milliLiter(s) (50 mL/Hr) IV Continuous <Continuous>  multivitamin 1 Tablet(s) Oral daily  sodium bicarbonate 1300 milliGRAM(s) Oral every 8 hours      Out:   09-13-22 @ 07:01  -  09-14-22 @ 07:00  --------------------------------------------------------  OUT: 220 mL    09-14-22 @ 07:01  -  09-14-22 @ 23:39  --------------------------------------------------------  OUT: 240 mL      EBL:     Voided Urine:   09-13-22 @ 07:01  -  09-14-22 @ 07:00  --------------------------------------------------------  OUT: 220 mL    09-14-22 @ 07:01  -  09-14-22 @ 23:39  --------------------------------------------------------  OUT: 240 mL      Maloney Catheter: yes no   Drains:   FAIZAN:    ,   Chest Tube:      NG Tube:       Physical Examination:  General Appearance: NAD   HEENT: trach in place, some dry blood noted on the gauze, no active bleeding  Heart: RRR  Lungs: CTABL  Abdomen:  Soft, , nondistended.     Medications: [Standing]  acetaminophen     Tablet .. 650 milliGRAM(s) Oral every 6 hours PRN  amLODIPine   Tablet 10 milliGRAM(s) Oral daily  aspirin  chewable 81 milliGRAM(s) Oral daily  atorvastatin 80 milliGRAM(s) Oral at bedtime  aztreonam  IVPB      aztreonam  IVPB 2000 milliGRAM(s) IV Intermittent every 12 hours  ceftazidime/avibactam IVPB 0.94 Gram(s) IV Intermittent every 24 hours  chlorhexidine 0.12% Liquid 15 milliLiter(s) Oral Mucosa every 12 hours  chlorhexidine 2% Cloths 1 Application(s) Topical <User Schedule>  collagenase Ointment 1 Application(s) Topical two times a day  dexMEDEtomidine Infusion 0.2 MICROgram(s)/kG/Hr IV Continuous <Continuous>  dextrose 5%. 1000 milliLiter(s) IV Continuous <Continuous>  dextrose 5%. 1000 milliLiter(s) IV Continuous <Continuous>  dextrose 50% Injectable 25 Gram(s) IV Push once  dextrose 50% Injectable 12.5 Gram(s) IV Push once  dextrose 50% Injectable 25 Gram(s) IV Push once  dextrose Oral Gel 15 Gram(s) Oral once PRN  furosemide   Injectable 80 milliGRAM(s) IV Push every 12 hours  glucagon  Injectable 1 milliGRAM(s) IntraMuscular once  hydrALAZINE 100 milliGRAM(s) Oral every 8 hours  insulin lispro (ADMELOG) corrective regimen sliding scale   SubCutaneous three times a day before meals  insulin lispro (ADMELOG) corrective regimen sliding scale   SubCutaneous at bedtime  labetalol 600 milliGRAM(s) Oral three times a day  labetalol Injectable 10 milliGRAM(s) IV Push every 6 hours PRN  lacosamide IVPB 50 milliGRAM(s) IV Intermittent every 12 hours  levETIRAcetam  Solution 500 milliGRAM(s) Oral two times a day  lidocaine 1%/epinephrine 1:100,000 Inj 20 milliLiter(s) Local Injection once  melatonin 3 milliGRAM(s) Oral at bedtime PRN  methylPREDNISolone sodium succinate Injectable 80 milliGRAM(s) IV Push every 8 hours  multivitamin 1 Tablet(s) Oral daily  pantoprazole  Injectable 40 milliGRAM(s) IV Push two times a day  sevelamer carbonate Powder 2400 milliGRAM(s) Enteral Tube every 8 hours  sodium bicarbonate 1300 milliGRAM(s) Oral every 8 hours  sodium chloride 0.65% Nasal 2 Spray(s) Both Nostrils two times a day    Medications: [PRN]  acetaminophen     Tablet .. 650 milliGRAM(s) Oral every 6 hours PRN  amLODIPine   Tablet 10 milliGRAM(s) Oral daily  aspirin  chewable 81 milliGRAM(s) Oral daily  atorvastatin 80 milliGRAM(s) Oral at bedtime  aztreonam  IVPB      aztreonam  IVPB 2000 milliGRAM(s) IV Intermittent every 12 hours  ceftazidime/avibactam IVPB 0.94 Gram(s) IV Intermittent every 24 hours  chlorhexidine 0.12% Liquid 15 milliLiter(s) Oral Mucosa every 12 hours  chlorhexidine 2% Cloths 1 Application(s) Topical <User Schedule>  collagenase Ointment 1 Application(s) Topical two times a day  dexMEDEtomidine Infusion 0.2 MICROgram(s)/kG/Hr IV Continuous <Continuous>  dextrose 5%. 1000 milliLiter(s) IV Continuous <Continuous>  dextrose 5%. 1000 milliLiter(s) IV Continuous <Continuous>  dextrose 50% Injectable 25 Gram(s) IV Push once  dextrose 50% Injectable 12.5 Gram(s) IV Push once  dextrose 50% Injectable 25 Gram(s) IV Push once  dextrose Oral Gel 15 Gram(s) Oral once PRN  furosemide   Injectable 80 milliGRAM(s) IV Push every 12 hours  glucagon  Injectable 1 milliGRAM(s) IntraMuscular once  hydrALAZINE 100 milliGRAM(s) Oral every 8 hours  insulin lispro (ADMELOG) corrective regimen sliding scale   SubCutaneous three times a day before meals  insulin lispro (ADMELOG) corrective regimen sliding scale   SubCutaneous at bedtime  labetalol 600 milliGRAM(s) Oral three times a day  labetalol Injectable 10 milliGRAM(s) IV Push every 6 hours PRN  lacosamide IVPB 50 milliGRAM(s) IV Intermittent every 12 hours  levETIRAcetam  Solution 500 milliGRAM(s) Oral two times a day  lidocaine 1%/epinephrine 1:100,000 Inj 20 milliLiter(s) Local Injection once  melatonin 3 milliGRAM(s) Oral at bedtime PRN  methylPREDNISolone sodium succinate Injectable 80 milliGRAM(s) IV Push every 8 hours  multivitamin 1 Tablet(s) Oral daily  pantoprazole  Injectable 40 milliGRAM(s) IV Push two times a day  sevelamer carbonate Powder 2400 milliGRAM(s) Enteral Tube every 8 hours  sodium bicarbonate 1300 milliGRAM(s) Oral every 8 hours  sodium chloride 0.65% Nasal 2 Spray(s) Both Nostrils two times a day      DVT PROPHYLAXIS:   GI PROPHYLAXIS: pantoprazole  Injectable 40 milliGRAM(s) IV Push two times a day    ANTICOAGULATION:   ANTIBIOTICS:  aztreonam  IVPB    aztreonam  IVPB 2000 milliGRAM(s)  ceftazidime/avibactam IVPB 0.94 Gram(s)        Labs:                        9.1    10.81 )-----------( 293      ( 14 Sep 2022 05:32 )             25.9     09-14    136  |  94<L>  |  125<HH>  ----------------------------<  224<H>  3.7   |  19  |  6.8<HH>    Ca    8.1<L>      14 Sep 2022 05:32  Phos  11.4     09-14  Mg     2.5     09-14    TPro  5.3<L>  /  Alb  2.8<L>  /  TBili  0.2  /  DBili  x   /  AST  43<H>  /  ALT  21  /  AlkPhos  181<H>  09-14    PT/INR - ( 14 Sep 2022 22:35 )   PT: 14.30 sec;   INR: 1.25 ratio         PTT - ( 13 Sep 2022 13:00 )  PTT:29.7 sec        Imaging:  No post-op imaging studies    Assessment:  56yF s/p trach    Plan:  - Monitor vitals  - Monitor post-op labs and replete as necessary  - Monitor for bowel function  - Continue Pain Medications if necessary  - Continue Antibiotics if necessary  - Monitor urine output and trial of void once Maloney removed  - DVT and GI Prophylaxis  - Monitor wound and dressing for changes, redress as needed.      Date/Time: 09-14-22 @ 23:39

## 2022-09-15 NOTE — PROGRESS NOTE ADULT - ATTENDING COMMENTS
Impression:    Acute respiratory failure s/p trach  Acute blood loss anemia. GI bleed  sp EGD and colonoscopy resolved  Oral cavity bleed resolved  Altered MS suspected vasculitis SP pulse steroids weaning    LLE cellulitis  ALF oliguric on RRT  COVID 19 infection   E Cloacae Bacteremia resolved     Plan as outlined above

## 2022-09-15 NOTE — PROGRESS NOTE ADULT - SUBJECTIVE AND OBJECTIVE BOX
seen and examined   24 h events noted   s/p trach         PAST HISTORY  --------------------------------------------------------------------------------  No significant changes to PMH, PSH, FHx, SHx, unless otherwise noted    ALLERGIES & MEDICATIONS  --------------------------------------------------------------------------------  Allergies    No Known Allergies    Intolerances      Standing Inpatient Medications  amLODIPine   Tablet 10 milliGRAM(s) Oral daily  aspirin  chewable 81 milliGRAM(s) Oral daily  atorvastatin 80 milliGRAM(s) Oral at bedtime  aztreonam  IVPB      aztreonam  IVPB 2000 milliGRAM(s) IV Intermittent every 12 hours  ceftazidime/avibactam IVPB 0.94 Gram(s) IV Intermittent every 24 hours  chlorhexidine 0.12% Liquid 15 milliLiter(s) Oral Mucosa every 12 hours  chlorhexidine 2% Cloths 1 Application(s) Topical <User Schedule>  collagenase Ointment 1 Application(s) Topical two times a day  dexMEDEtomidine Infusion 0.2 MICROgram(s)/kG/Hr IV Continuous <Continuous>  dextrose 5%. 1000 milliLiter(s) IV Continuous <Continuous>  dextrose 5%. 1000 milliLiter(s) IV Continuous <Continuous>  dextrose 50% Injectable 25 Gram(s) IV Push once  dextrose 50% Injectable 12.5 Gram(s) IV Push once  dextrose 50% Injectable 25 Gram(s) IV Push once  furosemide   Injectable 80 milliGRAM(s) IV Push every 12 hours  glucagon  Injectable 1 milliGRAM(s) IntraMuscular once  hydrALAZINE 100 milliGRAM(s) Oral every 8 hours  insulin lispro (ADMELOG) corrective regimen sliding scale   SubCutaneous three times a day before meals  insulin lispro (ADMELOG) corrective regimen sliding scale   SubCutaneous at bedtime  labetalol 600 milliGRAM(s) Oral three times a day  lacosamide IVPB 50 milliGRAM(s) IV Intermittent every 12 hours  levETIRAcetam  Solution 500 milliGRAM(s) Oral two times a day  lidocaine 1%/epinephrine 1:100,000 Inj 20 milliLiter(s) Local Injection once  methylPREDNISolone sodium succinate Injectable 80 milliGRAM(s) IV Push every 8 hours  multivitamin 1 Tablet(s) Oral daily  pantoprazole  Injectable 40 milliGRAM(s) IV Push two times a day  sevelamer carbonate Powder 2400 milliGRAM(s) Enteral Tube every 8 hours  sodium bicarbonate 1300 milliGRAM(s) Oral every 8 hours  sodium chloride 0.65% Nasal 2 Spray(s) Both Nostrils two times a day    PRN Inpatient Medications  acetaminophen     Tablet .. 650 milliGRAM(s) Oral every 6 hours PRN  dextrose Oral Gel 15 Gram(s) Oral once PRN  labetalol Injectable 10 milliGRAM(s) IV Push every 6 hours PRN  melatonin 3 milliGRAM(s) Oral at bedtime PRN            VITALS/PHYSICAL EXAM  --------------------------------------------------------------------------------  T(C): 36.5 (09-15-22 @ 04:00), Max: 36.5 (09-15-22 @ 04:00)  HR: 76 (09-15-22 @ 04:00) (54 - 76)  BP: 161/80 (09-15-22 @ 04:00) (131/76 - 166/91)  RR: 18 (09-15-22 @ 04:00) (10 - 25)  SpO2: 97% (09-15-22 @ 04:00) (88% - 99%)  Wt(kg): --  Height (cm): 165.1 (09-14-22 @ 14:41)  Weight (kg): 81.4 (09-14-22 @ 14:41)  BMI (kg/m2): 29.9 (09-14-22 @ 14:41)  BSA (m2): 1.89 (09-14-22 @ 14:41)      09-14-22 @ 07:01  -  09-15-22 @ 07:00  --------------------------------------------------------  IN: 427.3 mL / OUT: 440 mL / NET: -12.7 mL      Physical Exam:  	Gen: trach/vent  	Vascular access:udall     LABS/STUDIES  --------------------------------------------------------------------------------              8.5    12.57 >-----------<  280      [09-15-22 @ 05:49]              24.6     137  |  92  |  x   ----------------------------<  218      [09-15-22 @ 05:49]  4.3   |  18  |  x         Ca     8.2     [09-15-22 @ 05:49]      Mg     2.6     [09-15-22 @ 05:49]      Phos  11.4     [09-14-22 @ 05:32]    TPro  5.4  /  Alb  2.6  /  TBili  <0.2  /  DBili  x   /  AST  60  /  ALT  21  /  AlkPhos  186  [09-15-22 @ 05:49]    PT/INR: PT 14.30, INR 1.25       [09-14-22 @ 22:35]  PTT: 29.7       [09-13-22 @ 13:00]    Creatinine Trend:  SCr 6.8 [09-14 @ 05:32]  SCr 6.4 [09-13 @ 21:35]  SCr 6.1 [09-13 @ 05:00]  SCr 5.8 [09-12 @ 05:17]  SCr 5.4 [09-11 @ 04:58]    Urinalysis - [09-09-22 @ 19:05]      Color Yellow / Appearance Turbid / SG 1.007 / pH 6.0      Gluc Negative / Ketone Negative  / Bili Negative / Urobili <2 mg/dL       Blood Large / Protein 30 mg/dL / Leuk Est Large / Nitrite Negative      RBC 26 /  / Hyaline 6 / Gran  / Sq Epi  / Non Sq Epi 3 / Bacteria Negative    Urine Creatinine 17      [09-09-22 @ 19:05]  Urine Protein 46      [09-09-22 @ 19:05]    Ferritin 243      [08-28-22 @ 05:49]  PTH -- (Ca 8.5)      [09-05-22 @ 20:00]   75  TSH 0.86      [08-08-22 @ 17:53]  Lipid: chol 234, , HDL 42, LDL --      [08-03-22 @ 08:56]    HBcAb Nonreact      [09-09-22 @ 06:11]  HIV Nonreact      [09-09-22 @ 06:11]    LUIS FELIPE: titer Negative, pattern --      [09-03-22 @ 12:04]  dsDNA <12      [08-30-22 @ 19:27]  C3 Complement 134      [09-09-22 @ 06:11]  C4 Complement 33      [09-09-22 @ 06:11]  Rheumatoid Factor <10      [08-19-22 @ 11:37]  ANCA: cANCA Negative, pANCA Negative, atypical ANCA Negative      [08-30-22 @ 19:27]  Immunofixation Serum:   No Monoclonal Band Identified    Reference Range: None Detected      [08-30-22 @ 23:46]  SPEP Interpretation: Pattern Consistent With Acute Inflammation Or Stress      [08-30-22 @ 23:46]  Cryoglobulin: Negative      [09-09-22 @ 06:11]

## 2022-09-15 NOTE — PROGRESS NOTE ADULT - ASSESSMENT
Impression:    Acute respiratory failure s/p trach  Acute blood loss anemia. GI bleed  sp EGD and colonoscopy resolved  Oral cavity bleed resolved  Altered MS suspected vasculitis on pulse steroids weaning    LLE cellulitis  ALF oliguric on RRT  COVID 19 infection   E Cloacae Bacteremia resolved       PLAN:    CNS;  Steroid taper, f/u with neuro and rheum.  FU MS.  SAT today.      HEENT: Oral care.  Trach care    PULMONARY:  HOB @ 45 degrees.  Aspiration precautions. No vent changes.     CARDIOVASCULAR: Avoid overload.  BP control     GI: GI prophylaxis.  Peg feeding     RENAL:  follow up lytes.  Correct as needed.  DW renal.  Family Agreed with HD     INFECTIOUS DISEASE:  ABX per ID.  End date 9-16     HEMATOLOGICAL: LE doppler negative.  FU CBC and Coags     ENDOCRINE:  Follow up FS.  Insulin protocol if needed.    MUSCULOSKELETAL:  Bed chair position     MICU    VERY poor prognosis    Wound care per burn        Impression:    Acute respiratory failure s/p trach  Acute blood loss anemia. GI bleed  sp EGD and colonoscopy resolved  Oral cavity bleed resolved  Altered MS suspected vasculitis SP pulse steroids weaning    LLE cellulitis  ALF oliguric on RRT  COVID 19 infection   E Cloacae Bacteremia resolved       PLAN:    CNS;  Steroid taper, f/u with neuro and rheum.  FU MS.  SAT today.      HEENT: Oral care.  Trach care    PULMONARY:  HOB @ 45 degrees.  Aspiration precautions. No vent changes.     CARDIOVASCULAR: Avoid overload.  BP control     GI: GI prophylaxis.  Peg feeding.  Bowel Regimen     RENAL:  follow up lytes.  Correct as needed.  DW renal.  HD per renal      INFECTIOUS DISEASE:  ABX per ID.  End date 9-16     HEMATOLOGICAL: LE doppler negative.  FU CBC and Coags     ENDOCRINE:  Follow up FS.  Insulin protocol if needed.    MUSCULOSKELETAL:  Bed chair position     MICU    VERY poor prognosis    Wound care per burn

## 2022-09-15 NOTE — PROGRESS NOTE ADULT - ASSESSMENT
Assessment:57 yo F with PMHx of HTN, DM, prior stroke in 2017 with residual left sided weakness, who presented with RLE cellulitis. Stroke code called on 8/5 for unresponsiveness.  MR brain showed scattered punctate acute ischemic strokes in multiple territories with encephalopathy .    High suspicion for vasculitis     Suggestions:  Patient can go on a 60 mg prednisone taper for ~3 weeks  Renal biopsy when stable   Medical managment as per primary team   Seen and discussed with Dr. Redding. Pending note to follow for final recommendations

## 2022-09-15 NOTE — PROGRESS NOTE ADULT - SUBJECTIVE AND OBJECTIVE BOX
GENERAL SURGERY PROGRESS NOTE    Patient: JOSE MITHCELL , 56y ([birth year])Female   MRN: [id number]  Location: [facility]  A  Visit: 08-02-22 Inpatient  Date: 09-15-22 @ 01:54    Procedure/Dx/Injuries: s/p trach    Events of past 24 hours: no acute events     PAST MEDICAL & SURGICAL HISTORY:  Hypertension  Diabetes mellitus    No significant past surgical history      Vitals:   T(F): 96.4 (09-15-22 @ 00:00), Max: 97.5 (09-14-22 @ 12:00)  HR: 64 (09-15-22 @ 01:00)  BP: 140/79 (09-15-22 @ 01:00)  RR: 14 (09-15-22 @ 01:00)  SpO2: 97% (09-15-22 @ 01:00)  Mode: AC/ CMV (Assist Control/ Continuous Mandatory Ventilation), RR (machine): 16, TV (machine): 360, FiO2: 40, PEEP: 5, ITime: 1, MAP: 11, PIP: 16    Diet, NPO with Tube Feed:   Tube Feeding Modality: Gastrostomy  Nepro with Carb Steady  Total Volume for 24 Hours (mL): 480  Continuous  Starting Tube Feed Rate mL per Hour: 20  Until Goal Tube Feed Rate (mL per Hour): 20  Tube Feed Duration (in Hours): 24  Tube Feed Start Time: 10:00  Diet, NPO after Midnight:      NPO Start Date: 13-Sep-2022,   NPO Start Time: 23:59      Fluids:     I & O's:    09-13-22 @ 07:01  -  09-14-22 @ 07:00  --------------------------------------------------------  IN:    Dexmedetomidine: 486.3 mL    Glucerna: 180 mL    IV PiggyBack: 200 mL  Total IN: 866.3 mL    OUT:    Indwelling Catheter - Urethral (mL): 220 mL    Insulin: 0 mL  Total OUT: 220 mL    Total NET: 646.3 mL    Bowel Movement: : [] YES [] NO  Flatus: : [] YES [] NO    Physical Examination:  General Appearance: NAD   HEENT: trach in place, some dry blood noted on the gauze, no active bleeding  Heart: RRR  Lungs: CTABL  Abdomen:  Soft, , nondistended.     MEDICATIONS  (STANDING):  amLODIPine   Tablet 10 milliGRAM(s) Oral daily  aspirin  chewable 81 milliGRAM(s) Oral daily  atorvastatin 80 milliGRAM(s) Oral at bedtime  aztreonam  IVPB      aztreonam  IVPB 2000 milliGRAM(s) IV Intermittent every 12 hours  ceftazidime/avibactam IVPB 0.94 Gram(s) IV Intermittent every 24 hours  chlorhexidine 0.12% Liquid 15 milliLiter(s) Oral Mucosa every 12 hours  chlorhexidine 2% Cloths 1 Application(s) Topical <User Schedule>  collagenase Ointment 1 Application(s) Topical two times a day  dexMEDEtomidine Infusion 0.2 MICROgram(s)/kG/Hr (4.07 mL/Hr) IV Continuous <Continuous>  dextrose 5%. 1000 milliLiter(s) (100 mL/Hr) IV Continuous <Continuous>  dextrose 5%. 1000 milliLiter(s) (50 mL/Hr) IV Continuous <Continuous>  dextrose 50% Injectable 25 Gram(s) IV Push once  dextrose 50% Injectable 12.5 Gram(s) IV Push once  dextrose 50% Injectable 25 Gram(s) IV Push once  furosemide   Injectable 80 milliGRAM(s) IV Push every 12 hours  glucagon  Injectable 1 milliGRAM(s) IntraMuscular once  hydrALAZINE 100 milliGRAM(s) Oral every 8 hours  insulin lispro (ADMELOG) corrective regimen sliding scale   SubCutaneous three times a day before meals  insulin lispro (ADMELOG) corrective regimen sliding scale   SubCutaneous at bedtime  labetalol 600 milliGRAM(s) Oral three times a day  lacosamide IVPB 50 milliGRAM(s) IV Intermittent every 12 hours  levETIRAcetam  Solution 500 milliGRAM(s) Oral two times a day  lidocaine 1%/epinephrine 1:100,000 Inj 20 milliLiter(s) Local Injection once  methylPREDNISolone sodium succinate Injectable 80 milliGRAM(s) IV Push every 8 hours  multivitamin 1 Tablet(s) Oral daily  pantoprazole  Injectable 40 milliGRAM(s) IV Push two times a day  sevelamer carbonate Powder 2400 milliGRAM(s) Enteral Tube every 8 hours  sodium bicarbonate 1300 milliGRAM(s) Oral every 8 hours  sodium chloride 0.65% Nasal 2 Spray(s) Both Nostrils two times a day    MEDICATIONS  (PRN):  acetaminophen     Tablet .. 650 milliGRAM(s) Oral every 6 hours PRN Temp greater or equal to 38C (100.4F), Mild Pain (1 - 3)  dextrose Oral Gel 15 Gram(s) Oral once PRN Blood Glucose LESS THAN 70 milliGRAM(s)/deciliter  labetalol Injectable 10 milliGRAM(s) IV Push every 6 hours PRN Systolic blood pressure >  melatonin 3 milliGRAM(s) Oral at bedtime PRN Insomnia      DVT PROPHYLAXIS:   GI PROPHYLAXIS: pantoprazole  Injectable 40 milliGRAM(s) IV Push two times a day    ANTICOAGULATION:   ANTIBIOTICS:  aztreonam  IVPB    aztreonam  IVPB 2000 milliGRAM(s)  ceftazidime/avibactam IVPB 0.94 Gram(s)            LAB/STUDIES:  Labs:  CAPILLARY BLOOD GLUCOSE      POCT Blood Glucose.: 219 mg/dL (14 Sep 2022 22:08)  POCT Blood Glucose.: 235 mg/dL (14 Sep 2022 19:02)  POCT Blood Glucose.: 240 mg/dL (14 Sep 2022 12:32)  POCT Blood Glucose.: 224 mg/dL (14 Sep 2022 05:17)                          9.1    10.81 )-----------( 293      ( 14 Sep 2022 05:32 )             25.9       Auto Neutrophil %: 80.7 % (09-14-22 @ 05:32)  Auto Immature Granulocyte %: 0.9 % (09-14-22 @ 05:32)    09-14    136  |  94<L>  |  125<HH>  ----------------------------<  224<H>  3.7   |  19  |  6.8<HH>      Calcium, Total Serum: 8.1 mg/dL (09-14-22 @ 05:32)      LFTs:             5.3  | 0.2  | 43       ------------------[181     ( 14 Sep 2022 05:32 )  2.8  | x    | 21          Lipase:x      Amylase:x         Blood Gas Arterial, Lactate: 0.60 mmol/L (09-14-22 @ 03:04)  Blood Gas Arterial, Lactate: 0.70 mmol/L (09-13-22 @ 10:06)  Blood Gas Arterial, Lactate: 0.80 mmol/L (09-13-22 @ 03:25)  Blood Gas Arterial, Lactate: 0.60 mmol/L (09-12-22 @ 03:49)    ABG - ( 14 Sep 2022 03:04 )  pH: 7.44  /  pCO2: 28    /  pO2: 122   / HCO3: 19    / Base Excess: -4.4  /  SaO2: 99.6            ABG - ( 13 Sep 2022 10:06 )  pH: 7.43  /  pCO2: 28    /  pO2: 157   / HCO3: 19    / Base Excess: -4.8  /  SaO2: 100.0           ABG - ( 13 Sep 2022 03:25 )  pH: 7.44  /  pCO2: 28    /  pO2: 146   / HCO3: 19    / Base Excess: -4.0  /  SaO2: 99.5              Coags:     14.30  ----< 1.25    ( 14 Sep 2022 22:35 )     x               IMAGING:  < from: Xray Chest 1 View- PORTABLE-Routine (09.14.22 @ 05:39) >  Skeleton/soft tissues: Degenerative changes.    Impression:    Stable findings. Endotracheal tube in satisfactory position.    < end of copied text >      ACCESS/ DEVICES:  [ ] Peripheral IV  [ ] Central Venous Line	[ ] R	[ ] L	[ ] IJ	[ ] Fem	[ ] SC	Placed:   [ ] Arterial Line		[ ] R	[ ] L	[ ] Fem	[ ] Rad	[ ] Ax	Placed:   [ ] PICC:					[ ] Mediport  [ ] Urinary Catheter,  Date Placed:   [ ] Chest tube: [ ] Right, [ ] Left  [ ] FAIZAN/Ryne Drains        spectra [id number]

## 2022-09-15 NOTE — PROGRESS NOTE ADULT - ASSESSMENT
Assessment:  56yF s/p trach    Plan:  ICU management   Monitor wound and dressing for changes,   recall as needed    spectra [id number] Assessment:  56yF w/ PMHx HTN, DM2, and stroke (per son 2017) admitted for RLE wound. S/P debridement on 8/10/22 with burn team. Hospital course complicated my episode of AMS and found to have multiple punctate infarcts. Intubated on 9/6/22 for bedside endoscopy/colonoscopy and remains intubated. Thoracic surgery consulted for trach & PEG. Physical exam findings, imaging, and labs as documented above.     PLAN:    ICU management   Monitor vent settings  Surgery will sign off  Recall if further evaluation needed    spectra [phone]

## 2022-09-15 NOTE — PROGRESS NOTE ADULT - SUBJECTIVE AND OBJECTIVE BOX
Neurology Progress Note    Patient is a 56y old  Female who presents with a chief complaint of RLE wound (12 Sep 2022 15:26)      HPI:  57 yo F with PMH of HTN, DM2, and stroke (per son 2017) who presented for RLE wound. Started 3 months ago when she fell and hit her leg on a wooden cabinet. She put hydrogen peroxide, antibiotic cream, and wrapped the wound but never fully healed. Two weeks ago it started to show yellow pus so her and her family came to the ED for further treatment. Endorsed subjective fevers, chest pain, and vision changes which occurs when walked 2-3 blocks. Denied congestion, sore throat, cough, dyspnea, headache, dysuria, N/V, diarrhea     Per son, they fill her medications at [facility] ([phone]) and this is their current pharmacy. Called them to confirm her medications and they said her last refill of medications was 2018. Pt has not seen a doctor due to insurance problem and has not been taking any medications.    In the ED:  Vitals: T: 98.9, BP: 296/ 174, , RR: 18, 98%O2 on RA  Labs: CBC showed WBC 11.23; ESR 92, CRP 35.6  CMP showed glucose 302, alk phos 173; ; VBG pH 7.45  EKG pending  CXR showed borderline cardiomegaly and no airspace opacity.   X-ray of RLE also pending.     Received unasyn and vanco, humalin R, IV vasotec, IV labetalol   (02 Aug 2022 07:47)      PAST MEDICAL & SURGICAL HISTORY:  Hypertension      Diabetes mellitus      No significant past surgical history          FAMILY HISTORY:  FH: type 2 diabetes mellitus        Social History: (-) x 3    Allergies    No Known Allergies    Intolerances        MEDICATIONS  (STANDING):  amLODIPine   Tablet 10 milliGRAM(s) Oral daily  aspirin  chewable 81 milliGRAM(s) Oral daily  atorvastatin 80 milliGRAM(s) Oral at bedtime  aztreonam  IVPB      aztreonam  IVPB 2000 milliGRAM(s) IV Intermittent every 12 hours  ceftazidime/avibactam IVPB 0.94 Gram(s) IV Intermittent every 24 hours  chlorhexidine 0.12% Liquid 15 milliLiter(s) Oral Mucosa every 12 hours  chlorhexidine 2% Cloths 1 Application(s) Topical <User Schedule>  collagenase Ointment 1 Application(s) Topical two times a day  dexMEDEtomidine Infusion 0.2 MICROgram(s)/kG/Hr (4.07 mL/Hr) IV Continuous <Continuous>  dextrose 5%. 1000 milliLiter(s) (100 mL/Hr) IV Continuous <Continuous>  dextrose 5%. 1000 milliLiter(s) (50 mL/Hr) IV Continuous <Continuous>  dextrose 50% Injectable 25 Gram(s) IV Push once  dextrose 50% Injectable 12.5 Gram(s) IV Push once  dextrose 50% Injectable 25 Gram(s) IV Push once  furosemide   Injectable 80 milliGRAM(s) IV Push every 12 hours  glucagon  Injectable 1 milliGRAM(s) IntraMuscular once  hydrALAZINE 100 milliGRAM(s) Oral every 8 hours  insulin regular Infusion 1 Unit(s)/Hr (1 mL/Hr) IV Continuous <Continuous>  labetalol 600 milliGRAM(s) Oral three times a day  lacosamide IVPB 50 milliGRAM(s) IV Intermittent every 12 hours  levETIRAcetam  Solution 500 milliGRAM(s) Oral two times a day  lidocaine 1%/epinephrine 1:100,000 Inj 20 milliLiter(s) Local Injection once  methylPREDNISolone sodium succinate Injectable 80 milliGRAM(s) IV Push every 8 hours  multivitamin 1 Tablet(s) Oral daily  pantoprazole  Injectable 40 milliGRAM(s) IV Push two times a day  sevelamer carbonate Powder 2400 milliGRAM(s) Enteral Tube every 8 hours  sodium bicarbonate 1300 milliGRAM(s) Oral every 8 hours  sodium chloride 0.65% Nasal 2 Spray(s) Both Nostrils two times a day    MEDICATIONS  (PRN):  acetaminophen     Tablet .. 650 milliGRAM(s) Oral every 6 hours PRN Temp greater or equal to 38C (100.4F), Mild Pain (1 - 3)  dextrose Oral Gel 15 Gram(s) Oral once PRN Blood Glucose LESS THAN 70 milliGRAM(s)/deciliter  labetalol Injectable 10 milliGRAM(s) IV Push every 6 hours PRN Systolic blood pressure >  melatonin 3 milliGRAM(s) Oral at bedtime PRN Insomnia        Vital Signs Last 24 Hrs  T(C): 36.7 (15 Sep 2022 20:00), Max: 36.7 (15 Sep 2022 20:00)  T(F): 98 (15 Sep 2022 20:00), Max: 98 (15 Sep 2022 20:00)  HR: 78 (15 Sep 2022 20:00) (62 - 82)  BP: 168/79 (15 Sep 2022 20:00) (136/78 - 189/92)  BP(mean): 122 (15 Sep 2022 20:00) (95 - 147)  RR: 13 (15 Sep 2022 20:00) (13 - 25)  SpO2: 99% (15 Sep 2022 20:00) (96% - 100%)    Parameters below as of 15 Sep 2022 18:00  Patient On (Oxygen Delivery Method): ventilator    O2 Concentration (%): 40    Examination: Patient is trached/vent  Mental status: Awake, spontaneous opens eyes, able to look left and right but not tracking, blinking  more pronounced on the left eyes, not following commands  Motor: No spontaneous movements  Sensory: Withdrawal for all 4           Labs:   CBC Full  -  ( 15 Sep 2022 05:49 )  WBC Count : 12.57 K/uL  RBC Count : 2.89 M/uL  Hemoglobin : 8.5 g/dL  Hematocrit : 24.6 %  Platelet Count - Automated : 280 K/uL  Mean Cell Volume : 85.1 fL  Mean Cell Hemoglobin : 29.4 pg  Mean Cell Hemoglobin Concentration : 34.6 g/dL  Auto Neutrophil # : 11.70 K/uL  Auto Lymphocyte # : 0.39 K/uL  Auto Monocyte # : 0.38 K/uL  Auto Eosinophil # : 0.00 K/uL  Auto Basophil # : 0.01 K/uL  Auto Neutrophil % : 93.1 %  Auto Lymphocyte % : 3.1 %  Auto Monocyte % : 3.0 %  Auto Eosinophil % : 0.0 %  Auto Basophil % : 0.1 %    09-15    137  |  92<L>  |  130<HH>  ----------------------------<  218<H>  4.3   |  18  |  6.8<HH>    Ca    8.2<L>      15 Sep 2022 05:49  Phos  11.4     09-14  Mg     2.6     09-15    TPro  5.4<L>  /  Alb  2.6<L>  /  TBili  <0.2  /  DBili  x   /  AST  60<H>  /  ALT  21  /  AlkPhos  186<H>  09-15    LIVER FUNCTIONS - ( 15 Sep 2022 05:49 )  Alb: 2.6 g/dL / Pro: 5.4 g/dL / ALK PHOS: 186 U/L / ALT: 21 U/L / AST: 60 U/L / GGT: x           PT/INR - ( 15 Sep 2022 09:20 )   PT: 15.10 sec;   INR: 1.32 ratio         PTT - ( 15 Sep 2022 09:20 )  PTT:30.0 sec        Neuroimaging:  NCT:     09-15-22 @ 20:44

## 2022-09-15 NOTE — PROGRESS NOTE ADULT - ASSESSMENT
ALF rule out ATN / relative hypotension/ sepsis / E cloaca bacteremia / HTN ( was on multiple meds )/ CVA/ GIB  cr trending up / check repeat today   UO improving   hd today standard bath and will reassess   on lasix 80mg iv q12h  work up to date is neg for GN ( LUIS FELIPE DsDNA  neg / RF noted/ SPEP with inflammatory pattern ) cryo to be sent out/ no thrombocytopenia   No indication yet for a kidney biopsy from renal stand point  cont  sodium bicarbonate 1300 q 8   s/p trach   ph noted / increase sevelamer to 3 tabs po q8h/ feed nepro  overall prognosis poor  will follow

## 2022-09-15 NOTE — PROGRESS NOTE ADULT - SUBJECTIVE AND OBJECTIVE BOX
Patient is a 56y old  Female who presents with a chief complaint of RLE wound (12 Sep 2022 15:26)        Over Night Events: S/p trach placement, s/p Ogilvie placement for RRT. Off drips.      ROS:  See HPI    PHYSICAL EXAM    ICU Vital Signs Last 24 Hrs  T(C): 36.5 (15 Sep 2022 04:00), Max: 36.5 (15 Sep 2022 04:00)  T(F): 97.7 (15 Sep 2022 04:00), Max: 97.7 (15 Sep 2022 04:00)  HR: 74 (15 Sep 2022 08:49) (54 - 80)  BP: 163/81 (15 Sep 2022 08:49) (131/77 - 174/84)  BP(mean): 109 (15 Sep 2022 07:00) (95 - 125)  ABP: --  ABP(mean): --  RR: 18 (15 Sep 2022 08:49) (10 - 25)  SpO2: 99% (15 Sep 2022 08:49) (88% - 99%)    O2 Parameters below as of 15 Sep 2022 08:49  Patient On (Oxygen Delivery Method): ventilator            09-14-22 @ 07:01  -  09-15-22 @ 07:00  --------------------------------------------------------  IN:    Dexmedetomidine: 147.3 mL    Enteral Tube Flush: 190 mL    IV PiggyBack: 150 mL    Nepro with Carb Steady: 260 mL  Total IN: 747.3 mL    OUT:    Glucerna: 0 mL    Indwelling Catheter - Urethral (mL): 640 mL  Total OUT: 640 mL    Total NET: 107.3 mL            CONSTITUTIONAL:  Intubated.    ENT:   Trach in place.    EYES:   Clear bilaterally,   pupils equal,   round and reactive to light.  Roving eye motion    CARDIAC:   Normal rate,   regular rhythm.    no edema      CAROTID:   normal systolic impulse  no bruits    RESPIRATORY:   No wheezing  Normal chest expansion  Not tachypneic,  No use of accessory muscles    GASTROINTESTINAL:  Abdomen soft,   non-tender,   no guarding,   + BS    MUSCULOSKELETAL:   range of motion is not limited,  no clubbing, cyanosis    NEUROLOGICAL:   Not following commands.      LABS:                          8.5    12.57 )-----------( 280      ( 15 Sep 2022 05:49 )             24.6                                               09-15    137  |  92<L>  |  130<HH>  ----------------------------<  218<H>  4.3   |  18  |  6.8<HH>    Ca    8.2<L>      15 Sep 2022 05:49  Phos  11.4     09-14  Mg     2.6     09-15    TPro  5.4<L>  /  Alb  2.6<L>  /  TBili  <0.2  /  DBili  x   /  AST  60<H>  /  ALT  21  /  AlkPhos  186<H>  09-15      PT/INR - ( 14 Sep 2022 22:35 )   PT: 14.30 sec;   INR: 1.25 ratio         PTT - ( 13 Sep 2022 13:00 )  PTT:29.7 sec                                                                                     LIVER FUNCTIONS - ( 15 Sep 2022 05:49 )  Alb: 2.6 g/dL / Pro: 5.4 g/dL / ALK PHOS: 186 U/L / ALT: 21 U/L / AST: 60 U/L / GGT: x                    Fibrinogen Assay: 520.0 mg/dL (09-13-22 @ 13:00)  D-Dimer Assay, Quantitative: 438 ng/mL DDU (09-13-22 @ 13:00)  Fibrinogen Assay: >700.0 mg/dL (09-05-22 @ 01:59)  D-Dimer Assay, Quantitative: 1081 ng/mL DDU (09-05-22 @ 01:59)                    POCT Blood Glucose.: 229 mg/dL (09-15-22 @ 05:18)  POCT Blood Glucose.: 219 mg/dL (09-14-22 @ 22:08)  POCT Blood Glucose.: 235 mg/dL (09-14-22 @ 19:02)  POCT Blood Glucose.: 240 mg/dL (09-14-22 @ 12:32)                                                                   Mode: AC/ CMV (Assist Control/ Continuous Mandatory Ventilation)  RR (machine): 16  TV (machine): 360  FiO2: 40  PEEP: 5  ITime: 1  MAP: 11  PIP: 16                                      ABG - ( 15 Sep 2022 02:52 )  pH, Arterial: 7.43  pH, Blood: x     /  pCO2: 28    /  pO2: 120   / HCO3: 19    / Base Excess: -4.4  /  SaO2: x               MEDICATIONS  (STANDING):  amLODIPine   Tablet 10 milliGRAM(s) Oral daily  aspirin  chewable 81 milliGRAM(s) Oral daily  atorvastatin 80 milliGRAM(s) Oral at bedtime  aztreonam  IVPB      aztreonam  IVPB 2000 milliGRAM(s) IV Intermittent every 12 hours  ceftazidime/avibactam IVPB 0.94 Gram(s) IV Intermittent every 24 hours  chlorhexidine 0.12% Liquid 15 milliLiter(s) Oral Mucosa every 12 hours  chlorhexidine 2% Cloths 1 Application(s) Topical <User Schedule>  collagenase Ointment 1 Application(s) Topical two times a day  dexMEDEtomidine Infusion 0.2 MICROgram(s)/kG/Hr (4.07 mL/Hr) IV Continuous <Continuous>  dextrose 5%. 1000 milliLiter(s) (50 mL/Hr) IV Continuous <Continuous>  dextrose 5%. 1000 milliLiter(s) (100 mL/Hr) IV Continuous <Continuous>  dextrose 50% Injectable 25 Gram(s) IV Push once  dextrose 50% Injectable 12.5 Gram(s) IV Push once  dextrose 50% Injectable 25 Gram(s) IV Push once  furosemide   Injectable 80 milliGRAM(s) IV Push every 12 hours  glucagon  Injectable 1 milliGRAM(s) IntraMuscular once  hydrALAZINE 100 milliGRAM(s) Oral every 8 hours  insulin lispro (ADMELOG) corrective regimen sliding scale   SubCutaneous three times a day before meals  insulin lispro (ADMELOG) corrective regimen sliding scale   SubCutaneous at bedtime  labetalol 600 milliGRAM(s) Oral three times a day  lacosamide IVPB 50 milliGRAM(s) IV Intermittent every 12 hours  levETIRAcetam  Solution 500 milliGRAM(s) Oral two times a day  lidocaine 1%/epinephrine 1:100,000 Inj 20 milliLiter(s) Local Injection once  methylPREDNISolone sodium succinate Injectable 80 milliGRAM(s) IV Push every 8 hours  multivitamin 1 Tablet(s) Oral daily  pantoprazole  Injectable 40 milliGRAM(s) IV Push two times a day  sevelamer carbonate Powder 2400 milliGRAM(s) Enteral Tube every 8 hours  sodium bicarbonate 1300 milliGRAM(s) Oral every 8 hours  sodium chloride 0.65% Nasal 2 Spray(s) Both Nostrils two times a day    MEDICATIONS  (PRN):  acetaminophen     Tablet .. 650 milliGRAM(s) Oral every 6 hours PRN Temp greater or equal to 38C (100.4F), Mild Pain (1 - 3)  dextrose Oral Gel 15 Gram(s) Oral once PRN Blood Glucose LESS THAN 70 milliGRAM(s)/deciliter  labetalol Injectable 10 milliGRAM(s) IV Push every 6 hours PRN Systolic blood pressure >  melatonin 3 milliGRAM(s) Oral at bedtime PRN Insomnia      Xrays:                                                                                     ECHO

## 2022-09-15 NOTE — PROGRESS NOTE ADULT - SUBJECTIVE AND OBJECTIVE BOX
Patient is a 56y old  Female who presents with a chief complaint of RLE wound (12 Sep 2022 15:26)      INTERVAL HPI/OVERNIGHT EVENTS:   s/p Tracheostomy tube.  Received HD in AM. Spoke with Neurology, will f/u for recommendations regarding steroids.  Afebrile, hemodynamically stable     GCS:  Sedatives:  Lines:  Pressors:    ICU Vital Signs Last 24 Hrs  T(C): 36.5 (15 Sep 2022 12:00), Max: 36.6 (15 Sep 2022 08:00)  T(F): 97.7 (15 Sep 2022 12:00), Max: 97.8 (15 Sep 2022 08:00)  HR: 82 (15 Sep 2022 14:00) (54 - 82)  BP: 180/88 (15 Sep 2022 14:00) (131/77 - 189/92)  BP(mean): 137 (15 Sep 2022 14:00) (95 - 147)  ABP: --  ABP(mean): --  RR: 15 (15 Sep 2022 14:00) (12 - 25)  SpO2: 99% (15 Sep 2022 14:00) (88% - 100%)    O2 Parameters below as of 15 Sep 2022 14:00  Patient On (Oxygen Delivery Method): ventilator    O2 Concentration (%): 40      I&O's Summary    14 Sep 2022 07:01  -  15 Sep 2022 07:00  --------------------------------------------------------  IN: 747.3 mL / OUT: 660 mL / NET: 87.3 mL    15 Sep 2022 07:01  -  15 Sep 2022 14:24  --------------------------------------------------------  IN: 146 mL / OUT: 1095 mL / NET: -949 mL      Mode: AC/ CMV (Assist Control/ Continuous Mandatory Ventilation)  RR (machine): 16  TV (machine): 360  FiO2: 40  PEEP: 5  ITime: 1  MAP: 10  PIP: 28      LABS:                        8.5    12.57 )-----------( 280      ( 15 Sep 2022 05:49 )             24.6     09-15    137  |  92<L>  |  130<HH>  ----------------------------<  218<H>  4.3   |  18  |  6.8<HH>    Ca    8.2<L>      15 Sep 2022 05:49  Phos  11.4     09-14  Mg     2.6     09-15    TPro  5.4<L>  /  Alb  2.6<L>  /  TBili  <0.2  /  DBili  x   /  AST  60<H>  /  ALT  21  /  AlkPhos  186<H>  09-15    PT/INR - ( 15 Sep 2022 09:20 )   PT: 15.10 sec;   INR: 1.32 ratio         PTT - ( 15 Sep 2022 09:20 )  PTT:30.0 sec    CAPILLARY BLOOD GLUCOSE      POCT Blood Glucose.: 214 mg/dL (15 Sep 2022 14:16)  POCT Blood Glucose.: 229 mg/dL (15 Sep 2022 13:42)  POCT Blood Glucose.: 238 mg/dL (15 Sep 2022 12:52)  POCT Blood Glucose.: 229 mg/dL (15 Sep 2022 05:18)  POCT Blood Glucose.: 219 mg/dL (14 Sep 2022 22:08)  POCT Blood Glucose.: 235 mg/dL (14 Sep 2022 19:02)    ABG - ( 15 Sep 2022 02:52 )  pH, Arterial: 7.43  pH, Blood: x     /  pCO2: 28    /  pO2: 120   / HCO3: 19    / Base Excess: -4.4  /  SaO2: x                   RADIOLOGY & ADDITIONAL TESTS:    Consultant(s) Notes Reviewed:  [x ] YES  [ ] NO    MEDICATIONS  (STANDING):  amLODIPine   Tablet 10 milliGRAM(s) Oral daily  aspirin  chewable 81 milliGRAM(s) Oral daily  atorvastatin 80 milliGRAM(s) Oral at bedtime  aztreonam  IVPB      aztreonam  IVPB 2000 milliGRAM(s) IV Intermittent every 12 hours  ceftazidime/avibactam IVPB 0.94 Gram(s) IV Intermittent every 24 hours  chlorhexidine 0.12% Liquid 15 milliLiter(s) Oral Mucosa every 12 hours  chlorhexidine 2% Cloths 1 Application(s) Topical <User Schedule>  collagenase Ointment 1 Application(s) Topical two times a day  dexMEDEtomidine Infusion 0.2 MICROgram(s)/kG/Hr (4.07 mL/Hr) IV Continuous <Continuous>  dextrose 5%. 1000 milliLiter(s) (100 mL/Hr) IV Continuous <Continuous>  dextrose 5%. 1000 milliLiter(s) (50 mL/Hr) IV Continuous <Continuous>  dextrose 50% Injectable 25 Gram(s) IV Push once  dextrose 50% Injectable 12.5 Gram(s) IV Push once  dextrose 50% Injectable 25 Gram(s) IV Push once  furosemide   Injectable 80 milliGRAM(s) IV Push every 12 hours  glucagon  Injectable 1 milliGRAM(s) IntraMuscular once  hydrALAZINE 100 milliGRAM(s) Oral every 8 hours  insulin regular Infusion 1 Unit(s)/Hr (1 mL/Hr) IV Continuous <Continuous>  labetalol 600 milliGRAM(s) Oral three times a day  lacosamide IVPB 50 milliGRAM(s) IV Intermittent every 12 hours  levETIRAcetam  Solution 500 milliGRAM(s) Oral two times a day  lidocaine 1%/epinephrine 1:100,000 Inj 20 milliLiter(s) Local Injection once  methylPREDNISolone sodium succinate Injectable 80 milliGRAM(s) IV Push every 8 hours  multivitamin 1 Tablet(s) Oral daily  pantoprazole  Injectable 40 milliGRAM(s) IV Push two times a day  sevelamer carbonate Powder 2400 milliGRAM(s) Enteral Tube every 8 hours  sodium bicarbonate 1300 milliGRAM(s) Oral every 8 hours  sodium chloride 0.65% Nasal 2 Spray(s) Both Nostrils two times a day    MEDICATIONS  (PRN):  acetaminophen     Tablet .. 650 milliGRAM(s) Oral every 6 hours PRN Temp greater or equal to 38C (100.4F), Mild Pain (1 - 3)  dextrose Oral Gel 15 Gram(s) Oral once PRN Blood Glucose LESS THAN 70 milliGRAM(s)/deciliter  labetalol Injectable 10 milliGRAM(s) IV Push every 6 hours PRN Systolic blood pressure >  melatonin 3 milliGRAM(s) Oral at bedtime PRN Insomnia      PHYSICAL EXAM:  GENERAL:   HEAD:  Atraumatic, Normocephalic  EYES: EOMI, PERRLA, conjunctiva and sclera clear  NECK: Supple, No JVD, Normal thyroid, no enlarged nodes  NERVOUS SYSTEM:  Alert & Awake.  CHEST/LUNG: B/L good air entry; No rales, rhonchi, or wheezing  HEART: S1S2 normal, no S3, Regular rate and rhythm; No murmurs  ABDOMEN: Soft, Nontender, Nondistended; Bowel sounds present  EXTREMITIES:  2+ Peripheral Pulses, No clubbing, cyanosis, or edema  LYMPH: No lymphadenopathy noted  SKIN: No rashes or lesions    Care Discussed with Consultants/Other Providers [ x] YES  [ ] NO Patient is a 56y old  Female who presents with a chief complaint of RLE wound (12 Sep 2022 15:26)      INTERVAL HPI/OVERNIGHT EVENTS:   s/p Tracheostomy tube.  Received HD in AM. Spoke with Neurology, will f/u for recommendations regarding steroids.  Afebrile, hemodynamically stable. Tracheostomy tube oozing blood, dressings changed twice due to blood soaking from oozing. Not profusely bleeding. Spoke with CT surgery, will f/u recommendations.    GCS: 3  Sedatives: Precedex  Lines: UDALL  Pressors: None    ICU Vital Signs Last 24 Hrs  T(C): 36.5 (15 Sep 2022 12:00), Max: 36.6 (15 Sep 2022 08:00)  T(F): 97.7 (15 Sep 2022 12:00), Max: 97.8 (15 Sep 2022 08:00)  HR: 82 (15 Sep 2022 14:00) (54 - 82)  BP: 180/88 (15 Sep 2022 14:00) (131/77 - 189/92)  BP(mean): 137 (15 Sep 2022 14:00) (95 - 147)  ABP: --  ABP(mean): --  RR: 15 (15 Sep 2022 14:00) (12 - 25)  SpO2: 99% (15 Sep 2022 14:00) (88% - 100%)    O2 Parameters below as of 15 Sep 2022 14:00  Patient On (Oxygen Delivery Method): ventilator    O2 Concentration (%): 40      I&O's Summary    14 Sep 2022 07:01  -  15 Sep 2022 07:00  --------------------------------------------------------  IN: 747.3 mL / OUT: 660 mL / NET: 87.3 mL    15 Sep 2022 07:01  -  15 Sep 2022 14:24  --------------------------------------------------------  IN: 146 mL / OUT: 1095 mL / NET: -949 mL      Mode: AC/ CMV (Assist Control/ Continuous Mandatory Ventilation)  RR (machine): 16  TV (machine): 360  FiO2: 40  PEEP: 5  ITime: 1  MAP: 10  PIP: 28      LABS:                        8.5    12.57 )-----------( 280      ( 15 Sep 2022 05:49 )             24.6     09-15    137  |  92<L>  |  130<HH>  ----------------------------<  218<H>  4.3   |  18  |  6.8<HH>    Ca    8.2<L>      15 Sep 2022 05:49  Phos  11.4     09-14  Mg     2.6     09-15    TPro  5.4<L>  /  Alb  2.6<L>  /  TBili  <0.2  /  DBili  x   /  AST  60<H>  /  ALT  21  /  AlkPhos  186<H>  09-15    PT/INR - ( 15 Sep 2022 09:20 )   PT: 15.10 sec;   INR: 1.32 ratio         PTT - ( 15 Sep 2022 09:20 )  PTT:30.0 sec    CAPILLARY BLOOD GLUCOSE      POCT Blood Glucose.: 214 mg/dL (15 Sep 2022 14:16)  POCT Blood Glucose.: 229 mg/dL (15 Sep 2022 13:42)  POCT Blood Glucose.: 238 mg/dL (15 Sep 2022 12:52)  POCT Blood Glucose.: 229 mg/dL (15 Sep 2022 05:18)  POCT Blood Glucose.: 219 mg/dL (14 Sep 2022 22:08)  POCT Blood Glucose.: 235 mg/dL (14 Sep 2022 19:02)    ABG - ( 15 Sep 2022 02:52 )  pH, Arterial: 7.43  pH, Blood: x     /  pCO2: 28    /  pO2: 120   / HCO3: 19    / Base Excess: -4.4  /  SaO2: x                   RADIOLOGY & ADDITIONAL TESTS:    Consultant(s) Notes Reviewed:  [x ] YES  [ ] NO    MEDICATIONS  (STANDING):  amLODIPine   Tablet 10 milliGRAM(s) Oral daily  aspirin  chewable 81 milliGRAM(s) Oral daily  atorvastatin 80 milliGRAM(s) Oral at bedtime  aztreonam  IVPB      aztreonam  IVPB 2000 milliGRAM(s) IV Intermittent every 12 hours  ceftazidime/avibactam IVPB 0.94 Gram(s) IV Intermittent every 24 hours  chlorhexidine 0.12% Liquid 15 milliLiter(s) Oral Mucosa every 12 hours  chlorhexidine 2% Cloths 1 Application(s) Topical <User Schedule>  collagenase Ointment 1 Application(s) Topical two times a day  dexMEDEtomidine Infusion 0.2 MICROgram(s)/kG/Hr (4.07 mL/Hr) IV Continuous <Continuous>  dextrose 5%. 1000 milliLiter(s) (100 mL/Hr) IV Continuous <Continuous>  dextrose 5%. 1000 milliLiter(s) (50 mL/Hr) IV Continuous <Continuous>  dextrose 50% Injectable 25 Gram(s) IV Push once  dextrose 50% Injectable 12.5 Gram(s) IV Push once  dextrose 50% Injectable 25 Gram(s) IV Push once  furosemide   Injectable 80 milliGRAM(s) IV Push every 12 hours  glucagon  Injectable 1 milliGRAM(s) IntraMuscular once  hydrALAZINE 100 milliGRAM(s) Oral every 8 hours  insulin regular Infusion 1 Unit(s)/Hr (1 mL/Hr) IV Continuous <Continuous>  labetalol 600 milliGRAM(s) Oral three times a day  lacosamide IVPB 50 milliGRAM(s) IV Intermittent every 12 hours  levETIRAcetam  Solution 500 milliGRAM(s) Oral two times a day  lidocaine 1%/epinephrine 1:100,000 Inj 20 milliLiter(s) Local Injection once  methylPREDNISolone sodium succinate Injectable 80 milliGRAM(s) IV Push every 8 hours  multivitamin 1 Tablet(s) Oral daily  pantoprazole  Injectable 40 milliGRAM(s) IV Push two times a day  sevelamer carbonate Powder 2400 milliGRAM(s) Enteral Tube every 8 hours  sodium bicarbonate 1300 milliGRAM(s) Oral every 8 hours  sodium chloride 0.65% Nasal 2 Spray(s) Both Nostrils two times a day    MEDICATIONS  (PRN):  acetaminophen     Tablet .. 650 milliGRAM(s) Oral every 6 hours PRN Temp greater or equal to 38C (100.4F), Mild Pain (1 - 3)  dextrose Oral Gel 15 Gram(s) Oral once PRN Blood Glucose LESS THAN 70 milliGRAM(s)/deciliter  labetalol Injectable 10 milliGRAM(s) IV Push every 6 hours PRN Systolic blood pressure >  melatonin 3 milliGRAM(s) Oral at bedtime PRN Insomnia      PHYSICAL EXAM:  GENERAL: NAD  HEAD:  Atraumatic, Normocephalic  EYES: EOMI, left gaze preference  NERVOUS SYSTEM: nonresponsive to commands, eyes are open with non-purposeful movement  CHEST/LUNG: intubated, B/L coarse breath sounds  HEART: S1S2 normal, no S3, Regular rate and rhythm; No murmurs      Care Discussed with Consultants/Other Providers [ x] YES  [ ] NO    55 y/o woman with PMH of HTN, DM2, 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 During hospital stay, found to have multiple punctate infarcts suspected to be cardioembolic vs vasculitis. Patient also found to have sharp waves on vEEG and currently on AEDs. Hospital course further complicated by fever and now with MDR Enterobacter bacteremia.          # Acute ischemic strokes multifocal  - Spoke with neurology, suspicious for vasculitis, but not confirmed, CSF studies does not support the diagnosis. - Plavix held for LGIB. Aspirin resumed    - neurology recommended 1g IV solumedrol for 5 days, today is day 2. (cleared from ID)  - cw keppra   - Neuro check q1h  - Pt is intubated.   - Per stroke team, no need for angio, requested a renal bx as expected widespread vasculitis. And once cleared for infection, can be started on steroids (can wait to results of bx as rheum advised against rx empirically).   -Completed 5 day steroid course, f/u neuro recs after course complete for steroid dose    # nonoliguric ALF / Urinary retention / Suspected ATN  - Cr continues to trend up 4.7  - on IVFs  - 1 dose of lasix 80 mg IV once was given   - avoid hypotension   - renal US 9/2: no hydronephrosis  - keep nolasco for now  - sodium bicarbonate increased to 1300 q8h. Sodium bicarb drip started,   - nephro following  - daily BMP and I's and O's  - started sevelamer 2400 mg TID in PEG tube   - 9/15 received dialysis in AM    # Bacteremia, resolved   - bcx positive for MDR enterobacter Cloacae   - Cw w antibiotics per ID until 9/16  - 9/12 ceftaz changed to q 24, aztreonam still q12    # Purulent Right LE cellulitis   - s/p debridement by burn on 8/10  - Candida in wound. per Dr. Chua: contaminant  -  BCx w/ staph: likely contaminant. repeat BCx at that time was negative  - 9/13 BURN Consulted for new recs for wound care, follow recs      # GI bleed   - S/p 6 units of PRBCs. Hb is stable.   - Pt was seen by GI, bleeding hemorrhoids noted, external. Surgery contacted for rx.   - Protonix q12h  - ENT eval appreciated for oral bleed. No actively oozing wounds, teeth guard put in.     - oral cavity bleed from tongue biting, improving     # Hypertensive urgency due to noncompliance and Malignant HTN   - BP on admission 296/174 without signs of end organ damage. Renal artery duplex wnl>>ARIAN unlikely  - Aldosterone wnl, renin elevated  - BP overall improved  - SBP goal 120-160  - c/w amlodipine 10, furosemide 80 q12, hydralazine 100mg q8, labetalol 600mg TID,     # Diabetes mellitus   - HbA1C 10.4  - Lantus and lispro sliding scale    #HLD   - cw statins         #MISC:  - Diet: f/u nutrition recs  - GI prophylaxis: protonix   - Dispo: MICU   - Acitivity: bedrest     #Handoff  - tracheostomy today, neuro recs for steroids, completion of antibiotics, nutrition recommendations for diet

## 2022-09-16 NOTE — PROGRESS NOTE ADULT - SUBJECTIVE AND OBJECTIVE BOX
Patient is a 56y old  Female who presents with a chief complaint of RLE wound (12 Sep 2022 15:26)        Over Night Events:  Remains critically ill on MV.  Off pressors.  Off sedation.  Some bleeding around trach site.          ROS:     All ROS are negative except HPI         PHYSICAL EXAM    ICU Vital Signs Last 24 Hrs  T(C): 36.5 (16 Sep 2022 04:00), Max: 36.7 (15 Sep 2022 20:00)  T(F): 97.7 (16 Sep 2022 04:00), Max: 98 (15 Sep 2022 20:00)  HR: 72 (16 Sep 2022 07:00) (68 - 82)  BP: 157/82 (16 Sep 2022 07:00) (140/72 - 189/92)  BP(mean): 111 (16 Sep 2022 07:00) (93 - 147)  ABP: --  ABP(mean): --  RR: 14 (16 Sep 2022 07:00) (9 - 18)  SpO2: 99% (16 Sep 2022 07:00) (97% - 100%)    O2 Parameters below as of 15 Sep 2022 18:00  Patient On (Oxygen Delivery Method): ventilator    O2 Concentration (%): 40        CONSTITUTIONAL:  In NAD    ENT:   Airway patent,   Mouth with normal mucosa.   Trach     EYES:   Pupils equal,   Round and reactive to light.    CARDIAC:   Normal rate,   Regular rhythm.        RESPIRATORY:   No wheezing  Bilateral BS  Normal chest expansion  Not tachypneic,  No use of accessory muscles    GASTROINTESTINAL:  Abdomen soft,   Non-tender,   No guarding,   + BS    MUSCULOSKELETAL:   Range of motion is not limited,  No clubbing, cyanosis    NEUROLOGICAL:   Opens eyes.    Does not follow commands     SKIN:   Skin normal color for race,   No evidence of rash.    PSYCHIATRIC:   No apparent risk to self or others.      09-15-22 @ 07:01  -  09-16-22 @ 07:00  --------------------------------------------------------  IN:    Enteral Tube Flush: 220 mL    Insulin: 41 mL    IV PiggyBack: 150 mL    Nepro with Carb Steady: 480 mL  Total IN: 891 mL    OUT:    Dexmedetomidine: 0 mL    Indwelling Catheter - Urethral (mL): 730 mL    Other (mL): 1000 mL  Total OUT: 1730 mL    Total NET: -839 mL          LABS:                            7.6    17.93 )-----------( 257      ( 16 Sep 2022 06:30 )             21.9                                               09-16             7.6    17.93 )-----------( 257      ( 09-16 @ 06:30 )             21.9                8.5    12.57 )-----------( 280      ( 09-15 @ 05:49 )             24.6                9.1    10.81 )-----------( 293      ( 09-14 @ 05:32 )             25.9                9.2    12.23 )-----------( 306      ( 09-13 @ 21:35 )             25.9                9.5    10.39 )-----------( 322      ( 09-13 @ 13:00 )             26.8           137  |  92<L>  |  103<HH>  ----------------------------<  156<H>  3.8   |  23  |  5.6<HH>    Ca    8.2<L>      16 Sep 2022 06:30  Mg     2.3     09-16    TPro  5.2<L>  /  Alb  2.9<L>  /  TBili  0.3  /  DBili  x   /  AST  82<H>  /  ALT  18  /  AlkPhos  184<H>  09-16      PT/INR - ( 15 Sep 2022 09:20 )   PT: 15.10 sec;   INR: 1.32 ratio         PTT - ( 15 Sep 2022 09:20 )  PTT:30.0 sec                                                                                     LIVER FUNCTIONS - ( 16 Sep 2022 06:30 )  Alb: 2.9 g/dL / Pro: 5.2 g/dL / ALK PHOS: 184 U/L / ALT: 18 U/L / AST: 82 U/L / GGT: x                                                                                               Mode: AC/ CMV (Assist Control/ Continuous Mandatory Ventilation)  RR (machine): 15  TV (machine): 360  FiO2: 40  PEEP: 5  ITime: 1  MAP: 9  PIP: 20                                      ABG - ( 15 Sep 2022 02:52 )  pH, Arterial: 7.43  pH, Blood: x     /  pCO2: 28    /  pO2: 120   / HCO3: 19    / Base Excess: -4.4  /  SaO2: x                   MEDICATIONS  (STANDING):  amLODIPine   Tablet 10 milliGRAM(s) Oral daily  aspirin  chewable 81 milliGRAM(s) Oral daily  atorvastatin 80 milliGRAM(s) Oral at bedtime  aztreonam  IVPB      aztreonam  IVPB 2000 milliGRAM(s) IV Intermittent every 12 hours  ceftazidime/avibactam IVPB 0.94 Gram(s) IV Intermittent every 24 hours  chlorhexidine 0.12% Liquid 15 milliLiter(s) Oral Mucosa every 12 hours  chlorhexidine 2% Cloths 1 Application(s) Topical <User Schedule>  collagenase Ointment 1 Application(s) Topical two times a day  dexMEDEtomidine Infusion 0.2 MICROgram(s)/kG/Hr (4.07 mL/Hr) IV Continuous <Continuous>  dextrose 5%. 1000 milliLiter(s) (100 mL/Hr) IV Continuous <Continuous>  dextrose 5%. 1000 milliLiter(s) (50 mL/Hr) IV Continuous <Continuous>  dextrose 50% Injectable 25 Gram(s) IV Push once  dextrose 50% Injectable 12.5 Gram(s) IV Push once  dextrose 50% Injectable 25 Gram(s) IV Push once  furosemide   Injectable 80 milliGRAM(s) IV Push every 12 hours  glucagon  Injectable 1 milliGRAM(s) IntraMuscular once  hydrALAZINE 100 milliGRAM(s) Oral every 8 hours  insulin regular Infusion 1 Unit(s)/Hr (1 mL/Hr) IV Continuous <Continuous>  labetalol 600 milliGRAM(s) Oral three times a day  lacosamide IVPB 50 milliGRAM(s) IV Intermittent every 12 hours  levETIRAcetam  Solution 500 milliGRAM(s) Oral two times a day  lidocaine 1%/epinephrine 1:100,000 Inj 20 milliLiter(s) Local Injection once  methylPREDNISolone sodium succinate Injectable 80 milliGRAM(s) IV Push every 8 hours  multivitamin 1 Tablet(s) Oral daily  pantoprazole  Injectable 40 milliGRAM(s) IV Push two times a day  sevelamer carbonate Powder 2400 milliGRAM(s) Enteral Tube every 8 hours  sodium bicarbonate 1300 milliGRAM(s) Oral every 8 hours  sodium chloride 0.65% Nasal 2 Spray(s) Both Nostrils two times a day    MEDICATIONS  (PRN):  acetaminophen     Tablet .. 650 milliGRAM(s) Oral every 6 hours PRN Temp greater or equal to 38C (100.4F), Mild Pain (1 - 3)  dextrose Oral Gel 15 Gram(s) Oral once PRN Blood Glucose LESS THAN 70 milliGRAM(s)/deciliter  labetalol Injectable 10 milliGRAM(s) IV Push every 6 hours PRN Systolic blood pressure >  melatonin 3 milliGRAM(s) Oral at bedtime PRN Insomnia      New X-rays reviewed:                                                                                  ECHO

## 2022-09-16 NOTE — PROGRESS NOTE ADULT - SUBJECTIVE AND OBJECTIVE BOX
Nephrology progress note    THIS IS AN INCOMPLETE NOTE . FULL NOTE TO FOLLOW SHORTLY    Patient is seen and examined, events over the last 24 h noted .    Allergies:  No Known Allergies    Hospital Medications:   MEDICATIONS  (STANDING):  amLODIPine   Tablet 10 milliGRAM(s) Oral daily  aspirin  chewable 81 milliGRAM(s) Oral daily  atorvastatin 80 milliGRAM(s) Oral at bedtime  aztreonam  IVPB      aztreonam  IVPB 2000 milliGRAM(s) IV Intermittent every 12 hours  ceftazidime/avibactam IVPB 0.94 Gram(s) IV Intermittent every 24 hours  chlorhexidine 0.12% Liquid 15 milliLiter(s) Oral Mucosa every 12 hours  chlorhexidine 2% Cloths 1 Application(s) Topical <User Schedule>  collagenase Ointment 1 Application(s) Topical two times a day  dexMEDEtomidine Infusion 0.2 MICROgram(s)/kG/Hr (4.07 mL/Hr) IV Continuous <Continuous>  dextrose 5%. 1000 milliLiter(s) (100 mL/Hr) IV Continuous <Continuous>  dextrose 5%. 1000 milliLiter(s) (50 mL/Hr) IV Continuous <Continuous>  dextrose 50% Injectable 25 Gram(s) IV Push once  dextrose 50% Injectable 12.5 Gram(s) IV Push once  dextrose 50% Injectable 25 Gram(s) IV Push once  furosemide   Injectable 80 milliGRAM(s) IV Push every 12 hours  glucagon  Injectable 1 milliGRAM(s) IntraMuscular once  hydrALAZINE 100 milliGRAM(s) Oral every 8 hours  insulin regular Infusion 1 Unit(s)/Hr (1 mL/Hr) IV Continuous <Continuous>  labetalol 600 milliGRAM(s) Oral three times a day  lacosamide IVPB 50 milliGRAM(s) IV Intermittent every 12 hours  levETIRAcetam  Solution 500 milliGRAM(s) Oral two times a day  lidocaine 1%/epinephrine 1:100,000 Inj 20 milliLiter(s) Local Injection once  methylPREDNISolone sodium succinate Injectable 80 milliGRAM(s) IV Push every 8 hours  multivitamin 1 Tablet(s) Oral daily  pantoprazole  Injectable 40 milliGRAM(s) IV Push two times a day  sevelamer carbonate Powder 2400 milliGRAM(s) Enteral Tube every 8 hours  sodium bicarbonate 1300 milliGRAM(s) Oral every 8 hours  sodium chloride 0.65% Nasal 2 Spray(s) Both Nostrils two times a day        VITALS:  T(F): 97.7 (22 @ 04:00), Max: 98 (09-15-22 @ 20:00)  HR: 72 (22 @ 07:00)  BP: 157/82 (22 @ 07:00)  RR: 14 (22 @ 07:00)  SpO2: 99% (22 @ 07:00)  Wt(kg): --     @ 07:  -  09-15 @ 07:00  --------------------------------------------------------  IN: 747.3 mL / OUT: 660 mL / NET: 87.3 mL    09-15 @ :01  -   @ 07:00  --------------------------------------------------------  IN: 891 mL / OUT: 1730 mL / NET: -839 mL          PHYSICAL EXAM:  Constitutional: NAD  HEENT: anicteric sclera, oropharynx clear, MMM  Neck: No JVD  Respiratory: CTAB, no wheezes, rales or rhonchi  Cardiovascular: S1, S2, RRR  Gastrointestinal: BS+, soft, NT/ND  Extremities: No cyanosis or clubbing. No peripheral edema  :  No nolasco.   Skin: No rashes    LABS:      137  |  92<L>  |  103<HH>  ----------------------------<  156<H>  3.8   |  23  |  5.6<HH>    Ca    8.2<L>      16 Sep 2022 06:30  Mg     2.3         TPro  5.2<L>  /  Alb  2.9<L>  /  TBili  0.3  /  DBili      /  AST  82<H>  /  ALT  18  /  AlkPhos  184<H>                            7.6    17.93 )-----------( 257      ( 16 Sep 2022 06:30 )             21.9       Urine Studies:  Urinalysis Basic - ( 09 Sep 2022 19:05 )    Color: Yellow / Appearance: Turbid / S.007 / pH:   Gluc:  / Ketone: Negative  / Bili: Negative / Urobili: <2 mg/dL   Blood:  / Protein: 30 mg/dL / Nitrite: Negative   Leuk Esterase: Large / RBC: 26 /HPF /  /HPF   Sq Epi:  / Non Sq Epi: 3 /HPF / Bacteria: Negative      Creatinine, Random Urine: 17 mg/dL ( 19:05)  Protein/Creatinine Ratio Calculation: 2.7 Ratio (:05)      Ferritin 243      [22 @ 05:49]  PTH -- (Ca 8.5)      [22 @ 20:00]   75  TSH 0.86      [22 @ 17:53]  Lipid: chol 234, , HDL 42, LDL --      [22 @ 08:56]    HBsAb <3.0      [09-15-22 @ 12:05]  HBsAb Nonreact      [09-15-22 @ 12:05]  HBsAg Nonreact      [09-15-22 @ 12:05]  HBcAb Nonreact      [09-15-22 @ 12:05]  HIV Nonreact      [22 @ 06:11]    LUIS FELIPE: titer Negative, pattern --      [22 @ 12:04]  dsDNA <12      [22 @ 19:27]  C3 Complement 134      [22 @ 06:11]  C4 Complement 33      [22 @ 06:11]  Rheumatoid Factor <10      [22 @ 11:37]  ANCA: cANCA Negative, pANCA Negative, atypical ANCA Negative      [22 @ 19:27]  Syphilis Screen (Treponema Pallidum Ab) Negative      [22 @ 17:53]  Immunofixation Serum:   No Monoclonal Band Identified    Reference Range: None Detected      [22 @ 23:46]  SPEP Interpretation: Pattern Consistent With Acute Inflammation Or Stress      [22 @ 23:46]  Cryoglobulin: Negative      [22 @ 06:11]      RADIOLOGY & ADDITIONAL STUDIES:   Nephrology progress note  Patient is seen and examined, events over the last 24 h noted .  lying in bed   on MV     Allergies:  No Known Allergies    Hospital Medications:   MEDICATIONS  (STANDING):  amLODIPine   Tablet 10 milliGRAM(s) Oral daily  aspirin  chewable 81 milliGRAM(s) Oral daily  atorvastatin 80 milliGRAM(s) Oral at bedtime   aztreonam  IVPB 2000 milliGRAM(s) IV Intermittent every 12 hours  ceftazidime/avibactam IVPB 0.94 Gram(s) IV Intermittent every 24 hours  collagenase Ointment 1 Application(s) Topical two times a day  dexMEDEtomidine Infusion 0.2 MICROgram(s)/kG/Hr (4.07 mL/Hr) IV Continuous <Continuous>  furosemide   Injectable 80 milliGRAM(s) IV Push every 12 hours  glucagon  Injectable 1 milliGRAM(s) IntraMuscular once  hydrALAZINE 100 milliGRAM(s) Oral every 8 hours  insulin regular Infusion 1 Unit(s)/Hr (1 mL/Hr) IV Continuous <Continuous>  labetalol 600 milliGRAM(s) Oral three times a day  lacosamide IVPB 50 milliGRAM(s) IV Intermittent every 12 hours  levETIRAcetam  Solution 500 milliGRAM(s) Oral two times a day  lidocaine 1%/epinephrine 1:100,000 Inj 20 milliLiter(s) Local Injection once  methylPREDNISolone sodium succinate Injectable 80 milliGRAM(s) IV Push every 8 hours  multivitamin 1 Tablet(s) Oral daily  pantoprazole  Injectable 40 milliGRAM(s) IV Push two times a day  sevelamer carbonate Powder 2400 milliGRAM(s) Enteral Tube every 8 hours  sodium bicarbonate 1300 milliGRAM(s) Oral every 8 hours  sodium chloride 0.65% Nasal 2 Spray(s) Both Nostrils two times a day        VITALS:  T(F): 97.7 (22 @ 04:00), Max: 98 (09-15-22 @ 20:00)  HR: 72 (22 @ 07:00)  BP: 157/82 (22 @ 07:00)  RR: 14 (22 @ 07:00)  SpO2: 99% (22 @ 07:00)       @ 07:01  -  09-15 @ 07:00  --------------------------------------------------------  IN: 747.3 mL / OUT: 660 mL / NET: 87.3 mL    09-15 @ 07:  -   @ 07:00  --------------------------------------------------------  IN: 891 mL / OUT: 1730 mL / NET: -839 mL        15 Sep 2022 07:  -  16 Sep 2022 07:00  --------------------------------------------------------  IN:    Enteral Tube Flush: 220 mL    Insulin: 41 mL    IV PiggyBack: 150 mL    Nepro with Carb Steady: 480 mL  Total IN: 891 mL    OUT:    Dexmedetomidine: 0 mL    Indwelling Catheter - Urethral (mL): 730 mL    Other (mL): 1000 mL  Total OUT: 1730 mL    Total NET: -839 mL          PHYSICAL EXAM:  Constitutional: NAD  Neck: on MV / trached   Respiratory: CTAB,  Cardiovascular: S1, S2, RRR  Gastrointestinal: BS+, soft, NT/ND  Extremities: No cyanosis or clubbing. plus one edema   :  No nolasco.   Skin: No rashes    LABS:      137  |  92<L>  |  103<HH>  ----------------------------<  156<H>  3.8   |  23  |  5.6<HH>    Ca    8.2<L>      16 Sep 2022 06:30  Mg     2.3         TPro  5.2<L>  /  Alb  2.9<L>  /  TBili  0.3  /  DBili      /  AST  82<H>  /  ALT  18  /  AlkPhos  184<H>                            7.6    17.93 )-----------( 257      ( 16 Sep 2022 06:30 )             21.9       Urine Studies:  Urinalysis Basic - ( 09 Sep 2022 19:05 )    Color: Yellow / Appearance: Turbid / S.007 / pH:   Gluc:  / Ketone: Negative  / Bili: Negative / Urobili: <2 mg/dL   Blood:  / Protein: 30 mg/dL / Nitrite: Negative   Leuk Esterase: Large / RBC: 26 /HPF /  /HPF   Sq Epi:  / Non Sq Epi: 3 /HPF / Bacteria: Negative      Creatinine, Random Urine: 17 mg/dL ( 19:05)  Protein/Creatinine Ratio Calculation: 2.7 Ratio (:05)      Ferritin 243      [22 @ 05:49]  PTH -- (Ca 8.5)      [22 @ 20:00]   75  TSH 0.86      [22 @ 17:53]  Lipid: chol 234, , HDL 42, LDL --      [22 @ 08:56]    HBsAb <3.0      [09-15-22 @ 12:05]  HBsAb Nonreact      [09-15-22 @ 12:05]  HBsAg Nonreact      [09-15-22 @ 12:05]  HBcAb Nonreact      [09-15-22 @ 12:05]  HIV Nonreact      [22 @ 06:11]    LUIS FELIPE: titer Negative, pattern --      [22 @ 12:04]  dsDNA <12      [22 @ 19:27]  C3 Complement 134      [22 @ 06:11]  C4 Complement 33      [22 @ 06:11]  Rheumatoid Factor <10      [22 @ 11:37]  ANCA: cANCA Negative, pANCA Negative, atypical ANCA Negative      [22 @ 19:27]  Syphilis Screen (Treponema Pallidum Ab) Negative      [22 @ 17:53]  Immunofixation Serum:   No Monoclonal Band Identified    Reference Range: None Detected      [22 @ 23:46]  SPEP Interpretation: Pattern Consistent With Acute Inflammation Or Stress      [22 @ 23:46]  Cryoglobulin: Negative      [22 @ 06:11]      RADIOLOGY & ADDITIONAL STUDIES:

## 2022-09-16 NOTE — PROGRESS NOTE ADULT - ASSESSMENT
Impression:    Acute respiratory failure s/p trach  Some bleeding at the trach site.   Acute blood loss anemia. GI bleed  sp EGD and colonoscopy.    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 FU MS.        HEENT: Oral care.  Trach care.  CTS follow up     PULMONARY:  HOB @ 45 degrees.  Aspiration precautions. No vent changes. PS wean     CARDIOVASCULAR: Avoid overload.  BP control     GI: GI prophylaxis.  Peg feeding     RENAL:  follow up lytes.  Correct as needed.  DW renal.  HD per renal     INFECTIOUS DISEASE:  ABX per ID.  End date 9-16     HEMATOLOGICAL: LE doppler negative.  FU CBC and Coags.  DDAVP.      ENDOCRINE:  Follow up FS.  Insulin protocol if needed.    MUSCULOSKELETAL:  Bed chair position     DGT vent unit     VERY poor prognosis    Wound care per burn

## 2022-09-16 NOTE — PROGRESS NOTE ADULT - SUBJECTIVE AND OBJECTIVE BOX
JOSE MITCHELL 56y Female  MRN#: [id number]   CODE STATUS:     Admission Date: 08-02-22  Hospital Day: 45d    Pt is currently admitted with the primary diagnosis of     SUBJECTIVE  Hospital Course  57 y/o woman w PMHx of uncontrolled HTN, DM2, hemorrhagic ?anuerysmal stroke R basal ganglia 2017 w residual L sided weakness, h/o PEG s/p removal, had not seen doctor in >1yr, w/o meds since 2018, presented to ED 8/2/22 for RLE nonhealing wound [id number] and BIB son who noted it that evening, ED triage vitals noted for /170 range.   In the ED:  Vitals: T: 98.9, BP: 296/ 174, , RR: 18, 98% O2 on RA  Labs: CBC showed WBC 11.23; ESR 92, CRP 35.6, CMP showed glucose 302, alk phos 173; ; VBG pH 7.45  Admitted for cellulitis, HTN urgency and started on Unasyn, Vanc, insulin drip, IV labetalol, IV vasotec. On 8/5/22 had stroke code w NIHSS 23, for unresponsiveness, concern for seizure w post ictal confusion, found to have ?embolic strokes on MR. S/p debridement of RLE 8/10/22. Developed E cloaca bacteremia, tx w avacaz and aztreonam, has since started on HD. Intubated on 9/6/22 and converted to trach 9/14/22. Uldall placed ?9/14/22 and started on HD    Evaluated by:   Cardio: will need stress test as outpatient, rec for IVONNE w bubble study. loop recorder  Pod: s/p debridement/curettage of all fungal and ingrowing nails  Burn: Wound care, s/p surgical debridement 8/10/22, IV abx. F/u burn as outpatient [phone]; signed off 9/14/22.   ID: Vanc, Unasyn then after debridement, Augmentin x7days. Then Avycaz and aztreonam 9/3-9/16/22 for E cloaca bacteremia.   Neuro: Evaluated for stroke code 8/5/22, ?seizure, started on meningitis abx cvg, AEDs. MR w embolic strokes. Then encephalopathy, s/p LP.   GI: s/p PEG placement for OP dysphagia following CVA. Had GIB as well, received transfusions x?2. S/p colonoscopy and EGD 9/6/22 w no evidence of active bleed. (+) ext hemorrhoids.   Nephro: Consulted for HTN despite 5 meds, has started HD while in hospital, steroids   Surgery: Following for ext hemorrhoids   CT Surgery: OR on 9/14/22 for bronchoscopy, tracheostomy, and PEG tube placement     HCP/consent: Brother   Contact: Son      Overnight events      Subjective complaints        T(C): 36.5 (09-16-22 @ 04:00)  T(F): 97.7 (09-16-22 @ 04:00)  HR: 70 (09-16-22 @ 04:00)  BP: 177/92 (09-16-22 @ 04:00)  RR: 14 (09-16-22 @ 04:00)  SpO2: 98% (09-16-22 @ 04:00)        Present Today:   - Maloney:  No [  ], Yes [  ] : Indication:   - Type of IV Access:       .. CVC/Piccline:  No [  ], Yes [  ] : Indication:       .. Midline: No [  ], Yes [  ] : Indication:                                              OBJECTIVE  PAST MEDICAL & SURGICAL HISTORY  Hypertension    Diabetes mellitus    No significant past surgical history                                                ALLERGIES:  No Known Allergies                           HOME MEDICATIONS  Home Medications:  losartan 50 mg oral tablet: 1 tab(s) orally once a day (02 Aug 2022 10:38)  metFORMIN 500 mg oral tablet: 1 tab(s) orally 2 times a day (02 Aug 2022 10:38)  metoprolol succinate 50 mg oral tablet, extended release: 1 tab(s) orally once a day (02 Aug 2022 10:38)                           MEDICATIONS:  STANDING MEDICATIONS  amLODIPine   Tablet 10 milliGRAM(s) Oral daily  aspirin  chewable 81 milliGRAM(s) Oral daily  atorvastatin 80 milliGRAM(s) Oral at bedtime  aztreonam  IVPB      aztreonam  IVPB 2000 milliGRAM(s) IV Intermittent every 12 hours  ceftazidime/avibactam IVPB 0.94 Gram(s) IV Intermittent every 24 hours  chlorhexidine 0.12% Liquid 15 milliLiter(s) Oral Mucosa every 12 hours  chlorhexidine 2% Cloths 1 Application(s) Topical <User Schedule>  collagenase Ointment 1 Application(s) Topical two times a day  dexMEDEtomidine Infusion 0.2 MICROgram(s)/kG/Hr IV Continuous <Continuous>  dextrose 5%. 1000 milliLiter(s) IV Continuous <Continuous>  dextrose 5%. 1000 milliLiter(s) IV Continuous <Continuous>  dextrose 50% Injectable 25 Gram(s) IV Push once  dextrose 50% Injectable 12.5 Gram(s) IV Push once  dextrose 50% Injectable 25 Gram(s) IV Push once  furosemide   Injectable 80 milliGRAM(s) IV Push every 12 hours  glucagon  Injectable 1 milliGRAM(s) IntraMuscular once  hydrALAZINE 100 milliGRAM(s) Oral every 8 hours  insulin regular Infusion 1 Unit(s)/Hr IV Continuous <Continuous>  labetalol 600 milliGRAM(s) Oral three times a day  lacosamide IVPB 50 milliGRAM(s) IV Intermittent every 12 hours  levETIRAcetam  Solution 500 milliGRAM(s) Oral two times a day  lidocaine 1%/epinephrine 1:100,000 Inj 20 milliLiter(s) Local Injection once  methylPREDNISolone sodium succinate Injectable 80 milliGRAM(s) IV Push every 8 hours  multivitamin 1 Tablet(s) Oral daily  pantoprazole  Injectable 40 milliGRAM(s) IV Push two times a day  sevelamer carbonate Powder 2400 milliGRAM(s) Enteral Tube every 8 hours  sodium bicarbonate 1300 milliGRAM(s) Oral every 8 hours  sodium chloride 0.65% Nasal 2 Spray(s) Both Nostrils two times a day    PRN MEDICATIONS  acetaminophen     Tablet .. 650 milliGRAM(s) Oral every 6 hours PRN  dextrose Oral Gel 15 Gram(s) Oral once PRN  labetalol Injectable 10 milliGRAM(s) IV Push every 6 hours PRN  melatonin 3 milliGRAM(s) Oral at bedtime PRN                                            ------------------------------------------------------------  T(C): 36.5 (09-16-22 @ 04:00), Max: 36.7 (09-15-22 @ 20:00)  T(F): 97.7 (09-16-22 @ 04:00), Max: 98 (09-15-22 @ 20:00)  HR: 70 (09-16-22 @ 04:00) (68 - 82)  BP: 177/92 (09-16-22 @ 04:00) (140/72 - 189/92)  RR: 14 (09-16-22 @ 04:00) (9 - 24)  SpO2: 98% (09-16-22 @ 04:00) (97% - 100%)      09-14-22 @ 07:01  -  09-15-22 @ 07:00  --------------------------------------------------------  IN: 747.3 mL / OUT: 660 mL / NET: 87.3 mL    09-15-22 @ 07:01  -  09-16-22 @ 06:01  --------------------------------------------------------  IN: 531 mL / OUT: 1330 mL / NET: -799 mL                                               LABS:                        8.5    12.57 )-----------( 280      ( 15 Sep 2022 05:49 )             24.6     09-15    137  |  92<L>  |  130<HH>  ----------------------------<  218<H>  4.3   |  18  |  6.8<HH>    Ca    8.2<L>      15 Sep 2022 05:49  Mg     2.6     09-15    TPro  5.4<L>  /  Alb  2.6<L>  /  TBili  <0.2  /  DBili  x   /  AST  60<H>  /  ALT  21  /  AlkPhos  186<H>  09-15    PT/INR - ( 15 Sep 2022 09:20 )   PT: 15.10 sec;   INR: 1.32 ratio         PTT - ( 15 Sep 2022 09:20 )  PTT:30.0 sec    ABG - ( 15 Sep 2022 02:52 )  pH, Arterial: 7.43  pH, Blood: x     /  pCO2: 28    /  pO2: 120   / HCO3: 19    / Base Excess: -4.4  /  SaO2: x                                     RADIOLOGY:      TTE 9/8/22:    1. LV Ejection Fraction by Reich's Method witha biplane EF of 60 %.   2. Mildly increased LV wall thickness.   3. Spectral Doppler shows impaired relaxation pattern of left ventricular myocardial filling (Grade I diastolic dysfunction).   4. Normal left atrial size.   5. Normal right atrial size.   6. Mild mitral valve regurgitation.   7. Mild tricuspid regurgitation.   8. Mild aortic regurgitation.   9. Color flow doppler and intravenous injection of agitated saline demonstrates the presence of an intact intra atrial septum.      U/S abd RUQ 9/2/22:  Cholelithiasis and sludge without sonographic evidence of acute cholecystitis.  CBD mildly dilated measuring 7 to 8 mm in diameter. Correlate with liver function tests.  Right-sided pleural effusion.    U/S renal 9/2/22:   No hydronephrosis    CT Head 8/29/22:   1.  No hemorrhage or new infarct  2.  Chronic changes right subinsular region  3.  Multiple small infarcts (MRI August 10, 2022) not clearly visualized    CT Head 8/13/22:   Faintly demonstrated are multiple scattered cortically-based hypodensities, consistent with known history of acute embolic stroke, better delineated on recent MRI. No evidence of hemorrhagic transformation.    CTA Head 8/12/22:   No large vessel occlusion, aneurysm, or vascular malformation.  Moderate right/mild left atherosclerotic stenosis in the supraclinoid ICAs.  Focal mild atherosclerotic stenosis in the V3 segment of the right vertebral artery.    MR Head w/wo IV contrast 8/10/22:   Motion limited examination.  Multiple punctate cortical acute infarcts involving multiple vascular territories possibly related to embolic etiology. No evidence of acute  hemorrhage.  Moderate chronic microvascular type changes as well as chronic hemosiderin deposition within the right basal ganglia consistent with remote hemorrhage.  Chronic right thalamic lacunar infarcts.      CT Head 8/8/22:   Chronic ischemic changes unchanged from the scan of 8/5/2022.  No evidence of acute intracranial hemorrhage or acute territorial infarct.  No evidence of mass or midline shift.      CT Brain 8/5/22:   1. Mild atrophic changes.  2.  Hypodensities in the periventricular white matter, right thalamus, right basal ganglia, and right insular cortex likely representing ischemic change, age indeterminate.      CTA Head and Neck 8/5/22:   The visualized aortic arch, common carotid arteries and carotid bifurcations are patent. The remainder of the exam (distal cervical vessels and the intracranial circulation) is essentially nondiagnostic due to motion artifact.    U/S Duplex kidneys 8/3/22:   No evidence of significant hemodynamic stenosis noted in b/l renal arteries JOSE MITCHELL 56y Female  MRN#: [id number]   CODE STATUS:     Admission Date: 08-02-22  Hospital Day: 45d    Pt is currently admitted with the primary diagnosis of cellulitis, ?embolic CVA, E cloaca bacteremia, ARF     SUBJECTIVE  Hospital Course  55 y/o woman w PMHx of uncontrolled HTN, DM2, hemorrhagic ?anuerysmal stroke R basal ganglia 2017 w residual L sided weakness, h/o PEG s/p removal, had not seen doctor in >1yr, w/o meds since 2018, presented to ED 8/2/22 for RLE nonhealing wound [id number] and BIB son who noted it that evening, ED triage vitals noted for /170 range.   In the ED:  Vitals: T: 98.9, BP: 296/ 174, , RR: 18, 98% O2 on RA  Labs: CBC showed WBC 11.23; ESR 92, CRP 35.6, CMP showed glucose 302, alk phos 173; ; VBG pH 7.45  Admitted for cellulitis, HTN urgency and started on Unasyn, Vanc, insulin drip, IV labetalol, IV vasotec. On 8/5/22 had stroke code w NIHSS 23, for unresponsiveness, concern for seizure w post ictal confusion, found to have ?embolic strokes on MR. S/p debridement of RLE 8/10/22. Developed E cloaca bacteremia, tx w avacaz and aztreonam, has since started on HD. Intubated on 9/6/22 and converted to trach 9/14/22. Uldall placed ?9/14/22 and started on HD. S/p bronchoscopy, tracheostomy, and PEG tube placement 9/14/22.     Evaluated by:   Cardio: will need stress test as outpatient, rec for IVONNE w bubble study, loop recorder  Pod: s/p debridement/curettage of all fungal and ingrowing nails  Burn: Wound care, s/p surgical debridement 8/10/22, IV abx. F/u burn as outpatient [phone]; signed off 9/14/22.   ID: Vanc, Unasyn then after debridement, Augmentin x7days. Then Avycaz and aztreonam 9/3-9/16/22 for E cloaca bacteremia.   Neuro: Evaluated for stroke code 8/5/22, ?seizure, started on meningitis abx cvg, AEDs. MR w embolic strokes. Developed encephalopathy, s/p LP.   GI: s/p PEG placement for OP dysphagia following CVA. Had GIB as well, received transfusions x?2. S/p colonoscopy and EGD 9/6/22 w no evidence of active bleed. (+) ext hemorrhoids.   Nephro: Consulted for HTN despite 5 meds, has started HD while in hospital, steroids   Surgery: Following for ext hemorrhoids   CT Surgery: OR on 9/14/22 for bronchoscopy, tracheostomy, and PEG tube placement     HCP/consent: Brother   Contact: Son      Overnight events  high BP in 170s, received labetalol w minimal improvement     Subjective complaints  Unable to interact in meaningful manner       T(C): 36.5 (09-16-22 @ 04:00)  T(F): 97.7 (09-16-22 @ 04:00)  HR: 70 (09-16-22 @ 04:00)  BP: 177/92 (09-16-22 @ 04:00)  RR: 14 (09-16-22 @ 04:00)  SpO2: 98% (09-16-22 @ 04:00)    PHYSICAL EXAM:  GENERAL: NAD, lying in bed. Awake, will open eyes and look around but only on L side, gaze does not cross midline  HEAD:  Atraumatic, Normocephalic  EYES: EOMI, sclera clear  ENT: Moist mucous membranes  NECK: Supple. (+)trach in place, dressings w fresh wet blood. Later w/o continued bleeding after CTS assessment.  CHEST/LUNG: Clear to auscultation anteriorly bilaterally; No rales, rhonchi, wheezing, or rubs. Vented respirations  HEART: Regular rate and rhythm  ABDOMEN: (+)BS; Soft, nondistended  EXTREMITIES:  2+ Radial Pulses, brisk capillary refill. No clubbing, cyanosis. 1(+)pitting edema BLE, difficult to appreciate DP/PT pulses but warm to touch b/l.   NERVOUS SYSTEM:  As noted above.   SKIN: No rashes or lesions      Present Today:   - Type of IV Access:       .. CVC/Piccline:  No [ X ], Yes [  ] : Indication:       .. Midline: No [ X ], Yes [  ] : Indication:                                              OBJECTIVE  PAST MEDICAL & SURGICAL HISTORY  Hypertension    Diabetes mellitus    No significant past surgical history                                                ALLERGIES:  No Known Allergies                           HOME MEDICATIONS  Home Medications:  losartan 50 mg oral tablet: 1 tab(s) orally once a day (02 Aug 2022 10:38)  metFORMIN 500 mg oral tablet: 1 tab(s) orally 2 times a day (02 Aug 2022 10:38)  metoprolol succinate 50 mg oral tablet, extended release: 1 tab(s) orally once a day (02 Aug 2022 10:38)                           MEDICATIONS:  STANDING MEDICATIONS  amLODIPine   Tablet 10 milliGRAM(s) Oral daily  aspirin  chewable 81 milliGRAM(s) Oral daily  atorvastatin 80 milliGRAM(s) Oral at bedtime  aztreonam  IVPB      aztreonam  IVPB 2000 milliGRAM(s) IV Intermittent every 12 hours  ceftazidime/avibactam IVPB 0.94 Gram(s) IV Intermittent every 24 hours  chlorhexidine 0.12% Liquid 15 milliLiter(s) Oral Mucosa every 12 hours  chlorhexidine 2% Cloths 1 Application(s) Topical <User Schedule>  collagenase Ointment 1 Application(s) Topical two times a day  dexMEDEtomidine Infusion 0.2 MICROgram(s)/kG/Hr IV Continuous <Continuous>  dextrose 5%. 1000 milliLiter(s) IV Continuous <Continuous>  dextrose 5%. 1000 milliLiter(s) IV Continuous <Continuous>  dextrose 50% Injectable 25 Gram(s) IV Push once  dextrose 50% Injectable 12.5 Gram(s) IV Push once  dextrose 50% Injectable 25 Gram(s) IV Push once  furosemide   Injectable 80 milliGRAM(s) IV Push every 12 hours  glucagon  Injectable 1 milliGRAM(s) IntraMuscular once  hydrALAZINE 100 milliGRAM(s) Oral every 8 hours  insulin regular Infusion 1 Unit(s)/Hr IV Continuous <Continuous>  labetalol 600 milliGRAM(s) Oral three times a day  lacosamide IVPB 50 milliGRAM(s) IV Intermittent every 12 hours  levETIRAcetam  Solution 500 milliGRAM(s) Oral two times a day  lidocaine 1%/epinephrine 1:100,000 Inj 20 milliLiter(s) Local Injection once  methylPREDNISolone sodium succinate Injectable 80 milliGRAM(s) IV Push every 8 hours  multivitamin 1 Tablet(s) Oral daily  pantoprazole  Injectable 40 milliGRAM(s) IV Push two times a day  sevelamer carbonate Powder 2400 milliGRAM(s) Enteral Tube every 8 hours  sodium bicarbonate 1300 milliGRAM(s) Oral every 8 hours  sodium chloride 0.65% Nasal 2 Spray(s) Both Nostrils two times a day    PRN MEDICATIONS  acetaminophen     Tablet .. 650 milliGRAM(s) Oral every 6 hours PRN  dextrose Oral Gel 15 Gram(s) Oral once PRN  labetalol Injectable 10 milliGRAM(s) IV Push every 6 hours PRN  melatonin 3 milliGRAM(s) Oral at bedtime PRN                                            ------------------------------------------------------------  T(C): 36.5 (09-16-22 @ 04:00), Max: 36.7 (09-15-22 @ 20:00)  T(F): 97.7 (09-16-22 @ 04:00), Max: 98 (09-15-22 @ 20:00)  HR: 70 (09-16-22 @ 04:00) (68 - 82)  BP: 177/92 (09-16-22 @ 04:00) (140/72 - 189/92)  RR: 14 (09-16-22 @ 04:00) (9 - 24)  SpO2: 98% (09-16-22 @ 04:00) (97% - 100%)      09-14-22 @ 07:01  -  09-15-22 @ 07:00  --------------------------------------------------------  IN: 747.3 mL / OUT: 660 mL / NET: 87.3 mL    09-15-22 @ 07:01  -  09-16-22 @ 06:01  --------------------------------------------------------  IN: 531 mL / OUT: 1330 mL / NET: -799 mL                                               LABS:                        8.5    12.57 )-----------( 280      ( 15 Sep 2022 05:49 )             24.6     09-15    137  |  92<L>  |  130<HH>  ----------------------------<  218<H>  4.3   |  18  |  6.8<HH>    Ca    8.2<L>      15 Sep 2022 05:49  Mg     2.6     09-15    TPro  5.4<L>  /  Alb  2.6<L>  /  TBili  <0.2  /  DBili  x   /  AST  60<H>  /  ALT  21  /  AlkPhos  186<H>  09-15    PT/INR - ( 15 Sep 2022 09:20 )   PT: 15.10 sec;   INR: 1.32 ratio         PTT - ( 15 Sep 2022 09:20 )  PTT:30.0 sec    ABG - ( 15 Sep 2022 02:52 )  pH, Arterial: 7.43  pH, Blood: x     /  pCO2: 28    /  pO2: 120   / HCO3: 19    / Base Excess: -4.4  /  SaO2: x                                     RADIOLOGY:      TTE 9/8/22:    1. LV Ejection Fraction by Reich's Method witha biplane EF of 60 %.   2. Mildly increased LV wall thickness.   3. Spectral Doppler shows impaired relaxation pattern of left ventricular myocardial filling (Grade I diastolic dysfunction).   4. Normal left atrial size.   5. Normal right atrial size.   6. Mild mitral valve regurgitation.   7. Mild tricuspid regurgitation.   8. Mild aortic regurgitation.   9. Color flow doppler and intravenous injection of agitated saline demonstrates the presence of an intact intra atrial septum.      U/S abd RUQ 9/2/22:  Cholelithiasis and sludge without sonographic evidence of acute cholecystitis.  CBD mildly dilated measuring 7 to 8 mm in diameter. Correlate with liver function tests.  Right-sided pleural effusion.    U/S renal 9/2/22:   No hydronephrosis    CT Head 8/29/22:   1.  No hemorrhage or new infarct  2.  Chronic changes right subinsular region  3.  Multiple small infarcts (MRI August 10, 2022) not clearly visualized    CT Head 8/13/22:   Faintly demonstrated are multiple scattered cortically-based hypodensities, consistent with known history of acute embolic stroke, better delineated on recent MRI. No evidence of hemorrhagic transformation.    CTA Head 8/12/22:   No large vessel occlusion, aneurysm, or vascular malformation.  Moderate right/mild left atherosclerotic stenosis in the supraclinoid ICAs.  Focal mild atherosclerotic stenosis in the V3 segment of the right vertebral artery.    MR Head w/wo IV contrast 8/10/22:   Motion limited examination.  Multiple punctate cortical acute infarcts involving multiple vascular territories possibly related to embolic etiology. No evidence of acute  hemorrhage.  Moderate chronic microvascular type changes as well as chronic hemosiderin deposition within the right basal ganglia consistent with remote hemorrhage.  Chronic right thalamic lacunar infarcts.      CT Head 8/8/22:   Chronic ischemic changes unchanged from the scan of 8/5/2022.  No evidence of acute intracranial hemorrhage or acute territorial infarct.  No evidence of mass or midline shift.      CT Brain 8/5/22:   1. Mild atrophic changes.  2.  Hypodensities in the periventricular white matter, right thalamus, right basal ganglia, and right insular cortex likely representing ischemic change, age indeterminate.      CTA Head and Neck 8/5/22:   The visualized aortic arch, common carotid arteries and carotid bifurcations are patent. The remainder of the exam (distal cervical vessels and the intracranial circulation) is essentially nondiagnostic due to motion artifact.    U/S Duplex kidneys 8/3/22:   No evidence of significant hemodynamic stenosis noted in b/l renal arteries JOSE MITCHELL 56y Female  MRN#: [id number]   CODE STATUS:     Admission Date: 08-02-22  Hospital Day: 45d    Pt is currently admitted with the primary diagnosis of cellulitis, ?embolic CVA, E cloaca bacteremia, ARF     SUBJECTIVE  Hospital Course  57 y/o woman w PMHx of uncontrolled HTN, DM2, hemorrhagic ?anuerysmal stroke R basal ganglia 2017 w residual L sided weakness, h/o PEG s/p removal, had not seen doctor in >1yr, w/o meds since 2018, presented to ED 8/2/22 for RLE nonhealing wound [id number] and BIB son who noted it that evening, ED triage vitals noted for /170 range.   In the ED:  Vitals: T: 98.9, BP: 296/ 174, , RR: 18, 98% O2 on RA  Labs: CBC showed WBC 11.23; ESR 92, CRP 35.6, CMP showed glucose 302, alk phos 173; ; VBG pH 7.45  Admitted for cellulitis, HTN urgency and started on Unasyn, Vanc, insulin drip, IV labetalol, IV vasotec. On 8/5/22 had stroke code w NIHSS 23, for unresponsiveness, concern for seizure w post ictal confusion, found to have ?embolic strokes on MR. S/p debridement of RLE 8/10/22. Developed E cloaca bacteremia, tx w avacaz and aztreonam, has since started on HD. Intubated on 9/6/22 and converted to trach 9/14/22. Uldall placed ?9/14/22 and started on HD. S/p bronchoscopy, tracheostomy, and PEG tube placement 9/14/22. 9/13/22 tested COVID(+)     Evaluated by:   Cardio: will need stress test as outpatient, rec for IVONNE w bubble study, loop recorder  Pod: s/p debridement/curettage of all fungal and ingrowing nails  Burn: Wound care, s/p surgical debridement 8/10/22, IV abx. F/u burn as outpatient [phone]; signed off 9/14/22.   ID: Vanc, Unasyn then after debridement, Augmentin x7days. Then Avycaz and aztreonam 9/3-9/16/22 for E cloaca bacteremia.   Neuro: Evaluated for stroke code 8/5/22, ?seizure, started on meningitis abx cvg, AEDs. MR shereen embolic strokes. Developed encephalopathy, s/p LP.   GI: s/p PEG placement for OP dysphagia following CVA. Had GIB as well, received transfusions x?2. S/p colonoscopy and EGD 9/6/22 w no evidence of active bleed. (+) ext hemorrhoids.   Nephro: Consulted for HTN despite 5 meds, has started HD while in hospital, steroids   Surgery: Following for ext hemorrhoids   CT Surgery: OR on 9/14/22 for bronchoscopy, tracheostomy, and PEG tube placement     HCP/consent: Brother   Contact: Son      Overnight events  high BP in 170s, received labetalol w minimal improvement     Subjective complaints  Unable to interact in meaningful manner       T(C): 36.5 (09-16-22 @ 04:00)  T(F): 97.7 (09-16-22 @ 04:00)  HR: 70 (09-16-22 @ 04:00)  BP: 177/92 (09-16-22 @ 04:00)  RR: 14 (09-16-22 @ 04:00)  SpO2: 98% (09-16-22 @ 04:00)    PHYSICAL EXAM:  GENERAL: NAD, lying in bed. Awake, will open eyes and look around but only on L side, gaze does not cross midline  HEAD:  Atraumatic, Normocephalic  EYES: EOMI, sclera clear  ENT: Moist mucous membranes  NECK: Supple. (+)trach in place, dressings w fresh wet blood. Later w/o continued bleeding after CTS assessment.  CHEST/LUNG: Clear to auscultation anteriorly bilaterally; No rales, rhonchi, wheezing, or rubs. Vented respirations  HEART: Regular rate and rhythm  ABDOMEN: (+)BS; Soft, nondistended  EXTREMITIES:  2+ Radial Pulses, brisk capillary refill. No clubbing, cyanosis. 1(+)pitting edema BLE, difficult to appreciate DP/PT pulses but warm to touch b/l.   NERVOUS SYSTEM:  As noted above.   SKIN: No rashes or lesions      Present Today:   - Type of IV Access:       .. CVC/Piccline:  No [ X ], Yes [  ] : Indication:       .. Midline: No [ X ], Yes [  ] : Indication:                                              OBJECTIVE  PAST MEDICAL & SURGICAL HISTORY  Hypertension    Diabetes mellitus    No significant past surgical history                                                ALLERGIES:  No Known Allergies                           HOME MEDICATIONS  Home Medications:  losartan 50 mg oral tablet: 1 tab(s) orally once a day (02 Aug 2022 10:38)  metFORMIN 500 mg oral tablet: 1 tab(s) orally 2 times a day (02 Aug 2022 10:38)  metoprolol succinate 50 mg oral tablet, extended release: 1 tab(s) orally once a day (02 Aug 2022 10:38)                           MEDICATIONS:  STANDING MEDICATIONS  amLODIPine   Tablet 10 milliGRAM(s) Oral daily  aspirin  chewable 81 milliGRAM(s) Oral daily  atorvastatin 80 milliGRAM(s) Oral at bedtime  aztreonam  IVPB      aztreonam  IVPB 2000 milliGRAM(s) IV Intermittent every 12 hours  ceftazidime/avibactam IVPB 0.94 Gram(s) IV Intermittent every 24 hours  chlorhexidine 0.12% Liquid 15 milliLiter(s) Oral Mucosa every 12 hours  chlorhexidine 2% Cloths 1 Application(s) Topical <User Schedule>  collagenase Ointment 1 Application(s) Topical two times a day  dexMEDEtomidine Infusion 0.2 MICROgram(s)/kG/Hr IV Continuous <Continuous>  dextrose 5%. 1000 milliLiter(s) IV Continuous <Continuous>  dextrose 5%. 1000 milliLiter(s) IV Continuous <Continuous>  dextrose 50% Injectable 25 Gram(s) IV Push once  dextrose 50% Injectable 12.5 Gram(s) IV Push once  dextrose 50% Injectable 25 Gram(s) IV Push once  furosemide   Injectable 80 milliGRAM(s) IV Push every 12 hours  glucagon  Injectable 1 milliGRAM(s) IntraMuscular once  hydrALAZINE 100 milliGRAM(s) Oral every 8 hours  insulin regular Infusion 1 Unit(s)/Hr IV Continuous <Continuous>  labetalol 600 milliGRAM(s) Oral three times a day  lacosamide IVPB 50 milliGRAM(s) IV Intermittent every 12 hours  levETIRAcetam  Solution 500 milliGRAM(s) Oral two times a day  lidocaine 1%/epinephrine 1:100,000 Inj 20 milliLiter(s) Local Injection once  methylPREDNISolone sodium succinate Injectable 80 milliGRAM(s) IV Push every 8 hours  multivitamin 1 Tablet(s) Oral daily  pantoprazole  Injectable 40 milliGRAM(s) IV Push two times a day  sevelamer carbonate Powder 2400 milliGRAM(s) Enteral Tube every 8 hours  sodium bicarbonate 1300 milliGRAM(s) Oral every 8 hours  sodium chloride 0.65% Nasal 2 Spray(s) Both Nostrils two times a day    PRN MEDICATIONS  acetaminophen     Tablet .. 650 milliGRAM(s) Oral every 6 hours PRN  dextrose Oral Gel 15 Gram(s) Oral once PRN  labetalol Injectable 10 milliGRAM(s) IV Push every 6 hours PRN  melatonin 3 milliGRAM(s) Oral at bedtime PRN                                            ------------------------------------------------------------  T(C): 36.5 (09-16-22 @ 04:00), Max: 36.7 (09-15-22 @ 20:00)  T(F): 97.7 (09-16-22 @ 04:00), Max: 98 (09-15-22 @ 20:00)  HR: 70 (09-16-22 @ 04:00) (68 - 82)  BP: 177/92 (09-16-22 @ 04:00) (140/72 - 189/92)  RR: 14 (09-16-22 @ 04:00) (9 - 24)  SpO2: 98% (09-16-22 @ 04:00) (97% - 100%)      09-14-22 @ 07:01  -  09-15-22 @ 07:00  --------------------------------------------------------  IN: 747.3 mL / OUT: 660 mL / NET: 87.3 mL    09-15-22 @ 07:01  -  09-16-22 @ 06:01  --------------------------------------------------------  IN: 531 mL / OUT: 1330 mL / NET: -799 mL                                               LABS:                        8.5    12.57 )-----------( 280      ( 15 Sep 2022 05:49 )             24.6     09-15    137  |  92<L>  |  130<HH>  ----------------------------<  218<H>  4.3   |  18  |  6.8<HH>    Ca    8.2<L>      15 Sep 2022 05:49  Mg     2.6     09-15    TPro  5.4<L>  /  Alb  2.6<L>  /  TBili  <0.2  /  DBili  x   /  AST  60<H>  /  ALT  21  /  AlkPhos  186<H>  09-15    PT/INR - ( 15 Sep 2022 09:20 )   PT: 15.10 sec;   INR: 1.32 ratio         PTT - ( 15 Sep 2022 09:20 )  PTT:30.0 sec    ABG - ( 15 Sep 2022 02:52 )  pH, Arterial: 7.43  pH, Blood: x     /  pCO2: 28    /  pO2: 120   / HCO3: 19    / Base Excess: -4.4  /  SaO2: x                                     RADIOLOGY:      TTE 9/8/22:    1. LV Ejection Fraction by Reich's Method witha biplane EF of 60 %.   2. Mildly increased LV wall thickness.   3. Spectral Doppler shows impaired relaxation pattern of left ventricular myocardial filling (Grade I diastolic dysfunction).   4. Normal left atrial size.   5. Normal right atrial size.   6. Mild mitral valve regurgitation.   7. Mild tricuspid regurgitation.   8. Mild aortic regurgitation.   9. Color flow doppler and intravenous injection of agitated saline demonstrates the presence of an intact intra atrial septum.      U/S abd RUQ 9/2/22:  Cholelithiasis and sludge without sonographic evidence of acute cholecystitis.  CBD mildly dilated measuring 7 to 8 mm in diameter. Correlate with liver function tests.  Right-sided pleural effusion.    U/S renal 9/2/22:   No hydronephrosis    CT Head 8/29/22:   1.  No hemorrhage or new infarct  2.  Chronic changes right subinsular region  3.  Multiple small infarcts (MRI August 10, 2022) not clearly visualized    CT Head 8/13/22:   Faintly demonstrated are multiple scattered cortically-based hypodensities, consistent with known history of acute embolic stroke, better delineated on recent MRI. No evidence of hemorrhagic transformation.    CTA Head 8/12/22:   No large vessel occlusion, aneurysm, or vascular malformation.  Moderate right/mild left atherosclerotic stenosis in the supraclinoid ICAs.  Focal mild atherosclerotic stenosis in the V3 segment of the right vertebral artery.    MR Head w/wo IV contrast 8/10/22:   Motion limited examination.  Multiple punctate cortical acute infarcts involving multiple vascular territories possibly related to embolic etiology. No evidence of acute  hemorrhage.  Moderate chronic microvascular type changes as well as chronic hemosiderin deposition within the right basal ganglia consistent with remote hemorrhage.  Chronic right thalamic lacunar infarcts.      CT Head 8/8/22:   Chronic ischemic changes unchanged from the scan of 8/5/2022.  No evidence of acute intracranial hemorrhage or acute territorial infarct.  No evidence of mass or midline shift.      CT Brain 8/5/22:   1. Mild atrophic changes.  2.  Hypodensities in the periventricular white matter, right thalamus, right basal ganglia, and right insular cortex likely representing ischemic change, age indeterminate.      CTA Head and Neck 8/5/22:   The visualized aortic arch, common carotid arteries and carotid bifurcations are patent. The remainder of the exam (distal cervical vessels and the intracranial circulation) is essentially nondiagnostic due to motion artifact.    U/S Duplex kidneys 8/3/22:   No evidence of significant hemodynamic stenosis noted in b/l renal arteries

## 2022-09-16 NOTE — PROGRESS NOTE ADULT - ASSESSMENT
ALF rule out ATN / relative hypotension/ sepsis / E cloaca bacteremia / HTN ( was on multiple meds )/ CVA/ GIB  cr trending up / check repeat today   UO improving   HD today 3h UF 2l as tolerated   on lasix 80mg iv q12h cont non oliguric   DC sodium bicarb po   work up to date is neg for GN ( LUIS FELIPE DsDNA  neg / RF noted/ SPEP with inflammatory pattern ) cryo to be sent out/ no thrombocytopenia   No indication yet for a kidney biopsy from renal stand point  s/p trach   ph noted / increased sevelamer to 3 tabs po q8h/ feed nepro / repeat IP   overall prognosis poor  will follow

## 2022-09-16 NOTE — PROGRESS NOTE ADULT - ASSESSMENT
57 y/o woman w PMHx of uncontrolled HTN, DM2, hemorrhagic ?anuerysmal stroke R basal ganglia 2017 w residual L sided weakness, h/o PEG s/p removal, had not seen doctor in >1yr, w/o meds since 2018, presented to ED 8/2/22 for RLE nonhealing wound [id number] and BIB son who noted it that evening, ED triage vitals noted for /170 range.     Admitted for cellulitis, HTN urgency and started on Unasyn, Vanc, insulin drip, IV labetalol, IV vasotec. On 8/5/22 had stroke code w NIHSS 23, for unresponsiveness, concern for seizure w post ictal confusion, found to have ?embolic strokes on MR. S/p debridement of RLE 8/10/22. Developed E cloaca bacteremia, tx w avacaz and aztreonam, has since started on HD. Intubated on 9/6/22 and converted to trach 9/14/22. Uldall placed ?9/14/22 and started on HD                                                                                  ----------------------------------------------------  # DVT prophylaxis:   # GI prophylaxis:   # Diet:   # Activity:   # Code status:   # Disposition:                                                                            --------------------------------------------------------    # Handoff:      57 y/o woman w PMHx of uncontrolled HTN, DM2, hemorrhagic ?anuerysmal stroke R basal ganglia 2017 w residual L sided weakness, h/o PEG s/p removal, had not seen doctor in >1yr, w/o meds since 2018, presented to ED 8/2/22 for RLE nonhealing wound [id number] and BIB son who noted it that evening, ED triage vitals noted for /170 range.     Admitted for cellulitis, HTN urgency and started on Unasyn, Vanc, insulin drip, IV labetalol, IV vasotec. On 8/5/22 had stroke code w NIHSS 23, for unresponsiveness, concern for seizure w post ictal confusion, found to have ?embolic strokes on MR. S/p debridement of RLE 8/10/22. Developed E cloaca bacteremia, tx w avacaz and aztreonam, has since started on HD. Intubated on 9/6/22 and converted to trach 9/14/22. Uldall placed ?9/14/22 and started on HD    #Acute ischemic strokes, multifocal  #h/o hemorrhagic, ?aneurysmal stroke R basal ganglia 2017  Per neurology: suspicious for vasculitis, but not confirmed, CSF studies does not support the diagnosis. - Plavix held for LGIB. Aspirin resumed.   - Neuro: 60mg Prednisone taper [id number] starting 9/16/22-10/7/22  - Per stroke team, no need for angio, requested a renal bx when stable as expected widespread vasculitis.   - [facility]   - Neuro check q1h    #Nonoliguric ALF / Urinary retention / Suspected ATN  Renal US 9/2/22: no hydronephrosis  - Cr still high; 9/16/22 Cr 6.8   - Sodium bicarbonate increased to 1300 q8h, sevelamer 2400 mg TID in PEG   - Nephro following  - HD: 9/15/22, 9/16/22 of 2hr session removed 2L     #Bacteremia, resolved   BCx 9/1/22 (+) MDR enterobacter Cloacae   BCx 9/3/22, 9/4/22, 9/5/22, 9/6/22, 9/7/22 all NGF   - 9/12 ceftaz changed to q 24, aztreonam still q12  - Per ID: cont abx until 9/16    # Purulent Right LE cellulitis   S/p debridement by burn on 8/10/22  - Candida in wound. per Dr. Chua: contaminant  - BCx w/ staph: likely contaminant. repeat BCx at that time was negative  - F/u burn as outpatient [phone]; signed off 9/14/22.     #GI bleed  S/p 6 units of PRBCs. Hb goal 7+, 9/16/22 Hb 7.6  - Pt was seen by GI, external bleeding hemorrhoids noted. Surgery consulted.   - Protonix q12h  - ENT eval appreciated for oral bleed. No actively oozing wounds, teeth guard put in.     - Oral cavity bleed from tongue biting, improved    #Hypertensive urgency due to noncompliance and Malignant HTN   BP on admission 296/174 without signs of end organ damage at that time. Renal artery duplex wnl>>ARIAN unlikely  - Aldosterone wnl, renin elevated  - BP overall improved  - SBP goal 120-160  - c/w amlodipine 10, furosemide 80 q12, hydralazine 100mg q8, labetalol 600mg TID  9/16/22 BPs 150s/70s, HR 70s    # Diabetes mellitus   - HbA1C 10.4  - Transitioned from insulin drip to basal/bolus 9/16/22  - Lantus and lispro sliding scale; increase as necessary    #HLD   - Cont statins                                                                               ----------------------------------------------------  # DVT prophylaxis: Holding AC given recent GIB   # GI prophylaxis: Pantoprazole   # Diet: "Nepro at 30 ml/h + 5 packets Beneprotein/d --> 87 gm protein, 1400 kcal, 510 total mg phos, 23 mEq K/d"  # Activity: Bedrest  # Code status: FULL CODE   # Disposition: ICU                                                                            --------------------------------------------------------    # Handoff:   - Daily SAT   - F/u BP goal -160s  - Follow fs, adjust insulin   - Keep active T&S, Hb goal 7+   - Taper steroids    55 y/o woman w PMHx of uncontrolled HTN, DM2, hemorrhagic ?anuerysmal stroke R basal ganglia 2017 w residual L sided weakness, h/o PEG s/p removal, had not seen doctor in >1yr, w/o meds since 2018, presented to ED 8/2/22 for RLE nonhealing wound [id number] and BIB son who noted it that evening, ED triage vitals noted for /170 range.     Admitted for cellulitis, HTN urgency and started on Unasyn, Vanc, insulin drip, IV labetalol, IV vasotec. On 8/5/22 had stroke code w NIHSS 23, for unresponsiveness, concern for seizure w post ictal confusion, found to have ?embolic strokes on MR. S/p debridement of RLE 8/10/22. Developed E cloaca bacteremia, tx w avacaz and aztreonam, has since started on HD. Intubated on 9/6/22 and converted to trach 9/14/22. Uldall placed ?9/14/22 and started on HD. S/p bronchoscopy, tracheostomy, and PEG tube placement 9/14/22. Remains vent dependent.     #Acute ischemic strokes, multifocal  #h/o hemorrhagic, ?aneurysmal stroke R basal ganglia 2017  Per neurology: suspicious for vasculitis, but not confirmed, CSF studies does not support the diagnosis. - Plavix held for LGIB. Aspirin resumed.   - Neuro: 60mg Prednisone taper [id number] starting 9/16/22-10/7/22  - Per stroke team, no need for angio, requested a renal bx when stable as expected widespread vasculitis.   -  keppra   - Neuro check q1h    #Nonoliguric ALF / Urinary retention / Suspected ATN  Renal US 9/2/22: no hydronephrosis  - Cr still high; 9/16/22 Cr 6.8   - Sodium bicarbonate increased to 1300 q8h, sevelamer 2400 mg TID in PEG   - Nephro following  - HD: 9/15/22, 9/16/22 of 2hr session removed 2L     #Bacteremia, resolved   BCx 9/1/22 (+) MDR enterobacter Cloacae   BCx 9/3/22, 9/4/22, 9/5/22, 9/6/22, 9/7/22 all NGF   - 9/12 ceftaz changed to q 24, aztreonam still q12  - Per ID: cont abx until 9/16    #COVID (+) 9/13/22  Tested (+) 9/13/22, in isolation thru 9/23/22   Resp status as noted above  Will monitor for fevers, sepsis  Consider consult ID for further recs    # Purulent Right LE cellulitis   S/p debridement by burn on 8/10/22  - Candida in wound. per Dr. Chua: contaminant  - BCx w/ staph: likely contaminant. repeat BCx at that time was negative  - F/u burn as outpatient [phone]; signed off 9/14/22.     #GI bleed  S/p 6 units of PRBCs. Hb goal 7+, 9/16/22 Hb 7.6  - Pt was seen by GI, external bleeding hemorrhoids noted. Surgery consulted.   - Protonix q12h  - ENT eval appreciated for oral bleed. No actively oozing wounds, teeth guard put in.     - Oral cavity bleed from tongue biting, improved    #Hypertensive urgency due to noncompliance and Malignant HTN   BP on admission 296/174 without signs of end organ damage at that time. Renal artery duplex wnl>>ARIAN unlikely  - Aldosterone wnl, renin elevated  - BP overall improved  - SBP goal 120-160  - c/w amlodipine 10, furosemide 80 q12, hydralazine 100mg q8, labetalol 600mg TID  9/16/22 BPs 150s/70s, HR 70s    # Diabetes mellitus   - HbA1C 10.4  - Transitioned from insulin drip to basal/bolus 9/16/22  - Lantus and lispro sliding scale; increase as necessary    #HLD   - Cont statins                                                                               ----------------------------------------------------  # DVT prophylaxis: Holding AC given recent GIB   # GI prophylaxis: Pantoprazole   # Diet: "Nepro at 30 ml/h + 5 packets Beneprotein/d --> 87 gm protein, 1400 kcal, 510 total mg phos, 23 mEq K/d"  # Activity: Bedrest  # Code status: FULL CODE   # Disposition: ICU                                                                            --------------------------------------------------------    # Handoff:   - Daily SAT   - F/u BP goal -160s  - Follow fs, adjust insulin   - Keep active T&S, Hb goal 7+   - Taper steroids

## 2022-09-16 NOTE — PROGRESS NOTE ADULT - ASSESSMENT
IMP:  55 yo F with PMH of HTN, DM2, and CVA in 2017, who presented for RLE cellulitis now s/p debridement, s/p PEG placement 8/24, pending tracheostomy placement then plans for dialysis.  - acute/ chronic resp failure  - RLE cellulitis/ DFU  - uncontrolled DM  - embolic strokes  - ALF - now on HD  - s/p wound debridement by Burn team  - covid + 9/13, now off isolation  - GI bleed r/t hemorrrhoids s/p 6u PRBC      estimated REE by PSE 1378 k/d, est protein ~ 85+ gm/d  Suggest:  - Nepro at 30 ml/h + 5 packets Beneprotein/d --> 87 gm protein, 1400 kcal, 510 total mg phos, 23 mEq K/d  - will adjust feeds depending on tolerance and continuity of HD  - f/u and document BMs

## 2022-09-16 NOTE — CHART NOTE - NSCHARTNOTEFT_GEN_A_CORE
Transfer Note    Transfer from: ICU  Transfer to: SDU/CEU      Hospital Course:   55 y/o woman w PMHx of uncontrolled HTN, DM2, hemorrhagic ?anuerysmal stroke R basal ganglia 2017 w residual L sided weakness, h/o PEG s/p removal, had not seen doctor in >1yr, w/o meds since 2018, presented to ED 8/2/22 for RLE nonhealing wound [id number] and BIB son who noted it that evening, ED triage vitals noted for /170 range.   In the ED:  Vitals: T: 98.9, BP: 296/ 174, , RR: 18, 98% O2 on RA  Labs: CBC showed WBC 11.23; ESR 92, CRP 35.6, CMP showed glucose 302, alk phos 173; ; VBG pH 7.45  Admitted for cellulitis, HTN urgency and started on Unasyn, Vanc, insulin drip, IV labetalol, IV vasotec. On 8/5/22 had stroke code w NIHSS 23, for unresponsiveness, concern for seizure w post ictal confusion, found to have ?embolic strokes on MR. S/p debridement of RLE 8/10/22. Developed E cloaca bacteremia, tx w avacaz and aztreonam, has since started on HD. Intubated on 9/6/22 and converted to trach 9/14/22. Uldall placed ?9/14/22 and started on HD. S/p bronchoscopy, tracheostomy, and PEG tube placement 9/14/22. 9/13/22 tested COVID(+)     Evaluated by:   Cardio: will need stress test as outpatient, rec for IVONNE w bubble study, loop recorder  Pod: s/p debridement/curettage of all fungal and ingrowing nails  Burn: Wound care, s/p surgical debridement 8/10/22, IV abx. F/u burn as outpatient [phone]; signed off 9/14/22.   ID: Vanc, Unasyn then after debridement, Augmentin x7days. Then Avycaz and aztreonam 9/3-9/16/22 for E cloaca bacteremia.   Neuro: Evaluated for stroke code 8/5/22, ?seizure, started on meningitis abx cvg, AEDs. MR w embolic strokes. Developed encephalopathy, s/p LP.   GI: s/p PEG placement for OP dysphagia following CVA. Had GIB as well, received transfusions x?2. S/p colonoscopy and EGD 9/6/22 w no evidence of active bleed. (+) ext hemorrhoids.   Nephro: Consulted for HTN despite 5 meds, has started HD while in hospital, steroids   Surgery: Following for ext hemorrhoids   CT Surgery: OR on 9/14/22 for bronchoscopy, tracheostomy, and PEG tube placement     HCP/consent: Brother   Contact: Son    Lines/tubes:   Trach, PEG, R fem Uldall, L PIV x2    #HANDOFF:   - Daily SAT   - F/u BP goal -160s  - Follow fs, adjust insulin   - Keep active T&S, Hb goal 7+   - Taper steroids       MEDICATIONS:  STANDING MEDICATIONS  amLODIPine   Tablet 10 milliGRAM(s) Oral daily  aspirin  chewable 81 milliGRAM(s) Oral daily  atorvastatin 80 milliGRAM(s) Oral at bedtime  chlorhexidine 0.12% Liquid 15 milliLiter(s) Oral Mucosa every 12 hours  chlorhexidine 2% Cloths 1 Application(s) Topical <User Schedule>  collagenase Ointment 1 Application(s) Topical two times a day  dexMEDEtomidine Infusion 0.2 MICROgram(s)/kG/Hr IV Continuous <Continuous>  dextrose 5%. 1000 milliLiter(s) IV Continuous <Continuous>  dextrose 5%. 1000 milliLiter(s) IV Continuous <Continuous>  dextrose 50% Injectable 25 Gram(s) IV Push once  dextrose 50% Injectable 12.5 Gram(s) IV Push once  dextrose 50% Injectable 25 Gram(s) IV Push once  furosemide   Injectable 80 milliGRAM(s) IV Push every 12 hours  glucagon  Injectable 1 milliGRAM(s) IntraMuscular once  hydrALAZINE 100 milliGRAM(s) Oral every 8 hours  insulin glargine Injectable (LANTUS) 12 Unit(s) SubCutaneous at bedtime  insulin lispro (ADMELOG) corrective regimen sliding scale   SubCutaneous three times a day before meals  insulin lispro Injectable (ADMELOG) 4 Unit(s) SubCutaneous three times a day before meals  labetalol 600 milliGRAM(s) Oral three times a day  lacosamide IVPB 50 milliGRAM(s) IV Intermittent every 12 hours  levETIRAcetam  Solution 500 milliGRAM(s) Oral two times a day  lidocaine 1%/epinephrine 1:100,000 Inj 20 milliLiter(s) Local Injection once  multivitamin 1 Tablet(s) Oral daily  pantoprazole  Injectable 40 milliGRAM(s) IV Push two times a day  predniSONE   Tablet 60 milliGRAM(s) Oral daily  sevelamer carbonate Powder 2400 milliGRAM(s) Enteral Tube every 8 hours  sodium chloride 0.65% Nasal 2 Spray(s) Both Nostrils two times a day    PRN MEDICATIONS  acetaminophen     Tablet .. 650 milliGRAM(s) Oral every 6 hours PRN  dextrose Oral Gel 15 Gram(s) Oral once PRN  labetalol Injectable 10 milliGRAM(s) IV Push every 6 hours PRN  melatonin 3 milliGRAM(s) Oral at bedtime PRN      VITAL SIGNS: Last 24 Hours  T(C): 36.3 (16 Sep 2022 16:00), Max: 36.7 (15 Sep 2022 20:00)  T(F): 97.3 (16 Sep 2022 16:00), Max: 98 (15 Sep 2022 20:00)  HR: 76 (16 Sep 2022 16:00) (67 - 81)  BP: 154/79 (16 Sep 2022 16:00) (140/72 - 182/85)  BP(mean): 113 (16 Sep 2022 16:00) (93 - 136)  RR: 13 (16 Sep 2022 16:00) (9 - 18)  SpO2: 98% (16 Sep 2022 16:00) (97% - 100%)    LABS:                        7.6    17.93 )-----------( 257      ( 16 Sep 2022 06:30 )             21.9     09-16    137  |  92<L>  |  103<HH>  ----------------------------<  156<H>  3.8   |  23  |  5.6<HH>    Ca    8.2<L>      16 Sep 2022 06:30  Mg     2.3     09-16    TPro  5.2<L>  /  Alb  2.9<L>  /  TBili  0.3  /  DBili  x   /  AST  82<H>  /  ALT  18  /  AlkPhos  184<H>  09-16    PT/INR - ( 15 Sep 2022 09:20 )   PT: 15.10 sec;   INR: 1.32 ratio         PTT - ( 15 Sep 2022 09:20 )  PTT:30.0 sec    ABG - ( 15 Sep 2022 02:52 )  pH, Arterial: 7.43  pH, Blood: x     /  pCO2: 28    /  pO2: 120   / HCO3: 19    / Base Excess: -4.4  /  SaO2: x                       RADIOLOGY:  TTE 9/8/22:    1. LV Ejection Fraction by Reich's Method witha biplane EF of 60 %.   2. Mildly increased LV wall thickness.   3. Spectral Doppler shows impaired relaxation pattern of left ventricular myocardial filling (Grade I diastolic dysfunction).   4. Normal left atrial size.   5. Normal right atrial size.   6. Mild mitral valve regurgitation.   7. Mild tricuspid regurgitation.   8. Mild aortic regurgitation.   9. Color flow doppler and intravenous injection of agitated saline demonstrates the presence of an intact intra atrial septum.      U/S abd RUQ 9/2/22:  Cholelithiasis and sludge without sonographic evidence of acute cholecystitis.  CBD mildly dilated measuring 7 to 8 mm in diameter. Correlate with liver function tests.  Right-sided pleural effusion.    U/S renal 9/2/22:   No hydronephrosis    CT Head 8/29/22:   1.  No hemorrhage or new infarct  2.  Chronic changes right subinsular region  3.  Multiple small infarcts (MRI August 10, 2022) not clearly visualized    CT Head 8/13/22:   Faintly demonstrated are multiple scattered cortically-based hypodensities, consistent with known history of acute embolic stroke, better delineated on recent MRI. No evidence of hemorrhagic transformation.    CTA Head 8/12/22:   No large vessel occlusion, aneurysm, or vascular malformation.  Moderate right/mild left atherosclerotic stenosis in the supraclinoid ICAs.  Focal mild atherosclerotic stenosis in the V3 segment of the right vertebral artery.    MR Head w/wo IV contrast 8/10/22:   Motion limited examination.  Multiple punctate cortical acute infarcts involving multiple vascular territories possibly related to embolic etiology. No evidence of acute  hemorrhage.  Moderate chronic microvascular type changes as well as chronic hemosiderin deposition within the right basal ganglia consistent with remote hemorrhage.  Chronic right thalamic lacunar infarcts.      CT Head 8/8/22:   Chronic ischemic changes unchanged from the scan of 8/5/2022.  No evidence of acute intracranial hemorrhage or acute territorial infarct.  No evidence of mass or midline shift.      CT Brain 8/5/22:   1. Mild atrophic changes.  2.  Hypodensities in the periventricular white matter, right thalamus, right basal ganglia, and right insular cortex likely representing ischemic change, age indeterminate.      CTA Head and Neck 8/5/22:   The visualized aortic arch, common carotid arteries and carotid bifurcations are patent. The remainder of the exam (distal cervical vessels and the intracranial circulation) is essentially nondiagnostic due to motion artifact.    U/S Duplex kidneys 8/3/22:   No evidence of significant hemodynamic stenosis noted in b/l renal arteries        ASSESSMENT & PLAN:   55 y/o woman w PMHx of uncontrolled HTN, DM2, hemorrhagic ?anuerysmal stroke R basal ganglia 2017 w residual L sided weakness, h/o PEG s/p removal, had not seen doctor in >1yr, w/o meds since 2018, presented to ED 8/2/22 for RLE nonhealing wound [id number] and BIB son who noted it that evening, ED triage vitals noted for /170 range.     Admitted for cellulitis, HTN urgency and started on Unasyn, Vanc, insulin drip, IV labetalol, IV vasotec. On 8/5/22 had stroke code w NIHSS 23, for unresponsiveness, concern for seizure w post ictal confusion, found to have ?embolic strokes on MR. S/p debridement of RLE 8/10/22. Developed E cloaca bacteremia, tx w avacaz and aztreonam, has since started on HD. Intubated on 9/6/22 and converted to trach 9/14/22. Uldall placed ?9/14/22 and started on HD. S/p bronchoscopy, tracheostomy, and PEG tube placement 9/14/22. Remains vent dependent.     #Acute ischemic strokes, multifocal  #h/o hemorrhagic, ?aneurysmal stroke R basal ganglia 2017  Per neurology: suspicious for vasculitis, but not confirmed, CSF studies does not support the diagnosis. - Plavix held for LGIB. Aspirin resumed.   - Neuro: 60mg Prednisone taper [id number] starting 9/16/22-10/7/22  - Per stroke team, no need for angio, requested a renal bx when stable as expected widespread vasculitis.   -  keppra   - Neuro check q1h    #Nonoliguric ALF / Urinary retention / Suspected ATN  Renal US 9/2/22: no hydronephrosis  - Cr still high; 9/16/22 Cr 6.8   - Sodium bicarbonate increased to 1300 q8h, sevelamer 2400 mg TID in PEG   - Nephro following  - HD: 9/15/22, 9/16/22 of 2hr session removed 2L     #Bacteremia, resolved   BCx 9/1/22 (+) MDR enterobacter Cloacae   BCx 9/3/22, 9/4/22, 9/5/22, 9/6/22, 9/7/22 all NGF   - 9/12 ceftaz changed to q 24, aztreonam still q12  - Per ID: cont abx until 9/16    #COVID (+) 9/13/22  Tested (+) 9/13/22, in isolation thru 9/23/22   Resp status as noted above  Will monitor for fevers, sepsis  Consider consult ID for further recs    # Purulent Right LE cellulitis   S/p debridement by burn on 8/10/22  - Candida in wound. per Dr. Chua: contaminant  - BCx w/ staph: likely contaminant. repeat BCx at that time was negative  - F/u burn as outpatient [phone]; signed off 9/14/22.     #GI bleed  S/p 6 units of PRBCs. Hb goal 7+, 9/16/22 Hb 7.6  - Pt was seen by GI, external bleeding hemorrhoids noted. Surgery consulted.   - Protonix q12h  - ENT eval appreciated for oral bleed. No actively oozing wounds, teeth guard put in.     - Oral cavity bleed from tongue biting, improved    #Hypertensive urgency due to noncompliance and Malignant HTN   BP on admission 296/174 without signs of end organ damage at that time. Renal artery duplex wnl>>ARIAN unlikely  - Aldosterone wnl, renin elevated  - BP overall improved  - SBP goal 120-160  - c/w amlodipine 10, furosemide 80 q12, hydralazine 100mg q8, labetalol 600mg TID  9/16/22 BPs 150s/70s, HR 70s    # Diabetes mellitus   - HbA1C 10.4  - Transitioned from insulin drip to basal/bolus 9/16/22  - Lantus and lispro sliding scale; increase as necessary    #HLD   - Cont statins     Lines/tubes:   Trach, PEG, R fem Uldall, L PIV x2                                                                              ----------------------------------------------------  # DVT prophylaxis: Holding AC given recent GIB   # GI prophylaxis: Pantoprazole   # Diet: "Nepro at 30 ml/h + 5 packets Beneprotein/d --> 87 gm protein, 1400 kcal, 510 total mg phos, 23 mEq K/d"  # Activity: Bedrest  # Code status: FULL CODE   # Disposition: ICU                                                                            --------------------------------------------------------

## 2022-09-16 NOTE — PROGRESS NOTE ADULT - SUBJECTIVE AND OBJECTIVE BOX
NUTRITION SUPPORT TEAM  -  PROGRESS NOTE     Interval Events:  pt remains intubated via trach which was placed 9/14  some bleeding noted at trach site  +HD today  abd soft, ND, +GT  feeds still at 20 ml/h  r femoral [place]    VITALS:  T(F): 97.3 (09-16 @ 16:00), Max: 97.7 (09-16 @ 08:00)  HR: 78 (09-16 @ 19:00) (67 - 78)  BP: 167/83 (09-16 @ 19:00) (150/76 - 182/85)  RR: 12 (09-16 @ 19:00) (12 - 18)  SpO2: 99% (09-16 @ 19:00) (98% - 100%)    HEIGHT/WEIGHT/BMI:   Height (cm): 165.1 (09-14)  Weight (kg): 81.4 (09-14)  BMI (kg/m2): 29.9 (09-14)    I/Os:     09-15-22 @ 07:01  -  09-16-22 @ 07:00  --------------------------------------------------------  IN:    Enteral Tube Flush: 220 mL    Insulin: 41 mL    IV PiggyBack: 150 mL    Nepro with Carb Steady: 480 mL  Total IN: 891 mL    OUT:    Dexmedetomidine: 0 mL    Indwelling Catheter - Urethral (mL): 730 mL    Other (mL): 1000 mL  Total OUT: 1730 mL    Total NET: -839 mL    STANDING MEDICATIONS:   amLODIPine   Tablet 10 milliGRAM(s) Oral daily  aspirin  chewable 81 milliGRAM(s) Oral daily  atorvastatin 80 milliGRAM(s) Oral at bedtime  chlorhexidine 0.12% Liquid 15 milliLiter(s) Oral Mucosa every 12 hours  chlorhexidine 2% Cloths 1 Application(s) Topical <User Schedule>  dexMEDEtomidine Infusion 0.2 MICROgram(s)/kG/Hr IV Continuous <Continuous>  furosemide   Injectable 80 milliGRAM(s) IV Push every 12 hours  hydrALAZINE 100 milliGRAM(s) Oral every 8 hours  insulin lispro (ADMELOG) corrective regimen sliding scale   SubCutaneous three times a day before meals  insulin lispro Injectable (ADMELOG) 4 Unit(s) SubCutaneous three times a day before meals  labetalol 600 milliGRAM(s) Oral three times a day  lacosamide IVPB 50 milliGRAM(s) IV Intermittent every 12 hours  levETIRAcetam  Solution 500 milliGRAM(s) Oral two times a day  lidocaine 1%/epinephrine 1:100,000 Inj 20 milliLiter(s) Local Injection once  multivitamin 1 Tablet(s) Oral daily  pantoprazole  Injectable 40 milliGRAM(s) IV Push two times a day  predniSONE   Tablet 60 milliGRAM(s) Oral daily  sevelamer carbonate Powder 2400 milliGRAM(s) Enteral Tube every 8 hours  sodium chloride 0.65% Nasal 2 Spray(s) Both Nostrils two times a day    LABS:                         7.6    17.93 )-----------( 257      ( 16 Sep 2022 06:30 )             21.9     137  |  92<L>  |  103<HH>  ----------------------------<  156<H>          (09-16-22 @ 06:30)  3.8   |  23  |  5.6<HH>    Ca    8.2<L>          (09-16-22 @ 06:30)  Mg     2.3         (09-16-22 @ 06:30)    TPro  5.2<L>  /  Alb  2.9<L>  /  TBili  0.3  /  DBili  x   /  AST  82<H>  /  ALT  18  /  AlkPhos  184<H>       09-16-22 @ 06:30    Triglycerides, Serum: 240 mg/dL (08-03 @ 08:56)    Folate: 11.3 ng/mL (08-08 @ 17:53)    Vitamin B12, Serum: 358 pg/mL (08-08 @ 17:53)    Phosphorus Level, Serum: 11.4 mg/dL (09.14.22 @ 05:32)   Blood Glucose (Past 24 hours):  200 mg/dL (09-16 @ 17:20)  184 mg/dL (09-16 @ 11:18)  198 mg/dL (09-16 @ 08:40)  160 mg/dL (09-16 @ 04:34)  153 mg/dL (09-16 @ 00:15)  155 mg/dL (09-15 @ 23:08)  141 mg/dL (09-15 @ 22:32)  130 mg/dL (09-15 @ 21:29)  136 mg/dL (09-15 @ 20:05)      DIET:   Diet, NPO with Tube Feed:   Tube Feeding Modality: Gastrostomy  Nepro with Carb Steady  Total Volume for 24 Hours (mL): 720  Continuous  Starting Tube Feed Rate mL per Hour: 30  Until Goal Tube Feed Rate (mL per Hour): 30  Tube Feed Duration (in Hours): 24  Tube Feed Start Time: 10:00  Beneprotein ([facility] Only)     Qty per Day:  5 (09-16-22 @ 14:26) [Active]    RADIOLOGY:   < from: Xray Chest 1 View- PORTABLE-Routine (09.15.22 @ 06:09) >  Support devices: A tracheostomy tube is present. There is no definite   evidence of nasogastric tube on this examination. A loop recorder is   present. Telemetry leads and tubing overlie patient limiting evaluation    Cardiac/mediastinum/hilum: Stable    Lung parenchyma/Pleura: Stable left base opacity    < end of copied text >

## 2022-09-17 NOTE — PROGRESS NOTE ADULT - SUBJECTIVE AND OBJECTIVE BOX
Patient is a 57y old  Female who presents with a chief complaint of RLE wound (12 Sep 2022 15:26)        Over Night Events:  Remains critically ill on MV.  Off pressors.  No trach bleeding.  Bleeding from U Dall.  SP HD yesterday         ROS:     All ROS are negative except HPI         PHYSICAL EXAM    ICU Vital Signs Last 24 Hrs  T(C): 36.3 (17 Sep 2022 04:00), Max: 36.8 (16 Sep 2022 20:00)  T(F): 97.4 (17 Sep 2022 04:00), Max: 98.3 (16 Sep 2022 20:00)  HR: 66 (17 Sep 2022 07:00) (66 - 80)  BP: 150/81 (17 Sep 2022 07:00) (133/79 - 182/85)  BP(mean): 112 (17 Sep 2022 07:00) (98 - 141)  ABP: --  ABP(mean): --  RR: 16 (17 Sep 2022 07:00) (12 - 21)  SpO2: 100% (17 Sep 2022 07:00) (98% - 100%)    O2 Parameters below as of 16 Sep 2022 16:00  Patient On (Oxygen Delivery Method): ventilator    O2 Concentration (%): 40        CONSTITUTIONAL:  In  NAD    ENT:   Airway patent,   Mouth with normal mucosa.   Trach     EYES:   Pupils equal,   Round and reactive to light.    CARDIAC:   Normal rate,   Regular rhythm.      RESPIRATORY:   No wheezing  Bilateral BS  Normal chest expansion  Not tachypneic,  No use of accessory muscles    GASTROINTESTINAL:  Abdomen soft,   Non-tender,   No guarding,   + BS    MUSCULOSKELETAL:   Range of motion is not limited,  No clubbing, cyanosis    NEUROLOGICAL:   Opens eyes   Does not follow commands     SKIN:   Skin normal color for race,   No evidence of rash.    PSYCHIATRIC:   No apparent risk to self or others.      09-16-22 @ 07:01  -  09-17-22 @ 07:00  --------------------------------------------------------  IN:    Enteral Tube Flush: 240 mL    Insulin: 3 mL    IV PiggyBack: 105 mL    Nepro with Carb Steady: 650 mL  Total IN: 998 mL    OUT:    Indwelling Catheter - Urethral (mL): 470 mL    Other (mL): 2000 mL  Total OUT: 2470 mL    Total NET: -1472 mL          LABS:                            7.0    22.11 )-----------( 215      ( 17 Sep 2022 05:23 )             20.3                                               09-17    137  |  94<L>  |  73<HH>  ----------------------------<  156<H>  3.6   |  25  |  4.2<HH>    Ca    8.3<L>      17 Sep 2022 05:23  Phos  6.9     09-17  Mg     2.2     09-17    TPro  5.3<L>  /  Alb  3.0<L>  /  TBili  0.2  /  DBili  x   /  AST  107<H>  /  ALT  18  /  AlkPhos  200<H>  09-17      PT/INR - ( 15 Sep 2022 09:20 )   PT: 15.10 sec;   INR: 1.32 ratio         PTT - ( 15 Sep 2022 09:20 )  PTT:30.0 sec                                                                                     LIVER FUNCTIONS - ( 17 Sep 2022 05:23 )  Alb: 3.0 g/dL / Pro: 5.3 g/dL / ALK PHOS: 200 U/L / ALT: 18 U/L / AST: 107 U/L / GGT: x                                                                                               Mode: AC/ CMV (Assist Control/ Continuous Mandatory Ventilation)  RR (machine): 16  TV (machine): 360  FiO2: 40  PEEP: 5  ITime: 1  MAP: 10  PIP: 16                                          MEDICATIONS  (STANDING):  amLODIPine   Tablet 10 milliGRAM(s) Oral daily  aspirin  chewable 81 milliGRAM(s) Oral daily  atorvastatin 80 milliGRAM(s) Oral at bedtime  chlorhexidine 0.12% Liquid 15 milliLiter(s) Oral Mucosa every 12 hours  chlorhexidine 2% Cloths 1 Application(s) Topical <User Schedule>  dexMEDEtomidine Infusion 0.2 MICROgram(s)/kG/Hr (4.07 mL/Hr) IV Continuous <Continuous>  dextrose 5%. 1000 milliLiter(s) (100 mL/Hr) IV Continuous <Continuous>  dextrose 5%. 1000 milliLiter(s) (50 mL/Hr) IV Continuous <Continuous>  dextrose 50% Injectable 25 Gram(s) IV Push once  dextrose 50% Injectable 12.5 Gram(s) IV Push once  dextrose 50% Injectable 25 Gram(s) IV Push once  furosemide   Injectable 80 milliGRAM(s) IV Push every 12 hours  glucagon  Injectable 1 milliGRAM(s) IntraMuscular once  hydrALAZINE 100 milliGRAM(s) Oral every 8 hours  insulin glargine Injectable (LANTUS) 12 Unit(s) SubCutaneous at bedtime  insulin lispro (ADMELOG) corrective regimen sliding scale   SubCutaneous three times a day before meals  insulin lispro Injectable (ADMELOG) 4 Unit(s) SubCutaneous three times a day before meals  labetalol 600 milliGRAM(s) Oral three times a day  lacosamide IVPB 50 milliGRAM(s) IV Intermittent every 12 hours  levETIRAcetam  Solution 500 milliGRAM(s) Oral two times a day  lidocaine 1%/epinephrine 1:100,000 Inj 20 milliLiter(s) Local Injection once  multivitamin 1 Tablet(s) Oral daily  pantoprazole  Injectable 40 milliGRAM(s) IV Push two times a day  predniSONE   Tablet 60 milliGRAM(s) Oral daily  sevelamer carbonate Powder 2400 milliGRAM(s) Enteral Tube every 8 hours  sodium chloride 0.65% Nasal 2 Spray(s) Both Nostrils two times a day    MEDICATIONS  (PRN):  acetaminophen     Tablet .. 650 milliGRAM(s) Oral every 6 hours PRN Temp greater or equal to 38C (100.4F), Mild Pain (1 - 3)  dextrose Oral Gel 15 Gram(s) Oral once PRN Blood Glucose LESS THAN 70 milliGRAM(s)/deciliter  labetalol Injectable 10 milliGRAM(s) IV Push every 6 hours PRN Systolic blood pressure >  melatonin 3 milliGRAM(s) Oral at bedtime PRN Insomnia      New X-rays reviewed:                                                                                  ECHO

## 2022-09-17 NOTE — PROGRESS NOTE ADULT - SUBJECTIVE AND OBJECTIVE BOX
Nephrology progress note    THIS IS AN INCOMPLETE NOTE . FULL NOTE TO FOLLOW SHORTLY    Patient is seen and examined, events over the last 24 h noted .    Allergies:  No Known Allergies    Hospital Medications:   MEDICATIONS  (STANDING):  amLODIPine   Tablet 10 milliGRAM(s) Oral daily  aspirin  chewable 81 milliGRAM(s) Oral daily  atorvastatin 80 milliGRAM(s) Oral at bedtime  chlorhexidine 0.12% Liquid 15 milliLiter(s) Oral Mucosa every 12 hours  chlorhexidine 2% Cloths 1 Application(s) Topical <User Schedule>  dexMEDEtomidine Infusion 0.2 MICROgram(s)/kG/Hr (4.07 mL/Hr) IV Continuous <Continuous>  dextrose 5%. 1000 milliLiter(s) (50 mL/Hr) IV Continuous <Continuous>  dextrose 5%. 1000 milliLiter(s) (100 mL/Hr) IV Continuous <Continuous>  dextrose 50% Injectable 25 Gram(s) IV Push once  dextrose 50% Injectable 12.5 Gram(s) IV Push once  dextrose 50% Injectable 25 Gram(s) IV Push once  furosemide   Injectable 80 milliGRAM(s) IV Push every 12 hours  glucagon  Injectable 1 milliGRAM(s) IntraMuscular once  hydrALAZINE 100 milliGRAM(s) Oral every 8 hours  insulin glargine Injectable (LANTUS) 12 Unit(s) SubCutaneous at bedtime  insulin lispro (ADMELOG) corrective regimen sliding scale   SubCutaneous three times a day before meals  insulin lispro Injectable (ADMELOG) 4 Unit(s) SubCutaneous three times a day before meals  labetalol 600 milliGRAM(s) Oral three times a day  lacosamide IVPB 50 milliGRAM(s) IV Intermittent every 12 hours  levETIRAcetam  Solution 500 milliGRAM(s) Oral two times a day  lidocaine 1%/epinephrine 1:100,000 Inj 20 milliLiter(s) Local Injection once  multivitamin 1 Tablet(s) Oral daily  pantoprazole  Injectable 40 milliGRAM(s) IV Push two times a day  predniSONE   Tablet 60 milliGRAM(s) Oral daily  sevelamer carbonate Powder 2400 milliGRAM(s) Enteral Tube every 8 hours  sodium chloride 0.65% Nasal 2 Spray(s) Both Nostrils two times a day        VITALS:  T(F): 97.4 (09-17-22 @ 04:00), Max: 98.3 (09-16-22 @ 20:00)  HR: 70 (09-17-22 @ 05:00)  BP: 172/90 (09-17-22 @ 05:00)  RR: 12 (09-17-22 @ 05:00)  SpO2: 100% (09-17-22 @ 05:00)  Wt(kg): --    09-14 @ 07:01  -  09-15 @ 07:00  --------------------------------------------------------  IN: 747.3 mL / OUT: 660 mL / NET: 87.3 mL    09-15 @ 07:01  -  09-16 @ 07:00  --------------------------------------------------------  IN: 891 mL / OUT: 1730 mL / NET: -839 mL    09-16 @ 07:01  -  09-17 @ 06:50  --------------------------------------------------------  IN: 968 mL / OUT: 2240 mL / NET: -1272 mL          PHYSICAL EXAM:  Constitutional: NAD  HEENT: anicteric sclera, oropharynx clear, MMM  Neck: No JVD  Respiratory: CTAB, no wheezes, rales or rhonchi  Cardiovascular: S1, S2, RRR  Gastrointestinal: BS+, soft, NT/ND  Extremities: No cyanosis or clubbing. No peripheral edema  :  No nolasco.   Skin: No rashes    LABS:  09-17    137  |  94<L>  |  x   ----------------------------<  156<H>  3.6   |  25  |  x     Ca    8.3<L>      17 Sep 2022 05:23  Phos  6.9     09-17  Mg     2.2     09-17    TPro  5.3<L>  /  Alb  3.0<L>  /  TBili  0.2  /  DBili      /  AST  107<H>  /  ALT  18  /  AlkPhos  200<H>  09-17                          7.0    22.11 )-----------( 215      ( 17 Sep 2022 05:23 )             20.3       Urine Studies:        Ferritin 243      [08-28-22 @ 05:49]  PTH -- (Ca 8.5)      [09-05-22 @ 20:00]   75  TSH 0.86      [08-08-22 @ 17:53]  Lipid: chol 234, , HDL 42, LDL --      [08-03-22 @ 08:56]    HBsAb <3.0      [09-15-22 @ 12:05]  HBsAb Nonreact      [09-15-22 @ 12:05]  HBsAg Nonreact      [09-15-22 @ 12:05]  HBcAb Nonreact      [09-15-22 @ 12:05]  HIV Nonreact      [09-09-22 @ 06:11]    LUIS FELIPE: titer Negative, pattern --      [09-03-22 @ 12:04]  dsDNA <12      [08-30-22 @ 19:27]  C3 Complement 134      [09-09-22 @ 06:11]  C4 Complement 33      [09-09-22 @ 06:11]  Rheumatoid Factor <10      [08-19-22 @ 11:37]  ANCA: cANCA Negative, pANCA Negative, atypical ANCA Negative      [08-30-22 @ 19:27]  Syphilis Screen (Treponema Pallidum Ab) Negative      [08-08-22 @ 17:53]  Immunofixation Serum:   No Monoclonal Band Identified    Reference Range: None Detected      [08-30-22 @ 23:46]  SPEP Interpretation: Pattern Consistent With Acute Inflammation Or Stress      [08-30-22 @ 23:46]  Cryoglobulin: Negative      [09-09-22 @ 06:11]      RADIOLOGY & ADDITIONAL STUDIES:   Nephrology progress note  Patient is seen and examined, events over the last 24 h noted .  Lying in bed comfortable     Allergies:  No Known Allergies    Hospital Medications:   MEDICATIONS  (STANDING):  amLODIPine   Tablet 10 milliGRAM(s) Oral daily  aspirin  chewable 81 milliGRAM(s) Oral daily  atorvastatin 80 milliGRAM(s) Oral at bedtime  dexMEDEtomidine Infusion 0.2 MICROgram(s)/kG/Hr (4.07 mL/Hr) IV Continuous <Continuous>  furosemide   Injectable 80 milliGRAM(s) IV Push every 12 hours  glucagon  Injectable 1 milliGRAM(s) IntraMuscular once  hydrALAZINE 100 milliGRAM(s) Oral every 8 hours  insulin glargine Injectable (LANTUS) 12 Unit(s) SubCutaneous at bedtime  insulin lispro (ADMELOG) corrective regimen sliding scale   SubCutaneous three times a day before meals  insulin lispro Injectable (ADMELOG) 4 Unit(s) SubCutaneous three times a day before meals  labetalol 600 milliGRAM(s) Oral three times a day  lacosamide IVPB 50 milliGRAM(s) IV Intermittent every 12 hours  levETIRAcetam  Solution 500 milliGRAM(s) Oral two times a day  lidocaine 1%/epinephrine 1:100,000 Inj 20 milliLiter(s) Local Injection once  multivitamin 1 Tablet(s) Oral daily  pantoprazole  Injectable 40 milliGRAM(s) IV Push two times a day  predniSONE   Tablet 60 milliGRAM(s) Oral daily  sevelamer carbonate Powder 2400 milliGRAM(s) Enteral Tube every 8 hours  sodium chloride 0.65% Nasal 2 Spray(s) Both Nostrils two times a day        VITALS:  T(F): 97.4 (09-17-22 @ 04:00), Max: 98.3 (09-16-22 @ 20:00)  HR: 70 (09-17-22 @ 05:00)  BP: 172/90 (09-17-22 @ 05:00)  RR: 12 (09-17-22 @ 05:00)  SpO2: 100% (09-17-22 @ 05:00)  Wt(kg): --    09-14 @ 07:01  -  09-15 @ 07:00  --------------------------------------------------------  IN: 747.3 mL / OUT: 660 mL / NET: 87.3 mL    09-15 @ 07:01 - 09-16 @ 07:00  --------------------------------------------------------  IN: 891 mL / OUT: 1730 mL / NET: -839 mL    09-16 @ 07:01  -  09-17 @ 06:50  --------------------------------------------------------  IN: 968 mL / OUT: 2240 mL / NET: -1272 mL          PHYSICAL EXAM:  Constitutional: intubated on MV / trached   Respiratory: Crackles at base   Cardiovascular: S1, S2, RRR  Gastrointestinal: BS+, soft, NT/ND  Extremities: No cyanosis or clubbing. trace edema   :  No nolasco.   Skin: No rashes    LABS:    09-17    137  |  94<L>  |  73<HH>  ----------------------------<  156<H>  3.6   |  25  |  4.2<HH>    Ca    8.3<L>      17 Sep 2022 05:23  Phos  6.9     09-17  Mg     2.2     09-17    TPro  5.3<L>  /  Alb  3.0<L>  /  TBili  0.2  /  DBili  x   /  AST  107<H>  /  ALT  18  /  AlkPhos  200<H>  09-17                            7.0    22.11 )-----------( 215      ( 17 Sep 2022 05:23 )             20.3       Urine Studies:        Ferritin 243      [08-28-22 @ 05:49]  PTH -- (Ca 8.5)      [09-05-22 @ 20:00]   75  TSH 0.86      [08-08-22 @ 17:53]  Lipid: chol 234, , HDL 42, LDL --      [08-03-22 @ 08:56]    HBsAb <3.0      [09-15-22 @ 12:05]  HBsAb Nonreact      [09-15-22 @ 12:05]  HBsAg Nonreact      [09-15-22 @ 12:05]  HBcAb Nonreact      [09-15-22 @ 12:05]  HIV Nonreact      [09-09-22 @ 06:11]    LUIS FELIPE: titer Negative, pattern --      [09-03-22 @ 12:04]  dsDNA <12      [08-30-22 @ 19:27]  C3 Complement 134      [09-09-22 @ 06:11]  C4 Complement 33      [09-09-22 @ 06:11]  Rheumatoid Factor <10      [08-19-22 @ 11:37]  ANCA: cANCA Negative, pANCA Negative, atypical ANCA Negative      [08-30-22 @ 19:27]  Syphilis Screen (Treponema Pallidum Ab) Negative      [08-08-22 @ 17:53]  Immunofixation Serum:   No Monoclonal Band Identified    Reference Range: None Detected      [08-30-22 @ 23:46]  SPEP Interpretation: Pattern Consistent With Acute Inflammation Or Stress      [08-30-22 @ 23:46]  Cryoglobulin: Negative      [09-09-22 @ 06:11]      RADIOLOGY & ADDITIONAL STUDIES:

## 2022-09-17 NOTE — PROGRESS NOTE ADULT - ASSESSMENT
Impression:    Acute respiratory failure s/p trach  Some bleeding at the trach site resolved after a new suture   Acute blood loss anemia. GI bleed  sp EGD and colonoscopy.    Oral cavity bleed resolved  Altered MS suspected vasculitis SP pulse steroids   LLE cellulitis  ALF oliguric SP RRT  Bleeding from U Dall site   COVID 19 infection   E Cloacae Bacteremia resolved       PLAN:    CNS;  Steroid taper, f/u with neuro. FU MS.        HEENT: Oral care.  Trach care.      PULMONARY:  HOB @ 45 degrees.  Aspiration precautions. No vent changes. PS wean     CARDIOVASCULAR: Avoid overload.  BP control     GI: GI prophylaxis.  Peg feeding     RENAL:  follow up lytes.  Correct as needed.  DW renal. TO DC UDAll.       INFECTIOUS DISEASE:  ABX per ID.  End date 9-16     HEMATOLOGICAL: LE doppler negative.  FU CBC and Coags.  DDAVP.  One Unit PRBC     ENDOCRINE:  Follow up FS.  Insulin protocol if needed.    MUSCULOSKELETAL:  Bed chair position     DGT SDU     VERY poor prognosis    Wound care per burn

## 2022-09-17 NOTE — PROGRESS NOTE ADULT - SUBJECTIVE AND OBJECTIVE BOX
MICU RESIDENT PROGRESS NOTE    Patient is a 57y old  Female who presents with a chief complaint of RLE wound (12 Sep 2022 15:26)    INTERVAL HPI/OVERNIGHT EVENTS:   No overnight events   Afebrile, hemodynamically stable     ICU Vital Signs Last 24 Hrs  T(C): 36.8 (16 Sep 2022 20:00), Max: 36.8 (16 Sep 2022 20:00)  T(F): 98.3 (16 Sep 2022 20:00), Max: 98.3 (16 Sep 2022 20:00)  HR: 72 (17 Sep 2022 00:00) (67 - 80)  BP: 133/79 (17 Sep 2022 00:00) (133/79 - 182/85)  BP(mean): 106 (17 Sep 2022 00:00) (98 - 141)  ABP: --  ABP(mean): --  RR: 14 (17 Sep 2022 00:00) (9 - 18)  SpO2: 99% (17 Sep 2022 00:00) (97% - 100%)    O2 Parameters below as of 16 Sep 2022 16:00  Patient On (Oxygen Delivery Method): ventilator    O2 Concentration (%): 40      I&O's Summary    15 Sep 2022 07:01  -  16 Sep 2022 07:00  --------------------------------------------------------  IN: 891 mL / OUT: 1730 mL / NET: -839 mL    16 Sep 2022 07:01  -  17 Sep 2022 01:59  --------------------------------------------------------  IN: 518 mL / OUT: 2240 mL / NET: -1722 mL      Mode: AC/ CMV (Assist Control/ Continuous Mandatory Ventilation)  RR (machine): 16  TV (machine): 360  FiO2: 40  PEEP: 5  ITime: 1  MAP: 9  PIP: 15      LABS:                        7.6    17.93 )-----------( 257      ( 16 Sep 2022 06:30 )             21.9     09-16    137  |  92<L>  |  103<HH>  ----------------------------<  156<H>  3.8   |  23  |  5.6<HH>    Ca    8.2<L>      16 Sep 2022 06:30  Mg     2.3     09-16    TPro  5.2<L>  /  Alb  2.9<L>  /  TBili  0.3  /  DBili  x   /  AST  82<H>  /  ALT  18  /  AlkPhos  184<H>  09-16    PT/INR - ( 15 Sep 2022 09:20 )   PT: 15.10 sec;   INR: 1.32 ratio         PTT - ( 15 Sep 2022 09:20 )  PTT:30.0 sec    CAPILLARY BLOOD GLUCOSE      POCT Blood Glucose.: 170 mg/dL (16 Sep 2022 23:43)  POCT Blood Glucose.: 189 mg/dL (16 Sep 2022 21:34)  POCT Blood Glucose.: 200 mg/dL (16 Sep 2022 17:20)  POCT Blood Glucose.: 184 mg/dL (16 Sep 2022 11:18)  POCT Blood Glucose.: 198 mg/dL (16 Sep 2022 08:40)  POCT Blood Glucose.: 160 mg/dL (16 Sep 2022 04:34)    ABG - ( 15 Sep 2022 02:52 )  pH, Arterial: 7.43  pH, Blood: x     /  pCO2: 28    /  pO2: 120   / HCO3: 19    / Base Excess: -4.4  /  SaO2: x                   RADIOLOGY & ADDITIONAL TESTS:    Consultant(s) Notes Reviewed:  [x ] YES  [ ] NO    MEDICATIONS  (STANDING):  amLODIPine   Tablet 10 milliGRAM(s) Oral daily  aspirin  chewable 81 milliGRAM(s) Oral daily  atorvastatin 80 milliGRAM(s) Oral at bedtime  chlorhexidine 0.12% Liquid 15 milliLiter(s) Oral Mucosa every 12 hours  chlorhexidine 2% Cloths 1 Application(s) Topical <User Schedule>  dexMEDEtomidine Infusion 0.2 MICROgram(s)/kG/Hr (4.07 mL/Hr) IV Continuous <Continuous>  dextrose 5%. 1000 milliLiter(s) (100 mL/Hr) IV Continuous <Continuous>  dextrose 5%. 1000 milliLiter(s) (50 mL/Hr) IV Continuous <Continuous>  dextrose 50% Injectable 25 Gram(s) IV Push once  dextrose 50% Injectable 12.5 Gram(s) IV Push once  dextrose 50% Injectable 25 Gram(s) IV Push once  furosemide   Injectable 80 milliGRAM(s) IV Push every 12 hours  glucagon  Injectable 1 milliGRAM(s) IntraMuscular once  hydrALAZINE 100 milliGRAM(s) Oral every 8 hours  insulin glargine Injectable (LANTUS) 12 Unit(s) SubCutaneous at bedtime  insulin lispro (ADMELOG) corrective regimen sliding scale   SubCutaneous three times a day before meals  insulin lispro Injectable (ADMELOG) 4 Unit(s) SubCutaneous three times a day before meals  labetalol 600 milliGRAM(s) Oral three times a day  lacosamide IVPB 50 milliGRAM(s) IV Intermittent every 12 hours  levETIRAcetam  Solution 500 milliGRAM(s) Oral two times a day  lidocaine 1%/epinephrine 1:100,000 Inj 20 milliLiter(s) Local Injection once  multivitamin 1 Tablet(s) Oral daily  pantoprazole  Injectable 40 milliGRAM(s) IV Push two times a day  predniSONE   Tablet 60 milliGRAM(s) Oral daily  sevelamer carbonate Powder 2400 milliGRAM(s) Enteral Tube every 8 hours  sodium chloride 0.65% Nasal 2 Spray(s) Both Nostrils two times a day    MEDICATIONS  (PRN):  acetaminophen     Tablet .. 650 milliGRAM(s) Oral every 6 hours PRN Temp greater or equal to 38C (100.4F), Mild Pain (1 - 3)  dextrose Oral Gel 15 Gram(s) Oral once PRN Blood Glucose LESS THAN 70 milliGRAM(s)/deciliter  labetalol Injectable 10 milliGRAM(s) IV Push every 6 hours PRN Systolic blood pressure >  melatonin 3 milliGRAM(s) Oral at bedtime PRN Insomnia      PHYSICAL EXAM:  GENERAL: NAD, lying in bed. Awake, will open eyes and look around but only on L side, gaze does not cross midline  HEAD:  Atraumatic, Normocephalic  EYES: EOMI, sclera clear  ENT: Moist mucous membranes  NECK: Supple. (+)trach in place, clean dressings  CHEST/LUNG: Clear to auscultation anteriorly bilaterally; No rales, rhonchi, wheezing, or rubs. Vented respirations  HEART: Regular rate and rhythm  ABDOMEN: (+)BS; Soft, nondistended  EXTREMITIES:  2+ Radial Pulses, brisk capillary refill. No clubbing, cyanosis. 1(+)pitting edema BLE, difficult to appreciate DP/PT pulses but warm to touch b/l.   NERVOUS SYSTEM:  As noted above.   SKIN: No rashes or lesions      Care Discussed with Consultants/Other Providers [ x] YES  [ ] NO    ASSESSMENT/PLAN  55 y/o woman w PMHx of uncontrolled HTN, DM2, hemorrhagic ?anuerysmal stroke R basal ganglia 2017 w residual L sided weakness, h/o PEG s/p removal, had not seen doctor in >1yr, w/o meds since 2018, presented to ED 8/2/22 for RLE nonhealing wound [id number] and BIB son who noted it that evening, ED triage vitals noted for /170 range.     Admitted for cellulitis, HTN urgency and started on Unasyn, Vanc, insulin drip, IV labetalol, IV vasotec. On 8/5/22 had stroke code w NIHSS 23, for unresponsiveness, concern for seizure w post ictal confusion, found to have ?embolic strokes on MR. S/p debridement of RLE 8/10/22. Developed E cloaca bacteremia, tx w avacaz and aztreonam, has since started on HD. Intubated on 9/6/22 and converted to trach 9/14/22. Uldall placed ?9/14/22 and started on HD. S/p bronchoscopy, tracheostomy, and PEG tube placement 9/14/22. Remains vent dependent.     #Acute ischemic strokes, multifocal  #h/o hemorrhagic, ?aneurysmal stroke R basal ganglia 2017  Per neurology: suspicious for vasculitis, but not confirmed, CSF studies does not support the diagnosis. - Plavix held for LGIB. Aspirin resumed.   - Neuro: 60mg Prednisone taper [id number] starting 9/16/22-10/7/22  - Per stroke team, no need for angio, requested a renal bx when stable as expected widespread vasculitis.   -  keppra   - Neuro check q1h    #Nonoliguric ALF / Urinary retention / Suspected ATN  Renal US 9/2/22: no hydronephrosis  - Cr still high; 9/16/22 Cr 6.8   - Sodium bicarbonate increased to 1300 q8h, sevelamer 2400 mg TID in PEG   - Nephro following  - HD: 9/15/22, 9/16/22 of 2hr session removed 2L     #Bacteremia, resolved   BCx 9/1/22 (+) MDR enterobacter Cloacae   BCx 9/3/22, 9/4/22, 9/5/22, 9/6/22, 9/7/22 all NGF   - 9/12 ceftaz changed to q 24, aztreonam still q12  - Per ID: cont abx until 9/16    #COVID (+) 9/13/22  Tested (+) 9/13/22, in isolation thru 9/23/22   Resp status as noted above  Will monitor for fevers, sepsis  Consider consult ID for further recs    # Purulent Right LE cellulitis   S/p debridement by burn on 8/10/22  - Candida in wound. per Dr. Chua: contaminant  - BCx w/ staph: likely contaminant. repeat BCx at that time was negative  - F/u burn as outpatient [phone]; signed off 9/14/22.     #GI bleed  S/p 6 units of PRBCs. Hb goal 7+, 9/16/22 Hb 7.6  - Pt was seen by GI, external bleeding hemorrhoids noted. Surgery consulted.   - Protonix q12h  - ENT eval appreciated for oral bleed. No actively oozing wounds, teeth guard put in.     - Oral cavity bleed from tongue biting, improved    #Hypertensive urgency due to noncompliance and Malignant HTN   BP on admission 296/174 without signs of end organ damage at that time. Renal artery duplex wnl>>ARIAN unlikely  - Aldosterone wnl, renin elevated  - BP overall improved  - SBP goal 120-160  - c/w amlodipine 10, furosemide 80 q12, hydralazine 100mg q8, labetalol 600mg TID  9/16/22 BPs 150s/70s, HR 70s    # Diabetes mellitus   - HbA1C 10.4  - Transitioned from insulin drip to basal/bolus 9/16/22  - Lantus and lispro sliding scale; increase as necessary    #HLD   - Cont statins     Lines/tubes:   Trach, PEG, R fem Uldall, L PIV x2                                                                              ----------------------------------------------------  # DVT prophylaxis: Holding AC given recent GIB   # GI prophylaxis: Pantoprazole   # Diet: "Nepro at 30 ml/h + 5 packets Beneprotein/d --> 87 gm protein, 1400 kcal, 510 total mg phos, 23 mEq K/d"  # Activity: Bedrest  # Code status: FULL CODE   # Disposition: Floors

## 2022-09-18 NOTE — PROGRESS NOTE ADULT - ASSESSMENT
Impression:    Acute respiratory failure s/p trach  Some bleeding at the trach site resolved after a new suture   Acute blood loss anemia. GI bleed  sp EGD and colonoscopy.    Oral cavity bleed resolved  Altered MS suspected vasculitis SP pulse steroids   LLE cellulitis  ALF oliguric SP RRT  Bleeding from U Dall site SP removal    COVID 19 infection   E Cloacae Bacteremia resolved       PLAN:    CNS;  Steroid taper, f/u with neuro. FU MS.        HEENT: Oral care.  Trach care.      PULMONARY:  HOB @ 45 degrees.  Aspiration precautions. No vent changes. PS wean     CARDIOVASCULAR: Avoid overload.  BP control.      GI: GI prophylaxis.  Peg feeding     RENAL:  follow up lytes.  Correct as needed.  Renal follow up      INFECTIOUS DISEASE: SP ABX therapy.      HEMATOLOGICAL: LE doppler negative.  FU CBC and Coags.  DIC panel.  DC ASA.  FU Groin doppler      ENDOCRINE:  Follow up FS.  Insulin protocol if needed.    MUSCULOSKELETAL:  Bed chair position     DGT SDU     VERY poor prognosis    Wound care per burn

## 2022-09-18 NOTE — PROGRESS NOTE ADULT - SUBJECTIVE AND OBJECTIVE BOX
Patient is a 57y old  Female who presents with a chief complaint of RLE wound (12 Sep 2022 15:26)        Over Night Events:  remains critically ill on MV>  tolerated 3 hours PS.  Some Bleeding from [place]; site overnight.         ROS:     All ROS are negative except HPI         PHYSICAL EXAM    ICU Vital Signs Last 24 Hrs  T(C): 36.2 (18 Sep 2022 04:00), Max: 36.3 (17 Sep 2022 16:00)  T(F): 97.1 (18 Sep 2022 04:00), Max: 97.3 (17 Sep 2022 16:00)  HR: 68 (18 Sep 2022 08:00) (62 - 73)  BP: 136/75 (18 Sep 2022 07:00) (136/75 - 198/96)  BP(mean): 94 (18 Sep 2022 07:00) (94 - 144)  ABP: --  ABP(mean): --  RR: 14 (18 Sep 2022 08:00) (11 - 21)  SpO2: 99% (18 Sep 2022 08:00) (95% - 100%)    O2 Parameters below as of 18 Sep 2022 07:00  Patient On (Oxygen Delivery Method): tracheostomy collar            CONSTITUTIONAL:  IN NAD    ENT:   Airway patent,   Mouth with normal mucosa.   Trach     EYES:   Pupils equal,   Round and reactive to light.    CARDIAC:   Normal rate,   Regular rhythm.        RESPIRATORY:   No wheezing  Bilateral BS  Normal chest expansion  Not tachypneic,  No use of accessory muscles    GASTROINTESTINAL:  Abdomen soft,   Non-tender,   No guarding,   + BS    MUSCULOSKELETAL:   Range of motion is not limited,  No clubbing, cyanosis    NEUROLOGICAL:   Opens eyes   DOes not follow commands       SKIN:   Skin normal color for race,   No evidence of rash.    PSYCHIATRIC:   No apparent risk to self or others.        09-17-22 @ 07:01  -  09-18-22 @ 07:00  --------------------------------------------------------  IN:    Enteral Tube Flush: 50 mL    Nepro with Carb Steady: 660 mL  Total IN: 710 mL    OUT:    Indwelling Catheter - Urethral (mL): 255 mL    Intermittent Catheterization - Urethral (mL): 170 mL  Total OUT: 425 mL    Total NET: 285 mL          LABS:                            7.4    25.17 )-----------( 192      ( 18 Sep 2022 05:27 )             21.5                                               09-18             7.4    25.17 )-----------( 192      ( 09-18 @ 05:27 )             21.5                7.7    25.09 )-----------( 199      ( 09-17 @ 22:24 )             22.6                7.9    23.26 )-----------( 204      ( 09-17 @ 16:51 )             22.9                7.0    22.11 )-----------( 215      ( 09-17 @ 05:23 )             20.3                7.6    17.93 )-----------( 257      ( 09-16 @ 06:30 )             21.9           137  |  94<L>  |  83<HH>  ----------------------------<  153<H>  4.0   |  26  |  4.6<HH>    Ca    8.3<L>      18 Sep 2022 05:27  Phos  8.2     09-18  Mg     2.3     09-18    TPro  5.2<L>  /  Alb  2.8<L>  /  TBili  0.3  /  DBili  x   /  AST  129<H>  /  ALT  19  /  AlkPhos  227<H>  09-18      PT/INR - ( 17 Sep 2022 11:40 )   PT: 15.60 sec;   INR: 1.36 ratio         PTT - ( 17 Sep 2022 11:40 )  PTT:31.1 sec                                                                                     LIVER FUNCTIONS - ( 18 Sep 2022 05:27 )  Alb: 2.8 g/dL / Pro: 5.2 g/dL / ALK PHOS: 227 U/L / ALT: 19 U/L / AST: 129 U/L / GGT: x                                                                                               Mode: AC/ CMV (Assist Control/ Continuous Mandatory Ventilation)  RR (machine): 16  TV (machine): 360  FiO2: 40  PEEP: 8  ITime: 1  MAP: 10  PIP: 22                                          MEDICATIONS  (STANDING):  amLODIPine   Tablet 10 milliGRAM(s) Oral daily  aspirin  chewable 81 milliGRAM(s) Oral daily  atorvastatin 80 milliGRAM(s) Oral at bedtime  chlorhexidine 0.12% Liquid 15 milliLiter(s) Oral Mucosa every 12 hours  chlorhexidine 2% Cloths 1 Application(s) Topical <User Schedule>  dexMEDEtomidine Infusion 0.2 MICROgram(s)/kG/Hr (4.07 mL/Hr) IV Continuous <Continuous>  dextrose 5%. 1000 milliLiter(s) (100 mL/Hr) IV Continuous <Continuous>  dextrose 5%. 1000 milliLiter(s) (50 mL/Hr) IV Continuous <Continuous>  dextrose 50% Injectable 25 Gram(s) IV Push once  dextrose 50% Injectable 12.5 Gram(s) IV Push once  dextrose 50% Injectable 25 Gram(s) IV Push once  furosemide   Injectable 80 milliGRAM(s) IV Push every 12 hours  glucagon  Injectable 1 milliGRAM(s) IntraMuscular once  hydrALAZINE 100 milliGRAM(s) Oral every 8 hours  insulin glargine Injectable (LANTUS) 12 Unit(s) SubCutaneous at bedtime  insulin lispro (ADMELOG) corrective regimen sliding scale   SubCutaneous every 6 hours  insulin lispro Injectable (ADMELOG) 4 Unit(s) SubCutaneous three times a day before meals  labetalol 600 milliGRAM(s) Oral three times a day  lacosamide IVPB 50 milliGRAM(s) IV Intermittent every 12 hours  levETIRAcetam  Solution 500 milliGRAM(s) Oral two times a day  lidocaine 1%/epinephrine 1:100,000 Inj 20 milliLiter(s) Local Injection once  multivitamin 1 Tablet(s) Oral daily  pantoprazole  Injectable 40 milliGRAM(s) IV Push two times a day  predniSONE   Tablet 60 milliGRAM(s) Oral daily  sevelamer carbonate Powder 2400 milliGRAM(s) Enteral Tube every 8 hours  sodium chloride 0.65% Nasal 2 Spray(s) Both Nostrils two times a day    MEDICATIONS  (PRN):  acetaminophen     Tablet .. 650 milliGRAM(s) Oral every 6 hours PRN Temp greater or equal to 38C (100.4F), Mild Pain (1 - 3)  dextrose Oral Gel 15 Gram(s) Oral once PRN Blood Glucose LESS THAN 70 milliGRAM(s)/deciliter  labetalol Injectable 10 milliGRAM(s) IV Push every 6 hours PRN Systolic blood pressure >  melatonin 3 milliGRAM(s) Oral at bedtime PRN Insomnia      New X-rays reviewed:                                                                                  ECHO

## 2022-09-18 NOTE — PROGRESS NOTE ADULT - SUBJECTIVE AND OBJECTIVE BOX
Nephrology progress note    Patient was seen and examined, events over the last 24 h noted .  HD fri    Allergies:  No Known Allergies    Hospital Medications:   MEDICATIONS  (STANDING):  amLODIPine   Tablet 10 milliGRAM(s) Oral daily  atorvastatin 80 milliGRAM(s) Oral at bedtime  chlorhexidine 0.12% Liquid 15 milliLiter(s) Oral Mucosa every 12 hours  chlorhexidine 2% Cloths 1 Application(s) Topical <User Schedule>  cloNIDine 0.1 milliGRAM(s) Oral every 8 hours  dexMEDEtomidine Infusion 0.2 MICROgram(s)/kG/Hr (4.07 mL/Hr) IV Continuous <Continuous>  dextrose 5%. 1000 milliLiter(s) (100 mL/Hr) IV Continuous <Continuous>  dextrose 5%. 1000 milliLiter(s) (50 mL/Hr) IV Continuous <Continuous>  dextrose 50% Injectable 25 Gram(s) IV Push once  dextrose 50% Injectable 12.5 Gram(s) IV Push once  dextrose 50% Injectable 25 Gram(s) IV Push once  furosemide   Injectable 80 milliGRAM(s) IV Push every 12 hours  glucagon  Injectable 1 milliGRAM(s) IntraMuscular once  hydrALAZINE 100 milliGRAM(s) Oral every 8 hours  insulin glargine Injectable (LANTUS) 12 Unit(s) SubCutaneous at bedtime  insulin lispro (ADMELOG) corrective regimen sliding scale   SubCutaneous every 6 hours  insulin lispro Injectable (ADMELOG) 4 Unit(s) SubCutaneous three times a day before meals  labetalol 600 milliGRAM(s) Oral three times a day  lacosamide IVPB 50 milliGRAM(s) IV Intermittent every 12 hours  levETIRAcetam  Solution 500 milliGRAM(s) Oral two times a day  lidocaine 1%/epinephrine 1:100,000 Inj 20 milliLiter(s) Local Injection once  multivitamin 1 Tablet(s) Oral daily  pantoprazole  Injectable 40 milliGRAM(s) IV Push two times a day  predniSONE   Tablet 60 milliGRAM(s) Oral daily  sevelamer carbonate Powder 2400 milliGRAM(s) Enteral Tube every 8 hours  sodium chloride 0.65% Nasal 2 Spray(s) Both Nostrils two times a day        VITALS:  T(F): 97.2 (09-18-22 @ 08:00), Max: 97.2 (09-18-22 @ 08:00)  HR: 64 (09-18-22 @ 16:00)  BP: 136/75 (09-18-22 @ 07:00)  RR: 15 (09-18-22 @ 16:00)  SpO2: 98% (09-18-22 @ 16:00)  Wt(kg): --    09-16 @ 07:01  -  09-17 @ 07:00  --------------------------------------------------------  IN: 998 mL / OUT: 2470 mL / NET: -1472 mL    09-17 @ 07:01  -  09-18 @ 07:00  --------------------------------------------------------  IN: 710 mL / OUT: 425 mL / NET: 285 mL    09-18 @ 07:01  -  09-18 @ 17:05  --------------------------------------------------------  IN: 240 mL / OUT: 150 mL / NET: 90 mL          PHYSICAL EXAM:  trached  unresponsive  LABS:  09-18    137  |  94<L>  |  83<HH>  ----------------------------<  153<H>  4.0   |  26  |  4.6<HH>    Ca    8.3<L>      18 Sep 2022 05:27  Phos  8.2     09-18  Mg     2.3     09-18    TPro  5.2<L>  /  Alb  2.8<L>  /  TBili  0.3  /  DBili      /  AST  129<H>  /  ALT  19  /  AlkPhos  227<H>  09-18                          7.1    25.19 )-----------( 178      ( 18 Sep 2022 11:06 )             21.0       Urine Studies:      RADIOLOGY & ADDITIONAL STUDIES:

## 2022-09-19 NOTE — PROGRESS NOTE ADULT - ATTENDING COMMENTS
Impression:    Acute respiratory failure s/p trach  Some bleeding at the trach site resolved   Acute blood loss anemia. GI bleed  sp EGD and colonoscopy.    Oral cavity bleed resolved  Altered MS suspected vasculitis SP pulse steroids   LLE cellulitis  ALF oliguric SP RRT  Bleeding from U [place] site SP removal    COVID 19 infection   E Cloacae Bacteremia resolved     Plan as outlined above

## 2022-09-19 NOTE — PROGRESS NOTE ADULT - SUBJECTIVE AND OBJECTIVE BOX
Patient is a 57y old  Female who presents with a chief complaint of RLE wound (12 Sep 2022 15:26)      INTERVAL HPI/OVERNIGHT EVENTS:  None    FAMILY HISTORY:  FH: type 2 diabetes mellitus      T(C): 35.8 (09-19-22 @ 04:26), Max: 36.3 (09-18-22 @ 12:00)  HR: 67 (09-19-22 @ 04:26) (64 - 74)  BP: 190/93 (09-19-22 @ 04:26) (129/74 - 190/93)  RR: 16 (09-19-22 @ 04:26) (12 - 17)  SpO2: 97% (09-19-22 @ 04:26) (97% - 99%)  Wt(kg): --Vital Signs Last 24 Hrs  T(C): 35.8 (19 Sep 2022 04:26), Max: 36.3 (18 Sep 2022 12:00)  T(F): 96.5 (19 Sep 2022 04:26), Max: 97.3 (18 Sep 2022 12:00)  HR: 67 (19 Sep 2022 04:26) (64 - 74)  BP: 190/93 (19 Sep 2022 04:26) (129/74 - 190/93)  BP(mean): 120 (19 Sep 2022 00:30) (110 - 125)  RR: 16 (19 Sep 2022 04:26) (12 - 17)  SpO2: 97% (19 Sep 2022 04:26) (97% - 99%)    Parameters below as of 19 Sep 2022 00:30  Patient On (Oxygen Delivery Method): tracheostomy collar        PHYSICAL EXAM:  GENERAL: Obtunded  HEAD:  Atraumatic, Normocephalic  EYES: conjunctiva and sclera clear  ENMT: No tonsillar erythema, exudates, or enlargement; Moist mucous membranes, Good dentition, No lesions  NECK: Supple, No JVD, Normal thyroid  NERVOUS SYSTEM:  Obtunded  CHEST/LUNG: No rales, rhonchi, wheezing, or rubs  HEART: Regular rate and rhythm; No murmurs, rubs, or gallops  ABDOMEN: Soft, Nontender, Nondistended; Bowel sounds present  EXTREMITIES: 1+ bilat LE edema  LYMPH: No lymphadenopathy noted  SKIN: No rashes or lesions      acetaminophen     Tablet .. 650 milliGRAM(s) Oral every 6 hours PRN  amLODIPine   Tablet 10 milliGRAM(s) Oral daily  atorvastatin 80 milliGRAM(s) Oral at bedtime  chlorhexidine 0.12% Liquid 15 milliLiter(s) Oral Mucosa every 12 hours  chlorhexidine 2% Cloths 1 Application(s) Topical <User Schedule>  cloNIDine 0.1 milliGRAM(s) Oral every 8 hours  dexMEDEtomidine Infusion 0.2 MICROgram(s)/kG/Hr IV Continuous <Continuous>  dextrose 5%. 1000 milliLiter(s) IV Continuous <Continuous>  dextrose 5%. 1000 milliLiter(s) IV Continuous <Continuous>  dextrose 50% Injectable 25 Gram(s) IV Push once  dextrose 50% Injectable 12.5 Gram(s) IV Push once  dextrose 50% Injectable 25 Gram(s) IV Push once  dextrose Oral Gel 15 Gram(s) Oral once PRN  furosemide   Injectable 80 milliGRAM(s) IV Push every 12 hours  glucagon  Injectable 1 milliGRAM(s) IntraMuscular once  hydrALAZINE 100 milliGRAM(s) Oral every 8 hours  insulin glargine Injectable (LANTUS) 12 Unit(s) SubCutaneous at bedtime  insulin lispro (ADMELOG) corrective regimen sliding scale   SubCutaneous every 6 hours  insulin lispro Injectable (ADMELOG) 4 Unit(s) SubCutaneous three times a day before meals  labetalol 600 milliGRAM(s) Oral three times a day  labetalol Injectable 10 milliGRAM(s) IV Push every 6 hours PRN  lacosamide IVPB 50 milliGRAM(s) IV Intermittent every 12 hours  levETIRAcetam  Solution 500 milliGRAM(s) Oral two times a day  lidocaine 1%/epinephrine 1:100,000 Inj 20 milliLiter(s) Local Injection once  melatonin 3 milliGRAM(s) Oral at bedtime PRN  multivitamin 1 Tablet(s) Oral daily  pantoprazole  Injectable 40 milliGRAM(s) IV Push two times a day  predniSONE   Tablet 60 milliGRAM(s) Oral daily  sevelamer carbonate Powder 2400 milliGRAM(s) Enteral Tube every 8 hours  sodium chloride 0.65% Nasal 2 Spray(s) Both Nostrils two times a day    Mode: CPAP with PS, FiO2: 40, PEEP: 5, PS: 5, MAP: 8, PIP: 11  HEALTH ISSUES - PROBLEM Dx:  Ischemic stroke    Altered mental status    Anemia    Palliative care by specialist    Bacteremia

## 2022-09-19 NOTE — PROGRESS NOTE ADULT - ASSESSMENT
ALF rule out ATN / relative hypotension/ sepsis / E cloaca bacteremia / HTN ( was on multiple meds )/ CVA/ GIB  sp HD friday  no need for HD today urgently but becoming oliguric  will sign off recall PRN / call using TEAMS or on [phone] surgery for access for HD/ anticipate HD in AM   on lasix 80mg iv q12h cont follow UOP    work up to date is neg for GN ( LUIS FELIPE DsDNA  neg / RF noted/ SPEP with inflammatory pattern )  no thrombocytopenia   phos noted / increased sevelamer to 3 tabs po q8h/ feed nepro / repeat IP noted/ add phoslo one tab po q8h as per previous notes    overall prognosis poor  will follow

## 2022-09-19 NOTE — PROGRESS NOTE ADULT - ASSESSMENT
57 y/o woman w PMHx of uncontrolled HTN, DM2, hemorrhagic ?anuerysmal stroke R basal ganglia 2017 w residual L sided weakness, h/o PEG s/p removal, had not seen doctor in >1yr, w/o meds since 2018, presented to ED 8/2/22 for RLE nonhealing wound [id number] and BIB son who noted it that evening, ED triage vitals noted for /170 range.     Admitted for cellulitis, HTN urgency and started on Unasyn, Vanc, insulin drip, IV labetalol, IV vasotec. On 8/5/22 had stroke code w NIHSS 23, for unresponsiveness, concern for seizure w post ictal confusion, found to have ?embolic strokes on MR. S/p debridement of RLE 8/10/22. Developed E cloaca bacteremia, tx w avacaz and aztreonam, has since started on HD. Intubated on 9/6/22 and converted to trach 9/14/22. [place] placed ?9/14/22 and started on HD. S/p bronchoscopy, tracheostomy, and PEG tube placement 9/14/22. Remains vent dependent.     Downgraded from MICU      Acute respiratory failure s/p trach  Bleeding episodes (at the trach site resolved after a new suture) , Oral cavity bleed resolved , Bleeding from U [place] site SP removal    Acute blood loss anemia. GI bleed  sp EGD and colonoscopy.    Altered MS suspected vasculitis SP pulse steroids   LLE cellulitis  ALF oliguric SP RRT  COVID 19 infection   E Cl[place]ae Bacteremia resolved   uncontrolled blood pressure   Diabetes a1c 10.4  HLD   leukocytosis   normocytic anemia     increased clonidine to 0.2 mg  , c/w lasix ( monitor output) , amlodipine ( might change to nifedipine ,  hydralazine and labetalol     HD as per nephro  ( last HD on Friday ) , c/w sevelamer   give DDAP for possible HD cath placement, consult vascular   DIC panel with high fibrinogen   team discussed with hem likely uremia induced easy bleeding    Steroid taper, f/u with neuro. f/u labs  , c/w keppra and vimpat    Trach care,  Aspiration precautions. vent management as per Pulm cc   s/p antibiotics     LE doppler negative, Groin doppler -ve     off AG or antiplatelet    monitor FS and adjust Insulin ( lantus 12 u and lispro 4 tid)   frequent positioning  C/W statin    transfuse one unite pRBC     GI ppx

## 2022-09-19 NOTE — PROGRESS NOTE ADULT - SUBJECTIVE AND OBJECTIVE BOX
Patient is a 57y old  Female who presents with a chief complaint of RLE wound (12 Sep 2022 15:26)        Over Night Events: no pressors , remain on MV         ROS:     All ROS are negative except HPI         PHYSICAL EXAM    ICU Vital Signs Last 24 Hrs  T(C): 35.8 (19 Sep 2022 04:26), Max: 36.3 (18 Sep 2022 12:00)  T(F): 96.5 (19 Sep 2022 04:26), Max: 97.3 (18 Sep 2022 12:00)  HR: 67 (19 Sep 2022 04:26) (64 - 74)  BP: 190/93 (19 Sep 2022 04:26) (129/74 - 190/93)  BP(mean): 120 (19 Sep 2022 00:30) (110 - 125)  RR: 16 (19 Sep 2022 04:26) (12 - 17)  SpO2: 97% (19 Sep 2022 04:26) (97% - 99%)    O2 Parameters below as of 19 Sep 2022 00:30  Patient On (Oxygen Delivery Method): tracheostomy collar            CONSTITUTIONAL:  ill appearing     ENT:   trach site bleeding        CARDIAC:   Normal rate,   Regular rhythm.    Anasarca            RESPIRATORY:   No wheezing  Bilateral BS  Normal chest expansion  Not tachypneic,  No use of accessory muscles    GASTROINTESTINAL:  Abdomen soft,   Non-tender,   No guarding,   + BS       NEUROLOGICAL:   AAO x0     SKIN:   udol  site bleeding  RLE Stage 4 s/p debridement      09-18-22 @ 07:01  -  09-19-22 @ 07:00  --------------------------------------------------------  IN:    Enteral Tube Flush: 20 mL    IV PiggyBack: 50 mL    Nepro with Carb Steady: 690 mL  Total IN: 760 mL    OUT:    Indwelling Catheter - Urethral (mL): 595 mL  Total OUT: 595 mL    Total NET: 165 mL          LABS:                            7.1    25.19 )-----------( 178      ( 18 Sep 2022 11:06 )             21.0                                               09-18    137  |  94<L>  |  83<HH>  ----------------------------<  153<H>  4.0   |  26  |  4.6<HH>    Ca    8.3<L>      18 Sep 2022 05:27  Phos  8.2     09-18  Mg     2.3     09-18    TPro  5.2<L>  /  Alb  2.8<L>  /  TBili  0.3  /  DBili  x   /  AST  129<H>  /  ALT  19  /  AlkPhos  227<H>  09-18      PT/INR - ( 18 Sep 2022 11:06 )   PT: 15.10 sec;   INR: 1.32 ratio         PTT - ( 18 Sep 2022 11:06 )  PTT:31.2 sec                                                                                     LIVER FUNCTIONS - ( 18 Sep 2022 05:27 )  Alb: 2.8 g/dL / Pro: 5.2 g/dL / ALK PHOS: 227 U/L / ALT: 19 U/L / AST: 129 U/L / GGT: x                                                                                               Mode: CPAP with PS  FiO2: 40  PEEP: 5  PS: 5  MAP: 8  PIP: 11                                          MEDICATIONS  (STANDING):  amLODIPine   Tablet 10 milliGRAM(s) Oral daily  atorvastatin 80 milliGRAM(s) Oral at bedtime  chlorhexidine 0.12% Liquid 15 milliLiter(s) Oral Mucosa every 12 hours  chlorhexidine 2% Cloths 1 Application(s) Topical <User Schedule>  cloNIDine 0.1 milliGRAM(s) Oral every 8 hours  dexMEDEtomidine Infusion 0.2 MICROgram(s)/kG/Hr (4.07 mL/Hr) IV Continuous <Continuous>  dextrose 5%. 1000 milliLiter(s) (100 mL/Hr) IV Continuous <Continuous>  dextrose 5%. 1000 milliLiter(s) (50 mL/Hr) IV Continuous <Continuous>  dextrose 50% Injectable 25 Gram(s) IV Push once  dextrose 50% Injectable 12.5 Gram(s) IV Push once  dextrose 50% Injectable 25 Gram(s) IV Push once  furosemide   Injectable 80 milliGRAM(s) IV Push every 12 hours  glucagon  Injectable 1 milliGRAM(s) IntraMuscular once  hydrALAZINE 100 milliGRAM(s) Oral every 8 hours  insulin glargine Injectable (LANTUS) 12 Unit(s) SubCutaneous at bedtime  insulin lispro (ADMELOG) corrective regimen sliding scale   SubCutaneous every 6 hours  insulin lispro Injectable (ADMELOG) 4 Unit(s) SubCutaneous three times a day before meals  labetalol 600 milliGRAM(s) Oral three times a day  lacosamide IVPB 50 milliGRAM(s) IV Intermittent every 12 hours  levETIRAcetam  Solution 500 milliGRAM(s) Oral two times a day  lidocaine 1%/epinephrine 1:100,000 Inj 20 milliLiter(s) Local Injection once  multivitamin 1 Tablet(s) Oral daily  pantoprazole  Injectable 40 milliGRAM(s) IV Push two times a day  predniSONE   Tablet 60 milliGRAM(s) Oral daily  sevelamer carbonate Powder 2400 milliGRAM(s) Enteral Tube every 8 hours  sodium chloride 0.65% Nasal 2 Spray(s) Both Nostrils two times a day    MEDICATIONS  (PRN):  acetaminophen     Tablet .. 650 milliGRAM(s) Oral every 6 hours PRN Temp greater or equal to 38C (100.4F), Mild Pain (1 - 3)  dextrose Oral Gel 15 Gram(s) Oral once PRN Blood Glucose LESS THAN 70 milliGRAM(s)/deciliter  labetalol Injectable 10 milliGRAM(s) IV Push every 6 hours PRN Systolic blood pressure >  melatonin 3 milliGRAM(s) Oral at bedtime PRN Insomnia        CXR interpreted by me:  B/l infiltrates      Patient is a 57y old  Female who presents with a chief complaint of RLE wound (12 Sep 2022 15:26)        Over Night Events: no pressors , remain on MV         ROS:     All ROS are negative except HPI         PHYSICAL EXAM    ICU Vital Signs Last 24 Hrs  T(C): 35.8 (19 Sep 2022 04:26), Max: 36.3 (18 Sep 2022 12:00)  T(F): 96.5 (19 Sep 2022 04:26), Max: 97.3 (18 Sep 2022 12:00)  HR: 67 (19 Sep 2022 04:26) (64 - 74)  BP: 190/93 (19 Sep 2022 04:26) (129/74 - 190/93)  BP(mean): 120 (19 Sep 2022 00:30) (110 - 125)  RR: 16 (19 Sep 2022 04:26) (12 - 17)  SpO2: 97% (19 Sep 2022 04:26) (97% - 99%)    O2 Parameters below as of 19 Sep 2022 00:30  Patient On (Oxygen Delivery Method): tracheostomy collar            CONSTITUTIONAL:  ill appearing     ENT:   trach site bleeding        CARDIAC:   Normal rate,   Regular rhythm.    Anasarca            RESPIRATORY:   No wheezing  Bilateral BS  Normal chest expansion  Not tachypneic,  No use of accessory muscles    GASTROINTESTINAL:  Abdomen soft,   Non-tender,   No guarding,   + BS       NEUROLOGICAL:   AAO x0     SKIN:   udol  site bleeding  RLE Stage 4 s/p debridement      09-18-22 @ 07:01  -  09-19-22 @ 07:00  --------------------------------------------------------  IN:    Enteral Tube Flush: 20 mL    IV PiggyBack: 50 mL    Nepro with Carb Steady: 690 mL  Total IN: 760 mL    OUT:    Indwelling Catheter - Urethral (mL): 595 mL  Total OUT: 595 mL    Total NET: 165 mL          LABS:                            7.1    25.19 )-----------( 178      ( 18 Sep 2022 11:06 )             21.0                                               09-18    137  |  94<L>  |  83<HH>  ----------------------------<  153<H>  4.0   |  26  |  4.6<HH>    Creatinine Trend  BUN 91, Cr 4.9, (09-19-22 @ 17:45)  Creatinine Trend  BUN 93, Cr 5.3, (09-19-22 @ 10:16)  Creatinine Trend  BUN 83, Cr 4.6, (09-18-22 @ 05:27)  Creatinine Trend  BUN 73, Cr 4.2, (09-17-22 @ 05:23)  Creatinine Trend  , Cr 5.6, (09-16-22 @ 06:30)  Creatinine Trend  , Cr 6.8, (09-15-22 @ 05:49)      Ca    8.3<L>      18 Sep 2022 05:27  Phos  8.2     09-18  Mg     2.3     09-18    TPro  5.2<L>  /  Alb  2.8<L>  /  TBili  0.3  /  DBili  x   /  AST  129<H>  /  ALT  19  /  AlkPhos  227<H>  09-18      PT/INR - ( 18 Sep 2022 11:06 )   PT: 15.10 sec;   INR: 1.32 ratio         PTT - ( 18 Sep 2022 11:06 )  PTT:31.2 sec                                                                                     LIVER FUNCTIONS - ( 18 Sep 2022 05:27 )  Alb: 2.8 g/dL / Pro: 5.2 g/dL / ALK PHOS: 227 U/L / ALT: 19 U/L / AST: 129 U/L / GGT: x                                                                                               Mode: CPAP with PS  FiO2: 40  PEEP: 5  PS: 5  MAP: 8  PIP: 11                                          MEDICATIONS  (STANDING):  amLODIPine   Tablet 10 milliGRAM(s) Oral daily  atorvastatin 80 milliGRAM(s) Oral at bedtime  chlorhexidine 0.12% Liquid 15 milliLiter(s) Oral Mucosa every 12 hours  chlorhexidine 2% Cloths 1 Application(s) Topical <User Schedule>  cloNIDine 0.1 milliGRAM(s) Oral every 8 hours  dexMEDEtomidine Infusion 0.2 MICROgram(s)/kG/Hr (4.07 mL/Hr) IV Continuous <Continuous>  dextrose 5%. 1000 milliLiter(s) (100 mL/Hr) IV Continuous <Continuous>  dextrose 5%. 1000 milliLiter(s) (50 mL/Hr) IV Continuous <Continuous>  dextrose 50% Injectable 25 Gram(s) IV Push once  dextrose 50% Injectable 12.5 Gram(s) IV Push once  dextrose 50% Injectable 25 Gram(s) IV Push once  furosemide   Injectable 80 milliGRAM(s) IV Push every 12 hours  glucagon  Injectable 1 milliGRAM(s) IntraMuscular once  hydrALAZINE 100 milliGRAM(s) Oral every 8 hours  insulin glargine Injectable (LANTUS) 12 Unit(s) SubCutaneous at bedtime  insulin lispro (ADMELOG) corrective regimen sliding scale   SubCutaneous every 6 hours  insulin lispro Injectable (ADMELOG) 4 Unit(s) SubCutaneous three times a day before meals  labetalol 600 milliGRAM(s) Oral three times a day  lacosamide IVPB 50 milliGRAM(s) IV Intermittent every 12 hours  levETIRAcetam  Solution 500 milliGRAM(s) Oral two times a day  lidocaine 1%/epinephrine 1:100,000 Inj 20 milliLiter(s) Local Injection once  multivitamin 1 Tablet(s) Oral daily  pantoprazole  Injectable 40 milliGRAM(s) IV Push two times a day  predniSONE   Tablet 60 milliGRAM(s) Oral daily  sevelamer carbonate Powder 2400 milliGRAM(s) Enteral Tube every 8 hours  sodium chloride 0.65% Nasal 2 Spray(s) Both Nostrils two times a day    MEDICATIONS  (PRN):  acetaminophen     Tablet .. 650 milliGRAM(s) Oral every 6 hours PRN Temp greater or equal to 38C (100.4F), Mild Pain (1 - 3)  dextrose Oral Gel 15 Gram(s) Oral once PRN Blood Glucose LESS THAN 70 milliGRAM(s)/deciliter  labetalol Injectable 10 milliGRAM(s) IV Push every 6 hours PRN Systolic blood pressure >  melatonin 3 milliGRAM(s) Oral at bedtime PRN Insomnia        CXR interpreted by me:  B/l infiltrates

## 2022-09-19 NOTE — PROGRESS NOTE ADULT - SUBJECTIVE AND OBJECTIVE BOX
NUTRITION SUPPORT TEAM  -  PROGRESS NOTE     Interval Events:  + trach, vent  abd soft, +GT   no Maloney  little urine output - no HD today  + Admire  RLE dressed    VITALS:  T(F): 97 (09-19 @ 12:14), Max: 97 (09-19 @ 12:14)  HR: 80 (09-19 @ 12:14) (67 - 80)  BP: 172/90 (09-19 @ 13:00) (161/85 - 190/93)  RR: 20 (09-19 @ 12:14) (16 - 20)  SpO2: 100% (09-19 @ 12:14) (97% - 100%)    HEIGHT/WEIGHT/BMI:   Height (cm): 165.1 (09-14)  Weight (kg): 81.4 (09-14)  BMI (kg/m2): 29.9 (09-14)    I/Os:     09-18-22 @ 07:01  -  09-19-22 @ 07:00  --------------------------------------------------------  IN:    Enteral Tube Flush: 20 mL    IV PiggyBack: 50 mL    Nepro with Carb Steady: 690 mL  Total IN: 760 mL    OUT:    Indwelling Catheter - Urethral (mL): 595 mL  Total OUT: 595 mL    Total NET: 165 mL    STANDING MEDICATIONS:   amLODIPine   Tablet 10 milliGRAM(s) Oral daily  atorvastatin 80 milliGRAM(s) Oral at bedtime  chlorhexidine 0.12% Liquid 15 milliLiter(s) Oral Mucosa every 12 hours  chlorhexidine 2% Cloths 1 Application(s) Topical <User Schedule>  cloNIDine 0.2 milliGRAM(s) Oral every 8 hours  furosemide   Injectable 80 milliGRAM(s) IV Push every 12 hours  hydrALAZINE 100 milliGRAM(s) Oral every 8 hours  insulin glargine Injectable (LANTUS) 12 Unit(s) SubCutaneous at bedtime  insulin lispro (ADMELOG) corrective regimen sliding scale   SubCutaneous every 6 hours  insulin lispro Injectable (ADMELOG) 4 Unit(s) SubCutaneous three times a day before meals  labetalol 600 milliGRAM(s) Oral three times a day  lacosamide IVPB 50 milliGRAM(s) IV Intermittent every 12 hours  levETIRAcetam  Solution 500 milliGRAM(s) Oral two times a day  lidocaine 1%/epinephrine 1:100,000 Inj 20 milliLiter(s) Local Injection once  multivitamin 1 Tablet(s) Oral daily  pantoprazole  Injectable 40 milliGRAM(s) IV Push two times a day  predniSONE   Tablet 60 milliGRAM(s) Oral daily  sevelamer carbonate Powder 2400 milliGRAM(s) Enteral Tube every 8 hours  sodium chloride 0.65% Nasal 2 Spray(s) Both Nostrils two times a day      LABS:                         7.0    28.97 )-----------( 177      ( 19 Sep 2022 10:16 )             20.7     135  |  92<L>  |  93<HH>  ----------------------------<  123<H>          (09-19-22 @ 10:16)  4.7   |  23  |  5.3<HH>    Ca    9.4          (09-19-22 @ 10:16)  Phos  8.9         (09-19-22 @ 10:16)  Mg     2.5         (09-19-22 @ 10:16)    TPro  5.4<L>  /  Alb  3.0<L>  /  TBili  0.3  /  DBili  x   /  AST  154<H>  /  ALT  19  /  AlkPhos  227<H>       09-19-22 @ 10:16      Triglycerides, Serum: 240 mg/dL (08-03 @ 08:56)    Folate: 11.3 ng/mL (08-08 @ 17:53)    Vitamin B12, Serum: 358 pg/mL (08-08 @ 17:53)    Blood Glucose (Past 24 hours):  142 mg/dL (09-19 @ 12:44)  133 mg/dL (09-19 @ 08:13)  126 mg/dL (09-19 @ 05:08)  103 mg/dL (09-18 @ 23:11)  116 mg/dL (09-18 @ 18:24)      DIET:   Diet, NPO with Tube Feed:   Tube Feeding Modality: Gastrostomy  Nepro with Carb Steady  Total Volume for 24 Hours (mL): 720  Continuous  Starting Tube Feed Rate mL per Hour: 30  Until Goal Tube Feed Rate (mL per Hour): 30  Tube Feed Duration (in Hours): 24  Tube Feed Start Time: 10:00  Beneprotein      Qty per Day:  5    (09-16-22 @ 14:26) [Active]

## 2022-09-19 NOTE — PROGRESS NOTE ADULT - ASSESSMENT
55 y/o woman w PMHx of uncontrolled HTN, DM2, hemorrhagic ?anuerysmal stroke R basal ganglia 2017 w residual L sided weakness, h/o PEG s/p removal, had not seen doctor in >1yr, w/o meds since 2018, presented to ED 8/2/22 for RLE nonhealing wound [id number] and BIB son who noted it that evening, ED triage vitals noted for /170 range.     Admitted for cellulitis, HTN urgency and started on Unasyn, Vanc, insulin drip, IV labetalol, IV vasotec. On 8/5/22 had stroke code w NIHSS 23, for unresponsiveness, concern for seizure w post ictal confusion, found to have ?embolic strokes on MR. S/p debridement of RLE 8/10/22. Developed E cloaca bacteremia, tx w avacaz and aztreonam, has since started on HD. Intubated on 9/6/22 and converted to trach 9/14/22. Uldall placed ?9/14/22 and started on HD -> [place] removed (when?). S/p bronchoscopy, tracheostomy, and PEG tube placement 9/14/22. Remains vent dependent.     #Acute ischemic strokes, multifocal  #h/o hemorrhagic, ?aneurysmal stroke R basal ganglia 2017  Per neurology: suspicious for vasculitis, but not confirmed, CSF studies does not support the diagnosis. - Plavix held for LGIB. Aspirin resumed.   - Neuro: 60mg Prednisone taper [id number] starting 9/16/22-10/7/22  - Per stroke team, no need for angio, requested a renal bx when stable as expected widespread vasculitis.   - [facility]   - Neuro check q1h    #Nonoliguric ALF / Urinary retention / Suspected ATN  Renal US 9/2/22: no hydronephrosis  - Cr still high; 9/16/22 Cr 6.8   - Sodium bicarbonate increased to 1300 q8h, sevelamer 2400 mg TID in PEG   - Nephro following  - HD: 9/15/22, 9/16/22 of 2hr session removed 2L     #Bacteremia, resolved   BCx 9/1/22 (+) MDR enterobacter Cloacae   BCx 9/3/22, 9/4/22, 9/5/22, 9/6/22, 9/7/22 all NGF   - 9/12 ceftaz changed to q 24, aztreonam still q12  - Per ID: cont abx until 9/16    #COVID (+) 9/13/22  Tested (+) 9/13/22, in isolation thru 9/23/22   Resp status as noted above  Will monitor for fevers, sepsis  Consider consult ID for further recs    # Purulent Right LE cellulitis   S/p debridement by burn on 8/10/22  - Candida in wound. per Dr. Chua: contaminant  - BCx w/ staph: likely contaminant. repeat BCx at that time was negative  - F/u burn as outpatient [phone]; signed off 9/14/22.     #GI bleed  S/p 6 units of PRBCs. Hb goal 7+, 9/16/22 Hb 7.6  - Pt was seen by GI, external bleeding hemorrhoids noted. Surgery consulted.   - Protonix q12h  - ENT eval appreciated for oral bleed. No actively oozing wounds, teeth guard put in.     - Oral cavity bleed from tongue biting, improved    #Hypertensive urgency due to noncompliance and Malignant HTN   BP on admission 296/174 without signs of end organ damage at that time. Renal artery duplex wnl>>ARIAN unlikely  - Aldosterone wnl, renin elevated  - BP overall improved  - SBP goal 120-160  - c/w amlodipine 10, furosemide 80 q12, hydralazine 100mg q8, labetalol 600mg TID  9/16/22 BPs 150s/70s, HR 70s    # Diabetes mellitus   - HbA1C 10.4  - Transitioned from insulin drip to basal/bolus 9/16/22  - Lantus and lispro sliding scale; increase as necessary    #HLD   - Cont statins     Lines/tubes:   Trach, PEG, L PIV x2                                                                              ----------------------------------------------------  # DVT prophylaxis: Holding AC given recent GIB   # GI prophylaxis: Pantoprazole   # Diet: "Nepro at 30 ml/h + 5 packets Beneprotein/d --> 87 gm protein, 1400 kcal, 510 total mg phos, 23 mEq K/d"  # Activity: Bedrest  # Code status: FULL CODE   # Disposition: Floors      57 y/o woman w PMHx of uncontrolled HTN, DM2, hemorrhagic ?anuerysmal stroke R basal ganglia 2017 w residual L sided weakness, h/o PEG s/p removal, had not seen doctor in >1yr, w/o meds since 2018, presented to ED 8/2/22 for RLE nonhealing wound [id number] and BIB son who noted it that evening, ED triage vitals noted for /170 range.     Admitted for cellulitis, HTN urgency and started on Unasyn, Vanc, insulin drip, IV labetalol, IV vasotec. On 8/5/22 had stroke code w NIHSS 23, for unresponsiveness, concern for seizure w post ictal confusion, found to have ?embolic strokes on MR. S/p debridement of RLE 8/10/22. Developed E cloaca bacteremia, tx w avacaz and aztreonam, has since started on HD. Intubated on 9/6/22 and converted to trach 9/14/22. Uldall placed ?9/14/22 and started on HD -> [place] removed (when?). S/p bronchoscopy, tracheostomy, and PEG tube placement 9/14/22. Remains vent dependent.     #Acute ischemic strokes, multifocal  #h/o hemorrhagic, ?aneurysmal stroke R basal ganglia 2017  Per neurology: suspicious for vasculitis, but not confirmed, CSF studies does not support the diagnosis. - Plavix held for LGIB. Aspirin resumed.   - Neuro: 60mg Prednisone taper [id number] starting 9/16/22-10/7/22  - Per stroke team, no need for angio, requested a renal bx when stable as expected widespread vasculitis.   - [facility]   - Neuro check q1h    #Nonoliguric ALF / Urinary retention / Suspected ATN  Renal US 9/2/22: no hydronephrosis  - Cr still high; 9/16/22 Cr 6.8   - Sodium bicarbonate increased to 1300 q8h, sevelamer 2400 mg TID in PEG   - Nephro following  - HD: 9/15/22, 9/16/22 of 2hr session removed 2L     #Bacteremia, resolved   BCx 9/1/22 (+) MDR enterobacter Cloacae   BCx 9/3/22, 9/4/22, 9/5/22, 9/6/22, 9/7/22 all NGF   - 9/12 ceftaz changed to q 24, aztreonam still q12  - Per ID: cont abx until 9/16    #COVID (+) 9/13/22  Tested (+) 9/13/22, in isolation thru 9/23/22   Resp status as noted above  Will monitor for fevers, sepsis  Consider consult ID for further recs    # Purulent Right LE cellulitis   S/p debridement by burn on 8/10/22  - Candida in wound. per Dr. Chua: contaminant  - BCx w/ staph: likely contaminant. repeat BCx at that time was negative  - F/u burn as outpatient [phone]; signed off 9/14/22.     #GI bleed  S/p 6 units of PRBCs. Hb goal 7+, 9/16/22 Hb 7.6  - Pt was seen by GI, external bleeding hemorrhoids noted. Surgery consulted.   - Protonix q12h  - ENT eval appreciated for oral bleed. No actively oozing wounds, teeth guard put in.     - Oral cavity bleed from tongue biting, improved  - Hgb 7 on 9/19. Likey 2/2 to platelet dysfunction from uremia  - Restarting desmopressin 9/19  - Ordered 1 unit of PRBC    #Hypertensive urgency due to noncompliance and Malignant HTN   BP on admission 296/174 without signs of end organ damage at that time. Renal artery duplex wnl>>ARIAN unlikely  - Aldosterone wnl, renin elevated  - BP overall improved  - SBP goal 120-160  - c/w amlodipine 10, furosemide 80 q12, hydralazine 100mg q8, labetalol 600mg TID  9/16/22 BPs 150s/70s, HR 70s    # Diabetes mellitus   - HbA1C 10.4  - Transitioned from insulin drip to basal/bolus 9/16/22  - Lantus and lispro sliding scale; increase as necessary    #HLD   - Cont statins     Lines/tubes:   Trach, PEG, L PIV x2                                                                              ----------------------------------------------------  # DVT prophylaxis: Holding AC given recent GIB   # GI prophylaxis: Pantoprazole   # Diet: "Nepro at 30 ml/h + 5 packets Beneprotein/d --> 87 gm protein, 1400 kcal, 510 total mg phos, 23 mEq K/d"  # Activity: Bedrest  # Code status: FULL CODE   # Disposition: Floors

## 2022-09-19 NOTE — PROGRESS NOTE ADULT - SUBJECTIVE AND OBJECTIVE BOX
Nephrology progress note    THIS IS AN INCOMPLETE NOTE . FULL NOTE TO FOLLOW SHORTLY    Patient is seen and examined, events over the last 24 h noted .    Allergies:  No Known Allergies    Hospital Medications:   MEDICATIONS  (STANDING):  amLODIPine   Tablet 10 milliGRAM(s) Oral daily  atorvastatin 80 milliGRAM(s) Oral at bedtime  chlorhexidine 0.12% Liquid 15 milliLiter(s) Oral Mucosa every 12 hours  chlorhexidine 2% Cloths 1 Application(s) Topical <User Schedule>  cloNIDine 0.2 milliGRAM(s) Oral every 8 hours  dexMEDEtomidine Infusion 0.2 MICROgram(s)/kG/Hr (4.07 mL/Hr) IV Continuous <Continuous>  dextrose 5%. 1000 milliLiter(s) (100 mL/Hr) IV Continuous <Continuous>  dextrose 5%. 1000 milliLiter(s) (50 mL/Hr) IV Continuous <Continuous>  dextrose 50% Injectable 25 Gram(s) IV Push once  dextrose 50% Injectable 12.5 Gram(s) IV Push once  dextrose 50% Injectable 25 Gram(s) IV Push once  furosemide   Injectable 80 milliGRAM(s) IV Push every 12 hours  glucagon  Injectable 1 milliGRAM(s) IntraMuscular once  hydrALAZINE 100 milliGRAM(s) Oral every 8 hours  insulin glargine Injectable (LANTUS) 12 Unit(s) SubCutaneous at bedtime  insulin lispro (ADMELOG) corrective regimen sliding scale   SubCutaneous every 6 hours  insulin lispro Injectable (ADMELOG) 4 Unit(s) SubCutaneous three times a day before meals  labetalol 600 milliGRAM(s) Oral three times a day  lacosamide IVPB 50 milliGRAM(s) IV Intermittent every 12 hours  levETIRAcetam  Solution 500 milliGRAM(s) Oral two times a day  lidocaine 1%/epinephrine 1:100,000 Inj 20 milliLiter(s) Local Injection once  multivitamin 1 Tablet(s) Oral daily  pantoprazole  Injectable 40 milliGRAM(s) IV Push two times a day  predniSONE   Tablet 60 milliGRAM(s) Oral daily  sevelamer carbonate Powder 2400 milliGRAM(s) Enteral Tube every 8 hours  sodium chloride 0.65% Nasal 2 Spray(s) Both Nostrils two times a day        VITALS:  T(F): 96.5 (09-19-22 @ 04:26), Max: 97.3 (09-18-22 @ 12:00)  HR: 67 (09-19-22 @ 04:26)  BP: 190/93 (09-19-22 @ 04:26)  RR: 16 (09-19-22 @ 04:26)  SpO2: 97% (09-19-22 @ 04:26)  Wt(kg): --    09-17 @ 07:01  -  09-18 @ 07:00  --------------------------------------------------------  IN: 710 mL / OUT: 425 mL / NET: 285 mL    09-18 @ 07:01  -  09-19 @ 07:00  --------------------------------------------------------  IN: 760 mL / OUT: 595 mL / NET: 165 mL          PHYSICAL EXAM:  Constitutional: NAD  HEENT: anicteric sclera, oropharynx clear, MMM  Neck: No JVD  Respiratory: CTAB, no wheezes, rales or rhonchi  Cardiovascular: S1, S2, RRR  Gastrointestinal: BS+, soft, NT/ND  Extremities: No cyanosis or clubbing. No peripheral edema  :  No nolasco.   Skin: No rashes    LABS:  09-18    137  |  94<L>  |  83<HH>  ----------------------------<  153<H>  4.0   |  26  |  4.6<HH>    Ca    8.3<L>      18 Sep 2022 05:27  Phos  8.2     09-18  Mg     2.3     09-18    TPro  5.2<L>  /  Alb  2.8<L>  /  TBili  0.3  /  DBili      /  AST  129<H>  /  ALT  19  /  AlkPhos  227<H>  09-18                          7.1    25.19 )-----------( 178      ( 18 Sep 2022 11:06 )             21.0       Urine Studies:        Ferritin 243      [08-28-22 @ 05:49]  PTH -- (Ca 8.5)      [09-05-22 @ 20:00]   75  TSH 0.86      [08-08-22 @ 17:53]  Lipid: chol 234, , HDL 42, LDL --      [08-03-22 @ 08:56]    HBsAb <3.0      [09-15-22 @ 12:05]  HBsAb Nonreact      [09-15-22 @ 12:05]  HBsAg Nonreact      [09-15-22 @ 12:05]  HBcAb Nonreact      [09-15-22 @ 12:05]  HIV Nonreact      [09-09-22 @ 06:11]    LUIS FELIPE: titer Negative, pattern --      [09-03-22 @ 12:04]  dsDNA <12      [08-30-22 @ 19:27]  C3 Complement 134      [09-09-22 @ 06:11]  C4 Complement 33      [09-09-22 @ 06:11]  Rheumatoid Factor <10      [08-19-22 @ 11:37]  ANCA: cANCA Negative, pANCA Negative, atypical ANCA Negative      [08-30-22 @ 19:27]  Syphilis Screen (Treponema Pallidum Ab) Negative      [08-08-22 @ 17:53]  Immunofixation Serum:   No Monoclonal Band Identified    Reference Range: None Detected      [08-30-22 @ 23:46]  SPEP Interpretation: Pattern Consistent With Acute Inflammation Or Stress      [08-30-22 @ 23:46]  Cryoglobulin: Negative      [09-09-22 @ 06:11]      RADIOLOGY & ADDITIONAL STUDIES:   Nephrology progress note  Patient is seen and examined, events over the last 24 h noted .  Lying in bed  trached     Allergies:  No Known Allergies    Hospital Medications:   MEDICATIONS  (STANDING):    amLODIPine   Tablet 10 milliGRAM(s) Oral daily  atorvastatin 80 milliGRAM(s) Oral at bedtime  cloNIDine 0.2 milliGRAM(s) Oral every 8 hours  dexMEDEtomidine Infusion 0.2 MICROgram(s)/kG/Hr (4.07 mL/Hr) IV Continuous <Continuous>  furosemide   Injectable 80 milliGRAM(s) IV Push every 12 hours  glucagon  Injectable 1 milliGRAM(s) IntraMuscular once  hydrALAZINE 100 milliGRAM(s) Oral every 8 hours  insulin glargine Injectable (LANTUS) 12 Unit(s) SubCutaneous at bedtime  insulin lispro (ADMELOG) corrective regimen sliding scale   SubCutaneous every 6 hours  insulin lispro Injectable (ADMELOG) 4 Unit(s) SubCutaneous three times a day before meals  labetalol 600 milliGRAM(s) Oral three times a day  lacosamide IVPB 50 milliGRAM(s) IV Intermittent every 12 hours  levETIRAcetam  Solution 500 milliGRAM(s) Oral two times a day  lidocaine 1%/epinephrine 1:100,000 Inj 20 milliLiter(s) Local Injection once  multivitamin 1 Tablet(s) Oral daily  pantoprazole  Injectable 40 milliGRAM(s) IV Push two times a day  predniSONE   Tablet 60 milliGRAM(s) Oral daily  sevelamer carbonate Powder 2400 milliGRAM(s) Enteral Tube every 8 hours  sodium chloride 0.65% Nasal 2 Spray(s) Both Nostrils two times a day        VITALS:  T(F): 96.5 (09-19-22 @ 04:26), Max: 97.3 (09-18-22 @ 12:00)  HR: 67 (09-19-22 @ 04:26)  BP: 190/93 (09-19-22 @ 04:26)  RR: 16 (09-19-22 @ 04:26)  SpO2: 97% (09-19-22 @ 04:26)      09-17 @ 07:01  -  09-18 @ 07:00  --------------------------------------------------------  IN: 710 mL / OUT: 425 mL / NET: 285 mL    09-18 @ 07:01  -  09-19 @ 07:00  --------------------------------------------------------  IN: 760 mL / OUT: 595 mL / NET: 165 mL          PHYSICAL EXAM:  Constitutional: NAD  Neck: No JVD  Respiratory: CTAB, no wheezes, rales or rhonchi  Cardiovascular: S1, S2, RRR  Gastrointestinal: BS+, soft, NT/ND  Extremities: No cyanosis or clubbing. No peripheral edema  :  No nolasco.   Skin: No rashes    LABS:  09-19    135  |  92<L>  |  x   ----------------------------<  123<H>  4.7   |  23  |  x     Ca    9.4      19 Sep 2022 10:16  Phos  8.9     09-19  Mg     2.5     09-19    TPro  5.4<L>  /  Alb  3.0<L>  /  TBili  0.3  /  DBili  x   /  AST  154<H>  /  ALT  19  /  AlkPhos  227<H>  09-19 09-18    137  |  94<L>  |  83<HH>  ----------------------------<  153<H>  4.0   |  26  |  4.6<HH>    Ca    8.3<L>      18 Sep 2022 05:27  Phos  8.2     09-18  Mg     2.3     09-18    TPro  5.2<L>  /  Alb  2.8<L>  /  TBili  0.3  /  DBili      /  AST  129<H>  /  ALT  19  /  AlkPhos  227<H>  09-18                          7.1    25.19 )-----------( 178      ( 18 Sep 2022 11:06 )             21.0       Urine Studies:        Ferritin 243      [08-28-22 @ 05:49]  PTH -- (Ca 8.5)      [09-05-22 @ 20:00]   75  TSH 0.86      [08-08-22 @ 17:53]  Lipid: chol 234, , HDL 42, LDL --      [08-03-22 @ 08:56]    HBsAb <3.0      [09-15-22 @ 12:05]  HBsAb Nonreact      [09-15-22 @ 12:05]  HBsAg Nonreact      [09-15-22 @ 12:05]  HBcAb Nonreact      [09-15-22 @ 12:05]  HIV Nonreact      [09-09-22 @ 06:11]    LUIS FELIPE: titer Negative, pattern --      [09-03-22 @ 12:04]  dsDNA <12      [08-30-22 @ 19:27]  C3 Complement 134      [09-09-22 @ 06:11]  C4 Complement 33      [09-09-22 @ 06:11]  Rheumatoid Factor <10      [08-19-22 @ 11:37]  ANCA: cANCA Negative, pANCA Negative, atypical ANCA Negative      [08-30-22 @ 19:27]  Syphilis Screen (Treponema Pallidum Ab) Negative      [08-08-22 @ 17:53]  Immunofixation Serum:   No Monoclonal Band Identified    Reference Range: None Detected      [08-30-22 @ 23:46]  SPEP Interpretation: Pattern Consistent With Acute Inflammation Or Stress      [08-30-22 @ 23:46]  Cryoglobulin: Negative      [09-09-22 @ 06:11]      RADIOLOGY & ADDITIONAL STUDIES:

## 2022-09-19 NOTE — PROGRESS NOTE ADULT - ASSESSMENT
Impression:    Acute respiratory failure s/p trach  Some bleeding at the trach site resolved after a new suture   Acute blood loss anemia. GI bleed  sp EGD and colonoscopy.    Oral cavity bleed resolved  Altered MS suspected vasculitis SP pulse steroids   LLE cellulitis  ALF oliguric SP RRT  Bleeding from U Dall site SP removal    COVID 19 infection   E Cl[place]ae Bacteremia resolved       PLAN:    CNS;  Steroid taper, f/u with neuro. FU MS.        HEENT: Oral care.  Trach care.      PULMONARY:  HOB @ 45 degrees.  Aspiration precautions. No vent changes. PS wean     CARDIOVASCULAR: Avoid overload.  BP control.      GI: GI prophylaxis.  Peg feeding     RENAL:  follow up lytes.  Correct as needed.  Renal follow up  regarding HD , vascular f/ udoll     INFECTIOUS DISEASE: SP ABX therapy.      HEMATOLOGICAL: LE doppler negative.  FU CBC and Coags.  DIC panel reviewed.  DC ASA.   Groin doppler -ve      ENDOCRINE:  Follow up FS.  Insulin protocol if needed.    MUSCULOSKELETAL:  Bed chair position     SDU     VERY poor prognosis    Wound care per burn     GOC      Impression:    Acute respiratory failure s/p trach  Some bleeding at the trach site resolved   Acute blood loss anemia. GI bleed  sp EGD and colonoscopy.    Oral cavity bleed resolved  Altered MS suspected vasculitis SP pulse steroids   LLE cellulitis  ALF oliguric SP RRT  Bleeding from U Dall site SP removal    COVID 19 infection   E Cl[place]ae Bacteremia resolved       PLAN:    CNS;  Steroid taper, f/u with neuro. FU MS.        HEENT: Oral care.  Trach care.      PULMONARY:  HOB @ 45 degrees.  Aspiration precautions. No vent changes. PS wean 6-8 hours     CARDIOVASCULAR: Avoid overload.  BP control.      GI: GI prophylaxis.  Peg feeding     RENAL:  follow up lytes.  Correct as needed.  Renal follow up  regarding HD , vascular f/ udoll     INFECTIOUS DISEASE: SP ABX therapy.      HEMATOLOGICAL: LE doppler negative.  FU CBC and Coags.  DIC panel reviewed.  DC ASA.   Groin doppler -ve      ENDOCRINE:  Follow up FS.  Insulin protocol if needed.    MUSCULOSKELETAL:  Bed chair position     SDU     VERY poor prognosis    Wound care per burn     GOC

## 2022-09-19 NOTE — PROGRESS NOTE ADULT - ASSESSMENT
IMP:  57 yo F with PMH of HTN, DM2, and CVA in 2017, who presented for RLE cellulitis now s/p debridement, s/p PEG placement 8/24, pending tracheostomy placement then plans for dialysis.  - acute/ chronic resp failure  - RLE cellulitis/ DFU  - uncontrolled DM  - embolic strokes  - ALF - now on HD  - s/p wound debridement by Burn team  - covid + 9/13, now off isolation  - GI bleed r/t hemorrrhoids s/p 6u PRBC      estimated REE by PSE 1378 k/d, est protein ~ 85+ gm/d  Suggest:  - Nepro at 30 ml/h + 5 packets Beneprotein/d --> 87 gm protein, 1400 kcal, 510 total mg phos, 23 mEq K/d  - will adjust feeds depending on tolerance and continuity of HD  - f/u and document BMs  - need to document that all the Beneprotein is given daily

## 2022-09-19 NOTE — PROGRESS NOTE ADULT - SUBJECTIVE AND OBJECTIVE BOX
T H I S   I S    N O  T   A    F I N A L I Z E D   N O T JOSE MALONEY  57y, Female  Allergy: No Known Allergies    Hospital Day: 48d    Patient seen and examined earlier today.     PMH/PSH:  PAST MEDICAL & SURGICAL HISTORY:  Hypertension      Diabetes mellitus      No significant past surgical history          LAST 24-Hr EVENTS:    VITALS:  T(F): 96.5 (09-19-22 @ 04:26), Max: 97.3 (09-18-22 @ 12:00)  HR: 67 (09-19-22 @ 04:26)  BP: 190/93 (09-19-22 @ 04:26) (129/74 - 190/93)  RR: 16 (09-19-22 @ 04:26)  SpO2: 97% (09-19-22 @ 04:26)  FiO2: 40        TESTS & MEASUREMENTS:  Weight/BMI  81.4 (09-14-22 @ 14:41)  29.9 (09-14-22 @ 14:41)    09-17-22 @ 07:01  -  09-18-22 @ 07:00  --------------------------------------------------------  IN: 710 mL / OUT: 425 mL / NET: 285 mL    09-18-22 @ 07:01  -  09-19-22 @ 07:00  --------------------------------------------------------  IN: 760 mL / OUT: 595 mL / NET: 165 mL                            7.0    28.97 )-----------( 177      ( 19 Sep 2022 10:16 )             20.7     PT/INR - ( 18 Sep 2022 11:06 )   PT: 15.10 sec;   INR: 1.32 ratio         PTT - ( 18 Sep 2022 11:06 )  PTT:31.2 sec  INR: 1.32 ratio (09-18-22 @ 11:06)  INR: 1.36 ratio (09-17-22 @ 11:40)    09-18    137  |  94<L>  |  83<HH>  ----------------------------<  153<H>  4.0   |  26  |  4.6<HH>    Ca    8.3<L>      18 Sep 2022 05:27  Phos  8.2     09-18  Mg     2.3     09-18    TPro  5.2<L>  /  Alb  2.8<L>  /  TBili  0.3  /  DBili  x   /  AST  129<H>  /  ALT  19  /  AlkPhos  227<H>  09-18    LIVER FUNCTIONS - ( 18 Sep 2022 05:27 )  Alb: 2.8 g/dL / Pro: 5.2 g/dL / ALK PHOS: 227 U/L / ALT: 19 U/L / AST: 129 U/L / GGT: x                     D-Dimer Assay, Quantitative: 231 ng/mL DDU (09-18-22 @ 11:06)  D-Dimer Assay, Quantitative: 438 ng/mL DDU (09-13-22 @ 13:00)        COVID-19 PCR: Detected (09-13-22 @ 18:48)        A1C with Estimated Average Glucose Result: 10.4 % (08-06-22 @ 06:59)  A1C with Estimated Average Glucose Result: 10.8 % (08-03-22 @ 08:56)      Indwelling Urethral Catheter:     Connect To:  Straight Drainage/Gravity    Indication:  Urine Output Monitoring in Critically Ill (09-18-22 @ 04:17) (not performed)  Indwelling Urethral Catheter:     Connect To:  Straight Drainage/Gravity    Indication:  Urine Output Monitoring in Critically Ill (09-17-22 @ 02:40) (not performed)      RADIOLOGY, ECG, & ADDITIONAL TESTS:  12 Lead ECG:   Ventricular Rate 63 BPM    Atrial Rate 63 BPM    P-R Interval 162 ms    QRS Duration 76 ms    Q-T Interval 422 ms    QTC Calculation(Bazett) 431 ms    P Axis 51 degrees    R Axis 36 degrees    T Axis 112 degrees    Diagnosis Line Normal sinus rhythm  Anteroseptal infarct , age undetermined  Abnormal ECG    Confirmed by Robert Bauer ([id number]) on 8/27/2022 11:37:29 AM (08-26-22 @ 20:22)      RECENT DIAGNOSTIC ORDERS:  Comprehensive Metabolic Panel: 16:00 (09-19-22 @ 10:15)  Blood Gas Arterial - Lytes,iCa,Lact: AM Sched. Collection:19-Sep-2022 04:30 (09-19-22 @ 00:01)  Phosphorus Level, Serum: AM Sched. Collection: 19-Sep-2022 04:30 (09-19-22 @ 00:01)      MEDICATIONS:  MEDICATIONS  (STANDING):  amLODIPine   Tablet 10 milliGRAM(s) Oral daily  atorvastatin 80 milliGRAM(s) Oral at bedtime  chlorhexidine 0.12% Liquid 15 milliLiter(s) Oral Mucosa every 12 hours  chlorhexidine 2% Cloths 1 Application(s) Topical <User Schedule>  cloNIDine 0.2 milliGRAM(s) Oral every 8 hours  dextrose 5%. 1000 milliLiter(s) (100 mL/Hr) IV Continuous <Continuous>  dextrose 5%. 1000 milliLiter(s) (50 mL/Hr) IV Continuous <Continuous>  dextrose 50% Injectable 25 Gram(s) IV Push once  dextrose 50% Injectable 12.5 Gram(s) IV Push once  dextrose 50% Injectable 25 Gram(s) IV Push once  furosemide   Injectable 80 milliGRAM(s) IV Push every 12 hours  glucagon  Injectable 1 milliGRAM(s) IntraMuscular once  hydrALAZINE 100 milliGRAM(s) Oral every 8 hours  insulin glargine Injectable (LANTUS) 12 Unit(s) SubCutaneous at bedtime  insulin lispro (ADMELOG) corrective regimen sliding scale   SubCutaneous every 6 hours  insulin lispro Injectable (ADMELOG) 4 Unit(s) SubCutaneous three times a day before meals  labetalol 600 milliGRAM(s) Oral three times a day  lacosamide IVPB 50 milliGRAM(s) IV Intermittent every 12 hours  levETIRAcetam  Solution 500 milliGRAM(s) Oral two times a day  lidocaine 1%/epinephrine 1:100,000 Inj 20 milliLiter(s) Local Injection once  multivitamin 1 Tablet(s) Oral daily  pantoprazole  Injectable 40 milliGRAM(s) IV Push two times a day  predniSONE   Tablet 60 milliGRAM(s) Oral daily  sevelamer carbonate Powder 2400 milliGRAM(s) Enteral Tube every 8 hours  sodium chloride 0.65% Nasal 2 Spray(s) Both Nostrils two times a day    MEDICATIONS  (PRN):  acetaminophen     Tablet .. 650 milliGRAM(s) Oral every 6 hours PRN Temp greater or equal to 38C (100.4F), Mild Pain (1 - 3)  dextrose Oral Gel 15 Gram(s) Oral once PRN Blood Glucose LESS THAN 70 milliGRAM(s)/deciliter  labetalol Injectable 10 milliGRAM(s) IV Push every 6 hours PRN Systolic blood pressure >  melatonin 3 milliGRAM(s) Oral at bedtime PRN Insomnia      HOME MEDICATIONS:  losartan 50 mg oral tablet (08-02)  metFORMIN 500 mg oral tablet (08-02)  metoprolol succinate 50 mg oral tablet, extended release (08-02)      PHYSICAL EXAM:  GENERAL:   CHEST/LUNG:   HEART:   ABDOMEN:   EXTREMITIES:               MITCHELLJOSE  57y, Female  Allergy: No Known Allergies    Hospital Day: 48d    Patient seen and examined earlier today. patient is non verbal, on mechanical vent     PMH/PSH:  PAST MEDICAL & SURGICAL HISTORY:  Hypertension      Diabetes mellitus      No significant past surgical history          LAST 24-Hr EVENTS:    VITALS:  T(F): 96.5 (09-19-22 @ 04:26), Max: 97.3 (09-18-22 @ 12:00)  HR: 67 (09-19-22 @ 04:26)  BP: 190/93 (09-19-22 @ 04:26) (129/74 - 190/93)  RR: 16 (09-19-22 @ 04:26)  SpO2: 97% (09-19-22 @ 04:26)  FiO2: 40        TESTS & MEASUREMENTS:  Weight/BMI  81.4 (09-14-22 @ 14:41)  29.9 (09-14-22 @ 14:41)    09-17-22 @ 07:01  -  09-18-22 @ 07:00  --------------------------------------------------------  IN: 710 mL / OUT: 425 mL / NET: 285 mL    09-18-22 @ 07:01  -  09-19-22 @ 07:00  --------------------------------------------------------  IN: 760 mL / OUT: 595 mL / NET: 165 mL                            7.0    28.97 )-----------( 177      ( 19 Sep 2022 10:16 )             20.7     PT/INR - ( 18 Sep 2022 11:06 )   PT: 15.10 sec;   INR: 1.32 ratio         PTT - ( 18 Sep 2022 11:06 )  PTT:31.2 sec  INR: 1.32 ratio (09-18-22 @ 11:06)  INR: 1.36 ratio (09-17-22 @ 11:40)    09-18    137  |  94<L>  |  83<HH>  ----------------------------<  153<H>  4.0   |  26  |  4.6<HH>    Ca    8.3<L>      18 Sep 2022 05:27  Phos  8.2     09-18  Mg     2.3     09-18    TPro  5.2<L>  /  Alb  2.8<L>  /  TBili  0.3  /  DBili  x   /  AST  129<H>  /  ALT  19  /  AlkPhos  227<H>  09-18    LIVER FUNCTIONS - ( 18 Sep 2022 05:27 )  Alb: 2.8 g/dL / Pro: 5.2 g/dL / ALK PHOS: 227 U/L / ALT: 19 U/L / AST: 129 U/L / GGT: x                     D-Dimer Assay, Quantitative: 231 ng/mL DDU (09-18-22 @ 11:06)  D-Dimer Assay, Quantitative: 438 ng/mL DDU (09-13-22 @ 13:00)        COVID-19 PCR: Detected (09-13-22 @ 18:48)        A1C with Estimated Average Glucose Result: 10.4 % (08-06-22 @ 06:59)  A1C with Estimated Average Glucose Result: 10.8 % (08-03-22 @ 08:56)      Indwelling Urethral Catheter:     Connect To:  Straight Drainage/Gravity    Indication:  Urine Output Monitoring in Critically Ill (09-18-22 @ 04:17) (not performed)  Indwelling Urethral Catheter:     Connect To:  Straight Drainage/Gravity    Indication:  Urine Output Monitoring in Critically Ill (09-17-22 @ 02:40) (not performed)      RADIOLOGY, ECG, & ADDITIONAL TESTS:  12 Lead ECG:   Ventricular Rate 63 BPM    Atrial Rate 63 BPM    P-R Interval 162 ms    QRS Duration 76 ms    Q-T Interval 422 ms    QTC Calculation(Bazett) 431 ms    P Axis 51 degrees    R Axis 36 degrees    T Axis 112 degrees    Diagnosis Line Normal sinus rhythm  Anteroseptal infarct , age undetermined  Abnormal ECG    Confirmed by Robert Bauer ([id number]) on 8/27/2022 11:37:29 AM (08-26-22 @ 20:22)      RECENT DIAGNOSTIC ORDERS:  Comprehensive Metabolic Panel: 16:00 (09-19-22 @ 10:15)  Blood Gas Arterial - Lytes,iCa,Lact: AM Sched. Collection:19-Sep-2022 04:30 (09-19-22 @ 00:01)  Phosphorus Level, Serum: AM Sched. Collection: 19-Sep-2022 04:30 (09-19-22 @ 00:01)      MEDICATIONS:  MEDICATIONS  (STANDING):  amLODIPine   Tablet 10 milliGRAM(s) Oral daily  atorvastatin 80 milliGRAM(s) Oral at bedtime  chlorhexidine 0.12% Liquid 15 milliLiter(s) Oral Mucosa every 12 hours  chlorhexidine 2% Cloths 1 Application(s) Topical <User Schedule>  cloNIDine 0.2 milliGRAM(s) Oral every 8 hours  dextrose 5%. 1000 milliLiter(s) (100 mL/Hr) IV Continuous <Continuous>  dextrose 5%. 1000 milliLiter(s) (50 mL/Hr) IV Continuous <Continuous>  dextrose 50% Injectable 25 Gram(s) IV Push once  dextrose 50% Injectable 12.5 Gram(s) IV Push once  dextrose 50% Injectable 25 Gram(s) IV Push once  furosemide   Injectable 80 milliGRAM(s) IV Push every 12 hours  glucagon  Injectable 1 milliGRAM(s) IntraMuscular once  hydrALAZINE 100 milliGRAM(s) Oral every 8 hours  insulin glargine Injectable (LANTUS) 12 Unit(s) SubCutaneous at bedtime  insulin lispro (ADMELOG) corrective regimen sliding scale   SubCutaneous every 6 hours  insulin lispro Injectable (ADMELOG) 4 Unit(s) SubCutaneous three times a day before meals  labetalol 600 milliGRAM(s) Oral three times a day  lacosamide IVPB 50 milliGRAM(s) IV Intermittent every 12 hours  levETIRAcetam  Solution 500 milliGRAM(s) Oral two times a day  lidocaine 1%/epinephrine 1:100,000 Inj 20 milliLiter(s) Local Injection once  multivitamin 1 Tablet(s) Oral daily  pantoprazole  Injectable 40 milliGRAM(s) IV Push two times a day  predniSONE   Tablet 60 milliGRAM(s) Oral daily  sevelamer carbonate Powder 2400 milliGRAM(s) Enteral Tube every 8 hours  sodium chloride 0.65% Nasal 2 Spray(s) Both Nostrils two times a day    MEDICATIONS  (PRN):  acetaminophen     Tablet .. 650 milliGRAM(s) Oral every 6 hours PRN Temp greater or equal to 38C (100.4F), Mild Pain (1 - 3)  dextrose Oral Gel 15 Gram(s) Oral once PRN Blood Glucose LESS THAN 70 milliGRAM(s)/deciliter  labetalol Injectable 10 milliGRAM(s) IV Push every 6 hours PRN Systolic blood pressure >  melatonin 3 milliGRAM(s) Oral at bedtime PRN Insomnia      HOME MEDICATIONS: not confirmec   losartan 50 mg oral tablet (08-02)  metFORMIN 500 mg oral tablet (08-02)  metoprolol succinate 50 mg oral tablet, extended release (08-02)      PHYSICAL EXAM:  GENERAL: patient is non verbal, not communicative  not following command , closing her eyes  on mechanical vent  via trach   CHEST/LUNG: scattered rhonchi b/l   HEART: regular rhythm   ABDOMEN:  soft, distended, non tender   EXTREMITIES:  + edema

## 2022-09-20 NOTE — PROGRESS NOTE ADULT - SUBJECTIVE AND OBJECTIVE BOX
Patient is a 57y old  Female who presents with a chief complaint of RLE wound (12 Sep 2022 15:26)      T(F): 99.3 (09-20-22 @ 07:10), Max: 99.3 (09-20-22 @ 07:10)  HR: 68 (09-20-22 @ 12:51)  BP: 141/71 (09-20-22 @ 06:50)  RR: 16 (09-20-22 @ 07:10)  SpO2: 99% (09-20-22 @ 12:51) (97% - 100%)    PHYSICAL EXAM:  GENERAL: NAD  HEAD:  Atraumatic, Normocephalic  EYES: EOMI, PERRLA, conjunctiva and sclera clear  NERVOUS SYSTEM:  Alert & Oriented X3, no focal deficits   CHEST/LUNG: Clear to percussion bilaterally; No rales, rhonchi, wheezing, or rubs  HEART: Regular rate and rhythm; No murmurs, rubs, or gallops  ABDOMEN: Soft, Nontender, Nondistended; Bowel sounds present  EXTREMITIES:  2+ Peripheral Pulses, No clubbing, cyanosis, or edema    LABS  09-20    130<L>  |  88<L>  |  97<HH>  ----------------------------<  122<H>  5.3<H>   |  21  |  5.6<HH>    Ca    8.4      20 Sep 2022 05:39  Phos  8.9     09-19  Mg     2.5     09-20    TPro  5.5<L>  /  Alb  2.6<L>  /  TBili  0.2  /  DBili  x   /  AST  176<H>  /  ALT  20  /  AlkPhos  245<H>  09-20                          6.9    26.46 )-----------( 162      ( 20 Sep 2022 05:39 )             20.7     PT/INR - ( 20 Sep 2022 10:00 )   PT: 21.10 sec;   INR: 1.85 ratio         PTT - ( 20 Sep 2022 10:00 )  PTT:36.4 sec  Mode: CPAP with PS  FiO2: 40  PEEP: 5  PS: 5  MAP: 8  PIP: 12    CARDIAC ENZYMES          Culture Results:   No Growth Final (09-07-22)  Culture Results:   No Growth Final (09-06-22)  Culture Results:   No Growth Final (09-05-22)  Culture Results:   No Growth Final (09-04-22)  Culture Results:   No Growth Final (09-03-22)  Culture Results:   Growth in aerobic and anaerobic bottles: Enterobacter cloacae (Carbapenem  Resistant)  ***Blood Panel PCR results on this specimen are available  approximately 3 hours after the Gram stain result.***  Gram stain, PCR, and/or culture results may notalways  correspond due to difference in methodologies.  ************************************************************  This PCR assay was performed by multiplex PCR. This  Assay tests for 66 bacterial and resistance gene targets.  Please refer to the [facility] test directory  at [email or web address] for details. (09-01-22)  Culture Results:   >=3 organisms. Probable collection contamination. (09-01-22)  Culture Results:   >=3 organisms. Probable collection contamination. (08-31-22)  Culture Results:   No Growth Final (08-30-22)  Culture Results:   No growth at 1 week. (08-26-22)    RADIOLOGY    MEDICATIONS  (STANDING):  amLODIPine   Tablet 10 milliGRAM(s) Oral daily  atorvastatin 80 milliGRAM(s) Oral at bedtime  chlorhexidine 0.12% Liquid 15 milliLiter(s) Oral Mucosa every 12 hours  chlorhexidine 2% Cloths 1 Application(s) Topical <User Schedule>  cloNIDine 0.2 milliGRAM(s) Oral every 8 hours  dextrose 5%. 1000 milliLiter(s) (50 mL/Hr) IV Continuous <Continuous>  dextrose 5%. 1000 milliLiter(s) (100 mL/Hr) IV Continuous <Continuous>  dextrose 50% Injectable 25 Gram(s) IV Push once  dextrose 50% Injectable 12.5 Gram(s) IV Push once  dextrose 50% Injectable 25 Gram(s) IV Push once  furosemide   Injectable 80 milliGRAM(s) IV Push every 12 hours  glucagon  Injectable 1 milliGRAM(s) IntraMuscular once  hydrALAZINE 100 milliGRAM(s) Oral every 8 hours  insulin glargine Injectable (LANTUS) 12 Unit(s) SubCutaneous at bedtime  insulin lispro (ADMELOG) corrective regimen sliding scale   SubCutaneous every 6 hours  insulin lispro Injectable (ADMELOG) 4 Unit(s) SubCutaneous three times a day before meals  labetalol 600 milliGRAM(s) Oral three times a day  lacosamide IVPB 50 milliGRAM(s) IV Intermittent every 12 hours  levETIRAcetam  Solution 500 milliGRAM(s) Oral two times a day  lidocaine 1%/epinephrine 1:100,000 Inj 20 milliLiter(s) Local Injection once  multivitamin 1 Tablet(s) Oral daily  pantoprazole  Injectable 40 milliGRAM(s) IV Push two times a day  predniSONE   Tablet 60 milliGRAM(s) Oral daily  sevelamer carbonate Powder 2400 milliGRAM(s) Enteral Tube every 8 hours  sodium chloride 0.65% Nasal 2 Spray(s) Both Nostrils two times a day    MEDICATIONS  (PRN):  acetaminophen     Tablet .. 650 milliGRAM(s) Oral every 6 hours PRN Temp greater or equal to 38C (100.4F), Mild Pain (1 - 3)  dextrose Oral Gel 15 Gram(s) Oral once PRN Blood Glucose LESS THAN 70 milliGRAM(s)/deciliter  fentaNYL    Injectable 25 MICROGram(s) IV Push every 5 minutes PRN procedural sedation  labetalol Injectable 10 milliGRAM(s) IV Push every 6 hours PRN Systolic blood pressure >  melatonin 3 milliGRAM(s) Oral at bedtime PRN Insomnia  propofol Injectable 2 milliGRAM(s) IV Push once PRN sedation      Patient seen and evaluated this am, pt appears comfortable on ventilator      T(F): 99.3 (09-20-22 @ 07:10), Max: 99.3 (09-20-22 @ 07:10)  HR: 68 (09-20-22 @ 12:51)  BP: 141/71 (09-20-22 @ 06:50)  RR: 16 (09-20-22 @ 07:10)  SpO2: 99% (09-20-22 @ 12:51) (97% - 100%)    PHYSICAL EXAM:  GENERAL: NAD  HEAD:  Atraumatic, Normocephalic - blood around trach site   EYES: EOMI, PERRLA, conjunctiva and sclera clear  NERVOUS SYSTEM:  no focal deficits   CHEST/LUNG:  bilateral rhonchi  HEART: Regular rate and rhythm; No murmurs, rubs, or gallops  ABDOMEN: Soft, Nontender, Nondistended; Bowel sounds present, blood in area of previous  R femoral line   EXTREMITIES:  2+ Peripheral Pulses, No clubbing, cyanosis, or edema    LABS  09-20    130<L>  |  88<L>  |  97<HH>  ----------------------------<  122<H>  5.3<H>   |  21  |  5.6<HH>    Ca    8.4      20 Sep 2022 05:39  Phos  8.9     09-19  Mg     2.5     09-20    TPro  5.5<L>  /  Alb  2.6<L>  /  TBili  0.2  /  DBili  x   /  AST  176<H>  /  ALT  20  /  AlkPhos  245<H>  09-20                          6.9    26.46 )-----------( 162      ( 20 Sep 2022 05:39 )             20.7     PT/INR - ( 20 Sep 2022 10:00 )   PT: 21.10 sec;   INR: 1.85 ratio         PTT - ( 20 Sep 2022 10:00 )  PTT:36.4 sec  Mode: CPAP with PS  FiO2: 40  PEEP: 5    Culture Results:   No Growth Final (09-07-22)  Culture Results:   No Growth Final (09-06-22)  Culture Results:   No Growth Final (09-05-22)  Culture Results:   No Growth Final (09-04-22)  Culture Results:   No Growth Final (09-03-22)  Culture Results:   Growth in aerobic and anaerobic bottles: Enterobacter cloacae (Carbapenem  Resistant)  ***Blood Panel PCR results on this specimen are available  approximately 3 hours after the Gram stain result.***  Gram stain, PCR, and/or culture results may notalways  correspond due to difference in methodologies.  ************************************************************  This PCR assay was performed by multiplex PCR. This  Assay tests for 66 bacterial and resistance gene targets.  Please refer to the [facility] test directory  at [email or web address] for details. (09-01-22)  Culture Results:   >=3 organisms. Probable collection contamination. (09-01-22)  Culture Results:   >=3 organisms. Probable collection contamination. (08-31-22)  Culture Results:   No Growth Final (08-30-22)  Culture Results:   No growth at 1 week. (08-26-22)    RADIOLOGY  < from: Xray Chest 1 View- PORTABLE-Routine (09.19.22 @ 08:09) >  IMPRESSION:    Support devices: Stable tracheostomy tube.    Cardiac/mediastinum/hilum: Stable.    Lung parenchyma/Pleura: Stable to mild increase in bilateral   opacities/effusions. No pneumothorax.    Skeleton/soft tissues: Stable.      < end of copied text >    MEDICATIONS  (STANDING):  amLODIPine   Tablet 10 milliGRAM(s) Oral daily  atorvastatin 80 milliGRAM(s) Oral at bedtime  chlorhexidine 0.12% Liquid 15 milliLiter(s) Oral Mucosa every 12 hours  chlorhexidine 2% Cloths 1 Application(s) Topical <User Schedule>  cloNIDine 0.2 milliGRAM(s) Oral every 8 hours  furosemide   Injectable 80 milliGRAM(s) IV Push every 12 hours  hydrALAZINE 100 milliGRAM(s) Oral every 8 hours  insulin glargine Injectable (LANTUS) 12 Unit(s) SubCutaneous at bedtime  insulin lispro (ADMELOG) corrective regimen sliding scale   SubCutaneous every 6 hours  insulin lispro Injectable (ADMELOG) 4 Unit(s) SubCutaneous three times a day before meals  labetalol 600 milliGRAM(s) Oral three times a day  lacosamide IVPB 50 milliGRAM(s) IV Intermittent every 12 hours  levETIRAcetam  Solution 500 milliGRAM(s) Oral two times a day  lidocaine 1%/epinephrine 1:100,000 Inj 20 milliLiter(s) Local Injection once  multivitamin 1 Tablet(s) Oral daily  pantoprazole  Injectable 40 milliGRAM(s) IV Push two times a day  predniSONE   Tablet 60 milliGRAM(s) Oral daily  sevelamer carbonate Powder 2400 milliGRAM(s) Enteral Tube every 8 hours  sodium chloride 0.65% Nasal 2 Spray(s) Both Nostrils two times a day    MEDICATIONS  (PRN):  acetaminophen     Tablet .. 650 milliGRAM(s) Oral every 6 hours PRN Temp greater or equal to 38C (100.4F), Mild Pain (1 - 3)  dextrose Oral Gel 15 Gram(s) Oral once PRN Blood Glucose LESS THAN 70 milliGRAM(s)/deciliter  fentaNYL    Injectable 25 MICROGram(s) IV Push every 5 minutes PRN procedural sedation  labetalol Injectable 10 milliGRAM(s) IV Push every 6 hours PRN Systolic blood pressure >  melatonin 3 milliGRAM(s) Oral at bedtime PRN Insomnia  propofol Injectable 2 milliGRAM(s) IV Push once PRN sedation

## 2022-09-20 NOTE — CHART NOTE - NSCHARTNOTEFT_GEN_A_CORE
This is a transfer pt from [place].  Pt has encephalopathy secondary to acute strokes.  pt's s/p trache w/labored breathing this evening.  Additionally pt is bleeding around trache site and bleeding in trache tube.   Vent settings show pt not to be pulling  enough tidal volume. but pt's saturating @ 99%.  Attempt to bag pt was tense & difficult.  on auscultation there is no wheezing.  Anesthesia was called to attempt to orally intubate as there may be occulsion in trache tube.  Upon anesthesia's attempt to orally intubate pt a large blood clot was expelled from pt's mouth.  afterwards pt breathing improved and pt began to pill in adequate tidal volume. (>400ml) . In regards to bleeding @ trache site and in trache tube pt will receive 1 donor unit platelets when available and desompressin IV. for uremic bleeding.  Pt's condition & prognosis is very  poor.  Attending Nik was @ bed side and aware of all events and management of this pt. This is a transfer pt from [place].  Pt has encephalopathy secondary to acute strokes.  pt's s/p trache w/labored breathing this evening.  Additionally pt is bleeding around trache site and bleeding in trache tube.   Vent settings show pt not to be pulling  enough tidal volume. but pt's saturating @ 99%.  Attempt to bag pt was tense & difficult.  on auscultation there is no wheezing.  Anesthesia was called to attempt to orally intubate as there may be occulsion in trache tube.  Upon anesthesia's attempt to orally intubate pt a large blood clot was expelled from pt's mouth.  afterwards pt breathing improved and pt began to pill in adequate tidal volume. (>400ml)   Portable CXR also was done to r/o pneumothx .  CXR ordered did not show any acute pathology. In regards to bleeding @ trache site and in trache tube pt will receive 1 donor unit platelets when available and desompressin IV. for uremic bleeding.  Pt's condition & prognosis is very  poor.  Attending Nik was @ bed side and aware of all events and management of this pt. This is a transfer pt from [place].  Pt has encephalopathy secondary to acute strokes.  pt's s/p trache w/labored breathing this evening.  Additionally pt is bleeding around trache site and bleeding in trache tube.   Vent settings show pt not to be pulling  enough tidal volume. but pt's saturating @ 99%.  Attempt to bag pt was tense & difficult.  on auscultation there is no wheezing.  Anesthesia was called to attempt to orally intubate as there may be occulsion in trache tube.  Upon anesthesia's attempt to orally intubate pt a large blood clot was expelled from pt's mouth.  afterwards pt breathing improved and pt began to pull in adequate tidal volume. (>400ml)   Portable CXR also was done to r/o pneumothx .  CXR ordered did not show any acute pathology. In regards to bleeding @ trache site and in trache tube pt will receive 1 donor unit platelets when available and desompressin IV. for uremic bleeding.  Pt's condition & prognosis is very  poor.  Attending Nik was @ bed side and aware of all events and management of this pt.

## 2022-09-20 NOTE — PROGRESS NOTE ADULT - ASSESSMENT
Impression:    Acute respiratory failure s/p trach  Some bleeding at the trach site resolved   Acute blood loss anemia. GI bleed  sp EGD and colonoscopy.    Oral cavity bleed resolved  Altered MS suspected vasculitis SP pulse steroids   LLE cellulitis  ALF oliguric SP RRT  Bleeding from U Dall site SP removal    COVID 19 infection   E Cloacae Bacteremia resolved       PLAN:    CNS;  Steroid taper, f/u with neuro. FU MS.      recall neurology   HEENT: Oral care.  Trach care.      PULMONARY:  HOB @ 45 degrees.  Aspiration precautions. No vent changes. PS wean 6-8 hours     CARDIOVASCULAR: Avoid overload.  BP control.      GI: GI prophylaxis.  Peg feeding     RENAL:  follow up lytes.  Correct as needed.  Renal follow up  regarding HD , if need HD   surgery for HD     INFECTIOUS DISEASE: SP ABX therapy.      HEMATOLOGICAL: LE doppler negative.  FU CBC and Coags.  DIC panel reviewed.  DC ASA.   Groin doppler -ve      ENDOCRINE:  Follow up FS.  Insulin protocol if needed.    MUSCULOSKELETAL:  Bed chair position     SDU     VERY poor prognosis    Wound care per burn     GOC      Impression:    Acute respiratory failure s/p trach  Some bleeding at the trach site resolved   Acute blood loss anemia. GI bleed  sp EGD and colonoscopy.    Oral cavity bleed resolved  Altered MS suspected vasculitis SP pulse steroids   LLE cellulitis  ALF oliguric SP RRT  Bleeding from U Dall site SP removal    COVID 19 infection   E Cloacae Bacteremia resolved       PLAN:    CNS;  Steroid taper, f/u with neuro. FU MS.      recall neurology   HEENT: Oral care.  Trach care.      PULMONARY:  HOB @ 45 degrees.  Aspiration precautions. No vent changes. PS wean 6-8 hours     CARDIOVASCULAR: Avoid overload.  BP control.      GI: GI prophylaxis.  Peg feeding     RENAL:  follow up lytes.  Correct as needed.  Renal follow up  regarding HD , if need HD   surgery for HD     INFECTIOUS DISEASE: SP ABX therapy.      HEMATOLOGICAL: LE doppler negative.  FU CBC and Coags.  DIC panel reviewed.  DC ASA.   Groin doppler -ve    transfuse 2 unit prbc   serial cbc   place pressure site of udall       ENDOCRINE:  Follow up FS.  Insulin protocol if needed.  hold DDAvp   MUSCULOSKELETAL:  Bed chair position     SDU     VERY poor prognosis    Wound care per burn     GOC      Impression:    Acute respiratory failure s/p trach  Some bleeding at the trach site resolved   Acute blood loss anemia. GI bleed  sp EGD and colonoscopy.    Oral cavity bleed resolved  Altered MS suspected vasculitis SP pulse steroids   LLE cellulitis  ALF oliguric SP RRT  Bleeding from U Dall site SP removal    COVID 19 infection   E Cloacae Bacteremia resolved       PLAN: chart reviewed     CNS;  Steroid taper, f/u with neuro. FU MS.      recall neurology   HEENT: Oral care.  Trach care.      PULMONARY:  HOB @ 45 degrees.  Aspiration precautions. No vent changes. PS wean 6-8 hours     CARDIOVASCULAR: Avoid overload.  BP control.      GI: GI prophylaxis.  Peg feeding     RENAL:  follow up lytes.  Correct as needed.  Renal follow up  regarding HD , if need HD   surgery for HD     INFECTIOUS DISEASE: SP ABX therapy.      HEMATOLOGICAL: LE doppler negative.  FU CBC and Coags.  DIC panel reviewed.  DC ASA.   Groin doppler -ve    transfuse 2 unit prbc   serial cbc   place pressure site of udall       ENDOCRINE:  Follow up FS.  Insulin protocol if needed.  hold DDAvp   MUSCULOSKELETAL:  Bed chair position     SDU     VERY poor prognosis    Wound care per burn     GOC

## 2022-09-20 NOTE — PROGRESS NOTE ADULT - ASSESSMENT
57 y/o woman w PMHx of uncontrolled HTN, DM2, hemorrhagic ?anuerysmal stroke R basal ganglia 2017 w residual L sided weakness, h/o PEG s/p removal, had not seen doctor in >1yr, w/o meds since 2018, presented to ED 8/2/22 for RLE nonhealing wound [id number], ED triage vitals noted for /170 range.   \Admitted for cellulitis, HTN urgency and started on Unasyn, Vanc, insulin drip, IV labetalol, IV vasotec. On 8/5/22 had stroke code w NIHSS 23, for unresponsiveness, concern for seizure w post ictal confusion, found to have ?embolic strokes on MR. S/p debridement of RLE 8/10/22. Developed E cloaca bacteremia, tx w avacaz and aztreonam, has since started on HD. Intubated on 9/6/22 and converted to trach 9/14/22. Uldall placed ?9/14/22 and started on HD. R femoral [place] removed on 9/19 for bleeding, S/p bronchoscopy, tracheostomy, and PEG tube placement 9/14/22. Remains vent dependent.     Transferred to [facility] ICU yesterday    Acute respiratory failure s/p trach / Bleeding episodes - trach, oral cavity and udall sites / Acute blood loss anemia. GI bleed  sp EGD and colonoscopy.    Altered MS suspected vasculitis SP pulse steroids  / LLE cellulitis / ALF oliguric SP RRT / COVID 19 infection  / E Cloacae Bacteremia resolved  / uncontrolled blood pressure   Diabetes a1c 10.4 / HLD      - I placed new R IJ HD catheter - see procedure note   - HD again today - nephrology following   - IV Lasix - some urine output today    - transfuse 2 units PRBC and follow H/H   - Keppra, lacosamide   - prednisone taper   - sq insulin hospital protocol     -  hydralazine, clonidine, Norvasc, Lasix, Lipitor   - s/p antibiotics        - off antiplatelet     - frequent positioning    GI ppx

## 2022-09-20 NOTE — PROGRESS NOTE ADULT - ASSESSMENT
ALF rule out ATN / relative hypotension/ sepsis / E cloaca bacteremia / HTN ( was on multiple meds )/ CVA/ GIB  sp HD friday / udall removed  oliguric, creat up-trending / hyponatremia and mild hyperkalemia d/t worsening renal function  plan for ICU team to place udall / will order HD today, 3hrs, 2K, 2-3L UF as tolerated  on lasix 80mg iv q12h cont follow UOP    work up to date is neg for GN ( LUIS FELIPE DsDNA  neg / RF noted/ SPEP with inflammatory pattern )  no thrombocytopenia   phos noted / cont sevelamer / add phoslo one tab po q8h  overall prognosis poor  will follow

## 2022-09-20 NOTE — PROGRESS NOTE ADULT - SUBJECTIVE AND OBJECTIVE BOX
Nephrology Progress Note    JOSE MITCHELL  MRN-[id number]  57y  Female    S:  Patient is seen and examined, events over the last 24h noted.    O:  Allergies:  No Known Allergies    Hospital Medications:   MEDICATIONS  (STANDING):  amLODIPine   Tablet 10 milliGRAM(s) Oral daily  atorvastatin 80 milliGRAM(s) Oral at bedtime  furosemide   Injectable 80 milliGRAM(s) IV Push every 12 hours  glucagon  Injectable 1 milliGRAM(s) IntraMuscular once  hydrALAZINE 100 milliGRAM(s) Oral every 8 hours  insulin glargine Injectable (LANTUS) 12 Unit(s) SubCutaneous at bedtime  insulin lispro (ADMELOG) corrective regimen sliding scale   SubCutaneous every 6 hours  insulin lispro Injectable (ADMELOG) 4 Unit(s) SubCutaneous three times a day before meals  labetalol 600 milliGRAM(s) Oral three times a day  lacosamide IVPB 50 milliGRAM(s) IV Intermittent every 12 hours  levETIRAcetam  Solution 500 milliGRAM(s) Oral two times a day  lidocaine 1%/epinephrine 1:100,000 Inj 20 milliLiter(s) Local Injection once  multivitamin 1 Tablet(s) Oral daily  pantoprazole  Injectable 40 milliGRAM(s) IV Push two times a day  predniSONE   Tablet 60 milliGRAM(s) Oral daily  sevelamer carbonate Powder 2400 milliGRAM(s) Enteral Tube every 8 hours  sodium chloride 0.65% Nasal 2 Spray(s) Both Nostrils two times a day    MEDICATIONS  (PRN):  acetaminophen     Tablet .. 650 milliGRAM(s) Oral every 6 hours PRN Temp greater or equal to 38C (100.4F), Mild Pain (1 - 3)  dextrose Oral Gel 15 Gram(s) Oral once PRN Blood Glucose LESS THAN 70 milliGRAM(s)/deciliter  fentaNYL    Injectable 25 MICROGram(s) IV Push every 5 minutes PRN procedural sedation  labetalol Injectable 10 milliGRAM(s) IV Push every 6 hours PRN Systolic blood pressure >  melatonin 3 milliGRAM(s) Oral at bedtime PRN Insomnia  propofol Injectable 2 milliGRAM(s) IV Push once PRN sedation    Home Medications:  losartan 50 mg oral tablet: 1 tab(s) orally once a day (02 Aug 2022 10:38)  metFORMIN 500 mg oral tablet: 1 tab(s) orally 2 times a day (02 Aug 2022 10:38)  metoprolol succinate 50 mg oral tablet, extended release: 1 tab(s) orally once a day (02 Aug 2022 10:38)      VITALS:  Daily Height in cm: 165.1 (19 Sep 2022 23:31)    Daily Weight in k.3 (19 Sep 2022 23:31)  T(F): 99.3 (22 @ 07:10), Max: 99.3 (22 @ 07:10)  HR: 70 (22 @ 07:40)  BP: 141/71 (22 @ 06:50)  RR: 16 (22 @ 07:10)  SpO2: 98% (22 @ 07:40)  Wt(kg): --  I&O's Detail    19 Sep 2022 07:  -  20 Sep 2022 07:00  --------------------------------------------------------  IN:    Nepro with Carb Steady: 150 mL  Total IN: 150 mL    OUT:    Indwelling Catheter - Urethral (mL): 50 mL    Intermittent Catheterization - Urethral (mL): 150 mL    Voided (mL): 20 mL  Total OUT: 220 mL    Total NET: -70 mL      20 Sep 2022 07:01  -  20 Sep 2022 12:27  --------------------------------------------------------  IN:    PRBCs (Packed Red Blood Cells): 346 mL  Total IN: 346 mL    OUT:  Total OUT: 0 mL    Total NET: 346 mL        I&O's Summary    19 Sep 2022 07:01  -  20 Sep 2022 07:00  --------------------------------------------------------  IN: 150 mL / OUT: 220 mL / NET: -70 mL    20 Sep 2022 07:01  -  20 Sep 2022 12:27  --------------------------------------------------------  IN: 346 mL / OUT: 0 mL / NET: 346 mL      Height (cm): 165.1 ( @ 23:31)  Weight (kg): 97.3 ( @ 23:31)  BMI (kg/m2): 35.7 ( @ 23:31)  BSA (m2): 2.04 ( 23:31)    PHYSICAL EXAM:  Gen: trach/ventilated  Resp: b/l breath sounds  Card: S1/S2  Abd: soft, +GT  Extremities: +edema      LABS:  ABG - ( 20 Sep 2022 04:14 )  pH, Arterial: 7.48  pH, Blood: x     /  pCO2: 33    /  pO2: 131   / HCO3: 25    / Base Excess: 1.1   /  SaO2: 99.8        Blood Gas Arterial - Sodium: 128 mmol/L (22 @ 04:14)  Blood Gas Arterial - Calcium, Ionized: 1.04 mmol/L (22 @ 04:14)  Blood Gas Arterial - Sodium: 131 mmol/L (09-15-22 @ 02:52)        130<L>  |  88<L>  |  97<HH>  ----------------------------<  122<H>  5.3<H>   |  21  |  5.6<HH>    Ca    8.4      20 Sep 2022 05:39  Phos  8.9       Mg     2.5         TPro  5.5<L>  /  Alb  2.6<L>  /  TBili  0.2  /  DBili      /  AST  176<H>  /  ALT  20  /  AlkPhos  245<H>      Phosphorus Level, Serum: 8.9 mg/dL (22 @ 10:16)  Phosphorus Level, Serum: 8.2 mg/dL (22 @ 05:27)    Intact PTH: 75 pg/mL (09-05-22 @ 20:00)                          6.9    26.46 )-----------( 162      ( 20 Sep 2022 05:39 )             20.7     Mean Cell Volume: 88.8 fL (22 @ 05:39)    Creatinine trend:  Creatinine, Serum: 5.6 mg/dL (22 @ 05:39)  Creatinine, Serum: 4.9 mg/dL (22 @ 17:45)  Creatinine, Serum: 5.3 mg/dL (22 @ 10:16)  Creatinine, Serum: 4.6 mg/dL (22 @ 05:27)  Creatinine, Serum: 4.2 mg/dL (22 @ 05:23)  Creatinine, Serum: 5.6 mg/dL (22 @ 06:30)

## 2022-09-20 NOTE — PROGRESS NOTE ADULT - SUBJECTIVE AND OBJECTIVE BOX
Hospital Day:  49d    Chief Complaint: Patient is a 57y old  Female who presents with a chief complaint of RLE wound (12 Sep 2022 15:26)    24 hour events: Patient transferred from [facility]. Requires PRBC. 2 units ordered. Follow up CBC pending. For HD today, udall placed.     Past Medical Hx:   Hypertension    Diabetes mellitus      Past Sx:  No significant past surgical history      Allergies:  No Known Allergies    Current Meds:   Standing Meds:  amLODIPine   Tablet 10 milliGRAM(s) Oral daily  atorvastatin 80 milliGRAM(s) Oral at bedtime  chlorhexidine 0.12% Liquid 15 milliLiter(s) Oral Mucosa every 12 hours  chlorhexidine 2% Cloths 1 Application(s) Topical <User Schedule>  cloNIDine 0.2 milliGRAM(s) Oral every 8 hours  dextrose 5%. 1000 milliLiter(s) (100 mL/Hr) IV Continuous <Continuous>  dextrose 5%. 1000 milliLiter(s) (50 mL/Hr) IV Continuous <Continuous>  dextrose 50% Injectable 25 Gram(s) IV Push once  dextrose 50% Injectable 12.5 Gram(s) IV Push once  dextrose 50% Injectable 25 Gram(s) IV Push once  furosemide   Injectable 80 milliGRAM(s) IV Push every 12 hours  glucagon  Injectable 1 milliGRAM(s) IntraMuscular once  hydrALAZINE 100 milliGRAM(s) Oral every 8 hours  insulin glargine Injectable (LANTUS) 12 Unit(s) SubCutaneous at bedtime  insulin lispro (ADMELOG) corrective regimen sliding scale   SubCutaneous every 6 hours  insulin lispro Injectable (ADMELOG) 4 Unit(s) SubCutaneous three times a day before meals  labetalol 600 milliGRAM(s) Oral three times a day  lacosamide IVPB 50 milliGRAM(s) IV Intermittent every 12 hours  levETIRAcetam  Solution 500 milliGRAM(s) Oral two times a day  lidocaine 1%/epinephrine 1:100,000 Inj 20 milliLiter(s) Local Injection once  multivitamin 1 Tablet(s) Oral daily  pantoprazole  Injectable 40 milliGRAM(s) IV Push two times a day  predniSONE   Tablet 60 milliGRAM(s) Oral daily  sevelamer carbonate Powder 2400 milliGRAM(s) Enteral Tube every 8 hours  sodium chloride 0.65% Nasal 2 Spray(s) Both Nostrils two times a day    PRN Meds:  acetaminophen     Tablet .. 650 milliGRAM(s) Oral every 6 hours PRN Temp greater or equal to 38C (100.4F), Mild Pain (1 - 3)  dextrose Oral Gel 15 Gram(s) Oral once PRN Blood Glucose LESS THAN 70 milliGRAM(s)/deciliter  fentaNYL    Injectable 25 MICROGram(s) IV Push every 5 minutes PRN procedural sedation  labetalol Injectable 10 milliGRAM(s) IV Push every 6 hours PRN Systolic blood pressure >  melatonin 3 milliGRAM(s) Oral at bedtime PRN Insomnia  propofol Injectable 2 milliGRAM(s) IV Push once PRN sedation    HOME MEDICATIONS:  losartan 50 mg oral tablet: 1 tab(s) orally once a day  metFORMIN 500 mg oral tablet: 1 tab(s) orally 2 times a day  metoprolol succinate 50 mg oral tablet, extended release: 1 tab(s) orally once a day      Vital Signs:   T(F): 99.3 (09-20-22 @ 07:10), Max: 99.3 (09-20-22 @ 07:10)  HR: 68 (09-20-22 @ 12:51) (59 - 76)  BP: 141/71 (09-20-22 @ 06:50) (137/72 - 186/95)  RR: 16 (09-20-22 @ 07:10) (12 - 20)  SpO2: 99% (09-20-22 @ 12:51) (97% - 100%)      09-19-22 @ 07:01  -  09-20-22 @ 07:00  --------------------------------------------------------  IN: 210 mL / OUT: 220 mL / NET: -10 mL    09-20-22 @ 07:01  -  09-20-22 @ 14:13  --------------------------------------------------------  IN: 466 mL / OUT: 0 mL / NET: 466 mL        Physical Exam:   GENERAL: NAD  HEENT: NCAT, +trach  CHEST/LUNG: audible bilateral breath sounds bilaterally   HEART: Regular rate and rhythm; s1 s2 appreciated   ABDOMEN: Soft, Nontender, Nondistended +PEG  EXTREMITIES: +diffuse edema   NERVOUS SYSTEM:  unresponsive   LINES/CATHETERS: peripheral, +RIJ udall         Labs:                         6.9    26.46 )-----------( 162      ( 20 Sep 2022 05:39 )             20.7       20 Sep 2022 05:39    130    |  88     |  97     ----------------------------<  122    5.3     |  21     |  5.6      Ca    8.4        20 Sep 2022 05:39  Phos  8.9       19 Sep 2022 10:16  Mg     2.5       20 Sep 2022 05:39    TPro  5.5    /  Alb  2.6    /  TBili  0.2    /  DBili  x      /  AST  176    /  ALT  20     /  AlkPhos  245    20 Sep 2022 05:39       pTT    36.4             ----< 1.85 INR  (09-20-22 @ 10:00)    21.10        PT      Radiology:

## 2022-09-20 NOTE — CHART NOTE - NSCHARTNOTEFT_GEN_A_CORE
Was called for possible bleeding around trach site along with edematous area around trach. Patient was seen, no active bleeding seen at trach site and no active blood seen when suctioning patient. Patient is saturating 98% on vent settings of 40/5. If trach starts actively bleeding, recall surgery ( surgery resident) and will place surgicell around trach site. Discussed with CT surgeon Dr. Gideon Aceves who will come evaluate if bleeding continues.

## 2022-09-20 NOTE — PROGRESS NOTE ADULT - ASSESSMENT
57 y/o woman w PMHx of uncontrolled HTN, DM2, hemorrhagic ?anuerysmal stroke R basal ganglia 2017 w residual L sided weakness, h/o PEG s/p removal, had not seen doctor in >1yr, w/o meds since 2018, presented to ED 8/2/22 for RLE nonhealing wound [id number] and BIB son who noted it that evening, ED triage vitals noted for /170 range.     Admitted for cellulitis, HTN urgency and started on Unasyn, Vanc, insulin drip, IV labetalol, IV vasotec. On 8/5/22 had stroke code w NIHSS 23, for unresponsiveness, concern for seizure w post ictal confusion, found to have ?embolic strokes on MR. S/p debridement of RLE 8/10/22. Developed E cloaca bacteremia, tx w avacaz and aztreonam, has since started on HD. Intubated on 9/6/22 and converted to trach 9/14/22. Uldall placed ?9/14/22 and started on HD -> [place] removed. S/p bronchoscopy, tracheostomy, and PEG tube placement 9/14/22. Remains vent dependent. Patient oliguric with rising K on 9/20, RIJ udall placed.     #Acute ischemic strokes, multifocal  #h/o hemorrhagic, ?aneurysmal stroke R basal ganglia 2017  Per neurology: suspicious for vasculitis, but not confirmed, CSF studies does not support the diagnosis. - Plavix held for LGIB. Aspirin resumed.   - Neuro: 60mg Prednisone taper [id number] starting 9/16/22-10/7/22  - Per stroke team, no need for angio, requested a renal bx when stable as expected widespread vasculitis.   - cw keppra     # oliguric ALF / Urinary retention / Suspected ATN  Renal US 9/2/22: no hydronephrosis  - Cr still high; 9/16/22 Cr 6.8   - Sodium bicarbonate increased to 1300 q8h, sevelamer 2400 mg TID in PEG   - Nephro following  - HD: 9/15/22, 9/16/22 of 2hr session removed 2L   - RIJ udall placed 9/20 - will undergo HD today.     #Bacteremia, resolved   BCx 9/1/22 (+) MDR enterobacter Cloacae   BCx 9/3/22, 9/4/22, 9/5/22, 9/6/22, 9/7/22 all NGF   - 9/19 completed abx per ID     #COVID (+) 9/13/22  - Tested (+) 9/13/22, in isolation thru 9/23/22   - Resp status as noted above  - Will monitor for fevers, sepsis  - Consider consult ID for further recs    # Purulent Right LE cellulitis   S/p debridement by burn on 8/10/22  - Candida in wound. per Dr. Chua: contaminant  - BCx w/ staph: likely contaminant. repeat BCx at that time was negative  - F/u burn as outpatient [phone]; signed off 9/14/22.     #GI bleed  S/p 6 units of PRBCs. Hb goal 7+, 9/16/22 Hb 7.6  - Pt was seen by GI, external bleeding hemorrhoids noted. Surgery consulted.   - Protonix q12h  - ENT eval appreciated for oral bleed. No actively oozing wounds, teeth guard put in.     - Oral cavity bleed from tongue biting, improved  - Hgb 7 on 9/19. Likey 2/2 to platelet dysfunction from uremia  - Hold DDAVP after AM dose.   - give 2units PRBC on 9/20. Pressure held on udall site, some bleeding noted around trach.     #Hypertensive urgency due to noncompliance and Malignant HTN - improved   BP on admission 296/174 without signs of end organ damage at that time. Renal artery duplex wnl>>ARIAN unlikely  - Aldosterone wnl, renin elevated  - BP overall improved  - SBP goal 120-160  - c/w amlodipine 10, furosemide 80 q12, hydralazine 100mg q8, labetalol 600mg TID    # Diabetes mellitus   - HbA1C 10.4  - Transitioned from insulin drip to basal/bolus 9/16/22  - Lantus and lispro sliding scale; increase as necessary    #HLD   - Cont statins     Lines/tubes:   Trach, PEG, L PIV x2                                                                              ----------------------------------------------------  # DVT prophylaxis: Holding AC given recent GIB   # GI prophylaxis: Pantoprazole   # Diet: "Nepro at 30 ml/h + 5 packets Beneprotein/d --> 87 gm protein, 1400 kcal, 510 total mg phos, 23 mEq K/d"  # Activity: Bedrest  # Code status: FULL CODE   # Disposition: SDU

## 2022-09-20 NOTE — PROGRESS NOTE ADULT - SUBJECTIVE AND OBJECTIVE BOX
Patient is a 57y old  Female who presents with a chief complaint of RLE wound (12 Sep 2022 15:26)      Over Night Events:  Patient seen and examined.   patient was transferred from [facility]   on vent on cpap   not on pressors     ROS:  See HPI    PHYSICAL EXAM    ICU Vital Signs Last 24 Hrs  T(C): 37.4 (20 Sep 2022 07:10), Max: 37.4 (20 Sep 2022 07:10)  T(F): 99.3 (20 Sep 2022 07:10), Max: 99.3 (20 Sep 2022 07:10)  HR: 70 (20 Sep 2022 07:40) (59 - 80)  BP: 141/71 (20 Sep 2022 06:50) (137/72 - 186/95)  BP(mean): 100 (20 Sep 2022 06:50) (98 - 125)  ABP: --  ABP(mean): --  RR: 16 (20 Sep 2022 07:10) (12 - 20)  SpO2: 98% (20 Sep 2022 07:40) (97% - 100%)    O2 Parameters below as of 20 Sep 2022 06:50  Patient On (Oxygen Delivery Method): ventilator            General: ill looking   HEENT:   trach              Lymph Nodes: NO cervical LN   Lungs: Bilateral BS  Cardiovascular: Regular   Abdomen: Soft, Positive BS  Extremities: No clubbing   Skin: warm   Neurological: positive gag   Musculoskeletal: move all ext     I&O's Detail    19 Sep 2022 07:01  -  20 Sep 2022 07:00  --------------------------------------------------------  IN:    Nepro with Carb Steady: 150 mL  Total IN: 150 mL    OUT:    Indwelling Catheter - Urethral (mL): 50 mL    Intermittent Catheterization - Urethral (mL): 150 mL    Voided (mL): 20 mL  Total OUT: 220 mL    Total NET: -70 mL          LABS:                          6.9    26.46 )-----------( 162      ( 20 Sep 2022 05:39 )             20.7         20 Sep 2022 05:39    130    |  88     |  97     ----------------------------<  122    5.3     |  21     |  5.6      Ca    8.4        20 Sep 2022 05:39  Phos  8.9       19 Sep 2022 10:16  Mg     2.5       20 Sep 2022 05:39    TPro  5.5    /  Alb  2.6    /  TBili  0.2    /  DBili  x      /  AST  176    /  ALT  20     /  AlkPhos  245    20 Sep 2022 05:39  Amylase x     lipase x                                                 PT/INR - ( 18 Sep 2022 11:06 )   PT: 15.10 sec;   INR: 1.32 ratio         PTT - ( 18 Sep 2022 11:06 )  PTT:31.2 sec                                                                                                                                                Mode: CPAP with PS  FiO2: 40  PEEP: 5  PS: 5  MAP: 8  PIP: 11                                      ABG - ( 20 Sep 2022 04:14 )  pH, Arterial: 7.48  pH, Blood: x     /  pCO2: 33    /  pO2: 131   / HCO3: 25    / Base Excess: 1.1   /  SaO2: 99.8                MEDICATIONS  (STANDING):  amLODIPine   Tablet 10 milliGRAM(s) Oral daily  atorvastatin 80 milliGRAM(s) Oral at bedtime  chlorhexidine 0.12% Liquid 15 milliLiter(s) Oral Mucosa every 12 hours  chlorhexidine 2% Cloths 1 Application(s) Topical <User Schedule>  cloNIDine 0.2 milliGRAM(s) Oral every 8 hours  desmopressin IVPB 25 MICROGram(s) IV Intermittent <User Schedule>  dextrose 5%. 1000 milliLiter(s) (100 mL/Hr) IV Continuous <Continuous>  dextrose 5%. 1000 milliLiter(s) (50 mL/Hr) IV Continuous <Continuous>  dextrose 50% Injectable 25 Gram(s) IV Push once  dextrose 50% Injectable 12.5 Gram(s) IV Push once  dextrose 50% Injectable 25 Gram(s) IV Push once  furosemide   Injectable 80 milliGRAM(s) IV Push every 12 hours  glucagon  Injectable 1 milliGRAM(s) IntraMuscular once  hydrALAZINE 100 milliGRAM(s) Oral every 8 hours  insulin glargine Injectable (LANTUS) 12 Unit(s) SubCutaneous at bedtime  insulin lispro (ADMELOG) corrective regimen sliding scale   SubCutaneous every 6 hours  insulin lispro Injectable (ADMELOG) 4 Unit(s) SubCutaneous three times a day before meals  labetalol 600 milliGRAM(s) Oral three times a day  lacosamide IVPB 50 milliGRAM(s) IV Intermittent every 12 hours  levETIRAcetam  Solution 500 milliGRAM(s) Oral two times a day  lidocaine 1%/epinephrine 1:100,000 Inj 20 milliLiter(s) Local Injection once  multivitamin 1 Tablet(s) Oral daily  pantoprazole  Injectable 40 milliGRAM(s) IV Push two times a day  predniSONE   Tablet 60 milliGRAM(s) Oral daily  sevelamer carbonate Powder 2400 milliGRAM(s) Enteral Tube every 8 hours  sodium chloride 0.65% Nasal 2 Spray(s) Both Nostrils two times a day    MEDICATIONS  (PRN):  acetaminophen     Tablet .. 650 milliGRAM(s) Oral every 6 hours PRN Temp greater or equal to 38C (100.4F), Mild Pain (1 - 3)  dextrose Oral Gel 15 Gram(s) Oral once PRN Blood Glucose LESS THAN 70 milliGRAM(s)/deciliter  labetalol Injectable 10 milliGRAM(s) IV Push every 6 hours PRN Systolic blood pressure >  melatonin 3 milliGRAM(s) Oral at bedtime PRN Insomnia          Xrays:  TLC:  OG:  ET tube:                                                                                    pending    ECHO:  CAM ICU:

## 2022-09-20 NOTE — PHARMACOTHERAPY INTERVENTION NOTE - COMMENTS
Recommend to d/c desmopressin desmopressin 25mcg q8hr.     Desmopressin for the indication of uremic bleeding should be given as one time dose at onset of bleeding and may be repeated for a second dose if needed. Additional doses may be ineffective due to tachyphylaxis. Recommend to d/c scheduled doses of desmopressin 25mcg q8hr.     Desmopressin for the indication of uremic bleeding should be given as one time dose at onset of bleeding and may be repeated for a second dose if needed. Additional doses may be ineffective due to tachyphylaxis.

## 2022-09-21 NOTE — CHART NOTE - NSCHARTNOTEFT_GEN_A_CORE
Recalled by ICU resident for bleeding from trach site. Patient was assessed and all clots were removed along with surgicell from around trach site and under trach. Patient had all residual clot suctioned out from around trach site. Site was packed with Fibrillar and drain sponge was placed. Replace drain sponge as needed. Patient continues to ooze for which there is no current surgical intervention. There is no active bleed for patient to be taken to OR for hemostasis. Hold pressure as needed and replace drain sponge as needed.     Patient maintained tidal volume during entire bedside procedure and didn't have an episode of desaturation. Patient is on current vent settings of 40/5.     Surgery [id number]

## 2022-09-21 NOTE — BRIEF OPERATIVE NOTE - NSICDXBRIEFPOSTOP_GEN_ALL_CORE_FT
POST-OP DIAGNOSIS:  Acute respiratory failure with hypoxia 14-Sep-2022 16:43:12  Lis Jarrett  
POST-OP DIAGNOSIS:  Wound 10-Aug-2022 20:36:31 right lower leg Erik Jarvis  
POST-OP DIAGNOSIS:  Malfunction of tracheostomy 21-Sep-2022 19:19:25  Balbina Hughes

## 2022-09-21 NOTE — CHART NOTE - NSCHARTNOTEFT_GEN_A_CORE
Alerted by RN of bleeding from site of former tracheostomy.   Evaluated at bedside.     S/p Tracheostomy removal in OR+ intubation this evening.   Small bleeding vessel sutured + cauterized in OR      Former tracheostomy site packed with surgicell  Slow oozing of blood noted from site   Evidence of Mild hematoma formation surrounding tracheostomy site    Post-Op hgb 6.1  VSS at this time  Off AC at this time   Patient has received a total of 4 pRBC, 3 FFP, 2 platelet since 9/20  2U PRBC STAT  Will repeat CBC post blood transfusion   INR 1.5, Vitamin K ordered per Heme/ Onc  Attending notified     Alo WONG

## 2022-09-21 NOTE — PRE-ANESTHESIA EVALUATION ADULT - BP NONINVASIVE MEAN (MM HG)
History   Chief Complaint:  Abdominal Pain       The history is provided by the patient. A  was used.      Kd Riggs is a  15w pregnant 38 year old female who presents with right lower quadrant abdominal pain. States similar pain to yesterday's presentation in the ER, although pain is now prominent in her right lower abdomen. This episode has been lasting for about one month. Mentions associated weakness. She denies any injury. States intermittent shortness of breath and no appetite due to pain, although she has been trying to drink fluids. Denies chest pain, vomiting , nausea , vaginal bleeding or vaginal discharge. She has not felt any movements with the baby. No previous complications with pregnancies.     Review of Systems   Constitutional: Positive for appetite change.   Respiratory: Positive for shortness of breath.    Cardiovascular: Negative for chest pain.   Gastrointestinal: Positive for abdominal pain. Negative for nausea and vomiting.   Genitourinary: Negative for vaginal bleeding and vaginal discharge.   Neurological: Positive for weakness.   All other systems reviewed and are negative.      Allergies:  Carboprost Tromethamine    Medications:  Zorco   Zofran     Past Medical History:    H pylori   Gastroesophageal reflux disease   Cervical high risk HPV    Social History:  Has 5 children.   Speaks Latvian.     Physical Exam     Patient Vitals for the past 24 hrs:   BP Temp Temp src Pulse Resp SpO2   21 1520 117/56 -- -- 78 -- --   21 1510 -- -- -- -- -- 100 %   21 1500 -- -- -- -- -- 100 %   21 1430 -- -- -- -- -- 100 %   21 1400 100/54 -- -- -- -- --   21 1321 113/64 -- -- -- -- --   21 1308 -- 98.2  F (36.8  C) Oral 81 16 100 %       Physical Exam  General: Adult female sitting upright  Eyes: PERRL, Conjunctive within normal limits.  No scleral icterus  ENT: Moist mucous membranes, oropharynx clear.   CV: Normal S1S2, no murmur, 
rub or gallop. Regular rate and rhythm  Resp: Clear to auscultation bilaterally, no wheezes, rales or rhonchi. Normal respiratory effort.  GI: Abdomen is soft. Tenderness in the right upper quadrant and diffusely in the lower abdomen but less so. No rebound or guarding.  MSK: No edema. Normal active range of motion.  Skin: Warm and dry. No rashes or lesions or ecchymoses on visible skin.  Neuro: Alert and oriented. Responds appropriately to all questions and commands. No focal findings appreciated. Normal muscle tone.  Psych: Normal mood and affect.    Emergency Department Course     ECG:  ECG taken at 1347, ECG read at 1349  Normal sinus rhythm  Normal  ECG  Rate 73 bpm. ND interval 134 ms. QRS duration 74 ms. QT/QTc 386/425 ms. P-R-T axes 14 22 16.    Imaging:  Abdomen US, limited (RUQ only)  IMPRESSION:  Cholelithiasis and potential gallbladder sludge again  noted. Negative sonographic Franklin sign.    Laboratory:  CBC: WBC 5.9, HGB 11.5(L),    CMP: Glucose  120(H), BUN 4(L), Creatinine 0.50(L), Albumin  3.0(L) o/w WNL  Lipase: 83  Magnesium: 1.8  UA with microscopic: bacteria few o/w WNL    Emergency Department Course:    Reviewed:  I reviewed nursing notes, vitals and past medical history    Assessments:  1310 I obtained history and examined the patient as noted above.   1447 I rechecked the patient and explained findings.    Performed bedside transabdominal ob ultrasound. Reassuring Fetal movements were noted.   Patient reassessed. She notes she is feeling improved and feels comfortable going home. She plans on returning to [facility] to follow-up with her ob/gyn.     Interventions:  1327  NS, 1 L, IV   1329  Maalox 30mL Oral   1333  Tylenol 1,000mg Oral     Disposition:  The patient was discharged to home.     Impression & Plan     Medical Decision Making:  Kd Riggs is here for abdominal pain.  She was seen here yesterday for the same pain.  She localizes it to the mid right abdomen.  She has 
known gallstones therefore repeat ultrasound is obtained to evaluate for cholecystitis.  Fortunately this does not show any evidence of acute infection.  Her laboratory evaluation is similarly reassuring.  She is not tender in the right lower quadrant focally and I do not believe appendicitis is the cause of her symptoms.  She had a fetal ultrasound yesterday which was reassuring and today bedside ultrasound showed reassuring fetal movements and fetal heart tones. There did not seem indication to repeat OB ultrasound.  Laboratory assessment was similarly reassuring.  Slight bacteriuria is noted and bacterial urine culture is sent.  Treatment pending culture.  Differential for her abdominal pain is broad and includes gastritis, peptic ulcer disease, biliary colic, cholecystitis, hepatitis, pancreatitis, OB related pain.  With improvement with Maalox, gastritis or peptic ulcer disease is also considered as a possible cause for symptoms.  At this time exact etiology of symptoms is unclear.  Based on her evaluation, I think that outpatient management is appropriate. Symptomatic medications discussed.  Return immediately for worsening, uncontrolled, or new symptoms.  Recommend to follow-up with her OB/GYN within 2 to 3 days.  He should return immediate to the emergency department with worsening of symptoms.  All questions were answered prior to discharge.  She has both antiemetics and pain medications already at home.    Diagnosis:    ICD-10-CM    1. RUQ abdominal pain  R10.11    2. Lower abdominal pain  R10.30    3. Bacteriuria during pregnancy in second trimester  O99.891     R82.71          Scribe Disclosure:  I, Chris Sousa, am serving as a scribe at 1:13 PM on 2/18/2021 to document services personally performed by Madeline Landa MD based on my observations and the provider's statements to me.        Madeline Landa MD  02/20/21 [id number]    
120
105
108
137

## 2022-09-21 NOTE — PROGRESS NOTE ADULT - SUBJECTIVE AND OBJECTIVE BOX
Patient is a 57y old  Female who presents with a chief complaint of RLE wound (12 Sep 2022 15:26)      Over Night Events:  Patient seen and examined.   over night had elevation in peak pressure not pulling TV   spoke to intensivist he was able to remove big blood clot from around the trach and then the peak pressure went done and TV back to normal   blood oozing from site of trach / udall   on vent   not awake        ROS:  See HPI    PHYSICAL EXAM    ICU Vital Signs Last 24 Hrs  T(C): 35.8 (21 Sep 2022 07:00), Max: 36.3 (21 Sep 2022 02:18)  T(F): 96.4 (21 Sep 2022 07:00), Max: 97.3 (21 Sep 2022 02:18)  HR: 65 (21 Sep 2022 07:00) (62 - 78)  BP: 114/74 (21 Sep 2022 07:00) (114/74 - 192/104)  BP(mean): 103 (21 Sep 2022 07:00) (103 - 140)  ABP: --  ABP(mean): --  RR: 18 (21 Sep 2022 07:00) (14 - 24)  SpO2: 96% (21 Sep 2022 07:00) (92% - 100%)    O2 Parameters below as of 21 Sep 2022 07:00  Patient On (Oxygen Delivery Method): ventilator            General: not awake   HEENT:    trach             Lymph Nodes: NO cervical LN   Lungs: Bilateral BS  Cardiovascular: Regular   Abdomen: Soft, Positive BS  Extremities: No clubbing   Skin: warm   Neurological: positive gag   Musculoskeletal: move all ext     I&O's Detail    20 Sep 2022 07:01  -  21 Sep 2022 07:00  --------------------------------------------------------  IN:    Enteral Tube Flush: 50 mL    Frozen Plasma Cryoprecipitate Reduced: 656 mL    Nepro with Carb Steady: 570 mL    Platelets - Single Donor: 233 mL    PRBCs (Packed Red Blood Cells): 703 mL    Propofol: 97.5 mL  Total IN: 2309.5 mL    OUT:    Indwelling Catheter - Urethral (mL): 225 mL    Other (mL): 1100 mL  Total OUT: 1325 mL    Total NET: 984.5 mL      21 Sep 2022 07:01  -  21 Sep 2022 07:55  --------------------------------------------------------  IN:  Total IN: 0 mL    OUT:    Voided (mL): 60 mL  Total OUT: 60 mL    Total NET: -60 mL          LABS:                          6.6    24.09 )-----------( 159      ( 21 Sep 2022 05:57 )             19.7         20 Sep 2022 19:30    129    |  87     |  93     ----------------------------<  129    4.7     |  23     |  5.3      Ca    7.9        20 Sep 2022 19:30  Phos  8.9       19 Sep 2022 10:16  Mg     2.5       20 Sep 2022 05:39                                               PT/INR - ( 21 Sep 2022 05:57 )   PT: 17.40 sec;   INR: 1.52 ratio         PTT - ( 21 Sep 2022 05:57 )  PTT:36.6 sec                                                                                                                                                Mode: AC/ CMV (Assist Control/ Continuous Mandatory Ventilation)  RR (machine): 16  TV (machine): 360  FiO2: 40  PEEP: 5  MAP: 10  PIP: 28                                      ABG - ( 21 Sep 2022 04:02 )  pH, Arterial: 7.46  pH, Blood: x     /  pCO2: 35    /  pO2: 123   / HCO3: 25    / Base Excess: 1.1   /  SaO2: 99.4                MEDICATIONS  (STANDING):  amLODIPine   Tablet 10 milliGRAM(s) Oral daily  atorvastatin 80 milliGRAM(s) Oral at bedtime  chlorhexidine 0.12% Liquid 15 milliLiter(s) Oral Mucosa every 12 hours  chlorhexidine 2% Cloths 1 Application(s) Topical <User Schedule>  cloNIDine 0.2 milliGRAM(s) Oral every 8 hours  dextrose 5%. 1000 milliLiter(s) (100 mL/Hr) IV Continuous <Continuous>  dextrose 5%. 1000 milliLiter(s) (50 mL/Hr) IV Continuous <Continuous>  dextrose 50% Injectable 25 Gram(s) IV Push once  dextrose 50% Injectable 12.5 Gram(s) IV Push once  dextrose 50% Injectable 25 Gram(s) IV Push once  furosemide   Injectable 80 milliGRAM(s) IV Push every 12 hours  glucagon  Injectable 1 milliGRAM(s) IntraMuscular once  hydrALAZINE 100 milliGRAM(s) Oral every 8 hours  insulin lispro (ADMELOG) corrective regimen sliding scale   SubCutaneous three times a day before meals  labetalol 600 milliGRAM(s) Oral three times a day  lacosamide IVPB 50 milliGRAM(s) IV Intermittent every 12 hours  levETIRAcetam  Solution 500 milliGRAM(s) Oral two times a day  lidocaine 1%/epinephrine 1:100,000 Inj 20 milliLiter(s) Local Injection once  multivitamin 1 Tablet(s) Oral daily  pantoprazole  Injectable 40 milliGRAM(s) IV Push two times a day  predniSONE   Tablet 60 milliGRAM(s) Oral daily  propofol Infusion 10 MICROgram(s)/kG/Min (5.84 mL/Hr) IV Continuous <Continuous>  sevelamer carbonate Powder 2400 milliGRAM(s) Enteral Tube every 8 hours  sodium chloride 0.65% Nasal 2 Spray(s) Both Nostrils two times a day    MEDICATIONS  (PRN):  acetaminophen     Tablet .. 650 milliGRAM(s) Oral every 6 hours PRN Temp greater or equal to 38C (100.4F), Mild Pain (1 - 3)  dextrose Oral Gel 15 Gram(s) Oral once PRN Blood Glucose LESS THAN 70 milliGRAM(s)/deciliter  fentaNYL    Injectable 25 MICROGram(s) IV Push every 5 minutes PRN procedural sedation  labetalol Injectable 10 milliGRAM(s) IV Push every 6 hours PRN Systolic blood pressure >  melatonin 3 milliGRAM(s) Oral at bedtime PRN Insomnia  propofol Injectable 2 milliGRAM(s) IV Push once PRN sedation          Xrays:  TLC:  OG:  ET tube:                                                                                    b/l opacity more left with effusion    ECHO:  CAM ICU:           Patient is a 57y old  Female who presents with a chief complaint of RLE wound (12 Sep 2022 15:26)      Over Night Events:  Patient seen and examined.   over night had elevation in peak pressure not pulling TV   spoke to intensivist he was able to remove big blood clot from around the trach and then the peak pressure went done and TV back to normal   blood oozing from site of trach / udall   on vent   not awake    s/p 2prbc, 2 ffp, 1 plt     ROS:  See HPI    PHYSICAL EXAM    ICU Vital Signs Last 24 Hrs  T(C): 35.8 (21 Sep 2022 07:00), Max: 36.3 (21 Sep 2022 02:18)  T(F): 96.4 (21 Sep 2022 07:00), Max: 97.3 (21 Sep 2022 02:18)  HR: 65 (21 Sep 2022 07:00) (62 - 78)  BP: 114/74 (21 Sep 2022 07:00) (114/74 - 192/104)  BP(mean): 103 (21 Sep 2022 07:00) (103 - 140)  ABP: --  ABP(mean): --  RR: 18 (21 Sep 2022 07:00) (14 - 24)  SpO2: 96% (21 Sep 2022 07:00) (92% - 100%)    O2 Parameters below as of 21 Sep 2022 07:00  Patient On (Oxygen Delivery Method): ventilator            General: not awake   HEENT:    trach             Lymph Nodes: NO cervical LN   Lungs: Bilateral BS  Cardiovascular: Regular   Abdomen: Soft, Positive BS  Extremities: No clubbing   Skin: warm   Neurological: positive gag   Musculoskeletal: move all ext     I&O's Detail    20 Sep 2022 07:01  -  21 Sep 2022 07:00  --------------------------------------------------------  IN:    Enteral Tube Flush: 50 mL    Frozen Plasma Cryoprecipitate Reduced: 656 mL    Nepro with Carb Steady: 570 mL    Platelets - Single Donor: 233 mL    PRBCs (Packed Red Blood Cells): 703 mL    Propofol: 97.5 mL  Total IN: 2309.5 mL    OUT:    Indwelling Catheter - Urethral (mL): 225 mL    Other (mL): 1100 mL  Total OUT: 1325 mL    Total NET: 984.5 mL      21 Sep 2022 07:01  -  21 Sep 2022 07:55  --------------------------------------------------------  IN:  Total IN: 0 mL    OUT:    Voided (mL): 60 mL  Total OUT: 60 mL    Total NET: -60 mL          LABS:                          6.6    24.09 )-----------( 159      ( 21 Sep 2022 05:57 )             19.7         20 Sep 2022 19:30    129    |  87     |  93     ----------------------------<  129    4.7     |  23     |  5.3      Ca    7.9        20 Sep 2022 19:30  Phos  8.9       19 Sep 2022 10:16  Mg     2.5       20 Sep 2022 05:39                                               PT/INR - ( 21 Sep 2022 05:57 )   PT: 17.40 sec;   INR: 1.52 ratio         PTT - ( 21 Sep 2022 05:57 )  PTT:36.6 sec                                                                                                                                                Mode: AC/ CMV (Assist Control/ Continuous Mandatory Ventilation)  RR (machine): 16  TV (machine): 360  FiO2: 40  PEEP: 5  MAP: 10  PIP: 28                                      ABG - ( 21 Sep 2022 04:02 )  pH, Arterial: 7.46  pH, Blood: x     /  pCO2: 35    /  pO2: 123   / HCO3: 25    / Base Excess: 1.1   /  SaO2: 99.4                MEDICATIONS  (STANDING):  amLODIPine   Tablet 10 milliGRAM(s) Oral daily  atorvastatin 80 milliGRAM(s) Oral at bedtime  chlorhexidine 0.12% Liquid 15 milliLiter(s) Oral Mucosa every 12 hours  chlorhexidine 2% Cloths 1 Application(s) Topical <User Schedule>  cloNIDine 0.2 milliGRAM(s) Oral every 8 hours  dextrose 5%. 1000 milliLiter(s) (100 mL/Hr) IV Continuous <Continuous>  dextrose 5%. 1000 milliLiter(s) (50 mL/Hr) IV Continuous <Continuous>  dextrose 50% Injectable 25 Gram(s) IV Push once  dextrose 50% Injectable 12.5 Gram(s) IV Push once  dextrose 50% Injectable 25 Gram(s) IV Push once  furosemide   Injectable 80 milliGRAM(s) IV Push every 12 hours  glucagon  Injectable 1 milliGRAM(s) IntraMuscular once  hydrALAZINE 100 milliGRAM(s) Oral every 8 hours  insulin lispro (ADMELOG) corrective regimen sliding scale   SubCutaneous three times a day before meals  labetalol 600 milliGRAM(s) Oral three times a day  lacosamide IVPB 50 milliGRAM(s) IV Intermittent every 12 hours  levETIRAcetam  Solution 500 milliGRAM(s) Oral two times a day  lidocaine 1%/epinephrine 1:100,000 Inj 20 milliLiter(s) Local Injection once  multivitamin 1 Tablet(s) Oral daily  pantoprazole  Injectable 40 milliGRAM(s) IV Push two times a day  predniSONE   Tablet 60 milliGRAM(s) Oral daily  propofol Infusion 10 MICROgram(s)/kG/Min (5.84 mL/Hr) IV Continuous <Continuous>  sevelamer carbonate Powder 2400 milliGRAM(s) Enteral Tube every 8 hours  sodium chloride 0.65% Nasal 2 Spray(s) Both Nostrils two times a day    MEDICATIONS  (PRN):  acetaminophen     Tablet .. 650 milliGRAM(s) Oral every 6 hours PRN Temp greater or equal to 38C (100.4F), Mild Pain (1 - 3)  dextrose Oral Gel 15 Gram(s) Oral once PRN Blood Glucose LESS THAN 70 milliGRAM(s)/deciliter  fentaNYL    Injectable 25 MICROGram(s) IV Push every 5 minutes PRN procedural sedation  labetalol Injectable 10 milliGRAM(s) IV Push every 6 hours PRN Systolic blood pressure >  melatonin 3 milliGRAM(s) Oral at bedtime PRN Insomnia  propofol Injectable 2 milliGRAM(s) IV Push once PRN sedation          Xrays:  TLC:  OG:  ET tube:                                                                                    b/l opacity more left with effusion    ECHO:  CAM ICU:

## 2022-09-21 NOTE — PROGRESS NOTE ADULT - ASSESSMENT
57 y/o woman w PMHx of uncontrolled HTN, DM2, hemorrhagic ?anuerysmal stroke R basal ganglia 2017 w residual L sided weakness, h/o PEG s/p removal, had not seen doctor in >1yr, w/o meds since 2018, presented to ED 8/2/22 for RLE nonhealing wound [id number], ED triage vitals noted for /170 range.   for cellulitis, HTN urgency and started on Unasyn, Vanc, insulin drip, IV labetalol, IV vasotec. On 8/5/22 had stroke code w NIHSS 23, for unresponsiveness, concern for seizure w post ictal confusion, found to have ?embolic strokes on MR. S/p debridement of RLE 8/10/22. Developed E cloaca bacteremia, tx w avacaz and aztreonam, has since started on HD. Intubated on 9/6/22 and converted to trach 9/14/22. Uldall placed ?9/14/22 and started on HD. R femoral Deeth removed on 9/19 for bleeding, S/p bronchoscopy, tracheostomy, and PEG tube placement 9/14/22. Remains vent dependent.     Transferred to [facility] ICU on 9/19    Acute respiratory failure s/p trach / Bleeding episodes - trach, oral cavity and udall sites / Acute blood loss anemia. GI bleed  sp EGD and colonoscopy.    Altered MS suspected vasculitis SP pulse steroids  / LLE cellulitis / ALF oliguric SP RRT / COVID 19 infection  / E Cloacae Bacteremia resolved  / uncontrolled blood pressure   Diabetes a1c 10.4 / HLD      - transfuse another 2 units PRBC today - surgical f/u   - HD again today - nephrology following   - IV Lasix -    - Keppra, lacosamide   - prednisone taper   - sq insulin hospital protocol     -  hydralazine, clonidine, Norvasc, Lasix, Lipitor   - s/p antibiotics        - off antiplatelet     - frequent positioning    GI ppx    57 y/o woman w PMHx of uncontrolled HTN, DM2, hemorrhagic ?anuerysmal stroke R basal ganglia 2017 w residual L sided weakness, h/o PEG s/p removal, had not seen doctor in >1yr, w/o meds since 2018, presented to ED 8/2/22 for RLE nonhealing wound [id number], ED triage vitals noted for /170 range.   for cellulitis, HTN urgency and started on Unasyn, Vanc, insulin drip, IV labetalol, IV vasotec. On 8/5/22 had stroke code w NIHSS 23, for unresponsiveness, concern for seizure w post ictal confusion, found to have ?embolic strokes on MR. S/p debridement of RLE 8/10/22. Developed E cloaca bacteremia, tx w avacaz and aztreonam, has since started on HD. Intubated on 9/6/22 and converted to trach 9/14/22. Uldall placed ?9/14/22 and started on HD. R femoral Donald removed on 9/19 for bleeding, S/p bronchoscopy, tracheostomy, and PEG tube placement 9/14/22. Remains vent dependent.     Transferred to [facility] ICU on 9/19    Acute respiratory failure s/p trach / Bleeding episodes - trach, oral cavity and udall sites / Acute blood loss anemia. GI bleed  sp EGD and colonoscopy.    Altered MS suspected vasculitis SP pulse steroids  / LLE cellulitis / ALF oliguric SP RRT / COVID 19 infection  / E Cloacae Bacteremia resolved  / uncontrolled blood pressure   Diabetes a1c 10.4 / HLD      - transfuse another 2 units PRBC today - surgical f/u   - HD again today - nephrology following   - IV Lasix -    - Keppra, lacosamide    addm :  I spoke with surgical attending Dr Frye this am who evaluated pts bleeding trach in room, he stated he packed the trach and no surgical intervention needed at this time   - prednisone taper   - sq insulin hospital protocol     -  hydralazine, clonidine, Norvasc, Lasix, Lipitor   - s/p antibiotics        - off antiplatelet     - frequent positioning    GI ppx

## 2022-09-21 NOTE — PROGRESS NOTE ADULT - ASSESSMENT
Impression:    Acute respiratory failure s/p trach  Some bleeding at the trach site resolved   Acute blood loss anemia. GI bleed  sp EGD and colonoscopy.    Oral cavity bleed resolved  Altered MS suspected vasculitis SP pulse steroids   LLE cellulitis  ALF oliguric SP RRT  Bleeding from U Dall site SP removal    COVID 19 infection   E Cloacae Bacteremia resolved   ?? dic   bleeding from diff sites     PLAN:     CNS;  Steroid taper, f/u with neuro. FU MS.      recall neurology   HEENT: Oral care.  Trach care.      PULMONARY:  HOB @ 45 degrees.  Aspiration precautions. No vent changes.  keep on vent today   frequent suction     CARDIOVASCULAR: Avoid overload.  BP control.      GI: GI prophylaxis.  Peg feeding     RENAL:  follow up lytes.  Correct as needed.  Renal follow up need HD today   keep negative balance   s    INFECTIOUS DISEASE: SP ABX therapy.      HEMATOLOGICAL: LE doppler negative.  FU CBC and Coags.  DIC panel reviewed.  DC ASA.   Groin doppler -ve    s/p DDAVP   transfuse 2 unit prbc   serial cbc   place pressure site of udall       ENDOCRINE:  Follow up FS.  Insulin protocol if needed.  hold DDAvp   MUSCULOSKELETAL:  Bed chair position     VERY poor prognosis    Wound care per burn     GOC     icu monitoring   very poor prognosis   palliative care consult    Impression:    Acute respiratory failure s/p trach  Some bleeding at the trach site resolved   Acute blood loss anemia. GI bleed  sp EGD and colonoscopy.    Oral cavity bleed resolved  Altered MS suspected vasculitis SP pulse steroids   LLE cellulitis  ALF oliguric SP RRT  Bleeding from U Dall site SP removal    COVID 19 infection   E Cloacae Bacteremia resolved   ?? dic   bleeding from diff sites     PLAN:     CNS;  Steroid taper, f/u with neuro. FU MS.      recall neurology   HEENT: Oral care.  Trach care.      PULMONARY:  HOB @ 45 degrees.  Aspiration precautions. No vent changes.  keep on vent today   frequent suction   G x to evaluate the trach   CARDIOVASCULAR: Avoid overload.  BP control.      GI: GI prophylaxis.  Peg feeding     RENAL:  follow up lytes.  Correct as needed.  Renal follow up need HD today   keep negative balance   s    INFECTIOUS DISEASE: SP ABX therapy.      HEMATOLOGICAL: LE doppler negative.  FU CBC and Coags.  DIC panel reviewed.  DC ASA.   Groin doppler -ve    s/p DDAVP   transfuse 2 unit prbc   serial cbc         ENDOCRINE:  Follow up FS.  Insulin protocol if needed.    MUSCULOSKELETAL:  Bed chair position     VERY poor prognosis    Wound care per burn     GOC     icu monitoring   very poor prognosis   palliative care consult

## 2022-09-21 NOTE — PROGRESS NOTE ADULT - SUBJECTIVE AND OBJECTIVE BOX
[facility] FOLLOW UP NOTE  --------------------------------------------------------------------------------  Chief Complaint:    24 hour events/subjective:  Events over last 24hrs noted.  Pt seen during HD.  Decided to dialyze pt again today as there were problems w/ water pressure w/ HD last PM and therefore could not get complete HD.  Remains oliguric        PAST HISTORY  --------------------------------------------------------------------------------  No significant changes to PMH, PSH, FHx, SHx, unless otherwise noted    ALLERGIES & MEDICATIONS  --------------------------------------------------------------------------------  Allergies    No Known Allergies    Intolerances      Standing Inpatient Medications  amLODIPine   Tablet 10 milliGRAM(s) Oral daily  atorvastatin 80 milliGRAM(s) Oral at bedtime  chlorhexidine 0.12% Liquid 15 milliLiter(s) Oral Mucosa every 12 hours  chlorhexidine 2% Cloths 1 Application(s) Topical <User Schedule>  cloNIDine 0.2 milliGRAM(s) Oral every 8 hours  dextrose 5%. 1000 milliLiter(s) IV Continuous <Continuous>  dextrose 5%. 1000 milliLiter(s) IV Continuous <Continuous>  dextrose 50% Injectable 25 Gram(s) IV Push once  dextrose 50% Injectable 12.5 Gram(s) IV Push once  dextrose 50% Injectable 25 Gram(s) IV Push once  furosemide   Injectable 80 milliGRAM(s) IV Push every 12 hours  glucagon  Injectable 1 milliGRAM(s) IntraMuscular once  hydrALAZINE 100 milliGRAM(s) Oral every 8 hours  insulin lispro (ADMELOG) corrective regimen sliding scale   SubCutaneous three times a day before meals  labetalol 600 milliGRAM(s) Oral three times a day  lacosamide IVPB 50 milliGRAM(s) IV Intermittent every 12 hours  levETIRAcetam  Solution 500 milliGRAM(s) Oral two times a day  lidocaine 1%/epinephrine 1:100,000 Inj 20 milliLiter(s) Local Injection once  multivitamin 1 Tablet(s) Oral daily  pantoprazole  Injectable 40 milliGRAM(s) IV Push two times a day  predniSONE   Tablet 60 milliGRAM(s) Oral daily  sevelamer carbonate Powder 2400 milliGRAM(s) Enteral Tube every 8 hours  sodium chloride 0.65% Nasal 2 Spray(s) Both Nostrils two times a day    PRN Inpatient Medications  acetaminophen     Tablet .. 650 milliGRAM(s) Oral every 6 hours PRN  dextrose Oral Gel 15 Gram(s) Oral once PRN  fentaNYL    Injectable 25 MICROGram(s) IV Push every 5 minutes PRN  labetalol Injectable 10 milliGRAM(s) IV Push every 6 hours PRN  melatonin 3 milliGRAM(s) Oral at bedtime PRN      REVIEW OF SYSTEMS  --------------------------------------------------------------------------------  Gen: No weight changes, fatigue, fevers/chills, weakness  Skin: No rashes  Head/Eyes/Ears/Mouth: No headache; Normal hearing; Normal vision w/o blurriness; No sinus pain/discomfort, sore throat  Respiratory: No dyspnea, cough, wheezing, hemoptysis  CV: No chest pain, PND, orthopnea  GI: No abdominal pain, diarrhea, constipation, nausea, vomiting, melena, hematochezia  : No increased frequency, dysuria, hematuria, nocturia  MSK: No joint pain/swelling; no back pain; no edema  Neuro: No dizziness/lightheadedness, weakness, seizures, numbness, tingling  Heme: No easy bruising or bleeding  Endo: No heat/cold intolerance  Psych: No significant nervousness, anxiety, stress, depression    All other systems were reviewed and are negative, except as noted.    VITALS/PHYSICAL EXAM  --------------------------------------------------------------------------------  T(C): 35.3 (09-21-22 @ 11:00), Max: 36.3 (09-21-22 @ 02:18)  HR: 67 (09-21-22 @ 11:00) (62 - 78)  BP: 139/71 (09-21-22 @ 11:00) (114/74 - 192/104)  RR: 19 (09-21-22 @ 11:00) (14 - 24)  SpO2: 98% (09-21-22 @ 11:00) (92% - 100%)  Wt(kg): --  Height (cm): 165.1 (09-19-22 @ 23:31)  Weight (kg): 97.3 (09-19-22 @ 23:31)  BMI (kg/m2): 35.7 (09-19-22 @ 23:31)  BSA (m2): 2.04 (09-19-22 @ 23:31)      09-20-22 @ 07:01  -  09-21-22 @ 07:00  --------------------------------------------------------  IN: 2309.5 mL / OUT: 1325 mL / NET: 984.5 mL    09-21-22 @ 07:01  -  09-21-22 @ 12:13  --------------------------------------------------------  IN: 0 mL / OUT: 195 mL / NET: -195 mL      Physical Exam:  	Gen: ventilated via trach  	UE: Warm, no edema  	LE: Warm; + dependant edema  	Neuro: No focal deficits  	Psych: unresponsive  	Skin: Warm, without rashes  	Vascular access:    LABS/STUDIES  --------------------------------------------------------------------------------              6.6    24.09 >-----------<  159      [09-21-22 @ 05:57]              19.7     131  |  88  |  95  ----------------------------<  139      [09-21-22 @ 05:57]  4.8   |  22  |  5.6        Ca     8.0     [09-21-22 @ 05:57]      Mg     2.4     [09-21-22 @ 05:57]    TPro  5.4  /  Alb  2.8  /  TBili  0.3  /  DBili  x   /  AST  170  /  ALT  20  /  AlkPhos  207  [09-21-22 @ 05:57]    PT/INR: PT 17.40, INR 1.52       [09-21-22 @ 05:57]  PTT: 36.6       [09-21-22 @ 05:57]      Creatinine Trend:  SCr 5.6 [09-21 @ 05:57]  SCr 5.3 [09-20 @ 19:30]  SCr 5.6 [09-20 @ 05:39]  SCr 4.9 [09-19 @ 17:45]  SCr 5.3 [09-19 @ 10:16]    Urinalysis - [09-09-22 @ 19:05]      Color Yellow / Appearance Turbid / SG 1.007 / pH 6.0      Gluc Negative / Ketone Negative  / Bili Negative / Urobili <2 mg/dL       Blood Large / Protein 30 mg/dL / Leuk Est Large / Nitrite Negative      RBC 26 /  / Hyaline 6 / Gran  / Sq Epi  / Non Sq Epi 3 / Bacteria Negative      Ferritin 243      [08-28-22 @ 05:49]  PTH -- (Ca 8.5)      [09-05-22 @ 20:00]   75  TSH 0.86      [08-08-22 @ 17:53]  Lipid: chol 234, , HDL 42, LDL --      [08-03-22 @ 08:56]    HBsAb <3.0      [09-15-22 @ 12:05]  HBsAb Nonreact      [09-15-22 @ 12:05]  HBsAg Nonreact      [09-15-22 @ 12:05]  HBcAb Nonreact      [09-15-22 @ 12:05]  HIV Nonreact      [09-09-22 @ 06:11]    LUIS FELIPE: titer Negative, pattern --      [09-03-22 @ 12:04]  dsDNA <12      [08-30-22 @ 19:27]  C3 Complement 134      [09-09-22 @ 06:11]  C4 Complement 33      [09-09-22 @ 06:11]  ANCA: cANCA Negative, pANCA Negative, atypical ANCA Negative      [08-30-22 @ 19:27]  Immunofixation Serum:   No Monoclonal Band Identified    Reference Range: None Detected      [08-30-22 @ 23:46]  SPEP Interpretation: Pattern Consistent With Acute Inflammation Or Stress      [08-30-22 @ 23:46]  Cryoglobulin: Negative      [09-09-22 @ 06:11]

## 2022-09-21 NOTE — PROGRESS NOTE ADULT - ASSESSMENT
1)  Severe oliguric ALF likely ischemic ATN.  Has been HD dependant    2)  Severe HTN, overall improved aside from intermittent SBP's in 170's to 180's    3)  Bleeding from trach overnight.  Pt felt to have DIC    Recommendations:    1)  Will complete 3hjrs HD via [place], Optiflux dialyzer, 2K+ bath, attempt to remove 3L    2)  Can continue same anti-HTN regimen for now    3)  Of note, if needed for better BP control, can start ACE-I/ARB

## 2022-09-21 NOTE — BRIEF OPERATIVE NOTE - OPERATION/FINDINGS
small bleeder found upon removing trach, tied off with 2-0 silk suture. Other minor bleeding controlled with bovie. Packed with 2 packs of surgicell and 1 4x4 with another 4x4 laid on top. Patient still oozing but no active bleeding visualized in trach site. During bronchoscopy prior to and after of trach removal, multiple clots suctioned out.
adherent necrotic tissue
Open tracheostomy using Evelinley [id number] (8.5).

## 2022-09-21 NOTE — PROGRESS NOTE ADULT - ASSESSMENT
57 y/o woman w PMHx of uncontrolled HTN, DM2, hemorrhagic ?anuerysmal stroke R basal ganglia 2017 w residual L sided weakness, h/o PEG s/p removal, had not seen doctor in >1yr, w/o meds since 2018, presented to ED 8/2/22 for RLE nonhealing wound [id number] and BIB son who noted it that evening, ED triage vitals noted for /170 range.     Admitted for cellulitis, HTN urgency and started on Unasyn, Vanc, insulin drip, IV labetalol, IV vasotec. On 8/5/22 had stroke code w NIHSS 23, for unresponsiveness, concern for seizure w post ictal confusion, found to have ?embolic strokes on MR. S/p debridement of RLE 8/10/22. Developed E cloaca bacteremia, tx w avacaz and aztreonam, has since started on HD. Intubated on 9/6/22 and converted to trach 9/14/22. Uldall placed ?9/14/22 and started on HD -> [place] removed. S/p bronchoscopy, tracheostomy, and PEG tube placement 9/14/22. Remains vent dependent. Patient oliguric with rising K on 9/20, RIJ udall placed. Patient persistently bleeding from tracheostomy site.     # Acute bleed secondary to coagulopathy???  - bleeding predominantly from and around tracheostomy site.   - surgery made aware, examined and tried various interventions. Clot suctioned out of tracheostomy tube overnight.  - give 2units PRBC on 9/20. Pressure held on udall site, some bleeding noted around trach.   - to date patient has received a total of 4 pRBC, 3 FFP, 2 platelet since 9/20.   - not responsive to DDAVP.   - serial cbc  - Initially questionable DIC, fibrinogen >700, INR peak 1.8, haptoglobin pending. peripheral smear ordered.   - CT surgery to possibly cauterize the tracheostomy site. Surgery Follow up.  - heme onc eval     #Acute ischemic strokes, multifocal  #h/o hemorrhagic, ?aneurysmal stroke R basal ganglia 2017  Per neurology: suspicious for vasculitis, but not confirmed, CSF studies does not support the diagnosis. - Plavix held for LGIB. Aspirin resumed.   - Neuro: 60mg Prednisone taper [id number] starting 9/16/22-10/7/22  - Per stroke team, no need for angio, requested a renal bx when stable as expected widespread vasculitis.   - continue with Keppra   - recall neuro.     # oliguric ALF / Urinary retention / Suspected ATN  Renal US 9/2/22: no hydronephrosis  - Cr still high; 9/16/22 Cr 6.8   - Sodium bicarbonate increased to 1300 q8h, sevelamer 2400 mg TID in PEG   - Nephro following  - HD: 9/15/22, 9/16/22 of 2hr session removed 2L   - RIJ udall placed 9/20 - s/p HD on 9/21    #Bacteremia, resolved   BCx 9/1/22 (+) MDR enterobacter Cloacae   BCx 9/3/22, 9/4/22, 9/5/22, 9/6/22, 9/7/22 all NGF   - 9/19 completed abx per ID     #COVID (+) 9/13/22  - Tested (+) 9/13/22, in isolation thru 9/23/22   - Resp status as noted above  - Will monitor for fevers, sepsis  - Consider consult ID for further recs    # Purulent Right LE cellulitis   S/p debridement by burn on 8/10/22  - Candida in wound. per Dr. Chua: contaminant  - BCx w/ staph: likely contaminant. repeat BCx at that time was negative  - F/u burn as outpatient [phone]; signed off 9/14/22.     #GI bleed  S/p 6 units of PRBCs. Hb goal 7+, 9/16/22 Hb 7.6  - Pt was seen by GI, external bleeding hemorrhoids noted. Surgery consulted.   - Protonix q12h  - ENT eval appreciated for oral bleed. No actively oozing wounds, teeth guard put in.     - Oral cavity bleed from tongue biting, improved  - Hgb 7 on 9/19. Likey 2/2 to platelet dysfunction from uremia    #Hypertensive urgency due to noncompliance and Malignant HTN - improved   BP on admission 296/174 without signs of end organ damage at that time. Renal artery duplex wnl>>ARIAN unlikely  - Aldosterone wnl, renin elevated  - BP overall improved  - SBP goal 120-160  - c/w amlodipine 10, furosemide 80 q12, hydralazine 100mg q8, labetalol 600mg TID    # Diabetes mellitus   - HbA1C 10.4  - Transitioned from insulin drip to basal/bolus 9/16/22  - Lantus and lispro sliding scale; increase as necessary    #HLD   - Cont statins     # Family contact: Spoke to patient's son, Latrell, to update him on the patient's current condition, the bleed, and plan for the patient on 9/21. He understands that the patient has a poor prognosis.                                                                                 ----------------------------------------------------  # DVT prophylaxis: Holding AC given recent GIB and active current bleed.   # GI prophylaxis: Pantoprazole   # Diet: "Nepro at 30 ml/h + 5 packets Beneprotein/d --> 87 gm protein, 1400 kcal, 510 total mg phos, 23 mEq K/d"  # Activity: Bedrest  # Code status: FULL CODE   # Disposition: SDU

## 2022-09-21 NOTE — BRIEF OPERATIVE NOTE - NSICDXBRIEFPREOP_GEN_ALL_CORE_FT
PRE-OP DIAGNOSIS:  Acute respiratory failure with hypoxia 14-Sep-2022 16:43:01  Lis Jarrett  
PRE-OP DIAGNOSIS:  Tracheostomy malfunction 21-Sep-2022 19:19:10  Balbina Hughes  
PRE-OP DIAGNOSIS:  Wound 10-Aug-2022 20:36:11 right lower leg Erik Jarvis

## 2022-09-21 NOTE — PRE-ANESTHESIA EVALUATION ADULT - BMI (KG/M2)
29.9
35.7
30.2
29.9
SSKI Counseling:  I discussed with the patient the risks of SSKI including but not limited to thyroid abnormalities, metallic taste, GI upset, fever, headache, acne, arthralgias, paraesthesias, lymphadenopathy, easy bleeding, arrhythmias, and allergic reaction.

## 2022-09-21 NOTE — PRE-ANESTHESIA EVALUATION ADULT - NSANTHOSAYNRD_GEN_A_CORE
No. SHRUTI screening performed.  STOP BANG Legend: 0-2 = LOW Risk; 3-4 = INTERMEDIATE Risk; 5-8 = HIGH Risk
No. SHRUTI screening performed.  STOP BANG Legend: 0-2 = LOW Risk; 3-4 = INTERMEDIATE Risk; 5-8 = HIGH Risk

## 2022-09-21 NOTE — BRIEF OPERATIVE NOTE - NSICDXBRIEFPROCEDURE_GEN_ALL_CORE_FT
PROCEDURES:  Selective debridement 10-Aug-2022 20:35:49 right lower leg Erik Jarvis  
PROCEDURES:  Open tracheostomy 14-Sep-2022 16:42:20  Lis Jarrett  
PROCEDURES:  Simple revision of tracheostomy 21-Sep-2022 19:16:40  Balbina Hughes  Bronchoscopy 21-Sep-2022 19:16:59  Balbina Hughes

## 2022-09-21 NOTE — PROGRESS NOTE ADULT - SUBJECTIVE AND OBJECTIVE BOX
Hospital Day:  50d    Chief Complaint: Patient is a 57y old  Female who presents with a chief complaint of RLE wound (12 Sep 2022 15:26)    24 hour events: Significant bleeding around tracheostomy site. Patient received DDAVP, 2 PRBC, 2 FFP, and 1 PLT overnight.    Past Medical Hx:   Hypertension    Diabetes mellitus      Past Sx:  No significant past surgical history      Allergies:  No Known Allergies    Current Meds:   Standing Meds:  amLODIPine   Tablet 10 milliGRAM(s) Oral daily  atorvastatin 80 milliGRAM(s) Oral at bedtime  chlorhexidine 0.12% Liquid 15 milliLiter(s) Oral Mucosa every 12 hours  chlorhexidine 2% Cloths 1 Application(s) Topical <User Schedule>  cloNIDine 0.2 milliGRAM(s) Oral every 8 hours  dextrose 5%. 1000 milliLiter(s) (100 mL/Hr) IV Continuous <Continuous>  dextrose 5%. 1000 milliLiter(s) (50 mL/Hr) IV Continuous <Continuous>  dextrose 50% Injectable 25 Gram(s) IV Push once  dextrose 50% Injectable 12.5 Gram(s) IV Push once  dextrose 50% Injectable 25 Gram(s) IV Push once  furosemide   Injectable 80 milliGRAM(s) IV Push every 12 hours  glucagon  Injectable 1 milliGRAM(s) IntraMuscular once  hydrALAZINE 100 milliGRAM(s) Oral every 8 hours  insulin lispro (ADMELOG) corrective regimen sliding scale   SubCutaneous three times a day before meals  labetalol 600 milliGRAM(s) Oral three times a day  lacosamide IVPB 50 milliGRAM(s) IV Intermittent every 12 hours  levETIRAcetam  Solution 500 milliGRAM(s) Oral two times a day  lidocaine 1%/epinephrine 1:100,000 Inj 20 milliLiter(s) Local Injection once  multivitamin 1 Tablet(s) Oral daily  pantoprazole  Injectable 40 milliGRAM(s) IV Push two times a day  predniSONE   Tablet 60 milliGRAM(s) Oral daily  sevelamer carbonate Powder 2400 milliGRAM(s) Enteral Tube every 8 hours  sodium chloride 0.65% Nasal 2 Spray(s) Both Nostrils two times a day    PRN Meds:  acetaminophen     Tablet .. 650 milliGRAM(s) Oral every 6 hours PRN Temp greater or equal to 38C (100.4F), Mild Pain (1 - 3)  dextrose Oral Gel 15 Gram(s) Oral once PRN Blood Glucose LESS THAN 70 milliGRAM(s)/deciliter  fentaNYL    Injectable 50 MICROGram(s) IV Push every 6 hours PRN agitation  labetalol Injectable 10 milliGRAM(s) IV Push every 6 hours PRN Systolic blood pressure >  melatonin 3 milliGRAM(s) Oral at bedtime PRN Insomnia    HOME MEDICATIONS:  losartan 50 mg oral tablet: 1 tab(s) orally once a day  metFORMIN 500 mg oral tablet: 1 tab(s) orally 2 times a day  metoprolol succinate 50 mg oral tablet, extended release: 1 tab(s) orally once a day      Vital Signs:   T(F): 95.5 (09-21-22 @ 11:00), Max: 97.3 (09-21-22 @ 02:18)  HR: 73 (09-21-22 @ 15:07) (64 - 78)  BP: 194/88 (09-21-22 @ 13:00) (114/74 - 194/88)  RR: 17 (09-21-22 @ 13:00) (15 - 24)  SpO2: 98% (09-21-22 @ 15:07) (92% - 100%)      09-20-22 @ 07:01  -  09-21-22 @ 07:00  --------------------------------------------------------  IN: 2309.5 mL / OUT: 1325 mL / NET: 984.5 mL    09-21-22 @ 07:01  -  09-21-22 @ 15:23  --------------------------------------------------------  IN: 1360 mL / OUT: 3795 mL / NET: -2435 mL        Physical Exam:   GENERAL: NAD  HEENT: +bleeding around tracheostomy +YOHANNES mittal   CHEST/LUNG: audible bilateral breath sounds   HEART: Regular rate and rhythm; s1 s2 appreciated   ABDOMEN: Soft, Nontender, Nondistended abdomen   EXTREMITIES: +edema   NERVOUS SYSTEM:  responsive to painful stimuli, does not follow commands   LINES/CATHETERS: gaurav Farias PIV        Labs:                         7.9    25.39 )-----------( 135      ( 21 Sep 2022 13:04 )             23.4     Neutophil% 91.0, Lymphocyte% 2.7, Monocyte% 5.3, Bands% 0.9 09-21-22 @ 05:57    21 Sep 2022 05:57    131    |  88     |  95     ----------------------------<  139    4.8     |  22     |  5.6      Ca    8.0        21 Sep 2022 05:57  Mg     2.4       21 Sep 2022 05:57    TPro  5.4    /  Alb  2.8    /  TBili  0.3    /  DBili  x      /  AST  170    /  ALT  20     /  AlkPhos  207    21 Sep 2022 05:57       pTT    36.6             ----< 1.52 INR  (09-21-22 @ 05:57)    17.40        PT,    pTT    78.0             ----< 1.78 INR  (09-20-22 @ 19:30)    20.40        PT        Radiology:

## 2022-09-21 NOTE — PROGRESS NOTE ADULT - SUBJECTIVE AND OBJECTIVE BOX
events noted pt bleeding from trach site overnight      T(F): 96.4 (09-21-22 @ 07:00), Max: 97.3 (09-21-22 @ 02:18)  HR: 64 (09-21-22 @ 09:01)  BP: 155/78 (09-21-22 @ 09:03)  RR: 18 (09-21-22 @ 09:01)  SpO2: 98% (09-21-22 @ 09:01) (92% - 100%)    PHYSICAL EXAM:  GENERAL: NAD  HEAD:  Atraumatic, Normocephalic  EYES: EOMI, PERRLA, conjunctiva and sclera clear  NERVOUS SYSTEM: no focal deficits   CHEST/LUNG:  bilateral rhonchi  HEART: Regular rate and rhythm; No murmurs, rubs, or gallops  ABDOMEN: Soft, Nontender, Nondistended; Bowel sounds present  EXTREMITIES:  2+ Peripheral Pulses, No clubbing, cyanosis, or edema    LABS  09-21    131<L>  |  88<L>  |  95<HH>  ----------------------------<  139<H>  4.8   |  22  |  5.6<HH>    Ca    8.0<L>      21 Sep 2022 05:57  Phos  8.9     09-19  Mg     2.4     09-21    TPro  5.4<L>  /  Alb  2.8<L>  /  TBili  0.3  /  DBili  x   /  AST  170<H>  /  ALT  20  /  AlkPhos  207<H>  09-21                          6.6    24.09 )-----------( 159      ( 21 Sep 2022 05:57 )             19.7     PT/INR - ( 21 Sep 2022 05:57 )   PT: 17.40 sec;   INR: 1.52 ratio         PTT - ( 21 Sep 2022 05:57 )  PTT:36.6 sec    Mode: AC/ CMV (Assist Control/ Continuous Mandatory Ventilation)  RR (machine): 16  TV (machine): 360  FiO2: 40  PEEP: 5      Culture Results:   No Growth Final (09-07-22)  Culture Results:   No Growth Final (09-06-22)  Culture Results:   No Growth Final (09-05-22)  Culture Results:   No Growth Final (09-04-22)  Culture Results:   No Growth Final (09-03-22)  Culture Results:   Growth in aerobic and anaerobic bottles: Enterobacter cloacae (Carbapenem  Resistant)  ***Blood Panel PCR results on this specimen are available  approximately 3 hours after the Gram stain result.***  Gram stain, PCR, and/or culture results may notalways  correspond due to difference in methodologies.  ************************************************************  This PCR assay was performed by multiplex PCR. This  Assay tests for 66 bacterial and resistance gene targets.  Please refer to the [facility] test directory  at [email or web address] for details. (09-01-22)  Culture Results:   >=3 organisms. Probable collection contamination. (09-01-22)  Culture Results:   >=3 organisms. Probable collection contamination. (08-31-22)  Culture Results:   No Growth Final (08-30-22)  Culture Results:   No growth at 1 week. (08-26-22)    RADIOLOGY  < from: Xray Chest 1 View-PORTABLE IMMEDIATE (Xray Chest 1 View-PORTABLE IMMEDIATE .) (09.20.22 @ 13:41) >  Support devices: Right-sided central line with tip in the region of the   mid to distal superior vena cava. Loop recorder overlying the heart. The   tracheostomy tube in midline.    Cardiac/mediastinum/hilum: No change    Lung parenchyma/Pleura: Bilateral lung opacities with pleural effusions,   right greater than left, overall unchanged in the prior examination.    < end of copied text >    MEDICATIONS  (STANDING):  amLODIPine   Tablet 10 milliGRAM(s) Oral daily  atorvastatin 80 milliGRAM(s) Oral at bedtime  chlorhexidine 0.12% Liquid 15 milliLiter(s) Oral Mucosa every 12 hours  chlorhexidine 2% Cloths 1 Application(s) Topical <User Schedule>  cloNIDine 0.2 milliGRAM(s) Oral every 8 hours  furosemide   Injectable 80 milliGRAM(s) IV Push every 12 hours  hydrALAZINE 100 milliGRAM(s) Oral every 8 hours  insulin lispro (ADMELOG) corrective regimen sliding scale   SubCutaneous three times a day before meals  labetalol 600 milliGRAM(s) Oral three times a day  lacosamide IVPB 50 milliGRAM(s) IV Intermittent every 12 hours  levETIRAcetam  Solution 500 milliGRAM(s) Oral two times a day  lidocaine 1%/epinephrine 1:100,000 Inj 20 milliLiter(s) Local Injection once  multivitamin 1 Tablet(s) Oral daily  pantoprazole  Injectable 40 milliGRAM(s) IV Push two times a day  predniSONE   Tablet 60 milliGRAM(s) Oral daily  sevelamer carbonate Powder 2400 milliGRAM(s) Enteral Tube every 8 hours  sodium chloride 0.65% Nasal 2 Spray(s) Both Nostrils two times a day    MEDICATIONS  (PRN):  acetaminophen     Tablet .. 650 milliGRAM(s) Oral every 6 hours PRN Temp greater or equal to 38C (100.4F), Mild Pain (1 - 3)  dextrose Oral Gel 15 Gram(s) Oral once PRN Blood Glucose LESS THAN 70 milliGRAM(s)/deciliter  fentaNYL    Injectable 25 MICROGram(s) IV Push every 5 minutes PRN procedural sedation  labetalol Injectable 10 milliGRAM(s) IV Push every 6 hours PRN Systolic blood pressure >  melatonin 3 milliGRAM(s) Oral at bedtime PRN Insomnia

## 2022-09-22 NOTE — PROGRESS NOTE ADULT - ASSESSMENT
57 y/o woman w PMHx of uncontrolled HTN, DM2, hemorrhagic ?anuerysmal stroke R basal ganglia 2017 w residual L sided weakness, h/o PEG s/p removal, had not seen doctor in >1yr, w/o meds since 2018, presented to ED 8/2/22 for RLE nonhealing wound [id number] and BIB son who noted it that evening, ED triage vitals noted for /170 range.     Admitted for cellulitis, HTN urgency and started on Unasyn, Vanc, insulin drip, IV labetalol, IV vasotec. On 8/5/22 had stroke code w NIHSS 23, for unresponsiveness, concern for seizure w post ictal confusion, found to have ?embolic strokes on MR. S/p debridement of RLE 8/10/22. Developed E cloaca bacteremia, tx w avacaz and aztreonam, has since started on HD. Intubated on 9/6/22 and converted to trach 9/14/22. Uldall placed ?9/14/22 and started on HD -> [place] removed. S/p bronchoscopy, tracheostomy, and PEG tube placement 9/14/22. Remains vent dependent. Patient oliguric with rising K on 9/20, RIJ udall placed. Patient persistently bleeding from tracheostomy site. Taken for surgery on 9/21 by CT surgery for trach revision, bleeding cauterized and patient intubated orally. On 9/22 bleeding from trach site persistents but decreased. Some oozing from RIJ site. In AM of 9/22, CXR showed L lung collapse and patient under went bronchoscopy with removal of large clots.      # Acute bleed secondary to coagulopathy???  - bleeding predominantly from and around tracheostomy site s/p trach removal and surgical intervention by CTSx.  - give 2units PRBC on 9/20. Pressure held on udall site, some bleeding noted around trach.   - to date patient has received a total of 6 pRBC, 3 FFP, 2 platelet since 9/20.   - not responsive to DDAVP.   - serial cbc  - Initially questionable DIC, fibrinogen >700, INR peak 1.8, haptoglobin not significantly decreased. peripheral smear ordered.   - repeat HIT panel   - Spoke to heme/onc attending, recommend trying another dose of DDAVP, vitamin K 5mg IV (5 given yesterday), and possibly FFP. Patient could possibly still be in DIC vs platelet dysfunction secondary to uremia? Official consult to follow.     #Acute ischemic strokes, multifocal  #h/o hemorrhagic, ?aneurysmal stroke R basal ganglia 2017  Per neurology: suspicious for vasculitis, but not confirmed, CSF studies does not support the diagnosis. - Plavix held for LGIB. Aspirin resumed.   - Neuro: 60mg Prednisone taper [id number] starting 9/16/22-10/7/22  - Per stroke team, no need for angio, requested a renal bx when stable as expected widespread vasculitis.   - continue with Keppra   - recall neuro.     # oliguric ALF / Urinary retention / Suspected ATN  Renal US 9/2/22: no hydronephrosis  - Cr still high; 9/16/22 Cr 6.8   - Sodium bicarbonate increased to 1300 q8h, sevelamer 2400 mg TID in PEG   - Nephro following  - RIJ udall placed 9/20 - s/p HD on 9/21    #Bacteremia, resolved   BCx 9/1/22 (+) MDR enterobacter Cloacae   BCx 9/3/22, 9/4/22, 9/5/22, 9/6/22, 9/7/22 all NGF   - 9/19 completed abx per ID     #COVID (+) 9/13/22  - Tested (+) 9/13/22, in isolation thru 9/23/22   - Resp status as noted above  - Will monitor for fevers, sepsis  - no respiratory sxs at this time. consider ID consult     # Purulent Right LE cellulitis   S/p debridement by burn on 8/10/22  - Candida in wound. per Dr. Chua: contaminant  - BCx w/ staph: likely contaminant. repeat BCx at that time was negative  - F/u burn as outpatient [phone]; signed off 9/14/22.     #GI bleed  S/p 6 units of PRBCs. Hb goal 7+, 9/16/22 Hb 7.6  - Pt was seen by GI, external bleeding hemorrhoids noted.  - Protonix q12h  - ENT eval appreciated for oral bleed. No actively oozing wounds, teeth guard put in.     - Oral cavity bleed from tongue biting, improved  - Hgb 7 on 9/19. Likey 2/2 to platelet dysfunction from uremia    #Hypertensive urgency due to noncompliance and Malignant HTN - improved   BP on admission 296/174 without signs of end organ damage at that time. Renal artery duplex wnl>>ARIAN unlikely  - Aldosterone wnl, renin elevated  - BP overall improved  - SBP goal 120-160  - c/w amlodipine 10, furosemide 80 q12, hydralazine 100mg q8, labetalol 600mg TID    # Diabetes mellitus   - HbA1C 10.4  - Transitioned from insulin drip to basal/bolus 9/16/22  - Lantus and lispro sliding scale; increase as necessary    #HLD   - Cont statins     # Family contact: Spoke to patient's son Erik and her  on 9/22 to update them on the patient's current condition and plan                                                                                 ----------------------------------------------------  # DVT prophylaxis: Holding AC given recent GIB and active current bleed.   # GI prophylaxis: Pantoprazole   # Diet: "Nepro at 30 ml/h + 5 packets Beneprotein/d --> 87 gm protein, 1400 kcal, 510 total mg phos, 23 mEq K/d"  # Activity: Bedrest  # Code status: FULL CODE   # Disposition: Acute

## 2022-09-22 NOTE — PROGRESS NOTE ADULT - ASSESSMENT
1)  Severe oliguric ALF likely ischemic ATN.  Has been HD dependant    2)  Severe HTN, overall improved     3)  Bleeding from trach s/p bronchoscopy with clots removed    Recommendations:    1)  Will have HD tomorrow, 3hrs HD via [place], Optiflux dialyzer, 2K+ bath, attempt to remove 3L    2)  Can continue same anti-HTN regimen for now    3)  Of note, if needed for better BP control, can start ACE-I/ARB

## 2022-09-22 NOTE — PROCEDURE NOTE - NSBRONCHPROCDETAILS_GEN_A_CORE_FT
The procedure, indications, preparation and potential complications were explained to the Healthcare proxy, who indicated understanding and verbally consented the corresponding consent forms.  Patient was placed on 100% FiO2 and her feeds were held.  Patient was administered 50mcg of Fentanyl prior to the procedure and Versed 2mg during the procedure. The procedure was performed for diagnostic and therapeutic purposes. The bronchoscope was advanced through the endotracheal tube and advanced under direct visualization until the main jenaro was reached.  Normal tracheobronchial anatomy.  A thick clot was visualized obstructing the left mainstem bronchus.  Multiple attempts to suction out the clot were attempt until we were able to suction it.  The clot was around 6cm in length.  Blood clots and secretions were suctioned from both right and left mainstem bronchi.  The bronchoscopy was then advanced into the left lung to the segmental and subsegmental levels of the left upper and lower lobes.  Excessive secretions and blood clots were suctioned.  No endobronchial lesions were identified.   The bronchoscope was then advanced into the right lung to the segmental and subsegmental levels of the right upper, middle and lower lobes.   Mild secretions and clots were suctioned.  No endobronchial lesions were identified.   Bronchoalveolar Lavage of the Right Middle Lobe was performed.  The bronchoscope was then withdrawn through the endotracheal tube which was then resealed.  The patient tolerated the procedure well.  There were no complications.

## 2022-09-22 NOTE — PROGRESS NOTE ADULT - ATTENDING COMMENTS
patient seen and examined agree above note   will proceed with bronch   BAL   follow renal for HD   keep SI < os   serail cbc keep HB > 7.5   follow INR

## 2022-09-22 NOTE — PROGRESS NOTE ADULT - ASSESSMENT
55 y/o woman w PMHx of uncontrolled HTN, DM2, hemorrhagic ?anuerysmal stroke R basal ganglia 2017 w residual L sided weakness, h/o PEG s/p removal, had not seen doctor in >1yr, w/o meds since 2018, presented to ED 8/2/22 for RLE nonhealing wound [id number], ED triage vitals noted for /170 range.   for cellulitis, HTN urgency and started on Unasyn, Vanc, insulin drip, IV labetalol, IV vasotec. On 8/5/22 had stroke code w NIHSS 23, for unresponsiveness, concern for seizure w post ictal confusion, found to have ?embolic strokes on MR. S/p debridement of RLE 8/10/22. Developed E cloaca bacteremia, tx w avacaz and aztreonam, has since started on HD. Intubated on 9/6/22 and converted to trach 9/14/22. Uldall placed ?9/14/22 and started on HD. R femoral [place] removed on 9/19 for bleeding, S/p bronchoscopy, tracheostomy, and PEG tube placement 9/14/22. Remains vent dependent.     Transferred to [facility] ICU on 9/19    Acute respiratory failure s/p trach / Bleeding episodes - trach, oral cavity and udall sites / Acute blood loss anemia. GI bleed  sp EGD and colonoscopy.    Altered MS suspected vasculitis SP pulse steroids  / LLE cellulitis / ALF oliguric SP RRT / COVID 19 infection  / E Cloacae Bacteremia resolved  / uncontrolled blood pressure        - appreciate surgery removing trach and tieing off and boviing bleeding vessels    - H/H improved - follow serially and maintain above 7   - s/p bronch again this am and removal of large blood clot    - taper steroids   - IV Protonix   - heme f/u   - Keppra, lacosamide   - insulin sq hospital protocol   - very poor prognosis - family aware

## 2022-09-22 NOTE — PROGRESS NOTE ADULT - SUBJECTIVE AND OBJECTIVE BOX
Hospital Day:  51d    Chief Complaint: Patient is a 57y old  Female who presents with a chief complaint of RLE wound (12 Sep 2022 15:26)    24 hour events: Patient s/p trach revision and removal, oral intubation, and bronch by CTSx. Patient had L lung collapse s/p bronchoscopy today.     Past Medical Hx:   Hypertension    Diabetes mellitus      Past Sx:  No significant past surgical history      Allergies:  No Known Allergies    Current Meds:   Standing Meds:  amLODIPine   Tablet 10 milliGRAM(s) Oral daily  atorvastatin 80 milliGRAM(s) Oral at bedtime  chlorhexidine 0.12% Liquid 15 milliLiter(s) Oral Mucosa every 12 hours  chlorhexidine 2% Cloths 1 Application(s) Topical <User Schedule>  cloNIDine 0.2 milliGRAM(s) Oral every 8 hours  dextrose 5%. 1000 milliLiter(s) (100 mL/Hr) IV Continuous <Continuous>  dextrose 5%. 1000 milliLiter(s) (50 mL/Hr) IV Continuous <Continuous>  dextrose 50% Injectable 25 Gram(s) IV Push once  dextrose 50% Injectable 12.5 Gram(s) IV Push once  dextrose 50% Injectable 25 Gram(s) IV Push once  furosemide   Injectable 80 milliGRAM(s) IV Push every 12 hours  glucagon  Injectable 1 milliGRAM(s) IntraMuscular once  hydrALAZINE 100 milliGRAM(s) Oral every 8 hours  insulin lispro (ADMELOG) corrective regimen sliding scale   SubCutaneous three times a day before meals  labetalol 600 milliGRAM(s) Oral three times a day  lacosamide IVPB 50 milliGRAM(s) IV Intermittent every 12 hours  levETIRAcetam  Solution 1000 milliGRAM(s) Oral at bedtime  lidocaine 1%/epinephrine 1:100,000 Inj 20 milliLiter(s) Local Injection once  multivitamin 1 Tablet(s) Oral daily  pantoprazole  Injectable 40 milliGRAM(s) IV Push two times a day  predniSONE   Tablet 60 milliGRAM(s) Oral daily  sevelamer carbonate Powder 2400 milliGRAM(s) Enteral Tube every 8 hours  sodium chloride 0.65% Nasal 2 Spray(s) Both Nostrils two times a day    PRN Meds:  acetaminophen     Tablet .. 650 milliGRAM(s) Oral every 6 hours PRN Temp greater or equal to 38C (100.4F), Mild Pain (1 - 3)  dextrose Oral Gel 15 Gram(s) Oral once PRN Blood Glucose LESS THAN 70 milliGRAM(s)/deciliter  fentaNYL    Injectable 50 MICROGram(s) IV Push every 6 hours PRN agitation  labetalol Injectable 10 milliGRAM(s) IV Push every 6 hours PRN Systolic blood pressure >  melatonin 3 milliGRAM(s) Oral at bedtime PRN Insomnia    HOME MEDICATIONS:  losartan 50 mg oral tablet: 1 tab(s) orally once a day  metFORMIN 500 mg oral tablet: 1 tab(s) orally 2 times a day  metoprolol succinate 50 mg oral tablet, extended release: 1 tab(s) orally once a day      Vital Signs:   T(F): 96.8 (09-22-22 @ 15:30), Max: 99 (09-22-22 @ 02:00)  HR: 66 (09-22-22 @ 15:30) (57 - 72)  BP: 133/69 (09-22-22 @ 15:30) (109/66 - 172/88)  RR: 21 (09-22-22 @ 15:30) (14 - 30)  SpO2: 96% (09-22-22 @ 15:30) (89% - 100%)      09-21-22 @ 07:01  -  09-22-22 @ 07:00  --------------------------------------------------------  IN: 2088 mL / OUT: 3855 mL / NET: -1767 mL    09-22-22 @ 07:01  -  09-22-22 @ 16:29  --------------------------------------------------------  IN: 180 mL / OUT: 15 mL / NET: 165 mL        Physical Exam:   GENERAL: NAD  HEENT: ET tube, trach packing, +RIJ udall   CHEST/LUNG: audible bilateral breath sounds   HEART: Regular rate and rhythm  ABDOMEN: Soft, Nontender, Nondistended +PEG  EXTREMITIES: +edema  SKIN: no rashes, no new lesions  NERVOUS SYSTEM:  non responsive, does not follow commands   LINES/CATHETERS: gaurav Farias, CHANCE        Labs:                         7.7    20.89 )-----------( 116      ( 22 Sep 2022 05:44 )             22.2     Neutophil% 83.7, Lymphocyte% 4.6, Monocyte% 11.0, Bands% 0.6 09-22-22 @ 05:44    22 Sep 2022 05:44    136    |  94     |  60     ----------------------------<  120    3.9     |  25     |  3.7      Ca    7.5        22 Sep 2022 05:44  Mg     2.1       22 Sep 2022 05:44    TPro  4.8    /  Alb  3.4    /  TBili  0.2    /  DBili  x      /  AST  191    /  ALT  21     /  AlkPhos  153    22 Sep 2022 05:44       pTT    34.1             ----< 1.24 INR  (09-22-22 @ 05:44)    14.20        PT        Radiology:

## 2022-09-22 NOTE — PROGRESS NOTE ADULT - ASSESSMENT
ASSESSMENT:  57y F s/p revision and removal of trach with intraop bronchoscopy due to continuous oozing from trach site.     PLAN:  - repack 4x4 in trach site as needed and replace 4x4 coving trach opening as needed   - will reassess for possible trach reinsertion at bedside on 9/23/22   - No acute surgical intervention. Patient is constantly oozing from all puncture sites  - Management per ICU team   - consider palliative consult   - GOC discussion with family.     Surgery: [id number]

## 2022-09-22 NOTE — PROGRESS NOTE ADULT - ATTENDING SUPERVISION STATEMENT
Fellow
Fellow
Resident
Resident
Fellow
Fellow
Resident
Fellow
Fellow
Resident
Resident
Fellow
Resident
Fellow
Fellow
Resident

## 2022-09-22 NOTE — CHART NOTE - NSCHARTNOTEFT_GEN_A_CORE
Attempted to visit the patient. Patient was under Bronchoscopy  Unable to assess the neurologic status.   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 No ICH or new infarct  Will visit the patient on 9/23.

## 2022-09-22 NOTE — CHART NOTE - NSCHARTNOTEFT_GEN_A_CORE
Pt seen and examined, trach site no longer bleeding. However, still oozing from the HD catheter site.  A/P;  Recommends surgicel dressing

## 2022-09-22 NOTE — PHARMACOTHERAPY INTERVENTION NOTE - COMMENTS
Levetiracetam is 50% dialyzable Recommended dose adjustment to levetiracetam 1 gram at bedtime after dialysis.

## 2022-09-22 NOTE — PROGRESS NOTE ADULT - SUBJECTIVE AND OBJECTIVE BOX
Patient is a 57y old  Female who presents with a chief complaint of RLE wound (12 Sep 2022 15:26)    Over Night Events:  SP continuous bleeding from trach site.  SP trach removal and packing by surgery.  SP oral intubation.  Off sedation.  Off pressors.    ROS:   All ROS are negative except HPI     PHYSICAL EXAM  ICU Vital Signs Last 24 Hrs  T(C): 36.9 (22 Sep 2022 08:00), Max: 37.2 (22 Sep 2022 02:00)  T(F): 98.4 (22 Sep 2022 08:00), Max: 99 (22 Sep 2022 02:00)  HR: 60 (22 Sep 2022 08:18) (58 - 73)  BP: 118/67 (22 Sep 2022 08:00) (109/66 - 194/88)  BP(mean): 88 (22 Sep 2022 08:00) (83 - 126)  RR: 18 (22 Sep 2022 08:00) (11 - 24)  SpO2: 98% (22 Sep 2022 08:18) (95% - 100%)    O2 Parameters below as of 22 Sep 2022 08:00  Patient On (Oxygen Delivery Method): ventilator    O2 Concentration (%): 40    General: not awake   HEENT:  packing of trach site, ETT  Lungs: Decreased left breath sounds  Cardiovascular: Regular   Abdomen: Soft, Positive BS  Extremities: No clubbing   Skin: warm   Neurological: positive gag  Musculoskeletal: does not move extremities      09-21-22 @ 07:01  -  09-22-22 @ 07:00  --------------------------------------------------------  IN:    Enteral Tube Flush: 50 mL    IV PiggyBack: 100 mL    Nepro with Carb Steady: 210 mL    Plasma: 300 mL    PRBCs (Packed Red Blood Cells): 1428 mL  Total IN: 2088 mL    OUT:    Indwelling Catheter - Urethral (mL): 55 mL    Other (mL): 3600 mL    Voided (mL): 200 mL  Total OUT: 3855 mL    Total NET: -1767 mL      09-22-22 @ 07:01  -  09-22-22 @ 08:49  --------------------------------------------------------  IN:  Total IN: 0 mL    OUT:    Indwelling Catheter - Urethral (mL): 0 mL  Total OUT: 0 mL    Total NET: 0 mL      LABS:                        7.7    20.89 )-----------( 116      ( 22 Sep 2022 05:44 )             22.2     136  |  94<L>  |  60<H>  ----------------------------<  120<H>  3.9   |  25  |  3.7<H>    Ca    7.5<L>      22 Sep 2022 05:44  Mg     2.1     09-22    TPro  4.8<L>  /  Alb  3.4<L>  /  TBili  0.2  /  DBili  x   /  AST  191<H>  /  ALT  21  /  AlkPhos  153<H>  09-22    PT/INR - ( 22 Sep 2022 05:44 )   PT: 14.20 sec;   INR: 1.24 ratio    PTT - ( 22 Sep 2022 05:44 )  PTT:34.1 sec                                            LIVER FUNCTIONS - ( 22 Sep 2022 05:44 )  Alb: 3.4 g/dL / Pro: 4.8 g/dL / ALK PHOS: 153 U/L / ALT: 21 U/L / AST: 191 U/L / GGT: x                                              Mode: AC/ CMV (Assist Control/ Continuous Mandatory Ventilation)  RR (machine): 16  TV (machine): 360  FiO2: 40  PEEP: 5  ITime: 0.9  MAP: 9  PIP: 23    ABG - ( 22 Sep 2022 03:16 )  pH, Arterial: 7.47  pH, Blood: x     /  pCO2: 39    /  pO2: 148   / HCO3: 28    / Base Excess: 4.5   /  SaO2: 99.5    PP 23  PL 9      MEDICATIONS  (STANDING):  amLODIPine   Tablet 10 milliGRAM(s) Oral daily  atorvastatin 80 milliGRAM(s) Oral at bedtime  chlorhexidine 0.12% Liquid 15 milliLiter(s) Oral Mucosa every 12 hours  chlorhexidine 2% Cloths 1 Application(s) Topical <User Schedule>  cloNIDine 0.2 milliGRAM(s) Oral every 8 hours  dextrose 5%. 1000 milliLiter(s) (50 mL/Hr) IV Continuous <Continuous>  dextrose 5%. 1000 milliLiter(s) (100 mL/Hr) IV Continuous <Continuous>  dextrose 50% Injectable 25 Gram(s) IV Push once  dextrose 50% Injectable 12.5 Gram(s) IV Push once  dextrose 50% Injectable 25 Gram(s) IV Push once  furosemide   Injectable 80 milliGRAM(s) IV Push every 12 hours  glucagon  Injectable 1 milliGRAM(s) IntraMuscular once  hydrALAZINE 100 milliGRAM(s) Oral every 8 hours  insulin lispro (ADMELOG) corrective regimen sliding scale   SubCutaneous three times a day before meals  labetalol 600 milliGRAM(s) Oral three times a day  lacosamide IVPB 50 milliGRAM(s) IV Intermittent every 12 hours  levETIRAcetam  Solution 500 milliGRAM(s) Oral two times a day  lidocaine 1%/epinephrine 1:100,000 Inj 20 milliLiter(s) Local Injection once  multivitamin 1 Tablet(s) Oral daily  pantoprazole  Injectable 40 milliGRAM(s) IV Push two times a day  predniSONE   Tablet 60 milliGRAM(s) Oral daily  sevelamer carbonate Powder 2400 milliGRAM(s) Enteral Tube every 8 hours  sodium chloride 0.65% Nasal 2 Spray(s) Both Nostrils two times a day    MEDICATIONS  (PRN):  acetaminophen     Tablet .. 650 milliGRAM(s) Oral every 6 hours PRN Temp greater or equal to 38C (100.4F), Mild Pain (1 - 3)  dextrose Oral Gel 15 Gram(s) Oral once PRN Blood Glucose LESS THAN 70 milliGRAM(s)/deciliter  fentaNYL    Injectable 50 MICROGram(s) IV Push every 6 hours PRN agitation  labetalol Injectable 10 milliGRAM(s) IV Push every 6 hours PRN Systolic blood pressure >  melatonin 3 milliGRAM(s) Oral at bedtime PRN Insomnia      CXR reviewed

## 2022-09-22 NOTE — PROGRESS NOTE ADULT - SUBJECTIVE AND OBJECTIVE BOX
CT SURGERY PROGRESS NOTE    Patient: JOSE MITCHELL , 57y ([birth year])Female   MRN: [id number]  Location: [facility] 3I- 1  Visit: 08-02-22 Inpatient  Date: 09-22-22 @ 11:16    Procedure/Dx/Injuries: s/p trach revision and removal with intraop bronchoscopy.     Events of past 24 hours: Patient had oozing overnight from trach site but majority of bleeding is coming from right IJ uldall site. Patient had whiteout of left lung on xray this AM and had bronchoscopy completed by pulm who removed blood clots from left bronchus. Off pressors and currently on no sedation.     PAST MEDICAL & SURGICAL HISTORY:  Hypertension      Diabetes mellitus      No significant past surgical history          Vitals:   T(F): 96.5 (09-22-22 @ 11:00), Max: 99 (09-22-22 @ 02:00)  HR: 59 (09-22-22 @ 09:00)  BP: 125/70 (09-22-22 @ 09:00)  RR: 30 (09-22-22 @ 11:00)  SpO2: 99% (09-22-22 @ 09:00)  Mode: AC/ CMV (Assist Control/ Continuous Mandatory Ventilation), RR (machine): 16, TV (machine): 340, FiO2: 100, PEEP: 5, ITime: 0.9, MAP: 9, PIP: 23    Diet, NPO with Tube Feed:   Tube Feeding Modality: Gastrostomy  Nepro with Carb Steady  Total Volume for 24 Hours (mL): 720  Continuous  Starting Tube Feed Rate mL per Hour: 30  Until Goal Tube Feed Rate (mL per Hour): 30  Tube Feed Duration (in Hours): 24  Tube Feed Start Time: 10:00  Beneprotein ([facility] Only)     Qty per Day:  5      Fluids:     I & O's:    09-21-22 @ 07:01  -  09-22-22 @ 07:00  --------------------------------------------------------  IN:    Enteral Tube Flush: 50 mL    IV PiggyBack: 100 mL    Nepro with Carb Steady: 210 mL    Plasma: 300 mL    PRBCs (Packed Red Blood Cells): 1428 mL  Total IN: 2088 mL    OUT:    Indwelling Catheter - Urethral (mL): 55 mL    Other (mL): 3600 mL    Voided (mL): 200 mL  Total OUT: 3855 mL    Total NET: -1767 mL    PHYSICAL EXAM:  General: AAOx0. intubated and on vent.   HEENT: Trach site packed with 2 surgicell along with 1 4x4.   Respiratory: On vent. Settings of 340/18/40/5  Abdomen: Soft, non-distended, non-tender, no rebound, no guarding. PEG in place   Neuro: unable to complete as patient is nonverbal and AxOx0.   Skin: Warm/dry, normal color, no jaundice      MEDICATIONS  (STANDING):  amLODIPine   Tablet 10 milliGRAM(s) Oral daily  atorvastatin 80 milliGRAM(s) Oral at bedtime  chlorhexidine 0.12% Liquid 15 milliLiter(s) Oral Mucosa every 12 hours  chlorhexidine 2% Cloths 1 Application(s) Topical <User Schedule>  cloNIDine 0.2 milliGRAM(s) Oral every 8 hours  dextrose 5%. 1000 milliLiter(s) (100 mL/Hr) IV Continuous <Continuous>  dextrose 5%. 1000 milliLiter(s) (50 mL/Hr) IV Continuous <Continuous>  dextrose 50% Injectable 25 Gram(s) IV Push once  dextrose 50% Injectable 12.5 Gram(s) IV Push once  dextrose 50% Injectable 25 Gram(s) IV Push once  furosemide   Injectable 80 milliGRAM(s) IV Push every 12 hours  glucagon  Injectable 1 milliGRAM(s) IntraMuscular once  hydrALAZINE 100 milliGRAM(s) Oral every 8 hours  insulin lispro (ADMELOG) corrective regimen sliding scale   SubCutaneous three times a day before meals  labetalol 600 milliGRAM(s) Oral three times a day  lacosamide IVPB 50 milliGRAM(s) IV Intermittent every 12 hours  levETIRAcetam  Solution 1000 milliGRAM(s) Oral at bedtime  lidocaine 1%/epinephrine 1:100,000 Inj 20 milliLiter(s) Local Injection once  lidocaine 2% Viscous 15 milliLiter(s) Swish and Spit once  lidocaine 2% Viscous 15 milliLiter(s) Oral once  multivitamin 1 Tablet(s) Oral daily  pantoprazole  Injectable 40 milliGRAM(s) IV Push two times a day  predniSONE   Tablet 60 milliGRAM(s) Oral daily  sevelamer carbonate Powder 2400 milliGRAM(s) Enteral Tube every 8 hours  sodium chloride 0.65% Nasal 2 Spray(s) Both Nostrils two times a day    MEDICATIONS  (PRN):  acetaminophen     Tablet .. 650 milliGRAM(s) Oral every 6 hours PRN Temp greater or equal to 38C (100.4F), Mild Pain (1 - 3)  dextrose Oral Gel 15 Gram(s) Oral once PRN Blood Glucose LESS THAN 70 milliGRAM(s)/deciliter  fentaNYL    Injectable 50 MICROGram(s) IV Push every 6 hours PRN agitation  labetalol Injectable 10 milliGRAM(s) IV Push every 6 hours PRN Systolic blood pressure >  melatonin 3 milliGRAM(s) Oral at bedtime PRN Insomnia      DVT PROPHYLAXIS:   GI PROPHYLAXIS: pantoprazole  Injectable 40 milliGRAM(s) IV Push two times a day    ANTICOAGULATION:   ANTIBIOTICS:            LAB/STUDIES:  Labs:  CAPILLARY BLOOD GLUCOSE      POCT Blood Glucose.: 126 mg/dL (22 Sep 2022 06:22)  POCT Blood Glucose.: 190 mg/dL (21 Sep 2022 23:32)  POCT Blood Glucose.: 225 mg/dL (21 Sep 2022 17:33)  POCT Blood Glucose.: 191 mg/dL (21 Sep 2022 12:20)                          7.7    20.89 )-----------( 116      ( 22 Sep 2022 05:44 )             22.2       Auto Neutrophil %: 83.7 % (09-22-22 @ 05:44)  Auto Immature Granulocyte %: 0.6 % (09-22-22 @ 05:44)    09-22    136  |  94<L>  |  60<H>  ----------------------------<  120<H>  3.9   |  25  |  3.7<H>      Calcium, Total Serum: 7.5 mg/dL (09-22-22 @ 05:44)      LFTs:             4.8  | 0.2  | 191      ------------------[153     ( 22 Sep 2022 05:44 )  3.4  | x    | 21          Lipase:x      Amylase:x         Blood Gas Arterial, Lactate: 1.10 mmol/L (09-22-22 @ 03:16)  Blood Gas Arterial, Lactate: 0.60 mmol/L (09-21-22 @ 04:02)  Blood Gas Arterial, Lactate: 0.90 mmol/L (09-20-22 @ 04:14)    ABG - ( 22 Sep 2022 03:16 )  pH: 7.47  /  pCO2: 39    /  pO2: 148   / HCO3: 28    / Base Excess: 4.5   /  SaO2: 99.5            ABG - ( 21 Sep 2022 04:02 )  pH: 7.46  /  pCO2: 35    /  pO2: 123   / HCO3: 25    / Base Excess: 1.1   /  SaO2: 99.4            ABG - ( 20 Sep 2022 04:14 )  pH: 7.48  /  pCO2: 33    /  pO2: 131   / HCO3: 25    / Base Excess: 1.1   /  SaO2: 99.8              Coags:     14.20  ----< 1.24    ( 22 Sep 2022 05:44 )     34.1         IMAGING:  < from: Xray Chest 1 View- PORTABLE-Routine (Xray Chest 1 View- PORTABLE-Routine in AM.) (09.22.22 @ 06:22) >  Impression:    New near complete opacification of the left lung with new shift of   mediastinum to left suspicious for a mucous plug and complete collapse of   the left lung. Bronchoscopy may be of benefit.    < end of copied text >

## 2022-09-22 NOTE — CHART NOTE - NSCHARTNOTEFT_GEN_A_CORE
Writer met with family while Dr. Ramirez gave medical update.  Writer later met with son and spouse.  Spouse contacted son, Latrell Mack during meeting.  Spouse defers to son for all medical decisions.  Reviewed medical update and AD, and discussed GOC.  Family maintains that patient would want to continue with aggressive treatment despite poor prognosis.  Support rendered.

## 2022-09-22 NOTE — PROGRESS NOTE ADULT - ASSESSMENT
Impression:    Acute respiratory failure SP oral intubation  Tracheostomy bleed SP revision by Surgery  Acute blood loss anemia SP 2U overnight (6U, 3FFP, 2plt since 9/20)  Suspected GI bleed SP EGD and colonoscopy  Altered MS suspected vasculitis SP pulse steroids  LLE cellulitis  ALF oliguric SP RRT, dialyzed yesterday  Bleeding from [place] site SP removal    COVID 19 infection   E [place] Bacteremia resolved   Thrombocytopenia, r/o DIC vs HIT  HFpEF  HO multiple CVAs      PLAN:     CNS;   Prednisone 60mg per Neuro until tomorrow, wean to 40mg if Neuro agreeable.  Neuro FU.  FU MS.   Repeat CT Head.    HEENT: Oral care.  Trach care.   ETT care.  CT SX FU.    PULMONARY:  HOB @ 45 degrees.  Aspiration precautions.  Vent changes: decrease TV to 340.  ABG in PM.  Gentle suctioning.  Bronchoscopy today if family consents.    CARDIOVASCULAR:   Avoid volume overload.  BP control.    GI: GI prophylaxis.  PEG feeds.  Bowel regimen if needed.    RENAL:  follow up lytes.  Correct as needed.   SP HD yesterday.   Renal FU.    INFECTIOUS DISEASE:  Possible ABX, MRSA nares.  ID FU.  Check Procalcitonin.  Panculture.  Send DTA.    HEMATOLOGICAL:  SP DDAVP, no response.  DIC negative.  Check HIT panel.  Monitor CBC and coags.  Groin doppler negative.    ENDOCRINE:  Follow up FS.  Insulin protocol if needed.  TSH 0.86, transient?  Check TH.    MUSCULOSKELETAL:  Bed rest.  Wound care per burn     Very poor overall prognosis  CODE STATUS: Full code.  Palliative FU.    DISPO: Monitor in MICU

## 2022-09-22 NOTE — PHARMACOTHERAPY INTERVENTION NOTE - INTERVENTION TYPE RECOOMEND
Therapy Discontinuation Recommended - No indication
Dose Optimization/Non-renal Dose Adjustment
Timing/Frequency of Administration Recommended
Therapy Recommended - Alternative treatment
Therapy Recommended - Alternative treatment
Therapy duplication
Dose Adjustment - Renal Dose
Dose Optimization/Non-renal Dose Adjustment
Dose Optimization/Non-renal Dose Adjustment

## 2022-09-22 NOTE — PROGRESS NOTE ADULT - SUBJECTIVE AND OBJECTIVE BOX
Events noted, pt taken to OR yesterday by CT surgery, tracheostomy removed and bleeding vessel  tied off with 2-0 silk suture.   Other minor bleeding controlled with bovie. Packed with 2 packs of surgicell and 1 4x4 with another 4x4 laid on top. During bronchoscopy prior to and after  trach removal, multiple clots suctioned out.  This am L sided white out seen on CXR - bronchoscopy revealed large obstructing blood clot which was removed       T(F): 96.5 (09-22-22 @ 11:00), Max: 99 (09-22-22 @ 02:00)  HR: 58 (09-22-22 @ 11:00)  BP: 137/76 (09-22-22 @ 11:00)  RR: 22  SpO2: 100% (09-22-22 @ 11:00) (95% - 100%)    PHYSICAL EXAM:  GENERAL: NAD  HEAD:  Atraumatic, Normocephalic, large neck bandage   EYES: EOMI, PERRLA, conjunctiva and sclera clear  NERVOUS SYSTEM: positive gag   CHEST/LUNG:  bilateral rhonchi  HEART: Regular rate and rhythm; No murmurs, rubs, or gallops  ABDOMEN: Soft, Nontender, Nondistended; Bowel sounds present  EXTREMITIES:  2+ Peripheral Pulses, No clubbing, cyanosis, or edema    LABS  09-22    136  |  94<L>  |  60<H>  ----------------------------<  120<H>  3.9   |  25  |  3.7<H>    Ca    7.5<L>      22 Sep 2022 05:44  Mg     2.1     09-22    TPro  4.8<L>  /  Alb  3.4<L>  /  TBili  0.2  /  DBili  x   /  AST  191<H>  /  ALT  21  /  AlkPhos  153<H>  09-22                          7.7    20.89 )-----------( 116      ( 22 Sep 2022 05:44 )             22.2     PT/INR - ( 22 Sep 2022 05:44 )   PT: 14.20 sec;   INR: 1.24 ratio         PTT - ( 22 Sep 2022 05:44 )  PTT:34.1 sec    Mode: AC/ CMV (Assist Control/ Continuous Mandatory Ventilation)  RR (machine): 16  TV (machine): 340  FiO2: 100  PEEP: 5        Culture Results:   No Growth Final (09-07-22)  Culture Results:   No Growth Final (09-06-22)  Culture Results:   No Growth Final (09-05-22)  Culture Results:   No Growth Final (09-04-22)  Culture Results:   No Growth Final (09-03-22)  Culture Results:   Growth in aerobic and anaerobic bottles: Enterobacter cloacae (Carbapenem  Resistant)  ***Blood Panel PCR results on this specimen are available  approximately 3 hours after the Gram stain result.***  Gram stain, PCR, and/or culture results may notalways  correspond due to difference in methodologies.  ************************************************************  This PCR assay was performed by multiplex PCR. This  Assay tests for 66 bacterial and resistance gene targets.  Please refer to the [facility] test directory  at [email or web address] for details. (09-01-22)  Culture Results:   >=3 organisms. Probable collection contamination. (09-01-22)  Culture Results:   >=3 organisms. Probable collection contamination. (08-31-22)  Culture Results:   No Growth Final (08-30-22)  Culture Results:   No growth at 1 week. (08-26-22)    RADIOLOGY  < from: CT Head No Cont (09.22.22 @ 09:43) >  IMPRESSION:    No evidence of intracranial hemorrhage, acute territorial infarct, or   midline shift.    Stable moderate chronic microvascular ischemic changes and scattered   chronic infarcts.    Significantly increased paranasal inflammatory mucosal thickening.    < end of copied text >  < from: Xray Chest 1 View- PORTABLE-Routine (Xray Chest 1 View- PORTABLE-Routine in AM.) (09.22.22 @ 06:22) >    Impression:    New near complete opacification of the left lung with new shift of   mediastinum to left suspicious for a mucous plug and complete collapse of   the left lung. Bronchoscopy may be of benefit.    < end of copied text >    MEDICATIONS  (STANDING):  amLODIPine   Tablet 10 milliGRAM(s) Oral daily  atorvastatin 80 milliGRAM(s) Oral at bedtime  chlorhexidine 0.12% Liquid 15 milliLiter(s) Oral Mucosa every 12 hours  chlorhexidine 2% Cloths 1 Application(s) Topical <User Schedule>  cloNIDine 0.2 milliGRAM(s) Oral every 8 hours  furosemide   Injectable 80 milliGRAM(s) IV Push every 12 hours  hydrALAZINE 100 milliGRAM(s) Oral every 8 hours  insulin lispro (ADMELOG) corrective regimen sliding scale   SubCutaneous three times a day before meals  labetalol 600 milliGRAM(s) Oral three times a day  lacosamide IVPB 50 milliGRAM(s) IV Intermittent every 12 hours  levETIRAcetam  Solution 1000 milliGRAM(s) Oral at bedtime  lidocaine 1%/epinephrine 1:100,000 Inj 20 milliLiter(s) Local Injection once  multivitamin 1 Tablet(s) Oral daily  pantoprazole  Injectable 40 milliGRAM(s) IV Push two times a day  predniSONE   Tablet 60 milliGRAM(s) Oral daily  sevelamer carbonate Powder 2400 milliGRAM(s) Enteral Tube every 8 hours  sodium chloride 0.65% Nasal 2 Spray(s) Both Nostrils two times a day    MEDICATIONS  (PRN):  acetaminophen     Tablet .. 650 milliGRAM(s) Oral every 6 hours PRN Temp greater or equal to 38C (100.4F), Mild Pain (1 - 3)  dextrose Oral Gel 15 Gram(s) Oral once PRN Blood Glucose LESS THAN 70 milliGRAM(s)/deciliter  fentaNYL    Injectable 50 MICROGram(s) IV Push every 6 hours PRN agitation  labetalol Injectable 10 milliGRAM(s) IV Push every 6 hours PRN Systolic blood pressure >  melatonin 3 milliGRAM(s) Oral at bedtime PRN Insomnia

## 2022-09-23 NOTE — PROGRESS NOTE ADULT - ASSESSMENT
· Assessment	  55 yo F with PMH of HTN, DM2, CVA (2017) who presented with purulent right lower extremity wound for 3 months consistent with acute RLE cellulitis. Code stroke for unresponsiveness at 4pm 8/5    Impression  #CVA  - 8/11 MR Head w/wo IV Cont (08.10.22 @ 21:25): Multiple punctate cortical acute infarcts involving multiple vascular territories possibly related to embolic etiology. No evidence of acute hemorrhage. Moderate chronic microvascular type changes as well as chronic hemosiderin deposition within the right basal ganglia consistent with remote hemorrhage. Chronic right thalamic lacunar infarcts. More alert and does try to respondResolved SIRS with no infectious etiology  - s/p LP 8/26 with 24 WBC, RBC 49587, 37% PMNs, 61% Lymph -- protein/glucose not obtained    #Fevers 8/30 - Enterobacter cloaecae bacteremia  - UA with pyruia   - CXR 8/31 unremarkable   - BLood Cx 9/1 with Enterobacter cloacae with NDM+  - BLood Cx 9/3 NG   - completed 2 weeks of Avycaz + aztreonam (9/3-9/16)    #GI BLeed  - s/p EGD 9/6     #Possible Vasculitis   - s/p empiric steroid course -- planned for cytoxan   - on steroids    #Tracheostomy bleed SP revision by Surgery    #COVID-PCR positive 9/13   - no previous positive     RECOMMENDATIONS  This is a preliminary incomplete pended note, all final recommendations to follow after interview and examination of the patient.    Please call or message on [employer] Teams if with any questions.  Spectra [id number]       · Assessment	  57 yo F with PMH of HTN, DM2, CVA (2017) who presented with purulent right lower extremity wound for 3 months consistent with acute RLE cellulitis. Code stroke for unresponsiveness at 4pm 8/5    Impression  #CVA  - 8/11 MR Head w/wo IV Cont (08.10.22 @ 21:25): Multiple punctate cortical acute infarcts involving multiple vascular territories possibly related to embolic etiology. No evidence of acute hemorrhage. Moderate chronic microvascular type changes as well as chronic hemosiderin deposition within the right basal ganglia consistent with remote hemorrhage. Chronic right thalamic lacunar infarcts. More alert and does try to respondResolved SIRS with no infectious etiology  - s/p LP 8/26 with 24 WBC, RBC 15297, 37% PMNs, 61% Lymph -- protein/glucose not obtained    #Fevers 8/30 - Enterobacter cloaecae bacteremia  - UA with pyruia   - CXR 8/31 unremarkable   - BLood Cx 9/1 with Enterobacter cloacae with NDM+  - BLood Cx 9/3 NG   - completed 2 weeks of Avycaz + aztreonam (9/3-9/16)    #GI BLeed  - s/p EGD 9/6     #Possible Vasculitis   - s/p empiric steroid course -- planned for cytoxan   - on steroids    #Tracheostomy bleed SP revision by Surgery    #COVID-PCR positive 9/13   - no previous positive     RECOMMENDATIONS  - Leukocytosis potentially reactive to recurrent left lung plug/collapse vs. steroids (prednisone 60 mg most recently 9/23); procalcitonin difficult to interpret in CKD and some bleeding  - follow-up bronch cultures from 9/22 and 9/23  - trend WBC   - check C diff if developing diarrhea  - would monitor off antibiotics for now   --  if acute decompensation, check blood cx, fungitell and can give Linezolid 600 mg q 12 hours and Avycaz 0.94 g q 12 hours and Aztreonam 2g q q 12 hours at least until cultures return   - guarded prognosis    Please call or message on [employer] Teams if with any questions.  Spectra [phone]

## 2022-09-23 NOTE — CONSULT NOTE ADULT - CONSULT REQUESTED DATE/TIME
07-Sep-2022 11:05
12-Aug-2022 16:28
17-Aug-2022 11:04
05-Sep-2022 09:42
12-Aug-2022 09:48
12-Sep-2022 17:25
14-Sep-2022 11:16
14-Sep-2022 11:50
16-Aug-2022 10:41
16-Aug-2022 12:34
05-Sep-2022 11:59
07-Sep-2022 14:40
05-Sep-2022 14:23
09-Sep-2022 14:01
23-Sep-2022 14:05
02-Aug-2022 10:08
02-Aug-2022 11:31
02-Aug-2022 13:00
03-Aug-2022 10:00
04-Aug-2022 09:09
05-Aug-2022 16:13
06-Sep-2022 21:22
30-Aug-2022 16:27
06-Sep-2022 14:00

## 2022-09-23 NOTE — PROGRESS NOTE ADULT - SUBJECTIVE AND OBJECTIVE BOX
Nephrology Progress Note    JOSE MITCHELL  MRN-[id number]  57y  Female    S:  Patient is seen and examined, events over the last 24h noted.    O:  Allergies:  No Known Allergies    Hospital Medications:   MEDICATIONS  (STANDING):  amLODIPine   Tablet 10 milliGRAM(s) Oral daily  atorvastatin 80 milliGRAM(s) Oral at bedtime  chlorhexidine 0.12% Liquid 15 milliLiter(s) Oral Mucosa every 12 hours  chlorhexidine 2% Cloths 1 Application(s) Topical <User Schedule>  cloNIDine 0.2 milliGRAM(s) Oral every 8 hours  dextrose 5%. 1000 milliLiter(s) (100 mL/Hr) IV Continuous <Continuous>  dextrose 5%. 1000 milliLiter(s) (50 mL/Hr) IV Continuous <Continuous>  dextrose 50% Injectable 25 Gram(s) IV Push once  dextrose 50% Injectable 12.5 Gram(s) IV Push once  dextrose 50% Injectable 25 Gram(s) IV Push once  furosemide   Injectable 80 milliGRAM(s) IV Push every 12 hours  glucagon  Injectable 1 milliGRAM(s) IntraMuscular once  hydrALAZINE 100 milliGRAM(s) Oral every 8 hours  insulin lispro (ADMELOG) corrective regimen sliding scale   SubCutaneous three times a day before meals  labetalol 600 milliGRAM(s) Oral three times a day  levETIRAcetam  Solution 1000 milliGRAM(s) Oral at bedtime  lidocaine 1%/epinephrine 1:100,000 Inj 20 milliLiter(s) Local Injection once  multivitamin 1 Tablet(s) Oral daily  pantoprazole  Injectable 40 milliGRAM(s) IV Push two times a day  sevelamer carbonate Powder 2400 milliGRAM(s) Enteral Tube every 8 hours  sodium chloride 0.65% Nasal 2 Spray(s) Both Nostrils two times a day    MEDICATIONS  (PRN):  acetaminophen     Tablet .. 650 milliGRAM(s) Oral every 6 hours PRN Temp greater or equal to 38C (100.4F), Mild Pain (1 - 3)  dextrose Oral Gel 15 Gram(s) Oral once PRN Blood Glucose LESS THAN 70 milliGRAM(s)/deciliter  fentaNYL    Injectable 50 MICROGram(s) IV Push every 6 hours PRN agitation  labetalol Injectable 10 milliGRAM(s) IV Push every 6 hours PRN Systolic blood pressure >  melatonin 3 milliGRAM(s) Oral at bedtime PRN Insomnia    Home Medications:  losartan 50 mg oral tablet: 1 tab(s) orally once a day (02 Aug 2022 10:38)  metFORMIN 500 mg oral tablet: 1 tab(s) orally 2 times a day (02 Aug 2022 10:38)  metoprolol succinate 50 mg oral tablet, extended release: 1 tab(s) orally once a day (02 Aug 2022 10:38)      VITALS:  Daily     Daily Weight in k.1 (23 Sep 2022 01:00)  T(F): 96.7 (22 @ 15:00), Max: 99.1 (22 @ 19:00)  HR: 62 (22 @ 15:00)  BP: 152/83 (22 @ 15:00)  RR: 21 (22 @ 15:00)  SpO2: 95% (22 @ 15:00)  Wt(kg): --  I&O's Detail    22 Sep 2022 07:  -  23 Sep 2022 07:00  --------------------------------------------------------  IN:    Frozen Plasma Cryoprecipitate Reduced: 320 mL    IV PiggyBack: 50 mL    IV PiggyBack: 50 mL    IV PiggyBack: 50 mL    Nepro with Carb Steady: 630 mL    PRBCs (Packed Red Blood Cells): 593 mL  Total IN: 1693 mL    OUT:    Indwelling Catheter - Urethral (mL): 25 mL  Total OUT: 25 mL    Total NET: 1668 mL      23 Sep 2022 07:  -  23 Sep 2022 15:54  --------------------------------------------------------  IN:    Enteral Tube Flush: 50 mL    Nepro with Carb Steady: 180 mL  Total IN: 230 mL    OUT:    Indwelling Catheter - Urethral (mL): 20 mL    Other (mL): 4000 mL  Total OUT: 4020 mL    Total NET: -3790 mL        I&O's Summary    22 Sep 2022 07:01  -  23 Sep 2022 07:00  --------------------------------------------------------  IN: 1693 mL / OUT: 25 mL / NET: 1668 mL    23 Sep 2022 07:01  -  23 Sep 2022 15:54  --------------------------------------------------------  IN: 230 mL / OUT: 4020 mL / NET: -3790 mL          PHYSICAL EXAM:  Gen: intubated/ventilated  Resp: b/l breath sounds  Card: S1/S2  Abd: soft  Extremities: no edema  Vascular access: R IJ udall      LABS:  ABG - ( 23 Sep 2022 03:58 )  pH, Arterial: 7.50  pH, Blood: x     /  pCO2: 31    /  pO2: 211   / HCO3: 24    / Base Excess: 1.3   /  SaO2: 100.0         134<L>  |  91<L>  |  64<HH>  ----------------------------<  134<H>  4.2   |  26  |  3.8<H>    Ca    7.4<L>      23 Sep 2022 05:48  Mg     2.1         TPro  5.2<L>  /  Alb  2.9<L>  /  TBili  0.3  /  DBili      /  AST  242<H>  /  ALT  24  /  AlkPhos  168<H>        Phosphorus Level, Serum: 8.9 mg/dL (22 @ 10:16)  Phosphorus Level, Serum: 8.2 mg/dL (22 @ 05:27)    Intact PTH: 75 pg/mL (22 @ 20:00)                          8.5    22.04 )-----------( 100      ( 23 Sep 2022 05:48 )             24.5     Mean Cell Volume: 86.3 fL (22 @ 05:48)        Creatinine trend:  Creatinine, Serum: 3.8 mg/dL (22 @ 05:48)  Creatinine, Serum: 3.7 mg/dL (22 @ 05:44)  Creatinine, Serum: 3.5 mg/dL (22 @ 21:40)  Creatinine, Serum: 5.6 mg/dL (22 @ 05:57)  Creatinine, Serum: 5.3 mg/dL (22 @ 19:30)  Creatinine, Serum: 5.6 mg/dL (22 @ 05:39)

## 2022-09-23 NOTE — PROGRESS NOTE ADULT - CRITICAL CARE SERVICES PROVIDED
Patient is critically ill, requiring critical care services.

## 2022-09-23 NOTE — PROGRESS NOTE ADULT - SUBJECTIVE AND OBJECTIVE BOX
GENERAL SURGERY PROGRESS NOTE    Patient: JOSE MITCHELL , 57y ([birth year])Female   MRN: [id number]  Location: [facility] 3I- 1  Visit: 08-02-22 Inpatient  Date: 09-23-22 @ 13:50    Procedure/Dx/Injuries: S/P tracheostomy revision and removal     Events of past 24 hours: Patient underwent bronch again today  and ICU team is leaning towards cryo as patient continues to have drops in hemoglobin.     PAST MEDICAL & SURGICAL HISTORY:  Hypertension      Diabetes mellitus      No significant past surgical history          Vitals:   T(F): 96.1 (09-23-22 @ 12:00), Max: 99.1 (09-22-22 @ 19:00)  HR: 66 (09-23-22 @ 13:20)  BP: 189/93 (09-23-22 @ 13:20)  RR: 21 (09-23-22 @ 13:20)  SpO2: 98% (09-23-22 @ 13:20)  Mode: AC/ CMV (Assist Control/ Continuous Mandatory Ventilation), RR (machine): 16, TV (machine): 340, FiO2: 60, PEEP: 5, MAP: 15, PIP: 37    Diet, NPO with Tube Feed:   Tube Feeding Modality: Gastrostomy  Nepro with Carb Steady  Total Volume for 24 Hours (mL): 720  Continuous  Starting Tube Feed Rate mL per Hour: 30  Until Goal Tube Feed Rate (mL per Hour): 30  Tube Feed Duration (in Hours): 24  Tube Feed Start Time: 10:00  Beneprotein ([facility] Only)     Qty per Day:  5      Fluids:     I & O's:    09-22-22 @ 07:01  -  09-23-22 @ 07:00  --------------------------------------------------------  IN:    Frozen Plasma Cryoprecipitate Reduced: 320 mL    IV PiggyBack: 50 mL    IV PiggyBack: 50 mL    IV PiggyBack: 50 mL    Nepro with Carb Steady: 630 mL    PRBCs (Packed Red Blood Cells): 593 mL  Total IN: 1693 mL    OUT:    Indwelling Catheter - Urethral (mL): 25 mL  Total OUT: 25 mL    Total NET: 1668 mL    PHYSICAL EXAM   General: AAOx0. intubated and on vent.   HEENT: Trach site packed with 2 surgicell along with 1 4x4.   Respiratory: On vent. Settings of 340/18/40/5  Abdomen: Soft, non-distended, non-tender, no rebound, no guarding. PEG in place   Neuro: unable to complete as patient is nonverbal and AxOx0.   Skin: Warm/dry, normal color, no jaundice    MEDICATIONS  (STANDING):  amLODIPine   Tablet 10 milliGRAM(s) Oral daily  atorvastatin 80 milliGRAM(s) Oral at bedtime  chlorhexidine 0.12% Liquid 15 milliLiter(s) Oral Mucosa every 12 hours  chlorhexidine 2% Cloths 1 Application(s) Topical <User Schedule>  cloNIDine 0.2 milliGRAM(s) Oral every 8 hours  dextrose 5%. 1000 milliLiter(s) (100 mL/Hr) IV Continuous <Continuous>  dextrose 5%. 1000 milliLiter(s) (50 mL/Hr) IV Continuous <Continuous>  dextrose 50% Injectable 25 Gram(s) IV Push once  dextrose 50% Injectable 12.5 Gram(s) IV Push once  dextrose 50% Injectable 25 Gram(s) IV Push once  furosemide   Injectable 80 milliGRAM(s) IV Push every 12 hours  glucagon  Injectable 1 milliGRAM(s) IntraMuscular once  hydrALAZINE 100 milliGRAM(s) Oral every 8 hours  insulin lispro (ADMELOG) corrective regimen sliding scale   SubCutaneous three times a day before meals  labetalol 600 milliGRAM(s) Oral three times a day  levETIRAcetam  Solution 1000 milliGRAM(s) Oral at bedtime  lidocaine 1%/epinephrine 1:100,000 Inj 20 milliLiter(s) Local Injection once  multivitamin 1 Tablet(s) Oral daily  pantoprazole  Injectable 40 milliGRAM(s) IV Push two times a day  sevelamer carbonate Powder 2400 milliGRAM(s) Enteral Tube every 8 hours  sodium chloride 0.65% Nasal 2 Spray(s) Both Nostrils two times a day    MEDICATIONS  (PRN):  acetaminophen     Tablet .. 650 milliGRAM(s) Oral every 6 hours PRN Temp greater or equal to 38C (100.4F), Mild Pain (1 - 3)  dextrose Oral Gel 15 Gram(s) Oral once PRN Blood Glucose LESS THAN 70 milliGRAM(s)/deciliter  fentaNYL    Injectable 50 MICROGram(s) IV Push every 6 hours PRN agitation  labetalol Injectable 10 milliGRAM(s) IV Push every 6 hours PRN Systolic blood pressure >  melatonin 3 milliGRAM(s) Oral at bedtime PRN Insomnia      DVT PROPHYLAXIS:   GI PROPHYLAXIS: pantoprazole  Injectable 40 milliGRAM(s) IV Push two times a day    ANTICOAGULATION:   ANTIBIOTICS:            LAB/STUDIES:  Labs:  CAPILLARY BLOOD GLUCOSE      POCT Blood Glucose.: 186 mg/dL (23 Sep 2022 11:43)  POCT Blood Glucose.: 160 mg/dL (23 Sep 2022 06:40)  POCT Blood Glucose.: 168 mg/dL (22 Sep 2022 23:04)  POCT Blood Glucose.: 167 mg/dL (22 Sep 2022 17:22)                          8.5    22.04 )-----------( 100      ( 23 Sep 2022 05:48 )             24.5       Auto Neutrophil %: 83.4 % (09-23-22 @ 05:48)  Auto Immature Granulocyte %: 0.9 % (09-23-22 @ 05:48)    09-23    134<L>  |  91<L>  |  64<HH>  ----------------------------<  134<H>  4.2   |  26  |  3.8<H>      Calcium, Total Serum: 7.4 mg/dL (09-23-22 @ 05:48)      LFTs:             5.2  | 0.3  | 242      ------------------[168     ( 23 Sep 2022 05:48 )  2.9  | x    | 24          Lipase:x      Amylase:x         Blood Gas Arterial, Lactate: 0.90 mmol/L (09-23-22 @ 03:58)  Blood Gas Arterial, Lactate: 0.60 mmol/L (09-22-22 @ 15:45)  Blood Gas Arterial, Lactate: 1.10 mmol/L (09-22-22 @ 03:16)  Blood Gas Arterial, Lactate: 0.60 mmol/L (09-21-22 @ 04:02)    ABG - ( 23 Sep 2022 03:58 )  pH: 7.50  /  pCO2: 31    /  pO2: 211   / HCO3: 24    / Base Excess: 1.3   /  SaO2: 100.0           ABG - ( 22 Sep 2022 15:45 )  pH: 7.46  /  pCO2: 39    /  pO2: 85    / HCO3: 28    / Base Excess: 3.6   /  SaO2: 97.9            ABG - ( 22 Sep 2022 03:16 )  pH: 7.47  /  pCO2: 39    /  pO2: 148   / HCO3: 28    / Base Excess: 4.5   /  SaO2: 99.5              Coags:     13.10  ----< 1.14    ( 22 Sep 2022 17:20 )     33.2                    Culture - Fungal, Bronchial (collected 22 Sep 2022 11:00)  Source: BAL None  Preliminary Report (23 Sep 2022 07:44):    Testing in progress    Culture - Acid Fast - Bronchial w/Smear (collected 22 Sep 2022 11:00)  Source: BAL None    Culture - Bronchial (collected 22 Sep 2022 11:00)  Source: Lavage None  Gram Stain (23 Sep 2022 07:21):    No polymorphonuclear leukocytes seen per low power field    No Squamous epithelial cells seen per low power field    No organisms seen      ASSESSMENT:    57y F s/p revision and removal of trach with intraop bronchoscopy due to continuous oozing from trach site.     PLAN:  - repack 4x4 in trach site as needed and replace 4x4 coving trach opening as needed   - will reassess for possible trach reinsertion at bedside on 9/26/22   - No acute surgical intervention.   - Management per ICU team       Surgery: [id number]

## 2022-09-23 NOTE — CONSULT NOTE ADULT - SUBJECTIVE AND OBJECTIVE BOX
Patient is a 57y old  Female who presents with a chief complaint of RLE wound (12 Sep 2022 15:26)      HPI:  55 yo F with PMH of HTN, DM2, and stroke (per son 2017) who presented for RLE wound. Started 3 months ago when she fell and hit her leg on a wooden cabinet. She put hydrogen peroxide, antibiotic cream, and wrapped the wound but never fully healed. Two weeks ago it started to show yellow pus so her and her family came to the ED for further treatment. Endorsed subjective fevers, chest pain, and vision changes which occurs when walked 2-3 blocks. Denied congestion, sore throat, cough, dyspnea, headache, dysuria, N/V, diarrhea       HOSPITAL COURSE  57 y/o woman w PMHx of uncontrolled HTN, DM2, hemorrhagic ?anuerysmal stroke R basal ganglia 2017 w residual L sided weakness, h/o PEG s/p removal, had not seen doctor in >1yr, w/o meds since 2018, presented to ED 8/2/22 for RLE nonhealing wound [id number] and BIB son who noted it that evening, ED triage vitals noted for /170 range.     Admitted for cellulitis, HTN urgency and started on Unasyn, Vanc, insulin drip, IV labetalol, IV vasotec. On 8/5/22 had stroke code w NIHSS 23, for unresponsiveness, concern for seizure w post ictal confusion, found to have ?embolic strokes on MR. S/p debridement of RLE 8/10/22. Developed E cloaca bacteremia, tx w avacaz and aztreonam, has since started on HD. Intubated on 9/6/22 and converted to trach 9/14/22. Uldall placed ?9/14/22 and started on HD -> [place] removed. S/p bronchoscopy, tracheostomy, and PEG tube placement 9/14/22. Remains vent dependent. Patient oliguric with rising K on 9/20, RIJ udall placed. Patient persistently bleeding from tracheostomy site. Taken for surgery on 9/21 by CT surgery for trach revision, bleeding cauterized and patient intubated orally. On 9/22 bleeding from trach site persistent but decreased. Some oozing from RIJ site. In AM of 9/22, CXR showed L lung collapse and patient under went bronchoscopy today with removal of large clots.      Bleeding managed with PRBCs, DDAVP, FFP, vit K so far, without complete resolution      PAST MEDICAL & SURGICAL HISTORY:  Hypertension      Diabetes mellitus      SOCIAL HISTORY:    FAMILY HISTORY:  FH: type 2 diabetes mellitus        MEDICATIONS  (STANDING):  amLODIPine   Tablet 10 milliGRAM(s) Oral daily  atorvastatin 80 milliGRAM(s) Oral at bedtime  chlorhexidine 0.12% Liquid 15 milliLiter(s) Oral Mucosa every 12 hours  chlorhexidine 2% Cloths 1 Application(s) Topical <User Schedule>  cloNIDine 0.2 milliGRAM(s) Oral every 8 hours  dextrose 5%. 1000 milliLiter(s) (100 mL/Hr) IV Continuous <Continuous>  dextrose 5%. 1000 milliLiter(s) (50 mL/Hr) IV Continuous <Continuous>  dextrose 50% Injectable 25 Gram(s) IV Push once  dextrose 50% Injectable 12.5 Gram(s) IV Push once  dextrose 50% Injectable 25 Gram(s) IV Push once  furosemide   Injectable 80 milliGRAM(s) IV Push every 12 hours  glucagon  Injectable 1 milliGRAM(s) IntraMuscular once  hydrALAZINE 100 milliGRAM(s) Oral every 8 hours  insulin lispro (ADMELOG) corrective regimen sliding scale   SubCutaneous three times a day before meals  labetalol 600 milliGRAM(s) Oral three times a day  levETIRAcetam  Solution 1000 milliGRAM(s) Oral at bedtime  lidocaine 1%/epinephrine 1:100,000 Inj 20 milliLiter(s) Local Injection once  multivitamin 1 Tablet(s) Oral daily  pantoprazole  Injectable 40 milliGRAM(s) IV Push two times a day  sevelamer carbonate Powder 2400 milliGRAM(s) Enteral Tube every 8 hours  sodium chloride 0.65% Nasal 2 Spray(s) Both Nostrils two times a day    MEDICATIONS  (PRN):  acetaminophen     Tablet .. 650 milliGRAM(s) Oral every 6 hours PRN Temp greater or equal to 38C (100.4F), Mild Pain (1 - 3)  dextrose Oral Gel 15 Gram(s) Oral once PRN Blood Glucose LESS THAN 70 milliGRAM(s)/deciliter  fentaNYL    Injectable 50 MICROGram(s) IV Push every 6 hours PRN agitation  labetalol Injectable 10 milliGRAM(s) IV Push every 6 hours PRN Systolic blood pressure >  melatonin 3 milliGRAM(s) Oral at bedtime PRN Insomnia      Allergies    No Known Allergies    Intolerances        Vital Signs Last 24 Hrs  T(C): 35.7 (23 Sep 2022 13:00), Max: 37.3 (22 Sep 2022 19:00)  T(F): 96.3 (23 Sep 2022 13:00), Max: 99.1 (22 Sep 2022 19:00)  HR: 66 (23 Sep 2022 13:20) (52 - 73)  BP: 189/93 (23 Sep 2022 13:20) (114/63 - 193/94)  BP(mean): 130 (23 Sep 2022 13:00) (83 - 130)  RR: 21 (23 Sep 2022 13:20) (16 - 27)  SpO2: 98% (23 Sep 2022 13:20) (78% - 100%)    Parameters below as of 23 Sep 2022 13:20  Patient On (Oxygen Delivery Method): ventilator    O2 Concentration (%): 100    PHYSICAL EXAM  General: intubated  HEENT: sanguinous fluid in trach tube  CV: normal S1/S2 with no murmur rubs or gallops  Lungs: positive air movement b/l ant lungs,clear to auscultation, no wheezes, no rales  Abdomen: soft non-tender non-distended, no hepatosplenomegaly  Ext: no clubbing cyanosis or edema  Skin: no petechiae, mild oozing around IV lines  Neuro: awake    LABS:                          8.5    22.04 )-----------( 100      ( 23 Sep 2022 05:48 )             24.5         Mean Cell Volume : 86.3 fL  Mean Cell Hemoglobin : 29.9 pg  Mean Cell Hemoglobin Concentration : 34.7 g/dL  Auto Neutrophil # : 18.38 K/uL  Auto Lymphocyte # : 1.21 K/uL  Auto Monocyte # : 2.22 K/uL  Auto Eosinophil # : 0.01 K/uL  Auto Basophil # : 0.02 K/uL  Auto Neutrophil % : 83.4 %  Auto Lymphocyte % : 5.5 %  Auto Monocyte % : 10.1 %  Auto Eosinophil % : 0.0 %  Auto Basophil % : 0.1 %      Serial CBC's  09-23 @ 05:48  Hct-24.5 / Hgb-8.5 / Plat-100 / RBC-2.84 / WBC-22.04  Serial CBC's  09-22 @ 17:20  Hct-20.1 / Hgb-6.8 / Plat-109 / RBC-2.28 / WBC-22.35  Serial CBC's  09-22 @ 05:44  Hct-22.2 / Hgb-7.7 / Plat-116 / RBC-2.58 / WBC-20.89  Serial CBC's  09-21 @ 21:40  Hct-17.9 / Hgb-6.1 / Plat-116 / RBC-2.08 / WBC-21.25  Serial CBC's  09-21 @ 13:04  Hct-23.4 / Hgb-7.9 / Plat-135 / RBC-2.69 / WBC-25.39  Serial CBC's  09-21 @ 05:57  Hct-19.7 / Hgb-6.6 / Plat-159 / RBC-2.29 / WBC-24.09  Serial CBC's  09-20 @ 19:30  Hct-25.3 / Hgb-8.5 / Plat-151 / RBC-2.92 / WBC-25.76  Serial CBC's  09-20 @ 15:00  Hct-22.8 / Hgb-7.6 / Plat-164 / RBC-2.63 / WBC-25.18  Serial CBC's  09-20 @ 05:39  Hct-20.7 / Hgb-6.9 / Plat-162 / RBC-2.33 / WBC-26.46      09-23    134<L>  |  91<L>  |  64<HH>  ----------------------------<  134<H>  4.2   |  26  |  3.8<H>    Ca    7.4<L>      23 Sep 2022 05:48  Mg     2.1     09-23    TPro  5.2<L>  /  Alb  2.9<L>  /  TBili  0.3  /  DBili  x   /  AST  242<H>  /  ALT  24  /  AlkPhos  168<H>  09-23      PT/INR - ( 22 Sep 2022 17:20 )   PT: 13.10 sec;   INR: 1.14 ratio         PTT - ( 22 Sep 2022 17:20 )  PTT:33.2 sec      RADIOLOGY & ADDITIONAL STUDIES:  < from: Xray Chest 1 View-PORTABLE IMMEDIATE (Xray Chest 1 View-PORTABLE IMMEDIATE .) (09.23.22 @ 11:03) >  XR CHEST PORTABLE IMMED 1V                          PROCEDURE DATE:  09/23/2022          INTERPRETATION:  Clinical History / Reason for exam: Dyspnea    Comparison : Chest radiograph 9/23/2022.    Technique/Positioning: Frontal.    Findings:    Support devices: ET tube mid trachea central venous line superior vena   cava    Cardiac/mediastinum/hilum: Indeterminate    Lung parenchyma/Pleura: Decreased left lung opacification. No pleural   effusion or air leak    Skeleton/soft tissues: Unremarkable.    Impression:    Decreased left lung opacification. No pleural effusion or air leak      < end of copied text >

## 2022-09-23 NOTE — PROGRESS NOTE ADULT - ASSESSMENT
Impression:    Acute respiratory failure SP oral intubation  Tracheostomy bleed SP revision by Surgery  Acute blood loss anemia SP 2U overnight (6U, 3FFP, 2plt since 9/20)  Suspected GI bleed SP EGD and colonoscopy  Altered MS suspected vasculitis SP pulse steroids  LLE cellulitis  ALF oliguric SP RRT, dialyzed yesterday  Bleeding from [place] site SP removal    COVID 19 infection   E Cloacae Bacteremia resolved   Thrombocytopenia, r/o DIC vs HIT  HFpEF  HO multiple CVAs      PLAN:     CNS;   Prednisone 60mg per Neuro until tomorrow, wean to 40mg if Neuro agreeable.  Neuro FU.  FU MS.       HEENT: Oral care.  Trach care.   ETT care.  CT SX FU.    PULMONARY:  HOB @ 45 degrees.  Aspiration precautions.   will repeat bronch today     CARDIOVASCULAR:   Avoid volume overload.  BP control.    GI: GI prophylaxis.  PEG feeds.  Bowel regimen if needed.    RENAL:  follow up lytes.  Correct as needed.   Hd today Renal FU.    INFECTIOUS DISEASE:  Possible ABX, MRSA nares.  ID FU.  Check Procalcitonin.  Panculture.  Send DTA.    HEMATOLOGICAL:  SP DDAVP, no response.  DIC negative.  Check HIT panel.  Monitor CBC and coags.  Groin doppler negative.    ENDOCRINE:  Follow up FS.  Insulin protocol if needed.  TSH 0.86, transient?  Check TH.    MUSCULOSKELETAL:  Bed rest.  Wound care per burn     Very poor overall prognosis  CODE STATUS: Full code.  Palliative FU.    DISPO: Monitor in MICU

## 2022-09-23 NOTE — PROGRESS NOTE ADULT - ASSESSMENT
55 y/o woman w PMHx of uncontrolled HTN, DM2, hemorrhagic ?anuerysmal stroke R basal ganglia 2017 w residual L sided weakness, h/o PEG s/p removal, had not seen doctor in >1yr, w/o meds since 2018, presented to ED 8/2/22 for RLE nonhealing wound [id number], ED triage vitals noted for /170 range.   for cellulitis, HTN urgency and started on Unasyn, Vanc, insulin drip, IV labetalol, IV vasotec. On 8/5/22 had stroke code w NIHSS 23, for unresponsiveness, concern for seizure w post ictal confusion, found to have ?embolic strokes on MR. S/p debridement of RLE 8/10/22. Developed E cloaca bacteremia, tx w avacaz and aztreonam, has since started on HD. Intubated on 9/6/22 and converted to trach 9/14/22. Uldall placed ?9/14/22 and started on HD. R femoral [place] removed on 9/19 for bleeding, S/p bronchoscopy, tracheostomy, and PEG tube placement 9/14/22. Remains vent dependent.     Transferred to [facility] ICU on 9/19    Acute respiratory failure s/p trach / Bleeding episodes - trach, oral cavity and udall sites / Acute blood loss anemia. GI bleed  sp EGD and colonoscopy.    Altered MS suspected vasculitis SP pulse steroids  / LLE cellulitis / ALF oliguric SP RRT / COVID 19 infection  / E Cloacae Bacteremia resolved  / uncontrolled blood pressure        - repeat bronch this am to remove occluding blood clots from airway   - CT surgery f/u   - H/H improved - follow serially and maintain above HGB>7   - taper steroids   - IV Protonix   - procal is elevated - recal ID and consider initiating antibiotics    - Keppra, lacosamide   - insulin sq hospital protocol   - very poor prognosis - family aware

## 2022-09-23 NOTE — PROGRESS NOTE ADULT - ASSESSMENT
1)  Severe oliguric ALF likely ischemic ATN.  Has been HD dependant    2)  Severe HTN, overall improved     3)  Bleeding from trach s/p bronchoscopy x2 with clots removed / DIC     Recommendations:    1)  HD today, 3hrs HD via [place], Optiflux dialyzer, 2K+ bath, tolerated 4L UF    2)  Can continue same anti-HTN regimen for now    3)  Repeat phos level;  add calcium acetate 2 tabs TID /  monitor calcium level, maintain iCa > 1     3)  Of note, if needed for better BP control, can start ACE-I/ARB

## 2022-09-23 NOTE — PROGRESS NOTE ADULT - NS ATTEND AMEND GEN_ALL_CORE FT
Pt is a 57 yo F with PMHx of HTN, DM, prior stroke in 2017 with residual left sided weakness, who presented with RLE cellulitis. Stroke code called on 8/5 for unresponsiveness. Pt examined in ICU, intubated, fentanyl held during exam. Opens her eyes, left gaze preference, no blink to visual threat on the right, withdraws to noxious stimuli only with deep pain. Does not follow commands or track examiner.     Impr: scattered punctate acute ischemic strokes in multiple vascular territories  Given encephalopathy and small scattered strokes, high suspicion for vasculitis.   S/p LP, CSF with mildly elevated cell count and protein, normal glucose. elevated ESR/CRP.  Renally dose AED's.  Defer DSA at this time  Consider IR consult for renal biopsy (looking for vasculitis). Appreciate Rheum recs.   If recovered from infectious standpoint, recommend starting methylprednisone 1G IV x 5 days (with appropriate GI ppx and glucose monitoring)
large dressing change --. right leg wound healing -. local wound care
Patient seen and examined and agree with above except as noted.  Patients history, notes, labs, imaging, vitals and meds reviewed personally.  Patient lethargic but arouses  Follows simple commands  Not moving Left extremities  Trace movement in right extremities and plantars up b/l    Plan as above
Patient seen and examined.    Tongue laceration, no active bleeding. Continue local care.
Patient seen and examined and agree with above except as noted.  Patients history, notes, labs, imaging, vitals and meds reviewed personally.  Intermittently following commands  withdrawing to noxious stimuli better on left then right    Plan as above
large dressing change --> right leg wound -. will debride
Patient seen and examined and agree with above except as noted.  Patients history, notes, labs, imaging, vitals and meds reviewed personally.  Patient alert intermittently following commands    Plan as above
I have personally seen and examined this patient.  I have fully participated in the care of this patient.  I have reviewed all pertinent clinical information, including history, physical exam, plan and note. CT head showed no acute changes. Patient is encephalopathic and obtunded. Currently intubated. Left lung complete opacity and now with kidney failure. Possible mental status is due to sever toxic metabolic causes. Continue to taper down prednisone. Routine EEG. Will follow.     I have reviewed all pertinent clinical information and reviewed all relevant imaging and diagnostic studies personally.  Recommendations as above.  Agree with above assessment except as noted.
High Risk patient remains intubated on IV antibiotics with need for intensive monitoring
Family wants ongoing aggressive medical management  Will be available for GOC discussions as appropriate  HIgh RIsk patient remains intubated with need for intensive monitoring

## 2022-09-23 NOTE — PROCEDURE NOTE - NSICDXIRPREOP_GEN_A_CORE_FT
PRE-OP DIAGNOSIS:  Collapse of left lung 22-Sep-2022 11:06:36  Donya Mcdermott  
PRE-OP DIAGNOSIS:  Collapse of left lung 22-Sep-2022 11:06:36  Donya Mcdermott

## 2022-09-23 NOTE — PROGRESS NOTE ADULT - SUBJECTIVE AND OBJECTIVE BOX
Patient is a 57y old  Female who presents with a chief complaint of RLE wound (12 Sep 2022 15:26)      Over Night Events:  Patient seen and examined.   s/p bronch yesterday   blood clot was removed from left main bronch   cxr improved after       ROS:  See HPI    PHYSICAL EXAM    ICU Vital Signs Last 24 Hrs  T(C): 36 (23 Sep 2022 07:00), Max: 37.3 (22 Sep 2022 19:00)  T(F): 96.8 (23 Sep 2022 07:00), Max: 99.1 (22 Sep 2022 19:00)  HR: 65 (23 Sep 2022 06:00) (57 - 73)  BP: 132/68 (23 Sep 2022 06:00) (117/63 - 179/83)  BP(mean): 94 (23 Sep 2022 06:00) (85 - 121)  ABP: --  ABP(mean): --  RR: 21 (23 Sep 2022 07:00) (16 - 30)  SpO2: 96% (23 Sep 2022 06:00) (78% - 100%)    O2 Parameters below as of 23 Sep 2022 07:00  Patient On (Oxygen Delivery Method): ventilator            General: not awake   HEENT:     et tube   packed  tracheal stoma            Lymph Nodes: NO cervical LN   Lungs: Bilateral BS  Cardiovascular: Regular   Abdomen: Soft, Positive BS  Extremities: No clubbing   Skin: warm   Neurological: positive gag   Musculoskeletal: move all ext     I&O's Detail    22 Sep 2022 07:01  -  23 Sep 2022 07:00  --------------------------------------------------------  IN:    Frozen Plasma Cryoprecipitate Reduced: 320 mL    IV PiggyBack: 50 mL    IV PiggyBack: 50 mL    IV PiggyBack: 50 mL    Nepro with Carb Steady: 630 mL    PRBCs (Packed Red Blood Cells): 593 mL  Total IN: 1693 mL    OUT:    Indwelling Catheter - Urethral (mL): 25 mL  Total OUT: 25 mL    Total NET: 1668 mL          LABS:                          8.5    22.04 )-----------( 100      ( 23 Sep 2022 05:48 )             24.5         23 Sep 2022 05:48    134    |  91     |  64     ----------------------------<  134    4.2     |  26     |  3.8      Ca    7.4        23 Sep 2022 05:48  Mg     2.1       23 Sep 2022 05:48    TPro  5.2    /  Alb  2.9    /  TBili  0.3    /  DBili  x      /  AST  242    /  ALT  24     /  AlkPhos  168    23 Sep 2022 05:48  Amylase x     lipase x                                                 PT/INR - ( 22 Sep 2022 17:20 )   PT: 13.10 sec;   INR: 1.14 ratio         PTT - ( 22 Sep 2022 17:20 )  PTT:33.2 sec                                                                                                   Culture - Fungal, Bronchial (collected 22 Sep 2022 11:00)  Source: BAL None  Preliminary Report (23 Sep 2022 07:44):    Testing in progress    Culture - Bronchial (collected 22 Sep 2022 11:00)  Source: Lavage None  Gram Stain (23 Sep 2022 07:21):    No polymorphonuclear leukocytes seen per low power field    No Squamous epithelial cells seen per low power field    No organisms seen                                                   Mode: AC/ CMV (Assist Control/ Continuous Mandatory Ventilation)  RR (machine): 16  TV (machine): 340  FiO2: 60  PEEP: 5  MAP: 15  PIP: 37                                      ABG - ( 23 Sep 2022 03:58 )  pH, Arterial: 7.50  pH, Blood: x     /  pCO2: 31    /  pO2: 211   / HCO3: 24    / Base Excess: 1.3   /  SaO2: 100.0               MEDICATIONS  (STANDING):  amLODIPine   Tablet 10 milliGRAM(s) Oral daily  atorvastatin 80 milliGRAM(s) Oral at bedtime  chlorhexidine 0.12% Liquid 15 milliLiter(s) Oral Mucosa every 12 hours  chlorhexidine 2% Cloths 1 Application(s) Topical <User Schedule>  cloNIDine 0.2 milliGRAM(s) Oral every 8 hours  dextrose 5%. 1000 milliLiter(s) (100 mL/Hr) IV Continuous <Continuous>  dextrose 5%. 1000 milliLiter(s) (50 mL/Hr) IV Continuous <Continuous>  dextrose 50% Injectable 25 Gram(s) IV Push once  dextrose 50% Injectable 12.5 Gram(s) IV Push once  dextrose 50% Injectable 25 Gram(s) IV Push once  furosemide   Injectable 80 milliGRAM(s) IV Push every 12 hours  glucagon  Injectable 1 milliGRAM(s) IntraMuscular once  hydrALAZINE 100 milliGRAM(s) Oral every 8 hours  insulin lispro (ADMELOG) corrective regimen sliding scale   SubCutaneous three times a day before meals  labetalol 600 milliGRAM(s) Oral three times a day  levETIRAcetam  Solution 1000 milliGRAM(s) Oral at bedtime  lidocaine 1%/epinephrine 1:100,000 Inj 20 milliLiter(s) Local Injection once  lidocaine 2% Viscous 15 milliLiter(s) Swish and Spit once  multivitamin 1 Tablet(s) Oral daily  pantoprazole  Injectable 40 milliGRAM(s) IV Push two times a day  sevelamer carbonate Powder 2400 milliGRAM(s) Enteral Tube every 8 hours  sodium chloride 0.65% Nasal 2 Spray(s) Both Nostrils two times a day    MEDICATIONS  (PRN):  acetaminophen     Tablet .. 650 milliGRAM(s) Oral every 6 hours PRN Temp greater or equal to 38C (100.4F), Mild Pain (1 - 3)  dextrose Oral Gel 15 Gram(s) Oral once PRN Blood Glucose LESS THAN 70 milliGRAM(s)/deciliter  fentaNYL    Injectable 50 MICROGram(s) IV Push once PRN bronchoscopy  fentaNYL    Injectable 50 MICROGram(s) IV Push every 6 hours PRN agitation  labetalol Injectable 10 milliGRAM(s) IV Push every 6 hours PRN Systolic blood pressure >  melatonin 3 milliGRAM(s) Oral at bedtime PRN Insomnia  midazolam Injectable 2 milliGRAM(s) IV Push once PRN bronchoscopy          Xrays:  TLC:  OG:  ET tube:                                                                                    again left lung opacity collapse ?? plug    ECHO:  [facility] ICU:

## 2022-09-23 NOTE — PROGRESS NOTE ADULT - TIME-BASED
35
45
35
45
50
35
35
15
25
35
50
35
25
25
35
45
50
20
30
35
35
50
35
40
40
35
45
25
50
45
50
50

## 2022-09-23 NOTE — PROGRESS NOTE ADULT - ASSESSMENT
57 y/o woman w PMHx of uncontrolled HTN, DM2, hemorrhagic ?anuerysmal stroke R basal ganglia 2017 w residual L sided weakness, h/o PEG s/p removal, had not seen doctor in >1yr, w/o meds since 2018, presented to ED 8/2/22 for RLE nonhealing wound [id number] and BIB son who noted it that evening, ED triage vitals noted for /170 range.     Admitted for cellulitis, HTN urgency and started on Unasyn, Vanc, insulin drip, IV labetalol, IV vasotec. On 8/5/22 had stroke code w NIHSS 23, for unresponsiveness, concern for seizure w post ictal confusion, found to have ?embolic strokes on MR. S/p debridement of RLE 8/10/22. Developed E cloaca bacteremia, tx w avacaz and aztreonam, has since started on HD. Intubated on 9/6/22 and converted to trach 9/14/22. Uldall placed ?9/14/22 and started on HD -> [place] removed. S/p bronchoscopy, tracheostomy, and PEG tube placement 9/14/22. Remains vent dependent. Patient oliguric with rising K on 9/20, RIJ udall placed. Patient persistently bleeding from tracheostomy site. Taken for surgery on 9/21 by CT surgery for trach revision, bleeding cauterized and patient intubated orally. On 9/22 bleeding from trach site persistents but decreased. Some oozing from RIJ site. In AM of 9/22, CXR showed L lung collapse and patient under went bronchoscopy with removal of large clots.      # Acute bleed secondary to coagulopathy???  - bleeding predominantly from and around tracheostomy site s/p trach removal and surgical intervention by CTSx.  - give 2units PRBC on 9/20. Pressure held on udall site, some bleeding noted around trach.   - to date patient has received a total of 6 pRBC, 3 FFP, 2 platelet since 9/20.   - not responsive to DDAVP.   - serial cbc  - Initially questionable DIC, fibrinogen >700, INR peak 1.8, haptoglobin not significantly decreased.  - repeat HIT panel negative   - Spoke to heme/onc attending, recommend trying another dose of DDAVP, vitamin K 5mg IV (5 given yesterday), and possibly FFP. Patient could possibly still be in DIC vs platelet dysfunction secondary to uremia? Official consult to follow.     #Acute ischemic strokes, multifocal  #h/o hemorrhagic, ?aneurysmal stroke R basal ganglia 2017  Per neurology: suspicious for vasculitis, but not confirmed, CSF studies does not support the diagnosis. - Plavix held for LGIB. Aspirin resumed.   - Neuro: 60mg Prednisone taper [id number] starting 9/16/22-10/7/22  - Per stroke team, no need for angio, requested a renal bx when stable as expected widespread vasculitis.   - continue with Keppra   - recall neuro.     # oliguric ALF / Urinary retention / Suspected ATN  Renal US 9/2/22: no hydronephrosis  - Cr still high; 9/16/22 Cr 6.8   - Sodium bicarbonate increased to 1300 q8h, sevelamer 2400 mg TID in PEG   - Nephro following  - RIJ udall placed 9/20 - s/p HD on 9/21    #Bacteremia, resolved   BCx 9/1/22 (+) MDR enterobacter Cloacae   BCx 9/3/22, 9/4/22, 9/5/22, 9/6/22, 9/7/22 all NGF   - 9/19 completed abx per ID     #COVID (+) 9/13/22  - Tested (+) 9/13/22, in isolation thru 9/23/22   - Resp status as noted above  - Will monitor for fevers, sepsis  - no respiratory sxs at this time. consider ID consult     # Purulent Right LE cellulitis   S/p debridement by burn on 8/10/22  - Candida in wound. per Dr. Chua: contaminant  - BCx w/ staph: likely contaminant. repeat BCx at that time was negative  - F/u burn as outpatient [phone]; signed off 9/14/22.     #GI bleed  S/p 6 units of PRBCs. Hb goal 7+, 9/16/22 Hb 7.6  - Pt was seen by GI, external bleeding hemorrhoids noted.  - Protonix q12h  - ENT eval appreciated for oral bleed. No actively oozing wounds, teeth guard put in.     - Oral cavity bleed from tongue biting, improved  - Hgb 7 on 9/19. Likey 2/2 to platelet dysfunction from uremia    #Hypertensive urgency due to noncompliance and Malignant HTN - improved   BP on admission 296/174 without signs of end organ damage at that time. Renal artery duplex wnl>>ARIAN unlikely  - Aldosterone wnl, renin elevated  - BP overall improved  - SBP goal 120-160  - c/w amlodipine 10, furosemide 80 q12, hydralazine 100mg q8, labetalol 600mg TID    # Diabetes mellitus   - HbA1C 10.4  - Transitioned from insulin drip to basal/bolus 9/16/22  - Lantus and lispro sliding scale; increase as necessary    #HLD   - Cont statins     # Family contact: Spoke to patient's son Erik and her  on 9/22 to update them on the patient's current condition and plan                                                                                 ----------------------------------------------------  # DVT prophylaxis: Holding AC given recent GIB and active current bleed.   # GI prophylaxis: Pantoprazole   # Diet: "Nepro at 30 ml/h + 5 packets Beneprotein/d --> 87 gm protein, 1400 kcal, 510 total mg phos, 23 mEq K/d"  # Activity: Bedrest  # Code status: FULL CODE   # Disposition: Acute    57 y/o woman w PMHx of uncontrolled HTN, DM2, hemorrhagic ?anuerysmal stroke R basal ganglia 2017 w residual L sided weakness, h/o PEG s/p removal, had not seen doctor in >1yr, w/o meds since 2018, presented to ED 8/2/22 for RLE nonhealing wound [id number] and BIB son who noted it that evening, ED triage vitals noted for /170 range.     Admitted for cellulitis, HTN urgency and started on Unasyn, Vanc, insulin drip, IV labetalol, IV vasotec. On 8/5/22 had stroke code w NIHSS 23, for unresponsiveness, concern for seizure w post ictal confusion, found to have ?embolic strokes on MR. S/p debridement of RLE 8/10/22. Developed E cloaca bacteremia, tx w avacaz and aztreonam, has since started on HD. Intubated on 9/6/22 and converted to trach 9/14/22. Uldall placed ?9/14/22 and started on HD -> [place] removed. S/p bronchoscopy, tracheostomy, and PEG tube placement 9/14/22. Remains vent dependent. Patient oliguric with rising K on 9/20, RIJ udall placed. Patient persistently bleeding from tracheostomy site. Taken for surgery on 9/21 by CT surgery for trach revision, bleeding cauterized and patient intubated orally. On 9/22 bleeding from trach site persistents but decreased. Some oozing from RIJ site. In AM of 9/22, CXR showed L lung collapse and patient under went bronchoscopy with removal of large clots.  Patient underwent bronch again on 9/23 for L lung collapse.     # Acute bleed secondary to coagulopathy???  - bleeding predominantly from and around tracheostomy site s/p trach removal and surgical intervention by CTSx.  - give 2units PRBC on 9/20. Pressure held on udall site, some bleeding noted around trach.   - to date patient has received a total of approximately 8 pRBC, 3-4 FFP, 2 platelet since 9/20.   - not responsive to DDAVP.   - serial cbc  - possible DIC fibrinogen >700, INR peak 1.8, haptoglobin not significantly decreased.  - repeat HIT panel negative   - Heme/onc eval appreciated, start cryo 5-10 units every 12-24hour. Risks and benefits discussed with the son, who then talked it over with his father and would like to proceed with cryo in attempt bleeding. Will start cryo 5 units now. Continue to monitor for bleeding.   - heme/onc following     #Acute ischemic strokes, multifocal  #h/o hemorrhagic, ?aneurysmal stroke R basal ganglia 2017  Per neurology: suspicious for vasculitis, but not confirmed, CSF studies does not support the diagnosis. - Plavix held for LGIB. Aspirin resumed.   - Neuro: 60mg Prednisone taper [id number] starting 9/16/22-10/7/22  - Per stroke team, no need for angio, requested a renal bx when stable as expected widespread vasculitis.   - continue with Keppra   - recall neuro.     # oliguric ALF / Urinary retention / Suspected ATN  Renal US 9/2/22: no hydronephrosis  - Cr still high; 9/16/22 Cr 6.8   - Sodium bicarbonate increased to 1300 q8h, sevelamer 2400 mg TID in PEG   - Nephro following  - RIJ udall placed 9/20 - s/p HD on 9/21    #Bacteremia, resolved   BCx 9/1/22 (+) MDR enterobacter Cloacae   BCx 9/3/22, 9/4/22, 9/5/22, 9/6/22, 9/7/22 all NGF   - 9/19 completed abx per ID     #COVID (+) 9/13/22  - Tested (+) 9/13/22, in isolation thru 9/23/22   - Resp status as noted above  - Will monitor for fevers, sepsis  - no respiratory sxs at this time. consider ID consult     # Purulent Right LE cellulitis   S/p debridement by burn on 8/10/22  - Candida in wound. per Dr. Chua: contaminant  - BCx w/ staph: likely contaminant. repeat BCx at that time was negative  - F/u burn as outpatient [phone]; signed off 9/14/22.     #GI bleed  S/p 6 units of PRBCs. Hb goal 7+, 9/16/22 Hb 7.6  - Pt was seen by GI, external bleeding hemorrhoids noted.  - Protonix q12h  - ENT eval appreciated for oral bleed. No actively oozing wounds, teeth guard put in.     - Oral cavity bleed from tongue biting, improved  - Hgb 7 on 9/19. Likey 2/2 to platelet dysfunction from uremia    #Hypertensive urgency due to noncompliance and Malignant HTN - improved   BP on admission 296/174 without signs of end organ damage at that time. Renal artery duplex wnl>>ARIAN unlikely  - Aldosterone wnl, renin elevated  - BP overall improved  - SBP goal 120-160  - c/w amlodipine 10, furosemide 80 q12, hydralazine 100mg q8, labetalol 600mg TID    # Diabetes mellitus   - HbA1C 10.4  - Transitioned from insulin drip to basal/bolus 9/16/22  - Lantus and lispro sliding scale; increase as necessary    #HLD   - Cont statins     # Family contact: Spoke to patient's son Latrell Mack [phone] on 9/23 to update them on the patient's current condition and plan. We discussed the use of cryoprecipitate                                                                               ----------------------------------------------------  # DVT prophylaxis: Holding AC given recent GIB and active current bleed.   # GI prophylaxis: Pantoprazole   # Diet: "Nepro at 30 ml/h + 5 packets Beneprotein/d --> 87 gm protein, 1400 kcal, 510 total mg phos, 23 mEq K/d"  # Activity: Bedrest  # Code status: FULL CODE   # Disposition: Acute    55 y/o woman w PMHx of uncontrolled HTN, DM2, hemorrhagic ?anuerysmal stroke R basal ganglia 2017 w residual L sided weakness, h/o PEG s/p removal, had not seen doctor in >1yr, w/o meds since 2018, presented to ED 8/2/22 for RLE nonhealing wound [id number] and BIB son who noted it that evening, ED triage vitals noted for /170 range.     Admitted for cellulitis, HTN urgency and started on Unasyn, Vanc, insulin drip, IV labetalol, IV vasotec. On 8/5/22 had stroke code w NIHSS 23, for unresponsiveness, concern for seizure w post ictal confusion, found to have ?embolic strokes on MR. S/p debridement of RLE 8/10/22. Developed E cloaca bacteremia, tx w avacaz and aztreonam, has since started on HD. Intubated on 9/6/22 and converted to trach 9/14/22. Uldall placed ?9/14/22 and started on HD -> [place] removed. S/p bronchoscopy, tracheostomy, and PEG tube placement 9/14/22. Remains vent dependent. Patient oliguric with rising K on 9/20, RIJ udall placed. Patient persistently bleeding from tracheostomy site. Taken for surgery on 9/21 by CT surgery for trach revision, bleeding cauterized and patient intubated orally. On 9/22 bleeding from trach site persistents but decreased. Some oozing from RIJ site. In AM of 9/22, CXR showed L lung collapse and patient under went bronchoscopy with removal of large clots.  Patient underwent bronch again on 9/23 for L lung collapse.     # Acute bleed secondary to coagulopathy???  - bleeding predominantly from and around tracheostomy site s/p trach removal and surgical intervention by CTSx.  - give 2units PRBC on 9/20. Pressure held on udall site, some bleeding noted around trach.   - to date patient has received a total of approximately 8 pRBC, 3-4 FFP, 2 platelet since 9/20.   - not responsive to DDAVP.   - serial cbc  - possible DIC fibrinogen >700, INR peak 1.8, haptoglobin not significantly decreased.  - repeat HIT panel negative   - Heme/onc eval appreciated, start cryo 5-10 units every 12-24hour. Risks and benefits discussed with the son, who then talked it over with his father and would like to proceed with cryo in attempt bleeding. Will start cryo 5 units now. Continue to monitor for bleeding.   - check fungitell.   - heme/onc following     #Acute ischemic strokes, multifocal  #h/o hemorrhagic, ?aneurysmal stroke R basal ganglia 2017  Per neurology: suspicious for vasculitis, but not confirmed, CSF studies does not support the diagnosis. - Plavix held for LGIB. Aspirin resumed.   - Neuro: 60mg Prednisone taper [id number] starting 9/16/22-10/7/22  - Per stroke team, no need for angio, requested a renal bx when stable as expected widespread vasculitis.   - continue with Keppra   - Neuro eval noted. Rheum eval for cytoxan when stable.     # oliguric ALF / Urinary retention / Suspected ATN  Renal US 9/2/22: no hydronephrosis  - Cr still high; 9/16/22 Cr 6.8   - Sodium bicarbonate increased to 1300 q8h, sevelamer 2400 mg TID in PEG   - Nephro following  - YOHANNES mittal placed 9/20 - s/p HD on 9/21    #Bacteremia, resolved   BCx 9/1/22 (+) MDR enterobacter Cloacae   BCx 9/3/22, 9/4/22, 9/5/22, 9/6/22, 9/7/22 all NGF   - 9/19 completed abx per ID     #COVID (+) 9/13/22  - Tested (+) 9/13/22, in isolation thru 9/23/22   - Resp status as noted above  - Will monitor for fevers, sepsis  - no respiratory sxs at this time. consider ID consult     # Purulent Right LE cellulitis   S/p debridement by burn on 8/10/22  - Candida in wound. per Dr. Chua: contaminant  - BCx w/ staph: likely contaminant. repeat BCx at that time was negative  - F/u burn as outpatient [phone]; signed off 9/14/22.     #GI bleed  S/p 6 units of PRBCs. Hb goal 7+, 9/16/22 Hb 7.6  - Pt was seen by GI, external bleeding hemorrhoids noted.  - Protonix q12h  - ENT eval appreciated for oral bleed. No actively oozing wounds, teeth guard put in.     - Oral cavity bleed from tongue biting, improved  - Hgb 7 on 9/19. Likey 2/2 to platelet dysfunction from uremia    #Hypertensive urgency due to noncompliance and Malignant HTN - improved   BP on admission 296/174 without signs of end organ damage at that time. Renal artery duplex wnl>>ARIAN unlikely  - Aldosterone wnl, renin elevated  - BP overall improved  - SBP goal 120-160  - c/w amlodipine 10, furosemide 80 q12, hydralazine 100mg q8, labetalol 600mg TID    # Diabetes mellitus   - HbA1C 10.4  - Transitioned from insulin drip to basal/bolus 9/16/22  - Lantus and lispro sliding scale; increase as necessary    #HLD   - Cont statins     # Family contact: Spoke to patient's son Latrell Mack [phone] on 9/23 to update them on the patient's current condition and plan. We discussed the use of cryoprecipitate and after discussion of risks and benefits he spoke to his father and they were in agreement to try.                                                                               ----------------------------------------------------  # DVT prophylaxis: Holding AC given recent GIB and active current bleed.   # GI prophylaxis: Pantoprazole   # Diet: "Nepro at 30 ml/h + 5 packets Beneprotein/d --> 87 gm protein, 1400 kcal, 510 total mg phos, 23 mEq K/d"  # Activity: Bedrest  # Code status: FULL CODE   # Disposition: Acute    55 y/o woman w PMHx of uncontrolled HTN, DM2, hemorrhagic ?anuerysmal stroke R basal ganglia 2017 w residual L sided weakness, h/o PEG s/p removal, had not seen doctor in >1yr, w/o meds since 2018, presented to ED 8/2/22 for RLE nonhealing wound [id number] and BIB son who noted it that evening, ED triage vitals noted for /170 range.     Admitted for cellulitis, HTN urgency and started on Unasyn, Vanc, insulin drip, IV labetalol, IV vasotec. On 8/5/22 had stroke code w NIHSS 23, for unresponsiveness, concern for seizure w post ictal confusion, found to have ?embolic strokes on MR. S/p debridement of RLE 8/10/22. Developed E cloaca bacteremia, tx w avacaz and aztreonam, has since started on HD. Intubated on 9/6/22 and converted to trach 9/14/22. Uldall placed ?9/14/22 and started on HD -> [place] removed. S/p bronchoscopy, tracheostomy, and PEG tube placement 9/14/22. Remains vent dependent. Patient oliguric with rising K on 9/20, RIJ udall placed. Patient persistently bleeding from tracheostomy site. Taken for surgery on 9/21 by CT surgery for trach revision, bleeding cauterized and patient intubated orally. On 9/22 bleeding from trach site persistents but decreased. Some oozing from RIJ site. In AM of 9/22, CXR showed L lung collapse and patient under went bronchoscopy with removal of large clots.  Patient underwent bronch again on 9/23 for L lung white out.     # Acute bleed secondary to coagulopathy???  - bleeding predominantly from and around tracheostomy site s/p trach removal and surgical intervention by CTSx.  - give 2units PRBC on 9/20. Pressure held on udall site, some bleeding noted around trach.   - to date patient has received a total of approximately 8 pRBC, 3-4 FFP, 2 platelet since 9/20.   - not responsive to DDAVP.   - serial cbc  - possible DIC fibrinogen >700, INR peak 1.8, haptoglobin not significantly decreased.  - repeat HIT panel negative   - Heme/onc eval appreciated, start cryo 5-10 units every 12-24hour. Risks and benefits discussed with the son, who then talked it over with his father and would like to proceed with cryo in attempt bleeding. Will start cryo 5 units now. Continue to monitor for bleeding.   - check fungitell.   - heme/onc following     #Acute ischemic strokes, multifocal  #h/o hemorrhagic, ?aneurysmal stroke R basal ganglia 2017  Per neurology: suspicious for vasculitis, but not confirmed, CSF studies does not support the diagnosis. - Plavix held for LGIB. Aspirin resumed.   - Neuro: 60mg Prednisone taper [id number] starting 9/16/22-10/7/22  - Per stroke team, no need for angio, requested a renal bx when stable as expected widespread vasculitis.   - continue with Keppra   - Neuro eval noted. Rheum eval for cytoxan when stable.     # oliguric ALF / Urinary retention / Suspected ATN  Renal US 9/2/22: no hydronephrosis  - Cr still high; 9/16/22 Cr 6.8   - Sodium bicarbonate increased to 1300 q8h, sevelamer 2400 mg TID in PEG   - Nephro following  - YOHANNES mittal placed 9/20 - s/p HD on 9/21    #Bacteremia, resolved   BCx 9/1/22 (+) MDR enterobacter Cloacae   BCx 9/3/22, 9/4/22, 9/5/22, 9/6/22, 9/7/22 all NGF   - 9/19 completed abx per ID     #COVID (+) 9/13/22  - Tested (+) 9/13/22, in isolation thru 9/23/22   - Resp status as noted above  - Will monitor for fevers, sepsis  - no respiratory sxs at this time. consider ID consult     # Purulent Right LE cellulitis   S/p debridement by burn on 8/10/22  - Candida in wound. per Dr. Chua: contaminant  - BCx w/ staph: likely contaminant. repeat BCx at that time was negative  - F/u burn as outpatient [phone]; signed off 9/14/22.     #GI bleed  S/p 6 units of PRBCs. Hb goal 7+, 9/16/22 Hb 7.6  - Pt was seen by GI, external bleeding hemorrhoids noted.  - Protonix q12h  - ENT eval appreciated for oral bleed. No actively oozing wounds, teeth guard put in.     - Oral cavity bleed from tongue biting, improved  - Hgb 7 on 9/19. Likey 2/2 to platelet dysfunction from uremia    #Hypertensive urgency due to noncompliance and Malignant HTN - improved   BP on admission 296/174 without signs of end organ damage at that time. Renal artery duplex wnl>>ARIAN unlikely  - Aldosterone wnl, renin elevated  - BP overall improved  - SBP goal 120-160  - c/w amlodipine 10, furosemide 80 q12, hydralazine 100mg q8, labetalol 600mg TID    # Diabetes mellitus   - HbA1C 10.4  - sliding scale for now.     #HLD   - Cont statins     # Family contact: Spoke to patient's son Latrell Mack [phone] on 9/23 to update them on the patient's current condition and plan. We discussed the use of cryoprecipitate and after discussion of risks and benefits he spoke to his father and they were in agreement to try.                                                                               ----------------------------------------------------  # DVT prophylaxis: Holding AC given recent GIB and active current bleed.   # GI prophylaxis: Pantoprazole   # Diet: "Nepro at 30 ml/h + 5 packets Beneprotein/d --> 87 gm protein, 1400 kcal, 510 total mg phos, 23 mEq K/d"  # Activity: Bedrest  # Code status: FULL CODE   # Disposition: Acute

## 2022-09-23 NOTE — CONSULT NOTE ADULT - CONSULT REASON
Diagnostic DSA to r/o vasculitis
HTN urgency
Loop recorder
AMS
Chest pain
GOC
Rt Leg wound
nail
Trach & Peg
bleeding
vasculitis
"Pt has PEG"
BRBPR
gi bleed
Renal biopsy, rule out vasculitis
Wound RLE
stroke
G tube placement
Skin assessment and treatment recommendation
hypertension
oral bleeding
peg
Altered Mental Status and Concern for Vasculitis
cellulitis

## 2022-09-23 NOTE — PROGRESS NOTE ADULT - SUBJECTIVE AND OBJECTIVE BOX
Patient seen and evaluated this am, desaturation overnight, received 2 more Units PRBC and one FFP overnight      T(F): 96.6 (09-23-22 @ 08:00), Max: 99.1 (09-22-22 @ 19:00)  HR: 56 (09-23-22 @ 10:00)  BP: 125/74 (09-23-22 @ 10:00)  RR: 17 (09-23-22 @ 10:00)  SpO2: 98% (09-23-22 @ 10:00) (78% - 100%)    PHYSICAL EXAM:  GENERAL: NAD  HEAD:  Atraumatic, Normocephalic  EYES: EOMI, PERRLA, conjunctiva and sclera clear  NERVOUS SYSTEM: positive gag  CHEST/LUNG:  bilateral rhonchi  HEART: Regular rate and rhythm; No murmurs, rubs, or gallops  ABDOMEN: Soft, Nontender, Nondistended; Bowel sounds present  EXTREMITIES:  b/l  edema    LABS  09-23    134<L>  |  91<L>  |  64<HH>  ----------------------------<  134<H>  4.2   |  26  |  3.8<H>    Ca    7.4<L>      23 Sep 2022 05:48  Mg     2.1     09-23    TPro  5.2<L>  /  Alb  2.9<L>  /  TBili  0.3  /  DBili  x   /  AST  242<H>  /  ALT  24  /  AlkPhos  168<H>  09-23                          8.5    22.04 )-----------( 100      ( 23 Sep 2022 05:48 )             24.5   Procalcitonin, Serum (09.22.22 @ 10:21)   Procalcitonin, Serum: 1.31    PT/INR - ( 22 Sep 2022 17:20 )   PT: 13.10 sec;   INR: 1.14 ratio         PTT - ( 22 Sep 2022 17:20 )  PTT:33.2 sec    Mode: AC/ CMV (Assist Control/ Continuous Mandatory Ventilation)  RR (machine): 16  TV (machine): 340  FiO2: 60  PEEP: 5    < from: TTE Echo Complete w/o Contrast w/ Doppler (09.08.22 @ 09:32) >  Summary:   1. LV Ejection Fraction by Reihc's Method witha biplane EF of 60 %.   2. Mildly increased LV wall thickness.   3. Spectral Doppler shows impaired relaxation pattern of left   ventricular myocardial filling (Grade I diastolic dysfunction).   4. Normal left atrial size.   5. Normal right atrial size.   6. Mild mitral valve regurgitation.   7. Mild tricuspid regurgitation.   8. Mild aortic regurgitation.   9. Color flow doppler and intravenous injection of agitated saline   demonstrates the presence of an intact intra atrial septum.    < end of copied text >        Culture Results:   Testing in progress (09-22-22)  Culture Results:   No Growth Final (09-07-22)  Culture Results:   No Growth Final (09-06-22)  Culture Results:   No Growth Final (09-05-22)  Culture Results:   No Growth Final (09-04-22)  Culture Results:   No Growth Final (09-03-22)  Culture Results:   Growth in aerobic and anaerobic bottles: Enterobacter cloacae (Carbapenem  Resistant)  ***Blood Panel PCR results on this specimen are available  approximately 3 hours after the Gram stain result.***  Gram stain, PCR, and/or culture results may notalways  correspond due to difference in methodologies.  ************************************************************  This PCR assay was performed by multiplex PCR. This  Assay tests for 66 bacterial and resistance gene targets.  Please refer to the [facility] test directory  at [email or web address] for details. (09-01-22)  Culture Results:   >=3 organisms. Probable collection contamination. (09-01-22)  Culture Results:   >=3 organisms. Probable collection contamination. (08-31-22)  Culture Results:   No Growth Final (08-30-22)    RADIOLOGY  < from: Xray Chest 1 View- PORTABLE-Routine (Xray Chest 1 View- PORTABLE-Routine in AM.) (09.23.22 @ 06:35) >  Whiteout of the lung which may reflect mucous plugging resulting in   collapsed left lung    < end of copied text >    MEDICATIONS  (STANDING):  amLODIPine   Tablet 10 milliGRAM(s) Oral daily  atorvastatin 80 milliGRAM(s) Oral at bedtime  chlorhexidine 0.12% Liquid 15 milliLiter(s) Oral Mucosa every 12 hours  chlorhexidine 2% Cloths 1 Application(s) Topical <User Schedule>  cloNIDine 0.2 milliGRAM(s) Oral every 8 hours  furosemide   Injectable 80 milliGRAM(s) IV Push every 12 hours  hydrALAZINE 100 milliGRAM(s) Oral every 8 hours  insulin lispro (ADMELOG) corrective regimen sliding scale   SubCutaneous three times a day before meals  labetalol 600 milliGRAM(s) Oral three times a day  levETIRAcetam  Solution 1000 milliGRAM(s) Oral at bedtime  lidocaine 1%/epinephrine 1:100,000 Inj 20 milliLiter(s) Local Injection once  multivitamin 1 Tablet(s) Oral daily  pantoprazole  Injectable 40 milliGRAM(s) IV Push two times a day  sevelamer carbonate Powder 2400 milliGRAM(s) Enteral Tube every 8 hours  sodium chloride 0.65% Nasal 2 Spray(s) Both Nostrils two times a day    MEDICATIONS  (PRN):  acetaminophen     Tablet .. 650 milliGRAM(s) Oral every 6 hours PRN Temp greater or equal to 38C (100.4F), Mild Pain (1 - 3)  dextrose Oral Gel 15 Gram(s) Oral once PRN Blood Glucose LESS THAN 70 milliGRAM(s)/deciliter  fentaNYL    Injectable 50 MICROGram(s) IV Push every 6 hours PRN agitation  labetalol Injectable 10 milliGRAM(s) IV Push every 6 hours PRN Systolic blood pressure >  melatonin 3 milliGRAM(s) Oral at bedtime PRN Insomnia

## 2022-09-23 NOTE — PROGRESS NOTE ADULT - SUBJECTIVE AND OBJECTIVE BOX
Neurology Follow up note    Name  JOSE MITCHELL    HPI:  55 yo F with PMH of HTN, DM2, and stroke (per son 2017) who presented for RLE wound. Started 3 months ago when she fell and hit her leg on a wooden cabinet. She put hydrogen peroxide, antibiotic cream, and wrapped the wound but never fully healed. Two weeks ago it started to show yellow pus so her and her family came to the ED for further treatment. Endorsed subjective fevers, chest pain, and vision changes which occurs when walked 2-3 blocks. Denied congestion, sore throat, cough, dyspnea, headache, dysuria, N/V, diarrhea     Per son, they fill her medications at [facility] ([phone]) and this is their current pharmacy. Called them to confirm her medications and they said her last refill of medications was 2018. Pt has not seen a doctor due to insurance problem and has not been taking any medications.    In the ED:  Vitals: T: 98.9, BP: 296/ 174, , RR: 18, 98%O2 on RA  Labs: CBC showed WBC 11.23; ESR 92, CRP 35.6  CMP showed glucose 302, alk phos 173; ; VBG pH 7.45  EKG pending  CXR showed borderline cardiomegaly and no airspace opacity.   X-ray of RLE also pending.     Received unasyn and vanco, humalin R, IV vasotec, IV labetalol   (02 Aug 2022 07:47)      Interval History -           Vital Signs Last 24 Hrs  T(C): 35.6 (23 Sep 2022 12:00), Max: 37.3 (22 Sep 2022 19:00)  T(F): 96.1 (23 Sep 2022 12:00), Max: 99.1 (22 Sep 2022 19:00)  HR: 59 (23 Sep 2022 12:00) (52 - 73)  BP: 178/99 (23 Sep 2022 12:00) (114/63 - 179/83)  BP(mean): 130 (23 Sep 2022 12:00) (83 - 130)  RR: 24 (23 Sep 2022 12:00) (16 - 27)  SpO2: 98% (23 Sep 2022 12:00) (78% - 100%)    Parameters below as of 23 Sep 2022 11:00  Patient On (Oxygen Delivery Method): ventilator          Neurological Exam:   Mental status: Awake, alert and oriented x3.  Recent and remote memory intact.  Naming, repetition and comprehension intact.  Attention/concentration intact.  No dysarthria, no aphasia.  Fund of knowledge appropriate.    Cranial nerves: pupils equally round and reactive to light, visual fields full, no nystagmus, extraocular muscles intact, V1 through V3 intact bilaterally and symmetric, face symmetric, hearing intact to finger rub, palate elevation symmetric, tongue was midline, sternocleidomastoid/shoulder shrug strength bilaterally 5/5.    Motor:  Normal bulk and tone, strength 5/5 in bilateral upper and lower extremities.   strength 5/5.  Rapid alternating movements intact and symmetric.   Sensation: Intact to light touch, proprioception, and pinprick.  No neglect.   Coordination: No dysmetria on finger-to-nose and heel-to-shin.  No clumsiness.  Reflexes: 2+ in upper and lower extremities, downgoing toes bilaterally  Gait: Narrow and steady. No ataxia.  Romberg negative    Medications  acetaminophen     Tablet .. 650 milliGRAM(s) Oral every 6 hours PRN  amLODIPine   Tablet 10 milliGRAM(s) Oral daily  atorvastatin 80 milliGRAM(s) Oral at bedtime  chlorhexidine 0.12% Liquid 15 milliLiter(s) Oral Mucosa every 12 hours  chlorhexidine 2% Cloths 1 Application(s) Topical <User Schedule>  cloNIDine 0.2 milliGRAM(s) Oral every 8 hours  dextrose 5%. 1000 milliLiter(s) IV Continuous <Continuous>  dextrose 5%. 1000 milliLiter(s) IV Continuous <Continuous>  dextrose 50% Injectable 25 Gram(s) IV Push once  dextrose 50% Injectable 12.5 Gram(s) IV Push once  dextrose 50% Injectable 25 Gram(s) IV Push once  dextrose Oral Gel 15 Gram(s) Oral once PRN  fentaNYL    Injectable 50 MICROGram(s) IV Push every 6 hours PRN  furosemide   Injectable 80 milliGRAM(s) IV Push every 12 hours  glucagon  Injectable 1 milliGRAM(s) IntraMuscular once  hydrALAZINE 100 milliGRAM(s) Oral every 8 hours  insulin lispro (ADMELOG) corrective regimen sliding scale   SubCutaneous three times a day before meals  labetalol 600 milliGRAM(s) Oral three times a day  labetalol Injectable 10 milliGRAM(s) IV Push every 6 hours PRN  levETIRAcetam  Solution 1000 milliGRAM(s) Oral at bedtime  lidocaine 1%/epinephrine 1:100,000 Inj 20 milliLiter(s) Local Injection once  melatonin 3 milliGRAM(s) Oral at bedtime PRN  multivitamin 1 Tablet(s) Oral daily  pantoprazole  Injectable 40 milliGRAM(s) IV Push two times a day  sevelamer carbonate Powder 2400 milliGRAM(s) Enteral Tube every 8 hours  sodium chloride 0.65% Nasal 2 Spray(s) Both Nostrils two times a day      Lab                        8.5    22.04 )-----------( 100      ( 23 Sep 2022 05:48 )             24.5     09-23    134<L>  |  91<L>  |  64<HH>  ----------------------------<  134<H>  4.2   |  26  |  3.8<H>    Ca    7.4<L>      23 Sep 2022 05:48  Mg     2.1     09-23    TPro  5.2<L>  /  Alb  2.9<L>  /  TBili  0.3  /  DBili  x   /  AST  242<H>  /  ALT  24  /  AlkPhos  168<H>  09-23    CAPILLARY BLOOD GLUCOSE      POCT Blood Glucose.: 186 mg/dL (23 Sep 2022 11:43)  POCT Blood Glucose.: 160 mg/dL (23 Sep 2022 06:40)  POCT Blood Glucose.: 168 mg/dL (22 Sep 2022 23:04)  POCT Blood Glucose.: 167 mg/dL (22 Sep 2022 17:22)    LIVER FUNCTIONS - ( 23 Sep 2022 05:48 )  Alb: 2.9 g/dL / Pro: 5.2 g/dL / ALK PHOS: 168 U/L / ALT: 24 U/L / AST: 242 U/L / GGT: x           PT/INR - ( 22 Sep 2022 17:20 )   PT: 13.10 sec;   INR: 1.14 ratio         PTT - ( 22 Sep 2022 17:20 )  PTT:33.2 sec    Radiology     Neurology Follow up note    Name  JOSE MITCHELL    HPI:  55 yo F with PMH of HTN, DM2, and stroke (per son 2017) who presented for RLE wound. Started 3 months ago when she fell and hit her leg on a wooden cabinet. She put hydrogen peroxide, antibiotic cream, and wrapped the wound but never fully healed. Two weeks ago it started to show yellow pus so her and her family came to the ED for further treatment. Endorsed subjective fevers, chest pain, and vision changes which occurs when walked 2-3 blocks. Denied congestion, sore throat, cough, dyspnea, headache, dysuria, N/V, diarrhea     Per son, they fill her medications at [facility] ([phone]) and this is their current pharmacy. Called them to confirm her medications and they said her last refill of medications was 2018. Pt has not seen a doctor due to insurance problem and has not been taking any medications.    In the ED:  Vitals: T: 98.9, BP: 296/ 174, , RR: 18, 98%O2 on RA  Labs: CBC showed WBC 11.23; ESR 92, CRP 35.6  CMP showed glucose 302, alk phos 173; ; VBG pH 7.45  EKG pending  CXR showed borderline cardiomegaly and no airspace opacity.   X-ray of RLE also pending.     Received unasyn and vanco, humalin R, IV vasotec, IV labetalol   (02 Aug 2022 07:47)      Interval History -   Patient remains encephalopathic and reintubated.         Vital Signs Last 24 Hrs  T(C): 35.6 (23 Sep 2022 12:00), Max: 37.3 (22 Sep 2022 19:00)  T(F): 96.1 (23 Sep 2022 12:00), Max: 99.1 (22 Sep 2022 19:00)  HR: 59 (23 Sep 2022 12:00) (52 - 73)  BP: 178/99 (23 Sep 2022 12:00) (114/63 - 179/83)  BP(mean): 130 (23 Sep 2022 12:00) (83 - 130)  RR: 24 (23 Sep 2022 12:00) (16 - 27)  SpO2: 98% (23 Sep 2022 12:00) (78% - 100%)    Parameters below as of 23 Sep 2022 11:00  Patient On (Oxygen Delivery Method): ventilator          Neurological Exam:   Intubated. Not sedated.   Eye open. Closes her eyes with visual threat. Pupils round and reactive. Eye roving.   Facial grimacing and uplifting her shoulders to noxious stimuli. Cornea intact   No spontaneous movement in extremities.  Upgoing toes     Medications  acetaminophen     Tablet .. 650 milliGRAM(s) Oral every 6 hours PRN  amLODIPine   Tablet 10 milliGRAM(s) Oral daily  atorvastatin 80 milliGRAM(s) Oral at bedtime  chlorhexidine 0.12% Liquid 15 milliLiter(s) Oral Mucosa every 12 hours  chlorhexidine 2% Cloths 1 Application(s) Topical <User Schedule>  cloNIDine 0.2 milliGRAM(s) Oral every 8 hours  dextrose 5%. 1000 milliLiter(s) IV Continuous <Continuous>  dextrose 5%. 1000 milliLiter(s) IV Continuous <Continuous>  dextrose 50% Injectable 25 Gram(s) IV Push once  dextrose 50% Injectable 12.5 Gram(s) IV Push once  dextrose 50% Injectable 25 Gram(s) IV Push once  dextrose Oral Gel 15 Gram(s) Oral once PRN  fentaNYL    Injectable 50 MICROGram(s) IV Push every 6 hours PRN  furosemide   Injectable 80 milliGRAM(s) IV Push every 12 hours  glucagon  Injectable 1 milliGRAM(s) IntraMuscular once  hydrALAZINE 100 milliGRAM(s) Oral every 8 hours  insulin lispro (ADMELOG) corrective regimen sliding scale   SubCutaneous three times a day before meals  labetalol 600 milliGRAM(s) Oral three times a day  labetalol Injectable 10 milliGRAM(s) IV Push every 6 hours PRN  levETIRAcetam  Solution 1000 milliGRAM(s) Oral at bedtime  lidocaine 1%/epinephrine 1:100,000 Inj 20 milliLiter(s) Local Injection once  melatonin 3 milliGRAM(s) Oral at bedtime PRN  multivitamin 1 Tablet(s) Oral daily  pantoprazole  Injectable 40 milliGRAM(s) IV Push two times a day  sevelamer carbonate Powder 2400 milliGRAM(s) Enteral Tube every 8 hours  sodium chloride 0.65% Nasal 2 Spray(s) Both Nostrils two times a day      Lab                        8.5    22.04 )-----------( 100      ( 23 Sep 2022 05:48 )             24.5     09-23    134<L>  |  91<L>  |  64<HH>  ----------------------------<  134<H>  4.2   |  26  |  3.8<H>    Ca    7.4<L>      23 Sep 2022 05:48  Mg     2.1     09-23    TPro  5.2<L>  /  Alb  2.9<L>  /  TBili  0.3  /  DBili  x   /  AST  242<H>  /  ALT  24  /  AlkPhos  168<H>  09-23    CAPILLARY BLOOD GLUCOSE      POCT Blood Glucose.: 186 mg/dL (23 Sep 2022 11:43)  POCT Blood Glucose.: 160 mg/dL (23 Sep 2022 06:40)  POCT Blood Glucose.: 168 mg/dL (22 Sep 2022 23:04)  POCT Blood Glucose.: 167 mg/dL (22 Sep 2022 17:22)    LIVER FUNCTIONS - ( 23 Sep 2022 05:48 )  Alb: 2.9 g/dL / Pro: 5.2 g/dL / ALK PHOS: 168 U/L / ALT: 24 U/L / AST: 242 U/L / GGT: x           PT/INR - ( 22 Sep 2022 17:20 )   PT: 13.10 sec;   INR: 1.14 ratio         PTT - ( 22 Sep 2022 17:20 )  PTT:33.2 sec    Radiology    CT head:   No evidence of intracranial hemorrhage, acute territorial infarct, or   midline shift.    Stable moderate chronic microvascular ischemic changes and scattered   chronic infarcts.    Significantly increased paranasal inflammatory mucosal thickening.    Increased right mastoid effusion and layering secretions within the   nasopharyngeal airway.

## 2022-09-23 NOTE — PROGRESS NOTE ADULT - SUBJECTIVE AND OBJECTIVE BOX
JOSE MITCHELL  57y, Female  Allergy: No Known Allergies      LOS  52d    CHIEF COMPLAINT: RLE wound (12 Sep 2022 15:26)      INTERVAL EVENTS/HPI  - T(F): , Max: 99.1 (09-22-22 @ 19:00)  - WBC Count: 22.04 (09-23-22 @ 05:48)  WBC Count: 22.35 (09-22-22 @ 17:20)     Reconsulted for leukocytosis   Recent with bronch with blood clot removed from left main stem  Repeat CXR today with repeat left white out again      - Creatinine, Serum: 3.8 (09-23-22 @ 05:48)  Creatinine, Serum: 3.7 (09-22-22 @ 05:44)       ROS  General: Denies rigors, nightsweats  HEENT: Denies headache, rhinorrhea, sore throat, eye pain  CV: Denies CP, palpitations  PULM: Denies wheezing, hemoptysis  GI: Denies hematemesis, hematochezia, melena  : Denies discharge, hematuria  MSK: Denies arthralgias, myalgias  SKIN: Denies rash, lesions  NEURO: Denies paresthesias, weakness  PSYCH: Denies depression, anxiety    VITALS:  T(F): 96.6, Max: 99.1 (09-22-22 @ 19:00)  HR: 62  BP: 129/73  RR: 27Vital Signs Last 24 Hrs  T(C): 35.9 (23 Sep 2022 08:00), Max: 37.3 (22 Sep 2022 19:00)  T(F): 96.6 (23 Sep 2022 08:00), Max: 99.1 (22 Sep 2022 19:00)  HR: 62 (23 Sep 2022 08:00) (57 - 73)  BP: 129/73 (23 Sep 2022 08:00) (114/63 - 179/83)  BP(mean): 96 (23 Sep 2022 08:00) (83 - 121)  RR: 27 (23 Sep 2022 08:00) (16 - 30)  SpO2: 97% (23 Sep 2022 08:00) (78% - 100%)    Parameters below as of 23 Sep 2022 08:00  Patient On (Oxygen Delivery Method): ventilator    O2 Concentration (%): 60    PHYSICAL EXAM:  Gen: NAD, resting in bed  HEENT: Normocephalic, atraumatic  Neck: supple, no lymphadenopathy  CV: Regular rate & regular rhythm  Lungs: decreased BS at bases, no fremitus  Abdomen: Soft, BS present  Ext: Warm, well perfused  Neuro: non focal, awake  Skin: no rash, no erythema  Lines: no phlebitis    FH: Non-contributory  Social Hx: Non-contributory    TESTS & MEASUREMENTS:                        8.5    22.04 )-----------( 100      ( 23 Sep 2022 05:48 )             24.5     09-23    134<L>  |  91<L>  |  64<HH>  ----------------------------<  134<H>  4.2   |  26  |  3.8<H>    Ca    7.4<L>      23 Sep 2022 05:48  Mg     2.1     09-23    TPro  5.2<L>  /  Alb  2.9<L>  /  TBili  0.3  /  DBili  x   /  AST  242<H>  /  ALT  24  /  AlkPhos  168<H>  09-23      LIVER FUNCTIONS - ( 23 Sep 2022 05:48 )  Alb: 2.9 g/dL / Pro: 5.2 g/dL / ALK PHOS: 168 U/L / ALT: 24 U/L / AST: 242 U/L / GGT: x               Culture - Fungal, Bronchial (collected 09-22-22 @ 11:00)  Source: BAL None  Preliminary Report (09-23-22 @ 07:44):    Testing in progress    Culture - Bronchial (collected 09-22-22 @ 11:00)  Source: Lavage None  Gram Stain (09-23-22 @ 07:21):    No polymorphonuclear leukocytes seen per low power field    No Squamous epithelial cells seen per low power field    No organisms seen    Culture - Blood (collected 09-07-22 @ 05:08)  Source: .Blood None  Final Report (09-12-22 @ 19:00):    No Growth Final    Culture - Blood (collected 09-06-22 @ 05:30)  Source: .Blood None  Final Report (09-11-22 @ 20:00):    No Growth Final    Culture - Blood (collected 09-05-22 @ 12:19)  Source: .Blood Blood  Final Report (09-10-22 @ 18:00):    No Growth Final    Culture - Blood (collected 09-04-22 @ 05:43)  Source: .Blood None  Final Report (09-09-22 @ 12:00):    No Growth Final    Culture - Blood (collected 09-03-22 @ 18:18)  Source: .Blood None  Final Report (09-09-22 @ 04:00):    No Growth Final            INFECTIOUS DISEASES TESTING  MRSA PCR Result.: Positive (09-22-22 @ 12:11)  Procalcitonin, Serum: 1.31 (09-22-22 @ 10:21)  COVID-19 PCR: Detected (09-20-22 @ 07:00)  COVID-19 PCR: Detected (09-13-22 @ 18:48)  HIV-1/2 Combo Result: Nonreact (09-09-22 @ 06:11)  COVID-19 PCR: NotDetec (08-31-22 @ 11:58)  Procalcitonin, Serum: 2.46 (08-31-22 @ 07:24)  COVID-19 PCR: NotDetec (08-26-22 @ 09:40)  COVID-19 PCR: NotDetec (08-21-22 @ 02:34)  COVID-19 PCR: NotDetec (08-18-22 @ 17:36)  Procalcitonin, Serum: 0.11 (08-16-22 @ 06:04)  COVID-19 PCR: NotDetec (08-15-22 @ 15:41)  COVID-19 PCR: NotDetec (08-11-22 @ 13:22)  COVID-19 PCR: NotDetec (08-08-22 @ 05:14)  MRSA PCR Result.: Negative (08-02-22 @ 17:40)  COVID-19 PCR: NotDetec (08-02-22 @ 01:40)      INFLAMMATORY MARKERS  C-Reactive Protein, Serum: 289.1 mg/L (09-03-22 @ 12:04)  Sedimentation Rate, Erythrocyte: >140 mm/Hr (09-03-22 @ 12:04)  Sedimentation Rate, Erythrocyte: 125 mm/Hr (08-30-22 @ 19:27)  C-Reactive Protein, Serum: 54.4 mg/L (08-30-22 @ 19:27)      RADIOLOGY & ADDITIONAL TESTS:  I have personally reviewed the last available Chest xray  CXR  Xray Chest 1 View- PORTABLE-Urgent:   ACC: [id number] EXAM:  XR CHEST PORTABLE URGENT 1V                          PROCEDURE DATE:  09/21/2022          INTERPRETATION:  Clinical History / Reason for exam: Status post   intubation    Comparison : Chest radiograph earlier in the day.    Technique/Positioning: Frontal, portable. Rotated to the right    Findings:    Support devices: Endotracheal tube is in good position. Right central   line is stable. There is a loop recorder. Telemetry leads and tubing   overlie patient.    Cardiac/mediastinum/hilum: Difficult to evaluate due to rotation    Lung parenchyma/Pleura: Improved aeration to left lung with residual   disease. Stable right lung opacities    Skeleton/soft tissues: Stable    Impression:    Improved aeration to the left lung with residual disease. Stable right   lung opacities        --- End of Report ---            NOELLE CARLTON MD; Attending Radiologist  This document has been electronically signed. Sep 22 2022  8:40AM (09-21-22 @ 21:51)      CT      CARDIOLOGY TESTING      MEDICATIONS  amLODIPine   Tablet 10 Oral daily  atorvastatin 80 Oral at bedtime  chlorhexidine 0.12% Liquid 15 Oral Mucosa every 12 hours  chlorhexidine 2% Cloths 1 Topical <User Schedule>  cloNIDine 0.2 Oral every 8 hours  dextrose 5%. 1000 IV Continuous <Continuous>  dextrose 5%. 1000 IV Continuous <Continuous>  dextrose 50% Injectable 25 IV Push once  dextrose 50% Injectable 12.5 IV Push once  dextrose 50% Injectable 25 IV Push once  furosemide   Injectable 80 IV Push every 12 hours  glucagon  Injectable 1 IntraMuscular once  hydrALAZINE 100 Oral every 8 hours  insulin lispro (ADMELOG) corrective regimen sliding scale  SubCutaneous three times a day before meals  labetalol 600 Oral three times a day  levETIRAcetam  Solution 1000 Oral at bedtime  lidocaine 1%/epinephrine 1:100,000 Inj 20 Local Injection once  lidocaine 2% Viscous 15 Swish and Spit once  multivitamin 1 Oral daily  pantoprazole  Injectable 40 IV Push two times a day  sevelamer carbonate Powder 2400 Enteral Tube every 8 hours  sodium chloride 0.65% Nasal 2 Both Nostrils two times a day      WEIGHT  Weight (kg): 97.3 (09-21-22 @ 17:09)  Creatinine, Serum: 3.8 mg/dL (09-23-22 @ 05:48)      ANTIBIOTICS:      All available historical records have been reviewed      JOSE MITCHELL  57y, Female  Allergy: No Known Allergies      LOS  52d    CHIEF COMPLAINT: RLE wound (12 Sep 2022 15:26)     JOSE MITCHELL  57y, Female  Allergy: No Known Allergies      LOS  52d    CHIEF COMPLAINT: RLE wound (12 Sep 2022 15:26)      INTERVAL EVENTS/HPI  - T(F): , Max: 99.1 (09-22-22 @ 19:00)  - WBC Count: 22.04 (09-23-22 @ 05:48)  WBC Count: 22.35 (09-22-22 @ 17:20)     Reconsulted for leukocytosis   Recent with bronch with blood clot removed from left main stem  Repeat CXR today with repeat left white out again      - Creatinine, Serum: 3.8 (09-23-22 @ 05:48)  Creatinine, Serum: 3.7 (09-22-22 @ 05:44)       ROS  unable to obtain history secondary to patient's mental status and/or sedation    VITALS:  T(F): 96.6, Max: 99.1 (09-22-22 @ 19:00)  HR: 62  BP: 129/73  RR: 27Vital Signs Last 24 Hrs  T(C): 35.9 (23 Sep 2022 08:00), Max: 37.3 (22 Sep 2022 19:00)  T(F): 96.6 (23 Sep 2022 08:00), Max: 99.1 (22 Sep 2022 19:00)  HR: 62 (23 Sep 2022 08:00) (57 - 73)  BP: 129/73 (23 Sep 2022 08:00) (114/63 - 179/83)  BP(mean): 96 (23 Sep 2022 08:00) (83 - 121)  RR: 27 (23 Sep 2022 08:00) (16 - 30)  SpO2: 97% (23 Sep 2022 08:00) (78% - 100%)    Parameters below as of 23 Sep 2022 08:00  Patient On (Oxygen Delivery Method): ventilator    O2 Concentration (%): 60    PHYSICAL EXAM:  Gen: NAD, resting in bed  HEENT: Normocephalic, atraumatic  Neck: supple, no lymphadenopathy  CV: Regular rate & regular rhythm  Lungs: decreased BS at bases, no fremitus  Abdomen: Soft, BS present  Ext: Warm, well perfused  Neuro: non focal, awake  Skin: no rash, no erythema  Lines: no phlebitis    FH: Non-contributory  Social Hx: Non-contributory    TESTS & MEASUREMENTS:                        8.5    22.04 )-----------( 100      ( 23 Sep 2022 05:48 )             24.5     09-23    134<L>  |  91<L>  |  64<HH>  ----------------------------<  134<H>  4.2   |  26  |  3.8<H>    Ca    7.4<L>      23 Sep 2022 05:48  Mg     2.1     09-23    TPro  5.2<L>  /  Alb  2.9<L>  /  TBili  0.3  /  DBili  x   /  AST  242<H>  /  ALT  24  /  AlkPhos  168<H>  09-23      LIVER FUNCTIONS - ( 23 Sep 2022 05:48 )  Alb: 2.9 g/dL / Pro: 5.2 g/dL / ALK PHOS: 168 U/L / ALT: 24 U/L / AST: 242 U/L / GGT: x               Culture - Fungal, Bronchial (collected 09-22-22 @ 11:00)  Source: BAL None  Preliminary Report (09-23-22 @ 07:44):    Testing in progress    Culture - Bronchial (collected 09-22-22 @ 11:00)  Source: Lavage None  Gram Stain (09-23-22 @ 07:21):    No polymorphonuclear leukocytes seen per low power field    No Squamous epithelial cells seen per low power field    No organisms seen    Culture - Blood (collected 09-07-22 @ 05:08)  Source: .Blood None  Final Report (09-12-22 @ 19:00):    No Growth Final    Culture - Blood (collected 09-06-22 @ 05:30)  Source: .Blood None  Final Report (09-11-22 @ 20:00):    No Growth Final    Culture - Blood (collected 09-05-22 @ 12:19)  Source: .Blood Blood  Final Report (09-10-22 @ 18:00):    No Growth Final    Culture - Blood (collected 09-04-22 @ 05:43)  Source: .Blood None  Final Report (09-09-22 @ 12:00):    No Growth Final    Culture - Blood (collected 09-03-22 @ 18:18)  Source: .Blood None  Final Report (09-09-22 @ 04:00):    No Growth Final            INFECTIOUS DISEASES TESTING  MRSA PCR Result.: Positive (09-22-22 @ 12:11)  Procalcitonin, Serum: 1.31 (09-22-22 @ 10:21)  COVID-19 PCR: Detected (09-20-22 @ 07:00)  COVID-19 PCR: Detected (09-13-22 @ 18:48)  HIV-1/2 Combo Result: Nonreact (09-09-22 @ 06:11)  COVID-19 PCR: NotDetec (08-31-22 @ 11:58)  Procalcitonin, Serum: 2.46 (08-31-22 @ 07:24)  COVID-19 PCR: NotDetec (08-26-22 @ 09:40)  COVID-19 PCR: NotDetec (08-21-22 @ 02:34)  COVID-19 PCR: NotDetec (08-18-22 @ 17:36)  Procalcitonin, Serum: 0.11 (08-16-22 @ 06:04)  COVID-19 PCR: NotDetec (08-15-22 @ 15:41)  COVID-19 PCR: NotDetec (08-11-22 @ 13:22)  COVID-19 PCR: NotDetec (08-08-22 @ 05:14)  MRSA PCR Result.: Negative (08-02-22 @ 17:40)  COVID-19 PCR: NotDetec (08-02-22 @ 01:40)      INFLAMMATORY MARKERS  C-Reactive Protein, Serum: 289.1 mg/L (09-03-22 @ 12:04)  Sedimentation Rate, Erythrocyte: >140 mm/Hr (09-03-22 @ 12:04)  Sedimentation Rate, Erythrocyte: 125 mm/Hr (08-30-22 @ 19:27)  C-Reactive Protein, Serum: 54.4 mg/L (08-30-22 @ 19:27)      RADIOLOGY & ADDITIONAL TESTS:  I have personally reviewed the last available Chest xray  CXR  Xray Chest 1 View- PORTABLE-Urgent:   ACC: [id number] EXAM:  XR CHEST PORTABLE URGENT 1V                          PROCEDURE DATE:  09/21/2022          INTERPRETATION:  Clinical History / Reason for exam: Status post   intubation    Comparison : Chest radiograph earlier in the day.    Technique/Positioning: Frontal, portable. Rotated to the right    Findings:    Support devices: Endotracheal tube is in good position. Right central   line is stable. There is a loop recorder. Telemetry leads and tubing   overlie patient.    Cardiac/mediastinum/hilum: Difficult to evaluate due to rotation    Lung parenchyma/Pleura: Improved aeration to left lung with residual   disease. Stable right lung opacities    Skeleton/soft tissues: Stable    Impression:    Improved aeration to the left lung with residual disease. Stable right   lung opacities        --- End of Report ---            NOELLE CARLTON MD; Attending Radiologist  This document has been electronically signed. Sep 22 2022  8:40AM (09-21-22 @ 21:51)      CT      CARDIOLOGY TESTING      MEDICATIONS  amLODIPine   Tablet 10 Oral daily  atorvastatin 80 Oral at bedtime  chlorhexidine 0.12% Liquid 15 Oral Mucosa every 12 hours  chlorhexidine 2% Cloths 1 Topical <User Schedule>  cloNIDine 0.2 Oral every 8 hours  dextrose 5%. 1000 IV Continuous <Continuous>  dextrose 5%. 1000 IV Continuous <Continuous>  dextrose 50% Injectable 25 IV Push once  dextrose 50% Injectable 12.5 IV Push once  dextrose 50% Injectable 25 IV Push once  furosemide   Injectable 80 IV Push every 12 hours  glucagon  Injectable 1 IntraMuscular once  hydrALAZINE 100 Oral every 8 hours  insulin lispro (ADMELOG) corrective regimen sliding scale  SubCutaneous three times a day before meals  labetalol 600 Oral three times a day  levETIRAcetam  Solution 1000 Oral at bedtime  lidocaine 1%/epinephrine 1:100,000 Inj 20 Local Injection once  lidocaine 2% Viscous 15 Swish and Spit once  multivitamin 1 Oral daily  pantoprazole  Injectable 40 IV Push two times a day  sevelamer carbonate Powder 2400 Enteral Tube every 8 hours  sodium chloride 0.65% Nasal 2 Both Nostrils two times a day      WEIGHT  Weight (kg): 97.3 (09-21-22 @ 17:09)  Creatinine, Serum: 3.8 mg/dL (09-23-22 @ 05:48)      ANTIBIOTICS:      All available historical records have been reviewed      JOSE MITCHELL  57y, Female  Allergy: No Known Allergies      LOS  52d    CHIEF COMPLAINT: RLE wound (12 Sep 2022 15:26)

## 2022-09-23 NOTE — PROGRESS NOTE ADULT - TIME-BASED BILLING (NON-CRITICAL CARE)
Time-based billing (NON-critical care)

## 2022-09-23 NOTE — PROCEDURE NOTE - NSBRONCHPROCDETAILS_GEN_A_CORE_FT
The procedure, indications, preparation and potential complications were explained to the Healthcare proxy, who indicated understanding and verbally consented the corresponding consent forms.  Patient was placed on 100% FiO2 and her feeds were held.  Patient was administered 50mcg of Fentanyl and Versed 2mg during the procedure. The procedure was performed for therapeutic purposes. The bronchoscope was advanced through the endotracheal tube and advanced under direct visualization until the main jenaro was reached.  Normal tracheobronchial anatomy.  A thick clot was visualized obstructing the left mainstem bronchus.  Multiple attempts to suction out the clot were attempt until we were able to suction it.  5 big clots were suctioned out of the left mainstem bronchus. The bronchoscopy was then advanced into the left lung to the segmental and subsegmental levels of the left upper and lower lobes.  The left upper lobe was obstructed by a clot.  Multiple injections of cold Normal Saline were administered.  There was some bleeding behind and at the site of the clot.  Radial Jaw 4 standard 2mm forceps was used in an attempt to manually dislodge the clot.  The left lower lobe had some clots which were suctioned out as well.    The bronchoscope was then advanced into the right lung to the segmental and subsegmental levels of the right upper, middle and lower lobes.   Mild secretions and clots were suctioned.  No endobronchial lesions were identified.  The bronchoscope was then withdrawn through the endotracheal tube which was then resealed.  There was around 30cc of blood suctioned.  The patient tolerated the procedure well.  There were no complications. The procedure, indications, preparation and potential complications were explained to the Healthcare proxy, who indicated understanding and verbally consented the corresponding consent forms.  Patient was placed on 100% FiO2 and her feeds were held.  Patient was administered 50mcg of Fentanyl and Versed 2mg during the procedure. The procedure was performed for therapeutic purposes. The bronchoscope was advanced through the endotracheal tube and advanced under direct visualization until the main jenaro was reached.  Normal tracheobronchial anatomy.  A thick clot was visualized obstructing the left mainstem bronchus.  Multiple attempts to suction out the clot were attempt until we were able to suction it.  5 big clots were suctioned out of the left mainstem bronchus. The bronchoscopy was then advanced into the left lung to the segmental and subsegmental levels of the left upper and lower lobes.  The left upper lobe was obstructed by a clot.  Multiple injections of cold Normal Saline were administered.  There was some bleeding behind and at the site of the clot.  Radial Jaw 4 standard 2mm forceps was used in an attempt to manually dislodge the clot.  The left lower lobe had some clots which were suctioned out as well.    The bronchoscope was then advanced into the right lung to the segmental and subsegmental levels of the right upper, middle and lower lobes.   Mild secretions and clots were suctioned.   The bronchoscope was then withdrawn through the endotracheal tube which was then resealed.  There was around 30cc of blood suctioned.  The patient tolerated the procedure well.  There were no complications.

## 2022-09-23 NOTE — PROGRESS NOTE ADULT - NS ATTEND OPT1 GEN_ALL_CORE

## 2022-09-23 NOTE — PROCEDURE NOTE - NSBRONCHSPECIMENS_GEN_ALL_CORE
Cell Count/Gram Stain and Culture/Fungal Culture/Viral Culture/Acid Fast Culture/Cytopathology/Flow Cytometry
Cell Count/Gram Stain and Culture/Fungal Culture/Viral Culture/Acid Fast Culture/Cytopathology/Flow Cytometry

## 2022-09-23 NOTE — PROGRESS NOTE ADULT - SUBJECTIVE AND OBJECTIVE BOX
Hospital Day:  52d    Chief Complaint: Patient is a 57y old  Female who presents with a chief complaint of RLE wound (12 Sep 2022 15:26)    24 hour events: 2 units PRBC given overnight    Past Medical Hx:   Hypertension    Diabetes mellitus      Past Sx:  No significant past surgical history      Allergies:  No Known Allergies    Current Meds:   Standing Meds:  amLODIPine   Tablet 10 milliGRAM(s) Oral daily  atorvastatin 80 milliGRAM(s) Oral at bedtime  chlorhexidine 0.12% Liquid 15 milliLiter(s) Oral Mucosa every 12 hours  chlorhexidine 2% Cloths 1 Application(s) Topical <User Schedule>  cloNIDine 0.2 milliGRAM(s) Oral every 8 hours  dextrose 5%. 1000 milliLiter(s) (100 mL/Hr) IV Continuous <Continuous>  dextrose 5%. 1000 milliLiter(s) (50 mL/Hr) IV Continuous <Continuous>  dextrose 50% Injectable 25 Gram(s) IV Push once  dextrose 50% Injectable 12.5 Gram(s) IV Push once  dextrose 50% Injectable 25 Gram(s) IV Push once  furosemide   Injectable 80 milliGRAM(s) IV Push every 12 hours  glucagon  Injectable 1 milliGRAM(s) IntraMuscular once  hydrALAZINE 100 milliGRAM(s) Oral every 8 hours  insulin lispro (ADMELOG) corrective regimen sliding scale   SubCutaneous three times a day before meals  labetalol 600 milliGRAM(s) Oral three times a day  levETIRAcetam  Solution 1000 milliGRAM(s) Oral at bedtime  lidocaine 1%/epinephrine 1:100,000 Inj 20 milliLiter(s) Local Injection once  lidocaine 2% Viscous 15 milliLiter(s) Swish and Spit once  multivitamin 1 Tablet(s) Oral daily  pantoprazole  Injectable 40 milliGRAM(s) IV Push two times a day  sevelamer carbonate Powder 2400 milliGRAM(s) Enteral Tube every 8 hours  sodium chloride 0.65% Nasal 2 Spray(s) Both Nostrils two times a day    PRN Meds:  acetaminophen     Tablet .. 650 milliGRAM(s) Oral every 6 hours PRN Temp greater or equal to 38C (100.4F), Mild Pain (1 - 3)  dextrose Oral Gel 15 Gram(s) Oral once PRN Blood Glucose LESS THAN 70 milliGRAM(s)/deciliter  fentaNYL    Injectable 50 MICROGram(s) IV Push once PRN bronchoscopy  fentaNYL    Injectable 50 MICROGram(s) IV Push every 6 hours PRN agitation  labetalol Injectable 10 milliGRAM(s) IV Push every 6 hours PRN Systolic blood pressure >  melatonin 3 milliGRAM(s) Oral at bedtime PRN Insomnia  midazolam Injectable 2 milliGRAM(s) IV Push once PRN bronchoscopy    HOME MEDICATIONS:  losartan 50 mg oral tablet: 1 tab(s) orally once a day  metFORMIN 500 mg oral tablet: 1 tab(s) orally 2 times a day  metoprolol succinate 50 mg oral tablet, extended release: 1 tab(s) orally once a day      Vital Signs:   T(F): 96.6 (09-23-22 @ 08:00), Max: 99.1 (09-22-22 @ 19:00)  HR: 62 (09-23-22 @ 08:00) (57 - 73)  BP: 129/73 (09-23-22 @ 08:00) (114/63 - 179/83)  RR: 27 (09-23-22 @ 08:00) (16 - 30)  SpO2: 97% (09-23-22 @ 08:00) (78% - 100%)      09-22-22 @ 07:01  -  09-23-22 @ 07:00  --------------------------------------------------------  IN: 1693 mL / OUT: 25 mL / NET: 1668 mL    09-23-22 @ 07:01  -  09-23-22 @ 08:37  --------------------------------------------------------  IN: 60 mL / OUT: 0 mL / NET: 60 mL        Physical Exam:   GENERAL: NAD  HEENT: NCAT  CHEST/LUNG: CTAB  HEART: Regular rate and rhythm; s1 s2 appreciated, No murmurs, rubs, or gallops  ABDOMEN: Soft, Nontender, Nondistended; Bowel sounds present  EXTREMITIES: No LE edema b/l  SKIN: no rashes, no new lesions  NERVOUS SYSTEM:  Alert & Oriented X3  LINES/CATHETERS:        Labs:                         8.5    22.04 )-----------( 100      ( 23 Sep 2022 05:48 )             24.5     Neutophil% 83.4, Lymphocyte% 5.5, Monocyte% 10.1, Bands% 0.9 09-23-22 @ 05:48    23 Sep 2022 05:48    134    |  91     |  64     ----------------------------<  134    4.2     |  26     |  3.8      Ca    7.4        23 Sep 2022 05:48  Mg     2.1       23 Sep 2022 05:48    TPro  5.2    /  Alb  2.9    /  TBili  0.3    /  DBili  x      /  AST  242    /  ALT  24     /  AlkPhos  168    23 Sep 2022 05:48       pTT    33.2             ----< 1.14 INR  (09-22-22 @ 17:20)    13.10        PT        Radiology:

## 2022-09-23 NOTE — PROCEDURE NOTE - NSBRONCHFINDINGS_GEN_A_CORE_FT
Thick blood clot obstructing the left mainstem bronchus.  Excessive secretions and clots in left upper and lower lobes.  Mild secretions and clots in right upper and lower lobes.  Normal tracheobronchial anatomy and mucosa.  Normal anatomy and mucosa of the right mainstem bronchus.  Normal anatomy and mucosa of the left mainstem bronchus.  Normal anatomy and mucosa of the right upper, middle and lower lobe bronchi.  Normal anatomy and mucosa of the right upper lobe apical, anterior and posterior segments.  Normal anatomy and mucosa of the right middle lobe medial and lateral segments.  Normal anatomy and mucosa of the right lower lobe anterior, medial, lateral and posterior segments.  Superior segment entry visualized.  Normal anatomy and mucosa of the left upper lobe apicoposterior, anterior & superior lingular segments.  Normal anatomy and mucosa of the left lower lobe medial, lateral, apical and posterior segments. Superior segment entry visualized.  No endobronchial lesions identified.
Thick blood clot obstructing the left mainstem bronchus.  Thick blood clot obstructing the left upper lobe bronchus.  Thick blood clot partially obstructing the left lingula.  Mild secretions and clots in right upper and lower lobes.  Normal tracheobronchial anatomy and mucosa.  Normal anatomy and mucosa of the right mainstem bronchus.  Normal anatomy and mucosa of the left mainstem bronchus.  Normal anatomy and mucosa of the right upper, middle and lower lobe bronchi.  Normal anatomy and mucosa of the right upper lobe apical, anterior and posterior segments.  Normal anatomy and mucosa of the right middle lobe medial and lateral segments.  Normal anatomy and mucosa of the right lower lobe anterior, medial, lateral and posterior segments.  Superior segment entry visualized.  Unable to assess anatomy and mucosa of the left upper lobe apicoposterior, anterior & superior lingular segments.  Normal anatomy and mucosa of the left lower lobe medial, lateral, apical and posterior segments.  No endobronchial lesions identified were there was visibility.

## 2022-09-23 NOTE — CONSULT NOTE ADULT - ASSESSMENT
# Acute bleed secondary to coagulopathy??? DIC, platelet dysfunction from uremia  - bleeding predominantly from and around tracheostomy site s/p trach removal and surgical intervention by CTSx.  Recommend 5-10 units of cryoprecipitate every 12- 24 hrs. Pt did not respond to DDAVP,   keep Hgb 10g/dl.  s/p 2 doses of Vit K and multiple FFPs        #Acute ischemic strokes, multifocal  Per neurology: suspicious for vasculitis, pt on steroids    Monitor closely for bleeding    Plan d/w housestaff.  Prognosis guarded

## 2022-09-23 NOTE — PROCEDURE NOTE - NSBRONCHCOMMENTS_GEN_A_CORE_FT
Repeat CXR  FU BAL results  Refer to progress note for further recommendations
FU CXR  Refer to progress note for further recommendations.

## 2022-09-23 NOTE — CONSULT NOTE ADULT - CONSULT REQUESTED BY NAME
Medicine
Medicine
medicine team
Critical care team
Dr. Roman
ED
Medicine/ICU
Neurology
medicine
service
Dr. Lugo
ICU
ICU Team
Med
Medical ICU Team
Medicine
ed
service
ED
Medicine
ICU
hospitalist
x
Dr Jarquin

## 2022-09-24 NOTE — PROGRESS NOTE ADULT - ASSESSMENT
ASSESSMENT:    57y F s/p revision and removal of trach with intraop bronchoscopy due to continuous oozing from trach site.     PLAN:  - repack 4x4 in trach site as needed and replace 4x4 coving trach opening as needed. No active bleeding seen on dressing change this am.   - will reassess for possible trach reinsertion at bedside on 9/26/22   - No acute surgical intervention.   - Management per ICU team       Surgery: 0822

## 2022-09-24 NOTE — PROGRESS NOTE ADULT - ASSESSMENT
Impression:    Acute respiratory failure SP oral intubation  Tracheostomy bleed SP revision by Surgery  Acute blood loss anemia SP 2U overnight (6U, 3FFP, 2plt since 9/20)  Suspected GI bleed SP EGD and colonoscopy  Altered MS suspected vasculitis SP pulse steroids  LLE cellulitis  ALF oliguric SP RRT, dialyzed yesterday  Bleeding from [place] site SP removal    COVID 19 infection   E [place]ae Bacteremia resolved   Thrombocytopenia, r/o DIC vs HIT  HFpEF  HO multiple CVAs      PLAN:     CNS;   Prednisone 60mg per Neuro until tomorrow, wean to 40mg if Neuro agreeable.    Neuro FU.  FU MS.       HEENT: Oral care.  Trach care.   ETT care.  CT SX FU.    PULMONARY:  HOB @ 45 degrees.  Aspiration precautions.     will need repeat bronch today     CARDIOVASCULAR:   Avoid volume overload.  BP control.    GI: GI prophylaxis.  PEG feeds.  Bowel regimen if needed.    RENAL:  follow up lytes.  Correct as needed.   Hd today Renal FU.    INFECTIOUS DISEASE:  Possible ABX, MRSA nares.  ID FU.  Check Procalcitonin.  Panculture.  Send DTA.    HEMATOLOGICAL:  DIC negative.  Check HIT panel.    Monitor CBC and coags.  Groin doppler negative.  give DDAVP    ENDOCRINE:  Follow up FS.  Insulin protocol if needed.  TSH 0.86, transient?  Check TH.    MUSCULOSKELETAL:  Bed rest.  Wound care per burn     Very poor overall prognosis  CODE STATUS: Full code.  Palliative FU.    DISPO: Monitor in MICU

## 2022-09-24 NOTE — CHART NOTE - NSCHARTNOTEFT_GEN_A_CORE
Patient went into asystole ~ 10:20 AM - Code blue was called; ROSC achieved after ~13 mins of CPR, 3 doses of epinephrine and 1 amp of bicarbonate given, rhythm during the code was PEA.     Patient went into asystole again ~ 10:40 AM - Code blue was called; ROSC achieved after ~15 mins of CPR, 4 doses of epinephrine and 2 amp of bicarbonate given, rhythm during the code was PEA.     Stat labs - CXR, blood culture, CBC, CMP, Mg, DIC panel ordered; patient started on empiric antibiotics and antifungals for broad spectrum coverage.   5U cryoprecipitate and 1U PRBC ordered.     Attempted to contact the son at [phone]- no response; voicemail left; contacted patient's  at [phone] and updated of patient's status. Patient went into asystole ~ 10:20 AM - Code blue was called; ROSC achieved after ~13 mins of CPR, 3 doses of epinephrine and 1 amp of bicarbonate given, rhythm during the code was PEA.     Patient went into asystole again ~ 10:40 AM - Code blue was called; ROSC achieved after ~15 mins of CPR, 4 doses of epinephrine and 2 amp of bicarbonate given, rhythm during the code was PEA.     Stat labs - CXR, blood culture, CBC, CMP, Mg, DIC panel ordered; patient started on empiric antibiotics and antifungals for broad spectrum coverage. 5U cryoprecipitate and 1U PRBC ordered. Pt was started on levophed gtt.     Attempted to contact the son at [phone]- no response; voicemail left; contacted patient's  at [phone] and updated of patient's status. Patient went into asystole ~ 10:50 AM - Code blue was called; ROSC achieved after ~13 mins of CPR, 3 doses of epinephrine and 1 amp of bicarbonate given, rhythm during the code was PEA.     Patient went into asystole again ~ 11:30 AM - Code blue was called; ROSC achieved after ~15 mins of CPR, 4 doses of epinephrine and 2 amp of bicarbonate given, rhythm during the code was PEA.     Stat labs - CXR, blood culture, CBC, CMP, Mg, DIC panel ordered; patient started on empiric antibiotics and antifungals for broad spectrum coverage. 5U cryoprecipitate and 1U PRBC ordered. Pt was started on levophed gtt.     Attempted to contact the son at [phone]- no response; voicemail left; contacted patient's  at [phone] and updated of patient's status. Patient went into asystole ~ 10:55 AM - Code blue was called; ROSC achieved after ~13 mins of CPR, 3 doses of epinephrine and 1 amp of bicarbonate given, rhythm during the code was PEA.     Patient went into asystole again ~ 11:30 AM - Code blue was called; ROSC achieved after ~25 mins of CPR, 4 doses of epinephrine and 2 amp of bicarbonate given, rhythm during the code was PEA.     Stat labs - CXR, blood culture, CBC, CMP, Mg, DIC panel ordered; patient started on empiric antibiotics and antifungals for broad spectrum coverage. 5U cryoprecipitate and 1U PRBC ordered. Pt was started on levophed gtt.     Attempted to contact the son at [phone]- no response; voicemail left; contacted patient's  at [phone] and updated of patient's status.

## 2022-09-24 NOTE — PROCEDURE NOTE - NSINFORMCONSENT_GEN_A_CORE
Benefits, risks, and possible complications of procedure explained to patient/caregiver who verbalized understanding and gave verbal consent.
Frederick CHEUNG/Benefits, risks, and possible complications of procedure explained to patient/caregiver who verbalized understanding and gave verbal consent.
Benefits, risks, and possible complications of procedure explained to patient/caregiver who verbalized understanding and gave written consent.
Benefits, risks, and possible complications of procedure explained to patient/caregiver who verbalized understanding and gave verbal consent.
Latrell Mack (son and HCP)/Benefits, risks, and possible complications of procedure explained to patient/caregiver who verbalized understanding and gave verbal consent.

## 2022-09-24 NOTE — PROGRESS NOTE ADULT - SUBJECTIVE AND OBJECTIVE BOX
SUBJECTIVE:    Patient is a 57y old Female who presents with a chief complaint of RLE wound (12 Sep 2022 15:26)    Currently admitted to medicine with the primary diagnosis of Cellulitis       Today is hospital day 53d. This morning she is resting comfortably in bed and reports no new issues or overnight events.     ROS:   CONSTITUTIONAL: No weakness, fevers or chills   EYES/ENT: No visual changes; No vertigo or throat pain   NECK: No pain or stiffness   RESPIRATORY: No cough, wheezing, hemoptysis; No shortness of breath   CARDIOVASCULAR: No chest pain or palpitations   GASTROINTESTINAL: No abdominal or epigastric pain. No nausea, vomiting, or hematemesis; No diarrhea or constipation. No melena or hematochezia.  GENITOURINARY: No dysuria, frequency or hematuria  NEUROLOGICAL: No numbness or weakness  SKIN: No itching, rashes      PAST MEDICAL & SURGICAL HISTORY  Hypertension    Diabetes mellitus    No significant past surgical history      SOCIAL HISTORY:    ALLERGIES:  No Known Allergies    MEDICATIONS:  STANDING MEDICATIONS  amLODIPine   Tablet 10 milliGRAM(s) Oral daily  atorvastatin 80 milliGRAM(s) Oral at bedtime  aztreonam  IVPB 2000 milliGRAM(s) IV Intermittent every 12 hours  calcium acetate 1334 milliGRAM(s) Oral three times a day with meals  caspofungin IVPB      chlorhexidine 0.12% Liquid 15 milliLiter(s) Oral Mucosa every 12 hours  chlorhexidine 2% Cloths 1 Application(s) Topical <User Schedule>  cloNIDine 0.2 milliGRAM(s) Oral every 8 hours  dextrose 5%. 1000 milliLiter(s) IV Continuous <Continuous>  dextrose 5%. 1000 milliLiter(s) IV Continuous <Continuous>  dextrose 50% Injectable 25 Gram(s) IV Push once  dextrose 50% Injectable 12.5 Gram(s) IV Push once  dextrose 50% Injectable 25 Gram(s) IV Push once  fentaNYL   Infusion. 0.5 MICROgram(s)/kG/Hr IV Continuous <Continuous>  furosemide   Injectable 80 milliGRAM(s) IV Push every 12 hours  glucagon  Injectable 1 milliGRAM(s) IntraMuscular once  hydrALAZINE 100 milliGRAM(s) Oral every 8 hours  insulin lispro (ADMELOG) corrective regimen sliding scale   SubCutaneous three times a day before meals  labetalol 600 milliGRAM(s) Oral three times a day  levETIRAcetam  Solution 1000 milliGRAM(s) Oral at bedtime  lidocaine 1%/epinephrine 1:100,000 Inj 20 milliLiter(s) Local Injection once  linezolid  IVPB      midazolam Infusion 0.02 mG/kG/Hr IV Continuous <Continuous>  multivitamin 1 Tablet(s) Oral daily  norepinephrine Infusion 0.05 MICROgram(s)/kG/Min IV Continuous <Continuous>  pantoprazole  Injectable 40 milliGRAM(s) IV Push two times a day  predniSONE   Tablet 40 milliGRAM(s) Oral daily  sevelamer carbonate Powder 2400 milliGRAM(s) Enteral Tube every 8 hours  sodium chloride 0.65% Nasal 2 Spray(s) Both Nostrils two times a day    PRN MEDICATIONS  acetaminophen     Tablet .. 650 milliGRAM(s) Oral every 6 hours PRN  dextrose Oral Gel 15 Gram(s) Oral once PRN  fentaNYL    Injectable 50 MICROGram(s) IV Push every 6 hours PRN  labetalol Injectable 10 milliGRAM(s) IV Push every 6 hours PRN  melatonin 3 milliGRAM(s) Oral at bedtime PRN    VITALS:   T(F): 97.1  HR: 70  BP: 164/83  RR: 30  SpO2: 100%    LABS:  Negative for smoking/alcohol/drug use.                         8.1    25.44 )-----------( 102      ( 24 Sep 2022 10:25 )             25.1     09-24    133<L>  |  92<L>  |  47<H>  ----------------------------<  205<H>  4.4   |  25  |  2.9<H>    Ca    7.7<L>      24 Sep 2022 10:27  Phos  6.0     09-24  Mg     2.2     09-24    TPro  5.4<L>  /  Alb  3.1<L>  /  TBili  0.2  /  DBili  x   /  AST  311<H>  /  ALT  35  /  AlkPhos  218<H>  09-24    PT/INR - ( 24 Sep 2022 10:25 )   PT: 12.10 sec;   INR: 1.05 ratio         PTT - ( 24 Sep 2022 10:25 )  PTT:34.4 sec    ABG - ( 24 Sep 2022 03:20 )  pH, Arterial: 7.47  pH, Blood: x     /  pCO2: 40    /  pO2: 79    / HCO3: 29    / Base Excess: 5.0   /  SaO2: 97.7                  Culture - Fungal, Bronchial (collected 22 Sep 2022 11:00)  Source: BAL None  Preliminary Report (23 Sep 2022 07:44):    Testing in progress    Culture - Acid Fast - Bronchial w/Smear (collected 22 Sep 2022 11:00)  Source: BAL None  Preliminary Report (24 Sep 2022 15:05):    Culture is being performed.    Culture - Bronchial (collected 22 Sep 2022 11:00)  Source: Lavage None  Gram Stain (23 Sep 2022 07:21):    No polymorphonuclear leukocytes seen per low power field    No Squamous epithelial cells seen per low power field    No organisms seen  Preliminary Report (23 Sep 2022 20:12):    No growth          RADIOLOGY:    PHYSICAL EXAM:  GEN: No acute distress  HEENT: normocephalic, atraumatic, aniceteric  LUNGS: Clear to auscultation bilaterally, no rales/wheezing/ rhonchi  HEART: S1/S2 present. RRR, no murmurs  ABD: Soft, non-tender, non-distended. Bowel sounds present  EXT: NC/NC/NE/2+PP/ALMEIDA  NEURO: AAOX3, normal affect      ASSESSMENT AND PLAN: SUBJECTIVE:    Patient is a 57y old Female who presents with a chief complaint of RLE wound (12 Sep 2022 15:26)    Currently admitted to medicine with the primary diagnosis of Cellulitis       Today is hospital day 53d. This morning she is resting in bed    Patient cardiac arrest three times today, ROSC was achieved.   Patient was started on broad spectrum abx and caspofungin, repeat labs sent     ROS:   CONSTITUTIONAL: No weakness, fevers or chills   EYES/ENT: No visual changes; No vertigo or throat pain   NECK: No pain or stiffness   RESPIRATORY: No cough, wheezing, hemoptysis; No shortness of breath   CARDIOVASCULAR: No chest pain or palpitations   GASTROINTESTINAL: No abdominal or epigastric pain. No nausea, vomiting, or hematemesis; No diarrhea or constipation. No melena or hematochezia.  GENITOURINARY: No dysuria, frequency or hematuria  NEUROLOGICAL: No numbness or weakness  SKIN: No itching, rashes      PAST MEDICAL & SURGICAL HISTORY  Hypertension    Diabetes mellitus    No significant past surgical history      SOCIAL HISTORY:    ALLERGIES:  No Known Allergies    MEDICATIONS:  STANDING MEDICATIONS  amLODIPine   Tablet 10 milliGRAM(s) Oral daily  atorvastatin 80 milliGRAM(s) Oral at bedtime  aztreonam  IVPB 2000 milliGRAM(s) IV Intermittent every 12 hours  calcium acetate 1334 milliGRAM(s) Oral three times a day with meals  caspofungin IVPB      chlorhexidine 0.12% Liquid 15 milliLiter(s) Oral Mucosa every 12 hours  chlorhexidine 2% Cloths 1 Application(s) Topical <User Schedule>  cloNIDine 0.2 milliGRAM(s) Oral every 8 hours  dextrose 5%. 1000 milliLiter(s) IV Continuous <Continuous>  dextrose 5%. 1000 milliLiter(s) IV Continuous <Continuous>  dextrose 50% Injectable 25 Gram(s) IV Push once  dextrose 50% Injectable 12.5 Gram(s) IV Push once  dextrose 50% Injectable 25 Gram(s) IV Push once  fentaNYL   Infusion. 0.5 MICROgram(s)/kG/Hr IV Continuous <Continuous>  furosemide   Injectable 80 milliGRAM(s) IV Push every 12 hours  glucagon  Injectable 1 milliGRAM(s) IntraMuscular once  hydrALAZINE 100 milliGRAM(s) Oral every 8 hours  insulin lispro (ADMELOG) corrective regimen sliding scale   SubCutaneous three times a day before meals  labetalol 600 milliGRAM(s) Oral three times a day  levETIRAcetam  Solution 1000 milliGRAM(s) Oral at bedtime  lidocaine 1%/epinephrine 1:100,000 Inj 20 milliLiter(s) Local Injection once  linezolid  IVPB      midazolam Infusion 0.02 mG/kG/Hr IV Continuous <Continuous>  multivitamin 1 Tablet(s) Oral daily  norepinephrine Infusion 0.05 MICROgram(s)/kG/Min IV Continuous <Continuous>  pantoprazole  Injectable 40 milliGRAM(s) IV Push two times a day  predniSONE   Tablet 40 milliGRAM(s) Oral daily  sevelamer carbonate Powder 2400 milliGRAM(s) Enteral Tube every 8 hours  sodium chloride 0.65% Nasal 2 Spray(s) Both Nostrils two times a day    PRN MEDICATIONS  acetaminophen     Tablet .. 650 milliGRAM(s) Oral every 6 hours PRN  dextrose Oral Gel 15 Gram(s) Oral once PRN  fentaNYL    Injectable 50 MICROGram(s) IV Push every 6 hours PRN  labetalol Injectable 10 milliGRAM(s) IV Push every 6 hours PRN  melatonin 3 milliGRAM(s) Oral at bedtime PRN    VITALS:   T(F): 97.1  HR: 70  BP: 164/83  RR: 30  SpO2: 100%    LABS:  Negative for smoking/alcohol/drug use.                         8.1    25.44 )-----------( 102      ( 24 Sep 2022 10:25 )             25.1     09-24    133<L>  |  92<L>  |  47<H>  ----------------------------<  205<H>  4.4   |  25  |  2.9<H>    Ca    7.7<L>      24 Sep 2022 10:27  Phos  6.0     09-24  Mg     2.2     09-24    TPro  5.4<L>  /  Alb  3.1<L>  /  TBili  0.2  /  DBili  x   /  AST  311<H>  /  ALT  35  /  AlkPhos  218<H>  09-24    PT/INR - ( 24 Sep 2022 10:25 )   PT: 12.10 sec;   INR: 1.05 ratio         PTT - ( 24 Sep 2022 10:25 )  PTT:34.4 sec    ABG - ( 24 Sep 2022 03:20 )  pH, Arterial: 7.47  pH, Blood: x     /  pCO2: 40    /  pO2: 79    / HCO3: 29    / Base Excess: 5.0   /  SaO2: 97.7                  Culture - Fungal, Bronchial (collected 22 Sep 2022 11:00)  Source: BAL None  Preliminary Report (23 Sep 2022 07:44):    Testing in progress    Culture - Acid Fast - Bronchial w/Smear (collected 22 Sep 2022 11:00)  Source: BAL None  Preliminary Report (24 Sep 2022 15:05):    Culture is being performed.    Culture - Bronchial (collected 22 Sep 2022 11:00)  Source: Lavage None  Gram Stain (23 Sep 2022 07:21):    No polymorphonuclear leukocytes seen per low power field    No Squamous epithelial cells seen per low power field    No organisms seen  Preliminary Report (23 Sep 2022 20:12):    No growth          RADIOLOGY:    PHYSICAL EXAM:  GEN: intubated with crusted blood at mouth   HEENT: normocephalic, atraumatic, aniceteric  LUNGS: decreased breath sounds bl lower lobes  HEART: S1/S2 present. RRR, no murmurs  ABD: Soft, non-tender, non-distended. Bowel sounds present  EXT: b/l le edema , non pitting   NEURO: does not follow commands, does not respond to pain       ASSESSMENT AND PLAN:    55 y/o woman w PMHx of uncontrolled HTN, DM2, hemorrhagic ?anuerysmal stroke R basal ganglia 2017 w residual L sided weakness, h/o PEG s/p removal, had not seen doctor in >1yr, w/o meds since 2018, presented to ED 8/2/22 for RLE nonhealing wound q1jhydpc and BIB son who noted it that evening, ED triage vitals noted for /170 range.     Admitted for cellulitis, HTN urgency and started on Unasyn, Vanc, insulin drip, IV labetalol, IV vasotec. On 8/5/22 had stroke code w NIHSS 23, for unresponsiveness, concern for seizure w post ictal confusion, found to have ?embolic strokes on MR. S/p debridement of RLE 8/10/22. Developed E cloaca bacteremia, tx w avacaz and aztreonam, has since started on HD. Intubated on 9/6/22 and converted to trach 9/14/22. Uldall placed ?9/14/22 and started on HD -> [place] removed. S/p bronchoscopy, tracheostomy, and PEG tube placement 9/14/22. Remains vent dependent. Patient oliguric with rising K on 9/20, RIJ udall placed. Patient persistently bleeding from tracheostomy site. Taken for surgery on 9/21 by CT surgery for trach revision, bleeding cauterized and patient intubated orally. On 9/22 bleeding from trach site persistents but decreased. Some oozing from RIJ site. In AM of 9/22, CXR showed L lung collapse and patient under went bronchoscopy with removal of large clots.  Patient underwent bronch again on 9/23 for L lung white out.     # Cardiac arrest x3, unclear cause   - hemoglobin stable  - CXR post bronchoscopy this morning with resolution of mucus plug   - send repeat blood clx / urinalysis/ urine culture to r/o sepsis  - d-dimer  - cardiac enzymes , ekg  - cardiology eval  - rest of care per ICU team     # Left lung atelectasis/ mucus plug s/p bronch this am , resolved     # Acute bleed secondary to coagulopathy???  - bleeding predominantly from and around tracheostomy site s/p trach removal and surgical intervention by CTSx.  - give 2units PRBC on 9/20. Pressure held on udall site, some bleeding noted around trach.   - to date patient has received a total of approximately 8 pRBC, 3-4 FFP, 2 platelet since 9/20.   - not responsive to DDAVP.   - serial cbc  - possible DIC fibrinogen >700, INR peak 1.8, haptoglobin not significantly decreased.  - repeat HIT panel negative   - Heme/onc eval appreciated, start cryo 5-10 units every 12-24hour. Risks and benefits discussed with the son, who then talked it over with his father and would like to proceed with cryo in attempt bleeding. Will start cryo 5 units now. Continue to monitor for bleeding.   - check fungitell.   - heme/onc following     #Acute ischemic strokes, multifocal  #h/o hemorrhagic, ?aneurysmal stroke R basal ganglia 2017  Per neurology: suspicious for vasculitis, but not confirmed, CSF studies does not support the diagnosis. - Plavix held for LGIB. Aspirin resumed.   - Neuro: 60mg Prednisone taper [id number] starting 9/16/22-10/7/22  - Per stroke team, no need for angio, requested a renal bx when stable as expected widespread vasculitis.   - continue with Keppra   - Neuro eval noted. Rheum eval for cytoxan when stable.     # oliguric ALF / Urinary retention / Suspected ATN  Renal US 9/2/22: no hydronephrosis  - Cr still high; 9/16/22 Cr 6.8   - Sodium bicarbonate increased to 1300 q8h, sevelamer 2400 mg TID in PEG   - Nephro following  - RIJ udall placed 9/20 - s/p HD on 9/21    #Bacteremia, resolved   BCx 9/1/22 (+) MDR enterobacter Cloacae   BCx 9/3/22, 9/4/22, 9/5/22, 9/6/22, 9/7/22 all NGF   - 9/19 completed abx per ID     #COVID (+) 9/13/22  - Tested (+) 9/13/22, in isolation thru 9/23/22   - Resp status as noted above  - Will monitor for fevers, sepsis  - no respiratory sxs at this time. consider ID consult     # Purulent Right LE cellulitis   S/p debridement by burn on 8/10/22  - Candida in wound. per Dr. Chua: contaminant  - BCx w/ staph: likely contaminant. repeat BCx at that time was negative  - F/u burn as outpatient [phone]; signed off 9/14/22.     #GI bleed  S/p 6 units of PRBCs. Hb goal 7+, 9/16/22 Hb 7.6  - Pt was seen by GI, external bleeding hemorrhoids noted.  - Protonix q12h  - ENT eval appreciated for oral bleed. No actively oozing wounds, teeth guard put in.     - Oral cavity bleed from tongue biting, improved  - Hgb 7 on 9/19. Likey 2/2 to platelet dysfunction from uremia    #Hypertensive urgency due to noncompliance and Malignant HTN - improved   BP on admission 296/174 without signs of end organ damage at that time. Renal artery duplex wnl>>ARIAN unlikely  - Aldosterone wnl, renin elevated  - BP overall improved  - SBP goal 120-160  - c/w amlodipine 10, furosemide 80 q12, hydralazine 100mg q8, labetalol 600mg TID    # Diabetes mellitus   - HbA1C 10.4  - sliding scale for now.     #HLD   - Cont statins     # Family contact: multiple attempts were made to speak with Latrell, who is the patient's decision maker as per the . I was able to reach the  and gave all updates, however he deferred code status decision to be made with Latrell.                                                                            # DVT prophylaxis: Holding AC given recent GIB and active current bleed.   # GI prophylaxis: Pantoprazole   # Diet: NPO  # Activity: Bedrest  # Code status: FULL CODE   # Disposition: Acute     Patient is managed by ICU team

## 2022-09-24 NOTE — PROGRESS NOTE ADULT - ASSESSMENT
57 yo F with PMH of HTN, DM2, CVA (2017) who presented with purulent right lower extremity wound for 3 months consistent with acute RLE cellulitis. Code stroke for unresponsiveness at 4pm 8/5    Impression  #CVA  - 8/11 MR Head w/wo IV Cont (08.10.22 @ 21:25): Multiple punctate cortical acute infarcts involving multiple vascular territories possibly related to embolic etiology. No evidence of acute hemorrhage. Moderate chronic microvascular type changes as well as chronic hemosiderin deposition within the right basal ganglia consistent with remote hemorrhage. Chronic right thalamic lacunar infarcts. More alert and does try to respondResolved SIRS with no infectious etiology  - s/p LP 8/26 with 24 WBC, RBC 44408, 37% PMNs, 61% Lymph -- protein/glucose not obtained    #Fevers 8/30 - Enterobacter cloaecae bacteremia  - UA with pyruia   - CXR 8/31 unremarkable   - BLood Cx 9/1 with Enterobacter cloacae with NDM+  - BLood Cx 9/3 NG   - completed 2 weeks of Avycaz + aztreonam (9/3-9/16)    #GI BLeed  - s/p EGD 9/6     #Possible Vasculitis   - s/p empiric steroid course -- planned for cytoxan   - on steroids    #Tracheostomy bleed SP revision by Surgery    #COVID-PCR positive 9/13   - no previous positive     RECOMMENDATIONS  - Leukocytosis potentially reactive to recurrent left lung plug/collapse vs. steroids (prednisone 60 mg most recently 9/23); procalcitonin difficult to interpret in CKD and some bleeding  - follow-up bronch cultures from 9/22 and 9/23  - trend WBC   - check C diff if developing diarrhea  - would monitor off antibiotics for now   --  if acute decompensation, check blood cx, fungitell and can give Linezolid 600 mg q 12 hours and Avycaz 0.94 g q 12 hours and Aztreonam 2g q q 12 hours at least until cultures return   - guarded prognosis     57 yo F with PMH of HTN, DM2, CVA (2017) who presented with purulent right lower extremity wound for 3 months consistent with acute RLE cellulitis. Code stroke for unresponsiveness at 4pm 8/5    Impression  #CVA  - 8/11 MR Head w/wo IV Cont (08.10.22 @ 21:25): Multiple punctate cortical acute infarcts involving multiple vascular territories possibly related to embolic etiology. No evidence of acute hemorrhage. Moderate chronic microvascular type changes as well as chronic hemosiderin deposition within the right basal ganglia consistent with remote hemorrhage. Chronic right thalamic lacunar infarcts. More alert and does try to respondResolved SIRS with no infectious etiology  - s/p LP 8/26 with 24 WBC, RBC 58986, 37% PMNs, 61% Lymph -- protein/glucose not obtained    #Fevers 8/30 - Enterobacter cloaecae bacteremia  - UA with pyruia   - CXR 8/31 unremarkable   - BLood Cx 9/1 with Enterobacter cloacae with NDM+  - BLood Cx 9/3 NG   - completed 2 weeks of Avycaz + aztreonam (9/3-9/16)    #GI BLeed  - s/p EGD 9/6     #Possible Vasculitis   - s/p empiric steroid course -- planned for cytoxan   - on steroids    #Tracheostomy bleed SP revision by Surgery    #COVID-PCR positive 9/13   - no previous positive     would recommend:    1. Follow up Blood cultures from 9/24/22, are in process  2. Monitor WBC count  Leukocytosis potentially reactive to recurrent left lung plug/collapse vs. steroids (prednisone 60 mg most recently 9/23); procalcitonin difficult to interpret in CKD and some bleeding  3. Continue current Antimicrobials   4. Management of Vent as per ICU protocol  5. Aspiration precaution     - guarded prognosis    -d/w ICU team    Attending Attestation:    Spent more than 45 minutes on total encounter, more than 50 % of the visit was spent counseling and/or coordinating care by the Attending physician.

## 2022-09-24 NOTE — PROVIDER CONTACT NOTE (OTHER) - ASSESSMENT
Patient biting ETT and overbreathing ventilator.
/104, HR 80. No signs of distress. Patient is resting comfortably
Pt. is nonverbal. Unable to assessed
Site clean, no s/sx of infection

## 2022-09-24 NOTE — PROGRESS NOTE ADULT - SUBJECTIVE AND OBJECTIVE BOX
Patient is a 57y old  Female who presents with a chief complaint of RLE wound (12 Sep 2022 15:26)        HPI:  57 yo F with PMH of HTN, DM2, and stroke (per son 2017) who presented for RLE wound. Started 3 months ago when she fell and hit her leg on a wooden cabinet. She put hydrogen peroxide, antibiotic cream, and wrapped the wound but never fully healed. Two weeks ago it started to show yellow pus so her and her family came to the ED for further treatment. Endorsed subjective fevers, chest pain, and vision changes which occurs when walked 2-3 blocks. Denied congestion, sore throat, cough, dyspnea, headache, dysuria, N/V, diarrhea     Per son, they fill her medications at [facility] ([phone]) and this is their current pharmacy. Called them to confirm her medications and they said her last refill of medications was 2018. Pt has not seen a doctor due to insurance problem and has not been taking any medications.        Received unasyn and vanco, humalin R, IV vasotec, IV labetalol   (02 Aug 2022 07:47)      Pt evaluated on rounds.  I reviewed the radiology tests and hospital record.    I reviewed previous notes on this patient.    Interval Events: No overnight events.      CAM:+    SAT:+    SBT:+      REVIEW OF SYSTEMS:   see HPI      OBJECTIVE:  ICU Vital Signs Last 24 Hrs  T(C): 35.7 (24 Sep 2022 03:02), Max: 36.1 (24 Sep 2022 01:08)  T(F): 96.2 (24 Sep 2022 03:02), Max: 97 (24 Sep 2022 01:08)  HR: 69 (24 Sep 2022 04:30) (52 - 77)  BP: 167/84 (24 Sep 2022 04:30) (114/63 - 193/94)  BP(mean): 120 (24 Sep 2022 04:30) (83 - 130)  ABP: --  ABP(mean): --  RR: 17 (24 Sep 2022 04:30) (16 - 35)  SpO2: 98% (24 Sep 2022 04:30) (90% - 99%)    O2 Parameters below as of 24 Sep 2022 03:00  Patient On (Oxygen Delivery Method): ventilator    O2 Concentration (%): 70      Mode: AC/ CMV (Assist Control/ Continuous Mandatory Ventilation), RR (machine): 16, TV (machine): 360, FiO2: 60, PEEP: 5, ITime: 0.9, MAP: 6, PIP: 10    09-22 @ 07:01 - 09-23 @ 07:00  --------------------------------------------------------  IN: 1693 mL / OUT: 25 mL / NET: 1668 mL    09-23 @ 07:01 - 09-24 @ 05:42  --------------------------------------------------------  IN: 791 mL / OUT: 4045 mL / NET: -3254 mL      CAPILLARY BLOOD GLUCOSE      POCT Blood Glucose.: 156 mg/dL (23 Sep 2022 23:48)        PHYSICAL EXAM:       · ENMT:   Airway patent,   Nasal mucosa clear.  Mouth with normal mucosa.   No thrush    · EYES:   Clear bilaterally,   pupils equal,   round and reactive to light.    · CARDIAC:   Normal rate,   regular rhythm.    Heart sounds S1, S2.   No murmurs, no rubs or gallops on auscultation  no edema        CAROTID:   normal systolic impulse  no bruits    · RESPIRATORY:   rales  normal chest expansion  no retractions or use of accessory muscles  percussion of chest demonstrates r dullness on L    · GASTROINTESTINAL:  Abdomen soft,   non-tender,   + BS  liver/spleen not palpable    · MUSCULOSKELETAL:   no clubbing, cyanosis      · SKIN:   Skin normal color for race,   warm, dry   No evidence of rash.        · HEME LYMPH:   no splenomegaly.  No cervical  lymphadenopathy.  no inguinal lymphadenopathy    HOSPITAL MEDICATIONS:  MEDICATIONS  (STANDING):  amLODIPine   Tablet 10 milliGRAM(s) Oral daily  atorvastatin 80 milliGRAM(s) Oral at bedtime  chlorhexidine 0.12% Liquid 15 milliLiter(s) Oral Mucosa every 12 hours  chlorhexidine 2% Cloths 1 Application(s) Topical <User Schedule>  cloNIDine 0.2 milliGRAM(s) Oral every 8 hours  dextrose 5%. 1000 milliLiter(s) (50 mL/Hr) IV Continuous <Continuous>  dextrose 5%. 1000 milliLiter(s) (100 mL/Hr) IV Continuous <Continuous>  dextrose 50% Injectable 25 Gram(s) IV Push once  dextrose 50% Injectable 12.5 Gram(s) IV Push once  dextrose 50% Injectable 25 Gram(s) IV Push once  furosemide   Injectable 80 milliGRAM(s) IV Push every 12 hours  glucagon  Injectable 1 milliGRAM(s) IntraMuscular once  hydrALAZINE 100 milliGRAM(s) Oral every 8 hours  insulin lispro (ADMELOG) corrective regimen sliding scale   SubCutaneous three times a day before meals  labetalol 600 milliGRAM(s) Oral three times a day  levETIRAcetam  Solution 1000 milliGRAM(s) Oral at bedtime  lidocaine 1%/epinephrine 1:100,000 Inj 20 milliLiter(s) Local Injection once  multivitamin 1 Tablet(s) Oral daily  pantoprazole  Injectable 40 milliGRAM(s) IV Push two times a day  predniSONE   Tablet 40 milliGRAM(s) Oral daily  sevelamer carbonate Powder 2400 milliGRAM(s) Enteral Tube every 8 hours  sodium chloride 0.65% Nasal 2 Spray(s) Both Nostrils two times a day    MEDICATIONS  (PRN):  acetaminophen     Tablet .. 650 milliGRAM(s) Oral every 6 hours PRN Temp greater or equal to 38C (100.4F), Mild Pain (1 - 3)  dextrose Oral Gel 15 Gram(s) Oral once PRN Blood Glucose LESS THAN 70 milliGRAM(s)/deciliter  fentaNYL    Injectable 50 MICROGram(s) IV Push every 6 hours PRN agitation  labetalol Injectable 10 milliGRAM(s) IV Push every 6 hours PRN Systolic blood pressure >  melatonin 3 milliGRAM(s) Oral at bedtime PRN Insomnia    sodium chloride 0.9%.: Solution, 1000 milliLiter(s) infuse at 100 mL/Hr  Provider's Contact #: [phone]  sodium chloride 0.9%.: Solution, 1000 milliLiter(s) infuse at 100 mL/Hr, Stop After 10 Hours      LABS:                        8.5    22.72 )-----------( 100      ( 23 Sep 2022 22:30 )             25.0     09-23    134<L>  |  91<L>  |  64<HH>  ----------------------------<  134<H>  4.2   |  26  |  3.8<H>    Ca    7.4<L>      23 Sep 2022 05:48  Mg     2.1     09-23    TPro  5.2<L>  /  Alb  2.9<L>  /  TBili  0.3  /  DBili  x   /  AST  242<H>  /  ALT  24  /  AlkPhos  168<H>  09-23    PT/INR - ( 23 Sep 2022 18:10 )   PT: 11.80 sec;   INR: 1.03 ratio         PTT - ( 23 Sep 2022 18:10 )  PTT:54.2 sec    Arterial Blood Gas:  09-24 @ 03:20  7.47/40/79/29/97.7/5.0  ABG lactate: --  Arterial Blood Gas:  09-23 @ 03:58  7.50/31/211/24/100.0/1.3  ABG lactate: --  Arterial Blood Gas:  09-22 @ 15:45  7.46/39/85/28/97.9/3.6  ABG lactate: --      Mode: AC/ CMV (Assist Control/ Continuous Mandatory Ventilation), RR (machine): 16, TV (machine): 360, FiO2: 60, PEEP: 5, ITime: 0.9, MAP: 6, PIP: 10      COVID-19 PCR: Detected (20 Sep 2022 07:00)  COVID-19 PCR: Detected (13 Sep 2022 18:48)  COVID-19 PCR: NotDetec (31 Aug 2022 11:58)  COVID-19 PCR: NotDetec (26 Aug 2022 09:40)  COVID-19 PCR: NotDetec (21 Aug 2022 02:34)  COVID-19 PCR: NotDetec (18 Aug 2022 17:36)  COVID-19 PCR: NotDetec (15 Aug 2022 15:41)  COVID-19 PCR: NotDetec (11 Aug 2022 13:22)  COVID-19 PCR: NotDetec (08 Aug 2022 05:14)  COVID-19 PCR: NotDetec (02 Aug 2022 01:40)    Mode: AC/ CMV (Assist Control/ Continuous Mandatory Ventilation)  RR (machine): 16  TV (machine): 360  FiO2: 60  PEEP: 5  ITime: 0.9  MAP: 6  PIP: 10      ABG - ( 24 Sep 2022 03:20 )  pH, Arterial: 7.47  pH, Blood: x     /  pCO2: 40    /  pO2: 79    / HCO3: 29    / Base Excess: 5.0   /  SaO2: 97.7            RADIOLOGY: Today I personally interpreted the latest CXR and other pertinent films.

## 2022-09-24 NOTE — PROVIDER CONTACT NOTE (OTHER) - REASON
CIPRIANO Lip ulcer
Elevated BP
Pt has elevated temp of 101.6 b/p 186/93
Biting ETT
DVT prophylaxis
Pt. b/p 200/98 HR 65

## 2022-09-24 NOTE — GOALS OF CARE CONVERSATION - ADVANCED CARE PLANNING - CONVERSATION DETAILS
Spoke to patient's spouse Db and explained current medical condition and prognosis  At this time, he would like the patient to be full code until the rest of the family is able to make the decision. He deferred final decision to Hemal (I was unable to reach him multiple times throughout the day to give updates and discuss the goc)    Patient is full code at this time

## 2022-09-24 NOTE — EEG REPORT - NS EEG TEXT BOX
Epilepsy Attending Note:     JOSE MITCHELL    56y Female  MRN [id number]    Vital Signs Last 24 Hrs  T(C): 36.4 (09 Aug 2022 05:01), Max: 37.8 (08 Aug 2022 13:17)  T(F): 97.5 (09 Aug 2022 05:01), Max: 100 (08 Aug 2022 13:17)  HR: 85 (09 Aug 2022 06:30) (77 - 85)  BP: 168/86 (09 Aug 2022 06:30) (149/82 - 197/86)  BP(mean): 124 (08 Aug 2022 17:54) (124 - 124)  RR: 18 (09 Aug 2022 05:01) (17 - 18)  SpO2: --                              9.8    7.93  )-----------( 263      ( 09 Aug 2022 06:31 )             29.4           145  |  112<H>  |  16  ----------------------------<  118<H>  3.7   |  21  |  0.8    Ca    8.8      09 Aug 2022 06:31  Mg     2.2     -    TPro  6.1  /  Alb  3.1<L>  /  TBili  0.4  /  DBili  x   /  AST  19  /  ALT  14  /  AlkPhos  100  08-      MEDICATIONS  (STANDING):  ampicillin/sulbactam  IVPB 3 Gram(s) IV Intermittent every 6 hours  atorvastatin 80 milliGRAM(s) Oral at bedtime  dextrose 5%. 1000 milliLiter(s) (100 mL/Hr) IV Continuous <Continuous>  dextrose 5%. 1000 milliLiter(s) (50 mL/Hr) IV Continuous <Continuous>  dextrose 50% Injectable 25 Gram(s) IV Push once  dextrose 50% Injectable 12.5 Gram(s) IV Push once  dextrose 50% Injectable 25 Gram(s) IV Push once  enoxaparin Injectable 40 milliGRAM(s) SubCutaneous every 24 hours  glucagon  Injectable 1 milliGRAM(s) IntraMuscular once  hydrALAZINE 50 milliGRAM(s) Oral every 8 hours  insulin glargine Injectable (LANTUS) 22 Unit(s) SubCutaneous every morning  insulin lispro (ADMELOG) corrective regimen sliding scale   SubCutaneous three times a day before meals  insulin lispro Injectable (ADMELOG) 5 Unit(s) SubCutaneous three times a day before meals  labetalol 300 milliGRAM(s) Oral three times a day  levETIRAcetam  IVPB 1500 milliGRAM(s) IV Intermittent every 12 hours  losartan 50 milliGRAM(s) Oral daily  NIFEdipine XL 90 milliGRAM(s) Oral daily  pantoprazole    Tablet 40 milliGRAM(s) Oral before breakfast  phenytoin  IVPB 100 milliGRAM(s) IV Intermittent three times a day  sodium chloride 0.45%. 1000 milliLiter(s) (75 mL/Hr) IV Continuous <Continuous>  vancomycin  IVPB 1250 milliGRAM(s) IV Intermittent every 12 hours    MEDICATIONS  (PRN):  acetaminophen     Tablet .. 650 milliGRAM(s) Oral every 6 hours PRN Temp greater or equal to 38C (100.4F), Mild Pain (1 - 3)  dextrose Oral Gel 15 Gram(s) Oral once PRN Blood Glucose LESS THAN 70 milliGRAM(s)/deciliter  melatonin 3 milliGRAM(s) Oral at bedtime PRN Insomnia  midazolam Injectable 4 milliGRAM(s) IV Push once PRN seizure  silver sulfADIAZINE 1% Cream 1 Application(s) Topical two times a day PRN Wound Care      Phenytoin Level, Serum: 10.5 ug/mL [10.0 - 20.0] (22 @ 09:40)        VEEG in the last 24 hours:    Background - continuous, symmetrical, less than optimally organized, reaching frequencies in the range of 6-7 Hz, reactive    Focal and generalized slowin. mild to moderate generalized slowing  2. mild to moderate bi-frontal slowing  3. right hemispheric focal slowing    Interictal activity:  1. runs of bi-frontal sharply contoured theta  2. right hemispheric triphasic waves  3. right hemispheric sharp waves that appear epileptiform in nature    Events - around 3:30 pm patient had an event described as right facial twitching and right gaze deviation. This event was not associated with EEG change from the baseline.    Seizures - none    Impression: Abnomral VEEG as above    Plan - per neurology team    
Epilepsy Attending Note:     JOSE MITCHELL    56y Female  MRN [id number]    Vital Signs Last 24 Hrs  T(C): 37.7 (08 Aug 2022 04:42), Max: 37.7 (07 Aug 2022 20:47)  T(F): 99.9 (08 Aug 2022 04:42), Max: 99.9 (07 Aug 2022 20:47)  HR: 85 (08 Aug 2022 04:42) (80 - 93)  BP: 177/86 (08 Aug 2022 04:42) (157/78 - 191/104)  BP(mean): 123 (08 Aug 2022 04:42) (123 - 123)  RR: 18 (08 Aug 2022 04:42) (18 - 18)  SpO2: 100% (07 Aug 2022 13:35) (98% - 100%)    Parameters below as of 08 Aug 2022 04:42  Patient On (Oxygen Delivery Method): room air                              11.5   7.42  )-----------( 282      ( 07 Aug 2022 07:53 )             33.8       08-07    137  |  103  |  10  ----------------------------<  148<H>  3.6   |  24  |  0.7    Ca    8.5      07 Aug 2022 07:53    TPro  6.5  /  Alb  3.4<L>  /  TBili  0.5  /  DBili  x   /  AST  12  /  ALT  8   /  AlkPhos  116<H>  08-06      MEDICATIONS  (STANDING):  ampicillin/sulbactam  IVPB 3 Gram(s) IV Intermittent every 6 hours  atorvastatin 80 milliGRAM(s) Oral at bedtime  dextrose 5%. 1000 milliLiter(s) (100 mL/Hr) IV Continuous <Continuous>  dextrose 5%. 1000 milliLiter(s) (50 mL/Hr) IV Continuous <Continuous>  dextrose 50% Injectable 25 Gram(s) IV Push once  dextrose 50% Injectable 12.5 Gram(s) IV Push once  dextrose 50% Injectable 25 Gram(s) IV Push once  enoxaparin Injectable 40 milliGRAM(s) SubCutaneous every 24 hours  glucagon  Injectable 1 milliGRAM(s) IntraMuscular once  insulin glargine Injectable (LANTUS) 22 Unit(s) SubCutaneous every morning  insulin lispro (ADMELOG) corrective regimen sliding scale   SubCutaneous three times a day before meals  insulin lispro Injectable (ADMELOG) 5 Unit(s) SubCutaneous three times a day before meals  labetalol 300 milliGRAM(s) Oral three times a day  levETIRAcetam  IVPB 1000 milliGRAM(s) IV Intermittent every 12 hours  LORazepam   Injectable 1 milliGRAM(s) IV Push once  losartan 50 milliGRAM(s) Oral daily  NIFEdipine XL 90 milliGRAM(s) Oral daily  pantoprazole    Tablet 40 milliGRAM(s) Oral before breakfast  sodium chloride 0.9%. 1000 milliLiter(s) (60 mL/Hr) IV Continuous <Continuous>  vancomycin  IVPB 1250 milliGRAM(s) IV Intermittent every 12 hours    MEDICATIONS  (PRN):  acetaminophen     Tablet .. 650 milliGRAM(s) Oral every 6 hours PRN Temp greater or equal to 38C (100.4F), Mild Pain (1 - 3)  dextrose Oral Gel 15 Gram(s) Oral once PRN Blood Glucose LESS THAN 70 milliGRAM(s)/deciliter  melatonin 3 milliGRAM(s) Oral at bedtime PRN Insomnia  silver sulfADIAZINE 1% Cream 1 Application(s) Topical two times a day PRN Wound Care            VEEG in the last 24 hours:    Background--------------------continues, less than optimally organized reaching 7-8 hz superimposed by lower range theta    Focal and generalized slowing-----------1- mild to moderate generalized slowing. 2- mild to moderate  right hemispheric focal slowing    Interictal activity---1- right hemispheric triphasic waves. 2- right hemispheric TO sharp waves that appear epileptiform in nature 3- diffusely expressed triphasic waves    Events------------ none    Seizures----none    Impression:  abnormal as above. It is of note the the EKG recording also shows missing beats . ( discussed with the team)    Plan - as/neurology and primary team    
Epilepsy Attending Note:     JOSE MITCHELL    56y Female  MRN [id number]    Vital Signs Last 24 Hrs  T(C): 37.8 (11 Aug 2022 05:00), Max: 37.8 (11 Aug 2022 05:00)  T(F): 100 (11 Aug 2022 05:00), Max: 100 (11 Aug 2022 05:00)  HR: 84 (11 Aug 2022 08:44) (73 - 94)  BP: 187/112 (11 Aug 2022 08:44) (184/82 - 227/102)  BP(mean): --  RR: 18 (11 Aug 2022 08:44) (18 - 20)  SpO2: --    Parameters below as of 10 Aug 2022 13:22  Patient On (Oxygen Delivery Method): room air                              10.8   8.46  )-----------( 314      ( 11 Aug 2022 07:05 )             31.3       08-11    142  |  107  |  9<L>  ----------------------------<  103<H>  3.3<L>   |  18  |  0.7    Ca    8.8      11 Aug 2022 07:05  Mg     1.8     08-11    TPro  6.4  /  Alb  3.3<L>  /  TBili  0.4  /  DBili  x   /  AST  16  /  ALT  15  /  AlkPhos  136<H>  08-11      MEDICATIONS  (STANDING):  ampicillin/sulbactam  IVPB 3 Gram(s) IV Intermittent every 6 hours  atorvastatin 80 milliGRAM(s) Oral at bedtime  collagenase Ointment 1 Application(s) Topical two times a day  Dakins Solution - 1/2 Strength 1 Application(s) Topical two times a day  dextrose 5%. 1000 milliLiter(s) (100 mL/Hr) IV Continuous <Continuous>  dextrose 5%. 1000 milliLiter(s) (50 mL/Hr) IV Continuous <Continuous>  dextrose 50% Injectable 25 Gram(s) IV Push once  dextrose 50% Injectable 12.5 Gram(s) IV Push once  dextrose 50% Injectable 25 Gram(s) IV Push once  enoxaparin Injectable 40 milliGRAM(s) SubCutaneous every 24 hours  glucagon  Injectable 1 milliGRAM(s) IntraMuscular once  hydrALAZINE Injectable 15 milliGRAM(s) IV Push three times a day  insulin glargine Injectable (LANTUS) 22 Unit(s) SubCutaneous every morning  insulin lispro (ADMELOG) corrective regimen sliding scale   SubCutaneous three times a day before meals  insulin lispro Injectable (ADMELOG) 5 Unit(s) SubCutaneous three times a day before meals  levETIRAcetam  IVPB 1500 milliGRAM(s) IV Intermittent every 12 hours  lidocaine 1%/epinephrine 1:100,000 Inj 20 milliLiter(s) Local Injection once  losartan 100 milliGRAM(s) Oral daily  pantoprazole    Tablet 40 milliGRAM(s) Oral before breakfast  phenytoin  IVPB 100 milliGRAM(s) IV Intermittent three times a day  potassium chloride  20 mEq/100 mL IVPB 20 milliEquivalent(s) IV Intermittent every 2 hours  sodium chloride 0.45%. 1000 milliLiter(s) (75 mL/Hr) IV Continuous <Continuous>  vancomycin  IVPB 1250 milliGRAM(s) IV Intermittent every 12 hours    MEDICATIONS  (PRN):  acetaminophen     Tablet .. 650 milliGRAM(s) Oral every 6 hours PRN Temp greater or equal to 38C (100.4F), Mild Pain (1 - 3)  dextrose Oral Gel 15 Gram(s) Oral once PRN Blood Glucose LESS THAN 70 milliGRAM(s)/deciliter  labetalol Injectable 10 milliGRAM(s) IV Push every 6 hours PRN Systolic blood pressure >  melatonin 3 milliGRAM(s) Oral at bedtime PRN Insomnia  midazolam Injectable 4 milliGRAM(s) IV Push once PRN seizure      Phenytoin Level, Serum: 8.5 ug/mL [10.0 - 20.0] (08-11-22 @ 07:05)  Phenytoin Level, Serum: 10.5 ug/mL [10.0 - 20.0] (08-09-22 @ 09:40)        VEEG   X 10 hours :    Background----continues, less than optimally organized reaching frequencies in the range of 7-8 hz superimposed by lower range of low amplitude theta    Focal and generalized slowing-----  1-mild to moderate generalized slowing. 2-  mild to moderate bifrontal slowing left > right 3- right hemispheric focal slowing    Interictal activity---------- 1- right FC/Parasaggital sharp transients and triphasic waves     Events----------------none    Seizures-------------none    Impression:  abnormal as above    Plan - as/neurology    
Epilepsy Attending Note:     JOSE MITCHELL    56y Female  MRN [id number]    Vital Signs Last 24 Hrs  T(C): 36.3 (30 Aug 2022 05:22), Max: 36.3 (30 Aug 2022 05:22)  T(F): 97.3 (30 Aug 2022 05:22), Max: 97.3 (30 Aug 2022 05:22)  HR: 65 (30 Aug 2022 05:22) (60 - 65)  BP: 188/86 (30 Aug 2022 05:22) (136/72 - 188/86)  BP(mean): 130 (30 Aug 2022 05:22) (98 - 130)  RR: 18 (30 Aug 2022 05:22) (18 - 18)  SpO2: --                              10.0   8.63  )-----------( 344      ( 29 Aug 2022 06:29 )             29.5       08    140  |  102  |  32<H>  ----------------------------<  112<H>  4.2   |  26  |  1.0    Ca    9.7      29 Aug 2022 06:29  Mg     1.8         TPro  6.5  /  Alb  3.5  /  TBili  0.3  /  DBili  x   /  AST  11  /  ALT  14  /  AlkPhos  134<H>  08-29      MEDICATIONS  (STANDING):  apixaban 5 milliGRAM(s) Oral every 12 hours  atorvastatin 80 milliGRAM(s) Oral at bedtime  chlorthalidone 25 milliGRAM(s) Oral daily  collagenase Ointment 1 Application(s) Topical two times a day  Dakins Solution - 1/2 Strength 1 Application(s) Topical two times a day  dextrose 5%. 1000 milliLiter(s) (100 mL/Hr) IV Continuous <Continuous>  dextrose 5%. 1000 milliLiter(s) (50 mL/Hr) IV Continuous <Continuous>  dextrose 50% Injectable 25 Gram(s) IV Push once  dextrose 50% Injectable 12.5 Gram(s) IV Push once  dextrose 50% Injectable 25 Gram(s) IV Push once  doxazosin 2 milliGRAM(s) Oral at bedtime  glucagon  Injectable 1 milliGRAM(s) IntraMuscular once  hydrALAZINE 100 milliGRAM(s) Oral every 8 hours  insulin glargine Injectable (LANTUS) 22 Unit(s) SubCutaneous every morning  insulin lispro (ADMELOG) corrective regimen sliding scale   SubCutaneous three times a day before meals  insulin lispro Injectable (ADMELOG) 5 Unit(s) SubCutaneous three times a day before meals  labetalol 600 milliGRAM(s) Oral three times a day  lacosamide IVPB 50 milliGRAM(s) IV Intermittent every 12 hours  levETIRAcetam  Solution 1000 milliGRAM(s) Oral every 12 hours  lidocaine 1%/epinephrine 1:100,000 Inj 20 milliLiter(s) Local Injection once  losartan 100 milliGRAM(s) Oral daily  multivitamin/minerals/iron Oral Solution (CENTRUM) 15 milliLiter(s) Oral daily  pantoprazole    Tablet 40 milliGRAM(s) Oral before breakfast  sodium bicarbonate 650 milliGRAM(s) Oral every 6 hours  sodium chloride 0.65% Nasal 2 Spray(s) Both Nostrils two times a day    MEDICATIONS  (PRN):  acetaminophen     Tablet .. 650 milliGRAM(s) Oral every 6 hours PRN Temp greater or equal to 38C (100.4F), Mild Pain (1 - 3)  dextrose Oral Gel 15 Gram(s) Oral once PRN Blood Glucose LESS THAN 70 milliGRAM(s)/deciliter  labetalol Injectable 10 milliGRAM(s) IV Push every 6 hours PRN Systolic blood pressure >  LORazepam     Tablet 1 milliGRAM(s) Oral once PRN Agitation  melatonin 3 milliGRAM(s) Oral at bedtime PRN Insomnia          VEEG in the last 24 hours:    Background - continuous, symmetrical, less than optimally organized, reaching frequencies in the range of 4-5 Hz that is reactive    Focal and generalized slowin. moderate generalized slowing  2. mild to moderate independent right FT and left FC/frontal slowing    Interictal activity:  1. moderate number of right hemispheric FTC sharp waves, and low amplitude and after going spike   2. small number of left anterior quadrant sharp transients    Events - none    Seizures - none    Impression: Abnormal VEEG as above    Plan - as per neurovascular team    
Epilepsy Attending Note:     JOSE MITCHELL    56y Female  MRN [id number]    Vital Signs Last 24 Hrs  T(C): 37.4 (09 Aug 2022 20:02), Max: 37.4 (09 Aug 2022 20:02)  T(F): 99.4 (09 Aug 2022 20:02), Max: 99.4 (09 Aug 2022 20:02)  HR: 70 (09 Aug 2022 20:02) (70 - 78)  BP: 152/96 (10 Aug 2022 06:23) (120/78 - 178/111)  BP(mean): 136 (09 Aug 2022 14:24) (136 - 136)  RR: 18 (09 Aug 2022 20:02) (18 - 18)  SpO2: 94% (09 Aug 2022 14:24) (94% - 94%)    Parameters below as of 09 Aug 2022 14:24  Patient On (Oxygen Delivery Method): room air                              9.8    7.93  )-----------( 263      ( 09 Aug 2022 06:31 )             29.4       08-10    141  |  109  |  13  ----------------------------<  135<H>  4.1   |  16<L>  |  0.8    Ca    8.5      10 Aug 2022 07:23  Mg     1.9     08-10    TPro  6.2  /  Alb  3.1<L>  /  TBili  0.4  /  DBili  x   /  AST  26  /  ALT  14  /  AlkPhos  121<H>  08-10      MEDICATIONS  (STANDING):  ampicillin/sulbactam  IVPB 3 Gram(s) IV Intermittent every 6 hours  atorvastatin 80 milliGRAM(s) Oral at bedtime  dextrose 5%. 1000 milliLiter(s) (100 mL/Hr) IV Continuous <Continuous>  dextrose 5%. 1000 milliLiter(s) (50 mL/Hr) IV Continuous <Continuous>  dextrose 50% Injectable 25 Gram(s) IV Push once  dextrose 50% Injectable 12.5 Gram(s) IV Push once  dextrose 50% Injectable 25 Gram(s) IV Push once  enoxaparin Injectable 40 milliGRAM(s) SubCutaneous every 24 hours  glucagon  Injectable 1 milliGRAM(s) IntraMuscular once  hydrALAZINE 50 milliGRAM(s) Oral every 8 hours  insulin glargine Injectable (LANTUS) 22 Unit(s) SubCutaneous every morning  insulin lispro (ADMELOG) corrective regimen sliding scale   SubCutaneous three times a day before meals  insulin lispro Injectable (ADMELOG) 5 Unit(s) SubCutaneous three times a day before meals  labetalol 300 milliGRAM(s) Oral three times a day  levETIRAcetam  IVPB 1500 milliGRAM(s) IV Intermittent every 12 hours  losartan 100 milliGRAM(s) Oral daily  pantoprazole    Tablet 40 milliGRAM(s) Oral before breakfast  phenytoin  IVPB 100 milliGRAM(s) IV Intermittent three times a day  sodium chloride 0.45%. 1000 milliLiter(s) (75 mL/Hr) IV Continuous <Continuous>  vancomycin  IVPB 1250 milliGRAM(s) IV Intermittent every 12 hours    MEDICATIONS  (PRN):  acetaminophen     Tablet .. 650 milliGRAM(s) Oral every 6 hours PRN Temp greater or equal to 38C (100.4F), Mild Pain (1 - 3)  dextrose Oral Gel 15 Gram(s) Oral once PRN Blood Glucose LESS THAN 70 milliGRAM(s)/deciliter  melatonin 3 milliGRAM(s) Oral at bedtime PRN Insomnia  midazolam Injectable 4 milliGRAM(s) IV Push once PRN seizure  silver sulfADIAZINE 1% Cream 1 Application(s) Topical two times a day PRN Wound Care      Phenytoin Level, Serum: 10.5 ug/mL [10.0 - 20.0] (08-09-22 @ 09:40)        VEEG in the last 24 hours:    Background-----continues , less than optimally organized ( relatively compared to the day before shows better organization and reactivity) . PDR reaching 7-8 hz    Focal and generalized slowing------mild generalized slowing. 2- mild to moderate bifrontal slowing. 3- right hemispheric slowing    Interictal activity--------------  small number of right triphasic waves. small number of right parasaggital sharp tranients    Events--------------- none    Seizures------------- none    Impression:  abnormal as above    Plan - as/neurology    
FULL REPORT IN RESULTS SECTION
[facility]   COMPREHENSIVE EPILEPSY CENTER   REPORT OF ROUTINE EEG     [facility]: [address] [place] [address] [address], [place] [address], Ph#: [phone]  [facility]: [id number] [place] [id number], Ph#: [phone]  Office: [address] [place] [address], [facility] [address], [place] [address] Ph#: [phone]    Patient Name: JOSE MITCHELL  Age and : 57y (65)  MRN #: [id number]  Location: [address]  Referring Physician: Kieran Holman    Study Date: 22    _____________________________________________________________  TECHNICAL INFORMATION    Placement and Labeling of Electrodes:  The EEG was performed utilizing 20 channels referential EEG connections (coronal over temporal over parasagittal montage) using all standard 10-20 electrode placements with EKG.  Recording was at a sampling rate of 256 samples per second per channel. Shaheen and seizure detection algorithms were utilized.    _____________________________________________________________  HISTORY    Patient is a 57y old  Female who presents with a chief complaint of RLE wound (12 Sep 2022 15:26)    PERTINENT MEDICATION:  MEDICATIONS  (STANDING):  amLODIPine   Tablet 10 milliGRAM(s) Oral daily  atorvastatin 80 milliGRAM(s) Oral at bedtime  aztreonam  IVPB 2000 milliGRAM(s) IV Intermittent every 12 hours  calcium acetate 1334 milliGRAM(s) Oral three times a day with meals  caspofungin IVPB      ceftazidime/avibactam IVPB 0.94 Gram(s) IV Intermittent every 12 hours  chlorhexidine 0.12% Liquid 15 milliLiter(s) Oral Mucosa every 12 hours  chlorhexidine 2% Cloths 1 Application(s) Topical <User Schedule>  cloNIDine 0.2 milliGRAM(s) Oral every 8 hours  dextrose 5%. 1000 milliLiter(s) (100 mL/Hr) IV Continuous <Continuous>  dextrose 5%. 1000 milliLiter(s) (50 mL/Hr) IV Continuous <Continuous>  dextrose 50% Injectable 25 Gram(s) IV Push once  dextrose 50% Injectable 12.5 Gram(s) IV Push once  dextrose 50% Injectable 25 Gram(s) IV Push once  fentaNYL   Infusion. 0.5 MICROgram(s)/kG/Hr (4.87 mL/Hr) IV Continuous <Continuous>  furosemide   Injectable 80 milliGRAM(s) IV Push every 12 hours  glucagon  Injectable 1 milliGRAM(s) IntraMuscular once  hydrALAZINE 100 milliGRAM(s) Oral every 8 hours  insulin lispro (ADMELOG) corrective regimen sliding scale   SubCutaneous three times a day before meals  labetalol 600 milliGRAM(s) Oral three times a day  levETIRAcetam  Solution 1000 milliGRAM(s) Oral at bedtime  lidocaine 1%/epinephrine 1:100,000 Inj 20 milliLiter(s) Local Injection once  linezolid  IVPB      midazolam Infusion 0.02 mG/kG/Hr (1.95 mL/Hr) IV Continuous <Continuous>  multivitamin 1 Tablet(s) Oral daily  norepinephrine Infusion 0.05 MICROgram(s)/kG/Min (9.12 mL/Hr) IV Continuous <Continuous>  pantoprazole  Injectable 40 milliGRAM(s) IV Push two times a day  predniSONE   Tablet 40 milliGRAM(s) Oral daily  sevelamer carbonate Powder 2400 milliGRAM(s) Enteral Tube every 8 hours  sodium chloride 0.65% Nasal 2 Spray(s) Both Nostrils two times a day    _____________________________________________________________  STUDY INTERPRETATION    Findings: The background was continuous, spontaneously variable and reactive.     Background predominantly consisted of theta and delta activities. No posterior dominant rhythm seen.    Focal Slowing:   None were present.    Sleep Background:  Drowsiness and stage II sleep transients were not recorded.    Other Non-Epileptiform Findings:  None were present.    Interictal Epileptiform Activity:   None were present.    Events:  Clinical events: None recorded.  Seizures: None recorded.    Activation Procedures:   Hyperventilation was not performed.    Photic stimulation was not performed.     Artifacts:  Intermittent myogenic and movement artifacts were noted.    ECG:  The heart rate on single channel ECG was predominantly between 60-70 BPM.    _____________________________________________________________  EEG SUMMARY/CLASSIFICATION     Abnormal EEG in the awake, drowsy and asleep states.    - Moderate generalized slowing.    _____________________________________________________________  EEG IMPRESSION/CLINICAL CORRELATE    Abnormal EEG study.  Moderate nonspecific diffuse or multifocal cerebral dysfunction.   No epileptiform pattern or seizure seen.        Karyn Matthews MD  Epilepsy Attending, [facility]

## 2022-09-24 NOTE — PROVIDER CONTACT NOTE (OTHER) - SITUATION
Pt has been febrile on & off all day per documented VS. New axillary temp of 101.6 noted with recent tylenol dose given @ approx 16.30
Patient noted with ulcer on CIPRIANO lip
Patient's current /104, HR 80. No signs of distress.
Patient biting ETT and fighting ventilator, bite block in place.
Pt is not on any DVT prophylaxis.
Pt. b/p 200/98 HR 65. On cardiac monitor NSR. No s/s of chest pain distres or discomfort

## 2022-09-24 NOTE — PROGRESS NOTE ADULT - ASSESSMENT
1)  Severe oliguric ALF likely ischemic ATN.  Has been HD dependant, had HD Friday 4 L UF     2)  Severe HTN, overall improved     3)  Bleeding from trach s/p bronchoscopy x2 with clots removed / DIC panel drawn   Lt Hemithorax complete opacification, dropping O2 sats      Recommendations:    1)  HD was yesterday 3hrs HD via [place], Optiflux dialyzer, 2K+ bath, tolerated 4L UF    2)  Profuse bleeding from trach lungs - ANCA neg, LUIS FELIPE low titer 1:80  on steroids, s/p cryoprecipitate, s/p bronchoscopy    3)  Repeat phos level;  add calcium acetate 2 tabs TID /  monitor calcium level, maintain iCa > 1     will follow

## 2022-09-24 NOTE — CHART NOTE - NSCHARTNOTEFT_GEN_A_CORE
Patient became bradycardic to 30bpm at 11:15pm, 1mg of epinephrine was given but patient continued to be bradycardic and went into asystole, CPR was started; 1mg of epinephrine given, ROSC achieved after 7minutes of CPR. Patient became bradycardic to 30bpm at 11:15pm, 1mg of epinephrine was given but patient continued to be bradycardic and went into asystole, CPR was started; 1mg of epinephrine given, ROSC achieved after 7minutes of CPR.  Updated patient's son Latrell over phone. Patient became bradycardic to 30bpm at 11:15pm, 1mg of atropine was given but patient continued to be bradycardic and went into asystole, CPR was started; 1mg of epinephrine given, ROSC achieved after 7minutes of CPR.  Updated patient's son Latrell over phone.

## 2022-09-24 NOTE — PROVIDER CONTACT NOTE (OTHER) - BACKGROUND
Pt has bleeding issues, and right LE wound
Dx Uncontrol Hypertension
Patient intubated, no sedation ordered at this time. Administered fentanyl 50mcgs IV, not resolved.
Patient admitted for uncontrolled HTN and seizures

## 2022-09-24 NOTE — PROCEDURE NOTE - NSBRONCHPROCDETAILS_GEN_A_CORE_FT
DATE OF PROCEDURE: 9/24/2022 7:30 am    PREOPERATIVE DIAGNOSIS: mucus plug.left lung collapse    POSTOPERATIVE DIAGNOSES: mucus plug      PROCEDURE PERFORMED:  Bronchoscopy,  brushing and washing.         Attending: Roro burch         ASSISTANT: RT         ANESTHESIA:    CONSENT: After explanining the risk and benefit the consent was obtained from gabriel sandhu      The patient had been previously intubated and was on mechanical ventilatory support. She was not on sedation, 4 mg of versed and 50mcg of fentanyl was given. Her FiO2 was increased to 100% during the procedure. The fiberoptic bronchoscope was introduced through an endotracheal tube adaptor and the tip of the endotracheal tube was noted to be in good position above the jenaro.   The Right tracheobronchial tree was patent,   The Left tracheobronchial had clot that were suctioned on multiple attempts, epinephrine was not used as there was no signs of active bleeding.   The procedure was completed.  After adequate clearing of secretions was accomplished, the bronchoscope was removed from the patient and the procedure was ended.   The patient tolerated the procedure well and there were no complications.

## 2022-09-24 NOTE — PROGRESS NOTE ADULT - SUBJECTIVE AND OBJECTIVE BOX
Patient is seen and examined at the bed side, is afebrile.      REVIEW OF SYSTEMS: All other review systems are negative      ALLERGIES: No Known Allergies        ICU Vital Signs Last 24 Hrs  T(C): 36.2 (24 Sep 2022 15:44), Max: 36.3 (24 Sep 2022 05:01)  T(F): 97.1 (24 Sep 2022 15:44), Max: 97.3 (24 Sep 2022 05:01)  HR: 70 (24 Sep 2022 07:30) (60 - 77)  BP: 164/83 (24 Sep 2022 07:30) (125/72 - 181/86)  BP(mean): 116 (24 Sep 2022 07:30) (94 - 123)  ABP: --  ABP(mean): --  RR: 30 (24 Sep 2022 13:00) (15 - 36)  SpO2: 100% (24 Sep 2022 07:30) (90% - 100%)    O2 Parameters below as of 24 Sep 2022 07:30  Patient On (Oxygen Delivery Method): ventilator    O2 Concentration (%): 60        PHYSICAL EXAM:  GENERAL: Not in distress   CHEST/LUNG:  Aire ntry bilaterally  HEART: s1 and s2 present  ABDOMEN:  Nontender and  Nondistended  EXTREMITIES: No pedal  edema  CNS: Awake and Alert      LABS:                        8.2    25.21 )-----------( 105      ( 24 Sep 2022 18:10 )             25.9     09-24    133<L>  |  92<L>  |  47<H>  ----------------------------<  205<H>  4.4   |  25  |  2.9<H>    Ca    7.7<L>      24 Sep 2022 10:27  Phos  6.0     09-24  Mg     2.2     09-24    TPro  5.4<L>  /  Alb  3.1<L>  /  TBili  0.2  /  DBili  x   /  AST  311<H>  /  ALT  35  /  AlkPhos  218<H>  09-24    PT/INR - ( 24 Sep 2022 10:25 )   PT: 12.10 sec;   INR: 1.05 ratio         PTT - ( 24 Sep 2022 10:25 )  PTT:34.4 sec    CAPILLARY BLOOD GLUCOSE      POCT Blood Glucose.: 303 mg/dL (24 Sep 2022 18:38)  POCT Blood Glucose.: 194 mg/dL (24 Sep 2022 11:32)  POCT Blood Glucose.: 135 mg/dL (24 Sep 2022 05:43)  POCT Blood Glucose.: 156 mg/dL (23 Sep 2022 23:48)    ABG - ( 24 Sep 2022 03:20 )  pH, Arterial: 7.47  pH, Blood: x     /  pCO2: 40    /  pO2: 79    / HCO3: 29    / Base Excess: 5.0   /  SaO2: 97.7          MEDICATIONS  (STANDING):  amLODIPine   Tablet 10 milliGRAM(s) Oral daily  atorvastatin 80 milliGRAM(s) Oral at bedtime  aztreonam  IVPB 2000 milliGRAM(s) IV Intermittent every 12 hours  calcium acetate 1334 milliGRAM(s) Oral three times a day with meals  caspofungin IVPB      ceftazidime/avibactam IVPB 0.94 Gram(s) IV Intermittent every 12 hours  chlorhexidine 0.12% Liquid 15 milliLiter(s) Oral Mucosa every 12 hours  chlorhexidine 2% Cloths 1 Application(s) Topical <User Schedule>  cloNIDine 0.2 milliGRAM(s) Oral every 8 hours  fentaNYL   Infusion. 0.5 MICROgram(s)/kG/Hr (4.87 mL/Hr) IV Continuous <Continuous>  furosemide   Injectable 80 milliGRAM(s) IV Push every 12 hours  glucagon  Injectable 1 milliGRAM(s) IntraMuscular once  hydrALAZINE 100 milliGRAM(s) Oral every 8 hours  insulin lispro (ADMELOG) corrective regimen sliding scale   SubCutaneous three times a day before meals  labetalol 600 milliGRAM(s) Oral three times a day  levETIRAcetam  Solution 1000 milliGRAM(s) Oral at bedtime  lidocaine 1%/epinephrine 1:100,000 Inj 20 milliLiter(s) Local Injection once  linezolid  IVPB      midazolam Infusion 0.02 mG/kG/Hr (1.95 mL/Hr) IV Continuous <Continuous>  multivitamin 1 Tablet(s) Oral daily  norepinephrine Infusion 0.05 MICROgram(s)/kG/Min (9.12 mL/Hr) IV Continuous <Continuous>  pantoprazole  Injectable 40 milliGRAM(s) IV Push two times a day  predniSONE   Tablet 40 milliGRAM(s) Oral daily  sevelamer carbonate Powder 2400 milliGRAM(s) Enteral Tube every 8 hours  sodium chloride 0.65% Nasal 2 Spray(s) Both Nostrils two times a day        RADIOLOGY & ADDITIONAL TESTS:               Patient is seen and examined at the bed side, is afebrile. She remains iniubated/vented, s/p repeat Bronchoscopy today, 9/24/22, and tolerated the procedure well.       REVIEW OF SYSTEMS: Unable to obtain due to mental status      ALLERGIES: No Known Allergies        ICU Vital Signs Last 24 Hrs  T(C): 36.2 (24 Sep 2022 15:44), Max: 36.3 (24 Sep 2022 05:01)  T(F): 97.1 (24 Sep 2022 15:44), Max: 97.3 (24 Sep 2022 05:01)  HR: 70 (24 Sep 2022 07:30) (60 - 77)  BP: 164/83 (24 Sep 2022 07:30) (125/72 - 181/86)  BP(mean): 116 (24 Sep 2022 07:30) (94 - 123)  ABP: --  ABP(mean): --  RR: 30 (24 Sep 2022 13:00) (15 - 36)  SpO2: 100% (24 Sep 2022 07:30) (90% - 100%)    O2 Parameters below as of 24 Sep 2022 07:30  Patient On (Oxygen Delivery Method): ventilator    O2 Concentration (%): 60        PHYSICAL EXAM:  GENERAL: Not in distress , vented  CHEST/LUNG:  Not using accessory muscles  HEART: s1 and s2 present  ABDOMEN:  Nontender and  Nondistended  EXTREMITIES: No pedal  edema  CNS: vented       LABS:                        8.2    25.21 )-----------( 105      ( 24 Sep 2022 18:10 )             25.9       09-24    133<L>  |  92<L>  |  47<H>  ----------------------------<  205<H>  4.4   |  25  |  2.9<H>    Ca    7.7<L>      24 Sep 2022 10:27  Phos  6.0     09-24  Mg     2.2     09-24    TPro  5.4<L>  /  Alb  3.1<L>  /  TBili  0.2  /  DBili  x   /  AST  311<H>  /  ALT  35  /  AlkPhos  218<H>  09-24    PT/INR - ( 24 Sep 2022 10:25 )   PT: 12.10 sec;   INR: 1.05 ratio         PTT - ( 24 Sep 2022 10:25 )  PTT:34.4 sec    CAPILLARY BLOOD GLUCOSE      POCT Blood Glucose.: 303 mg/dL (24 Sep 2022 18:38)  POCT Blood Glucose.: 194 mg/dL (24 Sep 2022 11:32)  POCT Blood Glucose.: 135 mg/dL (24 Sep 2022 05:43)  POCT Blood Glucose.: 156 mg/dL (23 Sep 2022 23:48)    ABG - ( 24 Sep 2022 03:20 )  pH, Arterial: 7.47  pH, Blood: x     /  pCO2: 40    /  pO2: 79    / HCO3: 29    / Base Excess: 5.0   /  SaO2: 97.7          MEDICATIONS  (STANDING):  amLODIPine   Tablet 10 milliGRAM(s) Oral daily  atorvastatin 80 milliGRAM(s) Oral at bedtime  aztreonam  IVPB 2000 milliGRAM(s) IV Intermittent every 12 hours  calcium acetate 1334 milliGRAM(s) Oral three times a day with meals  caspofungin IVPB      ceftazidime/avibactam IVPB 0.94 Gram(s) IV Intermittent every 12 hours  chlorhexidine 0.12% Liquid 15 milliLiter(s) Oral Mucosa every 12 hours  chlorhexidine 2% Cloths 1 Application(s) Topical <User Schedule>  cloNIDine 0.2 milliGRAM(s) Oral every 8 hours  fentaNYL   Infusion. 0.5 MICROgram(s)/kG/Hr (4.87 mL/Hr) IV Continuous <Continuous>  furosemide   Injectable 80 milliGRAM(s) IV Push every 12 hours  glucagon  Injectable 1 milliGRAM(s) IntraMuscular once  hydrALAZINE 100 milliGRAM(s) Oral every 8 hours  insulin lispro (ADMELOG) corrective regimen sliding scale   SubCutaneous three times a day before meals  labetalol 600 milliGRAM(s) Oral three times a day  levETIRAcetam  Solution 1000 milliGRAM(s) Oral at bedtime  lidocaine 1%/epinephrine 1:100,000 Inj 20 milliLiter(s) Local Injection once  linezolid  IVPB      midazolam Infusion 0.02 mG/kG/Hr (1.95 mL/Hr) IV Continuous <Continuous>  multivitamin 1 Tablet(s) Oral daily  norepinephrine Infusion 0.05 MICROgram(s)/kG/Min (9.12 mL/Hr) IV Continuous <Continuous>  pantoprazole  Injectable 40 milliGRAM(s) IV Push two times a day  predniSONE   Tablet 40 milliGRAM(s) Oral daily  sevelamer carbonate Powder 2400 milliGRAM(s) Enteral Tube every 8 hours  sodium chloride 0.65% Nasal 2 Spray(s) Both Nostrils two times a day        RADIOLOGY & ADDITIONAL TESTS:    < from: Xray Chest 1 View-PORTABLE IMMEDIATE (Xray Chest 1 View-PORTABLE IMMEDIATE .) (09.24.22 @ 09:50) >  Improvementin left hemithorax opacification.      < from: Xray Chest 1 View- PORTABLE-Routine (Xray Chest 1 View- PORTABLE-Routine in AM.) (09.24.22 @ 05:29) >  Complete opacification of the left hemithorax.        MICROBIOLOGY DATA:    MRSA/MSSA PCR (09.22.22 @ 12:11)   MRSA PCR Result.: Positive:     Culture - Acid Fast - Bronchial w/Smear (09.22.22 @ 11:00)   Specimen Source: BAL None   Acid Fast Bacilli Smear: No acid fast bacilli seen by fluorochrome stain   Culture Results: Culture is being performed.     Culture - Bronchial (09.22.22 @ 11:00)   Gram Stain: No polymorphonuclear leukocytes seen per low power field   No Squamous epithelial cells seen per low power field   No organisms seen Specimen Source: Lavage None   Culture Results: No growth at 48 hours     COVID-19 PCR . (09.20.22 @ 07:00)   COVID-19 PCR: Detected

## 2022-09-24 NOTE — PROGRESS NOTE ADULT - SUBJECTIVE AND OBJECTIVE BOX
GENERAL SURGERY PROGRESS NOTE    Patient: JOSE MITCHELL , 57y ([birth year])Female   MRN: [id number]  Location: [facility] 3I- 1  Visit: 08-02-22 Inpatient  Date: 09-24-22 @ 09:41    Procedure/Dx/Injuries: S/P trach revision and removal and intraop bronchoscopy     Events of past 24 hours: No acute events. Patient received another bronch today by pulmonology due to collapse of left lung.     PAST MEDICAL & SURGICAL HISTORY:  Hypertension      Diabetes mellitus      No significant past surgical history          Vitals:   T(F): 97.3 (09-24-22 @ 07:10), Max: 97.3 (09-24-22 @ 05:01)  HR: 70 (09-24-22 @ 07:30)  BP: 164/83 (09-24-22 @ 07:30)  RR: 20 (09-24-22 @ 07:30)  SpO2: 100% (09-24-22 @ 07:30)  Mode: AC/ CMV (Assist Control/ Continuous Mandatory Ventilation), RR (machine): 16, TV (machine): 360, FiO2: 60, PEEP: 5, ITime: 0.9, MAP: 6, PIP: 10    Diet, NPO with Tube Feed:   Tube Feeding Modality: Gastrostomy  Nepro with Carb Steady  Total Volume for 24 Hours (mL): 720  Continuous  Starting Tube Feed Rate mL per Hour: 30  Until Goal Tube Feed Rate (mL per Hour): 30  Tube Feed Duration (in Hours): 24  Tube Feed Start Time: 10:00  Beneprotein ([facility] Only)     Qty per Day:  5      Fluids:     I & O's:    09-23-22 @ 07:01  -  09-24-22 @ 07:00  --------------------------------------------------------  IN:    Cryoprecipitate: 121 mL    Enteral Tube Flush: 200 mL    Nepro with Carb Steady: 660 mL  Total IN: 981 mL    OUT:    Indwelling Catheter - Urethral (mL): 55 mL    Other (mL): 4000 mL  Total OUT: 4055 mL    Total NET: -3074 mL    PHYSICAL EXAM   General: AAOx0. intubated and on vent.   HEENT: Trach site packed with 2 surgicell along with 1 4x4.   Respiratory: On vent.   Abdomen: Soft, non-distended, non-tender, no rebound, no guarding. PEG in place   Neuro: unable to complete as patient is nonverbal and AxOx0.   Skin: Warm/dry, normal color, no jaundice          MEDICATIONS  (STANDING):  amLODIPine   Tablet 10 milliGRAM(s) Oral daily  atorvastatin 80 milliGRAM(s) Oral at bedtime  chlorhexidine 0.12% Liquid 15 milliLiter(s) Oral Mucosa every 12 hours  chlorhexidine 2% Cloths 1 Application(s) Topical <User Schedule>  cloNIDine 0.2 milliGRAM(s) Oral every 8 hours  dextrose 5%. 1000 milliLiter(s) (100 mL/Hr) IV Continuous <Continuous>  dextrose 5%. 1000 milliLiter(s) (50 mL/Hr) IV Continuous <Continuous>  dextrose 50% Injectable 25 Gram(s) IV Push once  dextrose 50% Injectable 12.5 Gram(s) IV Push once  dextrose 50% Injectable 25 Gram(s) IV Push once  fentaNYL   Infusion. 0.5 MICROgram(s)/kG/Hr (4.87 mL/Hr) IV Continuous <Continuous>  furosemide   Injectable 80 milliGRAM(s) IV Push every 12 hours  glucagon  Injectable 1 milliGRAM(s) IntraMuscular once  hydrALAZINE 100 milliGRAM(s) Oral every 8 hours  insulin lispro (ADMELOG) corrective regimen sliding scale   SubCutaneous three times a day before meals  labetalol 600 milliGRAM(s) Oral three times a day  levETIRAcetam  Solution 1000 milliGRAM(s) Oral at bedtime  lidocaine 1%/epinephrine 1:100,000 Inj 20 milliLiter(s) Local Injection once  multivitamin 1 Tablet(s) Oral daily  pantoprazole  Injectable 40 milliGRAM(s) IV Push two times a day  predniSONE   Tablet 40 milliGRAM(s) Oral daily  sevelamer carbonate Powder 2400 milliGRAM(s) Enteral Tube every 8 hours  sodium chloride 0.65% Nasal 2 Spray(s) Both Nostrils two times a day    MEDICATIONS  (PRN):  acetaminophen     Tablet .. 650 milliGRAM(s) Oral every 6 hours PRN Temp greater or equal to 38C (100.4F), Mild Pain (1 - 3)  dextrose Oral Gel 15 Gram(s) Oral once PRN Blood Glucose LESS THAN 70 milliGRAM(s)/deciliter  fentaNYL    Injectable 50 MICROGram(s) IV Push every 6 hours PRN agitation  labetalol Injectable 10 milliGRAM(s) IV Push every 6 hours PRN Systolic blood pressure >  melatonin 3 milliGRAM(s) Oral at bedtime PRN Insomnia      DVT PROPHYLAXIS:   GI PROPHYLAXIS: pantoprazole  Injectable 40 milliGRAM(s) IV Push two times a day    ANTICOAGULATION:   ANTIBIOTICS:            LAB/STUDIES:  Labs:  CAPILLARY BLOOD GLUCOSE      POCT Blood Glucose.: 135 mg/dL (24 Sep 2022 05:43)  POCT Blood Glucose.: 156 mg/dL (23 Sep 2022 23:48)  POCT Blood Glucose.: 201 mg/dL (23 Sep 2022 17:49)  POCT Blood Glucose.: 186 mg/dL (23 Sep 2022 11:43)                          7.9    22.30 )-----------( 91       ( 24 Sep 2022 06:08 )             23.3       Auto Neutrophil %: 87.4 % (09-24-22 @ 06:08)  Auto Immature Granulocyte %: 0.6 % (09-24-22 @ 06:08)    09-24    139  |  95<L>  |  44<H>  ----------------------------<  124<H>  3.6   |  25  |  2.7<H>      Calcium, Total Serum: 7.9 mg/dL (09-24-22 @ 06:08)      LFTs:             5.2  | 0.3  | 285      ------------------[232     ( 24 Sep 2022 06:08 )  3.0  | x    | 32          Lipase:x      Amylase:x         Blood Gas Arterial, Lactate: 1.10 mmol/L (09-24-22 @ 03:20)  Blood Gas Arterial, Lactate: 0.90 mmol/L (09-23-22 @ 03:58)  Blood Gas Arterial, Lactate: 0.60 mmol/L (09-22-22 @ 15:45)  Blood Gas Arterial, Lactate: 1.10 mmol/L (09-22-22 @ 03:16)    ABG - ( 24 Sep 2022 03:20 )  pH: 7.47  /  pCO2: 40    /  pO2: 79    / HCO3: 29    / Base Excess: 5.0   /  SaO2: 97.7            ABG - ( 23 Sep 2022 03:58 )  pH: 7.50  /  pCO2: 31    /  pO2: 211   / HCO3: 24    / Base Excess: 1.3   /  SaO2: 100.0           ABG - ( 22 Sep 2022 15:45 )  pH: 7.46  /  pCO2: 39    /  pO2: 85    / HCO3: 28    / Base Excess: 3.6   /  SaO2: 97.9              Coags:     11.80  ----< 1.03    ( 23 Sep 2022 18:10 )     54.2                    Culture - Fungal, Bronchial (collected 22 Sep 2022 11:00)  Source: BAL None  Preliminary Report (23 Sep 2022 07:44):    Testing in progress    Culture - Acid Fast - Bronchial w/Smear (collected 22 Sep 2022 11:00)  Source: BAL None    Culture - Bronchial (collected 22 Sep 2022 11:00)  Source: Lavage None  Gram Stain (23 Sep 2022 07:21):    No polymorphonuclear leukocytes seen per low power field    No Squamous epithelial cells seen per low power field    No organisms seen  Preliminary Report (23 Sep 2022 20:12):    No growth

## 2022-09-24 NOTE — PROCEDURE NOTE - NSICDXIRLAUNCH_GEN_ALL_CORE
<---Click to Launch ICDx for Pre-Op and Post-Op

## 2022-09-24 NOTE — PROGRESS NOTE ADULT - SUBJECTIVE AND OBJECTIVE BOX
Nephrology progress note    Patient is seen and examined, events over the last 24 h noted .  Had HD yesterday   UF 4 L   Bleeding from ET tube, DIC panel drawn  Allergies:  No Known Allergies    Hospital Medications:   MEDICATIONS  (STANDING):  amLODIPine   Tablet 10 milliGRAM(s) Oral daily  atorvastatin 80 milliGRAM(s) Oral at bedtime  chlorhexidine 0.12% Liquid 15 milliLiter(s) Oral Mucosa every 12 hours  chlorhexidine 2% Cloths 1 Application(s) Topical <User Schedule>  cloNIDine 0.2 milliGRAM(s) Oral every 8 hours  dextrose 5%. 1000 milliLiter(s) (100 mL/Hr) IV Continuous <Continuous>  dextrose 5%. 1000 milliLiter(s) (50 mL/Hr) IV Continuous <Continuous>  dextrose 50% Injectable 25 Gram(s) IV Push once  dextrose 50% Injectable 12.5 Gram(s) IV Push once  dextrose 50% Injectable 25 Gram(s) IV Push once  fentaNYL   Infusion. 0.5 MICROgram(s)/kG/Hr (4.87 mL/Hr) IV Continuous <Continuous>  furosemide   Injectable 80 milliGRAM(s) IV Push every 12 hours  glucagon  Injectable 1 milliGRAM(s) IntraMuscular once  hydrALAZINE 100 milliGRAM(s) Oral every 8 hours  insulin lispro (ADMELOG) corrective regimen sliding scale   SubCutaneous three times a day before meals  labetalol 600 milliGRAM(s) Oral three times a day  levETIRAcetam  Solution 1000 milliGRAM(s) Oral at bedtime  lidocaine 1%/epinephrine 1:100,000 Inj 20 milliLiter(s) Local Injection once  midazolam Infusion 0.02 mG/kG/Hr (1.95 mL/Hr) IV Continuous <Continuous>  midazolam Injectable 4 milliGRAM(s) IV Push once  multivitamin 1 Tablet(s) Oral daily  pantoprazole  Injectable 40 milliGRAM(s) IV Push two times a day  predniSONE   Tablet 40 milliGRAM(s) Oral daily  sevelamer carbonate Powder 2400 milliGRAM(s) Enteral Tube every 8 hours  sodium chloride 0.65% Nasal 2 Spray(s) Both Nostrils two times a day        VITALS:  T(F): 97.3 (09-24-22 @ 07:10), Max: 97.3 (09-24-22 @ 05:01)  HR: 70 (09-24-22 @ 07:30)  BP: 164/83 (09-24-22 @ 07:30)  RR: 15 (09-24-22 @ 09:00)  SpO2: 100% (09-24-22 @ 07:30)  Wt(kg): --    09-22 @ 07:01  -  09-23 @ 07:00  --------------------------------------------------------  IN: 1693 mL / OUT: 25 mL / NET: 1668 mL    09-23 @ 07:01  -  09-24 @ 07:00  --------------------------------------------------------  IN: 981 mL / OUT: 4055 mL / NET: -3074 mL    09-24 @ 07:01  -  09-24 @ 10:44  --------------------------------------------------------  IN: 90 mL / OUT: 10 mL / NET: 80 mL          PHYSICAL EXAM:  Constitutional: On MV  Respiratory: BS b/l+  Cardiovascular: S1, S2, RRR  Gastrointestinal: BS+, soft  Extremities: +peripheral edema  Neurological: sedated  : + nolasco.   Skin: No rashes  Vascular Access:    LABS:  09-24    139  |  95<L>  |  44<H>  ----------------------------<  124<H>  3.6   |  25  |  2.7<H>    Ca    7.9<L>      24 Sep 2022 06:08  Mg     2.1     09-24    TPro  5.2<L>  /  Alb  3.0<L>  /  TBili  0.3  /  DBili      /  AST  285<H>  /  ALT  32  /  AlkPhos  232<H>  09-24                          8.1    25.44 )-----------( 102      ( 24 Sep 2022 10:25 )             25.1       Urine Studies:      RADIOLOGY & ADDITIONAL STUDIES:  < from: Xray Chest 1 View- PORTABLE-Routine (Xray Chest 1 View- PORTABLE-Routine in AM.) (09.24.22 @ 05:29) >  Impression:  Complete opacification of the left hemithorax.    < end of copied text >

## 2022-09-24 NOTE — PROVIDER CONTACT NOTE (OTHER) - ACTION/TREATMENT ORDERED:
Awaiting orders-
MD Castellano made aware and instructed to administer Labetalol PRN as order. Hydralazine 10mg ivpush was order.
ARLET WONG made aware. Will order versed 2mg IVP.
No DVT prophylaxis is indicted at this point. No new orders.
Nifedepine ordered

## 2022-09-24 NOTE — PROVIDER CONTACT NOTE (OTHER) - DATE AND TIME:
10-Aug-2022 15:00
03-Sep-2022 10:40
24-Sep-2022 02:00
30-Aug-2022 20:05
22-Aug-2022 21:30
24-Sep-2022 03:00

## 2022-09-24 NOTE — PROCEDURAL SAFETY CHECKLIST WITH OR WITHOUT SEDATION - NSPREVERIFYSED_GEN_ALL_CORE
EXAM DESCRIPTION:  CHEST PA/LATERAL



IMAGES COMPLETED DATE/TIME:  4/8/2020 7:52 am



REASON FOR STUDY:  WHEEZING



COMPARISON:  3/9/2015



EXAM PARAMETERS:  NUMBER OF VIEWS: two views

TECHNIQUE: Digital Frontal and Lateral radiographic views of the chest acquired.

RADIATION DOSE: NA

LIMITATIONS: none



FINDINGS:  LUNGS AND PLEURA: No consolidation or effusions.  Calcified granuloma in the right base is
 unchanged.  No effusions.  No pneumothorax.

MEDIASTINUM AND HILAR STRUCTURES: No masses or contour abnormalities.

HEART AND VASCULAR STRUCTURES: Heart normal size.  No evidence for failure.

BONES: No acute findings.

HARDWARE: None in the chest.

OTHER: No other significant finding.



IMPRESSION:  NO SIGNIFICANT RADIOGRAPHIC FINDING IN THE CHEST.



TECHNICAL DOCUMENTATION:  JOB ID:  [id number]

 2011 [employer]- All Rights Reserved



Reading location - IP/workstation name: [email or web address]
done
unable
done
done

## 2022-09-24 NOTE — CHART NOTE - NSCHARTNOTEFT_GEN_A_CORE
Called by RN that patient continues to bleed from mouth and ET tube and saturation dropping. FiO2 increased to 100% and PEEP at 10, patient satting at 93% on max vent settings. Stat CBC and DIC panel sent. 1U PRBC ordered. Discussed with intensivist on call - will give PRBC, FFP/Platelets as needed based on Stat labs.   Updated patient's son Latrell about the worsening respiratory status and continued bleeding, explained to him the poor prognosis and possibility of deterioration, as well as poor chances of success if CPR is performed.  Son wishes to continue full medical management and CPR if needed.

## 2022-09-24 NOTE — PROCEDURAL SAFETY CHECKLIST WITH OR WITHOUT SEDATION - NSPREPROCEDSEDAT_GEN_ALL_CORE
with sedation
with sedation
fentanyl push/with sedation
Fentanyl 50mcg IVP/Versed 5mg IVP/with sedation

## 2022-09-25 NOTE — PROGRESS NOTE ADULT - PROVIDER SPECIALTY LIST ADULT
Burn
CT Surgery
CT Surgery
Cardiology
Critical Care
Electrophysiology
Gastroenterology
Hospitalist
Infectious Disease
Infectious Disease
Internal Medicine
MICU
Nephrology
Neurology
Nutrition Support
Pulmonology
Pulmonology
Thoracic Surgery
Thoracic Surgery
Burn
CT Surgery
ENT
Hospitalist
Infectious Disease
Internal Medicine
MICU
Nephrology
Neurology
Palliative Care
Pulmonology
Pulmonology
Thoracic Surgery
CT Surgery
Critical Care
Gastroenterology
Hospitalist
Infectious Disease
Internal Medicine
MICU
Nephrology
Neurology
Nutrition Support
Pulmonology
Cardiology
Critical Care
Critical Care
Gastroenterology
Internal Medicine
Neurology
Pulmonology
Surgery
Hospitalist
Infectious Disease
Internal Medicine
Palliative Care
Hospitalist
Infectious Disease
Internal Medicine
Hospitalist
Internal Medicine
Internal Medicine
Infectious Disease
Internal Medicine

## 2022-09-25 NOTE — CHART NOTE - NSCHARTNOTESELECT_GEN_ALL_CORE
Electrophysiology PA
Electrophysiology PA Note
Event Note
Gastroenterology/Event Note
Neuroendovascular
Neuroendovascular/Event Note
Neuroendovascular/Event Note
Palliative Care SW Initial Assessment/Event Note
Palliative Care SW Note/Event Note
Sign Off/Event Note
Transfer Note
Code blue/Event Note
Electrophysiology PA Note
Electrophysiology PA Note
Event Note
Follow-Up/Event Note
Neuroendovascular
Neurovascular to Medicine/Transfer Note
Neurovascular/Event Note
Palliative Care SW Note/Event Note
Registered Dietitian Follow-Up/Event Note
Registered Dietitian Follow-Up/Event Note
Transfer Note
post op check/Event Note

## 2022-09-25 NOTE — DISCHARGE NOTE FOR THE EXPIRED PATIENT - HOSPITAL COURSE
Patient's time of death was on her 55th day of hospitalization. Patient initially came in with lower extremity cellulitis and was admitted to the floor. Her hospitalization was complicated by acute hypoxic respiratory failure requiring intubation. She was upgraded to ICU because of this and then ultimately had a tracheostomy placed. She began bleeding from her tracheostomy site, CT surgery team was at bedside and took her to OR emergently on 9/21, and attempted to cauterize and control bleed. They removed tracheostomy and reinserted intubation tube orally. Since then patient continued to bleed and she had multiple cardiac arrest, with each time the team suctioning out big blood clots from her ETT and reachieving ROSC. Today she had 7 cardiac arrest events total.

## 2022-09-25 NOTE — DISCHARGE NOTE FOR THE EXPIRED PATIENT - SECONDARY DIAGNOSIS.
Uncontrolled hypertension Uncontrolled diabetes mellitus with hyperglycemia Tracheostomy complication Leg wound, right

## 2022-09-25 NOTE — PROGRESS NOTE ADULT - ASSESSMENT
Impression:    Acute respiratory failure SP oral intubation  Tracheostomy bleed SP revision by Surgery  Acute blood loss anemia SP 2U overnight (6U, 3FFP, 2plt since 9/20)  Suspected GI bleed SP EGD and colonoscopy  Altered MS suspected vasculitis SP pulse steroids  LLE cellulitis  ALF oliguric SP RRT, dialyzed yesterday  Bleeding from [place] site SP removal    COVID 19 infection   E [place] Bacteremia resolved   Thrombocytopenia, r/o DIC vs HIT  HFpEF  HO multiple CVAs      PLAN:     CNS;   Prednisone 60mg per Neuro until tomorrow, wean to 40mg if Neuro agreeable.    Neuro FU.  FU MS.       HEENT: Oral care.  Trach care.   ETT care.    CT SX FU.    PULMONARY:  HOB @ 45 degrees.  Aspiration precautions.     no need repeat bronch today   continue O2 as necessary to maintain sats > 90%<94  no other changes in vent  possible change back to trach tomorrow      CARDIOVASCULAR:   Avoid volume overload.  BP control.    GI: GI prophylaxis.  PEG feeds.  Bowel regimen if needed.    RENAL:  follow up lytes.  Correct as needed.   Hd today Renal FU.    INFECTIOUS DISEASE:  Possible ABX, MRSA nares.    ID FU.    Procalcitonin.  1.23  Panculture.    Send DTA.    HEMATOLOGICAL:  DIC negative.  Check HIT panel.    Monitor CBC and coags.  Groin doppler negative.      ENDOCRINE:  Follow up FS.  Insulin protocol if needed.  TSH 0.86, transient?  Check TH.    MUSCULOSKELETAL:  Bed rest.  Wound care per burn     Very poor overall prognosis  CODE STATUS: Full code.  Palliative FU.    DISPO: Monitor in MICU Impression:    Acute respiratory failure SP oral intubation  Tracheostomy bleed SP revision by Surgery  Acute blood loss anemia SP 2U overnight (6U, 3FFP, 2plt since 9/20)  Suspected GI bleed SP EGD and colonoscopy  Altered MS suspected vasculitis SP pulse steroids  LLE cellulitis  ALF oliguric SP RRT, dialyzed yesterday  Bleeding from [place] site SP removal    COVID 19 infection   E [place] Bacteremia resolved   Thrombocytopenia, r/o DIC vs HIT  HFpEF  HO multiple CVAs      PLAN:     CNS;   Prednisone 60mg daily  Neuro FU.  FU MS.       HEENT: Oral care.  Trach care.   ETT care.    CT SX FU. urgent today    PULMONARY:  HOB @ 45 degrees.  Aspiration precautions.     no need repeat bronch today   continue O2 as necessary to maintain sats > 90%<94  no other changes in vent        CARDIOVASCULAR:   Avoid volume overload.  BP control.    GI: GI prophylaxis.  PEG feeds.  Bowel regimen if needed.    RENAL:  follow up lytes.  Correct as needed.   Hd today Renal FU.    INFECTIOUS DISEASE:  Possible ABX, MRSA nares.    ID FU.   Procalcitonin.  1.23  Panculture.    Send DTA.    HEMATOLOGICAL:  DIC negative.    Check HIT panel.    Monitor CBC and coags.    redose DDAVP today      ENDOCRINE:  Follow up FS.  Insulin protocol if needed.  TSH 0.86, transient?  Check TH.    MUSCULOSKELETAL:  Bed rest.  Wound care per burn     Very poor overall prognosis  CODE STATUS: Full code.  Palliative FU.    DISPO: Monitor in MICU    The patient is critically ill with a high probability of imminent or life threatening deterioration.   Impression:    Acute respiratory failure SP oral intubation  Tracheostomy bleed SP revision by Surgery  Acute blood loss anemia SP 2U overnight (6U, 3FFP, 2plt since 9/20)  Suspected GI bleed SP EGD and colonoscopy  Altered MS suspected vasculitis SP pulse steroids  LLE cellulitis  ALF oliguric SP RRT, dialyzed yesterday  Bleeding from [place] site SP removal    COVID 19 infection   E [place] Bacteremia resolved   Thrombocytopenia, r/o DIC vs HIT  HFpEF  HO multiple CVAs      PLAN:     CNS;   Prednisone 60mg daily  Neuro FU.  FU MS.       HEENT: Oral care.  Trach care.   ETT care.    CT SX FU. urgent today    PULMONARY:  HOB @ 45 degrees.  Aspiration precautions.     no need repeat bronch today   continue O2 as necessary to maintain sats > 90%<94  no other changes in vent        CARDIOVASCULAR:   Avoid volume overload.  BP control.    GI: GI prophylaxis.  PEG feeds.  Bowel regimen if needed.    RENAL:  follow up lytes.  Correct as needed.   Hd today Renal FU.    INFECTIOUS DISEASE:  Possible ABX, MRSA nares.    ID FU.   Procalcitonin.  1.23  Panculture.    Send DTA.    HEMATOLOGICAL:  DIC negative.    HIT panel negative   Monitor CBC and coags.    redose DDAVP today      ENDOCRINE:  Follow up FS.  Insulin protocol if needed.  TSH 0.86, transient?  Check TH.    MUSCULOSKELETAL:  Bed rest.  Wound care per burn     Very poor overall prognosis  CODE STATUS: Full code.  Palliative FU.    DISPO: Monitor in MICU    The patient is critically ill with a high probability of imminent or life threatening deterioration.

## 2022-09-25 NOTE — PROGRESS NOTE ADULT - SUBJECTIVE AND OBJECTIVE BOX
Patient is a 57y old  Female who presents with a chief complaint of RLE wound (12 Sep 2022 15:26)        HPI:  55 yo F with PMH of HTN, DM2, and stroke (per son 2017) who presented for RLE wound. Started 3 months ago when she fell and hit her leg on a wooden cabinet. She put hydrogen peroxide, antibiotic cream, and wrapped the wound but never fully healed. Two weeks ago it started to show yellow pus so her and her family came to the ED for further treatment. Endorsed subjective fevers, chest pain, and vision changes which occurs when walked 2-3 blocks. Denied congestion, sore throat, cough, dyspnea, headache, dysuria, N/V, diarrhea     Per son, they fill her medications at [facility] ([phone]) and this is their current pharmacy. Called them to confirm her medications and they said her last refill of medications was 2018. Pt has not seen a doctor due to insurance problem and has not been taking any medications.    In the ED:  Vitals: T: 98.9, BP: 296/ 174, , RR: 18, 98%O2 on RA  Labs: CBC showed WBC 11.23; ESR 92, CRP 35.6  CMP showed glucose 302, alk phos 173; ; VBG pH 7.45  EKG pending  CXR showed borderline cardiomegaly and no airspace opacity.   X-ray of RLE also pending.     Received unasyn and vanco, humalin R, IV vasotec, IV labetalol   (02 Aug 2022 07:47)      Pt evaluated on rounds.  I reviewed the radiology tests and hospital record.    I reviewed previous notes on this patient.    Interval Events: No overnight events.      CAM:    SAT:    SBT:      REVIEW OF SYSTEMS:   see HPI      OBJECTIVE:  ICU Vital Signs Last 24 Hrs  T(C): 36.2 (25 Sep 2022 03:00), Max: 36.3 (24 Sep 2022 07:10)  T(F): 97.1 (25 Sep 2022 03:00), Max: 97.3 (24 Sep 2022 07:10)  HR: 70 (25 Sep 2022 04:30) (66 - 103)  BP: 142/82 (25 Sep 2022 04:30) (102/77 - 298/158)  BP(mean): 107 (25 Sep 2022 04:30) (84 - 216)  ABP: --  ABP(mean): --  RR: 16 (25 Sep 2022 04:30) (9 - 130)  SpO2: 99% (25 Sep 2022 04:30) (82% - 100%)    O2 Parameters below as of 25 Sep 2022 03:00  Patient On (Oxygen Delivery Method): ventilator    O2 Concentration (%): 100      Mode: AC/ CMV (Assist Control/ Continuous Mandatory Ventilation), RR (machine): 16, TV (machine): 360, FiO2: 100, PEEP: 10, MAP: 12, PIP: 19    09-23 @ 07:01  -  09-24 @ 07:00  --------------------------------------------------------  IN: 981 mL / OUT: 4055 mL / NET: -3074 mL    09-24 @ 07:01  -  09-25 @ 05:44  --------------------------------------------------------  IN: 1717 mL / OUT: 30 mL / NET: 1687 mL      CAPILLARY BLOOD GLUCOSE      POCT Blood Glucose.: 155 mg/dL (25 Sep 2022 05:33)        PHYSICAL EXAM:       · ENMT:   Airway patent,   Nasal mucosa clear.  Mouth with normal mucosa.   No thrush    · EYES:   Clear bilaterally,   pupils equal,   round and reactive to light.    · CARDIAC:   Normal rate,   regular rhythm.    Heart sounds S1, S2.   No murmurs, no rubs or gallops on auscultation  no edema        CAROTID:   normal systolic impulse  no bruits    · RESPIRATORY:   rales  normal chest expansion  no retractions or use of accessory muscles  percussion of chest demonstrates no hyperresonance or dullness    · GASTROINTESTINAL:  Abdomen soft,   non-tender,   + BS  liver/spleen not palpable    · MUSCULOSKELETAL:   no clubbing, cyanosis      · SKIN:   Skin normal color for race,   warm, dry   No evidence of rash.        · HEME LYMPH:   no splenomegaly.  No cervical  lymphadenopathy.  no inguinal lymphadenopathy    HOSPITAL MEDICATIONS:  MEDICATIONS  (STANDING):  amLODIPine   Tablet 10 milliGRAM(s) Oral daily  atorvastatin 80 milliGRAM(s) Oral at bedtime  aztreonam  IVPB 2000 milliGRAM(s) IV Intermittent every 12 hours  calcium acetate 1334 milliGRAM(s) Oral three times a day with meals  caspofungin IVPB 50 milliGRAM(s) IV Intermittent every 24 hours  caspofungin IVPB      ceftazidime/avibactam IVPB 0.94 Gram(s) IV Intermittent every 12 hours  chlorhexidine 0.12% Liquid 15 milliLiter(s) Oral Mucosa every 12 hours  chlorhexidine 2% Cloths 1 Application(s) Topical <User Schedule>  cloNIDine 0.2 milliGRAM(s) Oral every 8 hours  dextrose 5%. 1000 milliLiter(s) (100 mL/Hr) IV Continuous <Continuous>  dextrose 5%. 1000 milliLiter(s) (50 mL/Hr) IV Continuous <Continuous>  dextrose 50% Injectable 25 Gram(s) IV Push once  dextrose 50% Injectable 12.5 Gram(s) IV Push once  dextrose 50% Injectable 25 Gram(s) IV Push once  fentaNYL   Infusion. 0.5 MICROgram(s)/kG/Hr (4.87 mL/Hr) IV Continuous <Continuous>  furosemide   Injectable 80 milliGRAM(s) IV Push every 12 hours  glucagon  Injectable 1 milliGRAM(s) IntraMuscular once  hydrALAZINE 100 milliGRAM(s) Oral every 8 hours  insulin lispro (ADMELOG) corrective regimen sliding scale   SubCutaneous three times a day before meals  labetalol 600 milliGRAM(s) Oral three times a day  levETIRAcetam  Solution 1000 milliGRAM(s) Oral at bedtime  lidocaine 1%/epinephrine 1:100,000 Inj 20 milliLiter(s) Local Injection once  linezolid  IVPB 600 milliGRAM(s) IV Intermittent every 12 hours  linezolid  IVPB      midazolam Infusion 0.02 mG/kG/Hr (1.95 mL/Hr) IV Continuous <Continuous>  multivitamin 1 Tablet(s) Oral daily  norepinephrine Infusion 0.05 MICROgram(s)/kG/Min (9.12 mL/Hr) IV Continuous <Continuous>  pantoprazole  Injectable 40 milliGRAM(s) IV Push two times a day  predniSONE   Tablet 40 milliGRAM(s) Oral daily  sevelamer carbonate Powder 2400 milliGRAM(s) Enteral Tube every 8 hours  sodium chloride 0.65% Nasal 2 Spray(s) Both Nostrils two times a day    MEDICATIONS  (PRN):  acetaminophen     Tablet .. 650 milliGRAM(s) Oral every 6 hours PRN Temp greater or equal to 38C (100.4F), Mild Pain (1 - 3)  dextrose Oral Gel 15 Gram(s) Oral once PRN Blood Glucose LESS THAN 70 milliGRAM(s)/deciliter  fentaNYL    Injectable 50 MICROGram(s) IV Push every 6 hours PRN agitation  labetalol Injectable 10 milliGRAM(s) IV Push every 6 hours PRN Systolic blood pressure >  melatonin 3 milliGRAM(s) Oral at bedtime PRN Insomnia    sodium chloride 0.9%.: Solution, 1000 milliLiter(s) infuse at 100 mL/Hr  Provider's Contact #: [phone]  sodium chloride 0.9%.: Solution, 1000 milliLiter(s) infuse at 100 mL/Hr, Stop After 10 Hours      LABS:                        8.2    25.21 )-----------( 105      ( 24 Sep 2022 18:10 )             25.9     09-24    140  |  95<L>  |  54<H>  ----------------------------<  309<H>  4.3   |  29  |  2.9<H>    Ca    7.6<L>      24 Sep 2022 18:10  Phos  6.0     09-24  Mg     2.3     09-24    TPro  5.5<L>  /  Alb  3.1<L>  /  TBili  0.3  /  DBili  x   /  AST  334<H>  /  ALT  52<H>  /  AlkPhos  232<H>  09-24    PT/INR - ( 24 Sep 2022 10:25 )   PT: 12.10 sec;   INR: 1.05 ratio         PTT - ( 24 Sep 2022 10:25 )  PTT:34.4 sec    Arterial Blood Gas:  09-25 @ 03:23  7.47/45/225/33/99.5/8.3  ABG lactate: --  Arterial Blood Gas:  09-24 @ 03:20  7.47/40/79/29/97.7/5.0  ABG lactate: --      Mode: AC/ CMV (Assist Control/ Continuous Mandatory Ventilation), RR (machine): 16, TV (machine): 360, FiO2: 100, PEEP: 10, MAP: 12, PIP: 19      COVID-19 PCR: Detected (20 Sep 2022 07:00)  COVID-19 PCR: Detected (13 Sep 2022 18:48)  COVID-19 PCR: NotDetec (31 Aug 2022 11:58)  COVID-19 PCR: NotDetec (26 Aug 2022 09:40)  COVID-19 PCR: NotDetec (21 Aug 2022 02:34)  COVID-19 PCR: NotDetec (18 Aug 2022 17:36)  COVID-19 PCR: NotDetec (15 Aug 2022 15:41)  COVID-19 PCR: NotDetec (11 Aug 2022 13:22)  COVID-19 PCR: NotDetec (08 Aug 2022 05:14)  COVID-19 PCR: NotDetec (02 Aug 2022 01:40)    Mode: AC/ CMV (Assist Control/ Continuous Mandatory Ventilation)  RR (machine): 16  TV (machine): 360  FiO2: 100  PEEP: 10  MAP: 12  PIP: 19      ABG - ( 25 Sep 2022 03:23 )  pH, Arterial: 7.47  pH, Blood: x     /  pCO2: 45    /  pO2: 225   / HCO3: 33    / Base Excess: 8.3   /  SaO2: 99.5                RADIOLOGY: Today I personally interpreted the latest CXR and other pertinent films.               Patient is a 57y old  Female who presents with a chief complaint of RLE wound (12 Sep 2022 15:26)        HPI:  57 yo F with PMH of HTN, DM2, and stroke (per son 2017) who presented for RLE wound. Started 3 months ago when she fell and hit her leg on a wooden cabinet. She put hydrogen peroxide, antibiotic cream, and wrapped the wound but never fully healed. Two weeks ago it started to show yellow pus so her and her family came to the ED for further treatment. Endorsed subjective fevers, chest pain, and vision changes which occurs when walked 2-3 blocks. Denied congestion, sore throat, cough, dyspnea, headache, dysuria, N/V, diarrhea     Per son, they fill her medications at [facility] ([phone]) and this is their current pharmacy. Called them to confirm her medications and they said her last refill of medications was 2018. Pt has not seen a doctor due to insurance problem and has not been taking any medications.    In the ED:  Vitals: T: 98.9, BP: 296/ 174, , RR: 18, 98%O2 on RA  Labs: CBC showed WBC 11.23; ESR 92, CRP 35.6  CMP showed glucose 302, alk phos 173; ; VBG pH 7.45  EKG pending  CXR showed borderline cardiomegaly and no airspace opacity.   X-ray of RLE also pending.     Received unasyn and vanco, humalin R, IV vasotec, IV labetalol   (02 Aug 2022 07:47)      Pt evaluated on rounds.  I reviewed the radiology tests and hospital record.    I reviewed previous notes on this patient.    Interval Events: Cardiac arrest  last PM.  Significant bleeding around trach and from ETT      CAM:+    SAT:n    SBT:n      REVIEW OF SYSTEMS:   see HPI      OBJECTIVE:  ICU Vital Signs Last 24 Hrs  T(C): 36.2 (25 Sep 2022 03:00), Max: 36.3 (24 Sep 2022 07:10)  T(F): 97.1 (25 Sep 2022 03:00), Max: 97.3 (24 Sep 2022 07:10)  HR: 70 (25 Sep 2022 04:30) (66 - 103)  BP: 142/82 (25 Sep 2022 04:30) (102/77 - 298/158)  BP(mean): 107 (25 Sep 2022 04:30) (84 - 216)  ABP: --  ABP(mean): --  RR: 16 (25 Sep 2022 04:30) (9 - 130)  SpO2: 99% (25 Sep 2022 04:30) (82% - 100%)    O2 Parameters below as of 25 Sep 2022 03:00  Patient On (Oxygen Delivery Method): ventilator    O2 Concentration (%): 100      Mode: AC/ CMV (Assist Control/ Continuous Mandatory Ventilation), RR (machine): 16, TV (machine): 360, FiO2: 100, PEEP: 10, MAP: 12, PIP: 19    09-23 @ 07:01  -  09-24 @ 07:00  --------------------------------------------------------  IN: 981 mL / OUT: 4055 mL / NET: -3074 mL    09-24 @ 07:01  -  09-25 @ 05:44  --------------------------------------------------------  IN: 1717 mL / OUT: 30 mL / NET: 1687 mL      CAPILLARY BLOOD GLUCOSE      POCT Blood Glucose.: 155 mg/dL (25 Sep 2022 05:33)        PHYSICAL EXAM:       · ENMT:   Airway patent,   Nasal mucosa clear.  Mouth with normal mucosa.   No thrush    · EYES:   Clear bilaterally,   pupils equal,   round and reactive to light.    · CARDIAC:   Normal rate,   regular rhythm.    Heart sounds S1, S2.   No murmurs, no rubs or gallops on auscultation  no edema        CAROTID:   normal systolic impulse  no bruits    · RESPIRATORY:   rales  normal chest expansion  no retractions or use of accessory muscles  percussion of chest demonstrates no hyperresonance or dullness    · GASTROINTESTINAL:  Abdomen soft,   non-tender,   + BS  liver/spleen not palpable    · MUSCULOSKELETAL:   no clubbing, cyanosis      · SKIN:   Skin normal color for race,   warm, dry   No evidence of rash.        · HEME LYMPH:   no splenomegaly.  No cervical  lymphadenopathy.  no inguinal lymphadenopathy    HOSPITAL MEDICATIONS:  MEDICATIONS  (STANDING):  amLODIPine   Tablet 10 milliGRAM(s) Oral daily  atorvastatin 80 milliGRAM(s) Oral at bedtime  aztreonam  IVPB 2000 milliGRAM(s) IV Intermittent every 12 hours  calcium acetate 1334 milliGRAM(s) Oral three times a day with meals  caspofungin IVPB 50 milliGRAM(s) IV Intermittent every 24 hours  caspofungin IVPB      ceftazidime/avibactam IVPB 0.94 Gram(s) IV Intermittent every 12 hours  chlorhexidine 0.12% Liquid 15 milliLiter(s) Oral Mucosa every 12 hours  chlorhexidine 2% Cloths 1 Application(s) Topical <User Schedule>  cloNIDine 0.2 milliGRAM(s) Oral every 8 hours  dextrose 5%. 1000 milliLiter(s) (100 mL/Hr) IV Continuous <Continuous>  dextrose 5%. 1000 milliLiter(s) (50 mL/Hr) IV Continuous <Continuous>  dextrose 50% Injectable 25 Gram(s) IV Push once  dextrose 50% Injectable 12.5 Gram(s) IV Push once  dextrose 50% Injectable 25 Gram(s) IV Push once  fentaNYL   Infusion. 0.5 MICROgram(s)/kG/Hr (4.87 mL/Hr) IV Continuous <Continuous>  furosemide   Injectable 80 milliGRAM(s) IV Push every 12 hours  glucagon  Injectable 1 milliGRAM(s) IntraMuscular once  hydrALAZINE 100 milliGRAM(s) Oral every 8 hours  insulin lispro (ADMELOG) corrective regimen sliding scale   SubCutaneous three times a day before meals  labetalol 600 milliGRAM(s) Oral three times a day  levETIRAcetam  Solution 1000 milliGRAM(s) Oral at bedtime  lidocaine 1%/epinephrine 1:100,000 Inj 20 milliLiter(s) Local Injection once  linezolid  IVPB 600 milliGRAM(s) IV Intermittent every 12 hours  linezolid  IVPB      midazolam Infusion 0.02 mG/kG/Hr (1.95 mL/Hr) IV Continuous <Continuous>  multivitamin 1 Tablet(s) Oral daily  norepinephrine Infusion 0.05 MICROgram(s)/kG/Min (9.12 mL/Hr) IV Continuous <Continuous>  pantoprazole  Injectable 40 milliGRAM(s) IV Push two times a day  predniSONE   Tablet 40 milliGRAM(s) Oral daily  sevelamer carbonate Powder 2400 milliGRAM(s) Enteral Tube every 8 hours  sodium chloride 0.65% Nasal 2 Spray(s) Both Nostrils two times a day    MEDICATIONS  (PRN):  acetaminophen     Tablet .. 650 milliGRAM(s) Oral every 6 hours PRN Temp greater or equal to 38C (100.4F), Mild Pain (1 - 3)  dextrose Oral Gel 15 Gram(s) Oral once PRN Blood Glucose LESS THAN 70 milliGRAM(s)/deciliter  fentaNYL    Injectable 50 MICROGram(s) IV Push every 6 hours PRN agitation  labetalol Injectable 10 milliGRAM(s) IV Push every 6 hours PRN Systolic blood pressure >  melatonin 3 milliGRAM(s) Oral at bedtime PRN Insomnia    sodium chloride 0.9%.: Solution, 1000 milliLiter(s) infuse at 100 mL/Hr  Provider's Contact #: [phone]  sodium chloride 0.9%.: Solution, 1000 milliLiter(s) infuse at 100 mL/Hr, Stop After 10 Hours      LABS:                        8.2    25.21 )-----------( 105      ( 24 Sep 2022 18:10 )             25.9     09-24    140  |  95<L>  |  54<H>  ----------------------------<  309<H>  4.3   |  29  |  2.9<H>    Ca    7.6<L>      24 Sep 2022 18:10  Phos  6.0     09-24  Mg     2.3     09-24    TPro  5.5<L>  /  Alb  3.1<L>  /  TBili  0.3  /  DBili  x   /  AST  334<H>  /  ALT  52<H>  /  AlkPhos  232<H>  09-24    PT/INR - ( 24 Sep 2022 10:25 )   PT: 12.10 sec;   INR: 1.05 ratio         PTT - ( 24 Sep 2022 10:25 )  PTT:34.4 sec    Arterial Blood Gas:  09-25 @ 03:23  7.47/45/225/33/99.5/8.3  ABG lactate: --  Arterial Blood Gas:  09-24 @ 03:20  7.47/40/79/29/97.7/5.0  ABG lactate: --      Mode: AC/ CMV (Assist Control/ Continuous Mandatory Ventilation), RR (machine): 16, TV (machine): 360, FiO2: 100, PEEP: 10, MAP: 12, PIP: 19      COVID-19 PCR: Detected (20 Sep 2022 07:00)  COVID-19 PCR: Detected (13 Sep 2022 18:48)  COVID-19 PCR: NotDetec (31 Aug 2022 11:58)  COVID-19 PCR: NotDetec (26 Aug 2022 09:40)  COVID-19 PCR: NotDetec (21 Aug 2022 02:34)  COVID-19 PCR: NotDetec (18 Aug 2022 17:36)  COVID-19 PCR: NotDetec (15 Aug 2022 15:41)  COVID-19 PCR: NotDetec (11 Aug 2022 13:22)  COVID-19 PCR: NotDetec (08 Aug 2022 05:14)  COVID-19 PCR: NotDetec (02 Aug 2022 01:40)    Mode: AC/ CMV (Assist Control/ Continuous Mandatory Ventilation)  RR (machine): 16  TV (machine): 360  FiO2: 100  PEEP: 10  MAP: 12  PIP: 19      ABG - ( 25 Sep 2022 03:23 )  pH, Arterial: 7.47  pH, Blood: x     /  pCO2: 45    /  pO2: 225   / HCO3: 33    / Base Excess: 8.3   /  SaO2: 99.5                RADIOLOGY: Today I personally interpreted the latest CXR and other pertinent films.

## 2022-09-25 NOTE — PROGRESS NOTE ADULT - SUBJECTIVE AND OBJECTIVE BOX
S; Called to see pt who is s/p code blue x 4 yesterday -->CPR x4 --> ROSC achieved; 1 unit PRBC yday as well.  Per RN, pt had been bleeding from trach site and mouth overnight w/compression dressing applied to trach area - now with no active bleeding. Pt appears to be bleeding from oral cavity due to tongue biting  -bite guard in place. S/P bronchoscopy yday w/removal of mucus plug  O; Vital Signs Last 24 Hrs  T(C): 36.3 (25 Sep 2022 08:15), Max: 36.3 (25 Sep 2022 07:10)  T(F): 97.3 (25 Sep 2022 08:15), Max: 97.3 (25 Sep 2022 07:10)  HR: 68 (25 Sep 2022 08:15) (66 - 103)  BP: 136/79 (25 Sep 2022 08:15) (102/77 - 298/158)  BP(mean): 102 (25 Sep 2022 08:15) (84 - 216)  RR: 19 (25 Sep 2022 08:15) (9 - 130)  SpO2: 99% (25 Sep 2022 08:15) (82% - 100%)    Parameters below as of 25 Sep 2022 08:15  Patient On (Oxygen Delivery Method): ventilator    O2 Concentration (%): 100    MEDICATIONS  (STANDING):  amLODIPine   Tablet 10 milliGRAM(s) Oral daily  atorvastatin 80 milliGRAM(s) Oral at bedtime  aztreonam  IVPB 2000 milliGRAM(s) IV Intermittent every 12 hours  calcium acetate 1334 milliGRAM(s) Oral three times a day with meals  caspofungin IVPB      caspofungin IVPB 50 milliGRAM(s) IV Intermittent every 24 hours  ceftazidime/avibactam IVPB 0.94 Gram(s) IV Intermittent every 12 hours  chlorhexidine 0.12% Liquid 15 milliLiter(s) Oral Mucosa every 12 hours  chlorhexidine 2% Cloths 1 Application(s) Topical <User Schedule>  cloNIDine 0.2 milliGRAM(s) Oral every 8 hours  desmopressin IVPB 25 MICROGram(s) IV Intermittent once  dextrose 5%. 1000 milliLiter(s) (100 mL/Hr) IV Continuous <Continuous>  dextrose 5%. 1000 milliLiter(s) (50 mL/Hr) IV Continuous <Continuous>  dextrose 50% Injectable 25 Gram(s) IV Push once  dextrose 50% Injectable 12.5 Gram(s) IV Push once  dextrose 50% Injectable 25 Gram(s) IV Push once  fentaNYL   Infusion. 0.5 MICROgram(s)/kG/Hr (4.87 mL/Hr) IV Continuous <Continuous>  furosemide   Injectable 80 milliGRAM(s) IV Push every 12 hours  glucagon  Injectable 1 milliGRAM(s) IntraMuscular once  hydrALAZINE 100 milliGRAM(s) Oral every 8 hours  insulin lispro (ADMELOG) corrective regimen sliding scale   SubCutaneous three times a day before meals  labetalol 600 milliGRAM(s) Oral three times a day  levETIRAcetam  Solution 1000 milliGRAM(s) Oral at bedtime  lidocaine 1%/epinephrine 1:100,000 Inj 20 milliLiter(s) Local Injection once  linezolid  IVPB 600 milliGRAM(s) IV Intermittent every 12 hours  linezolid  IVPB      midazolam Infusion 0.02 mG/kG/Hr (1.95 mL/Hr) IV Continuous <Continuous>  multivitamin 1 Tablet(s) Oral daily  norepinephrine Infusion 0.05 MICROgram(s)/kG/Min (9.12 mL/Hr) IV Continuous <Continuous>  pantoprazole  Injectable 40 milliGRAM(s) IV Push two times a day  predniSONE   Tablet 40 milliGRAM(s) Oral daily  sevelamer carbonate Powder 2400 milliGRAM(s) Enteral Tube every 8 hours  sodium chloride 0.65% Nasal 2 Spray(s) Both Nostrils two times a day    MEDICATIONS  (PRN):  acetaminophen     Tablet .. 650 milliGRAM(s) Oral every 6 hours PRN Temp greater or equal to 38C (100.4F), Mild Pain (1 - 3)  dextrose Oral Gel 15 Gram(s) Oral once PRN Blood Glucose LESS THAN 70 milliGRAM(s)/deciliter  fentaNYL    Injectable 50 MICROGram(s) IV Push every 6 hours PRN agitation  labetalol Injectable 10 milliGRAM(s) IV Push every 6 hours PRN Systolic blood pressure >  melatonin 3 milliGRAM(s) Oral at bedtime PRN Insomnia      PHYSICAL EXAM:  GENERAL: Intubated   HEENT: bite stick in place w/blood noted in oral cavity (being suctioned); Trach site with compression dressing/packing in place; packing has dried blood, no active bleeding ; neck surrounding site is firm  CHEST/LUNG: Clear to auscultation bilaterally  HEART: Regular rate and rhythm  ABDOMEN: Soft, Nontender, Nondistended;   EXTREMITIES:  No clubbing, cyanosis, or edema      LABS:  Labs:  CAPILLARY BLOOD GLUCOSE      POCT Blood Glucose.: 155 mg/dL (25 Sep 2022 05:33)  POCT Blood Glucose.: 303 mg/dL (24 Sep 2022 18:38)  POCT Blood Glucose.: 194 mg/dL (24 Sep 2022 11:32)                          6.8    21.62 )-----------( 86       ( 25 Sep 2022 07:22 )             19.5       Auto Neutrophil %: 87.0 % (09-25-22 @ 07:22)  Auto Immature Granulocyte %: 1.0 % (09-25-22 @ 07:22)  Auto Neutrophil %: 88.7 % (09-24-22 @ 10:25)  Auto Immature Granulocyte %: 0.7 % (09-24-22 @ 10:25)    09-25    135  |  90<L>  |  59<H>  ----------------------------<  138<H>  3.5   |  30  |  3.0<H>      Calcium, Total Serum: 7.2 mg/dL (09-25-22 @ 07:22)      LFTs:             4.8  | 0.4  | 281      ------------------[163     ( 25 Sep 2022 07:22 )  2.5  | x    | 42          Lipase:x      Amylase:x         Blood Gas Arterial, Lactate: 0.90 mmol/L (09-25-22 @ 03:23)  Lactate, Blood: 1.5 mmol/L (09-24-22 @ 18:10)  Blood Gas Arterial, Lactate: 1.10 mmol/L (09-24-22 @ 03:20)  Blood Gas Arterial, Lactate: 0.90 mmol/L (09-23-22 @ 03:58)  Blood Gas Arterial, Lactate: 0.60 mmol/L (09-22-22 @ 15:45)    ABG - ( 25 Sep 2022 03:23 )  pH: 7.47  /  pCO2: 45    /  pO2: 225   / HCO3: 33    / Base Excess: 8.3   /  SaO2: 99.5            ABG - ( 24 Sep 2022 03:20 )  pH: 7.47  /  pCO2: 40    /  pO2: 79    / HCO3: 29    / Base Excess: 5.0   /  SaO2: 97.7            ABG - ( 23 Sep 2022 03:58 )  pH: 7.50  /  pCO2: 31    /  pO2: 211   / HCO3: 24    / Base Excess: 1.3   /  SaO2: 100.0             Coags:     12.10  ----< 1.05    ( 24 Sep 2022 10:25 )     34.4        Culture - Fungal, Bronchial (collected 22 Sep 2022 11:00)  Source: BAL None  Preliminary Report (23 Sep 2022 07:44):    Testing in progress    Culture - Acid Fast - Bronchial w/Smear (collected 22 Sep 2022 11:00)  Source: BAL None  Preliminary Report (24 Sep 2022 15:05):    Culture is being performed.    Culture - Bronchial (collected 22 Sep 2022 11:00)  Source: Lavage None  Gram Stain (23 Sep 2022 07:21):    No polymorphonuclear leukocytes seen per low power field    No Squamous epithelial cells seen per low power field    No organisms seen  Final Report (24 Sep 2022 18:10):    No growth at 48 hours    RADIOLOGY & ADDITIONAL STUDIES:  < from: Xray Chest 1 View-PORTABLE IMMEDIATE (Xray Chest 1 View-PORTABLE IMMEDIATE .) (09.24.22 @ 12:34) >  Findings:    Support devices: Endotracheal tube unchanged. Right IJ central venous   catheter unchanged. Loop recorder.    Cardiac/mediastinum/hilum: Within normal limits.    Lung parenchyma/Pleura: The left lung field is now clear. Faint diffuse   opacity of the right lung field may represent a small layering pleural   effusion. No focal airspace consolidation. No pneumothorax.    Skeleton/soft tissues: No interval change.    Impression:  1.  The left lung field is now clear.  2.  Possible small right pleural effusion.    < end of copied text >

## 2022-09-25 NOTE — PROGRESS NOTE ADULT - ASSESSMENT
57y F s/p revision and removal of trach with intraop bronchoscopy due to continuous oozing from trach site.   S/P code blue x 4 yday -->CPR-->ROSC  Bleeding from oral cavity, trach site    PLAN:  - no active bleeding from trach site at this time. Current packing left in place; cont to monitor for any additional bleeding/soaking through of current dressing   - will reassess for possible trach reinsertion at bedside on 9/26/22   - will D/W Dr. Gideon Aceves   Management per ICU team

## 2022-09-25 NOTE — CHART NOTE - NSCHARTNOTEFT_GEN_A_CORE
Patient lost pulses, code blue was called. ROSC achieved after 3 rounds of epinephrine and 1 amp of calcium and bicarbonate, a big blood clot was suctioned out from the patient's ETT. Several minutes later she lost pulses again, 3 rounds coding and epinephrine was given but patient continued to remain pulseless. Time of death 1206.

## 2022-09-25 NOTE — PROGRESS NOTE ADULT - SUBJECTIVE AND OBJECTIVE BOX
SUBJECTIVE:    Patient is a 57y old Female who presents with a chief complaint of RLE wound (12 Sep 2022 15:26)    Currently admitted to medicine with the primary diagnosis of Cellulitis       Today is hospital day 54d. This morning she is resting comfortably in bed and reports no new issues or overnight events.     ROS:     pt intubated and sedated  unable to obtain       PAST MEDICAL & SURGICAL HISTORY  Hypertension    Diabetes mellitus    No significant past surgical history      SOCIAL HISTORY:    ALLERGIES:  No Known Allergies    MEDICATIONS:  STANDING MEDICATIONS  amLODIPine   Tablet 10 milliGRAM(s) Oral daily  atorvastatin 80 milliGRAM(s) Oral at bedtime  aztreonam  IVPB 2000 milliGRAM(s) IV Intermittent every 12 hours  calcium acetate 1334 milliGRAM(s) Oral three times a day with meals  caspofungin IVPB      caspofungin IVPB 50 milliGRAM(s) IV Intermittent every 24 hours  ceftazidime/avibactam IVPB 0.94 Gram(s) IV Intermittent every 12 hours  chlorhexidine 0.12% Liquid 15 milliLiter(s) Oral Mucosa every 12 hours  chlorhexidine 2% Cloths 1 Application(s) Topical <User Schedule>  cloNIDine 0.2 milliGRAM(s) Oral every 8 hours  dextrose 5%. 1000 milliLiter(s) IV Continuous <Continuous>  dextrose 5%. 1000 milliLiter(s) IV Continuous <Continuous>  dextrose 50% Injectable 25 Gram(s) IV Push once  dextrose 50% Injectable 12.5 Gram(s) IV Push once  dextrose 50% Injectable 25 Gram(s) IV Push once  fentaNYL   Infusion. 0.5 MICROgram(s)/kG/Hr IV Continuous <Continuous>  furosemide   Injectable 80 milliGRAM(s) IV Push every 12 hours  glucagon  Injectable 1 milliGRAM(s) IntraMuscular once  hydrALAZINE 100 milliGRAM(s) Oral every 8 hours  insulin lispro (ADMELOG) corrective regimen sliding scale   SubCutaneous three times a day before meals  labetalol 600 milliGRAM(s) Oral three times a day  levETIRAcetam  Solution 1000 milliGRAM(s) Oral at bedtime  lidocaine 1%/epinephrine 1:100,000 Inj 20 milliLiter(s) Local Injection once  linezolid  IVPB 600 milliGRAM(s) IV Intermittent every 12 hours  linezolid  IVPB      midazolam Infusion 0.02 mG/kG/Hr IV Continuous <Continuous>  multivitamin 1 Tablet(s) Oral daily  norepinephrine Infusion 0.05 MICROgram(s)/kG/Min IV Continuous <Continuous>  pantoprazole  Injectable 40 milliGRAM(s) IV Push two times a day  predniSONE   Tablet 40 milliGRAM(s) Oral daily  sevelamer carbonate Powder 2400 milliGRAM(s) Enteral Tube every 8 hours  sodium chloride 0.65% Nasal 2 Spray(s) Both Nostrils two times a day    PRN MEDICATIONS  acetaminophen     Tablet .. 650 milliGRAM(s) Oral every 6 hours PRN  dextrose Oral Gel 15 Gram(s) Oral once PRN  fentaNYL    Injectable 50 MICROGram(s) IV Push every 6 hours PRN  labetalol Injectable 10 milliGRAM(s) IV Push every 6 hours PRN  melatonin 3 milliGRAM(s) Oral at bedtime PRN    VITALS:   T(F): 97.3  HR: 69  BP: 137/77  RR: 19  SpO2: 100%    LABS:                          6.8    21.62 )-----------( 86       ( 25 Sep 2022 07:22 )             19.5     09-25    135  |  90<L>  |  59<H>  ----------------------------<  138<H>  3.5   |  30  |  3.0<H>    Ca    7.2<L>      25 Sep 2022 07:22  Phos  5.5     09-25  Mg     2.2     09-25    TPro  4.8<L>  /  Alb  2.5<L>  /  TBili  0.4  /  DBili  x   /  AST  281<H>  /  ALT  42<H>  /  AlkPhos  163<H>  09-25    PT/INR - ( 24 Sep 2022 10:25 )   PT: 12.10 sec;   INR: 1.05 ratio         PTT - ( 24 Sep 2022 10:25 )  PTT:34.4 sec    ABG - ( 25 Sep 2022 03:23 )  pH, Arterial: 7.47  pH, Blood: x     /  pCO2: 45    /  pO2: 225   / HCO3: 33    / Base Excess: 8.3   /  SaO2: 99.5              Lactate, Blood: 1.5 mmol/L (09-24-22 @ 18:10)      Culture - Blood (collected 23 Sep 2022 22:30)  Source: .Blood Blood-Peripheral  Gram Stain (25 Sep 2022 11:04):    Growth in aerobic bottle: Gram Positive Cocci in Pairs and Chains  Preliminary Report (25 Sep 2022 11:05):    Growth in aerobic bottle: Gram Positive Cocci in Pairs and Chains    ***Blood Panel PCR results on this specimen are available    approximately 3 hours after the Gram stain result.***    Gram stain, PCR, and/or culture results may not always    correspond due to difference in methodologies.    ************************************************************    This PCR assay was performed by multiplex PCR. This    Assay tests for 66 bacterial and resistance gene targets.    Please refer to the [facility] test directory    at [email or web address] for details.        RADIOLOGY:  GEN: intubated with crusted blood at mouth   HEENT: normocephalic, atraumatic, aniceteric  LUNGS: decreased breath sounds bl lower lobes  HEART: S1/S2 present. RRR, no murmurs  ABD: Soft, non-tender, non-distended. Bowel sounds present  EXT: b/l le edema , non pitting   NEURO: does not follow commands, does not respond to pain       ASSESSMENT AND PLAN:    57 y/o woman w PMHx of uncontrolled HTN, DM2, hemorrhagic ?anuerysmal stroke R basal ganglia 2017 w residual L sided weakness, h/o PEG s/p removal, had not seen doctor in >1yr, w/o meds since 2018, presented to ED 8/2/22 for RLE nonhealing wound [id number] and BIB son who noted it that evening, ED triage vitals noted for /170 range.     Admitted for cellulitis, HTN urgency and started on Unasyn, Vanc, insulin drip, IV labetalol, IV vasotec. On 8/5/22 had stroke code w NIHSS 23, for unresponsiveness, concern for seizure w post ictal confusion, found to have ?embolic strokes on MR. S/p debridement of RLE 8/10/22. Developed E cloaca bacteremia, tx w avacaz and aztreonam, has since started on HD. Intubated on 9/6/22 and converted to trach 9/14/22. U[place]all placed ?9/14/22 and started on HD -> [place] removed. S/p bronchoscopy, tracheostomy, and PEG tube placement 9/14/22. Remains vent dependent. Patient oliguric with rising K on 9/20, RIJ udall placed. Patient persistently bleeding from tracheostomy site. Taken for surgery on 9/21 by CT surgery for trach revision, bleeding cauterized and patient intubated orally. On 9/22 bleeding from trach site persistents but decreased. Some oozing from RIJ site. In AM of 9/22, CXR showed L lung collapse and patient under went bronchoscopy with removal of large clots.        s/p bronch 9/23 and 9/24  for L lung white out.     # Cardiac arrest x4 on 9/24/2022, ROSC achieved  # Acute blood loss anemia SP 2U overnight (6U, 3FFP, 2plt since 9/20) - predominantly from trach site  # Bacteremia gram + cocci in pairs and chains 9/24/2022  #Left lung mucus plug sp bronchoscopy 9/24/2022  #Acute respiratory failure SP oral intubation  #Tracheostomy bleed SP revision by Surgery  # Suspected GI bleed SP EGD and colonoscopy  # Altered MS suspected vasculitis SP pulse steroids  # LLE cellulitis sp debridement 8/10/2022  # Oliguric ALF SP RRT, dialyzed 9/24/2022  # Bleeding from [place] site SP removal    # COVID 19 infection   # E Cloacae Bacteremia resolved   # Thrombocytopenia- DIC and HIT panel negative   # HFpEF  # HO multiple CVAs    PLAN:     - CT SX f/u   - trial of DDAVP , previously not responsive  - serial cbc , active type and screen  - Transfuse prbc and repeat cbc   - PPI  - repeat HIT panel negative on 9/22  - On cryo, f/u with heme onc  - c/w steroids/ keppra per neurology  - on HD   - c/w antibiotics per ID, repeat blood cultures, f/u sensitivities   - c/w IV ABX                                                                                # DVT prophylaxis: Holding AC given recent GIB and active current bleed.   # GI prophylaxis: Pantoprazole   # Diet: NPO  # Activity: Bedrest  # Code status: FULL CODE   # Disposition: Acute , poor prognosis       Patient is managed by ICU team

## 2022-09-27 LAB
-  AMPICILLIN: SIGNIFICANT CHANGE UP
-  DAPTOMYCIN: SIGNIFICANT CHANGE UP
-  GENTAMICIN SYNERGY: SIGNIFICANT CHANGE UP
-  LINEZOLID: SIGNIFICANT CHANGE UP
-  STREPTOMYCIN SYNERGY: SIGNIFICANT CHANGE UP
-  VANCOMYCIN: SIGNIFICANT CHANGE UP
CULTURE RESULTS: SIGNIFICANT CHANGE UP
CULTURE RESULTS: SIGNIFICANT CHANGE UP
FUNGITELL: 105 PG/ML — HIGH
FUNGITELL: 138 PG/ML — HIGH
GRAM STN FLD: SIGNIFICANT CHANGE UP
METHOD TYPE: SIGNIFICANT CHANGE UP
SPECIMEN SOURCE: SIGNIFICANT CHANGE UP
SPECIMEN SOURCE: SIGNIFICANT CHANGE UP

## 2022-09-28 LAB
-  FLUCONAZOLE: SIGNIFICANT CHANGE UP
CULTURE RESULTS: SIGNIFICANT CHANGE UP
METHOD TYPE: SIGNIFICANT CHANGE UP
ORGANISM # SPEC MICROSCOPIC CNT: SIGNIFICANT CHANGE UP
SPECIMEN SOURCE: SIGNIFICANT CHANGE UP

## 2022-09-29 ENCOUNTER — APPOINTMENT (OUTPATIENT)
Dept: CARDIOLOGY | Facility: CLINIC | Age: 57
End: 2022-09-29

## 2022-09-29 LAB
CULTURE RESULTS: SIGNIFICANT CHANGE UP
GRAM STN FLD: SIGNIFICANT CHANGE UP

## 2022-09-30 LAB — CULTURE RESULTS: SIGNIFICANT CHANGE UP

## 2022-10-02 DIAGNOSIS — I11.0 HYPERTENSIVE HEART DISEASE WITH HEART FAILURE: ICD-10-CM

## 2022-10-02 DIAGNOSIS — I63.81 OTHER CEREBRAL INFARCTION DUE TO OCCLUSION OR STENOSIS OF SMALL ARTERY: ICD-10-CM

## 2022-10-02 DIAGNOSIS — K29.71 GASTRITIS, UNSPECIFIED, WITH BLEEDING: ICD-10-CM

## 2022-10-02 DIAGNOSIS — J95.01 HEMORRHAGE FROM TRACHEOSTOMY STOMA: ICD-10-CM

## 2022-10-02 DIAGNOSIS — I50.30 UNSPECIFIED DIASTOLIC (CONGESTIVE) HEART FAILURE: ICD-10-CM

## 2022-10-02 DIAGNOSIS — G81.94 HEMIPLEGIA, UNSPECIFIED AFFECTING LEFT NONDOMINANT SIDE: ICD-10-CM

## 2022-10-02 DIAGNOSIS — Z99.11 DEPENDENCE ON RESPIRATOR [VENTILATOR] STATUS: ICD-10-CM

## 2022-10-02 DIAGNOSIS — I47.1 SUPRAVENTRICULAR TACHYCARDIA: ICD-10-CM

## 2022-10-02 DIAGNOSIS — D62 ACUTE POSTHEMORRHAGIC ANEMIA: ICD-10-CM

## 2022-10-02 DIAGNOSIS — I48.0 PAROXYSMAL ATRIAL FIBRILLATION: ICD-10-CM

## 2022-10-02 DIAGNOSIS — R56.9 UNSPECIFIED CONVULSIONS: ICD-10-CM

## 2022-10-02 DIAGNOSIS — I46.9 CARDIAC ARREST, CAUSE UNSPECIFIED: ICD-10-CM

## 2022-10-02 DIAGNOSIS — J96.21 ACUTE AND CHRONIC RESPIRATORY FAILURE WITH HYPOXIA: ICD-10-CM

## 2022-10-02 DIAGNOSIS — N17.0 ACUTE KIDNEY FAILURE WITH TUBULAR NECROSIS: ICD-10-CM

## 2022-10-02 DIAGNOSIS — L03.115 CELLULITIS OF RIGHT LOWER LIMB: ICD-10-CM

## 2022-10-02 DIAGNOSIS — L60.0 INGROWING NAIL: ICD-10-CM

## 2022-10-02 DIAGNOSIS — I96 GANGRENE, NOT ELSEWHERE CLASSIFIED: ICD-10-CM

## 2022-10-02 DIAGNOSIS — I16.0 HYPERTENSIVE URGENCY: ICD-10-CM
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
## 2022-10-02 DIAGNOSIS — R65.20 SEVERE SEPSIS WITHOUT SEPTIC SHOCK: ICD-10-CM

## 2022-10-02 DIAGNOSIS — L85.3 XEROSIS CUTIS: ICD-10-CM

## 2022-10-02 DIAGNOSIS — U07.1 COVID-19: ICD-10-CM

## 2022-10-02 DIAGNOSIS — G93.41 METABOLIC ENCEPHALOPATHY: ICD-10-CM

## 2022-10-02 DIAGNOSIS — I63.111 CEREBRAL INFARCTION DUE TO EMBOLISM OF RIGHT VERTEBRAL ARTERY: ICD-10-CM

## 2022-10-02 DIAGNOSIS — L89.152 PRESSURE ULCER OF SACRAL REGION, STAGE 2: ICD-10-CM

## 2022-10-02 DIAGNOSIS — A41.59 OTHER GRAM-NEGATIVE SEPSIS: ICD-10-CM

## 2022-10-02 DIAGNOSIS — S81.811A LACERATION WITHOUT FOREIGN BODY, RIGHT LOWER LEG, INITIAL ENCOUNTER: ICD-10-CM

## 2022-10-02 DIAGNOSIS — E11.65 TYPE 2 DIABETES MELLITUS WITH HYPERGLYCEMIA: ICD-10-CM

## 2022-10-02 DIAGNOSIS — K13.79 OTHER LESIONS OF ORAL MUCOSA: ICD-10-CM

## 2022-10-02 DIAGNOSIS — K64.4 RESIDUAL HEMORRHOIDAL SKIN TAGS: ICD-10-CM

## 2022-10-15 LAB
CULTURE RESULTS: SIGNIFICANT CHANGE UP
SPECIMEN SOURCE: SIGNIFICANT CHANGE UP

## 2023-09-01 NOTE — PROGRESS NOTE ADULT - ASSESSMENT
ALF rule out ATN / relative hypotension/ sepsis / E cloaca bacteremia / HTN ( was on multiple meds )/ CVA/ GIB  sp HD friday  no need for HD today   on lasix 80mg iv q12h cont non oliguric   work up to date is neg for GN ( LUIS FELIPE DsDNA  neg / RF noted/ SPEP with inflammatory pattern )  no thrombocytopenia   phos noted / increased sevelamer to 3 tabs po q8h/ feed nepro / repeat IP noted/ add phoslo one tab po q8h   overall prognosis poor  will follow      132

## 2024-06-07 NOTE — PROGRESS NOTE ADULT - ASSESSMENT
Dr Luz filled atorvastatin in Feb when Dr Edwards was not available.    Pharmacy is requesting refill today of atorvastatin.    Order sent to Dr Edwards's clinical pool   5 yo F with PMH of HTN, DM2, CVA (2017) who presented with purulent right lower extremity wound for 3 months consistent with acute RLE cellulitis. Code stroke for unresponsiveness at 4pm 8/5    #Episode of unresponsiveness ?Seizure  -CTH negative for acute ICH  -d/w neuro: high likelihood of seizure  -started on IV Keppra  -check labs, lactate  -seizure precautions  -stat EEG  -keep NPO   -IVFs  -close monitoring    #Purulent RLE cellulitis r/o abscess  -s/p unasyn and and vancomycin in ED  -f/u wound and blood cultures   -trend inflammatory markers   -ID consult noted  - vanco, Unasyn  -check Vanco trough before 4th dose (ordered for am)  -burn debridement     #Hypertensive urgency due to noncompliance  -BP at admission 296/174 without signs of end organ damage  -gradual BP lowering    # DMII w/ Hyperglycemia  - started insulin regimen   - FS ac/hs  - f/u A1c    # Atypical Chest pain and FELIZ on admission- possibly MSK related but r/o cardiac etiology   - chest wall tender to palpation on admission  - currently CP free  - CXR unremarkable   - Tn negative  - outpt stress test    # hx of CVA 2017 (Aneurysmal as per family? ?ICH but old ischemic strokes on CTH)  # Left sided weakness  -need to get more Hx  -as per family, aneurysm was never fixed    DVT ppx: lovenox, SCDs  GI ppx: protonix  Diet: DASH/carb consistent  Activity: AAT

## 2024-10-18 NOTE — PATIENT PROFILE ADULT - PATIENT'S GENDER IDENTITY
Report ofr IR percutaneous tube change has been received from [facility], report placed in Dr. Morillo's folder for review and signature.  Noah Mccoy EMT October 18, 2024     
Female

## 2025-05-02 NOTE — SPEECH LANGUAGE PATHOLOGY EVALUATION - H & P REVIEW
Responsibilities: Yes  Prior Level of Assist for Transfers: Needs assistance  Active : No  Occupation: Retired    Cognitive and Communication Status:  Neurologic State: Alert  Orientation Level: Oriented x4  Cognition: Follows commands    Baseline Assessment:  Current Diet : NPO  Current Liquid Diet : NPO  Prior Dysphagia History: denies  Patient Complaint: Great Left toe pain  Hearing: impaired does not wear hearing aids     General:         O2 Device: None (Room air)     Current Diet : NPO  Current Liquid Diet : NPO  Dentition: Dentures top;Dentures bottom  Patient Positioning: Upright in bed    Dysphagia:  Oral Assessment:  Oral Motor   Labial: Left droop  Dentition: Upper & lower dentures  Oral Hygiene: Moist  Lingual: No impairment  Velum: No Impairment  Mandible: No impairment  P.O. Trials:  Consistencies Administered: Regular;Pureed;Thin - straw (bagel, applesauce)      Voice:  wfl      After Treatment:  Patient left in no apparent distress in bed, Call bell left within reach, Nursing notified, Caregiver present, and Updated patient's board with:  diet recommendations and aspiration precautions     Pain:  VAS (numerical) 9/10 great left toe    COMMUNICATION/EDUCATION:   BE FAST acronym for signs/symptoms of CVA and TIA, Swallow safety precautions, Aspiration precautions, GERD precautions, and Diet recommendations education provided to Patient, Family, and Nurse via handout provided , explanation, teach back, and visual, all questions/concerns addressed. Patient verbalizes understanding.    The patient's plan of care including recommendations, planned interventions, and recommended diet changes were discussed with: Occupational therapist and Registered nurse.    Thank you,  Fatou Lai M.S., M.Ed., CCC-SLP  Minutes: 25    
yes
